# Patient Record
Sex: MALE | Race: WHITE | NOT HISPANIC OR LATINO | Employment: FULL TIME | URBAN - METROPOLITAN AREA
[De-identification: names, ages, dates, MRNs, and addresses within clinical notes are randomized per-mention and may not be internally consistent; named-entity substitution may affect disease eponyms.]

---

## 2017-02-17 ENCOUNTER — HOSPITAL ENCOUNTER (EMERGENCY)
Facility: OTHER | Age: 37
Discharge: HOME | End: 2017-02-17
Attending: EMERGENCY MEDICINE
Payer: MEDICAID

## 2017-02-17 DIAGNOSIS — R52 PAIN: ICD-10-CM

## 2017-02-17 DIAGNOSIS — S82.891A ANKLE FRACTURE, RIGHT, CLOSED, INITIAL ENCOUNTER: Primary | ICD-10-CM

## 2017-02-17 PROCEDURE — 29515 APPLICATION SHORT LEG SPLINT: CPT | Mod: RT

## 2017-02-17 PROCEDURE — 99283 EMERGENCY DEPT VISIT LOW MDM: CPT | Mod: 25

## 2017-02-17 RX ORDER — HYDROCODONE BITARTRATE AND ACETAMINOPHEN 5; 325 MG/1; MG/1
1 TABLET ORAL EVERY 6 HOURS PRN
Qty: 12 TABLET | Refills: 0 | Status: SHIPPED | OUTPATIENT
Start: 2017-02-17 | End: 2017-02-22

## 2017-02-17 RX ORDER — IBUPROFEN 200 MG
400 TABLET ORAL EVERY 6 HOURS PRN
Qty: 30 TABLET | Refills: 0
Start: 2017-02-17 | End: 2017-06-13 | Stop reason: ALTCHOICE

## 2017-02-17 NOTE — ED AVS SNAPSHOT
[facility] EMERGENCY DEPARTMENT  [address] [place] [address]               Tony Gordillo   2017  5:31 PM   ED    Description:  Male : 1980   Department:  [facility] Emergency Department           Your Care was Coordinated By:     Provider Role From To    Miguel Thomason MD Attending Provider [id number] --      Reason for Visit     Ankle Pain           Diagnoses this Visit        Comments    Ankle fracture, right, closed, initial encounter    -  Primary     Pain           ED Disposition     ED Disposition Condition Comment    Discharge             To Do List           Follow-up Information     Follow up with [phone]. Schedule an appointment as soon as possible for a visit in 1 week(s).    Contact information:    Orthopedist       These Medications        Disp Refills Start End    ibuprofen (ADVIL,MOTRIN) 200 MG tablet 30 tablet 0 2017     Take 2 tablets (400 mg total) by mouth every 6 (six) hours as needed for Pain. - Oral    [facility] [address] Ph #: [phone]       hydrocodone-acetaminophen 5-325mg (NORCO) 5-325 mg per tablet 12 tablet 0 2017     Take 1 tablet by mouth every 6 (six) hours as needed for Pain. - Oral    [facility] [address] Ph #: [phone]         [facility] On [facility]     [facility] Care [facility] -  Assistance  Registered nurses in the [facility] provide clinical advisement, health education, appointment booking, and other advisory services.  Call for this free service at [phone].             Medications           Message regarding Medications     Verify the changes and/or additions to your medication regime listed below are the same as discussed with your clinician today.  If any of these changes or additions are incorrect, please notify your healthcare provider.        START  "taking these NEW medications        Refills    ibuprofen (ADVIL,MOTRIN) 200 MG tablet 0    Sig: Take 2 tablets (400 mg total) by mouth every 6 (six) hours as needed for Pain.    Class: No Print    Route: Oral    hydrocodone-acetaminophen 5-325mg (NORCO) 5-325 mg per tablet 0    Sig: Take 1 tablet by mouth every 6 (six) hours as needed for Pain.    Class: Print    Route: Oral           Verify that the below list of medications is an accurate representation of the medications you are currently taking.  If none reported, the list may be blank. If incorrect, please contact your healthcare provider. Carry this list with you in case of emergency.           Current Medications     azelastine (ASTELIN) 137 mcg nasal spray 1 spray by Nasal route 2 (two) times daily.    blood sugar diagnostic (GLUCOSE BLOOD) Strp Test glucose 4 x daily Contour meter    diclofenac (CATAFLAM) 50 MG tablet Take 50 mg by mouth 2 (two) times daily.    fenofibrate 160 MG Tab Take 160 mg by mouth once daily.    fish oil-omega-3 fatty acids 300-1,000 mg capsule Take 2 g by mouth once daily.    hydrocodone-acetaminophen 5-325mg (NORCO) 5-325 mg per tablet Take 1 tablet by mouth every 6 (six) hours as needed for Pain.    ibuprofen (ADVIL,MOTRIN) 200 MG tablet Take 2 tablets (400 mg total) by mouth every 6 (six) hours as needed for Pain.    insulin detemir (LEVEMIR FLEXPEN) 100 unit/mL (3 mL) SubQ InPn pen Inject 40 Units into the skin 2 (two) times daily.    insulin needles, disposable, 31 x 3/16 " Ndle Inject 1 each into the skin 3 (three) times daily before meals.    levothyroxine (SYNTHROID) 50 MCG tablet Take 50 mcg by mouth once daily.    lisinopril (PRINIVIL,ZESTRIL) 40 MG tablet Take 40 mg by mouth once daily.    meloxicam (MOBIC) 7.5 MG tablet Take 7.5 mg by mouth 2 (two) times daily as needed.    metformin (GLUCOPHAGE) 1000 MG tablet Take 1 tablet (1,000 mg total) by mouth 2 (two) times daily with meals.    propranolol (INDERAL) 20 MG tablet " "Take 20 mg by mouth once daily.    SURE COMFORT PEN NEEDLE 31 X 5/16 " Ndle USE WITH LANTUS AND NOVALOG FLEXPENS AS DIRECTED    tramadol (ULTRAM) 50 mg tablet Take 50 mg by mouth every 6 (six) hours as needed.             Clinical Reference Information           Allergies as of 2/17/2017     No Known Allergies      Immunizations Administered on Date of Encounter - 2/17/2017     None      ED Micro, Lab, POCT     None      ED Imaging Orders     Start Ordered       Status Ordering Provider    02/17/17 1802 02/17/17 1801  X-Ray Ankle Complete Right  1 time imaging      Final result         Discharge Instructions           Ankle Fracture    You have an ankle fracture. This means that one or more of the bones that make up the ankle joint are broken. This causes pain, swelling, and sometimes bruising.  A fracture is treated with a splint or cast or special boot. It will take about 4 to 6 weeks for the fracture to heal. Surgery may be needed to fix severe injuries.  Home care  · You will be given a splint, cast or boot to prevent movement at the ankle joint. Unless you were told otherwise, use crutches or a walker. Dont weight on the injured leg until cleared by your healthcare provider to do so. Crutches and walkers can be rented at many pharmacies and surgical or orthopedic supply stores. Dont put weight on a splint. It will break.  · Keep your leg elevated to reduce pain and swelling. When sleeping, place a pillow under the injured leg. When sitting, support the injured leg so it is level with your waist. This is very important during the first 48 hours.  · Apply an ice pack over the injured area for no more than 15 to 20 minutes. Do this every 3 to 6 hours for the first 24 to 48 hours. Continue with ice packs 3 to 4 times a day for the next 2 days, then as needed to ease pain and swelling. To make an ice pack, put ice cubes in a plastic bag that seals at the top. Wrap the bag in a clean, thin towel or cloth. Never put " ice or an ice pack directly on the skin. You can place the ice pack directly over the cast or splint. As the ice melts, be careful that the cast or splint doesnt get wet.  · Keep the cast, splint, or boot completely dry at all times. Bathe with your cast, splint, or boot out of the water, protected with 2 large plastic bags. Place 1 bag outside of the other. Tape each bag with duct tape at the top end. Water can still leak in. So it's best to keep the cast, splint, or boot away from water. If a boot or fiberglass cast or splint gets wet, dry it with a hair dryer on a cool setting.  · You may use over-the-counter pain medicine to control pain, unless another pain medicine was prescribed. Talk with your provider beforeusing these medicines if you have chronic liver or kidney disease or ever had a stomach ulcer or GI bleeding.  Follow-up care  Follow up with your healthcare provider in 1 week, or as advised. This is to be sure the bone is healing properly. If you were given a splint, it may be changed to a cast at your follow-up visit.  If X-rays were taken, you will be told of any new findings that may affect your care.  When to seek medical advice  Call your healthcare provider right away if any of these occur:  · The plaster cast or splint becomes wet or soft  · The fiberglass cast or splint stays wet for more than 24 hours  · There is increased tightness, sore areas, or pain under the cast or splint  · Your toes become swollen, cold, blue, numb, or tingly  · The cast becomes loose  · The cast has a bad smell  · The cast develops cracks or breaks   Date Last Reviewed: 11/23/2015  © 9162-6672 [employer]. [address] [place] [address], [place] [address]. All rights reserved. This information is not intended as a substitute for professional medical care. Always follow your healthcare professional's instructions.          Treating Ankle Fractures  Treatment of an ankle fracture may be surgical or non-surgical,  depending on where and how badly your ankle has been broken.   Some stable ankle fractures may be treated in a walking boot. These fractures are stable and will heal without additional treatment. You may be able to start walking on your ankle as soon as the pain improves.  Some fractures may require cast treatment.  A cast may be used to hold the broken bone in its proper position for healing. Sometimes the sections of broken bone must first be realigned. This is done by a process known as reduction. The type of reduction is based on how far the bone has moved from its normal position.     Sites of common ankle fractures    Closed reduction  If you have a clean break with little soft tissue damage, closed reduction will probably be used. Before the procedure, you may be given a light anesthetic to relax your muscles. Then your doctor manually readjusts the position of the broken bone.  Open reduction  If you have an open fracture (bone sticking out through the skin), badly misaligned sections of bone, or severe tissue injury, open reduction is likely. A general anesthetic may be used during the procedure to let you sleep and relax your muscles. Your doctor then makes one or more incisions to realign the bone and repair soft tissue. Screws or plates may be used to hold the bone in place during healing.    Casting the fracture  To make sure the bone is aligned properly, an X-ray is taken. Then the ankle is put in a cast to hold the bone in place during healing. Youll probably have to wear the cast for several weeks. For less severe fractures, a walking boot, brace, or splint may be all thats needed to hold the bone in place during healing.  The road to healing  Once your fracture has been treated, your doctor will tell you how to help it heal. You may be told to limit ankle use or weight-bearing activities, take medicines, and elevate the foot. If you have a cast, remember to keep it dry.   Date Last Reviewed:  9/9/2015  © 8951-0165 [employer]. [address] [place] [address], [place] [address]. All rights reserved. This information is not intended as a substitute for professional medical care. Always follow your healthcare professional's instructions.           [facility] complies with applicable Federal civil rights laws and does not discriminate on the basis of race, color, national origin, age, disability, or sex.        Language Assistance Services     ATTENTION: Language assistance services are available, free of charge. Please call [phone].      ATENCIÓN: Si habla español, tiene a jones disposición servicios gratuitos de asistencia lingüística. Llame al [phone].     CHÚ Ý: N?u b?n nói Ti?ng Vi?t, có các d?ch v? h? tr? ngôn ng? mi?n phí dành cho b?n. G?i s? [phone].

## 2017-02-17 NOTE — ED TRIAGE NOTES
right ankle pain since Tues secondary to twisting it in a hole on Tues. no deformity, no swelling, no bruising noted. Positive PMS noted.

## 2017-02-18 NOTE — ED PROVIDER NOTES
Encounter Date: 2/17/2017       History     Chief Complaint   Patient presents with    Ankle Pain     right ankle pain since Tues secondary to twisting it in a hole on Tues. no deformity, no swelling, no bruising noted. Positive PMS noted.     Review of patient's allergies indicates:  No Known Allergies  Patient is a 36 y.o. male presenting with the following complaint: foot injury. The history is provided by the patient.   Foot Injury    The incident occurred at work. The injury mechanism was torsion. The incident occurred several days ago (Since is not better my boss wanted me to come get it checked out since it happened at work). The pain is present in the right ankle. The quality of the pain is described as aching (The patient has been ambulating on the foot with an antalgic gait). The pain is at a severity of 3/10. Associated symptoms include loss of motion. Pertinent negatives include no inability to bear weight. The symptoms are aggravated by bearing weight.     Past Medical History   Diagnosis Date    Diabetes mellitus      No past medical history pertinent negatives.  Past Surgical History   Procedure Laterality Date    Left knee x 2      Knee surgery       acl,mcl,pcl     Family History   Problem Relation Age of Onset    Diabetes Mother     Diabetes Father      Social History   Substance Use Topics    Smoking status: Never Smoker    Smokeless tobacco: Never Used    Alcohol use Yes      Comment: 1-2 beers every 2 weeks     Review of Systems   Constitutional: Negative.    HENT: Negative.    Eyes: Negative.    Respiratory: Negative.    Cardiovascular: Negative.    Gastrointestinal: Negative.    Endocrine: Negative.    Genitourinary: Negative.    Musculoskeletal: Negative.    Skin: Negative.    Allergic/Immunologic: Negative.    Neurological: Negative.    Hematological: Negative.    Psychiatric/Behavioral: Negative.    All other systems reviewed and are negative.      Physical Exam   Initial Vitals    BP Pulse Resp Temp SpO2   -- -- -- -- --            Physical Exam    Nursing note and vitals reviewed.  Constitutional: Vital signs are normal. He appears well-developed. He is active and cooperative.   HENT:   Head: Normocephalic and atraumatic.   Eyes: Conjunctivae, EOM and lids are normal. Pupils are equal, round, and reactive to light.   Neck: Trachea normal and full passive range of motion without pain. Neck supple. No thyroid mass present.   Cardiovascular: Normal rate, regular rhythm, S1 normal, S2 normal, normal heart sounds, intact distal pulses and normal pulses.   Abdominal: Soft. Normal appearance, normal aorta and bowel sounds are normal.   Musculoskeletal: Normal range of motion.        Feet:    Lymphadenopathy:     He has no axillary adenopathy.   Neurological: He is alert and oriented to person, place, and time.   Skin: Skin is warm, dry and intact.   Psychiatric: He has a normal mood and affect. His speech is normal and behavior is normal. Judgment and thought content normal. Cognition and memory are normal.         ED Course   Procedures  Labs Reviewed - No data to display                       Imaging Results         X-Ray Ankle Complete Right (Final result) Result time:  02/17/17 18:53:52    Final result by Interface, Rad Results In (02/17/17 18:53:52)    Narrative:    Study Desc:   XR ANKLE COMPLETE 3 VIEW RIGHT  Clinical History: Fall, lateral right ankle pain     Comparison: None     Findings:     3 views right ankle.     Lateral soft tissue swelling.  5 mm linear ossific density adjacent to the posterior   malleolus without a donor site seen in addition to a smaller corticated ossific fragment   more inferiorly on the lateral view.  Findings are likely chronic in nature.  There is a   nondisplaced avulsion fracture at the inferior tip of the lateral malleolus.  Ankle   mortise is symmetric.     Impression:     Nondisplaced avulsion fracture inferior tip of the lateral malleolus seen on the  oblique   view.     SL: 24  Signed by: Levi Hughes MD  2017-02-17 18:53:50                    ED Course     Clinical Impression:   The primary encounter diagnosis was Ankle fracture, right, closed, initial encounter. A diagnosis of Pain was also pertinent to this visit.          Miguel Thomason MD  02/17/17 0160

## 2017-02-22 ENCOUNTER — OFFICE VISIT (OUTPATIENT)
Dept: ORTHOPEDICS | Facility: CLINIC | Age: 37
End: 2017-02-22
Payer: MEDICAID

## 2017-02-22 VITALS — HEIGHT: 73 IN | BODY MASS INDEX: 41.75 KG/M2 | WEIGHT: 315 LBS

## 2017-02-22 DIAGNOSIS — M25.579 ANKLE PAIN, UNSPECIFIED CHRONICITY, UNSPECIFIED LATERALITY: Primary | ICD-10-CM

## 2017-02-22 PROCEDURE — 99999 PR PBB SHADOW E&M-EST. PATIENT-LVL II: CPT | Mod: PBBFAC,,, | Performed by: ORTHOPAEDIC SURGERY

## 2017-02-22 PROCEDURE — 99213 OFFICE O/P EST LOW 20 MIN: CPT | Mod: S$PBB,,, | Performed by: ORTHOPAEDIC SURGERY

## 2017-02-22 PROCEDURE — 99212 OFFICE O/P EST SF 10 MIN: CPT | Mod: PBBFAC,PO | Performed by: ORTHOPAEDIC SURGERY

## 2017-02-22 NOTE — PROGRESS NOTES
Tony Gordillo had an inversion injury to his right ankle about a week ago,   comes in today with a boot, want to be checked.    PHYSICAL EXAMINATION:  Today shows tender at the ATFL.  No instability.  Skin is   intact.  Compartments are soft.    X-rays overall look pretty good.    ASSESSMENT:  Inversion injury to the right ankle.    PLAN:  He has a boot.  He can continue with this with a home exercise program to   work on.  They can start in a couple of weeks.  We can check him back then to   see how things are coming along.      PBB/PN  dd: 02/22/2017 12:14:57 (CST)  td: 02/22/2017 23:36:13 (CST)  Doc ID   #[id number]  Job ID #[id number]    CC:

## 2017-06-13 ENCOUNTER — HOSPITAL ENCOUNTER (EMERGENCY)
Facility: OTHER | Age: 37
Discharge: HOME | End: 2017-06-13
Attending: EMERGENCY MEDICINE
Payer: MEDICAID

## 2017-06-13 VITALS
DIASTOLIC BLOOD PRESSURE: 96 MMHG | HEIGHT: 73 IN | BODY MASS INDEX: 41.75 KG/M2 | RESPIRATION RATE: 20 BRPM | SYSTOLIC BLOOD PRESSURE: 171 MMHG | TEMPERATURE: 98 F | WEIGHT: 315 LBS | OXYGEN SATURATION: 97 % | HEART RATE: 76 BPM

## 2017-06-13 DIAGNOSIS — M77.31 HEEL SPUR, RIGHT: ICD-10-CM

## 2017-06-13 DIAGNOSIS — M79.673 ACUTE FOOT PAIN, UNSPECIFIED LATERALITY: Primary | ICD-10-CM

## 2017-06-13 DIAGNOSIS — T14.90XA TRAUMA: ICD-10-CM

## 2017-06-13 LAB — POCT GLUCOSE: 257 MG/DL (ref 70–110)

## 2017-06-13 PROCEDURE — 63600175 PHARM REV CODE 636 W HCPCS: Performed by: EMERGENCY MEDICINE

## 2017-06-13 PROCEDURE — 99284 EMERGENCY DEPT VISIT MOD MDM: CPT | Mod: 25

## 2017-06-13 PROCEDURE — 96372 THER/PROPH/DIAG INJ SC/IM: CPT

## 2017-06-13 RX ORDER — KETOROLAC TROMETHAMINE 30 MG/ML
30 INJECTION, SOLUTION INTRAMUSCULAR; INTRAVENOUS
Status: COMPLETED | OUTPATIENT
Start: 2017-06-13 | End: 2017-06-13

## 2017-06-13 RX ORDER — HYDROCODONE BITARTRATE AND ACETAMINOPHEN 5; 325 MG/1; MG/1
1 TABLET ORAL EVERY 8 HOURS PRN
Qty: 6 TABLET | Refills: 0 | Status: SHIPPED | OUTPATIENT
Start: 2017-06-13 | End: 2018-09-28

## 2017-06-13 RX ORDER — NAPROXEN 500 MG/1
500 TABLET ORAL 2 TIMES DAILY WITH MEALS
Qty: 20 TABLET | Refills: 0 | Status: SHIPPED | OUTPATIENT
Start: 2017-06-13 | End: 2018-09-28

## 2017-06-13 RX ORDER — CYCLOBENZAPRINE HCL 10 MG
10 TABLET ORAL 3 TIMES DAILY PRN
Qty: 15 TABLET | Refills: 0 | Status: SHIPPED | OUTPATIENT
Start: 2017-06-13 | End: 2017-06-18

## 2017-06-13 RX ORDER — METHYLPREDNISOLONE 4 MG/1
TABLET ORAL
Qty: 1 PACKAGE | Refills: 0 | Status: SHIPPED | OUTPATIENT
Start: 2017-06-13 | End: 2017-07-04

## 2017-06-13 RX ADMIN — KETOROLAC TROMETHAMINE 30 MG: 30 INJECTION, SOLUTION INTRAMUSCULAR at 03:06

## 2017-06-13 NOTE — ED PROVIDER NOTES
Encounter Date: 6/13/2017       History     Chief Complaint   Patient presents with    Foot Pain     + right foot and heel pain x 2 month with it worsening in the last week. patient has had previous surgery on the right ankle but has had no relief with rest and elevation      Review of patient's allergies indicates:  No Known Allergies  Tony Gordillo is a 36 y.o. male who presents to the Emergency Department with  right heel pain.  Patient states he has severe pain to the bottom of his right foot.  Pain is worse when he walks on it.  Patient states at night when he elevates it it'll feel better but the morning when he tries to walk on it the pain gets much worse as the day goes on.  Patient states in February 2017 he broke his ankle has been having right bottom of the foot pain ever since.  She reports a throbbing pain in the vomitus right foot last 3 weeks.  He had been using rest and elevation to treat the pain until last week.  Now pain is becoming unbearable.      The history is provided by the patient.     Past Medical History:   Diagnosis Date    Diabetes mellitus      Past Surgical History:   Procedure Laterality Date    KNEE SURGERY      acl,mcl,pcl    left knee x 2       Family History   Problem Relation Age of Onset    Diabetes Mother     Diabetes Father      Social History   Substance Use Topics    Smoking status: Never Smoker    Smokeless tobacco: Never Used    Alcohol use Yes      Comment: 1-2 beers every 2 weeks     Review of Systems   Constitutional: Negative for fever.   HENT: Negative for sore throat.    Respiratory: Negative for shortness of breath.    Cardiovascular: Negative for chest pain.   Gastrointestinal: Negative for nausea.   Genitourinary: Negative for dysuria.   Musculoskeletal: Positive for arthralgias. Negative for back pain.   Skin: Negative for rash.   Neurological: Negative for numbness.   Hematological: Does not bruise/bleed easily.   All other systems reviewed and are  negative.      Physical Exam     Initial Vitals [06/13/17 1155]   BP Pulse Resp Temp SpO2   (!) 171/96 76 20 97.7 °F (36.5 °C) 97 %     Physical Exam    Nursing note and vitals reviewed.  Constitutional: He appears well-developed and well-nourished. He is not diaphoretic. He is Obese . No distress.   HENT:   Head: Normocephalic and atraumatic.   Right Ear: External ear normal.   Left Ear: External ear normal.   Nose: Nose normal.   Mouth/Throat: Oropharynx is clear and moist.   Eyes: EOM are normal. Pupils are equal, round, and reactive to light.   Neck: Normal range of motion. Neck supple.   Cardiovascular: Normal rate, regular rhythm, normal heart sounds and intact distal pulses. Exam reveals no gallop and no friction rub.    No murmur heard.  Pulmonary/Chest: Breath sounds normal. No stridor. No respiratory distress. He has no wheezes. He has no rhonchi. He has no rales. He exhibits no tenderness.   Abdominal: Soft. Bowel sounds are normal. He exhibits no distension (obese) and no mass. There is no tenderness. There is no rebound and no guarding.   Musculoskeletal: Normal range of motion. He exhibits tenderness. He exhibits no edema.        Right hip: Normal.        Left hip: Normal.        Right knee: Normal.        Left knee: Normal.        Right ankle: Normal.        Left ankle: Normal.        Left foot: Normal.        Feet:    Neurological: He is alert and oriented to person, place, and time. He has normal strength and normal reflexes. He displays normal reflexes. No sensory deficit.   Skin: Skin is warm and dry. Capillary refill takes less than 2 seconds.   Psychiatric: He has a normal mood and affect.         ED Course   Procedures  Labs Reviewed   POCT GLUCOSE - Abnormal; Notable for the following:        Result Value    POCT Glucose 257 (*)     All other components within normal limits        Imaging Results          X-Ray Foot Complete Right (Final result)  Result time 06/13/17 14:43:51    Final result  by Chalo Dunlap MD (06/13/17 14:43:51)                 Narrative:    Study Desc:   XR FOOT COMPLETE 3 VIEW RIGHT  Clinical History: Pain.     3 views right foot.     Findings:  There is no acute fracture or dislocation.  No significant soft tissue swelling.  The   joint spaces appear preserved.  No radiopaque foreign body.  There is under calcaneal   spur.  Curvilinear ossific densities adjacent to the distal tibia seen on lateral   projection presumably chronic etiology.     If there is persistent clinical concern, follow up radiographs or MRI is recommended.     Impression:  No acute fracture.     SL: 24 Signed by: Chalo Dunlap MD  2017-06-13 14:43:49 [CDT]                                                   ED Course       Medical decision making   Chief complaint: right heel pain  Differential diagnosis: Strain, sprain, fracture, contusion, and heel spur  Treatment in the ED Physical Exam, and Toradol for pain  Patient reports decreased pain after medication.    Discussed outpatient treatment plan, and imaging results.    Fill and take prescriptions for naproxen, Flexeril, Medrol Dosepak, and Norco as directed.  Return to the ED if symptoms worsen or do not resolve.   Answered questions and discussed discharge plan.    Patient feels much better and is ready for discharge.  Follow up with PCP in 1 days.    Clinical Impression:   The primary encounter diagnosis was Acute foot pain, unspecified laterality. Diagnoses of Trauma and Heel spur, right were also pertinent to this visit.          Anum Longoria DO  06/17/17 0944

## 2017-06-13 NOTE — ED NOTES
Pt identifiers checked and correct.    LOC: The patient is awake, alert and aware of environment with an appropriate affect, the patient is oriented x 3 and speaking appropriately.   APPEARANCE: Patient resting comfortably and in no acute distress, patient is clean and well groomed, patient's clothing is properly fastened.   SKIN: The skin is warm and dry, color consistent with ethnicity, patient has normal skin turgor and moist mucus membranes, skin intact, no breakdown or bruising noted.   MUSCULOSKELETAL: Patient moving all extremities spontaneously, no obvious swelling or deformities noted / R heel tenderness and pain w/ pressure    RESPIRATORY: Airway is open and patent, respirations are spontaneous, patient has a normal effort and rate, no accessory muscle use noted.   CARDIAC: Patient has a normal rate and regular rhythm, no periphreal edema noted, capillary refill < 3 seconds.   ABDOMEN: Soft and non tender to palpation, no distention noted, active bowel sounds present in all four quadrants.   NEUROLOGIC: PERRL, 3 mm bilaterally, eyes open spontaneously, behavior appropriate to situation, follows commands, facial expression symmetrical, bilateral hand grasp equal and even, purposeful motor response noted, normal sensation in all extremities when touched with a finger.

## 2017-06-27 ENCOUNTER — HOSPITAL ENCOUNTER (EMERGENCY)
Facility: OTHER | Age: 37
Discharge: HOME | End: 2017-06-27
Attending: INTERNAL MEDICINE
Payer: MEDICAID

## 2017-06-27 VITALS
DIASTOLIC BLOOD PRESSURE: 94 MMHG | HEART RATE: 111 BPM | TEMPERATURE: 99 F | OXYGEN SATURATION: 98 % | SYSTOLIC BLOOD PRESSURE: 148 MMHG | RESPIRATION RATE: 18 BRPM

## 2017-06-27 DIAGNOSIS — S63.614A SPRAIN OF RIGHT RING FINGER, UNSPECIFIED SITE OF FINGER, INITIAL ENCOUNTER: Primary | ICD-10-CM

## 2017-06-27 DIAGNOSIS — T14.90XA TRAUMA: ICD-10-CM

## 2017-06-27 PROCEDURE — 25000003 PHARM REV CODE 250: Performed by: INTERNAL MEDICINE

## 2017-06-27 PROCEDURE — 99283 EMERGENCY DEPT VISIT LOW MDM: CPT

## 2017-06-27 RX ORDER — IBUPROFEN 400 MG/1
800 TABLET ORAL
Status: COMPLETED | OUTPATIENT
Start: 2017-06-27 | End: 2017-06-27

## 2017-06-27 RX ORDER — IBUPROFEN 800 MG/1
800 TABLET ORAL EVERY 8 HOURS PRN
Qty: 20 TABLET | Refills: 0 | Status: SHIPPED | OUTPATIENT
Start: 2017-06-27 | End: 2018-09-28

## 2017-06-27 RX ADMIN — IBUPROFEN 800 MG: 400 TABLET ORAL at 10:06

## 2017-06-28 NOTE — ED PROVIDER NOTES
Encounter Date: 6/27/2017       History     Chief Complaint   Patient presents with    Hand Pain     L ring finger caught between strap and tire, +swelling/pain, limited ROM     36-year-old male presents to the emergency department with a right fourth finger pain following an injury at work      The history is provided by the patient. No  was used.   Hand Injury    The incident occurred today. The incident occurred at work. The injury mechanism was torsion. The pain is present in the right fingers. The quality of the pain is described as aching. The pain is at a severity of 4/10. The pain has been constant since the incident. Pertinent negatives include no fever. He reports no foreign bodies present. The symptoms are aggravated by movement, palpation and use. He has tried nothing for the symptoms.     Review of patient's allergies indicates:  No Known Allergies  Past Medical History:   Diagnosis Date    Diabetes mellitus      Past Surgical History:   Procedure Laterality Date    ANKLE SURGERY      KNEE SURGERY      acl,mcl,pcl    left knee x 2       Family History   Problem Relation Age of Onset    Diabetes Mother     Diabetes Father      Social History   Substance Use Topics    Smoking status: Never Smoker    Smokeless tobacco: Never Used    Alcohol use Yes      Comment: 1-2 beers every 2 weeks     Review of Systems   Constitutional: Negative for fever.   Musculoskeletal:        Finger pain   All other systems reviewed and are negative.      Physical Exam     Initial Vitals [06/27/17 2039]   BP Pulse Resp Temp SpO2   (!) 148/94 (!) 111 18 98.8 °F (37.1 °C) 98 %      MAP       112         Physical Exam    Nursing note and vitals reviewed.  Constitutional: He appears well-developed and well-nourished.   HENT:   Head: Normocephalic.   Eyes: EOM are normal.   Neck: Normal range of motion.   Cardiovascular: Normal rate and regular rhythm.   Pulmonary/Chest: Breath sounds normal. No  respiratory distress.   Abdominal: He exhibits no distension.   Musculoskeletal:   Right ring finger pain upon movement; tenderness to palpation; no gross deformity; NV intact   Neurological: He is alert.   Skin: Skin is warm and dry.         ED Course   Procedures  Labs Reviewed - No data to display          Medical Decision Making:   Initial Assessment:   36-year-old male presents to the emergency department with a right fourth finger pain following an injury at work  Differential Diagnosis:   Finger sprain  Finger fracture  ED Management:  Patient was given instructions for finger sprain and a prescription for ibuprofen.  X-ray of the hand showed no acute fracture.  He was advised to follow-up with his primary care physician within the next week for reevaluation.                   ED Course     Clinical Impression:   The primary diagnosis is right fourth finger sprain  Disposition:   Disposition: Discharged  Condition: Stable                        Miguel Andres MD  07/08/17 5724

## 2017-06-29 ENCOUNTER — OFFICE VISIT (OUTPATIENT)
Dept: ORTHOPEDICS | Facility: CLINIC | Age: 37
End: 2017-06-29
Payer: MEDICAID

## 2017-06-29 DIAGNOSIS — M72.2 PLANTAR FASCIITIS: Primary | ICD-10-CM

## 2017-06-29 DIAGNOSIS — M25.571 RIGHT ANKLE PAIN, UNSPECIFIED CHRONICITY: ICD-10-CM

## 2017-06-29 DIAGNOSIS — E66.01 MORBID OBESITY, UNSPECIFIED OBESITY TYPE: ICD-10-CM

## 2017-06-29 DIAGNOSIS — Z79.4 INSULIN LONG-TERM USE: ICD-10-CM

## 2017-06-29 PROCEDURE — 99214 OFFICE O/P EST MOD 30 MIN: CPT | Mod: 25,S$PBB,, | Performed by: ORTHOPAEDIC SURGERY

## 2017-06-29 PROCEDURE — 99999 PR PBB SHADOW E&M-EST. PATIENT-LVL I: CPT | Mod: PBBFAC,,, | Performed by: ORTHOPAEDIC SURGERY

## 2017-06-29 PROCEDURE — 97760 ORTHOTIC MGMT&TRAING 1ST ENC: CPT | Mod: ,,, | Performed by: ORTHOPAEDIC SURGERY

## 2017-06-29 PROCEDURE — 99211 OFF/OP EST MAY X REQ PHY/QHP: CPT | Mod: PBBFAC,PO | Performed by: ORTHOPAEDIC SURGERY

## 2017-06-29 NOTE — PROGRESS NOTES
Dictation #1  MRN:[id number]  CSN:[id number]  36-year-old complaining of pain in his right heel.  He's had this now for about 2 months time.  Pain in the morning when he first gets out of bed.    Exam shows tenderness the plantar aspect of heel.  No signs of infection on stability*    X-rays show calcaneal osteophyte    Plan symptomatic care heelcord stretching will give him Visco heels as well as nights wants to wear as well as literature on plantar fasciitis    Further History  Aching pain  Worse with activity  Relieved with rest  No other associated symptoms  No other radiation    Further Exam  Alert and oriented  Pleasant  Contralateral limb has appropriate range of motion for age and condition  Contralateral limb has appropriate strength for age and condition  Contralateral limb has appropriate stability  for age and condition  No adenopathy  Pulses are appropriate for current condition  Skin is intact        Chief Complaint    Chief Complaint   Patient presents with    Right Ankle - Pain       HPI  Tony Gordillo is a 36 y.o.  male who presents with       Past Medical History  Past Medical History:   Diagnosis Date    Diabetes mellitus        Past Surgical History  Past Surgical History:   Procedure Laterality Date    ANKLE SURGERY      KNEE SURGERY      acl,mcl,pcl    left knee x 2         Medications  Current Outpatient Prescriptions   Medication Sig    azelastine (ASTELIN) 137 mcg nasal spray 1 spray by Nasal route 2 (two) times daily.    blood sugar diagnostic (GLUCOSE BLOOD) Strp Test glucose 4 x daily Contour meter (Patient taking differently: Test glucose 3 x daily Contour meter)    diclofenac (CATAFLAM) 50 MG tablet Take 50 mg by mouth 2 (two) times daily.    fenofibrate 160 MG Tab Take 160 mg by mouth once daily.    fish oil-omega-3 fatty acids 300-1,000 mg capsule Take 2 g by mouth once daily.    hydrocodone-acetaminophen 5-325mg (NORCO) 5-325 mg per tablet Take 1 tablet by mouth every 8  "(eight) hours as needed for Pain (As needed for severe 10 out of 10 pain).    ibuprofen (ADVIL,MOTRIN) 800 MG tablet Take 1 tablet (800 mg total) by mouth every 8 (eight) hours as needed for Pain.    insulin detemir (LEVEMIR FLEXPEN) 100 unit/mL (3 mL) SubQ InPn pen Inject 40 Units into the skin 2 (two) times daily. (Patient taking differently: Inject 80 Units into the skin every evening. )    INSULIN GLARGINE,HUM.REC.ANLOG (BASAGLAR KWIKPEN SUBQ) Inject 50 Units into the skin 2 (two) times daily.    INSULIN GLULISINE (APIDRA SUBQ) Inject 15 Units into the skin 3 (three) times daily.    insulin needles, disposable, 31 x 3/16 " Ndle Inject 1 each into the skin 3 (three) times daily before meals.    levothyroxine (SYNTHROID) 50 MCG tablet Take 50 mcg by mouth once daily.    lisinopril (PRINIVIL,ZESTRIL) 40 MG tablet Take 40 mg by mouth once daily.    meloxicam (MOBIC) 7.5 MG tablet Take 7.5 mg by mouth 2 (two) times daily as needed.    metformin (GLUCOPHAGE) 1000 MG tablet Take 1 tablet (1,000 mg total) by mouth 2 (two) times daily with meals.    methylPREDNISolone (MEDROL DOSEPACK) 4 mg tablet 4 mg six-day dose tapered Dosepak  Take as directed on pack  Dispense one pack    naproxen (NAPROSYN) 500 MG tablet Take 1 tablet (500 mg total) by mouth 2 (two) times daily with meals.    propranolol (INDERAL) 20 MG tablet Take 20 mg by mouth once daily.    SURE COMFORT PEN NEEDLE 31 X 5/16 " Ndle USE WITH LANTUS AND NOVALOG FLEXPENS AS DIRECTED     No current facility-administered medications for this visit.        Allergies  Review of patient's allergies indicates:  No Known Allergies    Family History  Family History   Problem Relation Age of Onset    Diabetes Mother     Diabetes Father        Social History  Social History     Social History    Marital status:      Spouse name: N/A    Number of children: N/A    Years of education: N/A     Occupational History    Not on file.     Social History " Main Topics    Smoking status: Never Smoker    Smokeless tobacco: Never Used    Alcohol use Yes      Comment: 1-2 beers every 2 weeks    Drug use: No    Sexual activity: Not on file     Other Topics Concern    Not on file     Social History Narrative    No narrative on file               Review of Systems     Constitutional: Negative    HENT: Negative  Eyes: Negative  Respiratory: Negative  Cardiovascular: Negative  Musculoskeletal: HPI  Skin: Negative  Neurological: Negative  Hematological: Negative  Endocrine: Negative                 Physical Exam    There were no vitals filed for this visit.  There is no height or weight on file to calculate BMI.  Physical Examination:     General appearance -  well appearing, and in no distress  Mental status - awake  Neck - supple  Chest -  symmetric air entry  Heart - normal rate   Abdomen - soft      Assessment     1. Plantar fasciitis    2. Right ankle pain, unspecified chronicity    3. Morbid obesity, unspecified obesity type    4. Insulin long-term use          PlanWe performed a custom orthotic/brace fitting, adjusting and training with the patient. The patient demonstrated understanding and proper care. This was performed for 15 minutes.

## 2017-11-05 ENCOUNTER — HOSPITAL ENCOUNTER (EMERGENCY)
Facility: OTHER | Age: 37
Discharge: HOME | End: 2017-11-05
Attending: EMERGENCY MEDICINE
Payer: MEDICAID

## 2017-11-05 VITALS
WEIGHT: 300 LBS | HEIGHT: 73 IN | HEART RATE: 89 BPM | DIASTOLIC BLOOD PRESSURE: 99 MMHG | TEMPERATURE: 98 F | OXYGEN SATURATION: 98 % | SYSTOLIC BLOOD PRESSURE: 158 MMHG | BODY MASS INDEX: 39.76 KG/M2 | RESPIRATION RATE: 18 BRPM

## 2017-11-05 DIAGNOSIS — R21 RASH AND NONSPECIFIC SKIN ERUPTION: Primary | ICD-10-CM

## 2017-11-05 LAB — POCT GLUCOSE: 301 MG/DL (ref 70–110)

## 2017-11-05 PROCEDURE — 99283 EMERGENCY DEPT VISIT LOW MDM: CPT

## 2017-11-05 PROCEDURE — 25000003 PHARM REV CODE 250: Performed by: NURSE PRACTITIONER

## 2017-11-05 RX ORDER — MUPIROCIN 20 MG/G
1 OINTMENT TOPICAL
Status: COMPLETED | OUTPATIENT
Start: 2017-11-05 | End: 2017-11-05

## 2017-11-05 RX ADMIN — MUPIROCIN 22 G: 20 OINTMENT TOPICAL at 04:11

## 2017-11-05 NOTE — ED PROVIDER NOTES
"Encounter Date: 11/5/2017       History     Chief Complaint   Patient presents with    Rash     Pt states, "At two today I noticed this thing on my right arm.Now it is itching and burning."     The history is provided by the patient. No  was used.   Rash    This is a new problem. The current episode started today. The problem has been gradually improving. The problem is associated with nothing. Affected Location: left forearm. The pain is at a severity of 0/10. Associated symptoms include blisters and itching. Pertinent negatives include no pain and no weeping. He has tried nothing for the symptoms.     Review of patient's allergies indicates:  No Known Allergies  Past Medical History:   Diagnosis Date    Diabetes mellitus      Past Surgical History:   Procedure Laterality Date    ANKLE SURGERY      KNEE SURGERY      acl,mcl,pcl    left knee x 2       Family History   Problem Relation Age of Onset    Diabetes Mother     Diabetes Father      Social History   Substance Use Topics    Smoking status: Never Smoker    Smokeless tobacco: Never Used    Alcohol use Yes      Comment: 1-2 beers every 2 weeks     Review of Systems   Constitutional: Negative.  Negative for fever.   HENT: Negative.  Negative for sore throat.    Eyes: Negative.    Respiratory: Negative.  Negative for shortness of breath.    Cardiovascular: Negative.  Negative for chest pain.   Gastrointestinal: Negative.  Negative for nausea.   Genitourinary: Negative.  Negative for dysuria.   Musculoskeletal: Negative.  Negative for back pain.   Skin: Positive for itching and rash.   Allergic/Immunologic: Negative.    Neurological: Negative.  Negative for weakness.   Hematological: Does not bruise/bleed easily.   Psychiatric/Behavioral: Negative.        Physical Exam     Initial Vitals [11/05/17 1456]   BP Pulse Resp Temp SpO2   (!) 166/105 95 16 98.2 °F (36.8 °C) 97 %      MAP       125.33         Physical Exam    Nursing note and " vitals reviewed.  Constitutional: He appears well-developed and well-nourished. He is not diaphoretic.  Non-toxic appearance. He does not appear ill. No distress.   HENT:   Head: Normocephalic and atraumatic.   Eyes: Conjunctivae are normal.   Neck: Normal range of motion.   Cardiovascular: Normal rate, regular rhythm, normal heart sounds and intact distal pulses. Exam reveals no gallop and no friction rub.    No murmur heard.  Pulmonary/Chest: Breath sounds normal. No respiratory distress. He has no wheezes. He has no rhonchi. He has no rales. He exhibits no tenderness.   Musculoskeletal: Normal range of motion.   Neurological: He is alert and oriented to person, place, and time.   Skin: Skin is warm and dry. Rash (non-specific skin eruption with three small vesicles noted with scant erythema and no swelling or drainage or tenderness on palpation.) noted.   Psychiatric: He has a normal mood and affect. His behavior is normal. Judgment and thought content normal.         ED Course   Procedures  Labs Reviewed   POCT GLUCOSE - Abnormal; Notable for the following:        Result Value    POCT Glucose 301 (*)     All other components within normal limits   POCT GLUCOSE             Medical Decision Making:   Initial Assessment:   Rash/skin erruption  Differential Diagnosis:   Abscess, cellulitis, folliculitis  ED Management:  Mupirocin ointment applied in the ER and patient discharged home on mupirocin.  Patient instructed to keep affected area clean and dry with mild soap and water.  Patient instructed to follow with his primary care provider in 2 days for wound check and return to the ER as needed if symptoms worsen or fail to improve.  Patient verbalized an understanding of discharge instructions and treatment plan.              Attending Attestation:     Physician Attestation Statement for NP/PA:   I discussed this assessment and plan of this patient with the NP/PA, but I did not personally examine the patient. The  face to face encounter was performed by the NP/PA.                  ED Course      Clinical Impression:   The encounter diagnosis was Rash and nonspecific skin eruption.                           Toussaint Battley III, FNP  11/05/17 2962       Diana Mckeon MD  11/05/17 9992

## 2018-06-19 ENCOUNTER — OFFICE VISIT (OUTPATIENT)
Dept: URGENT CARE | Facility: CLINIC | Age: 38
End: 2018-06-19
Payer: MEDICAID

## 2018-06-19 VITALS
OXYGEN SATURATION: 99 % | BODY MASS INDEX: 39.76 KG/M2 | TEMPERATURE: 99 F | DIASTOLIC BLOOD PRESSURE: 95 MMHG | SYSTOLIC BLOOD PRESSURE: 144 MMHG | HEART RATE: 107 BPM | HEIGHT: 73 IN | WEIGHT: 300 LBS

## 2018-06-19 DIAGNOSIS — K52.9 GASTROENTERITIS: Primary | ICD-10-CM

## 2018-06-19 DIAGNOSIS — R52 BODY ACHES: ICD-10-CM

## 2018-06-19 PROCEDURE — 99214 OFFICE O/P EST MOD 30 MIN: CPT | Mod: S$GLB,,, | Performed by: SURGERY

## 2018-06-19 RX ORDER — DICYCLOMINE HYDROCHLORIDE 20 MG/1
20 TABLET ORAL 3 TIMES DAILY PRN
Qty: 21 TABLET | Refills: 0 | Status: SHIPPED | OUTPATIENT
Start: 2018-06-19 | End: 2018-06-26

## 2018-06-19 RX ORDER — MUPIROCIN 20 MG/G
OINTMENT TOPICAL DAILY
Qty: 1 TUBE | Refills: 0 | Status: SHIPPED | OUTPATIENT
Start: 2018-06-19 | End: 2018-09-28

## 2018-06-19 NOTE — LETTER
June 19, 2018      [facility] [address], [place] [address]  Phone: [phone]  Fax: [phone]       Patient: Tony Gordillo   YOB: 1980  Date of Visit: 06/19/2018    To Whom It May Concern:    Dorothy Gordlilo  was at [facility] on 06/19/2018. He may return to work/school on 6/21/2018 with no restrictions. If you have any questions or concerns, or if I can be of further assistance, please do not hesitate to contact me.    Sincerely,    Kat De La Cruz MD

## 2018-06-19 NOTE — PATIENT INSTRUCTIONS
Noninfectious Gastroenteritis (Ages 6 Years to Adult)    Gastroenteritis can cause nausea, vomiting, diarrhea, and abdominal cramping. This may occur as a result of food sensitivity, inflammation of your gastrointestinal tract, medicines, stress, or other causes not related to infection. Your symptoms will usually last from 1 to 3 days, but can last longer. Antibiotics are not effective, but simple home treatment will be helpful.  Home care  Medicine  · You may use acetaminophen or NSAID medicines like ibuprofen or naproxen to control fever, unless another medicine is prescribed. (Note: If you have chronic liver or kidney disease, or ever had a stomach ulcer or gastrointestinalI bleeding, talk with your healthcare provider before using these medicines.) Aspirin should never be used in anyone under 18 years of age who is ill with a fever. It may cause severe liver damage. Don't increase your NSAID medicines if you are already taking these medicines for another condition (like arthritis). Don't use NSAIDS if you are on aspirin (such as for heart disease, or after a stroke).  · If medicines for diarrhea or vomiting are prescribed, take only as directed.  General care and preventing spread of the illness  · If symptoms are severe, rest at home for the next 24 hours or until you feel better.  · Hand washing with soap and water is the best way to prevent the spread of infection. Wash your hands after touching anyone who is sick.  · Wash your hands after using the toilet and before meals. Clean the toilet after each use.  · Caffeine, tobacco, and alcohol can make your diarrhea, cramping, and pain worse.  Diet  · Water and clear liquids are important so you do not get dehydrated. Drink a small amount at a time.  · Do not force yourself to eat, especially if you have cramps, vomiting, or diarrhea. When you finally decide to start eating, do not eat large amounts at a time, even if you are hungry.  · If you eat, avoid  fatty, greasy, spicy, or fried foods.  · Do not eat dairy products if you have diarrhea; they can make the diarrhea worse.  During the first 24 hours (the first full day), follow the diet below:  · Beverages: Water, clear liquids, soft drinks without caffeine, like ginger ale; mineral water (plain or flavored); decaffeinated tea and coffee.  · Soups: Clear broth, consommé, and bouillon Sports drinks aren't a good choice because they have too much sugar and not enough electrolytes. In this case, commercially available products called oral rehydration solutions are best.  · Desserts: Plain gelatin, popsicles, and fruit juice bars.  During the next 24 hours (the second day), you may add the following to the above if you have improved. If not, continue what you did the first day:  · Hot cereal, plain toast, bread, rolls, crackers  · Plain noodles, rice, mashed potatoes, chicken noodle or rice soup  · Unsweetened canned fruit (avoid pineapple), bananas  · Limit caffeine and chocolate. No spices or seasonings except salt.  During the next 24 hours  · Gradually resume a normal diet, as you feel better and your symptoms improve.  · If at any time your symptoms start getting worse, go back to clear liquids until you feel better.  Food preparation  · If you have diarrhea, you should not prepare food for others. When you  prepare food for yourself, wash your hands before and after.  · Wash your hands after using cutting boards, countertops, and knives that have been in contact with raw food.  · Keep uncooked meats away from cooked and ready-to-eat foods.  Follow-up care  Follow up with your healthcare provider if you are not improving over the next 2 to 3 days, or as advised. If a stool (diarrhea) sample was taken, call for the results as directed.  When to seek medical care  Call your healthcare provider right away if any of these occur:   · Increasing abdominal pain or constant lower right abdominal pain  · Continued  vomiting (unable to keep liquids down)  · Frequent diarrhea (more than 5 times a day)  · Blood in vomit or stool (black or red color)  · Inability to tolerate solid food after a few days.  · Dark urine, reduced urine output  · Weakness, dizziness  · Drowsiness  · Fever of 100.4ºF (38.0ºC) or higher, or as directed by your healthcare provider  · New rash  Call 911  Call 911 if any of these occur:  · Trouble breathing  · Chest pain  · Confusion  · Severe drowsiness or trouble awakening  · Seizure  · Stiff neck  Date Last Reviewed: 11/16/2015  © 1103-1113 [employer]. [address] [place] [address], [place] [address]. All rights reserved. This information is not intended as a substitute for professional medical care. Always follow your healthcare professional's instructions.

## 2018-06-19 NOTE — PROGRESS NOTES
"Subjective:       Patient ID: Tony Gordillo is a 37 y.o. male.    Vitals:  height is 6' 1" (1.854 m) and weight is 136.1 kg (300 lb). His temperature is 99.4 °F (37.4 °C). His blood pressure is 144/95 (abnormal) and his pulse is 107. His oxygen saturation is 99%.     Chief Complaint: Abdominal Pain and Generalized Body Aches    Abdominal Pain   This is a new problem. The current episode started in the past 7 days. The onset quality is sudden. The problem occurs intermittently. The pain is at a severity of 4/10. The pain is mild. The quality of the pain is colicky and a sensation of fullness. The abdominal pain does not radiate. Associated symptoms include diarrhea, a fever and myalgias. Pertinent negatives include no constipation, dysuria, hematochezia, melena, nausea or vomiting. Nothing aggravates the pain. The pain is relieved by bowel movements. He has tried antacids for the symptoms. The treatment provided no relief.     Review of Systems   Constitution: Positive for chills, fever and malaise/fatigue.   Cardiovascular: Negative for chest pain.   Respiratory: Negative for shortness of breath.    Musculoskeletal: Positive for myalgias. Negative for back pain.   Gastrointestinal: Positive for bloating, abdominal pain and diarrhea. Negative for constipation, hematochezia, melena, nausea and vomiting.   Genitourinary: Negative for dysuria.   All other systems reviewed and are negative.      Objective:      Physical Exam   Constitutional: He is oriented to person, place, and time. He appears well-developed and well-nourished.   HENT:   Head: Normocephalic and atraumatic.   Right Ear: External ear normal.   Left Ear: External ear normal.   Nose: Nose normal.   Mouth/Throat: Mucous membranes are normal.   Eyes: Conjunctivae and lids are normal.   Neck: Trachea normal and full passive range of motion without pain. Neck supple.   Cardiovascular: Normal rate, regular rhythm and normal heart sounds.    Pulmonary/Chest: " Effort normal and breath sounds normal. No respiratory distress.   Abdominal: Soft. Normal appearance and bowel sounds are normal. He exhibits no distension, no abdominal bruit, no pulsatile midline mass and no mass. There is no tenderness.   Musculoskeletal: Normal range of motion. He exhibits no edema.   Neurological: He is alert and oriented to person, place, and time. He has normal strength.   Skin: Skin is warm, dry and intact. He is not diaphoretic. No pallor.   Psychiatric: He has a normal mood and affect. His speech is normal and behavior is normal. Judgment and thought content normal. Cognition and memory are normal.   Nursing note and vitals reviewed.      Assessment:       1. Gastroenteritis    2. Body aches        Plan:         Gastroenteritis    Body aches    Other orders  -     mupirocin (BACTROBAN) 2 % ointment; Apply topically once daily.  Dispense: 1 Tube; Refill: 0  -     dicyclomine (BENTYL) 20 mg tablet; Take 1 tablet (20 mg total) by mouth 3 (three) times daily as needed.  Dispense: 21 tablet; Refill: 0          Patient Instructions     Noninfectious Gastroenteritis (Ages 6 Years to Adult)    Gastroenteritis can cause nausea, vomiting, diarrhea, and abdominal cramping. This may occur as a result of food sensitivity, inflammation of your gastrointestinal tract, medicines, stress, or other causes not related to infection. Your symptoms will usually last from 1 to 3 days, but can last longer. Antibiotics are not effective, but simple home treatment will be helpful.  Home care  Medicine  · You may use acetaminophen or NSAID medicines like ibuprofen or naproxen to control fever, unless another medicine is prescribed. (Note: If you have chronic liver or kidney disease, or ever had a stomach ulcer or gastrointestinalI bleeding, talk with your healthcare provider before using these medicines.) Aspirin should never be used in anyone under 18 years of age who is ill with a fever. It may cause severe liver  damage. Don't increase your NSAID medicines if you are already taking these medicines for another condition (like arthritis). Don't use NSAIDS if you are on aspirin (such as for heart disease, or after a stroke).  · If medicines for diarrhea or vomiting are prescribed, take only as directed.  General care and preventing spread of the illness  · If symptoms are severe, rest at home for the next 24 hours or until you feel better.  · Hand washing with soap and water is the best way to prevent the spread of infection. Wash your hands after touching anyone who is sick.  · Wash your hands after using the toilet and before meals. Clean the toilet after each use.  · Caffeine, tobacco, and alcohol can make your diarrhea, cramping, and pain worse.  Diet  · Water and clear liquids are important so you do not get dehydrated. Drink a small amount at a time.  · Do not force yourself to eat, especially if you have cramps, vomiting, or diarrhea. When you finally decide to start eating, do not eat large amounts at a time, even if you are hungry.  · If you eat, avoid fatty, greasy, spicy, or fried foods.  · Do not eat dairy products if you have diarrhea; they can make the diarrhea worse.  During the first 24 hours (the first full day), follow the diet below:  · Beverages: Water, clear liquids, soft drinks without caffeine, like ginger ale; mineral water (plain or flavored); decaffeinated tea and coffee.  · Soups: Clear broth, consommé, and bouillon Sports drinks aren't a good choice because they have too much sugar and not enough electrolytes. In this case, commercially available products called oral rehydration solutions are best.  · Desserts: Plain gelatin, popsicles, and fruit juice bars.  During the next 24 hours (the second day), you may add the following to the above if you have improved. If not, continue what you did the first day:  · Hot cereal, plain toast, bread, rolls, crackers  · Plain noodles, rice, mashed potatoes,  chicken noodle or rice soup  · Unsweetened canned fruit (avoid pineapple), bananas  · Limit caffeine and chocolate. No spices or seasonings except salt.  During the next 24 hours  · Gradually resume a normal diet, as you feel better and your symptoms improve.  · If at any time your symptoms start getting worse, go back to clear liquids until you feel better.  Food preparation  · If you have diarrhea, you should not prepare food for others. When you  prepare food for yourself, wash your hands before and after.  · Wash your hands after using cutting boards, countertops, and knives that have been in contact with raw food.  · Keep uncooked meats away from cooked and ready-to-eat foods.  Follow-up care  Follow up with your healthcare provider if you are not improving over the next 2 to 3 days, or as advised. If a stool (diarrhea) sample was taken, call for the results as directed.  When to seek medical care  Call your healthcare provider right away if any of these occur:   · Increasing abdominal pain or constant lower right abdominal pain  · Continued vomiting (unable to keep liquids down)  · Frequent diarrhea (more than 5 times a day)  · Blood in vomit or stool (black or red color)  · Inability to tolerate solid food after a few days.  · Dark urine, reduced urine output  · Weakness, dizziness  · Drowsiness  · Fever of 100.4ºF (38.0ºC) or higher, or as directed by your healthcare provider  · New rash  Call 911  Call 911 if any of these occur:  · Trouble breathing  · Chest pain  · Confusion  · Severe drowsiness or trouble awakening  · Seizure  · Stiff neck  Date Last Reviewed: 11/16/2015  © 5746-6196 [employer]. [address] [place] [address], [place] [address]. All rights reserved. This information is not intended as a substitute for professional medical care. Always follow your healthcare professional's instructions.

## 2018-07-18 ENCOUNTER — CLINICAL SUPPORT (OUTPATIENT)
Dept: URGENT CARE | Facility: CLINIC | Age: 38
End: 2018-07-18

## 2018-07-18 DIAGNOSIS — Z02.83 ENCOUNTER FOR DRUG SCREENING: Primary | ICD-10-CM

## 2018-07-18 LAB
CTP QC/QA: YES
POC 5 PANEL DRUG SCREEN: NORMAL

## 2018-07-18 PROCEDURE — 80305 DRUG TEST PRSMV DIR OPT OBS: CPT | Mod: S$GLB,,, | Performed by: NURSE PRACTITIONER

## 2018-09-26 ENCOUNTER — PATIENT MESSAGE (OUTPATIENT)
Dept: ENDOCRINOLOGY | Facility: CLINIC | Age: 38
End: 2018-09-26

## 2018-09-27 NOTE — PROGRESS NOTES
"This note was created by combination of typed  and Dragon dictation.  Transcription errors may be present.  If there are any questions, please contact me.    Assessment & Plan:   Type 2 diabetes mellitus without complication, with long-term current use of insulin  -his insurance has been changing.  Reportedly has had high A1cs however in the setting of being out of medications I believe.  Reports that he has not been controlled with insulin glargine 50 units twice daily, and insulin glulisine 15 units with meals.  He will often institute a sliding scale as well.  He had been using Bydureon but insurance formulary issues.  Sounds like they approved Victoza so change to that.  May consider Trulicity in the future if possible for convenience.  Apparently they approved farxiga so start that.  Reports that metformin didn't have any efficacy.  Foot exam normal today.  Eye exam done earlier today Marcelina old records request  On statin on ACEI low dose.  Hold on labs given out of meds x months.  -     VICTOZA 3-AYALA 0.6 mg/0.1 mL (18 mg/3 mL) PnIj; Inject 1.2 mg into the skin once daily.  Dispense: 6 mL; Refill: 5  -     insulin glargine (BASAGLAR KWIKPEN U-100 INSULIN) 100 unit/mL (3 mL) InPn pen; Inject 50 Units into the skin 2 (two) times daily.  Dispense: 3 Box; Refill: 5  -     insulin glulisine U-100 (APIDRA U-100 INSULIN) 100 unit/mL injection; Inject 15 Units into the skin 3 (three) times daily before meals.  Dispense: 20 mL; Refill: 11  -     needle, disp, 31 gauge 31 gauge x 5/16" Ndle; 1 each by Misc.(Non-Drug; Combo Route) route once daily. QID insulin and victoza  Dispense: 500 each; Refill: 5  -     dapagliflozin (FARXIGA) 10 mg Tab; Take 10 mg by mouth once daily.  Dispense: 90 tablet; Refill: 1  -     Comprehensive metabolic panel; Future; Expected date: 12/27/2018  -     Lipid panel; Future; Expected date: 12/27/2018  -     Hemoglobin A1c; Future; Expected date: 12/27/2018  -     CBC auto " "differential; Future; Expected date: 12/27/2018  -     TSH; Future; Expected date: 12/27/2018  -     Microalbumin/creatinine urine ratio; Future; Expected date: 12/27/2018    Pure hypercholesterolemia  -refilled lipitor hold on labs   -     atorvastatin (LIPITOR) 10 MG tablet; Take 1 tablet (10 mg total) by mouth once daily.  Dispense: 90 tablet; Refill: 1    Essential hypertension  -reports the ACEI is more for renoprotection than for HTN.  Refilled low dose lisinopril.  -     lisinopril (PRINIVIL,ZESTRIL) 5 MG tablet; Take 1 tablet (5 mg total) by mouth once daily.  Dispense: 90 tablet; Refill: 1    Migraine with aura and without status migrainosus, not intractable  -hx of topamax and propranolol with SE. Has found the     Need for vaccination for Strep pneumoniae  -     (In Office Administered) Pneumococcal Polysaccharide Vaccine (23 Valent) (SQ/IM)        Medications Discontinued During This Encounter   Medication Reason    mupirocin (BACTROBAN) 2 % ointment     insulin needles, disposable, 31 x 3/16 " Ndle     blood sugar diagnostic (GLUCOSE BLOOD) Strp     levothyroxine (SYNTHROID) 50 MCG tablet     naproxen (NAPROSYN) 500 MG tablet     meloxicam (MOBIC) 7.5 MG tablet     ibuprofen (ADVIL,MOTRIN) 800 MG tablet     diclofenac (CATAFLAM) 50 MG tablet     hydrocodone-acetaminophen 5-325mg (NORCO) 5-325 mg per tablet     fish oil-omega-3 fatty acids 300-1,000 mg capsule     fenofibrate 160 MG Tab     metformin (GLUCOPHAGE) 1000 MG tablet     lisinopril (PRINIVIL,ZESTRIL) 40 MG tablet     insulin detemir (LEVEMIR FLEXPEN) 100 unit/mL (3 mL) SubQ InPn pen     SURE COMFORT PEN NEEDLE 31 X 5/16 " Ndle     propranolol (INDERAL) 20 MG tablet Therapy completed    lisinopril (PRINIVIL,ZESTRIL) 5 MG tablet     insulin glargine (LANTUS SOLOSTAR) 100 unit/mL (3 mL) InPn     atorvastatin (LIPITOR) 10 MG tablet Reorder    lisinopril (PRINIVIL,ZESTRIL) 5 MG tablet Reorder    VICTOZA 3-AYALA 0.6 mg/0.1 mL (18 " "mg/3 mL) PnIj Reorder    INSULIN GLARGINE,HUM.REC.ANLOG (BASAGLAR KWIKPEN SUBQ) Reorder    INSULIN GLULISINE (APIDRA SUBQ) Reorder    exenatide microspheres (BYDUREON BCISE) 2 mg/0.85 mL AtIn Therapy completed       Medications Ordered This Encounter   Medications    atorvastatin (LIPITOR) 10 MG tablet     Sig: Take 1 tablet (10 mg total) by mouth once daily.     Dispense:  90 tablet     Refill:  1    dapagliflozin (FARXIGA) 10 mg Tab     Sig: Take 10 mg by mouth once daily.     Dispense:  90 tablet     Refill:  1    insulin glargine (BASAGLAR KWIKPEN U-100 INSULIN) 100 unit/mL (3 mL) InPn pen     Sig: Inject 50 Units into the skin 2 (two) times daily.     Dispense:  3 Box     Refill:  5     Order Specific Question:   This medication typically requires a prior authorization. For prior auth services, patient financial assistance, patient education and medication management send to an [facility] retail pharmacy.     Answer:   No, I will select a different retail pharmacy    insulin glulisine U-100 (APIDRA U-100 INSULIN) 100 unit/mL injection     Sig: Inject 15 Units into the skin 3 (three) times daily before meals.     Dispense:  20 mL     Refill:  11    lisinopril (PRINIVIL,ZESTRIL) 5 MG tablet     Sig: Take 1 tablet (5 mg total) by mouth once daily.     Dispense:  90 tablet     Refill:  1    needle, disp, 31 gauge 31 gauge x 5/16" Ndle     Si each by Misc.(Non-Drug; Combo Route) route once daily. QID insulin and victoza     Dispense:  500 each     Refill:  5    VICTOZA 3-AYALA 0.6 mg/0.1 mL (18 mg/3 mL) PnIj     Sig: Inject 1.2 mg into the skin once daily.     Dispense:  6 mL     Refill:  5     Order Specific Question:   This medication typically requires a prior authorization. For prior auth services, patient financial assistance, patient education and medication management send to an [facility] retail pharmacy.     Answer:   No, I will select a different retail pharmacy       Current Outpatient Medications: " "    atorvastatin (LIPITOR) 10 MG tablet, Take 1 tablet (10 mg total) by mouth once daily., Disp: 90 tablet, Rfl: 1    azelastine (ASTELIN) 137 mcg nasal spray, 1 spray by Nasal route 2 (two) times daily., Disp: , Rfl:     lisinopril (PRINIVIL,ZESTRIL) 5 MG tablet, Take 1 tablet (5 mg total) by mouth once daily., Disp: 90 tablet, Rfl: 1    loratadine (CLARITIN) 10 mg tablet, Take 10 mg by mouth once daily., Disp: , Rfl:     dapagliflozin (FARXIGA) 10 mg Tab, Take 10 mg by mouth once daily., Disp: 90 tablet, Rfl: 1    insulin glargine (BASAGLAR KWIKPEN U-100 INSULIN) 100 unit/mL (3 mL) InPn pen, Inject 50 Units into the skin 2 (two) times daily., Disp: 3 Box, Rfl: 5    insulin glulisine U-100 (APIDRA U-100 INSULIN) 100 unit/mL injection, Inject 15 Units into the skin 3 (three) times daily before meals., Disp: 20 mL, Rfl: 11    needle, disp, 31 gauge 31 gauge x 5/16" Ndle, 1 each by Misc.(Non-Drug; Combo Route) route once daily. QID insulin and victoza, Disp: 500 each, Rfl: 5    VICTOZA 3-AYALA 0.6 mg/0.1 mL (18 mg/3 mL) PnIj, Inject 1.2 mg into the skin once daily., Disp: 6 mL, Rfl: 5    Follow Up: No Follow-up on file. OV 3 months previsit labs ordered.    Subjective:     Chief Complaint   Patient presents with    Establish Care    Diabetes    Hypertension    Hyperlipidemia       HPI  Tony is a 38 y.o. male, last appointment with this clinic was Visit date not found.    NP   Had been seeing [facility] NP Iza Pierce but his provider is out of the office due to personal issue and not giong to be in for a while and he's out of meds x July.    DM2 on insulin; basaglar, apidra; bydureon; hx of metformin ineffective. Due to insurance formulary the Bydureon was not covered.  His previous NP sent in several medicines to see what might be covered and what would not and apparently he was contacted that Victoza was available and farxiga as well. Insulin with occasional sliding scale.  Variable glucose readings.  Has been " out of meds x July.  No recent labs.   HTN,lisinopril though he notes it's low dose and had previously been higher dose and now more for renal protective effect.  Hyperlipidemia atorvastatin  3/2014 nu med stress test negative.  Hypothyroid in the past with hx of levothyroxine but no longer taking.    Eye exam earlier today with Briana.  Migraines - has tried topamax and propranolol with either SE (propranolol SE of anger) or topamax didn't work.  Did not want to take narcotics.  Actually found a daith piercing (piercing of the ear) helps mitigate the headaches.  Migraine with aura and nausea and photophobia.  imitrex without relief.  The piercing is surgical steel.  Starting a new job and they require skin colored piercing and he tried it but didn't work.  So is requesting note for work to be able to wear surgical steel.  Never seen neurology.    No recent labs.     Getting HBV series with work.     Patient Care Team:  Jovany Ford MD as PCP - General (Internal Medicine)  Janneth Johnson RN (Inactive) as Registered Nurse (Diabetes)    Patient Active Problem List    Diagnosis Date Noted    Migraine with aura and without status migrainosus, not intractable propranolol SE angry; topamax not helpful; imitrex ineffective; daith ear piercing helps 09/28/2018    Type 2 diabetes mellitus without complication, with long-term current use of insulin     Essential hypertension     Hypothyroidism not currently on medication 07/29/2015    Hyperlipidemia LDL goal <70 06/03/2015    Insulin long-term use 06/03/2015    Tinea pedis 06/03/2015    Morbid obesity 06/03/2015       PAST MEDICAL HISTORY:  Past Medical History:   Diagnosis Date    Diabetes mellitus, type 2     Hyperlipidemia     Hypertension        PAST SURGICAL HISTORY:  Past Surgical History:   Procedure Laterality Date    ANKLE SURGERY      KNEE SURGERY      acl,mcl,pcl    left knee x 2       Family History   Problem Relation Age of Onset     Diabetes Mother     Diabetes Father     No Known Problems Brother     Autism Son     No Known Problems Daughter        SOCIAL HISTORY:  Social History     Socioeconomic History    Marital status:      Spouse name: Not on file    Number of children: Not on file    Years of education: Not on file    Highest education level: Not on file   Social Needs    Financial resource strain: Not on file    Food insecurity - worry: Not on file    Food insecurity - inability: Not on file    Transportation needs - medical: Not on file    Transportation needs - non-medical: Not on file   Occupational History    Occupation:  to be EMT basic     Employer: [employer]   Tobacco Use    Smoking status: Never Smoker    Smokeless tobacco: Never Used   Substance and Sexual Activity    Alcohol use: Yes     Comment: 1-2 beers every 2 weeks    Drug use: No    Sexual activity: Not on file   Other Topics Concern    Not on file   Social History Narrative    Not on file       ALLERGIES AND MEDICATIONS: updated and reviewed.  Review of patient's allergies indicates:  No Known Allergies  Current Outpatient Medications   Medication Sig Dispense Refill    azelastine (ASTELIN) 137 mcg nasal spray 1 spray by Nasal route 2 (two) times daily.      blood sugar diagnostic (GLUCOSE BLOOD) Strp Test glucose 4 x daily Contour meter (Patient taking differently: Test glucose 3 x daily Contour meter) 50 strip prn    diclofenac (CATAFLAM) 50 MG tablet Take 50 mg by mouth 2 (two) times daily.      fenofibrate 160 MG Tab Take 160 mg by mouth once daily.      fish oil-omega-3 fatty acids 300-1,000 mg capsule Take 2 g by mouth once daily.      hydrocodone-acetaminophen 5-325mg (NORCO) 5-325 mg per tablet Take 1 tablet by mouth every 8 (eight) hours as needed for Pain (As needed for severe 10 out of 10 pain). 6 tablet 0    ibuprofen (ADVIL,MOTRIN) 800 MG tablet Take 1 tablet (800 mg total) by mouth every 8 (eight)  "hours as needed for Pain. 20 tablet 0    insulin detemir (LEVEMIR FLEXPEN) 100 unit/mL (3 mL) SubQ InPn pen Inject 40 Units into the skin 2 (two) times daily. (Patient taking differently: Inject 80 Units into the skin every evening. ) 2 Box 12    INSULIN GLARGINE,HUM.REC.ANLOG (BASAGLAR KWIKPEN SUBQ) Inject 50 Units into the skin 2 (two) times daily.      INSULIN GLULISINE (APIDRA SUBQ) Inject 15 Units into the skin 3 (three) times daily.      insulin needles, disposable, 31 x 3/16 " Ndle Inject 1 each into the skin 3 (three) times daily before meals. 150 each 12    levothyroxine (SYNTHROID) 50 MCG tablet Take 50 mcg by mouth once daily.      lisinopril (PRINIVIL,ZESTRIL) 40 MG tablet Take 40 mg by mouth once daily.  1    meloxicam (MOBIC) 7.5 MG tablet Take 7.5 mg by mouth 2 (two) times daily as needed.  2    metformin (GLUCOPHAGE) 1000 MG tablet Take 1 tablet (1,000 mg total) by mouth 2 (two) times daily with meals. 60 tablet 11    mupirocin (BACTROBAN) 2 % ointment Apply topically once daily. 1 Tube 0    naproxen (NAPROSYN) 500 MG tablet Take 1 tablet (500 mg total) by mouth 2 (two) times daily with meals. 20 tablet 0    propranolol (INDERAL) 20 MG tablet Take 20 mg by mouth once daily.  1    SURE COMFORT PEN NEEDLE 31 X 5/16 " Ndle USE WITH LANTUS AND NOVALOG FLEXPENS AS DIRECTED 100 each 12     No current facility-administered medications for this visit.        Review of Systems   Constitutional: Negative for fever, malaise/fatigue and weight loss.   HENT: Negative for congestion.    Respiratory: Negative for shortness of breath.    Cardiovascular: Negative for chest pain, palpitations and leg swelling.   Gastrointestinal: Negative for abdominal pain.   Genitourinary: Negative for dysuria and urgency.   Neurological: Positive for headaches. Negative for tingling, focal weakness and weakness.       Objective:   Physical Exam   Vitals:    09/28/18 1412 09/28/18 1448   BP: (!) 142/94 136/64   BP " "Location:  Left arm   Patient Position:  Sitting   BP Method:  Large (Manual)   Pulse: 90    Temp: 98.3 °F (36.8 °C)    SpO2: 97%    Weight: (!) 136.1 kg (300 lb 0.7 oz)    Height: 6' 1" (1.854 m)     Body mass index is 39.59 kg/m².            Physical Exam   Constitutional: He is oriented to person, place, and time. He appears well-developed and well-nourished. No distress.   Eyes: EOM are normal.   Cardiovascular: Normal rate, regular rhythm and normal heart sounds.   No murmur heard.  Pulses:       Dorsalis pedis pulses are 3+ on the right side, and 3+ on the left side.        Posterior tibial pulses are 3+ on the right side, and 3+ on the left side.   Pulmonary/Chest: Effort normal and breath sounds normal.   Musculoskeletal: Normal range of motion.        Right foot: There is no deformity.        Left foot: There is no deformity.   Feet:   Right Foot:   Protective Sensation: 5 sites tested. 5 sites sensed.   Skin Integrity: Negative for ulcer, blister, skin breakdown, erythema or warmth.   Left Foot:   Protective Sensation: 5 sites tested. 5 sites sensed.   Skin Integrity: Negative for ulcer, blister, skin breakdown, erythema or warmth.   Neurological: He is alert and oriented to person, place, and time. Coordination normal.   Skin: Skin is warm and dry.   Psychiatric: He has a normal mood and affect. His behavior is normal. Thought content normal.     "

## 2018-09-28 ENCOUNTER — OFFICE VISIT (OUTPATIENT)
Dept: FAMILY MEDICINE | Facility: CLINIC | Age: 38
End: 2018-09-28
Payer: COMMERCIAL

## 2018-09-28 VITALS
TEMPERATURE: 98 F | BODY MASS INDEX: 39.77 KG/M2 | SYSTOLIC BLOOD PRESSURE: 136 MMHG | OXYGEN SATURATION: 97 % | DIASTOLIC BLOOD PRESSURE: 64 MMHG | HEART RATE: 90 BPM | HEIGHT: 73 IN | WEIGHT: 300.06 LBS

## 2018-09-28 DIAGNOSIS — G43.109 MIGRAINE WITH AURA AND WITHOUT STATUS MIGRAINOSUS, NOT INTRACTABLE: ICD-10-CM

## 2018-09-28 DIAGNOSIS — I10 ESSENTIAL HYPERTENSION: ICD-10-CM

## 2018-09-28 DIAGNOSIS — Z23 NEED FOR VACCINATION FOR STREP PNEUMONIAE: ICD-10-CM

## 2018-09-28 DIAGNOSIS — E11.9 TYPE 2 DIABETES MELLITUS WITHOUT COMPLICATION, WITH LONG-TERM CURRENT USE OF INSULIN: Primary | ICD-10-CM

## 2018-09-28 DIAGNOSIS — Z79.4 TYPE 2 DIABETES MELLITUS WITHOUT COMPLICATION, WITH LONG-TERM CURRENT USE OF INSULIN: Primary | ICD-10-CM

## 2018-09-28 DIAGNOSIS — E78.00 PURE HYPERCHOLESTEROLEMIA: ICD-10-CM

## 2018-09-28 PROBLEM — G43.009 MIGRAINE WITHOUT AURA AND WITHOUT STATUS MIGRAINOSUS, NOT INTRACTABLE: Status: ACTIVE | Noted: 2018-09-28

## 2018-09-28 PROBLEM — S63.614A SPRAIN OF RIGHT RING FINGER: Status: RESOLVED | Noted: 2017-06-27 | Resolved: 2018-09-28

## 2018-09-28 PROCEDURE — 3075F SYST BP GE 130 - 139MM HG: CPT | Mod: CPTII,S$GLB,, | Performed by: INTERNAL MEDICINE

## 2018-09-28 PROCEDURE — 3078F DIAST BP <80 MM HG: CPT | Mod: CPTII,S$GLB,, | Performed by: INTERNAL MEDICINE

## 2018-09-28 PROCEDURE — 90471 IMMUNIZATION ADMIN: CPT | Mod: S$GLB,,, | Performed by: INTERNAL MEDICINE

## 2018-09-28 PROCEDURE — 90732 PPSV23 VACC 2 YRS+ SUBQ/IM: CPT | Mod: S$GLB,,, | Performed by: INTERNAL MEDICINE

## 2018-09-28 PROCEDURE — 99999 PR PBB SHADOW E&M-EST. PATIENT-LVL III: CPT | Mod: PBBFAC,,, | Performed by: INTERNAL MEDICINE

## 2018-09-28 PROCEDURE — 99203 OFFICE O/P NEW LOW 30 MIN: CPT | Mod: 25,S$GLB,, | Performed by: INTERNAL MEDICINE

## 2018-09-28 PROCEDURE — 3008F BODY MASS INDEX DOCD: CPT | Mod: CPTII,S$GLB,, | Performed by: INTERNAL MEDICINE

## 2018-09-28 RX ORDER — LORATADINE 10 MG/1
10 TABLET ORAL DAILY
COMMUNITY
End: 2019-09-30 | Stop reason: SDUPTHER

## 2018-09-28 RX ORDER — LIRAGLUTIDE 6 MG/ML
INJECTION SUBCUTANEOUS
Refills: 3 | COMMUNITY
Start: 2018-08-29 | End: 2018-09-28 | Stop reason: SDUPTHER

## 2018-09-28 RX ORDER — DAPAGLIFLOZIN 10 MG/1
10 TABLET, FILM COATED ORAL DAILY
Qty: 90 TABLET | Refills: 1 | Status: SHIPPED | OUTPATIENT
Start: 2018-09-28 | End: 2019-07-11 | Stop reason: SINTOL

## 2018-09-28 RX ORDER — INSULIN GLARGINE 100 [IU]/ML
50 INJECTION, SOLUTION SUBCUTANEOUS 2 TIMES DAILY
Qty: 3 BOX | Refills: 5 | Status: SHIPPED | OUTPATIENT
Start: 2018-09-28 | End: 2019-07-11 | Stop reason: ALTCHOICE

## 2018-09-28 RX ORDER — LISINOPRIL 5 MG/1
5 TABLET ORAL DAILY
COMMUNITY
End: 2018-09-28 | Stop reason: SDUPTHER

## 2018-09-28 RX ORDER — LISINOPRIL 5 MG/1
5 TABLET ORAL DAILY
Qty: 90 TABLET | Refills: 1 | Status: SHIPPED | OUTPATIENT
Start: 2018-09-28 | End: 2019-05-26 | Stop reason: SDUPTHER

## 2018-09-28 RX ORDER — ATORVASTATIN CALCIUM 10 MG/1
10 TABLET, FILM COATED ORAL DAILY
COMMUNITY
End: 2018-09-28 | Stop reason: SDUPTHER

## 2018-09-28 RX ORDER — LIRAGLUTIDE 6 MG/ML
1.2 INJECTION SUBCUTANEOUS DAILY
Qty: 6 ML | Refills: 5 | Status: SHIPPED | OUTPATIENT
Start: 2018-09-28 | End: 2018-11-09

## 2018-09-28 RX ORDER — ATORVASTATIN CALCIUM 10 MG/1
10 TABLET, FILM COATED ORAL DAILY
Qty: 90 TABLET | Refills: 1 | Status: SHIPPED | OUTPATIENT
Start: 2018-09-28 | End: 2019-05-26 | Stop reason: SDUPTHER

## 2018-09-28 NOTE — LETTER
September 28, 2018      [facility]  [address] [facility]  [place] [address]  Phone: [phone]  Fax: [phone]       Patient: Tony Gordillo  YOB: 1980  Date of Visit: 9/28/18      To Whom It May Concern:    Tony Gordillo  was at [facility] on 9/28/18. He has an ear piercing that helps prevent migraines.  He has tried alternative colored rings but those have not been as effective at preventing migraines.  I recommend that he maintain the current piercing of surgical steel.    If you have any questions or concerns, or if I can be of further assistance, please do not hesitate to contact me.      Sincerely,        Jovany Ford MD

## 2018-11-08 ENCOUNTER — LAB VISIT (OUTPATIENT)
Dept: LAB | Facility: HOSPITAL | Age: 38
End: 2018-11-08
Attending: INTERNAL MEDICINE
Payer: COMMERCIAL

## 2018-11-08 DIAGNOSIS — Z79.4 TYPE 2 DIABETES MELLITUS WITHOUT COMPLICATION, WITH LONG-TERM CURRENT USE OF INSULIN: ICD-10-CM

## 2018-11-08 DIAGNOSIS — E11.9 TYPE 2 DIABETES MELLITUS WITHOUT COMPLICATION, WITH LONG-TERM CURRENT USE OF INSULIN: ICD-10-CM

## 2018-11-08 LAB
ALBUMIN SERPL BCP-MCNC: 3.9 G/DL
ALP SERPL-CCNC: 79 U/L
ALT SERPL W/O P-5'-P-CCNC: 30 U/L
ANION GAP SERPL CALC-SCNC: 11 MMOL/L
AST SERPL-CCNC: 24 U/L
BASOPHILS # BLD AUTO: 0.05 K/UL
BASOPHILS NFR BLD: 0.5 %
BILIRUB SERPL-MCNC: 0.9 MG/DL
BUN SERPL-MCNC: 14 MG/DL
CALCIUM SERPL-MCNC: 10 MG/DL
CHLORIDE SERPL-SCNC: 104 MMOL/L
CHOLEST SERPL-MCNC: 161 MG/DL
CHOLEST/HDLC SERPL: 2.8 {RATIO}
CO2 SERPL-SCNC: 24 MMOL/L
CREAT SERPL-MCNC: 0.9 MG/DL
DIFFERENTIAL METHOD: ABNORMAL
EOSINOPHIL # BLD AUTO: 0.1 K/UL
EOSINOPHIL NFR BLD: 1.3 %
ERYTHROCYTE [DISTWIDTH] IN BLOOD BY AUTOMATED COUNT: 13.1 %
EST. GFR  (AFRICAN AMERICAN): >60 ML/MIN/1.73 M^2
EST. GFR  (NON AFRICAN AMERICAN): >60 ML/MIN/1.73 M^2
ESTIMATED AVG GLUCOSE: 232 MG/DL
GLUCOSE SERPL-MCNC: 186 MG/DL
HBA1C MFR BLD HPLC: 9.7 %
HCT VFR BLD AUTO: 49.9 %
HDLC SERPL-MCNC: 57 MG/DL
HDLC SERPL: 35.4 %
HGB BLD-MCNC: 16 G/DL
IMM GRANULOCYTES # BLD AUTO: 0.05 K/UL
IMM GRANULOCYTES NFR BLD AUTO: 0.5 %
LDLC SERPL CALC-MCNC: 86.2 MG/DL
LYMPHOCYTES # BLD AUTO: 3.4 K/UL
LYMPHOCYTES NFR BLD: 34.7 %
MCH RBC QN AUTO: 29.1 PG
MCHC RBC AUTO-ENTMCNC: 32.1 G/DL
MCV RBC AUTO: 91 FL
MONOCYTES # BLD AUTO: 0.7 K/UL
MONOCYTES NFR BLD: 7.6 %
NEUTROPHILS # BLD AUTO: 5.4 K/UL
NEUTROPHILS NFR BLD: 55.4 %
NONHDLC SERPL-MCNC: 104 MG/DL
NRBC BLD-RTO: 0 /100 WBC
PLATELET # BLD AUTO: 220 K/UL
PMV BLD AUTO: 12.6 FL
POTASSIUM SERPL-SCNC: 4.2 MMOL/L
PROT SERPL-MCNC: 7.9 G/DL
RBC # BLD AUTO: 5.49 M/UL
SODIUM SERPL-SCNC: 139 MMOL/L
TRIGL SERPL-MCNC: 89 MG/DL
TSH SERPL DL<=0.005 MIU/L-ACNC: 2.06 UIU/ML
WBC # BLD AUTO: 9.66 K/UL

## 2018-11-08 PROCEDURE — 83036 HEMOGLOBIN GLYCOSYLATED A1C: CPT

## 2018-11-08 PROCEDURE — 80061 LIPID PANEL: CPT

## 2018-11-08 PROCEDURE — 80053 COMPREHEN METABOLIC PANEL: CPT

## 2018-11-08 PROCEDURE — 85025 COMPLETE CBC W/AUTO DIFF WBC: CPT

## 2018-11-08 PROCEDURE — 36415 COLL VENOUS BLD VENIPUNCTURE: CPT | Mod: PO

## 2018-11-08 PROCEDURE — 84443 ASSAY THYROID STIM HORMONE: CPT

## 2018-11-08 NOTE — PROGRESS NOTES
This note was created by combination of typed  and Dragon dictation.  Transcription errors may be present.  If there are any questions, please contact me.    Assessment & Plan:   Type 2 diabetes mellitus without complication, with long-term current use of insulin  Hypoglycemia   - He did have another time where his morning blood sugar was around the same as his episode reminiscent of hypoglycemia.  He did not have any symptoms at that time.  The rest of his sugars are still higher than ideal.  It could be that he has been acclimated to high glucose and to get down into the low normal range gave him symptoms.  Hopefully he will improve as his blood sugar continues to get controlled.  He is pleased with his current A1c as he notes maybe 5 months ago his A1c was at 13.  He understands that still higher than ideal.  I will increase his Victoza 1.8.  I am going to refer him to the diabetes endocrinologist and defer adjustment of the insulin regimen at this time.  I have sent in a prescription for continuous glucose monitor.  He understands that this may not be covered by insurance.  Stay on Farxiga stay on insulin  -     flash glucose scanning reader (FREESTYLE SAMANTHA 10 DAY READER) Misc; TID accuchecks  Dispense: 1 each; Refill: 0  -     flash glucose sensor (FREESTYLE SAMANTHA 10 DAY SENSOR) Kit; TID glucose checks.  Dispense: 1 kit; Refill: 11  -     Ambulatory referral to Endocrinology  -     VICTOZA 3-AYALA 0.6 mg/0.1 mL (18 mg/3 mL) PnIj; 1.8 mg by abdominal subcutaneous route once daily. Increased dose.  Dispense: 6 mL; Refill: 5  -     EKG 12-lead    Non-seasonal allergic rhinitis due to other allergic trigger  -was instructed to avoid antihistamine with ophtho.  rx for singulair.  -     montelukast (SINGULAIR) 10 mg tablet; Take 1 tablet (10 mg total) by mouth every evening.  Dispense: 30 tablet; Refill: 11    Medications Discontinued During This Encounter   Medication Reason    VICTOZA 3-AYALA 0.6 mg/0.1 mL  (18 mg/3 mL) PnBarosense        meds sent this encounter:  Medications Ordered This Encounter   Medications    flash glucose scanning reader (FREESTYLE SAMANTHA 10 DAY READER) Misc     Sig: TID accuchecks     Dispense:  1 each     Refill:  0    flash glucose sensor (FREESTYLE SAMANTHA 10 DAY SENSOR) Kit     Sig: TID glucose checks.     Dispense:  1 kit     Refill:  11    montelukast (SINGULAIR) 10 mg tablet     Sig: Take 1 tablet (10 mg total) by mouth every evening.     Dispense:  30 tablet     Refill:  11    VICTOZA 3-AYALA 0.6 mg/0.1 mL (18 mg/3 mL) PnIj     Si.8 mg by abdominal subcutaneous route once daily. Increased dose.     Dispense:  6 mL     Refill:  5     Order Specific Question:   This medication typically requires a prior authorization. For prior auth services, patient financial assistance, patient education and medication management send to an [facility] retail pharmacy.     Answer:   No, I will select a different retail pharmacy       Follow Up: No Follow-up on file.    Subjective:     Chief Complaint   Patient presents with    Diabetes       HPI  Tony is a 38 y.o. male, last appointment with this clinic was 2018.    DM2 on insulin; basaglar, apidra; bydureon; hx of metformin ineffective. Due to insurance formulary the Bydureon was not covered. Now on insulin; farxiga; victoza  HTN,lisinopril though he notes it's low dose and had previously been higher dose and now more for renal protective effect.  Hyperlipidemia atorvastatin  3/2014 nu med stress test negative.  Hypothyroid in the past with hx of levothyroxine but no longer taking.    Eye doctor Briana.  Migraines - never seen neuro for this.  has tried topamax and propranolol with either SE (propranolol SE of anger) or topamax didn't work.  Did not want to take narcotics.  Actually found a daith piercing (piercing of the ear) helps mitigate the headaches.  Migraine with aura and nausea and photophobia.  imitrex without relief.    I  previously saw him in late September.  Diabetes on insulin.  Reportedly had not been control with glargine 50 b.i.d. and [id number] with meals.  Changed him to Victoza.  Started on Farxiga.  Metformin ineffective.    So should be on insulin basal and mealtime, along with farxiga and victoza    Had episode of symptoms reminiscent of hypoglycemia 11/7 - the jitteriness, uneasy feeling, communication was off.  But the glc readings - 118 then 116 then 106.  So not hypoglycemic. Later that night 214.  They had to call EMS on him.  12 lead EKG was normal, BP sitting and standing was normal. Felt off the rest of the day with stomach cramping and bloating.  Possible acute gastroenteritis? Felt better by the next day. Did not have diarrhea.  Did not have focal neurologic deficits.  Did not have a headache.  Did not really experience chest pain, no unilateral swelling.      Is taking victoza, farxiga, uses the fast acting as a sliding scale. basaglar 50 BID.  For the fast acting insulin, he takes the base of 15 units for blood sugar less than 200, and increases it by 5 units for every 50 above 200, ie 15 units for 150-200; 20 for 200; 25 for 250, etc.    Is interested in CGM - Freestyle CGM.     Snacking throughout the day. But not ideal b/c of job restrictions.     Saw eye doctor who saw increased pressure in the eyes.  [facility]. Was recommened to stop allergy meds and caffeine.  On follow up decreased pressure after stopping antihistamine and caffeine.  Needs an alternative as he is getting more congested.  History of Singulair and did find it helpful in the past.                Patient Care Team:  Jovany Ford MD as PCP - General (Internal Medicine)  Janneth Johnson RN (Inactive) as Registered Nurse (Diabetes)  Rocky Hewitt MD as Consulting Physician (Ophthalmology)    Patient Active Problem List    Diagnosis Date Noted    Migraine with aura and without status migrainosus,  not intractable propranolol SE angry; topamax not helpful; imitrex ineffective; daith ear piercing helps 09/28/2018    Type 2 diabetes mellitus without complication, with long-term current use of insulin     Essential hypertension     Hypothyroidism not currently on medication 07/29/2015    Hyperlipidemia LDL goal <70 06/03/2015    Insulin long-term use 06/03/2015    Tinea pedis 06/03/2015    Morbid obesity 06/03/2015       PAST MEDICAL HISTORY:  Past Medical History:   Diagnosis Date    Diabetes mellitus, type 2     Hyperlipidemia     Hypertension        PAST SURGICAL HISTORY:  Past Surgical History:   Procedure Laterality Date    ANKLE SURGERY      KNEE SURGERY      acl,mcl,pcl    left knee x 2         SOCIAL HISTORY:  Social History     Socioeconomic History    Marital status:      Spouse name: Not on file    Number of children: Not on file    Years of education: Not on file    Highest education level: Not on file   Social Needs    Financial resource strain: Not on file    Food insecurity - worry: Not on file    Food insecurity - inability: Not on file    Transportation needs - medical: Not on file    Transportation needs - non-medical: Not on file   Occupational History    Occupation:  to be EMT basic     Employer: [employer]   Tobacco Use    Smoking status: Never Smoker    Smokeless tobacco: Never Used   Substance and Sexual Activity    Alcohol use: Yes     Comment: 1-2 beers every 2 weeks    Drug use: No    Sexual activity: Not on file   Other Topics Concern    Not on file   Social History Narrative    Not on file       ALLERGIES AND MEDICATIONS: updated and reviewed.  Review of patient's allergies indicates:   Allergen Reactions    Influenza virus vaccine, specific Hives     Current Outpatient Medications   Medication Sig Dispense Refill    atorvastatin (LIPITOR) 10 MG tablet Take 1 tablet (10 mg total) by mouth once daily. 90 tablet 1    azelastine  "(ASTELIN) 137 mcg nasal spray 1 spray by Nasal route 2 (two) times daily.      dapagliflozin (FARXIGA) 10 mg Tab Take 10 mg by mouth once daily. 90 tablet 1    insulin glargine (BASAGLAR KWIKPEN U-100 INSULIN) 100 unit/mL (3 mL) InPn pen Inject 50 Units into the skin 2 (two) times daily. 3 Box 5    insulin glulisine U-100 (APIDRA U-100 INSULIN) 100 unit/mL injection Inject 15 Units into the skin 3 (three) times daily before meals. 20 mL 11    lisinopril (PRINIVIL,ZESTRIL) 5 MG tablet Take 1 tablet (5 mg total) by mouth once daily. 90 tablet 1    loratadine (CLARITIN) 10 mg tablet Take 10 mg by mouth once daily.      needle, disp, 31 gauge 31 gauge x 5/16" Ndle 1 each by Misc.(Non-Drug; Combo Route) route once daily. QID insulin and victoza 500 each 5    VICTOZA 3-AYALA 0.6 mg/0.1 mL (18 mg/3 mL) PnIj Inject 1.2 mg into the skin once daily. 6 mL 5     No current facility-administered medications for this visit.        Review of Systems   Constitutional: Negative for chills and fever.   Respiratory: Negative for shortness of breath.    Cardiovascular: Negative for chest pain and palpitations.   Gastrointestinal: Negative for abdominal pain.   Genitourinary: Negative for dysuria.       Objective:   Physical Exam   Vitals:    11/09/18 0857   BP: 124/86   Pulse: 102   Temp: 98 °F (36.7 °C)   SpO2: 98%   Weight: (!) 140.6 kg (310 lb 1.2 oz)   Height: 6' 1" (1.854 m)    Body mass index is 40.91 kg/m².            Physical Exam   Constitutional: He is oriented to person, place, and time. He appears well-developed and well-nourished. No distress.   Eyes: EOM are normal.   Cardiovascular: Normal rate, regular rhythm and normal heart sounds.   No murmur heard.  Pulmonary/Chest: Effort normal and breath sounds normal.   Musculoskeletal: Normal range of motion. He exhibits no edema.   Neurological: He is alert and oriented to person, place, and time. Coordination normal.   Skin: Skin is warm and dry.   Psychiatric: He has a " normal mood and affect. His behavior is normal. Thought content normal.     EKG borderline tach no ST/T wave abn    Component      Latest Ref Rng & Units 11/8/2018   WBC      3.90 - 12.70 K/uL 9.66   RBC      4.60 - 6.20 M/uL 5.49   Hemoglobin      14.0 - 18.0 g/dL 16.0   Hematocrit      40.0 - 54.0 % 49.9   MCV      82 - 98 fL 91   MCH      27.0 - 31.0 pg 29.1   MCHC      32.0 - 36.0 g/dL 32.1   RDW      11.5 - 14.5 % 13.1   Platelets      150 - 350 K/uL 220   MPV      9.2 - 12.9 fL 12.6   Immature Granulocytes      0.0 - 0.5 % 0.5   Gran # (ANC)      1.8 - 7.7 K/uL 5.4   Immature Grans (Abs)      0.00 - 0.04 K/uL 0.05 (H)   Lymph #      1.0 - 4.8 K/uL 3.4   Mono #      0.3 - 1.0 K/uL 0.7   Eos #      0.0 - 0.5 K/uL 0.1   Baso #      0.00 - 0.20 K/uL 0.05   nRBC      0 /100 WBC 0   Gran%      38.0 - 73.0 % 55.4   Lymph%      18.0 - 48.0 % 34.7   Mono%      4.0 - 15.0 % 7.6   Eosinophil%      0.0 - 8.0 % 1.3   Basophil%      0.0 - 1.9 % 0.5   Differential Method       Automated   Sodium      136 - 145 mmol/L 139   Potassium      3.5 - 5.1 mmol/L 4.2   Chloride      95 - 110 mmol/L 104   CO2      23 - 29 mmol/L 24   Glucose      70 - 110 mg/dL 186 (H)   BUN, Bld      6 - 20 mg/dL 14   Creatinine      0.5 - 1.4 mg/dL 0.9   Calcium      8.7 - 10.5 mg/dL 10.0   Total Protein      6.0 - 8.4 g/dL 7.9   Albumin      3.5 - 5.2 g/dL 3.9   Total Bilirubin      0.1 - 1.0 mg/dL 0.9   Alkaline Phosphatase      55 - 135 U/L 79   AST      10 - 40 U/L 24   ALT      10 - 44 U/L 30   Anion Gap      8 - 16 mmol/L 11   eGFR if African American      >60 mL/min/1.73 m:2 >60.0   eGFR if non African American      >60 mL/min/1.73 m:2 >60.0   Cholesterol      120 - 199 mg/dL 161   Triglycerides      30 - 150 mg/dL 89   HDL      40 - 75 mg/dL 57   LDL Cholesterol      63.0 - 159.0 mg/dL 86.2   HDL/Chol Ratio      20.0 - 50.0 % 35.4   Total Cholesterol/HDL Ratio      2.0 - 5.0 2.8   Non-HDL Cholesterol      mg/dL 104   Microalbum.,U,Random       ug/mL 10.0   Creatinine, Random Ur      23.0 - 375.0 mg/dL 101.0   Microalb Creat Ratio      0.0 - 30.0 ug/mg 9.9   Hemoglobin A1C      4.0 - 5.6 % 9.7 (H)   Estimated Avg Glucose      68 - 131 mg/dL 232 (H)   TSH      0.400 - 4.000 uIU/mL 2.063

## 2018-11-09 ENCOUNTER — OFFICE VISIT (OUTPATIENT)
Dept: FAMILY MEDICINE | Facility: CLINIC | Age: 38
End: 2018-11-09
Payer: COMMERCIAL

## 2018-11-09 VITALS
OXYGEN SATURATION: 98 % | DIASTOLIC BLOOD PRESSURE: 86 MMHG | HEIGHT: 73 IN | WEIGHT: 310.06 LBS | HEART RATE: 102 BPM | SYSTOLIC BLOOD PRESSURE: 124 MMHG | BODY MASS INDEX: 41.09 KG/M2 | TEMPERATURE: 98 F

## 2018-11-09 DIAGNOSIS — E16.2 HYPOGLYCEMIA: ICD-10-CM

## 2018-11-09 DIAGNOSIS — Z79.4 TYPE 2 DIABETES MELLITUS WITHOUT COMPLICATION, WITH LONG-TERM CURRENT USE OF INSULIN: Primary | ICD-10-CM

## 2018-11-09 DIAGNOSIS — J30.89 NON-SEASONAL ALLERGIC RHINITIS DUE TO OTHER ALLERGIC TRIGGER: ICD-10-CM

## 2018-11-09 DIAGNOSIS — E11.9 TYPE 2 DIABETES MELLITUS WITHOUT COMPLICATION, WITH LONG-TERM CURRENT USE OF INSULIN: Primary | ICD-10-CM

## 2018-11-09 PROCEDURE — 99999 PR PBB SHADOW E&M-EST. PATIENT-LVL V: CPT | Mod: PBBFAC,,, | Performed by: INTERNAL MEDICINE

## 2018-11-09 PROCEDURE — 93010 ELECTROCARDIOGRAM REPORT: CPT | Mod: S$GLB,,, | Performed by: INTERNAL MEDICINE

## 2018-11-09 PROCEDURE — 93005 ELECTROCARDIOGRAM TRACING: CPT | Mod: S$GLB,,, | Performed by: INTERNAL MEDICINE

## 2018-11-09 PROCEDURE — 99214 OFFICE O/P EST MOD 30 MIN: CPT | Mod: S$GLB,,, | Performed by: INTERNAL MEDICINE

## 2018-11-09 PROCEDURE — 3046F HEMOGLOBIN A1C LEVEL >9.0%: CPT | Mod: CPTII,S$GLB,, | Performed by: INTERNAL MEDICINE

## 2018-11-09 PROCEDURE — 3074F SYST BP LT 130 MM HG: CPT | Mod: CPTII,S$GLB,, | Performed by: INTERNAL MEDICINE

## 2018-11-09 PROCEDURE — 3008F BODY MASS INDEX DOCD: CPT | Mod: CPTII,S$GLB,, | Performed by: INTERNAL MEDICINE

## 2018-11-09 PROCEDURE — 3079F DIAST BP 80-89 MM HG: CPT | Mod: CPTII,S$GLB,, | Performed by: INTERNAL MEDICINE

## 2018-11-09 RX ORDER — LIRAGLUTIDE 6 MG/ML
1.8 INJECTION SUBCUTANEOUS DAILY
Qty: 6 ML | Refills: 5 | Status: SHIPPED | OUTPATIENT
Start: 2018-11-09 | End: 2018-11-28 | Stop reason: SINTOL

## 2018-11-09 RX ORDER — MONTELUKAST SODIUM 10 MG/1
10 TABLET ORAL NIGHTLY
Qty: 30 TABLET | Refills: 11 | Status: SHIPPED | OUTPATIENT
Start: 2018-11-09 | End: 2018-12-09

## 2018-11-09 NOTE — LETTER
November 9, 2018      [facility]  [address] [facility]  [place] [address]  Phone: [phone]  Fax: [phone]       Patient: Tony Gordillo  YOB: 1980  Date of Visit: 11/9/18      To Whom It May Concern:    Tony Gordillo  was at [facility] on 11/9/18. He may return to work/school on today November 9 with no restrictions. If you have any questions or concerns, or if I can be of further assistance, please do not hesitate to contact me.      Sincerely,        Jovany Ford MD

## 2018-11-11 ENCOUNTER — HOSPITAL ENCOUNTER (EMERGENCY)
Facility: HOSPITAL | Age: 38
Discharge: HOME | End: 2018-11-11
Attending: EMERGENCY MEDICINE
Payer: COMMERCIAL

## 2018-11-11 VITALS
OXYGEN SATURATION: 95 % | HEIGHT: 73 IN | WEIGHT: 298 LBS | BODY MASS INDEX: 39.49 KG/M2 | TEMPERATURE: 99 F | DIASTOLIC BLOOD PRESSURE: 79 MMHG | SYSTOLIC BLOOD PRESSURE: 117 MMHG | RESPIRATION RATE: 20 BRPM | HEART RATE: 109 BPM

## 2018-11-11 DIAGNOSIS — K52.9 AGE (ACUTE GASTROENTERITIS): Primary | ICD-10-CM

## 2018-11-11 LAB
ALBUMIN SERPL-MCNC: 3.8 G/DL (ref 3.3–5.5)
ALBUMIN SERPL-MCNC: 4.1 G/DL (ref 3.3–5.5)
ALP SERPL-CCNC: 66 U/L (ref 42–141)
ALP SERPL-CCNC: 77 U/L (ref 42–141)
BILIRUB SERPL-MCNC: 1 MG/DL (ref 0.2–1.6)
BILIRUB SERPL-MCNC: 1 MG/DL (ref 0.2–1.6)
BILIRUBIN, POC UA: NEGATIVE
BLOOD, POC UA: NEGATIVE
BUN SERPL-MCNC: 12 MG/DL (ref 7–22)
CALCIUM SERPL-MCNC: 9.6 MG/DL (ref 8–10.3)
CHLORIDE SERPL-SCNC: 101 MMOL/L (ref 98–108)
CLARITY, POC UA: CLEAR
COLOR, POC UA: YELLOW
CREAT SERPL-MCNC: 1 MG/DL (ref 0.6–1.2)
GLUCOSE SERPL-MCNC: 141 MG/DL (ref 73–118)
GLUCOSE, POC UA: ABNORMAL
HCO3 UR-SCNC: 25.4 MMOL/L (ref 24–28)
KETONES, POC UA: ABNORMAL
LDH SERPL L TO P-CCNC: 1.77 MMOL/L (ref 0.5–2.2)
LEUKOCYTE EST, POC UA: NEGATIVE
NITRITE, POC UA: NEGATIVE
PCO2 BLDA: 46.3 MMHG (ref 35–45)
PH SMN: 7.35 [PH] (ref 7.35–7.45)
PH UR STRIP: 5.5 [PH]
PO2 BLDA: 59 MMHG (ref 40–60)
POC ALT (SGPT): 29 U/L (ref 10–47)
POC ALT (SGPT): 37 U/L (ref 10–47)
POC AMYLASE: 41 U/L (ref 14–97)
POC AST (SGOT): 28 U/L (ref 11–38)
POC AST (SGOT): 30 U/L (ref 11–38)
POC BE: -1 MMOL/L
POC GGT: 21 U/L (ref 5–65)
POC SATURATED O2: 89 % (ref 95–100)
POC TCO2: 27 MMOL/L (ref 18–33)
POC TCO2: 27 MMOL/L (ref 24–29)
POTASSIUM BLD-SCNC: 3.9 MMOL/L (ref 3.6–5.1)
PROTEIN, POC UA: NEGATIVE
PROTEIN, POC: 8 G/DL (ref 6.4–8.1)
PROTEIN, POC: 8 G/DL (ref 6.4–8.1)
SAMPLE: ABNORMAL
SODIUM BLD-SCNC: 143 MMOL/L (ref 128–145)
SPECIFIC GRAVITY, POC UA: 1.02
UROBILINOGEN, POC UA: 0.2 E.U./DL

## 2018-11-11 PROCEDURE — 85025 COMPLETE CBC W/AUTO DIFF WBC: CPT

## 2018-11-11 PROCEDURE — 63600175 PHARM REV CODE 636 W HCPCS: Performed by: EMERGENCY MEDICINE

## 2018-11-11 PROCEDURE — 99284 EMERGENCY DEPT VISIT MOD MDM: CPT | Mod: 25

## 2018-11-11 PROCEDURE — 81003 URINALYSIS AUTO W/O SCOPE: CPT

## 2018-11-11 PROCEDURE — 80053 COMPREHEN METABOLIC PANEL: CPT

## 2018-11-11 PROCEDURE — 25000003 PHARM REV CODE 250: Performed by: EMERGENCY MEDICINE

## 2018-11-11 PROCEDURE — 96375 TX/PRO/DX INJ NEW DRUG ADDON: CPT

## 2018-11-11 PROCEDURE — 82150 ASSAY OF AMYLASE: CPT

## 2018-11-11 PROCEDURE — 96365 THER/PROPH/DIAG IV INF INIT: CPT

## 2018-11-11 PROCEDURE — 82803 BLOOD GASES ANY COMBINATION: CPT

## 2018-11-11 RX ORDER — METOCLOPRAMIDE HYDROCHLORIDE 5 MG/ML
10 INJECTION INTRAMUSCULAR; INTRAVENOUS
Status: COMPLETED | OUTPATIENT
Start: 2018-11-11 | End: 2018-11-11

## 2018-11-11 RX ORDER — ONDANSETRON 2 MG/ML
4 INJECTION INTRAMUSCULAR; INTRAVENOUS
Status: COMPLETED | OUTPATIENT
Start: 2018-11-11 | End: 2018-11-11

## 2018-11-11 RX ORDER — PROMETHAZINE HYDROCHLORIDE 25 MG/1
25 SUPPOSITORY RECTAL EVERY 6 HOURS PRN
Qty: 10 SUPPOSITORY | Refills: 0 | OUTPATIENT
Start: 2018-11-11 | End: 2018-11-17

## 2018-11-11 RX ORDER — DICYCLOMINE HYDROCHLORIDE 20 MG/1
20 TABLET ORAL 2 TIMES DAILY PRN
Qty: 20 TABLET | Refills: 0 | Status: SHIPPED | OUTPATIENT
Start: 2018-11-11 | End: 2018-12-11

## 2018-11-11 RX ORDER — KETOROLAC TROMETHAMINE 30 MG/ML
30 INJECTION, SOLUTION INTRAMUSCULAR; INTRAVENOUS
Status: COMPLETED | OUTPATIENT
Start: 2018-11-11 | End: 2018-11-11

## 2018-11-11 RX ORDER — METOCLOPRAMIDE 10 MG/1
10 TABLET ORAL
Qty: 30 TABLET | Refills: 0 | Status: SHIPPED | OUTPATIENT
Start: 2018-11-11 | End: 2019-01-23

## 2018-11-11 RX ADMIN — KETOROLAC TROMETHAMINE 30 MG: 30 INJECTION INTRAMUSCULAR; INTRAVENOUS at 07:11

## 2018-11-11 RX ADMIN — ONDANSETRON 4 MG: 2 INJECTION INTRAMUSCULAR; INTRAVENOUS at 07:11

## 2018-11-11 RX ADMIN — LIDOCAINE HYDROCHLORIDE: 20 SOLUTION ORAL; TOPICAL at 08:11

## 2018-11-11 RX ADMIN — METOCLOPRAMIDE 10 MG: 5 INJECTION, SOLUTION INTRAMUSCULAR; INTRAVENOUS at 08:11

## 2018-11-11 RX ADMIN — SODIUM CHLORIDE 1000 ML: 0.9 INJECTION, SOLUTION INTRAVENOUS at 07:11

## 2018-11-11 RX ADMIN — PROMETHAZINE HYDROCHLORIDE 25 MG: 25 INJECTION INTRAMUSCULAR; INTRAVENOUS at 07:11

## 2018-11-12 ENCOUNTER — OFFICE VISIT (OUTPATIENT)
Dept: FAMILY MEDICINE | Facility: CLINIC | Age: 38
End: 2018-11-12
Payer: COMMERCIAL

## 2018-11-12 VITALS
DIASTOLIC BLOOD PRESSURE: 76 MMHG | HEIGHT: 73 IN | WEIGHT: 311.81 LBS | BODY MASS INDEX: 41.32 KG/M2 | OXYGEN SATURATION: 97 % | HEART RATE: 105 BPM | SYSTOLIC BLOOD PRESSURE: 120 MMHG | TEMPERATURE: 99 F

## 2018-11-12 DIAGNOSIS — Z79.4 TYPE 2 DIABETES MELLITUS WITHOUT COMPLICATION, WITH LONG-TERM CURRENT USE OF INSULIN: ICD-10-CM

## 2018-11-12 DIAGNOSIS — K52.9 GASTROENTERITIS: Primary | ICD-10-CM

## 2018-11-12 DIAGNOSIS — E11.9 TYPE 2 DIABETES MELLITUS WITHOUT COMPLICATION, WITH LONG-TERM CURRENT USE OF INSULIN: ICD-10-CM

## 2018-11-12 PROCEDURE — 99999 PR PBB SHADOW E&M-EST. PATIENT-LVL III: CPT | Mod: PBBFAC,,, | Performed by: INTERNAL MEDICINE

## 2018-11-12 PROCEDURE — 3008F BODY MASS INDEX DOCD: CPT | Mod: CPTII,S$GLB,, | Performed by: INTERNAL MEDICINE

## 2018-11-12 PROCEDURE — 3046F HEMOGLOBIN A1C LEVEL >9.0%: CPT | Mod: CPTII,S$GLB,, | Performed by: INTERNAL MEDICINE

## 2018-11-12 PROCEDURE — 3078F DIAST BP <80 MM HG: CPT | Mod: CPTII,S$GLB,, | Performed by: INTERNAL MEDICINE

## 2018-11-12 PROCEDURE — 3074F SYST BP LT 130 MM HG: CPT | Mod: CPTII,S$GLB,, | Performed by: INTERNAL MEDICINE

## 2018-11-12 PROCEDURE — 99214 OFFICE O/P EST MOD 30 MIN: CPT | Mod: S$GLB,,, | Performed by: INTERNAL MEDICINE

## 2018-11-12 NOTE — ED PROVIDER NOTES
Encounter Date: 11/11/2018    SCRIBE #1 NOTE: I, Alyson Gonzales, am scribing for, and in the presence of,  Dr. Gonsalez. I have scribed the following portions of the note - Other sections scribed: HPI, ROS, PE.       History     Chief Complaint   Patient presents with    Vomiting     Patient stated having nausea and vomiting starting  at 1400. Patient stated unable to tolerate PO intake.    Diarrhea     Patient stated having multiple diarrhea stools starting this am.     Abdominal Pain     Patient stated having right and left upper quadrant pain.      This is a 38 year old diabetic male who presents with nausea and vomiting since 2PM today. He denies symptoms yesterday and was active with his family. He reports his symptoms began with belching a sulfuric egg smell this morning. Around 10AM he began to have diarrhea. He reports more than 10 episodes of diarrhea today, yellow-patricia in color. Denies bloody stool. Around 2PM he began vomiting. At 4PM his wife called the [facility] who told him to try Zofran. The Zofran was left over from a previous prescription. Two hours later he continued to experience nausea, vomiting, and diarrhea so he decided to come to the ED. He reports 10 episodes of vomiting today and denies hematemesis or coffee ground emesis. Denies suspect food or recent antibiotics use. He currently complains of nausea and belching. He also reports right upper quadrant abdominal pressure. He denies history of abdominal surgery. Denies urinary symptoms. He has tried to eat and drink today. 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      Denies any changes in his medications. He saw his doctor three days ago for a recent drop in blood sugar and feeling shaky. His doctor told him he might have a GI bug.      The history is provided by the patient. No  was used.     Review of patient's allergies indicates:   Allergen Reactions     Influenza virus vaccine, specific Hives     Past Medical History:   Diagnosis Date    Diabetes mellitus, type 2     Hyperlipidemia     Hypertension      Past Surgical History:   Procedure Laterality Date    ANKLE SURGERY      KNEE SURGERY      acl,mcl,pcl    left knee x 2       Family History   Problem Relation Age of Onset    Diabetes Mother     Diabetes Father     No Known Problems Brother     Autism Son     No Known Problems Daughter      Social History     Tobacco Use    Smoking status: Never Smoker    Smokeless tobacco: Never Used   Substance Use Topics    Alcohol use: Yes     Comment: 1-2 beers every 2 weeks    Drug use: No     Review of Systems   Constitutional: Negative for chills and fever.   Respiratory: Negative for shortness of breath.    Cardiovascular: Negative for chest pain.   Gastrointestinal: Positive for abdominal pain, diarrhea, nausea and vomiting. Negative for abdominal distention and blood in stool.   Genitourinary: Negative for dysuria, hematuria and urgency.   Neurological: Positive for headaches (mild). Negative for dizziness and light-headedness.   All other systems reviewed and are negative.      Physical Exam     Initial Vitals [11/11/18 1838]   BP Pulse Resp Temp SpO2   (!) 134/99 (!) 117 (!) 22 98.7 °F (37.1 °C) 95 %      MAP       --         Physical Exam    Nursing note and vitals reviewed.  Constitutional: He appears well-developed and well-nourished. He is Obese .   HENT:   Head: Normocephalic and atraumatic.   Eyes: Conjunctivae are normal.   Neck: Normal range of motion and phonation normal. Neck supple. No stridor present.   Cardiovascular: Normal rate, regular rhythm, normal heart sounds and intact distal pulses. Exam reveals no gallop and no friction rub.    No murmur heard.  Pulmonary/Chest: Breath sounds normal. No stridor. No respiratory distress. He has no wheezes. He has no rhonchi. He has no rales.   Abdominal: Soft. Bowel sounds are normal. He exhibits no  distension. There is tenderness in the right upper quadrant and epigastric area. There is guarding. There is no rebound.   Musculoskeletal: Normal range of motion.   Neurological: He is alert and oriented to person, place, and time.   Skin: Skin is warm and dry.   Psychiatric: He has a normal mood and affect. His behavior is normal.         ED Course   Procedures  Labs Reviewed   POCT URINALYSIS W/O SCOPE - Abnormal; Notable for the following components:       Result Value    Glucose, UA 2+ (*)     Bilirubin, UA Negative (*)     Ketones, UA 1+ (*)     Blood, UA Negative (*)     Protein, UA Negative (*)     Nitrite, UA Negative (*)     Leukocytes, UA Negative (*)     All other components within normal limits   POCT CMP - Abnormal; Notable for the following components:    POC Glucose 141 (*)     All other components within normal limits   ISTAT PROCEDURE - Abnormal; Notable for the following components:    POC PH 7.347 (*)     POC PCO2 46.3 (*)     POC SATURATED O2 89 (*)     All other components within normal limits   CULTURE, STOOL   POCT URINALYSIS W/O SCOPE   POCT CBC   POCT CMP   POCT AMYLASE   POCT LIVER PANEL          Imaging Results          CT Renal Stone Study ABD Pelvis WO (Final result)  Result time 11/11/18 19:48:25    Final result by Ari Estrada MD (11/11/18 19:48:25)                 Impression:      Scattered liquid stool throughout the colon, which can be seen with a nonspecific diarrheal illness.  Otherwise, no evidence of bowel obstruction or inflammation.      Electronically signed by: Ari Estrada MD  Date:    11/11/2018  Time:    19:48             Narrative:    EXAMINATION:  CT RENAL STONE STUDY ABD PELVIS WO    CLINICAL HISTORY:  Abdominal pain, unspecified;    TECHNIQUE:  Low dose axial images, sagittal and coronal reformations were obtained from the lung bases to the pubic symphysis.  Contrast was not administered.    COMPARISON:  None    FINDINGS:  Included lung bases are clear.  Base of  the heart is normal in size without significant pericardial fluid.    Noncontrast appearance of the liver, gallbladder, pancreas, spleen, stomach, duodenum and bilateral adrenal glands are within normal limits.  No biliary ductal dilatation.    Bilateral kidneys are normal in size, shape and location.  No hydronephrosis or significant perinephric stranding.  No radiodense calculus seen within the collecting systems on either side or urinary bladder.  Bilateral ureters are within normal limits.  Urinary bladder is suboptimally distended.  Pelvic phleboliths noted.  Prostate and seminal vesicles are within normal limits.    No ascites, free air or lymphadenopathy.  Aorta is nonaneurysmal.  No significant atherosclerosis.    Appendix and terminal ileum are within normal limits.  Scattered liquid stool throughout the colon.  No evidence of bowel obstruction or inflammation.  No pneumatosis or portal venous gas.    Included osseous structures show minimal degenerative change without acute or destructive process seen.                                 Medical Decision Making:   Clinical Tests:   Lab Tests: Ordered and Reviewed       <> Summary of Lab: WBC 12.6   H&H 17.6/54.5  MCV 89.6  RDW% 13.1                  Scribe Attestation:   Scribe #1: I performed the above scribed service and the documentation accurately describes the services I performed. I attest to the accuracy of the note.      Labs Reviewed  Admission on 11/11/2018   Component Date Value Ref Range Status    Albumin, POC 11/11/2018 4.1  3.3 - 5.5 g/dL Final    Alkaline Phosphatase, POC 11/11/2018 66  42 - 141 U/L Final    ALT (SGPT), POC 11/11/2018 29  10 - 47 U/L Final    Amylase, POC 11/11/2018 41  14 - 97 U/L Final    AST (SGOT), POC 11/11/2018 30  11 - 38 U/L Final    POC GGT 11/11/2018 21  5 - 65 U/L Final    Bilirubin 11/11/2018 1.0  0.2 - 1.6 mg/dL Final    Protein 11/11/2018 8.0  6.4 - 8.1 g/dL Final    Albumin, POC 11/11/2018 3.8  3.3  - 5.5 g/dL Final    Alkaline Phosphatase, POC 11/11/2018 77  42 - 141 U/L Final    ALT (SGPT), POC 11/11/2018 37  10 - 47 U/L Final    AST (SGOT), POC 11/11/2018 28  11 - 38 U/L Final    POC BUN 11/11/2018 12  7 - 22 mg/dL Final    Calcium, POC 11/11/2018 9.6  8.0 - 10.3 mg/dL Final    POC Chloride 11/11/2018 101  98 - 108 mmol/L Final    POC Creatinine 11/11/2018 1.0  0.6 - 1.2 mg/dL Final    POC Glucose 11/11/2018 141* 73 - 118 mg/dL Final    POC Potassium 11/11/2018 3.9  3.6 - 5.1 mmol/L Final    POC Sodium 11/11/2018 143  128 - 145 mmol/L Final    Bilirubin 11/11/2018 1.0  0.2 - 1.6 mg/dL Final    POC TCO2 11/11/2018 27  18 - 33 mmol/L Final    Protein 11/11/2018 8.0  6.4 - 8.1 g/dL Final    POC PH 11/11/2018 7.347* 7.35 - 7.45 Final    POC PCO2 11/11/2018 46.3* 35 - 45 mmHg Final    POC PO2 11/11/2018 59  40 - 60 mmHg Final    POC HCO3 11/11/2018 25.4  24 - 28 mmol/L Final    POC BE 11/11/2018 -1  -2 to 2 mmol/L Final    POC SATURATED O2 11/11/2018 89* 95 - 100 % Final    POC Lactate 11/11/2018 1.77  0.5 - 2.2 mmol/L Final    POC TCO2 11/11/2018 27  24 - 29 mmol/L Final    Sample 11/11/2018 VENOUS   Final    Glucose, UA 11/11/2018 2+*  Final    Bilirubin, UA 11/11/2018 Negative*  Final    Ketones, UA 11/11/2018 1+*  Final    Spec Grav UA 11/11/2018 1.020   Final    Blood, UA 11/11/2018 Negative*  Final    PH, UA 11/11/2018 5.5   Final    Protein, UA 11/11/2018 Negative*  Final    Urobilinogen, UA 11/11/2018 0.2  E.U./dL Final    Nitrite, UA 11/11/2018 Negative*  Final    Leukocytes, UA 11/11/2018 Negative*  Final    Color, UA 11/11/2018 Yellow   Final    Clarity, UA 11/11/2018 Clear   Final        Imaging Reviewed    Imaging Results          CT Renal Stone Study ABD Pelvis WO (Final result)  Result time 11/11/18 19:48:25    Final result by Ari Estrada MD (11/11/18 19:48:25)                 Impression:      Scattered liquid stool throughout the colon, which can be seen with  a nonspecific diarrheal illness.  Otherwise, no evidence of bowel obstruction or inflammation.      Electronically signed by: Ari Estrada MD  Date:    11/11/2018  Time:    19:48             Narrative:    EXAMINATION:  CT RENAL STONE STUDY ABD PELVIS WO    CLINICAL HISTORY:  Abdominal pain, unspecified;    TECHNIQUE:  Low dose axial images, sagittal and coronal reformations were obtained from the lung bases to the pubic symphysis.  Contrast was not administered.    COMPARISON:  None    FINDINGS:  Included lung bases are clear.  Base of the heart is normal in size without significant pericardial fluid.    Noncontrast appearance of the liver, gallbladder, pancreas, spleen, stomach, duodenum and bilateral adrenal glands are within normal limits.  No biliary ductal dilatation.    Bilateral kidneys are normal in size, shape and location.  No hydronephrosis or significant perinephric stranding.  No radiodense calculus seen within the collecting systems on either side or urinary bladder.  Bilateral ureters are within normal limits.  Urinary bladder is suboptimally distended.  Pelvic phleboliths noted.  Prostate and seminal vesicles are within normal limits.    No ascites, free air or lymphadenopathy.  Aorta is nonaneurysmal.  No significant atherosclerosis.    Appendix and terminal ileum are within normal limits.  Scattered liquid stool throughout the colon.  No evidence of bowel obstruction or inflammation.  No pneumatosis or portal venous gas.    Included osseous structures show minimal degenerative change without acute or destructive process seen.                                Medications given in ED    Medications   ondansetron injection 4 mg (4 mg Intravenous Given 11/11/18 1920)   sodium chloride 0.9% bolus 1,000 mL (0 mLs Intravenous Stopped 11/11/18 2102)   promethazine (PHENERGAN) 25 mg in dextrose 5 % 50 mL IVPB (0 mg Intravenous Stopped 11/11/18 2034)   ketorolac injection 30 mg (30 mg Intravenous Given  11/11/18 1959)   (pyxis) gi cocktail (mylanta 30 mL, lidocaine 2 % viscous 10 mL, dicyclomine 10 mL) 50 mL ( Oral Given 11/11/18 2005)   metoclopramide HCl injection 10 mg (10 mg Intravenous Given 11/11/18 2056)       This document was produced by a scribe under my direction and in my presence. I agree with the content of the note and have made any necessary edits.     Stanislav Gonsalez MD         Note was created using voice recognition software. Note may have occasional typographical errors that may not have been identified and edited despite good cy initial review prior to signing.          ED Course as of Nov 11 2102   Sun Nov 11, 2018 2042 Mild nausea. No emesis or diarrhea during ED course thus far.   [DL]      ED Course User Index  [DL] Stanislav Gonsalez MD     Discharge Medications        Medication List      START taking these medications    dicyclomine 20 mg tablet  Commonly known as:  BENTYL  Take 1 tablet (20 mg total) by mouth 2 (two) times daily as needed (abdomnal cramps).     metoclopramide HCl 10 MG tablet  Commonly known as:  REGLAN  Take 1 tablet (10 mg total) by mouth before meals and at bedtime as needed (nausea).        ASK your doctor about these medications    atorvastatin 10 MG tablet  Commonly known as:  LIPITOR  Take 1 tablet (10 mg total) by mouth once daily.     azelastine 137 mcg (0.1 %) nasal spray  Commonly known as:  ASTELIN     dapagliflozin 10 mg Tab  Commonly known as:  FARXIGA  Take 10 mg by mouth once daily.     flash glucose scanning reader Misc  Commonly known as:  FREESTYLE SAMANTHA 10 DAY READER  TID accuchecks     flash glucose sensor Kit  Commonly known as:  FREESTYLE SAMANTHA 10 DAY SENSOR  TID glucose checks.     insulin glargine 100 unit/mL (3 mL) Inpn pen  Commonly known as:  BASAGLAR KWIKPEN U-100 INSULIN  Inject 50 Units into the skin 2 (two) times daily.     insulin glulisine U-100 100 unit/mL injection  Commonly known as:  APIDRA U-100 INSULIN  Inject 15 Units into the  "skin 3 (three) times daily before meals.     lisinopril 5 MG tablet  Commonly known as:  PRINIVIL,ZESTRIL  Take 1 tablet (5 mg total) by mouth once daily.     loratadine 10 mg tablet  Commonly known as:  CLARITIN     montelukast 10 mg tablet  Commonly known as:  SINGULAIR  Take 1 tablet (10 mg total) by mouth every evening.     needle (disp) 31 gauge 31 gauge x 5/16" Ndle  1 each by Misc.(Non-Drug; Combo Route) route once daily. QID insulin and victoza     VICTOZA 3-AYALA 0.6 mg/0.1 mL (18 mg/3 mL) Pnij  Generic drug:  liraglutide 0.6 mg/0.1 mL (18 mg/3 mL) subq PNIJ  1.8 mg by abdominal subcutaneous route once daily. Increased dose.           Where to Get Your Medications      You can get these medications from any pharmacy    Bring a paper prescription for each of these medications  · dicyclomine 20 mg tablet  · metoclopramide HCl 10 MG tablet               Patient discharged to home in stable condition with instructions to:   1. Please take all meds as prescribed.  2. Follow-up with your primary care doctor   3. Return precautions discussed and patient and/or family/caretaker understands to return to the emergency room for any concerns including worsening of your current symptoms, fever, chills, night sweats, worsening pain, chest pain, shortness of breath, nausea, vomiting, diarrhea, bleeding, headache, difficulty talking, visual disturbances, weakness, numbness or any other acute concerns    Clinical Impression:     1. AGE (acute gastroenteritis)                                   Stanislav Gonsalez MD  11/26/18 5123    "

## 2018-11-12 NOTE — PATIENT INSTRUCTIONS
SLOW ADVANCE DIET.     IF/WHEN DIARRHEA BETTER AND TOLERATING FOOD - RESTART MEALTIME INSULIN AND FARXIGA.    IF YOU DO OK THEN AFTER 1 WEEK RESTART VICTOZA 1.8.

## 2018-11-12 NOTE — ED NOTES
Pt unable to proceed with triage; pt c/o diarrhea and inability to hold stool; triage assessment to be continued

## 2018-11-12 NOTE — PROGRESS NOTES
This note was created by combination of typed  and Dragon dictation.  Transcription errors may be present.  If there are any questions, please contact me.    Assessment & Plan:   Gastroenteritis  -possible that this is gastroenteritis. 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  He is pretty sure no contact with C diff pt (known) x > 2 weeks.   SE of higher dose victoza? Had gone from 1.2 to 1.8  For now - BRAT diet. Stay on basal insulin, for now SSI for high readings.  immodium OK.  If still no improvement - container given to pt for C diff and stool culture.  If diarrhea continues to improve, and tolerating food better, restart mealtime insulin and farxiga and continue to hold on victoza until after a week.  If diarrhea restarts with resuming high dose victoza then go back down to 1.2.  -     Clostridium difficile EIA; Future; Expected date: 11/12/2018  -     Stool culture; Future; Expected date: 11/12/2018    Type 2 diabetes mellitus without complication, with long-term current use of insulin  -refilled freestyle samantha sensor 14 day.  -     flash glucose sensor (FREESTYLE SAMANTHA 14 DAY SENSOR) Kit; TID glucose checks  Dispense: 2 kit; Refill: 11    There are no discontinued medications.    meds sent this encounter:  Medications Ordered This Encounter   Medications    flash glucose sensor (FREESTYLE SAMANTHA 14 DAY SENSOR) Kit     Sig: TID glucose checks     Dispense:  2 kit     Refill:  11       Follow Up: No Follow-up on file.    Subjective:     Chief Complaint   Patient presents with    Follow-up       HPI  Tony is a 38 y.o. male, last appointment with this clinic was 11/9/2018.    DM2 on insulin; basaglar, apidra; bydureon; hx of metformin ineffective. Due to insurance formulary the Bydureon was not covered. Now on insulin; farxiga; victoza  HTN,lisinopril though he notes it's low dose and had previously been higher dose and now more for renal protective effect.  Hyperlipidemia  atorvastatin  3/2014 [facility] stress test negative.  Hypothyroid in the past with hx of levothyroxine but no longer taking.    Eye doctor Briana.  Migraines - never seen neuro for this.  has tried topamax and propranolol with either SE (propranolol SE of anger) or topamax didn't work.  Did not want to take narcotics.  Actually found a daith piercing (piercing of the ear) helps mitigate the headaches.  Migraine with aura and nausea and photophobia.  imitrex without relief.    Last visit - episode of hypoglycemia sensation though normal/elevated glucose readings. Plan was increase the victoza and follow up with endo.      That was on Friday.  Did okay Friday and did okay Saturday but then on Sunday he woke feeling nauseous and vomited several times.  Had taste of sulfur in his mouth.  Started having liquid stools.  Severe enough to warrant ER visit.  Was given antiemetics, supportive care, CT scan no stone, no mass, liquid stool in the colon.  No obvious inflammation.      He continues to have loose stool.  Clear liquid diet so far.  Blood sugars have been going up.  He was instructed to stop all medicines except for basal insulin because of his poor oral intake.      We discussed possibility that this may be side effect of the higher dose Victoza.      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      No systemic fevers or chills.  Some epigastric discomfort.  But had been vomiting several times.  He has not tried anything to stop the diarrhea such as Imodium.    Patient Care Team:  Jovany Ford MD as PCP - General (Internal Medicine)  Janneth Johnson RN (Inactive) as Registered Nurse (Diabetes)  Rocky Hewitt MD as Consulting Physician (Ophthalmology)    Patient Active Problem List    Diagnosis Date Noted    Non-seasonal allergic rhinitis; avoid antihistamines per opthalmology 11/09/2018    Migraine with aura and without status  migrainosus, not intractable propranolol SE angry; topamax not helpful; imitrex ineffective; daith ear piercing helps 09/28/2018    Type 2 diabetes mellitus without complication, with long-term current use of insulin     Essential hypertension     Hypothyroidism not currently on medication 07/29/2015    Hyperlipidemia LDL goal <70 06/03/2015    Insulin long-term use 06/03/2015    Tinea pedis 06/03/2015    Morbid obesity 06/03/2015       PAST MEDICAL HISTORY:  Past Medical History:   Diagnosis Date    Diabetes mellitus, type 2     Hyperlipidemia     Hypertension        PAST SURGICAL HISTORY:  Past Surgical History:   Procedure Laterality Date    ANKLE SURGERY      KNEE SURGERY      acl,mcl,pcl    left knee x 2         SOCIAL HISTORY:  Social History     Socioeconomic History    Marital status:      Spouse name: Not on file    Number of children: Not on file    Years of education: Not on file    Highest education level: Not on file   Social Needs    Financial resource strain: Not on file    Food insecurity - worry: Not on file    Food insecurity - inability: Not on file    Transportation needs - medical: Not on file    Transportation needs - non-medical: Not on file   Occupational History    Occupation:  to be EMT basic     Employer: [employer]   Tobacco Use    Smoking status: Never Smoker    Smokeless tobacco: Never Used   Substance and Sexual Activity    Alcohol use: Yes     Comment: 1-2 beers every 2 weeks    Drug use: No    Sexual activity: Not on file   Other Topics Concern    Not on file   Social History Narrative    Not on file       ALLERGIES AND MEDICATIONS: updated and reviewed.  Review of patient's allergies indicates:   Allergen Reactions    Influenza virus vaccine, specific Hives     Current Outpatient Medications   Medication Sig Dispense Refill    atorvastatin (LIPITOR) 10 MG tablet Take 1 tablet (10 mg total) by mouth once daily. 90 tablet 1     "azelastine (ASTELIN) 137 mcg nasal spray 1 spray by Nasal route 2 (two) times daily.      dapagliflozin (FARXIGA) 10 mg Tab Take 10 mg by mouth once daily. 90 tablet 1    dicyclomine (BENTYL) 20 mg tablet Take 1 tablet (20 mg total) by mouth 2 (two) times daily as needed (abdomnal cramps). 20 tablet 0    flash glucose scanning reader (FREESTYLE SAMANTHA 10 DAY READER) Misc TID accuchecks 1 each 0    flash glucose sensor (FREESTYLE SAMANTHA 10 DAY SENSOR) Kit TID glucose checks. 1 kit 11    insulin glargine (BASAGLAR KWIKPEN U-100 INSULIN) 100 unit/mL (3 mL) InPn pen Inject 50 Units into the skin 2 (two) times daily. 3 Box 5    insulin glulisine U-100 (APIDRA U-100 INSULIN) 100 unit/mL injection Inject 15 Units into the skin 3 (three) times daily before meals. 20 mL 11    lisinopril (PRINIVIL,ZESTRIL) 5 MG tablet Take 1 tablet (5 mg total) by mouth once daily. 90 tablet 1    loratadine (CLARITIN) 10 mg tablet Take 10 mg by mouth once daily.      metoclopramide HCl (REGLAN) 10 MG tablet Take 1 tablet (10 mg total) by mouth before meals and at bedtime as needed (nausea). 30 tablet 0    montelukast (SINGULAIR) 10 mg tablet Take 1 tablet (10 mg total) by mouth every evening. 30 tablet 11    needle, disp, 31 gauge 31 gauge x 5/16" Ndle 1 each by Misc.(Non-Drug; Combo Route) route once daily. QID insulin and victoza 500 each 5    promethazine (PHENERGAN) 25 MG suppository Place 1 suppository (25 mg total) rectally every 6 (six) hours as needed for Nausea. 10 suppository 0    VICTOZA 3-AYALA 0.6 mg/0.1 mL (18 mg/3 mL) PnIj 1.8 mg by abdominal subcutaneous route once daily. Increased dose. 6 mL 5     No current facility-administered medications for this visit.        Review of Systems   All other systems reviewed and are negative.      Objective:   Physical Exam   Vitals:    11/12/18 1525   BP: 120/76   Pulse: 105   Temp: 98.6 °F (37 °C)   SpO2: 97%   Weight: (!) 141.4 kg (311 lb 13.5 oz)   Height: 6' 1" (1.854 m)    " "Body mass index is 41.14 kg/m².  Weight: (!) 141.4 kg (311 lb 13.5 oz)   Height: 6' 1" (185.4 cm)     Physical Exam   Constitutional: He is oriented to person, place, and time. He appears well-developed and well-nourished. No distress.   Eyes: EOM are normal.   Cardiovascular: Normal rate, regular rhythm and normal heart sounds.   No murmur heard.  Pulmonary/Chest: Effort normal and breath sounds normal.   Abdominal:   Epigastric tenderness without mass   Musculoskeletal: Normal range of motion.   Neurological: He is alert and oriented to person, place, and time. Coordination normal.   Skin: Skin is warm and dry.   Psychiatric: He has a normal mood and affect. His behavior is normal. Thought content normal.     "

## 2018-11-12 NOTE — LETTER
November 12, 2018      [facility]  [address] [facility]  [place] [address]  Phone: [phone]  Fax: [phone]       Patient: Tony Gordillo  YOB: 1980  Date of Visit: 11/12/18      To Whom It May Concern:    Tony Gordillo  was at [facility] on 11/12/18. He may return to work/school on November 17 with no restrictions. If you have any questions or concerns, or if I can be of further assistance, please do not hesitate to contact me.      Sincerely,        Jovany Ford MD

## 2018-11-13 ENCOUNTER — TELEPHONE (OUTPATIENT)
Dept: FAMILY MEDICINE | Facility: CLINIC | Age: 38
End: 2018-11-13

## 2018-11-13 NOTE — TELEPHONE ENCOUNTER
Spoke with patient and would like for glucose kit to be sent to [facility] in [place].    Spoke with Karely at [facility] pharmacy ([phone]) with script order for glucose kit ordered.

## 2018-11-13 NOTE — TELEPHONE ENCOUNTER
----- Message from Brittany Ruth sent at 11/12/2018  4:44 PM CST -----  Contact: [facility]  Cant get flash glucose sensor (FREESTYLE SAMANTHA 14 DAY SENSOR) Kit. [facility].

## 2018-11-16 ENCOUNTER — TELEPHONE (OUTPATIENT)
Dept: FAMILY MEDICINE | Facility: CLINIC | Age: 38
End: 2018-11-16

## 2018-11-16 NOTE — TELEPHONE ENCOUNTER
----- Message from Mary Webb sent at 11/16/2018 10:01 AM CST -----  Contact: Damaso with [employer]   Damaso with [employer] called to inform Marisabel the patient's insurance says they never received the auth for patient's medication.         VICTOZA 3-AYALA 0.6 mg/0.1 mL (18 mg/3 mL) PnIj      [employer] [phone] Ref# [id number]

## 2018-11-16 NOTE — TELEPHONE ENCOUNTER
Spoke with been states a hard copy will be faxed to our office to be forward to pt insurance company.

## 2018-11-17 ENCOUNTER — HOSPITAL ENCOUNTER (EMERGENCY)
Facility: HOSPITAL | Age: 38
Discharge: HOME | End: 2018-11-17
Attending: EMERGENCY MEDICINE
Payer: COMMERCIAL

## 2018-11-17 VITALS
DIASTOLIC BLOOD PRESSURE: 74 MMHG | HEART RATE: 92 BPM | BODY MASS INDEX: 40.77 KG/M2 | WEIGHT: 309 LBS | TEMPERATURE: 98 F | OXYGEN SATURATION: 97 % | RESPIRATION RATE: 16 BRPM | SYSTOLIC BLOOD PRESSURE: 119 MMHG

## 2018-11-17 DIAGNOSIS — K52.9 GASTROENTERITIS: Primary | ICD-10-CM

## 2018-11-17 LAB
ALBUMIN SERPL-MCNC: 3.7 G/DL (ref 3.3–5.5)
ALP SERPL-CCNC: 66 U/L (ref 42–141)
BILIRUB SERPL-MCNC: 1 MG/DL (ref 0.2–1.6)
BILIRUBIN, POC UA: NEGATIVE
BLOOD, POC UA: NEGATIVE
BUN SERPL-MCNC: 16 MG/DL (ref 7–22)
CALCIUM SERPL-MCNC: 9.7 MG/DL (ref 8–10.3)
CHLORIDE SERPL-SCNC: 103 MMOL/L (ref 98–108)
CLARITY, POC UA: CLEAR
COLOR, POC UA: ABNORMAL
CREAT SERPL-MCNC: 0.8 MG/DL (ref 0.6–1.2)
GLUCOSE SERPL-MCNC: 174 MG/DL (ref 73–118)
GLUCOSE, POC UA: ABNORMAL
HCO3 UR-SCNC: 24.3 MMOL/L (ref 24–28)
KETONES, POC UA: ABNORMAL
LDH SERPL L TO P-CCNC: 1.46 MMOL/L (ref 0.5–2.2)
LEUKOCYTE EST, POC UA: NEGATIVE
NITRITE, POC UA: NEGATIVE
PCO2 BLDA: 36.7 MMHG (ref 35–45)
PH SMN: 7.43 [PH] (ref 7.35–7.45)
PH UR STRIP: 5.5 [PH]
PO2 BLDA: 73 MMHG (ref 40–60)
POC ALT (SGPT): 51 U/L (ref 10–47)
POC AST (SGOT): 33 U/L (ref 11–38)
POC BE: 0 MMOL/L
POC SATURATED O2: 95 % (ref 95–100)
POC TCO2: 25 MMOL/L (ref 24–29)
POC TCO2: 27 MMOL/L (ref 18–33)
POCT GLUCOSE: 153 MG/DL (ref 70–110)
POTASSIUM BLD-SCNC: 4.2 MMOL/L (ref 3.6–5.1)
PROTEIN, POC UA: NEGATIVE
PROTEIN, POC: 7.8 G/DL (ref 6.4–8.1)
SAMPLE: ABNORMAL
SODIUM BLD-SCNC: 143 MMOL/L (ref 128–145)
SPECIFIC GRAVITY, POC UA: 1.02
UROBILINOGEN, POC UA: 0.2 E.U./DL

## 2018-11-17 PROCEDURE — 99284 EMERGENCY DEPT VISIT MOD MDM: CPT | Mod: 25

## 2018-11-17 PROCEDURE — 96372 THER/PROPH/DIAG INJ SC/IM: CPT | Mod: 59

## 2018-11-17 PROCEDURE — 25000003 PHARM REV CODE 250: Performed by: NURSE PRACTITIONER

## 2018-11-17 PROCEDURE — 82803 BLOOD GASES ANY COMBINATION: CPT

## 2018-11-17 PROCEDURE — 96361 HYDRATE IV INFUSION ADD-ON: CPT

## 2018-11-17 PROCEDURE — 85025 COMPLETE CBC W/AUTO DIFF WBC: CPT

## 2018-11-17 PROCEDURE — 81003 URINALYSIS AUTO W/O SCOPE: CPT

## 2018-11-17 PROCEDURE — 96375 TX/PRO/DX INJ NEW DRUG ADDON: CPT

## 2018-11-17 PROCEDURE — 80053 COMPREHEN METABOLIC PANEL: CPT

## 2018-11-17 PROCEDURE — S0028 INJECTION, FAMOTIDINE, 20 MG: HCPCS | Performed by: NURSE PRACTITIONER

## 2018-11-17 PROCEDURE — 63600175 PHARM REV CODE 636 W HCPCS: Performed by: NURSE PRACTITIONER

## 2018-11-17 PROCEDURE — 96365 THER/PROPH/DIAG IV INF INIT: CPT

## 2018-11-17 RX ORDER — ONDANSETRON 4 MG/1
4 TABLET, ORALLY DISINTEGRATING ORAL
Status: COMPLETED | OUTPATIENT
Start: 2018-11-17 | End: 2018-11-17

## 2018-11-17 RX ORDER — FAMOTIDINE 10 MG/ML
20 INJECTION INTRAVENOUS
Status: COMPLETED | OUTPATIENT
Start: 2018-11-17 | End: 2018-11-17

## 2018-11-17 RX ORDER — PROMETHAZINE HYDROCHLORIDE 25 MG/1
25 TABLET ORAL EVERY 6 HOURS PRN
Qty: 20 TABLET | Refills: 0 | Status: SHIPPED | OUTPATIENT
Start: 2018-11-17 | End: 2019-01-23

## 2018-11-17 RX ORDER — DICYCLOMINE HYDROCHLORIDE 10 MG/ML
10 INJECTION INTRAMUSCULAR
Status: COMPLETED | OUTPATIENT
Start: 2018-11-17 | End: 2018-11-17

## 2018-11-17 RX ADMIN — SODIUM CHLORIDE 1000 ML: 0.9 INJECTION, SOLUTION INTRAVENOUS at 11:11

## 2018-11-17 RX ADMIN — PROMETHAZINE HYDROCHLORIDE 25 MG: 25 INJECTION INTRAMUSCULAR; INTRAVENOUS at 11:11

## 2018-11-17 RX ADMIN — DICYCLOMINE HYDROCHLORIDE 10 MG: 10 INJECTION INTRAMUSCULAR at 11:11

## 2018-11-17 RX ADMIN — FAMOTIDINE 20 MG: 10 INJECTION, SOLUTION INTRAVENOUS at 11:11

## 2018-11-17 RX ADMIN — ONDANSETRON 4 MG: 4 TABLET, ORALLY DISINTEGRATING ORAL at 02:11

## 2018-11-17 RX ADMIN — SODIUM CHLORIDE 1000 ML: 0.9 INJECTION, SOLUTION INTRAVENOUS at 12:11

## 2018-11-17 NOTE — ED NOTES
Pt reports he is feeling better, nausea greatly decreased.  Pt states he is ready to go home.  Continue with discharge as ordered.

## 2018-11-17 NOTE — ED PROVIDER NOTES
"Encounter Date: 11/17/2018       History     Chief Complaint   Patient presents with    Abdominal Pain     Pt states," Sulfa taste in mouth , upset stomach and stomach cramping. I think it is the Victoza."     The history is provided by the patient. No  was used.   Abdominal Pain   The current episode started yesterday. The onset of the illness was gradual. The problem has been gradually worsening. The abdominal pain is located in the periumbilical region. The abdominal pain does not radiate. The severity of the abdominal pain is 4/10. The abdominal pain is relieved by nothing. The abdominal pain is exacerbated by vomiting. The other symptoms of the illness include nausea, vomiting and diarrhea. The other symptoms of the illness do not include fever or dysuria.   Associated with: The pt was seen in the ER this past Sunday for same complaint. Workup negative. Told to stop Victoza. Started Victoza back up 2 days ago per Endocrinologist. then symptoms returned. Symptoms associated with the illness do not include chills.     Review of patient's allergies indicates:   Allergen Reactions    Influenza virus vaccine, specific Hives    Victoza [liraglutide] Nausea And Vomiting     Past Medical History:   Diagnosis Date    Diabetes mellitus, type 2     Hyperlipidemia     Hypertension      Past Surgical History:   Procedure Laterality Date    ANKLE SURGERY      KNEE SURGERY      acl,mcl,pcl    left knee x 2       Family History   Problem Relation Age of Onset    Diabetes Mother     Diabetes Father     No Known Problems Brother     Autism Son     No Known Problems Daughter      Social History     Tobacco Use    Smoking status: Never Smoker    Smokeless tobacco: Never Used   Substance Use Topics    Alcohol use: Yes     Comment: 1-2 beers every 2 weeks    Drug use: No     Review of Systems   Constitutional: Negative.  Negative for chills and fever.   HENT: Negative.  Negative for congestion, " sinus pressure and sore throat.    Eyes: Negative.  Negative for pain and discharge.   Respiratory: Negative.  Negative for cough.    Cardiovascular: Negative.  Negative for chest pain.   Gastrointestinal: Positive for abdominal pain, diarrhea, nausea and vomiting.   Genitourinary: Negative.  Negative for dysuria, genital sores, penile pain, scrotal swelling and testicular pain.   Musculoskeletal: Negative.  Negative for gait problem, joint swelling and neck pain.   Skin: Negative.  Negative for color change and rash.   Allergic/Immunologic: Negative.    Neurological: Negative.    Psychiatric/Behavioral: Negative.  Negative for behavioral problems, self-injury and suicidal ideas. The patient is not hyperactive.    All other systems reviewed and are negative.      Physical Exam     Initial Vitals [11/17/18 1026]   BP Pulse Resp Temp SpO2   (!) 171/104 (!) 120 16 98.4 °F (36.9 °C) 97 %      MAP       --         Physical Exam    Nursing note and vitals reviewed.  Constitutional: He appears well-developed and well-nourished. He is not diaphoretic.  Non-toxic appearance. He does not appear ill. No distress.   HENT:   Head: Normocephalic and atraumatic.   Eyes: Conjunctivae are normal.   Neck: Normal range of motion.   Cardiovascular: Normal rate, regular rhythm and normal heart sounds. Exam reveals no gallop and no friction rub.    No murmur heard.  Pulmonary/Chest: Breath sounds normal. He has no wheezes. He has no rhonchi. He has no rales. He exhibits no tenderness.   Abdominal: Soft. Normal appearance and bowel sounds are normal. He exhibits no shifting dullness, no distension, no pulsatile liver, no fluid wave, no abdominal bruit, no ascites, no pulsatile midline mass and no mass. There is no hepatosplenomegaly, splenomegaly or hepatomegaly. There is generalized tenderness and tenderness in the periumbilical area. There is no rigidity, no rebound, no guarding, no CVA tenderness, no tenderness at McBurney's point and  negative Herring's sign. No hernia. Hernia confirmed negative in the ventral area, confirmed negative in the right inguinal area and confirmed negative in the left inguinal area.   Musculoskeletal: Normal range of motion.   Neurological: He is alert and oriented to person, place, and time.   Skin: Skin is warm and dry. Capillary refill takes less than 2 seconds. No rash noted.   Psychiatric: He has a normal mood and affect. His behavior is normal. Judgment and thought content normal.         ED Course   Procedures  Labs Reviewed   POCT URINALYSIS W/O SCOPE - Abnormal; Notable for the following components:       Result Value    Glucose, UA 3+ (*)     Bilirubin, UA Negative (*)     Ketones, UA Trace (*)     Blood, UA Negative (*)     Protein, UA Negative (*)     Nitrite, UA Negative (*)     Leukocytes, UA Negative (*)     All other components within normal limits   POCT GLUCOSE - Abnormal; Notable for the following components:    POCT Glucose 153 (*)     All other components within normal limits   ISTAT PROCEDURE - Abnormal; Notable for the following components:    POC PO2 73 (*)     All other components within normal limits   POCT CMP - Abnormal; Notable for the following components:    ALT (SGPT), POC 51 (*)     POC Glucose 174 (*)     All other components within normal limits   POCT CBC   POCT URINALYSIS W/O SCOPE   POCT GLUCOSE MONITORING CONTINUOUS   POCT CMP          Imaging Results    None          Medical Decision Making:   Differential Diagnosis:   Electrolyte abnormality; DKA  Clinical Tests:   Lab Tests: Ordered and Reviewed  The following lab test(s) were unremarkable: CBC, CMP and Lactate       <> Summary of Lab: Venous blood gas  ED Management:  2 L NS bolus and Phenergan IV, Pepcid IV, and Bentyl IM.  Pt reports feeling better.  Tolerated po challenge well.  Pt discharged home on Phenergan and Align probiotic with instructions to eat a BRAT diet for the next 48 hrs then advance to regular as tolerated,  drink plenty of fluid, f/u with PCP and Endocrinologist in 2 days and return to ER as needed if symptoms worsen/fail to improve. Pt verbalized understanding of discharge instructions and treatment plan.                      Clinical Impression:   The encounter diagnosis was Gastroenteritis.                             Toussaint Battley III, [facility]  11/17/18 9019

## 2018-11-17 NOTE — ED NOTES
Pt has not buzzed to use bathroom.  Provide pt with urinal for specimen collection.  Pt reports he is feeling better compared to initial arrival to the ER, nausea is going down. Pt laying in stretcher, spouse at bedside.

## 2018-11-17 NOTE — ED NOTES
While ambulating out of the ER, pt to bathroom vomiting. NP notified.  Order received.  Pt to exam 3.

## 2018-11-27 ENCOUNTER — PATIENT MESSAGE (OUTPATIENT)
Dept: FAMILY MEDICINE | Facility: CLINIC | Age: 38
End: 2018-11-27

## 2018-11-28 ENCOUNTER — PATIENT MESSAGE (OUTPATIENT)
Dept: FAMILY MEDICINE | Facility: CLINIC | Age: 38
End: 2018-11-28

## 2018-11-29 ENCOUNTER — PATIENT MESSAGE (OUTPATIENT)
Dept: FAMILY MEDICINE | Facility: CLINIC | Age: 38
End: 2018-11-29

## 2018-11-29 DIAGNOSIS — E11.9 TYPE 2 DIABETES MELLITUS WITHOUT COMPLICATION, WITH LONG-TERM CURRENT USE OF INSULIN: Primary | ICD-10-CM

## 2018-11-29 DIAGNOSIS — Z79.4 TYPE 2 DIABETES MELLITUS WITHOUT COMPLICATION, WITH LONG-TERM CURRENT USE OF INSULIN: Primary | ICD-10-CM

## 2018-11-29 RX ORDER — INSULIN ASPART 100 [IU]/ML
15 INJECTION, SOLUTION INTRAVENOUS; SUBCUTANEOUS
Qty: 2 BOX | Refills: 11 | Status: SHIPPED | OUTPATIENT
Start: 2018-11-29 | End: 2018-12-07

## 2018-12-03 ENCOUNTER — TELEPHONE (OUTPATIENT)
Dept: ADMINISTRATIVE | Facility: HOSPITAL | Age: 38
End: 2018-12-03

## 2018-12-07 RX ORDER — INSULIN LISPRO 100 [IU]/ML
15 INJECTION, SOLUTION INTRAVENOUS; SUBCUTANEOUS 3 TIMES DAILY
Qty: 1 BOX | Refills: 11 | Status: SHIPPED | OUTPATIENT
Start: 2018-12-07 | End: 2019-01-06 | Stop reason: SDUPTHER

## 2018-12-10 ENCOUNTER — PATIENT MESSAGE (OUTPATIENT)
Dept: FAMILY MEDICINE | Facility: CLINIC | Age: 38
End: 2018-12-10

## 2019-01-06 ENCOUNTER — PATIENT MESSAGE (OUTPATIENT)
Dept: FAMILY MEDICINE | Facility: CLINIC | Age: 39
End: 2019-01-06

## 2019-01-06 DIAGNOSIS — E11.9 TYPE 2 DIABETES MELLITUS WITHOUT COMPLICATION, WITH LONG-TERM CURRENT USE OF INSULIN: Primary | ICD-10-CM

## 2019-01-06 DIAGNOSIS — Z79.4 TYPE 2 DIABETES MELLITUS WITHOUT COMPLICATION, WITH LONG-TERM CURRENT USE OF INSULIN: Primary | ICD-10-CM

## 2019-01-06 RX ORDER — INSULIN LISPRO 100 [IU]/ML
15 INJECTION, SOLUTION INTRAVENOUS; SUBCUTANEOUS 3 TIMES DAILY
Qty: 1 BOX | Refills: 11 | Status: SHIPPED | OUTPATIENT
Start: 2019-01-06 | End: 2019-05-01

## 2019-01-23 ENCOUNTER — OFFICE VISIT (OUTPATIENT)
Dept: ENDOCRINOLOGY | Facility: CLINIC | Age: 39
End: 2019-01-23
Payer: MEDICAID

## 2019-01-23 ENCOUNTER — OFFICE VISIT (OUTPATIENT)
Dept: FAMILY MEDICINE | Facility: CLINIC | Age: 39
End: 2019-01-23
Payer: MEDICAID

## 2019-01-23 VITALS
TEMPERATURE: 98 F | WEIGHT: 315 LBS | HEART RATE: 100 BPM | HEIGHT: 73 IN | BODY MASS INDEX: 41.75 KG/M2 | DIASTOLIC BLOOD PRESSURE: 76 MMHG | OXYGEN SATURATION: 96 % | SYSTOLIC BLOOD PRESSURE: 138 MMHG

## 2019-01-23 VITALS
HEIGHT: 73 IN | HEART RATE: 80 BPM | SYSTOLIC BLOOD PRESSURE: 134 MMHG | WEIGHT: 315 LBS | BODY MASS INDEX: 41.75 KG/M2 | DIASTOLIC BLOOD PRESSURE: 88 MMHG

## 2019-01-23 DIAGNOSIS — B07.0 PLANTAR WART OF LEFT FOOT: ICD-10-CM

## 2019-01-23 DIAGNOSIS — Z79.4 TYPE 2 DIABETES MELLITUS WITHOUT COMPLICATION, WITH LONG-TERM CURRENT USE OF INSULIN: ICD-10-CM

## 2019-01-23 DIAGNOSIS — G43.109 MIGRAINE WITH AURA AND WITHOUT STATUS MIGRAINOSUS, NOT INTRACTABLE: Primary | ICD-10-CM

## 2019-01-23 DIAGNOSIS — E11.9 TYPE 2 DIABETES MELLITUS WITHOUT COMPLICATION, WITH LONG-TERM CURRENT USE OF INSULIN: ICD-10-CM

## 2019-01-23 DIAGNOSIS — I10 ESSENTIAL HYPERTENSION: ICD-10-CM

## 2019-01-23 DIAGNOSIS — E78.5 HYPERLIPIDEMIA LDL GOAL <70: ICD-10-CM

## 2019-01-23 DIAGNOSIS — E66.01 MORBID OBESITY, UNSPECIFIED OBESITY TYPE: ICD-10-CM

## 2019-01-23 DIAGNOSIS — Z79.4 TYPE 2 DIABETES MELLITUS WITHOUT COMPLICATION, WITH LONG-TERM CURRENT USE OF INSULIN: Primary | ICD-10-CM

## 2019-01-23 DIAGNOSIS — E11.9 TYPE 2 DIABETES MELLITUS WITHOUT COMPLICATION, WITH LONG-TERM CURRENT USE OF INSULIN: Primary | ICD-10-CM

## 2019-01-23 DIAGNOSIS — N52.9 ERECTILE DYSFUNCTION, UNSPECIFIED ERECTILE DYSFUNCTION TYPE: ICD-10-CM

## 2019-01-23 DIAGNOSIS — Z79.4 INSULIN LONG-TERM USE: ICD-10-CM

## 2019-01-23 PROCEDURE — 99204 OFFICE O/P NEW MOD 45 MIN: CPT | Mod: S$PBB,,, | Performed by: NURSE PRACTITIONER

## 2019-01-23 PROCEDURE — 99999 PR PBB SHADOW E&M-EST. PATIENT-LVL III: ICD-10-PCS | Mod: PBBFAC,,, | Performed by: INTERNAL MEDICINE

## 2019-01-23 PROCEDURE — 95251 CONT GLUC MNTR ANALYSIS I&R: CPT | Mod: ,,, | Performed by: NURSE PRACTITIONER

## 2019-01-23 PROCEDURE — 99214 PR OFFICE/OUTPT VISIT, EST, LEVL IV, 30-39 MIN: ICD-10-PCS | Mod: S$PBB,,, | Performed by: INTERNAL MEDICINE

## 2019-01-23 PROCEDURE — 99999 PR PBB SHADOW E&M-EST. PATIENT-LVL IV: ICD-10-PCS | Mod: PBBFAC,,, | Performed by: NURSE PRACTITIONER

## 2019-01-23 PROCEDURE — 95251 PR GLUCOSE MONITOR, 72 HOUR, PHYS INTERP: ICD-10-PCS | Mod: ,,, | Performed by: NURSE PRACTITIONER

## 2019-01-23 PROCEDURE — 99999 PR PBB SHADOW E&M-EST. PATIENT-LVL III: CPT | Mod: PBBFAC,,, | Performed by: INTERNAL MEDICINE

## 2019-01-23 PROCEDURE — 99204 PR OFFICE/OUTPT VISIT, NEW, LEVL IV, 45-59 MIN: ICD-10-PCS | Mod: S$PBB,,, | Performed by: NURSE PRACTITIONER

## 2019-01-23 PROCEDURE — 99213 OFFICE O/P EST LOW 20 MIN: CPT | Mod: PBBFAC,27,PO | Performed by: INTERNAL MEDICINE

## 2019-01-23 PROCEDURE — 99214 OFFICE O/P EST MOD 30 MIN: CPT | Mod: PBBFAC,PN | Performed by: NURSE PRACTITIONER

## 2019-01-23 PROCEDURE — 99214 OFFICE O/P EST MOD 30 MIN: CPT | Mod: S$PBB,,, | Performed by: INTERNAL MEDICINE

## 2019-01-23 PROCEDURE — 99999 PR PBB SHADOW E&M-EST. PATIENT-LVL IV: CPT | Mod: PBBFAC,,, | Performed by: NURSE PRACTITIONER

## 2019-01-23 RX ORDER — RIZATRIPTAN BENZOATE 10 MG/1
10 TABLET ORAL
Qty: 6 TABLET | Refills: 11 | Status: SHIPPED | OUTPATIENT
Start: 2019-01-23 | End: 2019-07-18

## 2019-01-23 NOTE — PROGRESS NOTES
"CC: This 38 y.o. White male  is here for evaluation of  T2DM along with comorbidities indicated in the Visit Diagnosis section of this encounter.    HPI: Tony Gordillo was diagnosed with T2DM in 2008. He was without health insurance for 2017 and mid 2018.     Last seen by RICARDO Ngo NP, in 2015. New to me.     States his last A1c was 8.1% from outside lab early January 2019.   Farxiga started in late September per Dr. Ford. Notes that he started having difficulty maintaining an erection as soon as he started it.  However, it has been helpful in lowering his blood sugars.     He will be switching from Apidra to Admelog d/t insurance preference.     See Media for [facility] report. Only 5 days of data available.   Average glucose 152 +/- 43  Glucose > 180 - 21  Glucose  - 75%  Glucose < 70 - 4 %   CGM interpretation: overall glucoses are near or within goal.  Blood sugar dropped overnight after taking apidra about an hour late after dinner bc he fell asleep. Unsure why his BG dropped to 43 after breakfast - ate oatmeal and injected apidra 15 units.     LAST DIABETES EDUCATION: none     HOSPITALIZED FOR DIABETES -  No.    PRESCRIBED DIABETES MEDICATIONS: Farxiga 10 mg once daily, Basaglar 50 units bid, Apidra Solostar 15 units ac with correction scale - 150-200 -+  5 units, 201-250 - + 10 units     Misses medication doses - No  Averages 20-25 units ac of apidra. Generally when he's at work, he injects apidra after meals in case he needs to leave mid-meal d/t a call during work.     DM COMPLICATIONS: none    SIGNIFICANT DIABETES MED HISTORY:   Has previously used Novolin 70/30 morning only   glyburide--hypoglycemia  Victoza - nausea and vomiting at 1.2 mg; felt "run down" at lowest dose of 0.6 mg   Metformin - diarrhea and vomiting (has not tried metformin XR)     SELF MONITORING BLOOD GLUCOSE: Checks blood glucose at home - 4x/day. See media for Freestyle [facility] report.     HYPOGLYCEMIC EPISODES: symptoms start in " "the 70s, corrects with either a meal or a small snack like 1/2 peanut butter sandwich.      CURRENT DIET: drinks water mostly. Eats breakfast (oatmeal or fruit), lunch is at 11 am to 1 pm at work, snacks around 4 pm and then eats dinner at 6-7 pm at home.     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   PSYCH: No depression  OTHER: n/a      PHYSICAL EXAM:  GENERAL: Well developed, well nourished. No acute distress.   PSYCH: AAOx3, appropriate mood and affect, conversant, well-groomed. Judgement and insight good.   NEURO: Cranial nerves grossly intact. Speech clear, no tremor.   NECK: Trachea midline, no thyromegaly or lymphadenopathy.   CHEST: Respirations even and unlabored. CTA bilaterally.  CARDIOVASCULAR: Regular rate and rhythm. No bruits. No murmur. No edema.   ABDOMEN: Soft, non-tender, non-distended. Bowel sounds present.   MS: Gait steady. No clubbing.   SKIN: Normal skin turgor. Skin warm and dry. No areas of breakdown. No acanthosis nigricans.      Hemoglobin A1C   Date Value Ref Range Status   11/08/2018 9.7 (H) 4.0 - 5.6 % Final     Comment:     ADA Screening Guidelines:  5.7-6.4%  Consistent with prediabetes  >or=6.5%  Consistent with diabetes  High levels of fetal hemoglobin interfere with the HbA1C  assay. Heterozygous hemoglobin variants (HbS, HgC, etc)do  not significantly interfere with this assay. "   However, presence of multiple variants may affect accuracy.     06/03/2015 10.7 (H) 4.5 - 6.2 % Final   08/03/2012 10.8 (H) 4.0 - 6.2 % Final           Chemistry        Component Value Date/Time     11/08/2018 1208    K 4.2 11/08/2018 1208     11/08/2018 1208    CO2 24 11/08/2018 1208    BUN 14 11/08/2018 1208    CREATININE 0.9 11/08/2018 1208     (H) 11/08/2018 1208        Component Value Date/Time    CALCIUM 10.0 11/08/2018 1208    ALKPHOS 79 11/08/2018 1208    AST 24 11/08/2018 1208    ALT 30 11/08/2018 1208    BILITOT 0.9 11/08/2018 1208    ESTGFRAFRICA >60.0 11/08/2018 1208    EGFRNONAA >60.0 11/08/2018 1208          Lab Results   Component Value Date    LDLCALC 86.2 11/08/2018          Ref. Range 11/8/2018 12:08   Cholesterol Latest Ref Range: 120 - 199 mg/dL 161   HDL Latest Ref Range: 40 - 75 mg/dL 57   HDL/Chol Ratio Latest Ref Range: 20.0 - 50.0 % 35.4   LDL Cholesterol Latest Ref Range: 63.0 - 159.0 mg/dL 86.2   Non-HDL Cholesterol Latest Units: mg/dL 104   Total Cholesterol/HDL Ratio Latest Ref Range: 2.0 - 5.0  2.8   Triglycerides Latest Ref Range: 30 - 150 mg/dL 89       Lab Results   Component Value Date    MICALBCREAT 9.9 11/08/2018             STANDARDS of CARE:        ASA:               Last eye exam:       ASSESSMENT and PLAN:    A1C GOAL: < 7 %     1. Type 2 diabetes mellitus without complication, with long-term current use of insulin  Reviewed hypoglycemia treatment.     Stop Farxiga to see if it helps ED symptoms.     Increase Apidra (or Admelog) to 18 units before meals with correction scale.     Continue Basaglar 50 units twice daily.     Ideally, inject meal time insulin BEFORE meals. Please do not inject it more than 20 minutes after a meal because of the hypoglycemia risk.     Monitor blood sugars before each meal and bedtime, but at least every 8 hours in order to get a continuous glucose reading.     Can send in glucose logs or [facility] reports via [facility] for further  recommendations.      Return to clinic in 5 months with labs prior.     Hemoglobin A1c   2. Erectile dysfunction, unspecified erectile dysfunction type  As above.    3. Essential hypertension  Check blood pressure about 3x/week. Goal is less than 130/80. Follow up with Dr. Ford if values are greater than this after a month.    4.  Morbid obesity He is exercising a lot more now with his new job. Expect that he will lose some weight and therefore require less insulin. Will need to adjust insulin doses accordingly.        Orders Placed This Encounter   Procedures    Hemoglobin A1c     Standing Status:   Future     Standing Expiration Date:   3/23/2020        Follow-up in about 5 months (around 6/23/2019).     Thank you very much for allowing me to participate in Tony Gordillo's care.

## 2019-01-23 NOTE — PATIENT INSTRUCTIONS
Stop Farxiga.     Increase Apidra (or Admelog) to 18 units before meals with correction scale.     Continue Basaglar 50 units twice daily.     Ideally, inject meal time insulin BEFORE meals. Please do not inject it more than 20 minutes after a meal because of the hypoglycemia risk.     Monitor blood sugars before each meal and bedtime, but at least every 8 hours in order to get a continuous glucose reading.     Can send in glucose logs or [facility] reports via [facility] for further recommendations.      Return to clinic in 5 months with labs prior.     Check blood pressure about 3x/week. Goal is less than 130/80. Follow up with Dr. Ford if values are greater than this after a month.       RULE OF 15 FOR TREATMENT OF LOW BLOOD GLUCOSE:    If your blood glucose is < 80 mg/dL or you are experiencing  symptoms of low blood sugar (shaking, sweating, blurred vision)  treat with 15 grams of glucose, wait 15 minutes, recheck and repeat if needed.      Carry foods with you that you can use for quick treatment.  This could include glucose tablets, peppermints or other hard candies (not sugar free), juice packs.    Should note possible cause of hypoglycemia in blood glucose logs so that you may keep these incidents to a minimum.

## 2019-01-23 NOTE — LETTER
January 23, 2019      Jovany Ford MD  [address]  [place] [address]           [facility]  [address], [address]  [place] [address]  Phone: [phone]  Fax: [phone]          Patient: Tony Gordillo   MR Number: [id number]   YOB: 1980   Date of Visit: 1/23/2019       Dear Dr. Jovany Ford:    Thank you for referring Tony Gordillo to me for evaluation. Attached you will find relevant portions of my assessment and plan of care.    If you have questions, please do not hesitate to call me. I look forward to following Tony Gordillo along with you.    Sincerely,    Kate Mansfield NP    Enclosure  CC:  No Recipients    If you would like to receive this communication electronically, please contact [email or web address] or [phone] to request more information on [facility] access.    For providers and/or their staff who would like to refer a patient to [facility], please contact us through our one-stop-shop provider referral line, [facility], at [phone].    If you feel you have received this communication in error or would no longer like to receive these types of communications, please e-mail [email or web address]

## 2019-01-23 NOTE — PROGRESS NOTES
This note was created by combination of typed  and Dragon dictation.  Transcription errors may be present.  If there are any questions, please contact me.    Assessment & Plan:   Migraine with aura and without status migrainosus, not intractable propranolol SE angry; topamax not helpful; imitrex ineffective; daith ear piercing helps  -history of Imitrex ineffective.  History of Topamax ineffective.  History of propranolol which made him angry in the past.  I will try him with Maxalt and see if it can work for symptomatic control of migraines.  If he starts to get frequent migraines, would rechallenge on metoprolol.  Would like to avoid valproic acid with side effect of weight gain.  Also would avoid amitriptyline as he is going to be working as a  and needs to be easily awakened.  May need to see Neurology.  -     rizatriptan (MAXALT) 10 MG tablet; Take 1 tablet (10 mg total) by mouth as needed for Migraine.  Dispense: 6 tablet; Refill: 11    Plantar wart of left foot  -status post cryotherapy today.  Surrounded by tinea pedis.  Over-the-counter antifungal cream.    Type 2 diabetes mellitus without complication, with long-term current use of insulin  Insulin long-term use  -stopped Farxiga as of today.  Increased his dose of insulin.  Managed by Endocrine NP.    Essential hypertension  -stable on current regimen    Hyperlipidemia LDL goal <70  -on statin    There are no discontinued medications.    meds sent this encounter:  Medications Ordered This Encounter   Medications    rizatriptan (MAXALT) 10 MG tablet     Sig: Take 1 tablet (10 mg total) by mouth as needed for Migraine.     Dispense:  6 tablet     Refill:  11       Follow Up: No Follow-up on file.    Subjective:     Chief Complaint   Patient presents with    Migraine       HPI  Tony is a 38 y.o. male, last appointment with this clinic was 11/12/2018.    DM2 on insulin short and long acting. farxiga SE of ED. hx of metformin  ineffective. hx of victoza  HTN,lisinopril though he notes it's low dose and had previously been higher dose and now more for renal protective effect.  Hyperlipidemia atorvastatin  3/2014 nu med stress test negative.  Hypothyroid in the past with hx of levothyroxine but no longer taking.    Eye doctor Briana.  Migraines - never seen neuro for this.  has tried topamax and propranolol with either SE (propranolol SE of anger) or topamax didn't work.  Did not want to take narcotics.  Actually found a daith piercing (piercing of the ear) helps mitigate the headaches.  Migraine with aura and nausea and photophobia.  imitrex without relief.    Saw endo today.  farxiga with SE of ED; stop the farxiga  Increased apidra/admelog to 18  Stay on basaglar 50    He now has a new job, works as a .  However he had to remove the daith piercing as it was a personal hazard for the job.  Has not had migraines since removing it but is concerned that he might develop such.  Has tried Imitrex for symptomatic relief but never any other Triptan.  The prophylaxis attempts as above with the the side effect or in efficacy.  I will try him with metoprolol if he starts to increase frequency of migraines.    Has a spot on his left plantar foot that started out uncomfortable, started out as a hole, and now it seems to be spreading.  Finds it uncomfortable.    Patient Care Team:  Jovany Ford MD as PCP - General (Internal Medicine)  Janneth Johnson RN (Inactive) as Registered Nurse (Diabetes)  Rocky Hewitt MD as Consulting Physician (Ophthalmology)    Patient Active Problem List    Diagnosis Date Noted    Non-seasonal allergic rhinitis; avoid antihistamines per opthalmology 11/09/2018    Migraine with aura and without status migrainosus, not intractable propranolol SE angry; topamax not helpful; imitrex ineffective; daith ear piercing helps 09/28/2018    Type 2 diabetes mellitus without complication, with  long-term current use of insulin     Essential hypertension     Hypothyroidism not currently on medication 07/29/2015    Hyperlipidemia LDL goal <70 06/03/2015    Insulin long-term use 06/03/2015    Tinea pedis 06/03/2015    Morbid obesity 06/03/2015       PAST MEDICAL HISTORY:  Past Medical History:   Diagnosis Date    Diabetes mellitus, type 2     Hyperlipidemia     Hypertension        PAST SURGICAL HISTORY:  Past Surgical History:   Procedure Laterality Date    ANKLE SURGERY      KNEE SURGERY      acl,mcl,pcl    left knee x 2         SOCIAL HISTORY:  Social History     Socioeconomic History    Marital status:      Spouse name: Not on file    Number of children: Not on file    Years of education: Not on file    Highest education level: Not on file   Social Needs    Financial resource strain: Not on file    Food insecurity - worry: Not on file    Food insecurity - inability: Not on file    Transportation needs - medical: Not on file    Transportation needs - non-medical: Not on file   Occupational History    Occupation:  to be EMT basic     Employer: [employer]   Tobacco Use    Smoking status: Never Smoker    Smokeless tobacco: Never Used   Substance and Sexual Activity    Alcohol use: Yes     Comment: 1-2 beers every 2 weeks    Drug use: No    Sexual activity: Not on file   Other Topics Concern    Not on file   Social History Narrative    Not on file       ALLERGIES AND MEDICATIONS: updated and reviewed.  Review of patient's allergies indicates:   Allergen Reactions    Influenza virus vaccine, specific Hives    Victoza [liraglutide] Nausea And Vomiting     Current Outpatient Medications   Medication Sig Dispense Refill    atorvastatin (LIPITOR) 10 MG tablet Take 1 tablet (10 mg total) by mouth once daily. 90 tablet 1    flash glucose scanning reader ([employer]YLE SAMANTHA 14 DAY READER) [facility] Freestyle Samantha 14-day reader. To be used with Freestyle Samantha 14-day  "sensors 1 each 0    flash glucose sensor (FREESTYLE SAMANTHA 14 DAY SENSOR) Kit Please change sensor every 14 days. Freestyle Samantha Sensor 30-day supply, 2 sensors/month 2 kit 11    insulin glargine (BASAGLAR KWIKPEN U-100 INSULIN) 100 unit/mL (3 mL) InPn pen Inject 50 Units into the skin 2 (two) times daily. 3 Box 5    insulin lispro (ADMELOG SOLOSTAR U-100 INSULIN) 100 unit/mL pen Inject 15 Units into the skin 3 (three) times daily. Instead of apidra 1 Box 11    lisinopril (PRINIVIL,ZESTRIL) 5 MG tablet Take 1 tablet (5 mg total) by mouth once daily. 90 tablet 1    dapagliflozin (FARXIGA) 10 mg Tab Take 10 mg by mouth once daily. 90 tablet 1    loratadine (CLARITIN) 10 mg tablet Take 10 mg by mouth once daily.      needle, disp, 31 gauge 31 gauge x 5/16" Ndle 1 each by Misc.(Non-Drug; Combo Route) route once daily. QID insulin and victoza 500 each 5     No current facility-administered medications for this visit.        Review of Systems   All other systems reviewed and are negative.      Objective:   Physical Exam   Vitals:    01/23/19 1613   BP: 138/76   Pulse: 100   Temp: 98.2 °F (36.8 °C)   TempSrc: Oral   SpO2: 96%   Weight: (!) 144.1 kg (317 lb 10.9 oz)   Height: 6' 1" (1.854 m)    Body mass index is 41.91 kg/m².  Weight: (!) 144.1 kg (317 lb 10.9 oz)   Height: 6' 1" (185.4 cm)     Physical Exam   Constitutional: He is oriented to person, place, and time. He appears well-developed and well-nourished. No distress.   Eyes: EOM are normal.   Cardiovascular: Normal rate, regular rhythm and normal heart sounds.   No murmur heard.  Pulmonary/Chest: Effort normal and breath sounds normal.   Musculoskeletal: Normal range of motion. He exhibits no edema.   Neurological: He is alert and oriented to person, place, and time. Coordination normal.   Skin: Skin is warm and dry.   On the plantar left foot, around the head of metatarsal 3, is an area of hyperkeratosis and induration, no surrounding erythema no " ulceration.  Some surrounding scale of the bony heads of the metatarsals   Psychiatric: He has a normal mood and affect. His behavior is normal. Thought content normal.     The plantar foot, was cleaned rubbing alcohol wipe, and using a #11 Scalpel, sharp debridement was performed over the wart.  Subsequent cryotherapy.  Patient tolerated without complication.

## 2019-02-01 ENCOUNTER — HOSPITAL ENCOUNTER (EMERGENCY)
Facility: HOSPITAL | Age: 39
Discharge: HOME | End: 2019-02-01
Payer: MEDICAID

## 2019-02-01 VITALS
OXYGEN SATURATION: 98 % | HEIGHT: 73 IN | WEIGHT: 315 LBS | SYSTOLIC BLOOD PRESSURE: 142 MMHG | RESPIRATION RATE: 18 BRPM | DIASTOLIC BLOOD PRESSURE: 86 MMHG | HEART RATE: 96 BPM | TEMPERATURE: 98 F | BODY MASS INDEX: 41.75 KG/M2

## 2019-02-01 DIAGNOSIS — J06.9 UPPER RESPIRATORY TRACT INFECTION, UNSPECIFIED TYPE: Primary | ICD-10-CM

## 2019-02-01 LAB
CTP QC/QA: YES
FLUAV AG NPH QL: NEGATIVE
FLUBV AG NPH QL: NEGATIVE

## 2019-02-01 PROCEDURE — 99283 EMERGENCY DEPT VISIT LOW MDM: CPT | Mod: 25,ER

## 2019-02-01 PROCEDURE — 87804 INFLUENZA ASSAY W/OPTIC: CPT | Mod: ER

## 2019-02-01 RX ORDER — PROMETHAZINE HYDROCHLORIDE AND DEXTROMETHORPHAN HYDROBROMIDE 6.25; 15 MG/5ML; MG/5ML
5 SYRUP ORAL EVERY 6 HOURS PRN
Qty: 60 ML | Refills: 0 | Status: SHIPPED | OUTPATIENT
Start: 2019-02-01 | End: 2019-07-18

## 2019-02-01 RX ORDER — LEVOCETIRIZINE DIHYDROCHLORIDE 5 MG/1
5 TABLET, FILM COATED ORAL NIGHTLY
Qty: 30 TABLET | Refills: 0 | Status: SHIPPED | OUTPATIENT
Start: 2019-02-01 | End: 2019-07-11

## 2019-02-02 NOTE — ED PROVIDER NOTES
Encounter Date: 2/1/2019    SCRIBE #1 NOTE: I, Savanna Lewis, am scribing for, and in the presence of,  Toussaint Battley NP. I have scribed the following portions of the note - Other sections scribed: HPI, ROS, PE.       History     Chief Complaint   Patient presents with    Cough     C/O productive cough x2 days, vomiting starting tonight. Decreased appetite.     Generalized Body Aches     C/O generalized body aches x2 days.     38 y.o male with DM presents with a productive cough, congestion, and body aches for 5 days. He had 1 episode of vomiting due to a cough spell. He denies fever, sore throat, or diarrhea.  Positive ill contacts (flu exposure). He has been using a nasal spray every day for 1 week.       The history is provided by the patient. No  was used.     Review of patient's allergies indicates:   Allergen Reactions    Influenza virus vaccine, specific Hives    Victoza [liraglutide] Nausea And Vomiting     Past Medical History:   Diagnosis Date    Diabetes mellitus, type 2     Hyperlipidemia     Hypertension      Past Surgical History:   Procedure Laterality Date    ANKLE SURGERY      KNEE SURGERY      acl,mcl,pcl    left knee x 2       Family History   Problem Relation Age of Onset    Diabetes Mother     Diabetes Father     No Known Problems Brother     Autism Son     No Known Problems Daughter      Social History     Tobacco Use    Smoking status: Never Smoker    Smokeless tobacco: Never Used   Substance Use Topics    Alcohol use: Yes     Comment: 1-2 beers every 2 weeks    Drug use: No     Review of Systems   Constitutional: Negative.  Negative for activity change, chills, diaphoresis and fever.   HENT: Positive for congestion, postnasal drip and sinus pressure. Negative for nosebleeds, rhinorrhea, sinus pain, sore throat and trouble swallowing.    Eyes: Negative.  Negative for pain, discharge, redness and itching.   Respiratory: Positive for cough (productive).  Negative for chest tightness, shortness of breath, wheezing and stridor.    Cardiovascular: Negative.  Negative for chest pain, palpitations and leg swelling.   Gastrointestinal: Positive for vomiting (post tussive). Negative for abdominal pain, constipation, diarrhea and nausea.   Endocrine: Negative.  Negative for cold intolerance, heat intolerance, polydipsia, polyphagia and polyuria.   Genitourinary: Negative.  Negative for difficulty urinating, discharge, dysuria, frequency, genital sores, penile pain, scrotal swelling and testicular pain.   Musculoskeletal: Negative.  Negative for back pain, gait problem, joint swelling and neck pain.   Skin: Negative.  Negative for color change, rash and wound.   Allergic/Immunologic: Negative.    Neurological: Negative.  Negative for dizziness, tremors, seizures, weakness, light-headedness and headaches.   Hematological: Negative.    Psychiatric/Behavioral: Negative.  Negative for agitation, behavioral problems, confusion, hallucinations, self-injury and suicidal ideas. The patient is not nervous/anxious and is not hyperactive.    All other systems reviewed and are negative.      Physical Exam     Initial Vitals [02/01/19 2202]   BP Pulse Resp Temp SpO2   (!) 142/86 96 18 98.2 °F (36.8 °C) 98 %      MAP       --         Physical Exam    Nursing note and vitals reviewed.  Constitutional: He appears well-developed and well-nourished. He is not diaphoretic.  Non-toxic appearance. He does not appear ill. No distress.   HENT:   Head: Normocephalic and atraumatic.   Nose: Mucosal edema present. Right sinus exhibits no maxillary sinus tenderness and no frontal sinus tenderness. Left sinus exhibits no maxillary sinus tenderness and no frontal sinus tenderness.   Mouth/Throat: Uvula is midline, oropharynx is clear and moist and mucous membranes are normal. No oropharyngeal exudate, posterior oropharyngeal edema, posterior oropharyngeal erythema or tonsillar abscesses.   Eyes:  Conjunctivae and EOM are normal.   Neck: Normal range of motion.   Cardiovascular: Normal rate, regular rhythm and normal heart sounds. Exam reveals no gallop and no friction rub.    No murmur heard.  Pulmonary/Chest: Breath sounds normal. No respiratory distress. He has no wheezes. He has no rhonchi. He has no rales. He exhibits no tenderness.   Abdominal: Soft.   Musculoskeletal: Normal range of motion.   Neurological: He is alert and oriented to person, place, and time. No cranial nerve deficit or sensory deficit.   Skin: Skin is warm and dry. Capillary refill takes less than 2 seconds. No rash noted.   Psychiatric: He has a normal mood and affect. His behavior is normal. Judgment and thought content normal.         ED Course   Procedures  Labs Reviewed   POCT INFLUENZA A/B          Imaging Results    None          Medical Decision Making:   Initial Assessment:   URI with cough and congestion  Differential Diagnosis:   Acute sinusitis, bronchitis, influenza  Clinical Tests:   Lab Tests: Ordered and Reviewed  ED Management:  1) Pt discharged home on promethazine DM and Xyzal; influenza test negative  2) Pt instructed to drink plenty of fluids to loosen secretions  3) Pt instructed that he may take over-the-counter Mucinex (NOT DM) as needed  4) Pt instructed to take over-the-counter Tylenol or Motrin for fever/body aches   5) Pt instructed to return to ER as needed if symptoms worsen/fail to improve  6) Pt instructed to follow-up with primary care provider in 3 days  7) Pt verbalized understanding of discharge instructions and treatment plan              Scribe Attestation:   Scribe #1: I performed the above scribed service and the documentation accurately describes the services I performed. I attest to the accuracy of the note.               Clinical Impression:     1. Upper respiratory tract infection, unspecified type                                 Toussaint Battley III, [facility]  02/01/19 0240

## 2019-02-07 ENCOUNTER — PATIENT MESSAGE (OUTPATIENT)
Dept: FAMILY MEDICINE | Facility: CLINIC | Age: 39
End: 2019-02-07

## 2019-02-20 NOTE — TELEPHONE ENCOUNTER
Pt has not read my [facility] message. Insurance wrote stating that they will not cover rizatriptan.    Please call pt - did he ever try Amerge (naratriptan)?

## 2019-05-01 ENCOUNTER — PATIENT MESSAGE (OUTPATIENT)
Dept: FAMILY MEDICINE | Facility: CLINIC | Age: 39
End: 2019-05-01

## 2019-05-01 DIAGNOSIS — E11.9 TYPE 2 DIABETES MELLITUS WITHOUT COMPLICATION, WITH LONG-TERM CURRENT USE OF INSULIN: Primary | ICD-10-CM

## 2019-05-01 DIAGNOSIS — Z79.4 TYPE 2 DIABETES MELLITUS WITHOUT COMPLICATION, WITH LONG-TERM CURRENT USE OF INSULIN: Primary | ICD-10-CM

## 2019-05-01 RX ORDER — INSULIN ASPART 100 [IU]/ML
15 INJECTION, SOLUTION INTRAVENOUS; SUBCUTANEOUS
Qty: 1 BOX | Refills: 11 | Status: SHIPPED | OUTPATIENT
Start: 2019-05-01 | End: 2019-05-02 | Stop reason: SDUPTHER

## 2019-05-02 ENCOUNTER — PATIENT MESSAGE (OUTPATIENT)
Dept: FAMILY MEDICINE | Facility: CLINIC | Age: 39
End: 2019-05-02

## 2019-05-02 DIAGNOSIS — Z79.4 TYPE 2 DIABETES MELLITUS WITHOUT COMPLICATION, WITH LONG-TERM CURRENT USE OF INSULIN: ICD-10-CM

## 2019-05-02 DIAGNOSIS — E11.9 TYPE 2 DIABETES MELLITUS WITHOUT COMPLICATION, WITH LONG-TERM CURRENT USE OF INSULIN: ICD-10-CM

## 2019-05-02 RX ORDER — INSULIN ASPART 100 [IU]/ML
15 INJECTION, SOLUTION INTRAVENOUS; SUBCUTANEOUS
Qty: 1 BOX | Refills: 11 | Status: SHIPPED | OUTPATIENT
Start: 2019-05-02 | End: 2019-07-11

## 2019-05-26 DIAGNOSIS — E78.00 PURE HYPERCHOLESTEROLEMIA: ICD-10-CM

## 2019-05-26 DIAGNOSIS — I10 ESSENTIAL HYPERTENSION: ICD-10-CM

## 2019-05-26 RX ORDER — LISINOPRIL 5 MG/1
TABLET ORAL
Qty: 30 TABLET | Refills: 5 | Status: SHIPPED | OUTPATIENT
Start: 2019-05-26 | End: 2019-07-18 | Stop reason: SDUPTHER

## 2019-05-26 RX ORDER — ATORVASTATIN CALCIUM 10 MG/1
TABLET, FILM COATED ORAL
Qty: 30 TABLET | Refills: 5 | Status: SHIPPED | OUTPATIENT
Start: 2019-05-26 | End: 2019-07-18 | Stop reason: SDUPTHER

## 2019-06-19 ENCOUNTER — PATIENT MESSAGE (OUTPATIENT)
Dept: ENDOCRINOLOGY | Facility: CLINIC | Age: 39
End: 2019-06-19

## 2019-06-26 ENCOUNTER — LAB VISIT (OUTPATIENT)
Dept: LAB | Facility: HOSPITAL | Age: 39
End: 2019-06-26
Attending: NURSE PRACTITIONER
Payer: MEDICAID

## 2019-06-26 DIAGNOSIS — E11.9 TYPE 2 DIABETES MELLITUS WITHOUT COMPLICATION, WITH LONG-TERM CURRENT USE OF INSULIN: ICD-10-CM

## 2019-06-26 DIAGNOSIS — Z79.4 TYPE 2 DIABETES MELLITUS WITHOUT COMPLICATION, WITH LONG-TERM CURRENT USE OF INSULIN: ICD-10-CM

## 2019-06-26 LAB
ESTIMATED AVG GLUCOSE: 318 MG/DL (ref 68–131)
HBA1C MFR BLD HPLC: 12.7 % (ref 4–5.6)

## 2019-06-26 PROCEDURE — 36415 COLL VENOUS BLD VENIPUNCTURE: CPT | Mod: PO

## 2019-06-26 PROCEDURE — 83036 HEMOGLOBIN GLYCOSYLATED A1C: CPT

## 2019-07-08 ENCOUNTER — PATIENT OUTREACH (OUTPATIENT)
Dept: ADMINISTRATIVE | Facility: OTHER | Age: 39
End: 2019-07-08

## 2019-07-11 ENCOUNTER — PATIENT MESSAGE (OUTPATIENT)
Dept: ENDOCRINOLOGY | Facility: CLINIC | Age: 39
End: 2019-07-11

## 2019-07-11 ENCOUNTER — OFFICE VISIT (OUTPATIENT)
Dept: ENDOCRINOLOGY | Facility: CLINIC | Age: 39
End: 2019-07-11
Payer: COMMERCIAL

## 2019-07-11 VITALS
WEIGHT: 308 LBS | HEART RATE: 88 BPM | SYSTOLIC BLOOD PRESSURE: 146 MMHG | DIASTOLIC BLOOD PRESSURE: 90 MMHG | BODY MASS INDEX: 40.64 KG/M2

## 2019-07-11 DIAGNOSIS — E11.9 TYPE 2 DIABETES MELLITUS WITHOUT COMPLICATION, WITH LONG-TERM CURRENT USE OF INSULIN: ICD-10-CM

## 2019-07-11 DIAGNOSIS — Z71.9 VISIT FOR COUNSELING: ICD-10-CM

## 2019-07-11 DIAGNOSIS — Z79.4 TYPE 2 DIABETES MELLITUS WITHOUT COMPLICATION, WITH LONG-TERM CURRENT USE OF INSULIN: Primary | ICD-10-CM

## 2019-07-11 DIAGNOSIS — E66.01 MORBID OBESITY, UNSPECIFIED OBESITY TYPE: ICD-10-CM

## 2019-07-11 DIAGNOSIS — R45.86 MOOD CHANGES: ICD-10-CM

## 2019-07-11 DIAGNOSIS — E11.9 TYPE 2 DIABETES MELLITUS WITHOUT COMPLICATION, WITH LONG-TERM CURRENT USE OF INSULIN: Primary | ICD-10-CM

## 2019-07-11 DIAGNOSIS — Z79.4 TYPE 2 DIABETES MELLITUS WITHOUT COMPLICATION, WITH LONG-TERM CURRENT USE OF INSULIN: ICD-10-CM

## 2019-07-11 DIAGNOSIS — N52.9 ERECTILE DYSFUNCTION, UNSPECIFIED ERECTILE DYSFUNCTION TYPE: ICD-10-CM

## 2019-07-11 PROCEDURE — 99999 PR PBB SHADOW E&M-EST. PATIENT-LVL III: ICD-10-PCS | Mod: PBBFAC,,, | Performed by: NURSE PRACTITIONER

## 2019-07-11 PROCEDURE — 99999 PR PBB SHADOW E&M-EST. PATIENT-LVL III: CPT | Mod: PBBFAC,,, | Performed by: NURSE PRACTITIONER

## 2019-07-11 PROCEDURE — 99215 PR OFFICE/OUTPT VISIT, EST, LEVL V, 40-54 MIN: ICD-10-PCS | Mod: S$GLB,,, | Performed by: NURSE PRACTITIONER

## 2019-07-11 PROCEDURE — 99215 OFFICE O/P EST HI 40 MIN: CPT | Mod: S$GLB,,, | Performed by: NURSE PRACTITIONER

## 2019-07-11 RX ORDER — INSULIN PUMP SYRINGE, 3 ML
EACH MISCELLANEOUS
COMMUNITY
End: 2019-08-09 | Stop reason: ALTCHOICE

## 2019-07-11 RX ORDER — INSULIN GLARGINE 300 [IU]/ML
50 INJECTION, SOLUTION SUBCUTANEOUS 2 TIMES DAILY
Qty: 6 SYRINGE | Refills: 5 | Status: SHIPPED | OUTPATIENT
Start: 2019-07-11 | End: 2019-08-07 | Stop reason: SDUPTHER

## 2019-07-11 RX ORDER — INSULIN ASPART 100 [IU]/ML
25 INJECTION, SOLUTION INTRAVENOUS; SUBCUTANEOUS
Qty: 2 BOX | Refills: 5 | Status: SHIPPED | OUTPATIENT
Start: 2019-07-11 | End: 2019-12-13 | Stop reason: SDUPTHER

## 2019-07-11 NOTE — PROGRESS NOTES
CC: This 38 y.o. White male  is here for evaluation of  T2DM along with comorbidities indicated in the Visit Diagnosis section of this encounter.    HPI: Tony Gordillo was diagnosed with T2DM in 2008. He was without health insurance for 2017 and mid 2018.     Initial visit on 1/23/19  Last seen by RICARDO Ngo NP, in 2015. New to me.   States his last A1c was 8.1% from outside lab early January 2019.   Farxiga started in late September per Dr. Ford. Notes that he started having difficulty maintaining an erection as soon as he started it.  However, it has been helpful in lowering his blood sugars.   He will be switching from Apidra to Admelog d/t insurance preference.   See Media for Arleth report. Only 5 days of data available.   Average glucose 152 +/- 43  Glucose > 180 - 21  Glucose  - 75%  Glucose < 70 - 4 %   CGM interpretation: overall glucoses are near or within goal.  Blood sugar dropped overnight after taking apidra about an hour late after dinner bc he fell asleep. Unsure why his BG dropped to 43 after breakfast - ate oatmeal and injected apidra 15 units.   Plan Reviewed hypoglycemia treatment.   Stop Farxiga to see if it helps ED symptoms.   Increase Apidra (or Admelog) to 18 units before meals with correction scale.   Continue Basaglar 50 units twice daily.   Ideally, inject meal time insulin BEFORE meals. Please do not inject it more than 20 minutes after a meal because of the hypoglycemia risk.   Monitor blood sugars before each meal and bedtime, but at least every 8 hours in order to get a continuous glucose reading.   Can send in glucose logs or Arleth reports via [facility] for further recommendations.    Return to clinic in 5 months with labs prior.     Interval history  a1c is up from 9.7 to now 12.7%.   Due to job change and lack of insurance, he was without medications including insulin for about 3 months and was able to resume it in May. He stopped Farxiga and his rash and ED symptoms have  "resolved.   However, he found that he was getting kim and angry. He stopped all his meds and 3 days later found that his mood has stabilized back to baseline. Currently not on any medications. States his diet is good as well as exercise.     He was previously taking atorvastatin, lisinopril, and loratadine including Basaglar and Admelog.       LAST DIABETES EDUCATION: none     HOSPITALIZED FOR DIABETES -  No.    PRESCRIBED DIABETES MEDICATIONS:  Basaglar 50 units bid, Admelog 18 units ac with correction scale - 150-200 -+  5 units, 201-250 - + 10 units     Averaging 25 units before meals     DM COMPLICATIONS: none    SIGNIFICANT DIABETES MED HISTORY:   Has previously used Novolin 70/30 morning only   glyburide--hypoglycemia  Victoza - nausea and vomiting at 1.2 mg; felt "run down" at lowest dose of 0.6 mg   Metformin - diarrhea and vomiting (has not tried metformin XR)     SELF MONITORING BLOOD GLUCOSE: Checks blood glucose at home - 2x/day. No logs. Not using Freestyle Arleth.     While on Farxiga and insulin - BGs were 110-200, mostly 100s; when he got off Farxiga BGs increased to 200-300s. BGs now off insulin and therefore off all DM meds are a bit higher 240-350s (interestingly, not signifcantly higher).       HYPOGLYCEMIC EPISODES: none     CURRENT DIET: drinks water mostly. Eats breakfast (oatmeal or fruit), lunch is at 11 am to 1 pm at work, snacks around 4 pm and then eats dinner at 6-7 pm at home.     CURRENT EXERCISE: At home, he walks 4-5 miles about 3 times a week. At work 45 minutes on the treadmill at work.     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 No acute distress.   PSYCH: AAOx3, appropriate mood and affect, conversant, well-groomed. Judgement and insight good.   NEURO: Cranial nerves grossly intact. Speech clear, no tremor.   CHEST: Respirations even and unlabored.         Hemoglobin A1C   Date Value Ref Range Status   06/26/2019 12.7 (H) 4.0 - 5.6 % Final     Comment:     ADA Screening Guidelines:  5.7-6.4%  Consistent with prediabetes  >or=6.5%  Consistent with diabetes  High levels of fetal hemoglobin interfere with the HbA1C  assay. Heterozygous hemoglobin variants (HbS, HgC, etc)do  not significantly interfere with this assay.   However, presence of multiple variants may affect accuracy.     11/08/2018 9.7 (H) 4.0 - 5.6 % Final     Comment:     ADA Screening Guidelines:  5.7-6.4%  Consistent with prediabetes  >or=6.5%  Consistent with diabetes  High levels of fetal hemoglobin interfere with the HbA1C  assay. Heterozygous hemoglobin variants (HbS, HgC, etc)do  not significantly interfere with this assay.   However, presence of multiple variants may affect accuracy.     06/03/2015 10.7 (H) 4.5 - 6.2 % Final           Chemistry        Component Value Date/Time     11/08/2018 1208    K 4.2 11/08/2018 1208     11/08/2018 1208    CO2 24 11/08/2018 1208    BUN 14 11/08/2018 1208    CREATININE 0.9 11/08/2018 1208     (H) 11/08/2018 1208        Component Value Date/Time    CALCIUM 10.0 11/08/2018 1208    ALKPHOS 79 11/08/2018 1208    AST 24 11/08/2018 1208    ALT 30 11/08/2018 1208    BILITOT 0.9 11/08/2018 1208    ESTGFRAFRICA >60.0 11/08/2018 1208    EGFRNONAA >60.0 11/08/2018 1208          Lab Results   Component Value Date    LDLCALC 86.2 11/08/2018          Ref. Range 11/8/2018 12:08   Cholesterol Latest Ref Range: 120 - 199 mg/dL 161   HDL Latest Ref Range: 40 - 75 mg/dL 57   HDL/Chol Ratio  Latest Ref Range: 20.0 - 50.0 % 35.4   LDL Cholesterol Latest Ref Range: 63.0 - 159.0 mg/dL 86.2   Non-HDL Cholesterol Latest Units: mg/dL 104   Total Cholesterol/HDL Ratio Latest Ref Range: 2.0 - 5.0  2.8   Triglycerides Latest Ref Range: 30 - 150 mg/dL 89       Lab Results   Component Value Date    MICALBCREAT 9.9 11/08/2018             STANDARDS of CARE:        ASA:               Last eye exam:       ASSESSMENT and PLAN:    A1C GOAL: < 7 %     1. Type 2 diabetes mellitus without complication, with long-term current use of insulin  Needs to get back on insulin asap. Weight loss of 8 lb noted -  Likely related to discontinuation of insulin but may also be related to extreme rise in glucoses.     Change to Toujeo insulin and inject 50 units twice daily. -- long acting insulin.   Find out what rapid acting insulin is covered - Humalog, Humalog U200*, Novolog, Apidra. Not Ademlog since you most recently used it. Message office back with what's covered and the rx will be sent.     Inject 25 units fasting acting insulin before meals with insulin sensitivity factor of 10 and blood glucose goal of 150. (current glucose - 150)/10 = correction insulin dose.     Will try Invokana 100 mg once daily in the morning.     Monitor glucose 4x/day.     Return to clinic in 4-6 weeks.      2. Erectile dysfunction, unspecified erectile dysfunction type  Resolved with discontinuation of farxiga    3. Morbid obesity, unspecified obesity type  Increases insulin resistance.    4. Mood changes  F/u with Dr. Ford re: other medication adjustments.      Spent 40 minutes with patient with >50% time spent in counseling, as noted in # 1-4. Z71.9             No orders of the defined types were placed in this encounter.       Follow up in about 1 month (around 8/8/2019).

## 2019-07-11 NOTE — PATIENT INSTRUCTIONS
Change to Toujeo insulin and inject 50 units twice daily. -- long acting insulin.   Find out what rapid acting insulin is covered - Humalog, Humalog U200*, Novolog, Apidra. Not Ademlog since you most recently used it. Message office back with what's covered and the rx will be sent.     Inject 25 units fasting acting insulin before meals with insulin sensitivity factor of 10 and blood glucose goal of 150. (current glucose - 150)/10 = correction insulin dose.     Will try Invokana 100 mg once daily in the morning.     Monitor glucose 4x/day.     Return to clinic in 4-6 weeks.

## 2019-07-17 NOTE — PROGRESS NOTES
This note was created by combination of typed  and Dragon dictation.  Transcription errors may be present.  If there are any questions, please contact me.    Assessment & Plan:   Type 2 diabetes mellitus without complication, with long-term current use of insulin  -update CBC and CMP  -     CBC auto differential; Future; Expected date: 07/18/2019  -     Comprehensive metabolic panel; Future; Expected date: 07/18/2019    Pure hypercholesterolemia  -last lipid profile in the fall was good.  Nonfasting today.  No change, atorvastatin 10  -     atorvastatin (LIPITOR) 10 MG tablet; Take 1 tablet (10 mg total) by mouth once daily.  Dispense: 90 tablet; Refill: 3    Essential hypertension  -borderline control today.  Goal would be less than 130/80.  For now no change lisinopril 5.  I have asked him to start monitoring more consistently and if consistently above 130, increase it to lisinopril 10.  Asked him to notify me if I need to send in new prescription.  -     lisinopril (PRINIVIL,ZESTRIL) 5 MG tablet; Take 1 tablet (5 mg total) by mouth once daily.  Dispense: 90 tablet; Refill: 3    Male erectile disorder  -he would like to rule out etiologies.  Talked about possible causes, obesity, progressive diabetes.  Occult sleep apnea.  Check total testosterone.  If normal, would pursue sleep study and he would be fine with that.  -     Testosterone; Future; Expected date: 07/18/2019    Need for Tdap vaccination  -     (In Office Administered) Tdap Vaccine        Medications Discontinued During This Encounter   Medication Reason    promethazine-dextromethorphan (PROMETHAZINE-DM) 6.25-15 mg/5 mL Syrp Patient no longer taking    rizatriptan (MAXALT) 10 MG tablet Patient no longer taking    atorvastatin (LIPITOR) 10 MG tablet Reorder    lisinopril (PRINIVIL,ZESTRIL) 5 MG tablet Reorder       meds sent this encounter:  Medications Ordered This Encounter   Medications    atorvastatin (LIPITOR) 10 MG tablet     Sig:  Take 1 tablet (10 mg total) by mouth once daily.     Dispense:  90 tablet     Refill:  3    lisinopril (PRINIVIL,ZESTRIL) 5 MG tablet     Sig: Take 1 tablet (5 mg total) by mouth once daily.     Dispense:  90 tablet     Refill:  3       Follow Up: No follow-ups on file.    Subjective:     Chief Complaint   Patient presents with    Hypertension    Erectile Dysfunction       HPI  Tony is a 38 y.o. male, last appointment with this clinic was 1/23/2019.    Last visit with me - migraines - if persisting consider restarting beta blocker. History of Topamax ineffective.  History of propranolol which made him angry in the past.    Saw DM NP 7/11; stop farxiga SE of ED; increase short acting insulin to 25; change basaglar to toujeo same dose 50 BID; trial invokana  Insurance no longer covering rizatriptan. Changed to naratriptan    11/2018 lipid good; TSH WNL  6/26/19 a1c 12.7 off of insulin    Notes improvement in glucose control on toujeo and novolog. invokana so far OK/no SE. Previous had given him rash.  This so far no rash.    Still with some ED.  With medication, does seem to make it a bit worse.  He is interested in testing testosterone. Libido is good. Issues with maintaining erection.   Prostate on CT 11/2018 was normal  We talked about testosterone testing, and differential on this.  Could be pseudo hypogonadism from obesity, or from occult sleep apnea.  He would be agreeable for sleep apnea testing if testosterone is normal.  Sometimes snoring.   Normal urination normal stream.   He would like to find dx first before considering sildenafil.    Blood pressure today borderline.  Above 130.  Has a home cuff, not checking on a regular basis.  I would like for his goal to be less than 130/80.    His blood sugars have been coming down with the adjustments on his insulin regimen.  Overall feeling better.    New job with the fire department, much less stressed than previous.  As result, significantly decreased  frequency of migraines.    Patient Care Team:  Jovany Ford MD as PCP - General (Internal Medicine)  Janneth Johnson RN (Inactive) as Registered Nurse (Diabetes)  Rocky Hewitt MD as Consulting Physician (Ophthalmology)    Patient Active Problem List    Diagnosis Date Noted    Non-seasonal allergic rhinitis; avoid antihistamines per opthalmology 11/09/2018    Migraine with aura and without status migrainosus, not intractable propranolol SE angry; topamax not helpful; imitrex ineffective; daith ear piercing helps 09/28/2018    Type 2 diabetes mellitus without complication, with long-term current use of insulin     Essential hypertension     Hypothyroidism not currently on medication 07/29/2015    Hyperlipidemia LDL goal <70 06/03/2015    Insulin long-term use 06/03/2015    Tinea pedis 06/03/2015    Morbid obesity 06/03/2015       PAST MEDICAL HISTORY:  Past Medical History:   Diagnosis Date    Diabetes mellitus, type 2     Hyperlipidemia     Hypertension        PAST SURGICAL HISTORY:  Past Surgical History:   Procedure Laterality Date    ANKLE SURGERY      KNEE SURGERY      acl,mcl,pcl    left knee x 2         SOCIAL HISTORY:  Social History     Socioeconomic History    Marital status:      Spouse name: Not on file    Number of children: Not on file    Years of education: Not on file    Highest education level: Not on file   Occupational History    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     Employer: [employer]   Social Needs    Financial resource strain: Not on file    Food insecurity:     Worry: Not on file     Inability: Not on file    Transportation needs:     Medical: Not on file     Non-medical: Not on file   Tobacco Use    Smoking status: Never Smoker    Smokeless tobacco: Never Used   Substance and Sexual Activity    Alcohol use: Yes     Comment: 1-2 beers every 2 weeks    Drug use: No    Sexual activity: Not on file    Lifestyle    Physical activity:     Days per week: Not on file     Minutes per session: Not on file    Stress: Not on file   Relationships    Social connections:     Talks on phone: Not on file     Gets together: Not on file     Attends Sabianist service: Not on file     Active member of club or organization: Not on file     Attends meetings of clubs or organizations: Not on file     Relationship status: Not on file   Other Topics Concern    Not on file   Social History Narrative    Not on file       ALLERGIES AND MEDICATIONS: updated and reviewed.  Review of patient's allergies indicates:   Allergen Reactions    Influenza virus vaccine, specific Hives    Victoza [liraglutide] Nausea And Vomiting    Farxiga [dapagliflozin] Rash     Erectile dysfunction and rash      Current Outpatient Medications   Medication Sig Dispense Refill    atorvastatin (LIPITOR) 10 MG tablet TAKE 1 TABLET BY MOUTH EVERY DAY 30 tablet 5    blood-glucose meter (ONETOUCH ULTRA2 METER) kit OneTouch Ultra2 Meter kit      canagliflozin (INVOKANA) 100 mg Tab Take 1 tablet (100 mg total) by mouth once daily. 30 tablet 5    flash glucose scanning reader (FREESTYLE SAMANTHA 14 DAY READER) [facility] Freestyle Samantha 14-day reader. To be used with Freestyle Samantha 14-day sensors 1 each 0    flash glucose sensor (FREESTYLE SAMANTHA 14 DAY SENSOR) Kit Please change sensor every 14 days. Freestyle Samantha Sensor 30-day supply, 2 sensors/month 2 kit 11    insulin aspart U-100 (NOVOLOG FLEXPEN U-100 INSULIN) 100 unit/mL (3 mL) InPn pen Inject 25 Units into the skin 3 (three) times daily with meals. With correction scale; max dose 100  Units/day 2 Box 5    insulin glargine U-300 conc (TOUJEO MAX U-300 SOLOSTAR) 300 unit/mL (3 mL) InPn Inject 50 Units into the skin 2 (two) times daily. 6 Syringe 5    lisinopril (PRINIVIL,ZESTRIL) 5 MG tablet TAKE 1 TABLET BY MOUTH EVERY DAY 30 tablet 5    loratadine (CLARITIN) 10 mg tablet Take 10 mg by mouth once daily.   "    needle, disp, 31 gauge 31 gauge x 5/16" Ndle 1 each by Misc.(Non-Drug; Combo Route) route once daily. QID insulin and victoza 500 each 5    fluticasone propionate (FLONASE) 50 mcg/actuation nasal spray fluticasone propionate 50 mcg/actuation nasal spray,suspension       No current facility-administered medications for this visit.        Review of Systems   Constitutional: Negative for chills and fever.   Respiratory: Negative for shortness of breath.    Cardiovascular: Negative for chest pain and palpitations.   Genitourinary: Negative for dysuria, frequency and urgency.       Objective:   Physical Exam   Vitals:    07/18/19 1115 07/18/19 1135   BP: (!) 140/72 136/80   BP Location: Left arm Left arm   Patient Position: Sitting Sitting   BP Method: X-Large (Manual) Large (Manual)   Pulse: 90    Temp: 98.2 °F (36.8 °C)    TempSrc: Oral    SpO2: 96%    Weight: (!) 138.8 kg (306 lb)    Height: 6' 1" (1.854 m)     Body mass index is 40.37 kg/m².  Weight: (!) 138.8 kg (306 lb)   Height: 6' 1" (185.4 cm)     Physical Exam   Constitutional: He is oriented to person, place, and time. He appears well-developed and well-nourished. No distress.   Eyes: EOM are normal.   Cardiovascular: Normal rate, regular rhythm and normal heart sounds.   No murmur heard.  Posterior tibial pulses 3+ bilaterally   Pulmonary/Chest: Effort normal and breath sounds normal.   Musculoskeletal: Normal range of motion. He exhibits no edema.   Neurological: He is alert and oriented to person, place, and time. Coordination normal.   Skin: Skin is warm and dry.   Psychiatric: He has a normal mood and affect. His behavior is normal. Thought content normal.     "

## 2019-07-18 ENCOUNTER — LAB VISIT (OUTPATIENT)
Dept: LAB | Facility: HOSPITAL | Age: 39
End: 2019-07-18
Attending: INTERNAL MEDICINE
Payer: COMMERCIAL

## 2019-07-18 ENCOUNTER — OFFICE VISIT (OUTPATIENT)
Dept: FAMILY MEDICINE | Facility: CLINIC | Age: 39
End: 2019-07-18
Payer: COMMERCIAL

## 2019-07-18 VITALS
SYSTOLIC BLOOD PRESSURE: 136 MMHG | DIASTOLIC BLOOD PRESSURE: 80 MMHG | HEIGHT: 73 IN | TEMPERATURE: 98 F | HEART RATE: 90 BPM | BODY MASS INDEX: 40.56 KG/M2 | WEIGHT: 306 LBS | OXYGEN SATURATION: 96 %

## 2019-07-18 DIAGNOSIS — E11.9 TYPE 2 DIABETES MELLITUS WITHOUT COMPLICATION, WITH LONG-TERM CURRENT USE OF INSULIN: ICD-10-CM

## 2019-07-18 DIAGNOSIS — Z79.4 TYPE 2 DIABETES MELLITUS WITHOUT COMPLICATION, WITH LONG-TERM CURRENT USE OF INSULIN: Primary | ICD-10-CM

## 2019-07-18 DIAGNOSIS — N52.9 MALE ERECTILE DISORDER: ICD-10-CM

## 2019-07-18 DIAGNOSIS — E78.00 PURE HYPERCHOLESTEROLEMIA: ICD-10-CM

## 2019-07-18 DIAGNOSIS — Z79.4 TYPE 2 DIABETES MELLITUS WITHOUT COMPLICATION, WITH LONG-TERM CURRENT USE OF INSULIN: ICD-10-CM

## 2019-07-18 DIAGNOSIS — I10 ESSENTIAL HYPERTENSION: ICD-10-CM

## 2019-07-18 DIAGNOSIS — Z23 NEED FOR TDAP VACCINATION: ICD-10-CM

## 2019-07-18 DIAGNOSIS — E11.9 TYPE 2 DIABETES MELLITUS WITHOUT COMPLICATION, WITH LONG-TERM CURRENT USE OF INSULIN: Primary | ICD-10-CM

## 2019-07-18 LAB
ALBUMIN SERPL BCP-MCNC: 3.6 G/DL (ref 3.5–5.2)
ALP SERPL-CCNC: 68 U/L (ref 55–135)
ALT SERPL W/O P-5'-P-CCNC: 54 U/L (ref 10–44)
ANION GAP SERPL CALC-SCNC: 9 MMOL/L (ref 8–16)
AST SERPL-CCNC: 38 U/L (ref 10–40)
BASOPHILS # BLD AUTO: 0.05 K/UL (ref 0–0.2)
BASOPHILS NFR BLD: 0.5 % (ref 0–1.9)
BILIRUB SERPL-MCNC: 0.4 MG/DL (ref 0.1–1)
BUN SERPL-MCNC: 12 MG/DL (ref 6–20)
CALCIUM SERPL-MCNC: 10.2 MG/DL (ref 8.7–10.5)
CHLORIDE SERPL-SCNC: 104 MMOL/L (ref 95–110)
CO2 SERPL-SCNC: 26 MMOL/L (ref 23–29)
CREAT SERPL-MCNC: 0.9 MG/DL (ref 0.5–1.4)
DIFFERENTIAL METHOD: NORMAL
EOSINOPHIL # BLD AUTO: 0.1 K/UL (ref 0–0.5)
EOSINOPHIL NFR BLD: 1.5 % (ref 0–8)
ERYTHROCYTE [DISTWIDTH] IN BLOOD BY AUTOMATED COUNT: 13.1 % (ref 11.5–14.5)
EST. GFR  (AFRICAN AMERICAN): >60 ML/MIN/1.73 M^2
EST. GFR  (NON AFRICAN AMERICAN): >60 ML/MIN/1.73 M^2
GLUCOSE SERPL-MCNC: 141 MG/DL (ref 70–110)
HCT VFR BLD AUTO: 47.4 % (ref 40–54)
HGB BLD-MCNC: 15.3 G/DL (ref 14–18)
IMM GRANULOCYTES # BLD AUTO: 0.04 K/UL (ref 0–0.04)
IMM GRANULOCYTES NFR BLD AUTO: 0.4 % (ref 0–0.5)
LYMPHOCYTES # BLD AUTO: 3.2 K/UL (ref 1–4.8)
LYMPHOCYTES NFR BLD: 33.5 % (ref 18–48)
MCH RBC QN AUTO: 29.9 PG (ref 27–31)
MCHC RBC AUTO-ENTMCNC: 32.3 G/DL (ref 32–36)
MCV RBC AUTO: 93 FL (ref 82–98)
MONOCYTES # BLD AUTO: 0.8 K/UL (ref 0.3–1)
MONOCYTES NFR BLD: 8.4 % (ref 4–15)
NEUTROPHILS # BLD AUTO: 5.3 K/UL (ref 1.8–7.7)
NEUTROPHILS NFR BLD: 55.7 % (ref 38–73)
NRBC BLD-RTO: 0 /100 WBC
PLATELET # BLD AUTO: 233 K/UL (ref 150–350)
PMV BLD AUTO: 12.4 FL (ref 9.2–12.9)
POTASSIUM SERPL-SCNC: 4.8 MMOL/L (ref 3.5–5.1)
PROT SERPL-MCNC: 7.3 G/DL (ref 6–8.4)
RBC # BLD AUTO: 5.11 M/UL (ref 4.6–6.2)
SODIUM SERPL-SCNC: 139 MMOL/L (ref 136–145)
WBC # BLD AUTO: 9.51 K/UL (ref 3.9–12.7)

## 2019-07-18 PROCEDURE — 85025 COMPLETE CBC W/AUTO DIFF WBC: CPT

## 2019-07-18 PROCEDURE — 36415 COLL VENOUS BLD VENIPUNCTURE: CPT | Mod: PO

## 2019-07-18 PROCEDURE — 99214 OFFICE O/P EST MOD 30 MIN: CPT | Mod: 25,S$GLB,, | Performed by: INTERNAL MEDICINE

## 2019-07-18 PROCEDURE — 90715 TDAP VACCINE 7 YRS/> IM: CPT | Mod: S$GLB,,, | Performed by: INTERNAL MEDICINE

## 2019-07-18 PROCEDURE — 99999 PR PBB SHADOW E&M-EST. PATIENT-LVL IV: CPT | Mod: PBBFAC,,, | Performed by: INTERNAL MEDICINE

## 2019-07-18 PROCEDURE — 99214 PR OFFICE/OUTPT VISIT, EST, LEVL IV, 30-39 MIN: ICD-10-PCS | Mod: 25,S$GLB,, | Performed by: INTERNAL MEDICINE

## 2019-07-18 PROCEDURE — 99999 PR PBB SHADOW E&M-EST. PATIENT-LVL IV: ICD-10-PCS | Mod: PBBFAC,,, | Performed by: INTERNAL MEDICINE

## 2019-07-18 PROCEDURE — 90715 TDAP VACCINE GREATER THAN OR EQUAL TO 7YO IM: ICD-10-PCS | Mod: S$GLB,,, | Performed by: INTERNAL MEDICINE

## 2019-07-18 PROCEDURE — 80053 COMPREHEN METABOLIC PANEL: CPT

## 2019-07-18 PROCEDURE — 90471 IMMUNIZATION ADMIN: CPT | Mod: S$GLB,,, | Performed by: INTERNAL MEDICINE

## 2019-07-18 PROCEDURE — 84403 ASSAY OF TOTAL TESTOSTERONE: CPT

## 2019-07-18 PROCEDURE — 90471 TDAP VACCINE GREATER THAN OR EQUAL TO 7YO IM: ICD-10-PCS | Mod: S$GLB,,, | Performed by: INTERNAL MEDICINE

## 2019-07-18 RX ORDER — LISINOPRIL 5 MG/1
5 TABLET ORAL DAILY
Qty: 90 TABLET | Refills: 3 | Status: SHIPPED | OUTPATIENT
Start: 2019-07-18 | End: 2019-09-30

## 2019-07-18 RX ORDER — ATORVASTATIN CALCIUM 10 MG/1
10 TABLET, FILM COATED ORAL DAILY
Qty: 90 TABLET | Refills: 3 | Status: SHIPPED | OUTPATIENT
Start: 2019-07-18 | End: 2020-01-07 | Stop reason: DRUGHIGH

## 2019-07-18 RX ORDER — FLUTICASONE PROPIONATE 50 MCG
SPRAY, SUSPENSION (ML) NASAL
COMMUNITY
End: 2019-09-30 | Stop reason: SDUPTHER

## 2019-07-18 NOTE — PATIENT INSTRUCTIONS
PLEASE MONITOR BP - GOAL IS < 130/85.  IF PERSISTENTLY HIGH - WOULD INCREASE THE LISINOPRIL TO 10 MG.  LET ME KNOW IF I NEED TO SEND IN NEW RX.    If testosterone normal - would check for sleep apnea.

## 2019-07-19 ENCOUNTER — PATIENT MESSAGE (OUTPATIENT)
Dept: FAMILY MEDICINE | Facility: CLINIC | Age: 39
End: 2019-07-19

## 2019-07-19 DIAGNOSIS — R79.89 LOW TESTOSTERONE IN MALE: Primary | ICD-10-CM

## 2019-07-19 LAB — TESTOST SERPL-MCNC: 148 NG/DL (ref 304–1227)

## 2019-07-19 NOTE — PROGRESS NOTES
LFT mildly elevated  CBC normal  Suspect fatty liver  Testosterone low.  Need further evaluation with free and total fractions, FSH, LH, TSH, prolactin.  Consider MRI sella  Results to patient via [facility].  Labs ordered

## 2019-07-23 ENCOUNTER — LAB VISIT (OUTPATIENT)
Dept: LAB | Facility: HOSPITAL | Age: 39
End: 2019-07-23
Attending: INTERNAL MEDICINE
Payer: COMMERCIAL

## 2019-07-23 DIAGNOSIS — R79.89 LOW TESTOSTERONE IN MALE: ICD-10-CM

## 2019-07-23 LAB
FSH SERPL-ACNC: 4.6 MIU/ML (ref 0.95–11.95)
LH SERPL-ACNC: 3.3 MIU/ML (ref 0.6–12.1)
PROLACTIN SERPL IA-MCNC: 12.8 NG/ML (ref 3.5–19.4)
TESTOST SERPL-MCNC: 168 NG/DL (ref 304–1227)
TSH SERPL DL<=0.005 MIU/L-ACNC: 3.48 UIU/ML (ref 0.4–4)

## 2019-07-23 PROCEDURE — 84146 ASSAY OF PROLACTIN: CPT

## 2019-07-23 PROCEDURE — 84443 ASSAY THYROID STIM HORMONE: CPT

## 2019-07-23 PROCEDURE — 84403 ASSAY OF TOTAL TESTOSTERONE: CPT

## 2019-07-23 PROCEDURE — 83002 ASSAY OF GONADOTROPIN (LH): CPT

## 2019-07-23 PROCEDURE — 36415 COLL VENOUS BLD VENIPUNCTURE: CPT | Mod: PO

## 2019-07-23 PROCEDURE — 83001 ASSAY OF GONADOTROPIN (FSH): CPT

## 2019-07-23 PROCEDURE — 84402 ASSAY OF FREE TESTOSTERONE: CPT

## 2019-07-25 LAB — TESTOST FREE SERPL-MCNC: 7.9 PG/ML (ref 5.1–41.5)

## 2019-07-27 ENCOUNTER — PATIENT MESSAGE (OUTPATIENT)
Dept: FAMILY MEDICINE | Facility: CLINIC | Age: 39
End: 2019-07-27

## 2019-07-27 DIAGNOSIS — R06.83 SNORING: Primary | ICD-10-CM

## 2019-07-27 DIAGNOSIS — E66.9 OBESITY, UNSPECIFIED CLASSIFICATION, UNSPECIFIED OBESITY TYPE, UNSPECIFIED WHETHER SERIOUS COMORBIDITY PRESENT: ICD-10-CM

## 2019-07-29 ENCOUNTER — PATIENT MESSAGE (OUTPATIENT)
Dept: ENDOCRINOLOGY | Facility: CLINIC | Age: 39
End: 2019-07-29

## 2019-08-07 ENCOUNTER — PATIENT MESSAGE (OUTPATIENT)
Dept: FAMILY MEDICINE | Facility: CLINIC | Age: 39
End: 2019-08-07

## 2019-08-07 DIAGNOSIS — M79.673 HEEL PAIN, UNSPECIFIED LATERALITY: Primary | ICD-10-CM

## 2019-08-07 RX ORDER — CANAGLIFLOZIN 100 MG/1
TABLET, FILM COATED ORAL
Qty: 30 TABLET | Refills: 2 | Status: SHIPPED | OUTPATIENT
Start: 2019-08-07 | End: 2019-08-07 | Stop reason: SDUPTHER

## 2019-08-07 RX ORDER — INSULIN GLARGINE 300 [IU]/ML
50 INJECTION, SOLUTION SUBCUTANEOUS 2 TIMES DAILY
Qty: 6 SYRINGE | Refills: 5 | Status: SHIPPED | OUTPATIENT
Start: 2019-08-07 | End: 2019-08-08 | Stop reason: SDUPTHER

## 2019-08-07 NOTE — TELEPHONE ENCOUNTER
----- Message from Rox Stanley sent at 8/7/2019  2:54 PM CDT -----  Contact: [facility]  Type: RX Refill Request    Who Called: [facility]    Refill or New Rx: Refill     RX Name and Strength:insulin glargine U-300 conc (TOUJEO MAX U-300 SOLOSTAR) 300 unit/mL (3 mL) InPn, canagliflozin (INVOKANA) 100 mg Tab    How is the patient currently taking it? (ex. 1XDay):    Is this a 30 day or 90 day RX: 30    Preferred Pharmacy with phone number: [facility] [phone] (Phone)  [phone] (Fax)        Local or Mail Order: [facility]    Ordering Provider: Dr. Ford    Would the patient rather a call back or a response via [facility]? Call back     Best Call Back Number:[phone]    Additional Information:

## 2019-08-08 RX ORDER — INSULIN GLARGINE 300 [IU]/ML
50 INJECTION, SOLUTION SUBCUTANEOUS 2 TIMES DAILY
Qty: 4 SYRINGE | Refills: 5 | Status: SHIPPED | OUTPATIENT
Start: 2019-08-08 | End: 2020-10-21 | Stop reason: SDUPTHER

## 2019-08-09 ENCOUNTER — PATIENT MESSAGE (OUTPATIENT)
Dept: ENDOCRINOLOGY | Facility: CLINIC | Age: 39
End: 2019-08-09

## 2019-08-09 ENCOUNTER — TELEPHONE (OUTPATIENT)
Dept: OTOLARYNGOLOGY | Facility: CLINIC | Age: 39
End: 2019-08-09

## 2019-08-09 ENCOUNTER — PATIENT MESSAGE (OUTPATIENT)
Dept: FAMILY MEDICINE | Facility: CLINIC | Age: 39
End: 2019-08-09

## 2019-08-09 DIAGNOSIS — R79.89 LOW TESTOSTERONE IN MALE: Primary | ICD-10-CM

## 2019-08-09 RX ORDER — LANCETS
EACH MISCELLANEOUS
Qty: 400 EACH | Refills: 3 | Status: SHIPPED | OUTPATIENT
Start: 2019-08-09 | End: 2019-08-12

## 2019-08-09 RX ORDER — INSULIN PUMP SYRINGE, 3 ML
EACH MISCELLANEOUS
Qty: 1 EACH | Refills: 0 | Status: SHIPPED | OUTPATIENT
Start: 2019-08-09 | End: 2019-08-12

## 2019-08-09 NOTE — TELEPHONE ENCOUNTER
----- Message from Huber Godinez sent at 8/9/2019  8:55 AM CDT -----  Contact: Basilia Vazquez  [phone]  Ex [phone]    Type: Needs Medical Advice    Who Called:  Basilia Vazquez  [phone]  Ex [phone]      Medication: flash glucose scanning reader (FREESTYLE ARLETH 14 DAY READER) Misc 1 each 0 8/7/2019    Sig: Freestyle Arleth 14-day reader. To be used with Freestyle Arleth 14-day sensors   Sent to pharmacy as: flash glucose scanning reader (FREESTYLE ARLETH 14 DAY READER) Misc   Notes to Pharmacy: Patient will have voucher for free 14-day reader since he already owns the 10-day reader   E-Prescribing Status: Receipt confirmed by pharmacy (8/7/2019  1:41 PM CDT)     Pharmacy name and phone #:      [id number] - [place] - [email or web address]  [email or web address] [place] [address]  Phone: [phone] Fax: [phone]    Best Call Back Number:Ptnt     Additional Information:    Basilia advised this item needs a prior authorization. Ptnt advised that the Dr office was to have sent the PA request on 08-07-19, needs to confirm the fax number that the PA went to. PA has not been received yet at [employer] should be faxed to [phone]. Please call to confirm that PA has been sent.

## 2019-08-10 ENCOUNTER — HOSPITAL ENCOUNTER (EMERGENCY)
Facility: HOSPITAL | Age: 39
Discharge: HOME | End: 2019-08-10
Attending: INTERNAL MEDICINE
Payer: COMMERCIAL

## 2019-08-10 VITALS
RESPIRATION RATE: 18 BRPM | SYSTOLIC BLOOD PRESSURE: 133 MMHG | DIASTOLIC BLOOD PRESSURE: 89 MMHG | HEART RATE: 88 BPM | BODY MASS INDEX: 40.42 KG/M2 | TEMPERATURE: 98 F | HEIGHT: 73 IN | OXYGEN SATURATION: 99 % | WEIGHT: 305 LBS

## 2019-08-10 DIAGNOSIS — M54.50 ACUTE BILATERAL LOW BACK PAIN WITHOUT SCIATICA: Primary | ICD-10-CM

## 2019-08-10 PROCEDURE — 99283 EMERGENCY DEPT VISIT LOW MDM: CPT | Mod: ER

## 2019-08-10 RX ORDER — IBUPROFEN 800 MG/1
800 TABLET ORAL EVERY 8 HOURS PRN
Qty: 30 TABLET | Refills: 0 | Status: SHIPPED | OUTPATIENT
Start: 2019-08-10 | End: 2020-06-18

## 2019-08-11 NOTE — ED PROVIDER NOTES
Encounter Date: 8/10/2019    SCRIBE #1 NOTE: I, Phil Mane, am scribing for, and in the presence of,  Dr. Andres. I have scribed the following portions of the note - Other sections scribed: HPI, ROS, PE.       History     Chief Complaint   Patient presents with    Back Pain     PT REPORTS SNEEZED 3 DAYS AGO AND PULLED A MUSCLE IN HIS BACK, REPORTS WORK WANTED A NOTE TO RETURN TO WORK, PT REPORTS BACK IS STILL HURTING     Tony Gordillo is a 39 y.o. male who presents to the ED complaining of lower back pain from sneezing 3 days ago. The pt took ibuprofen but it did not help. The pain is worse with movement but unchanged with touched. The pt states the sx is getting worse and there is no improvement to his mobility The pt states he can not lift his twenty pound niece.     The history is provided by the patient. No  was used.     Review of patient's allergies indicates:   Allergen Reactions    Influenza virus vaccine, specific Hives    Victoza [liraglutide] Nausea And Vomiting    Farxiga [dapagliflozin] Rash     Erectile dysfunction and rash      Past Medical History:   Diagnosis Date    Diabetes mellitus, type 2     Hyperlipidemia     Hypertension      Past Surgical History:   Procedure Laterality Date    ANKLE SURGERY      KNEE SURGERY      acl,mcl,pcl    left knee x 2       Family History   Problem Relation Age of Onset    Diabetes Mother     Diabetes Father     No Known Problems Brother     Autism Son     No Known Problems Daughter      Social History     Tobacco Use    Smoking status: Never Smoker    Smokeless tobacco: Never Used   Substance Use Topics    Alcohol use: Yes     Comment: 1-2 beers every 2 weeks    Drug use: No     Review of Systems   Constitutional: Negative for fever.   Musculoskeletal: Positive for back pain.   All other systems reviewed and are negative.      Physical Exam     Initial Vitals [08/10/19 2054]   BP Pulse Resp Temp SpO2   132/82 98 20 98.1 °F  (36.7 °C) 98 %      MAP       --         Physical Exam    Nursing note and vitals reviewed.  Constitutional: He appears well-developed and well-nourished.   HENT:   Head: Normocephalic and atraumatic.   Eyes: Conjunctivae are normal.   Neck: Normal range of motion. Neck supple.   Cardiovascular: Normal rate and intact distal pulses.   Pulmonary/Chest: No respiratory distress.   Musculoskeletal: Normal range of motion.        Thoracic back: He exhibits pain (when moving).   Neurological: He is alert and oriented to person, place, and time.   Skin: Skin is warm and dry.   Psychiatric: He has a normal mood and affect. His behavior is normal.         ED Course   Procedures  Labs Reviewed - No data to display       Imaging Results    None          Medical Decision Making:   Initial Assessment:   Tony Gordillo is a 39 y.o. male who presents to the ED complaining of lower back pain from sneezing 3 days ago. The pt took ibuprofen but it did not help. The pain is worse with movement but unchanged with touched. The pt states the sx is getting worse and there is no improvement to his mobility The pt states he can not lift his twenty pound niece.     ED Management:  Patient was given instructions for back pain and a prescription for ibuprofen.  He was advised to follow up with primary care physician within the next 3 days for re-evaluation/return to the emergency department if condition worsens.            Scribe Attestation:   Scribe #1: I performed the above scribed service and the documentation accurately describes the services I performed. I attest to the accuracy of the note.        This document was produced by a scribe under my direction and in my presence. I agree with the content of the note and have made any necessary edits.     Dr. Andres    08/12/2019 5:41 AM          Clinical Impression:     1. Acute bilateral low back pain without sciatica            Disposition:   Disposition: Discharged  Condition:  Stable                        Miguel Andres MD  08/12/19 0575

## 2019-08-11 NOTE — ED PROVIDER NOTES
Encounter Date: 8/10/2019    SCRIBE #1 NOTE: I, Phil Mane, am scribing for, and in the presence of,  Dr. Andres. I have scribed the following portions of the note - Other sections scribed: HPI, ROS, PE.       History     Chief Complaint   Patient presents with    Back Pain     PT REPORTS SNEEZED 3 DAYS AGO AND PULLED A MUSCLE IN HIS BACK, REPORTS WORK WANTED A NOTE TO RETURN TO WORK, PT REPORTS BACK IS STILL HURTING     HPI  Review of patient's allergies indicates:   Allergen Reactions    Influenza virus vaccine, specific Hives    Victoza [liraglutide] Nausea And Vomiting    Farxiga [dapagliflozin] Rash     Erectile dysfunction and rash      Past Medical History:   Diagnosis Date    Diabetes mellitus, type 2     Hyperlipidemia     Hypertension      Past Surgical History:   Procedure Laterality Date    ANKLE SURGERY      KNEE SURGERY      acl,mcl,pcl    left knee x 2       Family History   Problem Relation Age of Onset    Diabetes Mother     Diabetes Father     No Known Problems Brother     Autism Son     No Known Problems Daughter      Social History     Tobacco Use    Smoking status: Never Smoker    Smokeless tobacco: Never Used   Substance Use Topics    Alcohol use: Yes     Comment: 1-2 beers every 2 weeks    Drug use: No     Review of Systems    Physical Exam     Initial Vitals [08/10/19 2054]   BP Pulse Resp Temp SpO2   132/82 98 20 98.1 °F (36.7 °C) 98 %      MAP       --         Physical Exam    ED Course   Procedures  Labs Reviewed - No data to display       Imaging Results    None                               Clinical Impression:   {Add your Clinical Impression here. If you haven't documented one yet, please pend the note, finalize a Clinical Impression, and refresh your note before signing.:[id number]}

## 2019-08-11 NOTE — ED TRIAGE NOTES
Pt presents to ER with c/o straining his back when he sneezed 3 days ago.  He denies any back problems previously and denies any numbness or tingling or weakness to legs.  He has been treating self with ibuprofen and aleve alternating for pain.  He states he can't take muscle relaxants because it makes his knees weak and he has had ACL repair and doesn't want to injure them.

## 2019-08-12 ENCOUNTER — PATIENT MESSAGE (OUTPATIENT)
Dept: ENDOCRINOLOGY | Facility: CLINIC | Age: 39
End: 2019-08-12

## 2019-08-12 RX ORDER — LANCETS
EACH MISCELLANEOUS
Qty: 400 EACH | Refills: 3 | Status: SHIPPED | OUTPATIENT
Start: 2019-08-12 | End: 2019-09-06

## 2019-08-12 RX ORDER — INSULIN PUMP SYRINGE, 3 ML
EACH MISCELLANEOUS
Qty: 1 EACH | Refills: 0 | Status: SHIPPED | OUTPATIENT
Start: 2019-08-12 | End: 2019-09-06

## 2019-08-14 ENCOUNTER — TELEPHONE (OUTPATIENT)
Dept: SLEEP MEDICINE | Facility: HOSPITAL | Age: 39
End: 2019-08-14

## 2019-08-20 ENCOUNTER — LAB VISIT (OUTPATIENT)
Dept: LAB | Facility: HOSPITAL | Age: 39
End: 2019-08-20
Attending: INTERNAL MEDICINE
Payer: COMMERCIAL

## 2019-08-20 DIAGNOSIS — R79.89 LOW TESTOSTERONE IN MALE: ICD-10-CM

## 2019-08-20 PROCEDURE — 36415 COLL VENOUS BLD VENIPUNCTURE: CPT | Mod: PO

## 2019-08-20 PROCEDURE — 82040 ASSAY OF SERUM ALBUMIN: CPT

## 2019-08-21 NOTE — PROGRESS NOTES
CC: This 39 y.o. White male  is here for evaluation of  T2DM along with comorbidities indicated in the Visit Diagnosis section of this encounter.    HPI: Tony Gordillo was diagnosed with T2DM in 2008. He was without health insurance for 2017 and mid 2018.       Prior visit 7/11/19  a1c is up from 9.7 to now 12.7%.   Due to job change and lack of insurance, he was without medications including insulin for about 3 months and was able to resume it in May. He stopped Farxiga and his rash and ED symptoms have resolved.   However, he found that he was getting kim and angry. He stopped all his meds and 3 days later found that his mood has stabilized back to baseline. Currently not on any medications. States his diet is good as well as exercise.   He was previously taking atorvastatin, lisinopril, and loratadine including Basaglar and Admelog.   Plan Needs to get back on insulin asap. Weight loss of 8 lb noted -  Likely related to discontinuation of insulin but may also be related to extreme rise in glucoses.   Change to Toujeo insulin and inject 50 units twice daily. -- long acting insulin.   Find out what rapid acting insulin is covered - Humalog, Humalog U200*, Novolog, Apidra. Not Ademlog since you most recently used it. Message office back with what's covered and the rx will be sent.   Inject 25 units fasting acting insulin before meals with insulin sensitivity factor of 10 and blood glucose goal of 150. (current glucose - 150)/10 = correction insulin dose.   Will try Invokana 100 mg once daily in the morning. Monitor glucose 4x/day. Return to clinic in 4-6 weeks.        Interval history  Patient has started Toujeo, Novolog, and Invokana. No complaints or noted side effects.   BGs have improved.     LAST DIABETES EDUCATION: none     HOSPITALIZED FOR DIABETES -  No.    PRESCRIBED DIABETES MEDICATIONS:  Toujeo 50 units bid, Novolog 25 units ac with correction scale -  ISF 10, goal 150, Invokana 100 mg once daily  "    Averaging 25 units before meals     DM COMPLICATIONS: none    SIGNIFICANT DIABETES MED HISTORY:   Has previously used Novolin 70/30 morning only   glyburide--hypoglycemia  Victoza - nausea and vomiting at 1.2 mg; felt "run down" at lowest dose of 0.6 mg   Metformin - diarrhea and vomiting (has not tried metformin XR)     SELF MONITORING BLOOD GLUCOSE: Checks blood glucose at home -8x/day. uses  Freestyle Arleth CGM. See report in Media.     Freestyle Arleth CGM interpretation: BGs overall stable with highest glucoses postdinner. Infrequent mild hypoglycemia noted.     HYPOGLYCEMIC EPISODES: two episodes noted from CGM report. Pt unsure what triggered those events.      CURRENT DIET: drinks water mostly. Eats breakfast (oatmeal or fruit), lunch is at 11 am to 1 pm at work, snacks around 4 pm and then eats dinner at 6-7 pm at home.     CURRENT EXERCISE: At home, he walks 4-5 miles about 3 times a week. At work 45 minutes on the treadmill at work.     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 No acute distress.   PSYCH: AAOx3, appropriate mood and affect, conversant, well-groomed. Judgement and insight good.   NEURO: Cranial nerves grossly intact. Speech clear, no tremor.   CHEST: Respirations even and unlabored.         Hemoglobin A1C   Date Value Ref Range Status   06/26/2019 12.7 (H) 4.0 - 5.6 % Final     Comment:     ADA Screening Guidelines:  5.7-6.4%  Consistent with prediabetes  >or=6.5%  Consistent with diabetes  High levels of fetal hemoglobin interfere with the HbA1C  assay. Heterozygous hemoglobin variants (HbS, " HgC, etc)do  not significantly interfere with this assay.   However, presence of multiple variants may affect accuracy.     11/08/2018 9.7 (H) 4.0 - 5.6 % Final     Comment:     ADA Screening Guidelines:  5.7-6.4%  Consistent with prediabetes  >or=6.5%  Consistent with diabetes  High levels of fetal hemoglobin interfere with the HbA1C  assay. Heterozygous hemoglobin variants (HbS, HgC, etc)do  not significantly interfere with this assay.   However, presence of multiple variants may affect accuracy.     06/03/2015 10.7 (H) 4.5 - 6.2 % Final           Chemistry        Component Value Date/Time     07/18/2019 1156    K 4.8 07/18/2019 1156     07/18/2019 1156    CO2 26 07/18/2019 1156    BUN 12 07/18/2019 1156    CREATININE 0.9 07/18/2019 1156     (H) 07/18/2019 1156        Component Value Date/Time    CALCIUM 10.2 07/18/2019 1156    ALKPHOS 68 07/18/2019 1156    AST 38 07/18/2019 1156    ALT 54 (H) 07/18/2019 1156    BILITOT 0.4 07/18/2019 1156    ESTGFRAFRICA >60.0 07/18/2019 1156    EGFRNONAA >60.0 07/18/2019 1156          Lab Results   Component Value Date    LDLCALC 86.2 11/08/2018          Ref. Range 11/8/2018 12:08   Cholesterol Latest Ref Range: 120 - 199 mg/dL 161   HDL Latest Ref Range: 40 - 75 mg/dL 57   HDL/Chol Ratio Latest Ref Range: 20.0 - 50.0 % 35.4   LDL Cholesterol Latest Ref Range: 63.0 - 159.0 mg/dL 86.2   Non-HDL Cholesterol Latest Units: mg/dL 104   Total Cholesterol/HDL Ratio Latest Ref Range: 2.0 - 5.0  2.8   Triglycerides Latest Ref Range: 30 - 150 mg/dL 89       Lab Results   Component Value Date    MICALBCREAT 9.9 11/08/2018             STANDARDS of CARE:        ASA:               Last eye exam:       ASSESSMENT and PLAN:    A1C GOAL: < 7 %     1. Type 2 diabetes mellitus without complication, with long-term current use of insulin  Increase Novolog to 30 units before dinner.   Monitor glucose 4x/day.   Return to clinic in 3 months with labs prior.   a1c today.      Hemoglobin A1c    Lipid panel    Microalbumin/creatinine urine ratio   2. Morbid obesity, unspecified obesity type  Avoid high carb/fat meals.    3. Essential hypertension  Per PCP        Orders Placed This Encounter   Procedures    Hemoglobin A1c     Standing Status:   Standing     Number of Occurrences:   2     Standing Expiration Date:   10/20/2020    Lipid panel     Standing Status:   Future     Standing Expiration Date:   8/21/2020    Microalbumin/creatinine urine ratio     Standing Status:   Future     Standing Expiration Date:   10/20/2020     Order Specific Question:   Specimen Source     Answer:   Urine        Follow up in about 3 months (around 11/22/2019).

## 2019-08-22 ENCOUNTER — OFFICE VISIT (OUTPATIENT)
Dept: ENDOCRINOLOGY | Facility: CLINIC | Age: 39
End: 2019-08-22
Payer: COMMERCIAL

## 2019-08-22 ENCOUNTER — HOSPITAL ENCOUNTER (OUTPATIENT)
Dept: SLEEP MEDICINE | Facility: HOSPITAL | Age: 39
Discharge: HOME | End: 2019-08-22
Attending: INTERNAL MEDICINE
Payer: COMMERCIAL

## 2019-08-22 VITALS
BODY MASS INDEX: 41.04 KG/M2 | DIASTOLIC BLOOD PRESSURE: 94 MMHG | WEIGHT: 311.13 LBS | HEART RATE: 98 BPM | SYSTOLIC BLOOD PRESSURE: 150 MMHG

## 2019-08-22 DIAGNOSIS — E11.9 TYPE 2 DIABETES MELLITUS WITHOUT COMPLICATION, WITH LONG-TERM CURRENT USE OF INSULIN: Primary | ICD-10-CM

## 2019-08-22 DIAGNOSIS — E66.9 OBESITY, UNSPECIFIED CLASSIFICATION, UNSPECIFIED OBESITY TYPE, UNSPECIFIED WHETHER SERIOUS COMORBIDITY PRESENT: ICD-10-CM

## 2019-08-22 DIAGNOSIS — R06.83 SNORING: ICD-10-CM

## 2019-08-22 DIAGNOSIS — I10 ESSENTIAL HYPERTENSION: ICD-10-CM

## 2019-08-22 DIAGNOSIS — E66.01 MORBID OBESITY, UNSPECIFIED OBESITY TYPE: ICD-10-CM

## 2019-08-22 DIAGNOSIS — Z79.4 TYPE 2 DIABETES MELLITUS WITHOUT COMPLICATION, WITH LONG-TERM CURRENT USE OF INSULIN: Primary | ICD-10-CM

## 2019-08-22 DIAGNOSIS — G47.33 OSA (OBSTRUCTIVE SLEEP APNEA): ICD-10-CM

## 2019-08-22 PROCEDURE — 99214 PR OFFICE/OUTPT VISIT, EST, LEVL IV, 30-39 MIN: ICD-10-PCS | Mod: S$GLB,,, | Performed by: NURSE PRACTITIONER

## 2019-08-22 PROCEDURE — 95800 SLP STDY UNATTENDED: CPT

## 2019-08-22 PROCEDURE — 99214 OFFICE O/P EST MOD 30 MIN: CPT | Mod: S$GLB,,, | Performed by: NURSE PRACTITIONER

## 2019-08-22 PROCEDURE — 95251 PR GLUCOSE MONITOR, 72 HOUR, PHYS INTERP: ICD-10-PCS | Mod: S$GLB,,, | Performed by: NURSE PRACTITIONER

## 2019-08-22 PROCEDURE — 99999 PR PBB SHADOW E&M-EST. PATIENT-LVL IV: ICD-10-PCS | Mod: PBBFAC,,, | Performed by: NURSE PRACTITIONER

## 2019-08-22 PROCEDURE — 95251 CONT GLUC MNTR ANALYSIS I&R: CPT | Mod: S$GLB,,, | Performed by: NURSE PRACTITIONER

## 2019-08-22 PROCEDURE — 99999 PR PBB SHADOW E&M-EST. PATIENT-LVL IV: CPT | Mod: PBBFAC,,, | Performed by: NURSE PRACTITIONER

## 2019-08-22 NOTE — PROGRESS NOTES
The patient ID was verified. He instructed on how to turn the Home Sleep Testing device on and off, how to apply the sensors. He  was encouraged to sleep on supine position and must have 6 hours of sleep. The patient was instructed not to get the device wet and return it to a  at the hospital. All questions were answered prior to patient leaving. He  was provided the  after visit summary.

## 2019-08-22 NOTE — PATIENT INSTRUCTIONS
Your sleep study will be scored and interpreted by one of our physicians who are board certified in sleep medicine.  Within two weeks the results will be sent to the physician who referred you. Your physician should then contact you to go over the results, along with any recommendations. If you do not hear from your physician within two weeks, please call them.   Please return the device back to us tomorrow before 10:00 AM. Drop it off to our .

## 2019-08-22 NOTE — PATIENT INSTRUCTIONS
Increase Novolog to 30 units before dinner.   Monitor glucose 4x/day.   Return to clinic in 3 months with labs prior.   a1c today.

## 2019-08-25 PROCEDURE — 95800 SLP STDY UNATTENDED: CPT | Mod: 26,,, | Performed by: INTERNAL MEDICINE

## 2019-08-25 PROCEDURE — 95800 PR SLEEP STUDY, UNATTENDED, RECORD HEART RATE/O2 SAT/RESP ANAL/SLEEP TIME: ICD-10-PCS | Mod: 26,,, | Performed by: INTERNAL MEDICINE

## 2019-08-26 DIAGNOSIS — G47.33 OBSTRUCTIVE SLEEP APNEA: Primary | ICD-10-CM

## 2019-08-26 NOTE — PROGRESS NOTES
Home polysomnogram with NAINA mild AHI 11.    Results to pt via my [facility]. Trial autopap. Plan for f/u 2-3 months on machine.

## 2019-08-26 NOTE — PROCEDURES
"Dear Provider,     You have ordered sleep LAB services to perform the sleep study for Tony Gordillo.  The sleep study that you ordered is complete.      Please find Sleep Study result in "Chart Review" under the "Media tab."      As the ordering provider, you are responsible for reviewing the results and implementing a treatment plan with your patient.    If you need a Sleep Medicine provider to explain the sleep study findings and arrange treatment for the patient, please refer patient for consultation to our Sleep Clinic via [facility] with Ambulatory Consult Sleep.    To do that please place an order for an  "Ambulatory Consult Sleep" - it will go to our clinic work queue for our Medical Assistant to contact the patient for an appointment.     For any questions, please contact our clinic staff at [phone] to talk to clinical staff.   "

## 2019-08-29 ENCOUNTER — OFFICE VISIT (OUTPATIENT)
Dept: URGENT CARE | Facility: CLINIC | Age: 39
End: 2019-08-29
Payer: COMMERCIAL

## 2019-08-29 VITALS
WEIGHT: 311 LBS | RESPIRATION RATE: 17 BRPM | BODY MASS INDEX: 41.22 KG/M2 | OXYGEN SATURATION: 97 % | HEIGHT: 73 IN | DIASTOLIC BLOOD PRESSURE: 89 MMHG | HEART RATE: 78 BPM | SYSTOLIC BLOOD PRESSURE: 136 MMHG | TEMPERATURE: 98 F

## 2019-08-29 DIAGNOSIS — M77.30: Primary | ICD-10-CM

## 2019-08-29 PROCEDURE — 99214 PR OFFICE/OUTPT VISIT, EST, LEVL IV, 30-39 MIN: ICD-10-PCS | Mod: 25,S$GLB,, | Performed by: PHYSICIAN ASSISTANT

## 2019-08-29 PROCEDURE — 99214 OFFICE O/P EST MOD 30 MIN: CPT | Mod: 25,S$GLB,, | Performed by: PHYSICIAN ASSISTANT

## 2019-08-29 PROCEDURE — 96372 PR INJECTION,THERAP/PROPH/DIAG2ST, IM OR SUBCUT: ICD-10-PCS | Mod: S$GLB,,, | Performed by: EMERGENCY MEDICINE

## 2019-08-29 PROCEDURE — 73630 X-RAY EXAM OF FOOT: CPT | Mod: RT,S$GLB,, | Performed by: RADIOLOGY

## 2019-08-29 PROCEDURE — 96372 THER/PROPH/DIAG INJ SC/IM: CPT | Mod: S$GLB,,, | Performed by: EMERGENCY MEDICINE

## 2019-08-29 PROCEDURE — 73630 XR FOOT COMPLETE 3 VIEW RIGHT: ICD-10-PCS | Mod: RT,S$GLB,, | Performed by: RADIOLOGY

## 2019-08-29 RX ORDER — KETOROLAC TROMETHAMINE 30 MG/ML
60 INJECTION, SOLUTION INTRAMUSCULAR; INTRAVENOUS
Status: COMPLETED | OUTPATIENT
Start: 2019-08-29 | End: 2019-08-29

## 2019-08-29 RX ORDER — METHYLPREDNISOLONE 4 MG/1
4 TABLET ORAL DAILY
Qty: 1 PACKAGE | Refills: 0 | Status: SHIPPED | OUTPATIENT
Start: 2019-08-29 | End: 2019-09-05

## 2019-08-29 RX ADMIN — KETOROLAC TROMETHAMINE 60 MG: 30 INJECTION, SOLUTION INTRAMUSCULAR; INTRAVENOUS at 10:08

## 2019-08-29 NOTE — PROGRESS NOTES
"Subjective:       Patient ID: Tony Gordillo is a 39 y.o. male.    Vitals:  height is 6' 1" (1.854 m) and weight is 141.1 kg (311 lb) (abnormal). His temperature is 98.1 °F (36.7 °C). His blood pressure is 136/89 and his pulse is 78. His respiration is 17 and oxygen saturation is 97%.     Chief Complaint: Foot Pain    Patient reports history of heel bone spur on the right side since 2017.  He has been seen by podiatrist before given a cortisone shot.  He has also been taking anti-inflammatories and wearing a brace.  However over the past months his symptoms have gotten worse and he could no longer take the pain. He has follow-up appointment with Podiatry but not until 9/28.  Denies numbness/tingling, weakness, recent injury.    Foot Pain   This is a new problem. Episode onset: 5 days  The problem occurs constantly. The problem has been rapidly worsening. Associated symptoms include arthralgias and myalgias. Pertinent negatives include no chest pain, chills, congestion, coughing, fatigue, fever, headaches, joint swelling, nausea, numbness, rash, sore throat, vertigo or vomiting. The symptoms are aggravated by walking. He has tried NSAIDs and acetaminophen for the symptoms. The treatment provided no relief.       Constitution: Negative for chills, fatigue and fever.   HENT: Negative for congestion and sore throat.    Neck: Negative for painful lymph nodes.   Cardiovascular: Negative for chest pain and leg swelling.   Eyes: Negative for double vision and blurred vision.   Respiratory: Negative for cough and shortness of breath.    Gastrointestinal: Negative for nausea, vomiting and diarrhea.   Genitourinary: Negative for dysuria, frequency and urgency.   Musculoskeletal: Positive for joint pain, pain with walking and muscle ache. Negative for joint swelling and muscle cramps.   Skin: Negative for color change, pale, rash and erythema.   Allergic/Immunologic: Negative for seasonal allergies.   Neurological: Negative " for dizziness, history of vertigo, light-headedness, passing out, headaches, numbness and tingling.   Hematologic/Lymphatic: Negative for swollen lymph nodes, easy bruising/bleeding and history of blood clots. Does not bruise/bleed easily.   Psychiatric/Behavioral: Negative for nervous/anxious, sleep disturbance and depression. The patient is not nervous/anxious.        Objective:      Physical Exam   Constitutional: He is oriented to person, place, and time. He appears well-developed and well-nourished.   HENT:   Head: Normocephalic and atraumatic. Head is without abrasion, without contusion and without laceration.   Right Ear: External ear normal.   Left Ear: External ear normal.   Nose: Nose normal.   Mouth/Throat: Oropharynx is clear and moist.   Eyes: Pupils are equal, round, and reactive to light. Conjunctivae, EOM and lids are normal.   Neck: Trachea normal, full passive range of motion without pain and phonation normal. Neck supple.   Cardiovascular: Normal rate, regular rhythm and normal heart sounds.   Pulmonary/Chest: Effort normal and breath sounds normal. No stridor. No respiratory distress.   Musculoskeletal: Normal range of motion.        Right foot: There is bony tenderness (Inferior calcaneus ). There is normal range of motion, no swelling and normal capillary refill.   Neurological: He is alert and oriented to person, place, and time. He has normal strength. No sensory deficit.   Skin: Skin is warm, dry and intact. Capillary refill takes less than 2 seconds. No abrasion, no bruising, no burn, no ecchymosis, no laceration, no lesion and no rash noted. No erythema.   Psychiatric: He has a normal mood and affect. His speech is normal and behavior is normal. Judgment and thought content normal. Cognition and memory are normal.   Nursing note and vitals reviewed.        Xr Foot Complete 3 View Right    Result Date: 8/29/2019  EXAMINATION: XR FOOT COMPLETE 3 VIEW RIGHT CLINICAL HISTORY: . Calcaneal spur,  unspecified foot TECHNIQUE: AP, lateral, and oblique views of the right foot were performed. COMPARISON: 06/13/2017. FINDINGS: Bulky inferior calcaneal spur appear similar to 06/13/2019 in this 39-year-old man. There is mild spurring about the head of the 1st metatarsal.  Otherwise joint spaces appear preserved.  Cortical margins are intact.  Lisfranc articulation is congruent.  Talar dome maintains rounded contour. I detect no fracture, dislocation, radiopaque retained foreign body, lytic or blastic lesion, erosion or chondrocalcinosis.     Please see above. Electronically signed by: Bea Blum MD Date:    08/29/2019 Time:    10:28    Assessment:       1. Bone spur of inferior portion of calcaneus        Plan:         Bone spur of inferior portion of calcaneus  -     XR FOOT COMPLETE 3 VIEW RIGHT; Future; Expected date: 08/29/2019  -     ketorolac injection 60 mg  -     methylPREDNISolone (MEDROL DOSEPACK) 4 mg tablet; Take 1 tablet (4 mg total) by mouth once daily. use as directed for 7 days  Dispense: 1 Package; Refill: 0      Heel Spurs    The plantar fascia is a thick, fibrous layer of tissue that covers the bones on the bottom of your foot. It holds the foot bones in an arched position. Plantar fasciitis is a painful swelling of the plantar fascia.  A heel spur is an overgrowth of bone where the plantar fascia attaches to the heel bone. The heel spur itself usually doesnt cause pain. However, the heel spur might be a sign of plantar fasciitis which may cause your foot pain. There is no specific treatment for heel spurs.   Plantar fasciitis can develop slowly or suddenly. It usually affects one foot at a time. Heel pain can feel sharp, like a knife sticking into the bottom of your foot. You may feel pain after exercising, long-distance jogging, stair climbing, long periods of standing, or after standing up.  Risk factors for plantar fasciitis include: arthritis, diabetes, obesity or recent weight gain,  flat foot, and having high arches. Wearing high heels, loose shoes, or shoes with poor arch support adds to the risk.    Foot pain is usually worse in the morning. But it improves with walking. By the end of the day there may be a dull aching. Treatment includes short-term rest and controlling inflammation. It may take up to 9 months before all symptoms go away. In rare cases, a steroid injection in the foot, or surgery, may be needed.  Home care  · If you are overweight, lose weight to help healing.  · Choose supportive shoes with good arch support and shock absorbency. Replace athletic shoes when they become worn out. Dont walk or run barefoot.  · Premade or custom-fitted shoe inserts may be helpful. Inserts made of silicone seem to be the most effective. Custom-made inserts can be provided by a podiatrist or foot specialist, physical therapist, or orthopedist.  · Premade or custom-made night splints keep the heel stretched out while you sleep. They may prevent morning pain.  · Avoid activities that stress the feet: jogging, prolonged standing or walking, contact sports, etc.  · First thing in the morning and before sports, stretch the bottom of your foot. Gently flex your ankle so the toes move toward your knee.  · Icing may help control heel pain. Apply an ice pack to the heel for 10-20 minutes as a preventive. Or ice your heel after a severe flare-up of symptoms. You may repeat this every 1-2 hours as needed.  · You may use over-the-counter pain medicine to control pain, unless another medicine was prescribed. Anti-inflammatory pain medicines, such as ibuprofen or naproxen, may work better than acetaminophen. If you have chronic liver or kidney disease or ever had a stomach ulcer or GI bleeding, talk with your healthcare provider before using these medicines.  · Shoe inserts, a night splint, or a special boot may be needed. Use these as directed by your healthcare provider.  Follow-up care   Follow up with  your healthcare provider, physical therapist, or podiatrist or foot specialist as advised.  When to seek medical advice  Call your healthcare provider right away if any of these occur:  · The pain worsens.  · There is no relief after a few weeks of home treatment.   Date Last Reviewed: 11/23/2015  © 8007-4594 [employer]. [address] [place] [address], [place] [address]. All rights reserved. This information is not intended as a substitute for professional medical care. Always follow your healthcare professional's instructions.

## 2019-08-29 NOTE — PATIENT INSTRUCTIONS
Heel Spurs    The plantar fascia is a thick, fibrous layer of tissue that covers the bones on the bottom of your foot. It holds the foot bones in an arched position. Plantar fasciitis is a painful swelling of the plantar fascia.  A heel spur is an overgrowth of bone where the plantar fascia attaches to the heel bone. The heel spur itself usually doesnt cause pain. However, the heel spur might be a sign of plantar fasciitis which may cause your foot pain. There is no specific treatment for heel spurs.   Plantar fasciitis can develop slowly or suddenly. It usually affects one foot at a time. Heel pain can feel sharp, like a knife sticking into the bottom of your foot. You may feel pain after exercising, long-distance jogging, stair climbing, long periods of standing, or after standing up.  Risk factors for plantar fasciitis include: arthritis, diabetes, obesity or recent weight gain, flat foot, and having high arches. Wearing high heels, loose shoes, or shoes with poor arch support adds to the risk.    Foot pain is usually worse in the morning. But it improves with walking. By the end of the day there may be a dull aching. Treatment includes short-term rest and controlling inflammation. It may take up to 9 months before all symptoms go away. In rare cases, a steroid injection in the foot, or surgery, may be needed.  Home care  · If you are overweight, lose weight to help healing.  · Choose supportive shoes with good arch support and shock absorbency. Replace athletic shoes when they become worn out. Dont walk or run barefoot.  · Premade or custom-fitted shoe inserts may be helpful. Inserts made of silicone seem to be the most effective. Custom-made inserts can be provided by a podiatrist or foot specialist, physical therapist, or orthopedist.  · Premade or custom-made night splints keep the heel stretched out while you sleep. They may prevent morning pain.  · Avoid activities that stress the feet: jogging,  prolonged standing or walking, contact sports, etc.  · First thing in the morning and before sports, stretch the bottom of your foot. Gently flex your ankle so the toes move toward your knee.  · Icing may help control heel pain. Apply an ice pack to the heel for 10-20 minutes as a preventive. Or ice your heel after a severe flare-up of symptoms. You may repeat this every 1-2 hours as needed.  · You may use over-the-counter pain medicine to control pain, unless another medicine was prescribed. Anti-inflammatory pain medicines, such as ibuprofen or naproxen, may work better than acetaminophen. If you have chronic liver or kidney disease or ever had a stomach ulcer or GI bleeding, talk with your healthcare provider before using these medicines.  · Shoe inserts, a night splint, or a special boot may be needed. Use these as directed by your healthcare provider.  Follow-up care   Follow up with your healthcare provider, physical therapist, or podiatrist or foot specialist as advised.  When to seek medical advice  Call your healthcare provider right away if any of these occur:  · The pain worsens.  · There is no relief after a few weeks of home treatment.   Date Last Reviewed: 11/23/2015  © 5745-0767 [employer]. [address] [place] [address], [place] [address]. All rights reserved. This information is not intended as a substitute for professional medical care. Always follow your healthcare professional's instructions.

## 2019-08-30 LAB
ALBUMIN SERPL-MCNC: 4.3 G/DL (ref 3.6–5.1)
SHBG SERPL-SCNC: 21 NMOL/L (ref 10–50)
TESTOST FREE SERPL-MCNC: 33.7 PG/ML (ref 46–224)
TESTOST SERPL-MCNC: 193 NG/DL (ref 250–1100)
TESTOSTERONE.FREE+WB SERPL-MCNC: 66.4 NG/DL (ref 110–575)

## 2019-09-04 DIAGNOSIS — R79.89 LOW TESTOSTERONE IN MALE: Primary | ICD-10-CM

## 2019-09-04 NOTE — PROGRESS NOTES
Total and free testosterone were low.  Pituitary hormone workup negative  But I will check MRI pituitary r/o mass/impingment. If normal start testosterone.

## 2019-09-06 ENCOUNTER — TELEPHONE (OUTPATIENT)
Dept: FAMILY MEDICINE | Facility: CLINIC | Age: 39
End: 2019-09-06

## 2019-09-06 ENCOUNTER — PATIENT MESSAGE (OUTPATIENT)
Dept: ENDOCRINOLOGY | Facility: CLINIC | Age: 39
End: 2019-09-06

## 2019-09-06 ENCOUNTER — PATIENT MESSAGE (OUTPATIENT)
Dept: FAMILY MEDICINE | Facility: CLINIC | Age: 39
End: 2019-09-06

## 2019-09-06 RX ORDER — PEN NEEDLE, DIABETIC 30 GX3/16"
1 NEEDLE, DISPOSABLE MISCELLANEOUS
Qty: 550 EACH | Refills: 4 | Status: SHIPPED | OUTPATIENT
Start: 2019-09-06 | End: 2023-02-15 | Stop reason: SDUPTHER

## 2019-09-06 RX ORDER — INSULIN PUMP SYRINGE, 3 ML
EACH MISCELLANEOUS
Qty: 1 EACH | Refills: 0 | Status: SHIPPED | OUTPATIENT
Start: 2019-09-06 | End: 2020-09-05

## 2019-09-06 RX ORDER — LANCETS
EACH MISCELLANEOUS
Qty: 400 EACH | Refills: 3 | Status: SHIPPED | OUTPATIENT
Start: 2019-09-06

## 2019-09-10 ENCOUNTER — TELEPHONE (OUTPATIENT)
Dept: ENDOCRINOLOGY | Facility: CLINIC | Age: 39
End: 2019-09-10

## 2019-09-10 DIAGNOSIS — Z79.4 TYPE 2 DIABETES MELLITUS WITHOUT COMPLICATION, WITH LONG-TERM CURRENT USE OF INSULIN: Primary | ICD-10-CM

## 2019-09-10 DIAGNOSIS — E11.9 TYPE 2 DIABETES MELLITUS WITHOUT COMPLICATION, WITH LONG-TERM CURRENT USE OF INSULIN: Primary | ICD-10-CM

## 2019-09-10 NOTE — TELEPHONE ENCOUNTER
Spoke with pt and was informed that he needs to see the DM education dept. Appt was made and pt also informed that his paperwork would be held until this appt is completed. Pt acknowledged he understands. Also a call was placed to [employer]([place]) and was informed that his paperwork would be put on temp. Hold until all documentation has been sent and at this time it  would be reopened and the process would continue.

## 2019-09-13 ENCOUNTER — CLINICAL SUPPORT (OUTPATIENT)
Dept: DIABETES | Facility: CLINIC | Age: 39
End: 2019-09-13
Payer: COMMERCIAL

## 2019-09-13 VITALS — WEIGHT: 308 LBS | BODY MASS INDEX: 40.64 KG/M2

## 2019-09-13 DIAGNOSIS — Z79.4 TYPE 2 DIABETES MELLITUS WITHOUT COMPLICATION, WITH LONG-TERM CURRENT USE OF INSULIN: ICD-10-CM

## 2019-09-13 DIAGNOSIS — E11.9 TYPE 2 DIABETES MELLITUS WITHOUT COMPLICATION, WITH LONG-TERM CURRENT USE OF INSULIN: ICD-10-CM

## 2019-09-13 PROCEDURE — 99999 PR PBB SHADOW E&M-EST. PATIENT-LVL I: CPT | Mod: PBBFAC,,, | Performed by: DIETITIAN, REGISTERED

## 2019-09-13 PROCEDURE — 99999 PR PBB SHADOW E&M-EST. PATIENT-LVL I: ICD-10-PCS | Mod: PBBFAC,,, | Performed by: DIETITIAN, REGISTERED

## 2019-09-13 PROCEDURE — G0108 PR DIAB MANAGE TRN  PER INDIV: ICD-10-PCS | Mod: S$GLB,,, | Performed by: DIETITIAN, REGISTERED

## 2019-09-13 PROCEDURE — G0108 DIAB MANAGE TRN  PER INDIV: HCPCS | Mod: S$GLB,,, | Performed by: DIETITIAN, REGISTERED

## 2019-09-13 NOTE — PROGRESS NOTES
Diabetes Education  Author: Preethi Monaco RD  Date: 9/13/2019    Diabetes Care Management Summary  Diabetes Education Record Assessment/Progress: Initial  Current Diabetes Risk Level: Moderate     Last A1c:   Lab Results   Component Value Date    HGBA1C 9.1 (H) 08/22/2019     Last visit with Diabetes Educator: : 09/13/2019      Diabetes Type  Diabetes Type : Type II  Diabetes History  Diabetes Diagnosis: >10 years  Current Treatment: Oral Medication, Diet, Insulin  Reviewed Problem List with Patient: Yes    Health Maintenance was reviewed today with patient. Discussed with patient importance of routine eye exams, foot exams/foot care, blood work (i.e.: A1c, microalbumin, and lipid), dental visits, yearly flu vaccine, and pneumonia vaccine as indicated by PCP. Patient verbalized understanding.     Health Maintenance Topics with due status: Not Due       Topic Last Completion Date    Eye Exam 10/29/2018    Lipid Panel 11/08/2018    TETANUS VACCINE 07/18/2019    Low Dose Statin 07/18/2019    Hemoglobin A1c 08/22/2019     Health Maintenance Due   Topic Date Due    Foot Exam  09/28/2019       Nutrition  Meal Planning: skipping meals, water, 3 meals per day, eats out often, snacks between meal  What type of beverages do you drink?: water  Meal Plan 24 Hour Recall - Breakfast: 6-8am: 1 c cereal w 2 cups milk, apple, coffee-black or 2-3 rggs, 2 sl hernandez, coffee blacl  Meal Plan 24 Hour Recall - Lunch: 12-1; Green veg salad w Italian dressing or 1-2 ham sandwiches on 2sl honey wheat bread/sandwich, water  Meal Plan 24 Hour Recall - Dinner: 5pm to midnight: 3 sandwiches w italian sausage, saurekraut,  beet salad, water  Meal Plan 24 Hour Recall - Snack: beef jacki, nuts, salad like at lunch, chees or PB crackers, fruit    Monitoring   Monitoring: Other  Self Monitoring : CGM  Blood Glucose Logs: No  Do you use a personal continuous glucose monitor?: Yes  What kind of glucose monitor do you use?: Freestyle Arleth Flash  In the  "last month, how often have you had a low blood sugar reaction?: once  What are your symptoms of low blood sugar?: fel strange  How do you treat low blood sugar?: candy  Can you tell when your blood sugar is too high?: no  Exercise   Exercise Type: walking, use exercise equipment(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)  Intensity: Moderate  Frequency: 3-5 Times per week  Duration: 30 min  Current Diabetes Treatment   Current Treatment: Oral Medication, Diet, Insulin  Social History  Preferred Learning Method: Face to Face, Group Education  Primary Support: Self, Spouse  Educational Level: High School  Occupation:   Smoking Status: Never a Smoker  Alcohol Use: Weekly  Barriers to Change: None  Learning Challenges : None   Readiness to Learn : Acceptance  Cultural Influences: No    Diabetes Education Assessment/Progress  Diabetes Disease Process (diabetes disease process and treatment options): Discussion, Instructed, Written Materials Provided, Individual Session, Comprehends Key Points  Nutrition (Incorporating nutritional management into one's lifestyle): Individual Session, Written Materials Provided, Discussion, Comprehends Key Points, Instructed  -pt is a  and often meals are "disturbed by 's duty". He admits to skipped or delayed meals as well as consuming large portions in anticipation of heavy "work load" in evenings  Diet recall notes very inconsistent carb intake at meals from 3 g to 100 g. Diet limited in whole grains, low fat dairy. Pt states he avoids sweets, potatoes and corn but will consume up to 10 slices of bread in on day (2 or more sandwiches at each meal or snack), pt states she eats several servings of fruit and non starchy veg each day  -Encouraged decreasing portion sizes of CHO to 3-5 servings (45-75g) per meal, 3 evenly-spaced meals daily and limiting snacks to mostly 0-5g CHO. If does have CHO snack occasionally, no more than 15g  -Discussed slow wt " "loss but pt states his wt is steady in 298-315lb range; reminded pt to reduce portion sizes and limit snacks when off duty    Physical Activity (incorporating physical activity into one's lifestyle): Individual Session, Written Materials Provided, Instructed, Comprehends Key Points  Medications (states correct name, dose, onset, peak, duration, side effects & timing of meds): Instructed, Comprehends Key Points, Discussion, Needs Review, Written Materials Provided, Individual Session  -Pt states he takes Novolog before all meals even if they do not contain carbs but has noticed recent low BG in early morning since increasing evening Novolog to 35 units. Advised pt to have a bedtime snack if BG approaches 100 or less or arrow indicates rapid drop in BG    Monitoring (monitoring blood glucose/other parameters & using results): Individual Session, Written Materials Provided, Instructed, Discussion, Demonstrates Understanding/Competency (verbalizes/demonstrates)  -pt has CGM and states -200. Discussed how to use the data from the freestyle to assit w diet and medication decisions; urged pt to adhere to the recommendations suggested with the direction of the arrows.     Acute Complications (preventing, detecting, and treating acute complications): Individual Session, Written Materials Provided, Instructed, Discussion, Comprehends Key Points  -pt noticed recent drop in BG in early morning  -Reviewed s/s, causes, and treatment of hyperglycemia and hypoglycemia and use of  "rule of 15" w/ hypoglycemia.    Chronic Complications (preventing, detecting, and treating chronic complications): Discussion, Individual Session, Written Materials Provided, Instructed, Demonstrates Understanding/Competency (verbalizes/demonstrates)  Clinical (diabetes, other pertinent medical history, and relevant comorbidities reviewed during visit): Discussion, Individual Session, Written Materials Provided, Instructed, Demonstrates " Understanding/Competency (verbalizes/demonstrates)  Cognitive (knowledge of self-management skills, functional health literacy): Discussion  Psychosocial (emotional response to diabetes): Discussion  Diabetes Distress and Support Systems: Discussion  Behavioral (readiness for change, lifestyle practices, self-care behaviors): Discussion    Goals  Patient has selected/evaluated goals during today's session: Yes, selected  Healthy Eating: Set(Adjust meals to more evenly spaced and reduce portion sizes to amts rec'd in DM guide)  Start Date: 09/13/19  Target Date: 10/13/19    Diabetes Care Plan/Intervention  Education Plan/Intervention: Individual Follow-Up DSMT(pt will get wife to come at next visit to discuss diet modifications for entire family)    Diabetes Meal Plan  Restrictions: Restricted Carbohydrate  Carbohydrate Per Meal: 45-60g, 60-75g  Carbohydrate Per Snack : 15-20g    Today's Self-Management Care Plan was developed with the patient's input and is based on barriers identified during today's assessment.    The long and short-term goals in the care plan were written with the patient/caregiver's input. The patient has agreed to work toward these goals to improve his overall diabetes control.      The patient received a copy of today's self-management plan and verbalized understanding of the care plan, goals, and all of today's instructions.      The patient was encouraged to communicate with his physician and care team regarding his condition(s) and treatment.  I provided the patient with my contact information today and encouraged him to contact me via phone or patient portal as needed.     Education Units of Time   Time Spent: 60 min

## 2019-09-25 ENCOUNTER — OFFICE VISIT (OUTPATIENT)
Dept: PODIATRY | Facility: CLINIC | Age: 39
End: 2019-09-25
Payer: COMMERCIAL

## 2019-09-25 VITALS
DIASTOLIC BLOOD PRESSURE: 84 MMHG | HEIGHT: 72 IN | BODY MASS INDEX: 41.72 KG/M2 | WEIGHT: 308 LBS | SYSTOLIC BLOOD PRESSURE: 128 MMHG

## 2019-09-25 DIAGNOSIS — B35.3 TINEA PEDIS, UNSPECIFIED LATERALITY: ICD-10-CM

## 2019-09-25 DIAGNOSIS — M72.2 PLANTAR FASCIITIS: ICD-10-CM

## 2019-09-25 DIAGNOSIS — M79.671 RIGHT FOOT PAIN: Primary | ICD-10-CM

## 2019-09-25 PROCEDURE — 99203 PR OFFICE/OUTPT VISIT, NEW, LEVL III, 30-44 MIN: ICD-10-PCS | Mod: 25,S$GLB,, | Performed by: PODIATRIST

## 2019-09-25 PROCEDURE — 20550 PR INJECT TENDON SHEATH/LIGAMENT: ICD-10-PCS | Mod: RT,S$GLB,, | Performed by: PODIATRIST

## 2019-09-25 PROCEDURE — 99999 PR PBB SHADOW E&M-EST. PATIENT-LVL III: CPT | Mod: PBBFAC,,, | Performed by: PODIATRIST

## 2019-09-25 PROCEDURE — 99999 PR PBB SHADOW E&M-EST. PATIENT-LVL III: ICD-10-PCS | Mod: PBBFAC,,, | Performed by: PODIATRIST

## 2019-09-25 PROCEDURE — 20550 NJX 1 TENDON SHEATH/LIGAMENT: CPT | Mod: RT,S$GLB,, | Performed by: PODIATRIST

## 2019-09-25 PROCEDURE — 99203 OFFICE O/P NEW LOW 30 MIN: CPT | Mod: 25,S$GLB,, | Performed by: PODIATRIST

## 2019-09-25 RX ORDER — KETOCONAZOLE 20 MG/G
CREAM TOPICAL DAILY
Qty: 1 TUBE | Refills: 3 | Status: SHIPPED | OUTPATIENT
Start: 2019-09-25 | End: 2023-10-18 | Stop reason: CLARIF

## 2019-09-25 RX ORDER — DEXAMETHASONE SODIUM PHOSPHATE 4 MG/ML
2 INJECTION, SOLUTION INTRA-ARTICULAR; INTRALESIONAL; INTRAMUSCULAR; INTRAVENOUS; SOFT TISSUE ONCE
Status: COMPLETED | OUTPATIENT
Start: 2019-09-25 | End: 2019-09-25

## 2019-09-25 RX ORDER — TRIAMCINOLONE ACETONIDE 40 MG/ML
20 INJECTION, SUSPENSION INTRA-ARTICULAR; INTRAMUSCULAR ONCE
Status: COMPLETED | OUTPATIENT
Start: 2019-09-25 | End: 2019-09-25

## 2019-09-25 RX ADMIN — DEXAMETHASONE SODIUM PHOSPHATE 2 MG: 4 INJECTION, SOLUTION INTRA-ARTICULAR; INTRALESIONAL; INTRAMUSCULAR; INTRAVENOUS; SOFT TISSUE at 08:09

## 2019-09-25 RX ADMIN — TRIAMCINOLONE ACETONIDE 20 MG: 40 INJECTION, SUSPENSION INTRA-ARTICULAR; INTRAMUSCULAR at 08:09

## 2019-09-25 NOTE — LETTER
September 25, 2019      Yo Mansfield MD  [address]  [place] [address]           [facility]  [address]  [place] [address]  Phone: [phone]          Patient: Tony Gordillo   MR Number: [id number]   YOB: 1980   Date of Visit: 9/25/2019       Dear Dr. Yo Mansfield:    Thank you for referring Tony Gordillo to me for evaluation. Attached you will find relevant portions of my assessment and plan of care.    If you have questions, please do not hesitate to call me. I look forward to following Tony Gordillo along with you.    Sincerely,    Brit Díaz, CHINTAN    Enclosure  CC:  No Recipients    If you would like to receive this communication electronically, please contact [email or web address] or [phone] to request more information on [employer] Link access.    For providers and/or their staff who would like to refer a patient to [facility], please contact us through our one-stop-shop provider referral line, [facility], at [phone].    If you feel you have received this communication in error or would no longer like to receive these types of communications, please e-mail [email or web address]

## 2019-09-25 NOTE — PROGRESS NOTES
Subjective:      Patient ID: Tony Gordillo is a 39 y.o. male.    Chief Complaint: Heel Pain (right)    Tony is a 39 y.o. male who presents to the clinic complaining of heel pain in the right foot, especially with the first step in the morning. The pain is described as Throbbing. The onset of the pain was gradual and has worsened over the past several weeks. Tony rates the pain as 5/10. He denies a history of trauma. Prior treatments include new shoes, massage. Reports heel pain present on and off since 2017. Also reports discoloration to nails and right plantar foot.     Review of Systems   Constitution: Negative for chills, diaphoresis and fever.   Cardiovascular: Negative for claudication, cyanosis, leg swelling and syncope.   Respiratory: Negative for cough and shortness of breath.    Skin: Positive for color change, nail changes and suspicious lesions.   Musculoskeletal: Positive for joint pain and myalgias. Negative for falls, muscle cramps and muscle weakness.   Gastrointestinal: Negative for diarrhea, nausea and vomiting.   Neurological: Negative for disturbances in coordination, numbness, paresthesias, sensory change, tremors and weakness.   Psychiatric/Behavioral: Negative for altered mental status.           Objective:      Physical Exam   Constitutional: He appears well-developed. He is cooperative.   Oriented to time, place, and person.   Cardiovascular:   DP and PT pulses are palpable bilaterally. 3 sec capillary refill time and toes and feet are warm to touch proximally .  There is  hair growth on the feet and toes b/l. There is no edema b/l. No spider veins or varicosities present b/l.      Musculoskeletal:   Equinus noted b/l ankles with < 10 deg DF noted. MMT 5/5 in DF/PF/Inv/Ev resistance with no reproduction of pain in any direction. Passive range of motion of ankle and pedal joints is painless b/l.    Pain on palpation plantar medial right heel, no pain with ROM or MMT or medial and lateral  compression of heel, - tinel's sign.         Feet:   Right Foot:   Skin Integrity: Negative for callus or dry skin.   Left Foot:   Skin Integrity: Negative for callus or dry skin.   Lymphadenopathy:   Negative lymphadenopathy bilateral popliteal fossa and tarsal tunnel.   Neurological: He is alert.   Light touch, proprioception, and sharp/dull sensation are all intact bilaterally. Protective threshold with the [place]-Wienstein monofilament is intact bilaterally.    Skin:   No open lesions, lacerations or wounds noted.Interdigital spaces clean, dry and intact b/l. No erythema noted to b/l foot.    Right hallux discolored/yellowed, dystrophic      Scaling dryness in a moccasin distribution is noted to the right foot.          Psychiatric: He has a normal mood and affect.             Assessment:       Encounter Diagnoses   Name Primary?    Right foot pain Yes    Plantar fasciitis     Tinea pedis, unspecified laterality          Plan:       Tony was seen today for heel pain.    Diagnoses and all orders for this visit:    Right foot pain  -     Ambulatory consult to Physical Therapy    Plantar fasciitis  -     Ambulatory consult to Physical Therapy    Tinea pedis, unspecified laterality    Other orders  -     ketoconazole (NIZORAL) 2 % cream; Apply topically once daily.  -     dexamethasone injection 2 mg  -     triamcinolone acetonide injection 20 mg      I counseled the patient on his conditions, their implications and medical management.      Discussed different treatment options for heel pain. including conservative and interventional.  I gave written and verbal instructions on heel cord stretching and this was demonstrated for the patient. Patient expressed understanding. Discussed wearing appropriate shoe gear and avoiding flats, slippers, sandals, barefoot, and sockfeet. Recommended arch supports. My recommendation for OTC supports is [employer], ASICS tennis shoes.       Patient instructed on adequate  icing techniques. Patient should ice the affected area at least once per day x 10 minutes for 10 days . I advised the  patient that extra icing would also be beneficial to ensure adequate anti inflammatory effect     Stretching handout dispensed to patient. Instructions on adequate stretching reviewed in clinic      Patient would like injection today. Skin was prepped with alcohol and anesthetized with ethyl chloride.  The following mixture was injected into Right medial approach: 3ccs of mixture of (1cc 1% plain Lidocaine : 1cc 0.5% Marcaine plain:  0.5cc kenalog-40 : 0.5cc dexamethasone) directly into problematic areas.  Patient  tolerated the injection well.     Referral for physical therapy placed    CAM boot dispensed and applied to affected foot. Advised to use at all times while weightbearing and ambulating even for few minutes. Advised to use sneaker style shoe in opposite foot to maintain balance. Ok to remove  at night but reapply if patient is to get up in the middle of the night. Verbalized understanding. Advised to use for 3 -4 weeks.     Ketoconazole 2% topical cream prescribed for treatment of aforementioned tinea pedis. Patient will use this medication as directed in addition to thourougly drying between toes daily, and applying powder as needed      F/u prn    Brit Díaz DPM

## 2019-09-27 NOTE — PROGRESS NOTES
"This note was created by combination of typed  and M-Modal dictation.  Transcription errors may be present.  If there are any questions, please contact me.    Assessment & Plan:   Essential hypertension  -notes that outside readings are typically greater than 130/80.  On lisinopril 5 longstanding.  I will increase it to 10 mg.  -     lisinopril 10 MG tablet; Take 1 tablet (10 mg total) by mouth once daily.  Dispense: 90 tablet; Refill: 1    Low testosterone in male  -awaiting MRI to rule out pituitary mass.  If/when negative, Rx sent for testosterone injection.  He would prefer injection over topical.  Less likely to contaminate other people.  Would start with testosterone 200 every 2 weeks, and will need to check a level after 3rd injection.  These are ordered.  -     testosterone cypionate (DEPOTESTOTERONE CYPIONATE) 200 mg/mL injection; Inject 1 mL (200 mg total) into the muscle every 14 (fourteen) days.  Dispense: 10 mL; Refill: 2  -     syringe, disposable, 1 mL Syrg; Testosterone every 2 weeks  Dispense: 25 Syringe; Refill: 1  -     safety needles (BD SAFETYGLIDE NEEDLE) 21 gauge x 1" Ndle; Testosterone injection every 2 weeks  Dispense: 25 each; Refill: 1  -     needle, disp, 25 gauge 25 gauge x 1" Ndle; Testosterone every 2 weeks  Dispense: 25 each; Refill: 11  -     Testosterone; Future; Expected date: 11/14/2019    Allergic rhinitis, unspecified seasonality, unspecified trigger  -refilled Flonase and Claritin  -     fluticasone propionate (FLONASE) 50 mcg/actuation nasal spray; fluticasone propionate 50 mcg/actuation nasal spray,suspension  Dispense: 16 g; Refill: 11  -     loratadine (CLARITIN) 10 mg tablet; Take 1 tablet (10 mg total) by mouth once daily.  Dispense: 30 tablet; Refill: 11        Medications Discontinued During This Encounter   Medication Reason    needle, disp, 31 gauge 31 gauge x 5/16" Ndle     fluticasone propionate (FLONASE) 50 mcg/actuation nasal spray Reorder    " "loratadine (CLARITIN) 10 mg tablet Reorder    lisinopril (PRINIVIL,ZESTRIL) 5 MG tablet        meds sent this encounter:  Medications Ordered This Encounter   Medications    fluticasone propionate (FLONASE) 50 mcg/actuation nasal spray     Sig: fluticasone propionate 50 mcg/actuation nasal spray,suspension     Dispense:  16 g     Refill:  11    lisinopril 10 MG tablet     Sig: Take 1 tablet (10 mg total) by mouth once daily.     Dispense:  90 tablet     Refill:  1    loratadine (CLARITIN) 10 mg tablet     Sig: Take 1 tablet (10 mg total) by mouth once daily.     Dispense:  30 tablet     Refill:  11    needle, disp, 25 gauge 25 gauge x 1" Ndle     Sig: Testosterone every 2 weeks     Dispense:  25 each     Refill:  11    safety needles (BD SAFETYGLIDE NEEDLE) 21 gauge x 1" Ndle     Sig: Testosterone injection every 2 weeks     Dispense:  25 each     Refill:  1    syringe, disposable, 1 mL Syrg     Sig: Testosterone every 2 weeks     Dispense:  25 Syringe     Refill:  1    testosterone cypionate (DEPOTESTOTERONE CYPIONATE) 200 mg/mL injection     Sig: Inject 1 mL (200 mg total) into the muscle every 14 (fourteen) days.     Dispense:  10 mL     Refill:  2       Follow Up: No follow-ups on file.    Subjective:     Chief Complaint   Patient presents with    Hypertension    Medication Refill       GONZALES Jimenez is a 39 y.o. male, last appointment with this clinic was 7/18/2019.    Low total and free testosterone, pituitary hormones normal.  Ordered MRI pituitary.  Finally got that approved through insurance.  Still needs to get that scheduled/done.    folllowed by DM NP    a1c high 8/2019, 9.1  7/2019 tsh wnl not on meds I believe    Unfortunately insurance won't cover freestyle joe unless he is referred to diabetes education.  So he went.  And now apparently is covered.    Had steroid injection 9/25 with podiatry for plantar fasciitis.    Has been monitoring BP - systolic 130s, typically greater than 130. Random " timing of checks.  On review, last couple of readings have been good but over the past 6 months have been higher than ideal.    Outside med list has hx of lisinopril hctz but he has no recollection of this and is pretty sure he's taking lisinopril 5 and has done so longstanding.     Insurance is refusing to pay for the home sleep study and refusing to pay for auto PAP at this time.  Apparently they feel there is insufficient documentation.    Requesting prescriptions for Flonase and Claritin.    Answers for HPI/ROS submitted by the patient on 9/29/2019   activity change: No  unexpected weight change: No  trouble swallowing: No  visual disturbance: No  chest tightness: No  polyuria: No  difficulty urinating: No  joint swelling: No  arthralgias: No  confusion: No  dysphoric mood: No      Diabetes Management Status    Statin: Taking  ACE/ARB: Taking    Screening or Prevention Patient's value Goal Complete/Controlled?   HgA1C Testing and Control   Lab Results   Component Value Date    HGBA1C 9.1 (H) 08/22/2019      Annually/Less than 8% No   Lipid profile : 11/08/2018 Annually Yes   LDL control Lab Results   Component Value Date    LDLCALC 86.2 11/08/2018    Annually/Less than 100 mg/dl  Yes   Nephropathy screening Lab Results   Component Value Date    LABMICR 10.0 11/08/2018     No results found for: PROTEINUA Annually Yes   Blood pressure BP Readings from Last 1 Encounters:   09/25/19 128/84    Less than 140/90 Yes   Dilated retinal exam : 10/29/2018 Annually Yes   Foot exam   : 09/28/2018 Annually Yes         Patient Care Team:  Jovany Ford MD as PCP - General (Internal Medicine)  Janneth Johnson RN (Inactive) as Registered Nurse (Diabetes)  Rocky Hewitt MD as Consulting Physician (Ophthalmology)  Preethi Monaco RD as Dietitian (Nutrition)    Patient Active Problem List    Diagnosis Date Noted    Low testosterone in male 09/04/2019    NAINA (obstructive sleep apnea) 8/2019 sleep study [facility] 11     Acute  bilateral low back pain without sciatica 08/10/2019    Non-seasonal allergic rhinitis; avoid antihistamines per opthalmology 11/09/2018    Migraine with aura and without status migrainosus, not intractable propranolol SE angry; topamax not helpful; imitrex ineffective; daith ear piercing helps 09/28/2018    Type 2 diabetes mellitus without complication, with long-term current use of insulin     Essential hypertension     Hypothyroidism not currently on medication 07/29/2015    Hyperlipidemia LDL goal <70 06/03/2015    Insulin long-term use 06/03/2015    Tinea pedis 06/03/2015    Morbid obesity 06/03/2015       PAST MEDICAL HISTORY:  Past Medical History:   Diagnosis Date    Diabetes mellitus, type 2     Hyperlipidemia     Hypertension        PAST SURGICAL HISTORY:  Past Surgical History:   Procedure Laterality Date    ANKLE SURGERY      KNEE SURGERY      acl,mcl,pcl    left knee x 2         SOCIAL HISTORY:  Social History     Socioeconomic History    Marital status:      Spouse name: Not on file    Number of children: Not on file    Years of education: Not on file    Highest education level: Not on file   Occupational History    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     Employer: [employer]   Social Needs    Financial resource strain: Not very hard    Food insecurity:     Worry: Sometimes true     Inability: Patient refused    Transportation needs:     Medical: No     Non-medical: No   Tobacco Use    Smoking status: Never Smoker    Smokeless tobacco: Never Used   Substance and Sexual Activity    Alcohol use: Yes     Frequency: Monthly or less     Drinks per session: 1 or 2     Binge frequency: Never     Comment: 1-2 beers every 2 weeks    Drug use: No    Sexual activity: Not on file   Lifestyle    Physical activity:     Days per week: 4 days     Minutes per session: 40 min    Stress: Not at all   Relationships    Social connections:      Talks on phone: More than three times a week     Gets together: Twice a week     Attends [employer] service: Not on file     Active member of club or organization: Patient refused     Attends meetings of clubs or organizations: Patient refused     Relationship status:    Other Topics Concern    Not on file   Social History Narrative    Not on file       ALLERGIES AND MEDICATIONS: updated and reviewed.  Review of patient's allergies indicates:   Allergen Reactions    Influenza virus vaccine, specific Hives    Victoza [liraglutide] Nausea And Vomiting    Farxiga [dapagliflozin] Rash     Erectile dysfunction and rash      Current Outpatient Medications   Medication Sig Dispense Refill    atorvastatin (LIPITOR) 10 MG tablet Take 1 tablet (10 mg total) by mouth once daily. 90 tablet 3    blood sugar diagnostic Strp To check BG 4 times daily, to use with insurance preferred meter 400 strip 3    blood-glucose meter kit To check BG 4 times daily, to use with insurance preferred meter 1 each 0    canagliflozin (INVOKANA) 100 mg Tab Take 1 tablet (100 mg total) by mouth once daily. 30 tablet 2    flash glucose scanning reader (FREESTYLE SAMANTHA 14 DAY READER) [facility] Freestyle Samantha 14-day reader. To be used with Freestyle Samantha 14-day sensors 1 each 0    flash glucose sensor (FREESTYLE SAMANTHA 14 DAY SENSOR) Kit Please change sensor every 14 days. Freestyle Samantha Sensor 30-day supply, 2 sensors/month 2 kit 11    fluticasone propionate (FLONASE) 50 mcg/actuation nasal spray fluticasone propionate 50 mcg/actuation nasal spray,suspension      ibuprofen (ADVIL,MOTRIN) 800 MG tablet Take 1 tablet (800 mg total) by mouth every 8 (eight) hours as needed for Pain. 30 tablet 0    insulin aspart U-100 (NOVOLOG FLEXPEN U-100 INSULIN) 100 unit/mL (3 mL) InPn pen Inject 25 Units into the skin 3 (three) times daily with meals. With correction scale; max dose 100  Units/day 2 Box 5    insulin glargine U-300 conc (TOUJEO MAX  "U-300 SOLOSTAR) 300 unit/mL (3 mL) InPn Inject 50 Units into the skin 2 (two) times daily. 4 Syringe 5    ketoconazole (NIZORAL) 2 % cream Apply topically once daily. 1 Tube 3    lancets Misc To check BG 4 times daily, to use with insurance preferred meter 400 each 3    lisinopril (PRINIVIL,ZESTRIL) 5 MG tablet Take 1 tablet (5 mg total) by mouth once daily. 90 tablet 3    loratadine (CLARITIN) 10 mg tablet Take 10 mg by mouth once daily.      needle, disp, 31 gauge 31 gauge x 5/16" Ndle 1 each by Misc.(Non-Drug; Combo Route) route once daily. QID insulin and victoza 500 each 5    pen needle, diabetic (BD ULTRA-FINE KEN PEN NEEDLE) 32 gauge x 5/32" Ndle 1 Device by Misc.(Non-Drug; Combo Route) route 5 (five) times daily. 550 each 4     No current facility-administered medications for this visit.        Review of Systems   HENT: Negative for hearing loss.    Eyes: Negative for discharge.   Respiratory: Negative for wheezing.    Cardiovascular: Negative for chest pain and palpitations.   Gastrointestinal: Negative for blood in stool, constipation, diarrhea and vomiting.   Genitourinary: Negative for hematuria and urgency.   Musculoskeletal: Negative for neck pain.   Neurological: Negative for weakness and headaches.   Endo/Heme/Allergies: Negative for polydipsia.       Objective:   Physical Exam   Vitals:    09/30/19 0807   BP: 128/84   BP Location: Right arm   Patient Position: Sitting   BP Method: Large (Manual)   Pulse: 92   Resp: 20   Temp: 98.5 °F (36.9 °C)   TempSrc: Oral   SpO2: 97%   Weight: (!) 142.7 kg (314 lb 7.8 oz)   Height: 6' 1" (1.854 m)    Body mass index is 41.49 kg/m².            Physical Exam   Constitutional: He is oriented to person, place, and time. He appears well-developed and well-nourished. No distress.   Eyes: EOM are normal.   Cardiovascular: Normal rate, regular rhythm and normal heart sounds.   No murmur heard.  Pulses:       Dorsalis pedis pulses are 3+ on the right side, and 3+ " on the left side.        Posterior tibial pulses are 3+ on the right side, and 3+ on the left side.   Pulmonary/Chest: Effort normal and breath sounds normal.   Musculoskeletal: Normal range of motion.        Right foot: There is no deformity.        Left foot: There is no deformity.   Feet:   Right Foot:   Protective Sensation: 5 sites tested. 5 sites sensed.   Skin Integrity: Negative for ulcer, blister, skin breakdown, erythema or warmth.   Left Foot:   Protective Sensation: 5 sites tested. 5 sites sensed.   Skin Integrity: Negative for ulcer, blister, skin breakdown, erythema or warmth.   Neurological: He is alert and oriented to person, place, and time. Coordination normal.   Skin: Skin is warm and dry.   Psychiatric: He has a normal mood and affect. His behavior is normal. Thought content normal.

## 2019-09-30 ENCOUNTER — OFFICE VISIT (OUTPATIENT)
Dept: FAMILY MEDICINE | Facility: CLINIC | Age: 39
End: 2019-09-30
Payer: COMMERCIAL

## 2019-09-30 VITALS
RESPIRATION RATE: 20 BRPM | HEART RATE: 92 BPM | OXYGEN SATURATION: 97 % | SYSTOLIC BLOOD PRESSURE: 128 MMHG | TEMPERATURE: 99 F | HEIGHT: 73 IN | DIASTOLIC BLOOD PRESSURE: 84 MMHG | WEIGHT: 314.5 LBS | BODY MASS INDEX: 41.68 KG/M2

## 2019-09-30 DIAGNOSIS — J30.9 ALLERGIC RHINITIS, UNSPECIFIED SEASONALITY, UNSPECIFIED TRIGGER: ICD-10-CM

## 2019-09-30 DIAGNOSIS — I10 ESSENTIAL HYPERTENSION: Primary | ICD-10-CM

## 2019-09-30 DIAGNOSIS — R79.89 LOW TESTOSTERONE IN MALE: ICD-10-CM

## 2019-09-30 PROCEDURE — 99999 PR PBB SHADOW E&M-EST. PATIENT-LVL III: CPT | Mod: PBBFAC,,, | Performed by: INTERNAL MEDICINE

## 2019-09-30 PROCEDURE — 99214 OFFICE O/P EST MOD 30 MIN: CPT | Mod: S$GLB,,, | Performed by: INTERNAL MEDICINE

## 2019-09-30 PROCEDURE — 99999 PR PBB SHADOW E&M-EST. PATIENT-LVL III: ICD-10-PCS | Mod: PBBFAC,,, | Performed by: INTERNAL MEDICINE

## 2019-09-30 PROCEDURE — 99214 PR OFFICE/OUTPT VISIT, EST, LEVL IV, 30-39 MIN: ICD-10-PCS | Mod: S$GLB,,, | Performed by: INTERNAL MEDICINE

## 2019-09-30 RX ORDER — TESTOSTERONE CYPIONATE 200 MG/ML
200 INJECTION, SOLUTION INTRAMUSCULAR
Qty: 10 ML | Refills: 2 | Status: SHIPPED | OUTPATIENT
Start: 2019-09-30 | End: 2020-04-22

## 2019-09-30 RX ORDER — LORATADINE 10 MG/1
10 TABLET ORAL DAILY
Qty: 30 TABLET | Refills: 11 | Status: SHIPPED | OUTPATIENT
Start: 2019-09-30 | End: 2020-09-14

## 2019-09-30 RX ORDER — LISINOPRIL 10 MG/1
10 TABLET ORAL DAILY
Qty: 90 TABLET | Refills: 1 | Status: SHIPPED | OUTPATIENT
Start: 2019-09-30 | End: 2020-03-22

## 2019-09-30 RX ORDER — FLUTICASONE PROPIONATE 50 MCG
SPRAY, SUSPENSION (ML) NASAL
Qty: 16 G | Refills: 11 | Status: SHIPPED | OUTPATIENT
Start: 2019-09-30 | End: 2020-12-16

## 2019-10-02 ENCOUNTER — TELEPHONE (OUTPATIENT)
Dept: RADIOLOGY | Facility: HOSPITAL | Age: 39
End: 2019-10-02

## 2019-10-02 ENCOUNTER — PATIENT MESSAGE (OUTPATIENT)
Dept: FAMILY MEDICINE | Facility: CLINIC | Age: 39
End: 2019-10-02

## 2019-10-03 ENCOUNTER — PATIENT MESSAGE (OUTPATIENT)
Dept: FAMILY MEDICINE | Facility: CLINIC | Age: 39
End: 2019-10-03

## 2019-10-04 ENCOUNTER — PATIENT MESSAGE (OUTPATIENT)
Dept: FAMILY MEDICINE | Facility: CLINIC | Age: 39
End: 2019-10-04

## 2019-10-09 ENCOUNTER — PATIENT MESSAGE (OUTPATIENT)
Dept: FAMILY MEDICINE | Facility: CLINIC | Age: 39
End: 2019-10-09

## 2019-10-10 ENCOUNTER — CLINICAL SUPPORT (OUTPATIENT)
Dept: REHABILITATION | Facility: HOSPITAL | Age: 39
End: 2019-10-10
Attending: PODIATRIST
Payer: COMMERCIAL

## 2019-10-10 DIAGNOSIS — M79.671 RIGHT FOOT PAIN: ICD-10-CM

## 2019-10-10 DIAGNOSIS — R26.9 GAIT ABNORMALITY: ICD-10-CM

## 2019-10-10 DIAGNOSIS — R29.898 DECREASED STRENGTH OF LOWER EXTREMITY: ICD-10-CM

## 2019-10-10 DIAGNOSIS — M25.672 DECREASED RANGE OF MOTION OF BOTH ANKLES: ICD-10-CM

## 2019-10-10 DIAGNOSIS — M25.671 DECREASED RANGE OF MOTION OF BOTH ANKLES: ICD-10-CM

## 2019-10-10 PROCEDURE — 97110 THERAPEUTIC EXERCISES: CPT | Mod: PN

## 2019-10-10 PROCEDURE — 97161 PT EVAL LOW COMPLEX 20 MIN: CPT | Mod: PN

## 2019-10-10 NOTE — PLAN OF CARE
[facility]  Physical Therapy Initial Evaluation    Name: Tony Gordillo  Clinic Number: [id number]    Therapy Diagnosis:   Encounter Diagnoses   Name Primary?    Right foot pain     Decreased strength of lower extremity     Decreased range of motion of both ankles     Gait abnormality      Physician: Brit Díaz DPM    Physician Orders: PT Eval and Treat     Medical Diagnosis from Referral:   M79.671 (ICD-10-CM) - Right foot pain   M72.2 (ICD-10-CM) - Plantar fasciitis     Evaluation Date: 10/10/2019  Authorization Period Expiration: 9/24/2020  Plan of Care Expiration: 1/10/2019  Visit # / Visits authorized: 1/ 1    Time In: 805  Time Out: 845  Total Billable Time: 40 minutes    Precautions: Standard and Diabetes    Subjective   Date of onset: 3 years ago, increasing over past couple of months  History of current condition - Tony reports: a history of heel spur on the foot that was found on imaging in 2017. At this time he had heel pain in foot 2/10. States he was given night splint at the time and was able to manage pain. Pt states recently it became unbearably painful. Pt states pain is primarily in heel of foot and goes toward toes. Pt states he saw podiatrist who gave him injection in heel, minimal relief reported. Pt states MD gave him CAM boot for at home. Pt also reports history of 3 reconstructive surgeries on L knee.      Medical History:   Past Medical History:   Diagnosis Date    Diabetes mellitus, type 2     Hyperlipidemia     Hypertension        Surgical History:   Tony Gordillo  has a past surgical history that includes left knee x 2; Knee surgery; and Ankle surgery.    Medications:   Tony has a current medication list which includes the following prescription(s): atorvastatin, blood sugar diagnostic, blood-glucose meter, canagliflozin, flash glucose scanning reader, flash glucose sensor, fluticasone propionate, ibuprofen, insulin aspart u-100, insulin  "glargine u-300 conc, ketoconazole, lancets, lisinopril, loratadine, needle (disp) 25 gauge, pen needle, diabetic, safety needles, syringe (disposable), and testosterone cypionate.    Allergies:   Review of patient's allergies indicates:   Allergen Reactions    Influenza virus vaccine, specific Hives    Victoza [liraglutide] Nausea And Vomiting    Farxiga [dapagliflozin] Rash     Erectile dysfunction and rash         Imaging, X-ray foot: 8/29/2019        Prior Therapy: For the knee and shoulders  Social History: lives with their family  Occupation: [employer]  Prior Level of Function: Some pain in heel, minimal, able to complete most tasks without pain  Current Level of Function: "Pain slows me down in tasks I have to do, can't stop but more painful"    Pain:  Current 3/10, worst 8/10, best 2/10   Location: right heel   Description: Sharp  Aggravating Factors: walking on foot all day, no rest time  Easing Factors: not walking    Pts goals: Be without pain     Objective     Posture Alignment: Posture WFL, decreased medial arches bilaterally R>L    GAIT DEVIATIONS: Tony displays antalgic gait on R LE    R-14  L-21    Ankle Range of Motion:   Ankle Right Left   Dorsiflexion 3/10 5/10      Strength:   Right Ankle   Left Ankle    Dorsiflexion: 5/5 Dorsiflexion: 5/5   Plantarflexion:  4/5 Plantarflexion: 4/5   Inversion: 4+/5 Inversion: 5/5   Eversion: 4/5 Eversion: 5/5     Special Tests:   Right Left   Navicular Drop positive positive   Windlass test negative negative     Palpation: Increased TTP to R calcaneus and proximal plantar fascia, some TTP to distal plantar fascia. No significant increased tone or tenderness noted to posterior tibialis tendon or navicular bone.     Flexibility: Decreased flexibility of B hamstrings and B gastrocs      CMS Impairment/Limitation/Restriction for FOTO Foot Survey    Therapist reviewed FOTO scores for Tony Gordillo on 10/10/2019.   FOTO documents entered into EPIC - see Media " section.    Limitation Score: 51%  Category: Mobility    Current : CK = at least 40% but < 60% impaired, limited or restricted  Goal: CK = at least 40% but < 60% impaired, limited or restricted       TREATMENT   Treatment Time In: 835  Treatment Time Out: 845  Total Treatment time separate from Evaluation: 10 minutes    Tony received therapeutic exercises to develop ROM and flexibility for 10 minutes including:  +Seated Gastroc Stretch 3x30 ea  +Seated Soleus Stretch 3x30 ea  +Standing Soleus Stretch 3x30 ea  +Standing Gastroc Stretch 3x30 ea    Home Exercises and Patient Education Provided    Education provided:   - role of PT, plan of care, goals    Written Home Exercises Provided: yes.  Exercises were reviewed and Tony was able to demonstrate them prior to the end of the session.  Tony demonstrated good  understanding of the education provided.     See EMR under Patient Instructions for exercises provided 10/10/2019.    Assessment   Tony is a 39 y.o. male referred to outpatient Physical Therapy with a medical diagnosis of right foot pain and plantar fascitis. Pt presents with signs and symptoms consistent with medical diagnoses. Pt presents with limitations in R foot ROM, strength, and balance. Pt with negative TTP to posterior tibialis tendon and navicular bone, suggesting no posterior tibialis tendonitis. Pt with significant TTP to calcaneus and proximal plantar fascia. Pt also with significant loss of medial arch on B feet. Pt would benefit from skilled PT services in order to improve R LE strength, flexibility, SL balance, and improve understanding of managing condition. Pt is motivated to participate in PT in order to improve tolerance to work tasks and functional mobility.     Pt prognosis is Good.   Pt will benefit from skilled outpatient Physical Therapy to address the deficits stated above and in the chart below, provide pt/family education, and to maximize pt's level of independence.     Plan of care  discussed with patient: Yes  Pt's spiritual, cultural and educational needs considered and patient is agreeable to the plan of care and goals as stated below:     Anticipated Barriers for therapy: none    Medical Necessity is demonstrated by the following  History  Co-morbidities and personal factors that may impact the plan of care Co-morbidities:   diabetes, high BMI, HTN and HLD, NAINA    Personal Factors:   no deficits     Complexity: low   Examination  Body Structures and Functions, activity limitations and participation restrictions that may impact the plan of care Body Regions:   lower extremities    Body Systems:    ROM  strength  balance  gait  motor control    Participation Restrictions:   Ability to participate in work activities without pain    Activity limitations:   Learning and applying knowledge  no deficits    General Tasks and Commands  no deficits    Communication  no deficits    Mobility  walking    Self care  no deficits    Domestic Life  shopping  cooking  doing house work (cleaning house, washing dishes, laundry)  assisting others    Interactions/Relationships  no deficits    Life Areas  no deficits    Community and Social Life  no deficits         Complexity: low  See FOTO outcome assessment   Clinical Presentation stable and uncomplicated low   Decision Making/ Complexity Score: low     Goals:  GOALS: Short Term Goals:  4 weeks  1.Report decreased  in  pain at worse less than  <   / =  4 /10  to increase tolerance for improved functional actvities  2. Pt to improve range of motion by 25% to allow for improved functional mobility to allow for improvement in IADLs.   3. Increased R LE MMT 1/2 grade  to increase tolerance for ADL and work activities.  4. Pt to be able to SL balance on R LE > / = 20 seconds in order to improve gait.   5. Pt to tolerate HEP to improve ROM and independence with ADL's.  6. Pt will report ability to walk for an entire work day with pain < / = 3/10 in order to improve  tolerance to work activities.     Long Term Goals: 8 weeks  1.Report decreased  in  pain at worse  less than  <   / =  1 /10  to increase tolerance for improved functional actvities  2.Pt to improve range of motion by 75% to allow for improved functional mobility to allow for improvement in IADLs.   3.Increased R LE MMT 1 grade  to increase tolerance for ADL and work activities.  4. Pt will score at least 41% on  FOTO outcome assessment  to increase functional activities and mobility   5. Pt to be Independent with HEP to improve ROM and independence with ADL's  6. Pt will be able to SL balance on R LE > / = 30 seconds in order to improve balance and gait.       Plan   Plan of care Certification: 10/10/2019 to 12/10/2019.    Outpatient Physical Therapy 2 times weekly for 6 weeks to include the following interventions: Gait Training, Manual Therapy, Moist Heat/ Ice, Neuromuscular Re-ed, Patient Education, Self Care, Therapeutic Activites and Therapeutic Exercise.       Maria Del Rosario Cruz, PT   10/10/2019

## 2019-10-28 ENCOUNTER — TELEPHONE (OUTPATIENT)
Dept: FAMILY MEDICINE | Facility: CLINIC | Age: 39
End: 2019-10-28

## 2019-10-28 NOTE — TELEPHONE ENCOUNTER
----- Message from Nicola Webb sent at 10/28/2019 11:53 AM CDT -----  Contact: [facility]  Type: Patient Call Back    Who called:Martine    What is the request in detail: She needs information on patient's insurance    Can the clinic reply by [facility]?No    Would the patient rather a call back or a response via [facility]? Callback    Best call back number:[phone] ext. [phone]    Additional Information:n/a

## 2019-10-29 ENCOUNTER — TELEPHONE (OUTPATIENT)
Dept: FAMILY MEDICINE | Facility: CLINIC | Age: 39
End: 2019-10-29

## 2019-10-29 NOTE — TELEPHONE ENCOUNTER
Talked with patient about coming in to sign a Medical record release forms. So [facility] can get all the information they need leading up to the cause for the MRI. Patient verbalized understanding and stated that he will be in Tomorrow morning to fill out the release forms.

## 2019-10-29 NOTE — TELEPHONE ENCOUNTER
----- Message from Tressa Ronquillo sent at 10/29/2019  1:28 PM CDT -----  Contact: Erica [facility] [phone]  Type: Patient Call Back    Who called:Erica     What is the request in detail: Erica from [facility] called to speak to the staff. They would like all of the patient's office notes, leading up to the MRI, faxed over to them. It can be faxed over: [phone]    Can the clinic reply by [facility]?no    Would the patient rather a call back or a response via [facility]? Call back    Best call back number: [phone]

## 2019-11-07 ENCOUNTER — PATIENT MESSAGE (OUTPATIENT)
Dept: FAMILY MEDICINE | Facility: CLINIC | Age: 39
End: 2019-11-07

## 2019-11-11 ENCOUNTER — PATIENT MESSAGE (OUTPATIENT)
Dept: FAMILY MEDICINE | Facility: CLINIC | Age: 39
End: 2019-11-11

## 2019-11-11 ENCOUNTER — TELEPHONE (OUTPATIENT)
Dept: FAMILY MEDICINE | Facility: CLINIC | Age: 39
End: 2019-11-11

## 2019-11-11 ENCOUNTER — DOCUMENTATION ONLY (OUTPATIENT)
Dept: FAMILY MEDICINE | Facility: CLINIC | Age: 39
End: 2019-11-11

## 2019-11-11 DIAGNOSIS — R79.89 LOW TESTOSTERONE IN MALE: ICD-10-CM

## 2019-11-11 NOTE — TELEPHONE ENCOUNTER
----- Message from Bhaskar Rutledge sent at 11/11/2019 11:27 AM CST -----  Contact: Pt  Pt called and would like to know if your office has received the results of his MRI    Pt can be reached at [phone]

## 2019-11-11 NOTE — PROGRESS NOTES
11/06/2019 received MRI pituitary with and without contrast from [facility]    Impression:  1.  No pituitary mass seen.  2.  Absence of the septum pellucidum.

## 2019-11-11 NOTE — TELEPHONE ENCOUNTER
Spoke with the pt and he is requesting results from his MRI done with [facility], it was done on 11/6/2019.  I told the pt, that I don't see his results and I ask him to call that location and request the results be sent over to the pcp.  Please advise

## 2019-11-11 NOTE — TELEPHONE ENCOUNTER
I did receive MRI results.  No pituitary mass.  Lab tests for his other pituitary hormones were negative.  This complete the workup.  I believe I had sent in a prescription for testosterone injections.  He can go ahead and start.  200 mg, every 2 weeks.   After 4 injections I will need to check testosterone level on him, 1 week after his 4th injection.     Testosterone level was ordered  Spoke with the pt and gave him the recommendations.  Patient verbalized understandings.

## 2019-11-11 NOTE — TELEPHONE ENCOUNTER
I did receive MRI results.  No pituitary mass.  Lab tests for his other pituitary hormones were negative.  This complete the workup.  I believe I had sent in a prescription for testosterone injections.  He can go ahead and start.  200 mg, every 2 weeks.   After 4 injections I will need to check testosterone level on him, 1 week after his 4th injection.    Testosterone level was ordered

## 2019-11-21 ENCOUNTER — LAB VISIT (OUTPATIENT)
Dept: LAB | Facility: HOSPITAL | Age: 39
End: 2019-11-21
Attending: NURSE PRACTITIONER
Payer: COMMERCIAL

## 2019-11-21 DIAGNOSIS — Z79.4 TYPE 2 DIABETES MELLITUS WITHOUT COMPLICATION, WITH LONG-TERM CURRENT USE OF INSULIN: ICD-10-CM

## 2019-11-21 DIAGNOSIS — E11.9 TYPE 2 DIABETES MELLITUS WITHOUT COMPLICATION, WITH LONG-TERM CURRENT USE OF INSULIN: ICD-10-CM

## 2019-11-21 LAB
CHOLEST SERPL-MCNC: 185 MG/DL (ref 120–199)
CHOLEST/HDLC SERPL: 3.4 {RATIO} (ref 2–5)
ESTIMATED AVG GLUCOSE: 189 MG/DL (ref 68–131)
HBA1C MFR BLD HPLC: 8.2 % (ref 4–5.6)
HDLC SERPL-MCNC: 55 MG/DL (ref 40–75)
HDLC SERPL: 29.7 % (ref 20–50)
LDLC SERPL CALC-MCNC: 107.6 MG/DL (ref 63–159)
NONHDLC SERPL-MCNC: 130 MG/DL
TRIGL SERPL-MCNC: 112 MG/DL (ref 30–150)

## 2019-11-21 PROCEDURE — 36415 COLL VENOUS BLD VENIPUNCTURE: CPT | Mod: PO

## 2019-11-21 PROCEDURE — 83036 HEMOGLOBIN GLYCOSYLATED A1C: CPT

## 2019-11-21 PROCEDURE — 80061 LIPID PANEL: CPT

## 2019-12-01 RX ORDER — MONTELUKAST SODIUM 10 MG/1
TABLET ORAL
Qty: 90 TABLET | Refills: 2 | Status: SHIPPED | OUTPATIENT
Start: 2019-12-01 | End: 2020-06-14

## 2019-12-06 DIAGNOSIS — E11.9 TYPE 2 DIABETES MELLITUS WITHOUT COMPLICATION, UNSPECIFIED WHETHER LONG TERM INSULIN USE: ICD-10-CM

## 2019-12-10 NOTE — PROGRESS NOTES
This note was created by combination of typed  and M-Modal dictation.  Transcription errors may be present.  If there are any questions, please contact me.    Assessment & Plan:   Low testosterone in male; pituitary axis normal. MRI negative. 11/2019 started on testosterone  -notes improvement in mood, energy, muscle mass since initiating it.  Injecting every 2 weeks.  I will repeat a testosterone level for him later this month.  Seven days after his last injection.  Notes some improvement in rectal dysfunction but not complete resolution.  Would like to give this a little bit more time.  We can certainly try him with sildenafil or similar if he fails to have significant improvement but it may take some time for this to really help.    Essential hypertension  -stable.  On lisinopril    Hyperlipidemia LDL goal <70  -on low-dose statin    Type 2 diabetes mellitus without complication, with long-term current use of insulin  -followed by diabetes nurse practitioner.  Has continuous glucose monitor.  A1c is improving.  Upcoming eye exam with outside Optometry. Dr. Marcelina CHEW (obstructive sleep apnea) 8/2019 sleep study AHI 11  -looks like he is compliant with use although he does not sleep a full 6 hr with it.  Reduced AHI episodes.  Overall positive contributor to energy, mood, and likely ED.  No changes.    There are no discontinued medications.    meds sent this encounter:       Follow Up: No follow-ups on file.  Plan for follow-up 3 months, with pre visit labs ordered    Subjective:     Chief Complaint   Patient presents with    Sleep Apnea    Hypogonadism       GONZALES Jimenez is a 39 y.o. male, last appointment with this clinic was 9/30/2019.    Hypogonad, negative workup, started on testosterone.  DM followed by NP    previist labs a1c 8.2 from 9.1  Lipid OK  Last TSH 7/2019 WNL on dose.    Testosterone - has had 3 shots.  Sundays. Notes improvement. Better energy. Better attitude - more calm.  Less  high strung.     But sensation of pulling to the testes.  Not painful not really nagging.  But constant. Normal urination.   Erections are better but issues with maintenance of erection. 2-3 minutes.   Notes waist size seems to be improving. Feels like muscle mass modestly improving.    Exercising in the gym. Every few days.     CPAP x 1 month; autopap.  Less nocturnal waking.  Wife notes he decreased snoring.  But stops after 4-5 hours. Intolerable. Sometimes wakes up feeling like he can't breathe - sensation of restriction?   He has a log of his use on his cell phone and he shows me.  Each evening,  Total apneas avg 2, hypopopneas maybe 10; avg 3- 6 hours a night.     Has CGM. a1c improving.  Still issues with insurance filling/covering meds timely manner.   Has upcoming eye exam next week with Marcelina. 12/23.     Knee - seeing ortho - had MRI - meniscal damage? And concerns about the ACL? Has f/u with him.  Hx of surgery with retained hardware.  Has had 3 surgeries in the past already. Dr. Rowley.    Patient Care Team:  Jovany Ford MD as PCP - General (Internal Medicine)  Janneth Johnson RN (Inactive) as Registered Nurse (Diabetes)  Rocky Hewitt MD as Consulting Physician (Ophthalmology)  Preethi Monaco RD as Dietitian (Nutrition)  River Montiel MA as Care Coordinator    Patient Active Problem List    Diagnosis Date Noted    Right foot pain 10/10/2019    Decreased strength of lower extremity 10/10/2019    Decreased range of motion of both ankles 10/10/2019    Gait abnormality 10/10/2019    Low testosterone in male; pituitary axis normal. MRI negative. 11/2019 started on testosterone 09/04/2019 11/06/2019 MRI pituitary with and without contrast from [facility]  Impression:  1.  No pituitary mass seen.  2.  Absence of the septum pellucidum.      NAINA (obstructive sleep apnea) 8/2019 sleep study AHI 11     Acute bilateral low back pain without sciatica 08/10/2019    Non-seasonal  allergic rhinitis; avoid antihistamines per opthalmology 11/09/2018    Migraine with aura and without status migrainosus, not intractable propranolol SE angry; topamax not helpful; imitrex ineffective; daith ear piercing helps 09/28/2018    Type 2 diabetes mellitus without complication, with long-term current use of insulin     Essential hypertension     Hypothyroidism not currently on medication 07/29/2015    Hyperlipidemia LDL goal <70 06/03/2015    Insulin long-term use 06/03/2015    Tinea pedis 06/03/2015    Morbid obesity 06/03/2015       PAST MEDICAL HISTORY:  Past Medical History:   Diagnosis Date    Diabetes mellitus, type 2     Hyperlipidemia     Hypertension        PAST SURGICAL HISTORY:  Past Surgical History:   Procedure Laterality Date    ANKLE SURGERY      KNEE SURGERY      acl,mcl,pcl    left knee x 2         SOCIAL HISTORY:  Social History     Socioeconomic History    Marital status:      Spouse name: Not on file    Number of children: Not on file    Years of education: Not on file    Highest education level: Not on file   Occupational History    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     Employer: [employer]   Social Needs    Financial resource strain: Not very hard    Food insecurity:     Worry: Sometimes true     Inability: Patient refused    Transportation needs:     Medical: No     Non-medical: No   Tobacco Use    Smoking status: Never Smoker    Smokeless tobacco: Never Used   Substance and Sexual Activity    Alcohol use: Yes     Frequency: Monthly or less     Drinks per session: 1 or 2     Binge frequency: Never     Comment: 1-2 beers every 2 weeks    Drug use: No    Sexual activity: Not on file   Lifestyle    Physical activity:     Days per week: 4 days     Minutes per session: 40 min    Stress: Not at all   Relationships    Social connections:     Talks on phone: More than three times a week     Gets together:  Twice a week     Attends Taoism service: Not on file     Active member of club or organization: Patient refused     Attends meetings of clubs or organizations: Patient refused     Relationship status:    Other Topics Concern    Not on file   Social History Narrative    Not on file       ALLERGIES AND MEDICATIONS: updated and reviewed.  Review of patient's allergies indicates:   Allergen Reactions    Influenza virus vaccine, specific Hives    Victoza [liraglutide] Nausea And Vomiting    Farxiga [dapagliflozin] Rash     Erectile dysfunction and rash      Current Outpatient Medications   Medication Sig Dispense Refill    atorvastatin (LIPITOR) 10 MG tablet Take 1 tablet (10 mg total) by mouth once daily. 90 tablet 3    blood sugar diagnostic Strp To check BG 4 times daily, to use with insurance preferred meter 400 strip 3    blood-glucose meter kit To check BG 4 times daily, to use with insurance preferred meter 1 each 0    canagliflozin (INVOKANA) 100 mg Tab Take 1 tablet (100 mg total) by mouth once daily. 30 tablet 2    flash glucose scanning reader (FREESTYLE SAMANTHA 14 DAY READER) [facility] Freestyle Samantha 14-day reader. To be used with Freestyle Samantha 14-day sensors 1 each 0    flash glucose sensor (FREESTYLE SAMANTHA 14 DAY SENSOR) Kit Please change sensor every 14 days. Freestyle Samantha Sensor 30-day supply, 2 sensors/month 2 kit 11    fluticasone propionate (FLONASE) 50 mcg/actuation nasal spray fluticasone propionate 50 mcg/actuation nasal spray,suspension 16 g 11    ibuprofen (ADVIL,MOTRIN) 800 MG tablet Take 1 tablet (800 mg total) by mouth every 8 (eight) hours as needed for Pain. 30 tablet 0    insulin aspart U-100 (NOVOLOG FLEXPEN U-100 INSULIN) 100 unit/mL (3 mL) InPn pen Inject 25 Units into the skin 3 (three) times daily with meals. With correction scale; max dose 100  Units/day 2 Box 5    insulin glargine U-300 conc (TOUJEO MAX U-300 SOLOSTAR) 300 unit/mL (3 mL) InPn Inject 50 Units  "into the skin 2 (two) times daily. 4 Syringe 5    ketoconazole (NIZORAL) 2 % cream Apply topically once daily. 1 Tube 3    lancets Misc To check BG 4 times daily, to use with insurance preferred meter 400 each 3    lisinopril 10 MG tablet Take 1 tablet (10 mg total) by mouth once daily. 90 tablet 1    loratadine (CLARITIN) 10 mg tablet Take 1 tablet (10 mg total) by mouth once daily. 30 tablet 11    montelukast (SINGULAIR) 10 mg tablet TAKE 1 TABLET BY MOUTH EVERY EVENING 90 tablet 2    needle, disp, 25 gauge 25 gauge x 1" Ndle Testosterone every 2 weeks 25 each 11    pen needle, diabetic (BD ULTRA-FINE KEN PEN NEEDLE) 32 gauge x 5/32" Ndle 1 Device by Misc.(Non-Drug; Combo Route) route 5 (five) times daily. 550 each 4    safety needles (BD SAFETYGLIDE NEEDLE) 21 gauge x 1" Ndle Testosterone injection every 2 weeks 25 each 1    syringe, disposable, 1 mL Syrg Testosterone every 2 weeks 25 Syringe 1    testosterone cypionate (DEPOTESTOTERONE CYPIONATE) 200 mg/mL injection Inject 1 mL (200 mg total) into the muscle every 14 (fourteen) days. 10 mL 2     No current facility-administered medications for this visit.        Review of Systems   All other systems reviewed and are negative.      Objective:   Physical Exam   Vitals:    12/11/19 1054   BP: 120/70   BP Location: Left arm   Patient Position: Sitting   BP Method: Large (Manual)   Pulse: 98   Temp: 98 °F (36.7 °C)   TempSrc: Oral   SpO2: 99%   Weight: (!) 145 kg (319 lb 10.7 oz)   Height: 6' 1" (1.854 m)    Body mass index is 42.17 kg/m².  Weight: (!) 145 kg (319 lb 10.7 oz)   Height: 6' 1" (185.4 cm)     Physical Exam   Constitutional: He is oriented to person, place, and time. He appears well-developed and well-nourished. No distress.   Eyes: EOM are normal.   Cardiovascular: Normal rate, regular rhythm and normal heart sounds.   No murmur heard.  Pulmonary/Chest: Effort normal and breath sounds normal.   Musculoskeletal: Normal range of motion. "   Neurological: He is alert and oriented to person, place, and time. Coordination normal.   Skin: Skin is warm and dry.   Psychiatric: He has a normal mood and affect. His behavior is normal. Thought content normal.        Component      Latest Ref Rng & Units 11/21/2019 8/22/2019   Cholesterol      120 - 199 mg/dL 185    Triglycerides      30 - 150 mg/dL 112    HDL      40 - 75 mg/dL 55    LDL Cholesterol External      63.0 - 159.0 mg/dL 107.6    Hdl/Cholesterol Ratio      20.0 - 50.0 % 29.7    Total Cholesterol/HDL Ratio      2.0 - 5.0 3.4    Non-HDL Cholesterol      mg/dL 130    Microalbum.,U,Random      ug/mL <2.5    Creatinine, Random Ur      23.0 - 375.0 mg/dL 64.0    MICROALB/CREAT RATIO      0.0 - 30.0 ug/mg Unable to calculate    Hemoglobin A1C External      4.0 - 5.6 % 8.2 (H) 9.1 (H)   Estimated Avg Glucose      68 - 131 mg/dL 189 (H) 214 (H)

## 2019-12-11 ENCOUNTER — OFFICE VISIT (OUTPATIENT)
Dept: FAMILY MEDICINE | Facility: CLINIC | Age: 39
End: 2019-12-11
Payer: COMMERCIAL

## 2019-12-11 VITALS
HEART RATE: 98 BPM | HEIGHT: 73 IN | BODY MASS INDEX: 41.75 KG/M2 | WEIGHT: 315 LBS | SYSTOLIC BLOOD PRESSURE: 120 MMHG | TEMPERATURE: 98 F | DIASTOLIC BLOOD PRESSURE: 70 MMHG | OXYGEN SATURATION: 99 %

## 2019-12-11 DIAGNOSIS — Z79.4 TYPE 2 DIABETES MELLITUS WITHOUT COMPLICATION, WITH LONG-TERM CURRENT USE OF INSULIN: ICD-10-CM

## 2019-12-11 DIAGNOSIS — E11.9 TYPE 2 DIABETES MELLITUS WITHOUT COMPLICATION, WITH LONG-TERM CURRENT USE OF INSULIN: ICD-10-CM

## 2019-12-11 DIAGNOSIS — G47.33 OSA (OBSTRUCTIVE SLEEP APNEA): ICD-10-CM

## 2019-12-11 DIAGNOSIS — R79.89 LOW TESTOSTERONE IN MALE: Primary | ICD-10-CM

## 2019-12-11 DIAGNOSIS — I10 ESSENTIAL HYPERTENSION: ICD-10-CM

## 2019-12-11 DIAGNOSIS — E78.5 HYPERLIPIDEMIA LDL GOAL <70: ICD-10-CM

## 2019-12-11 PROCEDURE — 99214 PR OFFICE/OUTPT VISIT, EST, LEVL IV, 30-39 MIN: ICD-10-PCS | Mod: S$GLB,,, | Performed by: INTERNAL MEDICINE

## 2019-12-11 PROCEDURE — 99999 PR PBB SHADOW E&M-EST. PATIENT-LVL III: CPT | Mod: PBBFAC,,, | Performed by: INTERNAL MEDICINE

## 2019-12-11 PROCEDURE — 99214 OFFICE O/P EST MOD 30 MIN: CPT | Mod: S$GLB,,, | Performed by: INTERNAL MEDICINE

## 2019-12-11 PROCEDURE — 99999 PR PBB SHADOW E&M-EST. PATIENT-LVL III: ICD-10-PCS | Mod: PBBFAC,,, | Performed by: INTERNAL MEDICINE

## 2019-12-13 ENCOUNTER — PATIENT MESSAGE (OUTPATIENT)
Dept: FAMILY MEDICINE | Facility: CLINIC | Age: 39
End: 2019-12-13

## 2019-12-13 ENCOUNTER — TELEPHONE (OUTPATIENT)
Dept: FAMILY MEDICINE | Facility: CLINIC | Age: 39
End: 2019-12-13

## 2019-12-13 DIAGNOSIS — Z20.828 EXPOSURE TO THE FLU: Primary | ICD-10-CM

## 2019-12-13 DIAGNOSIS — Z79.4 TYPE 2 DIABETES MELLITUS WITHOUT COMPLICATION, WITH LONG-TERM CURRENT USE OF INSULIN: ICD-10-CM

## 2019-12-13 DIAGNOSIS — E11.9 TYPE 2 DIABETES MELLITUS WITHOUT COMPLICATION, WITH LONG-TERM CURRENT USE OF INSULIN: ICD-10-CM

## 2019-12-13 RX ORDER — INSULIN ASPART 100 [IU]/ML
INJECTION, SOLUTION INTRAVENOUS; SUBCUTANEOUS
Qty: 30 ML | Refills: 1 | Status: SHIPPED | OUTPATIENT
Start: 2019-12-13 | End: 2020-01-07 | Stop reason: ALTCHOICE

## 2019-12-13 RX ORDER — OSELTAMIVIR PHOSPHATE 75 MG/1
75 CAPSULE ORAL DAILY
Qty: 10 CAPSULE | Refills: 0 | Status: SHIPPED | OUTPATIENT
Start: 2019-12-13 | End: 2019-12-23

## 2019-12-13 NOTE — TELEPHONE ENCOUNTER
Spoke with pt/F/u appt with Kate schedule for 1/14/2020 @10am. Pt had no further questions or concerns at this time.

## 2019-12-13 NOTE — TELEPHONE ENCOUNTER
LVM to call back to see if patient would like tamiflu sent in to pharmacy being the fact his children are sick with the flu.

## 2019-12-23 LAB
LEFT EYE DM RETINOPATHY: NEGATIVE
RIGHT EYE DM RETINOPATHY: NEGATIVE

## 2019-12-30 ENCOUNTER — LAB VISIT (OUTPATIENT)
Dept: LAB | Facility: HOSPITAL | Age: 39
End: 2019-12-30
Attending: INTERNAL MEDICINE
Payer: COMMERCIAL

## 2019-12-30 DIAGNOSIS — R79.89 LOW TESTOSTERONE IN MALE: ICD-10-CM

## 2019-12-30 LAB — TESTOST SERPL-MCNC: 575 NG/DL (ref 304–1227)

## 2019-12-30 PROCEDURE — 36415 COLL VENOUS BLD VENIPUNCTURE: CPT | Mod: PO

## 2019-12-30 PROCEDURE — 84403 ASSAY OF TOTAL TESTOSTERONE: CPT

## 2020-01-03 ENCOUNTER — PATIENT OUTREACH (OUTPATIENT)
Dept: ADMINISTRATIVE | Facility: OTHER | Age: 40
End: 2020-01-03

## 2020-01-06 NOTE — PROGRESS NOTES
This note was created by combination of typed  and M-Modal dictation.  Transcription errors may be present.  If there are any questions, please contact me.    Assessment & Plan:   Preop examination  Preop cardiovascular exam  Acute medial meniscal tear, unspecified laterality, initial encounter  -EKG essentially unchanged.  No known cardiovascular disease. Normal creatinine.  He has not arranged surgeon or surgery date yet.  Insurance issues  He will contact me with date and time when he is going to proceed with surgery and then we will check CBC and CMP and TSH at that time.  If those are normal, may proceed without further testing.  -     EKG 12-lead  -     CBC auto differential; Future; Expected date: 01/07/2020  -     Comprehensive metabolic panel; Future; Expected date: 01/07/2020    Hyperlipidemia LDL goal <70  -on statin    Essential hypertension  -at goal, on lisinopril not lisinopril HCT    Hypothyroidism due to acquired atrophy of thyroid  -last check in July was normal not on medication.  History of hypothyroid.  Check future TSH  -     TSH; Future; Expected date: 01/07/2020    Low testosterone in male; pituitary axis normal. MRI negative. 11/2019 started on testosterone  -labs 12/2019 was good on regimen repeat in 3 months.    Morbid obesity  Type 2 diabetes mellitus without complication, with long-term current use of insulin  Insulin long-term use  -just came from diabetes nurse practitioner with regimen changes including increase in the canagliflozin, adding on Actos, and increasing insulin.  Alyson to Dr. Hewitt for eye exam done week of [place]    Medications Discontinued During This Encounter   Medication Reason    lisinopril-hydrochlorothiazide (PRINZIDE,ZESTORETIC) 20-12.5 mg per tablet Therapy completed       meds sent this encounter:       Follow Up: No follow-ups on file.  He will arrange lab visit for CBC CMP TSH.  In 3 months, A1c, testosterone    Subjective:     Chief Complaint    Patient presents with    Pre-op Exam     for knee surgery       HPI  Tony is a 39 y.o. male, last appointment with this clinic was 12/11/2019.    12/30 testsoterone level normal on replacement  DM followed by NP. Outside ophtho Dr. Hewitt  NAINA on CPAP    DM NP - increased the canagliflozin. Added on actos. Increased the insulin mealtime.    Followed by [facility] for knee issues.  Meniscus? ACL?     ACL was confirmed to be torn but the plan is not to repair that.  MCL, LCL were ok.  So the plan was meniscectomy and hardware removal (screw).     He just found out that insurance is not covering the surgery - out of network. So he is trying to get in with his other orthopedist Dr. Rizo in [place].  See if he is in network.     On an average day lifts up to 100 pounds. No chest pain. 100 pound sacks. No palpitations, no pedal edema, no dyspnea.  No presyncope.     He had 1 episode where his blood sugars shot up to the 300 range for unclear reasons.  And then back down to normal.  Does not have any respiratory infection type symptoms.  No abdominal pain or diarrhea.  No dysuria.  No rash.  No headaches.    DASI score > 4.5 METS    Got eye exam last week with Marcelina    Patient Care Team:  Jovany Ford MD as PCP - General (Internal Medicine)  Janneth Johnson RN (Inactive) as Registered Nurse (Diabetes)  Rocky Hewitt MD as Consulting Physician (Ophthalmology)  Preethi Monaco RD as Dietitian (Nutrition)  River Montiel MA as Care Coordinator  Christofer Rowley MD as Consulting Physician (Orthopedic Surgery)    Patient Active Problem List    Diagnosis Date Noted    Right foot pain 10/10/2019    Decreased strength of lower extremity 10/10/2019    Decreased range of motion of both ankles 10/10/2019    Gait abnormality 10/10/2019    Low testosterone in male; pituitary axis normal. MRI negative. 11/2019 started on testosterone 09/04/2019 11/06/2019 MRI pituitary with and without contrast [facility]  Impression:  1.  No pituitary mass seen.  2.  Absence of the septum pellucidum.      NAINA (obstructive sleep apnea) 8/2019 sleep study AHI 11     Acute bilateral low back pain without sciatica 08/10/2019    Non-seasonal allergic rhinitis; avoid antihistamines per opthalmology 11/09/2018    Migraine with aura and without status migrainosus, not intractable propranolol SE angry; topamax not helpful; imitrex ineffective; daith ear piercing helps 09/28/2018    Type 2 diabetes mellitus without complication, with long-term current use of insulin     Essential hypertension     Hypothyroidism not currently on medication 07/29/2015    Hyperlipidemia LDL goal <70 06/03/2015    Insulin long-term use 06/03/2015    Tinea pedis 06/03/2015    Morbid obesity 06/03/2015       PAST MEDICAL HISTORY:  Past Medical History:   Diagnosis Date    Diabetes mellitus, type 2     Hyperlipidemia     Hypertension        PAST SURGICAL HISTORY:  Past Surgical History:   Procedure Laterality Date    ANKLE SURGERY      KNEE SURGERY      acl,mcl,pcl    left knee x 2         SOCIAL HISTORY:  Social History     Socioeconomic History    Marital status:      Spouse name: Not on file    Number of children: Not on file    Years of education: Not on file    Highest education level: Not on file   Occupational History    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     Employer: [employer]   Social Needs    Financial resource strain: Not very hard    Food insecurity:     Worry: Sometimes true     Inability: Patient refused    Transportation needs:     Medical: No     Non-medical: No   Tobacco Use    Smoking status: Never Smoker    Smokeless tobacco: Never Used   Substance and Sexual Activity    Alcohol use: Yes     Frequency: Monthly or less     Drinks per session: 1 or 2     Binge frequency: Never     Comment: 1-2 beers every 2 weeks    Drug use: No    Sexual activity: Not  on file   Lifestyle    Physical activity:     Days per week: 4 days     Minutes per session: 40 min    Stress: Not at all   Relationships    Social connections:     Talks on phone: More than three times a week     Gets together: Twice a week     Attends Holiness service: Not on file     Active member of club or organization: Patient refused     Attends meetings of clubs or organizations: Patient refused     Relationship status:    Other Topics Concern    Not on file   Social History Narrative    Not on file       ALLERGIES AND MEDICATIONS: updated and reviewed.  Review of patient's allergies indicates:   Allergen Reactions    Influenza virus vaccine, specific Hives    Victoza [liraglutide] Nausea And Vomiting    Farxiga [dapagliflozin] Rash     Erectile dysfunction and rash      Current Outpatient Medications   Medication Sig Dispense Refill    acyclovir (ZOVIRAX) 400 MG tablet acyclovir 400 mg tablet      atorvastatin (LIPITOR) 20 MG tablet Take 1 tablet (20 mg total) by mouth once daily. 30 tablet 3    azelastine 0.15 % (205.5 mcg) Spry azelastine 0.15 % (205.5 mcg) nasal spray   INHALE 2 SPRAYS TO EACH NOSTRIL TWICE A DAY      baclofen (LIORESAL) 10 MG tablet baclofen 10 mg tablet      blood sugar diagnostic (BLOOD GLUCOSE TEST) Strp OneTouch Ultra Test strips      blood sugar diagnostic Strp To check BG 4 times daily, to use with insurance preferred meter (Patient not taking: Reported on 1/7/2020) 400 strip 3    blood-glucose meter (ONETOUCH ULTRA2 METER) kit OneTouch Ultra2 Meter kit      blood-glucose meter kit To check BG 4 times daily, to use with insurance preferred meter 1 each 0    canagliflozin (INVOKANA) 300 mg Tab tablet Take 1 tablet (300 mg total) by mouth once daily. 30 tablet 5    fenofibrate 160 MG Tab fenofibrate 160 mg tablet      flash glucose scanning reader (FREESTYLE SAMANTHA 14 DAY READER) [facility] Freestyle Samantha 14-day reader. To be used with Freestyle Samantha 14-day  "sensors 1 each 0    flash glucose sensor (FREESTYLE SAMANTHA 14 DAY SENSOR) Kit Please change sensor every 14 days. Freestyle Samantha Sensor 30-day supply, 2 sensors/month 2 kit 11    fluticasone propionate (FLONASE) 50 mcg/actuation nasal spray fluticasone propionate 50 mcg/actuation nasal spray,suspension 16 g 11    fluticasone propionate (FLONASE) 50 mcg/actuation nasal spray fluticasone propionate 50 mcg/actuation nasal spray,suspension      ibuprofen (ADVIL,MOTRIN) 800 MG tablet Take 1 tablet (800 mg total) by mouth every 8 (eight) hours as needed for Pain. (Patient not taking: Reported on 1/7/2020) 30 tablet 0    insulin aspart U-100 (NOVOLOG U-100 INSULIN ASPART) 100 unit/mL injection Novolog U-100 Insulin aspart   SSI      insulin aspart, niacinamide, (FIASP FLEXTOUCH U-100 INSULIN) 100 unit/mL (3 mL) InPn Inject 30-30-36 units before meals with ss; max dose 120 units/day 15 Syringe 5    insulin glargine (LANTUS U-100 INSULIN) 100 unit/mL injection Lantus U-100 Insulin   40units at night before bed      insulin glargine U-300 conc (TOUJEO MAX U-300 SOLOSTAR) 300 unit/mL (3 mL) InPn Inject 50 Units into the skin 2 (two) times daily. 4 Syringe 5    insulin syringe-needle U-100 (SURE COMFORT INSULIN SYRINGE) 1 mL 31 gauge x 5/16 Syrg Sure Comfort Insulin Syringe 1 mL 31 gauge x 5/16"   USE FIVE TIMES DAILY WITH INSULINS      insulin syringe-needle U-100 1 mL 31 gauge x 15/64" Syrg insulin syrg mis 1ml/31gsure comfort insulin syringe/u-100/1ml/31g x 5/16"      ketoconazole (NIZORAL) 2 % cream Apply topically once daily. 1 Tube 3    lancets Misc To check BG 4 times daily, to use with insurance preferred meter (Patient not taking: Reported on 1/7/2020) 400 each 3    levothyroxine (SYNTHROID) 50 MCG tablet levothyroxine 50 mcg tablet   TAKE ONE TABLET BY MOUTH EVERY DAY      lisinopril 10 MG tablet Take 1 tablet (10 mg total) by mouth once daily. 90 tablet 1    lisinopril-hydrochlorothiazide " "(PRINZIDE,ZESTORETIC) 20-12.5 mg per tablet lisinopril 20 mg-hydrochlorothiazide 12.5 mg tablet      loratadine (CLARITIN) 10 mg tablet Take 1 tablet (10 mg total) by mouth once daily. 30 tablet 11    meloxicam (MOBIC) 7.5 MG tablet meloxicam 7.5 mg tablet   TAKE 1 TABLET TWICE DAILY AS NEEDED      montelukast (SINGULAIR) 10 mg tablet TAKE 1 TABLET BY MOUTH EVERY EVENING 90 tablet 2    needle, disp, 25 gauge 25 gauge x 1" Ndle Testosterone every 2 weeks 25 each 11    pen needle, diabetic (BD ULTRA-FINE MINI PEN NEEDLE) 31 gauge x 3/16" Ndle BD Ultra-Fine Mini Pen Needle 31 gauge x 3/16"   INJECT 1 EACH INTO THE SKIN 3 (THREE) TIMES DAILY BEFORE MEALS AND AT BEDTIME      pen needle, diabetic (BD ULTRA-FINE KEN PEN NEEDLE) 32 gauge x 5/32" Ndle 1 Device by Misc.(Non-Drug; Combo Route) route 5 (five) times daily. 550 each 4    pioglitazone (ACTOS) 15 MG tablet Take 1 tablet (15 mg total) by mouth once daily. 30 tablet 3    safety needles (BD SAFETYGLIDE NEEDLE) 21 gauge x 1" Ndle Testosterone injection every 2 weeks 25 each 1    syringe, disposable, 1 mL Syrg Testosterone every 2 weeks 25 Syringe 1    testosterone cypionate (DEPOTESTOTERONE CYPIONATE) 200 mg/mL injection Inject 1 mL (200 mg total) into the muscle every 14 (fourteen) days. 10 mL 2     No current facility-administered medications for this visit.        Review of Systems   Constitutional: Negative for chills and fever.   HENT: Negative for sinus pain.    Respiratory: Negative for cough and shortness of breath.    Cardiovascular: Negative for chest pain.   Gastrointestinal: Negative for abdominal pain.   Genitourinary: Negative for dysuria.   Musculoskeletal: Positive for joint pain.       Objective:   Physical Exam   Vitals:    01/07/20 1008   Pulse: 93   Temp: 98.8 °F (37.1 °C)   TempSrc: Oral   SpO2: 97%   Weight: (!) 147.8 kg (325 lb 13.4 oz)   Height: 6' 1" (1.854 m)    Body mass index is 42.99 kg/m².  Weight: (!) 147.8 kg (325 lb 13.4 oz) " "  Height: 6' 1" (185.4 cm)     Physical Exam   Constitutional: He is oriented to person, place, and time. He appears well-developed and well-nourished. No distress.   HENT:   TMs grey/clear bilaterally.  OP no erythema no exudates   Eyes: EOM are normal.   Neck: Neck supple.   Cardiovascular: Normal rate, regular rhythm and normal heart sounds.   No murmur heard.  Pulmonary/Chest: Effort normal and breath sounds normal. He has no wheezes.   Abdominal: Soft. He exhibits no distension and no mass. There is no tenderness. There is no guarding.   Musculoskeletal: He exhibits no edema.   Lymphadenopathy:     He has no cervical adenopathy.   Neurological: He is alert and oriented to person, place, and time. Coordination normal.   Skin: Skin is warm and dry.   Psychiatric: He has a normal mood and affect. His behavior is normal. Thought content normal.        EKG computer interpretation is septal infarct, with Q in V1 and V2; previous 11/2018 with Q in V1, insignificant Q in V2    I interpret as no change.  "

## 2020-01-07 ENCOUNTER — OFFICE VISIT (OUTPATIENT)
Dept: ENDOCRINOLOGY | Facility: CLINIC | Age: 40
End: 2020-01-07
Payer: COMMERCIAL

## 2020-01-07 ENCOUNTER — OFFICE VISIT (OUTPATIENT)
Dept: FAMILY MEDICINE | Facility: CLINIC | Age: 40
End: 2020-01-07
Payer: COMMERCIAL

## 2020-01-07 VITALS
OXYGEN SATURATION: 97 % | DIASTOLIC BLOOD PRESSURE: 78 MMHG | TEMPERATURE: 99 F | SYSTOLIC BLOOD PRESSURE: 122 MMHG | BODY MASS INDEX: 41.75 KG/M2 | HEIGHT: 73 IN | HEART RATE: 93 BPM | WEIGHT: 315 LBS

## 2020-01-07 VITALS
DIASTOLIC BLOOD PRESSURE: 88 MMHG | SYSTOLIC BLOOD PRESSURE: 141 MMHG | BODY MASS INDEX: 42.98 KG/M2 | HEART RATE: 93 BPM | WEIGHT: 315 LBS

## 2020-01-07 DIAGNOSIS — S83.249A ACUTE MEDIAL MENISCAL TEAR, UNSPECIFIED LATERALITY, INITIAL ENCOUNTER: ICD-10-CM

## 2020-01-07 DIAGNOSIS — Z79.4 INSULIN LONG-TERM USE: ICD-10-CM

## 2020-01-07 DIAGNOSIS — E03.4 HYPOTHYROIDISM DUE TO ACQUIRED ATROPHY OF THYROID: ICD-10-CM

## 2020-01-07 DIAGNOSIS — I10 ESSENTIAL HYPERTENSION: ICD-10-CM

## 2020-01-07 DIAGNOSIS — Z01.810 PREOP CARDIOVASCULAR EXAM: ICD-10-CM

## 2020-01-07 DIAGNOSIS — Z79.4 TYPE 2 DIABETES MELLITUS WITHOUT COMPLICATION, WITH LONG-TERM CURRENT USE OF INSULIN: ICD-10-CM

## 2020-01-07 DIAGNOSIS — E66.01 MORBID OBESITY, UNSPECIFIED OBESITY TYPE: ICD-10-CM

## 2020-01-07 DIAGNOSIS — E78.5 HYPERLIPIDEMIA, UNSPECIFIED HYPERLIPIDEMIA TYPE: ICD-10-CM

## 2020-01-07 DIAGNOSIS — E78.5 HYPERLIPIDEMIA LDL GOAL <70: ICD-10-CM

## 2020-01-07 DIAGNOSIS — Z01.818 PREOP EXAMINATION: Primary | ICD-10-CM

## 2020-01-07 DIAGNOSIS — E11.9 TYPE 2 DIABETES MELLITUS WITHOUT COMPLICATION, WITH LONG-TERM CURRENT USE OF INSULIN: ICD-10-CM

## 2020-01-07 DIAGNOSIS — R79.89 LOW TESTOSTERONE IN MALE: ICD-10-CM

## 2020-01-07 DIAGNOSIS — Z79.4 TYPE 2 DIABETES MELLITUS WITHOUT COMPLICATION, WITH LONG-TERM CURRENT USE OF INSULIN: Primary | ICD-10-CM

## 2020-01-07 DIAGNOSIS — E11.9 TYPE 2 DIABETES MELLITUS WITHOUT COMPLICATION, WITH LONG-TERM CURRENT USE OF INSULIN: Primary | ICD-10-CM

## 2020-01-07 DIAGNOSIS — E66.01 MORBID OBESITY: ICD-10-CM

## 2020-01-07 PROCEDURE — 99214 PR OFFICE/OUTPT VISIT, EST, LEVL IV, 30-39 MIN: ICD-10-PCS | Mod: S$GLB,,, | Performed by: NURSE PRACTITIONER

## 2020-01-07 PROCEDURE — 99999 PR PBB SHADOW E&M-EST. PATIENT-LVL IV: CPT | Mod: PBBFAC,,, | Performed by: NURSE PRACTITIONER

## 2020-01-07 PROCEDURE — 99214 OFFICE O/P EST MOD 30 MIN: CPT | Mod: S$GLB,,, | Performed by: INTERNAL MEDICINE

## 2020-01-07 PROCEDURE — 99214 OFFICE O/P EST MOD 30 MIN: CPT | Mod: S$GLB,,, | Performed by: NURSE PRACTITIONER

## 2020-01-07 PROCEDURE — 95251 PR GLUCOSE MONITOR, 72 HOUR, PHYS INTERP: ICD-10-PCS | Mod: S$GLB,,, | Performed by: NURSE PRACTITIONER

## 2020-01-07 PROCEDURE — 93010 EKG 12-LEAD: ICD-10-PCS | Mod: S$GLB,,, | Performed by: INTERNAL MEDICINE

## 2020-01-07 PROCEDURE — 99999 PR PBB SHADOW E&M-EST. PATIENT-LVL III: CPT | Mod: PBBFAC,,, | Performed by: INTERNAL MEDICINE

## 2020-01-07 PROCEDURE — 93005 ELECTROCARDIOGRAM TRACING: CPT | Mod: S$GLB,,, | Performed by: INTERNAL MEDICINE

## 2020-01-07 PROCEDURE — 95251 CONT GLUC MNTR ANALYSIS I&R: CPT | Mod: S$GLB,,, | Performed by: NURSE PRACTITIONER

## 2020-01-07 PROCEDURE — 99999 PR PBB SHADOW E&M-EST. PATIENT-LVL III: ICD-10-PCS | Mod: PBBFAC,,, | Performed by: INTERNAL MEDICINE

## 2020-01-07 PROCEDURE — 99214 PR OFFICE/OUTPT VISIT, EST, LEVL IV, 30-39 MIN: ICD-10-PCS | Mod: S$GLB,,, | Performed by: INTERNAL MEDICINE

## 2020-01-07 PROCEDURE — 93010 ELECTROCARDIOGRAM REPORT: CPT | Mod: S$GLB,,, | Performed by: INTERNAL MEDICINE

## 2020-01-07 PROCEDURE — 99999 PR PBB SHADOW E&M-EST. PATIENT-LVL IV: ICD-10-PCS | Mod: PBBFAC,,, | Performed by: NURSE PRACTITIONER

## 2020-01-07 PROCEDURE — 93005 EKG 12-LEAD: ICD-10-PCS | Mod: S$GLB,,, | Performed by: INTERNAL MEDICINE

## 2020-01-07 RX ORDER — MELOXICAM 7.5 MG/1
TABLET ORAL
COMMUNITY
End: 2020-12-17 | Stop reason: ALTCHOICE

## 2020-01-07 RX ORDER — FENOFIBRATE 160 MG/1
TABLET ORAL
COMMUNITY
End: 2020-10-21

## 2020-01-07 RX ORDER — INSULIN PUMP SYRINGE, 3 ML
EACH MISCELLANEOUS
COMMUNITY

## 2020-01-07 RX ORDER — PEN NEEDLE, DIABETIC 30 GX3/16"
NEEDLE, DISPOSABLE MISCELLANEOUS
COMMUNITY
End: 2020-07-21

## 2020-01-07 RX ORDER — SYRINGE,SAFETY WITH NEEDLE,1ML 25GX1"
SYRINGE (EA) MISCELLANEOUS
COMMUNITY
End: 2020-07-21

## 2020-01-07 RX ORDER — BACLOFEN 10 MG/1
TABLET ORAL
COMMUNITY
End: 2020-04-06 | Stop reason: ALTCHOICE

## 2020-01-07 RX ORDER — LISINOPRIL AND HYDROCHLOROTHIAZIDE 12.5; 2 MG/1; MG/1
TABLET ORAL
COMMUNITY
End: 2020-01-07 | Stop reason: ALTCHOICE

## 2020-01-07 RX ORDER — AZELASTINE HCL 205.5 UG/1
SPRAY NASAL
COMMUNITY
End: 2021-01-22

## 2020-01-07 RX ORDER — ACYCLOVIR 400 MG/1
TABLET ORAL
COMMUNITY
End: 2020-04-06 | Stop reason: ALTCHOICE

## 2020-01-07 RX ORDER — SYRINGE AND NEEDLE,INSULIN,1ML 31GX15/64"
SYRINGE, EMPTY DISPOSABLE MISCELLANEOUS
COMMUNITY
End: 2020-07-21 | Stop reason: ALTCHOICE

## 2020-01-07 RX ORDER — ATORVASTATIN CALCIUM 20 MG/1
20 TABLET, FILM COATED ORAL DAILY
Qty: 30 TABLET | Refills: 3 | Status: SHIPPED | OUTPATIENT
Start: 2020-01-07 | End: 2020-10-21 | Stop reason: SDUPTHER

## 2020-01-07 RX ORDER — PIOGLITAZONEHYDROCHLORIDE 15 MG/1
15 TABLET ORAL DAILY
Qty: 30 TABLET | Refills: 3 | Status: SHIPPED | OUTPATIENT
Start: 2020-01-07 | End: 2020-04-06 | Stop reason: SINTOL

## 2020-01-07 RX ORDER — INSULIN ASPART 100 [IU]/ML
INJECTION, SOLUTION INTRAVENOUS; SUBCUTANEOUS
COMMUNITY
End: 2020-04-06 | Stop reason: ALTCHOICE

## 2020-01-07 RX ORDER — LEVOTHYROXINE SODIUM 50 UG/1
TABLET ORAL
COMMUNITY
End: 2020-10-21

## 2020-01-07 RX ORDER — INSULIN GLARGINE 100 [IU]/ML
INJECTION, SOLUTION SUBCUTANEOUS
COMMUNITY
End: 2020-04-06 | Stop reason: ALTCHOICE

## 2020-01-07 RX ORDER — FLUTICASONE PROPIONATE 50 MCG
SPRAY, SUSPENSION (ML) NASAL
COMMUNITY
End: 2022-01-25 | Stop reason: SDUPTHER

## 2020-01-07 NOTE — PROGRESS NOTES
"CC: This 39 y.o. White male  is here for evaluation of  T2DM along with comorbidities indicated in the Visit Diagnosis section of this encounter.    HPI: Tony Gordillo was diagnosed with T2DM in 2008. He was without health insurance for 2017 and mid 2018.         Prior visit 8/22/19  Patient has started Toujeo, Novolog, and Invokana. No complaints or noted side effects.   BGs have improved.   Plan Increase Novolog to 30 units before dinner.   Monitor glucose 4x/day.   Return to clinic in 3 months with labs prior. a1c today.         Interval history  His A1c dropped from 9.1 in August to 8.2% in November.   He was going to have left knee surgery later this month but his insurance does not cover the place where he had it scheduled. Will need to find another surgeon.   Couldn't keep his BGs down despite low carb intake a couple of days ago.         LAST DIABETES EDUCATION: none     HOSPITALIZED FOR DIABETES -  No.    PRESCRIBED DIABETES MEDICATIONS:  Toujeo Max 50 units bid, Novolog 25-25-30 units ac with correction scale -  Takes + 5 units for every 50 mg/dL at BG > 150, Invokana 100 mg once daily     Hasn't been using the correction scale lately.     DM COMPLICATIONS: none    SIGNIFICANT DIABETES MED HISTORY:   Has previously used Novolin 70/30 morning only   glyburide--hypoglycemia  Victoza - nausea and vomiting at 1.2 mg; felt "run down" at lowest dose of 0.6 mg   Metformin - diarrhea and vomiting (has not tried metformin XR)     SELF MONITORING BLOOD GLUCOSE: Checks blood glucose at home -8x/day. uses  Freestyle Arleth CGM. See report in Media.     Freestyle Arleth CGM interpretation: a lot of postprandial elevations especially after dinner. No hypoglycemia. Lowest BGs overnight.     HYPOGLYCEMIC EPISODES: two episodes noted from CGM report. Pt unsure what triggered those events.      CURRENT DIET: drinks water mostly. Sometimes coffee at work.  Eats breakfast (hernandez/eggs, sandwich, occasionally cereal and " milk), lunch is at 11 am to 1 pm at work, snacks around 4 pm and then eats dinner at 6-7 pm at home.     CURRENT EXERCISE: At home, he walks 4-5 miles about 3 times a week. At work 45 minutes on the treadmill at work.     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   MS: + left knee pain r/t torn meniscus        PHYSICAL EXAM:  GENERAL: Well developed, well nourished. No acute distress.   PSYCH: AAOx3, appropriate mood and affect, conversant, well-groomed. Judgement and insight good.   NEURO: Cranial nerves grossly intact. Speech clear, no tremor.   CHEST: Respirations even and unlabored.         Hemoglobin A1C   Date Value Ref Range Status   11/21/2019 8.2 (H) 4.0 - 5.6 % Final     Comment:     ADA Screening Guidelines:  5.7-6.4%  Consistent with prediabetes  >or=6.5%  Consistent with diabetes  High levels of fetal hemoglobin interfere with the HbA1C  assay. Heterozygous hemoglobin variants (HbS, HgC, etc)do  not significantly interfere with this assay.   However, presence of multiple variants may affect accuracy.     08/22/2019 9.1 (H) 4.0 - 5.6 % Final     Comment:     ADA Screening Guidelines:  5.7-6.4%  Consistent with prediabetes  >or=6.5%  Consistent with diabetes  High levels of fetal hemoglobin interfere with the HbA1C  assay. Heterozygous hemoglobin variants (HbS, HgC, etc)do  not significantly interfere with this assay.   However, presence of multiple variants may affect accuracy.     06/26/2019 12.7 (H) 4.0 - 5.6 % Final     Comment:     ADA Screening Guidelines:  5.7-6.4%  Consistent with prediabetes  >or=6.5%  Consistent with diabetes  High levels of fetal hemoglobin interfere with the HbA1C  assay. Heterozygous hemoglobin variants (HbS,  HgC, etc)do  not significantly interfere with this assay.   However, presence of multiple variants may affect accuracy.             Chemistry        Component Value Date/Time     07/18/2019 1156    K 4.8 07/18/2019 1156     07/18/2019 1156    CO2 26 07/18/2019 1156    BUN 12 07/18/2019 1156    CREATININE 0.9 07/18/2019 1156     (H) 07/18/2019 1156        Component Value Date/Time    CALCIUM 10.2 07/18/2019 1156    ALKPHOS 68 07/18/2019 1156    AST 38 07/18/2019 1156    ALT 54 (H) 07/18/2019 1156    BILITOT 0.4 07/18/2019 1156    ESTGFRAFRICA >60.0 07/18/2019 1156    EGFRNONAA >60.0 07/18/2019 1156          Lab Results   Component Value Date    LDLCALC 107.6 11/21/2019           Lab Results   Component Value Date    MICALBCREAT Unable to calculate 11/21/2019             ASSESSMENT and PLAN:    A1C GOAL: < 7 %     1. Type 2 diabetes mellitus without complication, with long-term current use of insulin  Increase Novolog to 30-30-36 units before each meal.   Increase Invokana to 300 mg once daily.   Patient declines using metformin ER due to severe GI s/e in the past.   Will try Actos at low dose 15 mg once daily. Discussed s/e and risks.   Test glucose 4x/day.   Return to clinic in 3 months with labs prior. Call or message with any issues.     canagliflozin (INVOKANA) 300 mg Tab tablet    insulin aspart, niacinamide, (FIASP FLEXTOUCH U-100 INSULIN) 100 unit/mL (3 mL) InPn    pioglitazone (ACTOS) 15 MG tablet    Hemoglobin A1c   2. Morbid obesity, unspecified obesity type  Increases insulin resistance.      3. Essential hypertension  F/u with PCP    4. Hyperlipidemia, unspecified hyperlipidemia type  atorvastatin (LIPITOR) 20 MG tablet    Lipid panel    Hepatic function panel       Orders Placed This Encounter   Procedures    Hemoglobin A1c     Standing Status:   Future     Standing Expiration Date:   3/7/2021    Lipid panel     Standing Status:   Future     Standing Expiration Date:   1/6/2021     Hepatic function panel     Standing Status:   Future     Standing Expiration Date:   3/7/2021        Follow up in about 3 months (around 4/7/2020).

## 2020-01-07 NOTE — LETTER
January 7, 2020    Dr. Hewitt               [facility]  [address] [place] [address]  Phone: [phone]  Fax: [phone] January 7, 2020     Patient: Tony Gordillo    YOB: 1980   Date of Visit: 1/7/2020     AUTHORIZATION FOR RELEASE OF   CONFIDENTIAL INFORMATION     Dear Dr. Hewitt/staff,     We are seeing Tony Gordillo, date of birth 1980, in the clinic at [facility]/ INTERNAL MED/ PEDS. Jovany Ford MD is the patient's PCP. Tony Gordillo  has an outstanding lab/procedure at the time we reviewed him chart. In order to help keep his health information updated, he has authorized us to request the following medical record(s):          (  )  MAMMOGRAM                                      (  )  COLONOSCOPY      (  )  PAP SMEAR                                          (  )  OUTSIDE LAB RESULTS      (  )  DEXA SCAN                                          ( x )  EYE EXAM             (  )  FOOT EXAM                                          (  )  ENTIRE RECORD      (  )  OUTSIDE IMMUNIZATIONS                 (  )  _______________            Please fax records to [facility], Marvin P Dair, MD,  [phone]   [address] [place] [address]         If you have any questions, please contact River Montiel MA at [phone].

## 2020-01-07 NOTE — PATIENT INSTRUCTIONS
Increase Novolog to 30-30-36 units before each meal.   Increase Invokana to 300 mg once daily.   Patient declines using metformin ER due to severe GI s/e in the past.   Will try Actos at low dose 15 mg once daily. Discussed s/e and risks.   Test glucose 4x/day.   Return to clinic in 3 months with labs prior. Call or message with any issues.             Actos - makes insulin work better.   Side effects: weight gain, swelling. Risks: congestive heart failure and bladder cancer risk (conflicting reports);  fractures in women     Can help SHORT

## 2020-01-08 ENCOUNTER — TELEPHONE (OUTPATIENT)
Dept: ORTHOPEDICS | Facility: CLINIC | Age: 40
End: 2020-01-08

## 2020-01-08 ENCOUNTER — TELEPHONE (OUTPATIENT)
Dept: ENDOCRINOLOGY | Facility: CLINIC | Age: 40
End: 2020-01-08

## 2020-01-08 DIAGNOSIS — E11.9 TYPE 2 DIABETES MELLITUS WITHOUT COMPLICATION, WITH LONG-TERM CURRENT USE OF INSULIN: ICD-10-CM

## 2020-01-08 DIAGNOSIS — Z79.4 TYPE 2 DIABETES MELLITUS WITHOUT COMPLICATION, WITH LONG-TERM CURRENT USE OF INSULIN: ICD-10-CM

## 2020-01-08 NOTE — TELEPHONE ENCOUNTER
Called patient back patient states that he had medical release signed and wanted to know if the office received the medical records, I let the patient know that we have not received anything and we would continue to look for them and call him back if we do or do not receive anything before his appointment I also asked the patient to call us if he hasn't heard anything from us and he can also get his most recent mri on a disc with the report just incase the records are not available to be on the safe side patient states understand and also states he will call us a few days before the appointment to see if the medical records were sent

## 2020-01-08 NOTE — TELEPHONE ENCOUNTER
Returned call for x pharmacy and spoke with Rajendra. She stated that due to Mr. Gordillo's insurance he has to use this specific pharmacy. She stated that the pt informed her that the Invokana was sent to [facility] she has been trying to get [facility] on the phone to transfer the Rx over, but was unsuccessful with getting someone on the phone. The pharmacy would like to know can the Rx be resent to them instead.

## 2020-01-08 NOTE — TELEPHONE ENCOUNTER
----- Message from Tiffanie  sent at 1/8/2020  9:03 AM CST -----  Contact: Rajendra [facility]  Type: Needs Medical Advice    Who Called:      Best Call Back Number:     Additional Information: Requesting a call back from Nurse, regarding a refill for RX canagliflozin (INVOKANA) 300 mg Tab tablet , pharmacy needs Nurse to call back for RX to be filled ,please call ASAP

## 2020-01-08 NOTE — TELEPHONE ENCOUNTER
----- Message from Catracho Mckeon sent at 1/8/2020  1:10 PM CST -----  Contact: pt  Type: Needs Medical Advice    Who Called:  pt    Best Call Back Number: [phone]  Additional Information: pt is requseting a call when his medical release has been received. Pt is wanting to discuss.

## 2020-01-09 ENCOUNTER — PATIENT MESSAGE (OUTPATIENT)
Dept: FAMILY MEDICINE | Facility: CLINIC | Age: 40
End: 2020-01-09

## 2020-01-09 DIAGNOSIS — M25.562 CHRONIC PAIN OF BOTH KNEES: Primary | ICD-10-CM

## 2020-01-09 DIAGNOSIS — M25.561 CHRONIC PAIN OF BOTH KNEES: Primary | ICD-10-CM

## 2020-01-09 DIAGNOSIS — G89.29 CHRONIC PAIN OF BOTH KNEES: Primary | ICD-10-CM

## 2020-01-10 ENCOUNTER — TELEPHONE (OUTPATIENT)
Dept: ORTHOPEDICS | Facility: CLINIC | Age: 40
End: 2020-01-10

## 2020-01-10 NOTE — TELEPHONE ENCOUNTER
"----- Message from Tal Webb sent at 1/10/2020  4:44 PM CST -----  Contact: pt   Type: Needs Medical Advice    Who Called:  Pt   Symptoms (please be specific):    How long has patient had these symptoms:    Pharmacy name and phone #:    Best Call Back Number: [phone]   Additional Information: pt called wanted to know if you guys received records from [facility] in [place]. From "". Pt says he thinks they sent them 01/09/2020     "

## 2020-01-10 NOTE — TELEPHONE ENCOUNTER
Notified patient that Dr. Rizo's staff is gone for the day and someone will return his call on Monday. Patient stated understanding.

## 2020-01-13 ENCOUNTER — OFFICE VISIT (OUTPATIENT)
Dept: ORTHOPEDICS | Facility: CLINIC | Age: 40
End: 2020-01-13
Payer: COMMERCIAL

## 2020-01-13 ENCOUNTER — HOSPITAL ENCOUNTER (OUTPATIENT)
Dept: RADIOLOGY | Facility: HOSPITAL | Age: 40
Discharge: HOME | End: 2020-01-13
Attending: ORTHOPAEDIC SURGERY
Payer: COMMERCIAL

## 2020-01-13 VITALS — BODY MASS INDEX: 41.75 KG/M2 | HEIGHT: 73 IN | WEIGHT: 315 LBS

## 2020-01-13 DIAGNOSIS — M25.562 CHRONIC PAIN OF LEFT KNEE: Primary | ICD-10-CM

## 2020-01-13 DIAGNOSIS — Z71.2 ENCOUNTER TO DISCUSS TEST RESULTS: ICD-10-CM

## 2020-01-13 DIAGNOSIS — G89.29 CHRONIC PAIN OF BOTH KNEES: ICD-10-CM

## 2020-01-13 DIAGNOSIS — M25.562 CHRONIC PAIN OF BOTH KNEES: ICD-10-CM

## 2020-01-13 DIAGNOSIS — Z98.890 HISTORY OF REPAIR OF ACL: ICD-10-CM

## 2020-01-13 DIAGNOSIS — M17.12 PRIMARY OSTEOARTHRITIS OF LEFT KNEE: ICD-10-CM

## 2020-01-13 DIAGNOSIS — M25.561 CHRONIC PAIN OF BOTH KNEES: ICD-10-CM

## 2020-01-13 DIAGNOSIS — G89.29 CHRONIC PAIN OF LEFT KNEE: Primary | ICD-10-CM

## 2020-01-13 DIAGNOSIS — S83.242A ACUTE MEDIAL MENISCUS TEAR OF LEFT KNEE, INITIAL ENCOUNTER: ICD-10-CM

## 2020-01-13 PROCEDURE — 99214 PR OFFICE/OUTPT VISIT, EST, LEVL IV, 30-39 MIN: ICD-10-PCS | Mod: S$GLB,,, | Performed by: ORTHOPAEDIC SURGERY

## 2020-01-13 PROCEDURE — 99999 PR PBB SHADOW E&M-EST. PATIENT-LVL III: ICD-10-PCS | Mod: PBBFAC,,, | Performed by: ORTHOPAEDIC SURGERY

## 2020-01-13 PROCEDURE — 73562 XR KNEE ORTHO BILAT: ICD-10-PCS | Mod: 26,50,, | Performed by: RADIOLOGY

## 2020-01-13 PROCEDURE — 99214 OFFICE O/P EST MOD 30 MIN: CPT | Mod: S$GLB,,, | Performed by: ORTHOPAEDIC SURGERY

## 2020-01-13 PROCEDURE — 99999 PR PBB SHADOW E&M-EST. PATIENT-LVL III: CPT | Mod: PBBFAC,,, | Performed by: ORTHOPAEDIC SURGERY

## 2020-01-13 PROCEDURE — 73562 X-RAY EXAM OF KNEE 3: CPT | Mod: TC,50,PO

## 2020-01-13 PROCEDURE — 73562 X-RAY EXAM OF KNEE 3: CPT | Mod: 26,50,, | Performed by: RADIOLOGY

## 2020-01-13 NOTE — PROGRESS NOTES
39 years old complaining of pain in his left knee. He has had this now for a few months time. He has a history of an ACL reconstruction with an allograft revision done by myself in 2013.  States that his biggest problem is his knee giving way and knee pain.  Pain is 2/10 on good days 8/10 on bad days.    Exam shows well-healed scar, Lachman test and a soft endpoint but only about 4 mm translation    X-rays show well placed tunnels with degenerative changes noted in the knee.  Has an MRI report that reads rerupture of ACL graft with diffuse tear of the body posterior horn medial meniscus    Assessment degenerative disease the knee after ACL reconstruction    Plan:  We had a lengthy discussion about different treatment options and his symptoms.  At this point I feel that continued non operative measures is a good option.  We will see if we can get him set up for viscosupplementation into that left knee in the form of Euflexxa    Further History  Aching pain  Worse with activity  Relieved with rest  No other associated symptoms  No other radiation    Further Exam  Alert and oriented  Pleasant  Contralateral limb has appropriate range of motion for age and condition  Contralateral limb has appropriate strength for age and condition  Contralateral limb has appropriate stability  for age and condition  No adenopathy  Pulses are appropriate for current condition  Skin is intact        Chief Complaint    Chief Complaint   Patient presents with    Left Knee - Pain       HPI  Tony Gordillo is a 39 y.o.  male who presents with       Past Medical History  Past Medical History:   Diagnosis Date    Diabetes mellitus, type 2     Hyperlipidemia     Hypertension        Past Surgical History  Past Surgical History:   Procedure Laterality Date    ANKLE SURGERY      KNEE SURGERY      acl,mcl,pcl    left knee x 2         Medications  Current Outpatient Medications   Medication Sig    acyclovir (ZOVIRAX) 400 MG tablet  acyclovir 400 mg tablet    atorvastatin (LIPITOR) 20 MG tablet Take 1 tablet (20 mg total) by mouth once daily.    azelastine 0.15 % (205.5 mcg) Spry azelastine 0.15 % (205.5 mcg) nasal spray   INHALE 2 SPRAYS TO EACH NOSTRIL TWICE A DAY    baclofen (LIORESAL) 10 MG tablet baclofen 10 mg tablet    blood sugar diagnostic (BLOOD GLUCOSE TEST) Strp OneTouch Ultra Test strips    blood sugar diagnostic Strp To check BG 4 times daily, to use with insurance preferred meter    blood-glucose meter (ONETOUCH ULTRA2 METER) kit OneTouch Ultra2 Meter kit    blood-glucose meter kit To check BG 4 times daily, to use with insurance preferred meter    canagliflozin (INVOKANA) 300 mg Tab tablet Take 1 tablet (300 mg total) by mouth once daily.    fenofibrate 160 MG Tab fenofibrate 160 mg tablet    flash glucose scanning reader (FREESTYLE SAMANTHA 14 DAY READER) [facility] Freestyle Samantha 14-day reader. To be used with Freestyle Samantha 14-day sensors    flash glucose sensor (FREESTYLE SAMANHTA 14 DAY SENSOR) Kit Please change sensor every 14 days. Freestyle Samatnha Sensor 30-day supply, 2 sensors/month    fluticasone propionate (FLONASE) 50 mcg/actuation nasal spray fluticasone propionate 50 mcg/actuation nasal spray,suspension    fluticasone propionate (FLONASE) 50 mcg/actuation nasal spray fluticasone propionate 50 mcg/actuation nasal spray,suspension    ibuprofen (ADVIL,MOTRIN) 800 MG tablet Take 1 tablet (800 mg total) by mouth every 8 (eight) hours as needed for Pain. (Patient not taking: Reported on 1/7/2020)    insulin aspart U-100 (NOVOLOG U-100 INSULIN ASPART) 100 unit/mL injection Novolog U-100 Insulin aspart   SSI    insulin aspart, niacinamide, (FIASP FLEXTOUCH U-100 INSULIN) 100 unit/mL (3 mL) InPn Inject 30-30-36 units before meals with ss; max dose 120 units/day    insulin glargine (LANTUS U-100 INSULIN) 100 unit/mL injection Lantus U-100 Insulin   40units at night before bed    insulin glargine U-300 conc (TOUJEO  "MAX U-300 SOLOSTAR) 300 unit/mL (3 mL) InPn Inject 50 Units into the skin 2 (two) times daily.    insulin syringe-needle U-100 (SURE COMFORT INSULIN SYRINGE) 1 mL 31 gauge x 5/16 Syrg Sure Comfort Insulin Syringe 1 mL 31 gauge x 5/16"   USE FIVE TIMES DAILY WITH INSULINS    insulin syringe-needle U-100 1 mL 31 gauge x 15/64" Syrg insulin syrg mis 1ml/31gsure comfort insulin syringe/u-100/1ml/31g x 5/16"    ketoconazole (NIZORAL) 2 % cream Apply topically once daily.    lancets [facility] To check BG 4 times daily, to use with insurance preferred meter    levothyroxine (SYNTHROID) 50 MCG tablet levothyroxine 50 mcg tablet   TAKE ONE TABLET BY MOUTH EVERY DAY    lisinopril 10 MG tablet Take 1 tablet (10 mg total) by mouth once daily.    loratadine (CLARITIN) 10 mg tablet Take 1 tablet (10 mg total) by mouth once daily.    meloxicam (MOBIC) 7.5 MG tablet meloxicam 7.5 mg tablet   TAKE 1 TABLET TWICE DAILY AS NEEDED    montelukast (SINGULAIR) 10 mg tablet TAKE 1 TABLET BY MOUTH EVERY EVENING    needle, disp, 25 gauge 25 gauge x 1" Ndle Testosterone every 2 weeks    pen needle, diabetic (BD ULTRA-FINE MINI PEN NEEDLE) 31 gauge x 3/16" Ndle BD Ultra-Fine Mini Pen Needle 31 gauge x 3/16"   INJECT 1 EACH INTO THE SKIN 3 (THREE) TIMES DAILY BEFORE MEALS AND AT BEDTIME    pen needle, diabetic (BD ULTRA-FINE KEN PEN NEEDLE) 32 gauge x 5/32" Ndle 1 Device by Misc.(Non-Drug; Combo Route) route 5 (five) times daily.    pioglitazone (ACTOS) 15 MG tablet Take 1 tablet (15 mg total) by mouth once daily.    safety needles (BD SAFETYGLIDE NEEDLE) 21 gauge x 1" Ndle Testosterone injection every 2 weeks    syringe, disposable, 1 mL Syrg Testosterone every 2 weeks    testosterone cypionate (DEPOTESTOTERONE CYPIONATE) 200 mg/mL injection Inject 1 mL (200 mg total) into the muscle every 14 (fourteen) days.     No current facility-administered medications for this visit.        Allergies  Review of patient's allergies indicates: "   Allergen Reactions    Influenza virus vaccine, specific Hives    Victoza [liraglutide] Nausea And Vomiting    Farxiga [dapagliflozin] Rash     Erectile dysfunction and rash        Family History  Family History   Problem Relation Age of Onset    Diabetes Mother     Diabetes Father     No Known Problems Brother     Autism Son     No Known Problems Daughter        Social History  Social History     Socioeconomic History    Marital status:      Spouse name: Not on file    Number of children: Not on file    Years of education: Not on file    Highest education level: Not on file   Occupational History    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     Employer: [employer]   Social Needs    Financial resource strain: Not very hard    Food insecurity:     Worry: Sometimes true     Inability: Patient refused    Transportation needs:     Medical: No     Non-medical: No   Tobacco Use    Smoking status: Never Smoker    Smokeless tobacco: Never Used   Substance and Sexual Activity    Alcohol use: Yes     Frequency: Monthly or less     Drinks per session: 1 or 2     Binge frequency: Never     Comment: 1-2 beers every 2 weeks    Drug use: No    Sexual activity: Not on file   Lifestyle    Physical activity:     Days per week: 4 days     Minutes per session: 40 min    Stress: Not at all   Relationships    Social connections:     Talks on phone: More than three times a week     Gets together: Twice a week     Attends Je[employer] witness service: Not on file     Active member of club or organization: Patient refused     Attends meetings of clubs or organizations: Patient refused     Relationship status:    Other Topics Concern    Not on file   Social History Narrative    Not on file               Review of Systems     Constitutional: Negative    HENT: Negative  Eyes: Negative  Respiratory: Negative  Cardiovascular: Negative  Musculoskeletal: HPI  Skin:  Negative  Neurological: Negative  Hematological: Negative  Endocrine: Negative                 Physical Exam    There were no vitals filed for this visit.  Body mass index is 42.88 kg/m².  Physical Examination:     General appearance -  well appearing, and in no distress  Mental status - awake  Neck - supple  Chest -  symmetric air entry  Heart - normal rate   Abdomen - soft      Assessment     1. Chronic pain of left knee    2. Encounter to discuss test results    3. History of repair of ACL    4. Acute medial meniscus tear of left knee, initial encounter    5. Primary osteoarthritis of left knee          Plan

## 2020-01-17 ENCOUNTER — PATIENT OUTREACH (OUTPATIENT)
Dept: ADMINISTRATIVE | Facility: HOSPITAL | Age: 40
End: 2020-01-17

## 2020-01-21 ENCOUNTER — TELEPHONE (OUTPATIENT)
Dept: ORTHOPEDICS | Facility: CLINIC | Age: 40
End: 2020-01-21

## 2020-01-21 NOTE — TELEPHONE ENCOUNTER
Spoke to patient states his company is requesting billing codes to try and get authorization. Codes given as requested by patient.  ----- Message from Wallace Castellano MA sent at 1/21/2020  2:24 PM CST -----  Contact: Patient  Patient needs to know the code used for the injection that was denied by his ins company, his employer's ins will cover it  Call back

## 2020-01-31 ENCOUNTER — OFFICE VISIT (OUTPATIENT)
Dept: ORTHOPEDICS | Facility: CLINIC | Age: 40
End: 2020-01-31
Payer: COMMERCIAL

## 2020-01-31 VITALS — BODY MASS INDEX: 41.75 KG/M2 | WEIGHT: 315 LBS | HEIGHT: 73 IN

## 2020-01-31 DIAGNOSIS — M17.12 PRIMARY OSTEOARTHRITIS OF LEFT KNEE: ICD-10-CM

## 2020-01-31 DIAGNOSIS — M25.562 CHRONIC PAIN OF LEFT KNEE: Primary | ICD-10-CM

## 2020-01-31 DIAGNOSIS — G89.29 CHRONIC PAIN OF LEFT KNEE: Primary | ICD-10-CM

## 2020-01-31 PROCEDURE — 99499 NO LOS: ICD-10-PCS | Mod: S$GLB,,, | Performed by: ORTHOPAEDIC SURGERY

## 2020-01-31 PROCEDURE — 20610 DRAIN/INJ JOINT/BURSA W/O US: CPT | Mod: LT,S$GLB,, | Performed by: ORTHOPAEDIC SURGERY

## 2020-01-31 PROCEDURE — 99499 UNLISTED E&M SERVICE: CPT | Mod: S$GLB,,, | Performed by: ORTHOPAEDIC SURGERY

## 2020-01-31 PROCEDURE — 99999 PR PBB SHADOW E&M-EST. PATIENT-LVL IV: ICD-10-PCS | Mod: PBBFAC,,, | Performed by: ORTHOPAEDIC SURGERY

## 2020-01-31 PROCEDURE — 20610 LARGE JOINT ASPIRATION/INJECTION: L KNEE: ICD-10-PCS | Mod: LT,S$GLB,, | Performed by: ORTHOPAEDIC SURGERY

## 2020-01-31 PROCEDURE — 99999 PR PBB SHADOW E&M-EST. PATIENT-LVL IV: CPT | Mod: PBBFAC,,, | Performed by: ORTHOPAEDIC SURGERY

## 2020-01-31 NOTE — LETTER
January 31, 2020      Kate Mansfield, MARION  [address] [facility]  [place] [address]           [facility]  [address] [place] [address]  Phone: [phone]          Patient: Tony Gordillo   MR Number: [id number]   YOB: 1980   Date of Visit: 1/31/2020       Dear Kate Mansfield:    Thank you for referring Tony Gordillo to me for evaluation. Attached you will find relevant portions of my assessment and plan of care.    If you have questions, please do not hesitate to call me. I look forward to following Tony Gordillo along with you.    Sincerely,    Singh Rizo MD    Enclosure  CC:  No Recipients    If you would like to receive this communication electronically, please contact [email or web address] or [phone] to request more information on Sincerely Link access.    For providers and/or their staff who would like to refer a patient to [facility], please contact us through our one-stop-shop provider referral line, Kavitha Breaux, at [phone].    If you feel you have received this communication in error or would no longer like to receive these types of communications, please e-mail [email or web address]

## 2020-01-31 NOTE — PROCEDURES
Large Joint Aspiration/Injection: L knee  Date/Time: 1/31/2020 1:30 PM  Performed by: Singh Rizo MD  Authorized by: Singh Rizo MD     Consent Done?:  Yes (Verbal)  Indications:  Pain  Procedure site marked: Yes    Timeout: Prior to procedure the correct patient, procedure, and site was verified      Location:  Knee  Site:  L knee  Prep: Patient was prepped and draped in usual sterile fashion    Needle size:  21 G  Ultrasonic Guidance for needle placement: No  Approach:  Anterolateral  Medications:  20 mg sodium hyaluronate (EUFLEXXA) 10 mg/mL(mw 2.4 -3.6 million)  Patient tolerance:  Patient tolerated the procedure well with no immediate complications

## 2020-02-06 ENCOUNTER — OFFICE VISIT (OUTPATIENT)
Dept: ORTHOPEDICS | Facility: CLINIC | Age: 40
End: 2020-02-06
Payer: COMMERCIAL

## 2020-02-06 VITALS — WEIGHT: 315 LBS | BODY MASS INDEX: 41.75 KG/M2 | HEIGHT: 73 IN

## 2020-02-06 DIAGNOSIS — G89.29 CHRONIC PAIN OF LEFT KNEE: Primary | ICD-10-CM

## 2020-02-06 DIAGNOSIS — M17.12 PRIMARY OSTEOARTHRITIS OF LEFT KNEE: ICD-10-CM

## 2020-02-06 DIAGNOSIS — M25.562 CHRONIC PAIN OF LEFT KNEE: Primary | ICD-10-CM

## 2020-02-06 PROCEDURE — 99499 NO LOS: ICD-10-PCS | Mod: S$GLB,,, | Performed by: ORTHOPAEDIC SURGERY

## 2020-02-06 PROCEDURE — 99499 UNLISTED E&M SERVICE: CPT | Mod: S$GLB,,, | Performed by: ORTHOPAEDIC SURGERY

## 2020-02-06 PROCEDURE — 99999 PR PBB SHADOW E&M-EST. PATIENT-LVL IV: ICD-10-PCS | Mod: PBBFAC,,, | Performed by: ORTHOPAEDIC SURGERY

## 2020-02-06 PROCEDURE — 99999 PR PBB SHADOW E&M-EST. PATIENT-LVL IV: CPT | Mod: PBBFAC,,, | Performed by: ORTHOPAEDIC SURGERY

## 2020-02-06 PROCEDURE — 20610 DRAIN/INJ JOINT/BURSA W/O US: CPT | Mod: LT,S$GLB,, | Performed by: ORTHOPAEDIC SURGERY

## 2020-02-06 PROCEDURE — 20610 LARGE JOINT ASPIRATION/INJECTION: L KNEE: ICD-10-PCS | Mod: LT,S$GLB,, | Performed by: ORTHOPAEDIC SURGERY

## 2020-02-06 NOTE — PROCEDURES
Large Joint Aspiration/Injection: L knee  Performed by: Singh Rizo MD  Authorized by: Singh Rizo MD  Date/Time: 2/6/2020 3:45 PM    Consent Done?:  Yes (Verbal)  Indications:   Timeout: Immediately prior to procedure a time out was called to verify the correct patient, procedure, equipment, support staff and site/side marked as required.  Prep:Patient was prepped and draped in the usual sterile fashion.        Details:   Needle size: 21 G   Approach: anterolateral  Location:  Knee  Site:  L knee    Medications: 20 mg sodium hyaluronate (EUFLEXXA) 10 mg/mL(mw 2.4 -3.6 million)  Patient tolerance:  patient tolerated the procedure well with no immediate complications

## 2020-02-12 ENCOUNTER — OFFICE VISIT (OUTPATIENT)
Dept: ORTHOPEDICS | Facility: CLINIC | Age: 40
End: 2020-02-12
Payer: COMMERCIAL

## 2020-02-12 VITALS — WEIGHT: 315 LBS | BODY MASS INDEX: 41.75 KG/M2 | HEIGHT: 73 IN

## 2020-02-12 DIAGNOSIS — M17.12 PRIMARY OSTEOARTHRITIS OF LEFT KNEE: ICD-10-CM

## 2020-02-12 DIAGNOSIS — G89.29 CHRONIC PAIN OF LEFT KNEE: Primary | ICD-10-CM

## 2020-02-12 DIAGNOSIS — M25.562 CHRONIC PAIN OF LEFT KNEE: Primary | ICD-10-CM

## 2020-02-12 PROCEDURE — 20610 DRAIN/INJ JOINT/BURSA W/O US: CPT | Mod: LT,S$GLB,, | Performed by: ORTHOPAEDIC SURGERY

## 2020-02-12 PROCEDURE — 99999 PR PBB SHADOW E&M-EST. PATIENT-LVL IV: ICD-10-PCS | Mod: PBBFAC,,, | Performed by: ORTHOPAEDIC SURGERY

## 2020-02-12 PROCEDURE — 20610 LARGE JOINT ASPIRATION/INJECTION: L KNEE: ICD-10-PCS | Mod: LT,S$GLB,, | Performed by: ORTHOPAEDIC SURGERY

## 2020-02-12 PROCEDURE — 99499 NO LOS: ICD-10-PCS | Mod: S$GLB,,, | Performed by: ORTHOPAEDIC SURGERY

## 2020-02-12 PROCEDURE — 99499 UNLISTED E&M SERVICE: CPT | Mod: S$GLB,,, | Performed by: ORTHOPAEDIC SURGERY

## 2020-02-12 PROCEDURE — 99999 PR PBB SHADOW E&M-EST. PATIENT-LVL IV: CPT | Mod: PBBFAC,,, | Performed by: ORTHOPAEDIC SURGERY

## 2020-03-06 ENCOUNTER — PATIENT MESSAGE (OUTPATIENT)
Dept: FAMILY MEDICINE | Facility: CLINIC | Age: 40
End: 2020-03-06

## 2020-03-08 ENCOUNTER — PATIENT MESSAGE (OUTPATIENT)
Dept: FAMILY MEDICINE | Facility: CLINIC | Age: 40
End: 2020-03-08

## 2020-03-09 NOTE — TELEPHONE ENCOUNTER
Lab appt 3/31.  Will be injection 3/29  If low - will need to increase  If high - would not change but repeat in the future

## 2020-03-21 DIAGNOSIS — I10 ESSENTIAL HYPERTENSION: ICD-10-CM

## 2020-03-22 RX ORDER — LISINOPRIL 10 MG/1
TABLET ORAL
Qty: 90 TABLET | Refills: 1 | Status: SHIPPED | OUTPATIENT
Start: 2020-03-22 | End: 2020-09-09

## 2020-03-23 ENCOUNTER — LAB VISIT (OUTPATIENT)
Dept: LAB | Facility: HOSPITAL | Age: 40
End: 2020-03-23
Attending: INTERNAL MEDICINE
Payer: COMMERCIAL

## 2020-03-23 DIAGNOSIS — E78.5 HYPERLIPIDEMIA, UNSPECIFIED HYPERLIPIDEMIA TYPE: ICD-10-CM

## 2020-03-23 DIAGNOSIS — E11.9 TYPE 2 DIABETES MELLITUS WITHOUT COMPLICATION, WITH LONG-TERM CURRENT USE OF INSULIN: ICD-10-CM

## 2020-03-23 DIAGNOSIS — Z79.4 TYPE 2 DIABETES MELLITUS WITHOUT COMPLICATION, WITH LONG-TERM CURRENT USE OF INSULIN: ICD-10-CM

## 2020-03-23 LAB
ALBUMIN SERPL BCP-MCNC: 4 G/DL (ref 3.5–5.2)
ALP SERPL-CCNC: 67 U/L (ref 55–135)
ALT SERPL W/O P-5'-P-CCNC: 25 U/L (ref 10–44)
AST SERPL-CCNC: 19 U/L (ref 10–40)
BILIRUB DIRECT SERPL-MCNC: 0.3 MG/DL (ref 0.1–0.3)
BILIRUB SERPL-MCNC: 0.8 MG/DL (ref 0.1–1)
CHOLEST SERPL-MCNC: 183 MG/DL (ref 120–199)
CHOLEST/HDLC SERPL: 5.5 {RATIO} (ref 2–5)
ESTIMATED AVG GLUCOSE: 209 MG/DL (ref 68–131)
HBA1C MFR BLD HPLC: 8.9 % (ref 4–5.6)
HDLC SERPL-MCNC: 33 MG/DL (ref 40–75)
HDLC SERPL: 18 % (ref 20–50)
LDLC SERPL CALC-MCNC: 90.6 MG/DL (ref 63–159)
NONHDLC SERPL-MCNC: 150 MG/DL
PROT SERPL-MCNC: 7.9 G/DL (ref 6–8.4)
TRIGL SERPL-MCNC: 297 MG/DL (ref 30–150)

## 2020-03-23 PROCEDURE — 80061 LIPID PANEL: CPT

## 2020-03-23 PROCEDURE — 80076 HEPATIC FUNCTION PANEL: CPT

## 2020-03-23 PROCEDURE — 36415 COLL VENOUS BLD VENIPUNCTURE: CPT | Mod: PO

## 2020-03-23 PROCEDURE — 83036 HEMOGLOBIN GLYCOSYLATED A1C: CPT

## 2020-03-25 ENCOUNTER — PATIENT MESSAGE (OUTPATIENT)
Dept: FAMILY MEDICINE | Facility: CLINIC | Age: 40
End: 2020-03-25

## 2020-03-26 ENCOUNTER — PATIENT MESSAGE (OUTPATIENT)
Dept: FAMILY MEDICINE | Facility: CLINIC | Age: 40
End: 2020-03-26

## 2020-03-26 DIAGNOSIS — J01.90 ACUTE NON-RECURRENT SINUSITIS, UNSPECIFIED LOCATION: Primary | ICD-10-CM

## 2020-03-26 RX ORDER — AMOXICILLIN AND CLAVULANATE POTASSIUM 875; 125 MG/1; MG/1
1 TABLET, FILM COATED ORAL 2 TIMES DAILY
Qty: 20 TABLET | Refills: 0 | Status: SHIPPED | OUTPATIENT
Start: 2020-03-26 | End: 2020-04-06 | Stop reason: ALTCHOICE

## 2020-03-26 NOTE — TELEPHONE ENCOUNTER
Spoke with the patient, has been taking Tylenol, temperature 99°.  Sinus congestion, cough, 4 days.  No chest congestion or dyspnea.  He does work in emergency services.  I will send in Augmentin.  If he develops fever.  Chest congestion.  He needs to contact me-I would set up Covid19 testing.

## 2020-03-29 ENCOUNTER — PATIENT MESSAGE (OUTPATIENT)
Dept: FAMILY MEDICINE | Facility: CLINIC | Age: 40
End: 2020-03-29

## 2020-03-30 NOTE — TELEPHONE ENCOUNTER
Respiratory infection symptoms improving.  Unfortunately testosterone labs were not drawn with recent diabetes labs.  There are pending and and future labs I have asked him to send me a message so we can link up my labs to any future labs that diabetes nurse practitioner maybe ordering in the future.

## 2020-04-06 ENCOUNTER — OFFICE VISIT (OUTPATIENT)
Dept: ENDOCRINOLOGY | Facility: CLINIC | Age: 40
End: 2020-04-06
Payer: COMMERCIAL

## 2020-04-06 ENCOUNTER — PATIENT MESSAGE (OUTPATIENT)
Dept: ENDOCRINOLOGY | Facility: CLINIC | Age: 40
End: 2020-04-06

## 2020-04-06 DIAGNOSIS — E11.9 TYPE 2 DIABETES MELLITUS WITHOUT COMPLICATION, WITH LONG-TERM CURRENT USE OF INSULIN: Primary | ICD-10-CM

## 2020-04-06 DIAGNOSIS — Z79.4 TYPE 2 DIABETES MELLITUS WITHOUT COMPLICATION, WITH LONG-TERM CURRENT USE OF INSULIN: Primary | ICD-10-CM

## 2020-04-06 DIAGNOSIS — E66.01 MORBID OBESITY, UNSPECIFIED OBESITY TYPE: ICD-10-CM

## 2020-04-06 DIAGNOSIS — E11.649 HYPOGLYCEMIA ASSOCIATED WITH DIABETES: ICD-10-CM

## 2020-04-06 DIAGNOSIS — E78.5 HYPERLIPIDEMIA, UNSPECIFIED HYPERLIPIDEMIA TYPE: ICD-10-CM

## 2020-04-06 PROCEDURE — 99214 PR OFFICE/OUTPT VISIT, EST, LEVL IV, 30-39 MIN: ICD-10-PCS | Mod: 95,,, | Performed by: NURSE PRACTITIONER

## 2020-04-06 PROCEDURE — 99214 OFFICE O/P EST MOD 30 MIN: CPT | Mod: 95,,, | Performed by: NURSE PRACTITIONER

## 2020-04-06 NOTE — PATIENT INSTRUCTIONS
Continue Fiasp doses. If BG continues to drop consistently after testosterone injections, then we will need to adjust Fiasp dose the nights before testosterone doses.   Continue Invokana 300 mg once daily and Toujeo 50 units twice daily.   Messaged patient with link to Immediate Savings card to be used with Fiasp.   Ideally test glucose 4x/day. Can use Relion testing supplies from [employer] over the counter.   Follow up with PCP regarding levothyroxine. Patient unsure if he should be on it or not.   Improve diet as much as you can.   Reviewed importance of DM control to avoid COVID19 severe illness. Reviewed glycemic goals.   Return to clinic in 3 months with labs prior. Call if blood sugars if are still high.

## 2020-04-06 NOTE — PROGRESS NOTES
The patient location is: home  The chief complaint leading to consultation is: type 2 diabetes   Visit type: Virtual visit with synchronous audio and video  Total time spent with patient: 34 minutes  Each patient to whom he or she provides medical services by telemedicine is:  (1) informed of the relationship between the physician and patient and the respective role of any other health care provider with respect to management of the patient; and (2) notified that he or she may decline to receive medical services by telemedicine and may withdraw from such care at any time.      CC: This 39 y.o. White male  is here for evaluation of  T2DM along with comorbidities indicated in the Visit Diagnosis section of this encounter.    HPI: Tony Gordillo was diagnosed with T2DM in 2008. He was without health insurance for 2017 and mid 2018.         Prior visit 1/7/20  His A1c dropped from 9.1 in August to 8.2% in November.   He was going to have left knee surgery later this month but his insurance does not cover the place where he had it scheduled. Will need to find another surgeon.   Couldn't keep his BGs down despite low carb intake a couple of days ago.   Plan Increase Novolog to 30-30-36 units before each meal.   Increase Invokana to 300 mg once daily.   Patient declines using metformin ER due to severe GI s/e in the past.   Will try Actos at low dose 15 mg once daily. Discussed s/e and risks.   Test glucose 4x/day.   Return to clinic in 3 months with labs prior. Call or message with any issues.         Interval history  a1c up from 8.2 to 8.9%.   He is not taking pioglitazone - it made him kim.   His Toujeo pens were broken. He didn't have any Toujeo for a month. He just resumed it 2 days ago.   Hasn't been taking levothyroxine. Doesn't know how long he's been off it.     LAST DIABETES EDUCATION: none     HOSPITALIZED FOR DIABETES -  No.    PRESCRIBED DIABETES MEDICATIONS:  Toujeo Max 50 units bid, Fiasp 30-30-36   "units ac with correction scale -  Takes + 5 units for every 50 mg/dL at BG > 150, Invokana 300 mg once daily     Hasn't been using the correction scale lately.     DM COMPLICATIONS: none    SIGNIFICANT DIABETES MED HISTORY:   Has previously used Novolin 70/30 morning only   glyburide--hypoglycemia  Victoza - nausea and vomiting at 1.2 mg; felt "run down" at lowest dose of 0.6 mg   Metformin - diarrhea and vomiting (has not tried metformin XR)   Pioglitazone - altered mood, became angry     SELF MONITORING BLOOD GLUCOSE: Checks blood glucose at home several times a day with [employer]le[employer]. Hasn't used it however in a week d/t finances. He and his wife's work hours have been cut.   Rationing his strips, testing BG 2x/day. BGs were 200s in the mornings without Toujeo. FBG today was 130.     HYPOGLYCEMIC EPISODES: BG has dropped overnight as low as 40s, even without Toujeo. He did have a beer one night preceding one of these episodes. The last 2 times it occurred, it was the day after he injected the testosterone, which he takes every 14 days on Sundays. Has also happened when carb intake for dinner was low.      CURRENT DIET: drinks water mostly. Sometimes coffee at work.  Eats breakfast (hernandez/eggs, sandwich, occasionally cereal and milk), lunch is at 11 am to 1 pm at work, snacks around 4 pm and then eats dinner at 6-7 pm at home. -- could be better     CURRENT EXERCISE: cannot work out right now because gym is closed.   Averages 10 k steps/day.     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      ROS:   CONSTITUTIONAL: Appetite good, denies fatigue  : No dysuria.   MS: + left knee pain r/t torn meniscus      PHYSICAL EXAM:  GENERAL: Well developed, well nourished. No acute distress.   PSYCH: AAOx3, appropriate mood and affect, conversant, well-groomed. Judgement and insight good.   NEURO: Cranial nerves grossly intact. Speech clear, no tremor.   CHEST: " Respirations even and unlabored.         Hemoglobin A1C   Date Value Ref Range Status   03/23/2020 8.9 (H) 4.0 - 5.6 % Final     Comment:     ADA Screening Guidelines:  5.7-6.4%  Consistent with prediabetes  >or=6.5%  Consistent with diabetes  High levels of fetal hemoglobin interfere with the HbA1C  assay. Heterozygous hemoglobin variants (HbS, HgC, etc)do  not significantly interfere with this assay.   However, presence of multiple variants may affect accuracy.     11/21/2019 8.2 (H) 4.0 - 5.6 % Final     Comment:     ADA Screening Guidelines:  5.7-6.4%  Consistent with prediabetes  >or=6.5%  Consistent with diabetes  High levels of fetal hemoglobin interfere with the HbA1C  assay. Heterozygous hemoglobin variants (HbS, HgC, etc)do  not significantly interfere with this assay.   However, presence of multiple variants may affect accuracy.     08/22/2019 9.1 (H) 4.0 - 5.6 % Final     Comment:     ADA Screening Guidelines:  5.7-6.4%  Consistent with prediabetes  >or=6.5%  Consistent with diabetes  High levels of fetal hemoglobin interfere with the HbA1C  assay. Heterozygous hemoglobin variants (HbS, HgC, etc)do  not significantly interfere with this assay.   However, presence of multiple variants may affect accuracy.             Chemistry        Component Value Date/Time     07/18/2019 1156    K 4.8 07/18/2019 1156     07/18/2019 1156    CO2 26 07/18/2019 1156    BUN 12 07/18/2019 1156    CREATININE 0.9 07/18/2019 1156     (H) 07/18/2019 1156        Component Value Date/Time    CALCIUM 10.2 07/18/2019 1156    ALKPHOS 67 03/23/2020 0844    AST 19 03/23/2020 0844    ALT 25 03/23/2020 0844    BILITOT 0.8 03/23/2020 0844    ESTGFRAFRICA >60.0 07/18/2019 1156    EGFRNONAA >60.0 07/18/2019 1156          Lab Results   Component Value Date    LDLCALC 90.6 03/23/2020          Ref. Range 11/21/2019 10:35 3/23/2020 08:44   Cholesterol Latest Ref Range: 120 - 199 mg/dL 185 183   HDL Latest Ref Range: 40 - 75  mg/dL 55 33 (L)   Hdl/Cholesterol Ratio Latest Ref Range: 20.0 - 50.0 % 29.7 18.0 (L)   LDL Cholesterol External Latest Ref Range: 63.0 - 159.0 mg/dL 107.6 90.6   Non-HDL Cholesterol Latest Units: mg/dL 130 150   Total Cholesterol/HDL Ratio Latest Ref Range: 2.0 - 5.0  3.4 5.5 (H)   Triglycerides Latest Ref Range: 30 - 150 mg/dL 112 297 (H)     Lab Results   Component Value Date    MICALBCREAT Unable to calculate 11/21/2019             ASSESSMENT and PLAN:    A1C GOAL: < 7 %         1. Type 2 diabetes mellitus without complication, with long-term current use of insulin  Continue Fiasp doses. If BG continues to drop consistently after testosterone injections, then we will need to adjust Fiasp dose the nights before testosterone doses.   Continue Invokana 300 mg once daily and Toujeo 50 units twice daily.   Messaged patient with link to Immediate Savings card to be used with Fiasp.   Ideally test glucose 4x/day. Can use Relion testing supplies from [employer] over the counter.   Follow up with PCP regarding levothyroxine. Patient unsure if he should be on it or not.   Improve diet as much as you can.   Reviewed importance of DM control to avoid COVID19 severe illness. Reviewed glycemic goals.   Return to clinic in 3 months with labs prior. Call if blood sugars if are still high.       insulin aspart, niacinamide, (FIASP FLEXTOUCH U-100 INSULIN) 100 unit/mL (3 mL) InPn   2. Morbid obesity, unspecified obesity type  Increases insulin resistance.      3. Hyperlipidemia, unspecified hyperlipidemia type  Continue atorvastatin 20 mg    4. Hypoglycemia associated with diabetes  As above.          Orders Placed This Encounter   Procedures    Hemoglobin A1c     Standing Status:   Future     Standing Expiration Date:   6/5/2021        Follow up in about 3 months (around 7/6/2020).

## 2020-04-09 DIAGNOSIS — E11.9 TYPE 2 DIABETES MELLITUS WITHOUT COMPLICATION, WITH LONG-TERM CURRENT USE OF INSULIN: ICD-10-CM

## 2020-04-09 DIAGNOSIS — Z79.4 TYPE 2 DIABETES MELLITUS WITHOUT COMPLICATION, WITH LONG-TERM CURRENT USE OF INSULIN: ICD-10-CM

## 2020-04-09 RX ORDER — PIOGLITAZONEHYDROCHLORIDE 15 MG/1
TABLET ORAL
Qty: 90 TABLET | Refills: 1 | OUTPATIENT
Start: 2020-04-09

## 2020-04-14 ENCOUNTER — TELEPHONE (OUTPATIENT)
Dept: ENDOCRINOLOGY | Facility: CLINIC | Age: 40
End: 2020-04-14

## 2020-04-14 NOTE — TELEPHONE ENCOUNTER
----- Message from Kate Mansfield NP sent at 4/6/2020 11:19 AM CDT -----  Regarding: schedule visit in 3 mo   Please schedule f/u visit in 3 mo with labs prior.

## 2020-04-21 ENCOUNTER — TELEPHONE (OUTPATIENT)
Dept: ENDOCRINOLOGY | Facility: CLINIC | Age: 40
End: 2020-04-21

## 2020-04-21 NOTE — TELEPHONE ENCOUNTER
Called pt and informed him MARION Mansfield would like a 3 month f/u with him . Offered first opening 7/21/2020 preferred 11:30 am and 7/15/2020 labs at 8:45 am . Mailed reminder

## 2020-04-22 DIAGNOSIS — R79.89 LOW TESTOSTERONE IN MALE: ICD-10-CM

## 2020-04-22 RX ORDER — TESTOSTERONE CYPIONATE 200 MG/ML
200 INJECTION, SOLUTION INTRAMUSCULAR
Qty: 2 ML | Refills: 0 | Status: SHIPPED | OUTPATIENT
Start: 2020-04-22 | End: 2020-05-21

## 2020-05-14 ENCOUNTER — PATIENT MESSAGE (OUTPATIENT)
Dept: FAMILY MEDICINE | Facility: CLINIC | Age: 40
End: 2020-05-14

## 2020-05-14 DIAGNOSIS — R79.89 LOW TESTOSTERONE IN MALE: ICD-10-CM

## 2020-05-14 DIAGNOSIS — Z12.5 SCREENING FOR PROSTATE CANCER: Primary | ICD-10-CM

## 2020-05-15 NOTE — TELEPHONE ENCOUNTER
Please assist pt with lab scheduling - all the labs that are ordered, except the A1c which is scheduled for July.

## 2020-05-18 ENCOUNTER — LAB VISIT (OUTPATIENT)
Dept: LAB | Facility: HOSPITAL | Age: 40
End: 2020-05-18
Attending: INTERNAL MEDICINE
Payer: COMMERCIAL

## 2020-05-18 ENCOUNTER — PATIENT MESSAGE (OUTPATIENT)
Dept: FAMILY MEDICINE | Facility: CLINIC | Age: 40
End: 2020-05-18

## 2020-05-18 DIAGNOSIS — Z01.818 PREOP EXAMINATION: ICD-10-CM

## 2020-05-18 DIAGNOSIS — Z12.5 SCREENING FOR PROSTATE CANCER: ICD-10-CM

## 2020-05-18 DIAGNOSIS — R79.89 ABNORMAL TSH: Primary | ICD-10-CM

## 2020-05-18 DIAGNOSIS — R79.89 LOW TESTOSTERONE IN MALE: ICD-10-CM

## 2020-05-18 DIAGNOSIS — E03.4 HYPOTHYROIDISM DUE TO ACQUIRED ATROPHY OF THYROID: ICD-10-CM

## 2020-05-18 LAB
ALBUMIN SERPL BCP-MCNC: 3.7 G/DL (ref 3.5–5.2)
ALP SERPL-CCNC: 58 U/L (ref 55–135)
ALT SERPL W/O P-5'-P-CCNC: 21 U/L (ref 10–44)
ANION GAP SERPL CALC-SCNC: 9 MMOL/L (ref 8–16)
AST SERPL-CCNC: 18 U/L (ref 10–40)
BASOPHILS # BLD AUTO: 0.05 K/UL (ref 0–0.2)
BASOPHILS NFR BLD: 0.5 % (ref 0–1.9)
BILIRUB SERPL-MCNC: 0.8 MG/DL (ref 0.1–1)
BUN SERPL-MCNC: 14 MG/DL (ref 6–20)
CALCIUM SERPL-MCNC: 9.4 MG/DL (ref 8.7–10.5)
CHLORIDE SERPL-SCNC: 103 MMOL/L (ref 95–110)
CO2 SERPL-SCNC: 26 MMOL/L (ref 23–29)
COMPLEXED PSA SERPL-MCNC: 0.64 NG/ML (ref 0–4)
CREAT SERPL-MCNC: 0.8 MG/DL (ref 0.5–1.4)
DIFFERENTIAL METHOD: ABNORMAL
EOSINOPHIL # BLD AUTO: 0.1 K/UL (ref 0–0.5)
EOSINOPHIL NFR BLD: 1.1 % (ref 0–8)
ERYTHROCYTE [DISTWIDTH] IN BLOOD BY AUTOMATED COUNT: 13.7 % (ref 11.5–14.5)
EST. GFR  (AFRICAN AMERICAN): >60 ML/MIN/1.73 M^2
EST. GFR  (NON AFRICAN AMERICAN): >60 ML/MIN/1.73 M^2
GLUCOSE SERPL-MCNC: 113 MG/DL (ref 70–110)
HCT VFR BLD AUTO: 54.8 % (ref 40–54)
HGB BLD-MCNC: 17 G/DL (ref 14–18)
IMM GRANULOCYTES # BLD AUTO: 0.02 K/UL (ref 0–0.04)
IMM GRANULOCYTES NFR BLD AUTO: 0.2 % (ref 0–0.5)
LYMPHOCYTES # BLD AUTO: 3.1 K/UL (ref 1–4.8)
LYMPHOCYTES NFR BLD: 31.7 % (ref 18–48)
MCH RBC QN AUTO: 29.1 PG (ref 27–31)
MCHC RBC AUTO-ENTMCNC: 31 G/DL (ref 32–36)
MCV RBC AUTO: 94 FL (ref 82–98)
MONOCYTES # BLD AUTO: 1 K/UL (ref 0.3–1)
MONOCYTES NFR BLD: 10.6 % (ref 4–15)
NEUTROPHILS # BLD AUTO: 5.5 K/UL (ref 1.8–7.7)
NEUTROPHILS NFR BLD: 55.9 % (ref 38–73)
NRBC BLD-RTO: 0 /100 WBC
PLATELET # BLD AUTO: 214 K/UL (ref 150–350)
PMV BLD AUTO: 12.3 FL (ref 9.2–12.9)
POTASSIUM SERPL-SCNC: 4.1 MMOL/L (ref 3.5–5.1)
PROT SERPL-MCNC: 7.4 G/DL (ref 6–8.4)
RBC # BLD AUTO: 5.85 M/UL (ref 4.6–6.2)
SODIUM SERPL-SCNC: 138 MMOL/L (ref 136–145)
T4 FREE SERPL-MCNC: 0.81 NG/DL (ref 0.71–1.51)
TESTOST SERPL-MCNC: 546 NG/DL (ref 304–1227)
TSH SERPL DL<=0.005 MIU/L-ACNC: 4.44 UIU/ML (ref 0.4–4)
WBC # BLD AUTO: 9.83 K/UL (ref 3.9–12.7)

## 2020-05-18 PROCEDURE — 84439 ASSAY OF FREE THYROXINE: CPT

## 2020-05-18 PROCEDURE — 84443 ASSAY THYROID STIM HORMONE: CPT

## 2020-05-18 PROCEDURE — 85025 COMPLETE CBC W/AUTO DIFF WBC: CPT

## 2020-05-18 PROCEDURE — 84403 ASSAY OF TOTAL TESTOSTERONE: CPT

## 2020-05-18 PROCEDURE — 84153 ASSAY OF PSA TOTAL: CPT

## 2020-05-18 PROCEDURE — 80053 COMPREHEN METABOLIC PANEL: CPT

## 2020-05-18 PROCEDURE — 36415 COLL VENOUS BLD VENIPUNCTURE: CPT | Mod: PO

## 2020-05-19 NOTE — PROGRESS NOTES
CBC Hb elevated high normal  TSH high FT4 WNL  Not on thyroid medicine I believe  PSA WNL  CMP WNL  Testosterone ok on 200 every 2 weeks    [facility]  Repeat labs 3 months  If cbc still high may decrease to 150 every 2 weeks.

## 2020-05-21 DIAGNOSIS — R79.89 LOW TESTOSTERONE IN MALE: ICD-10-CM

## 2020-05-21 RX ORDER — TESTOSTERONE CYPIONATE 200 MG/ML
200 INJECTION, SOLUTION INTRAMUSCULAR
Qty: 2 ML | Refills: 0 | Status: SHIPPED | OUTPATIENT
Start: 2020-05-21 | End: 2020-06-09

## 2020-05-29 DIAGNOSIS — E11.9 TYPE 2 DIABETES MELLITUS WITHOUT COMPLICATION, WITH LONG-TERM CURRENT USE OF INSULIN: ICD-10-CM

## 2020-05-29 DIAGNOSIS — Z79.4 TYPE 2 DIABETES MELLITUS WITHOUT COMPLICATION, WITH LONG-TERM CURRENT USE OF INSULIN: ICD-10-CM

## 2020-05-29 RX ORDER — CANAGLIFLOZIN 300 MG/1
TABLET, FILM COATED ORAL
Qty: 30 TABLET | Refills: 11 | Status: SHIPPED | OUTPATIENT
Start: 2020-05-29 | End: 2020-10-23

## 2020-06-18 ENCOUNTER — OFFICE VISIT (OUTPATIENT)
Dept: UROLOGY | Facility: CLINIC | Age: 40
End: 2020-06-18
Payer: COMMERCIAL

## 2020-06-18 VITALS
HEART RATE: 98 BPM | WEIGHT: 315 LBS | HEIGHT: 73 IN | DIASTOLIC BLOOD PRESSURE: 98 MMHG | BODY MASS INDEX: 41.75 KG/M2 | SYSTOLIC BLOOD PRESSURE: 163 MMHG

## 2020-06-18 DIAGNOSIS — N52.01 ERECTILE DYSFUNCTION DUE TO ARTERIAL INSUFFICIENCY: ICD-10-CM

## 2020-06-18 DIAGNOSIS — E29.1 HYPOGONADISM IN MALE: Primary | ICD-10-CM

## 2020-06-18 PROCEDURE — 99203 PR OFFICE/OUTPT VISIT, NEW, LEVL III, 30-44 MIN: ICD-10-PCS | Mod: S$GLB,,, | Performed by: NURSE PRACTITIONER

## 2020-06-18 PROCEDURE — 99203 OFFICE O/P NEW LOW 30 MIN: CPT | Mod: S$GLB,,, | Performed by: NURSE PRACTITIONER

## 2020-06-18 RX ORDER — TADALAFIL 20 MG/1
20 TABLET ORAL
Qty: 30 TABLET | Refills: 11 | Status: SHIPPED | OUTPATIENT
Start: 2020-06-18 | End: 2022-02-18

## 2020-06-26 ENCOUNTER — LAB VISIT (OUTPATIENT)
Dept: LAB | Facility: HOSPITAL | Age: 40
End: 2020-06-26
Attending: NURSE PRACTITIONER
Payer: COMMERCIAL

## 2020-06-26 DIAGNOSIS — E29.1 HYPOGONADISM IN MALE: ICD-10-CM

## 2020-06-26 LAB
BASOPHILS # BLD AUTO: 0.05 K/UL (ref 0–0.2)
BASOPHILS NFR BLD: 0.6 % (ref 0–1.9)
DIFFERENTIAL METHOD: ABNORMAL
EOSINOPHIL # BLD AUTO: 0.1 K/UL (ref 0–0.5)
EOSINOPHIL NFR BLD: 0.9 % (ref 0–8)
ERYTHROCYTE [DISTWIDTH] IN BLOOD BY AUTOMATED COUNT: 13.5 % (ref 11.5–14.5)
HCT VFR BLD AUTO: 50.3 % (ref 40–54)
HGB BLD-MCNC: 16 G/DL (ref 14–18)
IMM GRANULOCYTES # BLD AUTO: 0.03 K/UL (ref 0–0.04)
IMM GRANULOCYTES NFR BLD AUTO: 0.3 % (ref 0–0.5)
LYMPHOCYTES # BLD AUTO: 2.4 K/UL (ref 1–4.8)
LYMPHOCYTES NFR BLD: 27.8 % (ref 18–48)
MCH RBC QN AUTO: 29.3 PG (ref 27–31)
MCHC RBC AUTO-ENTMCNC: 31.8 G/DL (ref 32–36)
MCV RBC AUTO: 92 FL (ref 82–98)
MONOCYTES # BLD AUTO: 1 K/UL (ref 0.3–1)
MONOCYTES NFR BLD: 11.9 % (ref 4–15)
NEUTROPHILS # BLD AUTO: 5.1 K/UL (ref 1.8–7.7)
NEUTROPHILS NFR BLD: 58.5 % (ref 38–73)
NRBC BLD-RTO: 0 /100 WBC
PLATELET # BLD AUTO: 197 K/UL (ref 150–350)
PMV BLD AUTO: 12.4 FL (ref 9.2–12.9)
RBC # BLD AUTO: 5.47 M/UL (ref 4.6–6.2)
TESTOST SERPL-MCNC: 332 NG/DL (ref 304–1227)
WBC # BLD AUTO: 8.69 K/UL (ref 3.9–12.7)

## 2020-06-26 PROCEDURE — 85025 COMPLETE CBC W/AUTO DIFF WBC: CPT

## 2020-06-26 PROCEDURE — 82672 ASSAY OF ESTROGEN: CPT

## 2020-06-26 PROCEDURE — 36415 COLL VENOUS BLD VENIPUNCTURE: CPT | Mod: PO

## 2020-06-26 PROCEDURE — 84403 ASSAY OF TOTAL TESTOSTERONE: CPT

## 2020-06-29 LAB — ESTROGEN SERPL-MCNC: 351 PG/ML

## 2020-07-01 ENCOUNTER — LAB VISIT (OUTPATIENT)
Dept: PRIMARY CARE CLINIC | Facility: OTHER | Age: 40
End: 2020-07-01
Attending: INTERNAL MEDICINE
Payer: COMMERCIAL

## 2020-07-01 DIAGNOSIS — Z03.818 ENCOUNTER FOR OBSERVATION FOR SUSPECTED EXPOSURE TO OTHER BIOLOGICAL AGENTS RULED OUT: ICD-10-CM

## 2020-07-01 PROCEDURE — U0003 INFECTIOUS AGENT DETECTION BY NUCLEIC ACID (DNA OR RNA); SEVERE ACUTE RESPIRATORY SYNDROME CORONAVIRUS 2 (SARS-COV-2) (CORONAVIRUS DISEASE [COVID-19]), AMPLIFIED PROBE TECHNIQUE, MAKING USE OF HIGH THROUGHPUT TECHNOLOGIES AS DESCRIBED BY CMS-2020-01-R: HCPCS

## 2020-07-02 LAB — SARS-COV-2 RNA RESP QL NAA+PROBE: NOT DETECTED

## 2020-07-08 ENCOUNTER — TELEPHONE (OUTPATIENT)
Dept: UROLOGY | Facility: CLINIC | Age: 40
End: 2020-07-08

## 2020-07-15 ENCOUNTER — LAB VISIT (OUTPATIENT)
Dept: LAB | Facility: HOSPITAL | Age: 40
End: 2020-07-15
Attending: NURSE PRACTITIONER
Payer: COMMERCIAL

## 2020-07-15 DIAGNOSIS — Z79.4 TYPE 2 DIABETES MELLITUS WITHOUT COMPLICATION, WITH LONG-TERM CURRENT USE OF INSULIN: ICD-10-CM

## 2020-07-15 DIAGNOSIS — E11.9 TYPE 2 DIABETES MELLITUS WITHOUT COMPLICATION, WITH LONG-TERM CURRENT USE OF INSULIN: ICD-10-CM

## 2020-07-15 PROCEDURE — 83036 HEMOGLOBIN GLYCOSYLATED A1C: CPT

## 2020-07-15 PROCEDURE — 36415 COLL VENOUS BLD VENIPUNCTURE: CPT | Mod: PO

## 2020-07-16 LAB
ESTIMATED AVG GLUCOSE: 180 MG/DL (ref 68–131)
HBA1C MFR BLD HPLC: 7.9 % (ref 4–5.6)

## 2020-07-21 ENCOUNTER — OFFICE VISIT (OUTPATIENT)
Dept: ENDOCRINOLOGY | Facility: CLINIC | Age: 40
End: 2020-07-21
Payer: COMMERCIAL

## 2020-07-21 VITALS
BODY MASS INDEX: 42.3 KG/M2 | HEART RATE: 95 BPM | SYSTOLIC BLOOD PRESSURE: 142 MMHG | WEIGHT: 315 LBS | TEMPERATURE: 100 F | DIASTOLIC BLOOD PRESSURE: 86 MMHG

## 2020-07-21 DIAGNOSIS — E66.01 MORBID OBESITY, UNSPECIFIED OBESITY TYPE: ICD-10-CM

## 2020-07-21 DIAGNOSIS — Z79.4 TYPE 2 DIABETES MELLITUS WITHOUT COMPLICATION, WITH LONG-TERM CURRENT USE OF INSULIN: Primary | ICD-10-CM

## 2020-07-21 DIAGNOSIS — E11.9 TYPE 2 DIABETES MELLITUS WITHOUT COMPLICATION, WITH LONG-TERM CURRENT USE OF INSULIN: Primary | ICD-10-CM

## 2020-07-21 DIAGNOSIS — I10 ESSENTIAL HYPERTENSION: ICD-10-CM

## 2020-07-21 DIAGNOSIS — E11.649 HYPOGLYCEMIA ASSOCIATED WITH DIABETES: ICD-10-CM

## 2020-07-21 PROCEDURE — 99999 PR PBB SHADOW E&M-EST. PATIENT-LVL V: ICD-10-PCS | Mod: PBBFAC,,, | Performed by: NURSE PRACTITIONER

## 2020-07-21 PROCEDURE — 99214 PR OFFICE/OUTPT VISIT, EST, LEVL IV, 30-39 MIN: ICD-10-PCS | Mod: S$GLB,,, | Performed by: NURSE PRACTITIONER

## 2020-07-21 PROCEDURE — 99999 PR PBB SHADOW E&M-EST. PATIENT-LVL V: CPT | Mod: PBBFAC,,, | Performed by: NURSE PRACTITIONER

## 2020-07-21 PROCEDURE — 99214 OFFICE O/P EST MOD 30 MIN: CPT | Mod: S$GLB,,, | Performed by: NURSE PRACTITIONER

## 2020-07-21 RX ORDER — METFORMIN HYDROCHLORIDE 1000 MG/1
1000 TABLET ORAL 2 TIMES DAILY WITH MEALS
Qty: 60 TABLET | Refills: 3 | Status: SHIPPED | OUTPATIENT
Start: 2020-07-21 | End: 2020-08-17

## 2020-07-21 NOTE — PATIENT INSTRUCTIONS
Look into Omnipod insulin pump. Unsure if it's conducive to his work as a .   Reviewed correct hypoglycemia treatment.   Make sure to take each Toujeo dose about every 24 hours. Discussed avoiding insulin stacking.

## 2020-07-21 NOTE — PROGRESS NOTES
CC: This 39 y.o. White male  is here for evaluation of  T2DM along with comorbidities indicated in the Visit Diagnosis section of this encounter.    HPI: Tony Gordillo was diagnosed with T2DM in 2008. He was without health insurance for 2017 and mid 2018.         Prior visit 4/6/20 virtual visit   a1c up from 8.2 to 8.9%.   He is not taking pioglitazone - it made him kim.   His Toujeo pens were broken. He didn't have any Toujeo for a month. He just resumed it 2 days ago.   Hasn't been taking levothyroxine. Doesn't know how long he's been off it.   Plan Continue Fiasp doses. If BG continues to drop consistently after testosterone injections, then we will need to adjust Fiasp dose the nights before testosterone doses.   Continue Invokana 300 mg once daily and Toujeo 50 units twice daily.   Messaged patient with link to [employer] card to be used with Fiasp.   Ideally test glucose 4x/day. Can use [employer] testing supplies from [employer] over the counter.   Follow up with PCP regarding levothyroxine. Patient unsure if he should be on it or not.   Improve diet as much as you can.   Reviewed importance of DM control to avoid COVID19 severe illness. Reviewed glycemic goals.   Return to clinic in 3 months with labs prior. Call if blood sugars if are still high.     Interval history  a1c is down from 8.9 to 7.9%.   He c/o feeling overworked at his job as a . He's lost 5 lb since January. He's been able to afford both insulins.   He takes Toujeo late 1-2x/week. Tries to inject Fiasp after a meal if he's at work because he is oftentimes interrupted mid meal to take a call. At home, he injects ac.     LAST DIABETES EDUCATION: none     HOSPITALIZED FOR DIABETES -  No.    PRESCRIBED DIABETES MEDICATIONS:  Toujeo Max 50 units bid, Fiasp 30-30-36  units ac with correction scale -  Takes + 5 units for every 50 mg/dL at BG > 150, Invokana 300 mg once daily     Hasn't been using the correction scale lately.     DM  "COMPLICATIONS: none    SIGNIFICANT DIABETES MED HISTORY:   Has previously used Novolin 70/30 morning only   glyburide--hypoglycemia  Victoza - nausea and vomiting at 1.2 mg; felt "run down" at lowest dose of 0.6 mg   Metformin - diarrhea and vomiting (has not tried metformin XR)   Pioglitazone - altered mood, reportedly became angry       SELF MONITORING BLOOD GLUCOSE: Checks blood glucose at home several times a day with Arleth. Hasn't used it however in a week d/t finances. He and his wife's work hours have been cut.   Rationing his strips, testing BG 2x/day. BGs were 200s in the mornings without Toujeo. FBG today was 130.     HYPOGLYCEMIC EPISODES: BG has dropped overnight twice over the last 2 weeks, with the last one at 44. Denies alcohol use prior to these nights. Does not believe it was related to his testosterone shots as he previously suspected. BG rebounded quite high after BG of 44 - he corrected with peanut butter and jelly with a soda.      CURRENT DIET: drinks water mostly. Sometimes coffee at work.  Eats breakfast (hernandez/eggs, sandwich, occasionally cereal and milk), lunch is at 11 am to 1 pm at work, snacks around 4 pm and then eats dinner at 6-7 pm at home. -- could be better     CURRENT EXERCISE: works out every 3 days - weights and cardio, aims for 1-1.5 hour workouts.      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 No acute distress.   PSYCH: AAOx3, appropriate mood and affect, conversant, well-groomed. Judgement and insight good.   NEURO: Cranial nerves grossly intact. Speech clear, no tremor.   CHEST: Respirations even and " unlabored.         Hemoglobin A1C   Date Value Ref Range Status   07/15/2020 7.9 (H) 4.0 - 5.6 % Final     Comment:     ADA Screening Guidelines:  5.7-6.4%  Consistent with prediabetes  >or=6.5%  Consistent with diabetes  High levels of fetal hemoglobin interfere with the HbA1C  assay. Heterozygous hemoglobin variants (HbS, HgC, etc)do  not significantly interfere with this assay.   However, presence of multiple variants may affect accuracy.     03/23/2020 8.9 (H) 4.0 - 5.6 % Final     Comment:     ADA Screening Guidelines:  5.7-6.4%  Consistent with prediabetes  >or=6.5%  Consistent with diabetes  High levels of fetal hemoglobin interfere with the HbA1C  assay. Heterozygous hemoglobin variants (HbS, HgC, etc)do  not significantly interfere with this assay.   However, presence of multiple variants may affect accuracy.     11/21/2019 8.2 (H) 4.0 - 5.6 % Final     Comment:     ADA Screening Guidelines:  5.7-6.4%  Consistent with prediabetes  >or=6.5%  Consistent with diabetes  High levels of fetal hemoglobin interfere with the HbA1C  assay. Heterozygous hemoglobin variants (HbS, HgC, etc)do  not significantly interfere with this assay.   However, presence of multiple variants may affect accuracy.             Chemistry        Component Value Date/Time     05/18/2020 0830    K 4.1 05/18/2020 0830     05/18/2020 0830    CO2 26 05/18/2020 0830    BUN 14 05/18/2020 0830    CREATININE 0.8 05/18/2020 0830     (H) 05/18/2020 0830        Component Value Date/Time    CALCIUM 9.4 05/18/2020 0830    ALKPHOS 58 05/18/2020 0830    AST 18 05/18/2020 0830    ALT 21 05/18/2020 0830    BILITOT 0.8 05/18/2020 0830    ESTGFRAFRICA >60.0 05/18/2020 0830    EGFRNONAA >60.0 05/18/2020 0830          Lab Results   Component Value Date    LDLCALC 90.6 03/23/2020          Ref. Range 11/21/2019 10:35 3/23/2020 08:44   Cholesterol Latest Ref Range: 120 - 199 mg/dL 185 183   HDL Latest Ref Range: 40 - 75 mg/dL 55 33 (L)    Hdl/Cholesterol Ratio Latest Ref Range: 20.0 - 50.0 % 29.7 18.0 (L)   LDL Cholesterol External Latest Ref Range: 63.0 - 159.0 mg/dL 107.6 90.6   Non-HDL Cholesterol Latest Units: mg/dL 130 150   Total Cholesterol/HDL Ratio Latest Ref Range: 2.0 - 5.0  3.4 5.5 (H)   Triglycerides Latest Ref Range: 30 - 150 mg/dL 112 297 (H)     Lab Results   Component Value Date    MICALBCREAT Unable to calculate 11/21/2019             ASSESSMENT and PLAN:    A1C GOAL: < 7 %       1. Type 2 diabetes mellitus without complication, with long-term current use of insulin  Look into Omnipod insulin pump. Unsure if it's conducive to his work as a .   Reviewed correct hypoglycemia treatment.   Make sure to take each Toujeo dose about every 24 hours. Discussed avoiding insulin stacking.     Will start TOPICAL metformin 1000 mg bid. Pt has had GI s/e in the past with oral metformin.     Decrease Toujeo Max to 64 units ONCE daily in the evening.     F/u with virtual visit in 4-6 weeks. Pt will make his own appt.    2. Morbid obesity, unspecified obesity type  Increases insulin resistance.      3. Hypoglycemia associated with diabetes  Reduce insulin as above.    4. Essential hypertension  High today. Advised pt to check bp regularly at home          No orders of the defined types were placed in this encounter.       Follow up in about 4 weeks (around 8/18/2020).

## 2020-07-22 ENCOUNTER — TELEPHONE (OUTPATIENT)
Dept: UROLOGY | Facility: CLINIC | Age: 40
End: 2020-07-22

## 2020-07-22 NOTE — TELEPHONE ENCOUNTER
Called patient.  Informed him that there is a national shortage.  Advised him to call back in 2 weeks to check.

## 2020-07-22 NOTE — TELEPHONE ENCOUNTER
----- Message from Nicola Webb sent at 7/22/2020  2:54 PM CDT -----  Regarding: Call  Contact: Patient  Type: Patient Call Back    Who called: Patient    What is the request in detail: Patient is requesting a call back; he is requesting an update on procedure status.    Can the clinic reply by [facility]? No    Would the patient rather a call back or a response via [facility]? Call    Best call back number: [phone]    Additional Information:n/a

## 2020-07-31 ENCOUNTER — PATIENT MESSAGE (OUTPATIENT)
Dept: FAMILY MEDICINE | Facility: CLINIC | Age: 40
End: 2020-07-31

## 2020-08-14 DIAGNOSIS — Z11.59 NEED FOR HEPATITIS C SCREENING TEST: ICD-10-CM

## 2020-08-17 ENCOUNTER — OFFICE VISIT (OUTPATIENT)
Dept: ENDOCRINOLOGY | Facility: CLINIC | Age: 40
End: 2020-08-17
Payer: COMMERCIAL

## 2020-08-17 DIAGNOSIS — Z79.4 TYPE 2 DIABETES MELLITUS WITHOUT COMPLICATION, WITH LONG-TERM CURRENT USE OF INSULIN: Primary | ICD-10-CM

## 2020-08-17 DIAGNOSIS — Z71.9 VISIT FOR COUNSELING: ICD-10-CM

## 2020-08-17 DIAGNOSIS — E11.9 TYPE 2 DIABETES MELLITUS WITHOUT COMPLICATION, WITH LONG-TERM CURRENT USE OF INSULIN: Primary | ICD-10-CM

## 2020-08-17 PROCEDURE — 95251 CONT GLUC MNTR ANALYSIS I&R: CPT | Mod: 95,,, | Performed by: NURSE PRACTITIONER

## 2020-08-17 PROCEDURE — 99213 OFFICE O/P EST LOW 20 MIN: CPT | Mod: 25,95,, | Performed by: NURSE PRACTITIONER

## 2020-08-17 PROCEDURE — 95251 PR GLUCOSE MONITOR, 72 HOUR, PHYS INTERP: ICD-10-PCS | Mod: 95,,, | Performed by: NURSE PRACTITIONER

## 2020-08-17 PROCEDURE — 99213 PR OFFICE/OUTPT VISIT, EST, LEVL III, 20-29 MIN: ICD-10-PCS | Mod: 25,95,, | Performed by: NURSE PRACTITIONER

## 2020-08-17 RX ORDER — METFORMIN HYDROCHLORIDE 1000 MG/1
1000 TABLET ORAL 2 TIMES DAILY WITH MEALS
Qty: 180 TABLET | Refills: 2 | Status: SHIPPED | OUTPATIENT
Start: 2020-08-17 | End: 2022-03-10

## 2020-08-17 NOTE — PROGRESS NOTES
The patient location is: home  The chief complaint leading to consultation is: type 2 diabetes    Visit type: audiovisual    Face to Face time with patient: 15 minutes of total time spent on the encounter, which includes face to face time and non-face to face time preparing to see the patient (eg, review of tests), Obtaining and/or reviewing separately obtained history, Documenting clinical information in the electronic or other health record, Independently interpreting results (not separately reported) and communicating results to the patient/family/caregiver, or Care coordination (not separately reported).         Each patient to whom he or she provides medical services by telemedicine is:  (1) informed of the relationship between the physician and patient and the respective role of any other health care provider with respect to management of the patient; and (2) notified that he or she may decline to receive medical services by telemedicine and may withdraw from such care at any time.    Notes:       CC: This 40 y.o. White male  is here for evaluation of  T2DM along with comorbidities indicated in the Visit Diagnosis section of this encounter.    HPI: Tony Gordillo was diagnosed with T2DM in 2008. He was without health insurance for 2017 and mid 2018.       Prior visit 7/21/20  a1c is down from 8.9 to 7.9%.   He c/o feeling overworked at his job as a . He's lost 5 lb since January. He's been able to afford both insulins.   He takes Toujeo late 1-2x/week. Tries to inject Fiasp after a meal if he's at work because he is oftentimes interrupted mid meal to take a call. At home, he injects ac.   Plan Look into Omnipod insulin pump. Unsure if it's conducive to his work as a .   Reviewed correct hypoglycemia treatment.   Make sure to take each Toujeo dose about every 24 hours. Discussed avoiding insulin stacking.   Will start TOPICAL metformin 1000 mg bid. Pt has had GI s/e in the past with  "oral metformin.   Decrease Toujeo Max to 64 units ONCE daily in the evening.   F/u with virtual visit in 4-6 weeks. Pt will make his own appt.         Interval history  He has started topical metformin. No GI s/e. Helping lower spikes in BGs.     He is interested in starting Omnipod.     LAST DIABETES EDUCATION: 2019    HOSPITALIZED FOR DIABETES -  No.    PRESCRIBED DIABETES MEDICATIONS:  Metformin TOPICAL 1000 mg bid, Toujeo Max 64 units nightly, Fiasp 30-30-36  units ac with correction scale -  Takes + 5 units for every 50 mg/dL at BG > 150, Invokana 300 mg once daily     Hasn't been using the correction scale lately.     DM COMPLICATIONS: none    SIGNIFICANT DIABETES MED HISTORY:   Has previously used Novolin 70/30 morning only   glyburide--hypoglycemia  Victoza - nausea and vomiting at 1.2 mg; felt "run down" at lowest dose of 0.6 mg   Metformin - diarrhea and vomiting (has not tried metformin XR)   Pioglitazone - altered mood, reportedly became angry       SELF MONITORING BLOOD GLUCOSE: Checks blood glucose at home several times a day with Arleth.   CGM interpretation: BGs overall stable. Tends to get higher BGs after dinner, extending overnight. Lowest BGs were 60s after breakfast, both times r/t not finishing meal when he had to take a call for work.     HYPOGLYCEMIC EPISODES: as above    CURRENT DIET: drinks water mostly. Sometimes coffee at work.  Eats breakfast (hernandez/eggs, sandwich, occasionally cereal and milk), lunch is at 11 am to 1 pm at work, snacks around 4 pm and then eats dinner at 6-7 pm at home.    CURRENT EXERCISE: some walking.     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            Hemoglobin A1C   Date Value Ref Range Status   07/15/2020 7.9 (H) 4.0 - 5.6 % Final     Comment:     ADA Screening Guidelines:  5.7-6.4%  Consistent with prediabetes  >or=6.5%  Consistent with diabetes  High levels of fetal hemoglobin interfere with the HbA1C  assay. " Heterozygous hemoglobin variants (HbS, HgC, etc)do  not significantly interfere with this assay.   However, presence of multiple variants may affect accuracy.     03/23/2020 8.9 (H) 4.0 - 5.6 % Final     Comment:     ADA Screening Guidelines:  5.7-6.4%  Consistent with prediabetes  >or=6.5%  Consistent with diabetes  High levels of fetal hemoglobin interfere with the HbA1C  assay. Heterozygous hemoglobin variants (HbS, HgC, etc)do  not significantly interfere with this assay.   However, presence of multiple variants may affect accuracy.     11/21/2019 8.2 (H) 4.0 - 5.6 % Final     Comment:     ADA Screening Guidelines:  5.7-6.4%  Consistent with prediabetes  >or=6.5%  Consistent with diabetes  High levels of fetal hemoglobin interfere with the HbA1C  assay. Heterozygous hemoglobin variants (HbS, HgC, etc)do  not significantly interfere with this assay.   However, presence of multiple variants may affect accuracy.             Chemistry        Component Value Date/Time     05/18/2020 0830    K 4.1 05/18/2020 0830     05/18/2020 0830    CO2 26 05/18/2020 0830    BUN 14 05/18/2020 0830    CREATININE 0.8 05/18/2020 0830     (H) 05/18/2020 0830        Component Value Date/Time    CALCIUM 9.4 05/18/2020 0830    ALKPHOS 58 05/18/2020 0830    AST 18 05/18/2020 0830    ALT 21 05/18/2020 0830    BILITOT 0.8 05/18/2020 0830    ESTGFRAFRICA >60.0 05/18/2020 0830    EGFRNONAA >60.0 05/18/2020 0830          Lab Results   Component Value Date    LDLCALC 90.6 03/23/2020          Ref. Range 11/21/2019 10:35 3/23/2020 08:44   Cholesterol Latest Ref Range: 120 - 199 mg/dL 185 183   HDL Latest Ref Range: 40 - 75 mg/dL 55 33 (L)   Hdl/Cholesterol Ratio Latest Ref Range: 20.0 - 50.0 % 29.7 18.0 (L)   LDL Cholesterol External Latest Ref Range: 63.0 - 159.0 mg/dL 107.6 90.6   Non-HDL Cholesterol Latest Units: mg/dL 130 150   Total Cholesterol/HDL Ratio Latest Ref Range: 2.0 - 5.0  3.4 5.5 (H)   Triglycerides Latest Ref  Range: 30 - 150 mg/dL 112 297 (H)     Lab Results   Component Value Date    MICALBCREAT Unable to calculate 11/21/2019             ASSESSMENT and PLAN:    A1C GOAL: < 7 %       1. Type 2 diabetes mellitus without complication, with long-term current use of insulin  Increase Fiasp to 42 units if eating a heavy dinner.   Will send pt's demographic info to [employer] so start orders.   rtc in 2 mo with labs prior.          Spent 15 minutes with patient with >50% time spent in counseling, as noted in # 1.     Orders Placed This Encounter   Procedures    Hemoglobin A1C     Standing Status:   Future     Standing Expiration Date:   10/16/2021    Microalbumin/creatinine urine ratio     Standing Status:   Future     Standing Expiration Date:   10/16/2021     Order Specific Question:   Specimen Source     Answer:   Urine        Follow up in about 2 months (around 10/17/2020).

## 2020-10-06 ENCOUNTER — PATIENT OUTREACH (OUTPATIENT)
Dept: ADMINISTRATIVE | Facility: OTHER | Age: 40
End: 2020-10-06

## 2020-10-06 DIAGNOSIS — M17.12 PRIMARY OSTEOARTHRITIS OF LEFT KNEE: Primary | ICD-10-CM

## 2020-10-06 NOTE — PROGRESS NOTES
LINKS immunization registry not responding  Care Everywhere updated  Health Maintenance updated  Chart reviewed for overdue [facility] (SUZAN) health maintenance testing (CRS, Breast Ca, Diabetic Eye Exam)   Orders entered:N/A

## 2020-10-07 ENCOUNTER — OFFICE VISIT (OUTPATIENT)
Dept: ORTHOPEDICS | Facility: CLINIC | Age: 40
End: 2020-10-07
Payer: COMMERCIAL

## 2020-10-07 VITALS — WEIGHT: 315 LBS | BODY MASS INDEX: 41.75 KG/M2 | HEIGHT: 73 IN

## 2020-10-07 DIAGNOSIS — M25.561 RIGHT KNEE PAIN, UNSPECIFIED CHRONICITY: ICD-10-CM

## 2020-10-07 DIAGNOSIS — E66.01 MORBID OBESITY: ICD-10-CM

## 2020-10-07 DIAGNOSIS — Z79.4 TYPE 2 DIABETES MELLITUS WITHOUT COMPLICATION, WITH LONG-TERM CURRENT USE OF INSULIN: ICD-10-CM

## 2020-10-07 DIAGNOSIS — M17.12 PRIMARY OSTEOARTHRITIS OF LEFT KNEE: Primary | ICD-10-CM

## 2020-10-07 DIAGNOSIS — E11.9 TYPE 2 DIABETES MELLITUS WITHOUT COMPLICATION, WITH LONG-TERM CURRENT USE OF INSULIN: ICD-10-CM

## 2020-10-07 DIAGNOSIS — M17.0 PRIMARY OSTEOARTHRITIS OF BOTH KNEES: Primary | ICD-10-CM

## 2020-10-07 PROCEDURE — 99999 PR PBB SHADOW E&M-EST. PATIENT-LVL V: CPT | Mod: PBBFAC,,, | Performed by: ORTHOPAEDIC SURGERY

## 2020-10-07 PROCEDURE — 99214 PR OFFICE/OUTPT VISIT, EST, LEVL IV, 30-39 MIN: ICD-10-PCS | Mod: 25,S$GLB,, | Performed by: ORTHOPAEDIC SURGERY

## 2020-10-07 PROCEDURE — 99214 OFFICE O/P EST MOD 30 MIN: CPT | Mod: 25,S$GLB,, | Performed by: ORTHOPAEDIC SURGERY

## 2020-10-07 PROCEDURE — 99999 PR PBB SHADOW E&M-EST. PATIENT-LVL V: ICD-10-PCS | Mod: PBBFAC,,, | Performed by: ORTHOPAEDIC SURGERY

## 2020-10-07 NOTE — PROGRESS NOTES
40 years old complaining of bilateral knee pain.  Received Euflexxa injections left knee in the past responded well.  Also having pain in the right knee which is particularly bothersome when goes to get up from being seated for long period of time.  Points to the lateral side knee as location his pain.  Pain is 3 on good days, 8 on bad days    Exam shows tenderness the joint line, Naida testing weakly positive, no signs infection or instability    X-rays showed arthritic changes    Assessment:  Bilateral knee arthrosis    Plan:  We will schedule him for bilateral Euflexxa injections get this done once it is approved by insurance    Further History  Aching pain  Worse with activity  Relieved with rest  No other associated symptoms  No other radiation    Further Exam  Alert and oriented  Pleasant  Contralateral limb has appropriate range of motion for age and condition  Contralateral limb has appropriate strength for age and condition  Contralateral limb has appropriate stability  for age and condition  No adenopathy  Pulses are appropriate for current condition  Skin is intact        Chief Complaint    Chief Complaint   Patient presents with    Left Knee - Pain    Right Knee - Pain       HPI  Tony Gordillo is a 40 y.o.  male who presents with       Past Medical History  Past Medical History:   Diagnosis Date    Diabetes mellitus, type 2     Hyperlipidemia     Hypertension        Past Surgical History  Past Surgical History:   Procedure Laterality Date    ANKLE SURGERY      KNEE SURGERY      acl,mcl,pcl    left knee x 2         Medications  Current Outpatient Medications   Medication Sig    blood sugar diagnostic (BLOOD GLUCOSE TEST) Strp OneTouch Ultra Test strips    blood sugar diagnostic Strp To check BG 4 times daily, to use with insurance preferred meter    blood-glucose meter (ONETOUCH ULTRA2 METER) kit OneTouch Ultra2 Meter kit    flash glucose scanning reader ([employer] SAMANTHA 14 DAY READER)  "Misc Freestyle Arleth 14-day reader. To be used with Freestyle Arleth 14-day sensors    fluticasone propionate (FLONASE) 50 mcg/actuation nasal spray fluticasone propionate 50 mcg/actuation nasal spray,suspension    fluticasone propionate (FLONASE) 50 mcg/actuation nasal spray fluticasone propionate 50 mcg/actuation nasal spray,suspension    FREESTYLE ARLETH 14 DAY SENSOR Kit PLEASE CHANGE SENSOR EVERY 14 DAYS. FREESTYLE ARLETH SENSOR 30-DAY SUPPLY, 2 SENSORS/MONTH    insulin aspart, niacinamide, (FIASP FLEXTOUCH U-100 INSULIN) 100 unit/mL (3 mL) InPn Inject 30-30-36 units before meals with ss; max dose 120 units/day    insulin glargine U-300 conc (TOUJEO MAX U-300 SOLOSTAR) 300 unit/mL (3 mL) InPn Inject 50 Units into the skin 2 (two) times daily.    INVOKANA 300 mg Tab tablet TAKE 1 TABLET BY MOUTH ONCE DAILY    ketoconazole (NIZORAL) 2 % cream Apply topically once daily.    lancets [facility] [place] BG 4 times daily, to use with insurance preferred meter    lisinopriL 10 MG tablet TAKE 1 TABLET BY MOUTH EVERY DAY    loratadine (CLARITIN) 10 mg tablet TAKE 1 TABLET BY MOUTH EVERY DAY    metFORMIN (GLUCOPHAGE) 1000 MG tablet Take 1 tablet (1,000 mg total) by mouth 2 (two) times daily with meals.    montelukast (SINGULAIR) 10 mg tablet TAKE 1 TABLET BY MOUTH EVERY EVENING    pen needle, diabetic (BD ULTRA-FINE KEN PEN NEEDLE) 32 gauge x 5/32" Ndle 1 Device by Misc.(Non-Drug; Combo Route) route 5 (five) times daily.    syringe, disposable, 1 mL Syrg Testosterone every 2 weeks    tadalafiL (CIALIS) 20 MG Tab Take 1 tablet (20 mg total) by mouth every 72 hours as needed.    testosterone cypionate (DEPOTESTOTERONE CYPIONATE) 200 mg/mL injection INJECT 1 ML (200 MG TOTAL) INTO THE MUSCLE EVERY 14 (FOURTEEN) DAYS.    atorvastatin (LIPITOR) 20 MG tablet Take 1 tablet (20 mg total) by mouth once daily.    azelastine 0.15 % (205.5 mcg) Spry azelastine 0.15 % (205.5 mcg) nasal spray   INHALE 2 SPRAYS TO EACH " NOSTRIL TWICE A DAY    fenofibrate 160 MG Tab fenofibrate 160 mg tablet    levothyroxine (SYNTHROID) 50 MCG tablet levothyroxine 50 mcg tablet   TAKE ONE TABLET BY MOUTH EVERY DAY    meloxicam (MOBIC) 7.5 MG tablet meloxicam 7.5 mg tablet   TAKE 1 TABLET TWICE DAILY AS NEEDED     Current Facility-Administered Medications   Medication    testosterone Pllt 10 Pellet       Allergies  Review of patient's allergies indicates:   Allergen Reactions    Influenza virus vaccine, specific Hives    Pioglitazone      Altered mood     Victoza [liraglutide] Nausea And Vomiting    Farxiga [dapagliflozin] Rash     Erectile dysfunction and rash        Family History  Family History   Problem Relation Age of Onset    Diabetes Mother     Diabetes Father     No Known Problems Brother     Autism Son     No Known Problems Daughter        Social History  Social History     Socioeconomic History    Marital status:      Spouse name: Not on file    Number of children: Not on file    Years of education: Not on file    Highest education level: Not on file   Occupational History    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     Employer: [employer]   Social Needs    Financial resource strain: Not very hard    Food insecurity     Worry: Sometimes true     Inability: Patient refused    Transportation needs     Medical: No     Non-medical: No   Tobacco Use    Smoking status: Never Smoker    Smokeless tobacco: Never Used   Substance and Sexual Activity    Alcohol use: Yes     Frequency: Monthly or less     Drinks per session: 1 or 2     Binge frequency: Never     Comment: 1-2 beers every 2 weeks    Drug use: No    Sexual activity: Not on file   Lifestyle    Physical activity     Days per week: 4 days     Minutes per session: 40 min    Stress: Not at all   Relationships    Social connections     Talks on phone: More than three times a week     Gets together: Twice a week      Attends Catholic service: Not on file     Active member of club or organization: Patient refused     Attends meetings of clubs or organizations: Patient refused     Relationship status:    Other Topics Concern    Not on file   Social History Narrative    Not on file               Review of Systems     Constitutional: Negative    HENT: Negative  Eyes: Negative  Respiratory: Negative  Cardiovascular: Negative  Musculoskeletal: HPI  Skin: Negative  Neurological: Negative  Hematological: Negative  Endocrine: Negative                 Physical Exam    There were no vitals filed for this visit.  Body mass index is 42.22 kg/m².  Physical Examination:     General appearance -  well appearing, and in no distress  Mental status - awake  Neck - supple  Chest -  symmetric air entry  Heart - normal rate   Abdomen - soft      Assessment     1. Primary osteoarthritis of left knee    2. Right knee pain, unspecified chronicity    3. Type 2 diabetes mellitus without complication, with long-term current use of insulin    4. Morbid obesity          Plan

## 2020-10-12 ENCOUNTER — PATIENT MESSAGE (OUTPATIENT)
Dept: ENDOCRINOLOGY | Facility: CLINIC | Age: 40
End: 2020-10-12

## 2020-10-19 ENCOUNTER — LAB VISIT (OUTPATIENT)
Dept: LAB | Facility: HOSPITAL | Age: 40
End: 2020-10-19
Attending: NURSE PRACTITIONER
Payer: COMMERCIAL

## 2020-10-19 ENCOUNTER — PATIENT MESSAGE (OUTPATIENT)
Dept: FAMILY MEDICINE | Facility: CLINIC | Age: 40
End: 2020-10-19

## 2020-10-19 ENCOUNTER — LAB VISIT (OUTPATIENT)
Dept: LAB | Facility: HOSPITAL | Age: 40
End: 2020-10-19
Attending: INTERNAL MEDICINE
Payer: COMMERCIAL

## 2020-10-19 DIAGNOSIS — E11.9 TYPE 2 DIABETES MELLITUS WITHOUT COMPLICATION, WITH LONG-TERM CURRENT USE OF INSULIN: ICD-10-CM

## 2020-10-19 DIAGNOSIS — R79.89 LOW TESTOSTERONE IN MALE: ICD-10-CM

## 2020-10-19 DIAGNOSIS — Z79.4 TYPE 2 DIABETES MELLITUS WITHOUT COMPLICATION, WITH LONG-TERM CURRENT USE OF INSULIN: ICD-10-CM

## 2020-10-19 DIAGNOSIS — R79.89 ABNORMAL TSH: ICD-10-CM

## 2020-10-19 DIAGNOSIS — Z11.59 NEED FOR HEPATITIS C SCREENING TEST: ICD-10-CM

## 2020-10-19 LAB
ALBUMIN SERPL BCP-MCNC: 4 G/DL (ref 3.5–5.2)
ALBUMIN/CREAT UR: 4 UG/MG (ref 0–30)
ALP SERPL-CCNC: 45 U/L (ref 55–135)
ALT SERPL W/O P-5'-P-CCNC: 32 U/L (ref 10–44)
ANION GAP SERPL CALC-SCNC: 11 MMOL/L (ref 8–16)
AST SERPL-CCNC: 22 U/L (ref 10–40)
BASOPHILS # BLD AUTO: 0.05 K/UL (ref 0–0.2)
BASOPHILS NFR BLD: 0.7 % (ref 0–1.9)
BILIRUB SERPL-MCNC: 0.6 MG/DL (ref 0.1–1)
BUN SERPL-MCNC: 15 MG/DL (ref 6–20)
CALCIUM SERPL-MCNC: 9.7 MG/DL (ref 8.7–10.5)
CHLORIDE SERPL-SCNC: 103 MMOL/L (ref 95–110)
CO2 SERPL-SCNC: 26 MMOL/L (ref 23–29)
CREAT SERPL-MCNC: 0.8 MG/DL (ref 0.5–1.4)
CREAT UR-MCNC: 75 MG/DL (ref 23–375)
DIFFERENTIAL METHOD: ABNORMAL
EOSINOPHIL # BLD AUTO: 0.1 K/UL (ref 0–0.5)
EOSINOPHIL NFR BLD: 1.1 % (ref 0–8)
ERYTHROCYTE [DISTWIDTH] IN BLOOD BY AUTOMATED COUNT: 13.7 % (ref 11.5–14.5)
EST. GFR  (AFRICAN AMERICAN): >60 ML/MIN/1.73 M^2
EST. GFR  (NON AFRICAN AMERICAN): >60 ML/MIN/1.73 M^2
ESTIMATED AVG GLUCOSE: 180 MG/DL (ref 68–131)
GLUCOSE SERPL-MCNC: 117 MG/DL (ref 70–110)
HBA1C MFR BLD HPLC: 7.9 % (ref 4–5.6)
HCT VFR BLD AUTO: 56.5 % (ref 40–54)
HGB BLD-MCNC: 17.7 G/DL (ref 14–18)
IMM GRANULOCYTES # BLD AUTO: 0.03 K/UL (ref 0–0.04)
IMM GRANULOCYTES NFR BLD AUTO: 0.4 % (ref 0–0.5)
LYMPHOCYTES # BLD AUTO: 2.6 K/UL (ref 1–4.8)
LYMPHOCYTES NFR BLD: 34.7 % (ref 18–48)
MCH RBC QN AUTO: 29.2 PG (ref 27–31)
MCHC RBC AUTO-ENTMCNC: 31.3 G/DL (ref 32–36)
MCV RBC AUTO: 93 FL (ref 82–98)
MICROALBUMIN UR DL<=1MG/L-MCNC: 3 UG/ML
MONOCYTES # BLD AUTO: 0.6 K/UL (ref 0.3–1)
MONOCYTES NFR BLD: 7.8 % (ref 4–15)
NEUTROPHILS # BLD AUTO: 4.1 K/UL (ref 1.8–7.7)
NEUTROPHILS NFR BLD: 55.3 % (ref 38–73)
NRBC BLD-RTO: 0 /100 WBC
PLATELET # BLD AUTO: 199 K/UL (ref 150–350)
PMV BLD AUTO: 12 FL (ref 9.2–12.9)
POTASSIUM SERPL-SCNC: 4 MMOL/L (ref 3.5–5.1)
PROT SERPL-MCNC: 7.6 G/DL (ref 6–8.4)
RBC # BLD AUTO: 6.07 M/UL (ref 4.6–6.2)
SODIUM SERPL-SCNC: 140 MMOL/L (ref 136–145)
TESTOST SERPL-MCNC: 636 NG/DL (ref 304–1227)
TSH SERPL DL<=0.005 MIU/L-ACNC: 2.75 UIU/ML (ref 0.4–4)
WBC # BLD AUTO: 7.4 K/UL (ref 3.9–12.7)

## 2020-10-19 PROCEDURE — 80053 COMPREHEN METABOLIC PANEL: CPT

## 2020-10-19 PROCEDURE — 84443 ASSAY THYROID STIM HORMONE: CPT

## 2020-10-19 PROCEDURE — 84403 ASSAY OF TOTAL TESTOSTERONE: CPT

## 2020-10-19 PROCEDURE — 85025 COMPLETE CBC W/AUTO DIFF WBC: CPT

## 2020-10-19 PROCEDURE — 86803 HEPATITIS C AB TEST: CPT

## 2020-10-19 PROCEDURE — 83036 HEMOGLOBIN GLYCOSYLATED A1C: CPT

## 2020-10-19 PROCEDURE — 36415 COLL VENOUS BLD VENIPUNCTURE: CPT | Mod: PO

## 2020-10-19 PROCEDURE — 82043 UR ALBUMIN QUANTITATIVE: CPT

## 2020-10-19 RX ORDER — TESTOSTERONE CYPIONATE 200 MG/ML
100 INJECTION, SOLUTION INTRAMUSCULAR
Qty: 2 ML | Refills: 4 | Status: SHIPPED | OUTPATIENT
Start: 2020-10-19 | End: 2021-02-12

## 2020-10-20 LAB — HCV AB SERPL QL IA: NEGATIVE

## 2020-10-20 NOTE — PROGRESS NOTES
Testosterone normal on 200 q2 weeks  But CBC elevated Hb  CMP Ok  TSH WNL on 50  a1c high, followed by DM NP    [facility] Reduce testosterone to 100 mg q2 weeks  Repeat labs 3 months.

## 2020-10-21 ENCOUNTER — OFFICE VISIT (OUTPATIENT)
Dept: ORTHOPEDICS | Facility: CLINIC | Age: 40
End: 2020-10-21
Payer: COMMERCIAL

## 2020-10-21 ENCOUNTER — OFFICE VISIT (OUTPATIENT)
Dept: ENDOCRINOLOGY | Facility: CLINIC | Age: 40
End: 2020-10-21
Payer: COMMERCIAL

## 2020-10-21 VITALS
HEART RATE: 101 BPM | TEMPERATURE: 98 F | SYSTOLIC BLOOD PRESSURE: 141 MMHG | DIASTOLIC BLOOD PRESSURE: 87 MMHG | WEIGHT: 315 LBS | BODY MASS INDEX: 42.73 KG/M2

## 2020-10-21 VITALS — HEIGHT: 73 IN | BODY MASS INDEX: 41.75 KG/M2 | WEIGHT: 315 LBS

## 2020-10-21 DIAGNOSIS — E66.01 MORBID OBESITY, UNSPECIFIED OBESITY TYPE: ICD-10-CM

## 2020-10-21 DIAGNOSIS — E78.5 HYPERLIPIDEMIA, UNSPECIFIED HYPERLIPIDEMIA TYPE: ICD-10-CM

## 2020-10-21 DIAGNOSIS — E11.9 TYPE 2 DIABETES MELLITUS WITHOUT COMPLICATION, WITH LONG-TERM CURRENT USE OF INSULIN: Primary | ICD-10-CM

## 2020-10-21 DIAGNOSIS — I10 ESSENTIAL HYPERTENSION: ICD-10-CM

## 2020-10-21 DIAGNOSIS — Z79.4 TYPE 2 DIABETES MELLITUS WITHOUT COMPLICATION, WITH LONG-TERM CURRENT USE OF INSULIN: Primary | ICD-10-CM

## 2020-10-21 DIAGNOSIS — E11.649 HYPOGLYCEMIA ASSOCIATED WITH DIABETES: ICD-10-CM

## 2020-10-21 DIAGNOSIS — M17.0 PRIMARY OSTEOARTHRITIS OF BOTH KNEES: Primary | ICD-10-CM

## 2020-10-21 DIAGNOSIS — Z71.9 VISIT FOR COUNSELING: ICD-10-CM

## 2020-10-21 DIAGNOSIS — E78.1 HYPERTRIGLYCERIDEMIA: ICD-10-CM

## 2020-10-21 PROCEDURE — 99499 NO LOS: ICD-10-PCS | Mod: S$GLB,,, | Performed by: ORTHOPAEDIC SURGERY

## 2020-10-21 PROCEDURE — 95251 CONT GLUC MNTR ANALYSIS I&R: CPT | Mod: S$GLB,,, | Performed by: NURSE PRACTITIONER

## 2020-10-21 PROCEDURE — 99999 PR PBB SHADOW E&M-EST. PATIENT-LVL II: CPT | Mod: PBBFAC,,, | Performed by: ORTHOPAEDIC SURGERY

## 2020-10-21 PROCEDURE — 99215 OFFICE O/P EST HI 40 MIN: CPT | Mod: S$GLB,,, | Performed by: NURSE PRACTITIONER

## 2020-10-21 PROCEDURE — 95251 PR GLUCOSE MONITOR, 72 HOUR, PHYS INTERP: ICD-10-PCS | Mod: S$GLB,,, | Performed by: NURSE PRACTITIONER

## 2020-10-21 PROCEDURE — 99499 UNLISTED E&M SERVICE: CPT | Mod: S$GLB,,, | Performed by: ORTHOPAEDIC SURGERY

## 2020-10-21 PROCEDURE — 20610 LARGE JOINT ASPIRATION/INJECTION: BILATERAL KNEE: ICD-10-PCS | Mod: 50,S$GLB,, | Performed by: ORTHOPAEDIC SURGERY

## 2020-10-21 PROCEDURE — 99999 PR PBB SHADOW E&M-EST. PATIENT-LVL II: ICD-10-PCS | Mod: PBBFAC,,, | Performed by: ORTHOPAEDIC SURGERY

## 2020-10-21 PROCEDURE — 99999 PR PBB SHADOW E&M-EST. PATIENT-LVL V: CPT | Mod: PBBFAC,,, | Performed by: NURSE PRACTITIONER

## 2020-10-21 PROCEDURE — 99215 PR OFFICE/OUTPT VISIT, EST, LEVL V, 40-54 MIN: ICD-10-PCS | Mod: S$GLB,,, | Performed by: NURSE PRACTITIONER

## 2020-10-21 PROCEDURE — 99999 PR PBB SHADOW E&M-EST. PATIENT-LVL V: ICD-10-PCS | Mod: PBBFAC,,, | Performed by: NURSE PRACTITIONER

## 2020-10-21 PROCEDURE — 20610 DRAIN/INJ JOINT/BURSA W/O US: CPT | Mod: 50,S$GLB,, | Performed by: ORTHOPAEDIC SURGERY

## 2020-10-21 RX ORDER — ATORVASTATIN CALCIUM 20 MG/1
20 TABLET, FILM COATED ORAL DAILY
Qty: 90 TABLET | Refills: 1 | Status: SHIPPED | OUTPATIENT
Start: 2020-10-21 | End: 2021-03-15

## 2020-10-21 RX ORDER — INSULIN ASPART INJECTION 100 [IU]/ML
INJECTION, SOLUTION SUBCUTANEOUS
Qty: 15 SYRINGE | Refills: 5 | Status: SHIPPED | OUTPATIENT
Start: 2020-10-21 | End: 2021-01-22

## 2020-10-21 RX ORDER — INSULIN GLARGINE 300 U/ML
56 INJECTION, SOLUTION SUBCUTANEOUS DAILY
Qty: 4 SYRINGE | Refills: 5 | Status: SHIPPED | OUTPATIENT
Start: 2020-10-21 | End: 2020-10-23

## 2020-10-21 NOTE — PROCEDURES
Large Joint Aspiration/Injection: bilateral knee    Date/Time: 10/21/2020 10:30 AM  Performed by: Singh Rizo MD  Authorized by: Singh Rizo MD     Consent Done?:  Yes (Verbal)  Timeout: prior to procedure the correct patient, procedure, and site was verified    Prep: patient was prepped and draped in usual sterile fashion      Details:  Needle Size:  21 G  Approach:  Anterolateral  Location:  Knee  Laterality:  Bilateral  Site:  Bilateral knee  Medications (Right):  20 mg sodium hyaluronate (EUFLEXXA) 10 mg/mL(mw 2.4 -3.6 million)  Medications (Left):  20 mg sodium hyaluronate (EUFLEXXA) 10 mg/mL(mw 2.4 -3.6 million)  Patient tolerance:  Patient tolerated the procedure well with no immediate complications

## 2020-10-21 NOTE — PROGRESS NOTES
Testosterone normal on 200 q2 weeks  But CBC elevated Hb  CMP Ok  TSH WNL on 50  a1c high, followed by DM NP  HCV negative.     Results to pt via [facility]. Reduce testosterone to 100 mg q2 weeks  He is in discussion with urology about testosterone seed implants.  Repeat labs 3 months.

## 2020-10-21 NOTE — PATIENT INSTRUCTIONS
Reduce Toujeo to 56 units once daily.   Continue Fiasp at 30-34 units before each meal but take larger amount of 40 units for larger meals.   At least 1 week prior to office visits, try to log in insulin doses and ideally a food log.    Change correction scale - Use on premeal readings: 150-200=+2, 201-250=+4; 251-300=+6; 301-350=+8, over 350=+10 units    Follow up in 3 months with labs prior.       Monitor BPs at home and send log in a week or two.

## 2020-10-21 NOTE — PROGRESS NOTES
CC: This 40 y.o. White male  is here for evaluation of  T2DM along with comorbidities indicated in the Visit Diagnosis section of this encounter.    HPI: Tony Gordillo was diagnosed with T2DM in 2008. He was without health insurance for 2017 and mid 2018.       Prior visit 7/21/20  a1c is down from 8.9 to 7.9%.   He c/o feeling overworked at his job as a . He's lost 5 lb since January. He's been able to afford both insulins.   He takes Toujeo late 1-2x/week. Tries to inject Fiasp after a meal if he's at work because he is oftentimes interrupted mid meal to take a call. At home, he injects ac.   Plan Look into Omnipod insulin pump. Unsure if it's conducive to his work as a .   Reviewed correct hypoglycemia treatment.   Make sure to take each Toujeo dose about every 24 hours. Discussed avoiding insulin stacking.   Will start TOPICAL metformin 1000 mg bid. Pt has had GI s/e in the past with oral metformin.   Decrease Toujeo Max to 64 units ONCE daily in the evening.   F/u with virtual visit in 4-6 weeks. Pt will make his own appt.         Prior visit 8/17/20 virtual visit   He has started topical metformin. No GI s/e. Helping lower spikes in BGs.   He is interested in starting Omnipod.   Plan Increase Fiasp to 42 units if eating a heavy dinner.   Will send pt's demographic info to Omn[employer] so start orders.   rtc in 2 mo with labs prior.       Interval history  a1c is the same at 7.9%. He was advised at lov to increase Fiasp to 42 units before supper. He has however continued with 30-34 units ac. BG dropped too low after supper at 42 units.     Cannot afford the Omnipod for $500/mo.         LAST DIABETES EDUCATION: 2019    HOSPITALIZED FOR DIABETES -  No.    PRESCRIBED DIABETES MEDICATIONS:    Metformin TOPICAL 1000 mg bid  Invokana 300 mg once daily   Toujeo Max 64 units nightly  Fiasp 30-30-36  units ac with correction scale -  Takes + 5 units for every 50 mg/dL at BG > 150  Hasn't been using  "the correction scale lately.     Takes Fiasp immediately after meals instead of before in case meal gets interrupted. He does inject ac when at home.       DM COMPLICATIONS: none    SIGNIFICANT DIABETES MED HISTORY:   Has previously used Novolin 70/30 morning only   glyburide--hypoglycemia  Victoza - nausea and vomiting at 1.2 mg; felt "run down" at lowest dose of 0.6 mg   Metformin - diarrhea and vomiting (has not tried metformin XR)   Pioglitazone - altered mood, reportedly became angry       SELF MONITORING BLOOD GLUCOSE: Checks blood glucose at home several times a day with [facility]. Uses phone as the reader   CGM interpretation:  Tends to get higher BGs after dinner. Having lows overnight about half the time.     HYPOGLYCEMIC EPISODES: as above    CURRENT DIET: drinks water mostly. Sometimes coffee at work.  Eats breakfast (hernandez/eggs, sandwich, occasionally cereal and milk), lunch is at 11 am to 1 pm at work, snacks around 4 pm and then eats dinner at 6-7 pm at home.    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   RESPIRATORY: No shortness of breath      PHYSICAL EXAM:  GENERAL: Well developed, well nourished. No acute distress.   PSYCH: AAOx3, appropriate mood and affect, conversant, well-groomed. Judgement and insight good.   NEURO: Cranial nerves grossly intact. Speech clear, no tremor.   CHEST: Respirations even and unlabored.    Foot exam:     Protective Sensation (w/ 10 gram monofilament):  Right: Intact  Left: Intact    Visual Inspection:  Skin Breakdown -  Neither    Pedal Pulses:   Right: Present  Left: Present    Posterior tibialis:   Right:Present  Left: Present          Hemoglobin A1C "   Date Value Ref Range Status   10/19/2020 7.9 (H) 4.0 - 5.6 % Final     Comment:     ADA Screening Guidelines:  5.7-6.4%  Consistent with prediabetes  >or=6.5%  Consistent with diabetes  High levels of fetal hemoglobin interfere with the HbA1C  assay. Heterozygous hemoglobin variants (HbS, HgC, etc)do  not significantly interfere with this assay.   However, presence of multiple variants may affect accuracy.     07/15/2020 7.9 (H) 4.0 - 5.6 % Final     Comment:     ADA Screening Guidelines:  5.7-6.4%  Consistent with prediabetes  >or=6.5%  Consistent with diabetes  High levels of fetal hemoglobin interfere with the HbA1C  assay. Heterozygous hemoglobin variants (HbS, HgC, etc)do  not significantly interfere with this assay.   However, presence of multiple variants may affect accuracy.     03/23/2020 8.9 (H) 4.0 - 5.6 % Final     Comment:     ADA Screening Guidelines:  5.7-6.4%  Consistent with prediabetes  >or=6.5%  Consistent with diabetes  High levels of fetal hemoglobin interfere with the HbA1C  assay. Heterozygous hemoglobin variants (HbS, HgC, etc)do  not significantly interfere with this assay.   However, presence of multiple variants may affect accuracy.       Lab Results   Component Value Date    TSH 2.752 10/19/2020           Chemistry        Component Value Date/Time     10/19/2020 1100    K 4.0 10/19/2020 1100     10/19/2020 1100    CO2 26 10/19/2020 1100    BUN 15 10/19/2020 1100    CREATININE 0.8 10/19/2020 1100     (H) 10/19/2020 1100        Component Value Date/Time    CALCIUM 9.7 10/19/2020 1100    ALKPHOS 45 (L) 10/19/2020 1100    AST 22 10/19/2020 1100    ALT 32 10/19/2020 1100    BILITOT 0.6 10/19/2020 1100    ESTGFRAFRICA >60.0 10/19/2020 1100    EGFRNONAA >60.0 10/19/2020 1100          Lab Results   Component Value Date    LDLCALC 90.6 03/23/2020          Ref. Range 11/21/2019 10:35 3/23/2020 08:44   Cholesterol Latest Ref Range: 120 - 199 mg/dL 185 183   HDL Latest Ref Range:  40 - 75 mg/dL 55 33 (L)   Hdl/Cholesterol Ratio Latest Ref Range: 20.0 - 50.0 % 29.7 18.0 (L)   LDL Cholesterol External Latest Ref Range: 63.0 - 159.0 mg/dL 107.6 90.6   Non-HDL Cholesterol Latest Units: mg/dL 130 150   Total Cholesterol/HDL Ratio Latest Ref Range: 2.0 - 5.0  3.4 5.5 (H)   Triglycerides Latest Ref Range: 30 - 150 mg/dL 112 297 (H)     Lab Results   Component Value Date    MICALBCREAT 4.0 10/19/2020             ASSESSMENT and PLAN:    A1C GOAL: < 7 %     1. Type 2 diabetes mellitus without complication, with long-term current use of insulin  Reduce Toujeo to 56 units once daily.   Continue Fiasp at 30-34 units before each meal but take larger amount of 40 units for larger meals.   At least 1 week prior to office visits, try to log in insulin doses and ideally a food log.    Change correction scale - Use on premeal readings: 150-200=+2, 201-250=+4; 251-300=+6; 301-350=+8, over 350=+10 units    Follow up in 3 months with labs prior.       insulin aspart, niacinamide, (FIASP FLEXTOUCH U-100 INSULIN) 100 unit/mL (3 mL) InPn    Hemoglobin A1C   2. Morbid obesity, unspecified obesity type  Increases insulin resistance.      3. Hypoglycemia associated with diabetes  Reduce insulin as above.    4. Essential hypertension  Monitor BPs at home and send log in a week or two.    5. Hyperlipidemia, unspecified hyperlipidemia type  atorvastatin (LIPITOR) 20 MG tablet   6. Hypertriglyceridemia  Lipid Panel       Spent 40 minutes with patient with >50% time spent in counseling, as noted in # 1-6.        Patient is testing blood sugars 4x/day and taking 4 injections of insulin a day. The patient's insulin treatment regimen requires frequent adjustments by the patient on the basis of therapeutic CGM testing results.   Addendum: He is having low glucoses less than 50 at least twice weekly.     Orders Placed This Encounter   Procedures    Hemoglobin A1C     Standing Status:   Future     Standing Expiration Date:    12/20/2021    Lipid Panel     Standing Status:   Future     Standing Expiration Date:   10/21/2021        Follow up in about 3 months (around 1/21/2021).

## 2020-10-23 ENCOUNTER — PATIENT MESSAGE (OUTPATIENT)
Dept: ENDOCRINOLOGY | Facility: CLINIC | Age: 40
End: 2020-10-23

## 2020-10-23 DIAGNOSIS — E11.9 TYPE 2 DIABETES MELLITUS WITHOUT COMPLICATION, WITH LONG-TERM CURRENT USE OF INSULIN: ICD-10-CM

## 2020-10-23 DIAGNOSIS — Z79.4 TYPE 2 DIABETES MELLITUS WITHOUT COMPLICATION, WITH LONG-TERM CURRENT USE OF INSULIN: ICD-10-CM

## 2020-10-23 RX ORDER — INSULIN GLARGINE 300 U/ML
56 INJECTION, SOLUTION SUBCUTANEOUS DAILY
Qty: 4 SYRINGE | Refills: 5 | Status: SHIPPED | OUTPATIENT
Start: 2020-10-23 | End: 2020-12-03 | Stop reason: SDUPTHER

## 2020-10-30 ENCOUNTER — OFFICE VISIT (OUTPATIENT)
Dept: ORTHOPEDICS | Facility: CLINIC | Age: 40
End: 2020-10-30
Payer: COMMERCIAL

## 2020-10-30 VITALS — WEIGHT: 315 LBS | HEIGHT: 73 IN | BODY MASS INDEX: 41.75 KG/M2

## 2020-10-30 DIAGNOSIS — M17.0 PRIMARY OSTEOARTHRITIS OF BOTH KNEES: Primary | ICD-10-CM

## 2020-10-30 PROCEDURE — 20610 LARGE JOINT ASPIRATION/INJECTION: BILATERAL KNEE: ICD-10-PCS | Mod: 50,S$GLB,, | Performed by: ORTHOPAEDIC SURGERY

## 2020-10-30 PROCEDURE — 20610 DRAIN/INJ JOINT/BURSA W/O US: CPT | Mod: 50,S$GLB,, | Performed by: ORTHOPAEDIC SURGERY

## 2020-10-30 PROCEDURE — 99999 PR PBB SHADOW E&M-EST. PATIENT-LVL IV: CPT | Mod: PBBFAC,,, | Performed by: ORTHOPAEDIC SURGERY

## 2020-10-30 PROCEDURE — 99999 PR PBB SHADOW E&M-EST. PATIENT-LVL IV: ICD-10-PCS | Mod: PBBFAC,,, | Performed by: ORTHOPAEDIC SURGERY

## 2020-10-30 PROCEDURE — 99499 NO LOS: ICD-10-PCS | Mod: S$GLB,,, | Performed by: ORTHOPAEDIC SURGERY

## 2020-10-30 PROCEDURE — 99499 UNLISTED E&M SERVICE: CPT | Mod: S$GLB,,, | Performed by: ORTHOPAEDIC SURGERY

## 2020-10-30 NOTE — PROCEDURES
Large Joint Aspiration/Injection: bilateral knee    Date/Time: 10/30/2020 11:00 AM  Performed by: Singh Rizo MD  Authorized by: Singh Rizo MD     Consent Done?:  Yes (Verbal)  Timeout: prior to procedure the correct patient, procedure, and site was verified    Prep: patient was prepped and draped in usual sterile fashion      Details:  Needle Size:  21 G  Approach:  Anterolateral  Location:  Knee  Laterality:  Bilateral  Site:  Bilateral knee  Medications (Right):  20 mg sodium hyaluronate (EUFLEXXA) 10 mg/mL(mw 2.4 -3.6 million)  Medications (Left):  20 mg sodium hyaluronate (EUFLEXXA) 10 mg/mL(mw 2.4 -3.6 million)  Patient tolerance:  Patient tolerated the procedure well with no immediate complications

## 2020-11-03 ENCOUNTER — PATIENT MESSAGE (OUTPATIENT)
Dept: ENDOCRINOLOGY | Facility: CLINIC | Age: 40
End: 2020-11-03

## 2020-11-04 ENCOUNTER — PATIENT MESSAGE (OUTPATIENT)
Dept: ENDOCRINOLOGY | Facility: CLINIC | Age: 40
End: 2020-11-04

## 2020-11-04 RX ORDER — BLOOD-GLUCOSE SENSOR
EACH MISCELLANEOUS
Qty: 3 DEVICE | Refills: 12 | Status: SHIPPED | OUTPATIENT
Start: 2020-11-04 | End: 2021-07-21 | Stop reason: SDUPTHER

## 2020-11-04 RX ORDER — BLOOD-GLUCOSE TRANSMITTER
EACH MISCELLANEOUS
Qty: 1 DEVICE | Refills: 3 | Status: SHIPPED | OUTPATIENT
Start: 2020-11-04 | End: 2020-11-04

## 2020-11-04 RX ORDER — BLOOD-GLUCOSE SENSOR
EACH MISCELLANEOUS
Qty: 3 DEVICE | Refills: 12 | Status: SHIPPED | OUTPATIENT
Start: 2020-11-04 | End: 2020-11-04

## 2020-11-04 RX ORDER — BLOOD-GLUCOSE TRANSMITTER
EACH MISCELLANEOUS
Qty: 1 DEVICE | Refills: 3 | Status: SHIPPED | OUTPATIENT
Start: 2020-11-04 | End: 2021-07-21 | Stop reason: SDUPTHER

## 2020-11-05 ENCOUNTER — OFFICE VISIT (OUTPATIENT)
Dept: ORTHOPEDICS | Facility: CLINIC | Age: 40
End: 2020-11-05
Payer: COMMERCIAL

## 2020-11-05 VITALS — WEIGHT: 315 LBS | HEIGHT: 73 IN | BODY MASS INDEX: 41.75 KG/M2

## 2020-11-05 DIAGNOSIS — M17.0 PRIMARY OSTEOARTHRITIS OF BOTH KNEES: Primary | ICD-10-CM

## 2020-11-05 PROCEDURE — 20610 LARGE JOINT ASPIRATION/INJECTION: BILATERAL KNEE: ICD-10-PCS | Mod: 50,S$GLB,, | Performed by: ORTHOPAEDIC SURGERY

## 2020-11-05 PROCEDURE — 99499 UNLISTED E&M SERVICE: CPT | Mod: S$GLB,,, | Performed by: ORTHOPAEDIC SURGERY

## 2020-11-05 PROCEDURE — 99999 PR PBB SHADOW E&M-EST. PATIENT-LVL II: CPT | Mod: PBBFAC,,, | Performed by: ORTHOPAEDIC SURGERY

## 2020-11-05 PROCEDURE — 20610 DRAIN/INJ JOINT/BURSA W/O US: CPT | Mod: 50,S$GLB,, | Performed by: ORTHOPAEDIC SURGERY

## 2020-11-05 PROCEDURE — 99499 NO LOS: ICD-10-PCS | Mod: S$GLB,,, | Performed by: ORTHOPAEDIC SURGERY

## 2020-11-05 PROCEDURE — 99999 PR PBB SHADOW E&M-EST. PATIENT-LVL II: ICD-10-PCS | Mod: PBBFAC,,, | Performed by: ORTHOPAEDIC SURGERY

## 2020-11-05 NOTE — PROCEDURES
Large Joint Aspiration/Injection: bilateral knee    Date/Time: 11/5/2020 1:00 PM  Performed by: Singh Rizo MD  Authorized by: Singh Rizo MD     Consent Done?:  Yes (Verbal)  Timeout: prior to procedure the correct patient, procedure, and site was verified    Prep: patient was prepped and draped in usual sterile fashion      Details:  Needle Size:  21 G  Approach:  Anterolateral  Location:  Knee  Laterality:  Bilateral  Site:  Bilateral knee  Medications (Right):  20 mg sodium hyaluronate (EUFLEXXA) 10 mg/mL(mw 2.4 -3.6 million)  Medications (Left):  20 mg sodium hyaluronate (EUFLEXXA) 10 mg/mL(mw 2.4 -3.6 million)  Patient tolerance:  Patient tolerated the procedure well with no immediate complications

## 2020-11-12 ENCOUNTER — PATIENT MESSAGE (OUTPATIENT)
Dept: ENDOCRINOLOGY | Facility: CLINIC | Age: 40
End: 2020-11-12

## 2020-11-12 DIAGNOSIS — E08.01 DIABETES MELLITUS DUE TO UNDERLYING CONDITION WITH HYPEROSMOLARITY AND COMA, WITH LONG-TERM CURRENT USE OF INSULIN: Primary | ICD-10-CM

## 2020-11-12 DIAGNOSIS — Z79.4 DIABETES MELLITUS DUE TO UNDERLYING CONDITION WITH HYPEROSMOLARITY AND COMA, WITH LONG-TERM CURRENT USE OF INSULIN: Primary | ICD-10-CM

## 2020-11-12 RX ORDER — BLOOD-GLUCOSE TRANSMITTER
EACH MISCELLANEOUS
Qty: 1 DEVICE | Refills: 3 | Status: CANCELLED | OUTPATIENT
Start: 2020-11-12

## 2020-11-12 RX ORDER — FLASH GLUCOSE SCANNING READER
EACH MISCELLANEOUS
Qty: 1 EACH | Refills: 0 | Status: CANCELLED | OUTPATIENT
Start: 2020-11-12

## 2020-11-12 RX ORDER — BLOOD-GLUCOSE,RECEIVER,CONT
EACH MISCELLANEOUS
Qty: 1 EACH | Refills: 0 | Status: SHIPPED | OUTPATIENT
Start: 2020-11-12 | End: 2023-02-15

## 2020-11-12 NOTE — TELEPHONE ENCOUNTER
Pt requesting order for Dexcom G6 transmitter (reader). States that order for the Dexcom went through [facility], he filled order, however he did not fill the prescription for the reciever, only the sensors.  He has been using the smartphone raul for a few days, likes using device, but dislikes that the raul drains the phone's battery, and would like to use the reciever instead

## 2020-11-15 ENCOUNTER — PATIENT MESSAGE (OUTPATIENT)
Dept: UROLOGY | Facility: CLINIC | Age: 40
End: 2020-11-15

## 2020-11-17 DIAGNOSIS — E29.1 HYPOGONADISM MALE: Primary | ICD-10-CM

## 2020-11-18 ENCOUNTER — TELEPHONE (OUTPATIENT)
Dept: UROLOGY | Facility: CLINIC | Age: 40
End: 2020-11-18

## 2020-11-18 NOTE — TELEPHONE ENCOUNTER
----- Message from Shilpa Gordon NP sent at 11/17/2020  7:02 AM CST -----  Regarding: RE: Testopel  Signed!  ----- Message -----  From: Blayne Leo  Sent: 11/16/2020   3:55 PM CST  To: Shilpa Gordon NP  Subject: Testopel                                         Please place order for testopel. Thanks!

## 2020-12-03 DIAGNOSIS — E11.9 TYPE 2 DIABETES MELLITUS WITHOUT COMPLICATION, WITH LONG-TERM CURRENT USE OF INSULIN: ICD-10-CM

## 2020-12-03 DIAGNOSIS — Z79.4 TYPE 2 DIABETES MELLITUS WITHOUT COMPLICATION, WITH LONG-TERM CURRENT USE OF INSULIN: ICD-10-CM

## 2020-12-03 RX ORDER — INSULIN GLARGINE 300 U/ML
56 INJECTION, SOLUTION SUBCUTANEOUS DAILY
Qty: 4 SYRINGE | Refills: 5 | Status: SHIPPED | OUTPATIENT
Start: 2020-12-03 | End: 2021-04-08 | Stop reason: SDUPTHER

## 2020-12-14 ENCOUNTER — PATIENT MESSAGE (OUTPATIENT)
Dept: ENDOCRINOLOGY | Facility: CLINIC | Age: 40
End: 2020-12-14

## 2020-12-17 ENCOUNTER — PATIENT MESSAGE (OUTPATIENT)
Dept: FAMILY MEDICINE | Facility: CLINIC | Age: 40
End: 2020-12-17

## 2020-12-17 ENCOUNTER — HOSPITAL ENCOUNTER (OUTPATIENT)
Dept: RADIOLOGY | Facility: HOSPITAL | Age: 40
Discharge: HOME | End: 2020-12-17
Attending: INTERNAL MEDICINE
Payer: COMMERCIAL

## 2020-12-17 ENCOUNTER — OFFICE VISIT (OUTPATIENT)
Dept: FAMILY MEDICINE | Facility: CLINIC | Age: 40
End: 2020-12-17
Payer: COMMERCIAL

## 2020-12-17 ENCOUNTER — TELEPHONE (OUTPATIENT)
Dept: UROLOGY | Facility: CLINIC | Age: 40
End: 2020-12-17

## 2020-12-17 VITALS
SYSTOLIC BLOOD PRESSURE: 120 MMHG | OXYGEN SATURATION: 98 % | DIASTOLIC BLOOD PRESSURE: 80 MMHG | BODY MASS INDEX: 41.75 KG/M2 | WEIGHT: 315 LBS | HEIGHT: 73 IN | TEMPERATURE: 99 F | HEART RATE: 74 BPM

## 2020-12-17 DIAGNOSIS — R68.89 FLU-LIKE SYMPTOMS: ICD-10-CM

## 2020-12-17 DIAGNOSIS — Z20.822 SUSPECTED COVID-19 VIRUS INFECTION: Primary | ICD-10-CM

## 2020-12-17 LAB
CTP QC/QA: YES
POC MOLECULAR INFLUENZA A AGN: NEGATIVE
POC MOLECULAR INFLUENZA B AGN: NEGATIVE

## 2020-12-17 PROCEDURE — 71046 XR CHEST PA AND LATERAL: ICD-10-PCS | Mod: 26,,, | Performed by: RADIOLOGY

## 2020-12-17 PROCEDURE — 87502 POCT INFLUENZA A/B MOLECULAR: ICD-10-PCS | Mod: QW,S$GLB,, | Performed by: INTERNAL MEDICINE

## 2020-12-17 PROCEDURE — 71046 X-RAY EXAM CHEST 2 VIEWS: CPT | Mod: 26,,, | Performed by: RADIOLOGY

## 2020-12-17 PROCEDURE — 99999 PR PBB SHADOW E&M-EST. PATIENT-LVL V: ICD-10-PCS | Mod: PBBFAC,,, | Performed by: INTERNAL MEDICINE

## 2020-12-17 PROCEDURE — 99999 PR PBB SHADOW E&M-EST. PATIENT-LVL V: CPT | Mod: PBBFAC,,, | Performed by: INTERNAL MEDICINE

## 2020-12-17 PROCEDURE — 99214 OFFICE O/P EST MOD 30 MIN: CPT | Mod: S$GLB,,, | Performed by: INTERNAL MEDICINE

## 2020-12-17 PROCEDURE — 87502 INFLUENZA DNA AMP PROBE: CPT | Mod: QW,S$GLB,, | Performed by: INTERNAL MEDICINE

## 2020-12-17 PROCEDURE — 71046 X-RAY EXAM CHEST 2 VIEWS: CPT | Mod: TC,FY,PO

## 2020-12-17 PROCEDURE — 99214 PR OFFICE/OUTPT VISIT, EST, LEVL IV, 30-39 MIN: ICD-10-PCS | Mod: S$GLB,,, | Performed by: INTERNAL MEDICINE

## 2020-12-17 NOTE — PROGRESS NOTES
This note was created by combination of typed  and M-Modal dictation.  Transcription errors may be present.  If there are any questions, please contact me.    Assessment and Plan:   Suspected COVID-19 virus infection  Flu-like symptoms  -already swabbed at [facility], results pending  Checking flu and CXR  Out of work until results  If negative out of work until afebrile x 24 hours and improvement in URI sx.  Will need note eventually regarding work disposition  If positive apparently his work requires 2 negative swabs to clear him, despite CDC guidance recommending against this.  Declines rx for cough  -     POCT Influenza A/B Molecular  -     X-Ray Chest PA And Lateral; Future; Expected date: 12/17/2020    Medications Discontinued During This Encounter   Medication Reason    flash glucose scanning reader (Carena SAMANTHA 14 DAY READER) Misc Therapy completed    J.G. inkSTYLE SAMANTHA 14 DAY SENSOR Kit     meloxicam (MOBIC) 7.5 MG tablet Therapy completed       meds sent this encounter:       Follow Up: No follow-ups on file.    Subjective:     Chief Complaint   Patient presents with    Cough     symptoms started about 3 days ago. taking ibuprofen and say selter plus    Fever    Generalized Body Aches    Chest Congestion    Nasal Congestion    Headache       HPI  Tony is a 40 y.o. male, last appointment with this clinic was Visit date not found.    DM followed by endo. Got the dexcom  OA knees, ortho, s/p joint injection  Hypogonad, urology was discussing with him about changing testosterone injection to testosterone pellets.  Hypothyroid  hyperlipid    10/2020 a1c 7.9  tsh wnl  Testosterone 636  CBC high hct    Is a [employer]  now.  Formerly with [employer].     URI sx. Went to [facility] for covid testing yesterday. Results pending.   Fever, congestion, aches and pains, joint pains.   Fever this AM 6 AM. Tylenol and say seltzer cold and ibuprofen. Afebrile on intake  Cough, nonproductive.  Feels like chest congestion. Sometimes some dyspnea. With exertion.   Bowels are OK, alternate between soft and formed but not watery diarrhea.   Sick contacts - a number of coworkers with covid.  He has not been around those individuals though.  Household members are asymptomatic. One child in school one child home school.     Is taking metformin topical compounded. Equivalent of 1000 bid. No GI issues with this.  toujeo and fiasp.  Had been out of toujeo for about 10 days for coverage issues.     Testosterone I had lowered him to 100 mg every 14 days IM  He is trying to get coverage for the testosterone pellets. Has not had those implanted yet.  He is in constant discussion with his insurance.    Patient Care Team:  Jovany Ford MD as PCP - General (Internal Medicine)  Janneth Johnson RN (Inactive) as Registered Nurse (Diabetes)  Rocky Hewitt MD as Consulting Physician (Ophthalmology)  Preethi Monaco RD as Dietitian (Nutrition)  River Montiel MA as Care Coordinator  Christofer Rowley MD as Consulting Physician (Orthopedic Surgery)  Singh Rizo MD as Consulting Physician (Sports Medicine)  Shilpa Gordon NP as Nurse Practitioner (Urology)    Patient Active Problem List    Diagnosis Date Noted    Right foot pain 10/10/2019    Decreased strength of lower extremity 10/10/2019    Decreased range of motion of both ankles 10/10/2019    Gait abnormality 10/10/2019    Low testosterone in male; pituitary axis normal. MRI negative. 11/2019 started on testosterone 09/04/2019 11/06/2019 MRI pituitary with and without contrast from [facility]  Impression:  1.  No pituitary mass seen.  2.  Absence of the septum pellucidum.      NAINA (obstructive sleep apnea) 8/2019 sleep study [facility] 11     Acute bilateral low back pain without sciatica 08/10/2019    Non-seasonal allergic rhinitis; avoid antihistamines per opthalmology 11/09/2018    Migraine with aura and without status migrainosus, not  intractable propranolol SE angry; topamax not helpful; imitrex ineffective; daith ear piercing helps 09/28/2018    Type 2 diabetes mellitus without complication, with long-term current use of insulin     Essential hypertension     Hypothyroidism 07/29/2015    Hyperlipidemia LDL goal <70 06/03/2015    Insulin long-term use 06/03/2015    Tinea pedis 06/03/2015    Morbid obesity 06/03/2015       PAST MEDICAL HISTORY:  Past Medical History:   Diagnosis Date    Diabetes mellitus, type 2     Hyperlipidemia     Hypertension        PAST SURGICAL HISTORY:  Past Surgical History:   Procedure Laterality Date    ANKLE SURGERY      KNEE SURGERY      acl,mcl,pcl    left knee x 2         SOCIAL HISTORY:  Social History     Socioeconomic History    Marital status:      Spouse name: Not on file    Number of children: Not on file    Years of education: Not on file    Highest education level: Not on file   Occupational History    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     Employer: [employer]   Social Needs    Financial resource strain: Not very hard    Food insecurity     Worry: Never true     Inability: Never true    Transportation needs     Medical: No     Non-medical: No   Tobacco Use    Smoking status: Never Smoker    Smokeless tobacco: Never Used   Substance and Sexual Activity    Alcohol use: Yes     Frequency: 2-4 times a month     Drinks per session: 1 or 2     Binge frequency: Never     Comment: 1-2 beers every 2 weeks    Drug use: No    Sexual activity: Not on file   Lifestyle    Physical activity     Days per week: 4 days     Minutes per session: 30 min    Stress: Not at all   Relationships    Social connections     Talks on phone: Three times a week     Gets together: Never     Attends Druze service: Not on file     Active member of club or organization: No     Attends meetings of clubs or organizations: Never     Relationship status:     Other Topics Concern    Not on file   Social History Narrative    Not on file       ALLERGIES AND MEDICATIONS: updated and reviewed.  Review of patient's allergies indicates:   Allergen Reactions    Influenza virus vaccine, specific Hives    Pioglitazone      Altered mood     Victoza [liraglutide] Nausea And Vomiting    Farxiga [dapagliflozin] Rash     Erectile dysfunction and rash        Medication List with Changes/Refills   Current Medications    ATORVASTATIN (LIPITOR) 20 MG TABLET    Take 1 tablet (20 mg total) by mouth once daily.    AZELASTINE 0.15 % (205.5 MCG) SPRY    azelastine 0.15 % (205.5 mcg) nasal spray   INHALE 2 SPRAYS TO EACH NOSTRIL TWICE A DAY    BLOOD SUGAR DIAGNOSTIC (BLOOD GLUCOSE TEST) STRP    OneTouch Ultra Test strips    BLOOD SUGAR DIAGNOSTIC STRP    To check BG 4 times daily, to use with insurance preferred meter    BLOOD-GLUCOSE METER (ONETOUCH ULTRA2 METER) KIT    OneTouch Ultra2 Meter kit    BLOOD-GLUCOSE METER,CONTINUOUS (DEXCOM G6 ) MISC    Use with dexcom G6 system    BLOOD-GLUCOSE SENSOR (DEXCOM G6 SENSOR) CAT    Change sensor every 10 days    BLOOD-GLUCOSE TRANSMITTER (DEXCOM G6 TRANSMITTER) CAT    Change every 3 months    CANAGLIFLOZIN (INVOKANA) 300 MG TAB TABLET    Take 1 tablet (300 mg total) by mouth once daily.    FLUTICASONE PROPIONATE (FLONASE) 50 MCG/ACTUATION NASAL SPRAY    fluticasone propionate 50 mcg/actuation nasal spray,suspension    FLUTICASONE PROPIONATE (FLONASE) 50 MCG/ACTUATION NASAL SPRAY    FLUTICASONE PROPIONATE 50 MCG/ACTUATION NASAL SPRAY,SUSPENSION    INSULIN ASPART, NIACINAMIDE, (FIASP FLEXTOUCH U-100 INSULIN) 100 UNIT/ML (3 ML) INPN    Inject 30-40 units before meals with ss; max dose 120 units/day    INSULIN GLARGINE U-300 CONC (TOUJEO MAX U-300 SOLOSTAR) 300 UNIT/ML (3 ML) INPN    Inject 56 Units into the skin once daily.    KETOCONAZOLE (NIZORAL) 2 % CREAM    Apply topically once daily.    LANCETS MISC    To check BG 4 times  "daily, to use with insurance preferred meter    LISINOPRIL 10 MG TABLET    TAKE 1 TABLET BY MOUTH EVERY DAY    LORATADINE (CLARITIN) 10 MG TABLET    TAKE 1 TABLET BY MOUTH EVERY DAY    METFORMIN (GLUCOPHAGE) 1000 MG TABLET    Take 1 tablet (1,000 mg total) by mouth 2 (two) times daily with meals.    MONTELUKAST (SINGULAIR) 10 MG TABLET    TAKE 1 TABLET BY MOUTH EVERY EVENING    PEN NEEDLE, DIABETIC (BD ULTRA-FINE KEN PEN NEEDLE) 32 GAUGE X 5/32" NDLE    1 Device by Misc.(Non-Drug; Combo Route) route 5 (five) times daily.    SYRINGE, DISPOSABLE, 1 ML SYRG    Testosterone every 2 weeks    TADALAFIL (CIALIS) 20 MG TAB    Take 1 tablet (20 mg total) by mouth every 72 hours as needed.    TESTOSTERONE CYPIONATE (DEPOTESTOTERONE CYPIONATE) 200 MG/ML INJECTION    Inject 0.5 mLs (100 mg total) into the muscle every 14 (fourteen) days. REDUCED DOSE   Discontinued Medications    FLASH GLUCOSE SCANNING READER (FREESTYLE SAMANTHA 14 DAY READER) [facility]    Freestyle Samantha 14-day reader. To be used with Freestyle Samantha 14-day sensors    FREESTYLE SAMANTHA 14 DAY SENSOR KIT    PLEASE CHANGE SENSOR EVERY 14 DAYS. FREESTYLE SAMANTHA SENSOR 30-DAY SUPPLY, 2 SENSORS/MONTH    MELOXICAM (MOBIC) 7.5 MG TABLET    meloxicam 7.5 mg tablet   TAKE 1 TABLET TWICE DAILY AS NEEDED        Review of Systems   Constitutional: Positive for chills and fever. Negative for malaise/fatigue.   HENT: Positive for congestion and sore throat. Negative for ear pain and hearing loss.    Respiratory: Positive for cough. Negative for sputum production and shortness of breath.    Cardiovascular: Negative for chest pain.   Musculoskeletal: Negative for myalgias and neck pain.   Skin: Negative for rash.   Neurological: Negative for headaches.       Objective:   Physical Exam   Vitals:    12/17/20 1305   BP: 120/80   BP Location: Right arm   Patient Position: Sitting   BP Method: Large (Automatic)   Pulse: 74   Temp: 98.6 °F (37 °C)   TempSrc: Oral   SpO2: 98%   Weight: (!) " "147.9 kg (326 lb)   Height: 6' 1" (1.854 m)    Body mass index is 43.01 kg/m².            Physical Exam  Constitutional:       Appearance: He is well-developed.   HENT:      Head: Normocephalic.      Right Ear: Tympanic membrane and ear canal normal.      Left Ear: Tympanic membrane and ear canal normal.      Mouth/Throat:      Pharynx: No oropharyngeal exudate or posterior oropharyngeal erythema.   Eyes:      General: No scleral icterus.        Right eye: No discharge.         Left eye: No discharge.   Neck:      Musculoskeletal: Neck supple.   Cardiovascular:      Rate and Rhythm: Normal rate and regular rhythm.      Heart sounds: Normal heart sounds.   Pulmonary:      Effort: Pulmonary effort is normal.      Breath sounds: Normal breath sounds. No wheezing.   Lymphadenopathy:      Cervical: No cervical adenopathy.   Skin:     General: Skin is warm and dry.   Neurological:      Mental Status: He is alert and oriented to person, place, and time.   Psychiatric:         Behavior: Behavior normal.       POCT flu negative  CXR negative  "

## 2020-12-18 ENCOUNTER — PATIENT MESSAGE (OUTPATIENT)
Dept: FAMILY MEDICINE | Facility: CLINIC | Age: 40
End: 2020-12-18

## 2020-12-18 ENCOUNTER — LAB VISIT (OUTPATIENT)
Dept: LAB | Facility: HOSPITAL | Age: 40
End: 2020-12-18
Attending: NURSE PRACTITIONER
Payer: COMMERCIAL

## 2020-12-18 DIAGNOSIS — E11.9 TYPE 2 DIABETES MELLITUS WITHOUT COMPLICATION, WITH LONG-TERM CURRENT USE OF INSULIN: ICD-10-CM

## 2020-12-18 DIAGNOSIS — E78.1 HYPERTRIGLYCERIDEMIA: ICD-10-CM

## 2020-12-18 DIAGNOSIS — Z79.4 TYPE 2 DIABETES MELLITUS WITHOUT COMPLICATION, WITH LONG-TERM CURRENT USE OF INSULIN: ICD-10-CM

## 2020-12-18 LAB
CHOLEST SERPL-MCNC: 136 MG/DL (ref 120–199)
CHOLEST/HDLC SERPL: 2.8 {RATIO} (ref 2–5)
ESTIMATED AVG GLUCOSE: 163 MG/DL (ref 68–131)
HBA1C MFR BLD HPLC: 7.3 % (ref 4–5.6)
HDLC SERPL-MCNC: 48 MG/DL (ref 40–75)
HDLC SERPL: 35.3 % (ref 20–50)
LDLC SERPL CALC-MCNC: 62.4 MG/DL (ref 63–159)
NONHDLC SERPL-MCNC: 88 MG/DL
TRIGL SERPL-MCNC: 128 MG/DL (ref 30–150)

## 2020-12-18 PROCEDURE — 80061 LIPID PANEL: CPT

## 2020-12-18 PROCEDURE — 83036 HEMOGLOBIN GLYCOSYLATED A1C: CPT

## 2020-12-18 PROCEDURE — 36415 COLL VENOUS BLD VENIPUNCTURE: CPT | Mod: PO

## 2021-01-04 ENCOUNTER — PATIENT MESSAGE (OUTPATIENT)
Dept: ADMINISTRATIVE | Facility: HOSPITAL | Age: 41
End: 2021-01-04

## 2021-01-11 ENCOUNTER — OFFICE VISIT (OUTPATIENT)
Dept: FAMILY MEDICINE | Facility: CLINIC | Age: 41
End: 2021-01-11
Payer: COMMERCIAL

## 2021-01-11 VITALS
DIASTOLIC BLOOD PRESSURE: 74 MMHG | WEIGHT: 315 LBS | SYSTOLIC BLOOD PRESSURE: 132 MMHG | HEIGHT: 73 IN | OXYGEN SATURATION: 96 % | BODY MASS INDEX: 41.75 KG/M2 | TEMPERATURE: 98 F | HEART RATE: 75 BPM

## 2021-01-11 DIAGNOSIS — M65.9 FLEXOR TENOSYNOVITIS OF THUMB: Primary | ICD-10-CM

## 2021-01-11 DIAGNOSIS — G56.01 CARPAL TUNNEL SYNDROME, RIGHT: ICD-10-CM

## 2021-01-11 PROCEDURE — 99999 PR PBB SHADOW E&M-EST. PATIENT-LVL V: ICD-10-PCS | Mod: PBBFAC,,, | Performed by: INTERNAL MEDICINE

## 2021-01-11 PROCEDURE — 99213 PR OFFICE/OUTPT VISIT, EST, LEVL III, 20-29 MIN: ICD-10-PCS | Mod: S$GLB,,, | Performed by: INTERNAL MEDICINE

## 2021-01-11 PROCEDURE — 99213 OFFICE O/P EST LOW 20 MIN: CPT | Mod: S$GLB,,, | Performed by: INTERNAL MEDICINE

## 2021-01-11 PROCEDURE — 99999 PR PBB SHADOW E&M-EST. PATIENT-LVL V: CPT | Mod: PBBFAC,,, | Performed by: INTERNAL MEDICINE

## 2021-01-18 ENCOUNTER — PATIENT MESSAGE (OUTPATIENT)
Dept: UROLOGY | Facility: CLINIC | Age: 41
End: 2021-01-18

## 2021-01-18 RX ORDER — MONTELUKAST SODIUM 10 MG/1
TABLET ORAL
Qty: 90 TABLET | Refills: 3 | Status: SHIPPED | OUTPATIENT
Start: 2021-01-18 | End: 2023-07-18

## 2021-01-21 ENCOUNTER — TELEPHONE (OUTPATIENT)
Dept: ENDOCRINOLOGY | Facility: CLINIC | Age: 41
End: 2021-01-21

## 2021-01-22 ENCOUNTER — OFFICE VISIT (OUTPATIENT)
Dept: ENDOCRINOLOGY | Facility: CLINIC | Age: 41
End: 2021-01-22
Payer: COMMERCIAL

## 2021-01-22 VITALS
TEMPERATURE: 98 F | HEART RATE: 83 BPM | SYSTOLIC BLOOD PRESSURE: 158 MMHG | WEIGHT: 315 LBS | BODY MASS INDEX: 42.54 KG/M2 | DIASTOLIC BLOOD PRESSURE: 96 MMHG

## 2021-01-22 DIAGNOSIS — E66.01 MORBID OBESITY, UNSPECIFIED OBESITY TYPE: ICD-10-CM

## 2021-01-22 DIAGNOSIS — Z79.4 TYPE 2 DIABETES MELLITUS WITHOUT COMPLICATION, WITH LONG-TERM CURRENT USE OF INSULIN: Primary | ICD-10-CM

## 2021-01-22 DIAGNOSIS — E11.9 TYPE 2 DIABETES MELLITUS WITHOUT COMPLICATION, WITH LONG-TERM CURRENT USE OF INSULIN: Primary | ICD-10-CM

## 2021-01-22 DIAGNOSIS — I10 ESSENTIAL HYPERTENSION: ICD-10-CM

## 2021-01-22 DIAGNOSIS — E11.649 HYPOGLYCEMIA ASSOCIATED WITH DIABETES: ICD-10-CM

## 2021-01-22 PROCEDURE — 99214 PR OFFICE/OUTPT VISIT, EST, LEVL IV, 30-39 MIN: ICD-10-PCS | Mod: S$GLB,,, | Performed by: NURSE PRACTITIONER

## 2021-01-22 PROCEDURE — 99999 PR PBB SHADOW E&M-EST. PATIENT-LVL V: CPT | Mod: PBBFAC,,, | Performed by: NURSE PRACTITIONER

## 2021-01-22 PROCEDURE — 99999 PR PBB SHADOW E&M-EST. PATIENT-LVL V: ICD-10-PCS | Mod: PBBFAC,,, | Performed by: NURSE PRACTITIONER

## 2021-01-22 PROCEDURE — 99214 OFFICE O/P EST MOD 30 MIN: CPT | Mod: S$GLB,,, | Performed by: NURSE PRACTITIONER

## 2021-01-22 RX ORDER — INSULIN ASPART INJECTION 100 [IU]/ML
INJECTION, SOLUTION SUBCUTANEOUS
Qty: 15 SYRINGE | Refills: 5
Start: 2021-01-22 | End: 2021-07-21 | Stop reason: SDUPTHER

## 2021-01-25 ENCOUNTER — PATIENT OUTREACH (OUTPATIENT)
Dept: ADMINISTRATIVE | Facility: OTHER | Age: 41
End: 2021-01-25

## 2021-01-25 ENCOUNTER — OFFICE VISIT (OUTPATIENT)
Dept: ENDOCRINOLOGY | Facility: CLINIC | Age: 41
End: 2021-01-25
Payer: COMMERCIAL

## 2021-01-25 VITALS
BODY MASS INDEX: 42.22 KG/M2 | SYSTOLIC BLOOD PRESSURE: 136 MMHG | WEIGHT: 315 LBS | DIASTOLIC BLOOD PRESSURE: 76 MMHG | HEART RATE: 99 BPM | TEMPERATURE: 98 F

## 2021-01-25 DIAGNOSIS — Z79.4 TYPE 2 DIABETES MELLITUS WITHOUT COMPLICATION, WITH LONG-TERM CURRENT USE OF INSULIN: Primary | ICD-10-CM

## 2021-01-25 DIAGNOSIS — E66.01 MORBID OBESITY: ICD-10-CM

## 2021-01-25 DIAGNOSIS — G47.33 OSA (OBSTRUCTIVE SLEEP APNEA): ICD-10-CM

## 2021-01-25 DIAGNOSIS — E11.9 TYPE 2 DIABETES MELLITUS WITHOUT COMPLICATION, WITH LONG-TERM CURRENT USE OF INSULIN: Primary | ICD-10-CM

## 2021-01-25 DIAGNOSIS — E03.4 HYPOTHYROIDISM DUE TO ACQUIRED ATROPHY OF THYROID: ICD-10-CM

## 2021-01-25 DIAGNOSIS — R79.89 LOW TESTOSTERONE IN MALE: Primary | ICD-10-CM

## 2021-01-25 PROCEDURE — 99214 PR OFFICE/OUTPT VISIT, EST, LEVL IV, 30-39 MIN: ICD-10-PCS | Mod: S$GLB,,, | Performed by: HOSPITALIST

## 2021-01-25 PROCEDURE — 99999 PR PBB SHADOW E&M-EST. PATIENT-LVL V: CPT | Mod: PBBFAC,,, | Performed by: HOSPITALIST

## 2021-01-25 PROCEDURE — 99214 OFFICE O/P EST MOD 30 MIN: CPT | Mod: S$GLB,,, | Performed by: HOSPITALIST

## 2021-01-25 PROCEDURE — 99999 PR PBB SHADOW E&M-EST. PATIENT-LVL V: ICD-10-PCS | Mod: PBBFAC,,, | Performed by: HOSPITALIST

## 2021-02-02 ENCOUNTER — PATIENT MESSAGE (OUTPATIENT)
Dept: ENDOCRINOLOGY | Facility: CLINIC | Age: 41
End: 2021-02-02

## 2021-02-03 ENCOUNTER — LAB VISIT (OUTPATIENT)
Dept: LAB | Facility: HOSPITAL | Age: 41
End: 2021-02-03
Attending: HOSPITALIST
Payer: COMMERCIAL

## 2021-02-03 DIAGNOSIS — R79.89 LOW TESTOSTERONE IN MALE: ICD-10-CM

## 2021-02-03 DIAGNOSIS — E03.4 HYPOTHYROIDISM DUE TO ACQUIRED ATROPHY OF THYROID: ICD-10-CM

## 2021-02-03 LAB
ALBUMIN SERPL BCP-MCNC: 3.9 G/DL (ref 3.5–5.2)
ALP SERPL-CCNC: 52 U/L (ref 55–135)
ALT SERPL W/O P-5'-P-CCNC: 27 U/L (ref 10–44)
ANION GAP SERPL CALC-SCNC: 10 MMOL/L (ref 8–16)
AST SERPL-CCNC: 19 U/L (ref 10–40)
BASOPHILS # BLD AUTO: 0.05 K/UL (ref 0–0.2)
BASOPHILS NFR BLD: 0.7 % (ref 0–1.9)
BILIRUB SERPL-MCNC: 0.7 MG/DL (ref 0.1–1)
BUN SERPL-MCNC: 16 MG/DL (ref 6–20)
CALCIUM SERPL-MCNC: 8.9 MG/DL (ref 8.7–10.5)
CHLORIDE SERPL-SCNC: 105 MMOL/L (ref 95–110)
CO2 SERPL-SCNC: 23 MMOL/L (ref 23–29)
CREAT SERPL-MCNC: 0.7 MG/DL (ref 0.5–1.4)
DIFFERENTIAL METHOD: NORMAL
EOSINOPHIL # BLD AUTO: 0.1 K/UL (ref 0–0.5)
EOSINOPHIL NFR BLD: 1.8 % (ref 0–8)
ERYTHROCYTE [DISTWIDTH] IN BLOOD BY AUTOMATED COUNT: 13.6 % (ref 11.5–14.5)
EST. GFR  (AFRICAN AMERICAN): >60 ML/MIN/1.73 M^2
EST. GFR  (NON AFRICAN AMERICAN): >60 ML/MIN/1.73 M^2
FERRITIN SERPL-MCNC: 82 NG/ML (ref 20–300)
FSH SERPL-ACNC: 0.4 MIU/ML (ref 0.95–11.95)
GLUCOSE SERPL-MCNC: 157 MG/DL (ref 70–110)
HCT VFR BLD AUTO: 47.6 % (ref 40–54)
HGB BLD-MCNC: 15.5 G/DL (ref 14–18)
IMM GRANULOCYTES # BLD AUTO: 0.02 K/UL (ref 0–0.04)
IMM GRANULOCYTES NFR BLD AUTO: 0.3 % (ref 0–0.5)
LH SERPL-ACNC: <0.2 MIU/ML (ref 0.6–12.1)
LYMPHOCYTES # BLD AUTO: 2.6 K/UL (ref 1–4.8)
LYMPHOCYTES NFR BLD: 35.9 % (ref 18–48)
MCH RBC QN AUTO: 30.2 PG (ref 27–31)
MCHC RBC AUTO-ENTMCNC: 32.6 G/DL (ref 32–36)
MCV RBC AUTO: 93 FL (ref 82–98)
MONOCYTES # BLD AUTO: 0.7 K/UL (ref 0.3–1)
MONOCYTES NFR BLD: 9.6 % (ref 4–15)
NEUTROPHILS # BLD AUTO: 3.7 K/UL (ref 1.8–7.7)
NEUTROPHILS NFR BLD: 51.7 % (ref 38–73)
NRBC BLD-RTO: 0 /100 WBC
PLATELET # BLD AUTO: 199 K/UL (ref 150–350)
PMV BLD AUTO: 11.9 FL (ref 9.2–12.9)
POTASSIUM SERPL-SCNC: 3.9 MMOL/L (ref 3.5–5.1)
PROLACTIN SERPL IA-MCNC: 11.1 NG/ML (ref 3.5–19.4)
PROT SERPL-MCNC: 7.1 G/DL (ref 6–8.4)
RBC # BLD AUTO: 5.13 M/UL (ref 4.6–6.2)
SODIUM SERPL-SCNC: 138 MMOL/L (ref 136–145)
T4 FREE SERPL-MCNC: 0.88 NG/DL (ref 0.71–1.51)
TSH SERPL DL<=0.005 MIU/L-ACNC: 4.87 UIU/ML (ref 0.4–4)
WBC # BLD AUTO: 7.21 K/UL (ref 3.9–12.7)

## 2021-02-03 PROCEDURE — 84443 ASSAY THYROID STIM HORMONE: CPT

## 2021-02-03 PROCEDURE — 85025 COMPLETE CBC W/AUTO DIFF WBC: CPT

## 2021-02-03 PROCEDURE — 83002 ASSAY OF GONADOTROPIN (LH): CPT

## 2021-02-03 PROCEDURE — 84146 ASSAY OF PROLACTIN: CPT

## 2021-02-03 PROCEDURE — 83001 ASSAY OF GONADOTROPIN (FSH): CPT

## 2021-02-03 PROCEDURE — 84403 ASSAY OF TOTAL TESTOSTERONE: CPT

## 2021-02-03 PROCEDURE — 36415 COLL VENOUS BLD VENIPUNCTURE: CPT | Mod: PO

## 2021-02-03 PROCEDURE — 82728 ASSAY OF FERRITIN: CPT

## 2021-02-03 PROCEDURE — 80053 COMPREHEN METABOLIC PANEL: CPT

## 2021-02-03 PROCEDURE — 84439 ASSAY OF FREE THYROXINE: CPT

## 2021-02-04 ENCOUNTER — TELEPHONE (OUTPATIENT)
Dept: UROLOGY | Facility: CLINIC | Age: 41
End: 2021-02-04

## 2021-02-11 LAB
ALBUMIN SERPL-MCNC: 4.1 G/DL (ref 3.6–5.1)
SHBG SERPL-SCNC: 21 NMOL/L (ref 10–50)
TESTOST FREE SERPL-MCNC: 25.2 PG/ML (ref 46–224)
TESTOST SERPL-MCNC: 145 NG/DL (ref 250–1100)
TESTOSTERONE.FREE+WB SERPL-MCNC: 47.5 NG/DL (ref 110–575)

## 2021-02-12 ENCOUNTER — TELEPHONE (OUTPATIENT)
Dept: ENDOCRINOLOGY | Facility: CLINIC | Age: 41
End: 2021-02-12

## 2021-02-12 DIAGNOSIS — E29.1 HYPOGONADISM IN MALE: Primary | ICD-10-CM

## 2021-02-12 DIAGNOSIS — R79.89 LOW TESTOSTERONE IN MALE: ICD-10-CM

## 2021-02-12 RX ORDER — TESTOSTERONE CYPIONATE 200 MG/ML
100 INJECTION, SOLUTION INTRAMUSCULAR
Qty: 2 ML | Refills: 5 | Status: SHIPPED | OUTPATIENT
Start: 2021-02-12 | End: 2022-02-18

## 2021-03-04 ENCOUNTER — TELEPHONE (OUTPATIENT)
Dept: UROLOGY | Facility: CLINIC | Age: 41
End: 2021-03-04

## 2021-03-05 ENCOUNTER — PATIENT MESSAGE (OUTPATIENT)
Dept: FAMILY MEDICINE | Facility: CLINIC | Age: 41
End: 2021-03-05

## 2021-03-05 DIAGNOSIS — E06.3 HYPOTHYROIDISM DUE TO HASHIMOTO'S THYROIDITIS: Primary | ICD-10-CM

## 2021-03-05 DIAGNOSIS — E03.8 HYPOTHYROIDISM DUE TO HASHIMOTO'S THYROIDITIS: Primary | ICD-10-CM

## 2021-03-07 ENCOUNTER — PATIENT MESSAGE (OUTPATIENT)
Dept: FAMILY MEDICINE | Facility: CLINIC | Age: 41
End: 2021-03-07

## 2021-03-07 RX ORDER — LEVOTHYROXINE SODIUM 50 UG/1
50 TABLET ORAL
Qty: 60 TABLET | Refills: 0 | Status: SHIPPED | OUTPATIENT
Start: 2021-03-07 | End: 2021-03-14

## 2021-04-06 ENCOUNTER — PATIENT MESSAGE (OUTPATIENT)
Dept: ADMINISTRATIVE | Facility: HOSPITAL | Age: 41
End: 2021-04-06

## 2021-04-08 RX ORDER — INSULIN GLARGINE 300 U/ML
54 INJECTION, SOLUTION SUBCUTANEOUS DAILY
Qty: 6 SYRINGE | Refills: 5 | Status: SHIPPED | OUTPATIENT
Start: 2021-04-08 | End: 2021-04-22 | Stop reason: SDUPTHER

## 2021-04-19 ENCOUNTER — LAB VISIT (OUTPATIENT)
Dept: LAB | Facility: HOSPITAL | Age: 41
End: 2021-04-19
Attending: NURSE PRACTITIONER
Payer: COMMERCIAL

## 2021-04-19 DIAGNOSIS — R79.89 LOW TESTOSTERONE IN MALE: ICD-10-CM

## 2021-04-19 DIAGNOSIS — E11.9 TYPE 2 DIABETES MELLITUS WITHOUT COMPLICATION, WITH LONG-TERM CURRENT USE OF INSULIN: ICD-10-CM

## 2021-04-19 DIAGNOSIS — Z79.4 TYPE 2 DIABETES MELLITUS WITHOUT COMPLICATION, WITH LONG-TERM CURRENT USE OF INSULIN: ICD-10-CM

## 2021-04-19 DIAGNOSIS — E29.1 HYPOGONADISM IN MALE: ICD-10-CM

## 2021-04-19 LAB
BASOPHILS # BLD AUTO: 0.04 K/UL (ref 0–0.2)
BASOPHILS NFR BLD: 0.5 % (ref 0–1.9)
DIFFERENTIAL METHOD: ABNORMAL
EOSINOPHIL # BLD AUTO: 0.1 K/UL (ref 0–0.5)
EOSINOPHIL NFR BLD: 1.5 % (ref 0–8)
ERYTHROCYTE [DISTWIDTH] IN BLOOD BY AUTOMATED COUNT: 13.3 % (ref 11.5–14.5)
ESTIMATED AVG GLUCOSE: 143 MG/DL (ref 68–131)
HBA1C MFR BLD: 6.6 % (ref 4–5.6)
HCT VFR BLD AUTO: 52 % (ref 40–54)
HGB BLD-MCNC: 16.4 G/DL (ref 14–18)
IMM GRANULOCYTES # BLD AUTO: 0.02 K/UL (ref 0–0.04)
IMM GRANULOCYTES NFR BLD AUTO: 0.2 % (ref 0–0.5)
LYMPHOCYTES # BLD AUTO: 2.5 K/UL (ref 1–4.8)
LYMPHOCYTES NFR BLD: 29.4 % (ref 18–48)
MCH RBC QN AUTO: 29.9 PG (ref 27–31)
MCHC RBC AUTO-ENTMCNC: 31.5 G/DL (ref 32–36)
MCV RBC AUTO: 95 FL (ref 82–98)
MONOCYTES # BLD AUTO: 0.9 K/UL (ref 0.3–1)
MONOCYTES NFR BLD: 10.5 % (ref 4–15)
NEUTROPHILS # BLD AUTO: 4.9 K/UL (ref 1.8–7.7)
NEUTROPHILS NFR BLD: 57.9 % (ref 38–73)
NRBC BLD-RTO: 0 /100 WBC
PLATELET # BLD AUTO: 204 K/UL (ref 150–450)
PMV BLD AUTO: 12.2 FL (ref 9.2–12.9)
RBC # BLD AUTO: 5.48 M/UL (ref 4.6–6.2)
TESTOST SERPL-MCNC: 894 NG/DL (ref 304–1227)
WBC # BLD AUTO: 8.41 K/UL (ref 3.9–12.7)

## 2021-04-19 PROCEDURE — 36415 COLL VENOUS BLD VENIPUNCTURE: CPT | Mod: PO | Performed by: HOSPITALIST

## 2021-04-19 PROCEDURE — 83036 HEMOGLOBIN GLYCOSYLATED A1C: CPT | Performed by: NURSE PRACTITIONER

## 2021-04-19 PROCEDURE — 85025 COMPLETE CBC W/AUTO DIFF WBC: CPT | Performed by: HOSPITALIST

## 2021-04-19 PROCEDURE — 84403 ASSAY OF TOTAL TESTOSTERONE: CPT | Performed by: HOSPITALIST

## 2021-04-22 ENCOUNTER — OFFICE VISIT (OUTPATIENT)
Dept: ENDOCRINOLOGY | Facility: CLINIC | Age: 41
End: 2021-04-22
Payer: COMMERCIAL

## 2021-04-22 VITALS
TEMPERATURE: 98 F | SYSTOLIC BLOOD PRESSURE: 133 MMHG | DIASTOLIC BLOOD PRESSURE: 94 MMHG | HEART RATE: 83 BPM | BODY MASS INDEX: 40.64 KG/M2 | WEIGHT: 308 LBS

## 2021-04-22 DIAGNOSIS — Z79.4 TYPE 2 DIABETES MELLITUS WITHOUT COMPLICATION, WITH LONG-TERM CURRENT USE OF INSULIN: Primary | ICD-10-CM

## 2021-04-22 DIAGNOSIS — E66.01 MORBID OBESITY, UNSPECIFIED OBESITY TYPE: ICD-10-CM

## 2021-04-22 DIAGNOSIS — E11.9 TYPE 2 DIABETES MELLITUS WITHOUT COMPLICATION, WITH LONG-TERM CURRENT USE OF INSULIN: Primary | ICD-10-CM

## 2021-04-22 DIAGNOSIS — I10 ESSENTIAL HYPERTENSION: ICD-10-CM

## 2021-04-22 DIAGNOSIS — E78.5 HYPERLIPIDEMIA, UNSPECIFIED HYPERLIPIDEMIA TYPE: ICD-10-CM

## 2021-04-22 PROCEDURE — 99999 PR PBB SHADOW E&M-EST. PATIENT-LVL V: CPT | Mod: PBBFAC,,, | Performed by: NURSE PRACTITIONER

## 2021-04-22 PROCEDURE — 95251 CONT GLUC MNTR ANALYSIS I&R: CPT | Mod: S$GLB,,, | Performed by: NURSE PRACTITIONER

## 2021-04-22 PROCEDURE — 95251 PR GLUCOSE MONITOR, 72 HOUR, PHYS INTERP: ICD-10-PCS | Mod: S$GLB,,, | Performed by: NURSE PRACTITIONER

## 2021-04-22 PROCEDURE — 99999 PR PBB SHADOW E&M-EST. PATIENT-LVL V: ICD-10-PCS | Mod: PBBFAC,,, | Performed by: NURSE PRACTITIONER

## 2021-04-22 PROCEDURE — 99214 OFFICE O/P EST MOD 30 MIN: CPT | Mod: S$GLB,,, | Performed by: NURSE PRACTITIONER

## 2021-04-22 PROCEDURE — 99214 PR OFFICE/OUTPT VISIT, EST, LEVL IV, 30-39 MIN: ICD-10-PCS | Mod: S$GLB,,, | Performed by: NURSE PRACTITIONER

## 2021-04-22 RX ORDER — INSULIN GLARGINE 300 U/ML
34 INJECTION, SOLUTION SUBCUTANEOUS DAILY
Qty: 3 SYRINGE | Refills: 5 | Status: SHIPPED | OUTPATIENT
Start: 2021-04-22 | End: 2021-07-21 | Stop reason: SDUPTHER

## 2021-04-22 RX ORDER — DULAGLUTIDE 0.75 MG/.5ML
0.75 INJECTION, SOLUTION SUBCUTANEOUS WEEKLY
Qty: 4 PEN | Refills: 3 | Status: SHIPPED | OUTPATIENT
Start: 2021-04-22 | End: 2021-07-21 | Stop reason: ALTCHOICE

## 2021-05-05 ENCOUNTER — PATIENT OUTREACH (OUTPATIENT)
Dept: ADMINISTRATIVE | Facility: OTHER | Age: 41
End: 2021-05-05

## 2021-05-18 ENCOUNTER — TELEPHONE (OUTPATIENT)
Dept: ENDOCRINOLOGY | Facility: CLINIC | Age: 41
End: 2021-05-18

## 2021-05-29 DIAGNOSIS — E03.8 HYPOTHYROIDISM DUE TO HASHIMOTO'S THYROIDITIS: ICD-10-CM

## 2021-05-29 DIAGNOSIS — E06.3 HYPOTHYROIDISM DUE TO HASHIMOTO'S THYROIDITIS: ICD-10-CM

## 2021-05-30 RX ORDER — LEVOTHYROXINE SODIUM 50 UG/1
50 TABLET ORAL
Qty: 90 TABLET | Refills: 0 | Status: SHIPPED | OUTPATIENT
Start: 2021-05-30 | End: 2022-08-12

## 2021-07-07 ENCOUNTER — PATIENT MESSAGE (OUTPATIENT)
Dept: ADMINISTRATIVE | Facility: HOSPITAL | Age: 41
End: 2021-07-07

## 2021-07-15 ENCOUNTER — OFFICE VISIT (OUTPATIENT)
Dept: FAMILY MEDICINE | Facility: CLINIC | Age: 41
End: 2021-07-15
Payer: COMMERCIAL

## 2021-07-15 ENCOUNTER — LAB VISIT (OUTPATIENT)
Dept: LAB | Facility: HOSPITAL | Age: 41
End: 2021-07-15
Attending: INTERNAL MEDICINE
Payer: COMMERCIAL

## 2021-07-15 VITALS
TEMPERATURE: 98 F | WEIGHT: 300.63 LBS | SYSTOLIC BLOOD PRESSURE: 130 MMHG | OXYGEN SATURATION: 96 % | HEART RATE: 87 BPM | BODY MASS INDEX: 39.84 KG/M2 | HEIGHT: 73 IN | DIASTOLIC BLOOD PRESSURE: 80 MMHG

## 2021-07-15 DIAGNOSIS — E78.5 HYPERLIPIDEMIA LDL GOAL <70: ICD-10-CM

## 2021-07-15 DIAGNOSIS — Z11.4 ENCOUNTER FOR SCREENING FOR HIV: ICD-10-CM

## 2021-07-15 DIAGNOSIS — E03.4 HYPOTHYROIDISM DUE TO ACQUIRED ATROPHY OF THYROID: ICD-10-CM

## 2021-07-15 DIAGNOSIS — E78.5 HYPERLIPIDEMIA, UNSPECIFIED HYPERLIPIDEMIA TYPE: ICD-10-CM

## 2021-07-15 DIAGNOSIS — N63.0 BREAST LUMP: ICD-10-CM

## 2021-07-15 DIAGNOSIS — M65.4 DE QUERVAIN'S TENOSYNOVITIS, RIGHT: Primary | ICD-10-CM

## 2021-07-15 DIAGNOSIS — E03.8 HYPOTHYROIDISM DUE TO HASHIMOTO'S THYROIDITIS: ICD-10-CM

## 2021-07-15 DIAGNOSIS — I10 ESSENTIAL HYPERTENSION: ICD-10-CM

## 2021-07-15 DIAGNOSIS — E11.9 TYPE 2 DIABETES MELLITUS WITHOUT COMPLICATION, WITH LONG-TERM CURRENT USE OF INSULIN: ICD-10-CM

## 2021-07-15 DIAGNOSIS — Z79.4 TYPE 2 DIABETES MELLITUS WITHOUT COMPLICATION, WITH LONG-TERM CURRENT USE OF INSULIN: ICD-10-CM

## 2021-07-15 DIAGNOSIS — R79.89 LOW TESTOSTERONE IN MALE: ICD-10-CM

## 2021-07-15 DIAGNOSIS — E06.3 HYPOTHYROIDISM DUE TO HASHIMOTO'S THYROIDITIS: ICD-10-CM

## 2021-07-15 LAB
ALBUMIN SERPL BCP-MCNC: 4 G/DL (ref 3.5–5.2)
ALP SERPL-CCNC: 56 U/L (ref 55–135)
ALT SERPL W/O P-5'-P-CCNC: 23 U/L (ref 10–44)
ANION GAP SERPL CALC-SCNC: 10 MMOL/L (ref 8–16)
AST SERPL-CCNC: 19 U/L (ref 10–40)
BASOPHILS # BLD AUTO: 0.03 K/UL (ref 0–0.2)
BASOPHILS NFR BLD: 0.4 % (ref 0–1.9)
BILIRUB SERPL-MCNC: 0.9 MG/DL (ref 0.1–1)
BUN SERPL-MCNC: 14 MG/DL (ref 6–20)
CALCIUM SERPL-MCNC: 10.1 MG/DL (ref 8.7–10.5)
CHLORIDE SERPL-SCNC: 104 MMOL/L (ref 95–110)
CHOLEST SERPL-MCNC: 160 MG/DL (ref 120–199)
CHOLEST/HDLC SERPL: 3.3 {RATIO} (ref 2–5)
CO2 SERPL-SCNC: 25 MMOL/L (ref 23–29)
CREAT SERPL-MCNC: 0.9 MG/DL (ref 0.5–1.4)
DIFFERENTIAL METHOD: NORMAL
EOSINOPHIL # BLD AUTO: 0.1 K/UL (ref 0–0.5)
EOSINOPHIL NFR BLD: 1.2 % (ref 0–8)
ERYTHROCYTE [DISTWIDTH] IN BLOOD BY AUTOMATED COUNT: 14 % (ref 11.5–14.5)
EST. GFR  (AFRICAN AMERICAN): >60 ML/MIN/1.73 M^2
EST. GFR  (NON AFRICAN AMERICAN): >60 ML/MIN/1.73 M^2
ESTIMATED AVG GLUCOSE: 174 MG/DL (ref 68–131)
GLUCOSE SERPL-MCNC: 155 MG/DL (ref 70–110)
HBA1C MFR BLD: 7.7 % (ref 4–5.6)
HCT VFR BLD AUTO: 51.7 % (ref 40–54)
HDLC SERPL-MCNC: 48 MG/DL (ref 40–75)
HDLC SERPL: 30 % (ref 20–50)
HGB BLD-MCNC: 16.7 G/DL (ref 14–18)
IMM GRANULOCYTES # BLD AUTO: 0.03 K/UL (ref 0–0.04)
IMM GRANULOCYTES NFR BLD AUTO: 0.4 % (ref 0–0.5)
LDLC SERPL CALC-MCNC: 84.4 MG/DL (ref 63–159)
LYMPHOCYTES # BLD AUTO: 2.5 K/UL (ref 1–4.8)
LYMPHOCYTES NFR BLD: 28.9 % (ref 18–48)
MCH RBC QN AUTO: 29.4 PG (ref 27–31)
MCHC RBC AUTO-ENTMCNC: 32.3 G/DL (ref 32–36)
MCV RBC AUTO: 91 FL (ref 82–98)
MONOCYTES # BLD AUTO: 0.7 K/UL (ref 0.3–1)
MONOCYTES NFR BLD: 8 % (ref 4–15)
NEUTROPHILS # BLD AUTO: 5.2 K/UL (ref 1.8–7.7)
NEUTROPHILS NFR BLD: 61.1 % (ref 38–73)
NONHDLC SERPL-MCNC: 112 MG/DL
NRBC BLD-RTO: 0 /100 WBC
PLATELET # BLD AUTO: 200 K/UL (ref 150–450)
PMV BLD AUTO: 12.2 FL (ref 9.2–12.9)
POTASSIUM SERPL-SCNC: 4 MMOL/L (ref 3.5–5.1)
PROT SERPL-MCNC: 7.7 G/DL (ref 6–8.4)
RBC # BLD AUTO: 5.68 M/UL (ref 4.6–6.2)
SODIUM SERPL-SCNC: 139 MMOL/L (ref 136–145)
TRIGL SERPL-MCNC: 138 MG/DL (ref 30–150)
TSH SERPL DL<=0.005 MIU/L-ACNC: 3.46 UIU/ML (ref 0.4–4)
WBC # BLD AUTO: 8.48 K/UL (ref 3.9–12.7)

## 2021-07-15 PROCEDURE — 99214 PR OFFICE/OUTPT VISIT, EST, LEVL IV, 30-39 MIN: ICD-10-PCS | Mod: S$GLB,,, | Performed by: INTERNAL MEDICINE

## 2021-07-15 PROCEDURE — 85025 COMPLETE CBC W/AUTO DIFF WBC: CPT | Performed by: INTERNAL MEDICINE

## 2021-07-15 PROCEDURE — 99999 PR PBB SHADOW E&M-EST. PATIENT-LVL V: ICD-10-PCS | Mod: PBBFAC,,, | Performed by: INTERNAL MEDICINE

## 2021-07-15 PROCEDURE — 80053 COMPREHEN METABOLIC PANEL: CPT | Performed by: INTERNAL MEDICINE

## 2021-07-15 PROCEDURE — 99999 PR PBB SHADOW E&M-EST. PATIENT-LVL V: CPT | Mod: PBBFAC,,, | Performed by: INTERNAL MEDICINE

## 2021-07-15 PROCEDURE — 83036 HEMOGLOBIN GLYCOSYLATED A1C: CPT | Performed by: NURSE PRACTITIONER

## 2021-07-15 PROCEDURE — 87389 HIV-1 AG W/HIV-1&-2 AB AG IA: CPT | Performed by: INTERNAL MEDICINE

## 2021-07-15 PROCEDURE — 36415 COLL VENOUS BLD VENIPUNCTURE: CPT | Mod: PO | Performed by: INTERNAL MEDICINE

## 2021-07-15 PROCEDURE — 80061 LIPID PANEL: CPT | Performed by: INTERNAL MEDICINE

## 2021-07-15 PROCEDURE — 99214 OFFICE O/P EST MOD 30 MIN: CPT | Mod: S$GLB,,, | Performed by: INTERNAL MEDICINE

## 2021-07-15 PROCEDURE — 84443 ASSAY THYROID STIM HORMONE: CPT | Performed by: INTERNAL MEDICINE

## 2021-07-15 RX ORDER — AZELASTINE HCL 205.5 UG/1
SPRAY NASAL
COMMUNITY
End: 2022-01-25 | Stop reason: SDUPTHER

## 2021-07-15 RX ORDER — ATORVASTATIN CALCIUM 20 MG/1
20 TABLET, FILM COATED ORAL DAILY
Qty: 90 TABLET | Refills: 3 | Status: SHIPPED | OUTPATIENT
Start: 2021-07-15 | End: 2022-04-18

## 2021-07-15 RX ORDER — LISINOPRIL 10 MG/1
10 TABLET ORAL DAILY
Qty: 90 TABLET | Refills: 3 | Status: SHIPPED | OUTPATIENT
Start: 2021-07-15 | End: 2022-03-15

## 2021-07-15 RX ORDER — DICLOFENAC SODIUM 10 MG/G
GEL TOPICAL
Qty: 100 G | Refills: 1 | Status: SHIPPED | OUTPATIENT
Start: 2021-07-15 | End: 2023-10-18 | Stop reason: CLARIF

## 2021-07-15 RX ORDER — FENOFIBRATE 160 MG/1
TABLET ORAL
COMMUNITY
End: 2023-10-05

## 2021-07-15 RX ORDER — DULAGLUTIDE 1.5 MG/.5ML
INJECTION, SOLUTION SUBCUTANEOUS
COMMUNITY
Start: 2021-07-07 | End: 2021-07-21 | Stop reason: ALTCHOICE

## 2021-07-16 DIAGNOSIS — I10 ESSENTIAL HYPERTENSION: ICD-10-CM

## 2021-07-16 DIAGNOSIS — E03.4 HYPOTHYROIDISM DUE TO ACQUIRED ATROPHY OF THYROID: Primary | ICD-10-CM

## 2021-07-16 DIAGNOSIS — E78.5 HYPERLIPIDEMIA LDL GOAL <70: ICD-10-CM

## 2021-07-16 LAB — HIV 1+2 AB+HIV1 P24 AG SERPL QL IA: NEGATIVE

## 2021-07-21 ENCOUNTER — OFFICE VISIT (OUTPATIENT)
Dept: ENDOCRINOLOGY | Facility: CLINIC | Age: 41
End: 2021-07-21
Payer: COMMERCIAL

## 2021-07-21 VITALS
HEART RATE: 96 BPM | BODY MASS INDEX: 39.63 KG/M2 | SYSTOLIC BLOOD PRESSURE: 138 MMHG | WEIGHT: 300.38 LBS | DIASTOLIC BLOOD PRESSURE: 90 MMHG | TEMPERATURE: 98 F

## 2021-07-21 DIAGNOSIS — R79.89 LOW TESTOSTERONE IN MALE: ICD-10-CM

## 2021-07-21 DIAGNOSIS — E66.01 SEVERE OBESITY (BMI 35.0-39.9) WITH COMORBIDITY: ICD-10-CM

## 2021-07-21 DIAGNOSIS — Z79.4 TYPE 2 DIABETES MELLITUS WITHOUT COMPLICATION, WITH LONG-TERM CURRENT USE OF INSULIN: Primary | ICD-10-CM

## 2021-07-21 DIAGNOSIS — M79.641 PAIN OF RIGHT HAND: Primary | ICD-10-CM

## 2021-07-21 DIAGNOSIS — E11.9 TYPE 2 DIABETES MELLITUS WITHOUT COMPLICATION, WITH LONG-TERM CURRENT USE OF INSULIN: Primary | ICD-10-CM

## 2021-07-21 PROCEDURE — 99214 OFFICE O/P EST MOD 30 MIN: CPT | Mod: S$GLB,,, | Performed by: NURSE PRACTITIONER

## 2021-07-21 PROCEDURE — 99999 PR PBB SHADOW E&M-EST. PATIENT-LVL IV: ICD-10-PCS | Mod: PBBFAC,,, | Performed by: NURSE PRACTITIONER

## 2021-07-21 PROCEDURE — 95251 PR GLUCOSE MONITOR, 72 HOUR, PHYS INTERP: ICD-10-PCS | Mod: S$GLB,,, | Performed by: NURSE PRACTITIONER

## 2021-07-21 PROCEDURE — 99214 PR OFFICE/OUTPT VISIT, EST, LEVL IV, 30-39 MIN: ICD-10-PCS | Mod: S$GLB,,, | Performed by: NURSE PRACTITIONER

## 2021-07-21 PROCEDURE — 95251 CONT GLUC MNTR ANALYSIS I&R: CPT | Mod: S$GLB,,, | Performed by: NURSE PRACTITIONER

## 2021-07-21 PROCEDURE — 99999 PR PBB SHADOW E&M-EST. PATIENT-LVL IV: CPT | Mod: PBBFAC,,, | Performed by: NURSE PRACTITIONER

## 2021-07-21 RX ORDER — BLOOD-GLUCOSE TRANSMITTER
EACH MISCELLANEOUS
Qty: 1 DEVICE | Refills: 3 | Status: SHIPPED | OUTPATIENT
Start: 2021-07-21 | End: 2022-08-15

## 2021-07-21 RX ORDER — INSULIN ASPART INJECTION 100 [IU]/ML
INJECTION, SOLUTION SUBCUTANEOUS
Qty: 5 PEN | Refills: 2 | Status: SHIPPED | OUTPATIENT
Start: 2021-07-21 | End: 2022-04-28

## 2021-07-21 RX ORDER — BLOOD-GLUCOSE SENSOR
EACH MISCELLANEOUS
Qty: 3 DEVICE | Refills: 12 | Status: SHIPPED | OUTPATIENT
Start: 2021-07-21 | End: 2022-08-15

## 2021-07-21 RX ORDER — INSULIN GLARGINE 300 U/ML
40 INJECTION, SOLUTION SUBCUTANEOUS DAILY
Qty: 2 PEN | Refills: 5 | Status: SHIPPED | OUTPATIENT
Start: 2021-07-21 | End: 2022-03-10

## 2021-07-21 RX ORDER — DULAGLUTIDE 1.5 MG/.5ML
INJECTION, SOLUTION SUBCUTANEOUS
Qty: 4 PEN | Refills: 5 | Status: SHIPPED | OUTPATIENT
Start: 2021-07-21 | End: 2021-09-21

## 2021-07-22 ENCOUNTER — PATIENT OUTREACH (OUTPATIENT)
Dept: ADMINISTRATIVE | Facility: OTHER | Age: 41
End: 2021-07-22

## 2021-07-22 ENCOUNTER — OFFICE VISIT (OUTPATIENT)
Dept: ORTHOPEDICS | Facility: CLINIC | Age: 41
End: 2021-07-22
Payer: COMMERCIAL

## 2021-07-22 VITALS
TEMPERATURE: 98 F | BODY MASS INDEX: 39.3 KG/M2 | WEIGHT: 296.5 LBS | RESPIRATION RATE: 18 BRPM | OXYGEN SATURATION: 96 % | DIASTOLIC BLOOD PRESSURE: 84 MMHG | HEART RATE: 91 BPM | HEIGHT: 73 IN | SYSTOLIC BLOOD PRESSURE: 134 MMHG

## 2021-07-22 DIAGNOSIS — M65.4 DE QUERVAIN'S TENOSYNOVITIS, RIGHT: ICD-10-CM

## 2021-07-22 DIAGNOSIS — M17.12 PRIMARY OSTEOARTHRITIS OF LEFT KNEE: Primary | ICD-10-CM

## 2021-07-22 PROCEDURE — 99999 PR PBB SHADOW E&M-EST. PATIENT-LVL V: CPT | Mod: PBBFAC,,, | Performed by: ORTHOPAEDIC SURGERY

## 2021-07-22 PROCEDURE — 99204 OFFICE O/P NEW MOD 45 MIN: CPT | Mod: S$GLB,,, | Performed by: ORTHOPAEDIC SURGERY

## 2021-07-22 PROCEDURE — 99999 PR PBB SHADOW E&M-EST. PATIENT-LVL V: ICD-10-PCS | Mod: PBBFAC,,, | Performed by: ORTHOPAEDIC SURGERY

## 2021-07-22 PROCEDURE — 99204 PR OFFICE/OUTPT VISIT, NEW, LEVL IV, 45-59 MIN: ICD-10-PCS | Mod: S$GLB,,, | Performed by: ORTHOPAEDIC SURGERY

## 2021-07-22 RX ORDER — MELOXICAM 7.5 MG/1
7.5 TABLET ORAL DAILY
Qty: 30 TABLET | Refills: 0 | Status: SHIPPED | OUTPATIENT
Start: 2021-07-22 | End: 2022-02-18

## 2021-07-30 ENCOUNTER — HOSPITAL ENCOUNTER (OUTPATIENT)
Dept: RADIOLOGY | Facility: HOSPITAL | Age: 41
Discharge: HOME | End: 2021-07-30
Attending: INTERNAL MEDICINE
Payer: COMMERCIAL

## 2021-07-30 DIAGNOSIS — N63.0 BREAST LUMP: ICD-10-CM

## 2021-07-30 PROCEDURE — 77066 DX MAMMO INCL CAD BI: CPT | Mod: 26,,, | Performed by: RADIOLOGY

## 2021-07-30 PROCEDURE — 77062 MAMMO DIGITAL DIAGNOSTIC BILAT WITH TOMO: ICD-10-PCS | Mod: 26,,, | Performed by: RADIOLOGY

## 2021-07-30 PROCEDURE — 77062 BREAST TOMOSYNTHESIS BI: CPT | Mod: 26,,, | Performed by: RADIOLOGY

## 2021-07-30 PROCEDURE — 77066 DX MAMMO INCL CAD BI: CPT | Mod: TC

## 2021-07-30 PROCEDURE — 76642 ULTRASOUND BREAST LIMITED: CPT | Mod: TC,LT

## 2021-07-30 PROCEDURE — 76642 US BREAST LEFT LIMITED: ICD-10-PCS | Mod: 26,LT,, | Performed by: RADIOLOGY

## 2021-07-30 PROCEDURE — 76642 ULTRASOUND BREAST LIMITED: CPT | Mod: 26,LT,, | Performed by: RADIOLOGY

## 2021-07-30 PROCEDURE — 77066 MAMMO DIGITAL DIAGNOSTIC BILAT WITH TOMO: ICD-10-PCS | Mod: 26,,, | Performed by: RADIOLOGY

## 2021-08-08 ENCOUNTER — HOSPITAL ENCOUNTER (EMERGENCY)
Facility: HOSPITAL | Age: 41
Discharge: HOME | End: 2021-08-08
Attending: INTERNAL MEDICINE
Payer: COMMERCIAL

## 2021-08-08 VITALS
TEMPERATURE: 98 F | HEART RATE: 84 BPM | RESPIRATION RATE: 18 BRPM | OXYGEN SATURATION: 98 % | BODY MASS INDEX: 39.76 KG/M2 | WEIGHT: 300 LBS | HEIGHT: 73 IN | DIASTOLIC BLOOD PRESSURE: 76 MMHG | SYSTOLIC BLOOD PRESSURE: 141 MMHG

## 2021-08-08 DIAGNOSIS — R52 PAIN: ICD-10-CM

## 2021-08-08 DIAGNOSIS — R07.81 RIB PAIN ON LEFT SIDE: Primary | ICD-10-CM

## 2021-08-08 PROCEDURE — 25000003 PHARM REV CODE 250: Mod: ER | Performed by: INTERNAL MEDICINE

## 2021-08-08 PROCEDURE — 94799 UNLISTED PULMONARY SVC/PX: CPT | Mod: ER

## 2021-08-08 PROCEDURE — 99283 EMERGENCY DEPT VISIT LOW MDM: CPT | Mod: ER

## 2021-08-08 RX ORDER — IBUPROFEN 400 MG/1
800 TABLET ORAL
Status: COMPLETED | OUTPATIENT
Start: 2021-08-08 | End: 2021-08-08

## 2021-08-08 RX ORDER — IBUPROFEN 800 MG/1
800 TABLET ORAL EVERY 8 HOURS PRN
Qty: 30 TABLET | Refills: 0 | OUTPATIENT
Start: 2021-08-08 | End: 2022-09-24

## 2021-08-08 RX ADMIN — IBUPROFEN 800 MG: 400 TABLET ORAL at 10:08

## 2021-08-09 ENCOUNTER — OFFICE VISIT (OUTPATIENT)
Dept: ORTHOPEDICS | Facility: CLINIC | Age: 41
End: 2021-08-09
Payer: COMMERCIAL

## 2021-08-09 VITALS
BODY MASS INDEX: 39.24 KG/M2 | WEIGHT: 296.06 LBS | RESPIRATION RATE: 18 BRPM | HEIGHT: 73 IN | SYSTOLIC BLOOD PRESSURE: 130 MMHG | DIASTOLIC BLOOD PRESSURE: 90 MMHG | TEMPERATURE: 98 F | HEART RATE: 85 BPM | OXYGEN SATURATION: 97 %

## 2021-08-09 DIAGNOSIS — M17.0 PRIMARY OSTEOARTHRITIS OF BOTH KNEES: Primary | ICD-10-CM

## 2021-08-09 PROCEDURE — 99499 NO LOS: ICD-10-PCS | Mod: S$GLB,,, | Performed by: ORTHOPAEDIC SURGERY

## 2021-08-09 PROCEDURE — 20610 LARGE JOINT ASPIRATION/INJECTION: L KNEE: ICD-10-PCS | Mod: 50,S$GLB,, | Performed by: ORTHOPAEDIC SURGERY

## 2021-08-09 PROCEDURE — 99499 UNLISTED E&M SERVICE: CPT | Mod: S$GLB,,, | Performed by: ORTHOPAEDIC SURGERY

## 2021-08-09 PROCEDURE — 99999 PR PBB SHADOW E&M-EST. PATIENT-LVL III: CPT | Mod: PBBFAC,,, | Performed by: ORTHOPAEDIC SURGERY

## 2021-08-09 PROCEDURE — 20610 DRAIN/INJ JOINT/BURSA W/O US: CPT | Mod: 50,S$GLB,, | Performed by: ORTHOPAEDIC SURGERY

## 2021-08-09 PROCEDURE — 99999 PR PBB SHADOW E&M-EST. PATIENT-LVL III: ICD-10-PCS | Mod: PBBFAC,,, | Performed by: ORTHOPAEDIC SURGERY

## 2021-08-09 RX ADMIN — TRIAMCINOLONE ACETONIDE 80 MG: 40 INJECTION, SUSPENSION INTRA-ARTICULAR; INTRAMUSCULAR at 01:08

## 2021-08-11 RX ORDER — TRIAMCINOLONE ACETONIDE 40 MG/ML
80 INJECTION, SUSPENSION INTRA-ARTICULAR; INTRAMUSCULAR
Status: DISCONTINUED | OUTPATIENT
Start: 2021-08-09 | End: 2021-08-11 | Stop reason: HOSPADM

## 2021-09-21 RX ORDER — DULAGLUTIDE 1.5 MG/.5ML
INJECTION, SOLUTION SUBCUTANEOUS
Qty: 4 PEN | Refills: 5 | Status: SHIPPED | OUTPATIENT
Start: 2021-09-21 | End: 2022-03-10

## 2021-10-04 ENCOUNTER — PATIENT MESSAGE (OUTPATIENT)
Dept: ADMINISTRATIVE | Facility: HOSPITAL | Age: 41
End: 2021-10-04

## 2021-12-15 ENCOUNTER — PATIENT MESSAGE (OUTPATIENT)
Dept: ADMINISTRATIVE | Facility: HOSPITAL | Age: 41
End: 2021-12-15
Payer: COMMERCIAL

## 2022-01-07 ENCOUNTER — LAB VISIT (OUTPATIENT)
Dept: PRIMARY CARE CLINIC | Facility: CLINIC | Age: 42
End: 2022-01-07
Payer: COMMERCIAL

## 2022-01-07 DIAGNOSIS — Z20.822 CONTACT WITH AND (SUSPECTED) EXPOSURE TO COVID-19: ICD-10-CM

## 2022-01-07 LAB
CTP QC/QA: YES
SARS-COV-2 AG RESP QL IA.RAPID: NEGATIVE

## 2022-01-07 PROCEDURE — 87811 SARS-COV-2 COVID19 W/OPTIC: CPT

## 2022-01-13 ENCOUNTER — PATIENT MESSAGE (OUTPATIENT)
Dept: ADMINISTRATIVE | Facility: HOSPITAL | Age: 42
End: 2022-01-13
Payer: COMMERCIAL

## 2022-01-25 ENCOUNTER — PATIENT MESSAGE (OUTPATIENT)
Dept: FAMILY MEDICINE | Facility: CLINIC | Age: 42
End: 2022-01-25

## 2022-01-25 ENCOUNTER — OFFICE VISIT (OUTPATIENT)
Dept: FAMILY MEDICINE | Facility: CLINIC | Age: 42
End: 2022-01-25
Payer: COMMERCIAL

## 2022-01-25 ENCOUNTER — HOSPITAL ENCOUNTER (OUTPATIENT)
Dept: RADIOLOGY | Facility: HOSPITAL | Age: 42
Discharge: HOME | End: 2022-01-25
Attending: INTERNAL MEDICINE
Payer: COMMERCIAL

## 2022-01-25 VITALS
DIASTOLIC BLOOD PRESSURE: 86 MMHG | HEART RATE: 105 BPM | HEIGHT: 73 IN | BODY MASS INDEX: 38.05 KG/M2 | SYSTOLIC BLOOD PRESSURE: 140 MMHG | OXYGEN SATURATION: 96 % | WEIGHT: 287.13 LBS | TEMPERATURE: 98 F

## 2022-01-25 DIAGNOSIS — R06.00 DYSPNEA, UNSPECIFIED TYPE: ICD-10-CM

## 2022-01-25 DIAGNOSIS — I10 ESSENTIAL HYPERTENSION: ICD-10-CM

## 2022-01-25 DIAGNOSIS — G43.109 MIGRAINE WITH AURA AND WITHOUT STATUS MIGRAINOSUS, NOT INTRACTABLE: ICD-10-CM

## 2022-01-25 DIAGNOSIS — J31.0 NONALLERGIC RHINITIS: ICD-10-CM

## 2022-01-25 DIAGNOSIS — E78.5 HYPERLIPIDEMIA LDL GOAL <70: ICD-10-CM

## 2022-01-25 DIAGNOSIS — U07.1 COVID-19 VIRUS INFECTION: Primary | ICD-10-CM

## 2022-01-25 DIAGNOSIS — U07.1 COVID-19 VIRUS INFECTION: ICD-10-CM

## 2022-01-25 DIAGNOSIS — Z79.4 TYPE 2 DIABETES MELLITUS WITHOUT COMPLICATION, WITH LONG-TERM CURRENT USE OF INSULIN: ICD-10-CM

## 2022-01-25 DIAGNOSIS — E03.4 HYPOTHYROIDISM DUE TO ACQUIRED ATROPHY OF THYROID: ICD-10-CM

## 2022-01-25 DIAGNOSIS — E11.9 TYPE 2 DIABETES MELLITUS WITHOUT COMPLICATION, WITH LONG-TERM CURRENT USE OF INSULIN: ICD-10-CM

## 2022-01-25 DIAGNOSIS — E66.01 MORBID OBESITY: ICD-10-CM

## 2022-01-25 PROCEDURE — 93010 EKG 12-LEAD: ICD-10-PCS | Mod: S$GLB,,, | Performed by: INTERNAL MEDICINE

## 2022-01-25 PROCEDURE — 71046 XR CHEST PA AND LATERAL: ICD-10-PCS | Mod: 26,,, | Performed by: RADIOLOGY

## 2022-01-25 PROCEDURE — 71046 X-RAY EXAM CHEST 2 VIEWS: CPT | Mod: 26,,, | Performed by: RADIOLOGY

## 2022-01-25 PROCEDURE — 99999 PR PBB SHADOW E&M-EST. PATIENT-LVL V: CPT | Mod: PBBFAC,,, | Performed by: INTERNAL MEDICINE

## 2022-01-25 PROCEDURE — 93005 EKG 12-LEAD: ICD-10-PCS | Mod: S$GLB,,, | Performed by: INTERNAL MEDICINE

## 2022-01-25 PROCEDURE — 99999 PR PBB SHADOW E&M-EST. PATIENT-LVL V: ICD-10-PCS | Mod: PBBFAC,,, | Performed by: INTERNAL MEDICINE

## 2022-01-25 PROCEDURE — 71046 X-RAY EXAM CHEST 2 VIEWS: CPT | Mod: TC,FY,PO

## 2022-01-25 PROCEDURE — 93005 ELECTROCARDIOGRAM TRACING: CPT | Mod: S$GLB,,, | Performed by: INTERNAL MEDICINE

## 2022-01-25 PROCEDURE — 99214 OFFICE O/P EST MOD 30 MIN: CPT | Mod: S$GLB,,, | Performed by: INTERNAL MEDICINE

## 2022-01-25 PROCEDURE — 99214 PR OFFICE/OUTPT VISIT, EST, LEVL IV, 30-39 MIN: ICD-10-PCS | Mod: S$GLB,,, | Performed by: INTERNAL MEDICINE

## 2022-01-25 PROCEDURE — 93010 ELECTROCARDIOGRAM REPORT: CPT | Mod: S$GLB,,, | Performed by: INTERNAL MEDICINE

## 2022-01-25 RX ORDER — BUDESONIDE AND FORMOTEROL FUMARATE DIHYDRATE 160; 4.5 UG/1; UG/1
2 AEROSOL RESPIRATORY (INHALATION) EVERY 12 HOURS
Qty: 10.2 G | Refills: 0 | Status: SHIPPED | OUTPATIENT
Start: 2022-01-25 | End: 2022-03-16 | Stop reason: ALTCHOICE

## 2022-01-25 RX ORDER — FLUTICASONE PROPIONATE 50 MCG
SPRAY, SUSPENSION (ML) NASAL
Qty: 16 G | Refills: 11 | Status: SHIPPED | OUTPATIENT
Start: 2022-01-25 | End: 2022-11-02 | Stop reason: SDUPTHER

## 2022-01-25 RX ORDER — BUTALBITAL, ACETAMINOPHEN AND CAFFEINE 50; 325; 40 MG/1; MG/1; MG/1
1 TABLET ORAL EVERY 4 HOURS PRN
Qty: 12 TABLET | Refills: 0 | OUTPATIENT
Start: 2022-01-25 | End: 2023-01-05

## 2022-01-25 RX ORDER — AZELASTINE HCL 205.5 UG/1
SPRAY NASAL
Qty: 30 ML | Refills: 11 | Status: SHIPPED | OUTPATIENT
Start: 2022-01-25 | End: 2023-10-05 | Stop reason: SDUPTHER

## 2022-01-25 NOTE — PROGRESS NOTES
This note was created by combination of typed  and M-Modal dictation.  Transcription errors may be present.  If there are any questions, please contact me.    Assessment and Plan:   COVID-19 virus infection  Dyspnea, unspecified type  -ever since COVID positive test.  Initially no symptoms and now with cough and dyspnea with exertion.  EKG no acute findings  Check chest x-ray  Has a home pulse ox, advised to monitor O2 sats.  No known history of asthma.  Trial of Symbicort.  -     EKG 12-lead  -     X-Ray Chest PA And Lateral; Future; Expected date: 01/25/2022  -     budesonide-formoterol 160-4.5 mcg (SYMBICORT) 160-4.5 mcg/actuation HFAA; Inhale 2 puffs into the lungs every 12 (twelve) hours. Controller  Dispense: 10.2 g; Refill: 0    Migraine with aura and without status migrainosus, not intractable propranolol SE angry; topamax not helpful; imitrex ineffective; daith ear piercing helps  -has been getting some headaches with recent illness.  Trial of Fioricet.  Does note that it makes him over-sedated so sparing use.  -     butalbital-acetaminophen-caffeine -40 mg (FIORICET, ESGIC) -40 mg per tablet; Take 1 tablet by mouth every 4 (four) hours as needed for Headaches.  Dispense: 12 tablet; Refill: 0    Essential hypertension  -not to goal, feeling ill today, address at follow-up    Morbid obesity  Type 2 diabetes mellitus without complication, with long-term current use of insulin  -overdue for labs, does not want to get labs today and he will schedule follow-up    Hyperlipidemia LDL goal <70  -last lipid profile was okay on statin    Hypothyroidism due to acquired atrophy of thyroid  -last TSH was good, on levothyroxine, needs to update.  He defers labs at this time    Nonallergic rhinitis  -requesting refills on Flonase and Astelin nasal spray.  Refill to pharmacy  -     azelastine 205.5 mcg (0.15 %) Spry; azelastine 205.5 mcg (0.15 %) nasal spray  INHALE 2 SPRAYS TO EACH NOSTRIL TWICE A  DAY  Dispense: 30 mL; Refill: 11  -     fluticasone propionate (FLONASE) 50 mcg/actuation nasal spray; fluticasone propionate 50 mcg/actuation nasal spray,suspension  Dispense: 16 g; Refill: 11          Medications Discontinued During This Encounter   Medication Reason    fluticasone propionate (FLONASE) 50 mcg/actuation nasal spray Reorder    azelastine 205.5 mcg (0.15 %) Spry Reorder       meds sent this encounter:  Medications Ordered This Encounter   Medications    azelastine 205.5 mcg (0.15 %) Spry     Sig: azelastine 205.5 mcg (0.15 %) nasal spray  INHALE 2 SPRAYS TO EACH NOSTRIL TWICE A DAY     Dispense:  30 mL     Refill:  11    budesonide-formoterol 160-4.5 mcg (SYMBICORT) 160-4.5 mcg/actuation HFAA     Sig: Inhale 2 puffs into the lungs every 12 (twelve) hours. Controller     Dispense:  10.2 g     Refill:  0    butalbital-acetaminophen-caffeine -40 mg (FIORICET, ESGIC) -40 mg per tablet     Sig: Take 1 tablet by mouth every 4 (four) hours as needed for Headaches.     Dispense:  12 tablet     Refill:  0    fluticasone propionate (FLONASE) 50 mcg/actuation nasal spray     Sig: fluticasone propionate 50 mcg/actuation nasal spray,suspension     Dispense:  16 g     Refill:  11       Follow Up: No follow-ups on file.  Will need to schedule follow-up when he is feeling better    Subjective:     Chief Complaint   Patient presents with    Cough     Symptoms started 2nd of January . Took say goodwin.     Nausea    Diarrhea    Chest Congestion    Nasal Congestion       HPI  Tony is a 41 y.o. male, last appointment with this clinic was Visit date not found.  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 Increased trulicity    mammo and US shows lipoma    Saw ortho for the knees and for the de quervains  injections in the knees    12/28 he and wife exposed to covid - his MIL went to  ED and subsequently admitted, wife was going to  every day for several days.  Went for testing 12/31 after several days found out it was positive both of them  Work required negative test; So [facility], negative. 1/7  And work needed another negative test from their doctor; he went 1/7, also negative per pt.    With initial positive test, really had no symptoms.  But since then he developed dyspnea.  With even mild exertion.  He works as a  so that of course is hazardous  And cough.  With rib pain.  The cough is minimally productive.  Talking too much, coughing in fits with rib pain  Last night new symptoms diarrhea, gassiness, burping, with acid brash.  No blood in stool  Possibly fever last night but did not check.  Throughout otherwise, no fever.   He took a home Rapid test today was negative.     And sensation of hard heartbeat.  Borderline tachycardic on intake today    Having migraines with this.  Tried multiple prophy without relief.  Had been doing better with reduced work stress but having headaches of late.    Patient Care Team:  Jovany Ford MD as PCP - General (Internal Medicine)  Janneth Johnson RN (Inactive) as Registered Nurse (Diabetes)  Rocky Hewitt MD as Consulting Physician (Ophthalmology)  Preethi Monaco RD as Dietitian (Nutrition)  River Montiel MA as Care Coordinator  Christofer Rowley MD as Consulting Physician  (Orthopedic Surgery)  Singh Rizo MD as Consulting Physician (Sports Medicine)  Shilpa Gordon NP as Nurse Practitioner (Urology)  Nicole Wynn MD (Family Medicine)    Patient Active Problem List    Diagnosis Date Noted    Rib pain on left side 08/08/2021    Right foot pain 10/10/2019    Decreased strength of lower extremity 10/10/2019    Decreased range of motion of both ankles 10/10/2019    Gait abnormality 10/10/2019    Low testosterone in male; pituitary axis normal. MRI negative. 11/2019 started on testosterone 09/04/2019 11/06/2019 MRI pituitary with and without contrast from [facility]  Impression:  1.  No pituitary mass seen.  2.  Absence of the septum pellucidum.      NAINA (obstructive sleep apnea) 8/2019 sleep study [facility] 11     Acute bilateral low back pain without sciatica 08/10/2019    Non-seasonal allergic rhinitis; avoid antihistamines per opthalmology 11/09/2018    Migraine with aura and without status migrainosus, not intractable propranolol SE angry; topamax not helpful; imitrex ineffective; daith ear piercing helps 09/28/2018    Type 2 diabetes mellitus without complication, with long-term current use of insulin     Essential hypertension     Hypothyroidism 07/29/2015    Hyperlipidemia LDL goal <70 06/03/2015    Insulin long-term use 06/03/2015    Tinea pedis 06/03/2015    Morbid obesity 06/03/2015       PAST MEDICAL PROBLEMS, PAST SURGICAL HISTORY: please see relevant portions of the electronic medical record    ALLERGIES AND MEDICATIONS: updated and reviewed.  Review of patient's allergies indicates:   Allergen Reactions    Influenza virus vaccine, specific Hives    Pioglitazone      Altered mood     Victoza [liraglutide] Nausea And Vomiting    Farxiga [dapagliflozin] Rash     Erectile dysfunction and rash        Medication List with Changes/Refills   Current Medications    ATORVASTATIN (LIPITOR) 20 MG TABLET    Take 1 tablet (20 mg total) by  mouth once daily.    AZELASTINE 205.5 MCG (0.15 %) SPRY    azelastine 205.5 mcg (0.15 %) nasal spray   INHALE 2 SPRAYS TO EACH NOSTRIL TWICE A DAY    BLOOD SUGAR DIAGNOSTIC (BLOOD GLUCOSE TEST) STRP    OneTouch Ultra Test strips    BLOOD SUGAR DIAGNOSTIC STRP    To check BG 4 times daily, to use with insurance preferred meter    BLOOD-GLUCOSE METER (ONETOUCH ULTRA2 METER) KIT    OneTouch Ultra2 Meter kit    BLOOD-GLUCOSE METER,CONTINUOUS (DEXCOM G6 ) MISC    Use with dexcom G6 system    BLOOD-GLUCOSE SENSOR (DEXCOM G6 SENSOR) CAT    Change sensor every 10 days    BLOOD-GLUCOSE TRANSMITTER (DEXCOM G6 TRANSMITTER) CAT    Change every 3 months    DICLOFENAC SODIUM (VOLTAREN) 1 % GEL    Apply the gel (2 g) to the affected area 4 times daily. Do not apply more than 8 g daily to any one affected joint    FENOFIBRATE 160 MG TAB    fenofibrate 160 mg tablet    FLUTICASONE PROPIONATE (FLONASE) 50 MCG/ACTUATION NASAL SPRAY    fluticasone propionate 50 mcg/actuation nasal spray,suspension    IBUPROFEN (ADVIL,MOTRIN) 800 MG TABLET    Take 1 tablet (800 mg total) by mouth every 8 (eight) hours as needed for Pain.    INSULIN ASPART, NIACINAMIDE, (FIASP FLEXTOUCH U-100 INSULIN) 100 UNIT/ML (3 ML) INPN    Use as needed with sliding scale. Max daily dose 30 units/day    INSULIN GLARGINE U-300 CONC (TOUJEO MAX U-300 SOLOSTAR) 300 UNIT/ML (3 ML) INSULIN PEN    Inject 40 Units into the skin once daily.    INVOKANA 300 MG TAB TABLET    TAKE 1 TABLET BY MOUTH ONCE DAILY    KETOCONAZOLE (NIZORAL) 2 % CREAM    Apply topically once daily.    LANCETS MIS    To check BG 4 times daily, to use with insurance preferred meter    LEVOTHYROXINE (SYNTHROID) 50 MCG TABLET    TAKE 1 TABLET (50 MCG TOTAL) BY MOUTH BEFORE BREAKFAST.    LISINOPRIL 10 MG TABLET    Take 1 tablet (10 mg total) by mouth once daily.    LORATADINE (CLARITIN) 10 MG TABLET    TAKE 1 TABLET BY MOUTH EVERY DAY    MELOXICAM (MOBIC) 7.5 MG TABLET    Take 1 tablet (7.5 mg  "total) by mouth once daily. Take once a day for two weeks then as needed. Take with food    METFORMIN (GLUCOPHAGE) 1000 MG TABLET    Take 1 tablet (1,000 mg total) by mouth 2 (two) times daily with meals.    MONTELUKAST (SINGULAIR) 10 MG TABLET    TAKE 1 TABLET BY MOUTH EVERY EVENING    PEN NEEDLE, DIABETIC (BD ULTRA-FINE KEN PEN NEEDLE) 32 GAUGE X 5/32" NDLE    1 Device by Misc.(Non-Drug; Combo Route) route 5 (five) times daily.    SYRINGE, DISPOSABLE, 1 ML SYRG    Testosterone every 2 weeks    TADALAFIL (CIALIS) 20 MG TAB    Take 1 tablet (20 mg total) by mouth every 72 hours as needed.    TESTOSTERONE CYPIONATE (DEPOTESTOTERONE CYPIONATE) 200 MG/ML INJECTION    Inject 0.5 mLs (100 mg total) into the muscle every 7 days. REDUCED DOSE    TRULICITY 1.5 MG/0.5 ML PEN INJECTOR    INJECT 1.5MG SUB CUTANEOUSLY EVERY WEEK    TRULICITY 1.5 MG/0.5 ML PEN INJECTOR    INJECT 1.5MG SUB CUTANEOUSLY EVERY WEEK        Objective:   Physical Exam   Vitals:    01/25/22 1527   BP: (!) 140/86   BP Location: Right arm   Patient Position: Sitting   BP Method: Large (Manual)   Pulse: 105   Temp: 98.1 °F (36.7 °C)   TempSrc: Oral   SpO2: 96%   Weight: 130.3 kg (287 lb 2.4 oz)   Height: 6' 1" (1.854 m)    Body mass index is 37.88 kg/m².            Physical Exam  Constitutional:       General: He is not in acute distress.     Appearance: He is well-developed and well-nourished.   HENT:      Right Ear: Tympanic membrane, ear canal and external ear normal. There is no impacted cerumen.      Left Ear: Tympanic membrane, ear canal and external ear normal. There is no impacted cerumen.   Eyes:      Extraocular Movements: EOM normal.   Cardiovascular:      Rate and Rhythm: Regular rhythm. Tachycardia present.      Heart sounds: Normal heart sounds. No murmur heard.      Pulmonary:      Effort: Pulmonary effort is normal.      Breath sounds: Normal breath sounds.   Musculoskeletal:         General: Normal range of motion.      Right lower leg: " No edema.      Left lower leg: No edema.   Skin:     General: Skin is warm and dry.   Neurological:      Mental Status: He is alert and oriented to person, place, and time.      Coordination: Coordination normal.   Psychiatric:         Mood and Affect: Mood and affect normal.         Behavior: Behavior normal.         Thought Content: Thought content normal.       EKG sinus, possible LAE, cannot r/o septal infarct; but no change compared to 1/7/2020

## 2022-01-25 NOTE — LETTER
[address] [facility] ? [place] [address] ? Phone [phone] ? Fax [phone]           Return to Work/School    Patient: Tony Gordillo  YOB: 1980   Date: 01/25/2022      To Whom It May Concern:     Tony Gordillo was in contact with/seen in my office on 01/25/2022. COVID-19 is present in our communities across the [facility]. Not all patients are eligible or appropriate to be tested. In this situation, your employee meets the following criteria:     Tony Gordillo has met the criteria for COVID-19 testing and has a POSITIVE result. He can return to work once they are asymptomatic for 24 hours without the use of fever reducing medications AND at least five days from the start of symptoms (or from the first positive result if they have no symptoms).      Patient remains significantly symptomatic and is not cleared to return to work at this time. Return to work date TBD, depending on symptom improvement.    If you have any questions or concerns, or if I can be of further assistance, please do not hesitate to contact me.     Sincerely,        Jovany Ford MD

## 2022-01-26 NOTE — PROGRESS NOTES
CXR normal.  Done for complaints of dyspnea.  With recent COVID infection.  Given inhalers at office visit  Results to patient via mychart.  If persisting, consider CT scan

## 2022-02-11 DIAGNOSIS — E11.9 TYPE 2 DIABETES MELLITUS WITHOUT COMPLICATION, UNSPECIFIED WHETHER LONG TERM INSULIN USE: ICD-10-CM

## 2022-02-14 ENCOUNTER — PATIENT MESSAGE (OUTPATIENT)
Dept: FAMILY MEDICINE | Facility: CLINIC | Age: 42
End: 2022-02-14
Payer: COMMERCIAL

## 2022-02-16 ENCOUNTER — HOSPITAL ENCOUNTER (OUTPATIENT)
Dept: RADIOLOGY | Facility: HOSPITAL | Age: 42
Discharge: HOME | End: 2022-02-16
Attending: INTERNAL MEDICINE
Payer: COMMERCIAL

## 2022-02-16 ENCOUNTER — OFFICE VISIT (OUTPATIENT)
Dept: FAMILY MEDICINE | Facility: CLINIC | Age: 42
End: 2022-02-16
Payer: COMMERCIAL

## 2022-02-16 VITALS
SYSTOLIC BLOOD PRESSURE: 132 MMHG | TEMPERATURE: 99 F | OXYGEN SATURATION: 99 % | WEIGHT: 291.56 LBS | HEART RATE: 111 BPM | DIASTOLIC BLOOD PRESSURE: 84 MMHG | HEIGHT: 73 IN | BODY MASS INDEX: 38.64 KG/M2

## 2022-02-16 DIAGNOSIS — Z79.4 TYPE 2 DIABETES MELLITUS WITHOUT COMPLICATION, WITH LONG-TERM CURRENT USE OF INSULIN: ICD-10-CM

## 2022-02-16 DIAGNOSIS — U07.1 COVID-19 VIRUS INFECTION: ICD-10-CM

## 2022-02-16 DIAGNOSIS — E66.01 MORBID OBESITY: ICD-10-CM

## 2022-02-16 DIAGNOSIS — I10 ESSENTIAL HYPERTENSION: ICD-10-CM

## 2022-02-16 DIAGNOSIS — E11.9 TYPE 2 DIABETES MELLITUS WITHOUT COMPLICATION, WITH LONG-TERM CURRENT USE OF INSULIN: ICD-10-CM

## 2022-02-16 DIAGNOSIS — R06.00 DYSPNEA, UNSPECIFIED TYPE: ICD-10-CM

## 2022-02-16 DIAGNOSIS — U07.1 COVID-19 VIRUS INFECTION: Primary | ICD-10-CM

## 2022-02-16 DIAGNOSIS — E03.4 HYPOTHYROIDISM DUE TO ACQUIRED ATROPHY OF THYROID: ICD-10-CM

## 2022-02-16 PROCEDURE — 71046 XR CHEST PA AND LATERAL: ICD-10-PCS | Mod: 26,,, | Performed by: RADIOLOGY

## 2022-02-16 PROCEDURE — 99999 PR PBB SHADOW E&M-EST. PATIENT-LVL IV: CPT | Mod: PBBFAC,,, | Performed by: INTERNAL MEDICINE

## 2022-02-16 PROCEDURE — 71046 X-RAY EXAM CHEST 2 VIEWS: CPT | Mod: TC,FY,PO

## 2022-02-16 PROCEDURE — 71046 X-RAY EXAM CHEST 2 VIEWS: CPT | Mod: 26,,, | Performed by: RADIOLOGY

## 2022-02-16 PROCEDURE — 99214 PR OFFICE/OUTPT VISIT, EST, LEVL IV, 30-39 MIN: ICD-10-PCS | Mod: S$GLB,,, | Performed by: INTERNAL MEDICINE

## 2022-02-16 PROCEDURE — 99999 PR PBB SHADOW E&M-EST. PATIENT-LVL IV: ICD-10-PCS | Mod: PBBFAC,,, | Performed by: INTERNAL MEDICINE

## 2022-02-16 PROCEDURE — 99214 OFFICE O/P EST MOD 30 MIN: CPT | Mod: S$GLB,,, | Performed by: INTERNAL MEDICINE

## 2022-02-16 NOTE — PROGRESS NOTES
How Severe Is Your Skin Lesion?: mild This note was created by combination of typed  and M-Modal dictation.  Transcription errors may be present.  If there are any questions, please contact me.    Assessment and Plan:   COVID-19 virus infection  Dyspnea, unspecified type  -continue debility after his COVID infection in late December/early January.  Last visit chest x-ray was normal.  Last visit O2 sats were lower than they are now.  At home his O2 sats got down to 88/89% but did not go to emergency room.  Today on intake, O2 sats 96/97% on room air at rest.  Saturations go down to 95% with ambulation.  Very briefly went down to 93%.  Does not meet threshold for supplemental O2.  And it sounds like overall this has improved compared to a month ago.  Still remains disabled from his current line of work.  Referral to Covid19 PTT  Referral to pulmonology  Stay on Symbicort  Paperwork for work given-out of work no return to work date set.  Plan to re-evaluate in 1 month.  Will see what prognosis physical therapy may be able to provide regards to time table  -     Ambulatory referral/consult to Physical/Occupational Therapy; Future; Expected date: 02/23/2022  -     Ambulatory referral/consult to Pulmonology; Future; Expected date: 02/23/2022  -     X-Ray Chest PA And Lateral; Future; Expected date: 02/16/2022    Type 2 diabetes mellitus without complication, with long-term current use of insulin  Morbid obesity  -will return for fasting labs.  Followed by diabetes nurse practitioner    Essential hypertension  -BP stable today.    Hypothyroidism due to acquired atrophy of thyroid  -check TSH on levothyroxine          There are no discontinued medications.    meds sent this encounter:       Follow Up: No follow-ups on file.  Follow-up 1 month    Subjective:     Chief Complaint   Patient presents with    Breathing Problem    Paperwork       HPI  Tony is a 41 y.o. male, last appointment with this clinic was 1/25/2022.  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 Trial symbicort  Migraines triggered by recent illness; fioricet  HTN not to goal, acute illness  Obesity, DM2, acute illness, deferred labs.  Allergic rhinitis, flonase and astelin  Hypothyroid/LT    7/15 labs  TSH good on dose  Lipid good on statin  CBC, CMP normal  A1c went up 7.7 from previous 6.6  HIV screening negative     Saw DM NP in July. Increased trulicity     mammo and US shows lipoma    Recovering from coronavirus infection.  Still feeling fatigued and dyspneic.  Still is unlikely to be able to perform a central job duties  Work needed some sort of return to work estimate.  Tachycardic on intake.  Remains tachycardic throughout exam.  Mowed his lawn this morning.  Normally he can mow his front and back yd in about 15 or 20 minutes.  Today he only most the front yd and it took about 30 minutes and was very fatigued and tired.  Earlier on he had message me about O2 sats dropping to 88% with ambulation.  He thought about going to the emergency room but all of his friends who worked in the ER discouraged him from going.  His O2 sats of late at home have been better.  Today on intake 96/97% on room air at rest, with ambulation, desaturation down to 95%.  Briefly down was down to 93% but that was fleeting.   Cough is better    Patient Care Team:  Jovany Ford MD as PCP - General (Internal Medicine)  Janneth Johnson RN (Inactive) as Registered Nurse (Diabetes)  Rocky Hewitt MD as Consulting Physician (Ophthalmology)  Preethi Monaco RD as Dietitian (Nutrition)  River Montiel,  Have Your Skin Lesions Been Treated?: not been treated Is This A New Presentation, Or A Follow-Up?: Skin Lesions MA as Care Coordinator  Christofer Rowley MD as Consulting Physician (Orthopedic Surgery)  Singh Rizo MD as Consulting Physician (Sports Medicine)  Shilpa Gordon NP as Nurse Practitioner (Urology)  Nicole Wynn MD (Family Medicine)    Patient Active Problem List    Diagnosis Date Noted    Rib pain on left side 08/08/2021    Right foot pain 10/10/2019    Decreased strength of lower extremity 10/10/2019    Decreased range of motion of both ankles 10/10/2019    Gait abnormality 10/10/2019    Low testosterone in male; pituitary axis normal. MRI negative. 11/2019 started on testosterone 09/04/2019 11/06/2019 MRI pituitary with and without contrast from [facility]  Impression:  1.  No pituitary mass seen.  2.  Absence of the septum pellucidum.      NAINA (obstructive sleep apnea) 8/2019 sleep study AHI 11     Acute bilateral low back pain without sciatica 08/10/2019    Non-seasonal allergic rhinitis; avoid antihistamines per opthalmology 11/09/2018    Migraine with aura and without status migrainosus, not intractable propranolol SE angry; topamax not helpful; imitrex ineffective; daith ear piercing helps 09/28/2018    Type 2 diabetes mellitus without complication, with long-term current use of insulin     Essential hypertension     Hypothyroidism 07/29/2015    Hyperlipidemia LDL goal <70 06/03/2015    Insulin long-term use 06/03/2015    Tinea pedis 06/03/2015    Morbid obesity 06/03/2015       PAST MEDICAL PROBLEMS, PAST SURGICAL HISTORY: please see relevant portions of the electronic medical record    ALLERGIES AND MEDICATIONS: updated and reviewed.  Review of patient's allergies indicates:   Allergen Reactions    Influenza virus vaccine, specific Hives    Pioglitazone      Altered mood     Victoza [liraglutide] Nausea And Vomiting    Farxiga [dapagliflozin] Rash     Erectile dysfunction and rash        Medication List with Changes/Refills   Current Medications     ATORVASTATIN (LIPITOR) 20 MG TABLET    Take 1 tablet (20 mg total) by mouth once daily.    AZELASTINE 205.5 MCG (0.15 %) SPRY    azelastine 205.5 mcg (0.15 %) nasal spray  INHALE 2 SPRAYS TO EACH NOSTRIL TWICE A DAY    BLOOD SUGAR DIAGNOSTIC STRP    To check BG 4 times daily, to use with insurance preferred meter    BLOOD SUGAR DIAGNOSTIC STRP    OneTouch Ultra Test strips    BLOOD-GLUCOSE METER KIT    OneTouch Ultra2 Meter kit    BLOOD-GLUCOSE METER,CONTINUOUS (DEXCOM G6 ) MISC    Use with dexcom G6 system    BLOOD-GLUCOSE SENSOR (DEXCOM G6 SENSOR) CAT    Change sensor every 10 days    BLOOD-GLUCOSE TRANSMITTER (DEXCOM G6 TRANSMITTER) CAT    Change every 3 months    BUDESONIDE-FORMOTEROL 160-4.5 MCG (SYMBICORT) 160-4.5 MCG/ACTUATION HFAA    Inhale 2 puffs into the lungs every 12 (twelve) hours. Controller    BUTALBITAL-ACETAMINOPHEN-CAFFEINE -40 MG (FIORICET, ESGIC) -40 MG PER TABLET    Take 1 tablet by mouth every 4 (four) hours as needed for Headaches.    DICLOFENAC SODIUM (VOLTAREN) 1 % GEL    Apply the gel (2 g) to the affected area 4 times daily. Do not apply more than 8 g daily to any one affected joint    FENOFIBRATE 160 MG TAB    fenofibrate 160 mg tablet    FLUTICASONE PROPIONATE (FLONASE) 50 MCG/ACTUATION NASAL SPRAY    fluticasone propionate 50 mcg/actuation nasal spray,suspension    IBUPROFEN (ADVIL,MOTRIN) 800 MG TABLET    Take 1 tablet (800 mg total) by mouth every 8 (eight) hours as needed for Pain.    INSULIN ASPART, NIACINAMIDE, (FIASP FLEXTOUCH U-100 INSULIN) 100 UNIT/ML (3 ML) INPN    Use as needed with sliding scale. Max daily dose 30 units/day    INSULIN GLARGINE U-300 CONC (TOUJEO MAX U-300 SOLOSTAR) 300 UNIT/ML (3 ML) INSULIN PEN    Inject 40 Units into the skin once daily.    INVOKANA 300 MG TAB TABLET    TAKE 1 TABLET BY MOUTH ONCE DAILY    KETOCONAZOLE (NIZORAL) 2 % CREAM    Apply topically once daily.    LANCETS MISC    To check BG 4 times daily, to use with  "insurance preferred meter    LEVOTHYROXINE (SYNTHROID) 50 MCG TABLET    TAKE 1 TABLET (50 MCG TOTAL) BY MOUTH BEFORE BREAKFAST.    LISINOPRIL 10 MG TABLET    Take 1 tablet (10 mg total) by mouth once daily.    LORATADINE (CLARITIN) 10 MG TABLET    TAKE 1 TABLET BY MOUTH EVERY DAY    MELOXICAM (MOBIC) 7.5 MG TABLET    Take 1 tablet (7.5 mg total) by mouth once daily. Take once a day for two weeks then as needed. Take with food    METFORMIN (GLUCOPHAGE) 1000 MG TABLET    Take 1 tablet (1,000 mg total) by mouth 2 (two) times daily with meals.    MONTELUKAST (SINGULAIR) 10 MG TABLET    TAKE 1 TABLET BY MOUTH EVERY EVENING    PEN NEEDLE, DIABETIC (BD ULTRA-FINE KEN PEN NEEDLE) 32 GAUGE X 5/32" NDLE    1 Device by Misc.(Non-Drug; Combo Route) route 5 (five) times daily.    SYRINGE, DISPOSABLE, 1 ML SYRG    Testosterone every 2 weeks    TADALAFIL (CIALIS) 20 MG TAB    Take 1 tablet (20 mg total) by mouth every 72 hours as needed.    TESTOSTERONE CYPIONATE (DEPOTESTOTERONE CYPIONATE) 200 MG/ML INJECTION    Inject 0.5 mLs (100 mg total) into the muscle every 7 days. REDUCED DOSE    TRULICITY 1.5 MG/0.5 ML PEN INJECTOR    INJECT 1.5MG SUB CUTANEOUSLY EVERY WEEK    TRULICITY 1.5 MG/0.5 ML PEN INJECTOR    INJECT 1.5MG SUB CUTANEOUSLY EVERY WEEK        Objective:   Physical Exam   Vitals:    02/16/22 1534   BP: 132/84   Pulse: (!) 111   Temp: 98.7 °F (37.1 °C)   TempSrc: Oral   SpO2: 99%   Weight: 132.2 kg (291 lb 8.9 oz)   Height: 6' 1" (1.854 m)    Body mass index is 38.47 kg/m².            Physical Exam  Constitutional:       General: He is not in acute distress.     Appearance: He is well-developed and well-nourished.   Eyes:      Extraocular Movements: EOM normal.   Cardiovascular:      Rate and Rhythm: Normal rate and regular rhythm.      Pulses:           Dorsalis pedis pulses are 3+ on the right side and 3+ on the left side.        Posterior tibial pulses are 1+ on the right side and 1+ on the left side.      Heart " sounds: Normal heart sounds. No murmur heard.      Pulmonary:      Effort: Pulmonary effort is normal.      Breath sounds: Normal breath sounds.   Musculoskeletal:         General: Normal range of motion.      Right foot: No deformity.      Left foot: No deformity.   Feet:      Right foot:      Protective Sensation: 5 sites tested. 5 sites sensed.      Skin integrity: No ulcer, blister, skin breakdown, erythema or warmth.      Left foot:      Protective Sensation: 5 sites tested. 5 sites sensed.      Skin integrity: No ulcer, blister, skin breakdown, erythema or warmth.   Skin:     General: Skin is warm and dry.   Neurological:      Mental Status: He is alert and oriented to person, place, and time.      Coordination: Coordination normal.   Psychiatric:         Mood and Affect: Mood and affect normal.         Behavior: Behavior normal.         Thought Content: Thought content normal.

## 2022-02-17 ENCOUNTER — PATIENT OUTREACH (OUTPATIENT)
Dept: ADMINISTRATIVE | Facility: OTHER | Age: 42
End: 2022-02-17
Payer: COMMERCIAL

## 2022-02-17 ENCOUNTER — LAB VISIT (OUTPATIENT)
Dept: LAB | Facility: HOSPITAL | Age: 42
End: 2022-02-17
Attending: INTERNAL MEDICINE
Payer: COMMERCIAL

## 2022-02-17 DIAGNOSIS — Z79.4 TYPE 2 DIABETES MELLITUS WITHOUT COMPLICATION, WITH LONG-TERM CURRENT USE OF INSULIN: ICD-10-CM

## 2022-02-17 DIAGNOSIS — E03.4 HYPOTHYROIDISM DUE TO ACQUIRED ATROPHY OF THYROID: ICD-10-CM

## 2022-02-17 DIAGNOSIS — E78.5 HYPERLIPIDEMIA LDL GOAL <70: ICD-10-CM

## 2022-02-17 DIAGNOSIS — E11.9 TYPE 2 DIABETES MELLITUS WITHOUT COMPLICATION, WITH LONG-TERM CURRENT USE OF INSULIN: ICD-10-CM

## 2022-02-17 LAB
ALBUMIN SERPL BCP-MCNC: 4.1 G/DL (ref 3.5–5.2)
ALP SERPL-CCNC: 64 U/L (ref 55–135)
ALT SERPL W/O P-5'-P-CCNC: 33 U/L (ref 10–44)
ANION GAP SERPL CALC-SCNC: 14 MMOL/L (ref 8–16)
AST SERPL-CCNC: 18 U/L (ref 10–40)
BASOPHILS # BLD AUTO: 0.05 K/UL (ref 0–0.2)
BASOPHILS NFR BLD: 0.5 % (ref 0–1.9)
BILIRUB SERPL-MCNC: 0.6 MG/DL (ref 0.1–1)
BUN SERPL-MCNC: 14 MG/DL (ref 6–20)
CALCIUM SERPL-MCNC: 10.3 MG/DL (ref 8.7–10.5)
CHLORIDE SERPL-SCNC: 105 MMOL/L (ref 95–110)
CHOLEST SERPL-MCNC: 162 MG/DL (ref 120–199)
CHOLEST/HDLC SERPL: 3.1 {RATIO} (ref 2–5)
CO2 SERPL-SCNC: 21 MMOL/L (ref 23–29)
CREAT SERPL-MCNC: 0.9 MG/DL (ref 0.5–1.4)
DIFFERENTIAL METHOD: ABNORMAL
EOSINOPHIL # BLD AUTO: 0.2 K/UL (ref 0–0.5)
EOSINOPHIL NFR BLD: 1.6 % (ref 0–8)
ERYTHROCYTE [DISTWIDTH] IN BLOOD BY AUTOMATED COUNT: 18 % (ref 11.5–14.5)
EST. GFR  (AFRICAN AMERICAN): >60 ML/MIN/1.73 M^2
EST. GFR  (NON AFRICAN AMERICAN): >60 ML/MIN/1.73 M^2
ESTIMATED AVG GLUCOSE: 237 MG/DL (ref 68–131)
GLUCOSE SERPL-MCNC: 159 MG/DL (ref 70–110)
HBA1C MFR BLD: 9.9 % (ref 4–5.6)
HCT VFR BLD AUTO: 53.9 % (ref 40–54)
HDLC SERPL-MCNC: 52 MG/DL (ref 40–75)
HDLC SERPL: 32.1 % (ref 20–50)
HGB BLD-MCNC: 16.8 G/DL (ref 14–18)
IMM GRANULOCYTES # BLD AUTO: 0.03 K/UL (ref 0–0.04)
IMM GRANULOCYTES NFR BLD AUTO: 0.3 % (ref 0–0.5)
LDLC SERPL CALC-MCNC: 81.8 MG/DL (ref 63–159)
LYMPHOCYTES # BLD AUTO: 2.9 K/UL (ref 1–4.8)
LYMPHOCYTES NFR BLD: 29.4 % (ref 18–48)
MCH RBC QN AUTO: 27.9 PG (ref 27–31)
MCHC RBC AUTO-ENTMCNC: 31.2 G/DL (ref 32–36)
MCV RBC AUTO: 90 FL (ref 82–98)
MONOCYTES # BLD AUTO: 0.9 K/UL (ref 0.3–1)
MONOCYTES NFR BLD: 8.9 % (ref 4–15)
NEUTROPHILS # BLD AUTO: 5.9 K/UL (ref 1.8–7.7)
NEUTROPHILS NFR BLD: 59.3 % (ref 38–73)
NONHDLC SERPL-MCNC: 110 MG/DL
NRBC BLD-RTO: 0 /100 WBC
PLATELET # BLD AUTO: 223 K/UL (ref 150–450)
PMV BLD AUTO: 11.9 FL (ref 9.2–12.9)
POTASSIUM SERPL-SCNC: 4.3 MMOL/L (ref 3.5–5.1)
PROT SERPL-MCNC: 8.1 G/DL (ref 6–8.4)
RBC # BLD AUTO: 6.02 M/UL (ref 4.6–6.2)
SODIUM SERPL-SCNC: 140 MMOL/L (ref 136–145)
TRIGL SERPL-MCNC: 141 MG/DL (ref 30–150)
TSH SERPL DL<=0.005 MIU/L-ACNC: 2.65 UIU/ML (ref 0.4–4)
WBC # BLD AUTO: 9.92 K/UL (ref 3.9–12.7)

## 2022-02-17 PROCEDURE — 83036 HEMOGLOBIN GLYCOSYLATED A1C: CPT | Performed by: NURSE PRACTITIONER

## 2022-02-17 PROCEDURE — 36415 COLL VENOUS BLD VENIPUNCTURE: CPT | Mod: PO | Performed by: INTERNAL MEDICINE

## 2022-02-17 PROCEDURE — 80053 COMPREHEN METABOLIC PANEL: CPT | Performed by: INTERNAL MEDICINE

## 2022-02-17 PROCEDURE — 85025 COMPLETE CBC W/AUTO DIFF WBC: CPT | Performed by: INTERNAL MEDICINE

## 2022-02-17 PROCEDURE — 80061 LIPID PANEL: CPT | Performed by: INTERNAL MEDICINE

## 2022-02-17 PROCEDURE — 84443 ASSAY THYROID STIM HORMONE: CPT | Performed by: INTERNAL MEDICINE

## 2022-02-18 ENCOUNTER — OFFICE VISIT (OUTPATIENT)
Dept: PULMONOLOGY | Facility: CLINIC | Age: 42
End: 2022-02-18
Payer: COMMERCIAL

## 2022-02-18 ENCOUNTER — LAB VISIT (OUTPATIENT)
Dept: LAB | Facility: HOSPITAL | Age: 42
End: 2022-02-18
Attending: INTERNAL MEDICINE
Payer: COMMERCIAL

## 2022-02-18 VITALS
BODY MASS INDEX: 38.29 KG/M2 | WEIGHT: 288.94 LBS | OXYGEN SATURATION: 97 % | HEART RATE: 86 BPM | DIASTOLIC BLOOD PRESSURE: 88 MMHG | HEIGHT: 73 IN | SYSTOLIC BLOOD PRESSURE: 136 MMHG

## 2022-02-18 DIAGNOSIS — U07.1 COVID-19 VIRUS INFECTION: ICD-10-CM

## 2022-02-18 DIAGNOSIS — G47.33 OSA (OBSTRUCTIVE SLEEP APNEA): ICD-10-CM

## 2022-02-18 DIAGNOSIS — R06.00 DYSPNEA, UNSPECIFIED TYPE: ICD-10-CM

## 2022-02-18 LAB — D DIMER PPP IA.FEU-MCNC: 0.36 MG/L FEU

## 2022-02-18 PROCEDURE — 99244 OFF/OP CNSLTJ NEW/EST MOD 40: CPT | Mod: S$GLB,,, | Performed by: INTERNAL MEDICINE

## 2022-02-18 PROCEDURE — 85379 FIBRIN DEGRADATION QUANT: CPT | Performed by: INTERNAL MEDICINE

## 2022-02-18 PROCEDURE — 99999 PR PBB SHADOW E&M-EST. PATIENT-LVL III: CPT | Mod: PBBFAC,,, | Performed by: INTERNAL MEDICINE

## 2022-02-18 PROCEDURE — 36415 COLL VENOUS BLD VENIPUNCTURE: CPT | Performed by: INTERNAL MEDICINE

## 2022-02-18 PROCEDURE — 99244 PR OFFICE CONSULTATION,LEVEL IV: ICD-10-PCS | Mod: S$GLB,,, | Performed by: INTERNAL MEDICINE

## 2022-02-18 PROCEDURE — 99999 PR PBB SHADOW E&M-EST. PATIENT-LVL III: ICD-10-PCS | Mod: PBBFAC,,, | Performed by: INTERNAL MEDICINE

## 2022-02-18 NOTE — PROGRESS NOTES
Tony Gordillo  was seen as a new patient at the request  Jovany Ford MD for the evaluation of  sob.    CHIEF COMPLAINT:  Shortness of Breath (Hx of covid)      HISTORY OF PRESENT ILLNESS: Tony Gordillo is a 41 y.o. male  has a past medical history of Diabetes mellitus, type 2, Hyperlipidemia, Hypertension, Obesity, and NAINA (obstructive sleep apnea).  Patient contracted covid 19 12/31/2021 at [facility].  At that time, patient experienced nasal congestion.  No fever/chill.  Patient was treated with self quarantine.  About 2 weeks after covid infection, patient noticed worsening dyspnea with adl.  +cough and wheezing.  Most noticeable when doing yard work.  Elevated pulse rate.  No lower extremities edema.  No orthopnea.  Patient stopped using cpap due to gasping sensation.  Patient was started on symbicort with improvement in dyspnea.      Today, patient denied coughing or wheezing.  No chest pain.  No orthopnea.  No pnd. Lost 20 lbs over past 6 months.  Recent blood work showed HbA1C 9.9.  Difficulty affording meds for DM2.      PAST MEDICAL HISTORY:    Active Ambulatory Problems     Diagnosis Date Noted    Hyperlipidemia LDL goal <70 06/03/2015    Insulin long-term use 06/03/2015    Tinea pedis 06/03/2015    Morbid obesity 06/03/2015    Hypothyroidism 07/29/2015    Type 2 diabetes mellitus without complication, with long-term current use of insulin     Essential hypertension     Migraine with aura and without status migrainosus, not intractable propranolol SE angry; topamax not helpful; imitrex ineffective; daith ear piercing helps 09/28/2018    Non-seasonal allergic rhinitis; avoid antihistamines per opthalmology 11/09/2018    Acute bilateral low back pain without sciatica 08/10/2019    NAINA (obstructive sleep apnea) 8/2019 sleep study AHI 11     Low testosterone in male; pituitary axis normal. MRI negative. 11/2019 started on testosterone 09/04/2019    Right foot pain 10/10/2019     Decreased strength of lower extremity 10/10/2019    Decreased range of motion of both ankles 10/10/2019    Gait abnormality 10/10/2019    Rib pain on left side 08/08/2021    Dyspnea 02/18/2022     Resolved Ambulatory Problems     Diagnosis Date Noted    Diabetes mellitus, type II 08/03/2012    HTN (hypertension) 08/03/2012    Type II or unspecified type diabetes mellitus without mention of complication, uncontrolled 06/03/2015    Sprain of right ring finger 06/27/2017    Hyperlipidemia     Snoring      Past Medical History:   Diagnosis Date    Diabetes mellitus, type 2     Hypertension     Obesity                 PAST SURGICAL HISTORY:    Past Surgical History:   Procedure Laterality Date    ANKLE SURGERY      KNEE SURGERY      acl,mcl,pcl    left knee x 2           FAMILY HISTORY:                Family History   Problem Relation Age of Onset    Diabetes Mother     Diabetes Father     No Known Problems Brother     Autism Son     No Known Problems Daughter        SOCIAL HISTORY:          Tobacco:   Social History     Tobacco Use   Smoking Status Never Smoker   Smokeless Tobacco Never Used     alcohol use:    Social History     Substance and Sexual Activity   Alcohol Use Yes    Comment: 1-2 beers every 2 weeks               Occupation:     ALLERGIES:    Review of patient's allergies indicates:   Allergen Reactions    Influenza virus vaccine, specific Hives    Pioglitazone      Altered mood     Victoza [liraglutide] Nausea And Vomiting    Farxiga [dapagliflozin] Rash     Erectile dysfunction and rash        CURRENT MEDICATIONS:    Current Outpatient Medications   Medication Sig Dispense Refill    atorvastatin (LIPITOR) 20 MG tablet Take 1 tablet (20 mg total) by mouth once daily. 90 tablet 3    azelastine 205.5 mcg (0.15 %) Spry azelastine 205.5 mcg (0.15 %) nasal spray  INHALE 2 SPRAYS TO EACH NOSTRIL TWICE A DAY 30 mL 11    blood sugar diagnostic Strp To check BG 4 times daily,  to use with insurance preferred meter 400 strip 3    blood sugar diagnostic Strp OneTouch Ultra Test strips      blood-glucose meter kit OneTouch Ultra2 Meter kit      blood-glucose meter,continuous (DEXCOM G6 ) Misc Use with dexcom G6 system 1 each 0    blood-glucose sensor (DEXCOM G6 SENSOR) Filomena Change sensor every 10 days 3 Device 12    blood-glucose transmitter (DEXCOM G6 TRANSMITTER) Filomena Change every 3 months 1 Device 3    budesonide-formoterol 160-4.5 mcg (SYMBICORT) 160-4.5 mcg/actuation HFAA Inhale 2 puffs into the lungs every 12 (twelve) hours. Controller 10.2 g 0    butalbital-acetaminophen-caffeine -40 mg (FIORICET, ESGIC) -40 mg per tablet Take 1 tablet by mouth every 4 (four) hours as needed for Headaches. 12 tablet 0    diclofenac sodium (VOLTAREN) 1 % Gel Apply the gel (2 g) to the affected area 4 times daily. Do not apply more than 8 g daily to any one affected joint 100 g 1    fenofibrate 160 MG Tab fenofibrate 160 mg tablet      fluticasone propionate (FLONASE) 50 mcg/actuation nasal spray fluticasone propionate 50 mcg/actuation nasal spray,suspension 16 g 11    ibuprofen (ADVIL,MOTRIN) 800 MG tablet Take 1 tablet (800 mg total) by mouth every 8 (eight) hours as needed for Pain. 30 tablet 0    insulin aspart, niacinamide, (FIASP FLEXTOUCH U-100 INSULIN) 100 unit/mL (3 mL) InPn Use as needed with sliding scale. Max daily dose 30 units/day 5 pen 2    insulin glargine U-300 conc (TOUJEO MAX U-300 SOLOSTAR) 300 unit/mL (3 mL) insulin pen Inject 40 Units into the skin once daily. 2 pen 5    INVOKANA 300 mg Tab tablet TAKE 1 TABLET BY MOUTH ONCE DAILY 30 tablet 11    ketoconazole (NIZORAL) 2 % cream Apply topically once daily. 1 Tube 3    lancets Misc To check BG 4 times daily, to use with insurance preferred meter 400 each 3    levothyroxine (SYNTHROID) 50 MCG tablet TAKE 1 TABLET (50 MCG TOTAL) BY MOUTH BEFORE BREAKFAST. 90 tablet 0    lisinopriL 10 MG tablet  "Take 1 tablet (10 mg total) by mouth once daily. 90 tablet 3    loratadine (CLARITIN) 10 mg tablet TAKE 1 TABLET BY MOUTH EVERY DAY 30 tablet 11    montelukast (SINGULAIR) 10 mg tablet TAKE 1 TABLET BY MOUTH EVERY EVENING 90 tablet 3    pen needle, diabetic (BD ULTRA-FINE KEN PEN NEEDLE) 32 gauge x 5/32" Ndle 1 Device by Misc.(Non-Drug; Combo Route) route 5 (five) times daily. 550 each 4    syringe, disposable, 1 mL Syrg Testosterone every 2 weeks 25 Syringe 1    TRULICITY 1.5 mg/0.5 mL pen injector INJECT 1.5MG SUB CUTANEOUSLY EVERY WEEK 4 pen 11    TRULICITY 1.5 mg/0.5 mL pen injector INJECT 1.5MG SUB CUTANEOUSLY EVERY WEEK 4 pen 5    meloxicam (MOBIC) 7.5 MG tablet Take 1 tablet (7.5 mg total) by mouth once daily. Take once a day for two weeks then as needed. Take with food (Patient not taking: No sig reported) 30 tablet 0    metFORMIN (GLUCOPHAGE) 1000 MG tablet Take 1 tablet (1,000 mg total) by mouth 2 (two) times daily with meals. (Patient not taking: No sig reported) 180 tablet 2    tadalafiL (CIALIS) 20 MG Tab Take 1 tablet (20 mg total) by mouth every 72 hours as needed. (Patient not taking: No sig reported) 30 tablet 11    testosterone cypionate (DEPOTESTOTERONE CYPIONATE) 200 mg/mL injection Inject 0.5 mLs (100 mg total) into the muscle every 7 days. REDUCED DOSE (Patient not taking: Reported on 2/16/2022) 2 mL 5     No current facility-administered medications for this visit.                  REVIEW OF SYSTEMS:     Pulmonary related symptoms as per HPI.  Gen:  no weight loss, no fever, no night sweat  HEENT:  no visual changes, no sore throat, no hearing loss  CV:  Per hpi  GI:  no melena, no hematochezia, no diarhea, no constipation.  :  no dysuria, no hematuria, no hesistancy, no dribbling  Neuro:  no syncope, no vertigo, no tinitus  Psych:  No homocide or suicide ideation; no depression.  Endocrine:  No heat or cold intolerance.  Sleep:  Without cpap, patient with loud snoring.  Sleep is " "about the same.    Otherwise, a balance of systems reviewed is negative.          PHYSICAL EXAM:  Vitals:    02/18/22 0839   BP: 136/88   Pulse: 86   SpO2: 97%   Weight: 131.1 kg (288 lb 14.6 oz)   Height: 6' 1" (1.854 m)   PainSc: 0-No pain     Body mass index is 38.12 kg/m².     GENERAL:  well develop; no apparent distress  HEENT:  no nasal congestion; no discharge noted; class 4 modified mallampatti.   NECK:  supple; no palpable masses.  CARDIO: regular rate and rhythm  PULM:  clear to auscultation bilaterally; no intercostals retractions; no accessory muscle usage   ABDOMEN:  soft nontender/nondistended.  +bowel sound  EXTREMITIES no cce  NEURO:  CN II-XII intact.  5/5 motor in all extremities.  sensation grossly intact   to light touch.  PSYCH:  normal affect.  Alert and oriented x 4    LABS  Pulmonary Functions Testing Results(personally reviewed):  none  ABG (personally reviewed):  none  CXR (personally reviewed):  2/16/22 no consolidation or effusion; +nipple piercing  CT CHEST(personally reviewed):  none    hsat 8/22/19 ahi of 11; rdi of 29    ASSESSMENT/PLAN  Problem List Items Addressed This Visit     Dyspnea    Overview      started after covid 19. cxr unremarkable.  Clinically improve with symbicort.  Baseline pft.  Will send for ETT to rule out cardiac etiology.  D-dimer is normal arguing against PE/DVT.             Relevant Orders    D-Dimer, Quantitative (Completed)    Stress Echo Which stress agent will be used? Treadmill Exercise; Color Flow Doppler? No    Complete PFT with bronchodilator    COVID-19 Routine Screening    CPAP/BIPAP SUPPLIES    NAINA (obstructive sleep apnea) 8/2019 sleep study AHI 11    Overview     -ahi of 11.  Stopped apap for over month due to gasping sensation.  ?excessive leakage a/w nasal congestion while having covid 19.  Nasal pantency is adequate.  Recommend resumption of apap.  However, APAP is being recall by [employer]  -we discussed at length about [employer] recall.  " Patient will register his apap.    Information for recall given to patient.             Other Visit Diagnoses     COVID-19 virus infection        Relevant Orders    CPAP/BIPAP SUPPLIES          Fatigue - may related to untreated patricia and dm2.  Patient had not fill his dm2 meds due to high copay.  Just met his deductible and meds are filled.        Patient will No follow-ups on file. with md/np.    CC: Send copy of this note to Jovany Ford MD

## 2022-02-18 NOTE — PROGRESS NOTES
Health Maintenance Due   Topic Date Due    Eye Exam  12/23/2020    Influenza Vaccine (1) 09/01/2021     Updates were requested from care everywhere.  Chart was reviewed for overdue Proactive [facility] Encounters (SUZAN) topics (CRS, Breast Cancer Screening, Eye exam)  Health Maintenance has been updated.  LINKS immunization registry triggered.  Immunizations were reconciled.

## 2022-02-21 ENCOUNTER — LAB VISIT (OUTPATIENT)
Dept: FAMILY MEDICINE | Facility: CLINIC | Age: 42
End: 2022-02-21
Payer: COMMERCIAL

## 2022-02-21 DIAGNOSIS — R06.02 SHORTNESS OF BREATH: ICD-10-CM

## 2022-02-21 DIAGNOSIS — R06.00 DYSPNEA, UNSPECIFIED TYPE: ICD-10-CM

## 2022-02-21 PROCEDURE — U0003 INFECTIOUS AGENT DETECTION BY NUCLEIC ACID (DNA OR RNA); SEVERE ACUTE RESPIRATORY SYNDROME CORONAVIRUS 2 (SARS-COV-2) (CORONAVIRUS DISEASE [COVID-19]), AMPLIFIED PROBE TECHNIQUE, MAKING USE OF HIGH THROUGHPUT TECHNOLOGIES AS DESCRIBED BY CMS-2020-01-R: HCPCS | Performed by: INTERNAL MEDICINE

## 2022-02-21 PROCEDURE — U0005 INFEC AGEN DETEC AMPLI PROBE: HCPCS | Performed by: INTERNAL MEDICINE

## 2022-02-21 NOTE — PROGRESS NOTES
CMP normal  CBC normal  Lipid profile good on statin  TSH good on current dose levothyroxine  A1c though high at 9.9  Urine microalbumin normal  Results to patient via my chart.  He is followed by diabetes nurse practitioner.  Encouraged to follow-up with her

## 2022-02-22 LAB
SARS-COV-2 RNA RESP QL NAA+PROBE: NOT DETECTED
SARS-COV-2- CYCLE NUMBER: NORMAL

## 2022-02-24 ENCOUNTER — HOSPITAL ENCOUNTER (OUTPATIENT)
Dept: RESPIRATORY THERAPY | Facility: HOSPITAL | Age: 42
Discharge: HOME | End: 2022-02-24
Attending: INTERNAL MEDICINE
Payer: COMMERCIAL

## 2022-02-24 VITALS — OXYGEN SATURATION: 97 % | RESPIRATION RATE: 20 BRPM | HEART RATE: 103 BPM

## 2022-02-24 DIAGNOSIS — R06.00 DYSPNEA, UNSPECIFIED TYPE: ICD-10-CM

## 2022-02-24 PROCEDURE — 25000242 PHARM REV CODE 250 ALT 637 W/ HCPCS: Performed by: INTERNAL MEDICINE

## 2022-02-24 PROCEDURE — 94060 PR EVAL OF BRONCHOSPASM: ICD-10-PCS | Mod: 26,,, | Performed by: INTERNAL MEDICINE

## 2022-02-24 PROCEDURE — 94727 PR PULM FUNCTION TEST BY GAS: ICD-10-PCS | Mod: 26,,, | Performed by: INTERNAL MEDICINE

## 2022-02-24 PROCEDURE — 94010 BREATHING CAPACITY TEST: CPT

## 2022-02-24 PROCEDURE — 94060 EVALUATION OF WHEEZING: CPT | Mod: 26,,, | Performed by: INTERNAL MEDICINE

## 2022-02-24 PROCEDURE — 94727 GAS DIL/WSHOT DETER LNG VOL: CPT | Mod: 26,,, | Performed by: INTERNAL MEDICINE

## 2022-02-24 PROCEDURE — 94727 GAS DIL/WSHOT DETER LNG VOL: CPT

## 2022-02-24 PROCEDURE — 94729 DIFFUSING CAPACITY: CPT

## 2022-02-24 RX ORDER — ALBUTEROL SULFATE 2.5 MG/.5ML
2.5 SOLUTION RESPIRATORY (INHALATION) ONCE
Status: COMPLETED | OUTPATIENT
Start: 2022-02-24 | End: 2022-02-24

## 2022-02-24 RX ADMIN — ALBUTEROL SULFATE 2.5 MG: 2.5 SOLUTION RESPIRATORY (INHALATION) at 11:02

## 2022-02-25 LAB
BRPFT: NORMAL
DLCO ADJ PRE: 31.45 ML/(MIN*MMHG)
DLCO SINGLE BREATH LLN: 28.16
DLCO SINGLE BREATH PRE REF: 94.7 %
DLCO SINGLE BREATH REF: 35.09
DLCOC SBVA LLN: 3.43
DLCOC SBVA PRE REF: 121.7 %
DLCOC SBVA REF: 4.56
DLCOC SINGLE BREATH LLN: 28.16
DLCOC SINGLE BREATH PRE REF: 89.6 %
DLCOC SINGLE BREATH REF: 35.09
DLCOVA LLN: 3.43
DLCOVA PRE REF: 128.6 %
DLCOVA PRE: 5.86 ML/(MIN*MMHG*L)
DLCOVA REF: 4.56
DLVAADJ PRE: 5.54 ML/(MIN*MMHG*L)
ERVN2 LLN: -16448.5
ERVN2 PRE REF: 56.7 %
ERVN2 PRE: 0.85 L
ERVN2 REF: 1.5
FEF 25 75 CHG: -25.2 %
FEF 25 75 LLN: 2.44
FEF 25 75 POST REF: 77.6 %
FEF 25 75 PRE REF: 103.7 %
FEF 25 75 REF: 4.25
FET100 CHG: -5.2 %
FEV1 CHG: -5.2 %
FEV1 FVC CHG: -8.6 %
FEV1 FVC LLN: 69
FEV1 FVC POST REF: 94.5 %
FEV1 FVC PRE REF: 103.5 %
FEV1 FVC REF: 80
FEV1 LLN: 3.58
FEV1 POST REF: 75.8 %
FEV1 PRE REF: 79.9 %
FEV1 REF: 4.53
FRCN2 LLN: 2.63
FRCN2 PRE REF: 59.2 %
FRCN2 REF: 3.62
FVC CHG: 3.8 %
FVC LLN: 4.53
FVC POST REF: 79.7 %
FVC PRE REF: 76.8 %
FVC REF: 5.71
IVC PRE: 4.31 L
IVC SINGLE BREATH LLN: 4.53
IVC SINGLE BREATH PRE REF: 75.5 %
IVC SINGLE BREATH REF: 5.71
PEF CHG: -37.9 %
PEF LLN: 8.27
PEF POST REF: 38.3 %
PEF PRE REF: 61.7 %
PEF REF: 10.77
POST FEF 25 75: 3.3 L/S
POST FET 100: 8.23 SEC
POST FEV1 FVC: 75.41 %
POST FEV1: 3.43 L
POST FVC: 4.55 L
POST PEF: 4.13 L/S
PRE DLCO: 33.25 ML/(MIN*MMHG)
PRE FEF 25 75: 4.41 L/S
PRE FET 100: 8.68 SEC
PRE FEV1 FVC: 82.54 %
PRE FEV1: 3.62 L
PRE FRC N2: 2.14 L
PRE FVC: 4.39 L
PRE PEF: 6.65 L/S
RVN2 LLN: 1.44
RVN2 PRE REF: 60.9 %
RVN2 PRE: 1.29 L
RVN2 REF: 2.12
RVN2TLCN2 LLN: 21.36
RVN2TLCN2 PRE REF: 75.9 %
RVN2TLCN2 PRE: 23.04 %
RVN2TLCN2 REF: 30.34
TLCN2 LLN: 6.55
TLCN2 PRE REF: 72.7 %
TLCN2 PRE: 5.6 L
TLCN2 REF: 7.7
VA PRE: 5.68 L
VA SINGLE BREATH LLN: 7.55
VA SINGLE BREATH PRE REF: 75.2 %
VA SINGLE BREATH REF: 7.55
VCMAXN2 LLN: 4.53
VCMAXN2 PRE REF: 75.4 %
VCMAXN2 PRE: 4.31 L
VCMAXN2 REF: 5.71

## 2022-03-03 ENCOUNTER — HOSPITAL ENCOUNTER (OUTPATIENT)
Dept: CARDIOLOGY | Facility: HOSPITAL | Age: 42
Discharge: HOME | End: 2022-03-03
Attending: INTERNAL MEDICINE
Payer: COMMERCIAL

## 2022-03-03 VITALS — BODY MASS INDEX: 37.81 KG/M2 | WEIGHT: 286.63 LBS

## 2022-03-03 DIAGNOSIS — R06.00 DYSPNEA, UNSPECIFIED TYPE: ICD-10-CM

## 2022-03-03 LAB
AORTIC ROOT ANNULUS: 2.9 CM
AORTIC VALVE CUSP SEPERATION: 2.38 CM
ASCENDING AORTA: 3.02 CM
AV INDEX (PROSTH): 0.79
AV MEAN GRADIENT: 4 MMHG
AV PEAK GRADIENT: 6 MMHG
AV VALVE AREA: 2.74 CM2
AV VELOCITY RATIO: 0.81
CV ECHO LV RWT: 0.59 CM
CV STRESS BASE HR: 88 BPM
DIASTOLIC BLOOD PRESSURE: 77 MMHG
DOP CALC AO PEAK VEL: 1.27 M/S
DOP CALC AO VTI: 24.51 CM
DOP CALC LVOT AREA: 3.5 CM2
DOP CALC LVOT DIAMETER: 2.1 CM
DOP CALC LVOT PEAK VEL: 1.03 M/S
DOP CALC LVOT STROKE VOLUME: 67.26 CM3
DOP CALCLVOT PEAK VEL VTI: 19.43 CM
E WAVE DECELERATION TIME: 116.91 MSEC
E/A RATIO: 0.78
E/E' RATIO: 9.87 M/S
ECHO LV POSTERIOR WALL: 1.35 CM (ref 0.6–1.1)
EJECTION FRACTION: 60 %
FRACTIONAL SHORTENING: 36 % (ref 28–44)
INTERVENTRICULAR SEPTUM: 1.42 CM (ref 0.6–1.1)
LA MAJOR: 5.53 CM
LA MINOR: 5.49 CM
LA WIDTH: 4.15 CM
LEFT ATRIUM SIZE: 4.41 CM
LEFT ATRIUM VOLUME: 85.71 CM3
LEFT INTERNAL DIMENSION IN SYSTOLE: 2.9 CM (ref 2.1–4)
LEFT VENTRICLE DIASTOLIC VOLUME: 94.46 ML
LEFT VENTRICLE SYSTOLIC VOLUME: 32.18 ML
LEFT VENTRICULAR INTERNAL DIMENSION IN DIASTOLE: 4.54 CM (ref 3.5–6)
LEFT VENTRICULAR MASS: 247.74 G
LV LATERAL E/E' RATIO: 7.4 M/S
LV SEPTAL E/E' RATIO: 14.8 M/S
MV PEAK A VEL: 0.95 M/S
MV PEAK E VEL: 0.74 M/S
MV STENOSIS PRESSURE HALF TIME: 33.9 MS
MV VALVE AREA P 1/2 METHOD: 6.49 CM2
OHS CV CPX 1 MINUTE RECOVERY HEART RATE: 157 BPM
OHS CV CPX 85 PERCENT MAX PREDICTED HEART RATE MALE: 151
OHS CV CPX MAX PREDICTED HEART RATE: 178
OHS CV CPX PATIENT IS FEMALE: 0
OHS CV CPX PATIENT IS MALE: 1
OHS CV CPX PEAK DIASTOLIC BLOOD PRESSURE: 30 MMHG
OHS CV CPX PEAK HEAR RATE: 169 BPM
OHS CV CPX PEAK RATE PRESSURE PRODUCT: ABNORMAL
OHS CV CPX PEAK SYSTOLIC BLOOD PRESSURE: 263 MMHG
OHS CV CPX PERCENT MAX PREDICTED HEART RATE ACHIEVED: 95
OHS CV CPX RATE PRESSURE PRODUCT PRESENTING: ABNORMAL
PISA TR MAX VEL: 1.6 M/S
PV PEAK VELOCITY: 1.16 CM/S
RA MAJOR: 4.42 CM
RA WIDTH: 3.86 CM
RIGHT VENTRICULAR END-DIASTOLIC DIMENSION: 3.59 CM
RV TISSUE DOPPLER FREE WALL SYSTOLIC VELOCITY 1 (APICAL 4 CHAMBER VIEW): 12.43 CM/S
SINUS: 3.04 CM
STJ: 2.4 CM
STRESS ECHO POST EXERCISE DUR MIN: 9 MINUTES
STRESS ECHO POST EXERCISE DUR SEC: 0 SECONDS
SYSTOLIC BLOOD PRESSURE: 118 MMHG
TDI LATERAL: 0.1 M/S
TDI SEPTAL: 0.05 M/S
TDI: 0.08 M/S
TR MAX PG: 10 MMHG
TRICUSPID ANNULAR PLANE SYSTOLIC EXCURSION: 2.3 CM

## 2022-03-03 PROCEDURE — 93325 STRESS ECHO (CUPID ONLY): ICD-10-PCS | Mod: 26,,, | Performed by: INTERNAL MEDICINE

## 2022-03-03 PROCEDURE — 63600175 PHARM REV CODE 636 W HCPCS: Performed by: INTERNAL MEDICINE

## 2022-03-03 PROCEDURE — 93320 DOPPLER ECHO COMPLETE: CPT | Mod: 26,,, | Performed by: INTERNAL MEDICINE

## 2022-03-03 PROCEDURE — 93351 STRESS ECHO (CUPID ONLY): ICD-10-PCS | Mod: 26,,, | Performed by: INTERNAL MEDICINE

## 2022-03-03 PROCEDURE — 93320 STRESS ECHO (CUPID ONLY): ICD-10-PCS | Mod: 26,,, | Performed by: INTERNAL MEDICINE

## 2022-03-03 PROCEDURE — 93351 STRESS TTE COMPLETE: CPT | Mod: 26,,, | Performed by: INTERNAL MEDICINE

## 2022-03-03 PROCEDURE — 93325 DOPPLER ECHO COLOR FLOW MAPG: CPT

## 2022-03-03 PROCEDURE — 93325 DOPPLER ECHO COLOR FLOW MAPG: CPT | Mod: 26,,, | Performed by: INTERNAL MEDICINE

## 2022-03-03 RX ORDER — DOBUTAMINE HYDROCHLORIDE 400 MG/100ML
0-20 INJECTION INTRAVENOUS CONTINUOUS
Status: DISCONTINUED | OUTPATIENT
Start: 2022-03-03 | End: 2022-03-04 | Stop reason: HOSPADM

## 2022-03-03 RX ORDER — ONDANSETRON 2 MG/ML
8 INJECTION INTRAMUSCULAR; INTRAVENOUS ONCE
Status: COMPLETED | OUTPATIENT
Start: 2022-03-03 | End: 2022-03-03

## 2022-03-03 RX ADMIN — ONDANSETRON 8 MG: 2 INJECTION, SOLUTION INTRAMUSCULAR; INTRAVENOUS at 10:03

## 2022-03-03 RX ADMIN — DOBUTAMINE HYDROCHLORIDE 20 MCG/KG/MIN: 400 INJECTION INTRAVENOUS at 10:03

## 2022-03-08 ENCOUNTER — CLINICAL SUPPORT (OUTPATIENT)
Dept: REHABILITATION | Facility: HOSPITAL | Age: 42
End: 2022-03-08
Attending: INTERNAL MEDICINE
Payer: COMMERCIAL

## 2022-03-08 ENCOUNTER — TELEPHONE (OUTPATIENT)
Dept: FAMILY MEDICINE | Facility: CLINIC | Age: 42
End: 2022-03-08
Payer: COMMERCIAL

## 2022-03-08 DIAGNOSIS — R06.00 DYSPNEA, UNSPECIFIED TYPE: ICD-10-CM

## 2022-03-08 DIAGNOSIS — Z74.09 DECREASED STRENGTH, ENDURANCE, AND MOBILITY: ICD-10-CM

## 2022-03-08 DIAGNOSIS — R68.89 DECREASED STRENGTH, ENDURANCE, AND MOBILITY: ICD-10-CM

## 2022-03-08 DIAGNOSIS — U07.1 COVID-19 VIRUS INFECTION: ICD-10-CM

## 2022-03-08 DIAGNOSIS — R53.1 DECREASED STRENGTH, ENDURANCE, AND MOBILITY: ICD-10-CM

## 2022-03-08 PROCEDURE — 97161 PT EVAL LOW COMPLEX 20 MIN: CPT | Mod: PN

## 2022-03-08 NOTE — TELEPHONE ENCOUNTER
Rt Natalia's call with [facility] , invokana no longer covered by insurance . They cover farxiga, vickduo xr , jardance and sinjardi , glyxambi . Please advise

## 2022-03-08 NOTE — TELEPHONE ENCOUNTER
----- Message from Shira Miller sent at 3/8/2022  4:29 PM CST -----  Type: Patient Call Back    Who called: Natalia from [facility].     What is the request in detail: Would not go into detail.    [facility] - [address] [address]  Phone: [phone] Fax: [phone]    Can the clinic reply by [facility]? No     Would the patient rather a call back or a response via [facility]? Call back     Best call back number: [phone]

## 2022-03-09 RX ORDER — EMPAGLIFLOZIN 25 MG/1
25 TABLET, FILM COATED ORAL DAILY
Qty: 30 TABLET | Refills: 0 | Status: SHIPPED | OUTPATIENT
Start: 2022-03-09 | End: 2022-03-09

## 2022-03-09 RX ORDER — EMPAGLIFLOZIN 25 MG/1
25 TABLET, FILM COATED ORAL DAILY
Qty: 30 TABLET | Refills: 0 | Status: SHIPPED | OUTPATIENT
Start: 2022-03-09 | End: 2022-03-10 | Stop reason: SDUPTHER

## 2022-03-09 NOTE — TELEPHONE ENCOUNTER
Spoke to patient, informed  medication sent to [facility], scheduled appointment for 3/10/22

## 2022-03-10 ENCOUNTER — OFFICE VISIT (OUTPATIENT)
Dept: ENDOCRINOLOGY | Facility: CLINIC | Age: 42
End: 2022-03-10
Payer: COMMERCIAL

## 2022-03-10 VITALS
TEMPERATURE: 98 F | BODY MASS INDEX: 39.37 KG/M2 | WEIGHT: 298.38 LBS | DIASTOLIC BLOOD PRESSURE: 100 MMHG | SYSTOLIC BLOOD PRESSURE: 160 MMHG | HEART RATE: 86 BPM

## 2022-03-10 DIAGNOSIS — E78.5 HYPERLIPIDEMIA, UNSPECIFIED HYPERLIPIDEMIA TYPE: ICD-10-CM

## 2022-03-10 DIAGNOSIS — Z79.4 TYPE 2 DIABETES MELLITUS WITHOUT COMPLICATION, WITH LONG-TERM CURRENT USE OF INSULIN: Primary | ICD-10-CM

## 2022-03-10 DIAGNOSIS — E66.01 SEVERE OBESITY (BMI 35.0-39.9) WITH COMORBIDITY: ICD-10-CM

## 2022-03-10 DIAGNOSIS — E11.9 TYPE 2 DIABETES MELLITUS WITHOUT COMPLICATION, WITH LONG-TERM CURRENT USE OF INSULIN: Primary | ICD-10-CM

## 2022-03-10 PROCEDURE — 99999 PR PBB SHADOW E&M-EST. PATIENT-LVL V: ICD-10-PCS | Mod: PBBFAC,,, | Performed by: NURSE PRACTITIONER

## 2022-03-10 PROCEDURE — 99999 PR PBB SHADOW E&M-EST. PATIENT-LVL V: CPT | Mod: PBBFAC,,, | Performed by: NURSE PRACTITIONER

## 2022-03-10 PROCEDURE — 99215 OFFICE O/P EST HI 40 MIN: CPT | Mod: S$GLB,,, | Performed by: NURSE PRACTITIONER

## 2022-03-10 PROCEDURE — 99215 PR OFFICE/OUTPT VISIT, EST, LEVL V, 40-54 MIN: ICD-10-PCS | Mod: S$GLB,,, | Performed by: NURSE PRACTITIONER

## 2022-03-10 RX ORDER — INSULIN GLARGINE 300 U/ML
30 INJECTION, SOLUTION SUBCUTANEOUS DAILY
Qty: 2 PEN | Refills: 5 | Status: SHIPPED | OUTPATIENT
Start: 2022-03-10 | End: 2023-02-15 | Stop reason: SDUPTHER

## 2022-03-10 RX ORDER — DULAGLUTIDE 3 MG/.5ML
3 INJECTION, SOLUTION SUBCUTANEOUS
Qty: 4 PEN | Refills: 3 | Status: SHIPPED | OUTPATIENT
Start: 2022-03-10 | End: 2022-04-28

## 2022-03-10 RX ORDER — HUMAN INSULIN 100 [IU]/ML
INJECTION, SUSPENSION SUBCUTANEOUS
Qty: 15 ML | Refills: 2 | Status: SHIPPED | OUTPATIENT
Start: 2022-03-10 | End: 2022-04-28 | Stop reason: SDUPTHER

## 2022-03-10 RX ORDER — EMPAGLIFLOZIN 25 MG/1
25 TABLET, FILM COATED ORAL DAILY
Qty: 30 TABLET | Refills: 0 | Status: SHIPPED | OUTPATIENT
Start: 2022-03-10 | End: 2022-04-28 | Stop reason: SDUPTHER

## 2022-03-10 NOTE — PATIENT INSTRUCTIONS
Increase Fiasp to 35 units before meals.   Decrease Toujeo from 40 to 30 units.   Start Jardiance as planned to replace Invokana.   Start Novolin N 14 units every night at 8 pm.   Increase Trulicity 3 mg weekly.   Return to clinic in 2 weeks - virtual visit.

## 2022-03-10 NOTE — PROGRESS NOTES
CC: This 41 y.o. White male  is here for evaluation of  T2DM along with comorbidities indicated in the Visit Diagnosis section of this encounter.    HPI: Tony Gordillo was diagnosed with T2DM in 2008. He was without health insurance for 2017 and mid 2018.       Prior visit 7/2021  a1c is up from 6.6 to 7.7%. He has noticed that his BGs have been higher.   BGs claudette after a stomach virus 3 weeks ago and he is needing Fiasp more often. BG rises 30-40 points after eating 2 boiled eggs. He was also without his CGM for a few weeks because of insurance issues.   He wonders if topical metformin has been working or not.   He has been taking Toujeo 40 units.  He did start Trulicity - took 0.75 mg x 1 month then couldn't get the next dose until a couple of weeks ago. Now taking Trulicity 1.5 mg x 2 weeks and denies GI s/e or other issues.   He is taking Fiasp about 20-30 units if BG > 250 and denies hypoglycemia following such high doses.   Plan Blood glucoses should improve over the next several weeks as Trulicity kicks in. After that, try holding metformin for a couple of days to see if BGs indeed remain the same or if they rise.   No other changes in DM meds.   Return to clinic in 3 months with labs prior.       Interval history  Pt has not been seen for several months. A1c is up from 7.7% in July to 9.9% in Feb.     Pt could not afford his meds for a few weeks in Jan d/t high deductible. He has been out of work since Jan d/t COVID and still continues to have SOB.   However, c/o high BGs despite eating low carb, being back on his meds, and also resuming Fiasp 30 units ac.     Pt stopped topical metformin in October d/t ineffectiveness.   Needs to change from Invokana to Jardiance d/t formulary change.           LAST DIABETES EDUCATION: 2019    HOSPITALIZED FOR DIABETES -  No.    PRESCRIBED DIABETES MEDICATIONS:    Metformin TOPICAL 1000 mg bid  Jardiance 25 mg once daily   Toujeo Max 40 units nightly  Trulicity 1.5 mg  "weekly   Fiasp - as above     Missed doses - no metformin as above.       DM COMPLICATIONS: none    SIGNIFICANT DIABETES MED HISTORY:   Has previously used Novolin 70/30 morning only   glyburide--hypoglycemia  Victoza - nausea and vomiting at 1.2 mg; felt "run down" at lowest dose of 0.6 mg   Metformin - diarrhea and vomiting (has not tried metformin XR)   Pioglitazone - altered mood, reportedly became angry       SELF MONITORING BLOOD GLUCOSE: Checks blood glucose at home several times a day with Dexcom. Uses phone as the reader   CGM interpretation:  BGs overall high, even after large doses of Fiasp. No hypoglycemia. BG consistently rises starting around 9 pm at night despite reportedly no bedtime snacking and stays high until about 3 am.       HYPOGLYCEMIC EPISODES:  None     CURRENT DIET: drinks water mostly. Sometimes coffee at work.  Eats 3-4 meals/day.     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 No acute distress.   PSYCH: AAOx3, appropriate mood and affect, conversant, well-groomed. Judgement and insight good.   NEURO: Cranial nerves grossly intact. Speech clear, no tremor.   CHEST: Respirations even and unlabored.          Hemoglobin A1C   Date Value Ref Range Status   02/17/2022 9.9 (H) 4.0 - 5.6 % Final     Comment:     ADA Screening Guidelines:  5.7-6.4%  Consistent with prediabetes  >or=6.5%  Consistent with diabetes    High levels of fetal hemoglobin interfere with the HbA1C  assay. Heterozygous hemoglobin variants (HbS, HgC, etc)do  not significantly interfere with this assay.   However, presence of multiple variants may affect accuracy.     07/15/2021 7.7 (H) " 4.0 - 5.6 % Final     Comment:     ADA Screening Guidelines:  5.7-6.4%  Consistent with prediabetes  >or=6.5%  Consistent with diabetes    High levels of fetal hemoglobin interfere with the HbA1C  assay. Heterozygous hemoglobin variants (HbS, HgC, etc)do  not significantly interfere with this assay.   However, presence of multiple variants may affect accuracy.     04/19/2021 6.6 (H) 4.0 - 5.6 % Final     Comment:     ADA Screening Guidelines:  5.7-6.4%  Consistent with prediabetes  >or=6.5%  Consistent with diabetes    High levels of fetal hemoglobin interfere with the HbA1C  assay. Heterozygous hemoglobin variants (HbS, HgC, etc)do  not significantly interfere with this assay.   However, presence of multiple variants may affect accuracy.       Lab Results   Component Value Date    TSH 2.655 02/17/2022           Chemistry        Component Value Date/Time     02/17/2022 1115    K 4.3 02/17/2022 1115     02/17/2022 1115    CO2 21 (L) 02/17/2022 1115    BUN 14 02/17/2022 1115    CREATININE 0.9 02/17/2022 1115     (H) 02/17/2022 1115        Component Value Date/Time    CALCIUM 10.3 02/17/2022 1115    ALKPHOS 64 02/17/2022 1115    AST 18 02/17/2022 1115    ALT 33 02/17/2022 1115    BILITOT 0.6 02/17/2022 1115    ESTGFRAFRICA >60.0 02/17/2022 1115    EGFRNONAA >60.0 02/17/2022 1115          Lab Results   Component Value Date    LDLCALC 81.8 02/17/2022      Latest Reference Range & Units 02/17/22 11:15   Cholesterol 120 - 199 mg/dL 162 [1]   HDL 40 - 75 mg/dL 52 [2]   HDL/Cholesterol Ratio 20.0 - 50.0 % 32.1   LDL Cholesterol External 63.0 - 159.0 mg/dL 81.8 [3]   Non-HDL Cholesterol mg/dL 110 [4]   Total Cholesterol/HDL Ratio 2.0 - 5.0  3.1   Triglycerides 30 - 150 mg/dL 141 [5]     Lab Results   Component Value Date    MICALBCREAT 8.8 02/17/2022             ASSESSMENT and PLAN:    A1C GOAL: < 7 %     1. Type 2 diabetes mellitus without complication, with long-term current use of insulin  Reviewed CGM  data in detail. Pt requires more aggressive prandial treatment as well as focus on high bedtime glucoses.     Increase Fiasp to 35 units before meals.   Increase Trulicity 3 mg weekly.   Decrease Toujeo from 40 to 30 units.   Start Jardiance as planned to replace Invokana.   Start Novolin N 14 units every night at 8 pm.     Return to clinic in 2 weeks - virtual visit.      2. Severe obesity (BMI 35.0-39.9) with comorbidity  Increases insulin resistance.      3. Hyperlipidemia, unspecified hyperlipidemia type  Continue statin           Patient is testing blood sugars 4x/day and taking 3 injections of insulin a day. The patient's insulin treatment regimen requires frequent adjustments by the patient on the basis of therapeutic CGM testing results.       No orders of the defined types were placed in this encounter.       No follow-ups on file.

## 2022-03-14 ENCOUNTER — OFFICE VISIT (OUTPATIENT)
Dept: PULMONOLOGY | Facility: CLINIC | Age: 42
End: 2022-03-14
Payer: COMMERCIAL

## 2022-03-14 VITALS
HEIGHT: 73 IN | DIASTOLIC BLOOD PRESSURE: 98 MMHG | WEIGHT: 301.38 LBS | OXYGEN SATURATION: 97 % | SYSTOLIC BLOOD PRESSURE: 152 MMHG | HEART RATE: 80 BPM | BODY MASS INDEX: 39.94 KG/M2

## 2022-03-14 DIAGNOSIS — R06.00 DYSPNEA, UNSPECIFIED TYPE: ICD-10-CM

## 2022-03-14 DIAGNOSIS — G47.33 OSA (OBSTRUCTIVE SLEEP APNEA): ICD-10-CM

## 2022-03-14 PROCEDURE — 99999 PR PBB SHADOW E&M-EST. PATIENT-LVL V: ICD-10-PCS | Mod: PBBFAC,,, | Performed by: INTERNAL MEDICINE

## 2022-03-14 PROCEDURE — 99214 OFFICE O/P EST MOD 30 MIN: CPT | Mod: S$GLB,,, | Performed by: INTERNAL MEDICINE

## 2022-03-14 PROCEDURE — 99999 PR PBB SHADOW E&M-EST. PATIENT-LVL V: CPT | Mod: PBBFAC,,, | Performed by: INTERNAL MEDICINE

## 2022-03-14 PROCEDURE — 99214 PR OFFICE/OUTPT VISIT, EST, LEVL IV, 30-39 MIN: ICD-10-PCS | Mod: S$GLB,,, | Performed by: INTERNAL MEDICINE

## 2022-03-14 NOTE — ASSESSMENT & PLAN NOTE
-will refer to card for st changes on dse  -patient endorse desat with ambulation.  dlco wnl.  Will send for 6 min walk.  May consider ct of chest pending result of 6 min walk.

## 2022-03-14 NOTE — PLAN OF CARE
[facility]  Physical Therapy Initial Evaluation  Post-COVID Recovery    Name: Tony Gordillo  Clinic Number: [id number]    Therapy Diagnosis:   Encounter Diagnoses   Name Primary?    COVID-19 virus infection     Dyspnea, unspecified type     Decreased strength, endurance, and mobility      Physician: Jovany Ford MD    Physician Orders: PT Eval and Treat     Medical Diagnosis from Referral:   U07.1 (ICD-10-CM) - COVID-19 virus infection   R06.00 (ICD-10-CM) - Dyspnea, unspecified type     Evaluation Date: 3/8/2022  Authorization Period Expiration: 2/16/23  Plan of Care Expiration: 6/8/2022  Visit # / Visits authorized: 1/ 1    Time In: 9:00 AM  Time Out: 9:30 AM  Total Billable Time: 30 minutes    Precautions: Standard and Diabetes    Subjective   Date of onset: December 2021  History of current condition - Tony reports: States he caught COVID on 12/28/21. States he was asymptomatic throughout initial infection. States about a week after initial infection, become harder to catch breath. States simple tasks were making him SOB. States he has been out of work since mid January. States he saw Dr. Lopez with pulmonology and did a PFT and an echo stress test. States at rest, his oxygen is normal now but was dropping into 80s. States now with more activity he gets into lower 90s. States his most limiting symptoms right now are fatigue, SOB with activity. States tasks that he would normally complete take a lot longer. States he has not attempted stairs at this time.      Medical History:   Past Medical History:   Diagnosis Date    Diabetes mellitus, type 2     Hyperlipidemia     Hypertension     Obesity     NAINA (obstructive sleep apnea)        Surgical History:   Tony Gordillo  has a past surgical history that includes left knee x 2; Knee surgery; and Ankle surgery.    Medications:   Tony has a current medication list which includes the following prescription(s): atorvastatin,  "azelastine, blood sugar diagnostic, blood sugar diagnostic, blood-glucose meter, dexcom g6 , dexcom g6 sensor, dexcom g6 transmitter, budesonide-formoterol 160-4.5 mcg, butalbital-acetaminophen-caffeine -40 mg, diclofenac sodium, fenofibrate, fluticasone propionate, ibuprofen, fiasp flextouch u-100 insulin, toujeo max u-300 solostar, invokana, ketoconazole, lancets, levothyroxine, lisinopril, loratadine, metformin, montelukast, pen needle, diabetic, syringe (disposable), trulicity, and trulicity.    Allergies:   Review of patient's allergies indicates:   Allergen Reactions    Influenza virus vaccine, specific Hives    Pioglitazone      Altered mood     Victoza [liraglutide] Nausea And Vomiting    Farxiga [dapagliflozin] Rash     Erectile dysfunction and rash         Imaging, Chest XRay: 2/16/2022  Impression: Stable, normal chest     Prior Therapy: none for COVID  Social History: lives with family,   Occupation:   Prior Level of Function: independent in all tasks, household activities   Current Level of Function: limited in exertion, takes longer to complete typical tasks    Pain:  Current 2/10, worst 10/10, best 2/10   Location: B knees  Description: aching, sharp  Aggravating Factors: weather, stairs, high impact   Easing Factors: ice and lying down      Pts goals: "I would like to breath and go back to work"     Objective   Fatigue Symptom Inventory (FSI)  Global Score: 82/127 (64%)  Disruption Index (questions 5-11): 41/70 (58%)  (Paper copies need to be scanned into patient media)    Resting vitals:   BP: 161/107 (initial)    -   148/102 (second measurement)   HR: 85   O2 sats: 97%    Postural examination/scapula alignment: Rounded shoulder    Strength: manual muscle test grades below     Lower Extremity Strength - all taken in seated  Right LE  Left LE    Hip Flexion: 5/5 Hip Flexion: 5/5   Hip Abduction: 5/5 Hip Abduction 5/5   Knee Extension: 5/5 Knee Extension: 5/5   Knee Flexion: " 5/5 Knee Flexion: 5/5   Ankle Dorsiflexion: 5/5 Ankle Dorsiflexion: 5/5       Gait Assessment:  - AD used: none  - 2 Minute Walk Test Distance: 411 ft  - O2 sats after: 93 % - claudette to 96% in 15 seconds  - HR after: 100-103  - BP: 161/103 bpm     GAIT DEVIATIONS:  Tony displays the following deviations with ambulation: decreased step length B, decreased pelvic rotation B, decreased reciprocal arm swing    Endurance Assessment:     Evaluation Reassessment Reassessment   5 time sit to stand 15 seconds  99 HR, 97%         TREATMENT   Treatment Time In: 00  Treatment Time Out:00  Total Treatment time separate from Evaluation: 00 minutes    Home Exercises and Patient Education Provided    Education provided:   - role of PT, plan of care, goals, scheduling limitations, cancellation policies  -breathing techniques (diaphragamtic, pursed lip breathing)   -walking program (starting at 5 minutes and building up as tolerated)    Written Home Exercises Provided: yes.  Exercises were reviewed and Tony was able to demonstrate them prior to the end of the session.  Tony demonstrated good  understanding of the education provided.     See EMR under Patient Instructions for exercises provided 3/8/2022.    Assessment   Tony is a 41 y.o. male referred to outpatient Physical Therapy with a medical diagnosis of COVID 19 infection and dyspnea. Pt presents with signs and symptoms consistent with medical diagnoses. Pt with significant fatigue as noted by global FSI scoring of 64%. Pt also with impaired functional strength and endurance as noted by 5 time sit to stand below age related normative data. Limited CV endurance indicated by distance ambulated in 2 minute walk test below age related normative data. Pt also with slight drop in SpO2 noted following 2 minute walk test indicating limited oxygenation during activities. Pt with elevated BP noted throughout session today, held higher level CV endurance activities at this time. Pt would  benefit from follow up with primary care MD to address BP. Pt would benefit from skilled PT services in order to address listed deficits, maximize return to functional activities, and improve quality of life.     Pt prognosis is Good.   Pt will benefit from skilled outpatient Physical Therapy to address the deficits stated above and in the chart below, provide pt/family education, and to maximize pt's level of independence.     Plan of care discussed with patient: Yes  Pt's spiritual, cultural and educational needs considered and patient is agreeable to the plan of care and goals as stated below:     Anticipated Barriers for therapy: none    Medical Necessity is demonstrated by the following  History  Co-morbidities and personal factors that may impact the plan of care Co-morbidities:   Type 2 Diabetes, HLD, HTN, NAINA    Personal Factors:   age  no deficits     low   Examination  Body Structures and Functions, activity limitations and participation restrictions that may impact the plan of care Body Regions:   lower extremities  upper extremities  trunk    Body Systems:    strength  balance  gait  transfers  transitions    Participation Restrictions:       Activity limitations:   Learning and applying knowledge  no deficits    General Tasks and Commands  no deficits    Communication  no deficits    Mobility  lifting and carrying objects  walking    Self care  no deficits    Domestic Life  shopping  cooking  doing house work (cleaning house, washing dishes, laundry)  assisting others    Interactions/Relationships  no deficits    Life Areas  no deficits    Community and Social Life  no deficits         low   Clinical Presentation stable and uncomplicated low   Decision Making/ Complexity Score: low     Goals:  Short Term Goals:  4 weeks  1. Pt will initiate home exercise program to improve strength, flexibility, endurance, mobility & balance to return pt to PLOF (progressing, not met)  2. Pt will tolerate 10 min on  stationary bike (I.e. Bike, NuStep) to improve endurance to assist in return to leaisure activities. (progressing, not met)  3. Pt will tolerate 2 min or greater on treadmill at 2.0 mph to improve gait speed (progressing, not met)  4. Pt will report ability to ride bike at home for > / = 5 minutes in order to improve quality of life. (progressing, not met)   5.  Pt will verbalize good recall & understanding of universal precautions to improve containment of infection and future illness (progressing, not met)  6. Pt will complete 6 minute walk test in order to improve CV endurance and tolerance to activities. (progressing, not met)    Long Term Goals: 12 weeks  1. Pt to report compliance with walking program/HEP > / = 3x per week in order to maximize gains and return to PLOF (progressing, not met)  2. Pt to perform 2 minute walk test for 450 feet or greater to improve gait speed & endurance  (progressing, not met)  3. Pt will improve 5 time sit to stand score for < / = 10 seconds in order to maximize functional strength and return to PLOF. (progressing, not met)  4. Pt will tolerate ambulating on treadmill at varying inclines for > / = 10 minutes in order to promote return to work. (progressing, not met)  5. Pt to report biking at home for 30 mins without significant fatigue in order to improve quality of life. (progressing, not met)     Plan   Plan of care Certification: 3/8/2022 to 6/8/2022.    Outpatient Physical Therapy 2 times weekly for 12 weeks to include the following interventions: Gait Training, Manual Therapy, Moist Heat/ Ice, Neuromuscular Re-ed, Patient Education, Self Care, Therapeutic Activities and Therapeutic Exercise.     Maria Del Rosario Cruz, PT, DPT  3/8/2022

## 2022-03-14 NOTE — PROGRESS NOTES
Tony Gordillo  was seen as a follow up.  CHIEF COMPLAINT:  Results      HISTORY OF PRESENT ILLNESS: Tony Gordillo is a 41 y.o. male  has a past medical history of Diabetes mellitus, type 2, Hyperlipidemia, Hypertension, Obesity, and NAINA (obstructive sleep apnea).  Patient contracted covid 19 12/31/2021 at [facility].  At that time, patient experienced nasal congestion.  No fever/chill.  Patient was treated with self quarantine.  About 2 weeks after covid infection, patient noticed worsening dyspnea with adl.  +cough and wheezing.  Most noticeable when doing yard work.  Elevated pulse rate.  No lower extremities edema.  No orthopnea.  Patient stopped using cpap due to gasping sensation.  Patient was started on symbicort with improvement in dyspnea.      Today, patient denied coughing or wheezing.  No chest pain.  No orthopnea.  No pnd. Lost 20 lbs over past 6 months.  Recent blood work showed HbA1C 9.9.  Difficulty affording meds for DM2.  Patient restarted cpap.  Main issue is pressure markings on ridge of nose with nasal mask.      PAST MEDICAL HISTORY:    Active Ambulatory Problems     Diagnosis Date Noted    Hyperlipidemia LDL goal <70 06/03/2015    Insulin long-term use 06/03/2015    Tinea pedis 06/03/2015    Morbid obesity 06/03/2015    Hypothyroidism 07/29/2015    Type 2 diabetes mellitus without complication, with long-term current use of insulin     Essential hypertension     Migraine with aura and without status migrainosus, not intractable propranolol SE angry; topamax not helpful; imitrex ineffective; daith ear piercing helps 09/28/2018    Non-seasonal allergic rhinitis; avoid antihistamines per opthalmology 11/09/2018    Acute bilateral low back pain without sciatica 08/10/2019    NAINA (obstructive sleep apnea) 8/2019 sleep study AHI 11     Low testosterone in male; pituitary axis normal. MRI negative. 11/2019 started on testosterone 09/04/2019    Right foot pain 10/10/2019     Decreased strength of lower extremity 10/10/2019    Decreased range of motion of both ankles 10/10/2019    Gait abnormality 10/10/2019    Rib pain on left side 08/08/2021    Dyspnea 02/18/2022    Decreased strength, endurance, and mobility 03/08/2022     Resolved Ambulatory Problems     Diagnosis Date Noted    Diabetes mellitus, type II 08/03/2012    HTN (hypertension) 08/03/2012    Type II or unspecified type diabetes mellitus without mention of complication, uncontrolled 06/03/2015    Sprain of right ring finger 06/27/2017    Hyperlipidemia     Snoring      Past Medical History:   Diagnosis Date    Diabetes mellitus, type 2     Hypertension     Obesity                 PAST SURGICAL HISTORY:    Past Surgical History:   Procedure Laterality Date    ANKLE SURGERY      KNEE SURGERY      acl,mcl,pcl    left knee x 2           FAMILY HISTORY:                Family History   Problem Relation Age of Onset    Diabetes Mother     Diabetes Father     No Known Problems Brother     Autism Son     No Known Problems Daughter        SOCIAL HISTORY:          Tobacco:   Social History     Tobacco Use   Smoking Status Never Smoker   Smokeless Tobacco Never Used     alcohol use:    Social History     Substance and Sexual Activity   Alcohol Use Yes    Comment: 1-2 beers every 2 weeks               Occupation:     ALLERGIES:    Review of patient's allergies indicates:   Allergen Reactions    Influenza virus vaccine, specific Hives    Pioglitazone      Altered mood     Victoza [liraglutide] Nausea And Vomiting    Farxiga [dapagliflozin] Rash     Erectile dysfunction and rash        CURRENT MEDICATIONS:    Current Outpatient Medications   Medication Sig Dispense Refill    atorvastatin (LIPITOR) 20 MG tablet Take 1 tablet (20 mg total) by mouth once daily. 90 tablet 3    azelastine 205.5 mcg (0.15 %) Spry azelastine 205.5 mcg (0.15 %) nasal spray  INHALE 2 SPRAYS TO EACH NOSTRIL TWICE A DAY 30 mL 11     blood sugar diagnostic Strp To check BG 4 times daily, to use with insurance preferred meter 400 strip 3    blood sugar diagnostic Strp OneTouch Ultra Test strips      blood-glucose meter kit OneTouch Ultra2 Meter kit      blood-glucose meter,continuous (DEXCOM G6 ) Misc Use with dexcom G6 system 1 each 0    blood-glucose sensor (DEXCOM G6 SENSOR) Filomena Change sensor every 10 days 3 Device 12    blood-glucose transmitter (DEXCOM G6 TRANSMITTER) Filomena Change every 3 months 1 Device 3    budesonide-formoterol 160-4.5 mcg (SYMBICORT) 160-4.5 mcg/actuation HFAA Inhale 2 puffs into the lungs every 12 (twelve) hours. Controller 10.2 g 0    butalbital-acetaminophen-caffeine -40 mg (FIORICET, ESGIC) -40 mg per tablet Take 1 tablet by mouth every 4 (four) hours as needed for Headaches. 12 tablet 0    diclofenac sodium (VOLTAREN) 1 % Gel Apply the gel (2 g) to the affected area 4 times daily. Do not apply more than 8 g daily to any one affected joint 100 g 1    dulaglutide (TRULICITY) 3 mg/0.5 mL pen injector Inject 3 mg into the skin every 7 days. 4 pen 3    empagliflozin (JARDIANCE) 25 mg tablet Take 1 tablet (25 mg total) by mouth once daily. 30 tablet 0    fenofibrate 160 MG Tab fenofibrate 160 mg tablet      fluticasone propionate (FLONASE) 50 mcg/actuation nasal spray fluticasone propionate 50 mcg/actuation nasal spray,suspension 16 g 11    ibuprofen (ADVIL,MOTRIN) 800 MG tablet Take 1 tablet (800 mg total) by mouth every 8 (eight) hours as needed for Pain. 30 tablet 0    insulin aspart, niacinamide, (FIASP FLEXTOUCH U-100 INSULIN) 100 unit/mL (3 mL) InPn Use as needed with sliding scale. Max daily dose 30 units/day 5 pen 2    insulin glargine U-300 conc (TOUJEO MAX U-300 SOLOSTAR) 300 unit/mL (3 mL) insulin pen Inject 30 Units into the skin once daily. 2 pen 5    insulin NPH isoph U-100 human (NOVOLIN N FLEXPEN) 100 unit/mL (3 mL) InPn Inject 14 units once daily at night 8 pm. 15  "mL 2    ketoconazole (NIZORAL) 2 % cream Apply topically once daily. 1 Tube 3    lancets Misc To check BG 4 times daily, to use with insurance preferred meter 400 each 3    levothyroxine (SYNTHROID) 50 MCG tablet TAKE 1 TABLET (50 MCG TOTAL) BY MOUTH BEFORE BREAKFAST. 90 tablet 0    lisinopriL 10 MG tablet Take 1 tablet (10 mg total) by mouth once daily. 90 tablet 3    loratadine (CLARITIN) 10 mg tablet TAKE 1 TABLET BY MOUTH EVERY DAY 30 tablet 11    montelukast (SINGULAIR) 10 mg tablet TAKE 1 TABLET BY MOUTH EVERY EVENING 90 tablet 3    pen needle, diabetic (BD ULTRA-FINE KEN PEN NEEDLE) 32 gauge x 5/32" Ndle 1 Device by Misc.(Non-Drug; Combo Route) route 5 (five) times daily. 550 each 4    syringe, disposable, 1 mL Syrg Testosterone every 2 weeks 25 Syringe 1     No current facility-administered medications for this visit.                  REVIEW OF SYSTEMS:     Pulmonary related symptoms as per HPI.  Gen:  no weight loss, no fever, no night sweat  HEENT:  no visual changes, no sore throat, no hearing loss  CV:  Per hpi  GI:  no melena, no hematochezia, no diarhea, no constipation.  :  no dysuria, no hematuria, no hesistancy, no dribbling  Neuro:  no syncope, no vertigo, no tinitus  Psych:  No homocide or suicide ideation; no depression.  Endocrine:  No heat or cold intolerance.  Sleep:  Without cpap, patient with loud snoring.  Sleep is about the same.    Otherwise, a balance of systems reviewed is negative.          PHYSICAL EXAM:  Vitals:    03/14/22 0926   BP: (!) 152/98   Pulse: 80   SpO2: 97%   Weight: (!) 136.7 kg (301 lb 5.9 oz)   Height: 6' 1" (1.854 m)   PainSc:   4     Body mass index is 39.76 kg/m².     GENERAL:  well develop; no apparent distress  HEENT:  no nasal congestion; no discharge noted; class 4 modified mallampatti.   NECK:  supple; no palpable masses.  CARDIO: regular rate and rhythm  PULM:  clear to auscultation bilaterally; no intercostals retractions; no accessory muscle usage "   ABDOMEN:  soft nontender/nondistended.  +bowel sound  EXTREMITIES no cce  NEURO:  CN II-XII intact.  5/5 motor in all extremities.  sensation grossly intact   to light touch.  PSYCH:  normal affect.  Alert and oriented x 4    LABS  Pulmonary Functions Testing Results(personally reviewed):    PFT 2/24/22 Ratio of 83%; FVC 4.39 L (77%); FEV1 3.62 L (80%); TLC 5.6 L (73%); dlco 33 (94%)   ABG (personally reviewed):  none  CXR (personally reviewed):  2/16/22 no consolidation or effusion; +nipple piercing  CT CHEST(personally reviewed):  none    hsat 8/22/19 ahi of 11; rdi of 29    Echo 3/3/22  · There were no arrhythmias during stress.  · Concentric hypertrophy and normal systolic function.  · The ECG portion of this study is positive for myocardial ischemia.  · The estimated ejection fraction is 60%.  · Normal left ventricular diastolic function.  · Normal right ventricular size with normal right ventricular systolic function.  · Mild left atrial enlargement.  · The stress echo portion of this study is negative for myocardial ischemia.        ASSESSMENT/PLAN  Problem List Items Addressed This Visit     Dyspnea    Overview      started after covid 19. cxr unremarkable.  Clinically improve with symbicort.  PFT with restrictive physiology with preserved dlco c/w habitus.  Stress echo with ?ischemic changes on echo but ST changes on ekg.             Current Assessment & Plan     -will refer to card for st changes on dse  -patient endorse desat with ambulation.  dlco wnl.  Will send for 6 min walk.  May consider ct of chest pending result of 6 min walk.               Relevant Orders    Ambulatory referral/consult to Cardiology    Stress test, pulmonary    NAINA (obstructive sleep apnea) 8/2019 sleep study AHI 11    Overview     -ahi of 11.  Stopped apap for over month due to gasping sensation.  ?excessive leakage a/w nasal congestion while having covid 19.  Nasal pantency is adequate.  Recommend resumption of apap.   However, APAP is being recall by [employer]  -we discussed at length about [employer] recall.  Patient already register his apap  -will switch to nasal pillow to alleviate pressure on ridge of nose.             Relevant Orders    CPAP/BIPAP SUPPLIES          Fatigue - may related to untreated patricia and dm2.  Patient had not fill his dm2 meds due to high copay.  Followed by endocrine.        Patient will No follow-ups on file. with md/np.

## 2022-03-15 ENCOUNTER — OFFICE VISIT (OUTPATIENT)
Dept: CARDIOLOGY | Facility: CLINIC | Age: 42
End: 2022-03-15
Payer: COMMERCIAL

## 2022-03-15 VITALS
WEIGHT: 297.63 LBS | OXYGEN SATURATION: 98 % | BODY MASS INDEX: 39.44 KG/M2 | HEART RATE: 85 BPM | SYSTOLIC BLOOD PRESSURE: 143 MMHG | DIASTOLIC BLOOD PRESSURE: 95 MMHG | RESPIRATION RATE: 15 BRPM | HEIGHT: 73 IN

## 2022-03-15 DIAGNOSIS — G47.33 OSA (OBSTRUCTIVE SLEEP APNEA): ICD-10-CM

## 2022-03-15 DIAGNOSIS — Z79.4 TYPE 2 DIABETES MELLITUS WITHOUT COMPLICATION, WITH LONG-TERM CURRENT USE OF INSULIN: ICD-10-CM

## 2022-03-15 DIAGNOSIS — R06.00 DYSPNEA, UNSPECIFIED TYPE: ICD-10-CM

## 2022-03-15 DIAGNOSIS — R79.89 LOW TESTOSTERONE IN MALE: ICD-10-CM

## 2022-03-15 DIAGNOSIS — I10 ESSENTIAL HYPERTENSION: ICD-10-CM

## 2022-03-15 DIAGNOSIS — M54.50 ACUTE BILATERAL LOW BACK PAIN WITHOUT SCIATICA: ICD-10-CM

## 2022-03-15 DIAGNOSIS — E11.9 TYPE 2 DIABETES MELLITUS WITHOUT COMPLICATION, WITH LONG-TERM CURRENT USE OF INSULIN: ICD-10-CM

## 2022-03-15 DIAGNOSIS — E66.01 MORBID OBESITY: ICD-10-CM

## 2022-03-15 DIAGNOSIS — E78.5 HYPERLIPIDEMIA LDL GOAL <70: Primary | ICD-10-CM

## 2022-03-15 PROCEDURE — 99204 OFFICE O/P NEW MOD 45 MIN: CPT | Mod: S$GLB,,, | Performed by: INTERNAL MEDICINE

## 2022-03-15 PROCEDURE — 99999 PR PBB SHADOW E&M-EST. PATIENT-LVL V: CPT | Mod: PBBFAC,,, | Performed by: INTERNAL MEDICINE

## 2022-03-15 PROCEDURE — 99204 PR OFFICE/OUTPT VISIT, NEW, LEVL IV, 45-59 MIN: ICD-10-PCS | Mod: S$GLB,,, | Performed by: INTERNAL MEDICINE

## 2022-03-15 PROCEDURE — 99999 PR PBB SHADOW E&M-EST. PATIENT-LVL V: ICD-10-PCS | Mod: PBBFAC,,, | Performed by: INTERNAL MEDICINE

## 2022-03-15 RX ORDER — LISINOPRIL 20 MG/1
20 TABLET ORAL DAILY
Qty: 90 TABLET | Refills: 3 | Status: SHIPPED | OUTPATIENT
Start: 2022-03-15 | End: 2023-04-17

## 2022-03-15 NOTE — PROGRESS NOTES
CARDIOVASCULAR CONSULTATION    REASON FOR CONSULT:   Tony Gordillo is a 41 y.o. male who presents for chest pain.      HISTORY OF PRESENT ILLNESS:     Patient is a pleasant 41-year-old man.  Main complaint is dyspnea on exertion and atypical chest pain.  Had a stress echo done.  Echo portion did not show any significant ischemia, EKG post abnormal.  It was a pharmacological stress echo because patient cannot run because of knee on knee.  States that he had COVID infection in December and since then his exercise tolerance has decreased significantly.  He can hardly walk and then has to stop because of shortness of breath.  Occasional gets chest heaviness when he gets very short of breath on exertion.      · There were no arrhythmias during stress.  · Concentric hypertrophy and normal systolic function.  · The ECG portion of this study is positive for myocardial ischemia.  · The estimated ejection fraction is 60%.  · Normal left ventricular diastolic function.  · Normal right ventricular size with normal right ventricular systolic function.  · Mild left atrial enlargement.  · The stress echo portion of this study is negative for myocardial ischemia.        PAST MEDICAL HISTORY:     Past Medical History:   Diagnosis Date    Diabetes mellitus, type 2     Hyperlipidemia     Hypertension     Obesity     NAINA (obstructive sleep apnea)        PAST SURGICAL HISTORY:     Past Surgical History:   Procedure Laterality Date    ANKLE SURGERY      KNEE SURGERY      acl,mcl,pcl    left knee x 2         ALLERGIES AND MEDICATION:     Review of patient's allergies indicates:   Allergen Reactions    Influenza virus vaccine, specific Hives    Pioglitazone      Altered mood     Victoza [liraglutide] Nausea And Vomiting    Farxiga [dapagliflozin] Rash     Erectile dysfunction and rash         Medication List          Accurate as of March 15, 2022  9:59 AM. If you have any questions, ask your nurse or doctor.             CONTINUE taking these medications    atorvastatin 20 MG tablet  Commonly known as: LIPITOR  Take 1 tablet (20 mg total) by mouth once daily.     azelastine 205.5 mcg (0.15 %) Spry  azelastine 205.5 mcg (0.15 %) nasal spray  INHALE 2 SPRAYS TO EACH NOSTRIL TWICE A DAY     * blood sugar diagnostic Strp     * blood sugar diagnostic Strp  To check BG 4 times daily, to use with insurance preferred meter     blood-glucose meter kit     budesonide-formoterol 160-4.5 mcg 160-4.5 mcg/actuation Hfaa  Commonly known as: SYMBICORT  Inhale 2 puffs into the lungs every 12 (twelve) hours. Controller     butalbital-acetaminophen-caffeine -40 mg -40 mg per tablet  Commonly known as: FIORICET, ESGIC  Take 1 tablet by mouth every 4 (four) hours as needed for Headaches.     DEXCOM G6  Misc  Generic drug: blood-glucose meter,continuous  Use with dexcom G6 system     DEXCOM G6 SENSOR Filomena  Generic drug: blood-glucose sensor  Change sensor every 10 days     DEXCOM G6 TRANSMITTER Filomena  Generic drug: blood-glucose transmitter  Change every 3 months     diclofenac sodium 1 % Gel  Commonly known as: VOLTAREN  Apply the gel (2 g) to the affected area 4 times daily. Do not apply more than 8 g daily to any one affected joint     fenofibrate 160 MG Tab     FIASP FLEXTOUCH U-100 INSULIN 100 unit/mL (3 mL) Inpn  Generic drug: insulin aspart (niacinamide)  Use as needed with sliding scale. Max daily dose 30 units/day     fluticasone propionate 50 mcg/actuation nasal spray  Commonly known as: FLONASE  fluticasone propionate 50 mcg/actuation nasal spray,suspension     ibuprofen 800 MG tablet  Commonly known as: ADVIL,MOTRIN  Take 1 tablet (800 mg total) by mouth every 8 (eight) hours as needed for Pain.     JARDIANCE 25 mg tablet  Generic drug: empagliflozin  Take 1 tablet (25 mg total) by mouth once daily.     ketoconazole 2 % cream  Commonly known as: NIZORAL  Apply topically once daily.     lancets Misc  To check BG 4 times  "daily, to use with insurance preferred meter     levothyroxine 50 MCG tablet  Commonly known as: SYNTHROID  TAKE 1 TABLET (50 MCG TOTAL) BY MOUTH BEFORE BREAKFAST.     lisinopriL 10 MG tablet  Take 1 tablet (10 mg total) by mouth once daily.     loratadine 10 mg tablet  Commonly known as: CLARITIN  TAKE 1 TABLET BY MOUTH EVERY DAY     montelukast 10 mg tablet  Commonly known as: SINGULAIR  TAKE 1 TABLET BY MOUTH EVERY EVENING     NovoLIN N Flexpen 100 unit/mL (3 mL) Inpn  Generic drug: insulin NPH isoph U-100 human  Inject 14 units once daily at night 8 pm.     pen needle, diabetic 32 gauge x 5/32" Ndle  Commonly known as: BD ULTRA-FINE KEN PEN NEEDLE  1 Device by Misc.(Non-Drug; Combo Route) route 5 (five) times daily.     syringe (disposable) 1 mL Syrg  Testosterone every 2 weeks     TOUJEO MAX U-300 SOLOSTAR 300 unit/mL (3 mL) insulin pen  Generic drug: insulin glargine U-300 conc  Inject 30 Units into the skin once daily.     TRULICITY 3 mg/0.5 mL pen injector  Generic drug: dulaglutide  Inject 3 mg into the skin every 7 days.         * This list has 2 medication(s) that are the same as other medications prescribed for you. Read the directions carefully, and ask your doctor or other care provider to review them with you.                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   HENT: " "Negative.    Eyes: Negative.    Cardiovascular: Positive for dyspnea on exertion.   Respiratory: Positive for shortness of breath.    Endocrine: Negative.    Hematologic/Lymphatic: Negative.    Skin: Negative.    Musculoskeletal: Negative.    Gastrointestinal: Negative.    Genitourinary: Negative.    Neurological: Negative.    Psychiatric/Behavioral: Negative.    Allergic/Immunologic: Negative.        A 10 point review of systems was performed and all the pertinent positives have been mentioned. Rest of review of systems was negative.        PHYSICAL EXAM:     Vitals:    03/15/22 0945   BP: (!) 143/95   Pulse: 85   Resp: 15    Body mass index is 39.27 kg/m².  Weight: 135 kg (297 lb 9.9 oz)   Height: 6' 1" (185.4 cm)     Physical Exam  Vitals reviewed.   Constitutional:       Appearance: He is well-developed.   HENT:      Head: Normocephalic.   Eyes:      Conjunctiva/sclera: Conjunctivae normal.      Pupils: Pupils are equal, round, and reactive to light.   Cardiovascular:      Rate and Rhythm: Normal rate and regular rhythm.      Heart sounds: Normal heart sounds.   Pulmonary:      Effort: Pulmonary effort is normal.      Breath sounds: Normal breath sounds.   Abdominal:      General: Bowel sounds are normal.      Palpations: Abdomen is soft.   Musculoskeletal:      Cervical back: Normal range of motion and neck supple.   Skin:     General: Skin is warm.   Neurological:      Mental Status: He is alert and oriented to person, place, and time.           DATA:     Laboratory:  CBC:  Recent Labs   Lab 04/19/21  0845 07/15/21  0840 02/17/22  1115   WBC 8.41 8.48 9.92   Hemoglobin 16.4 16.7 16.8   Hematocrit 52.0 51.7 53.9   Platelets 204 200 223       CHEMISTRIES:  Recent Labs   Lab 02/03/21  0817 07/15/21  0840 02/17/22  1115   Glucose 157 H 155 H 159 H   Sodium 138 139 140   Potassium 3.9 4.0 4.3   BUN 16 14 14   Creatinine 0.7 0.9 0.9   eGFR if African American >60.0 >60.0 >60.0   eGFR if non African American >60.0 " >60.0 >60.0   Calcium 8.9 10.1 10.3       CARDIAC BIOMARKERS:        COAGS:        LIPIDS/LFTS:  Recent Labs   Lab 12/18/20  0820 02/03/21  0817 07/15/21  0840 02/17/22  1115   Cholesterol 136  --  160 162   Triglycerides 128  --  138 141   HDL 48  --  48 52   LDL Cholesterol 62.4 L  --  84.4 81.8   Non-HDL Cholesterol 88  --  112 110   AST  --  19 19 18   ALT  --  27 23 33       Hemoglobin A1C   Date Value Ref Range Status   02/17/2022 9.9 (H) 4.0 - 5.6 % Final     Comment:     ADA Screening Guidelines:  5.7-6.4%  Consistent with prediabetes  >or=6.5%  Consistent with diabetes    High levels of fetal hemoglobin interfere with the HbA1C  assay. Heterozygous hemoglobin variants (HbS, HgC, etc)do  not significantly interfere with this assay.   However, presence of multiple variants may affect accuracy.     07/15/2021 7.7 (H) 4.0 - 5.6 % Final     Comment:     ADA Screening Guidelines:  5.7-6.4%  Consistent with prediabetes  >or=6.5%  Consistent with diabetes    High levels of fetal hemoglobin interfere with the HbA1C  assay. Heterozygous hemoglobin variants (HbS, HgC, etc)do  not significantly interfere with this assay.   However, presence of multiple variants may affect accuracy.     04/19/2021 6.6 (H) 4.0 - 5.6 % Final     Comment:     ADA Screening Guidelines:  5.7-6.4%  Consistent with prediabetes  >or=6.5%  Consistent with diabetes    High levels of fetal hemoglobin interfere with the HbA1C  assay. Heterozygous hemoglobin variants (HbS, HgC, etc)do  not significantly interfere with this assay.   However, presence of multiple variants may affect accuracy.         TSH  Recent Labs   Lab 02/03/21  0817 07/15/21  0840 02/17/22  1115   TSH 4.872 H 3.459 2.655       The 10-year ASCVD risk score (Neftali SHAH Jr., et al., 2013) is: 2.5%    Values used to calculate the score:      Age: 41 years      Sex: Male      Is Non- : No      Diabetic: Yes      Tobacco smoker: No      Systolic Blood Pressure: 143  mmHg      Is BP treated: Yes      HDL Cholesterol: 52 mg/dL      Total Cholesterol: 162 mg/dL             ASSESSMENT AND PLAN     Patient Active Problem List   Diagnosis    Hyperlipidemia LDL goal <70    Insulin long-term use    Tinea pedis    Morbid obesity    Hypothyroidism    Type 2 diabetes mellitus without complication, with long-term current use of insulin    Essential hypertension    Migraine with aura and without status migrainosus, not intractable propranolol SE angry; topamax not helpful; imitrex ineffective; daith ear piercing helps    Non-seasonal allergic rhinitis; avoid antihistamines per opthalmology    Acute bilateral low back pain without sciatica    NAINA (obstructive sleep apnea) 8/2019 sleep study AHI 11    Low testosterone in male; pituitary axis normal. MRI negative. 11/2019 started on testosterone    Right foot pain    Decreased strength of lower extremity    Decreased range of motion of both ankles    Gait abnormality    Rib pain on left side    Dyspnea    Decreased strength, endurance, and mobility         Dyspnea on exertion in patient with multiple risk factors for coronary artery disease.  EKG portion of stress test was abnormal, imaging portion was normal.  Discussed further evaluation.  Not ideal candidate for coronary CTA because of high BMI.  We will do further evaluation with the help of PET scan      Palpitations:  Check Holter      Hypertension:  Increase lisinopril to 20 mg daily.  Titrate as needed    Weight loss has been recommended    Follow-up after testing    Thank you very much for involving me in the care of your patient.  Please do not hesitate to contact me if there are any questions.      Hussain Winston MD, FACC, [facility]  Interventional Cardiologist, [facility].           This note was dictated with the help of speech recognition software.  There might be un-intended errors and/or substitutions.

## 2022-03-16 ENCOUNTER — OFFICE VISIT (OUTPATIENT)
Dept: FAMILY MEDICINE | Facility: CLINIC | Age: 42
End: 2022-03-16
Payer: COMMERCIAL

## 2022-03-16 VITALS
SYSTOLIC BLOOD PRESSURE: 130 MMHG | HEIGHT: 73 IN | TEMPERATURE: 98 F | OXYGEN SATURATION: 97 % | BODY MASS INDEX: 39.27 KG/M2 | HEART RATE: 85 BPM | DIASTOLIC BLOOD PRESSURE: 86 MMHG

## 2022-03-16 DIAGNOSIS — I10 ESSENTIAL HYPERTENSION: ICD-10-CM

## 2022-03-16 DIAGNOSIS — U09.9 COVID-19 LONG HAULER: ICD-10-CM

## 2022-03-16 DIAGNOSIS — Z86.16 HISTORY OF COVID-19: Primary | ICD-10-CM

## 2022-03-16 DIAGNOSIS — E66.01 SEVERE OBESITY: ICD-10-CM

## 2022-03-16 DIAGNOSIS — R06.00 DYSPNEA, UNSPECIFIED TYPE: ICD-10-CM

## 2022-03-16 DIAGNOSIS — R94.39 ABNORMAL STRESS TEST: ICD-10-CM

## 2022-03-16 PROCEDURE — 99999 PR PBB SHADOW E&M-EST. PATIENT-LVL V: ICD-10-PCS | Mod: PBBFAC,,, | Performed by: INTERNAL MEDICINE

## 2022-03-16 PROCEDURE — 99999 PR PBB SHADOW E&M-EST. PATIENT-LVL V: CPT | Mod: PBBFAC,,, | Performed by: INTERNAL MEDICINE

## 2022-03-16 PROCEDURE — 99214 OFFICE O/P EST MOD 30 MIN: CPT | Mod: S$GLB,,, | Performed by: INTERNAL MEDICINE

## 2022-03-16 PROCEDURE — 99214 PR OFFICE/OUTPT VISIT, EST, LEVL IV, 30-39 MIN: ICD-10-PCS | Mod: S$GLB,,, | Performed by: INTERNAL MEDICINE

## 2022-03-16 RX ORDER — FLUTICASONE FUROATE, UMECLIDINIUM BROMIDE AND VILANTEROL TRIFENATATE 200; 62.5; 25 UG/1; UG/1; UG/1
1 POWDER RESPIRATORY (INHALATION) DAILY
Qty: 60 EACH | Refills: 5 | Status: SHIPPED | OUTPATIENT
Start: 2022-03-16 | End: 2023-10-05

## 2022-03-16 NOTE — PROGRESS NOTES
This note was created by combination of typed  and M-Modal dictation.  Transcription errors may be present.  If there are any questions, please contact me.    Assessment and Plan:   History of COVID-19  COVID-19 long hauler  Dyspnea, unspecified type  Abnormal stress test  -forms filled out for work.  Out of work until June 16, 2022. Working with physical therapy.  He has 4 week and 12 week goals.  Also working with pulmonology and Cardiology.  Cardiology in response to abnormal stress test has ordered PET-CT as well as Holter monitor.  Symbicort with minimal improvement.  Will try him with Trelegy.  If no symptomatic improvement on Trelegy would not pursue further.  PFTs with some restriction, bronchodilator non responsive.  Work note, see scanned media  -     fluticasone-umeclidin-vilanter (TRELEGY ELLIPTA) 200-62.5-25 mcg inhaler; Inhale 1 puff into the lungs once daily. Stop symbicort  Dispense: 60 each; Refill: 5    Essential hypertension  -just started higher dose lisinopril 20. BP is good today.  Monitor.  Having headaches, let us see if this improvement in BP will help out with his headaches.  Fioricet over sedating.    Severe obesity  -work apparently will cover bariatric surgery.  He is interested in exploring.  Referral submitted  -     Ambulatory referral/consult to Bariatric Surgery; Future; Expected date: 03/23/2022          Medications Discontinued During This Encounter   Medication Reason    budesonide-formoterol 160-4.5 mcg (SYMBICORT) 160-4.5 mcg/actuation HFAA Alternate therapy       meds sent this encounter:  Medications Ordered This Encounter   Medications    fluticasone-umeclidin-vilanter (TRELEGY ELLIPTA) 200-62.5-25 mcg inhaler     Sig: Inhale 1 puff into the lungs once daily. Stop symbicort     Dispense:  60 each     Refill:  5       Follow Up: No follow-ups on file.  Follow-up 1 month.  His work needs him to update his out of work status every month.    Subjective:     Chief  Complaint   Patient presents with    Diabetes       HPI  Tony is a 41 y.o. male, last appointment with this clinic was 2/16/2022.  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 Referred to pulm and PT    CMP normal  CBC normal  Lipid profile good on statin  TSH good on current dose levothyroxine  A1c though high at 9.9  Urine microalbumin normal  Results to patient via my chart.  He is followed by diabetes nurse practitioner.  Encouraged to follow-up with her    Saw pulm  Ordered stress test and PFTs    PFTs with restriction, not BD responsive    Stress echo with abn EKG portion, normal echo portion    Saw DM NP; increased fiasp, increased trulicity; decreased toujeo; novolin evenings    Saw pulm in f/u  Referral to cards  Ordered 6 MWT  Change to nasal pillow    Saw cards.  Recommended PET stress  And holter  Increased lisinopril to 20    Improved baseline oxygenation he notes.  But he notes that whenever his heart rate goes up his oxygenation goes down.  Using the Symbicort does not really find that it does much.  I will try him on Trelegy.  He went to physical therapy once.  Because of access issues there were not able to get him in sooner.  But he is going to start up next week I believe.  Twice a week.  I reviewed physical therapy report and goals.  He has short-term, 4 week goals, and 12 lead goals.  Obviously right now he has not met any of them.  He may also start exercising at the facilities with work.  They have access to oxygen tanks if necessary and of course his coworkers are 1st  responders.    Continues to get headaches.  Fioricet did help but it made him groggy that day and into the next day.  Work has restrictions about piercings and so he is not able to wear his daith piercing which he previously found helpful.     Interested in bariatric surgery.  Found out that insurance will cover it.  Had made progress with weight loss but in the past month or 2 has not been able to sustain.     Needs forms filled out for work, updating return to work date.  Given that he has not met any of his physical therapy goals, and that these are 12 week goals with physical therapy, and given the strenuous rigorous physical nature of his work, I have put him out of work for 3 months.    Patient Care Team:  Jovany Ford MD as PCP - General (Internal Medicine)  Janneth Johnson RN (Inactive) as Registered Nurse (Diabetes)  Rocky Hewitt MD as Consulting Physician (Ophthalmology)  Preethi Monaco RD as Dietitian (Nutrition)  River Montiel MA as Care Coordinator  Christofer Rowley MD as Consulting Physician (Orthopedic Surgery)  Singh Rizo MD as Consulting Physician (Sports Medicine)  Shilpa Gordon NP as Nurse Practitioner (Urology)  Nicole Wynn MD (Family Medicine)    Patient Active Problem List    Diagnosis Date Noted    Decreased strength, endurance, and mobility 03/08/2022    Dyspnea 02/18/2022      started after covid 19. cxr unremarkable.  Clinically improve with symbicort.  PFT with restrictive physiology with preserved dlco c/w habitus.  Stress echo with ?ischemic changes on echo but ST changes on ekg.        Rib pain on left side 08/08/2021    Right foot pain 10/10/2019    Decreased strength of lower extremity 10/10/2019    Decreased range of motion of both ankles 10/10/2019    Gait abnormality 10/10/2019    Low testosterone in male; pituitary axis normal. MRI negative. 11/2019 started on testosterone 09/04/2019 11/06/2019 MRI pituitary with and without  contrast from [facility]  Impression:  1.  No pituitary mass seen.  2.  Absence of the septum pellucidum.      NAINA (obstructive sleep apnea) 8/2019 sleep study AHI 11      -ahi of 11.  Stopped apap for over month due to gasping sensation.  ?excessive leakage a/w nasal congestion while having covid 19.  Nasal pantency is adequate.  Recommend resumption of apap.  However, APAP is being recall by [employer]  -we discussed at length about [employer] recall.  Patient already register his apap  -will switch to nasal pillow to alleviate pressure on ridge of nose.        Acute bilateral low back pain without sciatica 08/10/2019    Non-seasonal allergic rhinitis; avoid antihistamines per opthalmology 11/09/2018    Migraine with aura and without status migrainosus, not intractable propranolol SE angry; topamax not helpful; imitrex ineffective; daith ear piercing helps 09/28/2018    Type 2 diabetes mellitus without complication, with long-term current use of insulin     Essential hypertension     Hypothyroidism 07/29/2015    Hyperlipidemia LDL goal <70 06/03/2015    Insulin long-term use 06/03/2015    Tinea pedis 06/03/2015    Morbid obesity 06/03/2015       PAST MEDICAL PROBLEMS, PAST SURGICAL HISTORY: please see relevant portions of the electronic medical record    ALLERGIES AND MEDICATIONS: updated and reviewed.  Review of patient's allergies indicates:   Allergen Reactions    Influenza virus vaccine, specific Hives    Pioglitazone      Altered mood     Victoza [liraglutide] Nausea And Vomiting    Farxiga [dapagliflozin] Rash     Erectile dysfunction and rash        Medication List with Changes/Refills   Current Medications    ATORVASTATIN (LIPITOR) 20 MG TABLET    Take 1 tablet (20 mg total) by mouth once daily.    AZELASTINE 205.5 MCG (0.15 %) SPRY    azelastine 205.5 mcg (0.15 %) nasal spray  INHALE 2 SPRAYS TO EACH NOSTRIL TWICE A DAY    BLOOD SUGAR DIAGNOSTIC STRP    To check BG 4 times daily, to  use with insurance preferred meter    BLOOD SUGAR DIAGNOSTIC STRP    OneTouch Ultra Test strips    BLOOD-GLUCOSE METER KIT    OneTouch Ultra2 Meter kit    BLOOD-GLUCOSE METER,CONTINUOUS (DEXCOM G6 ) MISC    Use with dexcom G6 system    BLOOD-GLUCOSE SENSOR (DEXCOM G6 SENSOR) CAT    Change sensor every 10 days    BLOOD-GLUCOSE TRANSMITTER (DEXCOM G6 TRANSMITTER) CAT    Change every 3 months    BUDESONIDE-FORMOTEROL 160-4.5 MCG (SYMBICORT) 160-4.5 MCG/ACTUATION HFAA    Inhale 2 puffs into the lungs every 12 (twelve) hours. Controller    BUTALBITAL-ACETAMINOPHEN-CAFFEINE -40 MG (FIORICET, ESGIC) -40 MG PER TABLET    Take 1 tablet by mouth every 4 (four) hours as needed for Headaches.    DICLOFENAC SODIUM (VOLTAREN) 1 % GEL    Apply the gel (2 g) to the affected area 4 times daily. Do not apply more than 8 g daily to any one affected joint    DULAGLUTIDE (TRULICITY) 3 MG/0.5 ML PEN INJECTOR    Inject 3 mg into the skin every 7 days.    EMPAGLIFLOZIN (JARDIANCE) 25 MG TABLET    Take 1 tablet (25 mg total) by mouth once daily.    FENOFIBRATE 160 MG TAB    fenofibrate 160 mg tablet    FLUTICASONE PROPIONATE (FLONASE) 50 MCG/ACTUATION NASAL SPRAY    fluticasone propionate 50 mcg/actuation nasal spray,suspension    IBUPROFEN (ADVIL,MOTRIN) 800 MG TABLET    Take 1 tablet (800 mg total) by mouth every 8 (eight) hours as needed for Pain.    INSULIN ASPART, NIACINAMIDE, (FIASP FLEXTOUCH U-100 INSULIN) 100 UNIT/ML (3 ML) INPN    Use as needed with sliding scale. Max daily dose 30 units/day    INSULIN GLARGINE U-300 CONC (TOUJEO MAX U-300 SOLOSTAR) 300 UNIT/ML (3 ML) INSULIN PEN    Inject 30 Units into the skin once daily.    INSULIN NPH ISOPH U-100 HUMAN (NOVOLIN N FLEXPEN) 100 UNIT/ML (3 ML) INPN    Inject 14 units once daily at night 8 pm.    KETOCONAZOLE (NIZORAL) 2 % CREAM    Apply topically once daily.    LANCETS MISC    To check BG 4 times daily, to use with insurance preferred meter    LEVOTHYROXINE  "(SYNTHROID) 50 MCG TABLET    TAKE 1 TABLET (50 MCG TOTAL) BY MOUTH BEFORE BREAKFAST.    LISINOPRIL (PRINIVIL,ZESTRIL) 20 MG TABLET    Take 1 tablet (20 mg total) by mouth once daily.    LORATADINE (CLARITIN) 10 MG TABLET    TAKE 1 TABLET BY MOUTH EVERY DAY    MONTELUKAST (SINGULAIR) 10 MG TABLET    TAKE 1 TABLET BY MOUTH EVERY EVENING    PEN NEEDLE, DIABETIC (BD ULTRA-FINE KEN PEN NEEDLE) 32 GAUGE X 5/32" NDLE    1 Device by Misc.(Non-Drug; Combo Route) route 5 (five) times daily.    SYRINGE, DISPOSABLE, 1 ML SYRG    Testosterone every 2 weeks        Objective:   Physical Exam   Vitals:    03/16/22 1027   BP: 130/86   BP Location: Right arm   Patient Position: Sitting   BP Method: Large (Manual)   Pulse: 85   Temp: 98.3 °F (36.8 °C)   TempSrc: Oral   SpO2: 97%   Height: 6' 1" (1.854 m)    Body mass index is 39.27 kg/m².            Physical Exam  Constitutional:       General: He is not in acute distress.     Appearance: He is well-developed.   Cardiovascular:      Rate and Rhythm: Normal rate and regular rhythm.      Heart sounds: Normal heart sounds. No murmur heard.  Pulmonary:      Effort: Pulmonary effort is normal.      Breath sounds: Normal breath sounds.   Musculoskeletal:         General: Normal range of motion.      Right lower leg: No edema.      Left lower leg: No edema.   Skin:     General: Skin is warm and dry.   Neurological:      Mental Status: He is alert and oriented to person, place, and time.      Coordination: Coordination normal.   Psychiatric:         Behavior: Behavior normal.         Thought Content: Thought content normal.         "

## 2022-03-21 ENCOUNTER — TELEPHONE (OUTPATIENT)
Dept: FAMILY MEDICINE | Facility: CLINIC | Age: 42
End: 2022-03-21
Payer: COMMERCIAL

## 2022-03-21 NOTE — TELEPHONE ENCOUNTER
----- Message from Jovany Ford MD sent at 3/18/2022 12:03 PM CDT -----  Regarding: aleyda richardson PA, needs form from insurer...  please contact [facility] at [phone].  Can't do this through cover my meds

## 2022-03-24 ENCOUNTER — CLINICAL SUPPORT (OUTPATIENT)
Dept: REHABILITATION | Facility: HOSPITAL | Age: 42
End: 2022-03-24
Payer: COMMERCIAL

## 2022-03-24 DIAGNOSIS — R68.89 DECREASED STRENGTH, ENDURANCE, AND MOBILITY: Primary | ICD-10-CM

## 2022-03-24 DIAGNOSIS — R53.1 DECREASED STRENGTH, ENDURANCE, AND MOBILITY: Primary | ICD-10-CM

## 2022-03-24 DIAGNOSIS — Z74.09 DECREASED STRENGTH, ENDURANCE, AND MOBILITY: Primary | ICD-10-CM

## 2022-03-24 PROCEDURE — 97110 THERAPEUTIC EXERCISES: CPT | Mod: PN

## 2022-03-24 NOTE — PROGRESS NOTES
[facility]   Physical Therapy Treatment Note     Name: Tony Gordillo  Clinic Number: [id number]    Therapy Diagnosis:   Encounter Diagnosis   Name Primary?    Decreased strength, endurance, and mobility Yes     Physician: Jovany Ford MD    Visit Date: 3/24/2022     Physician Orders: PT Eval and Treat      Medical Diagnosis from Referral:   U07.1 (ICD-10-CM) - COVID-19 virus infection   R06.00 (ICD-10-CM) - Dyspnea, unspecified type      Evaluation Date: 3/8/2022  Authorization Period Expiration: 12/31/2022  Plan of Care Expiration: 6/8/2022  Visit # / Visits authorized: 1/25 (+eval)  Progress Note Due: 4/8/2022    Precautions: Standard and monitor BP    Time In: 8:50 AM  Time Out: 9:30 AM  Total Billable Time: 40 minutes      SUBJECTIVE     Pt reports: States he has tried to start a walking program. Walked about 20 minutes at the longest last week. Oxygen has been running a little bit lower over the past couple days (~94%). States he saw Cardiology and is getting a heart monitor tomorrow.     He was compliant with home exercise program.  Response to previous treatment: none  Functional change: ongoing    Pain: 4/10  Location: headache/migraine    OBJECTIVE     Objective Measures updated at progress report unless specified.       TREATMENT     Tony received the treatments listed below:      received therapeutic exercises to develop strength and ROM for 40  minutes including:    Initial BP - 165/118  After 5 minutes seated  (cuff change to bariatric cuff) - 139/356XyH3: 96%  HR: 86    Nu-Step  L1 - 5 minutes    -98% SpO2   -93 HR    -144/100    -RPE 2/10    L3 - 5 minutes    -96% SpO2    -98 HR    -149/99   -RPE 2/10    L3 - 5 minutes   -98% SpO2   - 100 HR    -139/98    -RPE 2/10    Mat Exercises:    Bridges 3x10   -98%, 97 bpm, 2/10 RPE  Clamshells 3x10 B   -96%, 91, 2/10 RPE    BP following mat exercises: 144/101    Sit to stands from mat with 5# overhead raise  2x10   -98%, 107  bpm, 3/10 RPE    BP at end of session: 144/112    PATIENT EDUCATION AND HOME EXERCISES     Home Exercises Provided and Patient Education Provided     Education provided:   - continuation of HEP/walking program  -continuous monitoring of BP at home    Written Home Exercises Provided: yes. Exercises were reviewed and Tony was able to demonstrate them prior to the end of the session.  Tony demonstrated good  understanding of the education provided. See EMR under Patient Instructions for exercises provided during therapy sessions    ASSESSMENT     Pt tolerated initial treatment session well with no adverse response noted. Pt with elevated BP this session, particularly to diastolic pressure. Secondary to elevated BP, very low intensity exercise performed today. Pt reporting no symptoms of elevated BP at this time. BP monitored throughout low intensity Nu-Step and supine mat exercises with slight decline in pressure noted. Pt with appropriate SpO2 response throughout exercise, remaining above 95%. Pt with low level of intensity/exertion reported throughout all activities today. Pt educated to follow up with MD regarding elevated BP and present to ER if any acute symptoms of elevated BP emerge. Plan to continue progression of CV endurance challenges when BP returns to more therapeutic range. Pt remains appropriate for therapy at this time.     Tony Is progressing well towards his goals.   Pt prognosis is Good.     Pt will continue to benefit from skilled outpatient physical therapy to address the deficits listed in the problem list box on initial evaluation, provide pt/family education and to maximize pt's level of independence in the home and community environment.     Pt's spiritual, cultural and educational needs considered and pt agreeable to plan of care and goals.     Anticipated barriers to physical therapy: none    Goals:  Short Term Goals:  4 weeks  1. Pt will initiate home exercise program to improve strength,  flexibility, endurance, mobility & balance to return pt to PLOF (progressing, not met)  2. Pt will tolerate 10 min on stationary bike (I.e. Bike, NuStep) to improve endurance to assist in return to leaisure activities. (progressing, not met)  3. Pt will tolerate 2 min or greater on treadmill at 2.0 mph to improve gait speed (progressing, not met)  4. Pt will report ability to ride bike at home for > / = 5 minutes in order to improve quality of life. (progressing, not met)   5.  Pt will verbalize good recall & understanding of universal precautions to improve containment of infection and future illness (progressing, not met)  6. Pt will complete 6 minute walk test in order to improve CV endurance and tolerance to activities. (progressing, not met)     Long Term Goals: 12 weeks  1. Pt to report compliance with walking program/HEP > / = 3x per week in order to maximize gains and return to PLOF (progressing, not met)  2. Pt to perform 2 minute walk test for 450 feet or greater to improve gait speed & endurance  (progressing, not met)  3. Pt will improve 5 time sit to stand score for < / = 10 seconds in order to maximize functional strength and return to PLOF. (progressing, not met)  4. Pt will tolerate ambulating on treadmill at varying inclines for > / = 10 minutes in order to promote return to work. (progressing, not met)  5. Pt to report biking at home for 30 mins without significant fatigue in order to improve quality of life. (progressing, not met)     PLAN     Plan of care Certification: 3/8/2022 to 6/8/2022.     Outpatient Physical Therapy 2 times weekly for 12 weeks to include the following interventions: Gait Training, Manual Therapy, Moist Heat/ Ice, Neuromuscular Re-ed, Patient Education, Self Care, Therapeutic Activities and Therapeutic Exercise.     Maria Del Rosario Cruz, PT, DPT  3/24/2022

## 2022-03-25 ENCOUNTER — HOSPITAL ENCOUNTER (EMERGENCY)
Facility: HOSPITAL | Age: 42
Discharge: HOME | End: 2022-03-25
Attending: EMERGENCY MEDICINE
Payer: COMMERCIAL

## 2022-03-25 VITALS
OXYGEN SATURATION: 96 % | TEMPERATURE: 99 F | SYSTOLIC BLOOD PRESSURE: 136 MMHG | HEIGHT: 73 IN | HEART RATE: 95 BPM | WEIGHT: 297 LBS | DIASTOLIC BLOOD PRESSURE: 76 MMHG | RESPIRATION RATE: 27 BRPM | BODY MASS INDEX: 39.36 KG/M2

## 2022-03-25 DIAGNOSIS — R00.0 TACHYCARDIA: ICD-10-CM

## 2022-03-25 DIAGNOSIS — R19.7 VOMITING AND DIARRHEA: ICD-10-CM

## 2022-03-25 DIAGNOSIS — R11.10 VOMITING AND DIARRHEA: ICD-10-CM

## 2022-03-25 DIAGNOSIS — K52.9 COLITIS: Primary | ICD-10-CM

## 2022-03-25 LAB
ALBUMIN SERPL BCP-MCNC: 4 G/DL (ref 3.5–5.2)
ALP SERPL-CCNC: 60 U/L (ref 55–135)
ALT SERPL W/O P-5'-P-CCNC: 31 U/L (ref 10–44)
ANION GAP SERPL CALC-SCNC: 10 MMOL/L (ref 8–16)
AST SERPL-CCNC: 16 U/L (ref 10–40)
BACTERIA #/AREA URNS HPF: NORMAL /HPF
BASOPHILS # BLD AUTO: 0.04 K/UL (ref 0–0.2)
BASOPHILS NFR BLD: 0.2 % (ref 0–1.9)
BILIRUB SERPL-MCNC: 0.8 MG/DL (ref 0.1–1)
BILIRUB UR QL STRIP: NEGATIVE
BUN SERPL-MCNC: 22 MG/DL (ref 6–20)
C DIFF GDH STL QL: NEGATIVE
C DIFF TOX A+B STL QL IA: NEGATIVE
CALCIUM SERPL-MCNC: 9.8 MG/DL (ref 8.7–10.5)
CHLORIDE SERPL-SCNC: 107 MMOL/L (ref 95–110)
CLARITY UR: CLEAR
CO2 SERPL-SCNC: 22 MMOL/L (ref 23–29)
COLOR UR: YELLOW
CREAT SERPL-MCNC: 1 MG/DL (ref 0.5–1.4)
CTP QC/QA: YES
DIFFERENTIAL METHOD: ABNORMAL
EOSINOPHIL # BLD AUTO: 0.1 K/UL (ref 0–0.5)
EOSINOPHIL NFR BLD: 0.3 % (ref 0–8)
ERYTHROCYTE [DISTWIDTH] IN BLOOD BY AUTOMATED COUNT: 15.2 % (ref 11.5–14.5)
EST. GFR  (AFRICAN AMERICAN): >60 ML/MIN/1.73 M^2
EST. GFR  (NON AFRICAN AMERICAN): >60 ML/MIN/1.73 M^2
GLUCOSE SERPL-MCNC: 260 MG/DL (ref 70–110)
GLUCOSE UR QL STRIP: ABNORMAL
HCT VFR BLD AUTO: 57.5 % (ref 40–54)
HGB BLD-MCNC: 18.5 G/DL (ref 14–18)
HGB UR QL STRIP: NEGATIVE
IMM GRANULOCYTES # BLD AUTO: 0.08 K/UL (ref 0–0.04)
IMM GRANULOCYTES NFR BLD AUTO: 0.4 % (ref 0–0.5)
KETONES UR QL STRIP: ABNORMAL
LACTATE SERPL-SCNC: 1.9 MMOL/L (ref 0.5–2.2)
LEUKOCYTE ESTERASE UR QL STRIP: NEGATIVE
LIPASE SERPL-CCNC: 45 U/L (ref 4–60)
LYMPHOCYTES # BLD AUTO: 1.7 K/UL (ref 1–4.8)
LYMPHOCYTES NFR BLD: 9.4 % (ref 18–48)
MAGNESIUM SERPL-MCNC: 1.8 MG/DL (ref 1.6–2.6)
MCH RBC QN AUTO: 28.2 PG (ref 27–31)
MCHC RBC AUTO-ENTMCNC: 32.2 G/DL (ref 32–36)
MCV RBC AUTO: 88 FL (ref 82–98)
MICROSCOPIC COMMENT: NORMAL
MONOCYTES # BLD AUTO: 1.1 K/UL (ref 0.3–1)
MONOCYTES NFR BLD: 6.2 % (ref 4–15)
NEUTROPHILS # BLD AUTO: 15 K/UL (ref 1.8–7.7)
NEUTROPHILS NFR BLD: 83.5 % (ref 38–73)
NITRITE UR QL STRIP: NEGATIVE
NRBC BLD-RTO: 0 /100 WBC
OB PNL STL: POSITIVE
PH UR STRIP: 6 [PH] (ref 5–8)
PHOSPHATE SERPL-MCNC: 3.5 MG/DL (ref 2.7–4.5)
PLATELET # BLD AUTO: 218 K/UL (ref 150–450)
PMV BLD AUTO: 11.2 FL (ref 9.2–12.9)
POC MOLECULAR INFLUENZA A AGN: NEGATIVE
POC MOLECULAR INFLUENZA B AGN: NEGATIVE
POTASSIUM SERPL-SCNC: 4.2 MMOL/L (ref 3.5–5.1)
PROCALCITONIN SERPL IA-MCNC: 0.02 NG/ML
PROT SERPL-MCNC: 8 G/DL (ref 6–8.4)
PROT UR QL STRIP: ABNORMAL
RBC # BLD AUTO: 6.57 M/UL (ref 4.6–6.2)
SODIUM SERPL-SCNC: 139 MMOL/L (ref 136–145)
SP GR UR STRIP: >1.03 (ref 1–1.03)
T4 FREE SERPL-MCNC: 0.92 NG/DL (ref 0.71–1.51)
TROPONIN I SERPL DL<=0.01 NG/ML-MCNC: <0.006 NG/ML (ref 0–0.03)
TSH SERPL DL<=0.005 MIU/L-ACNC: 8.1 UIU/ML (ref 0.4–4)
URN SPEC COLLECT METH UR: ABNORMAL
UROBILINOGEN UR STRIP-ACNC: NEGATIVE EU/DL
WBC # BLD AUTO: 17.96 K/UL (ref 3.9–12.7)
WBC #/AREA STL HPF: NORMAL /[HPF]
YEAST URNS QL MICRO: NORMAL

## 2022-03-25 PROCEDURE — 96361 HYDRATE IV INFUSION ADD-ON: CPT

## 2022-03-25 PROCEDURE — 87427 SHIGA-LIKE TOXIN AG IA: CPT | Mod: 59 | Performed by: EMERGENCY MEDICINE

## 2022-03-25 PROCEDURE — 96376 TX/PRO/DX INJ SAME DRUG ADON: CPT

## 2022-03-25 PROCEDURE — 93005 ELECTROCARDIOGRAM TRACING: CPT

## 2022-03-25 PROCEDURE — 84484 ASSAY OF TROPONIN QUANT: CPT | Performed by: EMERGENCY MEDICINE

## 2022-03-25 PROCEDURE — 25000003 PHARM REV CODE 250: Performed by: EMERGENCY MEDICINE

## 2022-03-25 PROCEDURE — 84100 ASSAY OF PHOSPHORUS: CPT | Performed by: EMERGENCY MEDICINE

## 2022-03-25 PROCEDURE — 84443 ASSAY THYROID STIM HORMONE: CPT | Performed by: EMERGENCY MEDICINE

## 2022-03-25 PROCEDURE — 87040 BLOOD CULTURE FOR BACTERIA: CPT | Mod: 59 | Performed by: EMERGENCY MEDICINE

## 2022-03-25 PROCEDURE — 84439 ASSAY OF FREE THYROXINE: CPT | Performed by: EMERGENCY MEDICINE

## 2022-03-25 PROCEDURE — 87177 OVA AND PARASITES SMEARS: CPT | Performed by: EMERGENCY MEDICINE

## 2022-03-25 PROCEDURE — 87046 STOOL CULTR AEROBIC BACT EA: CPT | Mod: 59 | Performed by: EMERGENCY MEDICINE

## 2022-03-25 PROCEDURE — 96367 TX/PROPH/DG ADDL SEQ IV INF: CPT

## 2022-03-25 PROCEDURE — 82272 OCCULT BLD FECES 1-3 TESTS: CPT

## 2022-03-25 PROCEDURE — 83690 ASSAY OF LIPASE: CPT | Performed by: EMERGENCY MEDICINE

## 2022-03-25 PROCEDURE — 87045 FECES CULTURE AEROBIC BACT: CPT | Performed by: EMERGENCY MEDICINE

## 2022-03-25 PROCEDURE — 83605 ASSAY OF LACTIC ACID: CPT | Performed by: EMERGENCY MEDICINE

## 2022-03-25 PROCEDURE — 83735 ASSAY OF MAGNESIUM: CPT | Performed by: EMERGENCY MEDICINE

## 2022-03-25 PROCEDURE — 87449 NOS EACH ORGANISM AG IA: CPT | Performed by: EMERGENCY MEDICINE

## 2022-03-25 PROCEDURE — 87186 SC STD MICRODIL/AGAR DIL: CPT | Performed by: EMERGENCY MEDICINE

## 2022-03-25 PROCEDURE — 87329 GIARDIA AG IA: CPT | Performed by: EMERGENCY MEDICINE

## 2022-03-25 PROCEDURE — 89055 LEUKOCYTE ASSESSMENT FECAL: CPT | Performed by: EMERGENCY MEDICINE

## 2022-03-25 PROCEDURE — 96372 THER/PROPH/DIAG INJ SC/IM: CPT | Mod: 59 | Performed by: EMERGENCY MEDICINE

## 2022-03-25 PROCEDURE — 84145 PROCALCITONIN (PCT): CPT | Performed by: EMERGENCY MEDICINE

## 2022-03-25 PROCEDURE — 82272 OCCULT BLD FECES 1-3 TESTS: CPT | Performed by: EMERGENCY MEDICINE

## 2022-03-25 PROCEDURE — 81000 URINALYSIS NONAUTO W/SCOPE: CPT | Performed by: EMERGENCY MEDICINE

## 2022-03-25 PROCEDURE — 25500020 PHARM REV CODE 255: Performed by: EMERGENCY MEDICINE

## 2022-03-25 PROCEDURE — 87502 INFLUENZA DNA AMP PROBE: CPT

## 2022-03-25 PROCEDURE — 80053 COMPREHEN METABOLIC PANEL: CPT | Performed by: EMERGENCY MEDICINE

## 2022-03-25 PROCEDURE — 87209 SMEAR COMPLEX STAIN: CPT | Performed by: EMERGENCY MEDICINE

## 2022-03-25 PROCEDURE — 99285 EMERGENCY DEPT VISIT HI MDM: CPT | Mod: 25

## 2022-03-25 PROCEDURE — 96375 TX/PRO/DX INJ NEW DRUG ADDON: CPT

## 2022-03-25 PROCEDURE — 63600175 PHARM REV CODE 636 W HCPCS: Performed by: EMERGENCY MEDICINE

## 2022-03-25 PROCEDURE — 93010 EKG 12-LEAD: ICD-10-PCS | Mod: ,,, | Performed by: INTERNAL MEDICINE

## 2022-03-25 PROCEDURE — 85025 COMPLETE CBC W/AUTO DIFF WBC: CPT | Performed by: EMERGENCY MEDICINE

## 2022-03-25 PROCEDURE — 96365 THER/PROPH/DIAG IV INF INIT: CPT

## 2022-03-25 PROCEDURE — 87077 CULTURE AEROBIC IDENTIFY: CPT | Performed by: EMERGENCY MEDICINE

## 2022-03-25 PROCEDURE — 93010 ELECTROCARDIOGRAM REPORT: CPT | Mod: ,,, | Performed by: INTERNAL MEDICINE

## 2022-03-25 PROCEDURE — 82705 FATS/LIPIDS FECES QUAL: CPT | Performed by: EMERGENCY MEDICINE

## 2022-03-25 RX ORDER — PROMETHAZINE HYDROCHLORIDE 25 MG/1
25 SUPPOSITORY RECTAL EVERY 6 HOURS PRN
Qty: 10 SUPPOSITORY | Refills: 0 | Status: SHIPPED | OUTPATIENT
Start: 2022-03-25 | End: 2023-03-10 | Stop reason: CLARIF

## 2022-03-25 RX ORDER — DIPHENHYDRAMINE HCL 25 MG
50 CAPSULE ORAL
Status: COMPLETED | OUTPATIENT
Start: 2022-03-25 | End: 2022-03-25

## 2022-03-25 RX ORDER — ONDANSETRON 8 MG/1
8 TABLET, ORALLY DISINTEGRATING ORAL
Status: COMPLETED | OUTPATIENT
Start: 2022-03-25 | End: 2022-03-25

## 2022-03-25 RX ORDER — CIPROFLOXACIN 500 MG/1
500 TABLET ORAL 2 TIMES DAILY
Qty: 20 TABLET | Refills: 0 | Status: SHIPPED | OUTPATIENT
Start: 2022-03-25 | End: 2022-04-04

## 2022-03-25 RX ORDER — DICYCLOMINE HYDROCHLORIDE 10 MG/ML
20 INJECTION INTRAMUSCULAR
Status: COMPLETED | OUTPATIENT
Start: 2022-03-25 | End: 2022-03-25

## 2022-03-25 RX ORDER — DIPHENHYDRAMINE HYDROCHLORIDE 50 MG/ML
50 INJECTION INTRAMUSCULAR; INTRAVENOUS
Status: COMPLETED | OUTPATIENT
Start: 2022-03-25 | End: 2022-03-25

## 2022-03-25 RX ORDER — ONDANSETRON 8 MG/1
8 TABLET, ORALLY DISINTEGRATING ORAL EVERY 6 HOURS PRN
Qty: 30 TABLET | Refills: 0 | Status: SHIPPED | OUTPATIENT
Start: 2022-03-25 | End: 2023-03-10 | Stop reason: CLARIF

## 2022-03-25 RX ORDER — ONDANSETRON 2 MG/ML
8 INJECTION INTRAMUSCULAR; INTRAVENOUS
Status: COMPLETED | OUTPATIENT
Start: 2022-03-25 | End: 2022-03-25

## 2022-03-25 RX ORDER — METRONIDAZOLE 500 MG/1
500 TABLET ORAL 3 TIMES DAILY
Qty: 30 TABLET | Refills: 0 | Status: SHIPPED | OUTPATIENT
Start: 2022-03-25 | End: 2022-04-04

## 2022-03-25 RX ADMIN — DIPHENHYDRAMINE HYDROCHLORIDE 50 MG: 25 CAPSULE ORAL at 10:03

## 2022-03-25 RX ADMIN — SODIUM CHLORIDE 2397 ML: 0.9 INJECTION, SOLUTION INTRAVENOUS at 06:03

## 2022-03-25 RX ADMIN — PIPERACILLIN AND TAZOBACTAM 4.5 G: 4; .5 INJECTION, POWDER, LYOPHILIZED, FOR SOLUTION INTRAVENOUS; PARENTERAL at 08:03

## 2022-03-25 RX ADMIN — ONDANSETRON 8 MG: 2 INJECTION INTRAMUSCULAR; INTRAVENOUS at 09:03

## 2022-03-25 RX ADMIN — PROMETHAZINE HYDROCHLORIDE 25 MG: 25 INJECTION INTRAMUSCULAR; INTRAVENOUS at 11:03

## 2022-03-25 RX ADMIN — DICYCLOMINE HYDROCHLORIDE 20 MG: 20 INJECTION, SOLUTION INTRAMUSCULAR at 06:03

## 2022-03-25 RX ADMIN — IOHEXOL 100 ML: 350 INJECTION, SOLUTION INTRAVENOUS at 07:03

## 2022-03-25 RX ADMIN — DIPHENHYDRAMINE HYDROCHLORIDE 50 MG: 50 INJECTION INTRAMUSCULAR; INTRAVENOUS at 11:03

## 2022-03-25 RX ADMIN — ONDANSETRON 8 MG: 8 TABLET, ORALLY DISINTEGRATING ORAL at 10:03

## 2022-03-25 RX ADMIN — SODIUM CHLORIDE 1000 ML: 0.9 INJECTION, SOLUTION INTRAVENOUS at 11:03

## 2022-03-25 RX ADMIN — ONDANSETRON 8 MG: 2 INJECTION INTRAMUSCULAR; INTRAVENOUS at 06:03

## 2022-03-25 NOTE — ED NOTES
Adult Physical Assessment  LOC: Tony Gordillo, 41 y.o. male verified via two identifiers.  The patient is awake, alert, oriented and speaking appropriately at this time.  APPEARANCE: Appears UNWELL, burping frequently, guarding his abd area. Patient appears to be in no acute distress at this time. Patient is clean and well groomed, patient's clothing is properly fastened. Changed into gown and EKG performed, [facility] at [facility]  SKIN:The skin is warm and dry, color consistent with ethnicity, patient has normal skin turgor and moist mucus membranes, skin intact, no breakdown or brusing noted.  MUSCULOSKELETAL: Patient moving all extremities well, no obvious swelling or deformities noted.  RESPIRATORY: Airway is open and patent, respirations are spontaneous, patient has a increased effort and rate, no accessory muscle use noted.  CARDIAC: Patient has a normal rate and rhythm, occasionally slightly tachycardic, no periphreal edema noted in any extremity, capillary refill < 3 seconds in all extremities  ABDOMEN: Soft and  tender to palpation, no abdominal distention noted. Bowel sounds present in all four quadrants.  NEUROLOGIC: Eyes open spontaneously, behavior appropriate to situation, follows commands, facial expression symmetrical, bilateral hand grasp equal and even, purposeful motor response noted, normal sensation in all extremities when touched with a finger.    Wife remains at bedside.

## 2022-03-25 NOTE — DISCHARGE INSTRUCTIONS
Thank you for coming to our Emergency Department today. It is important to remember that some problems are difficult to diagnose and may not be found during your Emergency Department visit. Be sure to follow up with your primary care doctor and review all labs/imaging/tests that were performed during this visit with them. Some labs/tests may be outside of the normal range and require non-emergent follow-up and further investigation to help diagnose/exclude/prevent complications or other medical conditions.    If you do not have a primary care doctor, you may contact the one listed on your discharge paperwork or you may also call the [facility] Appointment Desk at [phone] to schedule an appointment and establish care with one. It is important to your health that you have a primary care doctor.    Please take all medications as directed. All medications may potentially have side-effects and it is impossible to predict which medications may give you side-effects or what side-effects (if any) they will give you.. If you feel that you are having a negative effect or side-effect of any medication you should immediately stop taking them and seek medical attention. If you feel that you are having a life-threatening reaction call 911.    Return to the ER with any questions/concerns, new/concerning symptoms, worsening or failure to improve.     Do not drive, swim, climb to height, take a bath or make any important decisions for 24 hours if you have received any pain medications, sedatives or mood altering drugs during your ER visit.        BELOW THIS LINE ONLY APPLIES IF YOU HAVE A COVID TEST PENDING OR IF YOU HAVE BEEN DIAGNOSED WITH COVID:  Please access [facility] to review the results of your test. Until the results of your COVID test return, you should isolate yourself so as not to potentially spread illness to others.   If your COVID test returns positive, you should isolate yourself so as not to spread  illness to others. After five full days, if you are feeling better and you have not had fever for 24 hours, you can return to your typical daily activities, but you must wear a mask around others for an additional 5 days.   If your COVID test returns negative and you are either unvaccinated or more than six months out from your two-dose vaccine and are not yet boosted, you should still quarantine for 5 full days followed by strict mask use for an additional 5 full days.   If your COVID test returns negative and you have received your 2-dose initial vaccine as well as a booster, you should continue strict mask use for 10 full days after the exposure.  For all those exposed, best practice includes a test at day 5 after the exposure. This can be a home test or a test through one of the many testing centers throughout our community.   Masking is always advised to limit the spread of COVID. Cdc.gov is an excellent site to obtain the latest up to date recommendations regarding COVID and COVID testing.     CDC Testing and Quarantine Guidelines for patients with exposure to a known-positive COVID-19 person:  A close exposure is defined as anyone who has had an exposure (masked or unmasked) to a known COVID -19 positive person within 6 feet of someone for a cumulative total of 15 minutes or more over a 24-hour period.   Vaccinated and/or if you recently had a positive covid test within 90 days do NOT need to quarantine after contact with someone who had COVID-19 unless you develop symptoms.   Fully vaccinated people who have not had a positive test within 90 days, should get tested 3-5 days after their exposure, even if they don't have symptoms and wear a mask indoors in public for 14 days following exposure or until their test result is negative.      Unvaccinated and/or NOT had a positive test within 90 days and meet close exposure  You are required by CDC guidelines to quarantine for at least 5 days from time of  exposure followed by 5 days of strict masking. It is recommended, but not required to test after 5 days, unless you develop symptoms, in which case you should test at that time.  If you get tested after 5 days and your test is positive, your 5 day period of isolation starts the day of the positive test.    If your exposure does not meet the above definition, you can return to your normal daily activities to include social distancing, wearing a mask and frequent handwashing.      Here is a link to guidance from the CDC:  https://www.cdc.gov/media/releases/2021/s1227-isolation-quarantine-guidance.html      [facility] Testing Sites:  https://ldh.la.gov/page/3934      [facility] website with testing locations and guidance:  [email or web address]

## 2022-03-25 NOTE — ED PROVIDER NOTES
Encounter Date: 3/25/2022       History     Chief Complaint   Patient presents with    Emesis     NVD for the past several hours this morning. Pt states that last time this happened it was a reaction to his insulin a few years ago, however he has not had any recent medication changes.     Diarrhea     41 y.o. male Past Medical History:  No date: Diabetes mellitus, type 2  No date: Hyperlipidemia  No date: Hypertension  No date: Obesity  No date: NAINA (obstructive sleep apnea)     Since for evaluation of intractable nausea vomiting diarrhea starting at 2:30 a.m. last night patient notes that he has had greater than 10 nonbloody diarrheal bowel movements and vomiting greater than 10 times since 2:30 a.m..  No new food exposures notes that family member ate the same meals that he did and they are not sick.  Notes mild abdominal cramping but no real pain.  Does note increase in flatulence        Review of patient's allergies indicates:   Allergen Reactions    Influenza virus vaccine, specific Hives    Pioglitazone      Altered mood     Victoza [liraglutide] Nausea And Vomiting    Farxiga [dapagliflozin] Rash     Erectile dysfunction and rash      Past Medical History:   Diagnosis Date    Diabetes mellitus, type 2     Hyperlipidemia     Hypertension     Obesity     NAINA (obstructive sleep apnea)      Past Surgical History:   Procedure Laterality Date    ANKLE SURGERY      KNEE SURGERY      acl,mcl,pcl    left knee x 2       Family History   Problem Relation Age of Onset    Diabetes Mother     Diabetes Father     No Known Problems Brother     Autism Son     No Known Problems Daughter      Social History     Tobacco Use    Smoking status: Never Smoker    Smokeless tobacco: Never Used   Substance Use Topics    Alcohol use: Yes     Comment: 1-2 beers every 2 weeks    Drug use: No     Review of Systems   Constitutional: Negative for fever.   HENT: Negative for sore throat.    Respiratory: Negative for  shortness of breath.    Cardiovascular: Negative for chest pain.   Gastrointestinal: Positive for abdominal pain. Negative for nausea.   Genitourinary: Negative for dysuria.   Musculoskeletal: Negative for back pain.   Skin: Negative for rash.   Neurological: Negative for weakness.   Hematological: Does not bruise/bleed easily.   All other systems reviewed and are negative.      Physical Exam     Initial Vitals   BP Pulse Resp Temp SpO2   03/25/22 0550 03/25/22 0550 03/25/22 0550 03/25/22 0551 03/25/22 0550   (!) 170/106 (!) 123 20 98.9 °F (37.2 °C) 99 %      MAP       --                Physical Exam    Nursing note and vitals reviewed.  Constitutional: He appears well-developed and well-nourished.   HENT:   Head: Normocephalic and atraumatic.   Eyes: EOM are normal. Pupils are equal, round, and reactive to light.   Cardiovascular: Normal rate, regular rhythm and normal heart sounds.   Pulmonary/Chest: Effort normal. No respiratory distress. He has no wheezes. He has no rales.   Abdominal: He exhibits no distension. There is no abdominal tenderness. There is no rebound and no guarding.   Musculoskeletal:         General: No tenderness or edema.     Neurological: He is alert and oriented to person, place, and time. No cranial nerve deficit.   Skin: Skin is warm and dry.   Psychiatric: He has a normal mood and affect.     soft ntnd no g/r  Well appearing in no distress  ED Course   Procedures  Labs Reviewed   CBC W/ AUTO DIFFERENTIAL - Abnormal; Notable for the following components:       Result Value    WBC 17.96 (*)     RBC 6.57 (*)     Hemoglobin 18.5 (*)     Hematocrit 57.5 (*)     RDW 15.2 (*)     Gran # (ANC) 15.0 (*)     Immature Grans (Abs) 0.08 (*)     Mono # 1.1 (*)     Gran % 83.5 (*)     Lymph % 9.4 (*)     All other components within normal limits   COMPREHENSIVE METABOLIC PANEL - Abnormal; Notable for the following components:    CO2 22 (*)     Glucose 260 (*)     BUN 22 (*)     All other components  within normal limits   OCCULT BLOOD X 1, STOOL - Abnormal; Notable for the following components:    Occult Blood Positive (*)     All other components within normal limits   TSH - Abnormal; Notable for the following components:    TSH 8.099 (*)     All other components within normal limits   CULTURE, BLOOD   CULTURE, BLOOD   CULTURE, STOOL   ENTEROHEMORRHAGIC E.COLI   CLOSTRIDIUM DIFFICILE   CULTURE, STOOL   ENTEROHEMORRHAGIC E.COLI   LACTIC ACID, PLASMA   MAGNESIUM   PHOSPHORUS   TROPONIN I   PROCALCITONIN   LIPASE   WBC, STOOL   TSH   T4, FREE   URINALYSIS, REFLEX TO URINE CULTURE   FECAL FAT, QUALITATIVE   GIARDIA/CRYPTOSPORIDIUM (EIA)   STOOL EXAM-OVA,CYSTS,PARASITES   POCT INFLUENZA A/B MOLECULAR     EKG Readings: (Independently Interpreted)   Hr 107, sinus tach, no jonna/twi, non acute, no stemi.        Imaging Results          CT Abdomen Pelvis With Contrast (Final result)  Result time 03/25/22 07:59:36    Final result by Nazario Chakraborty MD (03/25/22 07:59:36)                 Impression:      1. Dilated small bowel loops with wall thickening and likely reactive small volume free fluid in the right lower quadrant.  No discrete transition point is identified, however there is a gradual transition to more decompressed distal small bowel in the right lower quadrant.  Findings may relate to small bowel obstruction versus infectious inflammatory enteritis.  Clinical correlation recommended.  2. Liquid stool noted throughout the colon, nonspecific finding which may be seen in setting of diarrheal illness.  3. Additional details, as provided in the body of report.      Electronically signed by: Nazario Chakraborty  Date:    03/25/2022  Time:    07:59             Narrative:    EXAMINATION:  CT ABDOMEN PELVIS WITH CONTRAST    CLINICAL HISTORY:  Abdominal abscess/infection suspected;    TECHNIQUE:  Low dose axial images, sagittal and coronal reformations were obtained from the lung bases to the pubic symphysis following the  IV administration of 100 mL of Omnipaque 350    COMPARISON:  CT abdomen and pelvis performed 11/11/2018    FINDINGS:  Lower chest: Unremarkable.    Liver: Normal contour.  Focal fat deposition in the region the falciform ligament.    Gallbladder and bile ducts: Unremarkable. No biliary ductal dilatation.    Pancreas: Unremarkable.    Spleen: Unremarkable.    Adrenals: Unremarkable.    Kidneys: Unremarkable.    Lymph nodes: Mildly prominent in number upper mesenteric lymph nodes are noted.  For reference, a portacaval node measures up to 8 mm short axis dimension (series 2, image 51).  Scattered small but prominent number retroperitoneal lymph nodes are additionally noted.    Bowel and mesentery: Hiatal hernia.  Liquid stool is noted throughout the colon, a nonspecific finding which may be seen in the setting of diarrheal illness.  There is normal caliber the appendix.There is relatively diffuse dilation of small bowel loops, measuring up to approximately 3.1 cm as measured on the in the left upper quadrant (series 601, image 67).  Associated bowel wall thickening.  No discrete transition point is identified, however there appears to be a gradual transition to decompressed distal small bowel loops in the right lower quadrant.  Small volume potentially reactive to fluid is noted about small bowel loops in the right lower quadrant.    Abdominal aorta: Unremarkable.    Inferior vena cava: Unremarkable.    Free fluid or free air: As above.  No definite free air.    Pelvis: Unremarkable.    Body wall: Fat containing left inguinal hernia.    Bones: No definite acute findings.  Relatively minor degenerative findings in the spine.                               X-Ray Chest AP Portable (Final result)  Result time 03/25/22 06:51:47    Final result by Nazario Chakraborty MD (03/25/22 06:51:47)                 Impression:      Mildly prominent interstitial markings may relate to AP portable technique, noting that a mild degree of  pulmonary vascular congestion/edema versus an atypical or viral infectious etiology could appear similar.      Electronically signed by: Nazario Chakraborty  Date:    03/25/2022  Time:    06:51             Narrative:    EXAMINATION:  XR CHEST AP PORTABLE    CLINICAL HISTORY:  Sepsis;    TECHNIQUE:  Single frontal view of the chest was performed.    COMPARISON:  Chest radiograph performed 02/16/2022    FINDINGS:  Cardiomediastinal contour appears grossly unchanged.  Nonspecific interstitial increased attenuation.  No definite pneumothorax or pleural effusion.  No acute findings identified in the visualized abdomen.  Osseous and soft tissue structures appear without definite acute abnormality.                               X-Ray Abdomen Flat And Erect (Final result)  Result time 03/25/22 06:53:07    Final result by Nazario Chakraborty MD (03/25/22 06:53:07)                 Impression:      No definite acute findings identified.      Electronically signed by: Nazario Chakraborty  Date:    03/25/2022  Time:    06:53             Narrative:    EXAMINATION:  XR ABDOMEN FLAT AND ERECT    CLINICAL HISTORY:  Vomiting, unspecified    TECHNIQUE:  Flat and erect AP views of the abdomen were performed.    COMPARISON:  None    FINDINGS:  No definite dilated loops of small or large bowel are appreciated.  No fluid levels are seen on the upright view.  No definite free air.  Phleboliths appear to project within the pelvis.  No acute osseous or soft tissue abnormalities appreciated                                 Medications   sodium chloride 0.9% bolus 2,397 mL (0 mLs Intravenous Stopped 3/25/22 0734)   ondansetron injection 8 mg (8 mg Intravenous Given 3/25/22 0629)   dicyclomine injection 20 mg (20 mg Intramuscular Given 3/25/22 0632)   piperacillin-tazobactam 4.5 g in dextrose 5 % 100 mL IVPB (ready to mix system) (0 g Intravenous Stopped 3/25/22 0929)   iohexoL (OMNIPAQUE 350) injection 100 mL (100 mLs Intravenous Given 3/25/22 0735)    ondansetron injection 8 mg (8 mg Intravenous Given 3/25/22 7181)                     CT Abdomen Pelvis With Contrast   Final Result      1. Dilated small bowel loops with wall thickening and likely reactive small volume free fluid in the right lower quadrant.  No discrete transition point is identified, however there is a gradual transition to more decompressed distal small bowel in the right lower quadrant.  Findings may relate to small bowel obstruction versus infectious inflammatory enteritis.  Clinical correlation recommended.   2. Liquid stool noted throughout the colon, nonspecific finding which may be seen in setting of diarrheal illness.   3. Additional details, as provided in the body of report.         Electronically signed by: Nazario Chakraborty   Date:    03/25/2022   Time:    07:59      X-Ray Chest AP Portable   Final Result      Mildly prominent interstitial markings may relate to AP portable technique, noting that a mild degree of pulmonary vascular congestion/edema versus an atypical or viral infectious etiology could appear similar.         Electronically signed by: Nazario Chakraborty   Date:    03/25/2022   Time:    06:51      X-Ray Abdomen Flat And Erect   Final Result      No definite acute findings identified.         Electronically signed by: Nazario Chakraborty   Date:    03/25/2022   Time:    06:53          Clinically more c/w colitis than SBO. Pt is not distended.     Will discharge pt on cipro/flagyl/nausea meds/bentyl, have cautioned him to not use imodium  Clinical Impression:   Final diagnoses:  [R00.0] Tachycardia  [R11.10, R19.7] Vomiting and diarrhea  [K52.9] Colitis (Primary)          ED Disposition Condition    Discharge Stable        ED Prescriptions     None        Follow-up Information     Follow up With Specialties Details Why Contact Info    Jovany Ford MD Internal Medicine   [address] [place] [address]  [place] [id number]  [phone]             Jose Watlers MD  03/25/22 2223

## 2022-03-25 NOTE — ED NOTES
Pt presented to the ED with C/O N/V/D/ since 0300. Pt reports generalized abd cramps and discomfort. RR even and unlabored, Pt vomited 300ml of emesis once in the ED room. Pt also had an episode of diarrhea.

## 2022-03-26 LAB
CRYPTOSP AG STL QL IA: NEGATIVE
G LAMBLIA AG STL QL IA: NEGATIVE

## 2022-03-27 LAB
E COLI SXT1 STL QL IA: NEGATIVE
E COLI SXT2 STL QL IA: NEGATIVE

## 2022-03-28 LAB
BACTERIA STL CULT: NORMAL
O+P STL MICRO: NORMAL

## 2022-03-29 LAB
BACTERIA BLD CULT: NORMAL
BACTERIA BLD CULT: NORMAL

## 2022-03-30 LAB
FAT STL QL: NORMAL
NEUTRAL FAT STL QL: NORMAL

## 2022-03-31 ENCOUNTER — CLINICAL SUPPORT (OUTPATIENT)
Dept: REHABILITATION | Facility: HOSPITAL | Age: 42
End: 2022-03-31
Payer: COMMERCIAL

## 2022-03-31 DIAGNOSIS — R68.89 DECREASED STRENGTH, ENDURANCE, AND MOBILITY: Primary | ICD-10-CM

## 2022-03-31 DIAGNOSIS — Z74.09 DECREASED STRENGTH, ENDURANCE, AND MOBILITY: Primary | ICD-10-CM

## 2022-03-31 DIAGNOSIS — R53.1 DECREASED STRENGTH, ENDURANCE, AND MOBILITY: Primary | ICD-10-CM

## 2022-03-31 PROCEDURE — 97110 THERAPEUTIC EXERCISES: CPT | Mod: PN

## 2022-03-31 NOTE — PROGRESS NOTES
[facility]   Physical Therapy Treatment Note     Name: Tony Gordillo  Clinic Number: [id number]    Therapy Diagnosis:   Encounter Diagnosis   Name Primary?    Decreased strength, endurance, and mobility Yes     Physician: Jovany Ford MD    Visit Date: 3/31/2022     Physician Orders: PT Eval and Treat      Medical Diagnosis from Referral:   U07.1 (ICD-10-CM) - COVID-19 virus infection   R06.00 (ICD-10-CM) - Dyspnea, unspecified type      Evaluation Date: 3/8/2022  Authorization Period Expiration: 12/31/2022  Plan of Care Expiration: 6/8/2022  Visit # / Visits authorized: 2/25 (+eval)  Progress Note Due: 4/8/2022    Precautions: Standard and monitor BP    Time In: 8:50 AM  Time Out: 9:30 AM   Total Billable Time: 40 minutes       SUBJECTIVE     Pt reports: Was sick last week, feeling better now. Heart monitor was not performed secondary to being in ER, sick.     He was compliant with home exercise program.  Response to previous treatment: none  Functional change: ongoing    Pain: 4/10  Location: headache/migraine    OBJECTIVE     Objective Measures updated at progress report unless specified.       TREATMENT     Tony received the treatments listed below:      received therapeutic exercises to develop strength and ROM for 40 minutes including:    Initial BP - 121/98   SpO2: 98%  HR: 86    Recumbent Bike x 15 minutes  L2 - 3 minutes    -98% SpO2   -100 HR    -RPE 1/10    L5 - 3 minutes    -96% SpO2    -112 HR    -149/99   -RPE 3/10     *1 minute rest, adjusted seat farther from peddles*    L7 - 3 minutes   -98% SpO2   - 125 HR    -RPE 7/10    *1 minute rest break*    L5- 3 minutes    -98% SpO2   -119 HR    -RPE 5/10    L2 - 3 minutes    -98% SpO2   -110 HR    -RPE 6/10    BP following recumbent bike: 147/97    Mat Exercises:  Sit to stands from mat with 5# overhead raise  3x10   -98%, 118-121 bpm, 5/10 RPE    Walking with overhead carry #15 lbs x 6 laps 25 ft, 2 trials   -98%, 118-  126 bpm, 2/10 RPE    Sled Push    -97%, 146 bpm, 6/10 RPE    BP at end of session: 156/77    Not performed:  Bridges 3x10   -98%, 97 bpm, 2/10 RPE  Clamshells 3x10 B   -96%, 91, 2/10 RPE      PATIENT EDUCATION AND HOME EXERCISES     Home Exercises Provided and Patient Education Provided     Education provided:   - continuation of HEP/walking program  -continuous monitoring of BP at home    Written Home Exercises Provided: yes. Exercises were reviewed and Tony was able to demonstrate them prior to the end of the session.  Tony demonstrated good  understanding of the education provided. See EMR under Patient Instructions for exercises provided during therapy sessions    ASSESSMENT     Pt tolerated initial treatment session well with no adverse response noted. Pt with BP noted within therapeutic range this session, challenges increased as appropriate. Pt with greatest reported challenge with increased bike resistance and sled pushes this session. Pt with drop in diastolic pressure at end of session, however, pt reporting no symptoms at this time. Pt reports this is common for him with increased HR. Pt currently scheduled for heart monitor to be placed within upcoming month. Pt remains appropriate for therapy at this time.      Tony Is progressing well towards his goals.   Pt prognosis is Good.     Pt will continue to benefit from skilled outpatient physical therapy to address the deficits listed in the problem list box on initial evaluation, provide pt/family education and to maximize pt's level of independence in the home and community environment.     Pt's spiritual, cultural and educational needs considered and pt agreeable to plan of care and goals.     Anticipated barriers to physical therapy: none    Goals:  Short Term Goals:  4 weeks  1. Pt will initiate home exercise program to improve strength, flexibility, endurance, mobility & balance to return pt to PLOF (progressing, not met)  2. Pt will tolerate 10 min on  stationary bike (I.e. Bike, NuStep) to improve endurance to assist in return to leaisure activities. (progressing, not met)  3. Pt will tolerate 2 min or greater on treadmill at 2.0 mph to improve gait speed (progressing, not met)  4. Pt will report ability to ride bike at home for > / = 5 minutes in order to improve quality of life. (progressing, not met)   5.  Pt will verbalize good recall & understanding of universal precautions to improve containment of infection and future illness (progressing, not met)  6. Pt will complete 6 minute walk test in order to improve CV endurance and tolerance to activities. (progressing, not met)     Long Term Goals: 12 weeks  1. Pt to report compliance with walking program/HEP > / = 3x per week in order to maximize gains and return to PLOF (progressing, not met)  2. Pt to perform 2 minute walk test for 450 feet or greater to improve gait speed & endurance  (progressing, not met)  3. Pt will improve 5 time sit to stand score for < / = 10 seconds in order to maximize functional strength and return to PLOF. (progressing, not met)  4. Pt will tolerate ambulating on treadmill at varying inclines for > / = 10 minutes in order to promote return to work. (progressing, not met)  5. Pt to report biking at home for 30 mins without significant fatigue in order to improve quality of life. (progressing, not met)     PLAN     Plan of care Certification: 3/8/2022 to 6/8/2022.     Outpatient Physical Therapy 2 times weekly for 12 weeks to include the following interventions: Gait Training, Manual Therapy, Moist Heat/ Ice, Neuromuscular Re-ed, Patient Education, Self Care, Therapeutic Activities and Therapeutic Exercise.     Maria Del Rosario Cruz, PT, DPT  3/31/2022

## 2022-04-04 ENCOUNTER — CLINICAL SUPPORT (OUTPATIENT)
Dept: REHABILITATION | Facility: HOSPITAL | Age: 42
End: 2022-04-04
Payer: COMMERCIAL

## 2022-04-04 DIAGNOSIS — R53.1 DECREASED STRENGTH, ENDURANCE, AND MOBILITY: Primary | ICD-10-CM

## 2022-04-04 DIAGNOSIS — Z74.09 DECREASED STRENGTH, ENDURANCE, AND MOBILITY: Primary | ICD-10-CM

## 2022-04-04 DIAGNOSIS — R68.89 DECREASED STRENGTH, ENDURANCE, AND MOBILITY: Primary | ICD-10-CM

## 2022-04-04 PROCEDURE — 97110 THERAPEUTIC EXERCISES: CPT | Mod: PN

## 2022-04-04 NOTE — PROGRESS NOTES
[facility]   Physical Therapy Treatment Note     Name: Tony Gordillo  Clinic Number: [id number]    Therapy Diagnosis:   Encounter Diagnosis   Name Primary?    Decreased strength, endurance, and mobility Yes     Physician: Jovany Ford MD    Visit Date: 4/4/2022     Physician Orders: PT Eval and Treat      Medical Diagnosis from Referral:   U07.1 (ICD-10-CM) - COVID-19 virus infection   R06.00 (ICD-10-CM) - Dyspnea, unspecified type      Evaluation Date: 3/8/2022  Authorization Period Expiration: 12/31/2022  Plan of Care Expiration: 6/8/2022  Visit # / Visits authorized: 3/25 (+eval)  Progress Note Due: 4/8/2022    Precautions: Standard and monitor BP    Time In: 11:00 AM  Time Out: 11:45 AM   Total Billable Time: 45 minutes       SUBJECTIVE     Pt reports: Yesterday was feeling more fatigued, SOB. Doing better today.    He was compliant with home exercise program.  Response to previous treatment: none  Functional change: ongoing    Pain: 4/10  Location: headache/migraine    OBJECTIVE     Objective Measures updated at progress report unless specified.       TREATMENT     Tony received the treatments listed below:      received therapeutic exercises to develop strength and ROM for 45 minutes including:    Initial BP - 157/98  SpO2: 95%  HR: 115    Recumbent Bike x 13.5 minutes  L2 - 3 minutes    -98% SpO2   -100 HR    -RPE 1/10    L5 - 3 minutes    -98% SpO2    -135 HR    -RPE 4/10     L7 - 1.5 minutes   -97% SpO2   -143 HR    -RPE 7/10     *2 minute rest break* (96%, 122 bpm)    L5- 3 minutes    -95% SpO2   -131 HR    -RPE /410    L2 - 3 minutes    -95% SpO2   -130 HR    -RPE 2/10    BP following recumbent bike: 149/97    Mat Exercises:  Sit to stands from mat with 5# overhead raise  2x10   -97%, 134 bpm, 3/10 RPE    Walking around gym with equipment on: 5 minutes total   After 2 minutes - 96%, 140 bpm  After 5 minutes - 93%, 145 bpm  After 30 second seated break - 99%, 135  bpm    Sled Push 25#, with equipment donned (jacket and pants/boots)   -97%, 145 bpm, 6/10 RPE    BP at end of session: 158/96    Not performed:  Bridges 3x10   -98%, 97 bpm, 2/10 RPE  Clamshells 3x10 B   -96%, 91, 2/10 RPE    Walking with overhead carry #15 lbs x 6 laps 25 ft, 2 trials   -98%, 118- 126 bpm, 2/10 RPE    PATIENT EDUCATION AND HOME EXERCISES     Home Exercises Provided and Patient Education Provided     Education provided:   - continuation of HEP/walking program  -continuous monitoring of BP at home    Written Home Exercises Provided: yes. Exercises were reviewed and Tony was able to demonstrate them prior to the end of the session.  Tony demonstrated good  understanding of the education provided. See EMR under Patient Instructions for exercises provided during therapy sessions    ASSESSMENT     Pt tolerated initial treatment session well with no adverse response noted. Pt with BP noted within therapeutic range this session, however, higher resting HR noted today throughout. Pt requesting break after about 1.5 minutes on bike at L7 today. Pt with HR at 80% of MR max at this time.  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 Pt HR approaching 80% of HR max but within tolerable range during sled pushes.  Pt currently scheduled for heart monitor to be placed within upcoming days. Pt remains appropriate for therapy at this time.      Tony Is progressing well towards his goals.   Pt prognosis is Good.     Pt will continue to benefit from skilled outpatient physical therapy to address the deficits listed in the problem list box on initial evaluation, provide pt/family education and to maximize pt's level of independence in the home and community environment.     Pt's spiritual, cultural and educational needs considered and pt agreeable to plan of care and goals.     Anticipated barriers to physical therapy: none    Goals:  Short Term  Goals:  4 weeks  1. Pt will initiate home exercise program to improve strength, flexibility, endurance, mobility & balance to return pt to PLOF (progressing, not met)  2. Pt will tolerate 10 min on stationary bike (I.e. Bike, NuStep) to improve endurance to assist in return to leaisure activities. (progressing, not met)  3. Pt will tolerate 2 min or greater on treadmill at 2.0 mph to improve gait speed (progressing, not met)  4. Pt will report ability to ride bike at home for > / = 5 minutes in order to improve quality of life. (progressing, not met)   5.  Pt will verbalize good recall & understanding of universal precautions to improve containment of infection and future illness (progressing, not met)  6. Pt will complete 6 minute walk test in order to improve CV endurance and tolerance to activities. (progressing, not met)     Long Term Goals: 12 weeks  1. Pt to report compliance with walking program/HEP > / = 3x per week in order to maximize gains and return to PLOF (progressing, not met)  2. Pt to perform 2 minute walk test for 450 feet or greater to improve gait speed & endurance  (progressing, not met)  3. Pt will improve 5 time sit to stand score for < / = 10 seconds in order to maximize functional strength and return to PLOF. (progressing, not met)  4. Pt will tolerate ambulating on treadmill at varying inclines for > / = 10 minutes in order to promote return to work. (progressing, not met)  5. Pt to report biking at home for 30 mins without significant fatigue in order to improve quality of life. (progressing, not met)     PLAN     Plan of care Certification: 3/8/2022 to 6/8/2022.     Outpatient Physical Therapy 2 times weekly for 12 weeks to include the following interventions: Gait Training, Manual Therapy, Moist Heat/ Ice, Neuromuscular Re-ed, Patient Education, Self Care, Therapeutic Activities and Therapeutic Exercise.     Maria Del Rosario Cruz, PT, DPT  4/4/2022

## 2022-04-10 ENCOUNTER — PATIENT MESSAGE (OUTPATIENT)
Dept: FAMILY MEDICINE | Facility: CLINIC | Age: 42
End: 2022-04-10
Payer: COMMERCIAL

## 2022-04-11 ENCOUNTER — TELEPHONE (OUTPATIENT)
Dept: CARDIOLOGY | Facility: HOSPITAL | Age: 42
End: 2022-04-11
Payer: COMMERCIAL

## 2022-04-13 ENCOUNTER — HOSPITAL ENCOUNTER (OUTPATIENT)
Dept: CARDIOLOGY | Facility: HOSPITAL | Age: 42
Discharge: HOME | End: 2022-04-13
Attending: INTERNAL MEDICINE
Payer: COMMERCIAL

## 2022-04-13 VITALS
BODY MASS INDEX: 39.36 KG/M2 | DIASTOLIC BLOOD PRESSURE: 84 MMHG | SYSTOLIC BLOOD PRESSURE: 126 MMHG | WEIGHT: 297 LBS | HEART RATE: 100 BPM | HEIGHT: 73 IN

## 2022-04-13 DIAGNOSIS — R06.00 DYSPNEA, UNSPECIFIED TYPE: ICD-10-CM

## 2022-04-13 LAB
CFR FLOW - ANTERIOR: 2.57
CFR FLOW - INFERIOR: 3.1
CFR FLOW - LATERAL: 2.63
CFR FLOW - MAX: 3.9
CFR FLOW - MIN: 1.85
CFR FLOW - SEPTAL: 3.06
CFR FLOW - WHOLE HEART: 2.84
CV PHARM DOSE: 0.4 MG
CV STRESS BASE HR: 100 BPM
DIASTOLIC BLOOD PRESSURE: 84 MMHG
EJECTION FRACTION- HIGH: 65 %
END DIASTOLIC INDEX-HIGH: 153 ML/M2
END DIASTOLIC INDEX-LOW: 93 ML/M2
END SYSTOLIC INDEX-HIGH: 71 ML/M2
END SYSTOLIC INDEX-LOW: 31 ML/M2
NUC REST DIASTOLIC VOLUME INDEX: 125
NUC REST EJECTION FRACTION: 60
NUC REST SYSTOLIC VOLUME INDEX: 50
NUC STRESS DIASTOLIC VOLUME INDEX: 133
NUC STRESS EJECTION FRACTION: 68 %
NUC STRESS SYSTOLIC VOLUME INDEX: 43
OHS CV CPX 85 PERCENT MAX PREDICTED HEART RATE MALE: 151
OHS CV CPX MAX PREDICTED HEART RATE: 178
OHS CV CPX PATIENT IS FEMALE: 0
OHS CV CPX PATIENT IS MALE: 1
OHS CV CPX PEAK DIASTOLIC BLOOD PRESSURE: 52 MMHG
OHS CV CPX PEAK HEAR RATE: 107 BPM
OHS CV CPX PEAK RATE PRESSURE PRODUCT: NORMAL
OHS CV CPX PEAK SYSTOLIC BLOOD PRESSURE: 113 MMHG
OHS CV CPX PERCENT MAX PREDICTED HEART RATE ACHIEVED: 60
OHS CV CPX RATE PRESSURE PRODUCT PRESENTING: NORMAL
REST FLOW - ANTERIOR: 0.67 CC/MIN/G
REST FLOW - INFERIOR: 0.61 CC/MIN/G
REST FLOW - LATERAL: 0.74 CC/MIN/G
REST FLOW - MAX: 0.93 CC/MIN/G
REST FLOW - MIN: 0.29 CC/MIN/G
REST FLOW - SEPTAL: 0.44 CC/MIN/G
REST FLOW - WHOLE HEART: 0.62 CC/MIN/G
RETIRED EF AND QEF - SEE NOTES: 53 %
STRESS FLOW - ANTERIOR: 1.67 CC/MIN/G
STRESS FLOW - INFERIOR: 1.89 CC/MIN/G
STRESS FLOW - LATERAL: 1.94 CC/MIN/G
STRESS FLOW - MAX: 2.41 CC/MIN/G
STRESS FLOW - MIN: 0.57 CC/MIN/G
STRESS FLOW - SEPTAL: 1.34 CC/MIN/G
STRESS FLOW - WHOLE HEART: 1.71 CC/MIN/G
SYSTOLIC BLOOD PRESSURE: 126 MMHG

## 2022-04-13 PROCEDURE — 93018 CV STRESS TEST I&R ONLY: CPT | Mod: ,,, | Performed by: INTERNAL MEDICINE

## 2022-04-13 PROCEDURE — 93018 CARDIAC PET SCAN STRESS (CUPID ONLY): ICD-10-PCS | Mod: ,,, | Performed by: INTERNAL MEDICINE

## 2022-04-13 PROCEDURE — 78434 CARDIAC PET SCAN STRESS (CUPID ONLY): ICD-10-PCS | Mod: 26,,, | Performed by: INTERNAL MEDICINE

## 2022-04-13 PROCEDURE — 78434 AQMBF PET REST & RX STRESS: CPT | Mod: 26,,, | Performed by: INTERNAL MEDICINE

## 2022-04-13 PROCEDURE — 93016 CARDIAC PET SCAN STRESS (CUPID ONLY): ICD-10-PCS | Mod: ,,, | Performed by: INTERNAL MEDICINE

## 2022-04-13 PROCEDURE — 78431 MYOCRD IMG PET RST&STRS CT: CPT | Mod: 26,,, | Performed by: INTERNAL MEDICINE

## 2022-04-13 PROCEDURE — 63600175 PHARM REV CODE 636 W HCPCS: Performed by: INTERNAL MEDICINE

## 2022-04-13 PROCEDURE — 78431 CARDIAC PET SCAN STRESS (CUPID ONLY): ICD-10-PCS | Mod: 26,,, | Performed by: INTERNAL MEDICINE

## 2022-04-13 PROCEDURE — 78434 AQMBF PET REST & RX STRESS: CPT

## 2022-04-13 PROCEDURE — 93016 CV STRESS TEST SUPVJ ONLY: CPT | Mod: ,,, | Performed by: INTERNAL MEDICINE

## 2022-04-13 RX ORDER — REGADENOSON 0.08 MG/ML
0.4 INJECTION, SOLUTION INTRAVENOUS ONCE
Status: COMPLETED | OUTPATIENT
Start: 2022-04-13 | End: 2022-04-13

## 2022-04-13 RX ORDER — AMINOPHYLLINE 25 MG/ML
75 INJECTION, SOLUTION INTRAVENOUS ONCE
Status: COMPLETED | OUTPATIENT
Start: 2022-04-13 | End: 2022-04-13

## 2022-04-13 RX ADMIN — AMINOPHYLLINE 75 MG: 25 INJECTION, SOLUTION INTRAVENOUS at 12:04

## 2022-04-13 RX ADMIN — REGADENOSON 0.4 MG: 0.08 INJECTION, SOLUTION INTRAVENOUS at 12:04

## 2022-04-14 NOTE — PROGRESS NOTES
This note was created by combination of typed  and M-Modal dictation.  Transcription errors may be present.  If there are any questions, please contact me.    Assessment and Plan:   COVID-19 long hauler  Dyspnea, unspecified type  -still working with physical therapy.  At last visit with them has not met short-term or long-term goals.  Still not cleared to work.  Tentative return to work date around June.  Based on 4 week as well as 12 week goals from physical therapy.  Because of possible delay in getting back into physical therapy may need to extend out the leave  trelegy not covered by insurance.    Colitis  -symptoms have improved but CT scan with colitis, occult blood positive.  Referral to GI for evaluation and likely needs colonoscopy.  -     Ambulatory referral/consult to Gastroenterology; Future; Expected date: 04/25/2022    Type 2 diabetes mellitus without complication, with long-term current use of insulin  Morbid obesity  -followed by diabetes nurse practitioner.  Making adjustments in insulin regimen.    Hyperlipidemia LDL goal <70  Hyperlipidemia, unspecified hyperlipidemia type  -coronary artery calcifications seen on PET scan.  Increase atorvastatin from 20 mg to 40 mg  -     atorvastatin (LIPITOR) 40 MG tablet; Take 1 tablet (40 mg total) by mouth once daily.  Dispense: 90 tablet; Refill: 3    Hypothyroidism due to acquired atrophy of thyroid  -TSH elevated at ED visit.  Check future labs.  Previous to that had been good.    Essential hypertension  -stable    Has not heard back from bariatrics yet.  Asked him to stop off at referrals desk to inquire.    Medications Discontinued During This Encounter   Medication Reason    atorvastatin (LIPITOR) 20 MG tablet        meds sent this encounter:  Medications Ordered This Encounter   Medications    atorvastatin (LIPITOR) 40 MG tablet     Sig: Take 1 tablet (40 mg total) by mouth once daily.     Dispense:  90 tablet     Refill:  3     Increased  dose       Follow Up: No follow-ups on file.  Follow-up 1 month.  In the interim to follow-up with Cardiology.  And to reestablish physical therapy.    Subjective:     Chief Complaint   Patient presents with    Diabetes       HPI  Tony is a 41 y.o. male, last appointment with this clinic was 3/16/2022.  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  Abnormal stress test.  Plan for out of work until June 16. Working with physical therapy.  Also working with pulmonology and Cardiology.  Symbicort with minimal improvement so trial of Trelegy.  Hypertension just started higher dose of lisinopril.  Obesity, apparently qualifies/is covered for bariatric surgery.  Referral submitted.  Abnormal stress test cardiology ordered PET-CT and Holter monitor.    Trelegy not covered by insurance    ED 3/25.  CT suspicious for colitis.  Stool studies positive for occult blood otherwise negative for infection.  Discharged on Cipro and Flagyl    04/13/2022 cardiac PET stress negative for any significant perfusion abnormalities.  CT attenuation images show mild diffuse coronary calcifications in the LAD    Had gastroenteritis  And then end of last month, felt like he got the flu. Did not get dx'd but fever, joint and muscle pain.  Symptoms duration about 5 days.   He started getting burping, taste of sulfur.  Then changed to diarrhea.  And then 3-4 days later got the flu symptoms.    Has not seen GI before. Would be agreeable for referral.     On statin would increase dose - discussed findings on PET scan.  He would be agreeable.  Has  f/u with cardiology.     Last PT 4/4. Had to miss due to acute illness - the colitis and the flu like symptoms.   Because he missed appointments it's going to take some time to get back in to PT.  About 6 weeks he estimates.   Has been trying to push himself at home.  Notes HR tends to be high, in the high 90s.      Adjusting insulin.      Patient Care Team:  Jovany Ford MD as PCP - General (Internal Medicine)  Janneth Johnson RN (Inactive) as Registered Nurse (Diabetes)  Rocky Hewitt MD as Consulting Physician (Ophthalmology)  Preethi Monaco RD as Dietitian (Nutrition)  River Montiel MA as Care Coordinator  Christofer Rowley MD as Consulting Physician (Orthopedic Surgery)  Singh Rizo MD as Consulting Physician (Sports Medicine)  Shilpa Gordon NP as Nurse Practitioner (Urology)  Nicole Wynn MD (Family Medicine)    Patient Active Problem List    Diagnosis Date Noted    Decreased strength, endurance, and mobility 03/08/2022    Dyspnea 02/18/2022      started after covid 19. cxr unremarkable.  Clinically improve with symbicort.  PFT with restrictive physiology with preserved dlco c/w habitus.  Stress echo with ?ischemic changes on echo but ST changes on ekg.        Rib pain on left side 08/08/2021    Right foot pain 10/10/2019    Decreased strength of lower extremity 10/10/2019    Decreased range of motion of both ankles 10/10/2019    Gait abnormality 10/10/2019    Low testosterone in male; pituitary axis normal. MRI negative. 11/2019 started on testosterone 09/04/2019 11/06/2019 MRI pituitary with and without contrast from [facility]  Impression:  1.  No pituitary mass seen.  2.  Absence of the septum pellucidum.      NAINA (obstructive sleep apnea) 8/2019 sleep study A[facility] 11      -ahi of 11.  Stopped apap for over month due to gasping sensation.  ?excessive leakage a/w nasal congestion while having covid 19.  Nasal pantency is adequate.  Recommend resumption  of apap.  However, APAP is being recall by [employer]  -we discussed at length about [employer] recall.  Patient already register his apap  -will switch to nasal pillow to alleviate pressure on ridge of nose.        Acute bilateral low back pain without sciatica 08/10/2019    Non-seasonal allergic rhinitis; avoid antihistamines per opthalmology 11/09/2018    Migraine with aura and without status migrainosus, not intractable propranolol SE angry; topamax not helpful; imitrex ineffective; daith ear piercing helps 09/28/2018    Type 2 diabetes mellitus without complication, with long-term current use of insulin     Essential hypertension     Hypothyroidism 07/29/2015    Hyperlipidemia LDL goal <70 06/03/2015    Insulin long-term use 06/03/2015    Tinea pedis 06/03/2015    Morbid obesity 06/03/2015       PAST MEDICAL PROBLEMS, PAST SURGICAL HISTORY: please see relevant portions of the electronic medical record    ALLERGIES AND MEDICATIONS: updated and reviewed.  Review of patient's allergies indicates:   Allergen Reactions    Influenza virus vaccine, specific Hives    Pioglitazone      Altered mood     Victoza [liraglutide] Nausea And Vomiting    Farxiga [dapagliflozin] Rash     Erectile dysfunction and rash        Medication List with Changes/Refills   Current Medications    ATORVASTATIN (LIPITOR) 20 MG TABLET    Take 1 tablet (20 mg total) by mouth once daily.    AZELASTINE 205.5 MCG (0.15 %) SPRY    azelastine 205.5 mcg (0.15 %) nasal spray  INHALE 2 SPRAYS TO EACH NOSTRIL TWICE A DAY    BLOOD SUGAR DIAGNOSTIC STRP    To check BG 4 times daily, to use with insurance preferred meter    BLOOD SUGAR DIAGNOSTIC STRP    OneTouch Ultra Test strips    BLOOD-GLUCOSE METER KIT    OneTouch Ultra2 Meter kit    BLOOD-GLUCOSE METER,CONTINUOUS (DEXCOM G6 ) MISC    Use with dexcom G6 system    BLOOD-GLUCOSE SENSOR (DEXCOM G6 SENSOR) CAT    Change sensor every 10 days    BLOOD-GLUCOSE TRANSMITTER (DEXCOM G6  TRANSMITTER) CAT    Change every 3 months    BUTALBITAL-ACETAMINOPHEN-CAFFEINE -40 MG (FIORICET, ESGIC) -40 MG PER TABLET    Take 1 tablet by mouth every 4 (four) hours as needed for Headaches.    DICLOFENAC SODIUM (VOLTAREN) 1 % GEL    Apply the gel (2 g) to the affected area 4 times daily. Do not apply more than 8 g daily to any one affected joint    DULAGLUTIDE (TRULICITY) 3 MG/0.5 ML PEN INJECTOR    Inject 3 mg into the skin every 7 days.    EMPAGLIFLOZIN (JARDIANCE) 25 MG TABLET    Take 1 tablet (25 mg total) by mouth once daily.    FENOFIBRATE 160 MG TAB    fenofibrate 160 mg tablet    FLUTICASONE PROPIONATE (FLONASE) 50 MCG/ACTUATION NASAL SPRAY    fluticasone propionate 50 mcg/actuation nasal spray,suspension    FLUTICASONE-UMECLIDIN-VILANTER (TRELEGY ELLIPTA) 200-62.5-25 MCG INHALER    Inhale 1 puff into the lungs once daily. Stop symbicort    IBUPROFEN (ADVIL,MOTRIN) 800 MG TABLET    Take 1 tablet (800 mg total) by mouth every 8 (eight) hours as needed for Pain.    INSULIN ASPART, NIACINAMIDE, (FIASP FLEXTOUCH U-100 INSULIN) 100 UNIT/ML (3 ML) INPN    Use as needed with sliding scale. Max daily dose 30 units/day    INSULIN GLARGINE U-300 CONC (TOUJEO MAX U-300 SOLOSTAR) 300 UNIT/ML (3 ML) INSULIN PEN    Inject 30 Units into the skin once daily.    INSULIN NPH ISOPH U-100 HUMAN (NOVOLIN N FLEXPEN) 100 UNIT/ML (3 ML) INPN    Inject 14 units once daily at night 8 pm.    KETOCONAZOLE (NIZORAL) 2 % CREAM    Apply topically once daily.    LANCETS MISC    To check BG 4 times daily, to use with insurance preferred meter    LEVOTHYROXINE (SYNTHROID) 50 MCG TABLET    TAKE 1 TABLET (50 MCG TOTAL) BY MOUTH BEFORE BREAKFAST.    LISINOPRIL (PRINIVIL,ZESTRIL) 20 MG TABLET    Take 1 tablet (20 mg total) by mouth once daily.    LORATADINE (CLARITIN) 10 MG TABLET    TAKE 1 TABLET BY MOUTH EVERY DAY    MONTELUKAST (SINGULAIR) 10 MG TABLET    TAKE 1 TABLET BY MOUTH EVERY EVENING    ONDANSETRON (ZOFRAN-ODT) 8 MG  "TBDL    Take 1 tablet (8 mg total) by mouth every 6 (six) hours as needed (n/v).    PEN NEEDLE, DIABETIC (BD ULTRA-FINE KEN PEN NEEDLE) 32 GAUGE X 5/32" NDLE    1 Device by Misc.(Non-Drug; Combo Route) route 5 (five) times daily.    PROMETHAZINE (PHENERGAN) 25 MG SUPPOSITORY    Place 1 suppository (25 mg total) rectally every 6 (six) hours as needed for Nausea.    SYRINGE, DISPOSABLE, 1 ML SYRG    Testosterone every 2 weeks        Objective:   Physical Exam   Vitals:    04/18/22 0852   BP: 126/76   BP Location: Right arm   Patient Position: Sitting   BP Method: Large (Manual)   Pulse: 97   Temp: 98.1 °F (36.7 °C)   TempSrc: Oral   SpO2: 96%   Weight: 133.3 kg (293 lb 15.7 oz)   Height: 6' 1" (1.854 m)    Body mass index is 38.79 kg/m².  Weight: 133.3 kg (293 lb 15.7 oz)   Height: 6' 1" (185.4 cm)     Physical Exam  Constitutional:       General: He is not in acute distress.     Appearance: He is well-developed.   Cardiovascular:      Rate and Rhythm: Normal rate and regular rhythm.      Heart sounds: Normal heart sounds. No murmur heard.  Pulmonary:      Effort: Pulmonary effort is normal.      Breath sounds: Normal breath sounds.   Musculoskeletal:         General: Normal range of motion.   Skin:     General: Skin is warm and dry.   Neurological:      Mental Status: He is alert and oriented to person, place, and time.      Coordination: Coordination normal.   Psychiatric:         Behavior: Behavior normal.         Thought Content: Thought content normal.         "

## 2022-04-18 ENCOUNTER — OFFICE VISIT (OUTPATIENT)
Dept: FAMILY MEDICINE | Facility: CLINIC | Age: 42
End: 2022-04-18
Payer: COMMERCIAL

## 2022-04-18 VITALS
BODY MASS INDEX: 38.97 KG/M2 | OXYGEN SATURATION: 96 % | SYSTOLIC BLOOD PRESSURE: 126 MMHG | WEIGHT: 294 LBS | TEMPERATURE: 98 F | HEIGHT: 73 IN | DIASTOLIC BLOOD PRESSURE: 76 MMHG | HEART RATE: 97 BPM

## 2022-04-18 DIAGNOSIS — E11.9 TYPE 2 DIABETES MELLITUS WITHOUT COMPLICATION, WITH LONG-TERM CURRENT USE OF INSULIN: ICD-10-CM

## 2022-04-18 DIAGNOSIS — I10 ESSENTIAL HYPERTENSION: ICD-10-CM

## 2022-04-18 DIAGNOSIS — K52.9 COLITIS: ICD-10-CM

## 2022-04-18 DIAGNOSIS — E78.5 HYPERLIPIDEMIA LDL GOAL <70: ICD-10-CM

## 2022-04-18 DIAGNOSIS — E03.4 HYPOTHYROIDISM DUE TO ACQUIRED ATROPHY OF THYROID: ICD-10-CM

## 2022-04-18 DIAGNOSIS — U09.9 COVID-19 LONG HAULER: Primary | ICD-10-CM

## 2022-04-18 DIAGNOSIS — E78.5 HYPERLIPIDEMIA, UNSPECIFIED HYPERLIPIDEMIA TYPE: ICD-10-CM

## 2022-04-18 DIAGNOSIS — R06.00 DYSPNEA, UNSPECIFIED TYPE: ICD-10-CM

## 2022-04-18 DIAGNOSIS — E66.01 MORBID OBESITY: ICD-10-CM

## 2022-04-18 DIAGNOSIS — Z79.4 TYPE 2 DIABETES MELLITUS WITHOUT COMPLICATION, WITH LONG-TERM CURRENT USE OF INSULIN: ICD-10-CM

## 2022-04-18 PROCEDURE — 99214 PR OFFICE/OUTPT VISIT, EST, LEVL IV, 30-39 MIN: ICD-10-PCS | Mod: S$GLB,,, | Performed by: INTERNAL MEDICINE

## 2022-04-18 PROCEDURE — 99999 PR PBB SHADOW E&M-EST. PATIENT-LVL IV: CPT | Mod: PBBFAC,,, | Performed by: INTERNAL MEDICINE

## 2022-04-18 PROCEDURE — 99214 OFFICE O/P EST MOD 30 MIN: CPT | Mod: S$GLB,,, | Performed by: INTERNAL MEDICINE

## 2022-04-18 PROCEDURE — 99999 PR PBB SHADOW E&M-EST. PATIENT-LVL IV: ICD-10-PCS | Mod: PBBFAC,,, | Performed by: INTERNAL MEDICINE

## 2022-04-18 RX ORDER — ATORVASTATIN CALCIUM 40 MG/1
40 TABLET, FILM COATED ORAL DAILY
Qty: 90 TABLET | Refills: 3 | Status: SHIPPED | OUTPATIENT
Start: 2022-04-18 | End: 2023-10-16

## 2022-04-19 ENCOUNTER — TELEPHONE (OUTPATIENT)
Dept: FAMILY MEDICINE | Facility: CLINIC | Age: 42
End: 2022-04-19
Payer: COMMERCIAL

## 2022-04-21 ENCOUNTER — PATIENT OUTREACH (OUTPATIENT)
Dept: ADMINISTRATIVE | Facility: OTHER | Age: 42
End: 2022-04-21
Payer: COMMERCIAL

## 2022-04-21 NOTE — PROGRESS NOTES
Health Maintenance Due   Topic Date Due    Eye Exam  12/23/2020     Updates were requested from care everywhere.  Chart was reviewed for overdue Proactive [facility] Encounters (SUZAN) topics (CRS, Breast Cancer Screening, Eye exam)  Health Maintenance has been updated.  LINKS immunization registry triggered.  Immunizations were reconciled.

## 2022-04-22 ENCOUNTER — OFFICE VISIT (OUTPATIENT)
Dept: CARDIOLOGY | Facility: CLINIC | Age: 42
End: 2022-04-22
Payer: COMMERCIAL

## 2022-04-22 VITALS
DIASTOLIC BLOOD PRESSURE: 82 MMHG | WEIGHT: 291 LBS | SYSTOLIC BLOOD PRESSURE: 132 MMHG | OXYGEN SATURATION: 97 % | HEART RATE: 91 BPM | RESPIRATION RATE: 15 BRPM | BODY MASS INDEX: 38.57 KG/M2 | HEIGHT: 73 IN

## 2022-04-22 DIAGNOSIS — G43.109 MIGRAINE WITH AURA AND WITHOUT STATUS MIGRAINOSUS, NOT INTRACTABLE: ICD-10-CM

## 2022-04-22 DIAGNOSIS — Z79.4 TYPE 2 DIABETES MELLITUS WITHOUT COMPLICATION, WITH LONG-TERM CURRENT USE OF INSULIN: ICD-10-CM

## 2022-04-22 DIAGNOSIS — I10 ESSENTIAL HYPERTENSION: ICD-10-CM

## 2022-04-22 DIAGNOSIS — R29.898 DECREASED STRENGTH OF LOWER EXTREMITY: ICD-10-CM

## 2022-04-22 DIAGNOSIS — G47.33 OSA (OBSTRUCTIVE SLEEP APNEA): ICD-10-CM

## 2022-04-22 DIAGNOSIS — E78.5 HYPERLIPIDEMIA LDL GOAL <70: Primary | ICD-10-CM

## 2022-04-22 DIAGNOSIS — E11.9 TYPE 2 DIABETES MELLITUS WITHOUT COMPLICATION, WITH LONG-TERM CURRENT USE OF INSULIN: ICD-10-CM

## 2022-04-22 DIAGNOSIS — R06.00 DYSPNEA, UNSPECIFIED TYPE: ICD-10-CM

## 2022-04-22 DIAGNOSIS — Z79.4 INSULIN LONG-TERM USE: ICD-10-CM

## 2022-04-22 PROCEDURE — 99999 PR PBB SHADOW E&M-EST. PATIENT-LVL III: ICD-10-PCS | Mod: PBBFAC,,, | Performed by: INTERNAL MEDICINE

## 2022-04-22 PROCEDURE — 99214 PR OFFICE/OUTPT VISIT, EST, LEVL IV, 30-39 MIN: ICD-10-PCS | Mod: S$GLB,,, | Performed by: INTERNAL MEDICINE

## 2022-04-22 PROCEDURE — 99999 PR PBB SHADOW E&M-EST. PATIENT-LVL III: CPT | Mod: PBBFAC,,, | Performed by: INTERNAL MEDICINE

## 2022-04-22 PROCEDURE — 99214 OFFICE O/P EST MOD 30 MIN: CPT | Mod: S$GLB,,, | Performed by: INTERNAL MEDICINE

## 2022-04-22 NOTE — PROGRESS NOTES
CARDIOVASCULAR CONSULTATION    REASON FOR CONSULT:   Tony Gordillo is a 41 y.o. male who presents for chest pain.      HISTORY OF PRESENT ILLNESS:     Patient is a pleasant 41-year-old man.  Main complaint is dyspnea on exertion and atypical chest pain.  Had a stress echo done.  Echo portion did not show any significant ischemia, EKG post abnormal.  It was a pharmacological stress echo because patient cannot run because of knee on knee.  States that he had COVID infection in December and since then his exercise tolerance has decreased significantly.  He can hardly walk and then has to stop because of shortness of breath.  Occasional gets chest heaviness when he gets very short of breath on exertion.      · There were no arrhythmias during stress.  · Concentric hypertrophy and normal systolic function.  · The ECG portion of this study is positive for myocardial ischemia.  · The estimated ejection fraction is 60%.  · Normal left ventricular diastolic function.  · Normal right ventricular size with normal right ventricular systolic function.  · Mild left atrial enlargement.  · The stress echo portion of this study is negative for myocardial ischemia.    Notes from April 2022    Patient here for follow-up.  PET scan did not show any significant coronary artery stenosis.  It was suggestive of mild endothelial dysfunction.  Patient states that his dyspnea on exertion predominantly got worse after he got the COVID.  He is in physical therapy for that.        There are no clinically significant perfusion abnormalities. There is a small (<10%) amount of mild resting heterogeneity in the basal to apical septal wall(s) that improves with stress consistent with endothelial dysfunction secondary to HTN, HLD, and DM.    The whole heart absolute myocardial perfusion values averaged 0.62 cc/min/g at rest, which is normal; 1.71 cc/min/g at stress, which is mildly reduced; and CFR is 2.84 , which is low normal.    CT  attenuation images demonstrate mild diffuse coronary calcifications in the LAD territory and no aortic calcifications.    The gated perfusion images showed an ejection fraction of 60% at rest and 68% during stress. A normal ejection fraction is greater than 53%.    The wall motion is normal at rest and during stress.    The LV cavity size is normal at rest and stress.    The EKG portion of this study is negative for ischemia.    There were no arrhythmias during stress.    The patient reported no chest pain during the stress test.    There are no prior studies for comparison.      PAST MEDICAL HISTORY:     Past Medical History:   Diagnosis Date    Diabetes mellitus, type 2     Hyperlipidemia     Hypertension     Obesity     NAINA (obstructive sleep apnea)        PAST SURGICAL HISTORY:     Past Surgical History:   Procedure Laterality Date    ANKLE SURGERY      KNEE SURGERY      acl,mcl,pcl    left knee x 2         ALLERGIES AND MEDICATION:     Review of patient's allergies indicates:   Allergen Reactions    Influenza virus vaccine, specific Hives    Pioglitazone      Altered mood     Victoza [liraglutide] Nausea And Vomiting    Farxiga [dapagliflozin] Rash     Erectile dysfunction and rash         Medication List          Accurate as of April 22, 2022  9:39 AM. If you have any questions, ask your nurse or doctor.            CONTINUE taking these medications    atorvastatin 40 MG tablet  Commonly known as: LIPITOR  Take 1 tablet (40 mg total) by mouth once daily.     azelastine 205.5 mcg (0.15 %) Spry  azelastine 205.5 mcg (0.15 %) nasal spray  INHALE 2 SPRAYS TO EACH NOSTRIL TWICE A DAY     * blood sugar diagnostic Strp     * blood sugar diagnostic Strp  To check BG 4 times daily, to use with insurance preferred meter     blood-glucose meter kit     butalbital-acetaminophen-caffeine -40 mg -40 mg per tablet  Commonly known as: FIORICET, ESGIC  Take 1 tablet by mouth every 4 (four) hours  as needed for Headaches.     DEXCOM G6  Misc  Generic drug: blood-glucose meter,continuous  Use with dexcom G6 system     DEXCOM G6 SENSOR Filomena  Generic drug: blood-glucose sensor  Change sensor every 10 days     DEXCOM G6 TRANSMITTER Filomena  Generic drug: blood-glucose transmitter  Change every 3 months     diclofenac sodium 1 % Gel  Commonly known as: VOLTAREN  Apply the gel (2 g) to the affected area 4 times daily. Do not apply more than 8 g daily to any one affected joint     fenofibrate 160 MG Tab     FIASP FLEXTOUCH U-100 INSULIN 100 unit/mL (3 mL) Inpn  Generic drug: insulin aspart (niacinamide)  Use as needed with sliding scale. Max daily dose 30 units/day     fluticasone propionate 50 mcg/actuation nasal spray  Commonly known as: FLONASE  fluticasone propionate 50 mcg/actuation nasal spray,suspension     ibuprofen 800 MG tablet  Commonly known as: ADVIL,MOTRIN  Take 1 tablet (800 mg total) by mouth every 8 (eight) hours as needed for Pain.     JARDIANCE 25 mg tablet  Generic drug: empagliflozin  Take 1 tablet (25 mg total) by mouth once daily.     ketoconazole 2 % cream  Commonly known as: NIZORAL  Apply topically once daily.     lancets [facility]  To check BG 4 times daily, to use with insurance preferred meter     levothyroxine 50 MCG tablet  Commonly known as: SYNTHROID  TAKE 1 TABLET (50 MCG TOTAL) BY MOUTH BEFORE BREAKFAST.     lisinopriL 20 MG tablet  Commonly known as: PRINIVIL,ZESTRIL  Take 1 tablet (20 mg total) by mouth once daily.     loratadine 10 mg tablet  Commonly known as: CLARITIN  TAKE 1 TABLET BY MOUTH EVERY DAY     montelukast 10 mg tablet  Commonly known as: SINGULAIR  TAKE 1 TABLET BY MOUTH EVERY EVENING     NovoLIN N Flexpen 100 unit/mL (3 mL) Inpn  Generic drug: insulin NPH isoph U-100 human  Inject 14 units once daily at night 8 pm.     ondansetron 8 MG Tbdl  Commonly known as: ZOFRAN-ODT  Take 1 tablet (8 mg total) by mouth every 6 (six) hours as needed (n/v).     pen needle,  "diabetic 32 gauge x 5/32" Ndle  Commonly known as: BD ULTRA-FINE KEN PEN NEEDLE  1 Device by Misc.(Non-Drug; Combo Route) route 5 (five) times daily.     promethazine 25 MG suppository  Commonly known as: PHENERGAN  Place 1 suppository (25 mg total) rectally every 6 (six) hours as needed for Nausea.     syringe (disposable) 1 mL Syrg  Testosterone every 2 weeks     TOUJEO MAX U-300 SOLOSTAR 300 unit/mL (3 mL) insulin pen  Generic drug: insulin glargine U-300 conc  Inject 30 Units into the skin once daily.     TRELEGY ELLIPTA 200-62.5-25 mcg inhaler  Generic drug: fluticasone-umeclidin-vilanter  Inhale 1 puff into the lungs once daily. Stop symbicort     TRULICITY 3 mg/0.5 mL pen injector  Generic drug: dulaglutide  Inject 3 mg into the skin every 7 days.         * This list has 2 medication(s) that are the same as other medications prescribed for you. Read the directions carefully, and ask your doctor or other care provider to review them with you.                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   HENT: Negative.    Eyes: Negative.    Cardiovascular: Positive for dyspnea on exertion.   Respiratory: Positive for shortness of breath.    Endocrine: Negative.    Hematologic/Lymphatic: Negative.    Skin: Negative.    Musculoskeletal: Negative.    Gastrointestinal: Negative.  " "  Genitourinary: Negative.    Neurological: Negative.    Psychiatric/Behavioral: Negative.    Allergic/Immunologic: Negative.        A 10 point review of systems was performed and all the pertinent positives have been mentioned. Rest of review of systems was negative.        PHYSICAL EXAM:     Vitals:    04/22/22 0933   BP: 132/82   Pulse: 91   Resp: 15    Body mass index is 38.39 kg/m².  Weight: 132 kg (291 lb 0.1 oz)   Height: 6' 1" (185.4 cm)     Physical Exam  Vitals reviewed.   Constitutional:       Appearance: He is well-developed.   HENT:      Head: Normocephalic.   Eyes:      Conjunctiva/sclera: Conjunctivae normal.      Pupils: Pupils are equal, round, and reactive to light.   Cardiovascular:      Rate and Rhythm: Normal rate and regular rhythm.      Heart sounds: Normal heart sounds.   Pulmonary:      Effort: Pulmonary effort is normal.      Breath sounds: Normal breath sounds.   Abdominal:      General: Bowel sounds are normal.      Palpations: Abdomen is soft.   Musculoskeletal:      Cervical back: Normal range of motion and neck supple.   Skin:     General: Skin is warm.   Neurological:      Mental Status: He is alert and oriented to person, place, and time.           DATA:     Laboratory:  CBC:  Recent Labs   Lab 07/15/21  0840 02/17/22  1115 03/25/22  0617   WBC 8.48 9.92 17.96 H   Hemoglobin 16.7 16.8 18.5 H   Hematocrit 51.7 53.9 57.5 H   Platelets 200 223 218       CHEMISTRIES:  Recent Labs   Lab 07/15/21  0840 02/17/22  1115 03/25/22  0617   Glucose 155 H 159 H 260 H   Sodium 139 140 139   Potassium 4.0 4.3 4.2   BUN 14 14 22 H   Creatinine 0.9 0.9 1.0   eGFR if African American >60.0 >60.0 >60   eGFR if non African American >60.0 >60.0 >60   Calcium 10.1 10.3 9.8   Magnesium  --   --  1.8       CARDIAC BIOMARKERS:  Recent Labs   Lab 03/25/22  0617   Troponin I <0.006       COAGS:        LIPIDS/LFTS:  Recent Labs   Lab 12/18/20  0820 02/03/21  0817 07/15/21  0840 02/17/22  1115 03/25/22  0617 "   Cholesterol 136  --  160 162  --    Triglycerides 128  --  138 141  --    HDL 48  --  48 52  --    LDL Cholesterol 62.4 L  --  84.4 81.8  --    Non-HDL Cholesterol 88  --  112 110  --    AST  --    < > 19 18 16   ALT  --    < > 23 33 31    < > = values in this interval not displayed.       Hemoglobin A1C   Date Value Ref Range Status   02/17/2022 9.9 (H) 4.0 - 5.6 % Final     Comment:     ADA Screening Guidelines:  5.7-6.4%  Consistent with prediabetes  >or=6.5%  Consistent with diabetes    High levels of fetal hemoglobin interfere with the HbA1C  assay. Heterozygous hemoglobin variants (HbS, HgC, etc)do  not significantly interfere with this assay.   However, presence of multiple variants may affect accuracy.     07/15/2021 7.7 (H) 4.0 - 5.6 % Final     Comment:     ADA Screening Guidelines:  5.7-6.4%  Consistent with prediabetes  >or=6.5%  Consistent with diabetes    High levels of fetal hemoglobin interfere with the HbA1C  assay. Heterozygous hemoglobin variants (HbS, HgC, etc)do  not significantly interfere with this assay.   However, presence of multiple variants may affect accuracy.     04/19/2021 6.6 (H) 4.0 - 5.6 % Final     Comment:     ADA Screening Guidelines:  5.7-6.4%  Consistent with prediabetes  >or=6.5%  Consistent with diabetes    High levels of fetal hemoglobin interfere with the HbA1C  assay. Heterozygous hemoglobin variants (HbS, HgC, etc)do  not significantly interfere with this assay.   However, presence of multiple variants may affect accuracy.         TSH  Recent Labs   Lab 07/15/21  0840 02/17/22  1115 03/25/22  0617   TSH 3.459 2.655 8.099 H       The 10-year ASCVD risk score (Neftalimagnus SHAH Jr., et al., 2013) is: 2.2%    Values used to calculate the score:      Age: 41 years      Sex: Male      Is Non- : No      Diabetic: Yes      Tobacco smoker: No      Systolic Blood Pressure: 132 mmHg      Is BP treated: Yes      HDL Cholesterol: 52 mg/dL      Total Cholesterol: 162  mg/dL             ASSESSMENT AND PLAN     Patient Active Problem List   Diagnosis    Hyperlipidemia LDL goal <70    Insulin long-term use    Tinea pedis    Morbid obesity    Hypothyroidism    Type 2 diabetes mellitus without complication, with long-term current use of insulin    Essential hypertension    Migraine with aura and without status migrainosus, not intractable propranolol SE angry; topamax not helpful; imitrex ineffective; daith ear piercing helps    Non-seasonal allergic rhinitis; avoid antihistamines per opthalmology    Acute bilateral low back pain without sciatica    NAINA (obstructive sleep apnea) 8/2019 sleep study AHI 11    Low testosterone in male; pituitary axis normal. MRI negative. 11/2019 started on testosterone    Right foot pain    Decreased strength of lower extremity    Decreased range of motion of both ankles    Gait abnormality    Rib pain on left side    Dyspnea    Decreased strength, endurance, and mobility         Dyspnea on exertion in patient with multiple risk factors for coronary artery disease.  PET scan did not show any significant ischemia.  Endothelial dysfunction.  Continue aggressive risk factor modification, weight loss control risk factors.    Hypertension:  Well controlled at current therapy    Weight loss has been recommended    Follow-up 1 year or as needed  Thank you very much for involving me in the care of your patient.  Please do not hesitate to contact me if there are any questions.      Hussain Winston MD, FACC, [facility]  Interventional Cardiologist, [facility].           This note was dictated with the help of speech recognition software.  There might be un-intended errors and/or substitutions.

## 2022-04-28 ENCOUNTER — OFFICE VISIT (OUTPATIENT)
Dept: ENDOCRINOLOGY | Facility: CLINIC | Age: 42
End: 2022-04-28
Payer: COMMERCIAL

## 2022-04-28 DIAGNOSIS — Z79.4 TYPE 2 DIABETES MELLITUS WITHOUT COMPLICATION, WITH LONG-TERM CURRENT USE OF INSULIN: Primary | ICD-10-CM

## 2022-04-28 DIAGNOSIS — E11.9 TYPE 2 DIABETES MELLITUS WITHOUT COMPLICATION, WITH LONG-TERM CURRENT USE OF INSULIN: Primary | ICD-10-CM

## 2022-04-28 DIAGNOSIS — E78.5 HYPERLIPIDEMIA, UNSPECIFIED HYPERLIPIDEMIA TYPE: ICD-10-CM

## 2022-04-28 DIAGNOSIS — E66.01 SEVERE OBESITY (BMI 35.0-39.9) WITH COMORBIDITY: ICD-10-CM

## 2022-04-28 PROCEDURE — 99214 PR OFFICE/OUTPT VISIT, EST, LEVL IV, 30-39 MIN: ICD-10-PCS | Mod: 95,,, | Performed by: NURSE PRACTITIONER

## 2022-04-28 PROCEDURE — 99214 OFFICE O/P EST MOD 30 MIN: CPT | Mod: 95,,, | Performed by: NURSE PRACTITIONER

## 2022-04-28 RX ORDER — INSULIN ASPART 100 [IU]/ML
INJECTION, SOLUTION INTRAVENOUS; SUBCUTANEOUS
Qty: 45 ML | Refills: 5 | Status: SHIPPED | OUTPATIENT
Start: 2022-04-28 | End: 2022-12-21

## 2022-04-28 RX ORDER — HUMAN INSULIN 100 [IU]/ML
INJECTION, SUSPENSION SUBCUTANEOUS
Qty: 15 ML | Refills: 2 | Status: SHIPPED | OUTPATIENT
Start: 2022-04-28 | End: 2022-12-21

## 2022-04-28 RX ORDER — EMPAGLIFLOZIN 25 MG/1
25 TABLET, FILM COATED ORAL DAILY
Qty: 30 TABLET | Refills: 5 | Status: SHIPPED | OUTPATIENT
Start: 2022-04-28 | End: 2023-02-15 | Stop reason: SDUPTHER

## 2022-04-28 NOTE — PROGRESS NOTES
The patient location is: [place]  The chief complaint leading to consultation is: type 2 diabetes    Visit type: audiovisual    Face to Face time with patient: 30 minutes of total time spent on the encounter, which includes face to face time and non-face to face time preparing to see the patient (eg, review of tests), Obtaining and/or reviewing separately obtained history, Documenting clinical information in the electronic or other health record, Independently interpreting results (not separately reported) and communicating results to the patient/family/caregiver, or Care coordination (not separately reported).         Each patient to whom he or she provides medical services by telemedicine is:  (1) informed of the relationship between the physician and patient and the respective role of any other health care provider with respect to management of the patient; and (2) notified that he or she may decline to receive medical services by telemedicine and may withdraw from such care at any time.    Notes:     CC: This 41 y.o. White male  is here for evaluation of  T2DM along with comorbidities indicated in the Visit Diagnosis section of this encounter.    HPI: Tony Gordillo was diagnosed with T2DM in 2008. He was without health insurance for 2017 and mid 2018.     Prior visit 7/2021  a1c is up from 6.6 to 7.7%. He has noticed that his BGs have been higher.   BGs claudette after a stomach virus 3 weeks ago and he is needing Fiasp more often. BG rises 30-40 points after eating 2 boiled eggs. He was also without his CGM for a few weeks because of insurance issues.   He wonders if topical metformin has been working or not.   He has been taking Toujeo 40 units.  He did start Trulicity - took 0.75 mg x 1 month then couldn't get the next dose until a couple of weeks ago. Now taking Trulicity 1.5 mg x 2 weeks and denies GI s/e or other issues.   He is taking Fiasp about 20-30 units if BG > 250 and denies hypoglycemia following  such high doses.   Plan Blood glucoses should improve over the next several weeks as Trulicity kicks in. After that, try holding metformin for a couple of days to see if BGs indeed remain the same or if they rise.   No other changes in DM meds.   Return to clinic in 3 months with labs prior.       Prior visit 3/10/22  Pt has not been seen for several months. A1c is up from 7.7% in July to 9.9% in Feb.   Pt could not afford his meds for a few weeks in Jan d/t high deductible. He has been out of work since Jan d/t COVID and still continues to have SOB.   However, c/o high BGs despite eating low carb, being back on his meds, and also resuming Fiasp 30 units ac.   Pt stopped topical metformin in October d/t ineffectiveness.   Needs to change from Invokana to Jardiance d/t formulary change.   Plan Reviewed CGM data in detail. Pt requires more aggressive prandial treatment as well as focus on high bedtime glucoses.   Increase Fiasp to 35 units before meals.   Increase Trulicity 3 mg weekly.   Decrease Toujeo from 40 to 30 units.   Start Jardiance as planned to replace Invokana.   Start Novolin N 14 units every night at 8 pm.   Return to clinic in 2 weeks - virtual visit.     Interval history  States that his BGs are still high. Believes that Fiasp is not working.   He has increased Trulicity to 3 mg weekly and started  Novolin 14 units hs at 8-10 pm. He's resumed Jardiance. Denies nausea, diarrhea, or constipation.   He has used Fiasp and Novolin N at the same time during the day when BG spikes very high (~ Novolin 14 units with Fiasp 20 units).     LAST DIABETES EDUCATION: 2019    HOSPITALIZED FOR DIABETES -  No.    PRESCRIBED DIABETES MEDICATIONS:    Jardiance 25 mg once daily   Toujeo Max 42 units nightly  Trulicity 3 mg weekly   Fiasp 30-35 units ac     Missed doses - denies   Rotates injections: yes   Changes pen needles - yes     DM COMPLICATIONS: none    SIGNIFICANT DIABETES MED HISTORY:   Has previously used  "Novolin 70/30 morning only   glyburide--hypoglycemia  Victoza - nausea and vomiting at 1.2 mg; felt "run down" at lowest dose of 0.6 mg   Metformin - diarrhea and vomiting (has not tried metformin XR)   Metformin topical - ineffective, stopped on his own 10/2021  Pioglitazone - altered mood, reportedly became angry       SELF MONITORING BLOOD GLUCOSE: Checks blood glucose at home several times a day with Dexcom. Uses phone as the reader   CGM interpretation:  BGs overall elevated, highly fluctuating. Markedly high postprandial glucoses despite large dose of Fiasp. No hypoglycemia.         HYPOGLYCEMIC EPISODES:  None     CURRENT DIET: drinks water mostly. Sometimes coffee at work.  Eats 2-3 meals/day.   Dinner was fried chicken, salad, water. Lunch - 1 slice of keto toast, 1 egg. Apple at  ~ 4 pm.     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         ROS:   CONSTITUTIONAL: Appetite good, denies fatigue  RESPIRATORY: + pena s/p COVID infection, much improved     PHYSICAL EXAM:  GENERAL: Well developed, well nourished. No acute distress.   PSYCH: AAOx3, appropriate mood and affect, conversant, well-groomed. Judgement and insight good.   NEURO: Cranial nerves grossly intact. Speech clear, no tremor.   CHEST: Respirations even and unlabored.          Hemoglobin A1C   Date Value Ref Range Status   02/17/2022 9.9 (H) 4.0 - 5.6 % Final     Comment:     ADA Screening Guidelines:  5.7-6.4%  Consistent with prediabetes  >or=6.5%  Consistent with diabetes    High levels of fetal hemoglobin interfere with the HbA1C  assay. Heterozygous hemoglobin variants (HbS, HgC, etc)do  not significantly interfere with this assay.   However, presence of multiple variants may affect accuracy.     07/15/2021 7.7 (H) 4.0 - 5.6 % Final     Comment:     ADA Screening Guidelines:  5.7-6.4%  Consistent with prediabetes  >or=6.5%  Consistent with diabetes    High levels of fetal " hemoglobin interfere with the HbA1C  assay. Heterozygous hemoglobin variants (HbS, HgC, etc)do  not significantly interfere with this assay.   However, presence of multiple variants may affect accuracy.     04/19/2021 6.6 (H) 4.0 - 5.6 % Final     Comment:     ADA Screening Guidelines:  5.7-6.4%  Consistent with prediabetes  >or=6.5%  Consistent with diabetes    High levels of fetal hemoglobin interfere with the HbA1C  assay. Heterozygous hemoglobin variants (HbS, HgC, etc)do  not significantly interfere with this assay.   However, presence of multiple variants may affect accuracy.       Lab Results   Component Value Date    TSH 8.099 (H) 03/25/2022           Chemistry        Component Value Date/Time     03/25/2022 0617    K 4.2 03/25/2022 0617     03/25/2022 0617    CO2 22 (L) 03/25/2022 0617    BUN 22 (H) 03/25/2022 0617    CREATININE 1.0 03/25/2022 0617     (H) 03/25/2022 0617        Component Value Date/Time    CALCIUM 9.8 03/25/2022 0617    ALKPHOS 60 03/25/2022 0617    AST 16 03/25/2022 0617    ALT 31 03/25/2022 0617    BILITOT 0.8 03/25/2022 0617    ESTGFRAFRICA >60 03/25/2022 0617    EGFRNONAA >60 03/25/2022 0617          Lab Results   Component Value Date    LDLCALC 81.8 02/17/2022      Latest Reference Range & Units 02/17/22 11:15   Cholesterol 120 - 199 mg/dL 162 [1]   HDL 40 - 75 mg/dL 52 [2]   HDL/Cholesterol Ratio 20.0 - 50.0 % 32.1   LDL Cholesterol External 63.0 - 159.0 mg/dL 81.8 [3]   Non-HDL Cholesterol mg/dL 110 [4]   Total Cholesterol/HDL Ratio 2.0 - 5.0  3.1   Triglycerides 30 - 150 mg/dL 141 [5]     Lab Results   Component Value Date    MICALBCREAT 8.8 02/17/2022             ASSESSMENT and PLAN:    A1C GOAL: < 7 %     1. Type 2 diabetes mellitus without complication, with long-term current use of insulin  Unclear why post-meal BGs are so high despite reportedly good dietary and medication adherence. Will try different GLP1RA (Ozempic) and prandial insulin on higher dose.  Advised:     Switch from Fiasp to Novolog and increase to 40 units before each meal.   Switch from Truliclity 3 mg weekly to Ozempic 1 mg. - rx sent to [facility].   Continue Jardiance, Toujeo 42 units and Novolin N 14 units at bedtime.   Return to clinic in 1 month - in person visit.        2. Severe obesity (BMI 35.0-39.9) with comorbidity  Increases insulin resistance.      3. Hyperlipidemia, unspecified hyperlipidemia type  Continue statin           Patient is testing blood sugars 4x/day and taking 3 injections of insulin a day. The patient's insulin treatment regimen requires frequent adjustments by the patient on the basis of therapeutic CGM testing results.       No orders of the defined types were placed in this encounter.       Follow up in about 4 weeks (around 5/26/2022).

## 2022-04-28 NOTE — PATIENT INSTRUCTIONS
Switch from Fiasp to Novolog and increase to 40 units before each meal.   Switch from Truliclity 3 mg weekly to Ozempic 1 mg. - rx sent to [facility].   Continue Jardiance, Toujeo 42 units and Novolin N 14 units at bedtime.   Return to clinic in 1 month - in person visit.

## 2022-04-29 ENCOUNTER — DOCUMENTATION ONLY (OUTPATIENT)
Dept: REHABILITATION | Facility: HOSPITAL | Age: 42
End: 2022-04-29
Payer: COMMERCIAL

## 2022-04-29 NOTE — PROGRESS NOTES
Missed Visit/Cancellation      Date: 4/29/2022         Canceled Number: -  No Show Number: 1                                                                                                                Pt initially had visit scheduled for today for 0715.   Reason for cancellation: no show.    Pt's next scheduled physical therapy visit is 5/6/22.    Selina Tovar, PT, DPT  4/29/2022

## 2022-05-09 ENCOUNTER — CLINICAL SUPPORT (OUTPATIENT)
Dept: REHABILITATION | Facility: HOSPITAL | Age: 42
End: 2022-05-09
Payer: COMMERCIAL

## 2022-05-09 DIAGNOSIS — R68.89 DECREASED STRENGTH, ENDURANCE, AND MOBILITY: Primary | ICD-10-CM

## 2022-05-09 DIAGNOSIS — R53.1 DECREASED STRENGTH, ENDURANCE, AND MOBILITY: Primary | ICD-10-CM

## 2022-05-09 DIAGNOSIS — Z74.09 DECREASED STRENGTH, ENDURANCE, AND MOBILITY: Primary | ICD-10-CM

## 2022-05-09 PROCEDURE — 97110 THERAPEUTIC EXERCISES: CPT | Mod: PN

## 2022-05-09 NOTE — PROGRESS NOTES
"  [facility]   Physical Therapy Treatment Note     Name: Tony Gordillo  Clinic Number: [id number]    Therapy Diagnosis:   Encounter Diagnosis   Name Primary?    Decreased strength, endurance, and mobility Yes     Physician: Jovany Ford MD    Visit Date: 5/9/2022     Physician Orders: PT Eval and Treat      Medical Diagnosis from Referral:   U07.1 (ICD-10-CM) - COVID-19 virus infection   R06.00 (ICD-10-CM) - Dyspnea, unspecified type      Evaluation Date: 3/8/2022  Authorization Period Expiration: 12/31/2022  Plan of Care Expiration: 6/8/2022  Visit # / Visits authorized: 4/25 (+eval)  Progress Note Due: 4/8/2022    Precautions: Standard and monitor BP    Time In: 8:45  Time Out: 9:45  Total Billable Time: 60 minutes     SUBJECTIVE     Pt reports: missed appts - lower intestinal infections; missed appt - flu; missed appt - sons school   - back to riding bike and walking   - 1 bad SOB - "felt like someone was doing jumping jacks on my chest" "physically hurt to breathe"   - pt states doctor "cleared heart" and has appt in May, hoping to get released in June for work     He was compliant with home exercise program.  Response to previous treatment: worn out for 20 minutes   Functional change: ongoing    Pain: 4/10  Location: B knee pain     OBJECTIVE     Objective Measures updated at progress report unless specified.     TREATMENT     Tony received the treatments listed below:      received therapeutic exercises to develop strength and ROM for 60 minutes including:    Initial BP - 151/100 (automatic) 140/85 (manual)     Recumbent Bike  L2 - 4 minutes    -97% SpO2   -108 HR    -RPE 0/10    L5 - 4 minutes    -97% SpO2    -117 HR    -RPE 1/10     L7 - 4 minutes   -97% SpO2   -135 HR    -RPE 3-4/10     1 minute restbreak for B knee pain     L5 - 4 minutes    -92% SpO2   -135 HR    -RPE 3/10    L2 - 4 minutes    -98% SpO2   -125 HR    -RPE 1/10    BP following recumbent bike: 140/87    Mat " Exercises:  Sit to stands from mat with 15# overhead raise  2x10   -97%, 147 bpm, 4/10 RPE    Walking around gym with equipment (jacket, pants, boots, helmet) on: 5 minutes total   After 5 minutes - 96%, 158 bpm  After 30 second seated break - 98%, 135 bpm    Sled Push 25# x4 lengths    -97%, 145 bpm, 3-4/10 RPE - jacket pants boots helmet    -96%, 166 bpm, 3-4/10 RPE - jacket pants boots helmet tank     BP at end of session: 140/80    Not performed:  Bridges 3x10   -98%, 97 bpm, 2/10 RPE  Clamshells 3x10 B   -96%, 91, 2/10 RPE    Walking with overhead carry #15 lbs x 6 laps 25 ft, 2 trials   -98%, 118- 126 bpm, 2/10 RPE    PATIENT EDUCATION AND HOME EXERCISES     Home Exercises Provided and Patient Education Provided     Education provided:   - continuation of HEP/walking program  -continuous monitoring of BP at home    Written Home Exercises Provided: yes. Exercises were reviewed and Tony was able to demonstrate them prior to the end of the session.  Tony demonstrated good  understanding of the education provided. See EMR under Patient Instructions for exercises provided during therapy sessions    ASSESSMENT   Tony tolerated session well with improved vital signs and increased speed of recovery in regard to SpO2. Pt is feeling better following x2 illnesses which may be related. Limitations this date are bilateral knee pain. Increased bout time on nustep, increased overhead weight for sit to stand, increased gear utilized during walk and sled push with appropriate response. Pt remains appropriate for therapy at this time.      Tony Is progressing well towards his goals.   Pt prognosis is Good.     Pt will continue to benefit from skilled outpatient physical therapy to address the deficits listed in the problem list box on initial evaluation, provide pt/family education and to maximize pt's level of independence in the home and community environment.     Pt's spiritual, cultural and educational needs considered and  pt agreeable to plan of care and goals.     Anticipated barriers to physical therapy: SCHEDULING     Goals:  Short Term Goals:  4 weeks  1. Pt will initiate home exercise program to improve strength, flexibility, endurance, mobility & balance to return pt to PLOF (progressing, not met)  2. Pt will tolerate 10 min on stationary bike (I.e. Bike, NuStep) to improve endurance to assist in return to leaisure activities. (progressing, not met)  3. Pt will tolerate 2 min or greater on treadmill at 2.0 mph to improve gait speed (progressing, not met)  4. Pt will report ability to ride bike at home for > / = 5 minutes in order to improve quality of life. (progressing, not met)   5.  Pt will verbalize good recall & understanding of universal precautions to improve containment of infection and future illness (progressing, not met)  6. Pt will complete 6 minute walk test in order to improve CV endurance and tolerance to activities. (progressing, not met)     Long Term Goals: 12 weeks  1. Pt to report compliance with walking program/HEP > / = 3x per week in order to maximize gains and return to PLOF (progressing, not met)  2. Pt to perform 2 minute walk test for 450 feet or greater to improve gait speed & endurance  (progressing, not met)  3. Pt will improve 5 time sit to stand score for < / = 10 seconds in order to maximize functional strength and return to PLOF. (progressing, not met)  4. Pt will tolerate ambulating on treadmill at varying inclines for > / = 10 minutes in order to promote return to work. (progressing, not met)  5. Pt to report biking at home for 30 mins without significant fatigue in order to improve quality of life. (progressing, not met)     PLAN     Plan of care Certification: 3/8/2022 to 6/8/2022.     Outpatient Physical Therapy 2 times weekly for 12 weeks to include the following interventions: Gait Training, Manual Therapy, Moist Heat/ Ice, Neuromuscular Re-ed, Patient Education, Self Care, Therapeutic  Activities and Therapeutic Exercise.     Increase challenge, increase weight, increase amount of gear.     Tita Aquino, PT, DPT  5/9/2022

## 2022-05-11 ENCOUNTER — PATIENT OUTREACH (OUTPATIENT)
Dept: ADMINISTRATIVE | Facility: HOSPITAL | Age: 42
End: 2022-05-11
Payer: COMMERCIAL

## 2022-05-11 DIAGNOSIS — E11.9 TYPE 2 DIABETES MELLITUS WITHOUT COMPLICATION, UNSPECIFIED WHETHER LONG TERM INSULIN USE: Primary | ICD-10-CM

## 2022-05-12 ENCOUNTER — LAB VISIT (OUTPATIENT)
Dept: LAB | Facility: HOSPITAL | Age: 42
End: 2022-05-12
Attending: INTERNAL MEDICINE
Payer: COMMERCIAL

## 2022-05-12 ENCOUNTER — CLINICAL SUPPORT (OUTPATIENT)
Dept: REHABILITATION | Facility: HOSPITAL | Age: 42
End: 2022-05-12
Payer: COMMERCIAL

## 2022-05-12 DIAGNOSIS — R68.89 DECREASED STRENGTH, ENDURANCE, AND MOBILITY: Primary | ICD-10-CM

## 2022-05-12 DIAGNOSIS — Z74.09 DECREASED STRENGTH, ENDURANCE, AND MOBILITY: Primary | ICD-10-CM

## 2022-05-12 DIAGNOSIS — E11.9 TYPE 2 DIABETES MELLITUS WITHOUT COMPLICATION, UNSPECIFIED WHETHER LONG TERM INSULIN USE: ICD-10-CM

## 2022-05-12 DIAGNOSIS — R53.1 DECREASED STRENGTH, ENDURANCE, AND MOBILITY: Primary | ICD-10-CM

## 2022-05-12 LAB
ESTIMATED AVG GLUCOSE: 206 MG/DL (ref 68–131)
HBA1C MFR BLD: 8.8 % (ref 4–5.6)

## 2022-05-12 PROCEDURE — 83036 HEMOGLOBIN GLYCOSYLATED A1C: CPT | Performed by: INTERNAL MEDICINE

## 2022-05-12 PROCEDURE — 36415 COLL VENOUS BLD VENIPUNCTURE: CPT | Mod: PO | Performed by: INTERNAL MEDICINE

## 2022-05-12 PROCEDURE — 97110 THERAPEUTIC EXERCISES: CPT | Mod: PN

## 2022-05-12 NOTE — PROGRESS NOTES
[facility]   Physical Therapy Treatment Note     Name: Tony Gordillo  Clinic Number: [id number]    Therapy Diagnosis:   Encounter Diagnosis   Name Primary?    Decreased strength, endurance, and mobility Yes     Physician: Jovany Ford MD    Visit Date: 5/12/2022     Physician Orders: PT Eval and Treat      Medical Diagnosis from Referral:   U07.1 (ICD-10-CM) - COVID-19 virus infection   R06.00 (ICD-10-CM) - Dyspnea, unspecified type      Evaluation Date: 3/8/2022  Authorization Period Expiration: 12/31/2022  Plan of Care Expiration: 6/8/2022  Visit # / Visits authorized: 4/25 (+eval)  Progress Note Due: 4/8/2022    Precautions: Standard and monitor BP    Time In: 8:50 AM  Time Out: 9:30 AM  Total Billable Time: 40 minutes     SUBJECTIVE     Pt reports: his knees are bothering him today, otherwise doing well    He was compliant with home exercise program.  Response to previous treatment: worn out for 20 minutes   Functional change: ongoing    Pain: 6/10  Location: B knee pain     OBJECTIVE     Objective Measures updated at progress report unless specified.     TREATMENT     Tony received the treatments listed below:      received therapeutic exercises to develop strength and ROM for 40 minutes including:    Initial BP - 158/83    Recumbent Bike  L2 - 4 minutes    -97% SpO2   -111 HR    -RPE 0/10    L5 - 4 minutes    -97% SpO2    -107 HR    -RPE 3/10     L7 - 4 minutes   -97% SpO2   -114 HR    -RPE 4/10     1 minute restbreak for B knee pain     L5 - 4 minutes    -97% SpO2   -116 HR    -RPE 2/10    L2 - 4 minutes    -98% SpO2   -125 HR    -RPE 2/10    BP following recumbent bike: 140/87    Walking around gym with equipment (jacket, pants, boots, helmet) on: 5 minutes total   After 5 minutes - 97%, 133 bpm  After 30 second seated break - 98%, 135 bpm    Sled Push 25# x4 lengths    -96%, 158 bpm, 4/10 RPE - jacket pants boots helmet tank     BP at end of session: 169/108    Not  performed:  Sit to stands from mat with 15# overhead raise  2x10   -97%, 147 bpm, 4/10 RPE  Walking with overhead carry #15 lbs x 6 laps 25 ft, 2 trials   -98%, 118- 126 bpm, 2/10 RPE    PATIENT EDUCATION AND HOME EXERCISES     Home Exercises Provided and Patient Education Provided     Education provided:   - continuation of HEP/walking program  -continuous monitoring of BP at home    Written Home Exercises Provided: yes. Exercises were reviewed and Tony was able to demonstrate them prior to the end of the session.  Tony demonstrated good  understanding of the education provided. See EMR under Patient Instructions for exercises provided during therapy sessions    ASSESSMENT   Tony tolerated session well with improved vital signs and appropriate SpO2 response. Pt with some elevation in BP noted at end of session. Pt able to tolerate gait and sled activities with full uniform this session. RPE about 4/10 at end of walking/sled. Limited today secondary to B knee pain, however, able to tolerate all activities. Pt remains appropriate for therapy at this time.      Tony Is progressing well towards his goals.   Pt prognosis is Good.     Pt will continue to benefit from skilled outpatient physical therapy to address the deficits listed in the problem list box on initial evaluation, provide pt/family education and to maximize pt's level of independence in the home and community environment.     Pt's spiritual, cultural and educational needs considered and pt agreeable to plan of care and goals.     Anticipated barriers to physical therapy: SCHEDULING     Goals:  Short Term Goals:  4 weeks  1. Pt will initiate home exercise program to improve strength, flexibility, endurance, mobility & balance to return pt to PLOF (progressing, not met)  2. Pt will tolerate 10 min on stationary bike (I.e. Bike, NuStep) to improve endurance to assist in return to leaisure activities. (progressing, not met)  3. Pt will tolerate 2 min or  greater on treadmill at 2.0 mph to improve gait speed (progressing, not met)  4. Pt will report ability to ride bike at home for > / = 5 minutes in order to improve quality of life. (progressing, not met)   5.  Pt will verbalize good recall & understanding of universal precautions to improve containment of infection and future illness (progressing, not met)  6. Pt will complete 6 minute walk test in order to improve CV endurance and tolerance to activities. (progressing, not met)     Long Term Goals: 12 weeks  1. Pt to report compliance with walking program/HEP > / = 3x per week in order to maximize gains and return to PLOF (progressing, not met)  2. Pt to perform 2 minute walk test for 450 feet or greater to improve gait speed & endurance  (progressing, not met)  3. Pt will improve 5 time sit to stand score for < / = 10 seconds in order to maximize functional strength and return to PLOF. (progressing, not met)  4. Pt will tolerate ambulating on treadmill at varying inclines for > / = 10 minutes in order to promote return to work. (progressing, not met)  5. Pt to report biking at home for 30 mins without significant fatigue in order to improve quality of life. (progressing, not met)     PLAN     Plan of care Certification: 3/8/2022 to 6/8/2022.     Outpatient Physical Therapy 2 times weekly for 12 weeks to include the following interventions: Gait Training, Manual Therapy, Moist Heat/ Ice, Neuromuscular Re-ed, Patient Education, Self Care, Therapeutic Activities and Therapeutic Exercise.     Increase challenge, increase weight, increase amount of gear.     Maria Del Rosario Cruz, PT, DPT  5/12/2022

## 2022-05-16 ENCOUNTER — CLINICAL SUPPORT (OUTPATIENT)
Dept: REHABILITATION | Facility: HOSPITAL | Age: 42
End: 2022-05-16
Payer: COMMERCIAL

## 2022-05-16 DIAGNOSIS — Z74.09 DECREASED STRENGTH, ENDURANCE, AND MOBILITY: Primary | ICD-10-CM

## 2022-05-16 DIAGNOSIS — R68.89 DECREASED STRENGTH, ENDURANCE, AND MOBILITY: Primary | ICD-10-CM

## 2022-05-16 DIAGNOSIS — R53.1 DECREASED STRENGTH, ENDURANCE, AND MOBILITY: Primary | ICD-10-CM

## 2022-05-16 PROCEDURE — 97110 THERAPEUTIC EXERCISES: CPT | Mod: PN

## 2022-05-16 NOTE — PROGRESS NOTES
[facility]   Physical Therapy Treatment Note     Name: Tony Gordillo  Clinic Number: [id number]    Therapy Diagnosis:   No diagnosis found.  Physician: Jovany Ford MD    Visit Date: 5/16/2022     Physician Orders: PT Eval and Treat      Medical Diagnosis from Referral:   U07.1 (ICD-10-CM) - COVID-19 virus infection   R06.00 (ICD-10-CM) - Dyspnea, unspecified type      Evaluation Date: 3/8/2022  Authorization Period Expiration: 12/31/2022  Plan of Care Expiration: 6/8/2022  Visit # / Visits authorized: 4/25 (+eval)  Progress Note Due: 4/8/2022    Precautions: Standard and monitor BP    Time In: ***  Time Out: ***  Total Billable Time: *** minutes     SUBJECTIVE     Pt reports: *** his knees are bothering him today, otherwise doing well    He was compliant with home exercise program.  Response to previous treatment: worn out for 20 minutes   Functional change: ongoing    Pain: 6/10  Location: B knee pain     OBJECTIVE     Objective Measures updated at progress report unless specified.     TREATMENT     Tony received the treatments listed below:      received therapeutic exercises to develop strength and ROM for 40 minutes including:    Initial BP - 158/83    Recumbent Bike  L2 - 4 minutes    -97% SpO2   -111 HR    -RPE 0/10    L5 - 4 minutes    -97% SpO2    -107 HR    -RPE 3/10     L7 - 4 minutes   -97% SpO2   -114 HR    -RPE 4/10     1 minute restbreak for B knee pain     L5 - 4 minutes    -97% SpO2   -116 HR    -RPE 2/10    L2 - 4 minutes    -98% SpO2   -125 HR    -RPE 2/10    BP following recumbent bike: 140/87    Walking around gym with equipment (jacket, pants, boots, helmet) on: 5 minutes total   After 5 minutes - 97%, 133 bpm  After 30 second seated break - 98%, 135 bpm    Sled Push 25# x4 lengths    -96%, 158 bpm, 4/10 RPE - jacket pants boots helmet tank     BP at end of session: 169/108    Not performed:  Sit to stands from mat with 15# overhead raise  2x10   -97%, 147  bpm, 4/10 RPE  Walking with overhead carry #15 lbs x 6 laps 25 ft, 2 trials   -98%, 118- 126 bpm, 2/10 RPE    PATIENT EDUCATION AND HOME EXERCISES     Home Exercises Provided and Patient Education Provided     Education provided:   - continuation of HEP/walking program  -continuous monitoring of BP at home    Written Home Exercises Provided: yes. Exercises were reviewed and Tony was able to demonstrate them prior to the end of the session.  Tony demonstrated good  understanding of the education provided. See EMR under Patient Instructions for exercises provided during therapy sessions    ASSESSMENT   Tony tolerated session well with improved vital signs and appropriate SpO2 response. Pt with some elevation in BP noted at end of session. Pt able to tolerate gait and sled activities with full uniform this session. RPE about 4/10 at end of walking/sled. Limited today secondary to B knee pain, however, able to tolerate all activities. Pt remains appropriate for therapy at this time.      Tony Is progressing well towards his goals.   Pt prognosis is Good.     Pt will continue to benefit from skilled outpatient physical therapy to address the deficits listed in the problem list box on initial evaluation, provide pt/family education and to maximize pt's level of independence in the home and community environment.     Pt's spiritual, cultural and educational needs considered and pt agreeable to plan of care and goals.     Anticipated barriers to physical therapy: SCHEDULING     Goals:  Short Term Goals:  4 weeks  1. Pt will initiate home exercise program to improve strength, flexibility, endurance, mobility & balance to return pt to PLOF (progressing, not met)  2. Pt will tolerate 10 min on stationary bike (I.e. Bike, NuStep) to improve endurance to assist in return to leaisure activities. (progressing, not met)  3. Pt will tolerate 2 min or greater on treadmill at 2.0 mph to improve gait speed (progressing, not met)  4. Pt  will report ability to ride bike at home for > / = 5 minutes in order to improve quality of life. (progressing, not met)   5.  Pt will verbalize good recall & understanding of universal precautions to improve containment of infection and future illness (progressing, not met)  6. Pt will complete 6 minute walk test in order to improve CV endurance and tolerance to activities. (progressing, not met)     Long Term Goals: 12 weeks  1. Pt to report compliance with walking program/HEP > / = 3x per week in order to maximize gains and return to PLOF (progressing, not met)  2. Pt to perform 2 minute walk test for 450 feet or greater to improve gait speed & endurance  (progressing, not met)  3. Pt will improve 5 time sit to stand score for < / = 10 seconds in order to maximize functional strength and return to PLOF. (progressing, not met)  4. Pt will tolerate ambulating on treadmill at varying inclines for > / = 10 minutes in order to promote return to work. (progressing, not met)  5. Pt to report biking at home for 30 mins without significant fatigue in order to improve quality of life. (progressing, not met)     PLAN     Plan of care Certification: 3/8/2022 to 6/8/2022.     Outpatient Physical Therapy 2 times weekly for 12 weeks to include the following interventions: Gait Training, Manual Therapy, Moist Heat/ Ice, Neuromuscular Re-ed, Patient Education, Self Care, Therapeutic Activities and Therapeutic Exercise.     Increase challenge, increase weight, increase amount of gear.     Tita Aquino, PT, DPT  5/16/2022

## 2022-05-16 NOTE — PROGRESS NOTES
[facility]   Physical Therapy Treatment Note     Name: Tony Gordillo  Clinic Number: [id number]    Therapy Diagnosis:   Encounter Diagnosis   Name Primary?    Decreased strength, endurance, and mobility Yes     Physician: Jovany Ford MD    Visit Date: 5/16/2022     Physician Orders: PT Eval and Treat      Medical Diagnosis from Referral:   U07.1 (ICD-10-CM) - COVID-19 virus infection   R06.00 (ICD-10-CM) - Dyspnea, unspecified type      Evaluation Date: 3/8/2022  Authorization Period Expiration: 12/31/2022  Plan of Care Expiration: 6/8/2022  Visit # / Visits authorized: 6/25 (+eval)  Progress Note Due: 5/19/2022    Precautions: Standard and monitor BP    Time In:0850  Time Out: 0930  Total Billable Time: 40 minutes (TE-3)    SUBJECTIVE     Pt reports: Bilateral knee soreness this am. Soreness always present. Did not bring gear in today due to not wanting to get a diesel smell on it as he was working on his vehicle. Is trialing knee sleeves today to see if compression helps. Says so far they seem to be helping    He was compliant with home exercise program.  Response to previous treatment: worn out for 20 minutes   Functional change: ongoing    Pain: 2/10  Location: B knee soreness    OBJECTIVE     Objective Measures updated at progress report unless specified.     TREATMENT     Tony received the treatments listed below:      received therapeutic exercises to develop strength and ROM for 40 minutes including:    Initial BP - 131/85    Recumbent Bike  L2 - 4 minutes    -91% SpO2   -107 HR    -RPE 1/10    L5 - 4 minutes    -97% SpO2    -113 HR    -RPE 3/10     L7 - 4 minutes   -97% SpO2   -120 HR    -RPE 4/10     L5 - 4 minutes    -97% SpO2   -122 HR    -RPE 2/10    L2 - 4 minutes    -96% SpO2   -110 HR    -RPE 2/10    BP following recumbent bike: 131/72    Treadmill  2' at 2.0 mph   -96% SpO2   -100 HR    -RPE 2/10    2' at 2.0 mph   -94% SpO2   -113 HR    -RPE 2/10    5' at 2.0  mph   -95% SpO2   -115 HR    -RPE 1/10        PATIENT EDUCATION AND HOME EXERCISES     Home Exercises Provided and Patient Education Provided     Education provided:   - continuation of HEP/walking program  -continuous monitoring of BP at home    Written Home Exercises Provided: yes. Exercises were reviewed and Tony was able to demonstrate them prior to the end of the session.  Tony demonstrated good  understanding of the education provided. See EMR under Patient Instructions for exercises provided during therapy sessions    ASSESSMENT   Patient with appropriate vital signs and SpO2 response this visit. Has met 2/6 short-term goals as he has been riding his bike at home for 20-30 minutes 1-2 times a week and was able to tolerate greater than 2 minutes on the treadmill at 2.0 mph with appropriate vital signs response.      Tony Is progressing well towards his goals.   Pt prognosis is Good.     Pt will continue to benefit from skilled outpatient physical therapy to address the deficits listed in the problem list box on initial evaluation, provide pt/family education and to maximize pt's level of independence in the home and community environment.     Pt's spiritual, cultural and educational needs considered and pt agreeable to plan of care and goals.     Anticipated barriers to physical therapy: SCHEDULING     Goals:  Short Term Goals:  4 weeks  1. Pt will initiate home exercise program to improve strength, flexibility, endurance, mobility & balance to return pt to PLOF (progressing, not met)  2. Pt will tolerate 10 min on stationary bike (I.e. Bike, NuStep) to improve endurance to assist in return to leaisure activities. (progressing, not met)  3. Pt will tolerate 2 min or greater on treadmill at 2.0 mph to improve gait speed MET 5.16.22  4. Pt will report ability to ride bike at home for > / = 5 minutes in order to improve quality of life. MET 5.16.22  5.  Pt will verbalize good recall & understanding of universal  precautions to improve containment of infection and future illness (progressing, not met)  6. Pt will complete 6 minute walk test in order to improve CV endurance and tolerance to activities. (progressing, not met)     Long Term Goals: 12 weeks  1. Pt to report compliance with walking program/HEP > / = 3x per week in order to maximize gains and return to PLOF (progressing, not met)  2. Pt to perform 2 minute walk test for 450 feet or greater to improve gait speed & endurance  (progressing, not met)  3. Pt will improve 5 time sit to stand score for < / = 10 seconds in order to maximize functional strength and return to PLOF. (progressing, not met)  4. Pt will tolerate ambulating on treadmill at varying inclines for > / = 10 minutes in order to promote return to work. (progressing, not met)  5. Pt to report biking at home for 30 mins without significant fatigue in order to improve quality of life. (progressing, not met)     PLAN     Take objective measures next visit.    Lennie Thakur, PT, DPT  5/16/2022

## 2022-05-18 ENCOUNTER — CLINICAL SUPPORT (OUTPATIENT)
Dept: OPHTHALMOLOGY | Facility: CLINIC | Age: 42
End: 2022-05-18
Attending: INTERNAL MEDICINE
Payer: COMMERCIAL

## 2022-05-18 ENCOUNTER — OFFICE VISIT (OUTPATIENT)
Dept: FAMILY MEDICINE | Facility: CLINIC | Age: 42
End: 2022-05-18
Payer: COMMERCIAL

## 2022-05-18 VITALS
HEIGHT: 73 IN | SYSTOLIC BLOOD PRESSURE: 130 MMHG | OXYGEN SATURATION: 98 % | BODY MASS INDEX: 38.39 KG/M2 | TEMPERATURE: 98 F | HEART RATE: 100 BPM | DIASTOLIC BLOOD PRESSURE: 84 MMHG

## 2022-05-18 DIAGNOSIS — E03.4 HYPOTHYROIDISM DUE TO ACQUIRED ATROPHY OF THYROID: ICD-10-CM

## 2022-05-18 DIAGNOSIS — I10 ESSENTIAL HYPERTENSION: ICD-10-CM

## 2022-05-18 DIAGNOSIS — E66.01 MORBID OBESITY: ICD-10-CM

## 2022-05-18 DIAGNOSIS — U09.9 COVID-19 LONG HAULER: Primary | ICD-10-CM

## 2022-05-18 DIAGNOSIS — G43.109 MIGRAINE WITH AURA AND WITHOUT STATUS MIGRAINOSUS, NOT INTRACTABLE: ICD-10-CM

## 2022-05-18 DIAGNOSIS — Z87.820 HISTORY OF CONCUSSION: ICD-10-CM

## 2022-05-18 DIAGNOSIS — E11.3292 MILD NONPROLIFERATIVE DIABETIC RETINOPATHY OF LEFT EYE WITHOUT MACULAR EDEMA ASSOCIATED WITH TYPE 2 DIABETES MELLITUS: Primary | ICD-10-CM

## 2022-05-18 DIAGNOSIS — E78.5 HYPERLIPIDEMIA LDL GOAL <70: ICD-10-CM

## 2022-05-18 DIAGNOSIS — E11.9 TYPE 2 DIABETES MELLITUS WITHOUT COMPLICATION, UNSPECIFIED WHETHER LONG TERM INSULIN USE: ICD-10-CM

## 2022-05-18 PROCEDURE — 92228 IMG RTA DETC/MNTR DS PHY/QHP: CPT | Mod: TC,S$GLB,, | Performed by: INTERNAL MEDICINE

## 2022-05-18 PROCEDURE — 92228 DIABETIC EYE SCREENING PHOTO: ICD-10-PCS | Mod: TC,S$GLB,, | Performed by: INTERNAL MEDICINE

## 2022-05-18 PROCEDURE — 92228 IMG RTA DETC/MNTR DS PHY/QHP: CPT | Mod: 26,S$GLB,, | Performed by: OPTOMETRIST

## 2022-05-18 PROCEDURE — 99214 PR OFFICE/OUTPT VISIT, EST, LEVL IV, 30-39 MIN: ICD-10-PCS | Mod: S$GLB,,, | Performed by: INTERNAL MEDICINE

## 2022-05-18 PROCEDURE — 99999 PR PBB SHADOW E&M-EST. PATIENT-LVL III: CPT | Mod: PBBFAC,,, | Performed by: INTERNAL MEDICINE

## 2022-05-18 PROCEDURE — 99999 PR PBB SHADOW E&M-EST. PATIENT-LVL III: ICD-10-PCS | Mod: PBBFAC,,, | Performed by: INTERNAL MEDICINE

## 2022-05-18 PROCEDURE — 92228 DIABETIC EYE SCREENING PHOTO: ICD-10-PCS | Mod: 26,S$GLB,, | Performed by: OPTOMETRIST

## 2022-05-18 PROCEDURE — 99214 OFFICE O/P EST MOD 30 MIN: CPT | Mod: S$GLB,,, | Performed by: INTERNAL MEDICINE
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
## 2022-05-18 NOTE — PROGRESS NOTES
HPI     40 y/o male here for diabetic retinopathy screening with non-dilated   fundus photos per Dr. Ford.    Last edited by Sierra Jennings MA on 5/18/2022 10:23 AM. (History)            Assessment /Plan     For exam results, see Encounter Report.    Type 2 diabetes mellitus without complication, unspecified whether long term insulin use  -     Diabetic Eye Screening Photo      Please see  progress notes for interpretation.

## 2022-05-19 ENCOUNTER — PATIENT MESSAGE (OUTPATIENT)
Dept: FAMILY MEDICINE | Facility: CLINIC | Age: 42
End: 2022-05-19
Payer: COMMERCIAL

## 2022-05-19 ENCOUNTER — CLINICAL SUPPORT (OUTPATIENT)
Dept: REHABILITATION | Facility: HOSPITAL | Age: 42
End: 2022-05-19
Payer: COMMERCIAL

## 2022-05-19 DIAGNOSIS — R53.1 DECREASED STRENGTH, ENDURANCE, AND MOBILITY: Primary | ICD-10-CM

## 2022-05-19 DIAGNOSIS — Z74.09 DECREASED STRENGTH, ENDURANCE, AND MOBILITY: Primary | ICD-10-CM

## 2022-05-19 DIAGNOSIS — R68.89 DECREASED STRENGTH, ENDURANCE, AND MOBILITY: Primary | ICD-10-CM

## 2022-05-19 PROCEDURE — 97110 THERAPEUTIC EXERCISES: CPT | Mod: PN

## 2022-05-19 NOTE — PROGRESS NOTES
[facility]   Physical Therapy Treatment Note     Name: Tony Gordillo  Clinic Number: [id number]    Therapy Diagnosis:   Encounter Diagnosis   Name Primary?    Decreased strength, endurance, and mobility Yes     Physician: Jovany Ford MD    Visit Date: 5/19/2022     Physician Orders: PT Eval and Treat      Medical Diagnosis from Referral:   U07.1 (ICD-10-CM) - COVID-19 virus infection   R06.00 (ICD-10-CM) - Dyspnea, unspecified type      Evaluation Date: 3/8/2022  Authorization Period Expiration: 12/31/2022  Plan of Care Expiration: 6/8/2022  Visit # / Visits authorized: 7/25 (+eval)  Progress Note Due: 5/19/2022    Precautions: Standard and monitor BP    Time In: 845 AM  Time Out: 0915 AM  Total Billable Time: 30 minutes     SUBJECTIVE     Pt reports: Overall feels like his breathing and endurance has been much better. Feels confident about returning to work.     He was compliant with home exercise program.  Response to previous treatment: worn out for 20 minutes   Functional change: ongoing    Pain: 2/10  Location: B knee soreness    OBJECTIVE     Resting vitals:   BP: 155/100   HR: 102  O2 sats: 97%     Postural examination/scapula alignment: Rounded shoulder    Gait Assessment:  - AD used: none   - 6 Minute Walk Test Distance:  1555 ft (previous: 2 minute walk test for 411 ft)  - O2 sats after: 96 %  - HR after: 100  - BP: 146/90 with manual cuff     GAIT DEVIATIONS:  Tony displays the following deviations with ambulation: decreased step length B, decreased pelvic rotation B, decreased reciprocal arm swing     Endurance Assessment:       Evaluation Reassessment Reassessment   5 time sit to stand 15 seconds  99 HR, 97% 12 seconds   1/10 RPE   96%, 107 bpm            TREATMENT     Tony received the treatments listed below:      received therapeutic exercises to develop strength and ROM for 30 minutes including:    Testing listed above.    Recumbent Bike  L2 - 4 minutes     -91% SpO2   -107 HR    -RPE 1/10    L5 - 4 minutes    -97% SpO2    -113 HR    -RPE 3/10     L7 - 4 minutes   -97% SpO2   -120 HR    -RPE 4/10     L5 - 4 minutes    -97% SpO2   -122 HR    -RPE 2/10    L2 - 4 minutes    -96% SpO2   -110 HR    -RPE 2/10    BP following recumbent bike: 131/72    Treadmill  2' at 2.0 mph   -96% SpO2   -100 HR    -RPE 2/10    2' at 2.0 mph   -94% SpO2   -113 HR    -RPE 2/10    5' at 2.0 mph   -95% SpO2   -115 HR    -RPE 1/10        PATIENT EDUCATION AND HOME EXERCISES     Home Exercises Provided and Patient Education Provided     Education provided:   - continuation of HEP/walking program  -continuous monitoring of BP at home    Written Home Exercises Provided: yes. Exercises were reviewed and Tony was able to demonstrate them prior to the end of the session.  Tony demonstrated good  understanding of the education provided. See EMR under Patient Instructions for exercises provided during therapy sessions    ASSESSMENT   Patient presented to session today reporting improved confidence in return to work abilities and improved SOB. Pt re-assessed today with improvement noted in 5 time sit to stand and 6 minute walk test. Pt able to complete 6 minute walk test today with vitals remaining in therapeutic range. No significant SOB noted with all objective testing. Pt able to complete 10+ minutes on Nu-Step and Treadmill over past few sessions indicating improved endurance. Pt also able to demo ambulation around clinic in full gear without SOB or fatigue. Overall, pt with good gains noted in therapy. Pt demo ability for return to work at this time and reporting confidence to return to work. Pt to see primary care physician next week. Pt is appropriate for d/c from therapy at this time.     Tony Is progressing well towards his goals.   Pt prognosis is Good.     Pt will continue to benefit from skilled outpatient physical therapy to address the deficits listed in the problem list box on initial  evaluation, provide pt/family education and to maximize pt's level of independence in the home and community environment.     Pt's spiritual, cultural and educational needs considered and pt agreeable to plan of care and goals.     Anticipated barriers to physical therapy: SCHEDULING     Goals:  Short Term Goals:  4 weeks  1. Pt will initiate home exercise program to improve strength, flexibility, endurance, mobility & balance to return pt to PLOF MET  2. Pt will tolerate 10 min on stationary bike (I.e. Bike, NuStep) to improve endurance to assist in return to leaisure activities. MET  3. Pt will tolerate 2 min or greater on treadmill at 2.0 mph to improve gait speed MET 5.16.22  4. Pt will report ability to ride bike at home for > / = 5 minutes in order to improve quality of life. MET 5.16.22  5.  Pt will verbalize good recall & understanding of universal precautions to improve containment of infection and future illness MET  6. Pt will complete 6 minute walk test in order to improve CV endurance and tolerance to activities. MET     Long Term Goals: 12 weeks  1. Pt to report compliance with walking program/HEP > / = 3x per week in order to maximize gains and return to PLOF MET  2. Pt to perform 2 minute walk test for 450 feet or greater to improve gait speed & endurance MET  3. Pt will improve 5 time sit to stand score for < / = 10 seconds in order to maximize functional strength and return to PLOF. MET  4. Pt will tolerate ambulating on treadmill at varying inclines for > / = 10 minutes in order to promote return to work. MET  5. Pt to report biking at home for 30 mins without significant fatigue in order to improve quality of life. MET    PLAN     Discharge from skilled PT services.     Maria Del Rosario Cruz, PT, DPT  5/19/2022

## 2022-05-20 ENCOUNTER — TELEPHONE (OUTPATIENT)
Dept: FAMILY MEDICINE | Facility: CLINIC | Age: 42
End: 2022-05-20
Payer: COMMERCIAL

## 2022-05-20 ENCOUNTER — PATIENT MESSAGE (OUTPATIENT)
Dept: REHABILITATION | Facility: HOSPITAL | Age: 42
End: 2022-05-20
Payer: COMMERCIAL

## 2022-05-20 NOTE — TELEPHONE ENCOUNTER
----- Message from Kasia Carr sent at 5/20/2022 11:35 AM CDT -----  Regarding: self  .Type: Patient Call Back    Who called: self     What is the request in detail: submitted work release paper through portal yesterday and is checking on status of the form. Please call     Can the clinic reply by [facility]?    Would the patient rather a call back or a response via [facility]? Call     Best call back number: .[phone]

## 2022-05-20 NOTE — TELEPHONE ENCOUNTER
----- Message from Kasia Carr sent at 5/20/2022 11:35 AM CDT -----  Regarding: self  .Type: Patient Call Back    Who called: self     What is the request in detail: submitted work release paper through portal yesterday and is checking on status of the form. Please call     Can the clinic reply by [facility]?    Would the patient rather a call back or a response via [facility]? Call     Best call back number: .[phone]

## 2022-05-23 ENCOUNTER — PATIENT MESSAGE (OUTPATIENT)
Dept: CARDIOLOGY | Facility: CLINIC | Age: 42
End: 2022-05-23
Payer: COMMERCIAL

## 2022-05-26 ENCOUNTER — TELEPHONE (OUTPATIENT)
Dept: CARDIOLOGY | Facility: CLINIC | Age: 42
End: 2022-05-26
Payer: COMMERCIAL

## 2022-05-26 NOTE — TELEPHONE ENCOUNTER
----- Message from Dania Kirk sent at 5/23/2022 12:21 PM CDT -----  Pt would need a return to work note for his company

## 2022-08-08 ENCOUNTER — PATIENT MESSAGE (OUTPATIENT)
Dept: ENDOCRINOLOGY | Facility: CLINIC | Age: 42
End: 2022-08-08
Payer: COMMERCIAL

## 2022-08-09 ENCOUNTER — TELEPHONE (OUTPATIENT)
Dept: ENDOCRINOLOGY | Facility: CLINIC | Age: 42
End: 2022-08-09
Payer: COMMERCIAL

## 2022-08-09 NOTE — TELEPHONE ENCOUNTER
----- Message from Sierra Hernández sent at 8/9/2022 11:21 AM CDT -----  Type: Patient Call Back    Who called:pt    What is the request in detail:pt requesting paperwork for eligibility for work. Needs to be able to authorize to work. He has diabetes. Call pt     Can the clinic reply by [facility]?    Would the patient rather a call back or a response via [facility]? call    Best call back number:[phone] (home)       Additional Information:

## 2022-08-11 ENCOUNTER — PATIENT OUTREACH (OUTPATIENT)
Dept: ADMINISTRATIVE | Facility: HOSPITAL | Age: 42
End: 2022-08-11
Payer: COMMERCIAL

## 2022-08-11 NOTE — TELEPHONE ENCOUNTER
Contrast Dose Calculator:   Patient's age: 61.   Patient's sex: Male. Patient weight (kg) = 76.6. Creatinine level (mg/dL) = 5.21. Creatinine clearance (mL/min): 16.74. Contrast concentration (mg/mL) = 370. MACD = 60 mL. Max Contrast dose per Creatinine Cl calculator = 37.68 mL. Patient was scheduled.

## 2022-08-24 DIAGNOSIS — E11.9 TYPE 2 DIABETES MELLITUS WITHOUT COMPLICATION: ICD-10-CM

## 2022-08-31 ENCOUNTER — OFFICE VISIT (OUTPATIENT)
Dept: FAMILY MEDICINE | Facility: CLINIC | Age: 42
End: 2022-08-31
Payer: COMMERCIAL

## 2022-08-31 VITALS
BODY MASS INDEX: 38.12 KG/M2 | WEIGHT: 288.94 LBS | TEMPERATURE: 99 F | DIASTOLIC BLOOD PRESSURE: 80 MMHG | SYSTOLIC BLOOD PRESSURE: 120 MMHG | HEART RATE: 88 BPM | OXYGEN SATURATION: 96 %

## 2022-08-31 DIAGNOSIS — E66.01 MORBID OBESITY: ICD-10-CM

## 2022-08-31 DIAGNOSIS — J06.9 UPPER RESPIRATORY TRACT INFECTION, UNSPECIFIED TYPE: Primary | ICD-10-CM

## 2022-08-31 DIAGNOSIS — G43.109 MIGRAINE WITH AURA AND WITHOUT STATUS MIGRAINOSUS, NOT INTRACTABLE: ICD-10-CM

## 2022-08-31 DIAGNOSIS — H92.01 OTALGIA OF RIGHT EAR: ICD-10-CM

## 2022-08-31 PROCEDURE — 99214 OFFICE O/P EST MOD 30 MIN: CPT | Mod: S$GLB,,, | Performed by: NURSE PRACTITIONER

## 2022-08-31 PROCEDURE — 99999 PR PBB SHADOW E&M-EST. PATIENT-LVL III: CPT | Mod: PBBFAC,,, | Performed by: NURSE PRACTITIONER

## 2022-08-31 PROCEDURE — 99999 PR PBB SHADOW E&M-EST. PATIENT-LVL III: ICD-10-PCS | Mod: PBBFAC,,, | Performed by: NURSE PRACTITIONER

## 2022-08-31 PROCEDURE — 99214 PR OFFICE/OUTPT VISIT, EST, LEVL IV, 30-39 MIN: ICD-10-PCS | Mod: S$GLB,,, | Performed by: NURSE PRACTITIONER

## 2022-08-31 RX ORDER — ACETAMINOPHEN AND CHLORPHENIRAMINE MALEATE 325; 2 MG/1; MG/1
1 TABLET, FILM COATED ORAL 4 TIMES DAILY PRN
Qty: 30 TABLET | Refills: 1 | Status: SHIPPED | OUTPATIENT
Start: 2022-08-31 | End: 2023-03-09

## 2022-08-31 NOTE — PROGRESS NOTES
HPI     Chief Complaint:  Chief Complaint   Patient presents with    Otalgia     Right ear    Ear Drainage       Tony Gordillo is a 42 y.o. male with multiple medical diagnoses as listed in the medical history and problem list that presents for right ear pain.  Pt is new to me but is known to this clinic with his last appointment being 2022.      Otalgia   There is pain in the right ear. This is a new problem. The current episode started in the past 7 days. The problem has been gradually improving. There has been no fever. The pain is at a severity of 3/10. Associated symptoms include ear discharge (once after putting drops in ear). Pertinent negatives include no abdominal pain, coughing, diarrhea, headaches, hearing loss, neck pain, rash, rhinorrhea, sore throat or vomiting. He has tried ear drops for the symptoms. The treatment provided mild relief. There is no history of a chronic ear infection, hearing loss or a tympanostomy tube.       he is compliant with medications daily without any adverse side effects.    Assessment & Plan     Problem List Items Addressed This Visit          Neuro    Migraine with aura and without status migrainosus, not intractable propranolol SE angry; topamax not helpful; imitrex ineffective; daith ear piercing helps    The current medical regimen is effective;  continue present plan and medications.         Endocrine    Morbid obesity    We discussed weight issues and safe, effective ways of losing pounds, includin) diet:  low carbohydrate, low fat diet, stay away from fast food, fried and processed food, use whole grain, lot of fruits and vegetables, use healthy fat such as avocado, nuts and olive oil in reasonable quantity, stay away from sodas. Regular meals with lean proteins.  2) physical activity: ideally 150 min a week, with cardiovascular and resistance activity.  Patient was encouraged to set realistic attainable goals for weight loss, and we will follow up  periodically.    Provided pt with phone number to bariatric medicine.      Other Visit Diagnoses       Upper respiratory tract infection, unspecified type    -  Primary    -AFVSS in clinic today.  -exam of right ear unremarkable. No erythema, bulging TM, or pain with manipulation of tragus. Pt did report one episode of drainage, however this was after using ear drops which were the likely source of fluid. Pt's TM is intact.   -Mild symptoms, most likely viral etiology.  -based on clinical findings today, does not warrant Abx treatment at this time. D/w pt about the potential risks of inappropriate Abx use and that if patient's Sx worsens, we will re-evaluate to assess the need to change the treatment plan.    -Warm tea with honey and lemon, and/or warm salt water gargles every 4 hours PRN for sore throat. May try OTC Cepacol if pt desires.  -advised frequent hand washing, rest, and plenty of fluids. Increase water intake to 64-80 oz daily to help thin mucus.  -Tylenol tablets as needed for fever, headaches, sore throat, ear pain, bodyaches, and/or nasal/sinus inflammation.   -Nasal Saline spray (Over the counter [place] spray or [place])  2 sprays each nostril 2-3 times a day for nasal congestion.     Coricidin prn.     I counseled the patient on fluids, rest, OTC medications that can safely be used, hand/cough hygiene, expected course of illness, and when further medical attention would be warranted.      Discussed DDx, condition, and treatment.   Education sent to patient portal/included in after visit summary.  ED precautions given.   Notify provider if symptoms do not resolve or increase in severity.   Patient verbalizes understanding and agrees with plan of care.        Relevant Medications    chlorpheniramine-acetaminophen (CORICIDIN HBP COLD AND FLU) 2-325 mg Tab    Otalgia of right ear        As above.     Relevant Medications    chlorpheniramine-acetaminophen (CORICIDIN HBP COLD AND FLU) 2-325 mg Tab             --------------------------------------------      Health Maintenance:  Health Maintenance         Date Due Completion Date    Pneumococcal Vaccines (Age 0-64) (2 - PCV) 09/28/2019 9/28/2018    Hemoglobin A1c 08/12/2022 5/12/2022    Override on 7/11/2015: Done (HGB A1C 9.9H - [facility] - DR. MANN)    Influenza Vaccine (1) 09/01/2022 9/6/2019    Foot Exam 02/16/2023 2/16/2022    Override on 12/11/2012: Done    Diabetes Urine Screening 02/17/2023 2/17/2022    Lipid Panel 02/17/2023 2/17/2022    Override on 7/11/2015: Done ([facility] RESULTS - DR. MANN)    Low Dose Statin 04/18/2023 4/18/2022    Eye Exam 05/18/2023 5/18/2022    TETANUS VACCINE 07/18/2029 7/18/2019            Health maintenance reviewed.  Vaccines offered patient declined at this time     Follow Up:  Follow up in about 2 weeks (around 9/14/2022), or if symptoms worsen or fail to improve.    Exam     Review of Systems:  (as noted above)  Review of Systems   Constitutional:  Negative for chills, fever and unexpected weight change.   HENT:  Positive for ear discharge (once after putting drops in ear) and ear pain. Negative for hearing loss, rhinorrhea, sore throat and trouble swallowing.    Eyes:  Negative for visual disturbance.   Respiratory:  Negative for cough, chest tightness, shortness of breath and wheezing.    Cardiovascular:  Negative for chest pain and palpitations.   Gastrointestinal:  Negative for abdominal pain, anal bleeding, blood in stool, diarrhea and vomiting.   Endocrine: Negative for polydipsia and polyphagia.   Genitourinary:  Negative for decreased urine volume and hematuria.   Musculoskeletal:  Negative for neck pain.   Skin:  Negative for rash and wound.   Neurological:  Negative for seizures, speech difficulty and headaches.   Psychiatric/Behavioral:  Negative for confusion, decreased concentration and suicidal ideas.      Physical Exam:   Physical Exam  Constitutional:       General:  He is not in acute distress.     Appearance: He is obese. He is not ill-appearing or diaphoretic.   HENT:      Head: Normocephalic and atraumatic.      Right Ear: Tympanic membrane, ear canal and external ear normal. There is no impacted cerumen.      Left Ear: Tympanic membrane, ear canal and external ear normal. There is no impacted cerumen.      Nose: Congestion present.      Mouth/Throat:      Pharynx: No oropharyngeal exudate or posterior oropharyngeal erythema.   Eyes:      General: No scleral icterus.     Pupils: Pupils are equal, round, and reactive to light.   Neck:      Vascular: No carotid bruit.   Cardiovascular:      Rate and Rhythm: Normal rate and regular rhythm.      Pulses: Normal pulses.      Heart sounds: No murmur heard.    No friction rub. No gallop.   Pulmonary:      Effort: No respiratory distress.      Breath sounds: No wheezing.   Chest:      Chest wall: No tenderness.   Musculoskeletal:         General: No signs of injury.      Cervical back: No rigidity or tenderness.   Lymphadenopathy:      Cervical: No cervical adenopathy.   Skin:     Capillary Refill: Capillary refill takes 2 to 3 seconds.      Findings: No rash.   Neurological:      Mental Status: He is alert.      Cranial Nerves: No cranial nerve deficit.      Sensory: No sensory deficit.      Motor: No weakness.   Psychiatric:         Mood and Affect: Mood normal.     Vitals:    08/31/22 0942   BP: 120/80   BP Location: Right arm   Patient Position: Sitting   BP Method: Large (Manual)   Pulse: 88   Temp: 98.9 °F (37.2 °C)   TempSrc: Oral   SpO2: 96%   Weight: 131.1 kg (288 lb 14.6 oz)      Body mass index is 38.12 kg/m².        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     Employer: [employer]   Tobacco Use    Smoking status: Never    Smokeless tobacco: Never   Substance and Sexual Activity    Alcohol use: Yes     Comment: 1-2 beers every 2 weeks    Drug use: No     Social Determinants of Health     Financial Resource Strain: Unknown    Difficulty of Paying Living Expenses: Patient refused   Food Insecurity: Unknown    Worried About Running Out of Food in the Last Year: Patient refused    Ran Out of Food in the Last Year: Patient refused   Transportation Needs: Unknown    Lack of Transportation (Medical): Patient refused    Lack of Transportation (Non-Medical): Patient refused   Physical Activity: Unknown    Days of Exercise per Week: 2 days   Stress: Unknown    Feeling of Stress : Patient refused   Social Connections: Unknown    Frequency of Communication with Friends and Family: Patient refused    Frequency of Social Gatherings with Friends and Family: Patient refused    Active Member of Clubs or Organizations: Patient refused    Attends Club or Organization Meetings: Patient refused    Marital Status:    Housing Stability: Unknown    Unable to Pay for Housing in the Last Year: Patient refused    Unstable Housing in the Last Year: Patient refused       Family History:  Family History   Problem Relation Age of Onset    Diabetes Mother     Diabetes Father     No Known Problems Brother     Autism Son     No Known Problems Daughter        Allergies and Medications: (updated and reviewed)  Review of patient's allergies indicates:   Allergen Reactions    Influenza virus vaccine, specific Hives    Pioglitazone      Altered mood     Victoza [liraglutide] Nausea And Vomiting    Farxiga [dapagliflozin] Rash     Erectile dysfunction and rash      Current  Outpatient Medications   Medication Sig Dispense Refill    atorvastatin (LIPITOR) 40 MG tablet Take 1 tablet (40 mg total) by mouth once daily. 90 tablet 3    azelastine 205.5 mcg (0.15 %) Spry azelastine 205.5 mcg (0.15 %) nasal spray  INHALE 2 SPRAYS TO EACH NOSTRIL TWICE A DAY 30 mL 11    blood sugar diagnostic Strp To check BG 4 times daily, to use with insurance preferred meter 400 strip 3    blood sugar diagnostic Strp OneTouch Ultra Test strips      blood-glucose meter kit OneTouch Ultra2 Meter kit      blood-glucose meter,continuous (DEXCOM G6 ) Misc Use with dexcom G6 system 1 each 0    butalbital-acetaminophen-caffeine -40 mg (FIORICET, ESGIC) -40 mg per tablet Take 1 tablet by mouth every 4 (four) hours as needed for Headaches. 12 tablet 0    DEXCOM G6 SENSOR Filomena CHANGE SENSOR EVERY 10 DAYS 9 each 4    DEXCOM G6 TRANSMITTER Filomena CHANGE EVERY 3 MONTHS 1 each 3    diclofenac sodium (VOLTAREN) 1 % Gel Apply the gel (2 g) to the affected area 4 times daily. Do not apply more than 8 g daily to any one affected joint 100 g 1    empagliflozin (JARDIANCE) 25 mg tablet Take 1 tablet (25 mg total) by mouth once daily. 30 tablet 5    fenofibrate 160 MG Tab fenofibrate 160 mg tablet      fluticasone propionate (FLONASE) 50 mcg/actuation nasal spray fluticasone propionate 50 mcg/actuation nasal spray,suspension 16 g 11    fluticasone-umeclidin-vilanter (TRELEGY ELLIPTA) 200-62.5-25 mcg inhaler Inhale 1 puff into the lungs once daily. Stop symbicort 60 each 5    ibuprofen (ADVIL,MOTRIN) 800 MG tablet Take 1 tablet (800 mg total) by mouth every 8 (eight) hours as needed for Pain. 30 tablet 0    insulin aspart U-100 (NOVOLOG FLEXPEN U-100 INSULIN) 100 unit/mL (3 mL) InPn pen Inject 40 units before each meal with ss, max daily dose 150 units 45 mL 5    insulin glargine U-300 conc (TOUJEO MAX U-300 SOLOSTAR) 300 unit/mL (3 mL) insulin pen Inject 30 Units into the skin once daily. 2 pen 5    insulin NPH  "isoph U-100 human (NOVOLIN N FLEXPEN) 100 unit/mL (3 mL) InPn Inject 14 units once daily at night 8 pm. 15 mL 2    ketoconazole (NIZORAL) 2 % cream Apply topically once daily. 1 Tube 3    lancets Misc To check BG 4 times daily, to use with insurance preferred meter 400 each 3    levothyroxine (SYNTHROID) 50 MCG tablet TAKE 1 TABLET (50 MCG TOTAL) BY MOUTH BEFORE BREAKFAST. 90 tablet 2    lisinopriL (PRINIVIL,ZESTRIL) 20 MG tablet Take 1 tablet (20 mg total) by mouth once daily. 90 tablet 3    montelukast (SINGULAIR) 10 mg tablet TAKE 1 TABLET BY MOUTH EVERY EVENING 90 tablet 3    ondansetron (ZOFRAN-ODT) 8 MG TbDL Take 1 tablet (8 mg total) by mouth every 6 (six) hours as needed (n/v). 30 tablet 0    pen needle, diabetic (BD ULTRA-FINE KEN PEN NEEDLE) 32 gauge x 5/32" Ndle 1 Device by Misc.(Non-Drug; Combo Route) route 5 (five) times daily. 550 each 4    promethazine (PHENERGAN) 25 MG suppository Place 1 suppository (25 mg total) rectally every 6 (six) hours as needed for Nausea. 10 suppository 0    semaglutide (OZEMPIC) 1 mg/dose (4 mg/3 mL) Inject 1 mg into the skin every 7 days. 1 pen 5    syringe, disposable, 1 mL Syrg Testosterone every 2 weeks 25 Syringe 1    chlorpheniramine-acetaminophen (CORICIDIN HBP COLD AND FLU) 2-325 mg Tab Take 1 tablet by mouth 4 (four) times daily as needed (ear ache, runny nose, and congestion.). 30 tablet 1     No current facility-administered medications for this visit.       Patient Care Team:  Jovany Ford MD as PCP - General (Internal Medicine)  Janneth Johnson RN (Inactive) as Registered Nurse (Diabetes)  Rocky Hewitt MD as Consulting Physician (Ophthalmology)  Preethi Monaco RD as Dietitian (Nutrition)  Christofer Rowley MD as Consulting Physician (Orthopedic Surgery)  Singh Rizo MD as Consulting Physician (Sports Medicine)  Shilpa Gordon NP as Nurse Practitioner (Urology)  Nicole Wnyn MD (Family Medicine)  Rafael Meraz MA as Care " Coordinator      The patient expressed understanding and no barriers to adherence were identified.      - The patient indicates understanding of these issues and agrees with the plan. Brief care plan is updated and reviewed with the patient as applicable.      - The patient is given an After Visit Summary that lists all medications with directions, allergies, education, orders placed during this encounter and follow-up instructions.      - I have reviewed the patient's medical information including past medical, family, and social history sections including the medications and allergies.      - We discussed the patient's current medications.     This note was created by combination of typed  and MModal dictation.  Transcription errors may be present.  If there are any questions, please contact me.       Ridge Webb NP

## 2022-08-31 NOTE — PATIENT INSTRUCTIONS
//////////PHONE NUMBERS//////////    Bariatrics---[phone]  Breast Surgery---[phone]  Case Management---[phone]  Colonoscopy---[phone]  Imaging, Xray, CT, MRI, Ultrasound---[phone]  Infectious Disease---[phone]  Interventional Radiology---[phone]  Medical Records---[phone]  [facility]---[phone]  DME---[phone]  Optometry/Ophthalmology---[phone]  [facility]---[phone]  Physical Therapy---[phone]  Psychiatry---[phone]  Plastic Surgery---[phone]  Sleep Study---[phone]  Smoking Cessation---[phone]  Vaccine Hotline---[phone]  Wound Care---[phone]  COVID Vaccine center---[phone]  Referral Desk---[phone]    /////////////////////////////////////////////////    Patient Education       Obesity Discharge Instructions, Adult   About this topic   Obesity is a health problem where your weight is higher than it should be. Doctors use a method called body mass index or BMI to measure whether you are at a healthy weight for your height. The doctor uses your weight and your height to determine your BMI. A high BMI can be an indicator of high body fat. Obesity is different from being overweight. Being overweight means your BMI is 25 or higher. Being obese means your BMI is 30 or higher. If your BMI is 40 or higher, you are considered severely or morbidly obese. Being obese may lead to many health problems. It may make it hard for you to breathe and move easily. It may raise your risk for illnesses like high blood sugar and heart disease.  What care is needed at home?   Ask your doctor what you need to do when you go home. Make sure you understand everything the doctor says. This way you will know what you need to do.  Change your diet. Lower the calories in your diet. Ask your dietitian to help you set an eating plan that is right for you.  Get enough exercise. Talk to your doctor about the right amount of exercise for you.  Do not  smoke.  Limit the amount of beer, wine, and mixed drinks (alcohol) you drink.  Keep a record of your weight. Write down what you eat and drink each day. This may help you be more aware of the choices you are making.  What drugs may be needed?   The doctor may order drugs to:  Treat health problems that may cause weight gain  Lower your appetite  Help you lose weight  Will physical activity be limited?   Talk to your doctor about the right amount of exercise for you. This is especially important if you have heart problems, other illnesses, or if you recently had surgery.  Start slowly by doing more everyday activities like yard work or household chores.  Choose activities that you like to do.  What changes to diet are needed?   Whole grains are a good source of carbohydrates and fiber. Try to eat 3 to 5 servings of whole grain, high fiber foods each day. These are things like whole grain bread, bran cereals, brown rice, and whole wheat pasta.  Fruits and vegetables are good sources of fiber, vitamins, and minerals. Try to pick many kinds and colors. This will help you get different nutrients in your diet. Choose fresh, frozen, or canned fruits and vegetables. Buy plain, frozen fruits without added sugar. Buy plain, frozen vegetables without added salt and fat. Buy canned fruit in 100% juice or water. Avoid canned fruit in syrups. Buy canned vegetables with no salt added.  Milk is a good source of protein and some vitamins and minerals. Choose low-fat (1%) or fat-free milk. Eat nonfat or low-fat cheeses, ice creams, yogurt, and other dairy products.  Meats and beans are good sources of protein and iron. Eat more low-fat or lean meats like chicken without the skin, turkey without the skin, and fish. Eggs and peanut butter are good sources of protein as well. Dried peas, beans, and lentils are also good and contain fiber. Fatty fish, like salmon, tuna, mackerel, herring, and trout, are good to eat and have healthy  omega-3 fats.  Good fats can give you long-term energy. These are found in fish, nuts, seeds, and avocados. Try using olive oil, safflower oil, and low-sodium, low-fat salad dressing as a topping on foods. Use canola, olive, or peanut oil for cooking. Other healthy oils include corn, sunflower, and soybean oils.  Limit sweets such as candy and sugary drinks. Try to drink water instead. Drink diet or no calorie beverages when you want something other than water.  Cut back on solid fats, like butter, lard, and coconut oil.  Limit fatty foods such as desserts, fried foods, and chips.  Trans fats should be avoided. Most trans fats are found in processed foods, commercially baked goods, and fried foods and are very unhealthy. Saturated fat, which is different from trans fat, should be watched and limited if portions are too large. Saturated fat is found mainly in animal sources, such as meat and dairy products. Saturated fat is also found in coconut and palm oils.  Limit processed meats and most processed foods.  Limit eating out. If you choose to visit a restaurant, ask for the nutritional facts. You may also be able to find nutritional facts online. Then, you can make a plan and choose healthy items. Watch the portion size. Have large portions split and take part home for some other meal.  What problems could happen?   Low mood or self-esteem  Anxiety  Muscle pain, joint pain, or arthritis  Sleep apnea  Diabetes  High blood pressure  High cholesterol  Heart, lung, or breathing problems  Kidney or liver problems  Cancer  Gallstones  Increased sweating  Reduced fertility  Pregnancy complications  Gastroesophageal reflux disease  When do I need to call the doctor?   Signs of high or low blood sugar  Thoughts of hurting yourself  Teach Back: Helping You Understand   The Teach Back Method helps you understand the information we are giving you. After you talk with the staff, tell them in your own words what you learned. This  "helps to make sure the staff has described each thing clearly. It also helps to explain things that may have been confusing. Before going home, make sure you can do these:  I can tell you about my condition.  I can tell you what changes I need to make with my diet or drugs.  I can tell you what I will do if I have signs of a heart attack or stroke.  Where can I learn more?   Centers for Disease Control and Prevention  http://www.cdc.gov/obesity/adult/defining.html   [facility]  http://www.nhs.uk/Conditions/Obesity/Pages/obesityprevention.aspx   Last Reviewed Date   2021-06-23  Consumer Information Use and Disclaimer   This information is not specific medical advice and does not replace information you receive from your health care provider. This is only a brief summary of general information. It does NOT include all information about conditions, illnesses, injuries, tests, procedures, treatments, therapies, discharge instructions or life-style choices that may apply to you. You must talk with your health care provider for complete information about your health and treatment options. This information should not be used to decide whether or not to accept your health care providers advice, instructions or recommendations. Only your health care provider has the knowledge and training to provide advice that is right for you.  Copyright   Copyright © 2021 [employer] and its affiliates and/or licensors. All rights reserved.  Patient Education       Weight Loss Diet   About this topic   There are many "trendy" weight loss diets that are popular today. Many of these diets can end up being more harmful than helpful. The healthiest way to lose weight is to burn more calories than you eat.  A weight loss diet should help you have a healthy view of eating. It is NOT healthy to stop eating to try and lose weight. A good diet plan will help you cut down your food intake and make healthy choices.  A healthy weight loss goal is 1 to 2 " pounds (0.5 to 1 kg) per week. Reducing calories in your diet, burning calories through exercise, or both can help you lose weight. Combining a healthy diet with regular physical activity can help you get the best results.  To cut calories in your diet you can:  Switch from whole milk to 1% or skim milk.  Switch from regular cheese to low-fat or fat-free cheese.  Use healthier condiment choices:  Fat-free or low-fat sour cream or salad dressings  Spray butter  Diet syrups or jellies over regular  Try frozen yogurt as a dessert rather than eating ice cream.  Skip the chips. Snack on carrots, vegetables, or fruit. If chips are a favorite of yours, try the baked style and watch portion size.  Eat grilled, roasted, boiled, broiled, or baked meats. Avoid deep-frying. Choose skinless poultry, lean red meat, lean cuts of pork, and fish for good protein sources.  Try flavored no-calorie paul. Do not drink soda and juices that have many calories.  Choose fruit instead of sweets.  General   Eating smaller meals more often may be helpful. This will keep you from overeating at your next meal. Also, eating meals slowly helps you feel full faster.  If eating 3 meals is a part of your lifestyle, choose more lean proteins and higher fiber foods to fill you up at each meal.  Do not skip meals. Most often if you skip a meal, you eat too much at the next meal.  Eat smaller portions. Use a smaller plate or bowl for meals, and when you are eating out, eat half and take the rest home.  Plan ahead. Plan your meals and grocery list before going to the store. Planning will keep you from getting meals from restaurants.  Do not go to the grocery store hungry. You are more likely to buy snacks that are not good for you.  Portion out snacks. When you are having a snack, instead of grabbing the whole bag, portion a small amount out to give yourself a stopping point.  Drink water before and after your meals to help fill you up without the  calories.  When eating starchy foods, choose whole-grain products. These have a lot of fiber which will make you feel full. Fiber also helps lower cholesterol and helps with bowel function.  If you need a helpful start, ask your doctor to send you to a dietitian for weight loss help.         What will the results be?   Losing excess weight will make your whole body healthier. You will have more energy for your daily activities and lower your risk for health problems.  What lifestyle changes are needed?   Stay active. Eating healthy is not always enough to lose weight. Burning calories by exercising is a big part of weight loss.  What foods are good to eat?   The key is to watch your portion sizes. It is best to choose foods that are lower in fat and calories.  Choose lean meats:  Boneless, skinless chicken breast  Pork loin  90% lean beef  Lean turkey meat  Fresh fish (not fried)  Choose low-fat dairy products:  1% or skim milk  Spray butter or margarine  Low-fat or fat-free cheese  Frozen yogurt or low-calorie ice cream  Choose fresh fruits, vegetables, beans and lentils, and whole wheat products more often.  Choose water to drink more often. Drink diet or no-calorie beverages when you want something other than water. Aim to get your calories from the foods you eat.  Choose smart snacks:  Fruits  Vegetables  Low-fat or nonfat yogurt  Low-fat or no-fat cheese, such as cottage cheese  Unsalted nuts  Hard-boiled egg  Hummus  Guacamole  Natural peanut butter  Popcorn with no butter ? use pepper, garlic, or another spice to taste  Whole grain crackers  What foods should be limited or avoided?   Limit high-fat, high-sodium, and high-calorie foods like:  Fried foods  Processed meats  Whole-fat dairy products  Candy, cookies, chips, pastries  Sausage, hernandez, any full-fat meats  Soda, juice  Beer, wine, and mixed drinks (alcohol)  Will there be any other care needed?   What do I do first before trying to lose weight?  Talk  to your doctor and dietitian to see if you need to lose weight. Work with them to set your weight loss goals.  If you have a chronic illness, such as high blood sugar or high blood pressure, ask a doctor or dietitian what diet and exercise is right for you.  Ask your doctor about how much you are able to exercise and what type of exercise is good for you.  Helpful tips   Keep a food journal to help keep you on track.  Join a support group.  Tips for burning calories:  If your workplace is near your house, choose to walk or bike to work instead of driving.  Take 20-minute walks each day. Walk around during your lunch break. You will not only burn calories, but will raise your energy for the rest of the day.  Take the stairs over the elevator.  Join a gym or exercise class with a friend.  Try to exercise 30 minutes a day for overall health. Three 10-minute sessions work too. Aim for 60 to 90 minutes a day to lose weight.  Drink lots of water before, during, and after exercise.  Where can I learn more?   Academy of Nutrition and Dietetics  https://www.eatright.org/health/weight-loss/your-health-and-your-weight/back-to-basics-for-healthy-weight-loss   Centers for Disease Control and Prevention  https://www.cdc.gov/healthyweight/healthy_eating/index.html   Familydoctor.org  https://familydoctor.org/nutrition-weight-loss-need-know-fad-diets/   [facility]  https://www.nhs.uk/live-well/healthy-weight/12-tips-to-help-you-lose-weight/?tabname=you-and-your-weight   Last Reviewed Date   2021-06-24  Consumer Information Use and Disclaimer   This information is not specific medical advice and does not replace information you receive from your health care provider. This is only a brief summary of general information. It does NOT include all information about conditions, illnesses, injuries, tests, procedures, treatments, therapies, discharge instructions or life-style choices that may apply to you. You must talk with your health care  provider for complete information about your health and treatment options. This information should not be used to decide whether or not to accept your health care providers advice, instructions or recommendations. Only your health care provider has the knowledge and training to provide advice that is right for you.  Copyright   Copyright © 2021 [employer], [employer]. and its affiliates and/or licensors. All rights reserved.    Patient Education       Viral Upper Respiratory Infection Discharge Instructions, Adult   About this topic   You have an upper respiratory infection or URI. A URI can affect your nose, throat, ears, and sinuses. A virus is the cause of almost all URIs and antibiotics will not help you feel better more quickly. The common cold is an example of a viral URI.  URIs are easy to spread from person to person, most often through coughing or sneezing. A URI will almost always get better in a week or two without any treatment.         What care is needed at home?   Ask your doctor what you need to do when you go home. Make sure you ask questions if you do not understand what the doctor says.  If you smoke, try to quit. Your doctor or nurse can help.  Drink lots of fluids like water, juice, or broth. This will help replace any fluids lost if you have a runny nose or fever. Warm tea or soup can help soothe a sore throat.  If the air in your home feels dry, use a cool mist humidifier. This can help a stuffy nose and make it easier to breathe.  You can also use saline nose drops to relieve stuffiness.  If you decide to take over-the-counter cough or cold medicines, follow the directions on the label carefully. Be sure you do not take more than 1 medicine that contains acetaminophen. Also, if you have a heart problem or high blood pressure, check with your doctor before you take any of these medicines.  Wash your hands often. Cough or sneeze into a tissue or your elbow instead of your hands. This will help keep  others healthy.  What follow-up care is needed?   Your doctor may ask you to make visits to the office to check on your progress. Be sure to keep these visits.  What drugs may be needed?   The doctor may order drugs to:  Open up the tubes of your lungs  Treat viral infection  Relieve or stop coughing  Help with pain from a sore throat  Relieve runny and stuffy nose  Provide oxygen  Will physical activity be limited?   You need to rest for a few days to let your body recover from the infection.  What changes to diet are needed?   Eat soft foods like soup if swallowing is too painful.  What problems could happen?   Asthma attack  Sinus infections  Lung problems like pneumonia and bronchitis  Severe fluid loss. This is dehydration.  What can be done to prevent this health problem?   Wash your hands often with soap and water for at least 20 seconds, especially after coughing or sneezing. Alcohol-based hand sanitizers also work to kill the virus.  If you are sick, cover your mouth and nose with tissue when you cough or sneeze. You can also cough into your elbow. Throw away tissues in the trash and wash your hands after touching used tissues.  Do not get too close (kissing, hugging) to people who are sick.  Do not share towels or hankies with anyone who is sick. Clean commonly handled things like door handles, remotes, toys, and phones. Wipe them with a disinfectant.  Stay away from crowded places.  Cover your nose and mouth when you sneeze or cough.  Take vitamin C to help build up your body's ability to fight disease.  Get a flu shot each year.  When do I need to call the doctor?   You have trouble breathing when talking or sitting still.  You have a fever of 100.4°F (38°C) or higher for several days, chills, a very bad sore throat, or ear or sinus pain.  You develop a new fever after several days of feeling the same or improving.  You develop chest pain when you cough.  You have a cough that lasts more than 10  days.  You cough up blood, or the color of the mucus you cough up changes.  Teach Back: Helping You Understand   The Teach Back Method helps you understand the information we are giving you. After you talk with the staff, tell them in your own words what you learned. This helps to make sure the staff has described each thing clearly. It also helps to explain things that may have been confusing. Before going home, make sure you can do these:  I can tell you about my condition.  I can tell you what may help ease my signs.  I can tell you what I will do if I have a fever, chills, breathing very fast, or trouble breathing.  Where can I learn more?   American Lung Association  https://www.lung.org/blog/can-you-exercise-with-a-cold   American Lung Association  https://www.lung.org/lung-health-diseases/lung-disease-lookup/influenza/facts-about-the-common-cold   [facility] Choices  https://www.nhs.uk/conditions/respiratory-tract-infection/   [employer]  https://www.[employer].PageUp People/contents/the-common-cold-in-adults-beyond-the-basics   Last Reviewed Date   2021-06-08  Consumer Information Use and Disclaimer   This information is not specific medical advice and does not replace information you receive from your health care provider. This is only a brief summary of general information. It does NOT include all information about conditions, illnesses, injuries, tests, procedures, treatments, therapies, discharge instructions or life-style choices that may apply to you. You must talk with your health care provider for complete information about your health and treatment options. This information should not be used to decide whether or not to accept your health care providers advice, instructions or recommendations. Only your health care provider has the knowledge and training to provide advice that is right for you.  Copyright   Copyright © 2021 [employer], [employer]. and its affiliates and/or licensors. All rights reserved.

## 2022-09-24 ENCOUNTER — HOSPITAL ENCOUNTER (EMERGENCY)
Facility: HOSPITAL | Age: 42
Discharge: HOME | End: 2022-09-24
Attending: INTERNAL MEDICINE
Payer: COMMERCIAL

## 2022-09-24 VITALS
HEIGHT: 73 IN | RESPIRATION RATE: 16 BRPM | BODY MASS INDEX: 38.17 KG/M2 | TEMPERATURE: 98 F | WEIGHT: 288 LBS | HEART RATE: 100 BPM | SYSTOLIC BLOOD PRESSURE: 139 MMHG | DIASTOLIC BLOOD PRESSURE: 86 MMHG | OXYGEN SATURATION: 97 %

## 2022-09-24 DIAGNOSIS — M79.642 LEFT HAND PAIN: Primary | ICD-10-CM

## 2022-09-24 LAB — POCT GLUCOSE: 144 MG/DL (ref 70–110)

## 2022-09-24 PROCEDURE — 25000003 PHARM REV CODE 250: Mod: ER | Performed by: INTERNAL MEDICINE

## 2022-09-24 PROCEDURE — 82962 GLUCOSE BLOOD TEST: CPT | Mod: ER

## 2022-09-24 PROCEDURE — 99283 EMERGENCY DEPT VISIT LOW MDM: CPT | Mod: 25,ER

## 2022-09-24 RX ORDER — IBUPROFEN 800 MG/1
800 TABLET ORAL EVERY 8 HOURS PRN
Qty: 30 TABLET | Refills: 0 | Status: SHIPPED | OUTPATIENT
Start: 2022-09-24 | End: 2023-01-05 | Stop reason: ALTCHOICE

## 2022-09-24 RX ORDER — IBUPROFEN 400 MG/1
800 TABLET ORAL
Status: COMPLETED | OUTPATIENT
Start: 2022-09-24 | End: 2022-09-24

## 2022-09-24 RX ADMIN — IBUPROFEN 800 MG: 400 TABLET ORAL at 08:09

## 2022-09-25 NOTE — ED PROVIDER NOTES
Encounter Date: 9/24/2022    SCRIBE #1 NOTE: I, Kathleen Ronquillo, am scribing for, and in the presence of,  Miguel Andres MD. I have scribed the following portions of the note - Other sections scribed: HPI, ROS, PE.     History     Chief Complaint   Patient presents with    Hand Pain     Pt injured her second digit on his left hand today pt has pain and swelling to the area     Tony Gordillo is a 42 y.o. male, with HLD, HTN, and DM, presents to the ED for evaluation of second digit of left hand pain and swelling onset today PTA. Pt reports that he was playing with his nephew when he injured his finger.     The history is provided by the patient. No  was used.   Review of patient's allergies indicates:   Allergen Reactions    Influenza virus vaccine, specific Hives    Pioglitazone      Altered mood     Victoza [liraglutide] Nausea And Vomiting    Farxiga [dapagliflozin] Rash     Erectile dysfunction and rash      Past Medical History:   Diagnosis Date    Diabetes mellitus, type 2     Hyperlipidemia     Hypertension     Obesity     NAINA (obstructive sleep apnea)      Past Surgical History:   Procedure Laterality Date    ANKLE SURGERY      KNEE SURGERY      acl,mcl,pcl    left knee x 2       Family History   Problem Relation Age of Onset    Diabetes Mother     Diabetes Father     No Known Problems Brother     Autism Son     No Known Problems Daughter      Social History     Tobacco Use    Smoking status: Never    Smokeless tobacco: Never   Substance Use Topics    Alcohol use: Yes     Comment: 1-2 beers every 2 weeks    Drug use: No     Review of Systems   Constitutional:  Negative for fever.   HENT:  Negative for congestion, sore throat and trouble swallowing.    Respiratory:  Negative for cough and shortness of breath.    Cardiovascular:  Negative for chest pain.   Gastrointestinal:  Negative for abdominal pain, constipation, diarrhea, nausea and vomiting.   Genitourinary:  Negative for dysuria,  flank pain, frequency and urgency.   Musculoskeletal:  Positive for arthralgias and joint swelling. Negative for back pain.   Skin:  Negative for rash.   Neurological:  Negative for headaches.   All other systems reviewed and are negative.    Physical Exam     Initial Vitals [09/24/22 1940]   BP Pulse Resp Temp SpO2   129/81 100 16 98.2 °F (36.8 °C) 97 %      MAP       --         Physical Exam    Nursing note and vitals reviewed.  Constitutional: He appears well-developed and well-nourished.   HENT:   Head: Normocephalic and atraumatic.   Eyes: Conjunctivae are normal.   Neck: Neck supple.   Normal range of motion.  Cardiovascular:  Normal rate, regular rhythm and normal heart sounds.     Exam reveals no gallop and no friction rub.       No murmur heard.  Pulmonary/Chest: Breath sounds normal. No respiratory distress. He has no wheezes. He has no rhonchi. He has no rales.   Abdominal: Abdomen is soft. There is no abdominal tenderness.   Musculoskeletal:         General: Normal range of motion.      Left hand: No deformity.      Cervical back: Normal range of motion and neck supple.      Comments: Left index pain upon movement at MP joint proximal phalanx. Bilateral upper extremities are neurovascularly intact.       Neurological: He is alert and oriented to person, place, and time. GCS score is 15. GCS eye subscore is 4. GCS verbal subscore is 5. GCS motor subscore is 6.   Skin: Skin is warm and dry.   Psychiatric: He has a normal mood and affect.       ED Course   Procedures  Labs Reviewed   POCT GLUCOSE - Abnormal; Notable for the following components:       Result Value    POCT Glucose 144 (*)     All other components within normal limits   POCT GLUCOSE MONITORING CONTINUOUS          Imaging Results              X-Ray Finger 2 or More Views Left (Final result)  Result time 09/24/22 20:05:11      Final result by Amy Alvarez MD (09/24/22 20:05:11)                   Impression:      No acute osseous  abnormality identified.      Electronically signed by: Amy Alvarez MD  Date:    09/24/2022  Time:    20:05               Narrative:    EXAMINATION:  XR FINGER 2 OR MORE VIEWS LEFT    CLINICAL HISTORY:  injury;    TECHNIQUE:  Left 2nd finger three views.    COMPARISON:  None    FINDINGS:  No evidence of acute displaced fracture, dislocation, or osseous destructive process.  No radiopaque retained foreign body seen.                                       Medications   ibuprofen tablet 800 mg (800 mg Oral Given 9/24/22 2057)     Medical Decision Making:   History:   Old Medical Records: I decided to obtain old medical records.  Initial Assessment:   Tony Gordillo is a 42 y.o. male, with HLD, HTN, and DM, presents to the ED for evaluation of second digit of left hand pain and swelling onset today PTA. Pt reports that he was playing with his nephew when he injured his finger.   Clinical Tests:   Lab Tests: Ordered and Reviewed  Radiological Study: Ordered and Reviewed  ED Management:  X-ray of left hand reveals no acute disease.  Patient was given instructions for finger sprain and received ibuprofen in the emergency department as well as a prescription for ibuprofen.  He was advised to follow-up with his primary care physician within the next week for re-evaluation/return to the emergency department if condition worsens.        Scribe Attestation:   Scribe #1: I performed the above scribed service and the documentation accurately describes the services I performed. I attest to the accuracy of the note.               This document was produced by a scribe under my direction and in my presence. I agree with the content of the note and have made any necessary edits.     Dr. Andres    09/25/2022 4:00 AM    Clinical Impression:   Final diagnoses:  [M79.642] Left hand pain (Primary)        ED Disposition Condition    Discharge Stable          ED Prescriptions       Medication Sig Dispense Start Date End Date Auth.  Provider    ibuprofen (ADVIL,MOTRIN) 800 MG tablet Take 1 tablet (800 mg total) by mouth every 8 (eight) hours as needed for Pain. 30 tablet 9/24/2022 -- Miguel Andres MD          Follow-up Information       Follow up With Specialties Details Why Contact Info    Jovany Ford MD Internal Medicine Schedule an appointment as soon as possible for a visit in 1 week For reevaluation [address] [facility]  [place] [address]  [phone]               Miguel Andres MD  09/25/22 0409

## 2022-09-29 ENCOUNTER — OFFICE VISIT (OUTPATIENT)
Dept: NEUROLOGY | Facility: CLINIC | Age: 42
End: 2022-09-29
Payer: COMMERCIAL

## 2022-09-29 VITALS
HEART RATE: 84 BPM | BODY MASS INDEX: 40.5 KG/M2 | DIASTOLIC BLOOD PRESSURE: 86 MMHG | WEIGHT: 305.56 LBS | HEIGHT: 73 IN | SYSTOLIC BLOOD PRESSURE: 156 MMHG

## 2022-09-29 DIAGNOSIS — G43.109 MIGRAINE WITH AURA AND WITHOUT STATUS MIGRAINOSUS, NOT INTRACTABLE: ICD-10-CM

## 2022-09-29 DIAGNOSIS — Z87.820 HISTORY OF CONCUSSION: ICD-10-CM

## 2022-09-29 DIAGNOSIS — R41.3 OTHER AMNESIA: Primary | ICD-10-CM

## 2022-09-29 PROCEDURE — 99205 OFFICE O/P NEW HI 60 MIN: CPT | Mod: S$GLB,,, | Performed by: NEUROLOGICAL SURGERY

## 2022-09-29 PROCEDURE — 99999 PR PBB SHADOW E&M-EST. PATIENT-LVL IV: CPT | Mod: PBBFAC,,, | Performed by: NEUROLOGICAL SURGERY

## 2022-09-29 PROCEDURE — 99205 PR OFFICE/OUTPT VISIT, NEW, LEVL V, 60-74 MIN: ICD-10-PCS | Mod: S$GLB,,, | Performed by: NEUROLOGICAL SURGERY

## 2022-09-29 PROCEDURE — 99999 PR PBB SHADOW E&M-EST. PATIENT-LVL IV: ICD-10-PCS | Mod: PBBFAC,,, | Performed by: NEUROLOGICAL SURGERY
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
## 2022-10-05 ENCOUNTER — PATIENT OUTREACH (OUTPATIENT)
Dept: ADMINISTRATIVE | Facility: HOSPITAL | Age: 42
End: 2022-10-05
Payer: COMMERCIAL

## 2022-10-05 ENCOUNTER — PATIENT MESSAGE (OUTPATIENT)
Dept: ADMINISTRATIVE | Facility: HOSPITAL | Age: 42
End: 2022-10-05
Payer: COMMERCIAL

## 2022-10-11 ENCOUNTER — PATIENT MESSAGE (OUTPATIENT)
Dept: ADMINISTRATIVE | Facility: HOSPITAL | Age: 42
End: 2022-10-11
Payer: COMMERCIAL

## 2022-10-18 ENCOUNTER — OFFICE VISIT (OUTPATIENT)
Dept: NEUROLOGY | Facility: CLINIC | Age: 42
End: 2022-10-18
Payer: COMMERCIAL

## 2022-10-18 ENCOUNTER — TELEPHONE (OUTPATIENT)
Dept: FAMILY MEDICINE | Facility: CLINIC | Age: 42
End: 2022-10-18
Payer: COMMERCIAL

## 2022-10-18 DIAGNOSIS — F06.70 MILD NEUROCOGNITIVE DISORDER DUE TO TRAUMATIC BRAIN INJURY: Primary | ICD-10-CM

## 2022-10-18 DIAGNOSIS — S06.9XAS MILD NEUROCOGNITIVE DISORDER DUE TO TRAUMATIC BRAIN INJURY: Primary | ICD-10-CM

## 2022-10-18 DIAGNOSIS — R45.4 OUTBURSTS OF ANGER: ICD-10-CM

## 2022-10-18 PROCEDURE — 3066F PR DOCUMENTATION OF TREATMENT FOR NEPHROPATHY: ICD-10-PCS | Mod: CPTII,95,, | Performed by: PSYCHIATRY & NEUROLOGY

## 2022-10-18 PROCEDURE — 3066F NEPHROPATHY DOC TX: CPT | Mod: CPTII,95,, | Performed by: PSYCHIATRY & NEUROLOGY

## 2022-10-18 PROCEDURE — 4010F ACE/ARB THERAPY RXD/TAKEN: CPT | Mod: CPTII,95,, | Performed by: PSYCHIATRY & NEUROLOGY

## 2022-10-18 PROCEDURE — 3061F NEG MICROALBUMINURIA REV: CPT | Mod: CPTII,95,, | Performed by: PSYCHIATRY & NEUROLOGY

## 2022-10-18 PROCEDURE — 99499 NO LOS: ICD-10-PCS | Mod: 95,,, | Performed by: PSYCHIATRY & NEUROLOGY

## 2022-10-18 PROCEDURE — 4010F PR ACE/ARB THEARPY RXD/TAKEN: ICD-10-PCS | Mod: CPTII,95,, | Performed by: PSYCHIATRY & NEUROLOGY

## 2022-10-18 PROCEDURE — 96116 NUBHVL XM PHYS/QHP 1ST HR: CPT | Mod: 95,,, | Performed by: PSYCHIATRY & NEUROLOGY

## 2022-10-18 PROCEDURE — 96116 PR NEUROBEHAVIORAL STATUS EXAM BY PSYCH/PHYS: ICD-10-PCS | Mod: 95,,, | Performed by: PSYCHIATRY & NEUROLOGY

## 2022-10-18 PROCEDURE — 99499 UNLISTED E&M SERVICE: CPT | Mod: 95,,, | Performed by: PSYCHIATRY & NEUROLOGY

## 2022-10-18 PROCEDURE — 3052F PR MOST RECENT HEMOGLOBIN A1C LEVEL 8.0 - < 9.0%: ICD-10-PCS | Mod: CPTII,95,, | Performed by: PSYCHIATRY & NEUROLOGY

## 2022-10-18 PROCEDURE — 3061F PR NEG MICROALBUMINURIA RESULT DOCUMENTED/REVIEW: ICD-10-PCS | Mod: CPTII,95,, | Performed by: PSYCHIATRY & NEUROLOGY

## 2022-10-18 PROCEDURE — 3052F HG A1C>EQUAL 8.0%<EQUAL 9.0%: CPT | Mod: CPTII,95,, | Performed by: PSYCHIATRY & NEUROLOGY
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
## 2022-10-19 PROBLEM — R45.4 OUTBURSTS OF ANGER: Status: ACTIVE | Noted: 2022-10-19

## 2022-10-19 PROBLEM — F06.70 MILD NEUROCOGNITIVE DISORDER DUE TO TRAUMATIC BRAIN INJURY: Status: ACTIVE | Noted: 2022-10-19

## 2022-10-19 PROBLEM — S06.9XAS MILD NEUROCOGNITIVE DISORDER DUE TO TRAUMATIC BRAIN INJURY: Status: ACTIVE | Noted: 2022-10-19

## 2022-10-19 NOTE — ASSESSMENT & PLAN NOTE
Behavioral Neurology: He may benefit from discussing biomarkers and/or genetic testing for future planning.     Neuroimaging: Scheduled for 11/2.     Optimize Brain Health: Prioritize diabetes management. Focus on physical/social/cognitive activity/stimulation and maintain a heart healthy diet.     Follow-up: Testing on 11/11.

## 2022-10-19 NOTE — TELEPHONE ENCOUNTER
----- Message from Tiffany Bull sent at 12/17/2020  1:05 PM CST -----  Type: Patient Call Back    Who called: pt    What is the request in detail: pt calling regarding procedure today at 2:30. Please contact pt for f/u.    Can the clinic reply by [facility]? No     Would the patient rather a call back or a response via [facility]? Call back     Best call back number: [phone] (mobile)    Additional Information:       Thank You      
Spoke with the pt. The nurse informed the pt testopel procedure is pending. Appointment reschdulded. Pt stated understanding.            Thanks Faina  
Yes

## 2022-10-26 ENCOUNTER — APPOINTMENT (OUTPATIENT)
Dept: RADIOLOGY | Facility: HOSPITAL | Age: 42
End: 2022-10-26
Attending: ORTHOPAEDIC SURGERY
Payer: COMMERCIAL

## 2022-10-26 ENCOUNTER — OFFICE VISIT (OUTPATIENT)
Dept: ORTHOPEDICS | Facility: CLINIC | Age: 42
End: 2022-10-26
Attending: ORTHOPAEDIC SURGERY
Payer: COMMERCIAL

## 2022-10-26 DIAGNOSIS — M25.562 LEFT KNEE PAIN, UNSPECIFIED CHRONICITY: ICD-10-CM

## 2022-10-26 DIAGNOSIS — M70.52 PES ANSERINUS BURSITIS OF LEFT KNEE: ICD-10-CM

## 2022-10-26 DIAGNOSIS — M25.562 LEFT KNEE PAIN, UNSPECIFIED CHRONICITY: Primary | ICD-10-CM

## 2022-10-26 DIAGNOSIS — M17.0 PRIMARY OSTEOARTHRITIS OF BOTH KNEES: ICD-10-CM

## 2022-10-26 DIAGNOSIS — M79.642 LEFT HAND PAIN: Primary | ICD-10-CM

## 2022-10-26 PROCEDURE — 20610 LARGE JOINT ASPIRATION/INJECTION: L ANSERINE BURSA: ICD-10-PCS | Mod: 59,LT,S$GLB, | Performed by: ORTHOPAEDIC SURGERY

## 2022-10-26 PROCEDURE — 99213 OFFICE O/P EST LOW 20 MIN: CPT | Mod: 25,S$GLB,, | Performed by: ORTHOPAEDIC SURGERY

## 2022-10-26 PROCEDURE — 99213 PR OFFICE/OUTPT VISIT, EST, LEVL III, 20-29 MIN: ICD-10-PCS | Mod: 25,S$GLB,, | Performed by: ORTHOPAEDIC SURGERY

## 2022-10-26 PROCEDURE — 73562 X-RAY EXAM OF KNEE 3: CPT | Mod: TC,FY,PN,LT

## 2022-10-26 PROCEDURE — 73562 X-RAY EXAM OF KNEE 3: CPT | Mod: 26,LT,, | Performed by: RADIOLOGY

## 2022-10-26 PROCEDURE — 99999 PR PBB SHADOW E&M-EST. PATIENT-LVL III: CPT | Mod: PBBFAC,,, | Performed by: ORTHOPAEDIC SURGERY

## 2022-10-26 PROCEDURE — 20610 DRAIN/INJ JOINT/BURSA W/O US: CPT | Mod: LT,S$GLB,, | Performed by: ORTHOPAEDIC SURGERY

## 2022-10-26 PROCEDURE — 99999 PR PBB SHADOW E&M-EST. PATIENT-LVL III: ICD-10-PCS | Mod: PBBFAC,,, | Performed by: ORTHOPAEDIC SURGERY

## 2022-10-26 PROCEDURE — 73562 XR KNEE 3 VIEW LEFT: ICD-10-PCS | Mod: 26,LT,, | Performed by: RADIOLOGY

## 2022-10-26 PROCEDURE — 20610 DRAIN/INJ JOINT/BURSA W/O US: CPT | Mod: 59,LT,S$GLB, | Performed by: ORTHOPAEDIC SURGERY

## 2022-10-26 RX ORDER — TRIAMCINOLONE ACETONIDE 40 MG/ML
40 INJECTION, SUSPENSION INTRA-ARTICULAR; INTRAMUSCULAR
Status: DISCONTINUED | OUTPATIENT
Start: 2022-10-26 | End: 2022-10-26 | Stop reason: HOSPADM

## 2022-10-26 RX ADMIN — TRIAMCINOLONE ACETONIDE 40 MG: 40 INJECTION, SUSPENSION INTRA-ARTICULAR; INTRAMUSCULAR at 11:10

## 2022-10-26 NOTE — PROCEDURES
Large Joint Aspiration/Injection: L anserine bursa    Date/Time: 10/26/2022 11:15 AM  Performed by: Crissy Bradford MD  Authorized by: Crissy Bradford MD     Consent Done?:  Yes (Verbal)  Indications:  Arthritis  Timeout: prior to procedure the correct patient, procedure, and site was verified    Prep: patient was prepped and draped in usual sterile fashion      Local anesthesia used?: Yes    Local anesthetic:  Topical anesthetic    Details:  Needle Size:  22 G  Approach:  Anteromedial  Location:  Knee  Site:  L anserine bursa  Medications:  40 mg triamcinolone acetonide 40 mg/mL  Patient tolerance:  Patient tolerated the procedure well with no immediate complications
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
## 2022-10-26 NOTE — LETTER
October 26, 2022    Tony Gordillo  [address] [place] [address]         [facility]  [address] [facility]  [place] [address]  Phone: [phone] October 26, 2022     Patient: Tony Gordillo   YOB: 1980   Date of Visit: 10/26/2022       To Whom It May Concern:    It is my medical opinion that Tony Gordillo should remain out of work until re-evaulated in 1 week .    If you have any questions or concerns, please don't hesitate to call.    Sincerely,        Crissy Bradford MD

## 2022-10-26 NOTE — PROCEDURES
Large Joint Aspiration/Injection: L knee    Date/Time: 10/26/2022 11:15 AM  Performed by: Crissy Bradford MD  Authorized by: Crissy Bradford MD     Consent Done?:  Yes (Verbal)  Indications:  Arthritis  Timeout: prior to procedure the correct patient, procedure, and site was verified    Prep: patient was prepped and draped in usual sterile fashion      Local anesthesia used?: Yes    Local anesthetic:  Topical anesthetic    Details:  Needle Size:  22 G  Approach:  Anteromedial  Location:  Knee  Site:  L knee  Medications:  40 mg triamcinolone acetonide 40 mg/mL  Patient tolerance:  Patient tolerated the procedure well with no immediate complications   No

## 2022-10-28 DIAGNOSIS — Z79.4 TYPE 2 DIABETES MELLITUS WITH LEFT EYE AFFECTED BY MILD NONPROLIFERATIVE RETINOPATHY WITHOUT MACULAR EDEMA, WITH LONG-TERM CURRENT USE OF INSULIN: Primary | ICD-10-CM

## 2022-10-28 DIAGNOSIS — I10 ESSENTIAL HYPERTENSION: ICD-10-CM

## 2022-10-28 DIAGNOSIS — E11.3292 TYPE 2 DIABETES MELLITUS WITH LEFT EYE AFFECTED BY MILD NONPROLIFERATIVE RETINOPATHY WITHOUT MACULAR EDEMA, WITH LONG-TERM CURRENT USE OF INSULIN: Primary | ICD-10-CM

## 2022-11-02 ENCOUNTER — HOSPITAL ENCOUNTER (OUTPATIENT)
Dept: NEUROLOGY | Facility: HOSPITAL | Age: 42
Discharge: HOME | End: 2022-11-02
Attending: NEUROLOGICAL SURGERY
Payer: COMMERCIAL

## 2022-11-02 ENCOUNTER — HOSPITAL ENCOUNTER (OUTPATIENT)
Dept: RADIOLOGY | Facility: HOSPITAL | Age: 42
Discharge: HOME | End: 2022-11-02
Attending: NEUROLOGICAL SURGERY
Payer: COMMERCIAL

## 2022-11-02 ENCOUNTER — OFFICE VISIT (OUTPATIENT)
Dept: FAMILY MEDICINE | Facility: CLINIC | Age: 42
End: 2022-11-02
Payer: COMMERCIAL

## 2022-11-02 VITALS
HEART RATE: 95 BPM | WEIGHT: 284.94 LBS | TEMPERATURE: 99 F | OXYGEN SATURATION: 96 % | DIASTOLIC BLOOD PRESSURE: 70 MMHG | SYSTOLIC BLOOD PRESSURE: 130 MMHG | BODY MASS INDEX: 37.59 KG/M2

## 2022-11-02 DIAGNOSIS — E66.01 MORBID OBESITY: ICD-10-CM

## 2022-11-02 DIAGNOSIS — R41.3 OTHER AMNESIA: ICD-10-CM

## 2022-11-02 DIAGNOSIS — Z79.4 TYPE 2 DIABETES MELLITUS WITH LEFT EYE AFFECTED BY MILD NONPROLIFERATIVE RETINOPATHY WITHOUT MACULAR EDEMA, WITH LONG-TERM CURRENT USE OF INSULIN: ICD-10-CM

## 2022-11-02 DIAGNOSIS — E78.5 HYPERLIPIDEMIA LDL GOAL <70: ICD-10-CM

## 2022-11-02 DIAGNOSIS — J32.1 CHRONIC FRONTAL SINUSITIS: Primary | ICD-10-CM

## 2022-11-02 DIAGNOSIS — E11.3292 TYPE 2 DIABETES MELLITUS WITH LEFT EYE AFFECTED BY MILD NONPROLIFERATIVE RETINOPATHY WITHOUT MACULAR EDEMA, WITH LONG-TERM CURRENT USE OF INSULIN: ICD-10-CM

## 2022-11-02 DIAGNOSIS — E11.649 HYPOGLYCEMIA ASSOCIATED WITH DIABETES: ICD-10-CM

## 2022-11-02 DIAGNOSIS — J31.0 NONALLERGIC RHINITIS: ICD-10-CM

## 2022-11-02 DIAGNOSIS — I10 ESSENTIAL HYPERTENSION: ICD-10-CM

## 2022-11-02 PROCEDURE — 70553 MRI BRAIN STEM W/O & W/DYE: CPT | Mod: 26,,, | Performed by: RADIOLOGY

## 2022-11-02 PROCEDURE — 99214 PR OFFICE/OUTPT VISIT, EST, LEVL IV, 30-39 MIN: ICD-10-PCS | Mod: S$GLB,,, | Performed by: NURSE PRACTITIONER

## 2022-11-02 PROCEDURE — 70553 MRI BRAIN W WO CONTRAST: ICD-10-PCS | Mod: 26,,, | Performed by: RADIOLOGY

## 2022-11-02 PROCEDURE — 99999 PR PBB SHADOW E&M-EST. PATIENT-LVL IV: CPT | Mod: PBBFAC,,, | Performed by: NURSE PRACTITIONER

## 2022-11-02 PROCEDURE — 95819 EEG AWAKE AND ASLEEP: CPT | Mod: 26,,, | Performed by: PSYCHIATRY & NEUROLOGY

## 2022-11-02 PROCEDURE — A9585 GADOBUTROL INJECTION: HCPCS | Performed by: NEUROLOGICAL SURGERY

## 2022-11-02 PROCEDURE — 99999 PR PBB SHADOW E&M-EST. PATIENT-LVL IV: ICD-10-PCS | Mod: PBBFAC,,, | Performed by: NURSE PRACTITIONER

## 2022-11-02 PROCEDURE — 70553 MRI BRAIN STEM W/O & W/DYE: CPT | Mod: TC

## 2022-11-02 PROCEDURE — 25500020 PHARM REV CODE 255: Performed by: NEUROLOGICAL SURGERY

## 2022-11-02 PROCEDURE — 95819 PR EEG,W/AWAKE & ASLEEP RECORD: ICD-10-PCS | Mod: 26,,, | Performed by: PSYCHIATRY & NEUROLOGY

## 2022-11-02 PROCEDURE — 99214 OFFICE O/P EST MOD 30 MIN: CPT | Mod: S$GLB,,, | Performed by: NURSE PRACTITIONER

## 2022-11-02 RX ORDER — GADOBUTROL 604.72 MG/ML
10 INJECTION INTRAVENOUS
Status: COMPLETED | OUTPATIENT
Start: 2022-11-02 | End: 2022-11-02

## 2022-11-02 RX ORDER — FLUTICASONE PROPIONATE 50 MCG
SPRAY, SUSPENSION (ML) NASAL
Qty: 16 G | Refills: 3 | Status: SHIPPED | OUTPATIENT
Start: 2022-11-02 | End: 2022-11-03 | Stop reason: SDUPTHER

## 2022-11-02 RX ADMIN — GADOBUTROL 10 ML: 604.72 INJECTION INTRAVENOUS at 09:11

## 2022-11-02 NOTE — PROGRESS NOTES
HPI     Chief Complaint:  Chief Complaint   Patient presents with    Allergies    discuss meds         Tony DALLIN Gordillo is a 42 y.o. male with multiple medical diagnoses as listed in the medical history and problem list that presents for chronic sinusitis.  Pt is known to me with his his last appointment 8/31/2022.      Sinusitis  This is a chronic problem. The current episode started more than 1 year ago. The problem has been waxing and waning since onset. There has been no fever. Associated symptoms include congestion, headaches and sinus pressure. Pertinent negatives include no chills, coughing, shortness of breath or swollen glands. (Post nasal drip  ) Past treatments include oral decongestants, saline sprays, spray decongestants, sitting up and lying down. The treatment provided mild relief.     He has taking several medications in the past for this condition including but not limited to antihistamines, flonase, astelin. He denies previous evaluation by an ENT. States his nose has been broken on several occasions.    Pt is currently taking loratadine and singulair qd.       he is compliant with medications daily without any adverse side effects.    Assessment & Plan     Problem List Items Addressed This Visit          Cardiac/Vascular    Essential hypertension    /70 (BP Location: Right arm, Patient Position: Sitting, BP Method: Large (Manual))   Pulse 95   Temp 98.7 °F (37.1 °C) (Oral)   Wt 129.2 kg (284 lb 15.1 oz)   SpO2 96%   BMI 37.59 kg/m²     -continue current medication regimen  -DASH diet, regular cardiovascular exercises, portion control  - ?weight loss  -f/u with BP logs in 2 weeks if BP is not consistently <140/90      Hyperlipidemia LDL goal <70    discussed ways to lower triglycerides such as cutting simple sugars out of diet (white breads, candies, cookies, cakes, etc.) and reducing/eliminating intake of highly processed trans fatty acids.   Exercise 30 minutes a day for 4-5 days a  week.   Eat more fiber.         Endocrine    Morbid obesity    Body mass index is 37.59 kg/m².    We discussed weight issues and safe, effective ways of losing pounds, includin) diet:  low carbohydrate, low fat diet, stay away from fast food, fried and processed food, use whole grain, lot of fruits and vegetables, use healthy fat such as avocado, nuts and olive oil in reasonable quantity, stay away from sodas. Regular meals with lean proteins.  2) physical activity: ideally 150 min a week, with cardiovascular and resistance activity.  Patient was encouraged to set realistic attainable goals for weight loss, and we will follow up periodically.    Follow up with bariatric medicine.     Type 2 diabetes mellitus with left eye affected by mild nonproliferative retinopathy without macular edema, with long-term current use of insulin    -discussed with patient about routine diabetic care that includes but are not limited to regular eye exams, skin care, daily foot exam, proper nutrition, regular BG monitoring at home (fasting and mealtime) and medication compliance in a diabetic.  Target morning BS  and meal-time BS <180       Other Visit Diagnoses       Chronic frontal sinusitis    -  Primary    Hx of recurrent sinusitis with associated sinus infections. Specifically frontal sinusitis.     Continue antihistamine and singulair. Restart flonase.     Avoid triggers.     He is a . Advised to wear mask when possible when working in environment with fumes.     Hx of broken nose on several occasions.     Will refer to ENT due to chronic nature and possible deviation of septum.     Discussed DDx, condition, and treatment.   Education sent to patient portal/included in after visit summary.  ED precautions given.   Notify provider if symptoms do not resolve or increase in severity.   Patient verbalizes understanding and agrees with plan of care.      Relevant Medications    fluticasone propionate (FLONASE) 50  mcg/actuation nasal spray    Other Relevant Orders    Ambulatory referral/consult to ENT    Hypoglycemia associated with diabetes        The current medical regimen is effective;  continue present plan      Nonallergic rhinitis        Refilled flonase.    Relevant Medications    fluticasone propionate (FLONASE) 50 mcg/actuation nasal spray            --------------------------------------------      Health Maintenance:  Health Maintenance         Date Due Completion Date    Pneumococcal Vaccines (Age 0-64) (2 - PCV) 09/28/2019 9/28/2018    COVID-19 Vaccine (4 - Booster for Moderna series) 01/03/2022 11/8/2021    Hemoglobin A1c 08/12/2022 5/12/2022    Override on 7/11/2015: Done (HGB A1C 9.9H - [facility] - DR. MANN)    Influenza Vaccine (1) 09/01/2022 9/6/2019    Foot Exam 02/16/2023 2/16/2022    Override on 12/11/2012: Done    Diabetes Urine Screening 02/17/2023 2/17/2022    Lipid Panel 02/17/2023 2/17/2022    Override on 7/11/2015: Done ([facility] RESULTS - DR. MANN)    Eye Exam 05/18/2023 5/18/2022    Low Dose Statin 10/26/2023 10/26/2022    TETANUS VACCINE 07/18/2029 7/18/2019            Health maintenance reviewed.  Vaccines offered patient declined at this time     Follow Up:  Follow up in about 2 weeks (around 11/16/2022), or if symptoms worsen or fail to improve.    Exam     Review of Systems:  (as noted above)  Review of Systems   Constitutional:  Negative for chills and fever.   HENT:  Positive for congestion and sinus pressure. Negative for trouble swallowing.    Eyes:  Negative for visual disturbance.   Respiratory:  Negative for cough, chest tightness and shortness of breath.    Cardiovascular:  Negative for chest pain.   Gastrointestinal:  Negative for blood in stool.   Neurological:  Positive for headaches.     Physical Exam:   Physical Exam  Constitutional:       General: He is not in acute distress.     Appearance: He is not ill-appearing or diaphoretic.    HENT:      Head: Normocephalic and atraumatic.   Eyes:      General: No scleral icterus.  Cardiovascular:      Rate and Rhythm: Normal rate and regular rhythm.      Pulses: Normal pulses.      Heart sounds: No murmur heard.    No friction rub. No gallop.   Pulmonary:      Effort: No respiratory distress.   Chest:      Chest wall: No tenderness.   Musculoskeletal:      Cervical back: No rigidity.   Skin:     Capillary Refill: Capillary refill takes 2 to 3 seconds.   Neurological:      General: No focal deficit present.      Mental Status: He is alert and oriented to person, place, and time.     Vitals:    11/02/22 1627   BP: 130/70   BP Location: Right arm   Patient Position: Sitting   BP Method: Large (Manual)   Pulse: 95   Temp: 98.7 °F (37.1 °C)   TempSrc: Oral   SpO2: 96%   Weight: 129.2 kg (284 lb 15.1 oz)      Body mass index is 37.59 kg/m².        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     Employer: [employer]   Tobacco Use    Smoking status: Never    Smokeless tobacco: Never   Substance and Sexual Activity    Alcohol use: Yes     Comment: 1-2 beers every 2 weeks    Drug use: No     Social Determinants of Health     Financial Resource Strain: Low Risk     Difficulty of Paying Living Expenses: Not very hard   Food Insecurity: No Food Insecurity    Worried About Running Out of Food in the Last Year: Never true    Ran Out of Food in the Last Year: Never true   Transportation Needs: No Transportation Needs    Lack of  Transportation (Medical): No    Lack of Transportation (Non-Medical): No   Physical Activity: Unknown    Days of Exercise per Week: 5 days    Minutes of Exercise per Session: Patient refused   Stress: No Stress Concern Present    Feeling of Stress : Not at all   Social Connections: Unknown    Frequency of Communication with Friends and Family: More than three times a week    Frequency of Social Gatherings with Friends and Family: Patient refused    Active Member of Clubs or Organizations: No    Attends Club or Organization Meetings: Patient refused    Marital Status:    Housing Stability: Low Risk     Unable to Pay for Housing in the Last Year: No    Number of Places Lived in the Last Year: 1    Unstable Housing in the Last Year: No       Family History:  Family History   Problem Relation Age of Onset    Diabetes Mother     Diabetes Father     No Known Problems Brother     Autism Son     No Known Problems Daughter        Allergies and Medications: (updated and reviewed)  Review of patient's allergies indicates:   Allergen Reactions    Influenza virus vaccine, specific Hives    Pioglitazone      Altered mood     Victoza [liraglutide] Nausea And Vomiting    Farxiga [dapagliflozin] Rash     Erectile dysfunction and rash      Current Outpatient Medications   Medication Sig Dispense Refill    atorvastatin (LIPITOR) 40 MG tablet Take 1 tablet (40 mg total) by mouth once daily. 90 tablet 3    azelastine 205.5 mcg (0.15 %) Spry azelastine 205.5 mcg (0.15 %) nasal spray  INHALE 2 SPRAYS TO EACH NOSTRIL TWICE A DAY 30 mL 11    blood sugar diagnostic Strp To check BG 4 times daily, to use with insurance preferred meter 400 strip 3    blood sugar diagnostic Strp OneTouch Ultra Test strips      blood-glucose meter kit OneTouch Ultra2 Meter kit      blood-glucose meter,continuous (DEXCOM G6 ) Misc Use with dexcom G6 system 1 each 0    butalbital-acetaminophen-caffeine -40 mg (FIORICET, ESGIC) -40 mg per  tablet Take 1 tablet by mouth every 4 (four) hours as needed for Headaches. 12 tablet 0    chlorpheniramine-acetaminophen (CORICIDIN HBP COLD AND FLU) 2-325 mg Tab Take 1 tablet by mouth 4 (four) times daily as needed (ear ache, runny nose, and congestion.). 30 tablet 1    DEXCOM G6 SENSOR Filomena CHANGE SENSOR EVERY 10 DAYS 9 each 4    DEXCOM G6 TRANSMITTER Filomena CHANGE EVERY 3 MONTHS 1 each 3    diclofenac sodium (VOLTAREN) 1 % Gel Apply the gel (2 g) to the affected area 4 times daily. Do not apply more than 8 g daily to any one affected joint 100 g 1    empagliflozin (JARDIANCE) 25 mg tablet Take 1 tablet (25 mg total) by mouth once daily. 30 tablet 5    fenofibrate 160 MG Tab fenofibrate 160 mg tablet      fluticasone-umeclidin-vilanter (TRELEGY ELLIPTA) 200-62.5-25 mcg inhaler Inhale 1 puff into the lungs once daily. Stop symbicort 60 each 5    ibuprofen (ADVIL,MOTRIN) 800 MG tablet Take 1 tablet (800 mg total) by mouth every 8 (eight) hours as needed for Pain. 30 tablet 0    insulin aspart U-100 (NOVOLOG FLEXPEN U-100 INSULIN) 100 unit/mL (3 mL) InPn pen Inject 40 units before each meal with ss, max daily dose 150 units 45 mL 5    insulin glargine U-300 conc (TOUJEO MAX U-300 SOLOSTAR) 300 unit/mL (3 mL) insulin pen Inject 30 Units into the skin once daily. 2 pen 5    insulin NPH isoph U-100 human (NOVOLIN N FLEXPEN) 100 unit/mL (3 mL) InPn Inject 14 units once daily at night 8 pm. 15 mL 2    ketoconazole (NIZORAL) 2 % cream Apply topically once daily. 1 Tube 3    lancets Misc To check BG 4 times daily, to use with insurance preferred meter 400 each 3    levothyroxine (SYNTHROID) 50 MCG tablet TAKE 1 TABLET (50 MCG TOTAL) BY MOUTH BEFORE BREAKFAST. 90 tablet 2    lisinopriL (PRINIVIL,ZESTRIL) 20 MG tablet Take 1 tablet (20 mg total) by mouth once daily. 90 tablet 3    montelukast (SINGULAIR) 10 mg tablet TAKE 1 TABLET BY MOUTH EVERY EVENING 90 tablet 3    ondansetron (ZOFRAN-ODT) 8 MG TbDL Take 1 tablet (8 mg  "total) by mouth every 6 (six) hours as needed (n/v). 30 tablet 0    pen needle, diabetic (BD ULTRA-FINE KEN PEN NEEDLE) 32 gauge x 5/32" Ndle 1 Device by Misc.(Non-Drug; Combo Route) route 5 (five) times daily. 550 each 4    promethazine (PHENERGAN) 25 MG suppository Place 1 suppository (25 mg total) rectally every 6 (six) hours as needed for Nausea. 10 suppository 0    semaglutide (OZEMPIC) 1 mg/dose (4 mg/3 mL) Inject 1 mg into the skin every 7 days. 1 pen 5    syringe, disposable, 1 mL Syrg Testosterone every 2 weeks 25 Syringe 1    fluticasone propionate (FLONASE) 50 mcg/actuation nasal spray fluticasone propionate 50 mcg/actuation nasal spray,suspension 16 g 3     No current facility-administered medications for this visit.       Patient Care Team:  Jovany Ford MD as PCP - General (Internal Medicine)  Janneth Johnson RN (Inactive) as Registered Nurse (Diabetes)  Rocky Hewitt MD as Consulting Physician (Ophthalmology)  Preethi Monaco RD as Dietitian (Nutrition)  Christofer Rowley MD as Consulting Physician (Orthopedic Surgery)  Singh Rizo MD as Consulting Physician (Sports Medicine)  Shilpa Gordon NP as Nurse Practitioner (Urology)  Nicole Wynn MD (Family Medicine)  Rafael Meraz MA as Care Coordinator      The patient expressed understanding and no barriers to adherence were identified.      - The patient indicates understanding of these issues and agrees with the plan. Brief care plan is updated and reviewed with the patient as applicable.      - The patient is given an After Visit Summary that lists all medications with directions, allergies, education, orders placed during this encounter and follow-up instructions.      - I have reviewed the patient's medical information including past medical, family, and social history sections including the medications and allergies.      - We discussed the patient's current medications.     This note was created by combination " of typed  and MModal dictation.  Transcription errors may be present.  If there are any questions, please contact me.       Ridge Webb NP

## 2022-11-02 NOTE — PROCEDURES
EEG,w/awake & drowsy record    Date/Time: 11/2/2022 9:30 AM  Performed by: Eris Gonsales MD  Authorized by: Gildardo Rodarte MD     ELECTROENCEPHALOGRAM REPORT    DATE OF SERVICE: 11/2/22  EEG NUMBER: OW   REQUESTED BY: Cayden  LOCATION OF SERVICE:  [facility] [place]    METHODOLOGY   Electroencephalographic (EEG) recording is with electrodes placed according to the International 10-20 placement system.  Thirty two (32) channels of digital signal (sampling rate of 512/sec) including T1 and T2 was simultaneously recorded from the scalp and may include  EKG, EMG, and/or eye monitors.  Recording band pass was 0.1 to 512 hz.  Digital video recording of the patient is simultaneously recorded with the EEG.  The patient is instructed report clinical symptoms which may occur during the recording session.  EEG and video recording is stored and archived in digital format. Activation procedures which include photic stimulation, hyperventilation and instructing patients to perform simple task are done in selected patients.    The EEG is displayed on a monitor screen and can be reviewed using different montages.  Computer assisted analysis is employed to detect spike and electrographic seizure activity.   The entire record is submitted for computer analysis.  The entire recording is visually reviewed and the times identified by computer analysis as being spikes or seizures are reviewed again.  Compresses spectral analysis (CSA) is also performed on the activity recorded from each individual channel.  This is displayed as a power display of frequencies from 0 to 30 Hz over time.   The CSA is reviewed looking for asymmetries in power between homologous areas of the scalp and then compared with the original EEG recording.     Aligo software was also utilized in the review of this study.  This software suite analyzes the EEG recording in multiple domains.  Coherence and rhythmicity is computed to identify EEG sections which may contain  organized seizures.  Each channel undergoes analysis to detect presence of spike and sharp waves which have special and morphological characteristic of epileptic activity.  The routine EEG recording is converted from spacial into frequency domain.  This is then displayed comparing homologous areas to identify areas of significant asymmetry.  Algorithm to identify non-cortically generated artifact is used to separate eye movement, EMG and other artifact from the EEG    EEG FINDINGS  The record shows a good  organization at rest, consisting of a 10 Hz posterior dominant rhythm with good  reactivity. There is mild bilateral beta activity.    Drowsiness is characterized by attenuation of the background, vertex waves, and bilateral theta slowing. Stage II sleep is characterized by slowing, vertex waves, and symmetric sleep spindles.     Provocative maneuvers including hyperventilation and photic stimulation were not performed.     EKG recording shows a regular rhythm.    There is no push button or clinical event.    IMPRESSION:  Normal study.     Eris Gonsales MD

## 2022-11-03 ENCOUNTER — PATIENT MESSAGE (OUTPATIENT)
Dept: ENDOCRINOLOGY | Facility: CLINIC | Age: 42
End: 2022-11-03
Payer: COMMERCIAL

## 2022-11-03 ENCOUNTER — OFFICE VISIT (OUTPATIENT)
Dept: ORTHOPEDICS | Facility: CLINIC | Age: 42
End: 2022-11-03
Payer: COMMERCIAL

## 2022-11-03 DIAGNOSIS — E11.9 TYPE 2 DIABETES MELLITUS WITHOUT COMPLICATION, WITH LONG-TERM CURRENT USE OF INSULIN: Primary | ICD-10-CM

## 2022-11-03 DIAGNOSIS — J31.0 NONALLERGIC RHINITIS: ICD-10-CM

## 2022-11-03 DIAGNOSIS — J32.1 CHRONIC FRONTAL SINUSITIS: ICD-10-CM

## 2022-11-03 DIAGNOSIS — M17.0 PRIMARY OSTEOARTHRITIS OF BOTH KNEES: Primary | ICD-10-CM

## 2022-11-03 DIAGNOSIS — M79.645 PAIN OF FINGER OF LEFT HAND: Primary | ICD-10-CM

## 2022-11-03 DIAGNOSIS — Z79.4 TYPE 2 DIABETES MELLITUS WITHOUT COMPLICATION, WITH LONG-TERM CURRENT USE OF INSULIN: Primary | ICD-10-CM

## 2022-11-03 PROCEDURE — 99999 PR PBB SHADOW E&M-EST. PATIENT-LVL III: ICD-10-PCS | Mod: PBBFAC,,, | Performed by: ORTHOPAEDIC SURGERY

## 2022-11-03 PROCEDURE — 20610 LARGE JOINT ASPIRATION/INJECTION: R KNEE: ICD-10-PCS | Mod: RT,S$GLB,, | Performed by: ORTHOPAEDIC SURGERY

## 2022-11-03 PROCEDURE — 99999 PR PBB SHADOW E&M-EST. PATIENT-LVL III: CPT | Mod: PBBFAC,,, | Performed by: ORTHOPAEDIC SURGERY

## 2022-11-03 PROCEDURE — 99213 PR OFFICE/OUTPT VISIT, EST, LEVL III, 20-29 MIN: ICD-10-PCS | Mod: 25,S$GLB,, | Performed by: ORTHOPAEDIC SURGERY

## 2022-11-03 PROCEDURE — 99213 OFFICE O/P EST LOW 20 MIN: CPT | Mod: 25,S$GLB,, | Performed by: ORTHOPAEDIC SURGERY

## 2022-11-03 PROCEDURE — 20610 DRAIN/INJ JOINT/BURSA W/O US: CPT | Mod: RT,S$GLB,, | Performed by: ORTHOPAEDIC SURGERY

## 2022-11-03 RX ORDER — TRIAMCINOLONE ACETONIDE 40 MG/ML
80 INJECTION, SUSPENSION INTRA-ARTICULAR; INTRAMUSCULAR
Status: DISCONTINUED | OUTPATIENT
Start: 2022-11-03 | End: 2022-11-03 | Stop reason: HOSPADM

## 2022-11-03 RX ORDER — FLUTICASONE PROPIONATE 50 MCG
SPRAY, SUSPENSION (ML) NASAL
Qty: 16 G | Refills: 3 | Status: SHIPPED | OUTPATIENT
Start: 2022-11-03 | End: 2023-10-04 | Stop reason: SDUPTHER

## 2022-11-03 RX ORDER — BLOOD-GLUCOSE SENSOR
EACH MISCELLANEOUS
Qty: 2 KIT | Refills: 11 | Status: SHIPPED | OUTPATIENT
Start: 2022-11-03 | End: 2022-12-05

## 2022-11-03 RX ADMIN — TRIAMCINOLONE ACETONIDE 80 MG: 40 INJECTION, SUSPENSION INTRA-ARTICULAR; INTRAMUSCULAR at 12:11

## 2022-11-03 NOTE — PROGRESS NOTES
Follow up visit    History of Present Illness:   Tony comes to the office for follow up evaluation of bilateral knee pain.      Injected L knee at last visit   Feeling better but feels a lot of pressure still  R knee is painful     Finger pain improving     ROS: unremarkable and no change since last visit    Physical Examination:    NAD  Right knee  Minimal effusion   TTP MJL      L rindex finger - non tender to palpation over middle phalanx  Can make a full fist     Radiographic imaging: 3 views of the left knee shows arthritis     Assessment/Plan:  42 y.o. male  with R knee OA, healing fracture of L index middle phalanx    We discussed the etiology of persistent pain and further treatment options.  Injection of the right knee  performed, please see procedure note for more details.  Prior to the injection risks and benefits of corticosteroid injection were discussed with the patient including pain, infection, bleeding, skin color changes, swelling, steroid flare. We discussed that over time injections can result in chondral damage, acceleration of arthritis formation, damage to tendons and damage to joints.  The patient consented for the procedure.  Post-injection instructions were given to the patient in writing.  Ok to return to work  PT referral placed   RTC PRN      All questions were answered in detail. The patient  verbalized the understanding of the treatment plan and is in full agreement with the treatment plan.

## 2022-11-03 NOTE — PROCEDURES
Large Joint Aspiration/Injection: R knee    Date/Time: 11/3/2022 12:45 PM  Performed by: Crissy Bradford MD  Authorized by: Crissy Bradford MD     Consent Done?:  Yes (Verbal)  Indications:  Arthritis  Timeout: prior to procedure the correct patient, procedure, and site was verified    Prep: patient was prepped and draped in usual sterile fashion      Local anesthesia used?: Yes    Local anesthetic:  Topical anesthetic    Details:  Needle Size:  22 G  Approach:  Anteromedial  Location:  Knee  Site:  R knee  Medications:  80 mg triamcinolone acetonide 40 mg/mL  Patient tolerance:  Patient tolerated the procedure well with no immediate complications

## 2022-11-03 NOTE — TELEPHONE ENCOUNTER
No new care gaps identified.  [facility] Embedded Care Gaps. Reference number: [id number]. 11/03/2022   2:09:10 PM CDT

## 2022-11-03 NOTE — LETTER
November 3, 2022    Tony Gordillo  [address] [place] [address]         [facility]  [address] [facility]  [place] [address]  Phone: [phone] November 3, 2022     Patient: Tony Gordillo   YOB: 1980   Date of Visit: 11/3/2022       To Whom It May Concern:    It is my medical opinion that Tony Gordillo may return to full duty immediately with no restrictions.    If you have any questions or concerns, please don't hesitate to call.    Sincerely,        Crissy Bradford MD

## 2022-11-10 ENCOUNTER — PATIENT MESSAGE (OUTPATIENT)
Dept: ADMINISTRATIVE | Facility: HOSPITAL | Age: 42
End: 2022-11-10
Payer: COMMERCIAL

## 2022-11-11 ENCOUNTER — OFFICE VISIT (OUTPATIENT)
Dept: NEUROLOGY | Facility: CLINIC | Age: 42
End: 2022-11-11
Payer: COMMERCIAL

## 2022-11-11 DIAGNOSIS — S06.9XAS MILD NEUROCOGNITIVE DISORDER DUE TO TRAUMATIC BRAIN INJURY: Primary | ICD-10-CM

## 2022-11-11 DIAGNOSIS — R45.4 OUTBURSTS OF ANGER: ICD-10-CM

## 2022-11-11 DIAGNOSIS — F06.70 MILD NEUROCOGNITIVE DISORDER DUE TO TRAUMATIC BRAIN INJURY: Primary | ICD-10-CM

## 2022-11-11 DIAGNOSIS — R41.3 OTHER AMNESIA: ICD-10-CM

## 2022-11-11 PROCEDURE — 96132 PR NEUROPSYCHOLOGIC TEST EVAL SVCS, 1ST HR: ICD-10-PCS | Mod: S$GLB,,, | Performed by: PSYCHIATRY & NEUROLOGY

## 2022-11-11 PROCEDURE — 96138 PR PSYCH/NEUROPSYCH TEST ADMIN/SCORING, BY TECH, 2+ TESTS, 1ST 30 MIN: ICD-10-PCS | Mod: S$GLB,,, | Performed by: PSYCHIATRY & NEUROLOGY

## 2022-11-11 PROCEDURE — 96133 NRPSYC TST EVAL PHYS/QHP EA: CPT | Mod: S$GLB,,, | Performed by: PSYCHIATRY & NEUROLOGY

## 2022-11-11 PROCEDURE — 96138 PSYCL/NRPSYC TECH 1ST: CPT | Mod: S$GLB,,, | Performed by: PSYCHIATRY & NEUROLOGY

## 2022-11-11 PROCEDURE — 99999 PR PBB SHADOW E&M-EST. PATIENT-LVL I: CPT | Mod: PBBFAC,,,

## 2022-11-11 PROCEDURE — 99499 UNLISTED E&M SERVICE: CPT | Mod: S$GLB,,, | Performed by: PSYCHIATRY & NEUROLOGY

## 2022-11-11 PROCEDURE — 96139 PSYCL/NRPSYC TST TECH EA: CPT | Mod: S$GLB,,, | Performed by: PSYCHIATRY & NEUROLOGY

## 2022-11-11 PROCEDURE — 96139 PR PSYCH/NEUROPSYCH TEST ADMIN/SCORING, BY TECH, 2+ TESTS, EA ADDTL 30 MIN: ICD-10-PCS | Mod: S$GLB,,, | Performed by: PSYCHIATRY & NEUROLOGY

## 2022-11-11 PROCEDURE — 96132 NRPSYC TST EVAL PHYS/QHP 1ST: CPT | Mod: S$GLB,,, | Performed by: PSYCHIATRY & NEUROLOGY

## 2022-11-11 PROCEDURE — 99999 PR PBB SHADOW E&M-EST. PATIENT-LVL I: ICD-10-PCS | Mod: PBBFAC,,,

## 2022-11-11 PROCEDURE — 96133 PR NEUROPSYCHOLOGIC TEST EVAL SVCS, EA ADDTL HR: ICD-10-PCS | Mod: S$GLB,,, | Performed by: PSYCHIATRY & NEUROLOGY

## 2022-11-11 PROCEDURE — 99499 NO LOS: ICD-10-PCS | Mod: S$GLB,,, | Performed by: PSYCHIATRY & NEUROLOGY

## 2022-11-14 NOTE — PROGRESS NOTES
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   (CVLT-II Long Delay) 0  10 50   ATTENTION/WORKING MEMORY Raw Score Type of Standardized Score Standardized Score Percentile/CP   WAIS-IV WMI - SS 77 6   WAIS-IV Digit Span 21 ss 7 16         DS Forward 7 ss 6 9         DS Backward 7 ss 8 25         DS Sequence 7 ss 8 25         Longest Digit Forward 5 - - -         Longest Digit Backward 4 - - -         Longest Digit Sequence 5 - - -   WAIS-IV Arithmetic 8 ss 5 5   MENTAL PROCESSING SPEED Raw Score Type of Standardized Score Standardized Score Percentile/CP   WAIS-IV PSI - SS 84 14   WAIS-IV Symbol Search 28 ss 8 25   WAIS-IV Coding 45 ss 6 9   TMT A  26 Tscore 47 38   TMT A errors 0 - - -   EXECUTIVE FUNCTIONING Raw Score Type of Standardized Score Standardized Score Percentile/CP   TMT B 78 Tscore 40 16   TMT B errors 0 - - -   WAIS-IV Similarities 25 ss 10 50   FRONTOMOTOR  Raw Score Type of Standardized Score Standardized Score Percentile/CP   GPT DH 98 Tscore 28 1   GPD  Tscore 29 2   MOOD & PERSONALITY Raw Score Type of Standardized Score Standardized Score Percentile/CP   BDI-2 17 - - -   KYLE-7 5 - - -

## 2022-11-21 NOTE — ASSESSMENT & PLAN NOTE
Behavioral Neurology: He may benefit from discussing biomarkers and/or genetic testing for future planning. 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      Optimize Brain Health: Prioritize diabetes management. Focus on physical/social/cognitive activity/stimulation and maintain a heart healthy diet.    Compensatory Mechanisms: Working memory seems to be a significant weakness. As a result, he won't want to take for granted that he can quickly encode even a small piece of information. Recommended that he keep a small notebook on his person at all times and write down all important information.       Follow-up: Interval testing in 1-2 years.

## 2022-11-23 ENCOUNTER — TELEPHONE (OUTPATIENT)
Dept: NEUROLOGY | Facility: CLINIC | Age: 42
End: 2022-11-23
Payer: COMMERCIAL

## 2022-11-23 ENCOUNTER — PATIENT MESSAGE (OUTPATIENT)
Dept: NEUROLOGY | Facility: CLINIC | Age: 42
End: 2022-11-23
Payer: COMMERCIAL

## 2022-11-23 ENCOUNTER — OFFICE VISIT (OUTPATIENT)
Dept: NEUROLOGY | Facility: CLINIC | Age: 42
End: 2022-11-23
Payer: COMMERCIAL

## 2022-11-23 DIAGNOSIS — F06.70 MILD NEUROCOGNITIVE DISORDER DUE TO TRAUMATIC BRAIN INJURY: Primary | ICD-10-CM

## 2022-11-23 DIAGNOSIS — S06.9XAS MILD NEUROCOGNITIVE DISORDER DUE TO TRAUMATIC BRAIN INJURY: Primary | ICD-10-CM

## 2022-11-23 DIAGNOSIS — R45.4 OUTBURSTS OF ANGER: ICD-10-CM

## 2022-11-23 PROCEDURE — 99499 NO LOS: ICD-10-PCS | Mod: 95,,, | Performed by: PSYCHIATRY & NEUROLOGY

## 2022-11-23 PROCEDURE — 99499 UNLISTED E&M SERVICE: CPT | Mod: 95,,, | Performed by: PSYCHIATRY & NEUROLOGY

## 2022-11-23 NOTE — TELEPHONE ENCOUNTER
----- Message from Ari Frazier PsyD sent at 11/23/2022  2:36 PM CST -----  Here is another referral for Dr. South. Nothing urgent. Thanks,    Ari

## 2022-11-23 NOTE — PROGRESS NOTES
NEUROPSYCHOLOGICAL EVALUATION - CONFIDENTIAL  FEEDBACK NOTE    On 11/23/2022, I provided Mr. Tony Gordillo and his wife the neuropsychological evaluation results. Please see the full report for a comprehensive overview of the findings. Mr. Gordillo was provided a copy of the report and invited to call with additional questions.      Ari Frazier Psy.D., CAREYP  Board Certified in Clinical Neuropsychology  [facility] - Department of Neurology

## 2022-12-01 ENCOUNTER — TELEPHONE (OUTPATIENT)
Dept: NEUROLOGY | Facility: CLINIC | Age: 42
End: 2022-12-01
Payer: COMMERCIAL

## 2022-12-02 ENCOUNTER — OFFICE VISIT (OUTPATIENT)
Dept: OTOLARYNGOLOGY | Facility: CLINIC | Age: 42
End: 2022-12-02
Payer: COMMERCIAL

## 2022-12-02 VITALS — BODY MASS INDEX: 37.33 KG/M2 | HEIGHT: 73 IN | WEIGHT: 281.63 LBS

## 2022-12-02 DIAGNOSIS — R51.9 NONINTRACTABLE EPISODIC HEADACHE, UNSPECIFIED HEADACHE TYPE: ICD-10-CM

## 2022-12-02 DIAGNOSIS — J34.3 HYPERTROPHY OF INFERIOR NASAL TURBINATE: ICD-10-CM

## 2022-12-02 DIAGNOSIS — G47.33 OSA (OBSTRUCTIVE SLEEP APNEA): Primary | ICD-10-CM

## 2022-12-02 DIAGNOSIS — J30.2 SEASONAL ALLERGIC RHINITIS, UNSPECIFIED TRIGGER: ICD-10-CM

## 2022-12-02 DIAGNOSIS — Z78.9 DIFFICULTY USING CONTINUOUS POSITIVE AIRWAY PRESSURE (CPAP) DEVICE: ICD-10-CM

## 2022-12-02 DIAGNOSIS — J34.89 NASAL CRUSTING: ICD-10-CM

## 2022-12-02 PROCEDURE — 31231 NASAL ENDOSCOPY DX: CPT | Mod: S$GLB,,, | Performed by: OTOLARYNGOLOGY

## 2022-12-02 PROCEDURE — 31231 PR NASAL ENDOSCOPY, DX: ICD-10-PCS | Mod: S$GLB,,, | Performed by: OTOLARYNGOLOGY

## 2022-12-02 PROCEDURE — 99204 OFFICE O/P NEW MOD 45 MIN: CPT | Mod: 25,S$GLB,, | Performed by: OTOLARYNGOLOGY

## 2022-12-02 PROCEDURE — 99204 PR OFFICE/OUTPT VISIT, NEW, LEVL IV, 45-59 MIN: ICD-10-PCS | Mod: 25,S$GLB,, | Performed by: OTOLARYNGOLOGY

## 2022-12-02 RX ORDER — AZELASTINE 1 MG/ML
1 SPRAY, METERED NASAL 2 TIMES DAILY
Qty: 30 ML | Refills: 3 | Status: SHIPPED | OUTPATIENT
Start: 2022-12-02 | End: 2024-01-16

## 2022-12-02 RX ORDER — MUPIROCIN 20 MG/G
OINTMENT TOPICAL 2 TIMES DAILY
Qty: 1 EACH | Refills: 0 | Status: SHIPPED | OUTPATIENT
Start: 2022-12-02 | End: 2022-12-16

## 2022-12-02 NOTE — PROGRESS NOTES
OTOLARYNGOLOGY CLINIC NOTE  Date:  12/02/2022     Chief complaint:  Chief Complaint   Patient presents with    Sinus Problem     Sinus headaches, a lot of facial pressure.       History of Present Illness  Tony Gordillo is a 42 y.o. male  presenting today for a new evaluation and treatment of  headaches which he describes as a sinus headache. He has also been seen by neurologist dr neff and had an mri brain 11-2-22.   Congestion bilaterally when things blooming especially but does have some congestion all the time  Has broken nose a couple of times but No history of sinonasal surgery. 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 Tried astelin in the past without saline but notes that the Astelin dried out nose. Currently he is Doing nasal spray once a day - nasonex 2 sprays per nose once daily. Has not used slaine prior to medication nasal sprays in the past   Has to get abx 3 times per year when sinus issues get really bad    Certain smells make him nausea sometimes get congested too but not often ( perfumes)    He also has a history of patricia (AHI of 11 on HST 8-) and has a cpap mask. Interested if any other masks could work better but ideally would like to get rid of mask . Wakes up to pee on occasion still  [place] sleepy scale of 12 on pre-visit questionairre    Past Medical History  Past Medical History:   Diagnosis Date    Diabetes mellitus, type 2     Hyperlipidemia     Hypertension     Obesity     PATRICIA (obstructive sleep apnea)         Past Surgical History  Past Surgical History:   Procedure Laterality Date    ANKLE SURGERY      KNEE SURGERY      acl,mcl,pcl    left knee x 2          Medications  Current Outpatient Medications on File Prior to Visit   Medication Sig Dispense Refill    atorvastatin (LIPITOR) 40 MG tablet Take 1 tablet (40 mg total) by mouth once daily. 90 tablet 3    blood sugar  diagnostic Strp To check BG 4 times daily, to use with insurance preferred meter 400 strip 3    blood sugar diagnostic Strp OneTouch Ultra Test strips      blood-glucose meter kit OneTouch Ultra2 Meter kit      blood-glucose meter,continuous (DEXCOM G6 ) Misc Use with dexcom G6 system 1 each 0    butalbital-acetaminophen-caffeine -40 mg (FIORICET, ESGIC) -40 mg per tablet Take 1 tablet by mouth every 4 (four) hours as needed for Headaches. 12 tablet 0    chlorpheniramine-acetaminophen (CORICIDIN HBP COLD AND FLU) 2-325 mg Tab Take 1 tablet by mouth 4 (four) times daily as needed (ear ache, runny nose, and congestion.). 30 tablet 1    DEXCOM G6 SENSOR Filomena CHANGE SENSOR EVERY 10 DAYS 9 each 4    DEXCOM G6 TRANSMITTER Filomena CHANGE EVERY 3 MONTHS 1 each 3    diclofenac sodium (VOLTAREN) 1 % Gel Apply the gel (2 g) to the affected area 4 times daily. Do not apply more than 8 g daily to any one affected joint 100 g 1    empagliflozin (JARDIANCE) 25 mg tablet Take 1 tablet (25 mg total) by mouth once daily. 30 tablet 5    fenofibrate 160 MG Tab fenofibrate 160 mg tablet      flash glucose sensor (FREESTYLE SAMANTHA 3 SENSOR) Kit Change every 14 days 2 kit 11    fluticasone propionate (FLONASE) 50 mcg/actuation nasal spray fluticasone propionate 50 mcg/actuation nasal spray,suspension 16 g 3    fluticasone-umeclidin-vilanter (TRELEGY ELLIPTA) 200-62.5-25 mcg inhaler Inhale 1 puff into the lungs once daily. Stop symbicort 60 each 5    ibuprofen (ADVIL,MOTRIN) 800 MG tablet Take 1 tablet (800 mg total) by mouth every 8 (eight) hours as needed for Pain. 30 tablet 0    insulin aspart U-100 (NOVOLOG FLEXPEN U-100 INSULIN) 100 unit/mL (3 mL) InPn pen Inject 40 units before each meal with ss, max daily dose 150 units 45 mL 5    insulin glargine U-300 conc (TOUJEO MAX U-300 SOLOSTAR) 300 unit/mL (3 mL) insulin pen Inject 30 Units into the skin once daily. 2 pen 5    insulin NPH isoph U-100 human (NOVOLIN N FLEXPEN)  "100 unit/mL (3 mL) InPn Inject 14 units once daily at night 8 pm. 15 mL 2    ketoconazole (NIZORAL) 2 % cream Apply topically once daily. 1 Tube 3    lancets Misc To check BG 4 times daily, to use with insurance preferred meter 400 each 3    levothyroxine (SYNTHROID) 50 MCG tablet TAKE 1 TABLET (50 MCG TOTAL) BY MOUTH BEFORE BREAKFAST. 90 tablet 2    lisinopriL (PRINIVIL,ZESTRIL) 20 MG tablet Take 1 tablet (20 mg total) by mouth once daily. 90 tablet 3    montelukast (SINGULAIR) 10 mg tablet TAKE 1 TABLET BY MOUTH EVERY EVENING 90 tablet 3    ondansetron (ZOFRAN-ODT) 8 MG TbDL Take 1 tablet (8 mg total) by mouth every 6 (six) hours as needed (n/v). 30 tablet 0    pen needle, diabetic (BD ULTRA-FINE KEN PEN NEEDLE) 32 gauge x 5/32" Ndle 1 Device by Misc.(Non-Drug; Combo Route) route 5 (five) times daily. 550 each 4    promethazine (PHENERGAN) 25 MG suppository Place 1 suppository (25 mg total) rectally every 6 (six) hours as needed for Nausea. 10 suppository 0    semaglutide (OZEMPIC) 1 mg/dose (4 mg/3 mL) Inject 1 mg into the skin every 7 days. 1 pen 5    syringe, disposable, 1 mL Syrg Testosterone every 2 weeks 25 Syringe 1    azelastine 205.5 mcg (0.15 %) Spry azelastine 205.5 mcg (0.15 %) nasal spray  INHALE 2 SPRAYS TO EACH NOSTRIL TWICE A DAY (Patient not taking: Reported on 12/2/2022) 30 mL 11     No current facility-administered medications on file prior to visit.       Review of Systems  Review of Systems   Constitutional:  Negative for fever.   HENT:  Positive for congestion and sinus pain.    Eyes: Negative.    Respiratory:  Negative for hemoptysis.    Cardiovascular: Negative.    Gastrointestinal:  Positive for heartburn.   Musculoskeletal:  Positive for joint pain.   Neurological:  Positive for headaches.   Endo/Heme/Allergies:  Positive for environmental allergies.   Psychiatric/Behavioral: Negative.      Answers submitted by the patient for this visit:  Review of Symptoms Questionnaire  (Submitted on " "12/2/2022)  sinus pressure : Yes  postnasal drip: Yes  Snoring?: Yes  Sleep Apnea?: Yes  Acid Reflux?: Yes  Seasonal Allergies?: Yes  Social History   reports that he has never smoked. He has never used smokeless tobacco. He reports current alcohol use. He reports that he does not use drugs.     Family History  Family History   Problem Relation Age of Onset    Diabetes Mother     Diabetes Father     No Known Problems Brother     Autism Son     No Known Problems Daughter         Physical Exam   There were no vitals filed for this visit. Body mass index is 37.16 kg/m².  Weight: 127.7 kg (281 lb 10.2 oz)   Height: 6' 1" (185.4 cm)     GENERAL: no acute distress.  HEAD: normocephalic.   EYES: lids and lashes normal. No scleral icterus  EARS: external ear without lesion, normal pinna shape and position.  External auditory canal with normal cerumen, tympanic membrane fully visible, no perforation , no retraction. No middle ear effusion. Ossicles intact.   NOSE: external nose without significant bony abnormality; unable to visualize sinonasal tract on anterior rhinoscopy due to turbinate hypertrophy  ORAL CAVITY/OROPHARYNX: tongue midline and mobile.lateral scalloping of tongue Symmetric palate rise. Uvula midline.   NECK: trachea midline.   LYMPH NODES:No cervical lymphadenopathy.  RESPIRATORY: no stridor, no stertor. Voice normal. Respirations nonlabored.  NEURO: alert, responds to questions appropriately.   PSYCH:mood appropriate    PROCEDURE NOTE  Procedure: diagnostic rigid nasal endoscopy  Indications for procedure: recurrent sinus disease, evaluate for polyps and other sinonasal pathology; unable to visualize sinonasal tract on anterior rhinoscopy    Consent: procedure was explained in detail and verbal consent was obtained.   Anesthesia:4% lidocaine with neosynephrine  Procedure in detail: With the patient in the seated position, the zero degree endoscope was inserted atraumatically into the bilateral nasal " cavities and advance to the nasopharynx with the following areas examined with findings as described below.     Nasal cavity:no polyps or mass, no purulent drainage, no bleeding; +Crusting   Septum: no perforation   Turbinates:  inferior turbinates hypertrophied; middle turbinates not enlarged  Middle Meati: no significant edema, no polyps  Nasopharynx: no mass or lesion in the nasopharynx.     The scope was removed atraumatically without complication. The patient tolerated the procedure well. Photodocumentation obtained , all images and/or videos uploaded in media section of epic.    Imaging:  The patient does have any pertinent and/or recent imaging of the head and neck.mri brain 11-2-22    Images reviewed and no overt inflammatory changes/fluid build up   Labs:  CBC  Recent Labs   Lab 07/15/21  0840 02/17/22  1115 03/25/22  0617   WBC 8.48 9.92 17.96 H   Hemoglobin 16.7 16.8 18.5 H   Hematocrit 51.7 53.9 57.5 H   MCV 91 90 88   Platelets 200 223 218     BMP  Recent Labs   Lab 07/15/21  0840 02/17/22  1115 03/25/22  0617 11/02/22  0857   Glucose 155 H 159 H 260 H  --    Sodium 139 140 139  --    Potassium 4.0 4.3 4.2  --    Chloride 104 105 107  --    CO2 25 21 L 22 L  --    BUN 14 14 22 H  --    Creatinine 0.9 0.9 1.0 1.0   Calcium 10.1 10.3 9.8  --    Phosphorus  --   --  3.5  --    Magnesium  --   --  1.8  --      COAGS        Assessment  1. Nasal crusting  - Ambulatory referral/consult to ENT    2. NAINA (obstructive sleep apnea)  - Polysomnogram (CPAP will be added if patient meets diagnostic criteria.); Future    3. Hypertrophy of inferior nasal turbinate    4. Nonintractable episodic headache, unspecified headache type    5. Difficulty using continuous positive airway pressure (CPAP) device    6. Seasonal allergic rhinitis, unspecified trigger       Plan:  Discussed plan of care with patient in detail and all questions answered. Patient reported understanding of plan of care.   Allergic rhinitis(AR): We  discussed about treatment options for this. I recommend dual nasal spray therapy as combo of steroid and antihistamine nasal spray has been shown in evidence based studies to be better than either alone. Discussed medication administration technique ( point toward outer corner of eye and not towards nasal septum) and use nasal saline prior to medication sprays. Counseled on risk of bleeding, risk of increased intraocular pressure/cataract with long term use. We discussed importance of saline use prior to medication sprays to help sprays work better and to clean the nose. We discussed and physical documentation with photos were given to the patient about the saline and other medication sprays ( paperwork summarized discussion topics)    Nasal crusting: likely due to staph colonization. saline rinse prior to bactroban. bactroban BID for 14 days. If persists will need to treat members of household as they can be carriers that cause patient to get reinfected. Differential would include vasculitis or granulomatous processes but this is extremely rare . Workup indicated if crusting persists despite appropriate mgmt.      Obstructive sleep apnea: counseled would need to lose weight to be considered for inspire and recommend getting PSG after has had weight loss, counseled him on amount of weight loss needed to meet bmi criteria to move forward with next step of evaluation for inspire and also discussed with him that procedure is indicated for patient with moderate to severe sleep apnea. Would like to get PSG for accuracy . We discussed other potential options for treatment of sleep apnea including oral appliance    F/u 2-3 months for repeat scope    Please be aware that this note has been generated with the assistance of MModal voice-to-text.  Please excuse any spelling or grammatical errors.

## 2022-12-02 NOTE — PATIENT INSTRUCTIONS
"Information and instructions from your visit with me today:  Use ointment after sprays  Wait 20 minutes after nettipot for medication sprays  Start using the following medication nasal sprays:   Fluticasone spray:    This medication is a steroid spray. It stays within the nose and does not have absorption into the body that leads to side effects that one has with oral steroid medication. Fluticasone nasal spray is the same as the Flonase brand nasal spray. Discuss with your pharmacist if the price is lower over the counter or with a prescription ( this varies depending on insurance). The medication that is over the counter is the same as the prescription medication. Use this medication as instructed on the prescription, 1-2 sprays on each side of your nose twice daily.     Azelastine  spray:  This medication is an antihistamine used to treat nasal symptoms of allergy, which works specifically in the nose unlike antihistamine pills which have more of an effect on the whole body. Use this medication as instructed on the prescription, 1 spray on each side of your nose twice daily.     Additional instructions for medication sprays  Place the tip of the medication bottle in your nose and aim slightly up and out on each side to get medication high and deep into your nose and sinuses, and not have it all deposit in the very front of your nose. Aim the tip of the nozzle towards the outer corner of your eye . You can imagine aiming towards the back of your eyeball on each side for this, as opposed to straight back to the center of your nose and head.     You need to use this medication every day regardless of symptoms, as it takes time ( a few weeks) to work and get the benefits. It does not work on an "as needed" basis like taking a decongestant. If your symptoms only occur in a particular season, then the medication can be used seasonally instead of year long. For seasonal symptoms, you should start using the spray twice " daily a month before when you normally have symptoms ( for example, if symptoms start in August, should start at the end of June).     Start nasal irrigations with saline solution- you can either use a rinse or a mist spray:    SALINE SINUS RINSE (Melquiades Med brand): You should do a full bottle, half on one side of your nose and half on the other, 1-2 times per day (or more if able to, you cannot do this too much). Follow the instructions on the box: mix the salt packet with clean water (bottle, previously boiled, distilled, etc -- not tap water) to the line on the bottle to make the irrigation.         NASAL SALINE SPRAY ( simply saline and arm and hammer are examples) There are several different brands found in the cold and flu aisle of the pharmacy. You can use any brand of saline spray - this will deliver the saline by a gentle mist ( if you have difficulty or discomfort with nasal rinse/ a lot of fluid in the nose, this will be more comfortable).       Always rinse your nose with saline prior to using medication sprays and wait a couple of hours before using again. You can use the saline throughout the day to help with stuffy nose or dry nose.    Do not use nasal decongestant sprays such as Afrin or similar products long term ( over 3 days) .  This can cause long term physical nasal addiction. Afrin should only be used if having nose bleeds, severe nasal congestion , or severe ear pain/fullness and should not be used for more than 2-3 days in a row . It is a not a medication that should be used for a long period of time.     It was nice meeting you today, and I look forward to helping you feel better soon. Please don't hesitate to call if you have any other questions or concerns, or if I can be of any assistance in the meantime.      Tiffanie Delgado MD    [facility]     Phone  [phone]    Fax      [phone]        Tiffanie Delgado MD  Otorhinolaryngology

## 2022-12-03 ENCOUNTER — PATIENT MESSAGE (OUTPATIENT)
Dept: ENDOCRINOLOGY | Facility: CLINIC | Age: 42
End: 2022-12-03
Payer: COMMERCIAL

## 2022-12-03 DIAGNOSIS — Z79.4 TYPE 2 DIABETES MELLITUS WITHOUT COMPLICATION, WITH LONG-TERM CURRENT USE OF INSULIN: Primary | ICD-10-CM

## 2022-12-03 DIAGNOSIS — E11.9 TYPE 2 DIABETES MELLITUS WITHOUT COMPLICATION, WITH LONG-TERM CURRENT USE OF INSULIN: Primary | ICD-10-CM

## 2022-12-05 ENCOUNTER — PATIENT MESSAGE (OUTPATIENT)
Dept: ENDOCRINOLOGY | Facility: CLINIC | Age: 42
End: 2022-12-05
Payer: COMMERCIAL

## 2022-12-05 RX ORDER — FLASH GLUCOSE SENSOR
KIT MISCELLANEOUS
Qty: 2 KIT | Refills: 11 | Status: SHIPPED | OUTPATIENT
Start: 2022-12-05 | End: 2023-02-15

## 2022-12-06 ENCOUNTER — TELEPHONE (OUTPATIENT)
Dept: PULMONOLOGY | Facility: CLINIC | Age: 42
End: 2022-12-06
Payer: COMMERCIAL

## 2022-12-12 ENCOUNTER — TELEPHONE (OUTPATIENT)
Dept: SLEEP MEDICINE | Facility: HOSPITAL | Age: 42
End: 2022-12-12
Payer: COMMERCIAL

## 2022-12-21 RX ORDER — INSULIN ASPART 100 [IU]/ML
INJECTION, SOLUTION INTRAVENOUS; SUBCUTANEOUS
Qty: 45 EACH | Refills: 1 | Status: SHIPPED | OUTPATIENT
Start: 2022-12-21 | End: 2023-04-05 | Stop reason: SDUPTHER

## 2022-12-21 RX ORDER — HUMAN INSULIN 100 [IU]/ML
INJECTION, SUSPENSION SUBCUTANEOUS
Qty: 15 EACH | Refills: 1 | Status: SHIPPED | OUTPATIENT
Start: 2022-12-21 | End: 2023-02-15 | Stop reason: SDUPTHER

## 2022-12-29 ENCOUNTER — CLINICAL SUPPORT (OUTPATIENT)
Dept: REHABILITATION | Facility: HOSPITAL | Age: 42
End: 2022-12-29
Attending: ORTHOPAEDIC SURGERY
Payer: COMMERCIAL

## 2022-12-29 DIAGNOSIS — M17.0 PRIMARY OSTEOARTHRITIS OF BOTH KNEES: ICD-10-CM

## 2022-12-29 DIAGNOSIS — M25.662 DECREASED RANGE OF MOTION (ROM) OF LEFT KNEE: ICD-10-CM

## 2022-12-29 DIAGNOSIS — M62.81 QUADRICEPS WEAKNESS: ICD-10-CM

## 2022-12-29 PROCEDURE — 97161 PT EVAL LOW COMPLEX 20 MIN: CPT | Mod: PN

## 2022-12-29 PROCEDURE — 97110 THERAPEUTIC EXERCISES: CPT | Mod: PN

## 2022-12-29 NOTE — PLAN OF CARE
[facility]  Physical Therapy Initial Evaluation    Name: Tony Gordillo  Clinic Number: [id number]    Therapy Diagnosis:   Encounter Diagnoses   Name Primary?    Primary osteoarthritis of both knees     Decreased range of motion (ROM) of left knee     Quadriceps weakness      Physician: Crissy Bradford MD    Physician Orders: PT Eval and Treat   Medical Diagnosis from Referral: M17.0 (ICD-10-CM) - Primary osteoarthritis of both knees  Evaluation Date: 12/29/2022  Plan of Care Expiration: 3/29/23    Authorization Period Expiration: 11/3/23  Visit # / Visits authorized: 1/ 1      Time In: 0945  Time Out: 1040    Total Billable Time: 50 minutes    Precautions: Standard    Subjective   Date of onset: acute on chronic    History of current condition:   Tony  is a 42 year old male presenting with c/o bilateral knee pain left>right.  He reports an original onset of knee pain in 1999 that was aggravated with fall in shower 3 months ago.  He reports recent injections with minimal relief in the left.  He states he works as a  for 15 years. He states his most recent knee ACL surgery 2015. His goal is to help strengthen his knee and reduce pain.      Medical History:   Past Medical History:   Diagnosis Date    Diabetes mellitus, type 2     Hyperlipidemia     Hypertension     Obesity     NAINA (obstructive sleep apnea)        Surgical History:   Tony Gordillo  has a past surgical history that includes left knee x 2; Knee surgery; and Ankle surgery.    Medications:   Tony has a current medication list which includes the following prescription(s): atorvastatin, azelastine, azelastine, blood sugar diagnostic, blood sugar diagnostic, blood-glucose meter, dexcom g6 , butalbital-acetaminophen-caffeine -40 mg, coricidin hbp cold and flu, dexcom g6 sensor, dexcom g6 transmitter, diclofenac sodium, jardiance, fenofibrate, freestyle joe 2 sensor, fluticasone propionate, trelegy  ellipta, ibuprofen, insulin aspart u-100, toujeo max u-300 solostar, novolin n flexpen, ketoconazole, lancets, levothyroxine, lisinopril, montelukast, ondansetron, pen needle, diabetic, promethazine, semaglutide, and syringe (disposable).    Allergies:   Review of patient's allergies indicates:   Allergen Reactions    Influenza virus vaccine, specific Hives    Pioglitazone      Altered mood     Victoza [liraglutide] Nausea And Vomiting    Farxiga [dapagliflozin] Rash     Erectile dysfunction and rash         Imaging:   FINDINGS:  Patient has had previous cruciate ligament repair.  Joint space is narrowed with some degenerative change.  No joint effusion is seen.     Electronically signed by: Miguel Medina,     Prior Therapy: ACL reconstruction (2000, 2004, 2015)  Social History:  lives with their spouse, [facility]  Occupation:   Prior Level of Function: independent  Current Level of Function: independent    Pain:  Current 3/10, worst 9/10, best 3/10   Location: left knee > right     Aggravating Factors: sitting, standing, walking, squatting, kneeling, work duties  Easing Factors: lying down     Pts goals: His goal is to help strengthen his knee and reduce pain.      Objective     Gait: pt presents ambulating with a slight antalgic gait, decreased stance on left LE.   Observation: pt stands with diverging patellas, moderate quad atrophy bilaterally  Palpation: mild to moderate peripatellar, and medial and lateral joint line  Sensation: Intact    Range of Motion/Strength:   .  Knee  Right   Left      AROM  MMT  AROM  MMT    Flexion  140 4 132 4   Extension  0 4 -4 4   Hip ER/abd 4/5    Bed Mobility:Independent   Transfers: Independent     FOTO:  in media.     TREATMENT     Treatment Time In: 1015  Treatment Time Out: 1040    Total Treatment time separate from Evaluation: 20 minutes    Tony received therapeutic exercises to develop strength, endurance, ROM, flexibility, posture and core stabilization for 10  minutes including:   -quad set 2 x 10  -SLR 2 x 10  -hamstring stretch 20 se x 4  -sidelying clam 3 x 10 GTB    Tony received the following manual therapy techniques:  were applied to the: bilateral knees for 5 minutes, including:  Patellar mobilization    Education provided:   - HEP compliance    Written Home Exercises Provided: yes.    Exercises were reviewed and Tony was able to demonstrate them prior to the end of the session.  Tony demonstrated good  understanding of the education provided.     See EMR under Patient Instructions for exercises provided 12/29/2022.    Assessment     Pt presents with signs and symptoms consistent with referring diagnosis. Evaluation has determined a decrease in functional status and subjective and objective deficits that can be addressed by physical therapy intervention. Pt demonstrates pain limiting functional activities. Decreased flexibility and strength limiting normal movement patterns. Decreased segmental motion. Decreased postural strength and awareness. Positive special testing. Decreased participation in functional and recreational activities. Subjective and objective measures are addressed by goals in the plan of care.  Patient/family are involved in the development of these goals. Patient/family are educated about current injury and treatment.       Plan of care was dicussed with patient. Pt will benefit from skilled outpatient Physical Therapy to address the deficits stated above and in the chart below, provide pt/family education, and to maximize pt's level of independence. Pt's spiritual, cultural and educational needs considered and patient is agreeable to the plan of care and goals as stated below:     Pt prognosis is Good.  Anticipated Barriers for therapy: none     Medical Necessity is demonstrated by the following  History  Co-morbidities and personal factors that may impact the plan of care Co-morbidities:   Type 2 Diabetes, HLD, HTN, NAINA     Personal Factors:    age  no deficits       low   Examination  Body Structures and Functions, activity limitations and participation restrictions that may impact the plan of care Body Regions:   lower extremities  upper extremities  trunk     Body Systems:    strength  balance  gait  transfers  transitions     Participation Restrictions:    work duties     Activity limitations:   Learning and applying knowledge  no deficits     General Tasks and Commands  no deficits     Communication  no deficits     Mobility  lifting and carrying objects  walking     Self care  no deficits     Domestic Life  shopping  cooking  doing house work (cleaning house, washing dishes, laundry)  assisting others     Interactions/Relationships  no deficits     Life Areas  no deficits     Community and Social Life  no deficits             low   Clinical Presentation stable and uncomplicated low   Decision Making/ Complexity Score: low        Goals:    Short Term Goals (4 Weeks):     1.Pt to increase strength by a 1/2 grade of muscles test to allow for improvement in functional activities such as performing chores.  2.Pt to improve range of motion by 25% to allow for improved functional mobility to allow for improvement in IADLs.   3.Pt to report compliance with HEP and demonstrate proper exercise technique to PT to show competence with self management of condition.  4.Decrease pain by 25% during functional activities.    Long Term Goals (12 Weeks):     1. Increase ROM to allow improved joint biomechanics during functional activities.   2.Increase trunk and lower extremity strength to within normal limits during functional activities.   3. Independent with home exercise program.   4. Full return to functional activities with manageable complaints.  5. Patient to demonstrate improved posture and body mechanics.  6. Decrease pain by 75% during functional activities.    Plan     Plan of care Certification: 12/29/2022 to 3/25/23.    Recommended Treatment Plan: 2  times per week for 12 weeks with treatments to consist of:  Neuromuscular and postural re-education,  training, therapeutic exercise, therapeutic activities,balance training, gait training, manual therapy, soft tissue mobilization, ROM exercises, Cardiovascular,  Postural stabilization, manual traction, spinal mobilization, moist heat, cryotherapy, electrical stimulation, ultrasound, home exercise education and planning.    Kyler Miner, PT

## 2023-01-03 ENCOUNTER — OFFICE VISIT (OUTPATIENT)
Dept: URGENT CARE | Facility: CLINIC | Age: 43
End: 2023-01-03
Payer: COMMERCIAL

## 2023-01-03 VITALS
HEIGHT: 73 IN | RESPIRATION RATE: 18 BRPM | OXYGEN SATURATION: 96 % | WEIGHT: 281 LBS | SYSTOLIC BLOOD PRESSURE: 153 MMHG | BODY MASS INDEX: 37.24 KG/M2 | TEMPERATURE: 98 F | HEART RATE: 99 BPM | DIASTOLIC BLOOD PRESSURE: 99 MMHG

## 2023-01-03 DIAGNOSIS — R03.0 ELEVATED BLOOD PRESSURE READING: ICD-10-CM

## 2023-01-03 DIAGNOSIS — G44.021 INTRACTABLE CHRONIC CLUSTER HEADACHE: Primary | ICD-10-CM

## 2023-01-03 PROCEDURE — 99204 OFFICE O/P NEW MOD 45 MIN: CPT | Mod: 25,S$GLB,, | Performed by: FAMILY MEDICINE

## 2023-01-03 PROCEDURE — 96372 PR INJECTION,THERAP/PROPH/DIAG2ST, IM OR SUBCUT: ICD-10-PCS | Mod: S$GLB,,, | Performed by: FAMILY MEDICINE

## 2023-01-03 PROCEDURE — 96372 THER/PROPH/DIAG INJ SC/IM: CPT | Mod: S$GLB,,, | Performed by: FAMILY MEDICINE

## 2023-01-03 PROCEDURE — 99204 PR OFFICE/OUTPT VISIT, NEW, LEVL IV, 45-59 MIN: ICD-10-PCS | Mod: 25,S$GLB,, | Performed by: FAMILY MEDICINE

## 2023-01-03 RX ORDER — DIPHENHYDRAMINE HYDROCHLORIDE 50 MG/ML
25 INJECTION INTRAMUSCULAR; INTRAVENOUS
Status: COMPLETED | OUTPATIENT
Start: 2023-01-03 | End: 2023-01-03

## 2023-01-03 RX ORDER — KETOROLAC TROMETHAMINE 30 MG/ML
60 INJECTION, SOLUTION INTRAMUSCULAR; INTRAVENOUS
Status: COMPLETED | OUTPATIENT
Start: 2023-01-03 | End: 2023-01-03

## 2023-01-03 RX ORDER — KETOROLAC TROMETHAMINE 30 MG/ML
60 INJECTION, SOLUTION INTRAMUSCULAR; INTRAVENOUS
Status: DISCONTINUED | OUTPATIENT
Start: 2023-01-03 | End: 2023-01-03

## 2023-01-03 RX ADMIN — DIPHENHYDRAMINE HYDROCHLORIDE 25 MG: 50 INJECTION INTRAMUSCULAR; INTRAVENOUS at 03:01

## 2023-01-03 RX ADMIN — KETOROLAC TROMETHAMINE 60 MG: 30 INJECTION, SOLUTION INTRAMUSCULAR; INTRAVENOUS at 03:01

## 2023-01-03 NOTE — PATIENT INSTRUCTIONS
General Discharge Instructions   PLEASE READ YOUR DISCHARGE INSTRUCTIONS ENTIRELY AS IT CONTAINS IMPORTANT INFORMATION.  If you were prescribed a narcotic or controlled medication, do not drive or operate heavy equipment or machinery while taking these medications.  If you were prescribed antibiotics, please take them to completion.  You must understand that you've received an Urgent Care treatment only and that you may be released before all your medical problems are known or treated. You, the patient, will arrange for follow up care as instructed.    OVER THE COUNTER RECOMMENDATIONS/SUGGESTIONS.    Make sure to stay well hydrated.    Use Nasal Saline to mechanically move any post nasal drip from your eustachian tube or from the back of your throat.    Use warm salt water gargles to ease your throat pain. Warm salt water gargles as needed for sore throat- 1/2 tsp salt to 1 cup warm water, gargle as desired.    Use an antihistamine such as Claritin, Zyrtec or Allegra to dry you out.    Use pseudoephedrine (behind the counter) to decongest. Pseudoephedrine 30 mg up to 240 mg /day. It can raise your blood pressure and give you palpitations.    Use mucinex (guaifenesin) to break up mucous up to 2400mg/day to loosen any mucous.    The mucinex DM pill has a cough suppressant that can be sedating. It can be used at night to stop the tickle at the back of your throat.    You can use Mucinex D (it has guaifenesin and a high dose of pseudoephedrine) in the mornings to help decongest.    Use Afrin in each nare for no longer than 3 days, as it is addictive. It can also dry out your mucous membranes and cause elevated blood pressure. This is especially useful if you are flying.    Use Flonase 1-2 sprays/nostril per day. It is a local acting steroid nasal spray, if you develop a bloody nose, stop using the medication immediately.    Sometimes Nyquil at night is beneficial to help you get some rest, however it is sedating and it  does have an antihistamine, and tylenol.    Honey is a natural cough suppressant that can be used.    Tylenol up to 4,000 mg a day is safe for short periods and can be used for body aches, pain, and fever. However in high doses and prolonged use it can cause liver irritation.    Ibuprofen is a non-steroidal anti-inflammatory that can be used for body aches, pain, and fever.However it can also cause stomach irritation if over used.     Follow up with your PCP or specialty clinic as instructed in the next 2-3 days if not improved or as needed. You can call [phone] to schedule an appointment with appropriate provider.      If you condition worsens, we recommend that you receive another evaluation at the emergency room immediately or contact your primary medical clinic's after hours call service to discuss your concerns.      Please return here or go to the Emergency Department for any concerns or worsening condition.   You can also call [phone] to schedule an appointment with the appropriate provider.    Please return here or go to the Emergency Department for any concerns or worsening of condition.    Thank you for choosing [facility]!    Our goal in the Urgent Care is to always provide outstanding medical care. You may receive a survey by mail or e-mail in the next week regarding your experience today. We would greatly appreciate you completing and returning the survey. Your feedback provides us with a way to recognize our staff who provide very good care, and it helps us learn how to improve when your experience was below our aspiration of excellence.      We appreciate you trusting us with your medical care. We hope you feel better soon. We will be happy to take care of you for all of your future medical needs.    Sincerely,    SHARI Phan                                                      Headache   If your condition worsens or fails to improve we recommend that you receive  another evaluation at the ER immediately or contact your PCP to discuss your concerns or return here. You must understand that you've received an urgent care treatment only and that you may be released before all your medical problems are known or treated. You the patient will arrange for follouwp care as instructed.   If you were given narcotic prescriptions or muscle relaxants do not operate heavy equipment or machinery while taking these medications   Get rest and drink fluids. Get plenty of rest.  Watch for increase pain, fever, vomiting, neck pain or mental confusion.   You can also use tylenol or ibuprofen as directed as long as you don't have any allergies to these medications or medical conditions such as ulcers, liver or kidney disease or blood thinners etc that would prevent you from taking these meds.     Supplements for Migraine:  1. Magnesium Oxide - 400mg by mouth daily  2. Riboflavin (Vitamin B2) - 400mg by mouth daily  3. Coenzyme Q10 - 200mg tablet by mouth daily     You received an injection of a powerful NSAID today (Toradol).  It's effects will last up to 24 hours. Please do not take another NSAID (ie aspirin, ibuprofen, Aleve, Advil or Motrin) until this time tomorrow.  If you continue to have pain, you may take Tylenol (acetaminophen) if you are not allergic to this medication.

## 2023-01-03 NOTE — LETTER
January 3, 2023      [facility] - [facility]  [address] [facility], [address]  [place] [address]  Phone: [phone]  Fax: [phone]       Patient: Tony Gordillo   YOB: 1980  Date of Visit: 01/03/2023    To Whom It May Concern:    Dorothy Gordillo  was at [facility] on 01/03/2023. The patient may return to work/school on 1/6/2022 with no restrictions. If you have any questions or concerns, or if I can be of further assistance, please do not hesitate to contact me.    Sincerely,    Divya Huitron NP

## 2023-01-03 NOTE — PROGRESS NOTES
"Subjective:       Patient ID: Tony Gordillo is a 42 y.o. male.    Vitals:  height is 6' 1" (1.854 m) and weight is 127.5 kg (281 lb). His tympanic temperature is 97.6 °F (36.4 °C). His blood pressure is 153/99 (abnormal) and his pulse is 99. His respiration is 18 and oxygen saturation is 96%.     Chief Complaint: Headache    Pt is coming in with headaches and high blood pressure that started 5:30 this morning. Pt says he suffers with migraine headaches. Pt says the works at the [employer] and in the middle of a call the headaches hit him and when he got back to the station he started to feel worse. Pt took ibuprofen to help with little to no relief.    Provider note begins below:  Past Medical History:  No date: Diabetes mellitus, type 2  No date: Hyperlipidemia  No date: Hypertension  No date: Obesity  No date: NAINA (obstructive sleep apnea)   Pt with above pmh started with a headache this morning at 0530, denies this being the worst ha of his life. He has a hx of migraines and this seems similar. He says the lights worsen the pain. He says he feels hypersensitive to touch, and says he has pins and needles all over his body. He takes 20 mg of lisinopril, baseline bp <140/90. He was evaluated by EMS this morning, and advised by EMS to go to ED for further eval, but did not go. He says he has had cluster v migraines in the past and believes this feels more like cluster headaches. Most migraines are triggered by smells, so he has not had migraines lately bc they removed the triggers, wife is present today. He has a neuro appt on 1/19, hx of multiple head injuries and TBIs. Currently Cbg 118, joe 3. He does see ortho for knee pain, chronic knee issues. He works as a , has been working many shifts 24h, but relates he is not stressed at his job.     Headache   This is a new problem. The current episode started today. The problem occurs constantly. The problem has been gradually worsening. The pain does " not radiate. The pain quality is similar to prior headaches. The pain is at a severity of 8/10. The pain is severe. Associated symptoms include muscle aches, phonophobia, photophobia and tingling (to all extremties, trunk and back.). Pertinent negatives include no abdominal pain, abnormal behavior, anorexia, back pain, blurred vision, coughing, dizziness, drainage, ear pain, eye pain, eye redness, eye watering, facial sweating, fever, hearing loss, insomnia, loss of balance, nausea, neck pain, numbness, rhinorrhea, scalp tenderness, seizures, sinus pressure, sore throat, swollen glands, tinnitus, visual change, vomiting, weakness or weight loss. The symptoms are aggravated by bright light. He has tried NSAIDs for the symptoms. The treatment provided mild relief. His past medical history is significant for cluster headaches, hypertension, migraine headaches and recent head traumas (not recent, but hx of TBIs.). There is no history of cancer, immunosuppression, migraines in the family, obesity, pseudotumor cerebri, sinus disease or TMJ.     Constitution: Negative for activity change, appetite change, chills, sweating, fatigue, fever, unexpected weight change and generalized weakness.   HENT:  Negative for ear pain, tinnitus, hearing loss, sinus pressure and sore throat.    Neck: Negative for neck pain.   Eyes:  Positive for photophobia. Negative for eye trauma, foreign body in eye, eye discharge, eye itching, eye pain, eye redness, vision loss, double vision, blurred vision and eyelid swelling.   Respiratory:  Negative for cough.    Gastrointestinal:  Negative for abdominal pain, nausea and vomiting.   Musculoskeletal:  Negative for back pain.   Neurological:  Positive for headaches and history of migraines. Negative for dizziness, history of vertigo, light-headedness, passing out, facial drooping, speech difficulty, coordination disturbances, loss of balance, disorientation, altered mental status, loss of  consciousness, numbness, tingling, seizures and tremors.   Psychiatric/Behavioral:  Negative for altered mental status and disorientation. The patient does not have insomnia.      Objective:      Physical Exam   Constitutional: He is oriented to person, place, and time. He appears well-developed. He is cooperative.  Non-toxic appearance. He does not appear ill. No distress.      Comments:Wife present.   He is wearing sunglasses, he is speaking in complete sentences, follows all commands, [place].    overweight  HENT:   Head: Normocephalic and atraumatic.   Ears:   Right Ear: External ear normal.   Left Ear: External ear normal.   Nose: Nose normal.   Mouth/Throat: Oropharynx is clear and moist.   Eyes: Conjunctivae, EOM and lids are normal. Pupils are equal, round, and reactive to light. No visual field deficit is present. Pupils are equal.      Comments: EOMI without pain.    Neck: Trachea normal and phonation normal. Neck supple.   Musculoskeletal: Normal range of motion.         General: Normal range of motion.   Neurological: no focal deficit. He is alert and oriented to person, place, and time. He has normal motor skills and normal sensation. He displays no weakness, no atrophy, no tremor, facial symmetry and no dysarthria. No cranial nerve deficit. He exhibits normal muscle tone. He has a normal Finger-Nose-Finger Test. Coordination: Romberg sign negative, Heel to shin test normal and Rapid alternating movements normal. He shows no pronator drift. He displays no seizure activity. Gait and coordination normal. Coordination normal.      Comments: Alert, oriented x 3. EOMI, PERRLA. Cranial nerves intact: facial expressions (smile, raising eyebrows, shutting eyes, pursed lips) symmetric. Shoulder shrug strength 5/5; sternocleidomastoid muscle strength 5/5 bilaterally. Jaw is midline without deviation. Tongue protrudes at midline without fasciculations. Sensation to face in distribution of CN V1, V2, and V3 intact.  Sensation to upper and lower extremities intact. Finger to nose, rapid rhythmic alternating movements, and heel to shin test are intact and smooth bilaterally. Patient ambulates unassisted without rigidity or ataxia forward, backward, on heels and toes, and in tandem gait. Romberg negative. Voice quality, comprehension, articulation, coherence assessed as appropriate.       Skin: Skin is warm, dry, intact and not diaphoretic.   Psychiatric: His speech is normal and behavior is normal. Judgment and thought content normal.   Nursing note and vitals reviewed.      Assessment:       1. Intractable chronic cluster headache    2. Elevated blood pressure reading          Plan:       appt with neuro 1/19  Wife present who tells me he has had similar symptoms with previous headaches in the past.   Reviewed these supplements to try..  Supplements for Migraine:  1. Magnesium Oxide - 400mg by mouth daily  2. Riboflavin (Vitamin B2) - 400mg by mouth daily  3. Coenzyme Q10 - 200mg tablet by mouth daily      there is no:  Systemic symptoms including fever    ?Neoplasm history    ?Neurologic deficit (including decreased consciousness)    ?Onset is sudden or abrupt    ?Older age (onset after age 50 years)    ?Pattern change or recent onset of new headache    ?Precipitated by sneezing, coughing, or exercise    ?Progressive headache and atypical presentations    ?Painful eye with autonomic features    ?Post-traumatic onset of headache    ?Pathology of the immune system such as HIV    We discussed emergency department evaluation, he is not interested in emergency department evaluation.  He would like to try measures here in the urgent care.  He has no unilateral weakness or signs and symptoms of a stroke.  His headache was not thunderclap and feels similar to previous headaches.   Reviewed any worsening he must proceed to the emergency department for further eval and higher level of care.    Discussed results/diagnosis/plan with  patient in clinic. Strict precautions given to patient to monitor for worsening signs and symptoms. Advised to follow up with PCP or specialist.    Explained side effects of medications prescribed with patient and informed him/her to discontinue use if he/she has any side effects and to inform UC or PCP if this occurs. All questions answered. Strict ED verses clinic return precautions stressed and given in depth. Advised if symptoms worsens of fail to improve he/she should go to the Emergency Room. Discharge and follow-up instructions given verbally/printed with the patient who expressed understanding and willingness to comply with my recommendations. Patient voiced understanding and in agreement with current treatment plan. Patient exits the exam room in no acute distress. Conversant and engaged during discharge discussion, verbalized understanding.      30 minutes spent on patient's encounter. This includes face to face time and non-face to face time preparing to see the patient (eg, review of tests), obtaining and/or reviewing separately obtained history, documenting clinical information in the electronic or other health record, independently interpreting results and communicating results to the patient/family/caregiver, or care coordinator.    Intractable chronic cluster headache  -     Discontinue: ketorolac injection 60 mg  -     diphenhydrAMINE injection 25 mg  -     ketorolac injection 60 mg    Elevated blood pressure reading  Comments:  ctm, wife present and very helpful, log and fu with pcp.                  Patient Instructions   General Discharge Instructions   PLEASE READ YOUR DISCHARGE INSTRUCTIONS ENTIRELY AS IT CONTAINS IMPORTANT INFORMATION.  If you were prescribed a narcotic or controlled medication, do not drive or operate heavy equipment or machinery while taking these medications.  If you were prescribed antibiotics, please take them to completion.  You must understand that you've received an  Urgent Care treatment only and that you may be released before all your medical problems are known or treated. You, the patient, will arrange for follow up care as instructed.    OVER THE COUNTER RECOMMENDATIONS/SUGGESTIONS.    Make sure to stay well hydrated.    Use Nasal Saline to mechanically move any post nasal drip from your eustachian tube or from the back of your throat.    Use warm salt water gargles to ease your throat pain. Warm salt water gargles as needed for sore throat- 1/2 tsp salt to 1 cup warm water, gargle as desired.    Use an antihistamine such as Claritin, Zyrtec or Allegra to dry you out.    Use pseudoephedrine (behind the counter) to decongest. Pseudoephedrine 30 mg up to 240 mg /day. It can raise your blood pressure and give you palpitations.    Use mucinex (guaifenesin) to break up mucous up to 2400mg/day to loosen any mucous.    The mucinex DM pill has a cough suppressant that can be sedating. It can be used at night to stop the tickle at the back of your throat.    You can use Mucinex D (it has guaifenesin and a high dose of pseudoephedrine) in the mornings to help decongest.    Use Afrin in each nare for no longer than 3 days, as it is addictive. It can also dry out your mucous membranes and cause elevated blood pressure. This is especially useful if you are flying.    Use Flonase 1-2 sprays/nostril per day. It is a local acting steroid nasal spray, if you develop a bloody nose, stop using the medication immediately.    Sometimes Nyquil at night is beneficial to help you get some rest, however it is sedating and it does have an antihistamine, and tylenol.    Honey is a natural cough suppressant that can be used.    Tylenol up to 4,000 mg a day is safe for short periods and can be used for body aches, pain, and fever. However in high doses and prolonged use it can cause liver irritation.    Ibuprofen is a non-steroidal anti-inflammatory that can be used for body aches, pain, and  fever.However it can also cause stomach irritation if over used.     Follow up with your PCP or specialty clinic as instructed in the next 2-3 days if not improved or as needed. You can call [phone] to schedule an appointment with appropriate provider.      If you condition worsens, we recommend that you receive another evaluation at the emergency room immediately or contact your primary medical clinic's after hours call service to discuss your concerns.      Please return here or go to the Emergency Department for any concerns or worsening condition.   You can also call [phone] to schedule an appointment with the appropriate provider.    Please return here or go to the Emergency Department for any concerns or worsening of condition.    Thank you for choosing [facility]!    Our goal in the Urgent Care is to always provide outstanding medical care. You may receive a survey by mail or e-mail in the next week regarding your experience today. We would greatly appreciate you completing and returning the survey. Your feedback provides us with a way to recognize our staff who provide very good care, and it helps us learn how to improve when your experience was below our aspiration of excellence.      We appreciate you trusting us with your medical care. We hope you feel better soon. We will be happy to take care of you for all of your future medical needs.    Sincerely,    SHARI Phan                                                      Headache   If your condition worsens or fails to improve we recommend that you receive another evaluation at the ER immediately or contact your PCP to discuss your concerns or return here. You must understand that you've received an urgent care treatment only and that you may be released before all your medical problems are known or treated. You the patient will arrange for follouwp care as instructed.   If you were given narcotic prescriptions or muscle  relaxants do not operate heavy equipment or machinery while taking these medications   Get rest and drink fluids. Get plenty of rest.  Watch for increase pain, fever, vomiting, neck pain or mental confusion.   You can also use tylenol or ibuprofen as directed as long as you don't have any allergies to these medications or medical conditions such as ulcers, liver or kidney disease or blood thinners etc that would prevent you from taking these meds.     Supplements for Migraine:  1. Magnesium Oxide - 400mg by mouth daily  2. Riboflavin (Vitamin B2) - 400mg by mouth daily  3. Coenzyme Q10 - 200mg tablet by mouth daily     You received an injection of a powerful NSAID today (Toradol).  It's effects will last up to 24 hours. Please do not take another NSAID (ie aspirin, ibuprofen, Aleve, Advil or Motrin) until this time tomorrow.  If you continue to have pain, you may take Tylenol (acetaminophen) if you are not allergic to this medication.

## 2023-01-04 ENCOUNTER — TELEPHONE (OUTPATIENT)
Dept: URGENT CARE | Facility: CLINIC | Age: 43
End: 2023-01-04
Payer: COMMERCIAL

## 2023-01-04 NOTE — TELEPHONE ENCOUNTER
Courtesy call to check on patient, verified full name and date of birth.  He reports that he is feeling better, rates headache 1 or 2/10 on pain scale.  He reports he has been sleeping.  He does report he feels much better than yesterday, I advised her to follow-up with any future questions or concerns he agreed with plan.

## 2023-01-05 ENCOUNTER — HOSPITAL ENCOUNTER (EMERGENCY)
Facility: HOSPITAL | Age: 43
Discharge: HOME | End: 2023-01-05
Attending: EMERGENCY MEDICINE
Payer: COMMERCIAL

## 2023-01-05 VITALS
HEIGHT: 73 IN | HEART RATE: 77 BPM | RESPIRATION RATE: 17 BRPM | TEMPERATURE: 99 F | SYSTOLIC BLOOD PRESSURE: 135 MMHG | OXYGEN SATURATION: 98 % | BODY MASS INDEX: 37.24 KG/M2 | WEIGHT: 281 LBS | DIASTOLIC BLOOD PRESSURE: 80 MMHG

## 2023-01-05 DIAGNOSIS — G89.29 CHRONIC NONINTRACTABLE HEADACHE, UNSPECIFIED HEADACHE TYPE: Primary | ICD-10-CM

## 2023-01-05 DIAGNOSIS — R51.9 CHRONIC NONINTRACTABLE HEADACHE, UNSPECIFIED HEADACHE TYPE: Primary | ICD-10-CM

## 2023-01-05 LAB
CTP QC/QA: YES
INFLUENZA A ANTIGEN, POC: NEGATIVE
INFLUENZA B ANTIGEN, POC: NEGATIVE
POCT GLUCOSE: 111 MG/DL (ref 70–110)
SARS-COV-2 RDRP RESP QL NAA+PROBE: NEGATIVE

## 2023-01-05 PROCEDURE — 82962 GLUCOSE BLOOD TEST: CPT | Mod: ER

## 2023-01-05 PROCEDURE — 99284 EMERGENCY DEPT VISIT MOD MDM: CPT | Mod: 25,ER

## 2023-01-05 PROCEDURE — 63600175 PHARM REV CODE 636 W HCPCS: Mod: ER | Performed by: EMERGENCY MEDICINE

## 2023-01-05 PROCEDURE — 96372 THER/PROPH/DIAG INJ SC/IM: CPT | Performed by: EMERGENCY MEDICINE

## 2023-01-05 PROCEDURE — 87804 INFLUENZA ASSAY W/OPTIC: CPT | Mod: 59,ER

## 2023-01-05 PROCEDURE — 87635 SARS-COV-2 COVID-19 AMP PRB: CPT | Mod: ER | Performed by: PHYSICIAN ASSISTANT

## 2023-01-05 PROCEDURE — 25000003 PHARM REV CODE 250: Mod: ER | Performed by: EMERGENCY MEDICINE

## 2023-01-05 RX ORDER — DEXAMETHASONE SODIUM PHOSPHATE 4 MG/ML
12 INJECTION, SOLUTION INTRA-ARTICULAR; INTRALESIONAL; INTRAMUSCULAR; INTRAVENOUS; SOFT TISSUE
Status: COMPLETED | OUTPATIENT
Start: 2023-01-05 | End: 2023-01-05

## 2023-01-05 RX ORDER — PREDNISONE 20 MG/1
40 TABLET ORAL DAILY
Qty: 10 TABLET | Refills: 0 | Status: SHIPPED | OUTPATIENT
Start: 2023-01-05 | End: 2023-01-10

## 2023-01-05 RX ORDER — KETOROLAC TROMETHAMINE 10 MG/1
10 TABLET, FILM COATED ORAL EVERY 6 HOURS PRN
Qty: 12 TABLET | Refills: 0 | Status: SHIPPED | OUTPATIENT
Start: 2023-01-05 | End: 2023-01-08

## 2023-01-05 RX ORDER — BUTALBITAL, ACETAMINOPHEN AND CAFFEINE 50; 325; 40 MG/1; MG/1; MG/1
1 TABLET ORAL EVERY 4 HOURS PRN
Qty: 12 TABLET | Refills: 0 | Status: SHIPPED | OUTPATIENT
Start: 2023-01-05 | End: 2023-01-30 | Stop reason: ALTCHOICE

## 2023-01-05 RX ORDER — BUTALBITAL, ACETAMINOPHEN AND CAFFEINE 50; 325; 40 MG/1; MG/1; MG/1
1 TABLET ORAL
Status: COMPLETED | OUTPATIENT
Start: 2023-01-05 | End: 2023-01-05

## 2023-01-05 RX ORDER — KETOROLAC TROMETHAMINE 30 MG/ML
30 INJECTION, SOLUTION INTRAMUSCULAR; INTRAVENOUS
Status: COMPLETED | OUTPATIENT
Start: 2023-01-05 | End: 2023-01-05

## 2023-01-05 RX ADMIN — DEXAMETHASONE SODIUM PHOSPHATE 12 MG: 4 INJECTION, SOLUTION INTRA-ARTICULAR; INTRALESIONAL; INTRAMUSCULAR; INTRAVENOUS; SOFT TISSUE at 08:01

## 2023-01-05 RX ADMIN — KETOROLAC TROMETHAMINE 30 MG: 30 INJECTION, SOLUTION INTRAMUSCULAR; INTRAVENOUS at 07:01

## 2023-01-05 RX ADMIN — BUTALBITAL, ACETAMINOPHEN AND CAFFEINE 1 TABLET: 50; 325; 40 TABLET ORAL at 07:01

## 2023-01-05 NOTE — Clinical Note
"Tony"Annabella Gordillo was seen and treated in our emergency department on 1/5/2023.  He may return to work on 01/08/2023.       If you have any questions or concerns, please don't hesitate to call.      Dhara Villarreal RN    "

## 2023-01-06 NOTE — ED NOTES
Patient presents to Ed for headache x 3 days- was sent home from work and went to a walk in clinic and cleared for stroke- they gave him medications that did help but the headache cam back- initially with headache patient had elevated BP- Patient takes Lisinopril. Last took medication around 3pm and rates pain 5 out of 10 currently

## 2023-01-06 NOTE — ED PROVIDER NOTES
Encounter Date: 1/5/2023    SCRIBE #1 NOTE: I, Fany Reddy, am scribing for, and in the presence of,  Stanislav Gonsalez MD. I have scribed the following portions of the note - Other sections scribed: HPI, ROS, PE.     History     Chief Complaint   Patient presents with    Headache     Pt states migraine x3 days has Hx of this issue pt states tramadol help      42 year old male with multiple known medical problems including type 2 diabetes mellitus, hypertension, and hyperlipidemia, presents to the ED with chief compliant of a migraine that began 3 days ago. He reports he get migraines once a month. He states he went to urgent care 2 days ago where he received a benadryl and Toradol injection. He states this did not affect his migraine. His pain rating currently is 6/10. He says pain is in the front and middle of his head as well as behind the eyes. Patient has associated symptoms of dizziness and light-headedness. Patient denies any fever, nausea, or vomiting. Patient endorses occasional EtOH usage, but denies smoking. Patient endorses drinking 2-3 cups of coffee per day. Patient has allergy to influenza vaccine, Pioglitazone, and Victoza.     The history is provided by the patient. No  was used.   Review of patient's allergies indicates:   Allergen Reactions    Influenza virus vaccine, specific Hives    Pioglitazone      Altered mood     Victoza [liraglutide] Nausea And Vomiting    Farxiga [dapagliflozin] Rash     Erectile dysfunction and rash      Past Medical History:   Diagnosis Date    Diabetes mellitus, type 2     Hyperlipidemia     Hypertension     Obesity     NAINA (obstructive sleep apnea)      Past Surgical History:   Procedure Laterality Date    ANKLE SURGERY      KNEE SURGERY      acl,mcl,pcl    left knee x 2       Family History   Problem Relation Age of Onset    Diabetes Mother     Diabetes Father     No Known Problems Brother     Autism Son     No Known Problems Daughter       Social History     Tobacco Use    Smoking status: Never    Smokeless tobacco: Never   Substance Use Topics    Alcohol use: Yes     Comment: 1-2 beers every 2 weeks    Drug use: No     Review of Systems   Constitutional:  Negative for fever.   Eyes:  Negative for photophobia and visual disturbance.   Gastrointestinal:  Negative for nausea and vomiting.   Musculoskeletal:  Negative for gait problem and neck stiffness.   Skin:  Negative for rash.   Neurological:  Positive for dizziness, light-headedness and headaches. Negative for syncope and weakness.   All other systems reviewed and are negative.    Physical Exam     Initial Vitals [01/05/23 1641]   BP Pulse Resp Temp SpO2   136/88 97 17 98.8 °F (37.1 °C) 96 %      MAP       --         Physical Exam    Nursing note and vitals reviewed.  Constitutional: He appears well-developed and well-nourished.  Non-toxic appearance. He does not have a sickly appearance. No distress.   HENT:   Head: Normocephalic.   Right Ear: External ear normal. Tympanic membrane is bulging. Tympanic membrane is not injected and not erythematous.   Left Ear: External ear normal. Tympanic membrane is bulging. Tympanic membrane is not injected and not erythematous.   Mouth/Throat: Mucous membranes are dry. No trismus in the jaw.   Eyes: Conjunctivae are normal.   Neck: Neck supple.   Normal range of motion.  Cardiovascular:  Normal rate.           Pulmonary/Chest: Breath sounds normal. No stridor. No tachypnea and no bradypnea. No respiratory distress.   Abdominal: He exhibits no distension. There is no abdominal tenderness.   Musculoskeletal:         General: No tenderness or edema.      Cervical back: Normal range of motion and neck supple.     Neurological: He is alert and oriented to person, place, and time. GCS eye subscore is 4. GCS verbal subscore is 5. GCS motor subscore is 6.   Skin: Skin is warm and dry.   Psychiatric: He has a normal mood and affect. His behavior is normal.       ED  Course   Procedures  Labs Reviewed   POCT GLUCOSE - Abnormal; Notable for the following components:       Result Value    POCT Glucose 111 (*)     All other components within normal limits   SARS-COV-2 RDRP GENE    Narrative:     This test utilizes isothermal nucleic acid amplification technology to detect the SARS-CoV-2 RdRp nucleic acid segment. The analytical sensitivity (limit of detection) is 500 copies/swab.     A POSITIVE result is indicative of the presence of SARS-CoV-2 RNA; clinical correlation with patient history and other diagnostic information is necessary to determine patient infection status.    A NEGATIVE result means that SARS-CoV-2 nucleic acids are not present above the limit of detection. A NEGATIVE result should be treated as presumptive. It does not rule out the possibility of COVID-19 and should not be the sole basis for treatment decisions. If COVID-19 is strongly suspected based on clinical and exposure history, re-testing using an alternate molecular assay should be considered.     This test is only for use under the Food and Drug Administration s Emergency Use Authorization (EUA).     Commercial kits are provided by [employer]. Performance characteristics of the EUA have been independently verified by [facility] Department of Pathology and Laboratory Medicine.   _________________________________________________________________   The authorized Fact Sheet for Healthcare Providers and the authorized Fact Sheet for Patients of the ID NOW COVID-19 are available on the FDA website:    https://www.fda.gov/media/807911/download      https://www.fda.gov/media/434124/download      POCT RAPID INFLUENZA A/B          Imaging Results    None          Medications   butalbital-acetaminophen-caffeine -40 mg per tablet 1 tablet (1 tablet Oral Given 1/5/23 1932)   ketorolac injection 30 mg (30 mg Intramuscular Given 1/5/23 1933)   dexAMETHasone injection 12 mg (12 mg Intramuscular  Given 1/5/23 2001)     Medical Decision Making:   History:   Old Medical Records: I decided to obtain old medical records.  Clinical Tests:   Lab Tests: Reviewed and Ordered        Scribe Attestation:   Scribe #1: I performed the above scribed service and the documentation accurately describes the services I performed. I attest to the accuracy of the note.                 Labs Reviewed    Admission on 01/05/2023, Discharged on 01/05/2023   Component Date Value Ref Range Status    POCT Glucose 01/05/2023 111 (H)  70 - 110 mg/dL Final    POC Rapid COVID 01/05/2023 Negative  Negative Final     Acceptable 01/05/2023 Yes   Final    Influenza B Ag 01/05/2023 negative  Positive/Negative Final    Inflenza A Ag 01/05/2023 negative  Positive/Negative Final        Imaging Reviewed    Imaging Results    None         Medications given in ED    Medications   butalbital-acetaminophen-caffeine -40 mg per tablet 1 tablet (1 tablet Oral Given 1/5/23 1932)   ketorolac injection 30 mg (30 mg Intramuscular Given 1/5/23 1933)   dexAMETHasone injection 12 mg (12 mg Intramuscular Given 1/5/23 2001)         Note was created using voice recognition software. Note may have occasional typographical errors that may not have been identified and edited despite good cy initial review prior to signing.    I, Stanislav Gonsalez MD, personally performed the services described in this documentation. All medical record entries made by the scribe were at my direction and in my presence.  I have reviewed the chart and agree that the record reflects my personal performance and is accurate and complete.     Clinical Impression:   Final diagnoses:  [R51.9, G89.29] Chronic nonintractable headache, unspecified headache type (Primary)        ED Disposition Condition    Discharge Stable          ED Prescriptions       Medication Sig Dispense Start Date End Date Auth. Provider    butalbital-acetaminophen-caffeine -40 mg (FIORICET,  ESGIC) -40 mg per tablet Take 1 tablet by mouth every 4 (four) hours as needed for Headaches. 12 tablet 2023 Stanislav Gonsalez MD    ketorolac (TORADOL) 10 mg tablet () Take 1 tablet (10 mg total) by mouth every 6 (six) hours as needed for Pain (take with food). 12 tablet 2023 Stanislav Gonsalez MD    predniSONE (DELTASONE) 20 MG tablet () Take 2 tablets (40 mg total) by mouth once daily. for 5 days 10 tablet 2023 1/10/2023 Stanislav Gonsalez MD          Follow-up Information       Follow up With Specialties Details Why Contact Info    Jovany Ford MD Internal Medicine Call  As needed, for ongoing care [address] [place] [address] [phone]      The nearest emergency department.  Go to  As needed, If symptoms worsen     Srini South MD Neurology, Behavioral Health Go to  as previously scheduled [id number] [facility] [id number] [phone]               Stanislav Gonsalez MD  [phone]

## 2023-01-06 NOTE — DISCHARGE INSTRUCTIONS
Drink plenty of electrolyte-rich fluids (at least one gallon or more daily).  Limit/avoid caffeine (coffee, tea, coke, Dr AmaliaPepper, Mountain Dew, energy drinks, pre-workout supplements, etc.) and alcohol intake while symptoms persist.    Continue Flonase, Astelin, Singulair and Zyrtec(or Claritin or Allegra) daily as previously prescribed.

## 2023-01-09 ENCOUNTER — PATIENT MESSAGE (OUTPATIENT)
Dept: ADMINISTRATIVE | Facility: HOSPITAL | Age: 43
End: 2023-01-09
Payer: COMMERCIAL

## 2023-01-10 ENCOUNTER — CLINICAL SUPPORT (OUTPATIENT)
Dept: REHABILITATION | Facility: HOSPITAL | Age: 43
End: 2023-01-10
Payer: COMMERCIAL

## 2023-01-10 DIAGNOSIS — M62.81 QUADRICEPS WEAKNESS: ICD-10-CM

## 2023-01-10 DIAGNOSIS — M25.662 DECREASED RANGE OF MOTION (ROM) OF LEFT KNEE: Primary | ICD-10-CM

## 2023-01-10 PROCEDURE — 97110 THERAPEUTIC EXERCISES: CPT | Mod: PN

## 2023-01-10 NOTE — PROGRESS NOTES
Physical Therapy Daily Treatment Note     Name: Tony Gordillo  Clinic Number: [id number]    Therapy Diagnosis:   Encounter Diagnoses   Name Primary?    Decreased range of motion (ROM) of left knee Yes    Quadriceps weakness      Physician: Crissy Bradford MD    Visit Date: 1/10/2023  Physician Orders: PT Eval and Treat   Medical Diagnosis from Referral: M17.0 (ICD-10-CM) - Primary osteoarthritis of both knees  Evaluation Date: 12/29/2022  Plan of Care Expiration: 3/29/23     Authorization Period Expiration: 11/3/23  Visit # / Visits authorized: 1/ 1       Time In: 0830  Time Out: 0932    Total Billable Time: 57 minutes    Precautions: Standard    Subjective     Pt reports: the knees continue to moderately painful left> right. .  He was compliant with home exercise program.  Response to previous treatment: good  Functional change: none    Pain: 6/10  Location: bilateral knees     Objective     Tony received therapeutic exercises to develop strength, endurance, ROM, flexibility, posture and core stabilization for 45 minutes including:     -Upright bike x 8min  -gastroc stretch on wedge 30 sec x 4  -quad set 2 x 10  -SLR 2 x 10  -hamstring stretch 20 se x 4  -sidelying clam 3 x 10 GTB  -bridge with GTb 3 x 10  -SL shuttle 1.5/2  straps 3 x 10  -matrix hip abd 40 lbs 3 x 10  -matrix knee ext 10lbs 3 x 10 bilateral  -matrix ham curl 30 lbs 3 x 10 bilateral  -resisted sidestepping: hold     Tony received the following manual therapy techniques:  were applied to the: bilateral knees for 10 minutes, including:  Patellar mobilization, tibiofemoral distraction    CP x 10 min left knee post treatment.      Education provided:   - HEP compliance     Written Home Exercises Provided: Patient instructed to cont prior HEP.  Exercises were reviewed and Tony was able to demonstrate them prior to the end of the session.  Tony demonstrated good  understanding of the education provided.     See EMR under Patient Instructions for  exercises provided 1/10/2023.    Assessment     Pt continues to ambulate with a slight antalgic gait, decreased stance on left LE.  Progressed therex consisting of knee stabilization emphasis with attention to challenges with CKC activities. Fair response to exercise progression. Tony is progressing fair towards his goals.     Pt prognosis is Good.     Pt will continue to benefit from skilled outpatient physical therapy to address the deficits listed in the problem list box on initial evaluation, provide pt/family education and to maximize pt's level of independence in the home and community environment.     Pt's spiritual, cultural and educational needs considered and pt agreeable to plan of care and goals.     Anticipated barriers to physical therapy: none    Short Term Goals (4 Weeks):     1.Pt to increase strength by a 1/2 grade of muscles test to allow for improvement in functional activities such as performing chores.  2.Pt to improve range of motion by 25% to allow for improved functional mobility to allow for improvement in IADLs.   3.Pt to report compliance with HEP and demonstrate proper exercise technique to PT to show competence with self management of condition.  4.Decrease pain by 25% during functional activities.    Long Term Goals (12 Weeks):     1. Increase ROM to allow improved joint biomechanics during functional activities.   2.Increase trunk and lower extremity strength to within normal limits during functional activities.   3. Independent with home exercise program.   4. Full return to functional activities with manageable complaints.  5. Patient to demonstrate improved posture and body mechanics.  6. Decrease pain by 75% during functional activities.     Plan     Progress stabilization emphasis next visit.     Kyler Miner, PT

## 2023-01-13 ENCOUNTER — HOSPITAL ENCOUNTER (OUTPATIENT)
Dept: SLEEP MEDICINE | Facility: HOSPITAL | Age: 43
Discharge: HOME | End: 2023-01-13
Attending: OTOLARYNGOLOGY
Payer: COMMERCIAL

## 2023-01-13 ENCOUNTER — TELEPHONE (OUTPATIENT)
Dept: SLEEP MEDICINE | Facility: HOSPITAL | Age: 43
End: 2023-01-13
Payer: COMMERCIAL

## 2023-01-13 DIAGNOSIS — G47.33 OSA (OBSTRUCTIVE SLEEP APNEA): ICD-10-CM

## 2023-01-13 PROCEDURE — 95810 POLYSOM 6/> YRS 4/> PARAM: CPT

## 2023-01-14 NOTE — PATIENT INSTRUCTIONS
Your sleep study will be scored and interpreted by one of our physicians who are board certified in sleep medicine.  Within two weeks the results will be sent to the physician who referred you. Your physician should then contact you to go over the results, along with any recommendations. If you do not hear from your physician within two weeks, please call them.

## 2023-01-14 NOTE — PROGRESS NOTES
[facility] The entire procedure was explained, including Bio calibration procedure, patient was informed that there may be a need to enter the room during the night to fix lead or make adjustments to the equipment. Questions were answered prior to start of study. He was given instructions on how to call out for help including how to use the nurse call unit in the bathroom. Patient was given [employer] phone number to call if patient needed tech for anything, in case tech was out of tech room.  Patient education was performed. This includes the possible use of CPAP machine and different CPAP masks. He was given the after visit summary.   The lowest oxygen saturation observed during sleep was 87%   He did not meet criteria for a split night study due to insufficient amount of events during the 1st part of he study  The EKG appeared to be a sinus rhythm with periods of tachycardia  During the study the pt needed to sit up and eat chips due to his blood sugar dropping. The patient said his hands were shaking and the pt was sweating.Once the patients blood sugar went up the patient went back to sleep.

## 2023-01-16 ENCOUNTER — OUTPATIENT CASE MANAGEMENT (OUTPATIENT)
Dept: NEUROLOGY | Facility: CLINIC | Age: 43
End: 2023-01-16
Payer: COMMERCIAL

## 2023-01-16 DIAGNOSIS — R46.89 COGNITIVE AND BEHAVIORAL CHANGES: Primary | ICD-10-CM

## 2023-01-16 DIAGNOSIS — R41.89 COGNITIVE AND BEHAVIORAL CHANGES: Primary | ICD-10-CM

## 2023-01-16 PROCEDURE — 99358 PROLONG SERVICE W/O CONTACT: CPT | Mod: S$GLB,,, | Performed by: PSYCHIATRY & NEUROLOGY

## 2023-01-16 PROCEDURE — 99358 PR PROLONGED SERV,NO CONTACT,1ST HR: ICD-10-PCS | Mod: S$GLB,,, | Performed by: PSYCHIATRY & NEUROLOGY
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
## 2023-01-19 ENCOUNTER — LAB VISIT (OUTPATIENT)
Dept: LAB | Facility: HOSPITAL | Age: 43
End: 2023-01-19
Attending: PSYCHIATRY & NEUROLOGY
Payer: COMMERCIAL

## 2023-01-19 ENCOUNTER — OFFICE VISIT (OUTPATIENT)
Dept: NEUROLOGY | Facility: CLINIC | Age: 43
End: 2023-01-19
Payer: COMMERCIAL

## 2023-01-19 VITALS
WEIGHT: 295.31 LBS | BODY MASS INDEX: 39.14 KG/M2 | HEART RATE: 81 BPM | SYSTOLIC BLOOD PRESSURE: 135 MMHG | HEIGHT: 73 IN | DIASTOLIC BLOOD PRESSURE: 83 MMHG

## 2023-01-19 DIAGNOSIS — R45.4 OUTBURSTS OF ANGER: ICD-10-CM

## 2023-01-19 DIAGNOSIS — E03.4 HYPOTHYROIDISM DUE TO ACQUIRED ATROPHY OF THYROID: ICD-10-CM

## 2023-01-19 DIAGNOSIS — E11.49 TYPE 2 DIABETES MELLITUS WITH OTHER NEUROLOGIC COMPLICATION, WITH LONG-TERM CURRENT USE OF INSULIN: ICD-10-CM

## 2023-01-19 DIAGNOSIS — E11.649 HYPOGLYCEMIA ASSOCIATED WITH DIABETES: ICD-10-CM

## 2023-01-19 DIAGNOSIS — Z79.4 TYPE 2 DIABETES MELLITUS WITH OTHER NEUROLOGIC COMPLICATION, WITH LONG-TERM CURRENT USE OF INSULIN: ICD-10-CM

## 2023-01-19 DIAGNOSIS — G47.00 INSOMNIA, UNSPECIFIED TYPE: ICD-10-CM

## 2023-01-19 DIAGNOSIS — F90.1 ATTENTION DEFICIT HYPERACTIVITY DISORDER (ADHD), PREDOMINANTLY HYPERACTIVE TYPE: ICD-10-CM

## 2023-01-19 DIAGNOSIS — E11.9 TYPE 2 DIABETES MELLITUS WITHOUT COMPLICATION: ICD-10-CM

## 2023-01-19 DIAGNOSIS — G31.84 MCI (MILD COGNITIVE IMPAIRMENT): ICD-10-CM

## 2023-01-19 DIAGNOSIS — G47.33 OSA (OBSTRUCTIVE SLEEP APNEA): ICD-10-CM

## 2023-01-19 DIAGNOSIS — F07.81 TRAUMATIC ENCEPHALOPATHY: ICD-10-CM

## 2023-01-19 DIAGNOSIS — F07.81 TRAUMATIC ENCEPHALOPATHY: Primary | ICD-10-CM

## 2023-01-19 LAB
ALBUMIN SERPL BCP-MCNC: 3.9 G/DL (ref 3.5–5.2)
ALP SERPL-CCNC: 58 U/L (ref 55–135)
ALT SERPL W/O P-5'-P-CCNC: 30 U/L (ref 10–44)
ANION GAP SERPL CALC-SCNC: 6 MMOL/L (ref 8–16)
AST SERPL-CCNC: 25 U/L (ref 10–40)
BASOPHILS # BLD AUTO: 0.04 K/UL (ref 0–0.2)
BASOPHILS NFR BLD: 0.5 % (ref 0–1.9)
BILIRUB SERPL-MCNC: 0.6 MG/DL (ref 0.1–1)
BUN SERPL-MCNC: 17 MG/DL (ref 6–20)
CALCIUM SERPL-MCNC: 9.7 MG/DL (ref 8.7–10.5)
CHLORIDE SERPL-SCNC: 106 MMOL/L (ref 95–110)
CO2 SERPL-SCNC: 25 MMOL/L (ref 23–29)
CREAT SERPL-MCNC: 0.8 MG/DL (ref 0.5–1.4)
DIFFERENTIAL METHOD: ABNORMAL
EOSINOPHIL # BLD AUTO: 0.1 K/UL (ref 0–0.5)
EOSINOPHIL NFR BLD: 0.9 % (ref 0–8)
ERYTHROCYTE [DISTWIDTH] IN BLOOD BY AUTOMATED COUNT: 19.2 % (ref 11.5–14.5)
EST. GFR  (NO RACE VARIABLE): >60 ML/MIN/1.73 M^2
ESTIMATED AVG GLUCOSE: 183 MG/DL (ref 68–131)
FOLATE SERPL-MCNC: 10.7 NG/ML (ref 4–24)
GLUCOSE SERPL-MCNC: 182 MG/DL (ref 70–110)
HBA1C MFR BLD: 8 % (ref 4–5.6)
HCT VFR BLD AUTO: 46.9 % (ref 40–54)
HGB BLD-MCNC: 13.9 G/DL (ref 14–18)
IGG SERPL-MCNC: 813 MG/DL (ref 650–1600)
IMM GRANULOCYTES # BLD AUTO: 0.03 K/UL (ref 0–0.04)
IMM GRANULOCYTES NFR BLD AUTO: 0.4 % (ref 0–0.5)
LYMPHOCYTES # BLD AUTO: 2 K/UL (ref 1–4.8)
LYMPHOCYTES NFR BLD: 27 % (ref 18–48)
MAGNESIUM SERPL-MCNC: 1.9 MG/DL (ref 1.6–2.6)
MCH RBC QN AUTO: 24.5 PG (ref 27–31)
MCHC RBC AUTO-ENTMCNC: 29.6 G/DL (ref 32–36)
MCV RBC AUTO: 83 FL (ref 82–98)
MONOCYTES # BLD AUTO: 0.6 K/UL (ref 0.3–1)
MONOCYTES NFR BLD: 8.5 % (ref 4–15)
NEUTROPHILS # BLD AUTO: 4.6 K/UL (ref 1.8–7.7)
NEUTROPHILS NFR BLD: 62.7 % (ref 38–73)
NRBC BLD-RTO: 0 /100 WBC
PLATELET # BLD AUTO: 209 K/UL (ref 150–450)
PMV BLD AUTO: 11.5 FL (ref 9.2–12.9)
POTASSIUM SERPL-SCNC: 4 MMOL/L (ref 3.5–5.1)
PROT SERPL-MCNC: 7.5 G/DL (ref 6–8.4)
RBC # BLD AUTO: 5.67 M/UL (ref 4.6–6.2)
SODIUM SERPL-SCNC: 137 MMOL/L (ref 136–145)
T4 SERPL-MCNC: 6.5 UG/DL (ref 4.5–11.5)
TSH SERPL DL<=0.005 MIU/L-ACNC: 2.56 UIU/ML (ref 0.4–4)
TSH SERPL DL<=0.005 MIU/L-ACNC: 2.56 UIU/ML (ref 0.4–4)
WBC # BLD AUTO: 7.41 K/UL (ref 3.9–12.7)

## 2023-01-19 PROCEDURE — 83735 ASSAY OF MAGNESIUM: CPT | Performed by: PSYCHIATRY & NEUROLOGY

## 2023-01-19 PROCEDURE — 82746 ASSAY OF FOLIC ACID SERUM: CPT | Performed by: PSYCHIATRY & NEUROLOGY

## 2023-01-19 PROCEDURE — 85025 COMPLETE CBC W/AUTO DIFF WBC: CPT | Performed by: PSYCHIATRY & NEUROLOGY

## 2023-01-19 PROCEDURE — 96116 PR NEUROBEHAVIORAL STATUS EXAM BY PSYCH/PHYS: ICD-10-PCS | Mod: 59,S$GLB,, | Performed by: PSYCHIATRY & NEUROLOGY

## 2023-01-19 PROCEDURE — 84425 ASSAY OF VITAMIN B-1: CPT | Performed by: PSYCHIATRY & NEUROLOGY

## 2023-01-19 PROCEDURE — 96116 NUBHVL XM PHYS/QHP 1ST HR: CPT | Mod: 59,S$GLB,, | Performed by: PSYCHIATRY & NEUROLOGY

## 2023-01-19 PROCEDURE — 99215 PR OFFICE/OUTPT VISIT, EST, LEVL V, 40-54 MIN: ICD-10-PCS | Mod: S$GLB,,, | Performed by: PSYCHIATRY & NEUROLOGY

## 2023-01-19 PROCEDURE — 99999 PR PBB SHADOW E&M-EST. PATIENT-LVL V: ICD-10-PCS | Mod: PBBFAC,,, | Performed by: PSYCHIATRY & NEUROLOGY

## 2023-01-19 PROCEDURE — 0361U NEURFLMNT LT CHN DIG IA QUAN: CPT | Performed by: PSYCHIATRY & NEUROLOGY

## 2023-01-19 PROCEDURE — 84443 ASSAY THYROID STIM HORMONE: CPT | Performed by: INTERNAL MEDICINE

## 2023-01-19 PROCEDURE — 99999 PR PBB SHADOW E&M-EST. PATIENT-LVL V: CPT | Mod: PBBFAC,,, | Performed by: PSYCHIATRY & NEUROLOGY

## 2023-01-19 PROCEDURE — 36415 COLL VENOUS BLD VENIPUNCTURE: CPT | Performed by: PSYCHIATRY & NEUROLOGY

## 2023-01-19 PROCEDURE — 99417 PR PROLONGED SVC, OUTPT, W/WO DIRECT PT CONTACT,  EA ADDTL 15 MIN: ICD-10-PCS | Mod: S$GLB,,, | Performed by: PSYCHIATRY & NEUROLOGY

## 2023-01-19 PROCEDURE — 83921 ORGANIC ACID SINGLE QUANT: CPT | Mod: 91 | Performed by: PSYCHIATRY & NEUROLOGY

## 2023-01-19 PROCEDURE — 80053 COMPREHEN METABOLIC PANEL: CPT | Performed by: PSYCHIATRY & NEUROLOGY

## 2023-01-19 PROCEDURE — 86780 TREPONEMA PALLIDUM: CPT | Performed by: PSYCHIATRY & NEUROLOGY

## 2023-01-19 PROCEDURE — 82784 ASSAY IGA/IGD/IGG/IGM EACH: CPT | Performed by: PSYCHIATRY & NEUROLOGY

## 2023-01-19 PROCEDURE — 83036 HEMOGLOBIN GLYCOSYLATED A1C: CPT | Performed by: INTERNAL MEDICINE

## 2023-01-19 PROCEDURE — 84436 ASSAY OF TOTAL THYROXINE: CPT | Performed by: PSYCHIATRY & NEUROLOGY

## 2023-01-19 PROCEDURE — 99215 OFFICE O/P EST HI 40 MIN: CPT | Mod: S$GLB,,, | Performed by: PSYCHIATRY & NEUROLOGY

## 2023-01-19 PROCEDURE — 82607 VITAMIN B-12: CPT | Performed by: PSYCHIATRY & NEUROLOGY

## 2023-01-19 PROCEDURE — 99417 PROLNG OP E/M EACH 15 MIN: CPT | Mod: S$GLB,,, | Performed by: PSYCHIATRY & NEUROLOGY

## 2023-01-19 RX ORDER — TESTOSTERONE ENANTHATE 200 MG/ML
VIAL (ML) INTRAMUSCULAR
COMMUNITY
End: 2023-03-10 | Stop reason: CLARIF

## 2023-01-19 NOTE — PROGRESS NOTES
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 Communication with Friends and Family: More than three times a week    Frequency of Social Gatherings with Friends and Family: Patient refused    Active Member of Clubs or Organizations: No    Attends Club or Organization Meetings: Patient refused    Marital Status:    Housing Stability: Low Risk     Unable to Pay for Housing in the Last Year: No    Number of Places Lived in the Last Year: 1    Unstable Housing in the Last Year: No       Medication:     Current Outpatient Medications on File Prior to Visit   Medication Sig Dispense Refill    atorvastatin (LIPITOR) 40 MG tablet Take 1 tablet (40 mg total) by mouth once daily. 90 tablet 3    azelastine (ASTELIN) 137 mcg (0.1 %) nasal spray 1 spray (137 mcg total) by Nasal route 2 (two) times daily. 30 mL 3    azelastine 205.5 mcg (0.15 %) Spry azelastine 205.5 mcg (0.15 %) nasal spray  INHALE 2 SPRAYS TO EACH NOSTRIL TWICE A DAY 30 mL 11    blood sugar diagnostic Strp To check BG 4 times daily, to use with insurance preferred meter 400 strip 3    blood sugar diagnostic Strp OneTouch Ultra Test strips      blood-glucose meter kit  OneTouch Ultra2 Meter kit      blood-glucose meter,continuous (DEXCOM G6 ) Misc Use with dexcom G6 system 1 each 0    butalbital-acetaminophen-caffeine -40 mg (FIORICET, ESGIC) -40 mg per tablet Take 1 tablet by mouth every 4 (four) hours as needed for Headaches. 12 tablet 0    chlorpheniramine-acetaminophen (CORICIDIN HBP COLD AND FLU) 2-325 mg Tab Take 1 tablet by mouth 4 (four) times daily as needed (ear ache, runny nose, and congestion.). 30 tablet 1    DEXCOM G6 SENSOR Filomena CHANGE SENSOR EVERY 10 DAYS 9 each 4    DEXCOM G6 TRANSMITTER Filomena CHANGE EVERY 3 MONTHS 1 each 3    diclofenac sodium (VOLTAREN) 1 % Gel Apply the gel (2 g) to the affected area 4 times daily. Do not apply more than 8 g daily to any one affected joint 100 g 1    empagliflozin (JARDIANCE) 25 mg tablet Take 1 tablet (25 mg total) by mouth once daily. 30 tablet 5    fenofibrate 160 MG Tab fenofibrate 160 mg tablet      flash glucose sensor (FREESTYLE SAMANTHA 2 SENSOR) Kit Change sensor every 14 days. 2 kit 11    fluticasone propionate (FLONASE) 50 mcg/actuation nasal spray fluticasone propionate 50 mcg/actuation nasal spray,suspension 16 g 3    fluticasone-umeclidin-vilanter (TRELEGY ELLIPTA) 200-62.5-25 mcg inhaler Inhale 1 puff into the lungs once daily. Stop symbicort 60 each 5    insulin aspart U-100 (NOVOLOG) 100 unit/mL (3 mL) InPn pen INJECT 40 UNITS BEFORE EACH MEAL WITH SLIDNING SCALE, MAX DAILY DOSE 150 UNITS 45 each 1    insulin glargine U-300 conc (TOUJEO MAX U-300 SOLOSTAR) 300 unit/mL (3 mL) insulin pen Inject 30 Units into the skin once daily. 2 pen 5    insulin NPH isoph U-100 human (NOVOLIN N FLEXPEN) 100 unit/mL (3 mL) InPn INJECT 14 UNITS INTO THE SKIN ONCE DAILY AT NIGHT 8 PM. 15 each 1    ketoconazole (NIZORAL) 2 % cream Apply topically once daily. 1 Tube 3    lancets Misc To check BG 4 times daily, to use with insurance preferred meter 400 each 3    levothyroxine (SYNTHROID) 50 MCG tablet TAKE 1 TABLET  "(50 MCG TOTAL) BY MOUTH BEFORE BREAKFAST. 90 tablet 2    lisinopriL (PRINIVIL,ZESTRIL) 20 MG tablet Take 1 tablet (20 mg total) by mouth once daily. 90 tablet 3    montelukast (SINGULAIR) 10 mg tablet TAKE 1 TABLET BY MOUTH EVERY EVENING 90 tablet 3    ondansetron (ZOFRAN-ODT) 8 MG TbDL Take 1 tablet (8 mg total) by mouth every 6 (six) hours as needed (n/v). 30 tablet 0    pen needle, diabetic (BD ULTRA-FINE KEN PEN NEEDLE) 32 gauge x 5/32" Ndle 1 Device by Misc.(Non-Drug; Combo Route) route 5 (five) times daily. 550 each 4    promethazine (PHENERGAN) 25 MG suppository Place 1 suppository (25 mg total) rectally every 6 (six) hours as needed for Nausea. 10 suppository 0    semaglutide (OZEMPIC) 1 mg/dose (4 mg/3 mL) Inject 1 mg into the skin every 7 days. 1 pen 5    syringe, disposable, 1 mL Syrg Testosterone every 2 weeks 25 Syringe 1    testosterone enanthate (DELATESTRYL) 200 mg/mL injection Inject into the muscle every 14 (fourteen) days.       No current facility-administered medications on file prior to visit.        Review of patient's allergies indicates:   Allergen Reactions    Influenza virus vaccine, specific Hives    Pioglitazone      Altered mood     Victoza [liraglutide] Nausea And Vomiting    Farxiga [dapagliflozin] Rash     Erectile dysfunction and rash        Medications Reconciliation:   I have reconciled the patient's home medications and discharge medications with the patient/family. I have updated all changes.  Refer to After-Visit Medication List.    Objective:  Vital Signs:  Vitals:    01/19/23 0819   BP: 135/83   Pulse: 81     Wt Readings from Last 3 Encounters:   01/19/23 0819 134 kg (295 lb 4.9 oz)   01/05/23 1641 127.5 kg (281 lb)   01/03/23 1411 127.5 kg (281 lb)     Body mass index is 38.96 kg/m².           Neurological examination:  Mental Status:   His appearance was abnormal.  Throughout the interview, he is cooperative, his eye contact is appropriate.  His behavior is appropriate to " the clinical context without impropriety or improper language/conduct.  His behavior was not characterized by episodes of sudden uncontrollable and inappropriate laughing or crying.  The patient is awake; His energy level appeared normal.  His orientation is normal; Spatial 5/5 (location, the floor of building, city, county, state) and temporal 5/5 (month, day, year, MANDA) dimensions are accurate.  His attention/concentration is impaired.  He can complete three-step commands.  His fund of knowledge was appropriate for age, culture, and level of education.  His thought process is logical and goal-oriented.  He demonstrated appropriate insight based on actions, awareness of his illness, plans for the future.  He demonstrated good judgment based on actions and plans for the future.  He has no evidence of hallucinations (auditory, visual, olfactory).  He has no evidence of delusions (paranoid, grandiose, bizarre).  Cranial Nerves:   His pupils were normal.  His visual fields were full to confrontation in all quadrants.  His ocular pursuit in the horizontal and vertical plane was complete.  His saccadic initiation, velocity, and amplitude are normal.  His facial strength was normal.  His facial expression was symmetric and appropriate to the context.  His facial expression was normal without evidence of hypomimia.  His oropharynx and soft palate appeared abnormal.   Comment: mallampati 4;  His uvula is mid-line.  His tongue showed no evidence of scalloping.  He can protrude their tongue beyond His lips for >10 sec.  Speech/Language:   The patient's speech was fluent, non-effortful, and his rate was appropriate to the context.  He has no articulation (segmental features) errors.  The patient's speech is not dysarthric.  The patient's speech was without evidence of anomia.  He makes no phonological loop errors.  Motor:   The patient's bilateral upper extremity muscle bulk is appropriate.  The patient's upper extremity  muscle tone is increased.   Comment: R, R>L, Mild;  The patient's bilateral upper extremity muscle tone does not suggest spasticity.  The patient's bilateral upper extremity muscle tone does not suggest rigidity/cogwheeling.  There is evidence of paratonia.   Comment: Muscle tone is increased and there is evidence of paratonia.; Muscle tone is increased and there is evidence of paratonia.  Assessment of motor strength was symmetric and at minimal anti-gravity.  There is no pronator or downward drift.  There is no myoclonus observed in The patient's bilateral upper and lower extremities.  There are no fasciculations observed in The patient's bilateral upper and lower extremities.  Coordination:   He has no bilateral upper extremity limb dysmetria or past pointing on finger-nose-finger bilaterally.  He has no limb dysdiadochokinesia of the upper extremity on the pronation/supination test and screwing in a light bulb or lower extremity during tapping ball of each foot bilaterally.  He has no visible tremor.  He has evidence of interhemispheric motor control deficits.  He demonstrates evidence of motor overflow.  The patient's upper extremity fine motor coordination was abnormal.   Comment: R, R>L, Mild;  The patient's upper extremity fine motor coordination was not slow.   Comment: finger tapping, pronation/supination, and the open-close fist was slow.; finger tapping, pronation/supination, and the open-close fist was slow.  The patient's upper extremity fine motor coordination was not hypometric.   Comment: finger tapping, pronation/supination, and the open-close fist showed hypometria.; finger tapping, pronation/supination, and the open-close fist showed hypometria.  The patient's upper extremity fine motor coordination was not dysrhythmic.   Comment: finger tapping, pronation/supination, and the open-close fist showed dysrhythmia.; finger tapping, pronation/supination, and the open-close fist showed  dysrhythmia.  Higher Cortical Function:   The patient showed no evidence of simultanagnosia (Navon hierarchical letters).  He demonstrates no evidence of dorsal simultanagnosia (overlapping objects).  He demonstrates no evidence of ventral simultanagnosia (complex picture synthesis).  The patient showed no evidence of visuospatial constructional dysfunction.  The patient showed no evidence of apraxia.  He showed no dysexecutive behavior.  Sensory:   His cortical sensory assessment demonstrated no neglect bilaterally.  He he had no astereognosis (paper clip, ring, dime) bilaterally in the palms.  He he had no agraphesthesia (drawing numbers) in the palms.  His sensation was diminished to light touch, and vibratory sense.   Comment: in the bilateral upper and lower extremities in a length-dependant pattern.; in the bilateral upper and lower extremities in a length-dependant pattern.  Reflexes:   Reflexes were symmetric and 2+ at biceps, 2+ triceps, and 2+ brachioradialis, 2+ at the knees bilaterally, there was no cross-abductor sign, 2+ in the bilateral ankles.  Gait:   He can arise from a chair and sit back down without using their arms.  His gait was normal.  When attempting to walk abnormally (heels, tiptoes, tandem), he makes no errors.  Neuropsychological Evaluation Summary:  Prior Neurocognitive/Neurobehavioral Evaluation(s)  No Prior Testing Available  Neurocognitive/Neurobehavioral Evaluation completed on 2023-01-19    Neuropsychiatric/Behavioral Focused Evaluation Assessment    BEHAV5+ 2/6 See ROS section for a full description   Laboratories:     Lab Date Value [Reference]   Infectious Disease/Immunocompromised Screening           Cryptosporidium Antigen 2022, Mar-25    Negative      Giardia Antigen - EIA 2022, Mar-25    Negative      SARS Coronavirus 2 Antigen 2022, Jan-07    Negative      SARS-CoV-2 RNA, Amplification, Qual 2023, Jan-05    Negative      SARS-CoV2 (COVID-19) Qualitative PCR 2022, Feb-21     Not Detected [Not Detected]      Stool WBC 2022, Mar-25    No neutrophils seen [No neutrophils seen]      Hepatitis C Ab 07/15/2021  Negative      HIV 1/2 Ag/Ab 2021, Jul-15    Negative      Coagulopathy Screening   D-Dimer 2022, Feb-18    0.36      Metabolic Screening   Fat Qual Neutral, Stl 2022, Mar-25    Normal [Normal]      FECAL SPLIT FAT 2022, Mar-25    Normal [Normal]      Ferritin 03/25/2022  82 [20.0 - 300.0 ng/mL]      Free T4 02/03/20212021, Feb-03    0.92 [0.71 - 1.51 ng/dL]  0.88 [0.71 - 1.51 ng/dL]      Hemoglobin A1C External 2022, May-12  2022, Feb-17 2021, Jul-15    8.8 (H) [4.0 - 5.6 %]  9.9 (H) [4.0 - 5.6 %]  7.7 (H) [4.0 - 5.6 %]      Lactate, Fernie 2022, Mar-25    1.9 [0.5 - 2.2 mmol/L]      Lipase 2022, Mar-25    45 [4 - 60 U/L]      Procalcitonin 03/25/2022  0.02      TSH 02/03/2021  8.099 (H) [0.400 - 4.000 uIU/mL]  2.655 [0.400 - 4.000 uIU/mL]  3.459 [0.400 - 4.000 uIU/mL]      Albumin 2021, Feb-03    4.1 [3.6 - 5.1 g/dL]      Cholesterol 2022, Feb-17    162 [120 - 199 mg/dL]      HDL 2022, Feb-17    52 [40 - 75 mg/dL]      Non-HDL Cholesterol 2022, Feb-17    110 [mg/dL]      Triglycerides 2022, Feb-17    141 [30 - 150 mg/dL]      Neuroendocrine/Electrolyte Screening   FSH 03/25/2022  0.40 (L) [0.95 - 11.95 mIU/mL]      Magnesium 2022, Mar-25    1.8 [1.6 - 2.6 mg/dL]      Phosphorus 2022, Mar-25    3.5 [2.7 - 4.5 mg/dL]      Sex Hormone Binding Globulin 2021, Feb-03    21 [10 - 50 nmol/L]      Testosterone, Total 2021, Apr-19    894 [304 - 1227 ng/dL]      Creatinine 2022, Nov-02    1.0 [0.5 - 1.4 mg/dL]      Testosterone, Bioavailable 2021, Feb-03    47.5 (L) [110.0 - 575.0 ng/dL]      Testosterone, Free 2021, Feb-03    25.2 (L) [46.0 - 224.0 pg/mL]           Neuroimaging:    MRI brain/head without contrast on 11/2/2022  Formal interpretation by Radiology:  No acute intracranial abnormality  Independently reviewed radiological imaging by Srini Lima MD. MPH. Behavioral  Neurologist  T1: absence of a septum pellucidum probably suggestive of posttraumatic cavum septum vergae with degeneration. No significant cortical atrophy; tight sulci not suggestive of any focal atrophy or degenerative pathology. Subcortical structures of normal volume and ratio. Corpus callosum is of normal volume. Midbrain and kevin has normal volume ratio. Hippocampi shows no evidence of atrophy.  T2/FLAIR: no significant subcortical white matter changes.  DWI/ADC: No Significant DWI hyperintensities/hypointensities. No ADC correlation.  SWI/GRE: No Significant hypointensities to suggest cortical/subcortical hemosiderin deposition.  Impression: : No significant white matter changes regional atrophy however there is clear evidence of a lack of septum pellucidum likely traumatic in nature.     Procedures:    Electrocardiogram on 3/25/2022  Formal interpretation:  Vent. Rate : 107 BPM     Atrial Rate : 107 BPM    P-R Int : 166 ms          QRS Dur : 094 ms     QT Int : 334 ms       P-R-T Axes : 041 012 024 degrees    QTc Int : 445 ms Sinus tachycardia Nonspecific T wave abnormality Abnormal ECG  Independently reviewed Electrocardiogram by Srini Lima MD. MPH. Behavioral Neurologist  Impression: : Received ECG has no evidence of sinus node disease. HR (>=50-60). Prolonged OR interval (>0.22 s). Broad QRS complex (> 0.12 s).     Clinical Summary:     Mr. Crane is a 42-year-old right-handed male with a relevant past medical history of DM with complications, HTN, HLD. NAINA, migraine headaches, who presents reporting a 24-year history of gradually progressive neurocognitive impairment.       The clinical history is suggestive of:  Memory Impairment: STM encoding impairment, LTM encoding-retrieval impairment  Attention Impairment: Attention, Selective attention, Sustained attention, Shifting attention  Executive Impairment: Energization, Working Memory, Response Inhibition  Behavior Impairment: Emotional  Regulation  Psychiatric Impairment: Neurovegetative, Social Coherence, Mood Regulation  The neurological examination is significant for:  Cortical Transcallosal Disconnection: interhemispheric motor control (interhemispheric motor control ), motor efference (motor overflow)  Movement Disorder (Hypokinetic): paratonia (tone), parkinsonism (bradykinesia), dyskinesia (slowing, hypometria, dysrhythmia)  Sensory Dysfunction: peripheral (A? 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        Assessment:        Mr. Rothmans clinical presentation is behavior/psychiatric predominant mild cognitive impairment insufficient to interfere with activities of daily living (CDR-SOB: 1 - Questionable cognitive impairment).     Mr. Rothmans clinical presentation meets the criteria for Mild Cognitive Impairment (MCI-ADRC) (Miquel MS, et al. 2011 Alzheimer's & Dementia).     Concern regarding an intraindividual change in cognition  Impairment in one or more cognitive domains  Preservation of independence in functional abilities.  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     Substantial exposure to repetitive head impacts (RHIs) from contact sports,  service, or other causes i.e. history of concussion, although may be limited to subconcussive  core clinical features of cognitive impairment (in episodic memory and/or executive functioning) and/or neurobehavioral dysregulation.  a progressive course (typically  at least 2 years)  typically delayed clinical onset  that the clinical features are not fully accounted for by any other neurologic, psychiatric, or medical conditions.  Subtype Behavioral change: including violence, poor impulse control, socially inappropriate behavior, avolition, apathy, change in personality, and comorbid substance abuse  Subtype Motor disturbance: including bradykinesia, tremor, rigidity, gait instability, dysarthria, dysphagia, ataxia, and gaze disturbance     The observations made above, were discussed with the patient and his family. We recommend screening for reversible causes of cognitive impairment with serum laboratories. The patient has requested additional information regarding prognostication. No definitive biomarkers for CTE currently exist however significantly higher levels of [id number], lower levels of A?1-42, and variable t-tau tend to correlated with CTE versus normal controls (Valdivia et al. 2015; Silvana et al. 2021; Toby et al. 2022). We have discussed opportunities for biomarker testing (CSF Osorio biomarkers, IDEAs Amyloid-PET, Syn-One skin biopsy). We have provided reading material. We have   Recommended follow-up with sleep medicine  due to poorly controlled obstructive sleep apnea. Discussed environmental /lifestyle changes. We have discussed the MIND Diet and other lifestyle behavior that may help maintain brain health.        Care Management Plan:     #Diagnostic Workup:   Laboratories: Hcy, Free T4, TSH, B1, B9, B12, MMA, HIV Ab/Ag, RPR, D  #Neurocognitive Disorder Treatment:  We have discussed opportunities for biomarker testing (CSF Osorio biomarkers, IDEAs Amyloid-PET, Syn-One skin biopsy)  No indication for donepezil at this time  #Behavioral Disorder Treatment:  No indication for memantine at this time  Do not full have a full list accounting of patient's medication doses.  Request patient to retrieve these before recommending any new medication  May consider the  "addition of Depakote future date for management mood swings  May consider the addition of modafinil for daytime fatigue following decision to pursue biomarker and control of mood swing  #Insomnia Treatment:  We have made referrals to Sleep medicine for SXS suggest of NAINA  #Behavioral/Environmental Treatment  We recommend engaging in activities that stimulate cognitively and socially while avoiding excessive stimulation and fatigue in overwhelmingly complex situations.  We recommend integrating routine and schedule into your daily life. https://www.alzheimersproject.org/news/the-importance-of-routine-and-familiarity-to-persons-with-dementia/  #Health Maintenance/Lifestyle Advice  We have discussed the value in aggressively controlling vascular risk factors like hypertension, hyperlipidemia, and Diabetes SBP<130, LDL<100, A1C<7.0.  We discussed the need to optimize lifestyle choices including a heart-healthy diet (e.g., Mediterranean or DASH), increased cardiovascular exercise (goal 150 minutes of moderate-intensity per week), and stay cognitively and socially active.  #Support  We all need support sometimes. Get easy access to local resources, community programs, and services. https://www.communityresourcefinder.org/  Learn more about Cognitive Impairment in [place]: https://www.alz.org/professionals/public-health/state-overview/[place]  #Safety  The Alzheimer's Association administers the nationwide "Safe Return" program with identification bracelets, necklaces, or clothing tags and 24-hour assistance. More information is available online at https://www.alz.org/help-support/caregiving/safety/medicalert-with-24-7-wandering-support  #Follow up:  Follow-up in 4 weeks (Feb 2023).    Thank you for allowing us to participate in the care of your patient. Please do not hesitate to contact us with any questions or concerns.     It was a pleasure seeing Mr. Crane and we look forward to seeing them at their follow-up " visit.     This note is dictated on M*Modal Fluency Direct word recognition program. There are word recognition mistakes that are occasionally missed on review.         Scheduled Follow-up :  Future Appointments   Date Time Provider Department Center   1/25/2023  8:15 AM Kyler Miner, PT [facility]   1/31/2023  9:45 AM Kyler Miner, PT [facility]   3/3/2023  8:45 AM Tiffanie Delgado MD [facility] ENT [facility]       After Visit Medication List :     Medication List            Accurate as of January 19, 2023  5:02 PM. If you have any questions, ask your nurse or doctor.                CONTINUE taking these medications      atorvastatin 40 MG tablet  Commonly known as: LIPITOR  Take 1 tablet (40 mg total) by mouth once daily.     * azelastine 205.5 mcg (0.15 %) Spry  azelastine 205.5 mcg (0.15 %) nasal spray  INHALE 2 SPRAYS TO EACH NOSTRIL TWICE A DAY     * azelastine 137 mcg (0.1 %) nasal spray  Commonly known as: ASTELIN  1 spray (137 mcg total) by Nasal route 2 (two) times daily.     * blood sugar diagnostic Strp     * blood sugar diagnostic Strp  To check BG 4 times daily, to use with insurance preferred meter     blood-glucose meter kit     butalbital-acetaminophen-caffeine -40 mg -40 mg per tablet  Commonly known as: FIORICET, ESGIC  Take 1 tablet by mouth every 4 (four) hours as needed for Headaches.     CORICIDIN HBP COLD AND FLU 2-325 mg Tab  Generic drug: chlorpheniramine-acetaminophen  Take 1 tablet by mouth 4 (four) times daily as needed (ear ache, runny nose, and congestion.).     DEXCOM G6  Misc  Generic drug: blood-glucose meter,continuous  Use with dexcom G6 system     DEXCOM G6 SENSOR Filomena  Generic drug: blood-glucose sensor  CHANGE SENSOR EVERY 10 DAYS     DEXCOM G6 TRANSMITTER Filomena  Generic drug: blood-glucose transmitter  CHANGE EVERY 3 MONTHS     diclofenac sodium 1 % Gel  Commonly known as: VOLTAREN  Apply the gel (2 g) to the affected  "area 4 times daily. Do not apply more than 8 g daily to any one affected joint     fenofibrate 160 MG Tab     fluticasone propionate 50 mcg/actuation nasal spray  Commonly known as: FLONASE  fluticasone propionate 50 mcg/actuation nasal spray,suspension     FREESTYLE SAMANTHA 2 SENSOR Kit  Generic drug: flash glucose sensor  Change sensor every 14 days.     insulin aspart U-100 100 unit/mL (3 mL) Inpn pen  Commonly known as: NovoLOG  INJECT 40 UNITS BEFORE EACH MEAL WITH SLIDNING SCALE, MAX DAILY DOSE 150 UNITS     JARDIANCE 25 mg tablet  Generic drug: empagliflozin  Take 1 tablet (25 mg total) by mouth once daily.     ketoconazole 2 % cream  Commonly known as: NIZORAL  Apply topically once daily.     lancets [facility]  To check BG 4 times daily, to use with insurance preferred meter     levothyroxine 50 MCG tablet  Commonly known as: SYNTHROID  TAKE 1 TABLET (50 MCG TOTAL) BY MOUTH BEFORE BREAKFAST.     lisinopriL 20 MG tablet  Commonly known as: PRINIVIL,ZESTRIL  Take 1 tablet (20 mg total) by mouth once daily.     montelukast 10 mg tablet  Commonly known as: SINGULAIR  TAKE 1 TABLET BY MOUTH EVERY EVENING     NovoLIN N FlexPen 100 unit/mL (3 mL) Inpn  Generic drug: insulin NPH isoph U-100 human  INJECT 14 UNITS INTO THE SKIN ONCE DAILY AT NIGHT 8 PM.     ondansetron 8 MG Tbdl  Commonly known as: ZOFRAN-ODT  Take 1 tablet (8 mg total) by mouth every 6 (six) hours as needed (n/v).     pen needle, diabetic 32 gauge x 5/32" Ndle  Commonly known as: BD ULTRA-FINE KEN PEN NEEDLE  1 Device by Misc.(Non-Drug; Combo Route) route 5 (five) times daily.     promethazine 25 MG suppository  Commonly known as: PHENERGAN  Place 1 suppository (25 mg total) rectally every 6 (six) hours as needed for Nausea.     semaglutide 1 mg/dose (4 mg/3 mL)  Commonly known as: OZEMPIC  Inject 1 mg into the skin every 7 days.     syringe (disposable) 1 mL Syrg  Testosterone every 2 weeks     testosterone enanthate 200 mg/mL injection  Commonly known " as: MELVIN BREWER U-300 SOLOSTAR 300 unit/mL (3 mL) insulin pen  Generic drug: insulin glargine U-300 conc  Inject 30 Units into the skin once daily.     TRELEGY ELLIPTA 200-62.5-25 mcg inhaler  Generic drug: fluticasone-umeclidin-vilanter  Inhale 1 puff into the lungs once daily. Stop symbicort           * This list has 4 medication(s) that are the same as other medications prescribed for you. Read the directions carefully, and ask your doctor or other care provider to review them with you.                  Signing Physician:  Srini South MD    Billing:          -----------------------------------------------------------------------------    I spent a total of 105 minutes (time-in: 08:00 AM; time-out: 09:45 AM) on 2023-01-19, in person face-to-face with the patient and caregiver(s), >50% of that time was spent counseling regarding the symptoms, treatment plan, risks, therapeutic options, lifestyle modifications, and/or safety issues for the diagnoses above.    10/14 Review of Systems completed and is negative except as stated above in HPI (Systems reviewed: Const, Eyes, ENT, Resp, CV, GI, , MSK, Skin, Neuro)    I reviewed previous labs for a total of 5 minutes on 2023-01-19. This is directly related to the face-to-face encounter. Review of previous labs was performed all negative except as stated above in HPI    I reviewed previous diagnostic testing for a total of 5 minutes on 2023-01-19. This is directly related to the face-to-face encounter. A review of previous diagnostic testing was performed was noted to be within normal limits except as is stated above in HPI    I independently reviewed radiological imaging, specimen, and tracing for a total of 10 minutes on 2023-01-19. This is directly related to the face-to-face encounter. Independent review of radiological imaging, specimen, and tracing was noted to be within normal limits except as stated above in HPI    I reviewed the summation of  records from outside physicians for a total of 10 minutes on 2023-01-19. Reviewed and summation of records from an outside physician was performed as summarized above in HPI    I performed a neurobehavioral status examination that included a clinical assessment of thinking, reasoning, and judgment. Please see above HPI and ROS for full details. This exam was performed on 2023-01-19 and included 14 minutes spent on direct face-to-face clinical observation and interview with the patient and 17 minutes spent interpreting test results and preparing the report. The total time of 31 minutes spent on the neurobehavioral status examination is not included in the time spent on evaluation and management coding.    Total Billing time spent on encounter/documentation for this patient's evaluation and management, not including the neurobehavioral status examination: 121 minutes.

## 2023-01-20 LAB
TREPONEMA PALLIDUM IGG+IGM AB [PRESENCE] IN SERUM OR PLASMA BY IMMUNOASSAY: NONREACTIVE
VIT B12 SERPL-MCNC: 364 NG/L (ref 180–914)

## 2023-01-21 ENCOUNTER — PATIENT MESSAGE (OUTPATIENT)
Dept: NEUROLOGY | Facility: CLINIC | Age: 43
End: 2023-01-21
Payer: COMMERCIAL

## 2023-01-24 LAB
METHYLMALONATE SERPL-SCNC: 0.1 NMOL/ML
METHYLMALONATE SERPL-SCNC: 0.12 UMOL/L

## 2023-01-24 PROCEDURE — 95810 PR POLYSOMNOGRAPHY, 4 OR MORE: ICD-10-PCS | Mod: 26,,, | Performed by: INTERNAL MEDICINE

## 2023-01-24 PROCEDURE — 95810 POLYSOM 6/> YRS 4/> PARAM: CPT | Mod: 26,,, | Performed by: INTERNAL MEDICINE

## 2023-01-25 LAB — VIT B1 BLD-MCNC: 38 UG/L (ref 38–122)

## 2023-01-25 NOTE — PROCEDURES
"Dear Provider,     You have ordered sleep LAB services to perform the sleep study for Tony Gordillo.  The sleep study that you ordered is complete.      Please find Sleep Study result in "Chart Review" under the "Media tab."      As the ordering provider, you are responsible for reviewing the results and implementing a treatment plan with your patient.    If you need a Sleep Medicine provider to explain the sleep study findings and arrange treatment for the patient, please refer patient for consultation to our Sleep Clinic via [facility] with Ambulatory Consult Sleep.    To do that please place an order for an  "Ambulatory Consult Sleep" - it will go to our clinic work queue for our Medical Assistant to contact the patient for an appointment.     For any questions, please contact our clinic staff at [phone] to talk to clinical staff.   "

## 2023-01-26 LAB — NEUROFILAMENT LIGHT CHAIN, PLASMA: 8 PG/ML

## 2023-01-26 NOTE — PROGRESS NOTES
A1c 8.0 from 8.8  Overdue for f/u with NP Mansfield  TSH WNL on dose 50 no changes  Results to pt via MyChart. No changes.  Seeing neuro about TBI with memory loss

## 2023-01-30 DIAGNOSIS — E53.8 B12 DEFICIENCY: Primary | ICD-10-CM

## 2023-01-30 LAB
ONEOME COMMENT: NORMAL
ONEOME METHOD: NORMAL

## 2023-01-30 RX ORDER — LAMOTRIGINE 25 MG/1
TABLET ORAL
Qty: 90 TABLET | Refills: 1 | Status: SHIPPED | OUTPATIENT
Start: 2023-01-30 | End: 2023-02-21

## 2023-01-30 RX ORDER — ACETAMINOPHEN, DIPHENHYDRAMINE HCL, PHENYLEPHRINE HCL 325; 25; 5 MG/1; MG/1; MG/1
TABLET ORAL
Qty: 30 TABLET | Refills: 3 | Status: SHIPPED | OUTPATIENT
Start: 2023-01-30

## 2023-02-06 ENCOUNTER — TELEPHONE (OUTPATIENT)
Dept: ENDOCRINOLOGY | Facility: CLINIC | Age: 43
End: 2023-02-06
Payer: COMMERCIAL

## 2023-02-06 NOTE — TELEPHONE ENCOUNTER
Spoke with pharmacist, one refill of Ozempic 1mg authorized by VANIA Mansfield. Pharmacy aware that pt needs an apt for additional refills.

## 2023-02-15 ENCOUNTER — OFFICE VISIT (OUTPATIENT)
Dept: ENDOCRINOLOGY | Facility: CLINIC | Age: 43
End: 2023-02-15
Payer: COMMERCIAL

## 2023-02-15 ENCOUNTER — OFFICE VISIT (OUTPATIENT)
Dept: OTOLARYNGOLOGY | Facility: CLINIC | Age: 43
End: 2023-02-15
Payer: COMMERCIAL

## 2023-02-15 ENCOUNTER — TELEPHONE (OUTPATIENT)
Dept: PHARMACY | Facility: CLINIC | Age: 43
End: 2023-02-15
Payer: COMMERCIAL

## 2023-02-15 VITALS
DIASTOLIC BLOOD PRESSURE: 84 MMHG | SYSTOLIC BLOOD PRESSURE: 156 MMHG | WEIGHT: 308 LBS | TEMPERATURE: 99 F | BODY MASS INDEX: 40.64 KG/M2 | HEART RATE: 100 BPM

## 2023-02-15 VITALS — WEIGHT: 295 LBS | BODY MASS INDEX: 39.1 KG/M2 | HEIGHT: 73 IN

## 2023-02-15 DIAGNOSIS — J30.2 SEASONAL ALLERGIC RHINITIS, UNSPECIFIED TRIGGER: ICD-10-CM

## 2023-02-15 DIAGNOSIS — G47.33 OSA (OBSTRUCTIVE SLEEP APNEA): Primary | ICD-10-CM

## 2023-02-15 DIAGNOSIS — Z78.9 DIFFICULTY USING CONTINUOUS POSITIVE AIRWAY PRESSURE (CPAP) DEVICE: ICD-10-CM

## 2023-02-15 DIAGNOSIS — Z79.4 TYPE 2 DIABETES MELLITUS WITHOUT COMPLICATION, WITH LONG-TERM CURRENT USE OF INSULIN: Primary | ICD-10-CM

## 2023-02-15 DIAGNOSIS — E66.01 MORBID OBESITY: ICD-10-CM

## 2023-02-15 DIAGNOSIS — E11.9 TYPE 2 DIABETES MELLITUS WITHOUT COMPLICATION, WITH LONG-TERM CURRENT USE OF INSULIN: Primary | ICD-10-CM

## 2023-02-15 DIAGNOSIS — E78.5 HYPERLIPIDEMIA, UNSPECIFIED HYPERLIPIDEMIA TYPE: ICD-10-CM

## 2023-02-15 DIAGNOSIS — J34.3 HYPERTROPHY OF INFERIOR NASAL TURBINATE: ICD-10-CM

## 2023-02-15 PROCEDURE — 99999 PR PBB SHADOW E&M-EST. PATIENT-LVL V: ICD-10-PCS | Mod: PBBFAC,,, | Performed by: NURSE PRACTITIONER

## 2023-02-15 PROCEDURE — 95251 PR GLUCOSE MONITOR, 72 HOUR, PHYS INTERP: ICD-10-PCS | Mod: S$GLB,,, | Performed by: NURSE PRACTITIONER

## 2023-02-15 PROCEDURE — 99214 OFFICE O/P EST MOD 30 MIN: CPT | Mod: 25,S$GLB,, | Performed by: OTOLARYNGOLOGY

## 2023-02-15 PROCEDURE — 99215 OFFICE O/P EST HI 40 MIN: CPT | Mod: S$GLB,,, | Performed by: NURSE PRACTITIONER

## 2023-02-15 PROCEDURE — 99215 PR OFFICE/OUTPT VISIT, EST, LEVL V, 40-54 MIN: ICD-10-PCS | Mod: S$GLB,,, | Performed by: NURSE PRACTITIONER

## 2023-02-15 PROCEDURE — 99999 PR PBB SHADOW E&M-EST. PATIENT-LVL V: CPT | Mod: PBBFAC,,, | Performed by: NURSE PRACTITIONER

## 2023-02-15 PROCEDURE — 31231 PR NASAL ENDOSCOPY, DX: ICD-10-PCS | Mod: S$GLB,,, | Performed by: OTOLARYNGOLOGY

## 2023-02-15 PROCEDURE — 31231 NASAL ENDOSCOPY DX: CPT | Mod: S$GLB,,, | Performed by: OTOLARYNGOLOGY

## 2023-02-15 PROCEDURE — 99214 PR OFFICE/OUTPT VISIT, EST, LEVL IV, 30-39 MIN: ICD-10-PCS | Mod: 25,S$GLB,, | Performed by: OTOLARYNGOLOGY

## 2023-02-15 PROCEDURE — 95251 CONT GLUC MNTR ANALYSIS I&R: CPT | Mod: S$GLB,,, | Performed by: NURSE PRACTITIONER

## 2023-02-15 RX ORDER — INSULIN GLARGINE 300 U/ML
38 INJECTION, SOLUTION SUBCUTANEOUS DAILY
Qty: 4 PEN | Refills: 1 | Status: SHIPPED | OUTPATIENT
Start: 2023-02-15 | End: 2023-04-05 | Stop reason: SDUPTHER

## 2023-02-15 RX ORDER — HUMAN INSULIN 100 [IU]/ML
INJECTION, SUSPENSION SUBCUTANEOUS
Qty: 15 ML | Refills: 1 | Status: SHIPPED | OUTPATIENT
Start: 2023-02-15 | End: 2023-04-05 | Stop reason: SDUPTHER

## 2023-02-15 RX ORDER — PEN NEEDLE, DIABETIC 30 GX3/16"
1 NEEDLE, DISPOSABLE MISCELLANEOUS
Qty: 550 EACH | Refills: 4 | Status: SHIPPED | OUTPATIENT
Start: 2023-02-15

## 2023-02-15 RX ORDER — BLOOD-GLUCOSE SENSOR
EACH MISCELLANEOUS
Qty: 2 EACH | Refills: 11 | Status: SHIPPED | OUTPATIENT
Start: 2023-02-15

## 2023-02-15 RX ORDER — TIRZEPATIDE 10 MG/.5ML
10 INJECTION, SOLUTION SUBCUTANEOUS
Qty: 4 PEN | Refills: 1 | Status: SHIPPED | OUTPATIENT
Start: 2023-02-15 | End: 2023-04-05

## 2023-02-15 RX ORDER — TIRZEPATIDE 5 MG/.5ML
5 INJECTION, SOLUTION SUBCUTANEOUS
Qty: 4 PEN | Refills: 0 | Status: SHIPPED | OUTPATIENT
Start: 2023-02-15 | End: 2023-04-05

## 2023-02-15 NOTE — PATIENT INSTRUCTIONS
Start Mounjaro 5 mg once weekly for 4 weeks. Then increase to Mounjaro 10 mg once weekly.   Reduce Toujeo back down to 38 units once daily.     Continue Fiasp 40 units before meals, add correction scale:   Blood sugar 150 to 200, + 2 units  Blood sugar 201 to 250, + 4 units  Blood sugar 251 to 300, + 6 units  Blood sugar 301 to 350, + 8 units   Blood sugar greater than 350, + 10 units    Continue Jardiance and Novolin N 14 units at night.     Return to clinic in 2 months with labs prior with a1c.

## 2023-02-15 NOTE — PROGRESS NOTES
OTOLARYNGOLOGY CLINIC NOTE  Date:  02/15/2023     Chief complaint:  Chief Complaint   Patient presents with    Follow-up     3 month follow up for sinus, had a bad sinus infection about 11/2 months ago. Caused him to have a bad migraine       History of Present Illness  Tony Gordillo is a 42 y.o. male  presenting today for a followup. Doing saline, flonase, astelin once a day  right side feels slightly clogged today but has not really has issue with congestion.     Had been doing well and then had sinus infection   PSG AHI 1-24-23 19.5; uses cpap mask and sometimes does good with cpap and sometimes not. If allergy issue has to use face mask and gets chapped lips etc     Had been losing weight on ozempic  but has not been able to get it   Had discussed with him in the past about inspire , needs to lose weight if wants to try for dise  About a month ago had headache that was bad and was told it was a sinus infection, no drainage, no nasal congestion at that time. Started feeling better the next am after er but then got worse again later that day so he went back to the er and was told it was because of sinus and too much caffeine per patient. Was given a steroid injection ; took him about 5 days before got back to work     Woke up at 5 am and sudden facial pain after waking up and BP was elevated ; took some ibuprofen, went to er   No tearing from eye just severe pain   I originally saw the patient on 12-2 - 22. Below text is copied from initial note on that date describing history of present illness at time of presentation.    headaches which he describes as a sinus headache. He has also been seen by neurologist dr neff and had an mri brain 11-2-22.   Congestion bilaterally when things blooming especially but does have some congestion all the time  Has broken nose a couple of times but No history of sinonasal surgery. 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 Tried astelin in the past without saline but notes that the Astelin dried out nose. Currently he is Doing nasal spray once a day - nasonex 2 sprays per nose once daily. Has not used slaine prior to medication nasal sprays in the past   Has to get abx 3 times per year when sinus issues get really bad     Certain smells make him nausea sometimes get congested too but not often ( perfumes)     He also has a history of patricia (AHI of 11 on HST 8-) and has a cpap mask. Interested if any other masks could work better but ideally would like to get rid of mask . Wakes up to pee on occasion still  [place] sleepy scale of 12 on pre-visit questionairre    Past Medical History  Past Medical History:   Diagnosis Date    Diabetes mellitus, type 2     Hyperlipidemia     Hypertension     Obesity     PATRICIA (obstructive sleep apnea)         Past Surgical History  Past Surgical History:   Procedure Laterality Date    ANKLE SURGERY      KNEE SURGERY      acl,mcl,pcl    left knee x 2          Medications  Current Outpatient Medications on File Prior to Visit   Medication Sig Dispense Refill    atorvastatin (LIPITOR) 40 MG tablet Take 1 tablet (40 mg total) by mouth once daily. 90 tablet 3    azelastine (ASTELIN) 137 mcg (0.1 %) nasal spray 1 spray (137 mcg total) by Nasal route 2 (two) times daily. 30 mL 3    azelastine 205.5 mcg (0.15 %) Spry azelastine 205.5 mcg (0.15 %) nasal spray  INHALE 2 SPRAYS TO EACH NOSTRIL TWICE A DAY 30 mL 11    blood sugar diagnostic Strp To check BG 4 times daily, to use with insurance preferred meter 400 strip 3    blood sugar diagnostic Strp OneTouch Ultra Test strips      blood-glucose meter kit OneTouch Ultra2 Meter kit      blood-glucose meter,continuous (DEXCOM G6 ) Misc Use with dexcom G6 system 1 each 0    chlorpheniramine-acetaminophen (CORICIDIN HBP COLD AND FLU) 2-325 mg Tab Take 1 tablet by mouth 4 (four) times daily as needed (ear  ache, runny nose, and congestion.). 30 tablet 1    cyanocobalamin, vitamin B-12, 5,000 mcg Subl Place one under tongue once a day for 1 month, then continue to take under tongue per week 30 tablet 3    DEXCOM G6 SENSOR Filomena CHANGE SENSOR EVERY 10 DAYS 9 each 4    DEXCOM G6 TRANSMITTER Filomena CHANGE EVERY 3 MONTHS 1 each 3    diclofenac sodium (VOLTAREN) 1 % Gel Apply the gel (2 g) to the affected area 4 times daily. Do not apply more than 8 g daily to any one affected joint 100 g 1    empagliflozin (JARDIANCE) 25 mg tablet Take 1 tablet (25 mg total) by mouth once daily. 30 tablet 5    fenofibrate 160 MG Tab fenofibrate 160 mg tablet      flash glucose sensor (FREESTYLE SAMANTHA 2 SENSOR) Kit Change sensor every 14 days. 2 kit 11    fluticasone propionate (FLONASE) 50 mcg/actuation nasal spray fluticasone propionate 50 mcg/actuation nasal spray,suspension 16 g 3    fluticasone-umeclidin-vilanter (TRELEGY ELLIPTA) 200-62.5-25 mcg inhaler Inhale 1 puff into the lungs once daily. Stop symbicort 60 each 5    insulin aspart U-100 (NOVOLOG) 100 unit/mL (3 mL) InPn pen INJECT 40 UNITS BEFORE EACH MEAL WITH SLIDNING SCALE, MAX DAILY DOSE 150 UNITS 45 each 1    insulin glargine U-300 conc (TOUJEO MAX U-300 SOLOSTAR) 300 unit/mL (3 mL) insulin pen Inject 30 Units into the skin once daily. 2 pen 5    insulin NPH isoph U-100 human (NOVOLIN N FLEXPEN) 100 unit/mL (3 mL) InPn INJECT 14 UNITS INTO THE SKIN ONCE DAILY AT NIGHT 8 PM. 15 each 1    ketoconazole (NIZORAL) 2 % cream Apply topically once daily. 1 Tube 3    lamoTRIgine (LAMICTAL) 25 MG tablet Weeks 1-2: take 1 tablet (25 mg) daily; Weeks 3-4: take 2 tablets (50 mg) daily; Week 5: Continue taking 3 tablets (75mg) daily 90 tablet 1    lancets Misc To check BG 4 times daily, to use with insurance preferred meter 400 each 3    levothyroxine (SYNTHROID) 50 MCG tablet TAKE 1 TABLET (50 MCG TOTAL) BY MOUTH BEFORE BREAKFAST. 90 tablet 2    lisinopriL (PRINIVIL,ZESTRIL) 20 MG tablet  "Take 1 tablet (20 mg total) by mouth once daily. 90 tablet 3    montelukast (SINGULAIR) 10 mg tablet TAKE 1 TABLET BY MOUTH EVERY EVENING 90 tablet 3    ondansetron (ZOFRAN-ODT) 8 MG TbDL Take 1 tablet (8 mg total) by mouth every 6 (six) hours as needed (n/v). 30 tablet 0    pen needle, diabetic (BD ULTRA-FINE KEN PEN NEEDLE) 32 gauge x 5/32" Ndle 1 Device by Misc.(Non-Drug; Combo Route) route 5 (five) times daily. 550 each 4    promethazine (PHENERGAN) 25 MG suppository Place 1 suppository (25 mg total) rectally every 6 (six) hours as needed for Nausea. 10 suppository 0    semaglutide (OZEMPIC) 1 mg/dose (4 mg/3 mL) Inject 1 mg into the skin every 7 days. 1 pen 5    syringe, disposable, 1 mL Syrg Testosterone every 2 weeks 25 Syringe 1    testosterone enanthate (DELATESTRYL) 200 mg/mL injection Inject into the muscle every 14 (fourteen) days.       No current facility-administered medications on file prior to visit.       Review of Systems  Review of Systems   Constitutional:  Positive for malaise/fatigue.   Eyes: Negative.    Cardiovascular: Negative.    Gastrointestinal: Negative.    Genitourinary: Negative.    Skin: Negative.    Neurological:  Positive for headaches.   Psychiatric/Behavioral:  The patient is nervous/anxious.     Answers submitted by the patient for this visit:  Review of Symptoms Questionnaire  (Submitted on 2/15/2023)  sinus pressure : Yes  Sinus infection(s)?: Yes  Snoring?: Yes  Sleep Apnea?: Yes  Muscle aches / pain?: Yes  Seasonal Allergies?: Yes    Social History   reports that he has never smoked. He has never used smokeless tobacco. He reports current alcohol use. He reports that he does not use drugs.     Family History  Family History   Problem Relation Age of Onset    Diabetes Mother     Diabetes Father     No Known Problems Brother     Autism Son     No Known Problems Daughter         Physical Exam   There were no vitals filed for this visit. Body mass index is 38.92 kg/m².  Weight: " "133.8 kg (295 lb)   Height: 6' 1" (185.4 cm)     GENERAL: no acute distress.  HEAD: normocephalic.   EYES: No scleral icterus  EARS: external ear without lesion, normal pinna shape and position.    NOSE: external nose without significant bony abnormality  ORAL CAVITY/OROPHARYNX: tongue mobile.   NECK: trachea midline.   RESPIRATORY: no stridor, no stertor. Voice normal. Respirations nonlabored.  NEURO: alert, responds to questions appropriately.    PSYCH:mood appropriate    PROCEDURE NOTE  Procedure: diagnostic rigid nasal endoscopy  Indications for procedure:  evaluate response to nasal spray regimen, concern for possible recent sinus infection, eval if signs of chronic issue    Consent: procedure was explained in detail and verbal consent was obtained.   Anesthesia:4% lidocaine with neosynephrine  Procedure in detail: With the patient in the seated position, the zero degree endoscope was inserted atraumatically into the bilateral nasal cavities and advance to the nasopharynx with the following areas examined with findings as described below.     Nasal cavity:no polyps or mass, no purulent drainage, no bleeding  Septum: no perforation mild deviation to left ; spur  Turbinates:  inferior turbinates with mild hypertrophy; middle turbinates not enlarged  Middle Meati: mild edema  No edema of base of sphenoid  Nasopharynx: no mass or lesion in the nasopharynx.     The scope was removed atraumatically without complication. The patient tolerated the procedure well. Photodocumentation obtained with representative images below, all images and/or videos uploaded in media section of epic.                                                Imaging:  The patient does not have any new imaging of the head and neck since last visit.     Labs:  CBC  Recent Labs   Lab 02/17/22  1115 03/25/22  0617 01/19/23  1002   WBC 9.92 17.96 H 7.41   Hemoglobin 16.8 18.5 H 13.9 L   Hematocrit 53.9 57.5 H 46.9   MCV 90 88 83   Platelets 223 218 209 "     BMP  Recent Labs   Lab 02/17/22  1115 03/25/22  0617 11/02/22  0857 01/19/23  1002   Glucose 159 H 260 H  --  182 H   Sodium 140 139  --  137   Potassium 4.3 4.2  --  4.0   Chloride 105 107  --  106   CO2 21 L 22 L  --  25   BUN 14 22 H  --  17   Creatinine 0.9 1.0 1.0 0.8   Calcium 10.3 9.8  --  9.7   Phosphorus  --  3.5  --   --    Magnesium  --  1.8  --  1.9     COAGS        Assessment  1. NAINA (obstructive sleep apnea)    2. Hypertrophy of inferior nasal turbinate    3. Difficulty using continuous positive airway pressure (CPAP) device    4. Seasonal allergic rhinitis, unspecified trigger       Plan:  Discussed plan of care with patient in detail and all questions answered. Patient reported understanding of plan of care.   Reviewed psg results  Continue nasal spray regimen , can increase to bid with flare ups, discussed about regimen and how to tailor etc  Symptom description from recent episode does not sound like sinus infection suspect neurogenic headache pattern, has neurologist. Discussed that if recurs could get ct sinus , has mild edema of middle meatus on scope but mri without over fluid/chronic sinus issue. Ct could potentially give more information but recent episode I do not think was sinus related. He agrees to hold off on imaging for now  He will let me know if weight getting closer to what would be needed for dise and will also let me know if sinus issues recur/become problematic . Offered scheduled vs prn , he prefers prn which I am ok with     I spent a total of 33 minutes on the day of the visit.  This includes face to face time and non-face to face time preparing to see the patient (eg, review of tests), obtaining and/or reviewing separately obtained history, documenting clinical information in the electronic or other health record, independently interpreting results and communicating results to the patient/family/caregiver, or care coordinator.   Please be aware that this note has been  generated with the assistance of [employer] voice-to-text.  Please excuse any spelling or grammatical errors.

## 2023-02-15 NOTE — PROGRESS NOTES
CC: This 42 y.o. White male  is here for evaluation of  T2DM along with comorbidities indicated in the Visit Diagnosis section of this encounter.    HPI: Tony Gordillo was diagnosed with T2DM in 2008. He was without health insurance for 2017 and mid 2018.       Prior visit 4/2022 virtual visit   States that his BGs are still high. Believes that Fiasp is not working.   He has increased Trulicity to 3 mg weekly and started  Novolin 14 units hs at 8-10 pm. He's resumed Jardiance. Denies nausea, diarrhea, or constipation.   He has used Fiasp and Novolin N at the same time during the day when BG spikes very high (~ Novolin 14 units with Fiasp 20 units).   Plan Unclear why post-meal BGs are so high despite reportedly good dietary and medication adherence. Will try different GLP1RA (Ozempic) and prandial insulin on higher dose. Advised:   Switch from Fiasp to Novolog and increase to 40 units before each meal.   Switch from Truliclity 3 mg weekly to Ozempic 1 mg. - rx sent to [facility].   Continue Jardiance, Toujeo 42 units and Novolin N 14 units at bedtime.   Return to clinic in 1 month - in person visit.     Interval history  Pt has not been seen in close to a year. He missed his last appt and returns today for refills. He has been busy working 2 full time jobs.   Last a1c was 8% in Jan.     He found that Ozempic 1 mg was a bit more effective than Trulicity 3 mg. Pt has been out of Ozempic x 4 months. It was on backorder and then later needed a PA.   14 day CGM average has increased from ~ 170  in Dec to now 226. Denies poor diet. Tries to limit carbs to 60-80 grams CHO per meal.     When he ran out of Ozempic, he increased Toujeo from 35 to 45-50 units hs. Injects Fiasp 40 units ac, plus 10-20 units about an hour after meals as well.          LAST DIABETES EDUCATION: 2019    HOSPITALIZED FOR DIABETES -  No.    PRESCRIBED DIABETES MEDICATIONS:    Jardiance 25 mg once daily   Toujeo Max  hs - as above   Ozempic 1 mg weekly  "  Fiasp 30-35 units ac   Novolin N 14 units between 8-10 pm     Missed doses - denies   Rotates injections: yes   Changes pen needles - yes     DM COMPLICATIONS: none    SIGNIFICANT DIABETES MED HISTORY:   Has previously used Novolin 70/30 morning only   glyburide--hypoglycemia  Victoza - nausea and vomiting at 1.2 mg; felt "run down" at 0.6 mg   Metformin - diarrhea and vomiting (has not tried metformin XR)   Metformin topical - ineffective, stopped on his own 10/2021  Pioglitazone - altered mood, reportedly became angry   Novolin N at bedtime started 3/2022    SELF MONITORING BLOOD GLUCOSE: Checks blood glucose at home several times a day with [employer]  CGM raul.     CGM interpretation:  BGs overall elevated, particularly post-meal.  Markedly high postprandial glucoses despite large doses of Fiasp. No hypoglycemia.         HYPOGLYCEMIC EPISODES:  None     CURRENT DIET: drinks water mostly. Sometimes coffee at work.  Eats 2-3 meals/day.   Today lunch was pizza and salad but he states he typically eats lower carb.     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 No acute distress.   PSYCH: AAOx3, appropriate mood and affect, conversant, well-groomed. Judgement and insight good.   NEURO: Cranial nerves grossly intact. Speech clear, no tremor.   CHEST: Respirations even and unlabored.          Hemoglobin A1C   Date Value Ref Range Status   01/19/2023 8.0 (H) 4.0 - 5.6 % Final     Comment:     ADA Screening Guidelines:  5.7-6.4%  Consistent with prediabetes  >or=6.5%  Consistent with diabetes    High levels of fetal hemoglobin interfere with the HbA1C  assay. Heterozygous hemoglobin variants (HbS, HgC, etc)do  not significantly interfere with this assay. "   However, presence of multiple variants may affect accuracy.     05/12/2022 8.8 (H) 4.0 - 5.6 % Final     Comment:     ADA Screening Guidelines:  5.7-6.4%  Consistent with prediabetes  >or=6.5%  Consistent with diabetes    High levels of fetal hemoglobin interfere with the HbA1C  assay. Heterozygous hemoglobin variants (HbS, HgC, etc)do  not significantly interfere with this assay.   However, presence of multiple variants may affect accuracy.     02/17/2022 9.9 (H) 4.0 - 5.6 % Final     Comment:     ADA Screening Guidelines:  5.7-6.4%  Consistent with prediabetes  >or=6.5%  Consistent with diabetes    High levels of fetal hemoglobin interfere with the HbA1C  assay. Heterozygous hemoglobin variants (HbS, HgC, etc)do  not significantly interfere with this assay.   However, presence of multiple variants may affect accuracy.       Lab Results   Component Value Date    TSH 2.562 01/19/2023    TSH 2.562 01/19/2023           Chemistry        Component Value Date/Time     01/19/2023 1002    K 4.0 01/19/2023 1002     01/19/2023 1002    CO2 25 01/19/2023 1002    BUN 17 01/19/2023 1002    CREATININE 0.8 01/19/2023 1002     (H) 01/19/2023 1002        Component Value Date/Time    CALCIUM 9.7 01/19/2023 1002    ALKPHOS 58 01/19/2023 1002    AST 25 01/19/2023 1002    ALT 30 01/19/2023 1002    BILITOT 0.6 01/19/2023 1002    ESTGFRAFRICA >60 03/25/2022 0617    EGFRNONAA >60 03/25/2022 0617          Lab Results   Component Value Date    LDLCALC 81.8 02/17/2022      Latest Reference Range & Units 02/17/22 11:15   Cholesterol 120 - 199 mg/dL 162 [1]   HDL 40 - 75 mg/dL 52 [2]   HDL/Cholesterol Ratio 20.0 - 50.0 % 32.1   LDL Cholesterol External 63.0 - 159.0 mg/dL 81.8 [3]   Non-HDL Cholesterol mg/dL 110 [4]   Total Cholesterol/HDL Ratio 2.0 - 5.0  3.1   Triglycerides 30 - 150 mg/dL 141 [5]     Lab Results   Component Value Date    MICALBCREAT 8.8 02/17/2022             ASSESSMENT and PLAN:    A1C GOAL: < 7 %          1. Type 2 diabetes mellitus without complication, with long-term current use of insulin  Start Mounjaro 5 mg once weekly for 4 weeks. Then increase to Mounjaro 10 mg once weekly.   Reduce Toujeo back down to 38 units once daily.     Continue Fiasp 40 units before meals, add correction scale:   Blood sugar 150 to 200, + 2 units  Blood sugar 201 to 250, + 4 units  Blood sugar 251 to 300, + 6 units  Blood sugar 301 to 350, + 8 units   Blood sugar greater than 350, + 10 units    Continue Jardiance and Novolin N 14 units at night.     Return to clinic in 2 months with labs prior with a1c.     tirzepatide (MOUNJARO) 5 mg/0.5 mL PnIj    tirzepatide (MOUNJARO) 10 mg/0.5 mL PnIj    Hemoglobin A1C    Microalbumin/Creatinine Ratio, Urine    Lipid Panel      2. Morbid obesity  tirzepatide (MOUNJARO) 5 mg/0.5 mL PnIj    tirzepatide (MOUNJARO) 10 mg/0.5 mL PnIj      3. Hyperlipidemia, unspecified hyperlipidemia type  Lipid Panel           Patient is testing blood sugars 4x/day and taking 3 injections of insulin a day. The patient's insulin treatment regimen requires frequent adjustments by the patient on the basis of therapeutic CGM testing results.       Orders Placed This Encounter   Procedures    Hemoglobin A1C     Standing Status:   Future     Standing Expiration Date:   4/15/2024    Microalbumin/Creatinine Ratio, Urine     Standing Status:   Future     Standing Expiration Date:   4/15/2024     Order Specific Question:   Specimen Source     Answer:   Urine    Lipid Panel     Standing Status:   Future     Standing Expiration Date:   2/15/2024        Follow up in about 2 months (around 4/15/2023).

## 2023-02-24 ENCOUNTER — PATIENT MESSAGE (OUTPATIENT)
Dept: RESEARCH | Facility: HOSPITAL | Age: 43
End: 2023-02-24
Payer: COMMERCIAL

## 2023-02-24 NOTE — TELEPHONE ENCOUNTER
PA request received for Jardiance but pt states it needs to go to [facility]. Rx sent as pt requested.

## 2023-03-02 ENCOUNTER — PATIENT MESSAGE (OUTPATIENT)
Dept: ORTHOPEDICS | Facility: CLINIC | Age: 43
End: 2023-03-02
Payer: COMMERCIAL

## 2023-03-07 DIAGNOSIS — S46.211A BICEPS MUSCLE STRAIN, RIGHT, INITIAL ENCOUNTER: Primary | ICD-10-CM

## 2023-03-08 ENCOUNTER — PATIENT OUTREACH (OUTPATIENT)
Dept: ADMINISTRATIVE | Facility: HOSPITAL | Age: 43
End: 2023-03-08
Payer: COMMERCIAL

## 2023-03-08 ENCOUNTER — OFFICE VISIT (OUTPATIENT)
Dept: ORTHOPEDICS | Facility: CLINIC | Age: 43
End: 2023-03-08
Attending: ORTHOPAEDIC SURGERY
Payer: COMMERCIAL

## 2023-03-08 DIAGNOSIS — S46.011A TRAUMATIC ROTATOR CUFF TEAR, RIGHT, INITIAL ENCOUNTER: Primary | ICD-10-CM

## 2023-03-08 PROCEDURE — 99213 OFFICE O/P EST LOW 20 MIN: CPT | Mod: S$GLB,,, | Performed by: ORTHOPAEDIC SURGERY

## 2023-03-08 PROCEDURE — 99999 PR PBB SHADOW E&M-EST. PATIENT-LVL V: CPT | Mod: PBBFAC,,, | Performed by: ORTHOPAEDIC SURGERY

## 2023-03-08 PROCEDURE — 1159F PR MEDICATION LIST DOCUMENTED IN MEDICAL RECORD: ICD-10-PCS | Mod: CPTII,S$GLB,, | Performed by: ORTHOPAEDIC SURGERY

## 2023-03-08 PROCEDURE — 3052F PR MOST RECENT HEMOGLOBIN A1C LEVEL 8.0 - < 9.0%: ICD-10-PCS | Mod: CPTII,S$GLB,, | Performed by: ORTHOPAEDIC SURGERY

## 2023-03-08 PROCEDURE — 99213 PR OFFICE/OUTPT VISIT, EST, LEVL III, 20-29 MIN: ICD-10-PCS | Mod: S$GLB,,, | Performed by: ORTHOPAEDIC SURGERY

## 2023-03-08 PROCEDURE — 1160F PR REVIEW ALL MEDS BY PRESCRIBER/CLIN PHARMACIST DOCUMENTED: ICD-10-PCS | Mod: CPTII,S$GLB,, | Performed by: ORTHOPAEDIC SURGERY

## 2023-03-08 PROCEDURE — 4010F PR ACE/ARB THEARPY RXD/TAKEN: ICD-10-PCS | Mod: CPTII,S$GLB,, | Performed by: ORTHOPAEDIC SURGERY

## 2023-03-08 PROCEDURE — 1159F MED LIST DOCD IN RCRD: CPT | Mod: CPTII,S$GLB,, | Performed by: ORTHOPAEDIC SURGERY

## 2023-03-08 PROCEDURE — 4010F ACE/ARB THERAPY RXD/TAKEN: CPT | Mod: CPTII,S$GLB,, | Performed by: ORTHOPAEDIC SURGERY

## 2023-03-08 PROCEDURE — 99999 PR PBB SHADOW E&M-EST. PATIENT-LVL V: ICD-10-PCS | Mod: PBBFAC,,, | Performed by: ORTHOPAEDIC SURGERY

## 2023-03-08 PROCEDURE — 1160F RVW MEDS BY RX/DR IN RCRD: CPT | Mod: CPTII,S$GLB,, | Performed by: ORTHOPAEDIC SURGERY

## 2023-03-08 PROCEDURE — 3052F HG A1C>EQUAL 8.0%<EQUAL 9.0%: CPT | Mod: CPTII,S$GLB,, | Performed by: ORTHOPAEDIC SURGERY

## 2023-03-08 RX ORDER — MUPIROCIN 20 MG/G
OINTMENT TOPICAL
Status: CANCELLED | OUTPATIENT
Start: 2023-03-08

## 2023-03-08 NOTE — LETTER
March 8, 2023    Tony Gordillo  [address] [place] [address]         [facility]  [address]  [place] [address]  Phone: [phone] March 8, 2023     Patient: Tony Gordillo   YOB: 1980   Date of Visit: 3/8/2023       To Whom It May Concern:    Tony Gordillo  is currently under my care for an orthopedic injury/condition requiring activity restriction.     Start date of restrictions/Date of injury:  2/28/23    Patient has been under my care for this injury since: 03/08/2023     He is scheduled for surgery on 3/14/23. I anticipate that he will be out of work for about 6 months following the date of surgery.  Return to work will depend on progress with healing.     Date of next evaluation: 3/29/23      If you have any questions or concerns, please don't hesitate to call.    Sincerely,        Crissy Bradford MD

## 2023-03-08 NOTE — PROGRESS NOTES
Assessment: 42 y.o. male with right rotator cuff tear    I explained my diagnostic impression and the reasoning behind it in detail, using layman's terms.      Plan:   - he will drop off the MRI disc to me sometime this week     We discussed nonoperative and operative treatment options.  Given his age and activity level, I do recommend operative treatment in the form of right shoulder arthroscopy with rotator cuff repair, possible open biceps tenodesis.    We have discussed the surgery and anticipated recovery.  The patient understands that there may be limited mobility up to several weeks after surgery depending on procedures that are performed at the time of surgery.    The details of the surgical procedure were explained, including the location of probable incisions and a description of likely hardware and/or grafts to be used.  The patient understands the likely convalescence after surgery, in particular the expected postop rehab and recovery course. Alternatives both operative and non-operative with associated risks and benefits discussed. The outlined risks and potential complications of the proposed procedure include but are not limited to: bleeding, infection, vessel and/or nerve damage, pain, numbness, tingling, compartment syndrome, need for additional surgery, failure to return to pre-injury and/or preoperative functional status, inability to return to work, scar sensitivity, delayed healing, complex regional pain syndrome, weakness, partial and/or incomplete relief of symptoms, persistence of and/or worsening of symptoms, hardware and/or surgical failure, prominent and/or symptomatic hardware possibly necessitating future removal, failure of repair to heal, retear, loss of function, stiffness, functional debility, dysfunction, need for prolonged postoperative rehabilitation, fracture, deep venous thrombosis, pulmonary embolism, arthritis and death.  The patient states an understanding and wishes to  proceed with surgery.   All questions were answered.  No guarantees were implied or stated.  Written informed consent was obtained.   - we discussed his increased risk given his history of diabetes.  His last A1c was 8.0.  It is higher than usual because he was not able to find Ozempic for quite some time to control his diabetes.  He restarted on mounjaro 2 weeks ago and has had better blood sugar control.  We went over his log in his raul today and his average over the last week was 137.  With an A1c of 8 his risk of failure of repair is about 40%.  I believe his risk is in reality much lower given his more recent blood sugar control.  Is difficult to assign a 2.  This risk given the discrepancy between his current blood sugar average and A1c while he was out of his medication.  Discussed the importance of maintaining control of his blood sugar during the healing process to reduce the risk of retear or delayed healing.  - work note given    All questions were answered in detail. The patient is in full agreement with the treatment plan and will proceed accordingly.    Chief Complaint   Patient presents with    Right Shoulder - Injury, Pain, Swelling       Initial visit (3/8/23): Tony Gordillo is a 42 y.o. male who presents today complaining of right shoulder pain  DOI: 2/28/23  Trauma or new activity: yes - was starting a leaf blower and felt a pop with immediate pain in the right shoulder. He was at work when this happened   Pain is constant  Not getting better   Can move the arm, painful  Is able to fully FE when he is in supine position   Was seen at [facility] and an MRI was done at [facility] [place]  Aggravating factors: forward elevation   Relieving factors: rest   Night pain is present and is disruptive to sleep  Radicular symptoms: no numbness, paresthesias   Associated symptoms:  limited range of motion.    Prior treatment:  Taking meloxicam 7.5 and ibuprofen without relief   Pain does interfere with duties at  work and activities of daily living .  Localizes pain to lateral shoulder, subdeltoid fossa    This patient was seen in consultation at the request of Dr. Crissy Bradford    This is the extent of the patient's complaints at this time.     Hand dominance: Right     Occupation:      Review of patient's allergies indicates:   Allergen Reactions    Influenza virus vaccine, specific Hives    Pioglitazone      Altered mood     Victoza [liraglutide] Nausea And Vomiting    Farxiga [dapagliflozin] Rash     Erectile dysfunction and rash      Physical Exam:   Vitals:    03/08/23 1520   PainSc:   4   PainLoc: Shoulder       General:  Patient is alert, awake and oriented to time, place and person. Mood and affect are appropriate.  Patient does not appear to be in any distress, denies any constitutional symptoms and appears stated age.   HEENT: Pupils are equal and round, sclera are not injected. External examination of ears and nose reveals no abnormalities. Cranial nerves II-X are grossly intact  Neck: examination demonstrates painless  active range of motion. Spurling's sign is negative  Skin: no rashes, abrasions or open wounds on the affected extremity   Resp: No respiratory distress or audible wheezing   CV: 2+  pulses, all extremities warm and well perfused   Right Shoulder    Shoulder Range of Motion    Right     Left   (Active/Passive)       Forward Elevation     90/165             165/165  External rotation (arm at side)  45/45             45/45      Range of motion is painful     Scapular winging no  Scapular dyskinesia no    Acromioclavicular joint is not tender  Crossbody test: negative    Neer's positive  Hawkin's positive    Lisa's positive  Drop arm negative  Belly press positive      Cuff Strength     Right     Left   Supraspinatus        4/5    5/5  Infraspinatus     5/5    5/5  Subscapularis     3/5    5/5    Deltoid testing            5/5    5/5    Speeds positive  Yergasons positive    Elbow  examination demonstrates no tenderness to palpation and has normal range of motion.     ltsi C5-T1  + epl, io, fds, fdp   2+ RP      Imaging: 3 views of the right shoulder:  positive for degenerative changes of the AC joint. The humeral head is well centered on the AP and axillary views.  No significant degenerative changes.  No acute changes or fracture.      I personally reviewed and interpreted the patient's imaging obtained today in clinic     A note notifying Dr. Crissy Bradford of my findings was sent via the electronic medical record     This note was created by combination of typed  and M-Modal dictation. Transcription and phonetic errors may be present.  If there are any questions, please contact me.      Current Outpatient Medications:     atorvastatin (LIPITOR) 40 MG tablet, Take 1 tablet (40 mg total) by mouth once daily., Disp: 90 tablet, Rfl: 3    azelastine (ASTELIN) 137 mcg (0.1 %) nasal spray, 1 spray (137 mcg total) by Nasal route 2 (two) times daily., Disp: 30 mL, Rfl: 3    azelastine 205.5 mcg (0.15 %) Spry, azelastine 205.5 mcg (0.15 %) nasal spray  INHALE 2 SPRAYS TO EACH NOSTRIL TWICE A DAY, Disp: 30 mL, Rfl: 11    blood sugar diagnostic Strp, To check BG 4 times daily, to use with insurance preferred meter, Disp: 400 strip, Rfl: 3    blood sugar diagnostic Strp, OneTouch Ultra Test strips, Disp: , Rfl:     blood-glucose meter kit, OneTouch Ultra2 Meter kit, Disp: , Rfl:     blood-glucose sensor (FREESTYLE SAMANTHA 3 SENSOR) Filomena, Change every 14 days, Disp: 2 each, Rfl: 11    chlorpheniramine-acetaminophen (CORICIDIN HBP COLD AND FLU) 2-325 mg Tab, Take 1 tablet by mouth 4 (four) times daily as needed (ear ache, runny nose, and congestion.)., Disp: 30 tablet, Rfl: 1    cyanocobalamin, vitamin B-12, 5,000 mcg Subl, Place one under tongue once a day for 1 month, then continue to take under tongue per week, Disp: 30 tablet, Rfl: 3    diclofenac sodium (VOLTAREN) 1 % Gel, Apply the gel  (2 g) to the affected area 4 times daily. Do not apply more than 8 g daily to any one affected joint, Disp: 100 g, Rfl: 1    empagliflozin (JARDIANCE) 25 mg tablet, Take 1 tablet (25 mg total) by mouth once daily., Disp: 90 tablet, Rfl: 2    fenofibrate 160 MG Tab, fenofibrate 160 mg tablet, Disp: , Rfl:     fluticasone propionate (FLONASE) 50 mcg/actuation nasal spray, fluticasone propionate 50 mcg/actuation nasal spray,suspension, Disp: 16 g, Rfl: 3    fluticasone-umeclidin-vilanter (TRELEGY ELLIPTA) 200-62.5-25 mcg inhaler, Inhale 1 puff into the lungs once daily. Stop symbicort, Disp: 60 each, Rfl: 5    insulin aspart U-100 (NOVOLOG) 100 unit/mL (3 mL) InPn pen, INJECT 40 UNITS BEFORE EACH MEAL WITH SLIDNING SCALE, MAX DAILY DOSE 150 UNITS, Disp: 45 each, Rfl: 1    insulin glargine U-300 conc (TOUJEO MAX U-300 SOLOSTAR) 300 unit/mL (3 mL) insulin pen, Inject 38 Units into the skin once daily., Disp: 4 pen, Rfl: 1    insulin NPH isoph U-100 human (NOVOLIN N FLEXPEN) 100 unit/mL (3 mL) InPn, INJECT 14 UNITS INTO THE SKIN ONCE DAILY AT NIGHT 8 PM., Disp: 15 mL, Rfl: 1    ketoconazole (NIZORAL) 2 % cream, Apply topically once daily., Disp: 1 Tube, Rfl: 3    lamoTRIgine (LAMICTAL) 25 MG tablet, Take 3 tablets (75 mg total) by mouth once daily., Disp: 90 tablet, Rfl: 1    lancets Misc, To check BG 4 times daily, to use with insurance preferred meter, Disp: 400 each, Rfl: 3    levothyroxine (SYNTHROID) 50 MCG tablet, TAKE 1 TABLET (50 MCG TOTAL) BY MOUTH BEFORE BREAKFAST., Disp: 90 tablet, Rfl: 2    lisinopriL (PRINIVIL,ZESTRIL) 20 MG tablet, Take 1 tablet (20 mg total) by mouth once daily., Disp: 90 tablet, Rfl: 3    montelukast (SINGULAIR) 10 mg tablet, TAKE 1 TABLET BY MOUTH EVERY EVENING, Disp: 90 tablet, Rfl: 3    ondansetron (ZOFRAN-ODT) 8 MG TbDL, Take 1 tablet (8 mg total) by mouth every 6 (six) hours as needed (n/v)., Disp: 30 tablet, Rfl: 0    pen needle, diabetic (BD ULTRA-FINE KEN PEN NEEDLE) 32 gauge x  "5/32" Ndle, 1 Device by Misc.(Non-Drug; Combo Route) route 5 (five) times daily., Disp: 550 each, Rfl: 4    promethazine (PHENERGAN) 25 MG suppository, Place 1 suppository (25 mg total) rectally every 6 (six) hours as needed for Nausea., Disp: 10 suppository, Rfl: 0    syringe, disposable, 1 mL Syrg, Testosterone every 2 weeks, Disp: 25 Syringe, Rfl: 1    testosterone enanthate (DELATESTRYL) 200 mg/mL injection, Inject into the muscle every 14 (fourteen) days., Disp: , Rfl:     tirzepatide (MOUNJARO) 10 mg/0.5 mL PnIj, Inject 10 mg into the skin every 7 days. Start Mounjaro 5 mg once weekly for 4 weeks. Then increase to Mounjaro 10 mg once weekly., Disp: 4 pen, Rfl: 1    tirzepatide (MOUNJARO) 5 mg/0.5 mL PnIj, Inject 5 mg into the skin every 7 days. Start Mounjaro 5 mg once weekly for 4 weeks. Then increase to Mounjaro 10 mg once weekly., Disp: 4 pen, Rfl: 0    Past Medical History:   Diagnosis Date    Diabetes mellitus, type 2     Hyperlipidemia     Hypertension     Obesity     NAINA (obstructive sleep apnea)        Active Problem List with Overview Notes    Diagnosis Date Noted    Decreased range of motion (ROM) of left knee 12/29/2022    Quadriceps weakness 12/29/2022    Other amnesia 11/02/2022    Mild neurocognitive disorder due to traumatic brain injury 10/19/2022    Outbursts of anger 10/19/2022    Left hand pain 09/24/2022    Decreased strength, endurance, and mobility 03/08/2022    Dyspnea 02/18/2022      started after covid 19. cxr unremarkable.  Clinically improve with symbicort.  PFT with restrictive physiology with preserved dlco c/w habitus.  Stress echo with ?ischemic changes on echo but ST changes on ekg.        Rib pain on left side 08/08/2021    Right foot pain 10/10/2019    Decreased strength of lower extremity 10/10/2019    Decreased range of motion of both ankles 10/10/2019    Gait abnormality 10/10/2019    Low testosterone in male; pituitary axis normal. MRI negative. 11/2019 started on " testosterone 09/04/2019 11/06/2019 MRI pituitary with and without contrast from [facility]  Impression:  1.  No pituitary mass seen.  2.  Absence of the septum pellucidum.      NAINA (obstructive sleep apnea) 8/2019 sleep study [facility] 11      -ahi of 11.  Stopped apap for over month due to gasping sensation.  ?excessive leakage a/w nasal congestion while having covid 19.  Nasal pantency is adequate.  Recommend resumption of apap.  However, APAP is being recall by [employer]  -we discussed at length about [employer] recall.  Patient already register his apap  -will switch to nasal pillow to alleviate pressure on ridge of nose.        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, with long-term current use of insulin     Essential hypertension     Hypothyroidism 07/29/2015    Hyperlipidemia LDL goal <70 06/03/2015    Insulin long-term use 06/03/2015    Tinea pedis 06/03/2015    Morbid obesity 06/03/2015       Past Surgical History:   Procedure Laterality Date    ANKLE SURGERY      KNEE SURGERY      acl,mcl,pcl    left knee x 2         Social History     Socioeconomic History    Marital status:    Occupational History    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     Employer: [employer]   Tobacco Use    Smoking status: Never    Smokeless tobacco: Never   Substance and Sexual Activity    Alcohol use: Yes     Comment: 1-2 beers every 2 weeks    Drug use: No     Social Determinants of Health     Financial Resource Strain: Low Risk     Difficulty of Paying Living Expenses: Not very hard   Food  Insecurity: No Food Insecurity    Worried About Running Out of Food in the Last Year: Never true    Ran Out of Food in the Last Year: Never true   Transportation Needs: No Transportation Needs    Lack of Transportation (Medical): No    Lack of Transportation (Non-Medical): No   Physical Activity: Unknown    Days of Exercise per Week: 5 days    Minutes of Exercise per Session: Patient refused   Stress: No Stress Concern Present    Feeling of Stress : Not at all   Social Connections: Unknown    Frequency of Communication with Friends and Family: More than three times a week    Frequency of Social Gatherings with Friends and Family: Patient refused    Active Member of Clubs or Organizations: No    Attends Club or Organization Meetings: Patient refused    Marital Status:    Housing Stability: Low Risk     Unable to Pay for Housing in the Last Year: No    Number of Places Lived in the Last Year: 1    Unstable Housing in the Last Year: No

## 2023-03-08 NOTE — H&P (VIEW-ONLY)
Assessment: 42 y.o. male with right rotator cuff tear    I explained my diagnostic impression and the reasoning behind it in detail, using layman's terms.      Plan:   - he will drop off the MRI disc to me sometime this week     We discussed nonoperative and operative treatment options.  Given his age and activity level, I do recommend operative treatment in the form of right shoulder arthroscopy with rotator cuff repair, possible open biceps tenodesis.    We have discussed the surgery and anticipated recovery.  The patient understands that there may be limited mobility up to several weeks after surgery depending on procedures that are performed at the time of surgery.    The details of the surgical procedure were explained, including the location of probable incisions and a description of likely hardware and/or grafts to be used.  The patient understands the likely convalescence after surgery, in particular the expected postop rehab and recovery course. Alternatives both operative and non-operative with associated risks and benefits discussed. The outlined risks and potential complications of the proposed procedure include but are not limited to: bleeding, infection, vessel and/or nerve damage, pain, numbness, tingling, compartment syndrome, need for additional surgery, failure to return to pre-injury and/or preoperative functional status, inability to return to work, scar sensitivity, delayed healing, complex regional pain syndrome, weakness, partial and/or incomplete relief of symptoms, persistence of and/or worsening of symptoms, hardware and/or surgical failure, prominent and/or symptomatic hardware possibly necessitating future removal, failure of repair to heal, retear, loss of function, stiffness, functional debility, dysfunction, need for prolonged postoperative rehabilitation, fracture, deep venous thrombosis, pulmonary embolism, arthritis and death.  The patient states an understanding and wishes to  proceed with surgery.   All questions were answered.  No guarantees were implied or stated.  Written informed consent was obtained.   - we discussed his increased risk given his history of diabetes.  His last A1c was 8.0.  It is higher than usual because he was not able to find Ozempic for quite some time to control his diabetes.  He restarted on mounjaro 2 weeks ago and has had better blood sugar control.  We went over his log in his raul today and his average over the last week was 137.  With an A1c of 8 his risk of failure of repair is about 40%.  I believe his risk is in reality much lower given his more recent blood sugar control.  Is difficult to assign a 2.  This risk given the discrepancy between his current blood sugar average and A1c while he was out of his medication.  Discussed the importance of maintaining control of his blood sugar during the healing process to reduce the risk of retear or delayed healing.  - work note given    All questions were answered in detail. The patient is in full agreement with the treatment plan and will proceed accordingly.    Chief Complaint   Patient presents with    Right Shoulder - Injury, Pain, Swelling       Initial visit (3/8/23): Tony Gordillo is a 42 y.o. male who presents today complaining of right shoulder pain  DOI: 2/28/23  Trauma or new activity: yes - was starting a leaf blower and felt a pop with immediate pain in the right shoulder. He was at work when this happened   Pain is constant  Not getting better   Can move the arm, painful  Is able to fully FE when he is in supine position   Was seen at [facility] and an MRI was done at [facility] [place]  Aggravating factors: forward elevation   Relieving factors: rest   Night pain is present and is disruptive to sleep  Radicular symptoms: no numbness, paresthesias   Associated symptoms:  limited range of motion.    Prior treatment:  Taking meloxicam 7.5 and ibuprofen without relief   Pain does interfere with duties at  work and activities of daily living .  Localizes pain to lateral shoulder, subdeltoid fossa    This patient was seen in consultation at the request of Dr. Crissy Bradford    This is the extent of the patient's complaints at this time.     Hand dominance: Right     Occupation:      Review of patient's allergies indicates:   Allergen Reactions    Influenza virus vaccine, specific Hives    Pioglitazone      Altered mood     Victoza [liraglutide] Nausea And Vomiting    Farxiga [dapagliflozin] Rash     Erectile dysfunction and rash      Physical Exam:   Vitals:    03/08/23 1520   PainSc:   4   PainLoc: Shoulder       General:  Patient is alert, awake and oriented to time, place and person. Mood and affect are appropriate.  Patient does not appear to be in any distress, denies any constitutional symptoms and appears stated age.   HEENT: Pupils are equal and round, sclera are not injected. External examination of ears and nose reveals no abnormalities. Cranial nerves II-X are grossly intact  Neck: examination demonstrates painless  active range of motion. Spurling's sign is negative  Skin: no rashes, abrasions or open wounds on the affected extremity   Resp: No respiratory distress or audible wheezing   CV: 2+  pulses, all extremities warm and well perfused   Right Shoulder    Shoulder Range of Motion    Right     Left   (Active/Passive)       Forward Elevation     90/165             165/165  External rotation (arm at side)  45/45             45/45      Range of motion is painful     Scapular winging no  Scapular dyskinesia no    Acromioclavicular joint is not tender  Crossbody test: negative    Neer's positive  Hawkin's positive    Lisa's positive  Drop arm negative  Belly press positive      Cuff Strength     Right     Left   Supraspinatus        4/5    5/5  Infraspinatus     5/5    5/5  Subscapularis     3/5    5/5    Deltoid testing            5/5    5/5    Speeds positive  Yergasons positive    Elbow  examination demonstrates no tenderness to palpation and has normal range of motion.     ltsi C5-T1  + epl, io, fds, fdp   2+ RP      Imaging: 3 views of the right shoulder:  positive for degenerative changes of the AC joint. The humeral head is well centered on the AP and axillary views.  No significant degenerative changes.  No acute changes or fracture.      I personally reviewed and interpreted the patient's imaging obtained today in clinic     A note notifying Dr. Crissy Bradford of my findings was sent via the electronic medical record     This note was created by combination of typed  and M-Modal dictation. Transcription and phonetic errors may be present.  If there are any questions, please contact me.      Current Outpatient Medications:     atorvastatin (LIPITOR) 40 MG tablet, Take 1 tablet (40 mg total) by mouth once daily., Disp: 90 tablet, Rfl: 3    azelastine (ASTELIN) 137 mcg (0.1 %) nasal spray, 1 spray (137 mcg total) by Nasal route 2 (two) times daily., Disp: 30 mL, Rfl: 3    azelastine 205.5 mcg (0.15 %) Spry, azelastine 205.5 mcg (0.15 %) nasal spray  INHALE 2 SPRAYS TO EACH NOSTRIL TWICE A DAY, Disp: 30 mL, Rfl: 11    blood sugar diagnostic Strp, To check BG 4 times daily, to use with insurance preferred meter, Disp: 400 strip, Rfl: 3    blood sugar diagnostic Strp, OneTouch Ultra Test strips, Disp: , Rfl:     blood-glucose meter kit, OneTouch Ultra2 Meter kit, Disp: , Rfl:     blood-glucose sensor (FREESTYLE SAMANTHA 3 SENSOR) Filomena, Change every 14 days, Disp: 2 each, Rfl: 11    chlorpheniramine-acetaminophen (CORICIDIN HBP COLD AND FLU) 2-325 mg Tab, Take 1 tablet by mouth 4 (four) times daily as needed (ear ache, runny nose, and congestion.)., Disp: 30 tablet, Rfl: 1    cyanocobalamin, vitamin B-12, 5,000 mcg Subl, Place one under tongue once a day for 1 month, then continue to take under tongue per week, Disp: 30 tablet, Rfl: 3    diclofenac sodium (VOLTAREN) 1 % Gel, Apply the gel  (2 g) to the affected area 4 times daily. Do not apply more than 8 g daily to any one affected joint, Disp: 100 g, Rfl: 1    empagliflozin (JARDIANCE) 25 mg tablet, Take 1 tablet (25 mg total) by mouth once daily., Disp: 90 tablet, Rfl: 2    fenofibrate 160 MG Tab, fenofibrate 160 mg tablet, Disp: , Rfl:     fluticasone propionate (FLONASE) 50 mcg/actuation nasal spray, fluticasone propionate 50 mcg/actuation nasal spray,suspension, Disp: 16 g, Rfl: 3    fluticasone-umeclidin-vilanter (TRELEGY ELLIPTA) 200-62.5-25 mcg inhaler, Inhale 1 puff into the lungs once daily. Stop symbicort, Disp: 60 each, Rfl: 5    insulin aspart U-100 (NOVOLOG) 100 unit/mL (3 mL) InPn pen, INJECT 40 UNITS BEFORE EACH MEAL WITH SLIDNING SCALE, MAX DAILY DOSE 150 UNITS, Disp: 45 each, Rfl: 1    insulin glargine U-300 conc (TOUJEO MAX U-300 SOLOSTAR) 300 unit/mL (3 mL) insulin pen, Inject 38 Units into the skin once daily., Disp: 4 pen, Rfl: 1    insulin NPH isoph U-100 human (NOVOLIN N FLEXPEN) 100 unit/mL (3 mL) InPn, INJECT 14 UNITS INTO THE SKIN ONCE DAILY AT NIGHT 8 PM., Disp: 15 mL, Rfl: 1    ketoconazole (NIZORAL) 2 % cream, Apply topically once daily., Disp: 1 Tube, Rfl: 3    lamoTRIgine (LAMICTAL) 25 MG tablet, Take 3 tablets (75 mg total) by mouth once daily., Disp: 90 tablet, Rfl: 1    lancets Misc, To check BG 4 times daily, to use with insurance preferred meter, Disp: 400 each, Rfl: 3    levothyroxine (SYNTHROID) 50 MCG tablet, TAKE 1 TABLET (50 MCG TOTAL) BY MOUTH BEFORE BREAKFAST., Disp: 90 tablet, Rfl: 2    lisinopriL (PRINIVIL,ZESTRIL) 20 MG tablet, Take 1 tablet (20 mg total) by mouth once daily., Disp: 90 tablet, Rfl: 3    montelukast (SINGULAIR) 10 mg tablet, TAKE 1 TABLET BY MOUTH EVERY EVENING, Disp: 90 tablet, Rfl: 3    ondansetron (ZOFRAN-ODT) 8 MG TbDL, Take 1 tablet (8 mg total) by mouth every 6 (six) hours as needed (n/v)., Disp: 30 tablet, Rfl: 0    pen needle, diabetic (BD ULTRA-FINE KEN PEN NEEDLE) 32 gauge x  "5/32" Ndle, 1 Device by Misc.(Non-Drug; Combo Route) route 5 (five) times daily., Disp: 550 each, Rfl: 4    promethazine (PHENERGAN) 25 MG suppository, Place 1 suppository (25 mg total) rectally every 6 (six) hours as needed for Nausea., Disp: 10 suppository, Rfl: 0    syringe, disposable, 1 mL Syrg, Testosterone every 2 weeks, Disp: 25 Syringe, Rfl: 1    testosterone enanthate (DELATESTRYL) 200 mg/mL injection, Inject into the muscle every 14 (fourteen) days., Disp: , Rfl:     tirzepatide (MOUNJARO) 10 mg/0.5 mL PnIj, Inject 10 mg into the skin every 7 days. Start Mounjaro 5 mg once weekly for 4 weeks. Then increase to Mounjaro 10 mg once weekly., Disp: 4 pen, Rfl: 1    tirzepatide (MOUNJARO) 5 mg/0.5 mL PnIj, Inject 5 mg into the skin every 7 days. Start Mounjaro 5 mg once weekly for 4 weeks. Then increase to Mounjaro 10 mg once weekly., Disp: 4 pen, Rfl: 0    Past Medical History:   Diagnosis Date    Diabetes mellitus, type 2     Hyperlipidemia     Hypertension     Obesity     NAINA (obstructive sleep apnea)        Active Problem List with Overview Notes    Diagnosis Date Noted    Decreased range of motion (ROM) of left knee 12/29/2022    Quadriceps weakness 12/29/2022    Other amnesia 11/02/2022    Mild neurocognitive disorder due to traumatic brain injury 10/19/2022    Outbursts of anger 10/19/2022    Left hand pain 09/24/2022    Decreased strength, endurance, and mobility 03/08/2022    Dyspnea 02/18/2022      started after covid 19. cxr unremarkable.  Clinically improve with symbicort.  PFT with restrictive physiology with preserved dlco c/w habitus.  Stress echo with ?ischemic changes on echo but ST changes on ekg.        Rib pain on left side 08/08/2021    Right foot pain 10/10/2019    Decreased strength of lower extremity 10/10/2019    Decreased range of motion of both ankles 10/10/2019    Gait abnormality 10/10/2019    Low testosterone in male; pituitary axis normal. MRI negative. 11/2019 started on " testosterone 09/04/2019 11/06/2019 MRI pituitary with and without contrast from [facility]  Impression:  1.  No pituitary mass seen.  2.  Absence of the septum pellucidum.      NAINA (obstructive sleep apnea) 8/2019 sleep study [facility] 11      -[facility] of 11.  Stopped apap for over month due to gasping sensation.  ?excessive leakage a/w nasal congestion while having covid 19.  Nasal pantency is adequate.  Recommend resumption of apap.  However, APAP is being recall by [employer]  -we discussed at length about [employer] recall.  Patient already register his apap  -will switch to nasal pillow to alleviate pressure on ridge of nose.        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, with long-term current use of insulin     Essential hypertension     Hypothyroidism 07/29/2015    Hyperlipidemia LDL goal <70 06/03/2015    Insulin long-term use 06/03/2015    Tinea pedis 06/03/2015    Morbid obesity 06/03/2015       Past Surgical History:   Procedure Laterality Date    ANKLE SURGERY      KNEE SURGERY      acl,mcl,pcl    left knee x 2         Social History     Socioeconomic History    Marital status:    Occupational History    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     Employer: [employer]   Tobacco Use    Smoking status: Never    Smokeless tobacco: Never   Substance and Sexual Activity    Alcohol use: Yes     Comment: 1-2 beers every 2 weeks    Drug use: No     Social Determinants of Health     Financial Resource Strain: Low Risk     Difficulty of Paying Living Expenses: Not very hard   Food  Insecurity: No Food Insecurity    Worried About Running Out of Food in the Last Year: Never true    Ran Out of Food in the Last Year: Never true   Transportation Needs: No Transportation Needs    Lack of Transportation (Medical): No    Lack of Transportation (Non-Medical): No   Physical Activity: Unknown    Days of Exercise per Week: 5 days    Minutes of Exercise per Session: Patient refused   Stress: No Stress Concern Present    Feeling of Stress : Not at all   Social Connections: Unknown    Frequency of Communication with Friends and Family: More than three times a week    Frequency of Social Gatherings with Friends and Family: Patient refused    Active Member of Clubs or Organizations: No    Attends Club or Organization Meetings: Patient refused    Marital Status:    Housing Stability: Low Risk     Unable to Pay for Housing in the Last Year: No    Number of Places Lived in the Last Year: 1    Unstable Housing in the Last Year: No

## 2023-03-09 ENCOUNTER — HOSPITAL ENCOUNTER (OUTPATIENT)
Dept: RADIOLOGY | Facility: HOSPITAL | Age: 43
Discharge: HOME | End: 2023-03-09
Attending: NURSE PRACTITIONER
Payer: COMMERCIAL

## 2023-03-09 ENCOUNTER — PATIENT MESSAGE (OUTPATIENT)
Dept: SURGERY | Facility: HOSPITAL | Age: 43
End: 2023-03-09
Payer: COMMERCIAL

## 2023-03-09 ENCOUNTER — ANESTHESIA EVENT (OUTPATIENT)
Dept: SURGERY | Facility: HOSPITAL | Age: 43
End: 2023-03-09
Payer: OTHER MISCELLANEOUS

## 2023-03-09 ENCOUNTER — OFFICE VISIT (OUTPATIENT)
Dept: FAMILY MEDICINE | Facility: CLINIC | Age: 43
End: 2023-03-09
Payer: COMMERCIAL

## 2023-03-09 VITALS
BODY MASS INDEX: 39.77 KG/M2 | HEART RATE: 99 BPM | DIASTOLIC BLOOD PRESSURE: 86 MMHG | OXYGEN SATURATION: 96 % | SYSTOLIC BLOOD PRESSURE: 138 MMHG | WEIGHT: 300.06 LBS | HEIGHT: 73 IN | TEMPERATURE: 99 F

## 2023-03-09 DIAGNOSIS — Z01.818 PRE-OP EVALUATION: Primary | ICD-10-CM

## 2023-03-09 DIAGNOSIS — E11.3292 TYPE 2 DIABETES MELLITUS WITH LEFT EYE AFFECTED BY MILD NONPROLIFERATIVE RETINOPATHY WITHOUT MACULAR EDEMA, WITH LONG-TERM CURRENT USE OF INSULIN: ICD-10-CM

## 2023-03-09 DIAGNOSIS — Z79.4 TYPE 2 DIABETES MELLITUS WITH LEFT EYE AFFECTED BY MILD NONPROLIFERATIVE RETINOPATHY WITHOUT MACULAR EDEMA, WITH LONG-TERM CURRENT USE OF INSULIN: ICD-10-CM

## 2023-03-09 DIAGNOSIS — Z79.4 INSULIN LONG-TERM USE: ICD-10-CM

## 2023-03-09 DIAGNOSIS — G47.33 OSA (OBSTRUCTIVE SLEEP APNEA): ICD-10-CM

## 2023-03-09 DIAGNOSIS — E78.5 HYPERLIPIDEMIA LDL GOAL <70: ICD-10-CM

## 2023-03-09 DIAGNOSIS — E11.9 TYPE 2 DIABETES MELLITUS WITHOUT COMPLICATION, WITH LONG-TERM CURRENT USE OF INSULIN: ICD-10-CM

## 2023-03-09 DIAGNOSIS — I10 ESSENTIAL HYPERTENSION: ICD-10-CM

## 2023-03-09 DIAGNOSIS — E66.01 MORBID OBESITY: ICD-10-CM

## 2023-03-09 DIAGNOSIS — E03.4 HYPOTHYROIDISM DUE TO ACQUIRED ATROPHY OF THYROID: ICD-10-CM

## 2023-03-09 DIAGNOSIS — Z01.818 PRE-OP EVALUATION: ICD-10-CM

## 2023-03-09 DIAGNOSIS — Z79.4 TYPE 2 DIABETES MELLITUS WITHOUT COMPLICATION, WITH LONG-TERM CURRENT USE OF INSULIN: ICD-10-CM

## 2023-03-09 PROCEDURE — 99214 PR OFFICE/OUTPT VISIT, EST, LEVL IV, 30-39 MIN: ICD-10-PCS | Mod: S$GLB,,, | Performed by: NURSE PRACTITIONER

## 2023-03-09 PROCEDURE — 71046 X-RAY EXAM CHEST 2 VIEWS: CPT | Mod: 26,,, | Performed by: RADIOLOGY

## 2023-03-09 PROCEDURE — 3075F SYST BP GE 130 - 139MM HG: CPT | Mod: CPTII,S$GLB,, | Performed by: NURSE PRACTITIONER

## 2023-03-09 PROCEDURE — 3079F DIAST BP 80-89 MM HG: CPT | Mod: CPTII,S$GLB,, | Performed by: NURSE PRACTITIONER

## 2023-03-09 PROCEDURE — 3008F PR BODY MASS INDEX (BMI) DOCUMENTED: ICD-10-PCS | Mod: CPTII,S$GLB,, | Performed by: NURSE PRACTITIONER

## 2023-03-09 PROCEDURE — 1160F PR REVIEW ALL MEDS BY PRESCRIBER/CLIN PHARMACIST DOCUMENTED: ICD-10-PCS | Mod: CPTII,S$GLB,, | Performed by: NURSE PRACTITIONER

## 2023-03-09 PROCEDURE — 99214 OFFICE O/P EST MOD 30 MIN: CPT | Mod: S$GLB,,, | Performed by: NURSE PRACTITIONER

## 2023-03-09 PROCEDURE — 4010F ACE/ARB THERAPY RXD/TAKEN: CPT | Mod: CPTII,S$GLB,, | Performed by: NURSE PRACTITIONER

## 2023-03-09 PROCEDURE — 1159F PR MEDICATION LIST DOCUMENTED IN MEDICAL RECORD: ICD-10-PCS | Mod: CPTII,S$GLB,, | Performed by: NURSE PRACTITIONER

## 2023-03-09 PROCEDURE — 71046 XR CHEST PA AND LATERAL: ICD-10-PCS | Mod: 26,,, | Performed by: RADIOLOGY

## 2023-03-09 PROCEDURE — 99999 PR PBB SHADOW E&M-EST. PATIENT-LVL IV: ICD-10-PCS | Mod: PBBFAC,,, | Performed by: NURSE PRACTITIONER

## 2023-03-09 PROCEDURE — 3052F HG A1C>EQUAL 8.0%<EQUAL 9.0%: CPT | Mod: CPTII,S$GLB,, | Performed by: NURSE PRACTITIONER

## 2023-03-09 PROCEDURE — 1159F MED LIST DOCD IN RCRD: CPT | Mod: CPTII,S$GLB,, | Performed by: NURSE PRACTITIONER

## 2023-03-09 PROCEDURE — 93010 ELECTROCARDIOGRAM REPORT: CPT | Mod: S$GLB,,, | Performed by: INTERNAL MEDICINE

## 2023-03-09 PROCEDURE — 3075F PR MOST RECENT SYSTOLIC BLOOD PRESS GE 130-139MM HG: ICD-10-PCS | Mod: CPTII,S$GLB,, | Performed by: NURSE PRACTITIONER

## 2023-03-09 PROCEDURE — 93010 EKG 12-LEAD: ICD-10-PCS | Mod: S$GLB,,, | Performed by: INTERNAL MEDICINE

## 2023-03-09 PROCEDURE — 1160F RVW MEDS BY RX/DR IN RCRD: CPT | Mod: CPTII,S$GLB,, | Performed by: NURSE PRACTITIONER

## 2023-03-09 PROCEDURE — 71046 X-RAY EXAM CHEST 2 VIEWS: CPT | Mod: TC,FY

## 2023-03-09 PROCEDURE — 4010F PR ACE/ARB THEARPY RXD/TAKEN: ICD-10-PCS | Mod: CPTII,S$GLB,, | Performed by: NURSE PRACTITIONER

## 2023-03-09 PROCEDURE — 3052F PR MOST RECENT HEMOGLOBIN A1C LEVEL 8.0 - < 9.0%: ICD-10-PCS | Mod: CPTII,S$GLB,, | Performed by: NURSE PRACTITIONER

## 2023-03-09 PROCEDURE — 99999 PR PBB SHADOW E&M-EST. PATIENT-LVL IV: CPT | Mod: PBBFAC,,, | Performed by: NURSE PRACTITIONER

## 2023-03-09 PROCEDURE — 93005 ELECTROCARDIOGRAM TRACING: CPT | Mod: S$GLB,,, | Performed by: NURSE PRACTITIONER

## 2023-03-09 PROCEDURE — 3008F BODY MASS INDEX DOCD: CPT | Mod: CPTII,S$GLB,, | Performed by: NURSE PRACTITIONER

## 2023-03-09 PROCEDURE — 93005 EKG 12-LEAD: ICD-10-PCS | Mod: S$GLB,,, | Performed by: NURSE PRACTITIONER

## 2023-03-09 PROCEDURE — 3079F PR MOST RECENT DIASTOLIC BLOOD PRESSURE 80-89 MM HG: ICD-10-PCS | Mod: CPTII,S$GLB,, | Performed by: NURSE PRACTITIONER

## 2023-03-09 NOTE — PROGRESS NOTES
Subjective:      Tony Gordillo is a 42 y.o. male who presents to the office today for a preoperative consultation at the request of surgeon Dr. Bradford who plans on performing rotator cuff repair on March 14. This consultation is requested for the specific conditions prompting preoperative evaluation (i.e. because of potential affect on operative risk): DM, obestiy, NAINA. Planned anesthesia: general. The patient has the following known anesthesia issues:  none . Patients bleeding risk: no recent abnormal bleeding. Patient does not have objections to receiving blood products if needed.    The following portions of the patient's history were reviewed and updated as appropriate: allergies, current medications, past family history, past medical history, past social history, past surgical history, and problem list.    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     Employer: [employer]   Tobacco Use    Smoking status: Never    Smokeless tobacco: Never   Substance and Sexual Activity    Alcohol use: Yes     Comment: 1-2 beers every 2 weeks    Drug use: No     Social Determinants of Health     Financial Resource Strain: Low Risk     Difficulty of Paying Living Expenses: Not hard at all   Food Insecurity: No Food Insecurity    Worried About Running Out of Food in the Last Year: Never true    Ran Out of Food in the Last Year: Never true    Transportation Needs: No Transportation Needs    Lack of Transportation (Medical): No    Lack of Transportation (Non-Medical): No   Physical Activity: Insufficiently Active    Days of Exercise per Week: 3 days    Minutes of Exercise per Session: 40 min   Stress: No Stress Concern Present    Feeling of Stress : Only a little   Social Connections: Unknown    Frequency of Communication with Friends and Family: More than three times a week    Frequency of Social Gatherings with Friends and Family: Once a week    Active Member of Clubs or Organizations: Patient refused    Attends Club or Organization Meetings: Patient refused    Marital Status: Living with partner   Housing Stability: Low Risk     Unable to Pay for Housing in the Last Year: No    Number of Places Lived in the Last Year: 1    Unstable Housing in the Last Year: No       Current Outpatient Medications   Medication Sig Dispense Refill    atorvastatin (LIPITOR) 40 MG tablet Take 1 tablet (40 mg total) by mouth once daily. 90 tablet 3    azelastine (ASTELIN) 137 mcg (0.1 %) nasal spray 1 spray (137 mcg total) by Nasal route 2 (two) times daily. 30 mL 3    azelastine 205.5 mcg (0.15 %) Spry azelastine 205.5 mcg (0.15 %) nasal spray  INHALE 2 SPRAYS TO EACH NOSTRIL TWICE A DAY 30 mL 11    blood sugar diagnostic Strp To check BG 4 times daily, to use with insurance preferred meter 400 strip 3    blood sugar diagnostic Strp OneTouch Ultra Test strips      blood-glucose meter kit OneTouch Ultra2 Meter kit      blood-glucose sensor (FREESTYLE SAMANTHA 3 SENSOR) Filomena Change every 14 days 2 each 11    chlorpheniramine-acetaminophen (CORICIDIN HBP COLD AND FLU) 2-325 mg Tab Take 1 tablet by mouth 4 (four) times daily as needed (ear ache, runny nose, and congestion.). 30 tablet 1    cyanocobalamin, vitamin B-12, 5,000 mcg Subl Place one under tongue once a day for 1 month, then continue to take under tongue per week 30 tablet 3    diclofenac sodium (VOLTAREN) 1 % Gel  "Apply the gel (2 g) to the affected area 4 times daily. Do not apply more than 8 g daily to any one affected joint 100 g 1    empagliflozin (JARDIANCE) 25 mg tablet Take 1 tablet (25 mg total) by mouth once daily. 90 tablet 2    fenofibrate 160 MG Tab fenofibrate 160 mg tablet      fluticasone propionate (FLONASE) 50 mcg/actuation nasal spray fluticasone propionate 50 mcg/actuation nasal spray,suspension 16 g 3    fluticasone-umeclidin-vilanter (TRELEGY ELLIPTA) 200-62.5-25 mcg inhaler Inhale 1 puff into the lungs once daily. Stop symbicort 60 each 5    insulin aspart U-100 (NOVOLOG) 100 unit/mL (3 mL) InPn pen INJECT 40 UNITS BEFORE EACH MEAL WITH SLIDNING SCALE, MAX DAILY DOSE 150 UNITS 45 each 1    insulin glargine U-300 conc (TOUJEO MAX U-300 SOLOSTAR) 300 unit/mL (3 mL) insulin pen Inject 38 Units into the skin once daily. 4 pen 1    insulin NPH isoph U-100 human (NOVOLIN N FLEXPEN) 100 unit/mL (3 mL) InPn INJECT 14 UNITS INTO THE SKIN ONCE DAILY AT NIGHT 8 PM. 15 mL 1    ketoconazole (NIZORAL) 2 % cream Apply topically once daily. 1 Tube 3    lamoTRIgine (LAMICTAL) 25 MG tablet Take 3 tablets (75 mg total) by mouth once daily. 90 tablet 1    lancets Misc To check BG 4 times daily, to use with insurance preferred meter 400 each 3    levothyroxine (SYNTHROID) 50 MCG tablet TAKE 1 TABLET (50 MCG TOTAL) BY MOUTH BEFORE BREAKFAST. 90 tablet 2    lisinopriL (PRINIVIL,ZESTRIL) 20 MG tablet Take 1 tablet (20 mg total) by mouth once daily. 90 tablet 3    montelukast (SINGULAIR) 10 mg tablet TAKE 1 TABLET BY MOUTH EVERY EVENING 90 tablet 3    ondansetron (ZOFRAN-ODT) 8 MG TbDL Take 1 tablet (8 mg total) by mouth every 6 (six) hours as needed (n/v). 30 tablet 0    pen needle, diabetic (BD ULTRA-FINE KEN PEN NEEDLE) 32 gauge x 5/32" Ndle 1 Device by Misc.(Non-Drug; Combo Route) route 5 (five) times daily. 550 each 4    promethazine (PHENERGAN) 25 MG suppository Place 1 suppository (25 mg total) rectally every 6 (six) " "hours as needed for Nausea. 10 suppository 0    syringe, disposable, 1 mL Syrg Testosterone every 2 weeks 25 Syringe 1    testosterone enanthate (DELATESTRYL) 200 mg/mL injection Inject into the muscle every 14 (fourteen) days.      tirzepatide (MOUNJARO) 10 mg/0.5 mL PnIj Inject 10 mg into the skin every 7 days. Start Mounjaro 5 mg once weekly for 4 weeks. Then increase to Mounjaro 10 mg once weekly. 4 pen 1    tirzepatide (MOUNJARO) 5 mg/0.5 mL PnIj Inject 5 mg into the skin every 7 days. Start Mounjaro 5 mg once weekly for 4 weeks. Then increase to Mounjaro 10 mg once weekly. 4 pen 0     No current facility-administered medications for this visit.       Review of patient's allergies indicates:   Allergen Reactions    Influenza virus vaccine, specific Hives    Pioglitazone      Altered mood     Victoza [liraglutide] Nausea And Vomiting    Farxiga [dapagliflozin] Rash     Erectile dysfunction and rash       Review of Systems  A comprehensive review of systems was negative.      Objective:      /86 (BP Location: Right arm, Patient Position: Sitting, BP Method: Large (Manual))   Pulse 99   Temp 98.8 °F (37.1 °C) (Oral)   Ht 6' 1" (1.854 m)   Wt (!) 136.1 kg (300 lb 0.7 oz)   SpO2 96%   BMI 39.59 kg/m²     General Appearance:    Alert, cooperative, no distress, appears stated age   Head:    Normocephalic, without obvious abnormality, atraumatic   Eyes:    PERRL, conjunctiva/corneas clear, EOM's intact, fundi     benign, both eyes        Ears:    Normal TM's and external ear canals, both ears   Nose:   Nares normal, septum midline, mucosa normal, no drainage    or sinus tenderness   Throat:   Lips, mucosa, and tongue normal; teeth and gums normal   Neck:   Supple, symmetrical, trachea midline, no adenopathy;        thyroid:  No enlargement/tenderness/nodules; no carotid    bruit or JVD   Back:     Symmetric, no curvature, ROM normal, no CVA tenderness   Lungs:     Clear to auscultation bilaterally, " respirations unlabored   Chest wall:    No tenderness or deformity   Heart:    Regular rate and rhythm, S1 and S2 normal, no murmur, rub   or gallop   Abdomen:     Soft, non-tender, bowel sounds active all four quadrants,     no masses, no organomegaly   Genitalia:    Normal male without lesion, discharge or tenderness   Rectal:    Normal tone, normal prostate, no masses or tenderness;    guaiac negative stool   Extremities:   Extremities normal, atraumatic, no cyanosis or edema   Pulses:   2+ and symmetric all extremities   Skin:   Skin color, texture, turgor normal, no rashes or lesions   Lymph nodes:   Cervical, supraclavicular, and axillary nodes normal   Neurologic:   CNII-XII intact. Normal strength, sensation and reflexes       throughout       Predictors of intubation difficulty:   Morbid obesity? yes - 39.59   Anatomically abnormal facies? no   Prominent incisors? no   Receding mandible? no   Neck range of motion: normal   Mallampati score: I (soft palate, uvula, fauces, and tonsillar pillars visible)      Cardiographics  ECG:  NSR; possible left atrial enlargement; septal infarct undetermined age.    Had cardiac PET scan stress with CT: results below:  Conclusion         There are no clinically significant perfusion abnormalities. There is a small (<10%) amount of mild resting heterogeneity in the basal to apical septal wall(s) that improves with stress consistent with endothelial dysfunction secondary to HTN, HLD, and DM.    The whole heart absolute myocardial perfusion values averaged 0.62 cc/min/g at rest, which is normal; 1.71 cc/min/g at stress, which is mildly reduced; and CFR is 2.84 , which is low normal.    CT attenuation images demonstrate mild diffuse coronary calcifications in the LAD territory and no aortic calcifications.    The gated perfusion images showed an ejection fraction of 60% at rest and 68% during stress. A normal ejection fraction is greater than 53%.    The wall motion is normal  at rest and during stress.    The LV cavity size is normal at rest and stress.    The EKG portion of this study is negative for ischemia.    There were no arrhythmias during stress.    The patient reported no chest pain during the stress test.    There are no prior studies for comparison.      Imaging  Chest x-ray:  will obtain after visit      Lab Review   not applicable  Will obtain after visit.      Assessment:        42 y.o. male with planned surgery as above.    Known risk factors for perioperative complications: Diabetes mellitus    Difficulty with intubation is not anticipated.    Cardiac Risk Estimation: RCRI score :1; class II risk        Plan:   Pre-op evaluation  -     CBC Auto Differential; Future; Expected date: 03/09/2023  -     Comprehensive Metabolic Panel; Future; Expected date: 03/09/2023  -     IN OFFICE EKG 12-LEAD (to Muse)  -     Protime-INR; Future; Expected date: 03/09/2023  -     APTT; Future; Expected date: 03/09/2023  -     X-Ray Chest PA And Lateral; Future; Expected date: 03/09/2023    Essential hypertension  -     CBC Auto Differential; Future; Expected date: 03/09/2023  -     Comprehensive Metabolic Panel; Future; Expected date: 03/09/2023  -     X-Ray Chest PA And Lateral; Future; Expected date: 03/09/2023  The current medical regimen is effective;  continue present plan and medications.    Hyperlipidemia LDL goal <70  The current medical regimen is effective;  continue present plan and medications.  The patient is asked to make an attempt to improve diet and exercise patterns to aid in medical management of this problem.    Type 2 diabetes mellitus with left eye affected by mild nonproliferative retinopathy without macular edema, with long-term current use of insulin/Type 2 diabetes mellitus without complication, with long-term current use of insulin/Insulin long-term use   Last hemoglobin A1C was 8. Followed by endo. Down from 8.8.   The current medical regimen is effective;   continue present plan and medications.  The patient is asked to make an attempt to improve diet and exercise patterns to aid in medical management of this problem.    Morbid obesity  The patient is asked to make an attempt to improve diet and exercise patterns to aid in medical management of this problem.    NAINA (obstructive sleep apnea) 8/2019 sleep study [facility] 11  Stable. No acute concerns.    Hypothyroidism due to acquired atrophy of thyroid  The current medical regimen is effective;  continue present plan and medications.      RTC PRN    After reviewing all labs and test and evaluation by cardiology the patient is medically optimized for surgery.

## 2023-03-09 NOTE — PROGRESS NOTES
Health Maintenance Due   Topic Date Due    Pneumococcal Vaccines (Age 0-64) (2 - PCV) 09/28/2019    Influenza Vaccine (1) 09/01/2022    Foot Exam  02/16/2023    Lipid Panel  02/17/2023    Diabetes Urine Screening  02/17/2023

## 2023-03-10 ENCOUNTER — TELEPHONE (OUTPATIENT)
Dept: NEUROLOGY | Facility: CLINIC | Age: 43
End: 2023-03-10
Payer: COMMERCIAL

## 2023-03-10 ENCOUNTER — HOSPITAL ENCOUNTER (OUTPATIENT)
Dept: PREADMISSION TESTING | Facility: HOSPITAL | Age: 43
Discharge: HOME | End: 2023-03-10
Attending: ORTHOPAEDIC SURGERY
Payer: COMMERCIAL

## 2023-03-10 ENCOUNTER — OFFICE VISIT (OUTPATIENT)
Dept: CARDIOLOGY | Facility: CLINIC | Age: 43
End: 2023-03-10
Payer: COMMERCIAL

## 2023-03-10 ENCOUNTER — HOSPITAL ENCOUNTER (OUTPATIENT)
Dept: PREADMISSION TESTING | Facility: HOSPITAL | Age: 43
Discharge: HOME | End: 2023-03-10
Attending: INTERNAL MEDICINE
Payer: COMMERCIAL

## 2023-03-10 ENCOUNTER — TELEPHONE (OUTPATIENT)
Dept: FAMILY MEDICINE | Facility: CLINIC | Age: 43
End: 2023-03-10
Payer: COMMERCIAL

## 2023-03-10 VITALS
WEIGHT: 296.44 LBS | DIASTOLIC BLOOD PRESSURE: 72 MMHG | OXYGEN SATURATION: 98 % | HEART RATE: 97 BPM | SYSTOLIC BLOOD PRESSURE: 128 MMHG | RESPIRATION RATE: 18 BRPM | BODY MASS INDEX: 39.29 KG/M2 | HEIGHT: 73 IN

## 2023-03-10 VITALS
TEMPERATURE: 98 F | HEART RATE: 94 BPM | HEIGHT: 73 IN | DIASTOLIC BLOOD PRESSURE: 87 MMHG | RESPIRATION RATE: 18 BRPM | WEIGHT: 298.75 LBS | SYSTOLIC BLOOD PRESSURE: 128 MMHG | OXYGEN SATURATION: 97 % | BODY MASS INDEX: 39.59 KG/M2

## 2023-03-10 DIAGNOSIS — Z79.4 TYPE 2 DIABETES MELLITUS WITH LEFT EYE AFFECTED BY MILD NONPROLIFERATIVE RETINOPATHY WITHOUT MACULAR EDEMA, WITH LONG-TERM CURRENT USE OF INSULIN: ICD-10-CM

## 2023-03-10 DIAGNOSIS — R06.00 DYSPNEA, UNSPECIFIED TYPE: ICD-10-CM

## 2023-03-10 DIAGNOSIS — I10 ESSENTIAL HYPERTENSION: Primary | ICD-10-CM

## 2023-03-10 DIAGNOSIS — Z79.4 TYPE 2 DIABETES MELLITUS WITHOUT COMPLICATION, WITH LONG-TERM CURRENT USE OF INSULIN: ICD-10-CM

## 2023-03-10 DIAGNOSIS — Z74.09 DECREASED STRENGTH, ENDURANCE, AND MOBILITY: ICD-10-CM

## 2023-03-10 DIAGNOSIS — Z01.818 PRE-OP EXAM: ICD-10-CM

## 2023-03-10 DIAGNOSIS — E11.9 TYPE 2 DIABETES MELLITUS WITHOUT COMPLICATION, WITH LONG-TERM CURRENT USE OF INSULIN: ICD-10-CM

## 2023-03-10 DIAGNOSIS — E66.01 MORBID OBESITY: ICD-10-CM

## 2023-03-10 DIAGNOSIS — E11.3292 TYPE 2 DIABETES MELLITUS WITH LEFT EYE AFFECTED BY MILD NONPROLIFERATIVE RETINOPATHY WITHOUT MACULAR EDEMA, WITH LONG-TERM CURRENT USE OF INSULIN: ICD-10-CM

## 2023-03-10 DIAGNOSIS — E78.5 HYPERLIPIDEMIA LDL GOAL <70: ICD-10-CM

## 2023-03-10 DIAGNOSIS — R94.31 ABNORMAL EKG: Primary | ICD-10-CM

## 2023-03-10 DIAGNOSIS — G47.33 OSA (OBSTRUCTIVE SLEEP APNEA): ICD-10-CM

## 2023-03-10 DIAGNOSIS — R68.89 DECREASED STRENGTH, ENDURANCE, AND MOBILITY: ICD-10-CM

## 2023-03-10 DIAGNOSIS — E03.4 HYPOTHYROIDISM DUE TO ACQUIRED ATROPHY OF THYROID: ICD-10-CM

## 2023-03-10 DIAGNOSIS — G43.109 MIGRAINE WITH AURA AND WITHOUT STATUS MIGRAINOSUS, NOT INTRACTABLE: ICD-10-CM

## 2023-03-10 DIAGNOSIS — R53.1 DECREASED STRENGTH, ENDURANCE, AND MOBILITY: ICD-10-CM

## 2023-03-10 DIAGNOSIS — Z01.810 PREOP CARDIOVASCULAR EXAM: ICD-10-CM

## 2023-03-10 PROCEDURE — 3074F SYST BP LT 130 MM HG: CPT | Mod: CPTII,S$GLB,, | Performed by: INTERNAL MEDICINE

## 2023-03-10 PROCEDURE — 1160F PR REVIEW ALL MEDS BY PRESCRIBER/CLIN PHARMACIST DOCUMENTED: ICD-10-PCS | Mod: CPTII,S$GLB,, | Performed by: INTERNAL MEDICINE

## 2023-03-10 PROCEDURE — 99999 PR PBB SHADOW E&M-EST. PATIENT-LVL III: CPT | Mod: PBBFAC,,, | Performed by: INTERNAL MEDICINE

## 2023-03-10 PROCEDURE — 99214 OFFICE O/P EST MOD 30 MIN: CPT | Mod: S$GLB,,, | Performed by: INTERNAL MEDICINE

## 2023-03-10 PROCEDURE — 1159F MED LIST DOCD IN RCRD: CPT | Mod: CPTII,S$GLB,, | Performed by: INTERNAL MEDICINE

## 2023-03-10 PROCEDURE — 3078F PR MOST RECENT DIASTOLIC BLOOD PRESSURE < 80 MM HG: ICD-10-PCS | Mod: CPTII,S$GLB,, | Performed by: INTERNAL MEDICINE

## 2023-03-10 PROCEDURE — 3078F DIAST BP <80 MM HG: CPT | Mod: CPTII,S$GLB,, | Performed by: INTERNAL MEDICINE

## 2023-03-10 PROCEDURE — 4010F ACE/ARB THERAPY RXD/TAKEN: CPT | Mod: CPTII,S$GLB,, | Performed by: INTERNAL MEDICINE

## 2023-03-10 PROCEDURE — 3074F PR MOST RECENT SYSTOLIC BLOOD PRESSURE < 130 MM HG: ICD-10-PCS | Mod: CPTII,S$GLB,, | Performed by: INTERNAL MEDICINE

## 2023-03-10 PROCEDURE — 1160F RVW MEDS BY RX/DR IN RCRD: CPT | Mod: CPTII,S$GLB,, | Performed by: INTERNAL MEDICINE

## 2023-03-10 PROCEDURE — 3052F PR MOST RECENT HEMOGLOBIN A1C LEVEL 8.0 - < 9.0%: ICD-10-PCS | Mod: CPTII,S$GLB,, | Performed by: INTERNAL MEDICINE

## 2023-03-10 PROCEDURE — 3052F HG A1C>EQUAL 8.0%<EQUAL 9.0%: CPT | Mod: CPTII,S$GLB,, | Performed by: INTERNAL MEDICINE

## 2023-03-10 PROCEDURE — 99214 PR OFFICE/OUTPT VISIT, EST, LEVL IV, 30-39 MIN: ICD-10-PCS | Mod: S$GLB,,, | Performed by: INTERNAL MEDICINE

## 2023-03-10 PROCEDURE — 4010F PR ACE/ARB THEARPY RXD/TAKEN: ICD-10-PCS | Mod: CPTII,S$GLB,, | Performed by: INTERNAL MEDICINE

## 2023-03-10 PROCEDURE — 3008F PR BODY MASS INDEX (BMI) DOCUMENTED: ICD-10-PCS | Mod: CPTII,S$GLB,, | Performed by: INTERNAL MEDICINE

## 2023-03-10 PROCEDURE — 3008F BODY MASS INDEX DOCD: CPT | Mod: CPTII,S$GLB,, | Performed by: INTERNAL MEDICINE

## 2023-03-10 PROCEDURE — 99999 PR PBB SHADOW E&M-EST. PATIENT-LVL III: ICD-10-PCS | Mod: PBBFAC,,, | Performed by: INTERNAL MEDICINE

## 2023-03-10 PROCEDURE — 1159F PR MEDICATION LIST DOCUMENTED IN MEDICAL RECORD: ICD-10-PCS | Mod: CPTII,S$GLB,, | Performed by: INTERNAL MEDICINE

## 2023-03-10 NOTE — PROGRESS NOTES
CARDIOVASCULAR CONSULTATION    REASON FOR CONSULT:   Tony Gordillo is a 42 y.o. male who presents for chest pain.      HISTORY OF PRESENT ILLNESS:     Patient is a pleasant 41-year-old man.  Main complaint is dyspnea on exertion and atypical chest pain.  Had a stress echo done.  Echo portion did not show any significant ischemia, EKG post abnormal.  It was a pharmacological stress echo because patient cannot run because of knee on knee.  States that he had COVID infection in December and since then his exercise tolerance has decreased significantly.  He can hardly walk and then has to stop because of shortness of breath.  Occasional gets chest heaviness when he gets very short of breath on exertion.      There were no arrhythmias during stress.  Concentric hypertrophy and normal systolic function.  The ECG portion of this study is positive for myocardial ischemia.  The estimated ejection fraction is 60%.  Normal left ventricular diastolic function.  Normal right ventricular size with normal right ventricular systolic function.  Mild left atrial enlargement.  The stress echo portion of this study is negative for myocardial ischemia.    Notes from April 2022    Patient here for follow-up.  PET scan did not show any significant coronary artery stenosis.  It was suggestive of mild endothelial dysfunction.  Patient states that his dyspnea on exertion predominantly got worse after he got the COVID.  He is in physical therapy for that.        There are no clinically significant perfusion abnormalities. There is a small (<10%) amount of mild resting heterogeneity in the basal to apical septal wall(s) that improves with stress consistent with endothelial dysfunction secondary to HTN, HLD, and DM.    The whole heart absolute myocardial perfusion values averaged 0.62 cc/min/g at rest, which is normal; 1.71 cc/min/g at stress, which is mildly reduced; and CFR is 2.84 , which is low normal.    CT attenuation images  demonstrate mild diffuse coronary calcifications in the LAD territory and no aortic calcifications.    The gated perfusion images showed an ejection fraction of 60% at rest and 68% during stress. A normal ejection fraction is greater than 53%.    The wall motion is normal at rest and during stress.    The LV cavity size is normal at rest and stress.    The EKG portion of this study is negative for ischemia.    There were no arrhythmias during stress.    The patient reported no chest pain during the stress test.    There are no prior studies for comparison.      Mar 23:  Issues.  Needs to go for rotator cuff surgery.  Patient here for follow-up. can go up 4 flights of stairs and can walk 5 miles without any issues.  No chest pains at rest on exertion, orthopnea, PND        PAST MEDICAL HISTORY:     Past Medical History:   Diagnosis Date    Diabetes mellitus, type 2     Hyperlipidemia     Hypertension     Obesity     NAINA (obstructive sleep apnea)        PAST SURGICAL HISTORY:     Past Surgical History:   Procedure Laterality Date    KNEE SURGERY      acl,mcl,pcl    left knee x 2         ALLERGIES AND MEDICATION:     Review of patient's allergies indicates:   Allergen Reactions    Influenza virus vaccine, specific Hives    Pioglitazone      Altered mood     Victoza [liraglutide] Nausea And Vomiting    Farxiga [dapagliflozin] Rash     Erectile dysfunction and rash         Medication List            Accurate as of March 10, 2023  9:39 AM. If you have any questions, ask your nurse or doctor.                CONTINUE taking these medications      atorvastatin 40 MG tablet  Commonly known as: LIPITOR  Take 1 tablet (40 mg total) by mouth once daily.     * azelastine 205.5 mcg (0.15 %) Spry  azelastine 205.5 mcg (0.15 %) nasal spray  INHALE 2 SPRAYS TO EACH NOSTRIL TWICE A DAY     * azelastine 137 mcg (0.1 %) nasal spray  Commonly known as: ASTELIN  1 spray (137 mcg total) by Nasal route 2 (two) times daily.     * blood sugar  diagnostic Strp     * blood sugar diagnostic Strp  To check BG 4 times daily, to use with insurance preferred meter     blood-glucose meter kit     cyanocobalamin (vitamin B-12) 5,000 mcg Subl  Place one under tongue once a day for 1 month, then continue to take under tongue per week     diclofenac sodium 1 % Gel  Commonly known as: VOLTAREN  Apply the gel (2 g) to the affected area 4 times daily. Do not apply more than 8 g daily to any one affected joint     empagliflozin 25 mg tablet  Commonly known as: JARDIANCE  Take 1 tablet (25 mg total) by mouth once daily.     fenofibrate 160 MG Tab     fluticasone propionate 50 mcg/actuation nasal spray  Commonly known as: FLONASE  fluticasone propionate 50 mcg/actuation nasal spray,suspension     FREESTYLE SAMANTHA 3 SENSOR Filomena  Generic drug: blood-glucose sensor  Change every 14 days     insulin aspart U-100 100 unit/mL (3 mL) Inpn pen  Commonly known as: NovoLOG  INJECT 40 UNITS BEFORE EACH MEAL WITH SLIDNING SCALE, MAX DAILY DOSE 150 UNITS     ketoconazole 2 % cream  Commonly known as: NIZORAL  Apply topically once daily.     lamoTRIgine 25 MG tablet  Commonly known as: LAMICTAL  Take 3 tablets (75 mg total) by mouth once daily.     lancets Misc  To check BG 4 times daily, to use with insurance preferred meter     levothyroxine 50 MCG tablet  Commonly known as: SYNTHROID  TAKE 1 TABLET (50 MCG TOTAL) BY MOUTH BEFORE BREAKFAST.     lisinopriL 20 MG tablet  Commonly known as: PRINIVIL,ZESTRIL  Take 1 tablet (20 mg total) by mouth once daily.     montelukast 10 mg tablet  Commonly known as: SINGULAIR  TAKE 1 TABLET BY MOUTH EVERY EVENING     * MOUNJARO 5 mg/0.5 mL Pnij  Generic drug: tirzepatide  Inject 5 mg into the skin every 7 days. Start Mounjaro 5 mg once weekly for 4 weeks. Then increase to Mounjaro 10 mg once weekly.     * MOUNJARO 10 mg/0.5 mL Pnij  Generic drug: tirzepatide  Inject 10 mg into the skin every 7 days. Start Mounjaro 5 mg once weekly for 4 weeks. Then  "increase to Mounjaro 10 mg once weekly.     NovoLIN N FlexPen 100 unit/mL (3 mL) Inpn  Generic drug: insulin NPH isoph U-100 human  INJECT 14 UNITS INTO THE SKIN ONCE DAILY AT NIGHT 8 PM.     pen needle, diabetic 32 gauge x 5/32" Ndle  Commonly known as: BD ULTRA-FINE KEN PEN NEEDLE  1 Device by Misc.(Non-Drug; Combo Route) route 5 (five) times daily.     syringe (disposable) 1 mL Syrg  Testosterone every 2 weeks     TOUJEO MAX U-300 SOLOSTAR 300 unit/mL (3 mL) insulin pen  Generic drug: insulin glargine U-300 conc  Inject 38 Units into the skin once daily.     TRELEGY ELLIPTA 200-62.5-25 mcg inhaler  Generic drug: fluticasone-umeclidin-vilanter  Inhale 1 puff into the lungs once daily. Stop symbicort           * This list has 6 medication(s) that are the same as other medications prescribed for you. Read the directions carefully, and ask your doctor or other care provider to review them with you.                  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     Employer: [email or web address]   Tobacco Use    Smoking status: Never    Smokeless tobacco: Never   Substance and Sexual Activity    Alcohol use: Yes     Comment: 1-2 beers every 2 weeks    Drug use: No     Social Determinants of Health     Financial Resource Strain: Low Risk     Difficulty of Paying Living Expenses: Not hard at all   Food Insecurity: No Food Insecurity    Worried About Running Out of Food in the Last Year: Never true    Ran Out of Food in the Last Year: Never true   Transportation Needs: No Transportation Needs    Lack of Transportation (Medical): No    Lack of Transportation (Non-Medical): No   Physical Activity: Insufficiently Active    Days of Exercise per Week: 3 days    Minutes of Exercise per Session: 40 min   Stress: No Stress Concern Present    Feeling of Stress : Only a little   Social " "Connections: Unknown    Frequency of Communication with Friends and Family: More than three times a week    Frequency of Social Gatherings with Friends and Family: Once a week    Active Member of Clubs or Organizations: Patient refused    Attends Club or Organization Meetings: Patient refused    Marital Status: Living with partner   Housing Stability: Low Risk     Unable to Pay for Housing in the Last Year: No    Number of Places Lived in the Last Year: 1    Unstable Housing in the Last Year: No       FAMILY HISTORY:     Family History   Problem Relation Age of Onset    Diabetes Mother     Diabetes Father     No Known Problems Brother     Autism Son     No Known Problems Daughter        REVIEW OF SYSTEMS:   Review of Systems   Constitutional: Negative.   HENT: Negative.     Eyes: Negative.    Cardiovascular:  Negative for chest pain.   Endocrine: Negative.    Hematologic/Lymphatic: Negative.    Skin: Negative.    Musculoskeletal: Negative.    Gastrointestinal: Negative.    Genitourinary: Negative.    Neurological: Negative.    Psychiatric/Behavioral: Negative.     Allergic/Immunologic: Negative.      A 10 point review of systems was performed and all the pertinent positives have been mentioned. Rest of review of systems was negative.        PHYSICAL EXAM:     Vitals:    03/10/23 0927   BP: 128/72   Pulse: 97   Resp: 18    Body mass index is 39.11 kg/m².  Weight: 134.5 kg (296 lb 6.5 oz)   Height: 6' 1" (185.4 cm)     Physical Exam  Vitals reviewed.   Constitutional:       Appearance: He is well-developed.   HENT:      Head: Normocephalic.   Eyes:      Conjunctiva/sclera: Conjunctivae normal.      Pupils: Pupils are equal, round, and reactive to light.   Cardiovascular:      Rate and Rhythm: Normal rate and regular rhythm.      Heart sounds: Normal heart sounds.   Pulmonary:      Effort: Pulmonary effort is normal.      Breath sounds: Normal breath sounds.   Abdominal:      General: Bowel sounds are normal.      " Palpations: Abdomen is soft.   Musculoskeletal:      Cervical back: Normal range of motion and neck supple.   Skin:     General: Skin is warm.   Neurological:      Mental Status: He is alert and oriented to person, place, and time.         DATA:     Laboratory:  CBC:  Recent Labs   Lab 03/25/22  0617 01/19/23  1002 03/09/23  1604   WBC 17.96 H 7.41 8.21   Hemoglobin 18.5 H 13.9 L 14.4   Hematocrit 57.5 H 46.9 46.4   Platelets 218 209 240         CHEMISTRIES:  Recent Labs   Lab 07/15/21  0840 02/17/22  1115 03/25/22  0617 11/02/22  0857 01/19/23  1002 03/09/23  1604   Glucose 155 H 159 H 260 H  --  182 H 111 H   Sodium 139 140 139  --  137 141   Potassium 4.0 4.3 4.2  --  4.0 4.6   BUN 14 14 22 H  --  17 16   Creatinine 0.9 0.9 1.0 1.0 0.8 1.0   eGFR if African American >60.0 >60.0 >60  --   --   --    eGFR if non African American >60.0 >60.0 >60  --   --   --    Calcium 10.1 10.3 9.8  --  9.7 9.6   Magnesium  --   --  1.8  --  1.9  --          CARDIAC BIOMARKERS:  Recent Labs   Lab 03/25/22  0617   Troponin I <0.006         COAGS:  Recent Labs   Lab 03/09/23  1604   INR 1.0         LIPIDS/LFTS:  Recent Labs   Lab 12/18/20  0820 02/03/21  0817 07/15/21  0840 02/17/22  1115 03/25/22  0617 01/19/23  1002 03/09/23  1604   Cholesterol 136  --  160 162  --   --   --    Triglycerides 128  --  138 141  --   --   --    HDL 48  --  48 52  --   --   --    LDL Cholesterol 62.4 L  --  84.4 81.8  --   --   --    Non-HDL Cholesterol 88  --  112 110  --   --   --    AST  --    < > 19 18 16 25 16   ALT  --    < > 23 33 31 30 23    < > = values in this interval not displayed.         Hemoglobin A1C   Date Value Ref Range Status   01/19/2023 8.0 (H) 4.0 - 5.6 % Final     Comment:     ADA Screening Guidelines:  5.7-6.4%  Consistent with prediabetes  >or=6.5%  Consistent with diabetes    High levels of fetal hemoglobin interfere with the HbA1C  assay. Heterozygous hemoglobin variants (HbS, HgC, etc)do  not significantly interfere with  this assay.   However, presence of multiple variants may affect accuracy.     05/12/2022 8.8 (H) 4.0 - 5.6 % Final     Comment:     ADA Screening Guidelines:  5.7-6.4%  Consistent with prediabetes  >or=6.5%  Consistent with diabetes    High levels of fetal hemoglobin interfere with the HbA1C  assay. Heterozygous hemoglobin variants (HbS, HgC, etc)do  not significantly interfere with this assay.   However, presence of multiple variants may affect accuracy.     02/17/2022 9.9 (H) 4.0 - 5.6 % Final     Comment:     ADA Screening Guidelines:  5.7-6.4%  Consistent with prediabetes  >or=6.5%  Consistent with diabetes    High levels of fetal hemoglobin interfere with the HbA1C  assay. Heterozygous hemoglobin variants (HbS, HgC, etc)do  not significantly interfere with this assay.   However, presence of multiple variants may affect accuracy.         TSH  Recent Labs   Lab 02/17/22  1115 03/25/22  0617 01/19/23  1002   TSH 2.655 8.099 H 2.562  2.562         The 10-year ASCVD risk score (Shahzad TREVIÑO, et al., 2019) is: 2.3%    Values used to calculate the score:      Age: 42 years      Sex: Male      Is Non- : No      Diabetic: Yes      Tobacco smoker: No      Systolic Blood Pressure: 128 mmHg      Is BP treated: Yes      HDL Cholesterol: 52 mg/dL      Total Cholesterol: 162 mg/dL             ASSESSMENT AND PLAN     Patient Active Problem List   Diagnosis    Hyperlipidemia LDL goal <70    Insulin long-term use    Tinea pedis    Morbid obesity    Hypothyroidism    Type 2 diabetes mellitus with left eye affected by mild nonproliferative retinopathy without macular edema, with long-term current use of insulin    Essential hypertension    Migraine with aura and without status migrainosus, not intractable propranolol SE angry; topamax not helpful; imitrex ineffective; daith ear piercing helps    Non-seasonal allergic rhinitis; avoid antihistamines per opthalmology    Acute bilateral low back pain without  sciatica    NAINA (obstructive sleep apnea) 8/2019 sleep study AHI 11    Low testosterone in male; pituitary axis normal. MRI negative. 11/2019 started on testosterone    Right foot pain    Decreased strength of lower extremity    Decreased range of motion of both ankles    Gait abnormality    Rib pain on left side    Dyspnea    Decreased strength, endurance, and mobility    Left hand pain    Mild neurocognitive disorder due to traumatic brain injury    Outbursts of anger    Other amnesia    Decreased range of motion (ROM) of left knee    Quadriceps weakness         Preoperative risk assessment prior to rotator cuff surgery.   PET scan did not show any significant ischemia.  Endothelial dysfunction.  Continue aggressive risk factor modification, weight loss control risk factors.  Exercise tolerance greater than 4 Mets.  No further ischemic workup indicated prior to surgery.  May proceed for surgery at low-to-moderate risk    Hypertension:  Well controlled at current therapy    Weight loss has been recommended    Follow-up 1 year or as needed  Thank you very much for involving me in the care of your patient.  Please do not hesitate to contact me if there are any questions.      Hussain Winston MD, FACC, [facility]  Interventional Cardiologist, [facility].           This note was dictated with the help of speech recognition software.  There might be un-intended errors and/or substitutions.

## 2023-03-10 NOTE — ANESTHESIA PREPROCEDURE EVALUATION
03/10/2023  Tony Gordillo is a 42 y.o., male scheduled for REPAIR, ROTATOR CUFF, ARTHROSCOPIC (Right: Shoulder) on 3/14/2023.        Past Medical History:   Diagnosis Date    Diabetes mellitus, type 2     Hyperlipidemia     Hypertension     Obesity     NAINA (obstructive sleep apnea)        Past Surgical History:   Procedure Laterality Date    ANKLE SURGERY      KNEE SURGERY      acl,mcl,pcl    left knee x 2       LABS  CBC  Lab Results   Component Value Date    WBC 8.21 03/09/2023    HGB 14.4 03/09/2023    HCT 46.4 03/09/2023    MCV 83 03/09/2023     03/09/2023     BMP  Lab Results   Component Value Date     03/09/2023    K 4.6 03/09/2023     03/09/2023    CO2 26 03/09/2023    BUN 16 03/09/2023    CREATININE 1.0 03/09/2023    CALCIUM 9.6 03/09/2023    ANIONGAP 9 03/09/2023    EGFRNORACEVR >60 03/09/2023           Pre-op Assessment    I have reviewed the Patient Summary Reports.     I have reviewed the Nursing Notes. I have reviewed the NPO Status.   I have reviewed the Medications.     Review of Systems  Anesthesia Hx:  No problems with previous Anesthesia  Denies Family Hx of Anesthesia complications.   Denies Personal Hx of Anesthesia complications.   Social:  Non-Smoker, No Alcohol Use    Hematology/Oncology:  Hematology Normal   Oncology Normal     EENT/Dental:EENT/Dental Normal   Cardiovascular:   Exercise tolerance: good Hypertension  Functional Capacity good / => 4 METS    Pulmonary:   Denies Shortness of breath. Sleep Apnea, CPAP    Renal/:  Renal/ Normal     Hepatic/GI:  Hepatic/GI Normal    Musculoskeletal:   Rotator cuff tear   Neurological:   Headaches    Endocrine:   Diabetes, type 2 Hypothyroidism    Dermatological:  Skin Normal    Psych:  Psychiatric Normal           Physical Exam  General: Well nourished, Cooperative, Alert and Oriented    Airway:  Mallampati:  II   Mouth Opening: Normal  TM Distance: Normal  Tongue: Normal  Neck ROM: Normal ROM    Dental:  Intact    Chest/Lungs:  Normal Respiratory Rate    Heart:  Rate: Normal        Anesthesia Plan  Type of Anesthesia, risks & benefits discussed:    Anesthesia Type: Gen ETT  Intra-op Monitoring Plan: Standard ASA Monitors  Induction:  IV  Airway Plan: Direct and Video  Informed Consent: Informed consent signed with the Patient and all parties understand the risks and agree with anesthesia plan.  All questions answered.   ASA Score: 2    Ready For Surgery From Anesthesia Perspective.     .

## 2023-03-10 NOTE — TELEPHONE ENCOUNTER
----- Message from Divya Parker sent at 3/10/2023  9:50 AM CST -----  Who called: Self    What is the request in detail: Inform provider that he was seen at Cardiology for paperwork to be completed    Can the clinic reply by [facility]? No     Would the patient rather a call back or a response via [facility]? Call Back    Best call back number: [phone] (home)      Additional Information:

## 2023-03-10 NOTE — DISCHARGE INSTRUCTIONS
Before 7 AM, enter through the Emergency Entrance..   After 7 AM enter through the Main Entrance.      Your procedure  is scheduled for __3/14/2023________.    Call [phone] between 2pm and 5pm on __3/13/2023_____to find out your arrival time for the day of surgery.    You may have one visitor.  No children allowed.     You will be going to the Same Day Surgery Unit on the 2nd floor of the hospital.    Important instructions:  Do not eat anything after midnight.  You may have plain water, non carbonated.  You may also have Gatorade or Powerade after midnight.    Stop all fluids 2 hours before your surgery.    It is okay to brush your teeth.  Do not have gum, candy or mints.    SEE MEDICATION SHEET.   TAKE MEDICATIONS AS DIRECTED WITH SIPS OF WATER.      Do not take any diabetic medication on the morning of surgery unless instructed to do so by your doctor or pre op nurse.    STOP taking Aspirin, Ibuprofen,  Advil, Motrin, Mobic(meloxicam), Aleve (naproxen), Fish oil, and Vitamin E for at least 7 days before your surgery.     You may take Tylenol if needed which is not a blood thinner.    Please shower the night before and the morning of your surgery.      Use Chlorhexidine soap as instructed by your pre op nurse.   Please place clean linens on your bed the night before surgery. Please wear fresh clean clothing after each shower.    No shaving of procedural area at least 4-5 days before surgery due to increased risk of skin irritation and/or possible infection.    Contact lenses and removable denture work may not be worn during your procedure.    You may wear deodorant only. If you are having breast surgery, do not wear deodorant on the operative side.    Do not wear powder, body lotion, perfume/cologne or make-up.    Do not wear any jewelry or have any metal on your body.    You will be asked to remove any dentures or partials for the procedure.    If you are going home on the same day of surgery, you  must arrange for a family member or a friend to drive you home.  Public transportation is prohibited.  You will not be able to drive home if you were given anesthesia or sedation.    Patients who want to have their Post-op prescriptions filled from our in-house [facility], bring a Credit/Debit Card or cash with you. A co-pay may be required.  The pharmacy closes at 5:30 pm.    Wear loose fitting clothes allowing for bandages.    Please leave money and valuables home.      You may bring your cell phone.    Call the doctor if fever or illness should occur before your surgery.    Call [phone] to contact us here if needed.                            CLOTHES ON DAY OF SURGERY    SHOULDER surgery:  you must have a very oversized shirt.  Very, Very large.  You will probably have a large sling on with your arm strapped to your chest.  You will not be able to put the arm of the operated shoulder into a sleeve.  You can put the arm of the un-operated shoulder into the sleeve, but the shirt will need to be draped over the operated shoulder.       ARM or HAND surgery:  make sure that your sleeves are large and loose enough to pass over large dressings or cast.      BREAST or UNDERARM surgery:  wear a loose, button down shirt so that you can dress without raising your arms over your head.    ABDOMINAL surgery:  wear loose, comfortable clothing.  Nothing tight around the abdomen.  NO JEANS    PENIS or SCROTAL surgery:  loose comfortable clothing.  Large sweat pants, pajama pants or a robe.  ABSOLUTELY NO JEANS      LEG or FOOT surgery:  wear large loose pants that are able to pass over any large dressings or casts.  You could also wear loose shorts or a skirt.

## 2023-03-13 ENCOUNTER — TELEPHONE (OUTPATIENT)
Dept: ORTHOPEDICS | Facility: CLINIC | Age: 43
End: 2023-03-13
Payer: COMMERCIAL

## 2023-03-13 ENCOUNTER — PATIENT MESSAGE (OUTPATIENT)
Dept: SURGERY | Facility: HOSPITAL | Age: 43
End: 2023-03-13
Payer: COMMERCIAL

## 2023-03-13 DIAGNOSIS — S46.011A TRAUMATIC ROTATOR CUFF TEAR, RIGHT, INITIAL ENCOUNTER: Primary | ICD-10-CM

## 2023-03-13 NOTE — TELEPHONE ENCOUNTER
Spoken with patient about his physical therapy raul that is coming up after his surgery Dr. Bradford only want for him to learn about dressing and undressing with the sling on and do not raised his post op arm .

## 2023-03-14 ENCOUNTER — ANESTHESIA (OUTPATIENT)
Dept: SURGERY | Facility: HOSPITAL | Age: 43
End: 2023-03-14
Payer: OTHER MISCELLANEOUS

## 2023-03-14 ENCOUNTER — HOSPITAL ENCOUNTER (OUTPATIENT)
Facility: HOSPITAL | Age: 43
Discharge: HOME | End: 2023-03-14
Attending: ORTHOPAEDIC SURGERY | Admitting: ORTHOPAEDIC SURGERY
Payer: OTHER MISCELLANEOUS

## 2023-03-14 VITALS
OXYGEN SATURATION: 98 % | HEART RATE: 86 BPM | WEIGHT: 296.38 LBS | RESPIRATION RATE: 17 BRPM | DIASTOLIC BLOOD PRESSURE: 73 MMHG | TEMPERATURE: 98 F | BODY MASS INDEX: 39.1 KG/M2 | SYSTOLIC BLOOD PRESSURE: 140 MMHG

## 2023-03-14 DIAGNOSIS — S46.011A TRAUMATIC ROTATOR CUFF TEAR, RIGHT, INITIAL ENCOUNTER: Primary | ICD-10-CM

## 2023-03-14 DIAGNOSIS — Z01.818 PREOPERATIVE TESTING: ICD-10-CM

## 2023-03-14 LAB
POCT GLUCOSE: 109 MG/DL (ref 70–110)
POCT GLUCOSE: 88 MG/DL (ref 70–110)

## 2023-03-14 PROCEDURE — 25000003 PHARM REV CODE 250: Performed by: ANESTHESIOLOGY

## 2023-03-14 PROCEDURE — 25000003 PHARM REV CODE 250: Performed by: NURSE ANESTHETIST, CERTIFIED REGISTERED

## 2023-03-14 PROCEDURE — 64415 PERIPHERAL BLOCK: ICD-10-PCS | Mod: 59,RT,, | Performed by: ANESTHESIOLOGY

## 2023-03-14 PROCEDURE — C1889 IMPLANT/INSERT DEVICE, NOC: HCPCS | Performed by: ORTHOPAEDIC SURGERY

## 2023-03-14 PROCEDURE — 63600175 PHARM REV CODE 636 W HCPCS: Performed by: NURSE ANESTHETIST, CERTIFIED REGISTERED

## 2023-03-14 PROCEDURE — 25000003 PHARM REV CODE 250: Performed by: ORTHOPAEDIC SURGERY

## 2023-03-14 PROCEDURE — D9220A PRA ANESTHESIA: ICD-10-PCS | Mod: CRNA,,, | Performed by: NURSE ANESTHETIST, CERTIFIED REGISTERED

## 2023-03-14 PROCEDURE — 27201423 OPTIME MED/SURG SUP & DEVICES STERILE SUPPLY: Performed by: ORTHOPAEDIC SURGERY

## 2023-03-14 PROCEDURE — D9220A PRA ANESTHESIA: Mod: ANES,,, | Performed by: ANESTHESIOLOGY

## 2023-03-14 PROCEDURE — 29828 SHO ARTHRS SRG BICP TENODSIS: CPT | Mod: 51,RT,, | Performed by: ORTHOPAEDIC SURGERY

## 2023-03-14 PROCEDURE — 82962 GLUCOSE BLOOD TEST: CPT | Performed by: ORTHOPAEDIC SURGERY

## 2023-03-14 PROCEDURE — 63600175 PHARM REV CODE 636 W HCPCS: Performed by: ANESTHESIOLOGY

## 2023-03-14 PROCEDURE — 71000016 HC POSTOP RECOV ADDL HR: Performed by: ORTHOPAEDIC SURGERY

## 2023-03-14 PROCEDURE — D9220A PRA ANESTHESIA: ICD-10-PCS | Mod: ANES,,, | Performed by: ANESTHESIOLOGY

## 2023-03-14 PROCEDURE — D9220A PRA ANESTHESIA: Mod: CRNA,,, | Performed by: NURSE ANESTHETIST, CERTIFIED REGISTERED

## 2023-03-14 PROCEDURE — C1713 ANCHOR/SCREW BN/BN,TIS/BN: HCPCS | Performed by: ORTHOPAEDIC SURGERY

## 2023-03-14 PROCEDURE — 29827 PR SHLDR ARTHROSCOP,SURG,W/ROTAT CUFF REPR: ICD-10-PCS | Mod: RT,,, | Performed by: ORTHOPAEDIC SURGERY

## 2023-03-14 PROCEDURE — 36000710: Performed by: ORTHOPAEDIC SURGERY

## 2023-03-14 PROCEDURE — 37000009 HC ANESTHESIA EA ADD 15 MINS: Performed by: ORTHOPAEDIC SURGERY

## 2023-03-14 PROCEDURE — 29828 PR ARTHROSCOPY SHOULDER SURGICAL BICEPS TENODESIS: ICD-10-PCS | Mod: 51,RT,, | Performed by: ORTHOPAEDIC SURGERY

## 2023-03-14 PROCEDURE — 29823 PR SHLDR ARTHROSCOP,EXTEN DEBRIDE: ICD-10-PCS | Mod: 51,RT,, | Performed by: ORTHOPAEDIC SURGERY

## 2023-03-14 PROCEDURE — 64415 NJX AA&/STRD BRCH PLXS IMG: CPT | Performed by: ANESTHESIOLOGY

## 2023-03-14 PROCEDURE — 71000033 HC RECOVERY, INTIAL HOUR: Performed by: ORTHOPAEDIC SURGERY

## 2023-03-14 PROCEDURE — 71000015 HC POSTOP RECOV 1ST HR: Performed by: ORTHOPAEDIC SURGERY

## 2023-03-14 PROCEDURE — 29827 SHO ARTHRS SRG RT8TR CUF RPR: CPT | Mod: RT,,, | Performed by: ORTHOPAEDIC SURGERY

## 2023-03-14 PROCEDURE — 29823 SHO ARTHRS SRG XTNSV DBRDMT: CPT | Mod: 51,RT,, | Performed by: ORTHOPAEDIC SURGERY

## 2023-03-14 PROCEDURE — 36000711: Performed by: ORTHOPAEDIC SURGERY

## 2023-03-14 PROCEDURE — 37000008 HC ANESTHESIA 1ST 15 MINUTES: Performed by: ORTHOPAEDIC SURGERY

## 2023-03-14 RX ORDER — MUPIROCIN 20 MG/G
OINTMENT TOPICAL
Status: DISCONTINUED | OUTPATIENT
Start: 2023-03-14 | End: 2023-03-14 | Stop reason: HOSPADM

## 2023-03-14 RX ORDER — PREGABALIN 75 MG/1
75 CAPSULE ORAL 2 TIMES DAILY
Qty: 14 CAPSULE | Refills: 0 | Status: SHIPPED | OUTPATIENT
Start: 2023-03-14 | End: 2023-07-18

## 2023-03-14 RX ORDER — ROCURONIUM BROMIDE 10 MG/ML
INJECTION, SOLUTION INTRAVENOUS
Status: DISCONTINUED | OUTPATIENT
Start: 2023-03-14 | End: 2023-03-14

## 2023-03-14 RX ORDER — MIDAZOLAM HYDROCHLORIDE 1 MG/ML
INJECTION, SOLUTION INTRAMUSCULAR; INTRAVENOUS
Status: DISCONTINUED | OUTPATIENT
Start: 2023-03-14 | End: 2023-03-14

## 2023-03-14 RX ORDER — OXYCODONE HYDROCHLORIDE 5 MG/1
5 TABLET ORAL EVERY 6 HOURS PRN
Qty: 20 TABLET | Refills: 0 | Status: SHIPPED | OUTPATIENT
Start: 2023-03-14 | End: 2023-03-20 | Stop reason: SDUPTHER

## 2023-03-14 RX ORDER — SODIUM CHLORIDE 0.9 % (FLUSH) 0.9 %
10 SYRINGE (ML) INJECTION
Status: DISCONTINUED | OUTPATIENT
Start: 2023-03-14 | End: 2023-03-14 | Stop reason: HOSPADM

## 2023-03-14 RX ORDER — ONDANSETRON 4 MG/1
4 TABLET, ORALLY DISINTEGRATING ORAL ONCE
Status: COMPLETED | OUTPATIENT
Start: 2023-03-14 | End: 2023-03-14

## 2023-03-14 RX ORDER — SUCCINYLCHOLINE CHLORIDE 20 MG/ML
INJECTION INTRAMUSCULAR; INTRAVENOUS
Status: DISCONTINUED | OUTPATIENT
Start: 2023-03-14 | End: 2023-03-14

## 2023-03-14 RX ORDER — HYDROMORPHONE HYDROCHLORIDE 2 MG/ML
0.2 INJECTION, SOLUTION INTRAMUSCULAR; INTRAVENOUS; SUBCUTANEOUS EVERY 5 MIN PRN
Status: DISCONTINUED | OUTPATIENT
Start: 2023-03-14 | End: 2023-03-14 | Stop reason: HOSPADM

## 2023-03-14 RX ORDER — LIDOCAINE HYDROCHLORIDE 20 MG/ML
INJECTION INTRAVENOUS
Status: DISCONTINUED | OUTPATIENT
Start: 2023-03-14 | End: 2023-03-14

## 2023-03-14 RX ORDER — DEXAMETHASONE SODIUM PHOSPHATE 4 MG/ML
INJECTION, SOLUTION INTRA-ARTICULAR; INTRALESIONAL; INTRAMUSCULAR; INTRAVENOUS; SOFT TISSUE
Status: DISCONTINUED | OUTPATIENT
Start: 2023-03-14 | End: 2023-03-14

## 2023-03-14 RX ORDER — ACETAMINOPHEN 500 MG
500 TABLET ORAL EVERY 6 HOURS
Qty: 28 TABLET | Refills: 0 | Status: SHIPPED | OUTPATIENT
Start: 2023-03-14 | End: 2023-03-21

## 2023-03-14 RX ORDER — PHENYLEPHRINE HYDROCHLORIDE 10 MG/ML
INJECTION INTRAVENOUS
Status: DISCONTINUED | OUTPATIENT
Start: 2023-03-14 | End: 2023-03-14

## 2023-03-14 RX ORDER — SODIUM CHLORIDE, SODIUM LACTATE, POTASSIUM CHLORIDE, CALCIUM CHLORIDE 600; 310; 30; 20 MG/100ML; MG/100ML; MG/100ML; MG/100ML
INJECTION, SOLUTION INTRAVENOUS CONTINUOUS
Status: DISCONTINUED | OUTPATIENT
Start: 2023-03-14 | End: 2023-03-14 | Stop reason: HOSPADM

## 2023-03-14 RX ORDER — ROPIVACAINE HYDROCHLORIDE 5 MG/ML
INJECTION, SOLUTION EPIDURAL; INFILTRATION; PERINEURAL
Status: COMPLETED | OUTPATIENT
Start: 2023-03-14 | End: 2023-03-14

## 2023-03-14 RX ORDER — ONDANSETRON 2 MG/ML
INJECTION INTRAMUSCULAR; INTRAVENOUS
Status: DISCONTINUED | OUTPATIENT
Start: 2023-03-14 | End: 2023-03-14

## 2023-03-14 RX ORDER — FENTANYL CITRATE 50 UG/ML
INJECTION, SOLUTION INTRAMUSCULAR; INTRAVENOUS
Status: DISCONTINUED | OUTPATIENT
Start: 2023-03-14 | End: 2023-03-14

## 2023-03-14 RX ORDER — ONDANSETRON 4 MG/1
4 TABLET, FILM COATED ORAL EVERY 8 HOURS PRN
Qty: 10 TABLET | Refills: 0 | OUTPATIENT
Start: 2023-03-14 | End: 2023-04-03

## 2023-03-14 RX ORDER — CEFAZOLIN SODIUM 2 G/50ML
2 SOLUTION INTRAVENOUS
Status: DISCONTINUED | OUTPATIENT
Start: 2023-03-14 | End: 2023-03-14 | Stop reason: HOSPADM

## 2023-03-14 RX ORDER — PROPOFOL 10 MG/ML
VIAL (ML) INTRAVENOUS
Status: DISCONTINUED | OUTPATIENT
Start: 2023-03-14 | End: 2023-03-14

## 2023-03-14 RX ORDER — IBUPROFEN 800 MG/1
800 TABLET ORAL 3 TIMES DAILY
Qty: 30 TABLET | Refills: 0 | Status: SHIPPED | OUTPATIENT
Start: 2023-03-14 | End: 2023-03-29 | Stop reason: SDUPTHER

## 2023-03-14 RX ORDER — LIDOCAINE HYDROCHLORIDE 10 MG/ML
1 INJECTION, SOLUTION EPIDURAL; INFILTRATION; INTRACAUDAL; PERINEURAL ONCE
Status: DISCONTINUED | OUTPATIENT
Start: 2023-03-14 | End: 2023-03-14 | Stop reason: HOSPADM

## 2023-03-14 RX ORDER — ACETAMINOPHEN 500 MG
1000 TABLET ORAL
Status: COMPLETED | OUTPATIENT
Start: 2023-03-14 | End: 2023-03-14

## 2023-03-14 RX ADMIN — SODIUM CHLORIDE, SODIUM LACTATE, POTASSIUM CHLORIDE, AND CALCIUM CHLORIDE: .6; .31; .03; .02 INJECTION, SOLUTION INTRAVENOUS at 03:03

## 2023-03-14 RX ADMIN — PHENYLEPHRINE HYDROCHLORIDE 100 MCG: 10 INJECTION INTRAVENOUS at 02:03

## 2023-03-14 RX ADMIN — SUGAMMADEX 400 MG: 100 INJECTION, SOLUTION INTRAVENOUS at 04:03

## 2023-03-14 RX ADMIN — CEFAZOLIN SODIUM 3 G: 1 POWDER, FOR SOLUTION INTRAMUSCULAR; INTRAVENOUS at 01:03

## 2023-03-14 RX ADMIN — ROCURONIUM BROMIDE 50 MG: 10 INJECTION, SOLUTION INTRAVENOUS at 02:03

## 2023-03-14 RX ADMIN — ONDANSETRON 4 MG: 4 TABLET, ORALLY DISINTEGRATING ORAL at 06:03

## 2023-03-14 RX ADMIN — MUPIROCIN: 20 OINTMENT TOPICAL at 10:03

## 2023-03-14 RX ADMIN — LIDOCAINE HYDROCHLORIDE 100 MG: 20 INJECTION, SOLUTION INTRAVENOUS at 02:03

## 2023-03-14 RX ADMIN — SODIUM CHLORIDE, SODIUM LACTATE, POTASSIUM CHLORIDE, AND CALCIUM CHLORIDE: .6; .31; .03; .02 INJECTION, SOLUTION INTRAVENOUS at 01:03

## 2023-03-14 RX ADMIN — ROCURONIUM BROMIDE 20 MG: 10 INJECTION, SOLUTION INTRAVENOUS at 03:03

## 2023-03-14 RX ADMIN — FENTANYL CITRATE 100 MCG: 0.05 INJECTION, SOLUTION INTRAMUSCULAR; INTRAVENOUS at 02:03

## 2023-03-14 RX ADMIN — PROPOFOL 250 MG: 10 INJECTION, EMULSION INTRAVENOUS at 02:03

## 2023-03-14 RX ADMIN — SODIUM CHLORIDE, POTASSIUM CHLORIDE, SODIUM LACTATE AND CALCIUM CHLORIDE: 600; 310; 30; 20 INJECTION, SOLUTION INTRAVENOUS at 10:03

## 2023-03-14 RX ADMIN — SUCCINYLCHOLINE CHLORIDE 140 MG: 20 INJECTION, SOLUTION INTRAMUSCULAR; INTRAVENOUS at 02:03

## 2023-03-14 RX ADMIN — DEXAMETHASONE SODIUM PHOSPHATE 4 MG: 4 INJECTION, SOLUTION INTRAMUSCULAR; INTRAVENOUS at 02:03

## 2023-03-14 RX ADMIN — ONDANSETRON 4 MG: 2 INJECTION, SOLUTION INTRAMUSCULAR; INTRAVENOUS at 04:03

## 2023-03-14 RX ADMIN — ROPIVACAINE HYDROCHLORIDE 20 ML: 5 INJECTION, SOLUTION EPIDURAL; INFILTRATION; PERINEURAL at 01:03

## 2023-03-14 RX ADMIN — PHENYLEPHRINE HYDROCHLORIDE 50 MCG/MIN: 10 INJECTION INTRAVENOUS at 02:03

## 2023-03-14 RX ADMIN — ROCURONIUM BROMIDE 20 MG: 10 INJECTION, SOLUTION INTRAVENOUS at 04:03

## 2023-03-14 RX ADMIN — ACETAMINOPHEN 1000 MG: 500 TABLET, FILM COATED ORAL at 10:03

## 2023-03-14 RX ADMIN — MIDAZOLAM HYDROCHLORIDE 2 MG: 1 INJECTION, SOLUTION INTRAMUSCULAR; INTRAVENOUS at 01:03

## 2023-03-14 NOTE — TRANSFER OF CARE
Anesthesia Transfer of Care Note    Patient: Tony Gordillo    Procedure(s) Performed: Procedure(s) (LRB):  REPAIR, ROTATOR CUFF, ARTHROSCOPIC (Right)    Patient location: PACU    Anesthesia Type: general    Transport from OR: Transported from OR on 100% O2 by closed face mask with adequate spontaneous ventilation    Post pain: adequate analgesia    Post assessment: no apparent anesthetic complications and tolerated procedure well    Post vital signs: stable    Level of consciousness: sedated and responds to stimulation    Nausea/Vomiting: no nausea/vomiting    Complications: none    Transfer of care protocol was followed      Last vitals:   Visit Vitals  /77 (BP Location: Left arm)   Pulse 72   Temp 36.4 °C (97.5 °F) (Temporal)   Resp 17   Wt 134.4 kg (296 lb 6 oz)   SpO2 100%   BMI 39.10 kg/m²

## 2023-03-14 NOTE — OP NOTE
Surgeon: Crissy Bradford MD     PATIENT INFORMATION   Tony Gordillo 42 y.o. male 1980  MRN: [id number]  LOCATION: [facility]     DATE OF PROCEDURE: 3/14/2023     PREOPERATIVE DIAGNOSES:   Right    1. Rotator cuff tear   2. Biceps tendinopathy    POSTOPERATIVE DIAGNOSES:   Right   1. Rotator cuff tear, full thickness involving the subscapularis and supraspinatus,   2. Biceps tendinopathy     OPERATION:   Right Shoulder  1. Arthroscopic  subscapularis and supraspinatus rotator cuff repair   2. Arthroscopic biceps tenodesis  3. Extensive debridement       Surgeon(s) and Role:     * Crissy Bradford MD - Primary     ANESTHESIA: Regional with General Anesthesia     ESTIMATED BLOOD LOSS: less than 50 mL     IMPLANTS:   Implant Name Type Inv. Item Serial No.  Lot No. LRB No. Used Action   KNOTLESS REGNESORB SUTURE ANCHOR     [id number] Right 1 Implanted   MINITAPE LOOP    [employer] [id number] Right 2 Implanted   5.5mm SUTURE ANCHOR WITH THREE ULTRABRAID    [employer] [id number] Right 1 Implanted   KNOTLESS REGENESORB SUTURE ANCHOR 5.5mm    [employer] [id number] Right 1 Implanted           SPECIMENS:       Specimen (12h ago, onward)     None          COMPLICATIONS: None.      INTRAOPERATIVE COUNTS: Correct.      PROPHYLACTIC IV ANTIBIOTICS: Given per [facility] Protocol.     INDICATIONS FOR OPERATION:  Tony Gordillo 42 y.o. male has been seen and evaluated in the office for continued shoulder pain associated with a rotator cuff tear despite extensive non operative treatment. After a lengthy discussion with the patient, they wished to proceed with surgical intervention. The patient was fully informed of risks and benefits.     DESCRIPTION OF PROCEDURE:  The patient was identified and marked in the preoperative holding area.  All questions and concerns were addressed.  Regional anesthesia was performed by anesthesia personnel. The patient was then brought to the operating room and underwent general  anesthesia and was positioned in the beach chair position. All bony prominences were padded. The right upper extremity was prepped and draped in the normal sterile fashion. A time-out was called confirming the correct patient, site, side, procedure, and that appropriate antibiotics had been administered within 30 min of incision. Diagnostic arthroscopy was performed through the standard posterior portal.  Systematic review of the joint demonstrated a full thickness tear of the upper border of the subscapularis, full thickness midsubstance tear of the supraspinatus, tear of the biceps tendon with subluxation, type 1 SLAP tear, intact cartilage of the glenoid and humeral head.   The biceps was released with arthroscopic scissors and tagged with a  loop suture. The superior labrum and biceps stump were debrided.  The anterior capsule and superior aspect of the subscapularis  were debrided.    The lesser tuberosity was prepared using the shaver.  A looped was passed through the superior rolled edge of the tendon with a birdbeak and luggage tagged.  The subscapularis and biceps sutures were then loaded into a knotless suturetack. The tuberosity was prepared for anchor insertion using a punch and tap and the anchor was inserted.  The sutures were tensioned prior to tendon insertion and the tendon was reduced to the tuberosity with good compression.      The scope was then introduced the subacromial space. A minimal bursectomy was performed using a combination of the radiofrequency device and shaver.  The tear was noted to be mid substance in the supraspinatus tendon.    The tendon was noted to have adequate mobility to the footprint without excessive tension.  The footprint  was prepared with a shaver and keeley. Remaining tendon was left intact where possible, healthy tendon was only removed as needed for exposure  The tear was repaired using  a triple loaded 5.5 medial row anchor. The sutures were passed in a horizontal  mattress fashion and tied.  These sutures were then dunked into a knotless lateral row anchor.  Following repair, the tendon was probed and found to be well compressed to the repair site.  The head was noted to be well covered with the repair.     There was not a significant acromial osteophyte so no acromioplasty was performed.      The instruments were removed from the shoulder.  Portal sites were closed.        Sterile dressings were applied followed a [facility] Care wrap and Super Sling the patient was awakened from anesthesia and brought to recovery room without complication     POSTOPERATIVE PLAN: Plan to follow subscapularis-supraspinatus repair protocol (<3 cm in total size). Passive motion may begin at 4 weeks. Active motion at 6 weeks. No biceps resistive activity x 8 weeks. No cuff resistive activity x 12 weeks.

## 2023-03-14 NOTE — OR NURSING
1308 - time out done for block per Dr Borrero   Pt on monitors  1324 - block complete - pt tolerated well

## 2023-03-14 NOTE — PATIENT INSTRUCTIONS
Post operative pain control     You have been prescribed multiple medications to help with pain control and minimize use of narcotic pain medications. This is called multimodal pain control.   Take these medications scheduled for the first week (take on the below schedule whether or not you are having pain)  Ibuprofen 800 mg every 8 hours  Tylenol 500 mg every 6 hours   Lyrica 75 mg twice a day     Take oxycodone 5 mg every 6 hours as needed for breakthrough pain (only if pain is not well controlled with the above medications)    If you use a CPAP for sleep apnea it is extremely important to use this as instructed while taking the pain medication. Untreated sleep apnea and narcotic use can cause respiratory arrest and death      Rotator Cuff Tear Post-Operative Instructions     Crissy Bradford MD  Clinic phone number: [phone]    Call your surgeon for:   Uncontrolled nausea or vomiting  Persistent numbness or tingling in the arm after the block has worn off   Fevers greater than 101.4  Redness surrounding the incision (swelling is normal)  Shortness of breath/difficulty breathing   Chest pain  Any other concerns you may have   Pain:  You will be sent home from the hospital with a pain medication e-prescribed to your pharmacy   The anesthesiologist will perform a block which will numb your arm for several hours after the surgery. When you start to feel sensation return (in the form of pain or tingling) start taking your pain medication. The medicine takes about an hour to work do you do not want to wait until the block has fully worn off.   The first two days will be the most painful. After the pain starts to lessen you should start weaning the narcotic medication  An anti-nausea medicine will be sent with the pain medication as many people do experience nausea as a side effect  An over the counter stool softener such as Miralax can be taken to avoid constipation as a side effect of the pain medication   The pain  medication is intended to make your pain more tolerable. It is not intended to relieve 100% of pain.  Do not drive while taking pain medications   Avoid taking other sedative medications such as sleeping pills while taking narcotics.   Ice  You will be sent home with a discoapi care wrap as part of your dressings. It will be hooked up to a polar care ice pump.   For the first 4 days, use the machine for 30 minutes on and then 30 minutes off. Turning the unit off is especially important when your arm is numb from the block to avoid thermal injury to the skin.  After day 5 use as needed for pain control.   To fill the unit:   Unlock handles and remove lid   Fill with cold water to the indicated line and then with ice  Replace lid and lock   Keep unit upright   To use the polar care:  Before turning on make sure the pad is connected  Plug in  -- this will turn the unit on  Refilling:  Unplug unit to turn off  Disconnect the wrap by pushing down on the metal tab and gently pulling the connectors apart  Drain unit and refill  Reattach connector by pushing down on metal piece and pushing the connectors together   After the dressing is removed, keep the blue towel between the skin and the cooling pad so the pad does not directly contact your skin.    You will be sent home with instructions of how the pad should be re-applied after your dressing change.  Dressing care:    Leave your dressing on for three days. You may remove it and then recover each incision with a large band-aid   It is normal for the dressing to have a large amount of fluid on it.  This will be mixed with some blood.  Fluid is used during the surgery and will slowly leak out of the wounds over the first few days.  Do not get the dressing or incisions wet.  You will need to carefully shower or take a sponge bath to avoid wetting the dressing.  To wash under your arm safely, bend forward slightly to allow the arm to gently move forward so you can clean under  your arm without raising it to the side   Sling Instructions:  Wear sling at all times until instructed otherwise. It will be worn a total of 4-6 weeks depending on the size of your rotator cuff tear  Keep the pillow between the sling and your body in place as well  The sling may be removed for hygiene and dressing.    You may take the sling off to perform elbow range of motion exercises.  If a biceps tenodesis has been performed this should be avoided the first two weeks.   Post op activity and precautions:  Keep sling on as described above   Do not actively move the arm at the shoulder until instructed to do so by your surgeon.   No lifting   When lying on your back, put a pillow or towel behind the elbow to support the arm   No pushing yourself up out of a chair or off of your bed with the operative arm  You may find it more comfortable to sleep in about 30-40 degrees of inclination - in a recliner or on multiple pillows  Do not lay on the same side of the operative arm   To wash under your arm safely, bend forward slightly to allow the arm to gently move forward so you can clean under your arm without raising it to the side  Avoid any activities or places that may result in a fall (walking on uneven ground, crowded places, etc)  Physical Therapy  Physical therapy will start at 2-6 weeks post op depending on the size of your tear   In general, strengthening is not started until 12 weeks and after motion has been restored   Follow up appointment  You will be seen in clinic in 2 weeks following your procedure.  This will be made before you leave the hospital   Sutures will be removed  More detailed activity instructions will be given at that time

## 2023-03-14 NOTE — BRIEF OP NOTE
[facility] - Surgery  Brief Operative Note    Surgery Date: 3/14/2023     Surgeon(s) and Role:     * Crissy Bradford MD - Primary    Assisting Surgeon: None    Pre-op Diagnosis:  Traumatic rotator cuff tear, right, initial encounter [S46.011A]    Post-op Diagnosis:  Post-Op Diagnosis Codes:     * Traumatic rotator cuff tear, right, initial encounter [S46.011A]    Procedure(s) (LRB):  REPAIR, ROTATOR CUFF, ARTHROSCOPIC (Right)    Anesthesia: General/Regional    Operative Findings: rotator cuff tear, biceps tendon tear, subacromial bursitis    Estimated Blood Loss: 50 cc         Specimens:   none        Discharge Note    OUTCOME: Patient tolerated treatment/procedure well without complication and is now ready for discharge.    DISPOSITION: Home or Self Care    FINAL DIAGNOSIS:  Traumatic rotator cuff tear, right, initial encounter    FOLLOWUP: In clinic    DISCHARGE INSTRUCTIONS:    Discharge Procedure Orders   Ice to affected area   Order Comments: Polar care 30 min on 30 min off     Lifting restrictions   Order Comments: No lifting RUE     Other restrictions (specify):   Order Comments: Remain in sling     Notify your health care provider if you experience any of the following:  temperature >100.4     Notify your health care provider if you experience any of the following:  persistent nausea and vomiting or diarrhea     Notify your health care provider if you experience any of the following:  severe uncontrolled pain     Notify your health care provider if you experience any of the following:  redness, tenderness, or signs of infection (pain, swelling, redness, odor or green/yellow discharge around incision site)     Leave dressing on - Keep it clean, dry, and intact until clinic visit

## 2023-03-14 NOTE — DISCHARGE INSTRUCTIONS
ACTIVITY LEVEL: If you have received sedation or an anesthetic, you may feel sleepy for several hours. Rest  until you are more awake. Gradually resume your normal activities.    DIET: You may resume your home diet. If nausea is present, increase your diet gradually with fluids and bland  foods.    Medications: Pain medication should be taken only if needed and as directed. If antibiotics are prescribed, the  medication should be taken until completed. You will be given an updated list of you medications.    No driving, alcoholic beverages or signing legal documents for next 24 hours or while  taking pain medication.    Use polar ice as directed.     CALL THE DOCTOR:  For any obvious bleeding (some dried blood over the incision is normal).  Redness, swelling, foul smell around incision or fever over 101.  Shortness of breath, Coughing up Bloody Sputum or Pains or Swelling in your Calves.  Persistent pain or nausea not relieved by medication.  Impaired circulation such as tingling, change in color, numbness or tingling, coldness.    If any unusual problems or difficulties occur contact your doctor. If you cannot contact your doctor but  feel your signs and symptoms warrant a physicians attention return to the emergency room.       POLAR ICE-You will be sent home with a polar care wrap as part of your care. It will be hooked up to an ice pump.  For the first 4 days , use the machine for 30 minutes on and then 30 minutes off. ( or  time and or number of days specified by your doctor)   . You may use it after day 5 to use as needed for pain control. Be very cautious when anesthesia has given you a block to numb your extremity to monitor the ice therapy to avoid thermal injury to your skin. ( Do not leave the machine on , turn it on and off  at the 30 minute intervals and use a towel or cloth under the wrap to protect the skin )      To fill the unit:         Unlock the handles and remove lid        Fill with cold water  to the indicated line and then with ice to the indicated line        Replace the lid and lock         Keep unit upright   To use the polar care:         Before turning on make sure the pad is connected         Plug in -- this will turn the unit on   Refilling:         UNPLUG unit to turn OFF         Disconnect the wrap by pushing down on the metal tab and gently pulling the connectors apart         Drain unit and refill          Reattach connector by pushing down on metal piece and pushing the connectors together   To reapply the wrap  follow directions given to you or follow the numbers on the wrap 1,2,3        Fall Prevention  Millions of people fall every year and injure themselves. You may have had anesthesia or sedation which may increase your risk of falling. You may have health issues that put you at an increased risk of falling.     Here are ways to reduce your risk of falling.    Make your home safe by keeping walkways clear of objects you may trip over.  Use non-slip pads under rugs. Do not use area rugs or small throw rugs.  Use non-slip mats in bathtubs and showers.  Install handrails and lights on staircases.  Do not walk in poorly lit areas.  Do not stand on chairs or wobbly ladders.  Use caution when reaching overhead or looking upward. This position can cause a loss of balance.  Be sure your shoes fit properly, have non-slip bottoms and are in good condition.   Wear shoes both inside and out. Avoid going barefoot or wearing slippers.  Be cautious when going up and down stairs, curbs, and when walking on uneven sidewalks.  If your balance is poor, consider using a cane or walker.  If your fall was related to alcohol use, stop or limit alcohol intake.   If your fall was related to use of sleeping medicines, talk to your doctor about this. You may need to reduce your dosage at bedtime if you awaken during the night to go to the bathroom.    To reduce the need for nighttime bathroom trips:  Avoid  drinking fluids for several hours before going to bed  Empty your bladder before going to bed  Men can keep a urinal at the bedside  Stay as active as you can. Balance, flexibility, strength, and endurance all come from exercise. They all play a role in preventing falls. Ask your healthcare provider which types of activity are right for you.  Get your vision checked on a regular basis.  If you have pets, know where they are before you stand up or walk so you don't trip over them.  Use night lights.

## 2023-03-15 ENCOUNTER — CLINICAL SUPPORT (OUTPATIENT)
Dept: REHABILITATION | Facility: HOSPITAL | Age: 43
End: 2023-03-15
Attending: ORTHOPAEDIC SURGERY
Payer: OTHER MISCELLANEOUS

## 2023-03-15 DIAGNOSIS — R29.898 IMPAIRED STRENGTH OF SHOULDER MUSCLES: ICD-10-CM

## 2023-03-15 DIAGNOSIS — M25.611 DECREASED RANGE OF MOTION OF RIGHT SHOULDER: ICD-10-CM

## 2023-03-15 DIAGNOSIS — S46.011A TRAUMATIC ROTATOR CUFF TEAR, RIGHT, INITIAL ENCOUNTER: ICD-10-CM

## 2023-03-15 DIAGNOSIS — Z98.890 S/P RIGHT ROTATOR CUFF REPAIR: ICD-10-CM

## 2023-03-15 DIAGNOSIS — M67.813 BICEPS TENDONOSIS OF RIGHT SHOULDER: ICD-10-CM

## 2023-03-15 PROCEDURE — 97110 THERAPEUTIC EXERCISES: CPT | Mod: PN

## 2023-03-15 PROCEDURE — 97161 PT EVAL LOW COMPLEX 20 MIN: CPT | Mod: PN

## 2023-03-15 NOTE — PROGRESS NOTES
[facility]  Physical Therapy Initial Evaluation    Date: 3/15/2023   Name: Tony Gordillo  [employer] Number: [id number]    Therapy Diagnosis:   Encounter Diagnoses   Name Primary?    Traumatic rotator cuff tear, right, initial encounter     S/P right rotator cuff repair     Biceps tendonosis of right shoulder     Decreased range of motion of right shoulder     Impaired strength of shoulder muscles      Physician: Crissy Bradford MD    Physician Orders: PT Eval and Treat   Medical Diagnosis from Referral: S46.011A (ICD-10-CM) - Traumatic rotator cuff tear, right, initial encounter   Evaluation Date: 3/15/2023  Authorization Period Expiration: 3/12/2024  Plan of Care Expiration: 7/7/2024 - most likely will have to extend due to nature of surgery and pt's job requirements   Visit # / Visits authorized: 1/ 1    Progress Note Due: 4/15/2023  FOTO: 1/1  HEP: Access Code: [email or web address]    Precautions: Diabetes;     POSTOPERATIVE PLAN: Plan to follow subscapularis-supraspinatus repair protocol (<3 cm in total size). Passive motion may begin at 4 weeks. Active motion at 6 weeks. No biceps resistive activity x 8 weeks. No cuff resistive activity x 12 weeks.     DATE OF PROCEDURE: 3/14/2023     PREOPERATIVE DIAGNOSES:   Right     1. Rotator cuff tear   2. Biceps tendinopathy    POSTOPERATIVE DIAGNOSES:   Right   1. Rotator cuff tear, full thickness involving the subscapularis and supraspinatus,   2. Biceps tendinopathy     OPERATION:   Right Shoulder  1. Arthroscopic  subscapularis and supraspinatus rotator cuff repair   2. Arthroscopic biceps tenodesis  3. Extensive debridement  Surgeon(s) and Role:     * Crissy Bradford MD - Primary    Time In: 1210  Time Out: 1300  Total Appointment Time (timed & untimed codes): 50 minutes    Subjective   Date of onset: Tore RTC (supraspinatus and subscap and bicep tendon) on 2/28 while trying to pull back to start a leaf blower ---- DOS: 3/14/2023   History of  current condition - Tony reports: using tylenol and ibuprofen for pain management instead of pain medication. Pt presents to PT today with sling donned. He is sleeping in bed and got approx 3hrs last night however he did not get home from surgery until 8pm. Nerve block wore off around 6:30 this morning.   Pt is R arm dominant.   Prior to surgery; both arms would go numb and pt would decrease symptoms with elbow and wrist popping along with median and ulnar nerve glides - which he knows he will not be able to perform on R.    Pt is using ice machine provided to him and is using it for 30min on, 30min off t/o the day.     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);       ELIJAH: work related injury   Any fall: no  Any popping or clicking or locking: no  Any difficult to elevate shoulder flexion, abd, ER, or IR: yes  Does pain radiates: not currently   Pain constant or intermittent: constant       Pain:  Current 5/10, worst 10/10, best 3/10   Location: right shoulder   Description: Aching, Throbbing, Tight, and Deep  Aggravating Factors: everything   Easing Factors: ice    Prior Therapy: no  Social History:  lives with their family  Occupation:    Job description: ; 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), 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), loaded carries for hose approx 45lb; 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    Medications:   Tony has a current medication list which includes the following prescription(s): acetaminophen, atorvastatin, azelastine, azelastine, blood sugar diagnostic, blood sugar diagnostic, blood-glucose meter, freestyle joe 3 sensor, cyanocobalamin (vitamin b-12), diclofenac sodium, empagliflozin, fenofibrate, fluticasone propionate, trelegy ellipta, ibuprofen, insulin aspart u-100, toujeo max u-300 solostar, novolin n flexpen, ketoconazole, lamotrigine, lancets, levothyroxine, lisinopril, montelukast, ondansetron, oxycodone, pen needle, diabetic, pregabalin, syringe (disposable), mounjaro, and mounjaro.    Allergies:   Review of patient's allergies indicates:   Allergen Reactions    Influenza virus vaccine, specific Hives    Pioglitazone      Altered mood     Victoza [liraglutide] Nausea And Vomiting    Farxiga [dapagliflozin] Rash     Erectile dysfunction and rash           Objective           Posture Alignment: slouched posture;forward head;increased kyphosis   Shoulder rotation at rest: in post-op sling     Cervical Range of Motion: WNL t/o all planes     No ROM assessed 2/2 surgeon's instructions for no PROM until 4wks post-op   Passive Range of Motion (degrees):   Shoulder Right   Flexion NT   Abduction NT   ER  NT   IR  NT      Active Range of Motion in  (degrees):   Shoulder Right Left   Flexion    Abduction    ER NT 65   IR  NT 60     Upper Extremity Strength  (R) UE  (L) UE    Elbow flexion: NT Elbow flexion: 5/5   Elbow extension: NT Elbow extension: 5/5   Shoulder elevation: NT  "Shoulder elevation: 5/5   Shoulder flexion: NT Shoulder flexion: 5/5   Shoulder Abduction: NT Shoulder abduction: 5/5   Shoulder ER NT Shoulder ER 4/5   Shoulder IR NT Shoulder IR 4/5   Lower Trap NT Lower Trap 4-/5   Middle Trap NT Middle Trap 4/5   Rhomboids NT Rhomboids 4-/5         CMS Impairment/Limitation/Restriction for FOTO - Survey    Therapist reviewed FOTO scores for Tony Gordillo on 3/15/2023.   FOTO documents entered into [employer] - see Media section.    Limitation Score: -%  Perform when pt returns in 4wks          TREATMENT     Total Treatment time separate from Evaluation: 20 minutes    Tony received therapeutic exercises to develop strength, endurance, ROM, flexibility, posture, and core stabilization for 20 minutes including:  All performed in sling: x10 reps   Wrist flexion in neutral  Wrist extension in neutral  Wrist radial and ulnar deviation  Finger opposition  Ball squeezes   UT (S) 2x30" B  LS (S) B 2x30"  Scalene stretch B 2x30"      Home Exercises and Patient Education Provided    Education provided:   - HEP  - post-op precautions    Written Home Exercises Provided: yes.  Exercises were reviewed and Tony was able to demonstrate them prior to the end of the session.  Tony demonstrated fair  understanding of the education provided.     See EMR under Patient Instructions for exercises provided 3/15/2023.    Assessment   Tony is a 42 y.o. male referred to outpatient Physical Therapy with a medical diagnosis of S46.011A (ICD-10-CM) - Traumatic rotator cuff tear, right, initial encounter . Pt presents with s/p R RTCr of supraspinatus and subscapularis along with bicep tenodesis. No objective measures were assessed today 2/2 post-op precautions from MD stating no PROM until 4wks post-op. DPT discussed post-op precautions with pt and the importance of maintaining compliance with these recommendations set by surgeon. Pt admits he is not patient and usually pushes himself. DPT countered this " statement with additional physiological education regarding tissue healing time, scar tissue development and long lasting deficits in range of motion and strength. Pt will require frequent and follow-up education to ensure compliance is maintained. Pt was scheduled to return to therapy in 4wks per MD/surgeon's recommendations.     Pt prognosis is Guarded.   Pt will benefit from skilled outpatient Physical Therapy to address the deficits stated above and in the chart below, provide pt/family education, and to maximize pt's level of independence.     Plan of care discussed with patient: Yes  Pt's spiritual, cultural and educational needs considered and patient is agreeable to the plan of care and goals as stated below:     Anticipated Barriers for therapy: compliance with post-op precautions     Medical Necessity is demonstrated by the following  History  Co-morbidities and personal factors that may impact the plan of care Co-morbidities:   Past Medical History:   Diagnosis Date    Diabetes mellitus, type 2     Hyperlipidemia     Hypertension     Obesity     NAINA (obstructive sleep apnea)        Personal Factors:   attitudes     low   Examination  Body Structures and Functions, activity limitations and participation restrictions that may impact the plan of care Body Regions:   upper extremities  trunk    Body Systems:    gross symmetry  ROM  strength  gross coordinated movement  transfers  transitions  motor control  motor learning  respiratory rate  edema  scar formation  skin integrity  skin color    Participation Restrictions:   Post-op precautions for RTCr     Activity limitations:   Learning and applying knowledge  no deficits    General Tasks and Commands  no deficits    Communication  no deficits    Mobility  lifting and carrying objects  fine hand use (grasping/picking up)  using transportation (bus, train, plane, car)  driving (bike, car, motorcycle)    Self care  washing oneself (bathing, drying, washing  hands)  caring for body parts (brushing teeth, shaving, grooming)  toileting  dressing  eating  drinking  looking after one's health    Domestic Life  shopping  cooking  doing house work (cleaning house, washing dishes, laundry)  assisting others    Interactions/Relationships  no deficits    Life Areas  no deficits    Community and Social Life  community life  recreation and leisure         Complexity: low  See FOTO outcome assessment    Clinical Presentation evolving clinical presentation with changing clinical characteristics low   Decision Making/ Complexity Score: low     GOALS: Short Term Goals: 8 weeks  1.Report decreased in pain at worse less than  <   / =  6  /10  to increase tolerance for functional mobility. On going  2. Pt to improve PROM of flexion, scaption and abduction to 90deg and IR/ER to 30deg and R elbow to 0-130deg to allow for improved functional mobility to allow for improvement in IADLs. On going  3. Increased BUE MMT 1/2 grade to increase tolerance for ADL and work activities. On going  4. Pt to report be conscious of impaired sitting and standing posture daily to decrease pain. On going  5. Pt to tolerate HEP to improve ROM and independence with ADL's. On going    Long Term Goals: 16 weeks  1.Report decreased in pain at worse less than  <   / =  4  /10  to increase tolerance for functional mobility. On going  2.  Patient will report clinically significant improvements on FOTO score.On going  3.Increased BUE MMT 1 grade to increase tolerance for ADL and work activities.On going  4. Pt to report and demonstrate proper posture in standing and sitting to decrease pain. On going  5. Pt to be Independent with HEP to improve ROM and independence with ADL's.On going  6. Pt to improve AROM of flexion, scaption and abduction to 150deg and IR/ER to 60deg and R elbow to 0-150deg to allow for improved functional mobility to allow for improvement in IADLs. On going    Plan   Plan of care Certification:  3/15/2023 to 7/7/2023.    Outpatient Physical Therapy 2-3 times weekly for 16 weeks to include the following interventions: Electrical Stimulation  , Manual Therapy, Moist Heat/ Ice, Neuromuscular Re-ed, Patient Education, Self Care, Therapeutic Activities, and Therapeutic Exercise. Landon Lewis, PT, DPT, Cert DN      I CERTIFY THE NEED FOR THESE SERVICES FURNISHED UNDER THIS PLAN OF TREATMENT AND WHILE UNDER MY CARE   Physician's comments:     Physician's Signature: ___________________________________________________

## 2023-03-16 NOTE — ANESTHESIA POSTPROCEDURE EVALUATION
Anesthesia Post Evaluation    Patient: Tony Gordillo    Procedure(s) Performed: Procedure(s) (LRB):  REPAIR, ROTATOR CUFF, ARTHROSCOPIC (Right)    Final Anesthesia Type: general      Patient location during evaluation: PACU  Patient participation: Yes- Able to Participate  Level of consciousness: awake and alert and oriented  Post-procedure vital signs: reviewed and stable  Pain management: adequate  Airway patency: patent    PONV status at discharge: No PONV  Anesthetic complications: no      Cardiovascular status: blood pressure returned to baseline, hemodynamically stable and stable  Respiratory status: unassisted, spontaneous ventilation and room air  Hydration status: euvolemic  Follow-up not needed.          Vitals Value Taken Time   /73 03/14/23 1830   Temp 36.6 °C (97.9 °F) 03/14/23 1830   Pulse 86 03/14/23 1830   Resp 17 03/14/23 1830   SpO2 98 % 03/14/23 1830         Event Time   Out of Recovery 17:23:00         Pain/Bernabe Score: No data recorded

## 2023-03-17 PROBLEM — M25.611 DECREASED RANGE OF MOTION OF RIGHT SHOULDER: Status: ACTIVE | Noted: 2023-03-17

## 2023-03-17 PROBLEM — M67.813 BICEPS TENDONOSIS OF RIGHT SHOULDER: Status: ACTIVE | Noted: 2023-03-17

## 2023-03-17 PROBLEM — M62.81 QUADRICEPS WEAKNESS: Status: RESOLVED | Noted: 2022-12-29 | Resolved: 2023-03-17

## 2023-03-17 PROBLEM — M25.662 DECREASED RANGE OF MOTION (ROM) OF LEFT KNEE: Status: RESOLVED | Noted: 2022-12-29 | Resolved: 2023-03-17

## 2023-03-17 PROBLEM — R29.898 IMPAIRED STRENGTH OF SHOULDER MUSCLES: Status: ACTIVE | Noted: 2023-03-17

## 2023-03-17 PROBLEM — Z98.890 S/P RIGHT ROTATOR CUFF REPAIR: Status: ACTIVE | Noted: 2023-03-17

## 2023-03-17 NOTE — PLAN OF CARE
[facility]  Physical Therapy Initial Evaluation    Date: 3/15/2023   Name: Tony Gordillo  [facility] Number: [id number]    Therapy Diagnosis:   Encounter Diagnoses   Name Primary?    Traumatic rotator cuff tear, right, initial encounter     S/P right rotator cuff repair     Biceps tendonosis of right shoulder     Decreased range of motion of right shoulder     Impaired strength of shoulder muscles      Physician: Crissy Bradford MD    Physician Orders: PT Eval and Treat   Medical Diagnosis from Referral: S46.011A (ICD-10-CM) - Traumatic rotator cuff tear, right, initial encounter   Evaluation Date: 3/15/2023  Authorization Period Expiration: 3/12/2024  Plan of Care Expiration: 7/7/2024 - most likely will have to extend due to nature of surgery and pt's job requirements   Visit # / Visits authorized: 1/ 1    Progress Note Due: 4/15/2023  FOTO: 1/1  HEP: Access Code: [email or web address]    Precautions: Diabetes;     POSTOPERATIVE PLAN: Plan to follow subscapularis-supraspinatus repair protocol (<3 cm in total size). Passive motion may begin at 4 weeks. Active motion at 6 weeks. No biceps resistive activity x 8 weeks. No cuff resistive activity x 12 weeks.     DATE OF PROCEDURE: 3/14/2023     PREOPERATIVE DIAGNOSES:   Right     1. Rotator cuff tear   2. Biceps tendinopathy    POSTOPERATIVE DIAGNOSES:   Right   1. Rotator cuff tear, full thickness involving the subscapularis and supraspinatus,   2. Biceps tendinopathy     OPERATION:   Right Shoulder  1. Arthroscopic  subscapularis and supraspinatus rotator cuff repair   2. Arthroscopic biceps tenodesis  3. Extensive debridement  Surgeon(s) and Role:     * Crissy Bradford MD - Primary    Time In: 1210  Time Out: 1300  Total Appointment Time (timed & untimed codes): 50 minutes    Subjective   Date of onset: Tore RTC (supraspinatus and subscap and bicep tendon) on 2/28 while trying to pull back to start a leaf blower ---- DOS: 3/14/2023   History of  current condition - Tony reports: using tylenol and ibuprofen for pain management instead of pain medication. Pt presents to PT today with sling donned. He is sleeping in bed and got approx 3hrs last night however he did not get home from surgery until 8pm. Nerve block wore off around 6:30 this morning.   Pt is R arm dominant.   Prior to surgery; both arms would go numb and pt would decrease symptoms with elbow and wrist popping along with median and ulnar nerve glides - which he knows he will not be able to perform on R.    Pt is using ice machine provided to him and is using it for 30min on, 30min off t/o the day.     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);       ELIJAH: work related injury   Any fall: no  Any popping or clicking or locking: no  Any difficult to elevate shoulder flexion, abd, ER, or IR: yes  Does pain radiates: not currently   Pain constant or intermittent: constant       Pain:  Current 5/10, worst 10/10, best 3/10   Location: right shoulder   Description: Aching, Throbbing, Tight, and Deep  Aggravating Factors: everything   Easing Factors: ice    Prior Therapy: no  Social History:  lives with their family  Occupation:    Job description: ; 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), 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), loaded carries for hose approx 45lb; 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    Medications:   Tony has a current medication list which includes the following prescription(s): acetaminophen, atorvastatin, azelastine, azelastine, blood sugar diagnostic, blood sugar diagnostic, blood-glucose meter, freestyle joe 3 sensor, cyanocobalamin (vitamin b-12), diclofenac sodium, empagliflozin, fenofibrate, fluticasone propionate, trelegy ellipta, ibuprofen, insulin aspart u-100, toujeo max u-300 solostar, novolin n flexpen, ketoconazole, lamotrigine, lancets, levothyroxine, lisinopril, montelukast, ondansetron, oxycodone, pen needle, diabetic, pregabalin, syringe (disposable), mounjaro, and mounjaro.    Allergies:   Review of patient's allergies indicates:   Allergen Reactions    Influenza virus vaccine, specific Hives    Pioglitazone      Altered mood     Victoza [liraglutide] Nausea And Vomiting    Farxiga [dapagliflozin] Rash     Erectile dysfunction and rash           Objective           Posture Alignment: slouched posture;forward head;increased kyphosis   Shoulder rotation at rest: in post-op sling     Cervical Range of Motion: WNL t/o all planes     No ROM assessed 2/2 surgeon's instructions for no PROM until 4wks post-op   Passive Range of Motion (degrees):   Shoulder Right   Flexion NT   Abduction NT   ER  NT   IR  NT      Active Range of Motion in  (degrees):   Shoulder Right Left   Flexion    Abduction    ER NT 65   IR  NT 60     Upper Extremity Strength  (R) UE  (L) UE    Elbow flexion: NT Elbow flexion: 5/5   Elbow extension: NT Elbow extension: 5/5   Shoulder elevation: NT  "Shoulder elevation: 5/5   Shoulder flexion: NT Shoulder flexion: 5/5   Shoulder Abduction: NT Shoulder abduction: 5/5   Shoulder ER NT Shoulder ER 4/5   Shoulder IR NT Shoulder IR 4/5   Lower Trap NT Lower Trap 4-/5   Middle Trap NT Middle Trap 4/5   Rhomboids NT Rhomboids 4-/5         CMS Impairment/Limitation/Restriction for FOTO - Survey    Therapist reviewed FOTO scores for Tony Gordillo on 3/15/2023.   FOTO documents entered into [employer] - see Media section.    Limitation Score: -%  Perform when pt returns in 4wks          TREATMENT     Total Treatment time separate from Evaluation: 20 minutes    Tony received therapeutic exercises to develop strength, endurance, ROM, flexibility, posture, and core stabilization for 20 minutes including:  All performed in sling: x10 reps   Wrist flexion in neutral  Wrist extension in neutral  Wrist radial and ulnar deviation  Finger opposition  Ball squeezes   UT (S) 2x30" B  LS (S) B 2x30"  Scalene stretch B 2x30"      Home Exercises and Patient Education Provided    Education provided:   - HEP  - post-op precautions    Written Home Exercises Provided: yes.  Exercises were reviewed and Tony was able to demonstrate them prior to the end of the session.  Tony demonstrated fair  understanding of the education provided.     See EMR under Patient Instructions for exercises provided 3/15/2023.    Assessment   Tony is a 42 y.o. male referred to outpatient Physical Therapy with a medical diagnosis of S46.011A (ICD-10-CM) - Traumatic rotator cuff tear, right, initial encounter . Pt presents with s/p R RTCr of supraspinatus and subscapularis along with bicep tenodesis. No objective measures were assessed today 2/2 post-op precautions from MD stating no PROM until 4wks post-op. DPT discussed post-op precautions with pt and the importance of maintaining compliance with these recommendations set by surgeon. Pt admits he is not patient and usually pushes himself. DPT countered this " statement with additional physiological education regarding tissue healing time, scar tissue development and long lasting deficits in range of motion and strength. Pt will require frequent and follow-up education to ensure compliance is maintained. Pt was scheduled to return to therapy in 4wks per MD/surgeon's recommendations.     Pt prognosis is Guarded.   Pt will benefit from skilled outpatient Physical Therapy to address the deficits stated above and in the chart below, provide pt/family education, and to maximize pt's level of independence.     Plan of care discussed with patient: Yes  Pt's spiritual, cultural and educational needs considered and patient is agreeable to the plan of care and goals as stated below:     Anticipated Barriers for therapy: compliance with post-op precautions     Medical Necessity is demonstrated by the following  History  Co-morbidities and personal factors that may impact the plan of care Co-morbidities:   Past Medical History:   Diagnosis Date    Diabetes mellitus, type 2     Hyperlipidemia     Hypertension     Obesity     NAINA (obstructive sleep apnea)        Personal Factors:   attitudes     low   Examination  Body Structures and Functions, activity limitations and participation restrictions that may impact the plan of care Body Regions:   upper extremities  trunk    Body Systems:    gross symmetry  ROM  strength  gross coordinated movement  transfers  transitions  motor control  motor learning  respiratory rate  edema  scar formation  skin integrity  skin color    Participation Restrictions:   Post-op precautions for RTCr     Activity limitations:   Learning and applying knowledge  no deficits    General Tasks and Commands  no deficits    Communication  no deficits    Mobility  lifting and carrying objects  fine hand use (grasping/picking up)  using transportation (bus, train, plane, car)  driving (bike, car, motorcycle)    Self care  washing oneself (bathing, drying, washing  hands)  caring for body parts (brushing teeth, shaving, grooming)  toileting  dressing  eating  drinking  looking after one's health    Domestic Life  shopping  cooking  doing house work (cleaning house, washing dishes, laundry)  assisting others    Interactions/Relationships  no deficits    Life Areas  no deficits    Community and Social Life  community life  recreation and leisure         Complexity: low  See FOTO outcome assessment    Clinical Presentation evolving clinical presentation with changing clinical characteristics low   Decision Making/ Complexity Score: low     GOALS: Short Term Goals: 8 weeks  1.Report decreased in pain at worse less than  <   / =  6  /10  to increase tolerance for functional mobility. On going  2. Pt to improve PROM of flexion, scaption and abduction to 90deg and IR/ER to 30deg and R elbow to 0-130deg to allow for improved functional mobility to allow for improvement in IADLs. On going  3. Increased BUE MMT 1/2 grade to increase tolerance for ADL and work activities. On going  4. Pt to report be conscious of impaired sitting and standing posture daily to decrease pain. On going  5. Pt to tolerate HEP to improve ROM and independence with ADL's. On going    Long Term Goals: 16 weeks  1.Report decreased in pain at worse less than  <   / =  4  /10  to increase tolerance for functional mobility. On going  2.  Patient will report clinically significant improvements on FOTO score.On going  3.Increased BUE MMT 1 grade to increase tolerance for ADL and work activities.On going  4. Pt to report and demonstrate proper posture in standing and sitting to decrease pain. On going  5. Pt to be Independent with HEP to improve ROM and independence with ADL's.On going  6. Pt to improve AROM of flexion, scaption and abduction to 150deg and IR/ER to 60deg and R elbow to 0-150deg to allow for improved functional mobility to allow for improvement in IADLs. On going    Plan   Plan of care Certification:  3/15/2023 to 7/7/2023.    Outpatient Physical Therapy 2-3 times weekly for 16 weeks to include the following interventions: Electrical Stimulation , Manual Therapy, Moist Heat/ Ice, Neuromuscular Re-ed, Patient Education, Self Care, Therapeutic Activities, and Therapeutic Exercise. Landon Lewis, PT, DPT, Cert DN      I CERTIFY THE NEED FOR THESE SERVICES FURNISHED UNDER THIS PLAN OF TREATMENT AND WHILE UNDER MY CARE   Physician's comments:     Physician's Signature: ___________________________________________________

## 2023-03-20 DIAGNOSIS — S46.011A TRAUMATIC ROTATOR CUFF TEAR, RIGHT, INITIAL ENCOUNTER: Primary | ICD-10-CM

## 2023-03-20 RX ORDER — OXYCODONE HYDROCHLORIDE 5 MG/1
5 TABLET ORAL EVERY 6 HOURS PRN
Qty: 20 TABLET | Refills: 0 | Status: SHIPPED | OUTPATIENT
Start: 2023-03-20 | End: 2023-07-18

## 2023-03-29 ENCOUNTER — OFFICE VISIT (OUTPATIENT)
Dept: ORTHOPEDICS | Facility: CLINIC | Age: 43
End: 2023-03-29
Payer: COMMERCIAL

## 2023-03-29 DIAGNOSIS — S46.011A TRAUMATIC ROTATOR CUFF TEAR, RIGHT, INITIAL ENCOUNTER: Primary | ICD-10-CM

## 2023-03-29 PROCEDURE — 1159F MED LIST DOCD IN RCRD: CPT | Mod: CPTII,S$GLB,, | Performed by: ORTHOPAEDIC SURGERY

## 2023-03-29 PROCEDURE — 99999 PR PBB SHADOW E&M-EST. PATIENT-LVL II: CPT | Mod: PBBFAC,,, | Performed by: ORTHOPAEDIC SURGERY

## 2023-03-29 PROCEDURE — 1159F PR MEDICATION LIST DOCUMENTED IN MEDICAL RECORD: ICD-10-PCS | Mod: CPTII,S$GLB,, | Performed by: ORTHOPAEDIC SURGERY

## 2023-03-29 PROCEDURE — 99999 PR PBB SHADOW E&M-EST. PATIENT-LVL II: ICD-10-PCS | Mod: PBBFAC,,, | Performed by: ORTHOPAEDIC SURGERY

## 2023-03-29 PROCEDURE — 99024 PR POST-OP FOLLOW-UP VISIT: ICD-10-PCS | Mod: S$GLB,,, | Performed by: ORTHOPAEDIC SURGERY

## 2023-03-29 PROCEDURE — 4010F ACE/ARB THERAPY RXD/TAKEN: CPT | Mod: CPTII,S$GLB,, | Performed by: ORTHOPAEDIC SURGERY

## 2023-03-29 PROCEDURE — 3052F PR MOST RECENT HEMOGLOBIN A1C LEVEL 8.0 - < 9.0%: ICD-10-PCS | Mod: CPTII,S$GLB,, | Performed by: ORTHOPAEDIC SURGERY

## 2023-03-29 PROCEDURE — 99024 POSTOP FOLLOW-UP VISIT: CPT | Mod: S$GLB,,, | Performed by: ORTHOPAEDIC SURGERY

## 2023-03-29 PROCEDURE — 3052F HG A1C>EQUAL 8.0%<EQUAL 9.0%: CPT | Mod: CPTII,S$GLB,, | Performed by: ORTHOPAEDIC SURGERY

## 2023-03-29 PROCEDURE — 4010F PR ACE/ARB THEARPY RXD/TAKEN: ICD-10-PCS | Mod: CPTII,S$GLB,, | Performed by: ORTHOPAEDIC SURGERY

## 2023-03-29 RX ORDER — IBUPROFEN 800 MG/1
800 TABLET ORAL 3 TIMES DAILY
Qty: 90 TABLET | Refills: 0 | Status: SHIPPED | OUTPATIENT
Start: 2023-03-29 | End: 2023-04-28

## 2023-03-29 NOTE — PROGRESS NOTES
Postoperative Visit    History of Present Illness:   Tony is 2 weeks s/p right shoulder arthroscopy , rotator cuff repair, and arthroscopic biceps tenodesis  (DOS-3/14/23)  Full thickness SS and SSC, ATS biceps tenodesis  He is doing well -- pain is well controlled and He is not continuing to take narcotic pain medications   He is compliant with activity restrictions and is wearing the sling as instructed   Denies fevers, chills, constitutional symptoms   Has noted slower cap refill in R hand, not as pink. Is a little dry. No hand pain, swelling ,temp changes etc    Physical Examination:    NAD  right upper extremity:  Incision over the shoulder healing well with suture in place   No erythema, drainage or other signs of infection.   Swelling resolved   Sling in place   2+ RP, BCR in all digits. Hand is slightly less pink than CL side and CR is a little slower but still appropriate      Assessment/Plan:  42 y.o. male  2 weeks s/p right shoulder arthroscopy , rotator cuff repair, and arthroscopic biceps tenodesis  (DOS-3/14/23)    Plan  Sutures were removed today and steri strips were placed   The patient will continue with the sling and precautions as outlined in the postoperative instructions given to the patient.  The patient was given refill for non narcotic pain medications  elbow, wrist, and hand exercises as instructed   Physical therapy prescription placed at time of surgery - has appt scheduled  Observe changes in hand - let me know if this gets any worse.   Follow up in 4 weeks      All questions were answered in detail. The patient is in full agreement with the treatment plan and will proceed accordingly.

## 2023-03-29 NOTE — LETTER
March 29, 2023    Tony Gordillo  [address] [place] [address]             [facility]  [address]  [place] [address]  Phone: [phone] March 29, 2023     Patient: Tony Gordillo   YOB: 1980   Date of Visit: 3/29/2023       To Whom It May Concern:    Tony Gordillo  is currently under my care for an orthopedic injury/condition requiring activity restriction.     Start date of restrictions/Date of injury:  2/28/23    Patient has been under my care for this injury since: 03/08/2023     Last seen on 3/29/23    Continue Current restrictions - sling, no motion R shoulder     Will re-evaluate in 4 weeks.      If you have any questions or concerns, please don't hesitate to call.    Sincerely,        Crissy Bradford MD

## 2023-03-30 ENCOUNTER — PATIENT MESSAGE (OUTPATIENT)
Dept: ORTHOPEDICS | Facility: CLINIC | Age: 43
End: 2023-03-30
Payer: COMMERCIAL

## 2023-03-30 ENCOUNTER — PATIENT MESSAGE (OUTPATIENT)
Dept: ENDOCRINOLOGY | Facility: CLINIC | Age: 43
End: 2023-03-30
Payer: COMMERCIAL

## 2023-03-31 DIAGNOSIS — S46.011A TRAUMATIC ROTATOR CUFF TEAR, RIGHT, INITIAL ENCOUNTER: Primary | ICD-10-CM

## 2023-04-03 ENCOUNTER — HOSPITAL ENCOUNTER (EMERGENCY)
Facility: HOSPITAL | Age: 43
Discharge: HOME | End: 2023-04-03
Attending: EMERGENCY MEDICINE
Payer: COMMERCIAL

## 2023-04-03 VITALS
RESPIRATION RATE: 18 BRPM | OXYGEN SATURATION: 100 % | BODY MASS INDEX: 39.49 KG/M2 | SYSTOLIC BLOOD PRESSURE: 136 MMHG | TEMPERATURE: 99 F | WEIGHT: 298 LBS | HEIGHT: 73 IN | DIASTOLIC BLOOD PRESSURE: 72 MMHG | HEART RATE: 82 BPM

## 2023-04-03 DIAGNOSIS — R10.84 GENERALIZED ABDOMINAL CRAMPING: ICD-10-CM

## 2023-04-03 DIAGNOSIS — R19.7 VOMITING AND DIARRHEA: Primary | ICD-10-CM

## 2023-04-03 DIAGNOSIS — R11.10 VOMITING AND DIARRHEA: Primary | ICD-10-CM

## 2023-04-03 LAB
ALBUMIN SERPL BCP-MCNC: 4.1 G/DL (ref 3.5–5.2)
ALLENS TEST: ABNORMAL
ALP SERPL-CCNC: 76 U/L (ref 55–135)
ALT SERPL W/O P-5'-P-CCNC: 29 U/L (ref 10–44)
ANION GAP SERPL CALC-SCNC: 11 MMOL/L (ref 8–16)
AST SERPL-CCNC: 17 U/L (ref 10–40)
B-OH-BUTYR BLD STRIP-SCNC: 0.1 MMOL/L (ref 0–0.5)
BACTERIA #/AREA URNS HPF: NORMAL /HPF
BASOPHILS # BLD AUTO: 0.02 K/UL (ref 0–0.2)
BASOPHILS NFR BLD: 0.1 % (ref 0–1.9)
BILIRUB SERPL-MCNC: 0.7 MG/DL (ref 0.1–1)
BILIRUB UR QL STRIP: NEGATIVE
BUN SERPL-MCNC: 17 MG/DL (ref 6–20)
CALCIUM SERPL-MCNC: 9.4 MG/DL (ref 8.7–10.5)
CHLORIDE SERPL-SCNC: 110 MMOL/L (ref 95–110)
CLARITY UR: CLEAR
CO2 SERPL-SCNC: 17 MMOL/L (ref 23–29)
COLOR UR: YELLOW
CREAT SERPL-MCNC: 1.1 MG/DL (ref 0.5–1.4)
DELSYS: ABNORMAL
DIFFERENTIAL METHOD: ABNORMAL
EOSINOPHIL # BLD AUTO: 0.1 K/UL (ref 0–0.5)
EOSINOPHIL NFR BLD: 0.9 % (ref 0–8)
ERYTHROCYTE [DISTWIDTH] IN BLOOD BY AUTOMATED COUNT: 18 % (ref 11.5–14.5)
EST. GFR  (NO RACE VARIABLE): >60 ML/MIN/1.73 M^2
GLUCOSE SERPL-MCNC: 229 MG/DL (ref 70–110)
GLUCOSE UR QL STRIP: ABNORMAL
HCO3 UR-SCNC: 18.3 MMOL/L (ref 24–28)
HCT VFR BLD AUTO: 55.2 % (ref 40–54)
HGB BLD-MCNC: 17 G/DL (ref 14–18)
HGB UR QL STRIP: NEGATIVE
IMM GRANULOCYTES # BLD AUTO: 0.05 K/UL (ref 0–0.04)
IMM GRANULOCYTES NFR BLD AUTO: 0.3 % (ref 0–0.5)
KETONES UR QL STRIP: NEGATIVE
LEUKOCYTE ESTERASE UR QL STRIP: NEGATIVE
LYMPHOCYTES # BLD AUTO: 1.2 K/UL (ref 1–4.8)
LYMPHOCYTES NFR BLD: 7.5 % (ref 18–48)
MCH RBC QN AUTO: 25.2 PG (ref 27–31)
MCHC RBC AUTO-ENTMCNC: 30.8 G/DL (ref 32–36)
MCV RBC AUTO: 82 FL (ref 82–98)
MICROSCOPIC COMMENT: NORMAL
MODE: ABNORMAL
MONOCYTES # BLD AUTO: 1.3 K/UL (ref 0.3–1)
MONOCYTES NFR BLD: 8.3 % (ref 4–15)
NEUTROPHILS # BLD AUTO: 13 K/UL (ref 1.8–7.7)
NEUTROPHILS NFR BLD: 82.9 % (ref 38–73)
NITRITE UR QL STRIP: NEGATIVE
NRBC BLD-RTO: 0 /100 WBC
PCO2 BLDA: 35.7 MMHG (ref 35–45)
PH SMN: 7.32 [PH] (ref 7.35–7.45)
PH UR STRIP: 5 [PH] (ref 5–8)
PLATELET # BLD AUTO: 292 K/UL (ref 150–450)
PMV BLD AUTO: 10.6 FL (ref 9.2–12.9)
PO2 BLDA: 55 MMHG (ref 40–60)
POC BE: -7 MMOL/L
POC SATURATED O2: 86 % (ref 95–100)
POC TCO2: 19 MMOL/L (ref 24–29)
POCT GLUCOSE: 191 MG/DL (ref 70–110)
POTASSIUM SERPL-SCNC: 4.8 MMOL/L (ref 3.5–5.1)
PROT SERPL-MCNC: 7.9 G/DL (ref 6–8.4)
PROT UR QL STRIP: NEGATIVE
RBC # BLD AUTO: 6.74 M/UL (ref 4.6–6.2)
RBC #/AREA URNS HPF: 3 /HPF (ref 0–4)
SAMPLE: ABNORMAL
SITE: ABNORMAL
SODIUM SERPL-SCNC: 138 MMOL/L (ref 136–145)
SP GR UR STRIP: >1.03 (ref 1–1.03)
URN SPEC COLLECT METH UR: ABNORMAL
UROBILINOGEN UR STRIP-ACNC: NEGATIVE EU/DL
WBC # BLD AUTO: 15.69 K/UL (ref 3.9–12.7)
YEAST URNS QL MICRO: NORMAL

## 2023-04-03 PROCEDURE — 25000003 PHARM REV CODE 250: Performed by: EMERGENCY MEDICINE

## 2023-04-03 PROCEDURE — 81000 URINALYSIS NONAUTO W/SCOPE: CPT | Performed by: EMERGENCY MEDICINE

## 2023-04-03 PROCEDURE — 96374 THER/PROPH/DIAG INJ IV PUSH: CPT

## 2023-04-03 PROCEDURE — 82803 BLOOD GASES ANY COMBINATION: CPT

## 2023-04-03 PROCEDURE — 85025 COMPLETE CBC W/AUTO DIFF WBC: CPT | Performed by: EMERGENCY MEDICINE

## 2023-04-03 PROCEDURE — 80053 COMPREHEN METABOLIC PANEL: CPT | Performed by: EMERGENCY MEDICINE

## 2023-04-03 PROCEDURE — 99900035 HC TECH TIME PER 15 MIN (STAT)

## 2023-04-03 PROCEDURE — 82010 KETONE BODYS QUAN: CPT | Performed by: EMERGENCY MEDICINE

## 2023-04-03 PROCEDURE — 96372 THER/PROPH/DIAG INJ SC/IM: CPT | Mod: 59 | Performed by: EMERGENCY MEDICINE

## 2023-04-03 PROCEDURE — 99284 EMERGENCY DEPT VISIT MOD MDM: CPT | Mod: 25

## 2023-04-03 PROCEDURE — 96361 HYDRATE IV INFUSION ADD-ON: CPT

## 2023-04-03 PROCEDURE — 63600175 PHARM REV CODE 636 W HCPCS: Performed by: EMERGENCY MEDICINE

## 2023-04-03 PROCEDURE — 82962 GLUCOSE BLOOD TEST: CPT

## 2023-04-03 RX ORDER — DIPHENHYDRAMINE HCL 25 MG
50 CAPSULE ORAL
Status: COMPLETED | OUTPATIENT
Start: 2023-04-03 | End: 2023-04-03

## 2023-04-03 RX ORDER — DICYCLOMINE HYDROCHLORIDE 10 MG/ML
20 INJECTION INTRAMUSCULAR
Status: COMPLETED | OUTPATIENT
Start: 2023-04-03 | End: 2023-04-03

## 2023-04-03 RX ORDER — PROCHLORPERAZINE EDISYLATE 5 MG/ML
10 INJECTION INTRAMUSCULAR; INTRAVENOUS
Status: COMPLETED | OUTPATIENT
Start: 2023-04-03 | End: 2023-04-03

## 2023-04-03 RX ORDER — ONDANSETRON 4 MG/1
4 TABLET, FILM COATED ORAL EVERY 8 HOURS PRN
Qty: 12 TABLET | Refills: 0 | Status: SHIPPED | OUTPATIENT
Start: 2023-04-03 | End: 2023-07-18

## 2023-04-03 RX ORDER — DICYCLOMINE HYDROCHLORIDE 20 MG/1
20 TABLET ORAL 2 TIMES DAILY
Qty: 20 TABLET | Refills: 0 | Status: SHIPPED | OUTPATIENT
Start: 2023-04-03 | End: 2023-05-03

## 2023-04-03 RX ADMIN — DIPHENHYDRAMINE HYDROCHLORIDE 50 MG: 25 CAPSULE ORAL at 04:04

## 2023-04-03 RX ADMIN — DICYCLOMINE HYDROCHLORIDE 20 MG: 20 INJECTION INTRAMUSCULAR at 04:04

## 2023-04-03 RX ADMIN — SODIUM CHLORIDE 2000 ML: 9 INJECTION, SOLUTION INTRAVENOUS at 04:04

## 2023-04-03 RX ADMIN — PROCHLORPERAZINE EDISYLATE 10 MG: 5 INJECTION INTRAMUSCULAR; INTRAVENOUS at 04:04

## 2023-04-03 NOTE — FIRST PROVIDER EVALUATION
Medical screening examination initiated.  I have conducted a focused provider triage encounter, findings are as follows:    Brief history of present illness:  42-year-old gentleman who presents the emergency department with a complaint of vomiting, recent changes to diabetes medicines    There were no vitals filed for this visit.    Pertinent physical exam:  Alert and oriented.  Tachycardic.  Unlabored respirations.    Brief workup plan:  Labs order    Preliminary workup initiated; this workup will be continued and followed by the physician or advanced practice provider that is assigned to the patient when roomed.

## 2023-04-03 NOTE — ED PROVIDER NOTES
"Encounter Date: 4/3/2023    SCRIBE #1 NOTE: I, Antonio Crawley, am scribing for, and in the presence of,  Rudolph Esteves MD.     History     Chief Complaint   Patient presents with    Abdominal Pain     Pt reports to the ED with C/O weakness, near syncope, nausea, ABD cramps, and fatigue that started today. Pt reports "I have been feeling bad for the last week but today it has gotten a lot worse." Pt just started on a new DM med. Pt also is recovering from right rotator cough surgery and right arm is in a sling/ Splint. Pt is AAOx4, RESP easy and unlabored. Pt placed in [facility] bed for eval.      41 y/o male with DM, HTN, HLD presents to the ED with diarrhea for 2-3 days and vomiting today. His wife also notes loss of appetite, diffuse abdominal pain, chills, and sweats. Last meal this morning. Patient notes recently increasing dose of tirzepatide from 5 mg to 10 mg; first dose of 10 mg 1 week ago. He notes that he has had similar reactions to previous DM medications. He notes that his sugar was running low this morning, <70. Patient attempted treatment with fenergan at 1200 today with no relief. Patient denies cough, shortness of breath, chest pain, fever, dysuria, headaches, congestion, sore throat, arm or leg trouble, eye pain, ear pain, rash, or other associated symptoms. No medications taken today. He is allergic to pioglitazone, liraglutide, dapagliflozin.     The history is provided by the patient. No  was used.   Review of patient's allergies indicates:   Allergen Reactions    Influenza virus vaccine, specific Hives    Pioglitazone      Altered mood     Victoza [liraglutide] Nausea And Vomiting    Farxiga [dapagliflozin] Rash     Erectile dysfunction and rash      Past Medical History:   Diagnosis Date    Diabetes mellitus, type 2     Hyperlipidemia     Hypertension     Obesity     NAINA (obstructive sleep apnea)      Past Surgical History:   Procedure Laterality Date    ARTHROSCOPIC DEBRIDEMENT OF " SHOULDER  3/14/2023    Procedure: DEBRIDEMENT, SHOULDER, ARTHROSCOPIC;  Surgeon: Crissy Bradford MD;  Location: [facility] OR;  Service: Orthopedics;;    ARTHROSCOPIC REPAIR OF ROTATOR CUFF OF SHOULDER Right 3/14/2023    Procedure: REPAIR, ROTATOR CUFF, ARTHROSCOPIC;  Surgeon: Crissy Bradford MD;  Location: [facility] OR;  Service: Orthopedics;  Laterality: Right;  KARY PAYNE [phone]. TEXTED ELMER ON 3/9/2023 @ 12:10PM. ELMER RESPONDED ON 3/9/23 @ 12:23PM-SAG  RN PREOP 3/10/2023---CLEARED BY PCP AND CARDS    ARTHROSCOPY,SHOULDER,WITH BICEPS TENODESIS  3/14/2023    Procedure: ARTHROSCOPY,SHOULDER,WITH BICEPS TENODESIS;  Surgeon: Crissy Bradford MD;  Location: [facility] OR;  Service: Orthopedics;;    KNEE SURGERY      acl,mcl,pcl    left knee x 2       Family History   Problem Relation Age of Onset    Diabetes Mother     Diabetes Father     No Known Problems Brother     Autism Son     No Known Problems Daughter      Social History     Tobacco Use    Smoking status: Never    Smokeless tobacco: Never   Substance Use Topics    Alcohol use: Yes     Comment: 1-2 beers every 2 weeks    Drug use: No     Review of Systems   Constitutional:  Positive for appetite change (loss), chills and diaphoresis. Negative for fever.   HENT:  Negative for ear pain and sore throat.    Eyes:  Negative for pain.   Respiratory:  Negative for cough and shortness of breath.    Cardiovascular:  Negative for chest pain.   Gastrointestinal:  Positive for abdominal pain, diarrhea, nausea and vomiting.   Genitourinary:  Negative for dysuria.   Musculoskeletal:  Negative for back pain.        (-) Arm or leg trouble.    Skin:  Negative for rash.   Neurological:  Negative for headaches.   Psychiatric/Behavioral:  Negative for confusion.      Physical Exam     Initial Vitals [04/03/23 1518]   BP Pulse Resp Temp SpO2   134/78 106 18 98.8 °F (37.1 °C) 96 %      MAP       --         Physical Exam  The patient was examined specifically for the  following:   General:No significant distress, Good color, Warm and dry. Head and neck:Scalp atraumatic, Neck supple. Neurological:Appropriate conversation, Gross motor deficits. Eyes:Conjugate gaze, Clear corneas. ENT: No epistaxis. Cardiac: Regular rate and rhythm, Grossly normal heart tones. Pulmonary: Wheezing, Rales. Gastrointestinal: Abdominal tenderness, Abdominal distention. Musculoskeletal: Extremity deformity, Apparent pain with range of motion of the joints. Skin: Rash.   The findings on examination were normal except for the following:  The patient has an elevated BMI.  Lungs are clear and free of wheezing rales rubs or rhonchi.  There is no significant abdominal tenderness.  There is no guarding rebound mass or distention.  The abdomen is nontender.  The patient is in no apparent distress.  ED Course   Procedures  Labs Reviewed   COMPREHENSIVE METABOLIC PANEL - Abnormal; Notable for the following components:       Result Value    CO2 17 (*)     Glucose 229 (*)     All other components within normal limits   CBC W/ AUTO DIFFERENTIAL - Abnormal; Notable for the following components:    WBC 15.69 (*)     RBC 6.74 (*)     Hematocrit 55.2 (*)     MCH 25.2 (*)     MCHC 30.8 (*)     RDW 18.0 (*)     Gran # (ANC) 13.0 (*)     Immature Grans (Abs) 0.05 (*)     Mono # 1.3 (*)     Gran % 82.9 (*)     Lymph % 7.5 (*)     All other components within normal limits   ISTAT PROCEDURE - Abnormal; Notable for the following components:    POC PH 7.318 (*)     POC HCO3 18.3 (*)     POC SATURATED O2 86 (*)     POC TCO2 19 (*)     All other components within normal limits   POCT GLUCOSE - Abnormal; Notable for the following components:    POCT Glucose 191 (*)     All other components within normal limits   BETA - HYDROXYBUTYRATE, SERUM   URINALYSIS          Imaging Results    None       Medical decision making: Given the above this patient presents emergency room with diffuse abdominal cramping vomiting and diarrhea.   Gastroenteritis is considered.  The patient's presentation is complicated by diabetes on multiple medicines.  The patient has an elevated white blood count at 16,000.  I believe this is demargination for nausea and vomiting.  The patient has a slightly high hematocrit but a normal hemoglobin.  There are preponderance of granulocytes, likely demargination.  Chemistries are interesting for glucose of 229 after the history suggest that the patient has had low glucose is today.  The serum CO2 is slightly low raising concerns for diabetic ketoacidosis but the beta hydroxybutyrate is 0.1.  The patient has pH is slightly low at 7.31.  Perhaps his acidosis is secondary to his diarrhea or starvation.  I offered the patient observation in the hospital.  They declined wishing instead to go home on symptomatic therapy.  They understand the importance of returning if he gets worse or if new problems develop.  The patient and his wife were present for the conversations.  They seem to be very intelligent and capable of making good decisions in his own best interest.  I will release them as they request on clear liquids Zofran and dicyclomine.  The patient feels much improved that discharge.  The patient's abdomen is completely nontender at discharge.  I doubt ischemic colitis peritonitis and bowel obstruction.       Medications   sodium chloride 0.9% bolus 2,000 mL 2,000 mL (2,000 mLs Intravenous New Bag 4/3/23 1639)   dicyclomine injection 20 mg (20 mg Intramuscular Given 4/3/23 1640)   prochlorperazine injection Soln 10 mg (10 mg Intravenous Given 4/3/23 1640)   diphenhydrAMINE capsule 50 mg (50 mg Oral Given 4/3/23 1647)              Scribe Attestation:   Scribe #1: I performed the above scribed service and the documentation accurately describes the services I performed. I attest to the accuracy of the note.            I personally performed the services described in this documentation.  All medical record  entries made by the  randi are at my direction and in my presence.  Signed, Dr. Esteves       Clinical Impression:   Final diagnoses:  [R11.10, R19.7] Vomiting and diarrhea (Primary)  [R10.84] Generalized abdominal cramping        ED Disposition Condition    Discharge Stable          ED Prescriptions       Medication Sig Dispense Start Date End Date Auth. Provider    dicyclomine (BENTYL) 20 mg tablet Take 1 tablet (20 mg total) by mouth 2 (two) times daily. 20 tablet 4/3/2023 5/3/2023 Rudolph Esteves MD    ondansetron (ZOFRAN) 4 MG tablet Take 1 tablet (4 mg total) by mouth every 8 (eight) hours as needed (Nausea and vomiting). 12 tablet 4/3/2023 -- Rudolph Esteves MD          Follow-up Information       Follow up With Specialties Details Why Contact Info    Jovany Ford MD Internal Medicine In 2 days  [address] [facility]  [place] [address]  [phone]               Rudolph Esteves MD  04/03/23 1828       Rudolph Esteves MD  04/03/23 1820

## 2023-04-03 NOTE — DISCHARGE INSTRUCTIONS
Please follow up with the primary care doctor in 2 days.  Lots of clear liquids only while you have nausea vomiting diarrhea and abdominal pain.  Please hold your diabetes medicines while you are not eating.  Medicines as directed.

## 2023-04-04 ENCOUNTER — OFFICE VISIT (OUTPATIENT)
Dept: NEUROLOGY | Facility: CLINIC | Age: 43
End: 2023-04-04
Payer: COMMERCIAL

## 2023-04-04 DIAGNOSIS — F07.81 TRAUMATIC ENCEPHALOPATHY: Primary | ICD-10-CM

## 2023-04-04 DIAGNOSIS — Z15.89 MTHFR MUTATION: ICD-10-CM

## 2023-04-04 DIAGNOSIS — G89.29 OTHER CHRONIC PAIN: ICD-10-CM

## 2023-04-04 DIAGNOSIS — G31.84 MCI (MILD COGNITIVE IMPAIRMENT): ICD-10-CM

## 2023-04-04 DIAGNOSIS — G47.33 OSA (OBSTRUCTIVE SLEEP APNEA): ICD-10-CM

## 2023-04-04 DIAGNOSIS — Z79.899 POLYPHARMACY: ICD-10-CM

## 2023-04-04 DIAGNOSIS — F34.9 PERSISTENT MOOD DISORDER: ICD-10-CM

## 2023-04-04 DIAGNOSIS — R45.1 AGITATION: ICD-10-CM

## 2023-04-04 PROCEDURE — 99215 PR OFFICE/OUTPT VISIT, EST, LEVL V, 40-54 MIN: ICD-10-PCS | Mod: 95,,, | Performed by: PSYCHIATRY & NEUROLOGY

## 2023-04-04 PROCEDURE — 96116 NUBHVL XM PHYS/QHP 1ST HR: CPT | Mod: 95,59,, | Performed by: PSYCHIATRY & NEUROLOGY

## 2023-04-04 PROCEDURE — 96116 PR NEUROBEHAVIORAL STATUS EXAM BY PSYCH/PHYS: ICD-10-PCS | Mod: 95,59,, | Performed by: PSYCHIATRY & NEUROLOGY

## 2023-04-04 PROCEDURE — 99215 OFFICE O/P EST HI 40 MIN: CPT | Mod: 95,,, | Performed by: PSYCHIATRY & NEUROLOGY

## 2023-04-04 RX ORDER — LAMOTRIGINE 100 MG/1
TABLET ORAL
Qty: 60 TABLET | Refills: 1 | Status: SHIPPED | OUTPATIENT
Start: 2023-04-04 | End: 2023-05-11

## 2023-04-04 RX ORDER — LEVOMEFOLATE CALCIUM 15 MG
1 TABLET ORAL DAILY
Qty: 30 TABLET | Refills: 11 | Status: SHIPPED | OUTPATIENT
Start: 2023-04-04 | End: 2023-04-06

## 2023-04-04 NOTE — PROGRESS NOTES
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 objects.  Psychiatric:   He does feel depressed.  He is exhibiting symptoms of social withdrawal/indifference.  He denies anxiety.  He does exhibit cycling behavior.  He does not exhibit hyperactive behavior.  He is not exhibited symptoms of paranoia.  He does not have delusions.  He does not have hallucinations.  He does not have a history of sensitivity to neuroleptic/psychotropic medications.  Medical Review of Systems:   The patient does not have constipation.  The patient does not have urinary incontinence.  The patient denies orthostatic lightheadedness.  The patient's weight is stable.  Functional status:  Difficulty performing the following Instrumental ADLs:  Housekeeping: No  Food Preparation: No  Shopping: No  Ability to Handle Finances: No  Transportation/Driving: No  Household Appliances/Stove: No  Laundry: No  Difficulty performing the following Basic ADLs:  Dressing: No  Bathing: No  Toileting: No  Personal hygiene and grooming: No  Feeding: No  Care Management:  Patient/Family Safety Concerns:  Medication  Adherence: No  Home Safety: No  Wandered: No  Firearms: No  Fall Risk: No  Home Alone: No          Past Medical History:   Diagnosis Date    Diabetes mellitus, type 2     Hyperlipidemia     Hypertension     Obesity     NAINA (obstructive sleep apnea)        Past Surgical History:   Procedure Laterality Date    ARTHROSCOPIC DEBRIDEMENT OF SHOULDER  3/14/2023    Procedure: DEBRIDEMENT, SHOULDER, ARTHROSCOPIC;  Surgeon: Crissy Bradford MD;  Location: [facility] OR;  Service: Orthopedics;;    ARTHROSCOPIC REPAIR OF ROTATOR CUFF OF SHOULDER Right 3/14/2023    Procedure: REPAIR, ROTATOR CUFF, ARTHROSCOPIC;  Surgeon: Crissy Bradford MD;  Location: [facility] OR;  Service: Orthopedics;  Laterality: Right;  HARDY AND NEPHFLY PAYNE [phone]. TEXTED ELMER ON 3/9/2023 @ 12:10PM. ELMER RESPONDED ON 3/9/23 @ 12:23PM-SAG  RN PREOP 3/10/2023---CLEARED BY PCP AND CARDS    ARTHROSCOPY,SHOULDER,WITH BICEPS TENODESIS  3/14/2023    Procedure: ARTHROSCOPY,SHOULDER,WITH BICEPS TENODESIS;  Surgeon: Crissy Bradford MD;  Location: [facility] OR;  Service: Orthopedics;;    KNEE SURGERY      acl,mcl,pcl    left knee x 2         Family History   Problem Relation Age of Onset    Diabetes Mother     Diabetes Father     No Known Problems Brother     Autism Son     No Known Problems Daughter        Social History     Socioeconomic History    Marital status:    Occupational History    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     Employer: [employer]   Tobacco Use    Smoking status: Never    Smokeless tobacco: Never   Substance and Sexual Activity    Alcohol use: Yes     Comment: 1-2 beers every 2 weeks    Drug use: No     Social Determinants of Health     Financial Resource Strain: Low Risk     Difficulty of Paying Living Expenses: Not hard at all   Food Insecurity: No Food Insecurity    Worried About Running Out of Food in the Last Year: Never true    Ran Out of Food in the Last Year: Never true    Transportation Needs: No Transportation Needs    Lack of Transportation (Medical): No    Lack of Transportation (Non-Medical): No   Physical Activity: Insufficiently Active    Days of Exercise per Week: 3 days    Minutes of Exercise per Session: 40 min   Stress: No Stress Concern Present    Feeling of Stress : Only a little   Social Connections: Unknown    Frequency of Communication with Friends and Family: More than three times a week    Frequency of Social Gatherings with Friends and Family: Once a week    Active Member of Clubs or Organizations: Patient refused    Attends Club or Organization Meetings: Patient refused    Marital Status: Living with partner   Housing Stability: Low Risk     Unable to Pay for Housing in the Last Year: No    Number of Places Lived in the Last Year: 1    Unstable Housing in the Last Year: No       Medication:     Current Outpatient Medications on File Prior to Visit   Medication Sig Dispense Refill    atorvastatin (LIPITOR) 40 MG tablet Take 1 tablet (40 mg total) by mouth once daily. 90 tablet 3    azelastine (ASTELIN) 137 mcg (0.1 %) nasal spray 1 spray (137 mcg total) by Nasal route 2 (two) times daily. 30 mL 3    azelastine 205.5 mcg (0.15 %) Spry azelastine 205.5 mcg (0.15 %) nasal spray  INHALE 2 SPRAYS TO EACH NOSTRIL TWICE A DAY 30 mL 11    blood sugar diagnostic Strp To check BG 4 times daily, to use with insurance preferred meter 400 strip 3    blood sugar diagnostic Strp OneTouch Ultra Test strips      blood-glucose meter kit OneTouch Ultra2 Meter kit      blood-glucose sensor (FREESTYLE SAMANTHA 3 SENSOR) Filomena Change every 14 days 2 each 11    cyanocobalamin, vitamin B-12, 5,000 mcg Subl Place one under tongue once a day for 1 month, then continue to take under tongue per week 30 tablet 3    diclofenac sodium (VOLTAREN) 1 % Gel Apply the gel (2 g) to the affected area 4 times daily. Do not apply more than 8 g daily to any one affected joint 100 g 1    dicyclomine (BENTYL)  20 mg tablet Take 1 tablet (20 mg total) by mouth 2 (two) times daily. 20 tablet 0    empagliflozin (JARDIANCE) 25 mg tablet Take 1 tablet (25 mg total) by mouth once daily. 90 tablet 2    fenofibrate 160 MG Tab fenofibrate 160 mg tablet      fluticasone propionate (FLONASE) 50 mcg/actuation nasal spray fluticasone propionate 50 mcg/actuation nasal spray,suspension 16 g 3    fluticasone-umeclidin-vilanter (TRELEGY ELLIPTA) 200-62.5-25 mcg inhaler Inhale 1 puff into the lungs once daily. Stop symbicort 60 each 5    ibuprofen (ADVIL,MOTRIN) 800 MG tablet Take 1 tablet (800 mg total) by mouth 3 (three) times daily. 90 tablet 0    insulin aspart U-100 (NOVOLOG) 100 unit/mL (3 mL) InPn pen INJECT 40 UNITS BEFORE EACH MEAL WITH SLIDNING SCALE, MAX DAILY DOSE 150 UNITS 45 each 1    insulin glargine U-300 conc (TOUJEO MAX U-300 SOLOSTAR) 300 unit/mL (3 mL) insulin pen Inject 38 Units into the skin once daily. 4 pen 1    insulin NPH isoph U-100 human (NOVOLIN N FLEXPEN) 100 unit/mL (3 mL) InPn INJECT 14 UNITS INTO THE SKIN ONCE DAILY AT NIGHT 8 PM. 15 mL 1    ketoconazole (NIZORAL) 2 % cream Apply topically once daily. 1 Tube 3    lamoTRIgine (LAMICTAL) 25 MG tablet Take 3 tablets (75 mg total) by mouth once daily. 90 tablet 1    lancets Misc To check BG 4 times daily, to use with insurance preferred meter 400 each 3    levothyroxine (SYNTHROID) 50 MCG tablet TAKE 1 TABLET (50 MCG TOTAL) BY MOUTH BEFORE BREAKFAST. 90 tablet 2    lisinopriL (PRINIVIL,ZESTRIL) 20 MG tablet Take 1 tablet (20 mg total) by mouth once daily. 90 tablet 3    montelukast (SINGULAIR) 10 mg tablet TAKE 1 TABLET BY MOUTH EVERY EVENING 90 tablet 3    ondansetron (ZOFRAN) 4 MG tablet Take 1 tablet (4 mg total) by mouth every 8 (eight) hours as needed (Nausea and vomiting). 12 tablet 0    oxyCODONE (ROXICODONE) 5 MG immediate release tablet Take 1 tablet (5 mg total) by mouth every 6 (six) hours as needed for Pain. 20 tablet 0    pen needle, diabetic (BD  "ULTRA-FINE KEN PEN NEEDLE) 32 gauge x 5/32" Ndle 1 Device by Misc.(Non-Drug; Combo Route) route 5 (five) times daily. 550 each 4    pregabalin (LYRICA) 75 MG capsule Take 1 capsule (75 mg total) by mouth 2 (two) times daily. for 7 days 14 capsule 0    syringe, disposable, 1 mL Syrg Testosterone every 2 weeks 25 Syringe 1    tirzepatide (MOUNJARO) 10 mg/0.5 mL PnIj Inject 10 mg (one pen) into the skin every 7 days. 4 pen 1    tirzepatide (MOUNJARO) 5 mg/0.5 mL PnIj Inject 5 mg into the skin every 7 days. Start Mounjaro 5 mg once weekly for 4 weeks. Then increase to Mounjaro 10 mg once weekly. 4 pen 0     Current Facility-Administered Medications on File Prior to Visit   Medication Dose Route Frequency Provider Last Rate Last Admin    [COMPLETED] dicyclomine injection 20 mg  20 mg Intramuscular ED 1 Time Rudolph Esteves MD   20 mg at 04/03/23 1640    [COMPLETED] diphenhydrAMINE capsule 50 mg  50 mg Oral ED 1 Time Rudolph Esteves MD   50 mg at 04/03/23 1647    [COMPLETED] prochlorperazine injection Soln 10 mg  10 mg Intravenous ED 1 Time Rudolph Esteves MD   10 mg at 04/03/23 1640    [COMPLETED] sodium chloride 0.9% bolus 2,000 mL 2,000 mL  2,000 mL Intravenous ED 1 Time Rudolph Esteves MD   Stopped at 04/03/23 1739        Review of patient's allergies indicates:   Allergen Reactions    Influenza virus vaccine, specific Hives    Pioglitazone      Altered mood     Victoza [liraglutide] Nausea And Vomiting    Farxiga [dapagliflozin] Rash     Erectile dysfunction and rash        Medications Reconciliation:   I have reconciled the patient's home medications and discharge medications with the patient/family. I have updated all changes.  Refer to After-Visit Medication List.    Objective:  Vital Signs:  There were no vitals filed for this visit.  Wt Readings from Last 3 Encounters:   04/03/23 1518 135.2 kg (298 lb)   03/10/23 1209 134.4 kg (296 lb 6 oz)   03/10/23 0927 134.5 kg (296 lb 6.5 oz)     There is no height or " weight on file to calculate BMI.      Neurological examination:    Mental Status:   His appearance was abnormal.  Throughout the interview, he is cooperative, his eye contact is appropriate.  His behavior is appropriate to the clinical context without impropriety or improper language/conduct.  His behavior was not characterized by episodes of sudden uncontrollable and inappropriate laughing or crying.  The patient is awake; His energy level appeared normal.  His orientation is normal; Spatial 5/5 (location, the floor of building, city, county, state) and temporal 5/5 (month, day, year, MANDA) dimensions are accurate.  His attention/concentration is impaired.  He can complete three-step commands.  His fund of knowledge was appropriate for age, culture, and level of education.  His thought process is logical and goal-oriented.  He demonstrated appropriate insight based on actions, awareness of his illness, plans for the future.  He demonstrated good judgment based on actions and plans for the future.  He has no evidence of hallucinations (auditory, visual, olfactory).  He has no evidence of delusions (paranoid, grandiose, bizarre).  Cranial Nerves:   His facial strength was normal.  His facial expression was symmetric and appropriate to the context.  His oropharynx and soft palate appeared abnormal.   Comment: mallampati 4;  His uvula is mid-line.  His tongue showed no evidence of scalloping.  He tongue movement with normal.  Speech/Language:   The patient's speech was fluent, non-effortful, and his rate was appropriate to the context.  He has no articulation (segmental features) errors.  The patient's speech is not dysarthric.  The patient's speech was without evidence of anomia.  He makes no phonological loop errors.  Motor:   Assessment of motor strength was symmetric and at minimal anti-gravity.  There is no pronator or downward drift.  There is no myoclonus observed in The patient's bilateral upper and lower  extremities.  There are no fasciculations observed in The patient's bilateral upper and lower extremities.    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  Neurocognitive/Neurobehavioral Evaluation completed on 2023-04-04    Neuropsychiatric/Behavioral Focused Evaluation Assessment    BEHAV5+ 2/6 See ROS section for a full description   Laboratories:     Lab Date Value [Reference]   Coagulopathy Screening           aPTT 2023, Mar-09    24.5 [21.0 - 32.0 sec]      D-Dimer 2022, Feb-18    0.36      INR 2023, Mar-09    1.0 [0.8 - 1.2]      Protime 2023, Mar-09    10.2 [9.0 - 12.5 sec]      Metabolic Screening   Beta-Hydroxybutyrate 2023, Apr-03    0.1 [0.0 - 0.5 mmol/L]      Fat Qual Neutral, Stl 2022, Mar-25    Normal [Normal]      FECAL SPLIT FAT 2022, Mar-25    Normal [Normal]      Ferritin 03/25/2022  82 [20.0 - 300.0 ng/mL]      Free T4 02/03/20212021, Feb-03    0.92 [0.71 - 1.51 ng/dL]  0.88 [0.71 - 1.51 ng/dL]      Hemoglobin A1C External 2023, Jan-19 2023, Jan-19 2022, May-12    8.0 (H) [4.0 - 5.6 %]  8.0 (H) [4.0 - 5.6 %]  8.8 (H) [4.0 - 5.6 %]      Lactate, Fernie 2022, Mar-25    1.9 [0.5 - 2.2 mmol/L]      Lipase 01/19/2023  45 [4 - 60 U/L]      Methlymalonic Acid 01/19/20232023, Jan-19    0.12  0.12      Procalcitonin 01/19/2023  0.02      T4 Total 01/19/2023  6.5 [4.5 - 11.5 ug/dL]  6.5 [4.5 - 11.5 ug/dL]      TSH 02/03/2021  2.562 [0.400 - 4.000 uIU/mL]  2.562 [0.400 - 4.000 uIU/mL]  8.099 (H) [0.400 - 4.000 uIU/mL]      Glucose 2023, Apr-03    229 (H) [70 - 110 mg/dL]      Albumin 2023, Apr-03 2023, Mar-09  2023, Jan-19    4.1 [3.5 - 5.2 g/dL]  3.9 [3.5 - 5.2 g/dL]  3.9 [3.5 - 5.2 g/dL]      Alkaline Phosphatase 2023, Apr-03 2023, Mar-09  2023, Jan-19    76 [55 - 135 U/L]  60 [55 - 135 U/L]  58 [55 - 135 U/L]      ALT 2023, Apr-03 2023, Mar-09  2023, Jan-19     29 [10 - 44 U/L]  23 [10 - 44 U/L]  30 [10 - 44 U/L]      AST 2023, Apr-03 2023, Mar-09  2023, Jan-19    17 [10 - 40 U/L]  16 [10 - 40 U/L]  25 [10 - 40 U/L]      BILIRUBIN TOTAL 2023, Apr-03 2023, Mar-09  2023, Jan-19    0.7 [0.1 - 1.0 mg/dL]  0.4 [0.1 - 1.0 mg/dL]  0.6 [0.1 - 1.0 mg/dL]      PROTEIN TOTAL 2023, Apr-03 2023, Mar-09  2023, Jan-19    7.9 [6.0 - 8.4 g/dL]  7.5 [6.0 - 8.4 g/dL]  7.5 [6.0 - 8.4 g/dL]      Cholesterol 2022, Feb-17    162 [120 - 199 mg/dL]      HDL 2022, Feb-17    52 [40 - 75 mg/dL]      Non-HDL Cholesterol 2022, Feb-17    110 [mg/dL]      Triglycerides 01/19/2023  141 [30 - 150 mg/dL]      B12 Def. Methylmalonic Acid 01/19/2023  0.10  0.10      Folate 01/19/2023  10.7 [4.0 - 24.0 ng/mL]  10.7 [4.0 - 24.0 ng/mL]      Thiamine 01/19/2023  38 [38 - 122 ug/L]  38 [38 - 122 ug/L]      Vitamin B-12 01/19/20232023, Jan-19    364 [180 - 914 ng/L]  364 [180 - 914 ng/L]      Infectious Disease/Immunocompromised Screening   Cryptosporidium Antigen 2022, Mar-25    Negative      Giardia Antigen - EIA 2022, Mar-25    Negative      SARS Coronavirus 2 Antigen 2022, Jan-07    Negative      SARS-CoV-2 RNA, Amplification, Qual 2023, Jan-05 2023, Jan-05 2023, Jan-05    Negative  Negative  Negative      SARS-CoV2 (COVID-19) Qualitative PCR 2022, Feb-21    Not Detected [Not Detected]      Stool WBC 2022, Mar-25    No neutrophils seen [No neutrophils seen]      Hepatitis C Ab 07/15/2021  Negative      HIV 1/2 Ag/Ab 01/19/2023  Negative      Syphilis Treponemal Ab 01/19/20232023, Jan-19    Nonreactive [Nonreactive]  Nonreactive [Nonreactive]      Neuroendocrine/Electrolyte Screening   FSH 01/19/2023  0.40 (L) [0.95 - 11.95 mIU/mL]      Magnesium 03/25/20222022, Mar-25    1.9 [1.6 - 2.6 mg/dL]  1.9 [1.6 - 2.6 mg/dL]  1.8 [1.6 - 2.6 mg/dL]      Phosphorus 2022, Mar-25    3.5 [2.7 - 4.5 mg/dL]      Sex Hormone Binding Globulin 2021, Feb-03    21 [10 - 50 nmol/L]      Testosterone, Total 2021, Apr-19    894  [304 - 1227 ng/dL]      BUN 2023, Apr-03    17 [6 - 20 mg/dL]      Chloride 2023, Apr-03    110 [95 - 110 mmol/L]      Creatinine 2023, Apr-03    1.1 [0.5 - 1.4 mg/dL]      Potassium 2023, Apr-03    4.8 [3.5 - 5.1 mmol/L]      Sodium 2023, Apr-03    138 [136 - 145 mmol/L]      Testosterone, Bioavailable 2021, Feb-03    47.5 (L) [110.0 - 575.0 ng/dL]      Testosterone, Free 2021, Feb-03    25.2 (L) [46.0 - 224.0 pg/mL]      Cerebrospinal Fluid Assessment   IgG 2023, Jan-19 2023, Jan-19    813 [650 - 1600 mg/dL]  813 [650 - 1600 mg/dL]      Neurodegenerative Serum Fluid Assessment   NEUROFILAMENT LIGHT CHAIN, PLASMA 2023, Jan-19 2023, Jan-19    8.0  8.0      Standard Hematology Screen   Hematocrit 2023, Apr-03    55.2 (H) [40.0 - 54.0 %]      Hemoglobin 2023, Apr-03    17.0 [14.0 - 18.0 g/dL]      MCV 2023, Apr-03    82 [82 - 98 fL]      Platelets 2023, Apr-03    292 [150 - 450 K/uL]      Delirium Screening   Bacteria, UA 2023, Apr-03    Rare [None-Occ /hpf]      Glucose, UA 2023, Apr-03    4+ (A)      Ketones, UA 2023, Apr-03    Negative      Leukocytes, UA 2023, Apr-03    Negative      NITRITE UA 2023, Apr-03    Negative      Protein, UA 2023, Apr-03    Negative      RBC, UA 2023, Apr-03    3 [0 - 4 /hpf]           Neuroimaging:    MRI brain/head without contrast on 11/2/2022  Formal interpretation by Radiology:  No acute intracranial abnormality  Independently reviewed radiological imaging by Srini Lima MD. MPH. 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 No significant cortical atrophy; tight sulci not suggestive of any focal atrophy or degenerative pathology. Subcortical structures of normal volume and ratio. Corpus callosum is of normal volume. Midbrain and kevin has normal volume ratio. Hippocampi shows no evidence of atrophy.  T2/FLAIR: no significant subcortical white matter changes.  DWI/ADC: No Significant  DWI hyperintensities/hypointensities. No ADC correlation.  SWI/GRE: No Significant hypointensities to suggest cortical/subcortical hemosiderin deposition.  Impression: : No significant white matter changes regional atrophy however there is clear evidence of a lack of septum pellucidum likely traumatic in nature.     Procedures:    Electrocardiogram on 3/9/2023  Formal interpretation:  BPM    P-R Int : 174 ms          QRS Dur : 104 ms     QT Int : 338 ms       P-R-T Axes : 052 023 -01 degrees    QTc Int : 415 ms Normal sinus rhythm Possible Left atrial enlargement Septal infarct (cited on or before 09-MAR-2023) Abnormal ECG  Independently reviewed Electrocardiogram by Srini Lima MD. MPH. Behavioral Neurologist  Impression: : Received ECG has no evidence of sinus node disease. HR (>=50-60). Prolonged MD interval (>0.22 s). Broad QRS complex (> 0.12 s).    Electrocardiogram on 3/25/2022  Formal interpretation:  Vent. Rate : 107 BPM     Atrial Rate : 107 BPM    P-R Int : 166 ms          QRS Dur : 094 ms     QT Int : 334 ms       P-R-T Axes : 041 012 024 degrees    QTc Int : 445 ms Sinus tachycardia Nonspecific T wave abnormality Abnormal ECG  Independently reviewed Electrocardiogram by Srini Lima MD. MPH. Behavioral Neurologist  Impression: : Received ECG has no evidence of sinus node disease. HR (>=50-60). Prolonged MD interval (>0.22 s). Broad QRS complex (> 0.12 s).     Clinical Summary:     The patient is a 42-year-old right-handed male with a relevant past medical history of DM with complications, HTN, HLD. NAINA, migraine headaches, who presents reporting a 24-year history of gradually progressive neurocognitive impairment.       The clinical history is suggestive of:  Memory Impairment: STM encoding impairment, LTM encoding-retrieval impairment  Attention Impairment: Attention, Selective attention, Sustained attention, Shifting attention  Executive Impairment: Energization, Working Memory, Response  Inhibition  Behavior Impairment: Emotional Regulation  Psychiatric Impairment: Neurovegetative, Social Coherence, Mood Regulation  The neurological examination is significant for:  Cortical Transcallosal Disconnection: interhemispheric motor control (interhemispheric motor control ), motor efference (motor overflow)  Movement Disorder (Hypokinetic): paratonia (tone), parkinsonism (bradykinesia), dyskinesia (slowing, hypometria, dysrhythmia)  Sensory Dysfunction: peripheral (A? 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        Assessment:        The patient's clinical presentation is behavior/psychiatric predominant mild cognitive impairment insufficient to interfere with activities of daily living (CDR-SOB: 1 - Questionable cognitive impairment).     The patient's clinical presentation meets the criteria for Mild Cognitive Impairment (MCI-ADRC) (Miquel MS, et al. 2011 Alzheimer's & Dementia).     Concern regarding an intraindividual change in cognition  Impairment in one or more cognitive domains  Preservation of independence in functional abilities.  Not demented    The patient's clinical syndrome is best described as Traumatic Encephalopathy (SHAYLA) (Howard et al., 2015; Leela et al., 2017; Adelina et al., 2021).     At present, all neurodegenerative diseases can only be diagnosed with 100% certainty through a brain autopsy. 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     The observations made above, were discussed with the patient  and their supporting historian(s) (wife). We have discussed the additional diagnostic(s) and/or managenent below.     Care Management Plan:    #Diagnostic Screening for measurable forms of neurodegenerative pathology.  We have discussed opportunities for biomarker testing (CSF Osorio biomarkers, IDEAs Amyloid-PET, Syn-One skin biopsy).  #Optimize Neurocognitive Impairment and Quality  We have discussed the MIND Diet and other lifestyle behavior that may help maintain brain health.  We have provided written/digital reading material  Recommend continue CPAP compliance  Next appointment will try low-dose modafinil combination of Lamictal for inattention cognitive impairment  Continue B12 5000 mcg Q weekly  Start levo methyl folate 15 mg q.day for MTHFR mutation  #Optimize Behavioral Management and Quality.  No indication for memantine at this time  Increase Lamictal from  mg q.day - continue for 2 weeks. If no significant improvement increase to 150 mg q.day.  #Optimize Cerebrovascular Health.  The patient has a documented history of hyperlipidemia and/or hypercholesteremia with long-term complications such as cerebrovascular disease, peripheral vascular disease, and/or aortic atherosclerosis. Collectively these risk factors may contribute to cerebral atherosclerosis, and cerebral hypoperfusion compounded neurocognitive disorder. We discussed maximizing cerebrovascular-related medical therapy, including but not limited to cholesterol medications and antiplatelet agents. We have discussed the value of aggressively controlling vascular risk factors like hypertension, hyperlipidemia, and Diabetes SBP<130, LDL<100, and A1C<7.0. We discussed the need to optimize lifestyle choices, including a heart-healthy diet (e.g., Mediterranean or DASH), increased cardiovascular exercise (goal 150 minutes of moderate-intensity per week), and staying cognitively and socially active.  #Coordination of Care Management Planning.  We  "have recommended additional care management through social work support.  #Behavioral/Environmental Strategies  We recommend engaging in activities that stimulate cognitively and socially while avoiding excessive stimulation and fatigue in overwhelmingly complex situations.  We recommend integrating routine and schedule into your daily life. https://www.alzheimersproject.org/news/the-importance-of-routine-and-familiarity-to-persons-with-dementia/  #Health Maintenance/Lifestyle Advice  We have discussed the value in aggressively controlling vascular risk factors like hypertension, hyperlipidemia, and Diabetes SBP<130, LDL<100, A1C<7.0.  We discussed the need to optimize lifestyle choices including a heart-healthy diet (e.g., Mediterranean or DASH), increased cardiovascular exercise (goal 150 minutes of moderate-intensity per week), and stay cognitively and socially active.  #Support  We all need support sometimes. Get easy access to local resources, community programs, and services. https://www.communityresourcefinder.org/  Learn more about Cognitive Impairment in [place]: https://www.alz.org/professionals/public-health/state-overview/[place]  #Safety  The Alzheimer's Association administers the nationwide "Safe Return" program with identification bracelets, necklaces, or clothing tags and 24-hour assistance. More information is available online at [email or web address]  #Follow up:  Follow-up as needed.    Thank you for allowing us to participate in the care of your patient. Please do not hesitate to contact us with any questions or concerns.     It was a pleasure seeing The patient and we look forward to seeing them at their follow-up visit.     This note is dictated on M*Modal Fluency Direct word recognition program. There are word recognition mistakes that are occasionally missed on review.         Scheduled Follow-up :  Future Appointments "   Date Time Provider Department Center   4/5/2023 11:30 AM Kate Mansfield, NP [facility]CR[facility]   4/12/2023 10:00 AM Yvonne Lewis, PT [facility] [place] [facility]   4/14/2023  9:30 AM Rebeca Richardson, PTA [facility] [place] [facility]   4/17/2023 11:00 AM Rebeca Richardson, PTA [facility] [place] [facility]   4/19/2023  8:30 AM [facility], [facility] [place]   4/19/2023 10:45 AM Rebeca Richardson, PTA [facility] [place] [facility]   4/21/2023  9:30 AM Rebeca Richardson, PTA [facility] [place] [facility]   4/24/2023 11:00 AM Kyler Miner, PT [facility] [place] [facility]   4/25/2023  9:00 AM Kate Mansfield, NP [facility] ENDOCRN [facility]   4/26/2023 10:45 AM Rebeca Richardson, PTA [facility] [place] [facility]   4/27/2023 11:30 AM Crissy Bradford MD [facility] ORTHO [place] [facility]   4/28/2023  9:30 AM Rebeca Richardson, PTA [facility] [place] [facility]   5/1/2023 11:00 AM Kyler Miner, PT [facility] [place] [facility]   5/3/2023 10:45 AM Rebeca Richardson, PTA [facility] [place] [facility]   5/5/2023  9:30 AM Kyler Miner, PT [facility] [place] [facility]   5/8/2023 11:00 AM Kyler Miner, PT [facility] [place] [facility]   5/10/2023 10:45 AM Rebeca Richardson, PTA [facility] [place] [facility]   5/12/2023  9:30 AM Kyler iMner, PT [facility] [place] [facility]   5/15/2023 11:00 AM Rebeca Richardson, PTA [facility] [place] [facility]   5/17/2023 10:45 AM Rebeca Richardson, PTA [facility] [place] [facility]   5/19/2023  9:30 AM Kyler Miner, PT [facility]   5/22/2023 11:00 AM Kyler Miner, PT [facility]   5/24/2023 10:45 AM Rebeca Richardson PTA [facility]       After Visit Medication List :     Medication List            Accurate as of April 4, 2023  1:06 PM. If you have any questions, ask your nurse or doctor.                CONTINUE taking these medications      atorvastatin 40 MG tablet  Commonly known as: LIPITOR  Take 1 tablet (40 mg total) by mouth once daily.     * azelastine 205.5 mcg (0.15 %) Spry  azelastine 205.5 mcg (0.15 %) nasal spray  INHALE 2 SPRAYS TO EACH NOSTRIL  TWICE A DAY     * azelastine 137 mcg (0.1 %) nasal spray  Commonly known as: ASTELIN  1 spray (137 mcg total) by Nasal route 2 (two) times daily.     * blood sugar diagnostic Strp     * blood sugar diagnostic Strp  To check BG 4 times daily, to use with insurance preferred meter     blood-glucose meter kit     cyanocobalamin (vitamin B-12) 5,000 mcg Subl  Place one under tongue once a day for 1 month, then continue to take under tongue per week     diclofenac sodium 1 % Gel  Commonly known as: VOLTAREN  Apply the gel (2 g) to the affected area 4 times daily. Do not apply more than 8 g daily to any one affected joint     dicyclomine 20 mg tablet  Commonly known as: BENTYL  Take 1 tablet (20 mg total) by mouth 2 (two) times daily.     empagliflozin 25 mg tablet  Commonly known as: JARDIANCE  Take 1 tablet (25 mg total) by mouth once daily.     fenofibrate 160 MG Tab     fluticasone propionate 50 mcg/actuation nasal spray  Commonly known as: FLONASE  fluticasone propionate 50 mcg/actuation nasal spray,suspension     FREESTYLE SAMANTHA 3 SENSOR Filomena  Generic drug: blood-glucose sensor  Change every 14 days     ibuprofen 800 MG tablet  Commonly known as: ADVIL,MOTRIN  Take 1 tablet (800 mg total) by mouth 3 (three) times daily.     insulin aspart U-100 100 unit/mL (3 mL) Inpn pen  Commonly known as: NovoLOG  INJECT 40 UNITS BEFORE EACH MEAL WITH SLIDNING SCALE, MAX DAILY DOSE 150 UNITS     ketoconazole 2 % cream  Commonly known as: NIZORAL  Apply topically once daily.     lamoTRIgine 25 MG tablet  Commonly known as: LAMICTAL  Take 3 tablets (75 mg total) by mouth once daily.     lancets Misc  To check BG 4 times daily, to use with insurance preferred meter     levothyroxine 50 MCG tablet  Commonly known as: SYNTHROID  TAKE 1 TABLET (50 MCG TOTAL) BY MOUTH BEFORE BREAKFAST.     lisinopriL 20 MG tablet  Commonly known as: PRINIVIL,ZESTRIL  Take 1 tablet (20 mg total) by mouth once daily.     montelukast 10 mg tablet  Commonly  "known as: SINGULAIR  TAKE 1 TABLET BY MOUTH EVERY EVENING     * MOUNJARO 5 mg/0.5 mL Pnij  Generic drug: tirzepatide  Inject 5 mg into the skin every 7 days. Start Mounjaro 5 mg once weekly for 4 weeks. Then increase to Mounjaro 10 mg once weekly.     * MOUNJARO 10 mg/0.5 mL Pnij  Generic drug: tirzepatide  Inject 10 mg (one pen) into the skin every 7 days.     NovoLIN N FlexPen 100 unit/mL (3 mL) Inpn  Generic drug: insulin NPH isoph U-100 human  INJECT 14 UNITS INTO THE SKIN ONCE DAILY AT NIGHT 8 PM.     ondansetron 4 MG tablet  Commonly known as: ZOFRAN  Take 1 tablet (4 mg total) by mouth every 8 (eight) hours as needed (Nausea and vomiting).     oxyCODONE 5 MG immediate release tablet  Commonly known as: ROXICODONE  Take 1 tablet (5 mg total) by mouth every 6 (six) hours as needed for Pain.     pen needle, diabetic 32 gauge x 5/32" Ndle  Commonly known as: BD ULTRA-FINE KEN PEN NEEDLE  1 Device by Misc.(Non-Drug; Combo Route) route 5 (five) times daily.     pregabalin 75 MG capsule  Commonly known as: LYRICA  Take 1 capsule (75 mg total) by mouth 2 (two) times daily. for 7 days     syringe (disposable) 1 mL Syrg  Testosterone every 2 weeks     TOUJEO MAX U-300 SOLOSTAR 300 unit/mL (3 mL) insulin pen  Generic drug: insulin glargine U-300 conc  Inject 38 Units into the skin once daily.     TRELEGY ELLIPTA 200-62.5-25 mcg inhaler  Generic drug: fluticasone-umeclidin-vilanter  Inhale 1 puff into the lungs once daily. Stop symbicort           * This list has 6 medication(s) that are the same as other medications prescribed for you. Read the directions carefully, and ask your doctor or other care provider to review them with you.                  Signing Physician:  Srini South MD    Billing:        -----------------------------------------------------------------------------    I performed this consultation using real-time Telehealth tools, including a live video connection between my location and the patient's " location (their home within the [facility]). Prior to initiating the consultation, I obtained informed verbal consent to perform this consultation using Telehealth tools and answered all the questions about the Telehealth interaction. The participants understand that only a limited neurological exam and limited neuropsychological testing can be performed using Telehealth tools.    I spent a total of 45 minutes (time-in: 13:00 PM; time-out: 13:45 PM) on 2023-04-04, virtually face-to-face with the patient and caregiver(s), >50% of that time was spent counseling regarding the symptoms, treatment plan, risks, therapeutic options, lifestyle modifications, and/or safety issues for the diagnoses above.    10/14 Review of Systems completed and is negative except as stated above in HPI (Systems reviewed: Const, Eyes, ENT, Resp, CV, GI, , MSK, Skin, Neuro)    I reviewed previous labs for a total of 5 minutes on 2023-04-04. This is directly related to the face-to-face encounter. Review of previous labs was performed all negative except as stated above in HPI    I reviewed previous diagnostic testing for a total of 5 minutes on 2023-04-04. This is directly related to the face-to-face encounter. A review of previous diagnostic testing was performed was noted to be within normal limits except as is stated above in HPI    I performed a neurobehavioral status examination that included a clinical assessment of thinking, reasoning, and judgment. Please see above HPI and ROS for full details. This exam was performed on 2023-04-04 and included 15 minutes spent on direct face-to-face clinical observation and interview with the patient and 19 minutes spent interpreting test results and preparing the report. The total time of 34 minutes spent on the neurobehavioral status examination is not included in the time spent on evaluation and management coding.    Total Billing time spent on encounter/documentation for this patient's  evaluation and management, not including the neurobehavioral status examination: 40 minutes.

## 2023-04-05 ENCOUNTER — OFFICE VISIT (OUTPATIENT)
Dept: ENDOCRINOLOGY | Facility: CLINIC | Age: 43
End: 2023-04-05
Payer: COMMERCIAL

## 2023-04-05 VITALS
BODY MASS INDEX: 39.58 KG/M2 | DIASTOLIC BLOOD PRESSURE: 88 MMHG | WEIGHT: 300 LBS | TEMPERATURE: 98 F | HEART RATE: 88 BPM | SYSTOLIC BLOOD PRESSURE: 150 MMHG

## 2023-04-05 DIAGNOSIS — E66.01 SEVERE OBESITY: ICD-10-CM

## 2023-04-05 DIAGNOSIS — T50.905A DRUG-INDUCED NAUSEA AND VOMITING: ICD-10-CM

## 2023-04-05 DIAGNOSIS — R11.2 DRUG-INDUCED NAUSEA AND VOMITING: ICD-10-CM

## 2023-04-05 DIAGNOSIS — E11.9 TYPE 2 DIABETES MELLITUS WITHOUT COMPLICATION, WITH LONG-TERM CURRENT USE OF INSULIN: Primary | ICD-10-CM

## 2023-04-05 DIAGNOSIS — Z79.4 TYPE 2 DIABETES MELLITUS WITHOUT COMPLICATION, WITH LONG-TERM CURRENT USE OF INSULIN: Primary | ICD-10-CM

## 2023-04-05 PROCEDURE — 99999 PR PBB SHADOW E&M-EST. PATIENT-LVL V: CPT | Mod: PBBFAC,,, | Performed by: NURSE PRACTITIONER

## 2023-04-05 PROCEDURE — 95251 PR GLUCOSE MONITOR, 72 HOUR, PHYS INTERP: ICD-10-PCS | Mod: S$GLB,,, | Performed by: NURSE PRACTITIONER

## 2023-04-05 PROCEDURE — 99215 OFFICE O/P EST HI 40 MIN: CPT | Mod: S$GLB,,, | Performed by: NURSE PRACTITIONER

## 2023-04-05 PROCEDURE — 95251 CONT GLUC MNTR ANALYSIS I&R: CPT | Mod: S$GLB,,, | Performed by: NURSE PRACTITIONER

## 2023-04-05 PROCEDURE — 99215 PR OFFICE/OUTPT VISIT, EST, LEVL V, 40-54 MIN: ICD-10-PCS | Mod: S$GLB,,, | Performed by: NURSE PRACTITIONER

## 2023-04-05 PROCEDURE — 99999 PR PBB SHADOW E&M-EST. PATIENT-LVL V: ICD-10-PCS | Mod: PBBFAC,,, | Performed by: NURSE PRACTITIONER

## 2023-04-05 RX ORDER — SEMAGLUTIDE 1.34 MG/ML
INJECTION, SOLUTION SUBCUTANEOUS
Qty: 1 EACH | Refills: 0 | Status: SHIPPED | OUTPATIENT
Start: 2023-04-05 | End: 2023-07-10

## 2023-04-05 RX ORDER — INSULIN ASPART 100 [IU]/ML
INJECTION, SOLUTION INTRAVENOUS; SUBCUTANEOUS
Qty: 45 ML | Refills: 1 | Status: SHIPPED | OUTPATIENT
Start: 2023-04-05 | End: 2023-07-10 | Stop reason: SDUPTHER

## 2023-04-05 RX ORDER — SEMAGLUTIDE 2.68 MG/ML
2 INJECTION, SOLUTION SUBCUTANEOUS
Qty: 1 EACH | Refills: 2 | Status: SHIPPED | OUTPATIENT
Start: 2023-04-05 | End: 2023-07-10

## 2023-04-05 RX ORDER — HUMAN INSULIN 100 [IU]/ML
INJECTION, SUSPENSION SUBCUTANEOUS
Qty: 15 ML | Refills: 2 | Status: SHIPPED | OUTPATIENT
Start: 2023-04-05 | End: 2024-01-11

## 2023-04-05 RX ORDER — INSULIN GLARGINE 300 U/ML
38 INJECTION, SOLUTION SUBCUTANEOUS DAILY
Qty: 12 ML | Refills: 5 | Status: SHIPPED | OUTPATIENT
Start: 2023-04-05 | End: 2023-07-10 | Stop reason: SDUPTHER

## 2023-04-05 NOTE — PATIENT INSTRUCTIONS
Resume Toujeo at 38 units once daily at night and Jardiance now.   Resume Novolin N 14 units starting next week or when fasting glucoses start rising about 140.   Inject Fiasp 20-30 units before meals for now. Icrease back to 40 unitsl if blood sugars 2 hours after meals stay at 200s.   Resume Ozempic at 0.25 mg once weekly for 1 week.   Then increase to 0.5 mg once weekly for 3 weeks.   Then increase to 1 mg once weekly x 1 month.   Then increase to 2 mg once weekly. Ok to dial down to 36 clicks (= 1 mg) if 2 mg results in side effects.   Return to clinic in 3 months with labs prior.

## 2023-04-05 NOTE — PROGRESS NOTES
CC: This 42 y.o. White male  is here for evaluation of  T2DM along with comorbidities indicated in the Visit Diagnosis section of this encounter.    HPI: Tony Gordillo was diagnosed with T2DM in 2008. He was without health insurance for 2017 and mid 2018.       Prior visit 2/15/23  Pt has not been seen in close to a year. He missed his last appt and returns today for refills. He has been busy working 2 full time jobs.   Last a1c was 8% in Jan.   He found that Ozempic 1 mg was a bit more effective than Trulicity 3 mg. Pt has been out of Ozempic x 4 months. It was on backorder and then later needed a PA.   14 day CGM average has increased from ~ 170  in Dec to now 226. Denies poor diet. Tries to limit carbs to 60-80 grams CHO per meal.   When he ran out of Ozempic, he increased Toujeo from 35 to 45-50 units hs. Injects Fiasp 40 units ac, plus 10-20 units about an hour after meals as well.   Plan Start Mounjaro 5 mg once weekly for 4 weeks. Then increase to Mounjaro 10 mg once weekly.   Reduce Toujeo back down to 38 units once daily.   Continue Fiasp 40 units before meals, add correction scale: 150-200=+1, 201-250=+2, etc.   Continue Jardiance and Novolin N 14 units at night.   Return to clinic in 2 months with labs prior with a1c.       Interval hx  Pt presented to ED earlier this week with severe n/v and diarrhea. He had just increased Mounjaro from 5 to 10 mg a few days prior.   He reports great glucose control on Mounjaro 5 mg and was able to reduce Fiasp from 40 to 20-30 units ac. There were nights he did not take Novolin N. Appetite was quite lower on Mounjaro as well.        LAST DIABETES EDUCATION: 2019    HOSPITALIZED FOR DIABETES -  No.    PRESCRIBED DIABETES MEDICATIONS:    Jardiance 25 mg once daily   Toujeo Max 38 units hs   Fiasp - as above   Novolin N 14 units between 8-10 pm   Mounjaro - as above      Missed doses - denies   Rotates injections: yes   Changes pen needles - yes     DM COMPLICATIONS:  "none    SIGNIFICANT DIABETES MED HISTORY:   Has previously used Novolin 70/30 morning only   glyburide--hypoglycemia  Victoza - nausea and vomiting at 1.2 mg; felt "run down" at 0.6 mg.   Trulicity less effective than Ozempic, switched to Ozempic 4/2022  Metformin - diarrhea and vomiting (has not tried metformin XR)   Metformin topical - ineffective, stopped on his own 10/2021  Pioglitazone - altered mood, reportedly became angry   Novolin N at bedtime started 3/2022      SELF MONITORING BLOOD GLUCOSE: Checks blood glucose at home several times a day with I-Stand  CGM raul.     CGM interpretation:  2 week report includes the weeks before and after he increased Mounjaro from 5 to 10 mg. BGs overall controlled but with postprandial highs into the mid 200s. Mild hypoglycemia noted when he increased to 10 mg despite reportedly consuming large amounts of glucose.             HYPOGLYCEMIC EPISODES:  as above      CURRENT DIET: drinks water mostly. Sometimes coffee at work.  Eats 2-3 meals/day.     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     PHYSICAL EXAM:  GENERAL: Well developed, well nourished. No acute distress.   PSYCH: AAOx3, appropriate mood and affect, conversant, well-groomed. Judgement and insight good.   NEURO: Cranial nerves grossly intact. Speech clear, no tremor.   CHEST: Respirations even and unlabored.          Hemoglobin A1C   Date Value Ref Range Status   01/19/2023 8.0 (H) 4.0 - 5.6 % Final     Comment:     ADA Screening Guidelines:  5.7-6.4%  Consistent with prediabetes  >or=6.5%  Consistent with diabetes    High levels of fetal hemoglobin interfere with the HbA1C  assay. Heterozygous hemoglobin variants (HbS, HgC, etc)do  not significantly interfere with this assay.   However, " presence of multiple variants may affect accuracy.     05/12/2022 8.8 (H) 4.0 - 5.6 % Final     Comment:     ADA Screening Guidelines:  5.7-6.4%  Consistent with prediabetes  >or=6.5%  Consistent with diabetes    High levels of fetal hemoglobin interfere with the HbA1C  assay. Heterozygous hemoglobin variants (HbS, HgC, etc)do  not significantly interfere with this assay.   However, presence of multiple variants may affect accuracy.     02/17/2022 9.9 (H) 4.0 - 5.6 % Final     Comment:     ADA Screening Guidelines:  5.7-6.4%  Consistent with prediabetes  >or=6.5%  Consistent with diabetes    High levels of fetal hemoglobin interfere with the HbA1C  assay. Heterozygous hemoglobin variants (HbS, HgC, etc)do  not significantly interfere with this assay.   However, presence of multiple variants may affect accuracy.       Lab Results   Component Value Date    TSH 2.562 01/19/2023    TSH 2.562 01/19/2023           Chemistry        Component Value Date/Time     04/03/2023 1548    K 4.8 04/03/2023 1548     04/03/2023 1548    CO2 17 (L) 04/03/2023 1548    BUN 17 04/03/2023 1548    CREATININE 1.1 04/03/2023 1548     (H) 04/03/2023 1548        Component Value Date/Time    CALCIUM 9.4 04/03/2023 1548    ALKPHOS 76 04/03/2023 1548    AST 17 04/03/2023 1548    ALT 29 04/03/2023 1548    BILITOT 0.7 04/03/2023 1548    ESTGFRAFRICA >60 03/25/2022 0617    EGFRNONAA >60 03/25/2022 0617          Lab Results   Component Value Date    LDLCALC 81.8 02/17/2022      Latest Reference Range & Units 02/17/22 11:15   Cholesterol 120 - 199 mg/dL 162 [1]   HDL 40 - 75 mg/dL 52 [2]   HDL/Cholesterol Ratio 20.0 - 50.0 % 32.1   LDL Cholesterol External 63.0 - 159.0 mg/dL 81.8 [3]   Non-HDL Cholesterol mg/dL 110 [4]   Total Cholesterol/HDL Ratio 2.0 - 5.0  3.1   Triglycerides 30 - 150 mg/dL 141 [5]     Lab Results   Component Value Date    MICALBCREAT 8.8 02/17/2022             ASSESSMENT and PLAN:    A1C GOAL: < 7 %     1. Type 2  diabetes mellitus without complication, with long-term current use of insulin  Discussed resuming Mounjaro at lower dose versus Ozempic and titrating to 2 mg. He preferred the latter for now. Advised:     Resume Toujeo at 38 units once daily at night and Jardiance now.   Resume Novolin N 14 units starting next week or when fasting glucoses start rising about 140.   Inject Fiasp 20-30 units before meals for now. Increase back to 40 units if blood sugars 2 hours after meals stay at 200s.   Resume Ozempic at 0.25 mg once weekly for 1 week.   Then increase to 0.5 mg once weekly for 3 weeks.   Then increase to 1 mg once weekly x 1 month.   Then increase to 2 mg once weekly. Ok to dial down to 36 clicks (= 1 mg) if 2 mg results in side effects.   Return to clinic in 3 months with labs prior.       2. Severe obesity  Increases insulin resistance.         3. Drug-induced nausea and vomiting  Unable to tolerate Mounjaro 10 mg.            Patient is testing blood sugars 4x/day and taking 3 injections of insulin a day. The patient's insulin treatment regimen requires frequent adjustments by the patient on the basis of therapeutic CGM testing results.       No orders of the defined types were placed in this encounter.       Follow up in about 3 months (around 7/5/2023).

## 2023-04-12 ENCOUNTER — CLINICAL SUPPORT (OUTPATIENT)
Dept: REHABILITATION | Facility: HOSPITAL | Age: 43
End: 2023-04-12
Attending: ORTHOPAEDIC SURGERY
Payer: OTHER MISCELLANEOUS

## 2023-04-12 DIAGNOSIS — M25.611 DECREASED RANGE OF MOTION OF RIGHT SHOULDER: ICD-10-CM

## 2023-04-12 DIAGNOSIS — R29.898 IMPAIRED STRENGTH OF SHOULDER MUSCLES: ICD-10-CM

## 2023-04-12 DIAGNOSIS — M67.813 BICEPS TENDONOSIS OF RIGHT SHOULDER: ICD-10-CM

## 2023-04-12 DIAGNOSIS — Z98.890 S/P RIGHT ROTATOR CUFF REPAIR: Primary | ICD-10-CM

## 2023-04-12 PROCEDURE — 97112 NEUROMUSCULAR REEDUCATION: CPT | Mod: PN

## 2023-04-12 PROCEDURE — 97140 MANUAL THERAPY 1/> REGIONS: CPT | Mod: PN

## 2023-04-12 NOTE — PLAN OF CARE
[facility]   Physical Therapy Treatment Note     Name: Tony Gordillo  [facility] Number: [id number]    Therapy Diagnosis:   Encounter Diagnoses   Name Primary?    S/P right rotator cuff repair Yes    Biceps tendonosis of right shoulder     Decreased range of motion of right shoulder     Impaired strength of shoulder muscles      Physician: Crissy Bradford MD    Visit Date: 4/12/2023    Physician Orders: PT Eval and Treat   Medical Diagnosis from Referral: S46.011A (ICD-10-CM) - Traumatic rotator cuff tear, right, initial encounter   Evaluation Date: 3/15/2023  Authorization Period Expiration: 3/12/2024  Plan of Care Expiration: 7/7/2024 - most likely will have to extend due to nature of surgery and pt's job requirements   Visit # / Visits authorized: 1/ 1    Progress Note Due: 5/12/2023  FOTO: 1/1  HEP: Access Code: [email or web address]     Precautions: Diabetes;      POSTOPERATIVE PLAN: Plan to follow subscapularis-supraspinatus repair protocol (<3 cm in total size). Passive motion may begin at 4 weeks. Active motion at 6 weeks. No biceps resistive activity x 8 weeks. No cuff resistive activity x 12 weeks.      DATE OF PROCEDURE: 3/14/2023     PREOPERATIVE DIAGNOSES:   Right     1. Rotator cuff tear   2. Biceps tendinopathy    POSTOPERATIVE DIAGNOSES:   Right   1. Rotator cuff tear, full thickness involving the subscapularis and supraspinatus,   2. Biceps tendinopathy     OPERATION:   Right Shoulder  1. Arthroscopic  subscapularis and supraspinatus rotator cuff repair   2. Arthroscopic biceps tenodesis  3. Extensive debridement  Surgeon(s) and Role:     * Crissy Bradford MD - Primary     Per 2wk f/u with surgeon on 3/29/23:   Assessment/Plan:  42 y.o. male  2 weeks s/p right shoulder arthroscopy , rotator cuff repair, and arthroscopic biceps tenodesis  (DOS-3/14/23)     Plan  Sutures were removed today and steri strips were placed   The patient will continue with the sling and precautions as  "outlined in the postoperative instructions given to the patient.  The patient was given refill for non narcotic pain medications  elbow, wrist, and hand exercises as instructed   Physical therapy prescription placed at time of surgery - has appt scheduled  Observe changes in hand - let me know if this gets any worse.   Follow up in 4 weeks      Time In: 1000  Time Out: 1100  Total Appointment Time (timed & untimed codes): 50 minutes      SUBJECTIVE     Pt reports: "I've been feeling ok. I still have soreness but that is pretty normal at this point. I am still sleeping in my sling and I wear it the majority of the day."  He was compliant with home exercise program.  Response to previous treatment: no change - initial eval was last visit  Functional change: weaning out of sling     Pain: 5/10  Location: right shoulder      OBJECTIVE     Passive Range of Motion (degrees): measured on 4/12/23  Shoulder Right   Flexion 140deg   Abduction 100deg   Scaption 155deg   ER 30deg scap plane 40deg   IR 30deg scap plane 50deg   Elbow ROM 0-145deg      Active Range of Motion in  (degrees): will assess in upcoming weeks  Shoulder Right Left   Flexion    Abduction    ER NT 65   IR  NT 60      Upper Extremity Strength  (R) UE   (L) UE     Elbow flexion: 3/5 Elbow flexion: 5/5   Elbow extension: 3/5 Elbow extension: 5/5   Shoulder elevation: 2-/5 Shoulder elevation: 5/5   Shoulder flexion: 2-/5 Shoulder flexion: 5/5   Shoulder Abduction: 2-/5 Shoulder abduction: 5/5   Shoulder ER 2-/5 Shoulder ER 4/5   Shoulder IR 2-/5 Shoulder IR 4/5   Lower Trap NT Lower Trap 4-/5   Middle Trap NT Middle Trap 4/5   Rhomboids NT Rhomboids 4-/5          TREATMENT     Tony received the treatments listed below:      Tony received therapeutic exercises to develop strength, endurance, ROM, flexibility, posture, and core stabilization for 5 minutes including:  Supine bicep curls x15 (first 5 with AAROM and last 10reps performed AROM)  Pendulums " 3x 2-3min      Tony received the following manual therapy techniques: Soft tissue Mobilization were applied to the: R shoulder complex for 30 minutes, including:  Manual mobilizations to posterior and inferior capsule gr II-III  PROM all planes with protocol precautions       neuromuscular re-education activities to improve: Balance, Coordination, Kinesthetic, Sense, Proprioception, and Posture for 25 minutes. The following activities were included:  Ambulation t/o clinic to promote normalized arm swing pain free x5min  Seated scap retractions 2x12  Standing scap depressions 2x12  AA supine serratus punches 2x8 with DPT hand placed on scapula to guide movement pattern    PATIENT EDUCATION AND HOME EXERCISES     Home Exercises Provided and Patient Education Provided     Education provided:   - HEP   - post op precautions     Written Home Exercises Provided: Patient instructed to cont prior HEP. Exercises were reviewed and Tony was able to demonstrate them prior to the end of the session.  Tony demonstrated fair  understanding of the education provided. See EMR under Patient Instructions for exercises provided during therapy sessions    ASSESSMENT     Tony presents to PT for first follow up session since initial evaluation. He is 4wks post-op today and tolerated all PROM, joint mobilizations and initiation of AROM of R elbow flexion/extension in GE positions. Tony has pain at above listed end ranges however joint integrity is good/normal and DPT anticipates no limitations in regaining full AROM in R GHJ/scapulothoracic joints. Tony has remained compliant with all protocol restrictions and recommendations from DPT as well as surgeon. He has been provided with additions to HEP to initiate scapular movement patterns without involvement from RUE. DPT recommends initiating daily walks to re-establish reciprocal arm swing during gait, promotion of perfusion and for general mobility in tolerable/pain free ROM. Tony will  continue to benefit from skilled therapy in order to return to job without limitation. Tony's job as a  does not offer light or modified duty thus Tony will most likely require 9-12mo of formal rehab for guided and progressive strength training that will be specific to his job requirements.     Tony Is progressing well towards his goals.   Pt prognosis is Guarded.     Pt will continue to benefit from skilled outpatient physical therapy to address the deficits listed in the problem list box on initial evaluation, provide pt/family education and to maximize pt's level of independence in the home and community environment.     Pt's spiritual, cultural and educational needs considered and pt agreeable to plan of care and goals.     Anticipated Barriers for therapy: compliance with post-op precautions     GOALS: Short Term Goals: 8 weeks  1.Report decreased in pain at worse less than  <   / =  6  /10  to increase tolerance for functional mobility. On going  2. Pt to improve PROM of flexion, scaption and abduction to 90deg and IR/ER to 30deg and R elbow to 0-130deg to allow for improved functional mobility to allow for improvement in IADLs. Met 4/12/23  3. Increased BUE MMT 1/2 grade to increase tolerance for ADL and work activities. On going  4. Pt to report be conscious of impaired sitting and standing posture daily to decrease pain. On going  5. Pt to tolerate HEP to improve ROM and independence with ADL's. On going     Long Term Goals: 16 weeks  1.Report decreased in pain at worse less than  <   / =  4  /10  to increase tolerance for functional mobility. On going  2.  Patient will report clinically significant improvements on FOTO score.On going  3.Increased BUE MMT 1 grade to increase tolerance for ADL and work activities.On going  4. Pt to report and demonstrate proper posture in standing and sitting to decrease pain. On going  5. Pt to be Independent with HEP to improve ROM and independence with  ADL's.On going  6. Pt to improve AROM of flexion, scaption and abduction to 150deg and IR/ER to 60deg and R elbow to 0-150deg to allow for improved functional mobility to allow for improvement in IADLs. On going    PLAN     Plan of Care: 2x/wk for 12wks (4/12/23 - 7/7/23)      4/12/23 - 4wks post-op - (started PT)  4/26/23 - 6wks post-op  5/10/23 - 8wks post-op  5/24/23 - 10wks post-op   6/7/23 - 12 wks post-op - (only 8wks in PT)    DPT advised pt to not return to driving for at least another 2wks at which time DPT will reassess and make recommendations accordingly    Yvonne Lewis, PT, DPT, Cert DN  04/12/2023

## 2023-04-12 NOTE — PROGRESS NOTES
[facility]   Physical Therapy Treatment Note     Name: Tony Gordillo  [facility] Number: [id number]    Therapy Diagnosis:   Encounter Diagnoses   Name Primary?    S/P right rotator cuff repair Yes    Biceps tendonosis of right shoulder     Decreased range of motion of right shoulder     Impaired strength of shoulder muscles      Physician: Crissy Bradford MD    Visit Date: 4/12/2023    Physician Orders: PT Eval and Treat   Medical Diagnosis from Referral: S46.011A (ICD-10-CM) - Traumatic rotator cuff tear, right, initial encounter   Evaluation Date: 3/15/2023  Authorization Period Expiration: 3/12/2024  Plan of Care Expiration: 7/7/2024 - most likely will have to extend due to nature of surgery and pt's job requirements   Visit # / Visits authorized: 1/ 1    Progress Note Due: 5/12/2023  FOTO: 1/1  HEP: Access Code: [email or web address]     Precautions: Diabetes;      POSTOPERATIVE PLAN: Plan to follow subscapularis-supraspinatus repair protocol (<3 cm in total size). Passive motion may begin at 4 weeks. Active motion at 6 weeks. No biceps resistive activity x 8 weeks. No cuff resistive activity x 12 weeks.      DATE OF PROCEDURE: 3/14/2023     PREOPERATIVE DIAGNOSES:   Right     1. Rotator cuff tear   2. Biceps tendinopathy    POSTOPERATIVE DIAGNOSES:   Right   1. Rotator cuff tear, full thickness involving the subscapularis and supraspinatus,   2. Biceps tendinopathy     OPERATION:   Right Shoulder  1. Arthroscopic  subscapularis and supraspinatus rotator cuff repair   2. Arthroscopic biceps tenodesis  3. Extensive debridement  Surgeon(s) and Role:     * Crissy Bradford MD - Primary     Per 2wk f/u with surgeon on 3/29/23:   Assessment/Plan:  42 y.o. male  2 weeks s/p right shoulder arthroscopy , rotator cuff repair, and arthroscopic biceps tenodesis  (DOS-3/14/23)     Plan  Sutures were removed today and steri strips were placed   The patient will continue with the sling and precautions as  "outlined in the postoperative instructions given to the patient.  The patient was given refill for non narcotic pain medications  elbow, wrist, and hand exercises as instructed   Physical therapy prescription placed at time of surgery - has appt scheduled  Observe changes in hand - let me know if this gets any worse.   Follow up in 4 weeks      Time In: 1000  Time Out: 1100  Total Appointment Time (timed & untimed codes): 50 minutes      SUBJECTIVE     Pt reports: "I've been feeling ok. I still have soreness but that is pretty normal at this point. I am still sleeping in my sling and I wear it the majority of the day."  He was compliant with home exercise program.  Response to previous treatment: no change - initial eval was last visit  Functional change: weaning out of sling     Pain: 5/10  Location: right shoulder      OBJECTIVE     Passive Range of Motion (degrees): measured on 4/12/23  Shoulder Right   Flexion 140deg   Abduction 100deg   Scaption 155deg   ER 30deg scap plane 40deg   IR 30deg scap plane 50deg   Elbow ROM 0-145deg      Active Range of Motion in  (degrees): will assess in upcoming weeks  Shoulder Right Left   Flexion    Abduction    ER NT 65   IR  NT 60      Upper Extremity Strength  (R) UE   (L) UE     Elbow flexion: 3/5 Elbow flexion: 5/5   Elbow extension: 3/5 Elbow extension: 5/5   Shoulder elevation: 2-/5 Shoulder elevation: 5/5   Shoulder flexion: 2-/5 Shoulder flexion: 5/5   Shoulder Abduction: 2-/5 Shoulder abduction: 5/5   Shoulder ER 2-/5 Shoulder ER 4/5   Shoulder IR 2-/5 Shoulder IR 4/5   Lower Trap NT Lower Trap 4-/5   Middle Trap NT Middle Trap 4/5   Rhomboids NT Rhomboids 4-/5          TREATMENT     Tony received the treatments listed below:      Tony received therapeutic exercises to develop strength, endurance, ROM, flexibility, posture, and core stabilization for 5 minutes including:  Supine bicep curls x15 (first 5 with AAROM and last 10reps performed AROM)  Pendulums " 3x 2-3min      Tony received the following manual therapy techniques: Soft tissue Mobilization were applied to the: R shoulder complex for 30 minutes, including:  Manual mobilizations to posterior and inferior capsule gr II-III  PROM all planes with protocol precautions       neuromuscular re-education activities to improve: Balance, Coordination, Kinesthetic, Sense, Proprioception, and Posture for 25 minutes. The following activities were included:  Ambulation t/o clinic to promote normalized arm swing pain free x5min  Seated scap retractions 2x12  Standing scap depressions 2x12  AA supine serratus punches 2x8 with DPT hand placed on scapula to guide movement pattern    PATIENT EDUCATION AND HOME EXERCISES     Home Exercises Provided and Patient Education Provided     Education provided:   - HEP   - post op precautions     Written Home Exercises Provided: Patient instructed to cont prior HEP. Exercises were reviewed and Tony was able to demonstrate them prior to the end of the session.  Tony demonstrated fair  understanding of the education provided. See EMR under Patient Instructions for exercises provided during therapy sessions    ASSESSMENT     Tony presents to PT for first follow up session since initial evaluation. He is 4wks post-op today and tolerated all PROM, joint mobilizations and initiation of AROM of R elbow flexion/extension in GE positions. Tony has pain at above listed end ranges however joint integrity is good/normal and DPT anticipates no limitations in regaining full AROM in R GHJ/scapulothoracic joints. Tony has remained compliant with all protocol restrictions and recommendations from DPT as well as surgeon. He has been provided with additions to HEP to initiate scapular movement patterns without involvement from RUE. DPT recommends initiating daily walks to re-establish reciprocal arm swing during gait, promotion of perfusion and for general mobility in tolerable/pain free ROM. Tony will  continue to benefit from skilled therapy in order to return to job without limitation. Tony's job as a  does not offer light or modified duty thus Tony will most likely require 9-12mo of formal rehab for guided and progressive strength training that will be specific to his job requirements.     Tony Is progressing well towards his goals.   Pt prognosis is Guarded.     Pt will continue to benefit from skilled outpatient physical therapy to address the deficits listed in the problem list box on initial evaluation, provide pt/family education and to maximize pt's level of independence in the home and community environment.     Pt's spiritual, cultural and educational needs considered and pt agreeable to plan of care and goals.     Anticipated Barriers for therapy: compliance with post-op precautions     GOALS: Short Term Goals: 8 weeks  1.Report decreased in pain at worse less than  <   / =  6  /10  to increase tolerance for functional mobility. On going  2. Pt to improve PROM of flexion, scaption and abduction to 90deg and IR/ER to 30deg and R elbow to 0-130deg to allow for improved functional mobility to allow for improvement in IADLs. Met 4/12/23  3. Increased BUE MMT 1/2 grade to increase tolerance for ADL and work activities. On going  4. Pt to report be conscious of impaired sitting and standing posture daily to decrease pain. On going  5. Pt to tolerate HEP to improve ROM and independence with ADL's. On going     Long Term Goals: 16 weeks  1.Report decreased in pain at worse less than  <   / =  4  /10  to increase tolerance for functional mobility. On going  2.  Patient will report clinically significant improvements on FOTO score.On going  3.Increased BUE MMT 1 grade to increase tolerance for ADL and work activities.On going  4. Pt to report and demonstrate proper posture in standing and sitting to decrease pain. On going  5. Pt to be Independent with HEP to improve ROM and independence with  ADL's.On going  6. Pt to improve AROM of flexion, scaption and abduction to 150deg and IR/ER to 60deg and R elbow to 0-150deg to allow for improved functional mobility to allow for improvement in IADLs. On going    PLAN     Plan of Care: 2x/wk for 12wks (4/12/23 - 7/7/23)      4/12/23 - 4wks post-op - (started PT)  4/26/23 - 6wks post-op  5/10/23 - 8wks post-op  5/24/23 - 10wks post-op   6/7/23 - 12 wks post-op - (only 8wks in PT)    DPT advised pt to not return to driving for at least another 2wks at which time DPT will reassess and make recommendations accordingly    Yvonne Lewis, PT, DPT, Cert DN  04/12/2023

## 2023-04-14 ENCOUNTER — CLINICAL SUPPORT (OUTPATIENT)
Dept: REHABILITATION | Facility: HOSPITAL | Age: 43
End: 2023-04-14
Attending: ORTHOPAEDIC SURGERY
Payer: OTHER MISCELLANEOUS

## 2023-04-14 DIAGNOSIS — M67.813 BICEPS TENDONOSIS OF RIGHT SHOULDER: ICD-10-CM

## 2023-04-14 DIAGNOSIS — R29.898 IMPAIRED STRENGTH OF SHOULDER MUSCLES: ICD-10-CM

## 2023-04-14 DIAGNOSIS — Z98.890 S/P RIGHT ROTATOR CUFF REPAIR: Primary | ICD-10-CM

## 2023-04-14 DIAGNOSIS — M25.611 DECREASED RANGE OF MOTION OF RIGHT SHOULDER: ICD-10-CM

## 2023-04-14 PROCEDURE — 97140 MANUAL THERAPY 1/> REGIONS: CPT | Mod: PN,CQ

## 2023-04-14 PROCEDURE — 97110 THERAPEUTIC EXERCISES: CPT | Mod: PN,CQ

## 2023-04-14 PROCEDURE — 97112 NEUROMUSCULAR REEDUCATION: CPT | Mod: PN,CQ

## 2023-04-17 ENCOUNTER — CLINICAL SUPPORT (OUTPATIENT)
Dept: REHABILITATION | Facility: HOSPITAL | Age: 43
End: 2023-04-17
Attending: ORTHOPAEDIC SURGERY
Payer: OTHER MISCELLANEOUS

## 2023-04-17 DIAGNOSIS — R29.898 IMPAIRED STRENGTH OF SHOULDER MUSCLES: ICD-10-CM

## 2023-04-17 DIAGNOSIS — M25.611 DECREASED RANGE OF MOTION OF RIGHT SHOULDER: ICD-10-CM

## 2023-04-17 DIAGNOSIS — Z98.890 S/P RIGHT ROTATOR CUFF REPAIR: Primary | ICD-10-CM

## 2023-04-17 DIAGNOSIS — M67.813 BICEPS TENDONOSIS OF RIGHT SHOULDER: ICD-10-CM

## 2023-04-17 PROCEDURE — 97140 MANUAL THERAPY 1/> REGIONS: CPT | Mod: PN,CQ

## 2023-04-17 PROCEDURE — 97112 NEUROMUSCULAR REEDUCATION: CPT | Mod: PN,CQ

## 2023-04-17 PROCEDURE — 97110 THERAPEUTIC EXERCISES: CPT | Mod: PN,CQ

## 2023-04-17 NOTE — PROGRESS NOTES
[facility]   Physical Therapy Treatment Note     Name: Tony Gordillo  [facility] Number: [id number]    Therapy Diagnosis:   Encounter Diagnoses   Name Primary?    S/P right rotator cuff repair Yes    Biceps tendonosis of right shoulder     Decreased range of motion of right shoulder     Impaired strength of shoulder muscles      Physician: Crissy Bradford MD    Visit Date: 4/17/2023    Physician Orders: PT Eval and Treat   Medical Diagnosis from Referral: S46.011A (ICD-10-CM) - Traumatic rotator cuff tear, right, initial encounter   Evaluation Date: 3/15/2023  Authorization Period Expiration: 3/12/2024  Plan of Care Expiration: 7/7/2024 - most likely will have to extend due to nature of surgery and pt's job requirements   Visit # / Visits authorized: 1/ 1    Progress Note Due: 5/12/2023  FOTO: 1/1  HEP: Access Code: [email or web address]     Precautions: Diabetes;      POSTOPERATIVE PLAN: Plan to follow subscapularis-supraspinatus repair protocol (<3 cm in total size). Passive motion may begin at 4 weeks. Active motion at 6 weeks. No biceps resistive activity x 8 weeks. No cuff resistive activity x 12 weeks.      DATE OF PROCEDURE: 3/14/2023, 4 weeks & 3 days as of 04/17/2023       PREOPERATIVE DIAGNOSES:   Right     1. Rotator cuff tear   2. Biceps tendinopathy    POSTOPERATIVE DIAGNOSES:   Right   1. Rotator cuff tear, full thickness involving the subscapularis and supraspinatus,   2. Biceps tendinopathy     OPERATION:   Right Shoulder  1. Arthroscopic  subscapularis and supraspinatus rotator cuff repair   2. Arthroscopic biceps tenodesis  3. Extensive debridement  Surgeon(s) and Role:     * Crissy Bradford MD - Primary     Per 2wk f/u with surgeon on 3/29/23:   Assessment/Plan:  42 y.o. male  2 weeks s/p right shoulder arthroscopy , rotator cuff repair, and arthroscopic biceps tenodesis  (DOS-3/14/23)     Plan  Sutures were removed today and steri strips were placed   The patient will continue  "with the sling and precautions as outlined in the postoperative instructions given to the patient.  The patient was given refill for non narcotic pain medications  elbow, wrist, and hand exercises as instructed   Physical therapy prescription placed at time of surgery - has appt scheduled  Observe changes in hand - let me know if this gets any worse.   Follow up in 4 weeks      Time In: 1107AM  Time Out: 1200PM  Total Appointment Time (timed & untimed codes): 58 minutes      SUBJECTIVE     Pt reports:no pain in shoulder, but tenderness in bicep.    He was compliant with home exercise program.  Response to previous treatment: good  Functional change: weaning out of sling     Pain: 5/10  Location: right shoulder      OBJECTIVE       TREATMENT     Tony received the treatments listed below:      Tony received therapeutic exercises to develop strength, endurance, ROM, flexibility, posture, and core stabilization for 8 minutes including:    Pendulums (fwd/bwd;L/R; CW, CCW) 1 min/ea  UT Stretches 2x30"/ea  Shoulder shrugs fwd/bwd 20x/ea    Tony received the following manual therapy techniques: Soft tissue Mobilization were applied to the: R shoulder complex for 30 minutes, including:  Manual mobilizations to posterior and inferior capsule gr II-III  PROM all planes with protocol precautions     neuromuscular re-education activities to improve: Balance, Coordination, Kinesthetic, Sense, Proprioception, and Posture for 10 minutes. The following activities were included:    Seated scap retractions 3x10,3" (w/ sling donned)  Standing scap depressions 3x10, 3" (w/ sling donned)  Sidelying Scap Retractions 2x10    PATIENT EDUCATION AND HOME EXERCISES     Home Exercises Provided and Patient Education Provided     Education provided:   - HEP   - post op precautions     Written Home Exercises Provided: Patient instructed to cont prior HEP. Exercises were reviewed and Tony was able to demonstrate them prior to the end of the " session.  Tony demonstrated fair  understanding of the education provided. See EMR under Patient Instructions for exercises provided during therapy sessions    ASSESSMENT     Tony tolerated the above tx session well without increased pain. Pt is approx. 4wks & 4 days post-op today. Added scapular ROM with protocol and healing process in mind.  Performed scapular mobs this date, and there were no adverse effects. Tony will continue to benefit from skilled therapy in order to return to job without limitation.    Tony Is progressing well towards his goals.   Pt prognosis is Guarded.     Pt will continue to benefit from skilled outpatient physical therapy to address the deficits listed in the problem list box on initial evaluation, provide pt/family education and to maximize pt's level of independence in the home and community environment.     Pt's spiritual, cultural and educational needs considered and pt agreeable to plan of care and goals.     Anticipated Barriers for therapy: compliance with post-op precautions     GOALS: Short Term Goals: 8 weeks  1.Report decreased in pain at worse less than  <   / =  6  /10  to increase tolerance for functional mobility. On going  2. Pt to improve PROM of flexion, scaption and abduction to 90deg and IR/ER to 30deg and R elbow to 0-130deg to allow for improved functional mobility to allow for improvement in IADLs. Met 4/12/23  3. Increased BUE MMT 1/2 grade to increase tolerance for ADL and work activities. On going  4. Pt to report be conscious of impaired sitting and standing posture daily to decrease pain. On going  5. Pt to tolerate HEP to improve ROM and independence with ADL's. On going     Long Term Goals: 16 weeks  1.Report decreased in pain at worse less than  <   / =  4  /10  to increase tolerance for functional mobility. On going  2.  Patient will report clinically significant improvements on FOTO score.On going  3.Increased BUE MMT 1 grade to increase tolerance for  ADL and work activities.On going  4. Pt to report and demonstrate proper posture in standing and sitting to decrease pain. On going  5. Pt to be Independent with HEP to improve ROM and independence with ADL's.On going  6. Pt to improve AROM of flexion, scaption and abduction to 150deg and IR/ER to 60deg and R elbow to 0-150deg to allow for improved functional mobility to allow for improvement in IADLs. On going    PLAN   Continue progressing per pt protocol in future sessions  Plan of Care: 2x/wk for 12wks (4/12/23 - 7/7/23)      4/12/23 - 4wks post-op - (started PT)  4/26/23 - 6wks post-op  5/10/23 - 8wks post-op  5/24/23 - 10wks post-op   6/7/23 - 12 wks post-op - (only 8wks in PT)      Rebeca Richardson, PTA  04/17/2023

## 2023-04-18 NOTE — DISCHARGE INSTRUCTIONS
Ankle Fracture    You have an ankle fracture. This means that one or more of the bones that make up the ankle joint are broken. This causes pain, swelling, and sometimes bruising.  A fracture is treated with a splint or cast or special boot. It will take about 4 to 6 weeks for the fracture to heal. Surgery may be needed to fix severe injuries.  Home care  · You will be given a splint, cast or boot to prevent movement at the ankle joint. Unless you were told otherwise, use crutches or a walker. Dont weight on the injured leg until cleared by your healthcare provider to do so. Crutches and walkers can be rented at many pharmacies and surgical or orthopedic supply stores. Dont put weight on a splint. It will break.  · Keep your leg elevated to reduce pain and swelling. When sleeping, place a pillow under the injured leg. When sitting, support the injured leg so it is level with your waist. This is very important during the first 48 hours.  · Apply an ice pack over the injured area for no more than 15 to 20 minutes. Do this every 3 to 6 hours for the first 24 to 48 hours. Continue with ice packs 3 to 4 times a day for the next 2 days, then as needed to ease pain and swelling. To make an ice pack, put ice cubes in a plastic bag that seals at the top. Wrap the bag in a clean, thin towel or cloth. Never put ice or an ice pack directly on the skin. You can place the ice pack directly over the cast or splint. As the ice melts, be careful that the cast or splint doesnt get wet.  · Keep the cast, splint, or boot completely dry at all times. Bathe with your cast, splint, or boot out of the water, protected with 2 large plastic bags. Place 1 bag outside of the other. Tape each bag with duct tape at the top end. Water can still leak in. So it's best to keep the cast, splint, or boot away from water. If a boot or fiberglass cast or splint gets wet, dry it with a hair dryer on a cool setting.  · You may use over-the-counter  Follow-up visit with DALLAS Beckett on 4/25/23 for spinal cord stimulator lead pull and to determine next steps in plan of care.       DISCHARGE INSTRUCTIONS    During office hours (8:00 a.m.- 4:00 p.m.) questions or concerns may be answered  by calling [facility] Nurses at  [phone].  Messages received after hours will be returned the following business day.      In the case of an emergency, please dial 911 or seek assistance at the nearest Emergency Room/Urgent Care facility.     All Patients:    You may use ice on the injection site, as frequently as 20 minutes each hour if needed.    You may take your pain medicine.    You may continue taking your regular medication after your injection.     THE DRESSING ON YOUR BACK CANNOT GET WET, SO NO BATHING OR SHOWERING UNTIL THE LEADS ARE REMOVED NEXT WEEK.    You may resume light activities, as tolerated.    Resume your usual diet as tolerated.    It is strongly advised that you do not drive for 1-3 hours post injection.    If you have had oral sedation:  Do not drive for 8 hours post injection.        POSSIBLE PROCEDURE SIDE EFFECTS  -Call the [facility] Center if you are concerned  Increased Pain           Increased numbness/tingling      Nausea/Vomiting          Bruising/bleeding at site      Redness or swelling                                              Difficulty walking      Weakness           Fever greater than 100.5    *In the event of a severe headache after an epidural steroid injection that is relieved by lying down, please call the [facility] to speak with a clinical staff member*     pain medicine to control pain, unless another pain medicine was prescribed. Talk with your provider beforeusing these medicines if you have chronic liver or kidney disease or ever had a stomach ulcer or GI bleeding.  Follow-up care  Follow up with your healthcare provider in 1 week, or as advised. This is to be sure the bone is healing properly. If you were given a splint, it may be changed to a cast at your follow-up visit.  If X-rays were taken, you will be told of any new findings that may affect your care.  When to seek medical advice  Call your healthcare provider right away if any of these occur:  · The plaster cast or splint becomes wet or soft  · The fiberglass cast or splint stays wet for more than 24 hours  · There is increased tightness, sore areas, or pain under the cast or splint  · Your toes become swollen, cold, blue, numb, or tingly  · The cast becomes loose  · The cast has a bad smell  · The cast develops cracks or breaks   Date Last Reviewed: 11/23/2015 © 2000-2016 [employer]. [address] [place] [address]. All rights reserved. This information is not intended as a substitute for professional medical care. Always follow your healthcare professional's instructions.          Treating Ankle Fractures  Treatment of an ankle fracture may be surgical or non-surgical, depending on where and how badly your ankle has been broken.   Some stable ankle fractures may be treated in a walking boot. These fractures are stable and will heal without additional treatment. You may be able to start walking on your ankle as soon as the pain improves.  Some fractures may require cast treatment.  A cast may be used to hold the broken bone in its proper position for healing. Sometimes the sections of broken bone must first be realigned. This is done by a process known as reduction. The type of reduction is based on how far the bone has moved from its normal position.     Sites of common ankle  fractures    Closed reduction  If you have a clean break with little soft tissue damage, closed reduction will probably be used. Before the procedure, you may be given a light anesthetic to relax your muscles. Then your doctor manually readjusts the position of the broken bone.  Open reduction  If you have an open fracture (bone sticking out through the skin), badly misaligned sections of bone, or severe tissue injury, open reduction is likely. A general anesthetic may be used during the procedure to let you sleep and relax your muscles. Your doctor then makes one or more incisions to realign the bone and repair soft tissue. Screws or plates may be used to hold the bone in place during healing.    Casting the fracture  To make sure the bone is aligned properly, an X-ray is taken. Then the ankle is put in a cast to hold the bone in place during healing. Youll probably have to wear the cast for several weeks. For less severe fractures, a walking boot, brace, or splint may be all thats needed to hold the bone in place during healing.  The road to healing  Once your fracture has been treated, your doctor will tell you how to help it heal. You may be told to limit ankle use or weight-bearing activities, take medicines, and elevate the foot. If you have a cast, remember to keep it dry.   Date Last Reviewed: 9/9/2015  © 6453-0013 [employer]. [address] [place] [address], [place] [address]. All rights reserved. This information is not intended as a substitute for professional medical care. Always follow your healthcare professional's instructions.

## 2023-04-19 ENCOUNTER — CLINICAL SUPPORT (OUTPATIENT)
Dept: REHABILITATION | Facility: HOSPITAL | Age: 43
End: 2023-04-19
Attending: ORTHOPAEDIC SURGERY
Payer: OTHER MISCELLANEOUS

## 2023-04-19 DIAGNOSIS — Z98.890 S/P RIGHT ROTATOR CUFF REPAIR: Primary | ICD-10-CM

## 2023-04-19 DIAGNOSIS — R29.898 IMPAIRED STRENGTH OF SHOULDER MUSCLES: ICD-10-CM

## 2023-04-19 DIAGNOSIS — M25.611 DECREASED RANGE OF MOTION OF RIGHT SHOULDER: ICD-10-CM

## 2023-04-19 DIAGNOSIS — M67.813 BICEPS TENDONOSIS OF RIGHT SHOULDER: ICD-10-CM

## 2023-04-19 PROCEDURE — 97110 THERAPEUTIC EXERCISES: CPT | Mod: PN

## 2023-04-19 PROCEDURE — 97140 MANUAL THERAPY 1/> REGIONS: CPT | Mod: PN

## 2023-04-19 PROCEDURE — 97112 NEUROMUSCULAR REEDUCATION: CPT | Mod: PN

## 2023-04-19 NOTE — PROGRESS NOTES
[facility]   Physical Therapy Treatment Note     Name: Tony Gordillo  [facility] Number: [id number]    Therapy Diagnosis:   Encounter Diagnoses   Name Primary?    S/P right rotator cuff repair Yes    Biceps tendonosis of right shoulder     Decreased range of motion of right shoulder     Impaired strength of shoulder muscles      Physician: Crissy Bradford MD    Visit Date: 4/19/2023    Physician Orders: PT Eval and Treat   Medical Diagnosis from Referral: S46.011A (ICD-10-CM) - Traumatic rotator cuff tear, right, initial encounter   Evaluation Date: 3/15/2023  Authorization Period Expiration: 3/12/2024  Plan of Care Expiration: 7/7/2024 - most likely will have to extend due to nature of surgery and pt's job requirements   Visit # / Visits authorized: 1/ 1    Progress Note Due: 5/12/2023  FOTO: 1/1  HEP: Access Code: [email or web address]     Precautions: Diabetes;      POSTOPERATIVE PLAN: Plan to follow subscapularis-supraspinatus repair protocol (<3 cm in total size). Passive motion may begin at 4 weeks. Active motion at 6 weeks. No biceps resistive activity x 8 weeks. No cuff resistive activity x 12 weeks.      DATE OF PROCEDURE: 3/14/2023, 4 weeks & 3 days as of 04/19/2023       PREOPERATIVE DIAGNOSES:   Right     1. Rotator cuff tear   2. Biceps tendinopathy    POSTOPERATIVE DIAGNOSES:   Right   1. Rotator cuff tear, full thickness involving the subscapularis and supraspinatus,   2. Biceps tendinopathy     OPERATION:   Right Shoulder  1. Arthroscopic  subscapularis and supraspinatus rotator cuff repair   2. Arthroscopic biceps tenodesis  3. Extensive debridement  Surgeon(s) and Role:     * Crissy Bradford MD - Primary     Per 2wk f/u with surgeon on 3/29/23:   Assessment/Plan:  42 y.o. male  2 weeks s/p right shoulder arthroscopy , rotator cuff repair, and arthroscopic biceps tenodesis  (DOS-3/14/23)     Plan  Sutures were removed today and steri strips were placed   The patient will continue  "with the sling and precautions as outlined in the postoperative instructions given to the patient.  The patient was given refill for non narcotic pain medications  elbow, wrist, and hand exercises as instructed   Physical therapy prescription placed at time of surgery - has appt scheduled  Observe changes in hand - let me know if this gets any worse.   Follow up in 4 weeks      Time In: 1107AM  Time Out: 1200PM  Total Appointment Time (timed & untimed codes): 58 minutes      SUBJECTIVE     Pt reports:no pain in shoulder, but tenderness in bicep.    He was compliant with home exercise program.  Response to previous treatment: good  Functional change: weaning out of sling     Pain: 5/10  Location: right shoulder      OBJECTIVE       TREATMENT     Tony received the treatments listed below:      Tony received therapeutic exercises to develop strength, endurance, ROM, flexibility, posture, and core stabilization for 8 minutes including:    Pendulums (fwd/bwd;L/R; CW, CCW) 1 min/ea  UT Stretches 2x30"/ea  Shoulder shrugs fwd/bwd 20x/ea    Tony received the following manual therapy techniques: Soft tissue Mobilization were applied to the: R shoulder complex for 30 minutes, including:  Manual mobilizations to posterior and inferior capsule gr II-III  PROM all planes with protocol precautions     neuromuscular re-education activities to improve: Balance, Coordination, Kinesthetic, Sense, Proprioception, and Posture for 10 minutes. The following activities were included:    Seated scap retractions 3x10,3" (w/ sling donned)  Standing scap depressions 3x10, 3" (w/ sling donned)  Sidelying Scap Retractions 2x10    PATIENT EDUCATION AND HOME EXERCISES     Home Exercises Provided and Patient Education Provided     Education provided:   - HEP   - post op precautions     Written Home Exercises Provided: Patient instructed to cont prior HEP. Exercises were reviewed and Tony was able to demonstrate them prior to the end of the " session.  Tony demonstrated fair  understanding of the education provided. See EMR under Patient Instructions for exercises provided during therapy sessions    ASSESSMENT     Tony tolerated the above tx session well without increased pain. Pt is approx. 4wks & 4 days post-op today. Added scapular ROM with protocol and healing process in mind.  Performed scapular mobs this date, and there were no adverse effects. Tony will continue to benefit from skilled therapy in order to return to job without limitation.    Tony Is progressing well towards his goals.   Pt prognosis is Guarded.     Pt will continue to benefit from skilled outpatient physical therapy to address the deficits listed in the problem list box on initial evaluation, provide pt/family education and to maximize pt's level of independence in the home and community environment.     Pt's spiritual, cultural and educational needs considered and pt agreeable to plan of care and goals.     Anticipated Barriers for therapy: compliance with post-op precautions     GOALS: Short Term Goals: 8 weeks  1.Report decreased in pain at worse less than  <   / =  6  /10  to increase tolerance for functional mobility. On going  2. Pt to improve PROM of flexion, scaption and abduction to 90deg and IR/ER to 30deg and R elbow to 0-130deg to allow for improved functional mobility to allow for improvement in IADLs. Met 4/12/23  3. Increased BUE MMT 1/2 grade to increase tolerance for ADL and work activities. On going  4. Pt to report be conscious of impaired sitting and standing posture daily to decrease pain. On going  5. Pt to tolerate HEP to improve ROM and independence with ADL's. On going     Long Term Goals: 16 weeks  1.Report decreased in pain at worse less than  <   / =  4  /10  to increase tolerance for functional mobility. On going  2.  Patient will report clinically significant improvements on FOTO score.On going  3.Increased BUE MMT 1 grade to increase tolerance for  ADL and work activities.On going  4. Pt to report and demonstrate proper posture in standing and sitting to decrease pain. On going  5. Pt to be Independent with HEP to improve ROM and independence with ADL's.On going  6. Pt to improve AROM of flexion, scaption and abduction to 150deg and IR/ER to 60deg and R elbow to 0-150deg to allow for improved functional mobility to allow for improvement in IADLs. On going    PLAN   Continue progressing per pt protocol in future sessions  Plan of Care: 2x/wk for 12wks (4/12/23 - 7/7/23)      4/12/23 - 4wks post-op - (started PT)  4/26/23 - 6wks post-op  5/10/23 - 8wks post-op  5/24/23 - 10wks post-op   6/7/23 - 12 wks post-op - (only 8wks in PT)      Rebeca Richardson, PTA  04/19/2023

## 2023-04-19 NOTE — PROGRESS NOTES
[facility]   Physical Therapy Treatment Note     Name: Tony Gordillo  [facility] Number: [id number]    Therapy Diagnosis:   Encounter Diagnoses   Name Primary?    S/P right rotator cuff repair Yes    Biceps tendonosis of right shoulder     Decreased range of motion of right shoulder     Impaired strength of shoulder muscles      Physician: Crissy Bradford MD    Visit Date: 4/19/2023    Physician Orders: PT Eval and Treat   Medical Diagnosis from Referral: S46.011A (ICD-10-CM) - Traumatic rotator cuff tear, right, initial encounter   Evaluation Date: 3/15/2023  Authorization Period Expiration: 3/12/2024  Plan of Care Expiration: 7/7/2024 - most likely will have to extend due to nature of surgery and pt's job requirements   Visit # / Visits authorized: 1/ 1    Progress Note Due: 5/12/2023  FOTO: 1/1  HEP: Access Code: [email or web address]     Precautions: Diabetes;      POSTOPERATIVE PLAN: Plan to follow subscapularis-supraspinatus repair protocol (<3 cm in total size). Passive motion may begin at 4 weeks. Active motion at 6 weeks. No biceps resistive activity x 8 weeks. No cuff resistive activity x 12 weeks.      DATE OF PROCEDURE: 3/14/2023, 4 weeks & 3 days as of 04/19/2023       PREOPERATIVE DIAGNOSES:   Right     1. Rotator cuff tear   2. Biceps tendinopathy    POSTOPERATIVE DIAGNOSES:   Right   1. Rotator cuff tear, full thickness involving the subscapularis and supraspinatus,   2. Biceps tendinopathy     OPERATION:   Right Shoulder  1. Arthroscopic  subscapularis and supraspinatus rotator cuff repair   2. Arthroscopic biceps tenodesis  3. Extensive debridement  Surgeon(s) and Role:     * Crissy Bradford MD - Primary     Per 2wk f/u with surgeon on 3/29/23:   Assessment/Plan:  42 y.o. male  2 weeks s/p right shoulder arthroscopy , rotator cuff repair, and arthroscopic biceps tenodesis  (DOS-3/14/23)     Plan  Sutures were removed today and steri strips were placed   The patient will continue  "with the sling and precautions as outlined in the postoperative instructions given to the patient.  The patient was given refill for non narcotic pain medications  elbow, wrist, and hand exercises as instructed   Physical therapy prescription placed at time of surgery - has appt scheduled  Observe changes in hand - let me know if this gets any worse.   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 He is sleeping better and is nearing his baseline for sleeping prior to surgery.     He was compliant with home exercise program.  Response to previous treatment: good  Functional change: weaning out of sling     Pain: 5/10  Location: right shoulder      OBJECTIVE       TREATMENT     Tony received the treatments listed below:      Tony received therapeutic exercises to develop strength, endurance, ROM, flexibility, posture, and core stabilization for 10 minutes including:  Pendulums (fwd/bwd;L/R; CW, CCW) 1 min/ea  UT Stretches 2x30"/ea  Shoulder shrugs fwd/bwd 20x/ea      Tony received the following manual therapy techniques: Soft tissue Mobilization were applied to the: R shoulder complex for 25 minutes, including:  Manual mobilizations to posterior and inferior capsule gr II-III  PROM all planes with protocol precautions   L SL scapular mobilizations into upward scapular rotation, depression, protraction and retraction    neuromuscular re-education activities to improve: Balance, Coordination, Kinesthetic, Sense, Proprioception, and Posture for 25 minutes. The following activities were included:  RTC isometrics: IR/ER/flexion/extension x15 ea x2-3sec hold - submax   Table slides " "with PB on elevated head of table  - scaption AAROM x20 with heavy VC and TC to decrease shoulder elevation (compensation from UT)  Seated scap retractions 3x10,3" (w/ sling donned)  Standing scap depressions 3x10, 3" (w/ sling donned)  Sidelying Scap Retractions 2x10    PATIENT EDUCATION AND HOME EXERCISES     Home Exercises Provided and Patient Education Provided     Education provided:   - HEP   - post op precautions     Written Home Exercises Provided: Patient instructed to cont prior HEP. Exercises were reviewed and Tony was able to demonstrate them prior to the end of the session.  Tony demonstrated fair  understanding of the education provided. See EMR under Patient Instructions for exercises provided during therapy sessions    ASSESSMENT     Tony is making good progress in therapy. He is nearing full PROM of R GHJ with improvements noted in scapular mobility. He responds quickly and appropriately to cues and has remained compliant with all recommendations thus far. DPT and Tony discussed attempting to drive his wife's car which has bluetooth and hand controls on steering wheel to manage controls without having to reach with RUE. DPT advised pt to practice driving in empty parking lot with only LUE with wife present in car to assess tolerance for driving and pt agreed. DPT cleared pt from wearing sling at this time with suggestions for community based events and/or sleeping if so desired. PTA to follow up with pt regarding driving and how he is feeling outside of sling on a more regular basis. DPT initiated RTC isometrics aside from abduction 2/2 increasing discomfort along supraspinatus attachment point into muscle belly. Tony experienced slight increase in bicep tension during isometric shoulder flexion and IR however with rest all symptoms were resolved after approx 5-10sec. Tony was provided with progressions to HEP to initiate supine serratus punch, RTC isometrics (excluding abduction) and increase reps " of supine bicep curl. Tony is a great participant and is making good progress.       Tony Is progressing well towards his goals.   Pt prognosis is Guarded.     Pt will continue to benefit from skilled outpatient physical therapy to address the deficits listed in the problem list box on initial evaluation, provide pt/family education and to maximize pt's level of independence in the home and community environment.     Pt's spiritual, cultural and educational needs considered and pt agreeable to plan of care and goals.     Anticipated Barriers for therapy: compliance with post-op precautions     GOALS: Short Term Goals: 8 weeks  1.Report decreased in pain at worse less than  <   / =  6  /10  to increase tolerance for functional mobility. On going  2. Pt to improve PROM of flexion, scaption and abduction to 90deg and IR/ER to 30deg and R elbow to 0-130deg to allow for improved functional mobility to allow for improvement in IADLs. Met 4/12/23  3. Increased BUE MMT 1/2 grade to increase tolerance for ADL and work activities. On going  4. Pt to report be conscious of impaired sitting and standing posture daily to decrease pain. On going  5. Pt to tolerate HEP to improve ROM and independence with ADL's. On going     Long Term Goals: 16 weeks  1.Report decreased in pain at worse less than  <   / =  4  /10  to increase tolerance for functional mobility. On going  2.  Patient will report clinically significant improvements on FOTO score.On going  3.Increased BUE MMT 1 grade to increase tolerance for ADL and work activities.On going  4. Pt to report and demonstrate proper posture in standing and sitting to decrease pain. On going  5. Pt to be Independent with HEP to improve ROM and independence with ADL's.On going  6. Pt to improve AROM of flexion, scaption and abduction to 150deg and IR/ER to 60deg and R elbow to 0-150deg to allow for improved functional mobility to allow for improvement in IADLs. On going    PLAN    Continue progressing per pt protocol in future sessions  Plan of Care: 2x/wk for 12wks (4/12/23 - 7/7/23)      4/12/23 - 4wks post-op - (started PT)  4/26/23 - 6wks post-op  5/10/23 - 8wks post-op  5/24/23 - 10wks post-op   6/7/23 - 12 wks post-op - (only 8wks in PT)      Yvonne Lewis, PT  04/19/2023

## 2023-04-21 ENCOUNTER — CLINICAL SUPPORT (OUTPATIENT)
Dept: REHABILITATION | Facility: HOSPITAL | Age: 43
End: 2023-04-21
Attending: ORTHOPAEDIC SURGERY
Payer: OTHER MISCELLANEOUS

## 2023-04-21 DIAGNOSIS — R29.898 IMPAIRED STRENGTH OF SHOULDER MUSCLES: ICD-10-CM

## 2023-04-21 DIAGNOSIS — M67.813 BICEPS TENDONOSIS OF RIGHT SHOULDER: ICD-10-CM

## 2023-04-21 DIAGNOSIS — M25.611 DECREASED RANGE OF MOTION OF RIGHT SHOULDER: ICD-10-CM

## 2023-04-21 DIAGNOSIS — Z98.890 S/P RIGHT ROTATOR CUFF REPAIR: Primary | ICD-10-CM

## 2023-04-21 PROCEDURE — 97110 THERAPEUTIC EXERCISES: CPT | Mod: PN,CQ

## 2023-04-21 PROCEDURE — 97140 MANUAL THERAPY 1/> REGIONS: CPT | Mod: PN,CQ

## 2023-04-21 PROCEDURE — 97112 NEUROMUSCULAR REEDUCATION: CPT | Mod: PN,CQ

## 2023-04-21 NOTE — PROGRESS NOTES
[facility]   Physical Therapy Treatment Note     Name: Tony Gordillo  [facility] Number: [id number]    Therapy Diagnosis:   Encounter Diagnoses   Name Primary?    S/P right rotator cuff repair Yes    Biceps tendonosis of right shoulder     Decreased range of motion of right shoulder     Impaired strength of shoulder muscles      Physician: Crissy Bradford MD    Visit Date: 4/21/2023    Physician Orders: PT Eval and Treat   Medical Diagnosis from Referral: S46.011A (ICD-10-CM) - Traumatic rotator cuff tear, right, initial encounter   Evaluation Date: 3/15/2023  Authorization Period Expiration: 3/12/2024  Plan of Care Expiration: 7/7/2024 - most likely will have to extend due to nature of surgery and pt's job requirements   Visit # / Visits authorized: 1/ 1    Progress Note Due: 5/12/2023  FOTO: 1/1  HEP: Access Code: [email or web address]     Precautions: Diabetes;      POSTOPERATIVE PLAN: Plan to follow subscapularis-supraspinatus repair protocol (<3 cm in total size). Passive motion may begin at 4 weeks. Active motion at 6 weeks. No biceps resistive activity x 8 weeks. No cuff resistive activity x 12 weeks.      DATE OF PROCEDURE: 3/14/2023, 4 weeks & 3 days as of 04/21/2023       PREOPERATIVE DIAGNOSES:   Right     1. Rotator cuff tear   2. Biceps tendinopathy    POSTOPERATIVE DIAGNOSES:   Right   1. Rotator cuff tear, full thickness involving the subscapularis and supraspinatus,   2. Biceps tendinopathy     OPERATION:   Right Shoulder  1. Arthroscopic  subscapularis and supraspinatus rotator cuff repair   2. Arthroscopic biceps tenodesis  3. Extensive debridement  Surgeon(s) and Role:     * Crissy Bradford MD - Primary     Per 2wk f/u with surgeon on 3/29/23:   Assessment/Plan:  42 y.o. male  2 weeks s/p right shoulder arthroscopy , rotator cuff repair, and arthroscopic biceps tenodesis  (DOS-3/14/23)     Plan  Sutures were removed today and steri strips were placed   The patient will continue  "with the sling and precautions as outlined in the postoperative instructions given to the patient.  The patient was given refill for non narcotic pain medications  elbow, wrist, and hand exercises as instructed   Physical therapy prescription placed at time of surgery - has appt scheduled  Observe changes in hand - let me know if this gets any worse.   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 Yesterday pain was about a 7-8 but decreased to a 5. 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 When asked about sling he said, "She told me I didn't have to wear it anymore."    He was compliant with home exercise program.  Response to previous treatment: good  Functional change: weaning out of sling     Pain: 4/10  Location: right shoulder      OBJECTIVE       TREATMENT     Tony received the treatments listed below:      Tony received therapeutic exercises to develop strength, endurance, ROM, flexibility, posture, and core stabilization for 10 minutes including:  Pendulums (fwd/bwd;L/R; CW, CCW) 1 min/ea  UT Stretches 2x30"/ea  Shoulder shrugs fwd/bwd 20x/ea    Tony received the following manual therapy techniques: Soft tissue Mobilization were applied to the: R shoulder complex for 25 minutes, including:  Manual mobilizations to posterior and inferior capsule gr II-III  PROM all planes with protocol precautions   L SL scapular mobilizations into upward scapular rotation, depression, protraction and retraction    neuromuscular re-education activities to improve: Balance, Coordination, Kinesthetic, Sense, Proprioception, and Posture for 25 minutes. The following " "activities were included:  Seated scap retractions 3x10,3" (w/ arm supported)  Standing scap depressions 3x10, 3" (w/ arm supported  Sidelying Scap Retractions 2x10    PATIENT EDUCATION AND HOME EXERCISES     Home Exercises Provided and Patient Education Provided     Education provided:   - HEP   - post op precautions     Written Home Exercises Provided: Patient instructed to cont prior HEP. Exercises were reviewed and Tony was able to demonstrate them prior to the end of the session.  Tony demonstrated fair  understanding of the education provided. See EMR under Patient Instructions for exercises provided during therapy sessions    ASSESSMENT     Tony is making good progress in therapy in spite of higher pain levels this date. Pt arrives without sling donned and with reports of pain up to 7/10. Regressed program this date, and pt had a good response. Pt instructed to reincorporate sling until his surgeon advises otherwise for safety and protection of healing. Pt gave verbal understanding. Will continue progressing per post op protocol in future sessions.      Tony Is progressing well towards his goals.   Pt prognosis is Guarded.     Pt will continue to benefit from skilled outpatient physical therapy to address the deficits listed in the problem list box on initial evaluation, provide pt/family education and to maximize pt's level of independence in the home and community environment.     Pt's spiritual, cultural and educational needs considered and pt agreeable to plan of care and goals.     Anticipated Barriers for therapy: compliance with post-op precautions     GOALS: Short Term Goals: 8 weeks  1.Report decreased in pain at worse less than  <   / =  6  /10  to increase tolerance for functional mobility. On going  2. Pt to improve PROM of flexion, scaption and abduction to 90deg and IR/ER to 30deg and R elbow to 0-130deg to allow for improved functional mobility to allow for improvement in IADLs. Met " 4/12/23  3. Increased BUE MMT 1/2 grade to increase tolerance for ADL and work activities. On going  4. Pt to report be conscious of impaired sitting and standing posture daily to decrease pain. On going  5. Pt to tolerate HEP to improve ROM and independence with ADL's. On going     Long Term Goals: 16 weeks  1.Report decreased in pain at worse less than  <   / =  4  /10  to increase tolerance for functional mobility. On going  2.  Patient will report clinically significant improvements on FOTO score.On going  3.Increased BUE MMT 1 grade to increase tolerance for ADL and work activities.On going  4. Pt to report and demonstrate proper posture in standing and sitting to decrease pain. On going  5. Pt to be Independent with HEP to improve ROM and independence with ADL's.On going  6. Pt to improve AROM of flexion, scaption and abduction to 150deg and IR/ER to 60deg and R elbow to 0-150deg to allow for improved functional mobility to allow for improvement in IADLs. On going    PLAN   Continue progressing per pt protocol in future sessions  Plan of Care: 2x/wk for 12wks (4/12/23 - 7/7/23)      4/12/23 - 4wks post-op - (started PT)  4/26/23 - 6wks post-op  5/10/23 - 8wks post-op  5/24/23 - 10wks post-op   6/7/23 - 12 wks post-op - (only 8wks in PT)      Rebeca Richardson, PTA  04/21/2023

## 2023-04-22 ENCOUNTER — PATIENT MESSAGE (OUTPATIENT)
Dept: INTERNAL MEDICINE | Facility: CLINIC | Age: 43
End: 2023-04-22
Payer: COMMERCIAL

## 2023-04-24 ENCOUNTER — CLINICAL SUPPORT (OUTPATIENT)
Dept: REHABILITATION | Facility: HOSPITAL | Age: 43
End: 2023-04-24
Attending: ORTHOPAEDIC SURGERY
Payer: OTHER MISCELLANEOUS

## 2023-04-24 DIAGNOSIS — Z98.890 S/P RIGHT ROTATOR CUFF REPAIR: Primary | ICD-10-CM

## 2023-04-24 DIAGNOSIS — M25.611 DECREASED RANGE OF MOTION OF RIGHT SHOULDER: ICD-10-CM

## 2023-04-24 DIAGNOSIS — M67.813 BICEPS TENDONOSIS OF RIGHT SHOULDER: ICD-10-CM

## 2023-04-24 DIAGNOSIS — R29.898 IMPAIRED STRENGTH OF SHOULDER MUSCLES: ICD-10-CM

## 2023-04-24 PROCEDURE — 97112 NEUROMUSCULAR REEDUCATION: CPT | Mod: PN

## 2023-04-24 PROCEDURE — 97140 MANUAL THERAPY 1/> REGIONS: CPT | Mod: PN

## 2023-04-24 PROCEDURE — 97110 THERAPEUTIC EXERCISES: CPT | Mod: PN

## 2023-04-24 NOTE — PROGRESS NOTES
[facility]   Physical Therapy Treatment Note     Name: Tony Gordillo  [facility] Number: [id number]    Therapy Diagnosis:   Encounter Diagnoses   Name Primary?    S/P right rotator cuff repair Yes    Biceps tendonosis of right shoulder     Decreased range of motion of right shoulder     Impaired strength of shoulder muscles      Physician: Crissy Bradford MD    Visit Date: 4/24/2023    Physician Orders: PT Eval and Treat   Medical Diagnosis from Referral: S46.011A (ICD-10-CM) - Traumatic rotator cuff tear, right, initial encounter   Evaluation Date: 3/15/2023  Authorization Period Expiration: 3/12/2024  Plan of Care Expiration: 7/7/2024 - most likely will have to extend due to nature of surgery and pt's job requirements   Visit # / Visits authorized: 1/ 1    Progress Note Due: 5/12/2023  FOTO: 1/1  HEP: Access Code: [email or web address]     Precautions: Diabetes;      POSTOPERATIVE PLAN: Plan to follow subscapularis-supraspinatus repair protocol (<3 cm in total size). Passive motion may begin at 4 weeks. Active motion at 6 weeks. No biceps resistive activity x 8 weeks. No cuff resistive activity x 12 weeks.      DATE OF PROCEDURE: 3/14/2023, 4 weeks & 3 days as of 04/24/2023       PREOPERATIVE DIAGNOSES:   Right     1. Rotator cuff tear   2. Biceps tendinopathy    POSTOPERATIVE DIAGNOSES:   Right   1. Rotator cuff tear, full thickness involving the subscapularis and supraspinatus,   2. Biceps tendinopathy     OPERATION:   Right Shoulder  1. Arthroscopic  subscapularis and supraspinatus rotator cuff repair   2. Arthroscopic biceps tenodesis  3. Extensive debridement  Surgeon(s) and Role:     * Crissy Bradford MD - Primary     Per 2wk f/u with surgeon on 3/29/23:   Assessment/Plan:  42 y.o. male  2 weeks s/p right shoulder arthroscopy , rotator cuff repair, and arthroscopic biceps tenodesis  (DOS-3/14/23)     Plan  Sutures were removed today and steri strips were placed   The patient will continue  "with the sling and precautions as outlined in the postoperative instructions given to the patient.  The patient was given refill for non narcotic pain medications  elbow, wrist, and hand exercises as instructed   Physical therapy prescription placed at time of surgery - has appt scheduled  Observe changes in hand - let me know if this gets any worse.   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 Reports following up with MD this week.   He was compliant with home exercise program.  Response to previous treatment: good  Functional change: weaning out of sling     Pain: 4/10  Location: right shoulder      OBJECTIVE       TREATMENT     Tony received the treatments listed below:      Tony received therapeutic exercises to develop strength, endurance, ROM, flexibility, posture, and core stabilization for 25 minutes including:    Pulley x 5 min scaption  Pendulums (fwd/bwd;L/R; CW, CCW) 1 min/ea  t-ball   UT Stretches 2x30"/ea  Shoulder shrugs fwd/bwd 20x/ea  Seated thoracic ext with towel roll x 20  Seated table slides AA x 2 x 10    Tony received the following manual therapy techniques: Soft tissue Mobilization were applied to the: R shoulder complex for 10 minutes, including:  Manual mobilizations to posterior and inferior capsule gr II-III: not performed  PROM all planes with protocol precautions   L SL scapular mobilizations into upward scapular rotation, depression, protraction and retraction    neuromuscular re-education activities to improve: Balance, Coordination, Kinesthetic, Sense, Proprioception, and Posture for 8 minutes. The following activities were " "included:  Seated scap retractions/depressions 3x10,3"   Sidelying Scap Retractions 2x10    PATIENT EDUCATION AND HOME EXERCISES     Home Exercises Provided and Patient Education Provided     Education provided:   - HEP   - post op precautions     Written Home Exercises Provided: Patient instructed to cont prior HEP. Exercises were reviewed and Tony was able to demonstrate them prior to the end of the session.  Tony demonstrated fair  understanding of the education provided. See EMR under Patient Instructions for exercises provided during therapy sessions    ASSESSMENT     Pt presents today out of immobilizer. Requires min cueing with scapular repositioning with prescribed activities. Improved PROM in all planes.  Good response to exercise progression including initiation of sub-maximal AAROM. Tony Is progressing well towards his goals.     Pt prognosis is Guarded.     Pt will continue to benefit from skilled outpatient physical therapy to address the deficits listed in the problem list box on initial evaluation, provide pt/family education and to maximize pt's level of independence in the home and community environment.     Pt's spiritual, cultural and educational needs considered and pt agreeable to plan of care and goals.     Anticipated Barriers for therapy: compliance with post-op precautions     GOALS: Short Term Goals: 8 weeks  1.Report decreased in pain at worse less than  <   / =  6  /10  to increase tolerance for functional mobility. On going  2. Pt to improve PROM of flexion, scaption and abduction to 90deg and IR/ER to 30deg and R elbow to 0-130deg to allow for improved functional mobility to allow for improvement in IADLs. Met 4/12/23  3. Increased BUE MMT 1/2 grade to increase tolerance for ADL and work activities. On going  4. Pt to report be conscious of impaired sitting and standing posture daily to decrease pain. On going  5. Pt to tolerate HEP to improve ROM and independence with ADL's. On " going     Long Term Goals: 16 weeks  1.Report decreased in pain at worse less than  <   / =  4  /10  to increase tolerance for functional mobility. On going  2.  Patient will report clinically significant improvements on FOTO score.On going  3.Increased BUE MMT 1 grade to increase tolerance for ADL and work activities.On going  4. Pt to report and demonstrate proper posture in standing and sitting to decrease pain. On going  5. Pt to be Independent with HEP to improve ROM and independence with ADL's.On going  6. Pt to improve AROM of flexion, scaption and abduction to 150deg and IR/ER to 60deg and R elbow to 0-150deg to allow for improved functional mobility to allow for improvement in IADLs. On going    PLAN     Continue progressing per pt protocol in future sessions  Plan of Care: 2x/wk for 12wks (4/12/23 - 7/7/23)      4/12/23 - 4wks post-op - (started PT)  4/26/23 - 6wks post-op  5/10/23 - 8wks post-op  5/24/23 - 10wks post-op   6/7/23 - 12 wks post-op - (only 8wks in PT)      Kyler Miner, PT  04/24/2023

## 2023-04-26 ENCOUNTER — CLINICAL SUPPORT (OUTPATIENT)
Dept: REHABILITATION | Facility: HOSPITAL | Age: 43
End: 2023-04-26
Attending: ORTHOPAEDIC SURGERY
Payer: OTHER MISCELLANEOUS

## 2023-04-26 DIAGNOSIS — Z98.890 S/P RIGHT ROTATOR CUFF REPAIR: Primary | ICD-10-CM

## 2023-04-26 DIAGNOSIS — M25.611 DECREASED RANGE OF MOTION OF RIGHT SHOULDER: ICD-10-CM

## 2023-04-26 DIAGNOSIS — M67.813 BICEPS TENDONOSIS OF RIGHT SHOULDER: ICD-10-CM

## 2023-04-26 DIAGNOSIS — R29.898 IMPAIRED STRENGTH OF SHOULDER MUSCLES: ICD-10-CM

## 2023-04-26 PROCEDURE — 97140 MANUAL THERAPY 1/> REGIONS: CPT | Mod: PN,CQ

## 2023-04-26 PROCEDURE — 97110 THERAPEUTIC EXERCISES: CPT | Mod: PN,CQ

## 2023-04-26 PROCEDURE — 97112 NEUROMUSCULAR REEDUCATION: CPT | Mod: PN,CQ

## 2023-04-26 NOTE — PROGRESS NOTES
[facility]   Physical Therapy Treatment Note     Name: Tony Gordillo  [facility] Number: [id number]    Therapy Diagnosis:   Encounter Diagnoses   Name Primary?    S/P right rotator cuff repair Yes    Biceps tendonosis of right shoulder     Decreased range of motion of right shoulder     Impaired strength of shoulder muscles      Physician: Crissy Bradford MD    Visit Date: 4/26/2023    Physician Orders: PT Eval and Treat   Medical Diagnosis from Referral: S46.011A (ICD-10-CM) - Traumatic rotator cuff tear, right, initial encounter   Evaluation Date: 3/15/2023  Authorization Period Expiration: 3/12/2024  Plan of Care Expiration: 7/7/2024 - most likely will have to extend due to nature of surgery and pt's job requirements   Visit # / Visits authorized: 8/12   Progress Note Due: 5/12/2023  FOTO: 1/1  HEP: Access Code: [email or web address]     Precautions: Diabetes;      POSTOPERATIVE PLAN: Plan to follow subscapularis-supraspinatus repair protocol (<3 cm in total size). Passive motion may begin at 4 weeks. Active motion at 6 weeks. No biceps resistive activity x 8 weeks. No cuff resistive activity x 12 weeks.      DATE OF PROCEDURE: 3/14/2023, 4 weeks & 3 days as of 04/26/2023       PREOPERATIVE DIAGNOSES:   Right     1. Rotator cuff tear   2. Biceps tendinopathy    POSTOPERATIVE DIAGNOSES:   Right   1. Rotator cuff tear, full thickness involving the subscapularis and supraspinatus,   2. Biceps tendinopathy     OPERATION:   Right Shoulder  1. Arthroscopic  subscapularis and supraspinatus rotator cuff repair   2. Arthroscopic biceps tenodesis  3. Extensive debridement  Surgeon(s) and Role:     * Crissy Bradford MD - Primary     Per 2wk f/u with surgeon on 3/29/23:   Assessment/Plan:  42 y.o. male  2 weeks s/p right shoulder arthroscopy , rotator cuff repair, and arthroscopic biceps tenodesis  (DOS-3/14/23)     Plan  Sutures were removed today and steri strips were placed   The patient will continue  "with the sling and precautions as outlined in the postoperative instructions given to the patient.  The patient was given refill for non narcotic pain medications  elbow, wrist, and hand exercises as instructed   Physical therapy prescription placed at time of surgery - has appt scheduled  Observe changes in hand - let me know if this gets any worse.   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 Reports following up with MD this week.     He was compliant with home exercise program.  Response to previous treatment: good  Functional change: weaning out of sling     Pain: 4/10  Location: right shoulder      OBJECTIVE       TREATMENT     Tony received the treatments listed below:      Tony received therapeutic exercises to develop strength, endurance, ROM, flexibility, posture, and core stabilization for 25 minutes including:    Pulley x 5 min scaption    Seated thoracic ext with towel roll 20x3"  Seated table slides AA Flexion x 2 x 10  +Seated table Slides AA Scaption w/ elevated HOB 2x10  +Sidelying AA Scaption w/ UE [place] 2x10  +Supine AA Chest Press to Flexion w/ wand 2x10  +Supine AA Protractions w/ wand 2x10  +Supine AA ER @ 0 deg w/ wand 2x10, 5"    Tony received the following manual therapy techniques: Soft tissue Mobilization were applied to the: R shoulder complex for 10 minutes, including:  Manual mobilizations to posterior and inferior capsule gr II-III: not performed  PROM all planes with protocol precautions   L SL scapular mobilizations into upward scapular rotation, depression, protraction and retraction    neuromuscular re-education activities to " "improve: Balance, Coordination, Kinesthetic, Sense, Proprioception, and Posture for 8 minutes. The following activities were included:  Seated scap retractions/depressions 3x10,3"   Sidelying Scap Retractions 2x10, 3"    PATIENT EDUCATION AND HOME EXERCISES     Home Exercises Provided and Patient Education Provided     Education provided:   - HEP   - post op precautions     Written Home Exercises Provided: Patient instructed to cont prior HEP. Exercises were reviewed and Tony was able to demonstrate them prior to the end of the session.  Tony demonstrated fair  understanding of the education provided. See EMR under Patient Instructions for exercises provided during therapy sessions    ASSESSMENT     Tony tolerated the above tx session well with minimal c/o biceps pain. Continued with ROM emphasis this date with no adverse effects reported. Will continue working to improve ROM to normal limits. Tony Is progressing well towards his goals.     Pt prognosis is Guarded.     Pt will continue to benefit from skilled outpatient physical therapy to address the deficits listed in the problem list box on initial evaluation, provide pt/family education and to maximize pt's level of independence in the home and community environment.     Pt's spiritual, cultural and educational needs considered and pt agreeable to plan of care and goals.     Anticipated Barriers for therapy: compliance with post-op precautions     GOALS: Short Term Goals: 8 weeks  1.Report decreased in pain at worse less than  <   / =  6  /10  to increase tolerance for functional mobility. On going  2. Pt to improve PROM of flexion, scaption and abduction to 90deg and IR/ER to 30deg and R elbow to 0-130deg to allow for improved functional mobility to allow for improvement in IADLs. Met 4/12/23  3. Increased BUE MMT 1/2 grade to increase tolerance for ADL and work activities. On going  4. Pt to report be conscious of impaired sitting and standing posture daily " to decrease pain. On going  5. Pt to tolerate HEP to improve ROM and independence with ADL's. On going     Long Term Goals: 16 weeks  1.Report decreased in pain at worse less than  <   / =  4  /10  to increase tolerance for functional mobility. On going  2.  Patient will report clinically significant improvements on FOTO score.On going  3.Increased BUE MMT 1 grade to increase tolerance for ADL and work activities.On going  4. Pt to report and demonstrate proper posture in standing and sitting to decrease pain. On going  5. Pt to be Independent with HEP to improve ROM and independence with ADL's.On going  6. Pt to improve AROM of flexion, scaption and abduction to 150deg and IR/ER to 60deg and R elbow to 0-150deg to allow for improved functional mobility to allow for improvement in IADLs. On going    PLAN     Continue progressing per pt protocol in future sessions  Plan of Care: 2x/wk for 12wks (4/12/23 - 7/7/23)      4/12/23 - 4wks post-op - (started PT)  4/26/23 - 6wks post-op  5/10/23 - 8wks post-op  5/24/23 - 10wks post-op   6/7/23 - 12 wks post-op - (only 8wks in PT)      Rebeca Richardson, MAIDA  04/26/2023

## 2023-04-27 ENCOUNTER — OFFICE VISIT (OUTPATIENT)
Dept: ORTHOPEDICS | Facility: CLINIC | Age: 43
End: 2023-04-27
Payer: COMMERCIAL

## 2023-04-27 DIAGNOSIS — S46.011A TRAUMATIC ROTATOR CUFF TEAR, RIGHT, INITIAL ENCOUNTER: Primary | ICD-10-CM

## 2023-04-27 PROCEDURE — 1159F MED LIST DOCD IN RCRD: CPT | Mod: CPTII,S$GLB,, | Performed by: ORTHOPAEDIC SURGERY

## 2023-04-27 PROCEDURE — 1160F PR REVIEW ALL MEDS BY PRESCRIBER/CLIN PHARMACIST DOCUMENTED: ICD-10-PCS | Mod: CPTII,S$GLB,, | Performed by: ORTHOPAEDIC SURGERY

## 2023-04-27 PROCEDURE — 99999 PR PBB SHADOW E&M-EST. PATIENT-LVL IV: ICD-10-PCS | Mod: PBBFAC,,, | Performed by: ORTHOPAEDIC SURGERY

## 2023-04-27 PROCEDURE — 99999 PR PBB SHADOW E&M-EST. PATIENT-LVL IV: CPT | Mod: PBBFAC,,, | Performed by: ORTHOPAEDIC SURGERY

## 2023-04-27 PROCEDURE — 4010F ACE/ARB THERAPY RXD/TAKEN: CPT | Mod: CPTII,S$GLB,, | Performed by: ORTHOPAEDIC SURGERY

## 2023-04-27 PROCEDURE — 1159F PR MEDICATION LIST DOCUMENTED IN MEDICAL RECORD: ICD-10-PCS | Mod: CPTII,S$GLB,, | Performed by: ORTHOPAEDIC SURGERY

## 2023-04-27 PROCEDURE — 4010F PR ACE/ARB THEARPY RXD/TAKEN: ICD-10-PCS | Mod: CPTII,S$GLB,, | Performed by: ORTHOPAEDIC SURGERY

## 2023-04-27 PROCEDURE — 99024 PR POST-OP FOLLOW-UP VISIT: ICD-10-PCS | Mod: S$GLB,,, | Performed by: ORTHOPAEDIC SURGERY

## 2023-04-27 PROCEDURE — 99024 POSTOP FOLLOW-UP VISIT: CPT | Mod: S$GLB,,, | Performed by: ORTHOPAEDIC SURGERY

## 2023-04-27 PROCEDURE — 1160F RVW MEDS BY RX/DR IN RCRD: CPT | Mod: CPTII,S$GLB,, | Performed by: ORTHOPAEDIC SURGERY

## 2023-04-27 PROCEDURE — 3052F PR MOST RECENT HEMOGLOBIN A1C LEVEL 8.0 - < 9.0%: ICD-10-PCS | Mod: CPTII,S$GLB,, | Performed by: ORTHOPAEDIC SURGERY

## 2023-04-27 PROCEDURE — 3052F HG A1C>EQUAL 8.0%<EQUAL 9.0%: CPT | Mod: CPTII,S$GLB,, | Performed by: ORTHOPAEDIC SURGERY

## 2023-04-27 NOTE — PROGRESS NOTES
Postoperative Visit    History of Present Illness:   Tony is 6 weeks s/p right shoulder arthroscopy , rotator cuff repair, and arthroscopic biceps tenodesis  (DOS-3/14/23)  Full thickness SS and SSC, ATS biceps tenodesis  He is doing well -- pain is well controlled and He is not continuing to take narcotic pain medications   He is compliant with activity restrictions and is wearing the sling as instructed   Denies fevers, chills, constitutional symptoms     Physical Examination:    NAD  right upper extremity: AROM: , ER 40  Weakness with SS, SSC with good strength  2+ RP, BCR in all digits.     Assessment/Plan:  42 y.o. male  6 weeks s/p right shoulder arthroscopy , rotator cuff repair, and arthroscopic biceps tenodesis  (DOS-3/14/23)    Plan  Sling stopped last week- ok   Continue PT  Ok to continue AROM, no strengthening until 12 weeks for shoulder, 8 weeks for elbow   Follow up in 6 weeks      All questions were answered in detail. The patient is in full agreement with the treatment plan and will proceed accordingly.

## 2023-04-28 ENCOUNTER — CLINICAL SUPPORT (OUTPATIENT)
Dept: REHABILITATION | Facility: HOSPITAL | Age: 43
End: 2023-04-28
Attending: ORTHOPAEDIC SURGERY
Payer: OTHER MISCELLANEOUS

## 2023-04-28 DIAGNOSIS — R29.898 IMPAIRED STRENGTH OF SHOULDER MUSCLES: ICD-10-CM

## 2023-04-28 DIAGNOSIS — Z98.890 S/P RIGHT ROTATOR CUFF REPAIR: Primary | ICD-10-CM

## 2023-04-28 DIAGNOSIS — M67.813 BICEPS TENDONOSIS OF RIGHT SHOULDER: ICD-10-CM

## 2023-04-28 DIAGNOSIS — M25.611 DECREASED RANGE OF MOTION OF RIGHT SHOULDER: ICD-10-CM

## 2023-04-28 PROCEDURE — 97140 MANUAL THERAPY 1/> REGIONS: CPT | Mod: PN,CQ

## 2023-04-28 PROCEDURE — 97110 THERAPEUTIC EXERCISES: CPT | Mod: PN,CQ

## 2023-04-28 PROCEDURE — 97112 NEUROMUSCULAR REEDUCATION: CPT | Mod: PN,CQ

## 2023-04-28 NOTE — PROGRESS NOTES
[facility]   Physical Therapy Treatment Note     Name: Tony Gordillo  [facility] Number: [id number]    Therapy Diagnosis:   Encounter Diagnoses   Name Primary?    S/P right rotator cuff repair Yes    Biceps tendonosis of right shoulder     Decreased range of motion of right shoulder     Impaired strength of shoulder muscles      Physician: Crissy Bradford MD    Visit Date: 4/28/2023    Physician Orders: PT Eval and Treat   Medical Diagnosis from Referral: S46.011A (ICD-10-CM) - Traumatic rotator cuff tear, right, initial encounter   Evaluation Date: 3/15/2023  Authorization Period Expiration: 3/12/2024  Plan of Care Expiration: 7/7/2024 - most likely will have to extend due to nature of surgery and pt's job requirements   Visit # / Visits authorized: 8/12   Progress Note Due: 5/12/2023  FOTO: 1/1  HEP: Access Code: [email or web address]     Precautions: Diabetes;      POSTOPERATIVE PLAN: Plan to follow subscapularis-supraspinatus repair protocol (<3 cm in total size). Passive motion may begin at 4 weeks. Active motion at 6 weeks. No biceps resistive activity x 8 weeks. No cuff resistive activity x 12 weeks.      DATE OF PROCEDURE: 3/14/2023, 4 weeks & 3 days as of 04/28/2023       PREOPERATIVE DIAGNOSES:   Right     1. Rotator cuff tear   2. Biceps tendinopathy    POSTOPERATIVE DIAGNOSES:   Right   1. Rotator cuff tear, full thickness involving the subscapularis and supraspinatus,   2. Biceps tendinopathy     OPERATION:   Right Shoulder  1. Arthroscopic  subscapularis and supraspinatus rotator cuff repair   2. Arthroscopic biceps tenodesis  3. Extensive debridement  Surgeon(s) and Role:     * Crissy Bradford MD - Primary     Per 2wk f/u with surgeon on 3/29/23:   Assessment/Plan:  42 y.o. male  2 weeks s/p right shoulder arthroscopy , rotator cuff repair, and arthroscopic biceps tenodesis  (DOS-3/14/23)     Plan  Sutures were removed today and steri strips were placed   The patient will continue  "with the sling and precautions as outlined in the postoperative instructions given to the patient.  The patient was given refill for non narcotic pain medications  elbow, wrist, and hand exercises as instructed   Physical therapy prescription placed at time of surgery - has appt scheduled  Observe changes in hand - let me know if this gets any worse.   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 Reports following up with MD this week.     He was compliant with home exercise program.  Response to previous treatment: good  Functional change: weaning out of sling     Pain: 4/10  Location: right shoulder      OBJECTIVE       TREATMENT     Tony received the treatments listed below:      Tony received therapeutic exercises to develop strength, endurance, ROM, flexibility, posture, and core stabilization for 25 minutes including:    Pulley x 5 min scaption    Seated table slides AA Flexion x 2 x 10  Seated table Slides AA Scaption w/ elevated HOB 2x10  Sidelying AA Scaption w/ UE [place] 2x10  +Supine AA Chest Press to Flexion w/ wand 2x10  Supine AA Protractions w/ wand 2x10  Supine AA ER @ 0 deg w/ wand 2x10, 5"    Tony received the following manual therapy techniques: Soft tissue Mobilization were applied to the: R shoulder complex for 15 minutes, including:  Manual mobilizations to posterior and inferior capsule gr II-III: not performed  PROM all planes with protocol precautions   L SL scapular mobilizations into upward scapular rotation, depression, protraction and retraction    neuromuscular re-education activities to improve: Balance, Coordination, Kinesthetic, " "Sense, Proprioception, and Posture for 10 minutes. The following activities were included:  Seated scap retractions/depressions 3x10,3"   Sidelying Scap Retractions 2x10, 3"  Seated thoracic ext with towel roll 20x3"  PATIENT EDUCATION AND HOME EXERCISES     Home Exercises Provided and Patient Education Provided     Education provided:   - HEP   - post op precautions     Written Home Exercises Provided: Patient instructed to cont prior HEP. Exercises were reviewed and Tony was able to demonstrate them prior to the end of the session.  Tony demonstrated fair  understanding of the education provided. See EMR under Patient Instructions for exercises provided during therapy sessions    ASSESSMENT     Tony tolerated the above tx session well with minimal c/o pain. Continued with ROM emphasis this date with no adverse effects reported. Will continue working to improve ROM to normal limits prior to adding strengthening therEx. Tony Is progressing well towards his goals.     Pt prognosis is Guarded.     Pt will continue to benefit from skilled outpatient physical therapy to address the deficits listed in the problem list box on initial evaluation, provide pt/family education and to maximize pt's level of independence in the home and community environment.     Pt's spiritual, cultural and educational needs considered and pt agreeable to plan of care and goals.     Anticipated Barriers for therapy: compliance with post-op precautions     GOALS: Short Term Goals: 8 weeks  1.Report decreased in pain at worse less than  <   / =  6  /10  to increase tolerance for functional mobility. On going  2. Pt to improve PROM of flexion, scaption and abduction to 90deg and IR/ER to 30deg and R elbow to 0-130deg to allow for improved functional mobility to allow for improvement in IADLs. Met 4/12/23  3. Increased BUE MMT 1/2 grade to increase tolerance for ADL and work activities. On going  4. Pt to report be conscious of impaired sitting " and standing posture daily to decrease pain. On going  5. Pt to tolerate HEP to improve ROM and independence with ADL's. On going     Long Term Goals: 16 weeks  1.Report decreased in pain at worse less than  <   / =  4  /10  to increase tolerance for functional mobility. On going  2.  Patient will report clinically significant improvements on FOTO score.On going  3.Increased BUE MMT 1 grade to increase tolerance for ADL and work activities.On going  4. Pt to report and demonstrate proper posture in standing and sitting to decrease pain. On going  5. Pt to be Independent with HEP to improve ROM and independence with ADL's.On going  6. Pt to improve AROM of flexion, scaption and abduction to 150deg and IR/ER to 60deg and R elbow to 0-150deg to allow for improved functional mobility to allow for improvement in IADLs. On going    PLAN     Continue progressing per pt protocol in future sessions  Plan of Care: 2x/wk for 12wks (4/12/23 - 7/7/23)      4/12/23 - 4wks post-op - (started PT)  4/26/23 - 6wks post-op  5/10/23 - 8wks post-op  5/24/23 - 10wks post-op   6/7/23 - 12 wks post-op - (only 8wks in PT)      Rebeca Richardson, MAIDA  04/28/2023

## 2023-05-01 ENCOUNTER — CLINICAL SUPPORT (OUTPATIENT)
Dept: REHABILITATION | Facility: HOSPITAL | Age: 43
End: 2023-05-01
Attending: ORTHOPAEDIC SURGERY
Payer: OTHER MISCELLANEOUS

## 2023-05-01 DIAGNOSIS — M25.611 DECREASED RANGE OF MOTION OF RIGHT SHOULDER: ICD-10-CM

## 2023-05-01 DIAGNOSIS — M67.813 BICEPS TENDONOSIS OF RIGHT SHOULDER: ICD-10-CM

## 2023-05-01 DIAGNOSIS — S46.011A TRAUMATIC ROTATOR CUFF TEAR, RIGHT, INITIAL ENCOUNTER: Primary | ICD-10-CM

## 2023-05-01 DIAGNOSIS — R29.898 IMPAIRED STRENGTH OF SHOULDER MUSCLES: ICD-10-CM

## 2023-05-01 DIAGNOSIS — Z98.890 S/P RIGHT ROTATOR CUFF REPAIR: Primary | ICD-10-CM

## 2023-05-01 PROCEDURE — 97140 MANUAL THERAPY 1/> REGIONS: CPT | Mod: PN

## 2023-05-01 PROCEDURE — 97110 THERAPEUTIC EXERCISES: CPT | Mod: PN

## 2023-05-01 NOTE — PLAN OF CARE
[facility]   Physical Therapy progress Note     Name: Tony Gordillo  Clinic Number: [id number]    Therapy Diagnosis:   Encounter Diagnoses   Name Primary?    S/P right rotator cuff repair Yes    Biceps tendonosis of right shoulder     Decreased range of motion of right shoulder     Impaired strength of shoulder muscles      Physician: Crissy Bradford MD    Visit Date: 5/1/2023    Physician Orders: PT Eval and Treat   Medical Diagnosis from Referral: S46.011A (ICD-10-CM) - Traumatic rotator cuff tear, right, initial encounter   Evaluation Date: 3/15/2023  Authorization Period Expiration: 3/12/2024  Plan of Care Expiration: 8/1/23  Visit # / Visits authorized: 8/12   Progress Note Due: 5/12/2023  FOTO: 1/1  HEP: Access Code: [email or web address]     Precautions: Diabetes;      POSTOPERATIVE PLAN: Plan to follow subscapularis-supraspinatus repair protocol (<3 cm in total size). Passive motion may begin at 4 weeks. Active motion at 6 weeks. No biceps resistive activity x 8 weeks. No cuff resistive activity x 12 weeks.      DATE OF PROCEDURE: 3/14/2023, 4 weeks & 3 days as of 05/01/2023       PREOPERATIVE DIAGNOSES:   Right     1. Rotator cuff tear   2. Biceps tendinopathy    POSTOPERATIVE DIAGNOSES:   Right   1. Rotator cuff tear, full thickness involving the subscapularis and supraspinatus,   2. Biceps tendinopathy     OPERATION:   Right Shoulder  1. Arthroscopic  subscapularis and supraspinatus rotator cuff repair   2. Arthroscopic biceps tenodesis  3. 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  States the shoulder is much improved.     He was compliant with home exercise program.  Response to previous treatment: good  Functional change: improved ADL's    Pain: 4/10  Location: right shoulder      OBJECTIVE     Passive Range of Motion (degrees): measured on 5/1/23  Shoulder Right   Flexion 140deg   Abduction 100deg   Scaption 155deg      AAROM  (degrees): will assess in upcoming weeks  Shoulder Right Left   Flexion 140 150   Abduction 100 145   ER at 60 deg 80 65   IR  25 60      AROM: not tested  TREATMENT     Tony received the treatments listed below:      Tony received therapeutic exercises to develop strength, endurance, ROM, flexibility, posture, and core stabilization for 35 minutes including:    Pulley x 5 min scaption  [place] with t-ball 30/30  Seated table slides AA Flexion x 2 x 10  Seated table Slides AA Scaption w/ elevated HOB 2x10  Sidelying AA Scaption w/ UE [place] 2x10  Supine AA  press up with wand  Supine AA Protractions w/ wand 2x10  Supine AA ER @ 0 deg w/ wand 2x10, 5"  4 way GH isometrics 2 x 10    Tony received the following manual therapy techniques: Soft tissue Mobilization were applied to the: R shoulder complex for 10  minutes, including:  Manual mobilizations to posterior and inferior capsule gr II-III: not performed  PROM all planes with protocol precautions   L SL scapular mobilizations into upward scapular rotation, depression, protraction and retraction    neuromuscular re-education activities to improve: Balance, Coordination, Kinesthetic, Sense, Proprioception, and Posture for 10 minutes. The following activities were included:  Seated scap retractions/depressions 3x10,3"   Sidelying Scap Retractions 2x10, 3"  Seated thoracic ext with towel roll 20x3"  PATIENT EDUCATION AND HOME EXERCISES     Home Exercises Provided and Patient Education Provided     Education provided:   - HEP   - post op precautions     Written Home Exercises Provided: Patient " instructed to cont prior HEP. Exercises were reviewed and Tony was able to demonstrate them prior to the end of the session.  Tony demonstrated fair  understanding of the education provided. See EMR under Patient Instructions for exercises provided during therapy sessions    ASSESSMENT     Pt presents today d/c'd from immobilizer. Requires decreased cueing with scapular repositioning with prescribed activities. Improved GH PROM in all planes.  Good response to exercise progression per protocol. Tony Is progressing well towards his goals.     Pt prognosis is Guarded.     Pt will continue to benefit from skilled outpatient physical therapy to address the deficits listed in the problem list box on initial evaluation, provide pt/family education and to maximize pt's level of independence in the home and community environment.     Pt's spiritual, cultural and educational needs considered and pt agreeable to plan of care and goals.     Anticipated Barriers for therapy: compliance with post-op precautions     GOALS: Short Term Goals: 8 weeks  updated 5/1/23  MET  1.Report decreased in pain at worse less than  <   / =  6  /10  to increase tolerance for functional mobility. MET  2. Pt to improve PROM of flexion, scaption and abduction to 90deg and IR/ER to 30deg and R elbow to 0-130deg to allow for improved functional mobility to allow for improvement in IADLs. Met 4/12/23  3. Increased BUE MMT 1/2 grade to increase tolerance for ADL and work activities.    4. Pt to report be conscious of impaired sitting and standing posture daily to decrease pain. MET  5. Pt to tolerate HEP to improve ROM and independence with ADL's.  MET     Long Term Goals: 16 weeks   in progress  1.Report decreased in pain at worse less than  <   / =  4  /10  to increase tolerance for functional mobility. On going  2.  Patient will report clinically significant improvements on FOTO score.On going  3.Increased BUE MMT 1 grade to increase tolerance for  ADL and work activities.On going  4. Pt to report and demonstrate proper posture in standing and sitting to decrease pain. On going  5. Pt to be Independent with HEP to improve ROM and independence with ADL's.On going  6. Pt to improve AROM of flexion, scaption and abduction to 150deg and IR/ER to 60deg and R elbow to 0-150deg to allow for improved functional mobility to allow for improvement in IADLs. On going    PLAN     Recommended Treatment Plan: 2-3 times per week for 12 weeks with treatments to consist of:  Neuromuscular and postural re-education,  training, therapeutic exercise, therapeutic activities,balance training, gait training, manual therapy, soft tissue mobilization, ROM exercises, Cardiovascular,  Postural stabilization, manual traction, spinal mobilization, moist heat, cryotherapy, electrical stimulation,dry needling,  home exercise education and planning.     Kyler Miner, PT  05/01/2023

## 2023-05-01 NOTE — PROGRESS NOTES
[facility]   Physical Therapy progress Note     Name: Tony Gordillo  Clinic Number: [id number]    Therapy Diagnosis:   Encounter Diagnoses   Name Primary?    S/P right rotator cuff repair Yes    Biceps tendonosis of right shoulder     Decreased range of motion of right shoulder     Impaired strength of shoulder muscles      Physician: Crissy Bradford MD    Visit Date: 5/1/2023    Physician Orders: PT Eval and Treat   Medical Diagnosis from Referral: S46.011A (ICD-10-CM) - Traumatic rotator cuff tear, right, initial encounter   Evaluation Date: 3/15/2023  Authorization Period Expiration: 3/12/2024  Plan of Care Expiration: 8/1/23  Visit # / Visits authorized: 8/12   Progress Note Due: 5/12/2023  FOTO: 1/1  HEP: Access Code: [email or web address]     Precautions: Diabetes;      POSTOPERATIVE PLAN: Plan to follow subscapularis-supraspinatus repair protocol (<3 cm in total size). Passive motion may begin at 4 weeks. Active motion at 6 weeks. No biceps resistive activity x 8 weeks. No cuff resistive activity x 12 weeks.      DATE OF PROCEDURE: 3/14/2023, 4 weeks & 3 days as of 05/01/2023       PREOPERATIVE DIAGNOSES:   Right     1. Rotator cuff tear   2. Biceps tendinopathy    POSTOPERATIVE DIAGNOSES:   Right   1. Rotator cuff tear, full thickness involving the subscapularis and supraspinatus,   2. Biceps tendinopathy     OPERATION:   Right Shoulder  1. Arthroscopic  subscapularis and supraspinatus rotator cuff repair   2. Arthroscopic biceps tenodesis  3. 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  States the shoulder is much improved.     He was compliant with home exercise program.  Response to previous treatment: good  Functional change: improved ADL's    Pain: 4/10  Location: right shoulder      OBJECTIVE     Passive Range of Motion (degrees): measured on 5/1/23  Shoulder Right   Flexion 140deg   Abduction 100deg   Scaption 155deg      AAROM  (degrees): will assess in upcoming weeks  Shoulder Right Left   Flexion 140 150   Abduction 100 145   ER at 60 deg 80 65   IR  25 60      AROM: not tested  TREATMENT     Tony received the treatments listed below:      Tony received therapeutic exercises to develop strength, endurance, ROM, flexibility, posture, and core stabilization for 35 minutes including:    Pulley x 5 min scaption  [place] with t-ball 30/30  Seated table slides AA Flexion x 2 x 10  Seated table Slides AA Scaption w/ elevated HOB 2x10  Sidelying AA Scaption w/ UE [place] 2x10  Supine AA  press up with wand  Supine AA Protractions w/ wand 2x10  Supine AA ER @ 0 deg w/ wand 2x10, 5"  4 way GH isometrics 2 x 10    Tony received the following manual therapy techniques: Soft tissue Mobilization were applied to the: R shoulder complex for 10  minutes, including:  Manual mobilizations to posterior and inferior capsule gr II-III: not performed  PROM all planes with protocol precautions   L SL scapular mobilizations into upward scapular rotation, depression, protraction and retraction    neuromuscular re-education activities to improve: Balance, Coordination, Kinesthetic, Sense, Proprioception, and Posture for 10 minutes. The following activities were included:  Seated scap retractions/depressions 3x10,3"   Sidelying Scap Retractions 2x10, 3"  Seated thoracic ext with towel roll 20x3"  PATIENT EDUCATION AND HOME EXERCISES     Home Exercises Provided and Patient Education Provided     Education provided:   - HEP   - post op precautions     Written Home Exercises Provided: Patient " instructed to cont prior HEP. Exercises were reviewed and Tony was able to demonstrate them prior to the end of the session.  Tony demonstrated fair  understanding of the education provided. See EMR under Patient Instructions for exercises provided during therapy sessions    ASSESSMENT     Pt presents today d/c'd from immobilizer. Requires decreased cueing with scapular repositioning with prescribed activities. Improved GH PROM in all planes.  Good response to exercise progression per protocol. Tony Is progressing well towards his goals.     Pt prognosis is Guarded.     Pt will continue to benefit from skilled outpatient physical therapy to address the deficits listed in the problem list box on initial evaluation, provide pt/family education and to maximize pt's level of independence in the home and community environment.     Pt's spiritual, cultural and educational needs considered and pt agreeable to plan of care and goals.     Anticipated Barriers for therapy: compliance with post-op precautions     GOALS: Short Term Goals: 8 weeks  updated 5/1/23  MET  1.Report decreased in pain at worse less than  <   / =  6  /10  to increase tolerance for functional mobility. MET  2. Pt to improve PROM of flexion, scaption and abduction to 90deg and IR/ER to 30deg and R elbow to 0-130deg to allow for improved functional mobility to allow for improvement in IADLs. Met 4/12/23  3. Increased BUE MMT 1/2 grade to increase tolerance for ADL and work activities.    4. Pt to report be conscious of impaired sitting and standing posture daily to decrease pain. MET  5. Pt to tolerate HEP to improve ROM and independence with ADL's.  MET     Long Term Goals: 16 weeks   in progress  1.Report decreased in pain at worse less than  <   / =  4  /10  to increase tolerance for functional mobility. On going  2.  Patient will report clinically significant improvements on FOTO score.On going  3.Increased BUE MMT 1 grade to increase tolerance for  ADL and work activities.On going  4. Pt to report and demonstrate proper posture in standing and sitting to decrease pain. On going  5. Pt to be Independent with HEP to improve ROM and independence with ADL's.On going  6. Pt to improve AROM of flexion, scaption and abduction to 150deg and IR/ER to 60deg and R elbow to 0-150deg to allow for improved functional mobility to allow for improvement in IADLs. On going    PLAN     Recommended Treatment Plan: 2-3 times per week for 12 weeks with treatments to consist of:  Neuromuscular and postural re-education,  training, therapeutic exercise, therapeutic activities,balance training, gait training, manual therapy, soft tissue mobilization, ROM exercises, Cardiovascular,  Postural stabilization, manual traction, spinal mobilization, moist heat, cryotherapy, electrical stimulation,dry needling,  home exercise education and planning.     Kyler Miner, PT  05/01/2023

## 2023-05-02 ENCOUNTER — PATIENT MESSAGE (OUTPATIENT)
Dept: ADMINISTRATIVE | Facility: HOSPITAL | Age: 43
End: 2023-05-02
Payer: COMMERCIAL

## 2023-05-02 ENCOUNTER — PATIENT OUTREACH (OUTPATIENT)
Dept: ADMINISTRATIVE | Facility: HOSPITAL | Age: 43
End: 2023-05-02
Payer: COMMERCIAL

## 2023-05-03 ENCOUNTER — CLINICAL SUPPORT (OUTPATIENT)
Dept: REHABILITATION | Facility: HOSPITAL | Age: 43
End: 2023-05-03
Attending: ORTHOPAEDIC SURGERY
Payer: OTHER MISCELLANEOUS

## 2023-05-03 DIAGNOSIS — R29.898 IMPAIRED STRENGTH OF SHOULDER MUSCLES: ICD-10-CM

## 2023-05-03 DIAGNOSIS — Z98.890 S/P RIGHT ROTATOR CUFF REPAIR: Primary | ICD-10-CM

## 2023-05-03 DIAGNOSIS — M25.611 DECREASED RANGE OF MOTION OF RIGHT SHOULDER: ICD-10-CM

## 2023-05-03 DIAGNOSIS — M67.813 BICEPS TENDONOSIS OF RIGHT SHOULDER: ICD-10-CM

## 2023-05-03 PROCEDURE — 97140 MANUAL THERAPY 1/> REGIONS: CPT | Mod: PN,CQ

## 2023-05-03 PROCEDURE — 97112 NEUROMUSCULAR REEDUCATION: CPT | Mod: PN,CQ

## 2023-05-03 PROCEDURE — 97110 THERAPEUTIC EXERCISES: CPT | Mod: PN,CQ

## 2023-05-03 NOTE — PROGRESS NOTES
[facility]   Physical Therapy progress Note     Name: Tony Gordillo  Clinic Number: [id number]    Therapy Diagnosis:   Encounter Diagnoses   Name Primary?    S/P right rotator cuff repair Yes    Biceps tendonosis of right shoulder     Decreased range of motion of right shoulder     Impaired strength of shoulder muscles      Physician: Crissy Bradford MD    Visit Date: 5/3/2023    Physician Orders: PT Eval and Treat   Medical Diagnosis from Referral: S46.011A (ICD-10-CM) - Traumatic rotator cuff tear, right, initial encounter   Evaluation Date: 3/15/2023  Authorization Period Expiration: 3/12/2024  Plan of Care Expiration: 8/1/23  Visit # / Visits authorized: 8/12   Progress Note Due: 5/12/2023  FOTO: 1/1  HEP: Access Code: [email or web address]     Precautions: Diabetes;      POSTOPERATIVE PLAN: Plan to follow subscapularis-supraspinatus repair protocol (<3 cm in total size). Passive motion may begin at 4 weeks. Active motion at 6 weeks. No biceps resistive activity x 8 weeks. No cuff resistive activity x 12 weeks.      DATE OF PROCEDURE: 3/14/2023, 4 weeks & 3 days as of 05/03/2023       PREOPERATIVE DIAGNOSES:   Right     1. Rotator cuff tear   2. Biceps tendinopathy    POSTOPERATIVE DIAGNOSES:   Right   1. Rotator cuff tear, full thickness involving the subscapularis and supraspinatus,   2. Biceps tendinopathy     OPERATION:   Right Shoulder  1. Arthroscopic  subscapularis and supraspinatus rotator cuff repair   2. Arthroscopic biceps tenodesis  3. 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     He was compliant with home exercise program.  Response to previous treatment: good  Functional change: improved ADL's    Pain: 2/10  Location: right shoulder      OBJECTIVE     AROM: not tested  TREATMENT     Tony received the treatments listed below:      Tony received therapeutic exercises to develop strength, endurance, ROM, flexibility, posture, and core stabilization for 35 minutes including:    Pulley x 5 min scaption  Codmans with t-ball 30/30  Seated table slides AA Flexion x 2 x 10  Seated table Slides AA Scaption w/ elevated HOB 2x10  Sidelying AA Scaption w/ UE [place] 2x10  Supine AA  press up with wand  Supine AA Protractions w/ wand 2x10  Supine AA ER @ 0 deg w/ wand 2x10, 5"  4 way GH isometrics 2 x 10    Tony received the following manual therapy techniques: Soft tissue Mobilization were applied to the: R shoulder complex for 10  minutes, including:  Manual mobilizations to posterior and inferior capsule gr II-III: not performed  PROM all planes with protocol precautions   L SL scapular mobilizations into upward scapular rotation, depression, protraction and retraction    neuromuscular re-education activities to improve: Balance, Coordination, Kinesthetic, Sense, Proprioception, and Posture for 10 minutes. The following activities were included:  Seated scap retractions/depressions 3x10,3"   Sidelying Scap Retractions 2x10, 3"  Seated thoracic ext with towel roll 20x3"  PATIENT EDUCATION AND HOME EXERCISES     Home Exercises Provided and Patient Education Provided     Education provided:   - HEP   - post op precautions     Written Home Exercises Provided: Patient instructed to cont prior HEP. Exercises were reviewed and Tony was able to demonstrate them prior to the end of the session.  Tony demonstrated fair  understanding of the education provided. See EMR under Patient Instructions for exercises provided during therapy " sessions    ASSESSMENT     Tony tolerated the above tx session well without exacerbation of symptoms. Continued conservative progressions this date, and pt performed well with minimal cueing required. Improved GHJ PROM in all planes, however pt continues to experience pain across proximal biceps. Continues to have good response to exercise progression per protocol. Tony Is progressing well towards his goals. Will continue incorporating active movements conservatively at this time.     Pt prognosis is Guarded.     Pt will continue to benefit from skilled outpatient physical therapy to address the deficits listed in the problem list box on initial evaluation, provide pt/family education and to maximize pt's level of independence in the home and community environment.     Pt's spiritual, cultural and educational needs considered and pt agreeable to plan of care and goals.     Anticipated Barriers for therapy: compliance with post-op precautions     GOALS: Short Term Goals: 8 weeks  updated 5/1/23  MET  1.Report decreased in pain at worse less than  <   / =  6  /10  to increase tolerance for functional mobility. MET  2. Pt to improve PROM of flexion, scaption and abduction to 90deg and IR/ER to 30deg and R elbow to 0-130deg to allow for improved functional mobility to allow for improvement in IADLs. Met 4/12/23  3. Increased BUE MMT 1/2 grade to increase tolerance for ADL and work activities.    4. Pt to report be conscious of impaired sitting and standing posture daily to decrease pain. MET  5. Pt to tolerate HEP to improve ROM and independence with ADL's.  MET     Long Term Goals: 16 weeks   in progress  1.Report decreased in pain at worse less than  <   / =  4  /10  to increase tolerance for functional mobility. On going  2.  Patient will report clinically significant improvements on FOTO score.On going  3.Increased BUE MMT 1 grade to increase tolerance for ADL and work activities.On going  4. Pt to report and  demonstrate proper posture in standing and sitting to decrease pain. On going  5. Pt to be Independent with HEP to improve ROM and independence with ADL's.On going  6. Pt to improve AROM of flexion, scaption and abduction to 150deg and IR/ER to 60deg and R elbow to 0-150deg to allow for improved functional mobility to allow for improvement in IADLs. On going    PLAN     Recommended Treatment Plan: 2-3 times per week for 12 weeks with treatments to consist of:  Neuromuscular and postural re-education,  training, therapeutic exercise, therapeutic activities,balance training, gait training, manual therapy, soft tissue mobilization, ROM exercises, Cardiovascular,  Postural stabilization, manual traction, spinal mobilization, moist heat, cryotherapy, electrical stimulation,dry needling,  home exercise education and planning.     Rebeca Richardson, PTA  05/03/2023

## 2023-05-05 ENCOUNTER — CLINICAL SUPPORT (OUTPATIENT)
Dept: REHABILITATION | Facility: HOSPITAL | Age: 43
End: 2023-05-05
Attending: ORTHOPAEDIC SURGERY
Payer: OTHER MISCELLANEOUS

## 2023-05-05 DIAGNOSIS — M67.813 BICEPS TENDONOSIS OF RIGHT SHOULDER: ICD-10-CM

## 2023-05-05 DIAGNOSIS — R29.898 IMPAIRED STRENGTH OF SHOULDER MUSCLES: ICD-10-CM

## 2023-05-05 DIAGNOSIS — M25.611 DECREASED RANGE OF MOTION OF RIGHT SHOULDER: ICD-10-CM

## 2023-05-05 DIAGNOSIS — Z98.890 S/P RIGHT ROTATOR CUFF REPAIR: Primary | ICD-10-CM

## 2023-05-05 PROCEDURE — 97110 THERAPEUTIC EXERCISES: CPT | Mod: PN,CQ

## 2023-05-05 PROCEDURE — 97112 NEUROMUSCULAR REEDUCATION: CPT | Mod: PN,CQ

## 2023-05-05 PROCEDURE — 97140 MANUAL THERAPY 1/> REGIONS: CPT | Mod: PN,CQ

## 2023-05-05 NOTE — PROGRESS NOTES
[facility]   Physical Therapy progress Note     Name: Tony Gordillo  Clinic Number: [id number]    Therapy Diagnosis:   Encounter Diagnoses   Name Primary?    S/P right rotator cuff repair Yes    Biceps tendonosis of right shoulder     Decreased range of motion of right shoulder     Impaired strength of shoulder muscles      Physician: Crissy Bradford MD    Visit Date: 5/5/2023    Physician Orders: PT Eval and Treat   Medical Diagnosis from Referral: S46.011A (ICD-10-CM) - Traumatic rotator cuff tear, right, initial encounter   Evaluation Date: 3/15/2023  Authorization Period Expiration: 3/12/2024  Plan of Care Expiration: 8/1/23  Visit # / Visits authorized: 8/12   Progress Note Due: 5/12/2023  FOTO: 1/1  HEP: Access Code: [email or web address]     Precautions: Diabetes;      POSTOPERATIVE PLAN: Plan to follow subscapularis-supraspinatus repair protocol (<3 cm in total size). Passive motion may begin at 4 weeks. Active motion at 6 weeks. No biceps resistive activity x 8 weeks. No cuff resistive activity x 12 weeks.      DATE OF PROCEDURE: 3/14/2023, 4 weeks & 3 days as of 05/05/2023       PREOPERATIVE DIAGNOSES:   Right     1. Rotator cuff tear   2. Biceps tendinopathy    POSTOPERATIVE DIAGNOSES:   Right   1. Rotator cuff tear, full thickness involving the subscapularis and supraspinatus,   2. Biceps tendinopathy     OPERATION:   Right Shoulder  1. Arthroscopic  subscapularis and supraspinatus rotator cuff repair   2. Arthroscopic biceps tenodesis  3. 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     He was  "compliant with home exercise program.  Response to previous treatment: good  Functional change: improved ADL's    Pain: 2/10  Location: right shoulder      OBJECTIVE     TREATMENT     Tony received the treatments listed below:      Tony received therapeutic exercises to develop strength, endurance, ROM, flexibility, posture, and core stabilization for 35 minutes including:    Pulley x 5 min scaption  UBE 5 min  [place] with t-ball 30/30-all directions  Wall Slides in Scaption 2x10  Supine AA  press up with wand overhead flexion 3x10  Supine AA Protractions w/ wand 3x10  Supine AA ER @ 45 deg w/ wand 3x10, 5"  Sidelying Shoulder Flexion 2x10  Sidelying Shoulder Abduction 2x10    Tony received the following manual therapy techniques: Soft tissue Mobilization were applied to the: R shoulder complex for 10 minutes, including:  Manual mobilizations to posterior and inferior capsule gr II-III  PROM all planes with protocol precautions   L SL scapular mobilizations into upward scapular rotation, depression, protraction and retraction    neuromuscular re-education activities to improve: Balance, Coordination, Kinesthetic, Sense, Proprioception, and Posture for 15 minutes. The following activities were included:  +IR/ER Isometric Walkouts w/ YTB 2x10/ea  Seated scap retractions/depressions 3x10,3"   Sidelying Scap Retractions 2x10, 3"  Seated thoracic ext with towel roll 20x3"    PATIENT EDUCATION AND HOME EXERCISES     Home Exercises Provided and Patient Education Provided     Education provided:   - HEP   - post op precautions     Written Home Exercises Provided: Patient instructed to cont prior HEP. Exercises were reviewed and Tony was able to demonstrate them prior to the end of the session.  Tony demonstrated fair  understanding of the education provided. See EMR under Patient Instructions for exercises provided during therapy sessions    ASSESSMENT     Tony tolerated the above tx session well with minimal pain with " progressions this date, and pt performed well with minimal cueing required. Continues to improve GHJ PROM in all planes, however pt continues to experience pain across proximal biceps. Continues to have good response to exercise progression per protocol. Tony Is progressing well towards his goals. Will continue incorporating active movements conservatively at this time.     Pt prognosis is Guarded.     Pt will continue to benefit from skilled outpatient physical therapy to address the deficits listed in the problem list box on initial evaluation, provide pt/family education and to maximize pt's level of independence in the home and community environment.     Pt's spiritual, cultural and educational needs considered and pt agreeable to plan of care and goals.     Anticipated Barriers for therapy: compliance with post-op precautions     GOALS: Short Term Goals: 8 weeks  updated 5/1/23  MET  1.Report decreased in pain at worse less than  <   / =  6  /10  to increase tolerance for functional mobility. MET  2. Pt to improve PROM of flexion, scaption and abduction to 90deg and IR/ER to 30deg and R elbow to 0-130deg to allow for improved functional mobility to allow for improvement in IADLs. Met 4/12/23  3. Increased BUE MMT 1/2 grade to increase tolerance for ADL and work activities.    4. Pt to report be conscious of impaired sitting and standing posture daily to decrease pain. MET  5. Pt to tolerate HEP to improve ROM and independence with ADL's.  MET     Long Term Goals: 16 weeks   in progress  1.Report decreased in pain at worse less than  <   / =  4  /10  to increase tolerance for functional mobility. On going  2.  Patient will report clinically significant improvements on FOTO score.On going  3.Increased BUE MMT 1 grade to increase tolerance for ADL and work activities.On going  4. Pt to report and demonstrate proper posture in standing and sitting to decrease pain. On going  5. Pt to be Independent with HEP to  improve ROM and independence with ADL's.On going  6. Pt to improve AROM of flexion, scaption and abduction to 150deg and IR/ER to 60deg and R elbow to 0-150deg to allow for improved functional mobility to allow for improvement in IADLs. On going    PLAN     Recommended Treatment Plan: 2-3 times per week for 12 weeks with treatments to consist of:  Neuromuscular and postural re-education,  training, therapeutic exercise, therapeutic activities,balance training, gait training, manual therapy, soft tissue mobilization, ROM exercises, Cardiovascular,  Postural stabilization, manual traction, spinal mobilization, moist heat, cryotherapy, electrical stimulation,dry needling,  home exercise education and planning.     Rebeca Richardson, PTA  05/05/2023

## 2023-05-08 ENCOUNTER — CLINICAL SUPPORT (OUTPATIENT)
Dept: REHABILITATION | Facility: HOSPITAL | Age: 43
End: 2023-05-08
Attending: ORTHOPAEDIC SURGERY
Payer: OTHER MISCELLANEOUS

## 2023-05-08 DIAGNOSIS — S46.011A TRAUMATIC ROTATOR CUFF TEAR, RIGHT, INITIAL ENCOUNTER: ICD-10-CM

## 2023-05-08 DIAGNOSIS — R29.898 IMPAIRED STRENGTH OF SHOULDER MUSCLES: ICD-10-CM

## 2023-05-08 DIAGNOSIS — M25.611 DECREASED RANGE OF MOTION OF RIGHT SHOULDER: ICD-10-CM

## 2023-05-08 DIAGNOSIS — Z98.890 S/P RIGHT ROTATOR CUFF REPAIR: Primary | ICD-10-CM

## 2023-05-08 DIAGNOSIS — M67.813 BICEPS TENDONOSIS OF RIGHT SHOULDER: ICD-10-CM

## 2023-05-08 PROCEDURE — 97140 MANUAL THERAPY 1/> REGIONS: CPT | Mod: PN

## 2023-05-08 PROCEDURE — 97112 NEUROMUSCULAR REEDUCATION: CPT | Mod: PN

## 2023-05-08 PROCEDURE — 97110 THERAPEUTIC EXERCISES: CPT | Mod: PN

## 2023-05-08 NOTE — PROGRESS NOTES
[facility]   Physical Therapy progress Note     Name: Tony Gordillo  [facility] Number: [id number]    Therapy Diagnosis:   Encounter Diagnoses   Name Primary?    Traumatic rotator cuff tear, right, initial encounter     S/P right rotator cuff repair Yes    Biceps tendonosis of right shoulder     Decreased range of motion of right shoulder     Impaired strength of shoulder muscles      Physician: Crissy Bradford MD    Visit Date: 5/8/2023    Physician Orders: PT Eval and Treat   Medical Diagnosis from Referral: S46.011A (ICD-10-CM) - Traumatic rotator cuff tear, right, initial encounter   Evaluation Date: 3/15/2023  Authorization Period Expiration: 3/12/2024  Plan of Care Expiration: 8/1/23  Visit # / Visits authorized: 8/12   Progress Note Due: 5/12/2023  FOTO: 1/1  HEP: Access Code: [email or web address]     Precautions: Diabetes;      POSTOPERATIVE PLAN: Plan to follow subscapularis-supraspinatus repair protocol (<3 cm in total size). Passive motion may begin at 4 weeks. Active motion at 6 weeks. No biceps resistive activity x 8 weeks. No cuff resistive activity x 12 weeks.      DATE OF PROCEDURE: 3/14/2023, 4 weeks & 3 days as of 05/08/2023       PREOPERATIVE DIAGNOSES:   Right     1. Rotator cuff tear   2. Biceps tendinopathy    POSTOPERATIVE DIAGNOSES:   Right   1. Rotator cuff tear, full thickness involving the subscapularis and supraspinatus,   2. Biceps tendinopathy     OPERATION:   Right Shoulder  1. Arthroscopic  subscapularis and supraspinatus rotator cuff repair   2. Arthroscopic biceps tenodesis  3. 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     He was compliant with home exercise program.  Response to previous treatment: good  Functional change: improved ADL's    Pain: 2/10  Location: right shoulder      OBJECTIVE     TREATMENT     Tony received the treatments listed below:      Tony received therapeutic exercises to develop strength, endurance, ROM, flexibility, posture, and core stabilization for 35 minutes including:    Pulley x 5 min scaption  UBE 5 min  [place] with t-ball 30/30-all directions  Wall Slides in Scaption 2x10  Supine wand press up 3lbs 3 x 10  Supine AA Protractions w/ wand 3x10  Supine AA ER @ 45 deg w/ wand 3x10, 5"  Sidelying Shoulder Flexion 2x10  Sidelying Shoulder Abduction 2x10    Tony received the following manual therapy techniques: Soft tissue Mobilization were applied to the: R shoulder complex for 10 minutes, including:  Manual mobilizations to posterior and inferior capsule gr II-III  PROM all planes with protocol precautions   L SL scapular mobilizations into upward scapular rotation, depression, protraction and retraction    neuromuscular re-education activities to improve: Balance, Coordination, Kinesthetic, Sense, Proprioception, and Posture for 15 minutes. The following activities were included:  +IR/ER Isometric Walkouts w/ YTB 2x10/ea  Seated thoracic ext with towel roll 20x3"  Scapular wall slides with pillow case 2 x 10    PATIENT EDUCATION AND HOME EXERCISES     Home Exercises Provided and Patient Education Provided     Education provided:   - HEP   - post op precautions     Written Home Exercises Provided: Patient instructed to cont prior HEP. Exercises were reviewed and Tony was able to demonstrate them prior to the end of the session.  Tony demonstrated fair  understanding of the education provided. See EMR under Patient Instructions for exercises provided during therapy sessions    ASSESSMENT     Pt presents ambulating with an improved gait, decreased " guarding. Pt requires decreased cueing with scapular repositioning with prescribed activities. Mild c/o increased discomfort with prescribed activities.  Improved GH PROM in all planes.  Good response to exercise progression per protocol.    Pt prognosis is Guarded.     Pt will continue to benefit from skilled outpatient physical therapy to address the deficits listed in the problem list box on initial evaluation, provide pt/family education and to maximize pt's level of independence in the home and community environment.     Pt's spiritual, cultural and educational needs considered and pt agreeable to plan of care and goals.     Anticipated Barriers for therapy: compliance with post-op precautions     GOALS: Short Term Goals: 8 weeks  updated 5/1/23  MET  1.Report decreased in pain at worse less than  <   / =  6  /10  to increase tolerance for functional mobility. MET  2. Pt to improve PROM of flexion, scaption and abduction to 90deg and IR/ER to 30deg and R elbow to 0-130deg to allow for improved functional mobility to allow for improvement in IADLs. Met 4/12/23  3. Increased BUE MMT 1/2 grade to increase tolerance for ADL and work activities.    4. Pt to report be conscious of impaired sitting and standing posture daily to decrease pain. MET  5. Pt to tolerate HEP to improve ROM and independence with ADL's.  MET     Long Term Goals: 16 weeks   in progress  1.Report decreased in pain at worse less than  <   / =  4  /10  to increase tolerance for functional mobility. On going  2.  Patient will report clinically significant improvements on FOTO score.On going  3.Increased BUE MMT 1 grade to increase tolerance for ADL and work activities.On going  4. Pt to report and demonstrate proper posture in standing and sitting to decrease pain. On going  5. Pt to be Independent with HEP to improve ROM and independence with ADL's.On going  6. Pt to improve AROM of flexion, scaption and abduction to 150deg and IR/ER to 60deg  and R elbow to 0-150deg to allow for improved functional mobility to allow for improvement in IADLs. On going    PLAN     Progress sub-maximal shoulder stabilization activities next visit per RTC repair protocol.     Kyler Miner, PT  05/08/2023

## 2023-05-10 ENCOUNTER — CLINICAL SUPPORT (OUTPATIENT)
Dept: REHABILITATION | Facility: HOSPITAL | Age: 43
End: 2023-05-10
Attending: ORTHOPAEDIC SURGERY
Payer: OTHER MISCELLANEOUS

## 2023-05-10 DIAGNOSIS — R29.898 IMPAIRED STRENGTH OF SHOULDER MUSCLES: ICD-10-CM

## 2023-05-10 DIAGNOSIS — M25.611 DECREASED RANGE OF MOTION OF RIGHT SHOULDER: ICD-10-CM

## 2023-05-10 DIAGNOSIS — M67.813 BICEPS TENDONOSIS OF RIGHT SHOULDER: ICD-10-CM

## 2023-05-10 DIAGNOSIS — Z98.890 S/P RIGHT ROTATOR CUFF REPAIR: Primary | ICD-10-CM

## 2023-05-10 PROCEDURE — 97140 MANUAL THERAPY 1/> REGIONS: CPT | Mod: PN

## 2023-05-10 PROCEDURE — 97112 NEUROMUSCULAR REEDUCATION: CPT | Mod: PN

## 2023-05-10 PROCEDURE — 97110 THERAPEUTIC EXERCISES: CPT | Mod: PN

## 2023-05-10 NOTE — PROGRESS NOTES
[facility]   Physical Therapy progress Note     Name: Tony Gordillo  Clinic Number: [id number]    Therapy Diagnosis:   Encounter Diagnoses   Name Primary?    S/P right rotator cuff repair Yes    Biceps tendonosis of right shoulder     Decreased range of motion of right shoulder     Impaired strength of shoulder muscles      Physician: Crissy Bradford MD    Visit Date: 5/10/2023    Physician Orders: PT Eval and Treat   Medical Diagnosis from Referral: S46.011A (ICD-10-CM) - Traumatic rotator cuff tear, right, initial encounter   Evaluation Date: 3/15/2023  Authorization Period Expiration: 3/12/2024  Plan of Care Expiration: 8/1/23  Visit # / Visits authorized: 9/12   Progress Note Due: 5/12/2023  FOTO: 1/1  HEP: Access Code: [email or web address]     Precautions: Diabetes;      POSTOPERATIVE PLAN: Plan to follow subscapularis-supraspinatus repair protocol (<3 cm in total size). Passive motion may begin at 4 weeks. Active motion at 6 weeks. No biceps resistive activity x 8 weeks. No cuff resistive activity x 12 weeks.      DATE OF PROCEDURE: 3/14/2023, 4 weeks & 3 days as of 05/10/2023       PREOPERATIVE DIAGNOSES:   Right     1. Rotator cuff tear   2. Biceps tendinopathy    POSTOPERATIVE DIAGNOSES:   Right   1. Rotator cuff tear, full thickness involving the subscapularis and supraspinatus,   2. Biceps tendinopathy     OPERATION:   Right Shoulder  1. Arthroscopic  subscapularis and supraspinatus rotator cuff repair   2. Arthroscopic biceps tenodesis  3. 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  Still  "observing the lifting restrictions, will be able to return to work in July.  Wants to make sure he has good strength when he returns    He was compliant with home exercise program.  Response to previous treatment: good  Functional change: improved ADL's    Pain: 2/10  Location: right shoulder      OBJECTIVE   PROM Scaption: 170 deg Pain endfeel  PROM ER: 88 deg tight endfeel, pain to 5/10  PROM IR: 61 deg, tight endfeel      TREATMENT     Tony received the treatments listed below:      Tony received therapeutic exercises to develop strength, endurance, ROM, flexibility, posture, and core stabilization for 35 minutes including:    Pulley x 5 min scaption  UBE 6 min/ 3 fwd 3 rev  Subjective taken  Codmans with t-ball 30/30-all directions- NP  Wall Slides in Scaption 2x10  Supine wand press up 3lbs 3 x 10- NP  Supine AA Protractions w/ wand 3x10- NP  Supine AA ER @ 45 deg w/ wand 3x10, 5"- NP  Sidelying Shoulder Flexion 2x10  Sidelying Shoulder Abduction 2x10  Prone ext 3x10  Prone ABD emphasis on scap retract prior to initiation of motion 3x10  Prone rows #6, TC for scap retract 3x12  SA punch with MR 2x12 (fatigue and activation of UT noted)  AAROM seated PT assist with motion and manually track scapula 3x 15    Tony received the following manual therapy techniques: Soft tissue Mobilization were applied to the: R shoulder complex for 13 minutes, including:  Manual mobilizations to posterior and inferior capsule gr II-III  PROM all planes with protocol precautions   L SL scapular mobilizations into upward scapular rotation, depression, protraction and retraction  AAROM scaption with PT protract and rotate scapula  Scapular lift  Subscap release    neuromuscular re-education activities to improve: Balance, Coordination, Kinesthetic, Sense, Proprioception, and Posture for 15 minutes. The following activities were included:  +IR/ER Isometric Walkouts w/ YTB 2x10/ea  Seated thoracic ext with towel roll 20x3"  Scapular " wall slides with pillow case 2 x 10  Wall push ups emphasis on scapular retraction and controlled movment 2x10      PATIENT EDUCATION AND HOME EXERCISES     Home Exercises Provided and Patient Education Provided     Education provided:   - HEP   - post op precautions     Written Home Exercises Provided: Patient instructed to cont prior HEP. Exercises were reviewed and Tony was able to demonstrate them prior to the end of the session.  Tony demonstrated fair  understanding of the education provided. See EMR under Patient Instructions for exercises provided during therapy sessions    ASSESSMENT     Pt presents ambulating with an improved gait, decreased guarding. Pt requires decreased cueing with scapular repositioning with prescribed activities, resonded well to scapulo-humeral rhythm reset with exercise.  Excellent ROM, mild tightness in post capsule. Mild c/o increased discomfort with prescribed activities.   Good response to exercise progression per protocol.    Pt prognosis is Guarded.     Pt will continue to benefit from skilled outpatient physical therapy to address the deficits listed in the problem list box on initial evaluation, provide pt/family education and to maximize pt's level of independence in the home and community environment.     Pt's spiritual, cultural and educational needs considered and pt agreeable to plan of care and goals.     Anticipated Barriers for therapy: compliance with post-op precautions     GOALS: Short Term Goals: 8 weeks  updated 5/1/23  MET  1.Report decreased in pain at worse less than  <   / =  6  /10  to increase tolerance for functional mobility. MET  2. Pt to improve PROM of flexion, scaption and abduction to 90deg and IR/ER to 30deg and R elbow to 0-130deg to allow for improved functional mobility to allow for improvement in IADLs. Met 4/12/23  3. Increased BUE MMT 1/2 grade to increase tolerance for ADL and work activities.    4. Pt to report be conscious of impaired  sitting and standing posture daily to decrease pain. MET  5. Pt to tolerate HEP to improve ROM and independence with ADL's.  MET     Long Term Goals: 16 weeks   in progress  1.Report decreased in pain at worse less than  <   / =  4  /10  to increase tolerance for functional mobility. On going  2.  Patient will report clinically significant improvements on FOTO score.On going  3.Increased BUE MMT 1 grade to increase tolerance for ADL and work activities.On going  4. Pt to report and demonstrate proper posture in standing and sitting to decrease pain. On going  5. Pt to be Independent with HEP to improve ROM and independence with ADL's.On going  6. Pt to improve AROM of flexion, scaption and abduction to 150deg and IR/ER to 60deg and R elbow to 0-150deg to allow for improved functional mobility to allow for improvement in IADLs. On going    PLAN     Progress sub-maximal shoulder stabilization activities next visit per RTC repair protocol.     Reynaldo Mendoza, PT  05/10/2023

## 2023-05-11 ENCOUNTER — PATIENT MESSAGE (OUTPATIENT)
Dept: INTERNAL MEDICINE | Facility: CLINIC | Age: 43
End: 2023-05-11
Payer: COMMERCIAL

## 2023-05-11 RX ORDER — LAMOTRIGINE 100 MG/1
100 TABLET ORAL DAILY
Qty: 45 TABLET | Refills: 1 | Status: SHIPPED | OUTPATIENT
Start: 2023-05-11 | End: 2023-06-12

## 2023-05-12 ENCOUNTER — PATIENT MESSAGE (OUTPATIENT)
Dept: ADMINISTRATIVE | Facility: HOSPITAL | Age: 43
End: 2023-05-12
Payer: COMMERCIAL

## 2023-05-12 ENCOUNTER — CLINICAL SUPPORT (OUTPATIENT)
Dept: REHABILITATION | Facility: HOSPITAL | Age: 43
End: 2023-05-12
Attending: ORTHOPAEDIC SURGERY
Payer: OTHER MISCELLANEOUS

## 2023-05-12 DIAGNOSIS — M67.813 BICEPS TENDONOSIS OF RIGHT SHOULDER: ICD-10-CM

## 2023-05-12 DIAGNOSIS — R29.898 IMPAIRED STRENGTH OF SHOULDER MUSCLES: ICD-10-CM

## 2023-05-12 DIAGNOSIS — Z98.890 S/P RIGHT ROTATOR CUFF REPAIR: Primary | ICD-10-CM

## 2023-05-12 DIAGNOSIS — M25.611 DECREASED RANGE OF MOTION OF RIGHT SHOULDER: ICD-10-CM

## 2023-05-12 PROCEDURE — 97112 NEUROMUSCULAR REEDUCATION: CPT | Mod: PN,CQ

## 2023-05-12 PROCEDURE — 97140 MANUAL THERAPY 1/> REGIONS: CPT | Mod: PN

## 2023-05-12 PROCEDURE — 97110 THERAPEUTIC EXERCISES: CPT | Mod: PN,CQ

## 2023-05-12 NOTE — PROGRESS NOTES
[facility]   Physical Therapy progress Note     Name: Tony Gordillo  Clinic Number: [id number]    Therapy Diagnosis:   Encounter Diagnoses   Name Primary?    S/P right rotator cuff repair Yes    Biceps tendonosis of right shoulder     Decreased range of motion of right shoulder     Impaired strength of shoulder muscles      Physician: Crissy Bradford MD    Visit Date: 5/12/2023    Physician Orders: PT Eval and Treat   Medical Diagnosis from Referral: S46.011A (ICD-10-CM) - Traumatic rotator cuff tear, right, initial encounter   Evaluation Date: 3/15/2023  Authorization Period Expiration: 3/12/2024  Plan of Care Expiration: 8/1/23  Visit # / Visits authorized: 10/12   Progress Note Due: 5/12/2023  FOTO: 1/1  HEP: Access Code: [email or web address]     Precautions: Diabetes;      POSTOPERATIVE PLAN: Plan to follow subscapularis-supraspinatus repair protocol (<3 cm in total size). Passive motion may begin at 4 weeks. Active motion at 6 weeks. No biceps resistive activity x 8 weeks. No cuff resistive activity x 12 weeks.      DATE OF PROCEDURE: 3/14/2023, 4 weeks & 3 days as of 05/12/2023       PREOPERATIVE DIAGNOSES:   Right     1. Rotator cuff tear   2. Biceps tendinopathy    POSTOPERATIVE DIAGNOSES:   Right   1. Rotator cuff tear, full thickness involving the subscapularis and supraspinatus,   2. Biceps tendinopathy     OPERATION:   Right Shoulder  1. Arthroscopic  subscapularis and supraspinatus rotator cuff repair   2. Arthroscopic biceps tenodesis  3. 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 Was at 8/10 last night, is about 6/10 this morning.     He was compliant with home exercise program.  Response to previous treatment: good  Functional change: improved ADL's    Pain: 6/10  Location: right shoulder      OBJECTIVE   PROM Scaption: 170 deg Pain endfeel  PROM ER: 88 deg tight endfeel, pain to 5/10  PROM IR: 61 deg, tight endfeel      TREATMENT     Tony received the treatments listed below:      Bold = performed today     Tony received therapeutic exercises to develop strength, endurance, ROM, flexibility, posture, and core stabilization for 40 minutes including:    Pulley x 5 min scaption  Codmans with t-ball 30/30-all directions  Wall Slides in Scaption 2x10  Supine wand press up 3lbs 3 x 10  Supine AA Protractions w/ wand 3x10  Supine AA ER @ 45 deg w/ wand 3x10, 5"  Sidelying Shoulder Flexion 2x10      Tony received the following manual therapy techniques: Soft tissue Mobilization were applied to the: R shoulder complex for 10 minutes, including:  Manual PROM , GH oscillation, STM biceps    neuromuscular re-education activities to improve: Balance, Coordination, Kinesthetic, Sense, Proprioception, and Posture for 10 minutes. The following activities were included:  IR/ER Isometric Walkouts w/ YTB 2x10/ea  Seated thoracic ext with towel roll 20x3"          PATIENT EDUCATION AND HOME EXERCISES     Home Exercises Provided and Patient Education Provided     Education provided:   - HEP   - post op precautions     Written Home Exercises Provided: Patient instructed to cont prior HEP. Exercises were reviewed and oTny was able to demonstrate them prior to the end of the session.  Tony demonstrated fair  understanding of the education provided. See EMR under Patient Instructions for exercises provided during therapy sessions    ASSESSMENT     Pt reports increased pain along bicep. Completed range of motion activities with increased discomfort " throughout. Patient was unable to tolerate mobility exercises on wall today and has increased pain with IR/ER walkouts. Good response to exercise progression per RTC protocol.     Pt prognosis is Guarded.     Pt will continue to benefit from skilled outpatient physical therapy to address the deficits listed in the problem list box on initial evaluation, provide pt/family education and to maximize pt's level of independence in the home and community environment.     Pt's spiritual, cultural and educational needs considered and pt agreeable to plan of care and goals.     Anticipated Barriers for therapy: compliance with post-op precautions     GOALS: Short Term Goals: 8 weeks  updated 5/1/23  MET  1.Report decreased in pain at worse less than  <   / =  6  /10  to increase tolerance for functional mobility. MET  2. Pt to improve PROM of flexion, scaption and abduction to 90deg and IR/ER to 30deg and R elbow to 0-130deg to allow for improved functional mobility to allow for improvement in IADLs. Met 4/12/23  3. Increased BUE MMT 1/2 grade to increase tolerance for ADL and work activities.    4. Pt to report be conscious of impaired sitting and standing posture daily to decrease pain. MET  5. Pt to tolerate HEP to improve ROM and independence with ADL's.  MET     Long Term Goals: 16 weeks   in progress  1.Report decreased in pain at worse less than  <   / =  4  /10  to increase tolerance for functional mobility. On going  2.  Patient will report clinically significant improvements on FOTO score.On going  3.Increased BUE MMT 1 grade to increase tolerance for ADL and work activities.On going  4. Pt to report and demonstrate proper posture in standing and sitting to decrease pain. On going  5. Pt to be Independent with HEP to improve ROM and independence with ADL's.On going  6. Pt to improve AROM of flexion, scaption and abduction to 150deg and IR/ER to 60deg and R elbow to 0-150deg to allow for improved functional  mobility to allow for improvement in IADLs. On going    PLAN     Progress sub-maximal shoulder stabilization activities next visit per RTC repair protocol.     Bela Carpenter, PTA  05/12/2023

## 2023-05-15 ENCOUNTER — CLINICAL SUPPORT (OUTPATIENT)
Dept: REHABILITATION | Facility: HOSPITAL | Age: 43
End: 2023-05-15
Attending: ORTHOPAEDIC SURGERY
Payer: OTHER MISCELLANEOUS

## 2023-05-15 DIAGNOSIS — Z98.890 S/P RIGHT ROTATOR CUFF REPAIR: Primary | ICD-10-CM

## 2023-05-15 DIAGNOSIS — M67.813 BICEPS TENDONOSIS OF RIGHT SHOULDER: ICD-10-CM

## 2023-05-15 DIAGNOSIS — R29.898 IMPAIRED STRENGTH OF SHOULDER MUSCLES: ICD-10-CM

## 2023-05-15 DIAGNOSIS — M25.611 DECREASED RANGE OF MOTION OF RIGHT SHOULDER: ICD-10-CM

## 2023-05-15 PROCEDURE — 97110 THERAPEUTIC EXERCISES: CPT | Mod: PN,CQ

## 2023-05-15 PROCEDURE — 97140 MANUAL THERAPY 1/> REGIONS: CPT | Mod: PN,CQ

## 2023-05-15 NOTE — PROGRESS NOTES
[facility]   Physical Therapy progress Note     Name: Tony Gordillo  Clinic Number: [id number]    Therapy Diagnosis:   Encounter Diagnoses   Name Primary?    S/P right rotator cuff repair Yes    Biceps tendonosis of right shoulder     Decreased range of motion of right shoulder     Impaired strength of shoulder muscles      Physician: Crissy Bradford MD    Visit Date: 5/15/2023    Physician Orders: PT Eval and Treat   Medical Diagnosis from Referral: S46.011A (ICD-10-CM) - Traumatic rotator cuff tear, right, initial encounter   Evaluation Date: 3/15/2023  Authorization Period Expiration: 3/12/2024  Plan of Care Expiration: 8/1/23  Visit # / Visits authorized: 10/12   Progress Note Due: 5/12/2023  FOTO: 1/1  HEP: Access Code: [email or web address]     Precautions: Diabetes;      POSTOPERATIVE PLAN: Plan to follow subscapularis-supraspinatus repair protocol (<3 cm in total size). Passive motion may begin at 4 weeks. Active motion at 6 weeks. No biceps resistive activity x 8 weeks. No cuff resistive activity x 12 weeks.      DATE OF PROCEDURE: 3/14/2023, 4 weeks & 3 days as of 05/14/2023       PREOPERATIVE DIAGNOSES:   Right     1. Rotator cuff tear   2. Biceps tendinopathy    POSTOPERATIVE DIAGNOSES:   Right   1. Rotator cuff tear, full thickness involving the subscapularis and supraspinatus,   2. Biceps tendinopathy     OPERATION:   Right Shoulder  1. Arthroscopic  subscapularis and supraspinatus rotator cuff repair   2. Arthroscopic biceps tenodesis  3. 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 Was at 8/10 last night, is about 6/10 this morning.     He was compliant with home exercise program.  Response to previous treatment: good  Functional change: improved ADL's    Pain: 6/10  Location: right shoulder      OBJECTIVE   PROM Scaption: 170 deg Pain endfeel  PROM ER: 88 deg tight endfeel, pain to 5/10  PROM IR: 61 deg, tight endfeel      TREATMENT     Tony received the treatments listed below:      Bold = performed today     Tony received therapeutic exercises to develop strength, endurance, ROM, flexibility, posture, and core stabilization for 35 minutes including:    Pulley x 5 min scaption (Facing Door)  Wall Slides in Scaption 2x10  Supine wand press up 3lbs 3 x 10  Supine AA Protractions w/ wand 3x10  Supine AA ER @ 45 deg w/ wand 3x10, 5"  Sidelying Shoulder Flexion w/ [place] 2x10      Tony received the following manual therapy techniques: Soft tissue Mobilization were applied to the: R shoulder complex for 15 minutes, including:  Manual PROM , GH oscillation, STM biceps    neuromuscular re-education activities to improve: Balance, Coordination, Kinesthetic, Sense, Proprioception, and Posture for 10 minutes. The following activities were included:    Ball Circles on Wall 3x30"    PATIENT EDUCATION AND HOME EXERCISES     Home Exercises Provided and Patient Education Provided     Education provided:   - HEP   - post op precautions     Written Home Exercises Provided: Patient instructed to cont prior HEP. Exercises were reviewed and Tony was able to demonstrate them prior to the end of the session.  Tony demonstrated fair  understanding of the education provided. See EMR under Patient Instructions for exercises provided during therapy sessions    ASSESSMENT     Pt continues to report  increased pain along bicep, so cupping and STM was administered. Completed range of motion and stability activities. Tolerated mobility exercises on wall " today. Good response to exercise progression per RTC protocol.   Will add strengthening NV.  Pt prognosis is Guarded.     Pt will continue to benefit from skilled outpatient physical therapy to address the deficits listed in the problem list box on initial evaluation, provide pt/family education and to maximize pt's level of independence in the home and community environment.     Pt's spiritual, cultural and educational needs considered and pt agreeable to plan of care and goals.     Anticipated Barriers for therapy: compliance with post-op precautions     GOALS: Short Term Goals: 8 weeks  updated 5/1/23  MET  1.Report decreased in pain at worse less than  <   / =  6  /10  to increase tolerance for functional mobility. MET  2. Pt to improve PROM of flexion, scaption and abduction to 90deg and IR/ER to 30deg and R elbow to 0-130deg to allow for improved functional mobility to allow for improvement in IADLs. Met 4/12/23  3. Increased BUE MMT 1/2 grade to increase tolerance for ADL and work activities.    4. Pt to report be conscious of impaired sitting and standing posture daily to decrease pain. MET  5. Pt to tolerate HEP to improve ROM and independence with ADL's.  MET     Long Term Goals: 16 weeks   in progress  1.Report decreased in pain at worse less than  <   / =  4  /10  to increase tolerance for functional mobility. On going  2.  Patient will report clinically significant improvements on FOTO score.On going  3.Increased BUE MMT 1 grade to increase tolerance for ADL and work activities.On going  4. Pt to report and demonstrate proper posture in standing and sitting to decrease pain. On going  5. Pt to be Independent with HEP to improve ROM and independence with ADL's.On going  6. Pt to improve AROM of flexion, scaption and abduction to 150deg and IR/ER to 60deg and R elbow to 0-150deg to allow for improved functional mobility to allow for improvement in IADLs. On going    PLAN     Progress sub-maximal  shoulder stabilization activities next visit per RTC repair protocol.     Rebeca Richardson, PTA  05/14/2023

## 2023-05-16 ENCOUNTER — PATIENT MESSAGE (OUTPATIENT)
Dept: ADMINISTRATIVE | Facility: HOSPITAL | Age: 43
End: 2023-05-16
Payer: COMMERCIAL

## 2023-05-16 ENCOUNTER — PATIENT OUTREACH (OUTPATIENT)
Dept: ADMINISTRATIVE | Facility: HOSPITAL | Age: 43
End: 2023-05-16
Payer: COMMERCIAL

## 2023-05-17 ENCOUNTER — CLINICAL SUPPORT (OUTPATIENT)
Dept: REHABILITATION | Facility: HOSPITAL | Age: 43
End: 2023-05-17
Attending: ORTHOPAEDIC SURGERY
Payer: OTHER MISCELLANEOUS

## 2023-05-17 DIAGNOSIS — M25.611 DECREASED RANGE OF MOTION OF RIGHT SHOULDER: ICD-10-CM

## 2023-05-17 DIAGNOSIS — M67.813 BICEPS TENDONOSIS OF RIGHT SHOULDER: ICD-10-CM

## 2023-05-17 DIAGNOSIS — R29.898 IMPAIRED STRENGTH OF SHOULDER MUSCLES: ICD-10-CM

## 2023-05-17 DIAGNOSIS — Z98.890 S/P RIGHT ROTATOR CUFF REPAIR: Primary | ICD-10-CM

## 2023-05-17 PROCEDURE — 97140 MANUAL THERAPY 1/> REGIONS: CPT | Mod: PN,CQ

## 2023-05-17 PROCEDURE — 97110 THERAPEUTIC EXERCISES: CPT | Mod: PN,CQ

## 2023-05-17 PROCEDURE — 97112 NEUROMUSCULAR REEDUCATION: CPT | Mod: PN,CQ

## 2023-05-17 NOTE — PROGRESS NOTES
[facility]   Physical Therapy progress Note     Name: Tony Gordillo  [facility] Number: [id number]    Therapy Diagnosis:   Encounter Diagnoses   Name Primary?    S/P right rotator cuff repair Yes    Biceps tendonosis of right shoulder     Decreased range of motion of right shoulder     Impaired strength of shoulder muscles      Physician: Crissy Bradford MD    Visit Date: 5/17/2023    Physician Orders: PT Eval and Treat   Medical Diagnosis from Referral: S46.011A (ICD-10-CM) - Traumatic rotator cuff tear, right, initial encounter   Evaluation Date: 3/15/2023  Authorization Period Expiration: 3/12/2024  Plan of Care Expiration: 8/1/23  Visit # / Visits authorized: 10/12   Progress Note Due: 5/12/2023  FOTO: 1/1  HEP: Access Code: [email or web address]     Precautions: Diabetes;      POSTOPERATIVE PLAN: Plan to follow subscapularis-supraspinatus repair protocol (<3 cm in total size). Passive motion may begin at 4 weeks. Active motion at 6 weeks. No biceps resistive activity x 8 weeks. No cuff resistive activity x 12 weeks.      DATE OF PROCEDURE: 3/14/2023, 4 weeks & 3 days as of 05/17/2023       PREOPERATIVE DIAGNOSES:   Right     1. Rotator cuff tear   2. Biceps tendinopathy    POSTOPERATIVE DIAGNOSES:   Right   1. Rotator cuff tear, full thickness involving the subscapularis and supraspinatus,   2. Biceps tendinopathy     OPERATION:   Right Shoulder  1. Arthroscopic  subscapularis and supraspinatus rotator cuff repair   2. Arthroscopic biceps tenodesis  3. 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    He was compliant with home exercise program.  Response to previous treatment: good  Functional change: improved ADL's    Pain: 6/10  Location: right shoulder      OBJECTIVE     TREATMENT     Tony received the treatments listed below:      Bold = performed today     Tony received therapeutic exercises to develop strength, endurance, ROM, flexibility, posture, and core stabilization for 35 minutes including:    Pulley x 3 min scaption  +Abduction on Pulley 3 min  Sidelying Shoulder Flexion 3x10  Sidelying Shoulder Abduction 3x10  Pendulums 2x30 (F/B, R/L, Circles)  Supine wand press up 3lbs 3 x 10    Resume NV:  Wall Slides in Scaption 2x10  Supine AA Protractions w/ wand 3x10  Supine AA ER @ 45 deg w/ wand 3x10, 5"    Tony received the following manual therapy techniques: Soft tissue Mobilization were applied to the: R shoulder complex for 8 minutes, including:  Manual PROM , GH oscillation, STM biceps    neuromuscular re-education activities to improve: Balance, Coordination, Kinesthetic, Sense, Proprioception, and Posture for 15 minutes. The following activities were included:  IR/ER walkouts 3xFailure  Rhythmic Stabilization Flexion 3x30"  Rhythmic stabilization ER/IR @ 45 deg 3x30"    PATIENT EDUCATION AND HOME EXERCISES     Home Exercises Provided and Patient Education Provided     Education provided:   - HEP   - post op precautions     Written Home Exercises Provided: Patient instructed to cont prior HEP. Exercises were reviewed and Tony was able to demonstrate them prior to the end of the session.  Tony demonstrated fair  understanding of the education provided. See EMR under Patient Instructions for exercises provided during therapy sessions    ASSESSMENT     Tony tolerated the above tx session fair to well. Pt continues to report increased pain along bicep. Completed range of motion and stability activities this date with pain in bicep being limiting factor. Recommended pt to contact MD as soon as " possible if pain doesn't lessen to under 5/10. Fair response to exercise progression per RTC protocol.   Will add strengthening NV if possible.    Pt prognosis is Guarded.     Pt will continue to benefit from skilled outpatient physical therapy to address the deficits listed in the problem list box on initial evaluation, provide pt/family education and to maximize pt's level of independence in the home and community environment.     Pt's spiritual, cultural and educational needs considered and pt agreeable to plan of care and goals.     Anticipated Barriers for therapy: compliance with post-op precautions     GOALS: Short Term Goals: 8 weeks  updated 5/1/23  MET  1.Report decreased in pain at worse less than  <   / =  6  /10  to increase tolerance for functional mobility. MET  2. Pt to improve PROM of flexion, scaption and abduction to 90deg and IR/ER to 30deg and R elbow to 0-130deg to allow for improved functional mobility to allow for improvement in IADLs. Met 4/12/23  3. Increased BUE MMT 1/2 grade to increase tolerance for ADL and work activities.    4. Pt to report be conscious of impaired sitting and standing posture daily to decrease pain. MET  5. Pt to tolerate HEP to improve ROM and independence with ADL's.  MET     Long Term Goals: 16 weeks   in progress  1.Report decreased in pain at worse less than  <   / =  4  /10  to increase tolerance for functional mobility. On going  2.  Patient will report clinically significant improvements on FOTO score.On going  3.Increased BUE MMT 1 grade to increase tolerance for ADL and work activities.On going  4. Pt to report and demonstrate proper posture in standing and sitting to decrease pain. On going  5. Pt to be Independent with HEP to improve ROM and independence with ADL's.On going  6. Pt to improve AROM of flexion, scaption and abduction to 150deg and IR/ER to 60deg and R elbow to 0-150deg to allow for improved functional mobility to allow for improvement in  IADLs. On going    PLAN     Progress sub-maximal shoulder stabilization activities next visit per RTC repair protocol.     Rebeca Richardson, PTA  05/17/2023

## 2023-05-18 ENCOUNTER — PATIENT MESSAGE (OUTPATIENT)
Dept: ADMINISTRATIVE | Facility: HOSPITAL | Age: 43
End: 2023-05-18
Payer: COMMERCIAL

## 2023-05-18 RX ORDER — SEMAGLUTIDE 1.34 MG/ML
INJECTION, SOLUTION SUBCUTANEOUS
Qty: 1 EACH | Refills: 0 | OUTPATIENT
Start: 2023-05-18

## 2023-05-19 ENCOUNTER — CLINICAL SUPPORT (OUTPATIENT)
Dept: REHABILITATION | Facility: HOSPITAL | Age: 43
End: 2023-05-19
Attending: ORTHOPAEDIC SURGERY
Payer: OTHER MISCELLANEOUS

## 2023-05-19 DIAGNOSIS — M67.813 BICEPS TENDONOSIS OF RIGHT SHOULDER: ICD-10-CM

## 2023-05-19 DIAGNOSIS — Z98.890 S/P RIGHT ROTATOR CUFF REPAIR: Primary | ICD-10-CM

## 2023-05-19 DIAGNOSIS — M25.611 DECREASED RANGE OF MOTION OF RIGHT SHOULDER: ICD-10-CM

## 2023-05-19 DIAGNOSIS — R29.898 IMPAIRED STRENGTH OF SHOULDER MUSCLES: ICD-10-CM

## 2023-05-19 PROCEDURE — 97112 NEUROMUSCULAR REEDUCATION: CPT | Mod: PN,CQ

## 2023-05-19 PROCEDURE — 97110 THERAPEUTIC EXERCISES: CPT | Mod: PN,CQ

## 2023-05-19 PROCEDURE — 97140 MANUAL THERAPY 1/> REGIONS: CPT | Mod: PN,CQ

## 2023-05-19 NOTE — PROGRESS NOTES
[facility]   Physical Therapy progress Note     Name: Tony Gordillo  Clinic Number: [id number]    Therapy Diagnosis:   Encounter Diagnoses   Name Primary?    S/P right rotator cuff repair Yes    Biceps tendonosis of right shoulder     Decreased range of motion of right shoulder     Impaired strength of shoulder muscles      Physician: Crissy Bradford MD    Visit Date: 5/19/2023    Physician Orders: PT Eval and Treat   Medical Diagnosis from Referral: S46.011A (ICD-10-CM) - Traumatic rotator cuff tear, right, initial encounter   Evaluation Date: 3/15/2023  Authorization Period Expiration: 3/12/2024  Plan of Care Expiration: 8/1/23  Visit # / Visits authorized: 10/12   Progress Note Due: 5/12/2023  FOTO: 1/1  HEP: Access Code: [email or web address]     Precautions: Diabetes;      POSTOPERATIVE PLAN: Plan to follow subscapularis-supraspinatus repair protocol (<3 cm in total size). Passive motion may begin at 4 weeks. Active motion at 6 weeks. No biceps resistive activity x 8 weeks. No cuff resistive activity x 12 weeks.      DATE OF PROCEDURE: 3/14/2023, 4 weeks & 3 days as of 05/19/2023       PREOPERATIVE DIAGNOSES:   Right     1. Rotator cuff tear   2. Biceps tendinopathy    POSTOPERATIVE DIAGNOSES:   Right   1. Rotator cuff tear, full thickness involving the subscapularis and supraspinatus,   2. Biceps tendinopathy     OPERATION:   Right Shoulder  1. Arthroscopic  subscapularis and supraspinatus rotator cuff repair   2. Arthroscopic biceps tenodesis  3. 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    He was compliant with home exercise program.  Response to previous treatment: good  Functional change: improved ADL's    Pain: 6/10  Location: right shoulder      OBJECTIVE     TREATMENT     Tony received the treatments listed below:      Bold = performed today     Tony received therapeutic exercises to develop strength, endurance, ROM, flexibility, posture, and core stabilization for 22 minutes including:    UBE 3 fwd, 3 bwd  Sidelying Shoulder Flexion x10 (Biceps pain)  +Supine Shld Abd stretch w/ cane 2x10,3"  Thoracic Extension over half foam 30x    Resume NV:  Wall Slides in Scaption 2x10  Supine AA Protractions w/ wand 3x10  Supine AA ER @ 45 deg w/ wand 3x10, 5"    Tony received the following manual therapy techniques: Soft tissue Mobilization were applied to the: R shoulder complex for 8 minutes, including:  Manual PROM   GH oscillation  Posterior & Inferior Glides Grade II-IV    neuromuscular re-education activities to improve: Balance, Coordination, Kinesthetic, Sense, Proprioception, and Posture for 30 minutes. The following activities were included:  IR/ER walkouts 3xFailure  Rhythmic Stabilization Flexion 3x30"  Rhythmic stabilization ER/IR @ 45 deg 3x30"  Shld Taps on Wall 10x P!  Ball on Wall up/down, side to side, circles each way 10x (P!)    PATIENT EDUCATION AND HOME EXERCISES     Home Exercises Provided and Patient Education Provided     Education provided:   - HEP   - post op precautions     Written Home Exercises Provided: Patient instructed to cont prior HEP. Exercises were reviewed and Tony was able to demonstrate them prior to the end of the session.  Tony demonstrated fair  understanding of the education provided. See EMR under Patient Instructions for exercises provided during therapy sessions    ASSESSMENT     Tony tolerated the above tx session fair to well. Pt continues to report increased pain along bicep that is moderate to severe in nature. Continued with range of motion, " GHJ stability, and postural activities this date with pain in bicep being a limiting factor. Fair response to exercise progression per RTC protocol. Will gradually add strengthening components per pt tolerance in future sessions. .    Pt prognosis is Guarded.     Pt will continue to benefit from skilled outpatient physical therapy to address the deficits listed in the problem list box on initial evaluation, provide pt/family education and to maximize pt's level of independence in the home and community environment.     Pt's spiritual, cultural and educational needs considered and pt agreeable to plan of care and goals.     Anticipated Barriers for therapy: compliance with post-op precautions     GOALS: Short Term Goals: 8 weeks  updated 5/1/23  MET  1.Report decreased in pain at worse less than  <   / =  6  /10  to increase tolerance for functional mobility. MET  2. Pt to improve PROM of flexion, scaption and abduction to 90deg and IR/ER to 30deg and R elbow to 0-130deg to allow for improved functional mobility to allow for improvement in IADLs. Met 4/12/23  3. Increased BUE MMT 1/2 grade to increase tolerance for ADL and work activities.    4. Pt to report be conscious of impaired sitting and standing posture daily to decrease pain. MET  5. Pt to tolerate HEP to improve ROM and independence with ADL's.  MET     Long Term Goals: 16 weeks   in progress  1.Report decreased in pain at worse less than  <   / =  4  /10  to increase tolerance for functional mobility. On going  2.  Patient will report clinically significant improvements on FOTO score.On going  3.Increased BUE MMT 1 grade to increase tolerance for ADL and work activities.On going  4. Pt to report and demonstrate proper posture in standing and sitting to decrease pain. On going  5. Pt to be Independent with HEP to improve ROM and independence with ADL's.On going  6. Pt to improve AROM of flexion, scaption and abduction to 150deg and IR/ER to 60deg and R  elbow to 0-150deg to allow for improved functional mobility to allow for improvement in IADLs. On going    PLAN     Progress shoulder stabilization activities next visit per RTC repair protocol.     Rebeca Richardson, PTA  05/19/2023

## 2023-05-22 ENCOUNTER — CLINICAL SUPPORT (OUTPATIENT)
Dept: REHABILITATION | Facility: HOSPITAL | Age: 43
End: 2023-05-22
Attending: ORTHOPAEDIC SURGERY
Payer: OTHER MISCELLANEOUS

## 2023-05-22 DIAGNOSIS — M67.813 BICEPS TENDONOSIS OF RIGHT SHOULDER: ICD-10-CM

## 2023-05-22 DIAGNOSIS — R29.898 IMPAIRED STRENGTH OF SHOULDER MUSCLES: ICD-10-CM

## 2023-05-22 DIAGNOSIS — M25.611 DECREASED RANGE OF MOTION OF RIGHT SHOULDER: ICD-10-CM

## 2023-05-22 DIAGNOSIS — Z98.890 S/P RIGHT ROTATOR CUFF REPAIR: Primary | ICD-10-CM

## 2023-05-22 PROCEDURE — 97110 THERAPEUTIC EXERCISES: CPT | Mod: PN

## 2023-05-22 PROCEDURE — 97112 NEUROMUSCULAR REEDUCATION: CPT | Mod: PN

## 2023-05-22 PROCEDURE — 97140 MANUAL THERAPY 1/> REGIONS: CPT | Mod: PN

## 2023-05-22 NOTE — PROGRESS NOTES
[facility]   Physical Therapy progress Note     Name: Tony Gordillo  [facility] Number: [id number]    Therapy Diagnosis:   Encounter Diagnoses   Name Primary?    S/P right rotator cuff repair Yes    Biceps tendonosis of right shoulder     Decreased range of motion of right shoulder     Impaired strength of shoulder muscles      Physician: Crissy Bradford MD    Visit Date: 5/22/2023    Physician Orders: PT Eval and Treat   Medical Diagnosis from Referral: S46.011A (ICD-10-CM) - Traumatic rotator cuff tear, right, initial encounter   Evaluation Date: 3/15/2023  Authorization Period Expiration: 3/12/2024  Plan of Care Expiration: 8/1/23  Visit # / Visits authorized: 10/12   Progress Note Due: 6/22/23  FOTO: 1/1  HEP: Access Code: [email or web address]     Precautions: Diabetes;      POSTOPERATIVE PLAN: Plan to follow subscapularis-supraspinatus repair protocol (<3 cm in total size). Passive motion may begin at 4 weeks. Active motion at 6 weeks. No biceps resistive activity x 8 weeks. No cuff resistive activity x 12 weeks.      DATE OF PROCEDURE: 3/14/2023       PREOPERATIVE DIAGNOSES:   Right     1. Rotator cuff tear   2. Biceps tendinopathy    POSTOPERATIVE DIAGNOSES:   Right   1. Rotator cuff tear, full thickness involving the subscapularis and supraspinatus,   2. Biceps tendinopathy     OPERATION:   Right Shoulder  1. Arthroscopic  subscapularis and supraspinatus rotator cuff repair   2. Arthroscopic biceps tenodesis  3. 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 C/o decreased bicep pain of late     He was compliant with home  "exercise program.  Response to previous treatment: good  Functional change: improved ADL's    Pain: 6/10  Location: right shoulder      OBJECTIVE     Passive Range of Motion (degrees): measured on 5/1/23  Shoulder Right   Flexion 155   Abduction 100deg      AROM  (degrees): will assess in upcoming weeks  Shoulder Right Left   Flexion 150 160   Abduction 100 160   ER at 90 deg 88 90   IR  30 40        TREATMENT     Tony received the treatments listed below:      Bold = performed today     Tony received therapeutic exercises to develop strength, endurance, ROM, flexibility, posture, and core stabilization for 40 minutes including:    UBE 3 fwd, 3 bwd  Codmans on t-ball 30/30  Pulley x 6 min scaption/abduction  Standing full can 2 x 10  Supined wand flex/abd 4lbs  Serratus press 3lbs 3 x 10  Scapular wall slides with pillow case 2 x 10    Tony received the following manual therapy techniques: Soft tissue Mobilization were applied to the: R shoulder complex for 8 minutes, including:    Manual GH PROM   R/S at 90 deg    neuromuscular re-education activities to improve: Balance, Coordination, Kinesthetic, Sense, Proprioception, and Posture for 8 minutes. The following activities were included:    IR/ER walkouts GTB 2 x 15  Rhythmic stabilization supine at 90 deg deg 3x30"    PATIENT EDUCATION AND HOME EXERCISES     Home Exercises Provided and Patient Education Provided     Education provided:   - HEP   - post op precautions     Written Home Exercises Provided: Patient instructed to cont prior HEP. Exercises were reviewed and Tony was able to demonstrate them prior to the end of the session.  Tony demonstrated fair  understanding of the education provided. See EMR under Patient Instructions for exercises provided during therapy sessions    ASSESSMENT     Tony tolerated the above tx session fair to well. Pt continues to report increased pain along bicep that is moderate to severe in nature. Continued with range of motion, " GHJ stability, and postural activities this date with pain in bicep being a limiting factor. Fair response to exercise progression per RTC protocol. Will gradually add strengthening components per pt tolerance in future sessions. .    Pt prognosis is Guarded.     Pt will continue to benefit from skilled outpatient physical therapy to address the deficits listed in the problem list box on initial evaluation, provide pt/family education and to maximize pt's level of independence in the home and community environment.     Pt's spiritual, cultural and educational needs considered and pt agreeable to plan of care and goals.     Anticipated Barriers for therapy: compliance with post-op precautions     GOALS: Short Term Goals: 8 weeks  updated 5/22/23  MET  1.Report decreased in pain at worse less than  <   / =  6  /10  to increase tolerance for functional mobility. MET  2. Pt to improve PROM of flexion, scaption and abduction to 90deg and IR/ER to 30deg and R elbow to 0-130deg to allow for improved functional mobility to allow for improvement in IADLs. Met 4/12/23  3. Increased BUE MMT 1/2 grade to increase tolerance for ADL and work activities.  MET  4. Pt to report be conscious of impaired sitting and standing posture daily to decrease pain. MET  5. Pt to tolerate HEP to improve ROM and independence with ADL's.  MET     Long Term Goals: 16 weeks   in progress  1.Report decreased in pain at worse less than  <   / =  4  /10  to increase tolerance for functional mobility. On going  2.  Patient will report clinically significant improvements on FOTO score.On going  3.Increased BUE MMT 1 grade to increase tolerance for ADL and work activities.On going  4. Pt to report and demonstrate proper posture in standing and sitting to decrease pain. On going  5. Pt to be Independent with HEP to improve ROM and independence with ADL's.On going  6. Pt to improve AROM of flexion, scaption and abduction to 150deg and IR/ER to 60deg and R  elbow to 0-150deg to allow for improved functional mobility to allow for improvement in IADLs. On going    PLAN     Progress shoulder stabilization activities next visit per RTC repair protocol.     Kyler Miner, PT  05/22/2023

## 2023-05-24 ENCOUNTER — CLINICAL SUPPORT (OUTPATIENT)
Dept: REHABILITATION | Facility: HOSPITAL | Age: 43
End: 2023-05-24
Attending: ORTHOPAEDIC SURGERY
Payer: OTHER MISCELLANEOUS

## 2023-05-24 DIAGNOSIS — M67.813 BICEPS TENDONOSIS OF RIGHT SHOULDER: ICD-10-CM

## 2023-05-24 DIAGNOSIS — R29.898 IMPAIRED STRENGTH OF SHOULDER MUSCLES: ICD-10-CM

## 2023-05-24 DIAGNOSIS — M25.611 DECREASED RANGE OF MOTION OF RIGHT SHOULDER: ICD-10-CM

## 2023-05-24 DIAGNOSIS — Z98.890 S/P RIGHT ROTATOR CUFF REPAIR: Primary | ICD-10-CM

## 2023-05-24 PROCEDURE — 97140 MANUAL THERAPY 1/> REGIONS: CPT | Mod: PN,CQ

## 2023-05-24 PROCEDURE — 97112 NEUROMUSCULAR REEDUCATION: CPT | Mod: PN,CQ

## 2023-05-24 PROCEDURE — 97110 THERAPEUTIC EXERCISES: CPT | Mod: PN,CQ

## 2023-05-24 NOTE — PROGRESS NOTES
[facility]   Physical Therapy progress Note     Name: Tony Gordillo  Clinic Number: [id number]    Therapy Diagnosis:   Encounter Diagnoses   Name Primary?    S/P right rotator cuff repair Yes    Biceps tendonosis of right shoulder     Decreased range of motion of right shoulder     Impaired strength of shoulder muscles      Physician: Crissy Bradford MD    Visit Date: 5/24/2023    Physician Orders: PT Eval and Treat   Medical Diagnosis from Referral: S46.011A (ICD-10-CM) - Traumatic rotator cuff tear, right, initial encounter   Evaluation Date: 3/15/2023  Authorization Period Expiration: 3/12/2024  Plan of Care Expiration: 8/1/23  Visit # / Visits authorized: 10/12   Progress Note Due: 6/22/23  FOTO: 1/1  HEP: Access Code: [email or web address]     Precautions: Diabetes;      POSTOPERATIVE PLAN: Plan to follow subscapularis-supraspinatus repair protocol (<3 cm in total size). Passive motion may begin at 4 weeks. Active motion at 6 weeks. No biceps resistive activity x 8 weeks. No cuff resistive activity x 12 weeks.      DATE OF PROCEDURE: 3/14/2023       PREOPERATIVE DIAGNOSES:   Right     1. Rotator cuff tear   2. Biceps tendinopathy    POSTOPERATIVE DIAGNOSES:   Right   1. Rotator cuff tear, full thickness involving the subscapularis and supraspinatus,   2. Biceps tendinopathy     OPERATION:   Right Shoulder  1. Arthroscopic  subscapularis and supraspinatus rotator cuff repair   2. Arthroscopic biceps tenodesis  3. 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     He was compliant with home exercise program.  Response to previous treatment: good  Functional change: improved ADL's    Pain: 3-4/10  Location: right shoulder      OBJECTIVE        TREATMENT     Tony received the treatments listed below:      Bold = performed today     Tony received therapeutic exercises to develop strength, endurance, ROM, flexibility, posture, and core stabilization for 25 minutes including:    +Seated AA shoulder flexion 7# 3 min  +Seated AA shoulder Abduction 7# 3 min  Supine wand flex/abd 4lbs 3x15, 3"  Serratus press 3lbs 3 x 15  Scapular wall slides with pillow case 2 x 10      Tony received the following manual therapy techniques: Soft tissue Mobilization were applied to the: R shoulder complex for 8 minutes, including:    Manual GH PROM   R/S at 90 deg    neuromuscular re-education activities to improve: Balance, Coordination, Kinesthetic, Sense, Proprioception, and Posture for 20 minutes. The following activities were included:    Ball Circles on Wall 3x10"/ea  IR/ER walkouts GTB 2 x 15  Rhythmic stabilization supine at 90 deg flexion 5x30"  Rhythmic stabilization supine at 45 deg Abd (ER/IR) 5x30"     PATIENT EDUCATION AND HOME EXERCISES     Home Exercises Provided and Patient Education Provided     Education provided:   - HEP   - post op precautions     Written Home Exercises Provided: Patient instructed to cont prior HEP. Exercises were reviewed and Tony was able to demonstrate them prior to the end of the session.  Tony demonstrated fair  understanding of the education provided. See EMR under Patient Instructions for exercises provided during therapy sessions    ASSESSMENT     Tony tolerated the above tx session fair to well. Pt continues to report increased pain along bicep, so therEx modified slightly to decrease biceps irritability. Continued with range of motion, GHJ stability, and postural activities this date with pain in bicep being a limiting factor. Fair response to " exercise progression per RTC protocol. Will gradually add strengthening components per pt tolerance in future sessions. .    Pt prognosis is Guarded.     Pt will continue to benefit from skilled outpatient physical therapy to address the deficits listed in the problem list box on initial evaluation, provide pt/family education and to maximize pt's level of independence in the home and community environment.     Pt's spiritual, cultural and educational needs considered and pt agreeable to plan of care and goals.     Anticipated Barriers for therapy: compliance with post-op precautions     GOALS: Short Term Goals: 8 weeks  updated 5/22/23  MET  1.Report decreased in pain at worse less than  <   / =  6  /10  to increase tolerance for functional mobility. MET  2. Pt to improve PROM of flexion, scaption and abduction to 90deg and IR/ER to 30deg and R elbow to 0-130deg to allow for improved functional mobility to allow for improvement in IADLs. Met 4/12/23  3. Increased BUE MMT 1/2 grade to increase tolerance for ADL and work activities.  MET  4. Pt to report be conscious of impaired sitting and standing posture daily to decrease pain. MET  5. Pt to tolerate HEP to improve ROM and independence with ADL's.  MET     Long Term Goals: 16 weeks   in progress  1.Report decreased in pain at worse less than  <   / =  4  /10  to increase tolerance for functional mobility. On going  2.  Patient will report clinically significant improvements on FOTO score.On going  3.Increased BUE MMT 1 grade to increase tolerance for ADL and work activities.On going  4. Pt to report and demonstrate proper posture in standing and sitting to decrease pain. On going  5. Pt to be Independent with HEP to improve ROM and independence with ADL's.On going  6. Pt to improve AROM of flexion, scaption and abduction to 150deg and IR/ER to 60deg and R elbow to 0-150deg to allow for improved functional mobility to allow for improvement in IADLs. On  going    PLAN     Progress shoulder stabilization activities next visit per RTC repair protocol.     Rebeca Richardson, PTA  05/24/2023

## 2023-05-25 NOTE — PROGRESS NOTES
[facility]   Physical Therapy progress Note     Name: Tony Gordillo  Clinic Number: [id number]    Therapy Diagnosis:   Encounter Diagnoses   Name Primary?    S/P right rotator cuff repair Yes    Biceps tendonosis of right shoulder     Decreased range of motion of right shoulder     Impaired strength of shoulder muscles        Physician: Crissy Bradford MD    Visit Date: 5/26/2023    Physician Orders: PT Eval and Treat   Medical Diagnosis from Referral: S46.011A (ICD-10-CM) - Traumatic rotator cuff tear, right, initial encounter   Evaluation Date: 3/15/2023  Authorization Period Expiration: 3/12/2024  Plan of Care Expiration: 8/1/23  Visit # / Visits authorized: 11/12   Progress Note Due: 6/22/23  FOTO: 1/1  HEP: Access Code: [email or web address]     Precautions: Diabetes;      POSTOPERATIVE PLAN: Plan to follow subscapularis-supraspinatus repair protocol (<3 cm in total size). Passive motion may begin at 4 weeks. Active motion at 6 weeks. No biceps resistive activity x 8 weeks. No cuff resistive activity x 12 weeks.      DATE OF PROCEDURE: 3/14/2023       PREOPERATIVE DIAGNOSES:   Right     1. Rotator cuff tear   2. Biceps tendinopathy    POSTOPERATIVE DIAGNOSES:   Right   1. Rotator cuff tear, full thickness involving the subscapularis and supraspinatus,   2. Biceps tendinopathy     OPERATION:   Right Shoulder  1. Arthroscopic  subscapularis and supraspinatus rotator cuff repair   2. Arthroscopic biceps tenodesis  3. 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     He was compliant with  "home exercise program.  Response to previous treatment: good  Functional change: improved ADL's    Pain: 6/10  Location: right shoulder      OBJECTIVE     Passive Range of Motion (degrees): measured on 5/1/23  Shoulder Right   Flexion 155   Abduction 100deg      AROM  (degrees): will assess in upcoming weeks  Shoulder Right Left   Flexion 150 160   Abduction 100 160   ER at 90 deg 88 90   IR  30 40        TREATMENT     Tony received the treatments listed below:      Bold = performed today     Tony received therapeutic exercises to develop strength, endurance, ROM, flexibility, posture, and core stabilization for 40 minutes including:    UBE 3 fwd, 3 bwd  Codmans on t-ball 30/30  Pulley x 6 min scaption/abduction  Standing full can 2 x 10  Supine wand flex/abd 4lbs 3x15, 3"  Serratus press 3lbs 3 x 15  Scapular wall slides with pillow case 2 x 10  Sidelying Shoulder Flexion 3x10  Sidelying Shoulder Abduction 3x10  Star taps on wall yellow band at wrists 2x5       Tony received the following manual therapy techniques: Soft tissue Mobilization were applied to the: R shoulder complex for 10 minutes, including:    Manual GH PROM   STM bicep   R/S at 90 deg    neuromuscular re-education activities to improve: Balance, Coordination, Kinesthetic, Sense, Proprioception, and Posture for 10 minutes. The following activities were included:    IR YTB/ER walkouts GTB 2 x 15  Rhythmic stabilization supine at 90 deg deg 3x30"    Cold pack 8'    PATIENT EDUCATION AND HOME EXERCISES     Home Exercises Provided and Patient Education Provided     Education provided:   - HEP   - post op precautions     Written Home Exercises Provided: Patient instructed to cont prior HEP. Exercises were reviewed and Tony was able to demonstrate them prior to the end of the session.  Tony demonstrated fair  understanding of the education provided. See EMR under Patient Instructions for exercises provided during therapy sessions    ASSESSMENT     Tony " presents with continued discomfort within bicep region. Continued working to increase scapular stabilization with progression today. Patient demonstrates good range of motion WNL. Cues given to reduce UT compensation with activities on the wall. Plan to continue progressing strength for enhance shoulder stability as tolerated.    Pt prognosis is Guarded.     Pt will continue to benefit from skilled outpatient physical therapy to address the deficits listed in the problem list box on initial evaluation, provide pt/family education and to maximize pt's level of independence in the home and community environment.     Pt's spiritual, cultural and educational needs considered and pt agreeable to plan of care and goals.     Anticipated Barriers for therapy: compliance with post-op precautions     GOALS: Short Term Goals: 8 weeks  updated 5/22/23  MET  1.Report decreased in pain at worse less than  <   / =  6  /10  to increase tolerance for functional mobility. MET  2. Pt to improve PROM of flexion, scaption and abduction to 90deg and IR/ER to 30deg and R elbow to 0-130deg to allow for improved functional mobility to allow for improvement in IADLs. Met 4/12/23  3. Increased BUE MMT 1/2 grade to increase tolerance for ADL and work activities.  MET  4. Pt to report be conscious of impaired sitting and standing posture daily to decrease pain. MET  5. Pt to tolerate HEP to improve ROM and independence with ADL's.  MET     Long Term Goals: 16 weeks   in progress  1.Report decreased in pain at worse less than  <   / =  4  /10  to increase tolerance for functional mobility. On going  2.  Patient will report clinically significant improvements on FOTO score.On going  3.Increased BUE MMT 1 grade to increase tolerance for ADL and work activities.On going  4. Pt to report and demonstrate proper posture in standing and sitting to decrease pain. On going  5. Pt to be Independent with HEP to improve ROM and independence with ADL's.On  going  6. Pt to improve AROM of flexion, scaption and abduction to 150deg and IR/ER to 60deg and R elbow to 0-150deg to allow for improved functional mobility to allow for improvement in IADLs. On going    PLAN     Progress shoulder stabilization activities next visit per RTC repair protocol.     Bela Carpenter, PTA  05/26/2023

## 2023-05-26 ENCOUNTER — CLINICAL SUPPORT (OUTPATIENT)
Dept: REHABILITATION | Facility: HOSPITAL | Age: 43
End: 2023-05-26
Attending: ORTHOPAEDIC SURGERY
Payer: OTHER MISCELLANEOUS

## 2023-05-26 DIAGNOSIS — R29.898 IMPAIRED STRENGTH OF SHOULDER MUSCLES: ICD-10-CM

## 2023-05-26 DIAGNOSIS — M67.813 BICEPS TENDONOSIS OF RIGHT SHOULDER: ICD-10-CM

## 2023-05-26 DIAGNOSIS — M25.611 DECREASED RANGE OF MOTION OF RIGHT SHOULDER: ICD-10-CM

## 2023-05-26 DIAGNOSIS — Z98.890 S/P RIGHT ROTATOR CUFF REPAIR: Primary | ICD-10-CM

## 2023-05-26 PROCEDURE — 97140 MANUAL THERAPY 1/> REGIONS: CPT | Mod: PN,CQ

## 2023-05-26 PROCEDURE — 97110 THERAPEUTIC EXERCISES: CPT | Mod: PN,CQ

## 2023-05-26 PROCEDURE — 97112 NEUROMUSCULAR REEDUCATION: CPT | Mod: PN,CQ

## 2023-05-29 ENCOUNTER — PATIENT MESSAGE (OUTPATIENT)
Dept: ADMINISTRATIVE | Facility: HOSPITAL | Age: 43
End: 2023-05-29
Payer: COMMERCIAL

## 2023-05-29 ENCOUNTER — CLINICAL SUPPORT (OUTPATIENT)
Dept: REHABILITATION | Facility: HOSPITAL | Age: 43
End: 2023-05-29
Payer: OTHER MISCELLANEOUS

## 2023-05-29 DIAGNOSIS — M25.611 DECREASED RANGE OF MOTION OF RIGHT SHOULDER: ICD-10-CM

## 2023-05-29 DIAGNOSIS — M67.813 BICEPS TENDONOSIS OF RIGHT SHOULDER: ICD-10-CM

## 2023-05-29 DIAGNOSIS — Z98.890 S/P RIGHT ROTATOR CUFF REPAIR: Primary | ICD-10-CM

## 2023-05-29 DIAGNOSIS — R29.898 IMPAIRED STRENGTH OF SHOULDER MUSCLES: ICD-10-CM

## 2023-05-29 PROCEDURE — 97110 THERAPEUTIC EXERCISES: CPT | Mod: PN,CQ

## 2023-05-29 PROCEDURE — 97140 MANUAL THERAPY 1/> REGIONS: CPT | Mod: PN,CQ

## 2023-05-29 NOTE — PROGRESS NOTES
[facility]   Physical Therapy progress Note     Name: Tony Gordillo  Clinic Number: [id number]    Therapy Diagnosis:   No diagnosis found.      Physician: Crissy Bradford MD    Visit Date: 5/29/2023    Physician Orders: PT Eval and Treat   Medical Diagnosis from Referral: S46.011A (ICD-10-CM) - Traumatic rotator cuff tear, right, initial encounter   Evaluation Date: 3/15/2023  Authorization Period Expiration: 3/12/2024  Plan of Care Expiration: 8/1/23  Visit # / Visits authorized: 11/12   Progress Note Due: 6/22/23  FOTO: 1/1  HEP: Access Code: [email or web address]     Precautions: Diabetes;      POSTOPERATIVE PLAN: Plan to follow subscapularis-supraspinatus repair protocol (<3 cm in total size). Passive motion may begin at 4 weeks. Active motion at 6 weeks. No biceps resistive activity x 8 weeks. No cuff resistive activity x 12 weeks.      DATE OF PROCEDURE: 3/14/2023       PREOPERATIVE DIAGNOSES:   Right     1. Rotator cuff tear   2. Biceps tendinopathy    POSTOPERATIVE DIAGNOSES:   Right   1. Rotator cuff tear, full thickness involving the subscapularis and supraspinatus,   2. Biceps tendinopathy     OPERATION:   Right Shoulder  1. Arthroscopic  subscapularis and supraspinatus rotator cuff repair   2. Arthroscopic biceps tenodesis  3. 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     He was compliant with home exercise program.  Response to previous treatment: good  Functional change: improved ADL's    Pain: 6/10  Location: right shoulder      OBJECTIVE     Passive Range of Motion  "(degrees): measured on 5/1/23  Shoulder Right   Flexion 155   Abduction 100deg      AROM  (degrees): will assess in upcoming weeks  Shoulder Right Left   Flexion 150 160   Abduction 100 160   ER at 90 deg 88 90   IR  30 40        TREATMENT     Tony received the treatments listed below:      Bold = performed today     Tony received therapeutic exercises to develop strength, endurance, ROM, flexibility, posture, and core stabilization for 40 minutes including:    Codmans on t-ball 30/30  Pulley x 6 min scaption/abduction  Standing full can 2 x 10  Supine wand flex/abd 4lbs 3x15, 3"  Serratus press 3lbs 3 x 15  Scapular wall slides with pillow case 2 x 10  Sidelying Shoulder Flexion 3x10  Sidelying Shoulder Abduction 3x10  Star taps on wall yellow band at wrists 2x5   +Bicep curl 1# 2 x 10 in supine     Tony received the following manual therapy techniques: Soft tissue Mobilization were applied to the: R shoulder complex for 10 minutes, including:    Manual GH PROM   STM bicep   R/S at 90 deg    neuromuscular re-education activities to improve: Balance, Coordination, Kinesthetic, Sense, Proprioception, and Posture for 10 minutes. The following activities were included:    IR YTB/ER walkouts GTB 2 x 15  Rhythmic stabilization supine at 90 deg deg 3x30"    Cold pack 8'    PATIENT EDUCATION AND HOME EXERCISES     Home Exercises Provided and Patient Education Provided     Education provided:   - HEP   - post op precautions     Written Home Exercises Provided: Patient instructed to cont prior HEP. Exercises were reviewed and Tony was able to demonstrate them prior to the end of the session.  Tony demonstrated fair  understanding of the education provided. See EMR under Patient Instructions for exercises provided during therapy sessions    ASSESSMENT     Tony presents with continued discomfort within bicep region. Continued working to increase scapular stabilization with progression today. Patient demonstrates good range of " motion WNL. Cues given to reduce UT compensation with activities on the wall. Plan to continue progressing strength for enhance shoulder stability as tolerated.    Pt prognosis is Guarded.     Pt will continue to benefit from skilled outpatient physical therapy to address the deficits listed in the problem list box on initial evaluation, provide pt/family education and to maximize pt's level of independence in the home and community environment.     Pt's spiritual, cultural and educational needs considered and pt agreeable to plan of care and goals.     Anticipated Barriers for therapy: compliance with post-op precautions     GOALS: Short Term Goals: 8 weeks  updated 5/22/23  MET  1.Report decreased in pain at worse less than  <   / =  6  /10  to increase tolerance for functional mobility. MET  2. Pt to improve PROM of flexion, scaption and abduction to 90deg and IR/ER to 30deg and R elbow to 0-130deg to allow for improved functional mobility to allow for improvement in IADLs. Met 4/12/23  3. Increased BUE MMT 1/2 grade to increase tolerance for ADL and work activities.  MET  4. Pt to report be conscious of impaired sitting and standing posture daily to decrease pain. MET  5. Pt to tolerate HEP to improve ROM and independence with ADL's.  MET     Long Term Goals: 16 weeks   in progress  1.Report decreased in pain at worse less than  <   / =  4  /10  to increase tolerance for functional mobility. On going  2.  Patient will report clinically significant improvements on FOTO score.On going  3.Increased BUE MMT 1 grade to increase tolerance for ADL and work activities.On going  4. Pt to report and demonstrate proper posture in standing and sitting to decrease pain. On going  5. Pt to be Independent with HEP to improve ROM and independence with ADL's.On going  6. Pt to improve AROM of flexion, scaption and abduction to 150deg and IR/ER to 60deg and R elbow to 0-150deg to allow for improved functional mobility to allow  for improvement in IADLs. On going    PLAN     Progress shoulder stabilization activities next visit per RTC repair protocol.     Ronak Koenig, PTA  05/29/2023

## 2023-05-29 NOTE — PROGRESS NOTES
[facility]   Physical Therapy progress Note     Name: Tony Gordillo  Clinic Number: [id number]    Therapy Diagnosis:   Encounter Diagnoses   Name Primary?    S/P right rotator cuff repair Yes    Biceps tendonosis of right shoulder     Decreased range of motion of right shoulder     Impaired strength of shoulder muscles        Physician: Crissy Bradford MD    Visit Date: 5/29/2023    Physician Orders: PT Eval and Treat   Medical Diagnosis from Referral: S46.011A (ICD-10-CM) - Traumatic rotator cuff tear, right, initial encounter   Evaluation Date: 3/15/2023  Authorization Period Expiration: 3/12/2024  Plan of Care Expiration: 8/1/23  Visit # / Visits authorized: 11/12   Progress Note Due: 6/22/23  FOTO: 1/1  HEP: Access Code: [email or web address]     Precautions: Diabetes;      POSTOPERATIVE PLAN: Plan to follow subscapularis-supraspinatus repair protocol (<3 cm in total size). Passive motion may begin at 4 weeks. Active motion at 6 weeks. No biceps resistive activity x 8 weeks. No cuff resistive activity x 12 weeks.      DATE OF PROCEDURE: 3/14/2023       PREOPERATIVE DIAGNOSES:   Right     1. Rotator cuff tear   2. Biceps tendinopathy    POSTOPERATIVE DIAGNOSES:   Right   1. Rotator cuff tear, full thickness involving the subscapularis and supraspinatus,   2. Biceps tendinopathy     OPERATION:   Right Shoulder  1. Arthroscopic  subscapularis and supraspinatus rotator cuff repair   2. Arthroscopic biceps tenodesis  3. 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     He was compliant with home exercise program.  Response to previous treatment: good  Functional change: improved ADL's    Pain: 6/10  Location: right shoulder      OBJECTIVE     Passive Range of Motion (degrees): measured on 5/1/23  Shoulder Right   Flexion 155   Abduction 100deg      AROM  (degrees): will assess in upcoming weeks  Shoulder Right Left   Flexion 150 160   Abduction 100 160   ER at 90 deg 88 90   IR  30 40        TREATMENT     Tony received the treatments listed below:      Bold = performed today     Tony received therapeutic exercises to develop strength, endurance, ROM, flexibility, posture, and core stabilization for 45 minutes including:    UBE 3 fwd, 3 bwd  Codmans on t-ball 30/30  Pulley x 6 min scaption/abduction  Standing full can 2 x 10  Supine wand flex/abd 4lbs 3x15, 3"  Serratus press 3lbs 3 x 15  Supine bicep curl 1# 2x10  Scapular wall slides with pillow case 2 x 10  Sidelying Shoulder Flexion 3x10  Sidelying Shoulder Abduction 3x10  Star taps on wall yellow band at wrists 2x5   +Rows Blue band 2x10  +shoulder extension blue band 2x10  +Standing scaption w/ isometric hold x10 ea       Tony received the following manual therapy techniques: Soft tissue Mobilization were applied to the: R shoulder complex for 15 minutes, including:    Manual GH PROM   IASTM bicep   R/S at 90 deg    neuromuscular re-education activities to improve: Balance, Coordination, Kinesthetic, Sense, Proprioception, and Posture for 00 minutes. The following activities were included:    IR YTB/ER walkouts GTB 2 x 15  Rhythmic stabilization supine at 90 deg deg 3x30"    Cold pack 8'    PATIENT EDUCATION AND HOME EXERCISES     Home Exercises Provided and Patient Education Provided     Education provided:   - HEP   - post op precautions     Written Home Exercises Provided: Patient instructed to cont prior HEP. Exercises were reviewed and Tony was able to demonstrate them prior to the end of the " session.  Tony demonstrated fair  understanding of the education provided. See EMR under Patient Instructions for exercises provided during therapy sessions    ASSESSMENT     Tony presents with increased soreness today in bicep region. Completed increased manual techniques including IASTM to reduce muscle tension and enhance mobility. Patient continued to be limited with progressions secondary to increased pain. Progressed patient scapular strengthening in order to enhance stability of shoulder with OH movements. Plan to continue with manual techniques as patient reports some relief. Progress strengthening per protocol.     Pt prognosis is Guarded.     Pt will continue to benefit from skilled outpatient physical therapy to address the deficits listed in the problem list box on initial evaluation, provide pt/family education and to maximize pt's level of independence in the home and community environment.     Pt's spiritual, cultural and educational needs considered and pt agreeable to plan of care and goals.     Anticipated Barriers for therapy: compliance with post-op precautions     GOALS: Short Term Goals: 8 weeks  updated 5/22/23  MET  1.Report decreased in pain at worse less than  <   / =  6  /10  to increase tolerance for functional mobility. MET  2. Pt to improve PROM of flexion, scaption and abduction to 90deg and IR/ER to 30deg and R elbow to 0-130deg to allow for improved functional mobility to allow for improvement in IADLs. Met 4/12/23  3. Increased BUE MMT 1/2 grade to increase tolerance for ADL and work activities.  MET  4. Pt to report be conscious of impaired sitting and standing posture daily to decrease pain. MET  5. Pt to tolerate HEP to improve ROM and independence with ADL's.  MET     Long Term Goals: 16 weeks   in progress  1.Report decreased in pain at worse less than  <   / =  4  /10  to increase tolerance for functional mobility. On going  2.  Patient will report clinically significant  improvements on FOTO score.On going  3.Increased BUE MMT 1 grade to increase tolerance for ADL and work activities.On going  4. Pt to report and demonstrate proper posture in standing and sitting to decrease pain. On going  5. Pt to be Independent with HEP to improve ROM and independence with ADL's.On going  6. Pt to improve AROM of flexion, scaption and abduction to 150deg and IR/ER to 60deg and R elbow to 0-150deg to allow for improved functional mobility to allow for improvement in IADLs. On going    PLAN     Progress shoulder stabilization activities next visit per RTC repair protocol.     Bela Carpenter, PTA  05/29/2023

## 2023-05-31 ENCOUNTER — CLINICAL SUPPORT (OUTPATIENT)
Dept: REHABILITATION | Facility: HOSPITAL | Age: 43
End: 2023-05-31
Attending: ORTHOPAEDIC SURGERY
Payer: OTHER MISCELLANEOUS

## 2023-05-31 DIAGNOSIS — M67.813 BICEPS TENDONOSIS OF RIGHT SHOULDER: ICD-10-CM

## 2023-05-31 DIAGNOSIS — Z98.890 S/P RIGHT ROTATOR CUFF REPAIR: Primary | ICD-10-CM

## 2023-05-31 DIAGNOSIS — M25.611 DECREASED RANGE OF MOTION OF RIGHT SHOULDER: ICD-10-CM

## 2023-05-31 DIAGNOSIS — R29.898 IMPAIRED STRENGTH OF SHOULDER MUSCLES: ICD-10-CM

## 2023-05-31 PROCEDURE — 97140 MANUAL THERAPY 1/> REGIONS: CPT | Mod: PN,CQ

## 2023-05-31 PROCEDURE — 97112 NEUROMUSCULAR REEDUCATION: CPT | Mod: PN,CQ

## 2023-05-31 PROCEDURE — 97110 THERAPEUTIC EXERCISES: CPT | Mod: PN,CQ

## 2023-05-31 NOTE — PROGRESS NOTES
[facility]   Physical Therapy progress Note     Name: Tony Gordillo  Clinic Number: [id number]    Therapy Diagnosis:   Encounter Diagnoses   Name Primary?    S/P right rotator cuff repair Yes    Biceps tendonosis of right shoulder     Decreased range of motion of right shoulder     Impaired strength of shoulder muscles        Physician: Crissy Bradford MD    Visit Date: 5/31/2023    Physician Orders: PT Eval and Treat   Medical Diagnosis from Referral: S46.011A (ICD-10-CM) - Traumatic rotator cuff tear, right, initial encounter   Evaluation Date: 3/15/2023  Authorization Period Expiration: 3/12/2024  Plan of Care Expiration: 8/1/23  Visit # / Visits authorized: 11/12   Progress Note Due: 6/22/23  FOTO: 1/1  HEP: Access Code: [email or web address]     Precautions: Diabetes;      POSTOPERATIVE PLAN: Plan to follow subscapularis-supraspinatus repair protocol (<3 cm in total size). Passive motion may begin at 4 weeks. Active motion at 6 weeks. No biceps resistive activity x 8 weeks. No cuff resistive activity x 12 weeks.      DATE OF PROCEDURE: 3/14/2023       PREOPERATIVE DIAGNOSES:   Right     1. Rotator cuff tear   2. Biceps tendinopathy    POSTOPERATIVE DIAGNOSES:   Right   1. Rotator cuff tear, full thickness involving the subscapularis and supraspinatus,   2. Biceps tendinopathy     OPERATION:   Right Shoulder  1. Arthroscopic  subscapularis and supraspinatus rotator cuff repair   2. Arthroscopic biceps tenodesis  3. 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 Ever since we scraped on it the bicep has been feeling better.  "    He was compliant with home exercise program.  Response to previous treatment: good  Functional change: improved ADL's    Pain: 0/10  Location: right shoulder      OBJECTIVE         TREATMENT     Tony received the treatments listed below:      Bold = performed today     Tony received therapeutic exercises to develop strength, endurance, ROM, flexibility, posture, and core stabilization for 30 minutes including:    UBE 3 fwd, 3 bwd    Serratus press 3lbs 3 x 15  Supine bicep curl 3# 3x10  Scapular wall slides with YTB pillow case 3 x 10  Sidelying Shoulder Flexion 3x10  Sidelying Shoulder Abduction 3x10  Wall clocks yellow band at wrists 5x/ea hand   +Rows Blue band 3x10  +Shoulder extension blue band 3x10  +Standing scaption 1# 2x10    Resume NV:  Codmans on t-ball 30/30  Pulley x 6 min scaption/abduction  Standing full can 2 x 10  Supine wand flex/abd 4lbs 3x15, 3"    Tony received the following manual therapy techniques: Soft tissue Mobilization were applied to the: R shoulder complex for 15 minutes, including:    Manual GH PROM   IASTM bicep   R/S at 90 deg    neuromuscular re-education activities to improve: Balance, Coordination, Kinesthetic, Sense, Proprioception, and Posture for 15 minutes. The following activities were included:    IR walkouts GTB 2 x 15  ER walkouts YTB 2 x 15  Rhythmic stabilization supine at 90 deg 3x30"    Cold pack 10'    PATIENT EDUCATION AND HOME EXERCISES     Home Exercises Provided and Patient Education Provided     Education provided:   - HEP   - post op precautions     Written Home Exercises Provided: Patient instructed to cont prior HEP. Exercises were reviewed and Tony was able to demonstrate them prior to the end of the session.  Tony demonstrated fair  understanding of the education provided. See EMR under Patient Instructions for exercises provided during therapy sessions    ASSESSMENT     Tony is 9 weeks and 1 day post op. Pt presents with decreased soreness in bicep " region for the first time in a few weeks. Included additional stability therEx this date 2/2 presentation, and he performed well with minimal pain exacerbation. Plan to continue improving ROM, strength, and stability in future sessions per PT protocol.    Pt prognosis is Guarded.     Pt will continue to benefit from skilled outpatient physical therapy to address the deficits listed in the problem list box on initial evaluation, provide pt/family education and to maximize pt's level of independence in the home and community environment.     Pt's spiritual, cultural and educational needs considered and pt agreeable to plan of care and goals.     Anticipated Barriers for therapy: compliance with post-op precautions     GOALS: Short Term Goals: 8 weeks  updated 5/22/23  MET  1.Report decreased in pain at worse less than  <   / =  6  /10  to increase tolerance for functional mobility. MET  2. Pt to improve PROM of flexion, scaption and abduction to 90deg and IR/ER to 30deg and R elbow to 0-130deg to allow for improved functional mobility to allow for improvement in IADLs. Met 4/12/23  3. Increased BUE MMT 1/2 grade to increase tolerance for ADL and work activities.  MET  4. Pt to report be conscious of impaired sitting and standing posture daily to decrease pain. MET  5. Pt to tolerate HEP to improve ROM and independence with ADL's.  MET     Long Term Goals: 16 weeks   in progress  1.Report decreased in pain at worse less than  <   / =  4  /10  to increase tolerance for functional mobility. On going  2.  Patient will report clinically significant improvements on FOTO score.On going  3.Increased BUE MMT 1 grade to increase tolerance for ADL and work activities.On going  4. Pt to report and demonstrate proper posture in standing and sitting to decrease pain. On going  5. Pt to be Independent with HEP to improve ROM and independence with ADL's.On going  6. Pt to improve AROM of flexion, scaption and abduction to 150deg and  IR/ER to 60deg and R elbow to 0-150deg to allow for improved functional mobility to allow for improvement in IADLs. On going    PLAN     Progress shoulder stabilization activities next visit per RTC repair protocol.     Rebeca Richardson, MAIDA  05/31/2023

## 2023-06-02 ENCOUNTER — CLINICAL SUPPORT (OUTPATIENT)
Dept: REHABILITATION | Facility: HOSPITAL | Age: 43
End: 2023-06-02
Attending: ORTHOPAEDIC SURGERY
Payer: OTHER MISCELLANEOUS

## 2023-06-02 DIAGNOSIS — R29.898 IMPAIRED STRENGTH OF SHOULDER MUSCLES: ICD-10-CM

## 2023-06-02 DIAGNOSIS — M25.611 DECREASED RANGE OF MOTION OF RIGHT SHOULDER: ICD-10-CM

## 2023-06-02 DIAGNOSIS — M67.813 BICEPS TENDONOSIS OF RIGHT SHOULDER: ICD-10-CM

## 2023-06-02 DIAGNOSIS — S46.011A TRAUMATIC ROTATOR CUFF TEAR, RIGHT, INITIAL ENCOUNTER: Primary | ICD-10-CM

## 2023-06-02 DIAGNOSIS — Z98.890 S/P RIGHT ROTATOR CUFF REPAIR: Primary | ICD-10-CM

## 2023-06-02 PROCEDURE — 97110 THERAPEUTIC EXERCISES: CPT | Mod: PN

## 2023-06-02 PROCEDURE — 97112 NEUROMUSCULAR REEDUCATION: CPT | Mod: PN

## 2023-06-02 NOTE — PROGRESS NOTES
[facility]   Physical Therapy progress Note     Name: Tony Gordillo  Clinic Number: [id number]    Therapy Diagnosis:   Encounter Diagnoses   Name Primary?    S/P right rotator cuff repair Yes    Biceps tendonosis of right shoulder     Decreased range of motion of right shoulder     Impaired strength of shoulder muscles        Physician: Crissy Bradford MD    Visit Date: 6/2/2023    Physician Orders: PT Eval and Treat   Medical Diagnosis from Referral: S46.011A (ICD-10-CM) - Traumatic rotator cuff tear, right, initial encounter   Evaluation Date: 3/15/2023  Authorization Period Expiration: 3/12/2024  Plan of Care Expiration: 8/1/23  Visit # / Visits authorized: 11/12   Progress Note Due: 6/22/23  FOTO: 1/1  HEP: Access Code: [email or web address]     Precautions: Diabetes;      POSTOPERATIVE PLAN: Plan to follow subscapularis-supraspinatus repair protocol (<3 cm in total size). Passive motion may begin at 4 weeks. Active motion at 6 weeks. No biceps resistive activity x 8 weeks. No cuff resistive activity x 12 weeks.      DATE OF PROCEDURE: 3/14/2023       PREOPERATIVE DIAGNOSES:   Right     1. Rotator cuff tear   2. Biceps tendinopathy    POSTOPERATIVE DIAGNOSES:   Right   1. Rotator cuff tear, full thickness involving the subscapularis and supraspinatus,   2. Biceps tendinopathy     OPERATION:   Right Shoulder  1. Arthroscopic  subscapularis and supraspinatus rotator cuff repair   2. Arthroscopic biceps tenodesis  3. 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     He was compliant with home exercise  "program.  Response to previous treatment: good  Functional change: improved ADL's    Pain: 3/10  Location: right shoulder      OBJECTIVE       TREATMENT     Tony received the treatments listed below:      Bold = performed today     Tony received therapeutic exercises to develop strength, endurance, ROM, flexibility, posture, and core stabilization for 45 minutes including:    UBE 3 fwd, 3 bwd  Serratus press 3lbs 3 x 15  Bicep curl 3 x 10 RTB  Scapular wall slides with pillow case 2 x 10  Sidelying ER 1lb  Prone shoulder ext 3 x 10  Rows BTB 3x10  Bilateral Shoulder extension RTB 3x10  Standing full can scaption 0# 2x10  Wax on/off red weighted ball on wall in scaption CKC x 30  Chest pass vs rebounder yellow weighted ball 2 x 10 seated on blue t-ball      Tony received the following manual therapy techniques: Soft tissue Mobilization were applied to the: R shoulder complex for 5 minutes, including:  Manual GH PROM       neuromuscular re-education activities to improve: Balance, Coordination, Kinesthetic, Sense, Proprioception, and Posture for 10 minutes. The following activities were included:    IR walkouts GTB 2 x 15  ER walkouts YTB 2 x 15  Rhythmic stabilization supine at 90 deg 3x30"    Cold pack 10'    PATIENT EDUCATION AND HOME EXERCISES     Home Exercises Provided and Patient Education Provided     Education provided:   - HEP   - post op precautions     Written Home Exercises Provided: Patient instructed to cont prior HEP. Exercises were reviewed and Tony was able to demonstrate them prior to the end of the session.  Tony demonstrated fair  understanding of the education provided. See EMR under Patient Instructions for exercises provided during therapy sessions    ASSESSMENT      Pt requires continued min cueing with scapular repositioning with prescribed activities. Mild c/o increased discomfort with prescribed activities.  Good GH PROM in all planes.  Good response to exercise progression per protocol " including initiation of RTC strengthening.     Pt prognosis is Guarded.     Pt will continue to benefit from skilled outpatient physical therapy to address the deficits listed in the problem list box on initial evaluation, provide pt/family education and to maximize pt's level of independence in the home and community environment.     Pt's spiritual, cultural and educational needs considered and pt agreeable to plan of care and goals.     Anticipated Barriers for therapy: compliance with post-op precautions     GOALS: Short Term Goals: 8 weeks  updated 5/22/23  MET  1.Report decreased in pain at worse less than  <   / =  6  /10  to increase tolerance for functional mobility. MET  2. Pt to improve PROM of flexion, scaption and abduction to 90deg and IR/ER to 30deg and R elbow to 0-130deg to allow for improved functional mobility to allow for improvement in IADLs. Met 4/12/23  3. Increased BUE MMT 1/2 grade to increase tolerance for ADL and work activities.  MET  4. Pt to report be conscious of impaired sitting and standing posture daily to decrease pain. MET  5. Pt to tolerate HEP to improve ROM and independence with ADL's.  MET     Long Term Goals: 16 weeks   in progress  1.Report decreased in pain at worse less than  <   / =  4  /10  to increase tolerance for functional mobility. On going  2.  Patient will report clinically significant improvements on FOTO score.On going  3.Increased BUE MMT 1 grade to increase tolerance for ADL and work activities.On going  4. Pt to report and demonstrate proper posture in standing and sitting to decrease pain. On going  5. Pt to be Independent with HEP to improve ROM and independence with ADL's.On going  6. Pt to improve AROM of flexion, scaption and abduction to 150deg and IR/ER to 60deg and R elbow to 0-150deg to allow for improved functional mobility to allow for improvement in IADLs. On going    PLAN     Progress shoulder stabilization activities next visit per RTC repair  protocol.     Kyler Miner, PT  06/02/2023

## 2023-06-03 ENCOUNTER — PATIENT MESSAGE (OUTPATIENT)
Dept: INTERNAL MEDICINE | Facility: CLINIC | Age: 43
End: 2023-06-03
Payer: COMMERCIAL

## 2023-06-05 ENCOUNTER — CLINICAL SUPPORT (OUTPATIENT)
Dept: REHABILITATION | Facility: HOSPITAL | Age: 43
End: 2023-06-05
Payer: OTHER MISCELLANEOUS

## 2023-06-05 DIAGNOSIS — M25.611 DECREASED RANGE OF MOTION OF RIGHT SHOULDER: ICD-10-CM

## 2023-06-05 DIAGNOSIS — Z98.890 S/P RIGHT ROTATOR CUFF REPAIR: Primary | ICD-10-CM

## 2023-06-05 DIAGNOSIS — R29.898 IMPAIRED STRENGTH OF SHOULDER MUSCLES: ICD-10-CM

## 2023-06-05 DIAGNOSIS — M67.813 BICEPS TENDONOSIS OF RIGHT SHOULDER: ICD-10-CM

## 2023-06-05 PROCEDURE — 97112 NEUROMUSCULAR REEDUCATION: CPT | Mod: PN,CQ

## 2023-06-05 PROCEDURE — 97110 THERAPEUTIC EXERCISES: CPT | Mod: PN,CQ

## 2023-06-07 ENCOUNTER — PATIENT MESSAGE (OUTPATIENT)
Dept: REHABILITATION | Facility: HOSPITAL | Age: 43
End: 2023-06-07
Payer: COMMERCIAL

## 2023-06-07 NOTE — PROGRESS NOTES
A1c 8.8 from 9.9  Has upcoming f/u with DM NP  Results to pt via [place]. 
Pt aware Adderall written RX is ready for pickup.
negative

## 2023-06-12 RX ORDER — LAMOTRIGINE 100 MG/1
150 TABLET ORAL DAILY
Qty: 135 TABLET | Refills: 3 | Status: SHIPPED | OUTPATIENT
Start: 2023-06-12 | End: 2024-06-11

## 2023-06-13 ENCOUNTER — PATIENT MESSAGE (OUTPATIENT)
Dept: ADMINISTRATIVE | Facility: HOSPITAL | Age: 43
End: 2023-06-13
Payer: COMMERCIAL

## 2023-06-14 ENCOUNTER — OFFICE VISIT (OUTPATIENT)
Dept: ORTHOPEDICS | Facility: CLINIC | Age: 43
End: 2023-06-14
Payer: COMMERCIAL

## 2023-06-14 DIAGNOSIS — S46.011A TRAUMATIC ROTATOR CUFF TEAR, RIGHT, INITIAL ENCOUNTER: Primary | ICD-10-CM

## 2023-06-14 PROCEDURE — 99999 PR PBB SHADOW E&M-EST. PATIENT-LVL IV: CPT | Mod: PBBFAC,,, | Performed by: ORTHOPAEDIC SURGERY

## 2023-06-14 PROCEDURE — 99024 POSTOP FOLLOW-UP VISIT: CPT | Mod: S$GLB,,, | Performed by: ORTHOPAEDIC SURGERY

## 2023-06-14 PROCEDURE — 99999 PR PBB SHADOW E&M-EST. PATIENT-LVL IV: ICD-10-PCS | Mod: PBBFAC,,, | Performed by: ORTHOPAEDIC SURGERY

## 2023-06-14 PROCEDURE — 4010F ACE/ARB THERAPY RXD/TAKEN: CPT | Mod: CPTII,S$GLB,, | Performed by: ORTHOPAEDIC SURGERY

## 2023-06-14 PROCEDURE — 99024 PR POST-OP FOLLOW-UP VISIT: ICD-10-PCS | Mod: S$GLB,,, | Performed by: ORTHOPAEDIC SURGERY

## 2023-06-14 PROCEDURE — 3052F HG A1C>EQUAL 8.0%<EQUAL 9.0%: CPT | Mod: CPTII,S$GLB,, | Performed by: ORTHOPAEDIC SURGERY

## 2023-06-14 PROCEDURE — 1160F PR REVIEW ALL MEDS BY PRESCRIBER/CLIN PHARMACIST DOCUMENTED: ICD-10-PCS | Mod: CPTII,S$GLB,, | Performed by: ORTHOPAEDIC SURGERY

## 2023-06-14 PROCEDURE — 1159F MED LIST DOCD IN RCRD: CPT | Mod: CPTII,S$GLB,, | Performed by: ORTHOPAEDIC SURGERY

## 2023-06-14 PROCEDURE — 4010F PR ACE/ARB THEARPY RXD/TAKEN: ICD-10-PCS | Mod: CPTII,S$GLB,, | Performed by: ORTHOPAEDIC SURGERY

## 2023-06-14 PROCEDURE — 3052F PR MOST RECENT HEMOGLOBIN A1C LEVEL 8.0 - < 9.0%: ICD-10-PCS | Mod: CPTII,S$GLB,, | Performed by: ORTHOPAEDIC SURGERY

## 2023-06-14 PROCEDURE — 1160F RVW MEDS BY RX/DR IN RCRD: CPT | Mod: CPTII,S$GLB,, | Performed by: ORTHOPAEDIC SURGERY

## 2023-06-14 PROCEDURE — 1159F PR MEDICATION LIST DOCUMENTED IN MEDICAL RECORD: ICD-10-PCS | Mod: CPTII,S$GLB,, | Performed by: ORTHOPAEDIC SURGERY

## 2023-06-14 NOTE — LETTER
June 14, 2023    Tony Gordillo  [address] [place] [address]         [facility]  [address]  [place] [address]  Phone: [phone] June 14, 2023     Patient: Tony Gordillo   YOB: 1980   Date of Visit: 6/14/2023     To Whom It May Concern:    Tony Gordillo  is currently under my care for an orthopedic injury/condition requiring activity restriction.     Start date of restrictions: 2/28/23    Patient has been under my care since: 3/8/23    I anticipate that he He will be out of work until 9/14/23.  Date of release to work may change depending on his recovery progress.    Date of next evaluation:  6 weeks from 6/14/23    If you have any questions or concerns, please don't hesitate to call.    Sincerely,        Crissy Bradford MD

## 2023-06-14 NOTE — PROGRESS NOTES
Postoperative Visit    History of Present Illness:   Tony is 12 weeks s/p right shoulder arthroscopy , rotator cuff repair, and arthroscopic biceps tenodesis  (DOS-3/14/23)    Full thickness SS and SSC, ATS biceps tenodesis  He is doing well   Still feels a little weak especially with rotation   Still doing PT  Soreness with PT but otherwise doesn't have pain     Physical Examination:    NAD  right upper extremity: AROM: , ER 40  Good cuff strength  2+ RP, BCR in all digits.     Assessment/Plan:  42 y.o. male 12 weeks s/p right shoulder arthroscopy , rotator cuff repair, and arthroscopic biceps tenodesis  (DOS-3/14/23)    Plan  Continue PT  Follow up in 6 weeks      All questions were answered in detail. The patient is in full agreement with the treatment plan and will proceed accordingly.

## 2023-06-16 ENCOUNTER — PATIENT MESSAGE (OUTPATIENT)
Dept: ORTHOPEDICS | Facility: CLINIC | Age: 43
End: 2023-06-16
Payer: COMMERCIAL

## 2023-06-16 ENCOUNTER — CLINICAL SUPPORT (OUTPATIENT)
Dept: REHABILITATION | Facility: HOSPITAL | Age: 43
End: 2023-06-16
Payer: OTHER MISCELLANEOUS

## 2023-06-16 DIAGNOSIS — M67.813 BICEPS TENDONOSIS OF RIGHT SHOULDER: ICD-10-CM

## 2023-06-16 DIAGNOSIS — Z98.890 S/P RIGHT ROTATOR CUFF REPAIR: Primary | ICD-10-CM

## 2023-06-16 DIAGNOSIS — M25.611 DECREASED RANGE OF MOTION OF RIGHT SHOULDER: ICD-10-CM

## 2023-06-16 DIAGNOSIS — S46.011A TRAUMATIC ROTATOR CUFF TEAR, RIGHT, INITIAL ENCOUNTER: ICD-10-CM

## 2023-06-16 DIAGNOSIS — R29.898 IMPAIRED STRENGTH OF SHOULDER MUSCLES: ICD-10-CM

## 2023-06-16 PROCEDURE — 97110 THERAPEUTIC EXERCISES: CPT | Mod: PN,CQ

## 2023-06-16 NOTE — PROGRESS NOTES
[facility]   Physical Therapy progress Note     Name: Tony Gordillo  [facility] Number: [id number]    Therapy Diagnosis:   Encounter Diagnoses   Name Primary?    Traumatic rotator cuff tear, right, initial encounter     S/P right rotator cuff repair Yes    Biceps tendonosis of right shoulder     Decreased range of motion of right shoulder     Impaired strength of shoulder muscles        Physician: Crissy Bradford MD    Visit Date: 6/16/2023    Physician Orders: PT Eval and Treat   Medical Diagnosis from Referral: S46.011A (ICD-10-CM) - Traumatic rotator cuff tear, right, initial encounter   Evaluation Date: 3/15/2023  Authorization Period Expiration: 3/12/2024  Plan of Care Expiration: 8/1/23  Visit # / Visits authorized: 11/12   Progress Note Due: 6/22/23  FOTO: 1/1  HEP: Access Code: [email or web address]     Precautions: Diabetes;      POSTOPERATIVE PLAN: Plan to follow subscapularis-supraspinatus repair protocol (<3 cm in total size). Passive motion may begin at 4 weeks. Active motion at 6 weeks. No biceps resistive activity x 8 weeks. No cuff resistive activity x 12 weeks.      DATE OF PROCEDURE: 3/14/2023       PREOPERATIVE DIAGNOSES:   Right     1. Rotator cuff tear   2. Biceps tendinopathy    POSTOPERATIVE DIAGNOSES:   Right   1. Rotator cuff tear, full thickness involving the subscapularis and supraspinatus,   2. Biceps tendinopathy     OPERATION:   Right Shoulder  1. Arthroscopic  subscapularis and supraspinatus rotator cuff repair   2. Arthroscopic biceps tenodesis  3. 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 He took it easy for a little while, and isn't overdoing it anymore    He was compliant with home exercise program.  Response to previous treatment: good  Functional change: improved ADL's    Pain: 3/10  Location: right shoulder      OBJECTIVE   13 weeks and 2 days 06/16/2023  AROM:  Flexion WNL (180 deg)  Abduction WNL (180 deg)  IR 66 deg  ER 88 deg   TREATMENT     Tony received the treatments listed below:      Bold = performed today     Tony received therapeutic exercises to develop strength, endurance, ROM, flexibility, posture, and core stabilization for 38 minutes including:    UBE 4 fwd, 4 bwd  Serratus press 5lbs 3 x 15  Supine Wand Flexion 5lbs 3x15  Sidelying ER 2 lb 3x15  Scapular wall slides with pillow case YTB 3 x 10  Freemotion rows 20# (x2) 3x10  Seated Freemotion Lat Pulldowns 20# (x2) 3x10  Standing Bicep Curls Freemotion 3# 3x10  Shoulder Flexion +Abd circuit w/ 1lb DB 8x (P!)    Resume NV:  Sidelying Shoulder Flexion 2# 2x10  Bicep curl 3 x 10 RTB  Prone shoulder ext 3 x 10 +6  Rows BTB 3x10  Standing full can scaption 0# 2x10  Wax on/off +yellow weighted ball on wall in scaption CKC  2 x 30  Chest pass vs rebounder yellow weighted ball 3 x 12 seated on blue t-ball      Tony received the following manual therapy techniques: Soft tissue Mobilization were applied to the: R shoulder complex for 08 minutes, including:  Manual GH PROM   Objective Measurements    neuromuscular re-education activities to improve: Balance, Coordination, Kinesthetic, Sense, Proprioception, and Posture for 00 minutes. The following activities were included:    IR walkouts RTB 3 x 15  ER walkouts YTB 3 x 15  Rhythmic stabilization supine at 90 deg 3x30"    Cold pack 10'    PATIENT EDUCATION AND HOME EXERCISES     Home Exercises Provided and Patient Education Provided     Education provided:   - HEP   - post op precautions     Written Home Exercises Provided: Patient instructed to cont prior HEP. " Exercises were reviewed and Tony was able to demonstrate them prior to the end of the session.  Tony demonstrated fair  understanding of the education provided. See EMR under Patient Instructions for exercises provided during therapy sessions    ASSESSMENT   Tony presented to PT with reports of decreased pain overall. Increased resistance to multiple therex for improved RTC strength and stability. Progressed periscapular and shoulder strengthening this date with mild increase in pain/fatigue with circuit at end of session. Provided verbal and tactile cues throughout session for proper form and technique. Tony continues to report occasional right shoulder fatigue, but was able to complete today's session without many reports of increased right shoulder pain or discomfort. Will continue to progress right RTC stability and strength per post op RTC protocol.     Pt prognosis is Guarded.     Pt will continue to benefit from skilled outpatient physical therapy to address the deficits listed in the problem list box on initial evaluation, provide pt/family education and to maximize pt's level of independence in the home and community environment.     Pt's spiritual, cultural and educational needs considered and pt agreeable to plan of care and goals.     Anticipated Barriers for therapy: compliance with post-op precautions     GOALS: Short Term Goals: 8 weeks  updated 5/22/23  MET  1.Report decreased in pain at worse less than  <   / =  6  /10  to increase tolerance for functional mobility. MET  2. Pt to improve PROM of flexion, scaption and abduction to 90deg and IR/ER to 30deg and R elbow to 0-130deg to allow for improved functional mobility to allow for improvement in IADLs. Met 4/12/23  3. Increased BUE MMT 1/2 grade to increase tolerance for ADL and work activities.  MET  4. Pt to report be conscious of impaired sitting and standing posture daily to decrease pain. MET  5. Pt to tolerate HEP to improve ROM and  independence with ADL's.  MET     Long Term Goals: 16 weeks   in progress  1.Report decreased in pain at worse less than  <   / =  4  /10  to increase tolerance for functional mobility. On going  2.  Patient will report clinically significant improvements on FOTO score.On going  3.Increased BUE MMT 1 grade to increase tolerance for ADL and work activities.On going  4. Pt to report and demonstrate proper posture in standing and sitting to decrease pain. On going  5. Pt to be Independent with HEP to improve ROM and independence with ADL's.On going  6. Pt to improve AROM of flexion, scaption and abduction to 150deg and IR/ER to 60deg and R elbow to 0-150deg to allow for improved functional mobility to allow for improvement in IADLs. On going    PLAN     Progress shoulder stabilization activities next visit per RTC repair protocol.     Rebeca Richardson, PTA  06/16/2023

## 2023-06-21 ENCOUNTER — CLINICAL SUPPORT (OUTPATIENT)
Dept: REHABILITATION | Facility: HOSPITAL | Age: 43
End: 2023-06-21
Attending: ORTHOPAEDIC SURGERY
Payer: OTHER MISCELLANEOUS

## 2023-06-21 DIAGNOSIS — R29.898 IMPAIRED STRENGTH OF SHOULDER MUSCLES: ICD-10-CM

## 2023-06-21 DIAGNOSIS — M25.611 DECREASED RANGE OF MOTION OF RIGHT SHOULDER: ICD-10-CM

## 2023-06-21 DIAGNOSIS — Z98.890 S/P RIGHT ROTATOR CUFF REPAIR: Primary | ICD-10-CM

## 2023-06-21 DIAGNOSIS — M67.813 BICEPS TENDONOSIS OF RIGHT SHOULDER: ICD-10-CM

## 2023-06-21 PROCEDURE — 97110 THERAPEUTIC EXERCISES: CPT | Mod: PN

## 2023-06-21 PROCEDURE — 97140 MANUAL THERAPY 1/> REGIONS: CPT | Mod: PN

## 2023-06-21 PROCEDURE — 97112 NEUROMUSCULAR REEDUCATION: CPT | Mod: PN

## 2023-06-21 NOTE — PROGRESS NOTES
[facility]   Physical Therapy progress Note     Name: Tony Gordillo  [facility] Number: [id number]    Therapy Diagnosis:   Encounter Diagnoses   Name Primary?    S/P right rotator cuff repair Yes    Biceps tendonosis of right shoulder     Decreased range of motion of right shoulder     Impaired strength of shoulder muscles          Physician: Crissy Bradford MD    Visit Date: 6/21/2023    Physician Orders: PT Eval and Treat   Medical Diagnosis from Referral: S46.011A (ICD-10-CM) - Traumatic rotator cuff tear, right, initial encounter   Evaluation Date: 3/15/2023  Authorization Period Expiration:12/7/2023  Plan of Care Expiration: 8/1/23  Visit # / Visits authorized: 2/12   Progress Note Due: 6/22/23  FOTO: 1/1  HEP: Access Code: [email or web address]     Precautions: Diabetes;      POSTOPERATIVE PLAN: Plan to follow subscapularis-supraspinatus repair protocol (<3 cm in total size). Passive motion may begin at 4 weeks. Active motion at 6 weeks. No biceps resistive activity x 8 weeks. No cuff resistive activity x 12 weeks.      DATE OF PROCEDURE: 3/14/2023       PREOPERATIVE DIAGNOSES:   Right     1. Rotator cuff tear   2. Biceps tendinopathy    POSTOPERATIVE DIAGNOSES:   Right   1. Rotator cuff tear, full thickness involving the subscapularis and supraspinatus,   2. Biceps tendinopathy     OPERATION:   Right Shoulder  1. Arthroscopic  subscapularis and supraspinatus rotator cuff repair   2. Arthroscopic biceps tenodesis  3. 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 He even has a little bruising on  "the back of his Right upper arm but denies anything that may have caused it.     He was compliant with home exercise program.  Response to previous treatment: good  Functional change: improved ADL's    Pain: 3/10  Location: right shoulder      OBJECTIVE   14 weeks and 0 days 06/21/2023  AROM:  Flexion WNL (180 deg)  Abduction WNL (180 deg)  IR 66 deg  ER 88 deg   TREATMENT     Tony received the treatments listed below:      Bold = performed today     oTny received therapeutic exercises to develop strength, endurance, ROM, flexibility, posture, and core stabilization for 15 minutes including:    UBE 4 fwd, 4 bwd  Sidelying ER 2 lb 3x15    Scapular wall slides with pillow case YTB 3 x 10  Freemotion rows 20# (x2) 3x10  Seated Freemotion Lat Pulldowns 20# (x2) 3x10  Standing Bicep Curls Freemotion 3# 3x10  Shoulder Flexion +Abd circuit w/ 1lb DB 8x (P!)    Resume NV:  Serratus press 5lbs 3 x 15  Supine Wand Flexion 5lbs 3x15  Sidelying Shoulder Flexion 2# 2x10  Bicep curl 3 x 10 RTB  Prone shoulder ext 3 x 10 +6  Rows BTB 3x10  Standing full can scaption 0# 2x10  Wax on/off +yellow weighted ball on wall in scaption CKC  2 x 30  Chest pass vs rebounder yellow weighted ball 3 x 12 seated on blue t-ball      Tony received the following manual therapy techniques: Soft tissue Mobilization were applied to the: R shoulder complex for 20 minutes, including:  Manual GH PROM   Inferior grade ii-iii GHJ mobilization  AP GHJ mobilization grade ii-iii  Scapular mobilization all direction      neuromuscular re-education activities to improve: Balance, Coordination, Kinesthetic, Sense, Proprioception, and Posture for 32 minutes. The following activities were included:    Prone T's 5" 3X10   Prone lvl 2 lower trap 5" 3x10   Hitchhiker into shoulder scaption 3x10     IR walkouts GTB 2 x 15  ER walkouts RTB 2 x 15  Rhythmic stabilization supine at 90 deg 3x30"      PATIENT EDUCATION AND HOME EXERCISES     Home Exercises Provided and " Patient Education Provided     Education provided:   - HEP   - post op precautions     Written Home Exercises Provided: Patient instructed to cont prior HEP. Exercises were reviewed and Tony was able to demonstrate them prior to the end of the session.  Tony demonstrated fair  understanding of the education provided. See EMR under Patient Instructions for exercises provided during therapy sessions    ASSESSMENT   Tony arrived to therapy with increase in soreness of right upper extremity today. He continues to display slight shoulder hike with some pain between 80 deg to 110 deg. He displays increase in UT compensation and therefore PT added postural strengthening for middle and lower trap today in which patient was challenged and fatigued. He also continues to be challenged with external rotation strengthening and was challenged with exercises targeting these structures. He would continue to benefit from skilled PT to address mobility and strength deficits to allow him to return to work related tasks without difficulty or risk of reinjury.     Pt prognosis is Guarded.     Pt will continue to benefit from skilled outpatient physical therapy to address the deficits listed in the problem list box on initial evaluation, provide pt/family education and to maximize pt's level of independence in the home and community environment.     Pt's spiritual, cultural and educational needs considered and pt agreeable to plan of care and goals.     Anticipated Barriers for therapy: compliance with post-op precautions     GOALS: Short Term Goals: 8 weeks  updated 5/22/23  MET  1.Report decreased in pain at worse less than  <   / =  6  /10  to increase tolerance for functional mobility. MET  2. Pt to improve PROM of flexion, scaption and abduction to 90deg and IR/ER to 30deg and R elbow to 0-130deg to allow for improved functional mobility to allow for improvement in IADLs. Met 4/12/23  3. Increased BUE MMT 1/2 grade to increase  tolerance for ADL and work activities.  MET  4. Pt to report be conscious of impaired sitting and standing posture daily to decrease pain. MET  5. Pt to tolerate HEP to improve ROM and independence with ADL's.  MET     Long Term Goals: 16 weeks   in progress  1.Report decreased in pain at worse less than  <   / =  4  /10  to increase tolerance for functional mobility. On going  2.  Patient will report clinically significant improvements on FOTO score.On going  3.Increased BUE MMT 1 grade to increase tolerance for ADL and work activities.On going  4. Pt to report and demonstrate proper posture in standing and sitting to decrease pain. On going  5. Pt to be Independent with HEP to improve ROM and independence with ADL's.On going  6. Pt to improve AROM of flexion, scaption and abduction to 150deg and IR/ER to 60deg and R elbow to 0-150deg to allow for improved functional mobility to allow for improvement in IADLs. On going    PLAN     Progress mobility and postural strengthening as well as stabilization.     Scarlet Navarrete, PT,DPT  06/21/2023

## 2023-06-22 ENCOUNTER — PATIENT MESSAGE (OUTPATIENT)
Dept: INTERNAL MEDICINE | Facility: CLINIC | Age: 43
End: 2023-06-22
Payer: COMMERCIAL

## 2023-06-23 ENCOUNTER — TELEPHONE (OUTPATIENT)
Dept: ORTHOPEDICS | Facility: CLINIC | Age: 43
End: 2023-06-23
Payer: COMMERCIAL

## 2023-06-23 ENCOUNTER — CLINICAL SUPPORT (OUTPATIENT)
Dept: REHABILITATION | Facility: HOSPITAL | Age: 43
End: 2023-06-23
Attending: ORTHOPAEDIC SURGERY
Payer: OTHER MISCELLANEOUS

## 2023-06-23 DIAGNOSIS — M67.813 BICEPS TENDONOSIS OF RIGHT SHOULDER: ICD-10-CM

## 2023-06-23 DIAGNOSIS — Z98.890 S/P RIGHT ROTATOR CUFF REPAIR: Primary | ICD-10-CM

## 2023-06-23 DIAGNOSIS — R29.898 IMPAIRED STRENGTH OF SHOULDER MUSCLES: ICD-10-CM

## 2023-06-23 DIAGNOSIS — M25.611 DECREASED RANGE OF MOTION OF RIGHT SHOULDER: ICD-10-CM

## 2023-06-23 PROCEDURE — 97112 NEUROMUSCULAR REEDUCATION: CPT | Mod: PN

## 2023-06-23 PROCEDURE — 97110 THERAPEUTIC EXERCISES: CPT | Mod: PN

## 2023-06-23 NOTE — PROGRESS NOTES
[facility]   Physical Therapy progress Note     Name: Tony Gordillo  [facility] Number: [id number]    Therapy Diagnosis:   Encounter Diagnoses   Name Primary?    S/P right rotator cuff repair Yes    Biceps tendonosis of right shoulder     Decreased range of motion of right shoulder     Impaired strength of shoulder muscles            Physician: Crissy Bradford MD    Visit Date: 6/23/2023    Physician Orders: PT Eval and Treat   Medical Diagnosis from Referral: S46.011A (ICD-10-CM) - Traumatic rotator cuff tear, right, initial encounter   Evaluation Date: 3/15/2023  Authorization Period Expiration:12/7/2023  Plan of Care Expiration: 8/1/23  Visit # / Visits authorized: 2/12   Progress Note Due: 6/22/23  FOTO: 1/1  HEP: Access Code: [email or web address]     Precautions: Diabetes;      POSTOPERATIVE PLAN: Plan to follow subscapularis-supraspinatus repair protocol (<3 cm in total size). Passive motion may begin at 4 weeks. Active motion at 6 weeks. No biceps resistive activity x 8 weeks. No cuff resistive activity x 12 weeks.      DATE OF PROCEDURE: 3/14/2023       PREOPERATIVE DIAGNOSES:   Right     1. Rotator cuff tear   2. Biceps tendinopathy    POSTOPERATIVE DIAGNOSES:   Right   1. Rotator cuff tear, full thickness involving the subscapularis and supraspinatus,   2. Biceps tendinopathy     OPERATION:   Right Shoulder  1. Arthroscopic  subscapularis and supraspinatus rotator cuff repair   2. Arthroscopic biceps tenodesis  3. 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 He started back on some  "medication to see if it is making him sick and he thinks it may be causing him to feel bad again.     He was compliant with home exercise program.  Response to previous treatment: soreness  Functional change: improved ADL's    Pain: 3/10  Location: right shoulder      OBJECTIVE   14 weeks and 0 days 06/23/2023  AROM:  Flexion WNL (180 deg)  Abduction WNL (180 deg)  IR 66 deg  ER 88 deg         TREATMENT     Tony received the treatments listed below:      Bold = performed today     Tony received therapeutic exercises to develop strength, endurance, ROM, flexibility, posture, and core stabilization for 15 minutes including:    UBE 4 fwd, 4 bwd +lvl4  Sidelying ER +3 lb 3x15      Resume NV:  Scapular wall slides with pillow case YTB 3 x 10  Freemotion rows 20# (x2) 3x10  Seated Freemotion Lat Pulldowns 20# (x2) 3x10  Standing Bicep Curls Freemotion 3# 3x10  Shoulder Flexion +Abd circuit w/ 1lb DB 8x (P!)  Serratus press 5lbs 3 x 15  Supine Wand Flexion 5lbs 3x15  Sidelying Shoulder Flexion 2# 2x10  Bicep curl 3 x 10 RTB  Prone shoulder ext 3 x 10 +6  Rows BTB 3x10  Standing full can scaption 0# 2x10  Wax on/off +yellow weighted ball on wall in scaption CKC  2 x 30  Chest pass vs rebounder yellow weighted ball 3 x 12 seated on blue t-ball      Tony received the following manual therapy techniques: Soft tissue Mobilization were applied to the: R shoulder complex for 5 minutes, including:  Manual GH PROM   Inferior grade ii-iii GHJ mobilization  AP GHJ mobilization grade ii-iii  Scapular mobilization all direction      neuromuscular re-education activities to improve: Balance, Coordination, Kinesthetic, Sense, Proprioception, and Posture for 38 minutes. The following activities were included:    Prone T's 5" 3X10   Prone lvl 2 lower trap 5" 3x10   Hitchhiker into shoulder scaption 3x10     IR walkouts GTB 2 x 15  ER walkouts GTB 2 x 15  Body blade external rotation/external rotation 30"x4  Rhythmic stabilization supine at " "90 deg 3x30"      PATIENT EDUCATION AND HOME EXERCISES     Home Exercises Provided and Patient Education Provided     Education provided:   - HEP   - post op precautions     Written Home Exercises Provided: Patient instructed to cont prior HEP. Exercises were reviewed and Tony was able to demonstrate them prior to the end of the session.  Tony demonstrated fair  understanding of the education provided. See EMR under Patient Instructions for exercises provided during therapy sessions    ASSESSMENT   Tony arrived to therapy with increase in aching and discomfort that he attributes to recent change in medication. PT continued with postural and RTC endurance strength training. Patient was challenged with all strengthening. Due to increase in pain and discomfort today PT did not perform reassessment but will perform at next visit.     Pt prognosis is Guarded.     Pt will continue to benefit from skilled outpatient physical therapy to address the deficits listed in the problem list box on initial evaluation, provide pt/family education and to maximize pt's level of independence in the home and community environment.     Pt's spiritual, cultural and educational needs considered and pt agreeable to plan of care and goals.     Anticipated Barriers for therapy: compliance with post-op precautions     GOALS: Short Term Goals: 8 weeks  updated 5/22/23  MET  1.Report decreased in pain at worse less than  <   / =  6  /10  to increase tolerance for functional mobility. MET  2. Pt to improve PROM of flexion, scaption and abduction to 90deg and IR/ER to 30deg and R elbow to 0-130deg to allow for improved functional mobility to allow for improvement in IADLs. Met 4/12/23  3. Increased BUE MMT 1/2 grade to increase tolerance for ADL and work activities.  MET  4. Pt to report be conscious of impaired sitting and standing posture daily to decrease pain. MET  5. Pt to tolerate HEP to improve ROM and independence with ADL's.  MET   "   Long Term Goals: 16 weeks   in progress  1.Report decreased in pain at worse less than  <   / =  4  /10  to increase tolerance for functional mobility. On going  2.  Patient will report clinically significant improvements on FOTO score.On going  3.Increased BUE MMT 1 grade to increase tolerance for ADL and work activities.On going  4. Pt to report and demonstrate proper posture in standing and sitting to decrease pain. On going  5. Pt to be Independent with HEP to improve ROM and independence with ADL's.On going  6. Pt to improve AROM of flexion, scaption and abduction to 150deg and IR/ER to 60deg and R elbow to 0-150deg to allow for improved functional mobility to allow for improvement in IADLs. On going    PLAN     Progress mobility and postural strengthening as well as stabilization.     Scarlet Navarrete, PT,DPT  06/23/2023

## 2023-06-26 ENCOUNTER — CLINICAL SUPPORT (OUTPATIENT)
Dept: REHABILITATION | Facility: HOSPITAL | Age: 43
End: 2023-06-26
Attending: ORTHOPAEDIC SURGERY
Payer: OTHER MISCELLANEOUS

## 2023-06-26 DIAGNOSIS — Z98.890 S/P RIGHT ROTATOR CUFF REPAIR: Primary | ICD-10-CM

## 2023-06-26 DIAGNOSIS — M25.611 DECREASED RANGE OF MOTION OF RIGHT SHOULDER: ICD-10-CM

## 2023-06-26 DIAGNOSIS — M67.813 BICEPS TENDONOSIS OF RIGHT SHOULDER: ICD-10-CM

## 2023-06-26 DIAGNOSIS — R29.898 IMPAIRED STRENGTH OF SHOULDER MUSCLES: ICD-10-CM

## 2023-06-26 PROCEDURE — 97112 NEUROMUSCULAR REEDUCATION: CPT | Mod: PN

## 2023-06-26 PROCEDURE — 97140 MANUAL THERAPY 1/> REGIONS: CPT | Mod: PN

## 2023-06-26 PROCEDURE — 97110 THERAPEUTIC EXERCISES: CPT | Mod: PN

## 2023-06-26 NOTE — PROGRESS NOTES
[facility]   Physical Therapy progress Note     Name: Tony Gordillo  Clinic Number: [id number]    Therapy Diagnosis:   Encounter Diagnoses   Name Primary?    S/P right rotator cuff repair Yes    Biceps tendonosis of right shoulder     Decreased range of motion of right shoulder     Impaired strength of shoulder muscles          Physician: Crissy Bradford MD    Visit Date: 6/26/2023    Physician Orders: PT Eval and Treat   Medical Diagnosis from Referral: S46.011A (ICD-10-CM) - Traumatic rotator cuff tear, right, initial encounter   Evaluation Date: 3/15/2023  Authorization Period Expiration:12/7/2023  Plan of Care Expiration: 8/1/23  Visit # / Visits authorized: 4/12   Progress Note Due: 6/22/23  FOTO: 1/1     Precautions: Diabetes;      POSTOPERATIVE PLAN: Plan to follow subscapularis-supraspinatus repair protocol (<3 cm in total size). Passive motion may begin at 4 weeks. Active motion at 6 weeks. No biceps resistive activity x 8 weeks. No cuff resistive activity x 12 weeks.      DATE OF PROCEDURE: 3/14/2023       PREOPERATIVE DIAGNOSES:   Right     1. Rotator cuff tear   2. Biceps tendinopathy    POSTOPERATIVE DIAGNOSES:   Right   1. Rotator cuff tear, full thickness involving the subscapularis and supraspinatus,   2. Biceps tendinopathy     OPERATION:   Right Shoulder  1. Arthroscopic  subscapularis and supraspinatus rotator cuff repair   2. Arthroscopic biceps tenodesis  3. 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     He was compliant with home exercise program.  Response to  "previous treatment: soreness  Functional change: improved ADL's    Pain: 3/10  Location: right shoulder      OBJECTIVE   14 weeks and 0 days 06/26/2023  AROM:  Flexion WNL (180 deg)  Abduction WNL (180 deg)  IR 66 deg  ER 88 deg     *=pain   Left (kgf) Right (kgf) LSI%   Shoulder flexion 23.2 8.5 36.6%   Shoulder abduction 19.9 5.1 25.6%   Shoulder external rotation  46.6 18.6 39.9%   Shoulder internal rotation  32.5 52.6 106%   Elbow flexion 5/5 4+/5    Elbow extension 5/5 4+/4        TREATMENT     Tony received the treatments listed below:      Bold = performed today     Tony received therapeutic exercises to develop strength, endurance, ROM, flexibility, posture, and core stabilization for 15 minutes including:    UBE 4 fwd, 4 bwd +lvl4  Sidelying ER +3 lb 3x15      Resume NV:  Scapular wall slides with pillow case YTB 3 x 10  Freemotion rows 20# (x2) 3x10  Seated Freemotion Lat Pulldowns 20# (x2) 3x10  Standing Bicep Curls Freemotion 3# 3x10  Shoulder Flexion +Abd circuit w/ 1lb DB 8x (P!)  Serratus press 5lbs 3 x 15  Supine Wand Flexion 5lbs 3x15  Sidelying Shoulder Flexion 2# 2x10  Bicep curl 3 x 10 RTB  Prone shoulder ext 3 x 10 +6  Rows BTB 3x10  Standing full can scaption 0# 2x10  Wax on/off +yellow weighted ball on wall in scaption CKC  2 x 30  Chest pass vs rebounder yellow weighted ball 3 x 12 seated on blue t-ball      Tony received the following manual therapy techniques: Soft tissue Mobilization were applied to the: R shoulder complex for 20 minutes, including:  Manual GH PROM   Inferior grade ii-iii GHJ mobilization  AP GHJ mobilization grade ii-iii  Scapular mobilization all direction  reassessment    neuromuscular re-education activities to improve: Balance, Coordination, Kinesthetic, Sense, Proprioception, and Posture for 28 minutes. The following activities were included:    Prone T's 5" 3X10   Prone lvl 2 lower trap 5" 3x10   Hitchhiker into shoulder scaption 2x10     IR walkouts GTB 3x 15  ER " "walkouts GTB 3x 15  Body blade external rotation/external rotation 30"x4  Rhythmic stabilization supine at 90 deg 3x30"      PATIENT EDUCATION AND HOME EXERCISES     Home Exercises Provided and Patient Education Provided     Education provided:   - HEP   - post op precautions     Written Home Exercises Provided: Patient instructed to cont prior HEP. Exercises were reviewed and Tony was able to demonstrate them prior to the end of the session.  Tony demonstrated fair  understanding of the education provided. See EMR under Patient Instructions for exercises provided during therapy sessions    ASSESSMENT   Tony was reassessed and displayed significant strength deficits with right upper extremity as compared to Left. He displayed decrease in overall strength as well as endurance of strength as seen by decrease in power with reps. He would continue to benefit from skilled PT to improve postural strength as well as global RTC of right upper extremity.     Pt prognosis is Guarded.     Pt will continue to benefit from skilled outpatient physical therapy to address the deficits listed in the problem list box on initial evaluation, provide pt/family education and to maximize pt's level of independence in the home and community environment.     Pt's spiritual, cultural and educational needs considered and pt agreeable to plan of care and goals.     Anticipated Barriers for therapy: compliance with post-op precautions     GOALS: Short Term Goals: 8 weeks  updated 5/22/23  MET  1.Report decreased in pain at worse less than  <   / =  6  /10  to increase tolerance for functional mobility. MET  2. Pt to improve PROM of flexion, scaption and abduction to 90deg and IR/ER to 30deg and R elbow to 0-130deg to allow for improved functional mobility to allow for improvement in IADLs. Met 4/12/23  3. Increased BUE MMT 1/2 grade to increase tolerance for ADL and work activities.  MET  4. Pt to report be conscious of impaired sitting and " standing posture daily to decrease pain. MET  5. Pt to tolerate HEP to improve ROM and independence with ADL's.  MET     Long Term Goals: 16 weeks   in progress  1.Report decreased in pain at worse less than  <   / =  4  /10  to increase tolerance for functional mobility. On going  2.  Patient will report clinically significant improvements on FOTO score.On going  3.Increased BUE MMT 1 grade to increase tolerance for ADL and work activities.On going  4. Pt to report and demonstrate proper posture in standing and sitting to decrease pain. On going  5. Pt to be Independent with HEP to improve ROM and independence with ADL's.On going  6. Pt to improve AROM of flexion, scaption and abduction to 150deg and IR/ER to 60deg and R elbow to 0-150deg to allow for improved functional mobility to allow for improvement in IADLs. On going    PLAN     Progress mobility and postural strengthening as well as stabilization.     Scarlet Navarrete, PT,DPT  06/26/2023

## 2023-06-28 ENCOUNTER — CLINICAL SUPPORT (OUTPATIENT)
Dept: REHABILITATION | Facility: HOSPITAL | Age: 43
End: 2023-06-28
Attending: ORTHOPAEDIC SURGERY
Payer: OTHER MISCELLANEOUS

## 2023-06-28 DIAGNOSIS — M67.813 BICEPS TENDONOSIS OF RIGHT SHOULDER: ICD-10-CM

## 2023-06-28 DIAGNOSIS — M25.611 DECREASED RANGE OF MOTION OF RIGHT SHOULDER: ICD-10-CM

## 2023-06-28 DIAGNOSIS — R29.898 IMPAIRED STRENGTH OF SHOULDER MUSCLES: ICD-10-CM

## 2023-06-28 DIAGNOSIS — Z98.890 S/P RIGHT ROTATOR CUFF REPAIR: Primary | ICD-10-CM

## 2023-06-28 PROCEDURE — 97140 MANUAL THERAPY 1/> REGIONS: CPT | Mod: PN

## 2023-06-28 PROCEDURE — 97110 THERAPEUTIC EXERCISES: CPT | Mod: PN

## 2023-06-28 NOTE — PROGRESS NOTES
[facility]   Physical Therapy progress Note     Name: Tony Gordillo  Clinic Number: [id number]    Therapy Diagnosis:   Encounter Diagnoses   Name Primary?    S/P right rotator cuff repair Yes    Biceps tendonosis of right shoulder     Decreased range of motion of right shoulder     Impaired strength of shoulder muscles            Physician: Crissy Bradford MD    Visit Date: 6/28/2023    Physician Orders: PT Eval and Treat   Medical Diagnosis from Referral: S46.011A (ICD-10-CM) - Traumatic rotator cuff tear, right, initial encounter   Evaluation Date: 3/15/2023  Authorization Period Expiration:12/7/2023  Plan of Care Expiration: 8/1/23  Visit # / Visits authorized: 5/12   Progress Note Due: 7/26/23  FOTO: 1/1     Precautions: Diabetes;      POSTOPERATIVE PLAN: Plan to follow subscapularis-supraspinatus repair protocol (<3 cm in total size). Passive motion may begin at 4 weeks. Active motion at 6 weeks. No biceps resistive activity x 8 weeks. No cuff resistive activity x 12 weeks.      DATE OF PROCEDURE: 3/14/2023       PREOPERATIVE DIAGNOSES:   Right     1. Rotator cuff tear   2. Biceps tendinopathy    POSTOPERATIVE DIAGNOSES:   Right   1. Rotator cuff tear, full thickness involving the subscapularis and supraspinatus,   2. Biceps tendinopathy     OPERATION:   Right Shoulder  1. Arthroscopic  subscapularis and supraspinatus rotator cuff repair   2. Arthroscopic biceps tenodesis  3. 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 He felt okay yesterday with some achiness that eased throughout the day. He  "woke up this morning around 2am with pain. He tried to take some Tylenol and has had no relief. He reports that yesterday he did some mild cleaning around the house with some mopping bu did not have pain. He localizes his pain to the anterior of his shoulder and down the side of his arm to his elbow. At first it felt like he slept wrong but then the pain kept going up.  He does report that he has a history of radicular symptoms from his neck and has seen a chiropractor in the past but has not gone in awhile due to his shoulder surgery.     He was compliant with home exercise program.  Response to previous treatment: soreness  Functional change: improved ADL's    Pain: 7/10  Location: right shoulder      OBJECTIVE   15 weeks and 1 days 06/28/2023    Spurling + bilaterally      TREATMENT     Tony received the treatments listed below:      Bold = performed today     Tony received therapeutic exercises to develop strength, endurance, ROM, flexibility, posture, and core stabilization for 10 minutes including:    Supine pec stretch over towel roll 3'   Seated thoracic extension over white foam roll 10"x10      Resume NV:  UBE 4 fwd, 4 bwd +lvl4  Sidelying ER +3 lb 3x15  Scapular wall slides with pillow case YTB 3 x 10  Freemotion rows 20# (x2) 3x10  Seated Freemotion Lat Pulldowns 20# (x2) 3x10  Standing Bicep Curls Freemotion 3# 3x10  Shoulder Flexion +Abd circuit w/ 1lb DB 8x (P!)  Serratus press 5lbs 3 x 15  Supine Wand Flexion 5lbs 3x15  Sidelying Shoulder Flexion 2# 2x10  Bicep curl 3 x 10 RTB  Prone shoulder ext 3 x 10 +6  Rows BTB 3x10  Standing full can scaption 0# 2x10  Wax on/off +yellow weighted ball on wall in scaption CKC  2 x 30  Chest pass vs rebounder yellow weighted ball 3 x 12 seated on blue t-ball      Tony received the following manual therapy techniques: Soft tissue Mobilization were applied to the: R shoulder complex for 43 minutes, including:  Manual GH PROM   Inferior grade I-ii GHJ " "mobilization  AP GHJ mobilization grade I-ii  Scapular mobilization all direction  1st rib mobilization grade ii-iii      neuromuscular re-education activities to improve: Balance, Coordination, Kinesthetic, Sense, Proprioception, and Posture for 00 minutes. The following activities were included:    Prone T's 5" 3X10   Prone lvl 2 lower trap 5" 3x10   Hitchhiker into shoulder scaption 2x10     IR walkouts GTB 3x 15  ER walkouts GTB 3x 15  Body blade external rotation/external rotation 30"x4  Rhythmic stabilization supine at 90 deg 3x30"      PATIENT EDUCATION AND HOME EXERCISES     Home Exercises Provided and Patient Education Provided     Education provided:   - HEP   - post op precautions     Written Home Exercises Provided: Patient instructed to cont prior HEP. Exercises were reviewed and Tony was able to demonstrate them prior to the end of the session.  Tony demonstrated fair  understanding of the education provided. See EMR under Patient Instructions for exercises provided during therapy sessions    ASSESSMENT   Tony arrived to today's treatment session with significant increase in pain. Upon assessment PT believes that pain may be radicular in nature. Patient displayed hypomobility of Right first rib as compared to Left as well as muscle hypertonicity of scalenes and Right upper trap. After manual techniques of 1st rib mobilization patient pain decrease to 5/10. This is a significant changed from 7/10 at beginning of session. Pt may continue to benefit from postural strengthening to continue to decrease upper trap activation with right upper extremity functional use.     Pt prognosis is Guarded.     Pt will continue to benefit from skilled outpatient physical therapy to address the deficits listed in the problem list box on initial evaluation, provide pt/family education and to maximize pt's level of independence in the home and community environment.     Pt's spiritual, cultural and educational needs " considered and pt agreeable to plan of care and goals.     Anticipated Barriers for therapy: compliance with post-op precautions     GOALS: Short Term Goals: 8 weeks  updated 5/22/23  MET  1.Report decreased in pain at worse less than  <   / =  6  /10  to increase tolerance for functional mobility. MET  2. Pt to improve PROM of flexion, scaption and abduction to 90deg and IR/ER to 30deg and R elbow to 0-130deg to allow for improved functional mobility to allow for improvement in IADLs. Met 4/12/23  3. Increased BUE MMT 1/2 grade to increase tolerance for ADL and work activities.  MET  4. Pt to report be conscious of impaired sitting and standing posture daily to decrease pain. MET  5. Pt to tolerate HEP to improve ROM and independence with ADL's.  MET     Long Term Goals: 16 weeks   in progress  1.Report decreased in pain at worse less than  <   / =  4  /10  to increase tolerance for functional mobility. On going  2.  Patient will report clinically significant improvements on FOTO score.On going  3.Increased BUE MMT 1 grade to increase tolerance for ADL and work activities.On going  4. Pt to report and demonstrate proper posture in standing and sitting to decrease pain. On going  5. Pt to be Independent with HEP to improve ROM and independence with ADL's.On going  6. Pt to improve AROM of flexion, scaption and abduction to 150deg and IR/ER to 60deg and R elbow to 0-150deg to allow for improved functional mobility to allow for improvement in IADLs. On going    PLAN     Progress mobility and postural strengthening as well as stabilization.     Scarlet Navarrete, PT,DPT  06/28/2023

## 2023-06-29 ENCOUNTER — PATIENT MESSAGE (OUTPATIENT)
Dept: REHABILITATION | Facility: HOSPITAL | Age: 43
End: 2023-06-29
Payer: COMMERCIAL

## 2023-06-29 NOTE — PROGRESS NOTES
[facility]   Physical Therapy progress Note     Name: Tony Gordillo  Clinic Number: [id number]    Therapy Diagnosis:   Encounter Diagnoses   Name Primary?    S/P right rotator cuff repair Yes    Biceps tendonosis of right shoulder     Decreased range of motion of right shoulder     Impaired strength of shoulder muscles              Physician: Crissy Bradford MD    Visit Date: 6/30/2023    Physician Orders: PT Eval and Treat   Medical Diagnosis from Referral: S46.011A (ICD-10-CM) - Traumatic rotator cuff tear, right, initial encounter   Evaluation Date: 3/15/2023  Authorization Period Expiration:12/7/2023  Plan of Care Expiration: 8/1/23  Visit # / Visits authorized: 6/12   Progress Note Due: 7/26/23  FOTO: 1/1     Precautions: Diabetes;      POSTOPERATIVE PLAN: Plan to follow subscapularis-supraspinatus repair protocol (<3 cm in total size). Passive motion may begin at 4 weeks. Active motion at 6 weeks. No biceps resistive activity x 8 weeks. No cuff resistive activity x 12 weeks.      DATE OF PROCEDURE: 3/14/2023       PREOPERATIVE DIAGNOSES:   Right     1. Rotator cuff tear   2. Biceps tendinopathy    POSTOPERATIVE DIAGNOSES:   Right   1. Rotator cuff tear, full thickness involving the subscapularis and supraspinatus,   2. Biceps tendinopathy     OPERATION:   Right Shoulder  1. Arthroscopic  subscapularis and supraspinatus rotator cuff repair   2. Arthroscopic biceps tenodesis  3. 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 He saw the chiropractor and had his back worked on and is just  "sore today.     He was compliant with home exercise program.  Response to previous treatment: soreness  Functional change: improved ADL's    Pain: 2/10  Location: right shoulder      OBJECTIVE   15 weeks and 3 days 06/30/2023      TREATMENT     Tony received the treatments listed below:      Bold = performed today     Tony received therapeutic exercises to develop strength, endurance, ROM, flexibility, posture, and core stabilization for 24 minutes including:  UBE 4 fwd, 4 bwd +lvl4    Seated thoracic extension over 1/2 foam roll 10" 3x10   External rotation with wand 5" x 5 minutes    Resume NV:    Sidelying ER +3 lb 3x15  Scapular wall slides with pillow case YTB 3 x 10  Freemotion rows 20# (x2) 3x10  Seated Freemotion Lat Pulldowns 20# (x2) 3x10  Standing Bicep Curls Freemotion 3# 3x10  Shoulder Flexion +Abd circuit w/ 1lb DB 8x (P!)  Serratus press 5lbs 3 x 15  Supine Wand Flexion 5lbs 3x15  Sidelying Shoulder Flexion 2# 2x10  Bicep curl 3 x 10 RTB  Prone shoulder ext 3 x 10 +6  Rows BTB 3x10  Standing full can scaption 0# 2x10  Wax on/off +yellow weighted ball on wall in scaption CKC  2 x 30  Chest pass vs rebounder yellow weighted ball 3 x 12 seated on blue t-ball      Tony received the following manual therapy techniques: Soft tissue Mobilization were applied to the: R shoulder complex for 5 minutes, including:  External rotation passive range of motion   AP GHJ mobilization grade I-ii    NP:  Manual GH PROM   Inferior grade I-ii GHJ mobilization    Scapular mobilization all direction  1st rib mobilization grade ii-iii      neuromuscular re-education activities to improve: Balance, Coordination, Kinesthetic, Sense, Proprioception, and Posture for 30 minutes. The following activities were included:    Prone Lvl 2 middle trap 5" 3X10   Prone lvl 2 lower trap 5" 3x10   Prone extension 5" 3x10   Supine serratus punch 2# dowel 3x10       Hitchhiker into shoulder scaption 2x10   Body blade external " "rotation/external rotation 30"x4  IR walkouts GTB 3x 15  ER walkouts GTB 3x 15    Rhythmic stabilization supine at 90 deg 3x30"      PATIENT EDUCATION AND HOME EXERCISES     Home Exercises Provided and Patient Education Provided     Education provided:   - updated HEP   - post op precautions     Written Home Exercises Provided: yes. Exercises were reviewed and Tony was able to demonstrate them prior to the end of the session.  Tony demonstrated good  understanding of the education provided. See EMR under Patient Instructions for exercises provided during therapy sessions    ASSESSMENT   Tony arrived to today's session with significant improvement in pain as compared to previous session. Session focused on thoracic mobility and postural strengthening. Tactile and verbal cues for proper scapular positioning with exercises. PT updated Home exercise program to continue to improve postural strength as well as scapular mobility with functional exercises.     Pt prognosis is Guarded.     Pt will continue to benefit from skilled outpatient physical therapy to address the deficits listed in the problem list box on initial evaluation, provide pt/family education and to maximize pt's level of independence in the home and community environment.     Pt's spiritual, cultural and educational needs considered and pt agreeable to plan of care and goals.     Anticipated Barriers for therapy: compliance with post-op precautions     GOALS: Short Term Goals: 8 weeks  updated 5/22/23  MET  1.Report decreased in pain at worse less than  <   / =  6  /10  to increase tolerance for functional mobility. MET  2. Pt to improve PROM of flexion, scaption and abduction to 90deg and IR/ER to 30deg and R elbow to 0-130deg to allow for improved functional mobility to allow for improvement in IADLs. Met 4/12/23  3. Increased BUE MMT 1/2 grade to increase tolerance for ADL and work activities.  MET  4. Pt to report be conscious of impaired sitting and " standing posture daily to decrease pain. MET  5. Pt to tolerate HEP to improve ROM and independence with ADL's.  MET     Long Term Goals: 16 weeks   in progress  1.Report decreased in pain at worse less than  <   / =  4  /10  to increase tolerance for functional mobility. On going  2.  Patient will report clinically significant improvements on FOTO score.On going  3.Increased BUE MMT 1 grade to increase tolerance for ADL and work activities.On going  4. Pt to report and demonstrate proper posture in standing and sitting to decrease pain. On going  5. Pt to be Independent with HEP to improve ROM and independence with ADL's.On going  6. Pt to improve AROM of flexion, scaption and abduction to 150deg and IR/ER to 60deg and R elbow to 0-150deg to allow for improved functional mobility to allow for improvement in IADLs. On going    PLAN     Progress mobility and postural strengthening as well as stabilization.     Scarlet Navarrete, PT,DPT  06/30/2023

## 2023-06-30 ENCOUNTER — CLINICAL SUPPORT (OUTPATIENT)
Dept: REHABILITATION | Facility: HOSPITAL | Age: 43
End: 2023-06-30
Attending: ORTHOPAEDIC SURGERY
Payer: OTHER MISCELLANEOUS

## 2023-06-30 DIAGNOSIS — M67.813 BICEPS TENDONOSIS OF RIGHT SHOULDER: ICD-10-CM

## 2023-06-30 DIAGNOSIS — M25.611 DECREASED RANGE OF MOTION OF RIGHT SHOULDER: ICD-10-CM

## 2023-06-30 DIAGNOSIS — R29.898 IMPAIRED STRENGTH OF SHOULDER MUSCLES: ICD-10-CM

## 2023-06-30 DIAGNOSIS — Z98.890 S/P RIGHT ROTATOR CUFF REPAIR: Primary | ICD-10-CM

## 2023-06-30 PROCEDURE — 97110 THERAPEUTIC EXERCISES: CPT | Mod: PN

## 2023-06-30 PROCEDURE — 97112 NEUROMUSCULAR REEDUCATION: CPT | Mod: PN

## 2023-07-03 ENCOUNTER — PATIENT MESSAGE (OUTPATIENT)
Dept: NEUROLOGY | Facility: CLINIC | Age: 43
End: 2023-07-03
Payer: COMMERCIAL

## 2023-07-03 ENCOUNTER — CLINICAL SUPPORT (OUTPATIENT)
Dept: REHABILITATION | Facility: HOSPITAL | Age: 43
End: 2023-07-03
Attending: ORTHOPAEDIC SURGERY
Payer: OTHER MISCELLANEOUS

## 2023-07-03 DIAGNOSIS — M25.611 DECREASED RANGE OF MOTION OF RIGHT SHOULDER: ICD-10-CM

## 2023-07-03 DIAGNOSIS — M67.813 BICEPS TENDONOSIS OF RIGHT SHOULDER: ICD-10-CM

## 2023-07-03 DIAGNOSIS — R29.898 IMPAIRED STRENGTH OF SHOULDER MUSCLES: ICD-10-CM

## 2023-07-03 DIAGNOSIS — Z98.890 S/P RIGHT ROTATOR CUFF REPAIR: Primary | ICD-10-CM

## 2023-07-03 PROCEDURE — 97110 THERAPEUTIC EXERCISES: CPT | Mod: PN

## 2023-07-03 PROCEDURE — 97140 MANUAL THERAPY 1/> REGIONS: CPT | Mod: PN

## 2023-07-03 PROCEDURE — 97112 NEUROMUSCULAR REEDUCATION: CPT | Mod: PN

## 2023-07-03 NOTE — PROGRESS NOTES
[facility]   Physical Therapy progress Note     Name: Tony Gordillo  Clinic Number: [id number]    Therapy Diagnosis:   Encounter Diagnoses   Name Primary?    S/P right rotator cuff repair Yes    Biceps tendonosis of right shoulder     Decreased range of motion of right shoulder     Impaired strength of shoulder muscles            Physician: Crissy Bradford MD    Visit Date: 7/3/2023    Physician Orders: PT Eval and Treat   Medical Diagnosis from Referral: S46.011A (ICD-10-CM) - Traumatic rotator cuff tear, right, initial encounter   Evaluation Date: 3/15/2023  Authorization Period Expiration:12/7/2023  Plan of Care Expiration: 8/1/23  Visit # / Visits authorized: 7/12   Progress Note Due: 7/26/23  FOTO: 1/1     Precautions: Diabetes;      POSTOPERATIVE PLAN: Plan to follow subscapularis-supraspinatus repair protocol (<3 cm in total size). Passive motion may begin at 4 weeks. Active motion at 6 weeks. No biceps resistive activity x 8 weeks. No cuff resistive activity x 12 weeks.      DATE OF PROCEDURE: 3/14/2023       PREOPERATIVE DIAGNOSES:   Right     1. Rotator cuff tear   2. Biceps tendinopathy    POSTOPERATIVE DIAGNOSES:   Right   1. Rotator cuff tear, full thickness involving the subscapularis and supraspinatus,   2. Biceps tendinopathy     OPERATION:   Right Shoulder  1. Arthroscopic  subscapularis and supraspinatus rotator cuff repair   2. Arthroscopic biceps tenodesis  3. 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  "He is just feeling stiff today. He slept most of the day yesterday and today due to a migraine so he has not really been using his shoulder.     He was compliant with home exercise program.  Response to previous treatment: soreness  Functional change: improved ADL's    Pain: 0/10  Location: right shoulder      OBJECTIVE   15 weeks and 6 days 07/03/2023      TREATMENT     Tony received the treatments listed below:      Bold = performed today     Tony received therapeutic exercises to develop strength, endurance, ROM, flexibility, posture, and core stabilization for 20 minutes including:  Pulley 5'   +LLLD shoulder abduction with heat +4# 5'  +Side lying shoulder abduction +2# 3x10   Sidelying ER 3 lb 2x15    UBE 4 fwd, 4 bwd +lvl4    Seated thoracic extension over 1/2 foam roll 10" 3x10   External rotation with wand 5" x 5 minutes      Tony received the following manual therapy techniques: Soft tissue Mobilization were applied to the: R shoulder complex for 15 minutes, including:  External rotation passive range of motion   AP GHJ mobilization grade Ii-iii  Inferior GHJ mobilization grade ii-iii    NP:  Manual GH PROM   Inferior grade I-ii GHJ mobilization    Scapular mobilization all direction  1st rib mobilization grade ii-iii      neuromuscular re-education activities to improve: Balance, Coordination, Kinesthetic, Sense, Proprioception, and Posture for 25 minutes. The following activities were included:    Prone Lvl 2 middle trap 5" 3X10   Prone lvl 2 lower trap 5" 3x10   +landmine press 7# 3x10   Prone extension 5" 3x10   Supine serratus punch 2# dowel 3x10       Hitchhiker into shoulder scaption 2x10   Body blade external rotation/external rotation 30"x4  IR walkouts GTB 3x 15  ER walkouts GTB 3x 15    Rhythmic stabilization supine at 90 deg 3x30"      PATIENT EDUCATION AND HOME EXERCISES     Home Exercises Provided and Patient Education Provided     Education provided:   - updated HEP   - post op precautions "     Written Home Exercises Provided: yes. Exercises were reviewed and Tony was able to demonstrate them prior to the end of the session.  Tony demonstrated good  understanding of the education provided. See EMR under Patient Instructions for exercises provided during therapy sessions    ASSESSMENT   GHJ joint mobilizations performed today to improve arthro kinematics. Patient continues to have greatest difficulty with shoulder abduction which improved with LLLD stretch and side lying abduction for gravity elimination. Patient responded well to treatment session without increase in pain.     Pt prognosis is Guarded.     Pt will continue to benefit from skilled outpatient physical therapy to address the deficits listed in the problem list box on initial evaluation, provide pt/family education and to maximize pt's level of independence in the home and community environment.     Pt's spiritual, cultural and educational needs considered and pt agreeable to plan of care and goals.     Anticipated Barriers for therapy: compliance with post-op precautions     GOALS: Short Term Goals: 8 weeks  updated 5/22/23  MET  1.Report decreased in pain at worse less than  <   / =  6  /10  to increase tolerance for functional mobility. MET  2. Pt to improve PROM of flexion, scaption and abduction to 90deg and IR/ER to 30deg and R elbow to 0-130deg to allow for improved functional mobility to allow for improvement in IADLs. Met 4/12/23  3. Increased BUE MMT 1/2 grade to increase tolerance for ADL and work activities.  MET  4. Pt to report be conscious of impaired sitting and standing posture daily to decrease pain. MET  5. Pt to tolerate HEP to improve ROM and independence with ADL's.  MET     Long Term Goals: 16 weeks   in progress  1.Report decreased in pain at worse less than  <   / =  4  /10  to increase tolerance for functional mobility. On going  2.  Patient will report clinically significant improvements on FOTO score.On  going  3.Increased BUE MMT 1 grade to increase tolerance for ADL and work activities.On going  4. Pt to report and demonstrate proper posture in standing and sitting to decrease pain. On going  5. Pt to be Independent with HEP to improve ROM and independence with ADL's.On going  6. Pt to improve AROM of flexion, scaption and abduction to 150deg and IR/ER to 60deg and R elbow to 0-150deg to allow for improved functional mobility to allow for improvement in IADLs. On going    PLAN     Progress mobility and postural strengthening as well as stabilization.     Scarlet Navarrete, PT,DPT  07/03/2023

## 2023-07-05 ENCOUNTER — CLINICAL SUPPORT (OUTPATIENT)
Dept: REHABILITATION | Facility: HOSPITAL | Age: 43
End: 2023-07-05
Attending: ORTHOPAEDIC SURGERY
Payer: OTHER MISCELLANEOUS

## 2023-07-05 ENCOUNTER — OFFICE VISIT (OUTPATIENT)
Dept: ORTHOPEDICS | Facility: CLINIC | Age: 43
End: 2023-07-05
Payer: COMMERCIAL

## 2023-07-05 DIAGNOSIS — M25.611 DECREASED RANGE OF MOTION OF RIGHT SHOULDER: ICD-10-CM

## 2023-07-05 DIAGNOSIS — M17.0 PRIMARY OSTEOARTHRITIS OF BOTH KNEES: Primary | ICD-10-CM

## 2023-07-05 DIAGNOSIS — Z98.890 S/P RIGHT ROTATOR CUFF REPAIR: Primary | ICD-10-CM

## 2023-07-05 DIAGNOSIS — R29.898 IMPAIRED STRENGTH OF SHOULDER MUSCLES: ICD-10-CM

## 2023-07-05 DIAGNOSIS — M67.813 BICEPS TENDONOSIS OF RIGHT SHOULDER: ICD-10-CM

## 2023-07-05 PROCEDURE — 20610 DRAIN/INJ JOINT/BURSA W/O US: CPT | Mod: RT,S$GLB,, | Performed by: ORTHOPAEDIC SURGERY

## 2023-07-05 PROCEDURE — 97140 MANUAL THERAPY 1/> REGIONS: CPT | Mod: PN,CQ

## 2023-07-05 PROCEDURE — 20610 LARGE JOINT ASPIRATION/INJECTION: R KNEE: ICD-10-PCS | Mod: RT,S$GLB,, | Performed by: ORTHOPAEDIC SURGERY

## 2023-07-05 PROCEDURE — 99213 OFFICE O/P EST LOW 20 MIN: CPT | Mod: 25,S$GLB,, | Performed by: ORTHOPAEDIC SURGERY

## 2023-07-05 PROCEDURE — 99213 PR OFFICE/OUTPT VISIT, EST, LEVL III, 20-29 MIN: ICD-10-PCS | Mod: 25,S$GLB,, | Performed by: ORTHOPAEDIC SURGERY

## 2023-07-05 PROCEDURE — 97110 THERAPEUTIC EXERCISES: CPT | Mod: PN,CQ

## 2023-07-05 PROCEDURE — 97112 NEUROMUSCULAR REEDUCATION: CPT | Mod: PN,CQ

## 2023-07-05 PROCEDURE — 99999 PR PBB SHADOW E&M-EST. PATIENT-LVL IV: CPT | Mod: PBBFAC,,, | Performed by: ORTHOPAEDIC SURGERY

## 2023-07-05 PROCEDURE — 99999 PR PBB SHADOW E&M-EST. PATIENT-LVL IV: ICD-10-PCS | Mod: PBBFAC,,, | Performed by: ORTHOPAEDIC SURGERY

## 2023-07-05 RX ADMIN — TRIAMCINOLONE ACETONIDE 40 MG: 40 INJECTION, SUSPENSION INTRA-ARTICULAR; INTRAMUSCULAR at 02:07

## 2023-07-05 NOTE — PROCEDURES
Large Joint Aspiration/Injection: R knee    Date/Time: 7/5/2023 2:45 PM  Performed by: Crissy Bradford MD  Authorized by: Crissy Bradford MD     Consent Done?:  Yes (Verbal)  Indications:  Arthritis  Timeout: prior to procedure the correct patient, procedure, and site was verified    Prep: patient was prepped and draped in usual sterile fashion      Local anesthesia used?: Yes    Local anesthetic:  Topical anesthetic    Details:  Needle Size:  22 G  Approach:  Anteromedial  Location:  Knee  Site:  R knee  Medications:  40 mg triamcinolone acetonide 40 mg/mL  Patient tolerance:  Patient tolerated the procedure well with no immediate complications

## 2023-07-05 NOTE — PROGRESS NOTES
[facility]   Physical Therapy progress Note     Name: Tony Gordillo  Clinic Number: [id number]    Therapy Diagnosis:   Encounter Diagnoses   Name Primary?    S/P right rotator cuff repair Yes    Biceps tendonosis of right shoulder     Decreased range of motion of right shoulder     Impaired strength of shoulder muscles            Physician: Crissy Bradford MD    Visit Date: 7/5/2023    Physician Orders: PT Eval and Treat   Medical Diagnosis from Referral: S46.011A (ICD-10-CM) - Traumatic rotator cuff tear, right, initial encounter   Evaluation Date: 3/15/2023  Authorization Period Expiration:12/7/2023  Plan of Care Expiration: 8/1/23  Visit # / Visits authorized: 7/12   Progress Note Due: 7/26/23  FOTO: 1/1     Precautions: Diabetes;      POSTOPERATIVE PLAN: Plan to follow subscapularis-supraspinatus repair protocol (<3 cm in total size). Passive motion may begin at 4 weeks. Active motion at 6 weeks. No biceps resistive activity x 8 weeks. No cuff resistive activity x 12 weeks.      DATE OF PROCEDURE: 3/14/2023       PREOPERATIVE DIAGNOSES:   Right     1. Rotator cuff tear   2. Biceps tendinopathy    POSTOPERATIVE DIAGNOSES:   Right   1. Rotator cuff tear, full thickness involving the subscapularis and supraspinatus,   2. Biceps tendinopathy     OPERATION:   Right Shoulder  1. Arthroscopic  subscapularis and supraspinatus rotator cuff repair   2. Arthroscopic biceps tenodesis  3. 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    He was compliant with home exercise program.  Response  "to previous treatment: soreness  Functional change: improved ADL's    Pain: 0/10  Location: right shoulder      OBJECTIVE   15 weeks and 6 days 07/05/2023      TREATMENT     Tony received the treatments listed below:      Bold = performed today     Tony received therapeutic exercises to develop strength, endurance, ROM, flexibility, posture, and core stabilization for 30 minutes including:  UBE 3min fwd, 3 min bwd  Pulley 5'   LLLD shoulder abduction with heat +4# 5'  +Side lying shoulder abduction +2# 3x10   Sidelying ER 3 lb 2x15    Resume NV:   Seated thoracic extension over 1/2 foam roll 10" 3x10   External rotation with wand 5" x 5 minutes      Tony received the following manual therapy techniques: Soft tissue Mobilization were applied to the: R shoulder complex for 15 minutes, including:  External rotation passive range of motion   AP GHJ mobilization grade Ii-iii  Inferior GHJ mobilization grade ii-iii    NP:  Manual GH PROM   Inferior grade I-ii GHJ mobilization  Scapular mobilization all direction  1st rib mobilization grade ii-iii      neuromuscular re-education activities to improve: Balance, Coordination, Kinesthetic, Sense, Proprioception, and Posture for 15 minutes. The following activities were included:    Prone Lvl 2 middle trap 5" 3X10   Prone lvl 2 lower trap 5" 3x10   Landmine press 7# 3x10     Prone extension 5" 3x10   Supine serratus punch 2# dowel 3x10   Hitchhiker into shoulder scaption 2x10   Body blade external rotation/external rotation 30"x4  IR walkouts GTB 3x 15  ER walkouts GTB 3x 15  Rhythmic stabilization supine at 90 deg 3x30"      PATIENT EDUCATION AND HOME EXERCISES     Home Exercises Provided and Patient Education Provided     Education provided:   - updated HEP   - post op precautions     Written Home Exercises Provided: yes. Exercises were reviewed and Tony was able to demonstrate them prior to the end of the session.  Tony demonstrated good  understanding of the education " provided. See EMR under Patient Instructions for exercises provided during therapy sessions    ASSESSMENT   Tony tolerated the above tx session well with minimal c/o pain. Continue with GHJ joint mobilizations to improve arthro kinematics as well as therEx for strength, stability, and ROM. Patient continues to have greatest difficulty with shoulder abduction which improves with LLLD stretch and MT techniques. Patient responded well to treatment session without increase in pain.     Pt prognosis is Guarded.     Pt will continue to benefit from skilled outpatient physical therapy to address the deficits listed in the problem list box on initial evaluation, provide pt/family education and to maximize pt's level of independence in the home and community environment.     Pt's spiritual, cultural and educational needs considered and pt agreeable to plan of care and goals.     Anticipated Barriers for therapy: compliance with post-op precautions     GOALS: Short Term Goals: 8 weeks  updated 5/22/23  MET  1.Report decreased in pain at worse less than  <   / =  6  /10  to increase tolerance for functional mobility. MET  2. Pt to improve PROM of flexion, scaption and abduction to 90deg and IR/ER to 30deg and R elbow to 0-130deg to allow for improved functional mobility to allow for improvement in IADLs. Met 4/12/23  3. Increased BUE MMT 1/2 grade to increase tolerance for ADL and work activities.  MET  4. Pt to report be conscious of impaired sitting and standing posture daily to decrease pain. MET  5. Pt to tolerate HEP to improve ROM and independence with ADL's.  MET     Long Term Goals: 16 weeks   in progress  1.Report decreased in pain at worse less than  <   / =  4  /10  to increase tolerance for functional mobility. On going  2.  Patient will report clinically significant improvements on FOTO score.On going  3.Increased BUE MMT 1 grade to increase tolerance for ADL and work activities.On going  4. Pt to report and  demonstrate proper posture in standing and sitting to decrease pain. On going  5. Pt to be Independent with HEP to improve ROM and independence with ADL's.On going  6. Pt to improve AROM of flexion, scaption and abduction to 150deg and IR/ER to 60deg and R elbow to 0-150deg to allow for improved functional mobility to allow for improvement in IADLs. On going    PLAN     Progress mobility and postural strengthening as well as stabilization.     Rebeca Richardson, PTA  07/05/2023

## 2023-07-05 NOTE — PROGRESS NOTES
Follow up visit    History of Present Illness:   Tony comes to the office for follow up evaluation of bilateral knee pain.  In the past he has gotten good relief with corticosteroid injections to the right knee and Euflexxa series to the left knee.  Euflexxa is not helpful to the right knee and corticosteroid injections have not been helpful to the left knee.  He is currently having pain to both knees and would like to discuss repeating injections.    ROS: unremarkable and no change since last visit    Physical Examination:    NAD  Left and Right knee  No swelling or erythema  Tender palpation medial joint line   No change in range of motion    Radiographic imaging:  No new, previous x-rays show kl 3 osteoarthritis of bilateral knees    Assessment/Plan:  42 y.o. male  with Bilateral knee osteoarthritis    We discussed the etiology of persistent pain and further treatment options.  Injection of the right knee  performed, please see procedure note for more details.  Prior to the injection risks and benefits of corticosteroid injection were discussed with the patient including pain, infection, bleeding, skin color changes, swelling, steroid flare. We discussed that over time injections can result in chondral damage, acceleration of arthritis formation, damage to tendons and damage to joints.  The patient consented for the procedure.  Post-injection instructions were given to the patient in writing.  Pre authorization for Euflexxa series for the left knee placed  Return for follow up visit: 3 weeks    All questions were answered in detail. The patient  verbalized the understanding of the treatment plan and is in full agreement with the treatment plan.    MEDICAL NECESSITY FOR VISCOSUPPLEMENTATION:  A thorough evaluation of the patient, have determined that viscosupplementation is medically necessary.  The patient has painful DJD of the knee with failure of conservative therapy including lifestyle modifications and  rehabilitation exercises.  Oral analgesics/NSAIDs have not adequately controlled symptoms and there is radiographic evidence of joint space narrowing, subchondral sclerosis, and osteophyte formation - Kellgren Tj grade 2 or greater.

## 2023-07-06 ENCOUNTER — LAB VISIT (OUTPATIENT)
Dept: LAB | Facility: HOSPITAL | Age: 43
End: 2023-07-06
Attending: INTERNAL MEDICINE
Payer: COMMERCIAL

## 2023-07-06 DIAGNOSIS — E78.5 HYPERLIPIDEMIA, UNSPECIFIED HYPERLIPIDEMIA TYPE: ICD-10-CM

## 2023-07-06 DIAGNOSIS — Z79.4 TYPE 2 DIABETES MELLITUS WITHOUT COMPLICATION, WITH LONG-TERM CURRENT USE OF INSULIN: ICD-10-CM

## 2023-07-06 DIAGNOSIS — E11.9 TYPE 2 DIABETES MELLITUS WITHOUT COMPLICATION, WITH LONG-TERM CURRENT USE OF INSULIN: ICD-10-CM

## 2023-07-06 LAB
CHOLEST SERPL-MCNC: 142 MG/DL (ref 120–199)
CHOLEST/HDLC SERPL: 2.5 {RATIO} (ref 2–5)
ESTIMATED AVG GLUCOSE: 166 MG/DL (ref 68–131)
HBA1C MFR BLD: 7.4 % (ref 4–5.6)
HDLC SERPL-MCNC: 57 MG/DL (ref 40–75)
HDLC SERPL: 40.1 % (ref 20–50)
LDLC SERPL CALC-MCNC: 72.8 MG/DL (ref 63–159)
NONHDLC SERPL-MCNC: 85 MG/DL
TRIGL SERPL-MCNC: 61 MG/DL (ref 30–150)

## 2023-07-06 PROCEDURE — 36415 COLL VENOUS BLD VENIPUNCTURE: CPT | Mod: PO | Performed by: NURSE PRACTITIONER

## 2023-07-06 PROCEDURE — 80061 LIPID PANEL: CPT | Performed by: NURSE PRACTITIONER

## 2023-07-06 PROCEDURE — 83036 HEMOGLOBIN GLYCOSYLATED A1C: CPT | Performed by: NURSE PRACTITIONER

## 2023-07-07 ENCOUNTER — CLINICAL SUPPORT (OUTPATIENT)
Dept: REHABILITATION | Facility: HOSPITAL | Age: 43
End: 2023-07-07
Attending: ORTHOPAEDIC SURGERY
Payer: OTHER MISCELLANEOUS

## 2023-07-07 DIAGNOSIS — Z98.890 S/P RIGHT ROTATOR CUFF REPAIR: Primary | ICD-10-CM

## 2023-07-07 DIAGNOSIS — M67.813 BICEPS TENDONOSIS OF RIGHT SHOULDER: ICD-10-CM

## 2023-07-07 DIAGNOSIS — M25.611 DECREASED RANGE OF MOTION OF RIGHT SHOULDER: ICD-10-CM

## 2023-07-07 DIAGNOSIS — R29.898 IMPAIRED STRENGTH OF SHOULDER MUSCLES: ICD-10-CM

## 2023-07-07 PROCEDURE — 97110 THERAPEUTIC EXERCISES: CPT | Mod: PN,CQ

## 2023-07-07 PROCEDURE — 97530 THERAPEUTIC ACTIVITIES: CPT | Mod: PN,CQ

## 2023-07-07 PROCEDURE — 97140 MANUAL THERAPY 1/> REGIONS: CPT | Mod: PN,CQ

## 2023-07-07 RX ORDER — TRIAMCINOLONE ACETONIDE 40 MG/ML
40 INJECTION, SUSPENSION INTRA-ARTICULAR; INTRAMUSCULAR
Status: DISCONTINUED | OUTPATIENT
Start: 2023-07-05 | End: 2023-07-07 | Stop reason: HOSPADM

## 2023-07-07 NOTE — PROGRESS NOTES
[facility]   Physical Therapy progress Note     Name: Tony Gordillo  Clinic Number: [id number]    Therapy Diagnosis:   Encounter Diagnoses   Name Primary?    S/P right rotator cuff repair Yes    Biceps tendonosis of right shoulder     Decreased range of motion of right shoulder     Impaired strength of shoulder muscles        Physician: Crissy Bradford MD    Visit Date: 7/7/2023    Physician Orders: PT Eval and Treat   Medical Diagnosis from Referral: S46.011A (ICD-10-CM) - Traumatic rotator cuff tear, right, initial encounter   Evaluation Date: 3/15/2023  Authorization Period Expiration:12/7/2023  Plan of Care Expiration: 8/1/23  Visit # / Visits authorized: 9/12   Progress Note Due: 7/26/23  FOTO: 1/1     Precautions: Diabetes;      POSTOPERATIVE PLAN: Plan to follow subscapularis-supraspinatus repair protocol (<3 cm in total size). Passive motion may begin at 4 weeks. Active motion at 6 weeks. No biceps resistive activity x 8 weeks. No cuff resistive activity x 12 weeks.      DATE OF PROCEDURE: 3/14/2023       PREOPERATIVE DIAGNOSES:   Right     1. Rotator cuff tear   2. Biceps tendinopathy    POSTOPERATIVE DIAGNOSES:   Right   1. Rotator cuff tear, full thickness involving the subscapularis and supraspinatus,   2. Biceps tendinopathy     OPERATION:   Right Shoulder  1. Arthroscopic  subscapularis and supraspinatus rotator cuff repair   2. Arthroscopic biceps tenodesis  3. 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    He was compliant with home exercise program.  Response to  "previous treatment: soreness  Functional change: improved ADL's    Pain: 0/10  Location: right shoulder      OBJECTIVE   16 weeks and 3 days 07/07/2023      TREATMENT     Tony received the treatments listed below:      Bold = performed today     Tony received therapeutic exercises to develop strength, endurance, ROM, flexibility, posture, and core stabilization for 30 minutes including:    UBE 3min fwd, 3 min bwd  Pulley 5'  Freemotion:   Shld Ext 13# 3x10   Shld Rows 13# 3x10   Shld IR 13# 3x10   Shld ER 3# 10x  Bilateral ER w/ RTB 3x10  +Side lying shoulder abduction +2# 3x10   Sidelying ER 3 lb 2x15    Resume NV:   Seated thoracic extension over 1/2 foam roll 10" 3x10   External rotation with wand 5" x 5 minutes    Tony received the following manual therapy techniques: Soft tissue Mobilization were applied to the: R shoulder complex for 15 minutes, including:    AP GHJ mobilization grade Ii-iii  Inferior GHJ mobilization grade ii-iii    NP:  External rotation passive range of motion   Manual GH PROM   Inferior grade I-ii GHJ mobilization  Scapular mobilization all direction  1st rib mobilization grade ii-iii    neuromuscular re-education activities to improve: Balance, Coordination, Kinesthetic, Sense, Proprioception, and Posture for 15 minutes. The following activities were included:    Prone Lvl 2 middle trap 5" 3X10   Prone lvl 2 lower trap 5" 3x10   Landmine press 7# 3x10     Prone extension 5" 3x10   Supine serratus punch 2# dowel 3x10   Hitchhiker into shoulder scaption 2x10   Body blade external rotation/external rotation 30"x4  IR walkouts GTB 3x 15  ER walkouts GTB 3x 15  Rhythmic stabilization supine at 90 deg 3x30"      PATIENT EDUCATION AND HOME EXERCISES     Home Exercises Provided and Patient Education Provided     Education provided:   - updated HEP   - post op precautions     Written Home Exercises Provided: yes. Exercises were reviewed and Tony was able to demonstrate them prior to the end of the " session.  Tony demonstrated good  understanding of the education provided. See EMR under Patient Instructions for exercises provided during therapy sessions    ASSESSMENT   Tony tolerated the above tx session well with minimal c/o pain. Continued with GHJ joint mobilizations to improve arthro kinematics as well as therEx for strength, stability, and ROM. Progressed periscapular and RTC strengthening. Patient continues to have greatest difficulty with shoulder abduction which improves with LLLD stretch (+MHP) and MT techniques. Patient responded well to treatment session without increase in pain.     Pt prognosis is Guarded.     Pt will continue to benefit from skilled outpatient physical therapy to address the deficits listed in the problem list box on initial evaluation, provide pt/family education and to maximize pt's level of independence in the home and community environment.     Pt's spiritual, cultural and educational needs considered and pt agreeable to plan of care and goals.     Anticipated Barriers for therapy: compliance with post-op precautions     GOALS: Short Term Goals: 8 weeks  updated 5/22/23  MET  1.Report decreased in pain at worse less than  <   / =  6  /10  to increase tolerance for functional mobility. MET  2. Pt to improve PROM of flexion, scaption and abduction to 90deg and IR/ER to 30deg and R elbow to 0-130deg to allow for improved functional mobility to allow for improvement in IADLs. Met 4/12/23  3. Increased BUE MMT 1/2 grade to increase tolerance for ADL and work activities.  MET  4. Pt to report be conscious of impaired sitting and standing posture daily to decrease pain. MET  5. Pt to tolerate HEP to improve ROM and independence with ADL's.  MET     Long Term Goals: 16 weeks   in progress  1.Report decreased in pain at worse less than  <   / =  4  /10  to increase tolerance for functional mobility. On going  2.  Patient will report clinically significant improvements on FOTO score.On  going  3.Increased BUE MMT 1 grade to increase tolerance for ADL and work activities.On going  4. Pt to report and demonstrate proper posture in standing and sitting to decrease pain. On going  5. Pt to be Independent with HEP to improve ROM and independence with ADL's.On going  6. Pt to improve AROM of flexion, scaption and abduction to 150deg and IR/ER to 60deg and R elbow to 0-150deg to allow for improved functional mobility to allow for improvement in IADLs. On going    PLAN     Progress mobility and postural strengthening as well as stabilization.     Rebeca Richardson, PTA  07/07/2023

## 2023-07-10 ENCOUNTER — OFFICE VISIT (OUTPATIENT)
Dept: ENDOCRINOLOGY | Facility: CLINIC | Age: 43
End: 2023-07-10
Payer: COMMERCIAL

## 2023-07-10 ENCOUNTER — CLINICAL SUPPORT (OUTPATIENT)
Dept: REHABILITATION | Facility: HOSPITAL | Age: 43
End: 2023-07-10
Attending: ORTHOPAEDIC SURGERY
Payer: OTHER MISCELLANEOUS

## 2023-07-10 VITALS
HEART RATE: 93 BPM | TEMPERATURE: 98 F | DIASTOLIC BLOOD PRESSURE: 90 MMHG | BODY MASS INDEX: 38.92 KG/M2 | SYSTOLIC BLOOD PRESSURE: 138 MMHG | WEIGHT: 295 LBS

## 2023-07-10 DIAGNOSIS — E66.01 SEVERE OBESITY (BMI 35.0-39.9) WITH COMORBIDITY: ICD-10-CM

## 2023-07-10 DIAGNOSIS — R29.898 IMPAIRED STRENGTH OF SHOULDER MUSCLES: ICD-10-CM

## 2023-07-10 DIAGNOSIS — M67.813 BICEPS TENDONOSIS OF RIGHT SHOULDER: ICD-10-CM

## 2023-07-10 DIAGNOSIS — E78.5 HYPERLIPIDEMIA, UNSPECIFIED HYPERLIPIDEMIA TYPE: ICD-10-CM

## 2023-07-10 DIAGNOSIS — Z98.890 S/P RIGHT ROTATOR CUFF REPAIR: Primary | ICD-10-CM

## 2023-07-10 DIAGNOSIS — M25.611 DECREASED RANGE OF MOTION OF RIGHT SHOULDER: ICD-10-CM

## 2023-07-10 DIAGNOSIS — Z79.4 TYPE 2 DIABETES MELLITUS WITHOUT COMPLICATION, WITH LONG-TERM CURRENT USE OF INSULIN: Primary | ICD-10-CM

## 2023-07-10 DIAGNOSIS — M17.0 PRIMARY OSTEOARTHRITIS OF BOTH KNEES: Primary | ICD-10-CM

## 2023-07-10 DIAGNOSIS — E11.9 TYPE 2 DIABETES MELLITUS WITHOUT COMPLICATION, WITH LONG-TERM CURRENT USE OF INSULIN: Primary | ICD-10-CM

## 2023-07-10 DIAGNOSIS — S46.011A TRAUMATIC ROTATOR CUFF TEAR, RIGHT, INITIAL ENCOUNTER: ICD-10-CM

## 2023-07-10 PROCEDURE — 99215 PR OFFICE/OUTPT VISIT, EST, LEVL V, 40-54 MIN: ICD-10-PCS | Mod: S$GLB,,, | Performed by: NURSE PRACTITIONER

## 2023-07-10 PROCEDURE — 95251 PR GLUCOSE MONITOR, 72 HOUR, PHYS INTERP: ICD-10-PCS | Mod: S$GLB,,, | Performed by: NURSE PRACTITIONER

## 2023-07-10 PROCEDURE — 99999 PR PBB SHADOW E&M-EST. PATIENT-LVL V: CPT | Mod: PBBFAC,,, | Performed by: NURSE PRACTITIONER

## 2023-07-10 PROCEDURE — 95251 CONT GLUC MNTR ANALYSIS I&R: CPT | Mod: S$GLB,,, | Performed by: NURSE PRACTITIONER

## 2023-07-10 PROCEDURE — 99999 PR PBB SHADOW E&M-EST. PATIENT-LVL V: ICD-10-PCS | Mod: PBBFAC,,, | Performed by: NURSE PRACTITIONER

## 2023-07-10 PROCEDURE — 99215 OFFICE O/P EST HI 40 MIN: CPT | Mod: S$GLB,,, | Performed by: NURSE PRACTITIONER

## 2023-07-10 PROCEDURE — 97110 THERAPEUTIC EXERCISES: CPT | Mod: PN

## 2023-07-10 PROCEDURE — 97140 MANUAL THERAPY 1/> REGIONS: CPT | Mod: PN

## 2023-07-10 PROCEDURE — 97112 NEUROMUSCULAR REEDUCATION: CPT | Mod: PN

## 2023-07-10 RX ORDER — TIRZEPATIDE 5 MG/.5ML
5 INJECTION, SOLUTION SUBCUTANEOUS
Qty: 4 PEN | Refills: 5 | Status: SHIPPED | OUTPATIENT
Start: 2023-07-10 | End: 2024-01-11

## 2023-07-10 RX ORDER — INSULIN ASPART 100 [IU]/ML
INJECTION, SOLUTION INTRAVENOUS; SUBCUTANEOUS
Qty: 30 ML | Refills: 5 | Status: SHIPPED | OUTPATIENT
Start: 2023-07-10 | End: 2024-01-11 | Stop reason: SDUPTHER

## 2023-07-10 RX ORDER — INSULIN PMP CART,AUT,G6/7,CNTR
1 EACH SUBCUTANEOUS DAILY
Qty: 6 EACH | Refills: 0 | Status: SHIPPED | OUTPATIENT
Start: 2023-07-10 | End: 2023-08-21

## 2023-07-10 RX ORDER — INSULIN GLARGINE 300 U/ML
30 INJECTION, SOLUTION SUBCUTANEOUS DAILY
Qty: 3 PEN | Refills: 5 | Status: SHIPPED | OUTPATIENT
Start: 2023-07-10 | End: 2024-01-11 | Stop reason: SDUPTHER

## 2023-07-10 RX ORDER — TIRZEPATIDE 2.5 MG/.5ML
2.5 INJECTION, SOLUTION SUBCUTANEOUS
COMMUNITY
End: 2023-07-10

## 2023-07-10 NOTE — PATIENT INSTRUCTIONS
Continue Mounjaro 5 mg once weekly. Advised against aspirating from Mounjaro pen.   Continue Jardiance and Toujeo.   Continue Fiasp 30 units prior to meals with carbs, recommend 20 units before high protein meals, 15-20 units before snacks.     Avoid eating out/high fat meals/bedtime snacking.     Rx for Omnipod 5 sent to [facility] to check cost and coverage. Pt understands he is not to start Omnipod on his own. If it's covered, he is to contact office so that he can scheduled with [employer] for insulin pump evaluation.     Return to clinic in 3 months with labs prior.

## 2023-07-10 NOTE — PROGRESS NOTES
[facility]   Physical Therapy progress Note     Name: Tony Gordillo  Clinic Number: [id number]    Therapy Diagnosis:   Encounter Diagnoses   Name Primary?    S/P right rotator cuff repair Yes    Biceps tendonosis of right shoulder     Decreased range of motion of right shoulder     Impaired strength of shoulder muscles          Physician: Crissy Bradford MD    Visit Date: 7/10/2023    Physician Orders: PT Eval and Treat   Medical Diagnosis from Referral: S46.011A (ICD-10-CM) - Traumatic rotator cuff tear, right, initial encounter   Evaluation Date: 3/15/2023  Authorization Period Expiration:12/7/2023  Plan of Care Expiration: 8/1/23  Visit # / Visits authorized: 10/12   Progress Note Due: 7/26/23  FOTO: 1/1     Precautions: Diabetes;      POSTOPERATIVE PLAN: Plan to follow subscapularis-supraspinatus repair protocol (<3 cm in total size). Passive motion may begin at 4 weeks. Active motion at 6 weeks. No biceps resistive activity x 8 weeks. No cuff resistive activity x 12 weeks.      DATE OF PROCEDURE: 3/14/2023       PREOPERATIVE DIAGNOSES:   Right     1. Rotator cuff tear   2. Biceps tendinopathy    POSTOPERATIVE DIAGNOSES:   Right   1. Rotator cuff tear, full thickness involving the subscapularis and supraspinatus,   2. Biceps tendinopathy     OPERATION:   Right Shoulder  1. Arthroscopic  subscapularis and supraspinatus rotator cuff repair   2. Arthroscopic biceps tenodesis  3. 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 He has been working on heat and stretching at home and he feels  "good and then he will be a little sore after.     He was compliant with home exercise program.  Response to previous treatment: soreness  Functional change: improved ADL's    Pain: 0/10  Location: right shoulder      OBJECTIVE   16 weeks and 6 days 07/10/2023      TREATMENT     Tony received the treatments listed below:      Bold = performed today     Tony received therapeutic exercises to develop strength, endurance, ROM, flexibility, posture, and core stabilization for 36 minutes including:    UBE 3min fwd, 3 min bwd  Pulley 5'  +bilateral shoulder external rotation yellow theraband with shoulder elevation 2x10   Sidelying ER 3 lb 3x10  Wall clocks yellow theraband 2x10  LLLD abduction with noist heat and 4# x5'     Resume NV:  Side lying shoulder abduction 2# 3x10   Bilateral ER w/ RTB 3x10  Freemotion:   Shld Ext 13# 3x10   Shld Rows 13# 3x10   Shld IR 13# 3x10   Shld ER 3# 10x   Seated thoracic extension over 1/2 foam roll 10" 3x10   External rotation with wand 5" x 5 minutes    Tony received the following manual therapy techniques: Soft tissue Mobilization were applied to the: R shoulder complex for 10 minutes, including:    AP GHJ mobilization grade Ii-iii  Inferior GHJ mobilization grade ii-iii      neuromuscular re-education activities to improve: Balance, Coordination, Kinesthetic, Sense, Proprioception, and Posture for 25 minutes. The following activities were included:    Body blade external rotation/external rotation 30"x5  ProneT 5" 3X10   Prone Y  5" 3x10   Wall ABCs red weighted ball x2   IR walkouts GTB 3x 15  ER walkouts GTB 3x 15  Landmine press 7# 3x10     Prone extension 5" 3x10   Supine serratus punch 2# dowel 3x10   Hitchhiker into shoulder scaption 2x10       Rhythmic stabilization supine at 90 deg 3x30"      PATIENT EDUCATION AND HOME EXERCISES     Home Exercises Provided and Patient Education Provided     Education provided:   - updated HEP   - post op precautions     Written Home Exercises " Provided: yes. Exercises were reviewed and Tony was able to demonstrate them prior to the end of the session.  Tony demonstrated good  understanding of the education provided. See EMR under Patient Instructions for exercises provided during therapy sessions    ASSESSMENT     Patient arrived to therapy without pain. Some popping felt with active range of motion and passive range of motion between  degrees without pain. PT continued with postural and RTC strengthening. Patient able to improve to ~120 degrees of shoulder abduction at end of session before popping sensation. Patient has 2 more approved visits at this time and PT messaged referring provider for more visits.     Pt prognosis is Guarded.     Pt will continue to benefit from skilled outpatient physical therapy to address the deficits listed in the problem list box on initial evaluation, provide pt/family education and to maximize pt's level of independence in the home and community environment.     Pt's spiritual, cultural and educational needs considered and pt agreeable to plan of care and goals.     Anticipated Barriers for therapy: compliance with post-op precautions     GOALS: Short Term Goals: 8 weeks  updated 5/22/23  MET  1.Report decreased in pain at worse less than  <   / =  6  /10  to increase tolerance for functional mobility. MET  2. Pt to improve PROM of flexion, scaption and abduction to 90deg and IR/ER to 30deg and R elbow to 0-130deg to allow for improved functional mobility to allow for improvement in IADLs. Met 4/12/23  3. Increased BUE MMT 1/2 grade to increase tolerance for ADL and work activities.  MET  4. Pt to report be conscious of impaired sitting and standing posture daily to decrease pain. MET  5. Pt to tolerate HEP to improve ROM and independence with ADL's.  MET     Long Term Goals: 16 weeks   in progress  1.Report decreased in pain at worse less than  <   / =  4  /10  to increase tolerance for functional mobility. On  going  2.  Patient will report clinically significant improvements on FOTO score.On going  3.Increased BUE MMT 1 grade to increase tolerance for ADL and work activities.On going  4. Pt to report and demonstrate proper posture in standing and sitting to decrease pain. On going  5. Pt to be Independent with HEP to improve ROM and independence with ADL's.On going  6. Pt to improve AROM of flexion, scaption and abduction to 150deg and IR/ER to 60deg and R elbow to 0-150deg to allow for improved functional mobility to allow for improvement in IADLs. On going    PLAN     Progress mobility and postural strengthening as well as stabilization.     Scarlet Navarrete, PT,DPT  07/10/2023

## 2023-07-10 NOTE — PROGRESS NOTES
CC: This 42 y.o. White male  is here for evaluation of  T2DM along with comorbidities indicated in the Visit Diagnosis section of this encounter.    HPI: Tony Gordillo was diagnosed with T2DM in 2008. He was without health insurance for 2017 and mid 2018.           Prior visit 4/2023  Pt presented to ED earlier this week with severe n/v and diarrhea. He had just increased Mounjaro from 5 to 10 mg a few days prior.   He reports great glucose control on Mounjaro 5 mg and was able to reduce Fiasp from 40 to 20-30 units ac. There were nights he did not take Novolin N. Appetite was quite lower on Mounjaro as well.   Plan Discussed resuming Mounjaro at lower dose versus Ozempic and titrating to 2 mg. He preferred the latter for now. Advised:   Resume Toujeo at 38 units once daily at night and Jardiance now.   Resume Novolin N 14 units starting next week or when fasting glucoses start rising about 140.   Inject Fiasp 20-30 units before meals for now. Increase back to 40 units if blood sugars 2 hours after meals stay at 200s.   Resume Ozempic at 0.25 mg once weekly for 1 week.   Then increase to 0.5 mg once weekly for 3 weeks.   Then increase to 1 mg once weekly x 1 month.   Then increase to 2 mg once weekly. Ok to dial down to 36 clicks (= 1 mg) if 2 mg results in side effects.   Return to clinic in 3 months with labs prior.       Interval hx arrives with wife.   A1c is down from 8 to 7.4%.  5 lb weight loss since lov.   Pt did resume Ozempic after lov and titrated to 1 mg once weekly. Three weeks ago, he switched to Mounjaro. He took 1/2 the Mounjaro 10 mg pen weekly. He aspirated 1/2 the dose into an insulin syringe to inject 5 mg weekly. He wanted to switch to Mounjaro d/t high numbers on Ozempic. Appetite is suppressed for about 3 days after each Mounjaro dose.     He has been adjusting insulin doses as well. See below for  current doses.       LAST DIABETES EDUCATION: 2019    HOSPITALIZED FOR DIABETES -   "No.    DIABETES MEDICATIONS:    Jardiance 25 mg once daily   Toujeo Max 30 units hs     Fiasp 30 units ac - takes about 1x/day depending on diet or if he is eating more than 10 grams CHO      Novolin N 14 units between 8-10 pm - has taken it 3x/week if bedtime BG > 200         Missed doses - denies   Rotates injections: yes   Changes pen needles - yes     DM COMPLICATIONS: none    SIGNIFICANT DIABETES MED HISTORY:   Has previously used Novolin 70/30 morning only   glyburide--hypoglycemia  Metformin - diarrhea and vomiting (has not tried metformin XR)   Metformin topical - ineffective, stopped on his own 10/2021  Pioglitazone - altered mood, reportedly became angry   Novolin N at bedtime started 3/2022    Victoza - nausea and vomiting at 1.2 mg; felt "run down" at 0.6 mg.   Trulicity less effective than Ozempic, switched to Ozempic 4/2022, switched from Ozempic to Mounjaro 2/2023;  switched back to Ozempic 4/2023 d/t GI s/e on Mounjaro 10 mg.     SELF MONITORING BLOOD GLUCOSE: Checks blood glucose at home several times a day with [employer]ens[employer] CGM raul.     Previously used dexcom CGM     CGM interpretation:  glucoses overall at goal with highest BGs in the afternoon/evening d/t diet. No Brea Phenomenon noted. A few mild lows overnight after taking Novolin N. Pt is quite insulin resistant - BG spiked to low 200s today after injecting Novolog 30 units before eating a slice of toast (12 grams CHO).                 HYPOGLYCEMIC EPISODES:  as above      CURRENT DIET: drinks water mostly. Sometimes coffee at work.  Eats 2-3 meals/day.     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     PHYSICAL EXAM:  GENERAL: Well developed, well nourished. No acute distress.   PSYCH: AAOx3, appropriate mood and affect, conversant, " well-groomed. Judgement and insight good.   NEURO: Cranial nerves grossly intact. Speech clear, no tremor.   CHEST: Respirations even and unlabored.          Hemoglobin A1C   Date Value Ref Range Status   07/06/2023 7.4 (H) 4.0 - 5.6 % Final     Comment:     ADA Screening Guidelines:  5.7-6.4%  Consistent with prediabetes  >or=6.5%  Consistent with diabetes    High levels of fetal hemoglobin interfere with the HbA1C  assay. Heterozygous hemoglobin variants (HbS, HgC, etc)do  not significantly interfere with this assay.   However, presence of multiple variants may affect accuracy.     01/19/2023 8.0 (H) 4.0 - 5.6 % Final     Comment:     ADA Screening Guidelines:  5.7-6.4%  Consistent with prediabetes  >or=6.5%  Consistent with diabetes    High levels of fetal hemoglobin interfere with the HbA1C  assay. Heterozygous hemoglobin variants (HbS, HgC, etc)do  not significantly interfere with this assay.   However, presence of multiple variants may affect accuracy.     05/12/2022 8.8 (H) 4.0 - 5.6 % Final     Comment:     ADA Screening Guidelines:  5.7-6.4%  Consistent with prediabetes  >or=6.5%  Consistent with diabetes    High levels of fetal hemoglobin interfere with the HbA1C  assay. Heterozygous hemoglobin variants (HbS, HgC, etc)do  not significantly interfere with this assay.   However, presence of multiple variants may affect accuracy.       Lab Results   Component Value Date    TSH 2.562 01/19/2023    TSH 2.562 01/19/2023           Chemistry        Component Value Date/Time     04/03/2023 1548    K 4.8 04/03/2023 1548     04/03/2023 1548    CO2 17 (L) 04/03/2023 1548    BUN 17 04/03/2023 1548    CREATININE 1.1 04/03/2023 1548     (H) 04/03/2023 1548        Component Value Date/Time    CALCIUM 9.4 04/03/2023 1548    ALKPHOS 76 04/03/2023 1548    AST 17 04/03/2023 1548    ALT 29 04/03/2023 1548    BILITOT 0.7 04/03/2023 1548    ESTGFRAFRICA >60 03/25/2022 0617    EGFRNONAA >60 03/25/2022 0617          Latest Reference Range & Units Most Recent   BUN 6 - 20 mg/dL 17  4/3/23 15:48   Creatinine 0.5 - 1.4 mg/dL 1.1  4/3/23 15:48   eGFR >60 mL/min/1.73 m^2 >60  4/3/23 15:48     Lab Results   Component Value Date    LDLCALC 72.8 07/06/2023        Latest Reference Range & Units 07/06/23 10:05   Cholesterol 120 - 199 mg/dL 142   HDL 40 - 75 mg/dL 57   HDL/Cholesterol Ratio 20.0 - 50.0 % 40.1   LDL Cholesterol External 63.0 - 159.0 mg/dL 72.8   Non-HDL Cholesterol mg/dL 85   Total Cholesterol/HDL Ratio 2.0 - 5.0  2.5   Triglycerides 30 - 150 mg/dL 61       Lab Results   Component Value Date    MICALBCREAT 9.9 07/06/2023             ASSESSMENT and PLAN:    A1C GOAL: < 7 %     1. Type 2 diabetes mellitus without complication, with long-term current use of insulin  Continue Mounjaro 5 mg once weekly. Advised against aspirating from Mounjaro pen. New rx for Mounjaro 5 mg sent.   Continue Jardiance and Toujeo.   Continue Fiasp 30 units prior to meals with carbs; recommend at least 15-20 units before meals/snacks even if not eating carbs since protein intake is also spiking BGs. Pt is very insulin resistant.     Avoid eating out/high fat meals/bedtime snacking.     Rx for Omnipod 5 sent to [facility] to check cost and coverage. Pt understands he is not to start Omnipod on his own. If it's covered, he is to contact office so that he can scheduled with Education for insulin pump evaluation.     Return to clinic in 3 months with labs prior, sooner if pump is started.       insulin pump cart,automated,BT (OMNIPOD 5 G6 PODS, GEN 5,) Crtg    tirzepatide (MOUNJARO) 5 mg/0.5 mL PnIj    insulin glargine U-300 conc (TOUJEO MAX U-300 SOLOSTAR) 300 unit/mL (3 mL) insulin pen    insulin aspart U-100 (NOVOLOG) 100 unit/mL (3 mL) InPn pen    Hemoglobin A1C      2. Severe obesity (BMI 35.0-39.9) with comorbidity  tirzepatide (MOUNJARO) 5 mg/0.5 mL PnIj      3. Hyperlipidemia, unspecified hyperlipidemia type  Continue atorvastatin.                 Patient is testing blood sugars 4x/day and taking 3 injections of insulin a day. The patient's insulin treatment regimen requires frequent adjustments by the patient on the basis of therapeutic CGM testing results.       Orders Placed This Encounter   Procedures    Hemoglobin A1C     Standing Status:   Future     Standing Expiration Date:   9/7/2024        Follow up in about 3 months (around 10/10/2023).

## 2023-07-12 ENCOUNTER — CLINICAL SUPPORT (OUTPATIENT)
Dept: REHABILITATION | Facility: HOSPITAL | Age: 43
End: 2023-07-12
Attending: ORTHOPAEDIC SURGERY
Payer: OTHER MISCELLANEOUS

## 2023-07-12 ENCOUNTER — PATIENT MESSAGE (OUTPATIENT)
Dept: NEUROLOGY | Facility: CLINIC | Age: 43
End: 2023-07-12
Payer: COMMERCIAL

## 2023-07-12 DIAGNOSIS — Z98.890 S/P RIGHT ROTATOR CUFF REPAIR: Primary | ICD-10-CM

## 2023-07-12 DIAGNOSIS — M67.813 BICEPS TENDONOSIS OF RIGHT SHOULDER: ICD-10-CM

## 2023-07-12 DIAGNOSIS — R29.898 IMPAIRED STRENGTH OF SHOULDER MUSCLES: ICD-10-CM

## 2023-07-12 DIAGNOSIS — M25.611 DECREASED RANGE OF MOTION OF RIGHT SHOULDER: ICD-10-CM

## 2023-07-12 PROCEDURE — 97110 THERAPEUTIC EXERCISES: CPT | Mod: PN

## 2023-07-12 PROCEDURE — 97112 NEUROMUSCULAR REEDUCATION: CPT | Mod: PN

## 2023-07-12 NOTE — PROGRESS NOTES
[facility]   Physical Therapy progress Note     Name: Tony Gordillo  Clinic Number: [id number]    Therapy Diagnosis:   Encounter Diagnoses   Name Primary?    S/P right rotator cuff repair Yes    Biceps tendonosis of right shoulder     Decreased range of motion of right shoulder     Impaired strength of shoulder muscles          Physician: Crissy Bradford MD    Visit Date: 7/12/2023    Physician Orders: PT Eval and Treat   Medical Diagnosis from Referral: S46.011A (ICD-10-CM) - Traumatic rotator cuff tear, right, initial encounter   Evaluation Date: 3/15/2023  Authorization Period Expiration:12/7/2023  Plan of Care Expiration: 8/1/23  Visit # / Visits authorized: 10/12   Progress Note Due: 7/26/23  FOTO: 1/1     Precautions: Diabetes;      POSTOPERATIVE PLAN: Plan to follow subscapularis-supraspinatus repair protocol (<3 cm in total size). Passive motion may begin at 4 weeks. Active motion at 6 weeks. No biceps resistive activity x 8 weeks. No cuff resistive activity x 12 weeks.      DATE OF PROCEDURE: 3/14/2023       PREOPERATIVE DIAGNOSES:   Right     1. Rotator cuff tear   2. Biceps tendinopathy    POSTOPERATIVE DIAGNOSES:   Right   1. Rotator cuff tear, full thickness involving the subscapularis and supraspinatus,   2. Biceps tendinopathy     OPERATION:   Right Shoulder  1. Arthroscopic  subscapularis and supraspinatus rotator cuff repair   2. Arthroscopic biceps tenodesis  3. 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 He still has some popping with shoulder abduction     He was  "compliant with home exercise program.  Response to previous treatment: soreness  Functional change: improved ADL's    Pain: 0/10  Location: right shoulder      OBJECTIVE   17 weeks and 1 days 07/12/2023      TREATMENT     Tony received the treatments listed below:      Bold = performed today     Tony received therapeutic exercises to develop strength, endurance, ROM, flexibility, posture, and core stabilization for 25 minutes including:    UBE 3min fwd, 3 min bwd  Pulley 5'  Sidelying ER 3 lb 3x10  Wall clocks yellow theraband 2x10  LLLD abduction with moist heat and 4# x5'   Landmine press 9# 3x10     Resume NV:  Side lying shoulder abduction 2# 3x10   Bilateral ER w/ RTB 3x10  Freemotion:   Shld Ext 13# 3x10   Shld Rows 13# 3x10   Shld IR 13# 3x10   Shld ER 3# 10x   Seated thoracic extension over 1/2 foam roll 10" 3x10   External rotation with wand 5" x 5 minutes    Tony received the following manual therapy techniques: Soft tissue Mobilization were applied to the: R shoulder complex for 00 minutes, including:    AP GHJ mobilization grade Ii-iii  Inferior GHJ mobilization grade ii-iii      neuromuscular re-education activities to improve: Balance, Coordination, Kinesthetic, Sense, Proprioception, and Posture for 25 minutes. The following activities were included:    Body blade external rotation/external rotation 30"x5  ProneT 5" 3X10   Prone Y  5" 3x10   Prone extension 5" 3x10  Wall ABCs red weighted ball x2   IR walkouts GTB 3x 15  ER walkouts GTB 3x 15      Supine serratus punch 2# dowel 3x10   [place]hiker into shoulder scaption 2x10       Rhythmic stabilization supine at 90 deg 3x30"      PATIENT EDUCATION AND HOME EXERCISES     Home Exercises Provided and Patient Education Provided     Education provided:   - updated HEP   - post op precautions     Written Home Exercises Provided: yes. Exercises were reviewed and Tony was able to demonstrate them prior to the end of the session.  Tony demonstrated good  " understanding of the education provided. See EMR under Patient Instructions for exercises provided during therapy sessions    ASSESSMENT     Patient continues to display popping with shoulder abduction that improve with external rotation of upper extremity. Patient has 1 more visit approved after today. PT to cancel visit on 7/14 to allow shoulder to rest and not aggravate tendon with abduction as well as to allow time for more approval of visits. He was challenged with all postural strengthening today.     Pt prognosis is Guarded.     Pt will continue to benefit from skilled outpatient physical therapy to address the deficits listed in the problem list box on initial evaluation, provide pt/family education and to maximize pt's level of independence in the home and community environment.     Pt's spiritual, cultural and educational needs considered and pt agreeable to plan of care and goals.     Anticipated Barriers for therapy: compliance with post-op precautions     GOALS: Short Term Goals: 8 weeks  updated 5/22/23  MET  1.Report decreased in pain at worse less than  <   / =  6  /10  to increase tolerance for functional mobility. MET  2. Pt to improve PROM of flexion, scaption and abduction to 90deg and IR/ER to 30deg and R elbow to 0-130deg to allow for improved functional mobility to allow for improvement in IADLs. Met 4/12/23  3. Increased BUE MMT 1/2 grade to increase tolerance for ADL and work activities.  MET  4. Pt to report be conscious of impaired sitting and standing posture daily to decrease pain. MET  5. Pt to tolerate HEP to improve ROM and independence with ADL's.  MET     Long Term Goals: 16 weeks   in progress  1.Report decreased in pain at worse less than  <   / =  4  /10  to increase tolerance for functional mobility. On going  2.  Patient will report clinically significant improvements on FOTO score.On going  3.Increased BUE MMT 1 grade to increase tolerance for ADL and work activities.On  going  4. Pt to report and demonstrate proper posture in standing and sitting to decrease pain. On going  5. Pt to be Independent with HEP to improve ROM and independence with ADL's.On going  6. Pt to improve AROM of flexion, scaption and abduction to 150deg and IR/ER to 60deg and R elbow to 0-150deg to allow for improved functional mobility to allow for improvement in IADLs. On going    PLAN     Progress mobility and postural strengthening as well as stabilization.     Scarlet Navarrete, PT,DPT  07/12/2023

## 2023-07-14 DIAGNOSIS — G47.33 OSA (OBSTRUCTIVE SLEEP APNEA): Primary | ICD-10-CM

## 2023-07-14 DIAGNOSIS — R41.840 ATTENTION AND CONCENTRATION DEFICIT: ICD-10-CM

## 2023-07-14 RX ORDER — MODAFINIL 100 MG/1
TABLET ORAL
Qty: 30 TABLET | Refills: 2 | Status: SHIPPED | OUTPATIENT
Start: 2023-07-14 | End: 2023-10-26

## 2023-07-18 ENCOUNTER — PATIENT OUTREACH (OUTPATIENT)
Dept: ADMINISTRATIVE | Facility: HOSPITAL | Age: 43
End: 2023-07-18
Payer: COMMERCIAL

## 2023-07-18 ENCOUNTER — OFFICE VISIT (OUTPATIENT)
Dept: FAMILY MEDICINE | Facility: CLINIC | Age: 43
End: 2023-07-18
Payer: COMMERCIAL

## 2023-07-18 VITALS
BODY MASS INDEX: 39.35 KG/M2 | HEIGHT: 73 IN | WEIGHT: 296.88 LBS | HEART RATE: 93 BPM | TEMPERATURE: 98 F | SYSTOLIC BLOOD PRESSURE: 120 MMHG | OXYGEN SATURATION: 95 % | DIASTOLIC BLOOD PRESSURE: 80 MMHG

## 2023-07-18 DIAGNOSIS — R51.9 NONINTRACTABLE HEADACHE, UNSPECIFIED CHRONICITY PATTERN, UNSPECIFIED HEADACHE TYPE: ICD-10-CM

## 2023-07-18 DIAGNOSIS — Z20.822 EXPOSURE TO COVID-19 VIRUS: ICD-10-CM

## 2023-07-18 DIAGNOSIS — J06.9 VIRAL URI: Primary | ICD-10-CM

## 2023-07-18 DIAGNOSIS — Z79.4 TYPE 2 DIABETES MELLITUS WITH LEFT EYE AFFECTED BY MILD NONPROLIFERATIVE RETINOPATHY WITHOUT MACULAR EDEMA, WITH LONG-TERM CURRENT USE OF INSULIN: ICD-10-CM

## 2023-07-18 DIAGNOSIS — I10 ESSENTIAL HYPERTENSION: ICD-10-CM

## 2023-07-18 DIAGNOSIS — S06.9XAS MILD NEUROCOGNITIVE DISORDER DUE TO TRAUMATIC BRAIN INJURY: ICD-10-CM

## 2023-07-18 DIAGNOSIS — R79.89 LOW TESTOSTERONE IN MALE: ICD-10-CM

## 2023-07-18 DIAGNOSIS — E11.3292 TYPE 2 DIABETES MELLITUS WITH LEFT EYE AFFECTED BY MILD NONPROLIFERATIVE RETINOPATHY WITHOUT MACULAR EDEMA, WITH LONG-TERM CURRENT USE OF INSULIN: ICD-10-CM

## 2023-07-18 DIAGNOSIS — F06.70 MILD NEUROCOGNITIVE DISORDER DUE TO TRAUMATIC BRAIN INJURY: ICD-10-CM

## 2023-07-18 DIAGNOSIS — E03.4 HYPOTHYROIDISM DUE TO ACQUIRED ATROPHY OF THYROID: ICD-10-CM

## 2023-07-18 DIAGNOSIS — E78.5 HYPERLIPIDEMIA LDL GOAL <70: ICD-10-CM

## 2023-07-18 LAB — SARS-COV-2 RNA RESP QL NAA+PROBE: NOT DETECTED

## 2023-07-18 PROCEDURE — 99214 PR OFFICE/OUTPT VISIT, EST, LEVL IV, 30-39 MIN: ICD-10-PCS | Mod: S$GLB,,, | Performed by: INTERNAL MEDICINE

## 2023-07-18 PROCEDURE — 87635 SARS-COV-2 COVID-19 AMP PRB: CPT | Performed by: INTERNAL MEDICINE

## 2023-07-18 PROCEDURE — 99214 OFFICE O/P EST MOD 30 MIN: CPT | Mod: S$GLB,,, | Performed by: INTERNAL MEDICINE

## 2023-07-18 PROCEDURE — 99999 PR PBB SHADOW E&M-EST. PATIENT-LVL V: CPT | Mod: PBBFAC,,, | Performed by: INTERNAL MEDICINE

## 2023-07-18 PROCEDURE — 99999 PR PBB SHADOW E&M-EST. PATIENT-LVL V: ICD-10-PCS | Mod: PBBFAC,,, | Performed by: INTERNAL MEDICINE

## 2023-07-18 RX ORDER — METHYLPREDNISOLONE 4 MG/1
TABLET ORAL
Qty: 1 EACH | Refills: 0 | Status: SHIPPED | OUTPATIENT
Start: 2023-07-18 | End: 2023-09-18

## 2023-07-18 NOTE — PROGRESS NOTES
Chief Complaint  Chief Complaint   Patient presents with    Sinus Problem       HPI  Tony Gordillo is a 42 y.o. male with multiple medical diagnoses as listed in the medical history and problem list that presents for sinus pain and headaches.  Their last appointment with primary care was Visit date not found.      Since Sunday, he has had headache, sinus pressure and overall feeling ill. Generalised headache towards the front of his head. Macarena seltzer effervescence for sinus - states that it helps him breath. Also uses Rhina pot. Takes ibruprofen and tylenol for headaches, but that hasn't helped. Took phenergan yesterday, which helped him for a little and was able to go to sleep. Also felt nauseous and almost vomited yesterday.     He has experienced this years before (about 20 years ago) and he was given antibiotics and steroids.     His children at home have been sick for about a week and they have been on antibiotics for about 5 days.     Denies fevers or chills.   Denies abdominal pain, diarrhoea, constipation.     ROS  Review of Systems   Constitutional:  Negative for chills and fever.   HENT:  Positive for congestion, sinus pressure and sinus pain. Negative for rhinorrhea and sore throat.    Eyes:  Positive for pain.   Respiratory:  Negative for cough and shortness of breath.    Cardiovascular:  Negative for chest pain.   Gastrointestinal:  Positive for nausea. Negative for abdominal pain, constipation, diarrhea and vomiting.   Neurological:  Positive for dizziness, light-headedness and headaches.     PAST MEDICAL HISTORY:  Past Medical History:   Diagnosis Date    Diabetes mellitus, type 2     Hyperlipidemia     Hypertension     Obesity     NAINA (obstructive sleep apnea)        PAST SURGICAL HISTORY:  Past Surgical History:   Procedure Laterality Date    ARTHROSCOPIC DEBRIDEMENT OF SHOULDER  3/14/2023    Procedure: DEBRIDEMENT, SHOULDER, ARTHROSCOPIC;  Surgeon: Crissy Bradford MD;  Location: [facility] OR;   Service: Orthopedics;;    ARTHROSCOPIC REPAIR OF ROTATOR CUFF OF SHOULDER Right 3/14/2023    Procedure: REPAIR, ROTATOR CUFF, ARTHROSCOPIC;  Surgeon: Crissy Bradford MD;  Location: [facility] OR;  Service: Orthopedics;  Laterality: Right;  KARY PAYNE [phone]. TEXTED ELMER ON 3/9/2023 @ 12:10PM. 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     Employer: [employer]   Tobacco Use    Smoking status: Never    Smokeless tobacco: Never   Substance and Sexual Activity    Alcohol use: Yes     Comment: 1-2 beers every 2 weeks    Drug use: No     Social Determinants of Health     Financial Resource Strain: Low Risk     Difficulty of Paying Living Expenses: Not hard at all   Food Insecurity: No Food Insecurity    Worried About Running Out of Food in the Last Year: Never true    Ran Out of Food in the Last Year: Never true   Transportation Needs: No Transportation Needs    Lack of Transportation (Medical): No    Lack of Transportation (Non-Medical): No   Physical Activity: Insufficiently Active    Days of Exercise per Week: 3 days    Minutes of Exercise per Session: 40 min   Stress: No Stress Concern Present    Feeling of Stress : Only a little   Social Connections: Unknown    Frequency of Communication with Friends and Family: More than three times a week    Frequency of Social Gatherings with Friends and Family: Once a week    Active Member of Clubs or Organizations: Patient refused    Attends Club or Organization Meetings: Patient  refused    Marital Status: Living with partner   Housing Stability: Low Risk     Unable to Pay for Housing in the Last Year: No    Number of Places Lived in the Last Year: 1    Unstable Housing in the Last Year: No       FAMILY HISTORY:  Family History   Problem Relation Age of Onset    Diabetes Mother     Diabetes Father     No Known Problems Brother     Autism Son     No Known Problems Daughter        ALLERGIES AND MEDICATIONS: updated and reviewed.  Review of patient's allergies indicates:   Allergen Reactions    Influenza virus vaccine, specific Hives    Pioglitazone      Altered mood     Victoza [liraglutide] Nausea And Vomiting    Farxiga [dapagliflozin] Rash     Erectile dysfunction and rash      Current Outpatient Medications   Medication Sig Dispense Refill    atorvastatin (LIPITOR) 40 MG tablet Take 1 tablet (40 mg total) by mouth once daily. 90 tablet 3    azelastine (ASTELIN) 137 mcg (0.1 %) nasal spray 1 spray (137 mcg total) by Nasal route 2 (two) times daily. 30 mL 3    azelastine 205.5 mcg (0.15 %) Spry azelastine 205.5 mcg (0.15 %) nasal spray  INHALE 2 SPRAYS TO EACH NOSTRIL TWICE A DAY 30 mL 11    blood sugar diagnostic Strp To check BG 4 times daily, to use with insurance preferred meter 400 strip 3    blood sugar diagnostic Strp OneTouch Ultra Test strips      blood-glucose meter kit OneTouch Ultra2 Meter kit      blood-glucose sensor (FREESTYLE SAMANTHA 3 SENSOR) Filomena Change every 14 days 2 each 11    cyanocobalamin, vitamin B-12, 5,000 mcg Subl Place one under tongue once a day for 1 month, then continue to take under tongue per week 30 tablet 3    diclofenac sodium (VOLTAREN) 1 % Gel Apply the gel (2 g) to the affected area 4 times daily. Do not apply more than 8 g daily to any one affected joint 100 g 1    empagliflozin (JARDIANCE) 25 mg tablet Take 1 tablet (25 mg total) by mouth once daily. 90 tablet 2    fenofibrate 160 MG Tab fenofibrate 160 mg tablet      fluticasone propionate (FLONASE)  "50 mcg/actuation nasal spray fluticasone propionate 50 mcg/actuation nasal spray,suspension 16 g 3    insulin aspart U-100 (NOVOLOG) 100 unit/mL (3 mL) InPn pen INJECT 15-30 UNITS BEFORE EACH MEAL WITH SLIDNING SCALE, MAX DAILY DOSE 100 UNITS 30 mL 5    insulin glargine U-300 conc (TOUJEO MAX U-300 SOLOSTAR) 300 unit/mL (3 mL) insulin pen Inject 30 Units into the skin once daily. 3 pen 5    insulin NPH isoph U-100 human (NOVOLIN N FLEXPEN) 100 unit/mL (3 mL) InPn INJECT 14 UNITS INTO THE SKIN ONCE DAILY AT NIGHT 8 PM. 15 mL 2    insulin pump cart,automated,BT (OMNIPOD 5 G6 PODS, GEN 5,) Crtg Inject 1 each into the skin once daily. 6 each 0    ketoconazole (NIZORAL) 2 % cream Apply topically once daily. 1 Tube 3    L-METHYLFOLATE 15 mg Tab TAKE 15 MG BY MOUTH ONCE DAILY. 30 tablet 11    lamoTRIgine (LAMICTAL) 100 MG tablet Take 1.5 tablets (150 mg total) by mouth once daily. 135 tablet 3    lancets Misc To check BG 4 times daily, to use with insurance preferred meter 400 each 3    levothyroxine (SYNTHROID) 50 MCG tablet TAKE 1 TABLET (50 MCG TOTAL) BY MOUTH BEFORE BREAKFAST. 90 tablet 2    lisinopriL (PRINIVIL,ZESTRIL) 20 MG tablet TAKE 1 TABLET BY MOUTH EVERY DAY 90 tablet 3    modafiniL (PROVIGIL) 100 MG Tab Take 1/4 tablet in AM for 1 week, then 1/2 tablet in AM for 1 week, then increase to 1 tablet in AM as need for attention/concentration/drowsiness 30 tablet 2    pen needle, diabetic (BD ULTRA-FINE KEN PEN NEEDLE) 32 gauge x 5/32" Ndle 1 Device by Misc.(Non-Drug; Combo Route) route 5 (five) times daily. 550 each 4    syringe, disposable, 1 mL Syrg Testosterone every 2 weeks 25 Syringe 1    tirzepatide (MOUNJARO) 5 mg/0.5 mL PnIj Inject 5 mg into the skin every 7 days. 4 pen 5    fluticasone-umeclidin-vilanter (TRELEGY ELLIPTA) 200-62.5-25 mcg inhaler Inhale 1 puff into the lungs once daily. Stop symbicort (Patient not taking: Reported on 7/18/2023) 60 each 5    methylPREDNISolone (MEDROL DOSEPACK) 4 mg tablet " "use as directed 1 each 0    ondansetron (ZOFRAN) 4 MG tablet Take 1 tablet (4 mg total) by mouth every 8 (eight) hours as needed (Nausea and vomiting). 12 tablet 0     No current facility-administered medications for this visit.         Physical Exam  Vitals:    07/18/23 1004   BP: 120/80   BP Location: Left arm   Patient Position: Sitting   BP Method: Large (Manual)   Pulse: 93   Temp: 98 °F (36.7 °C)   TempSrc: Oral   SpO2: 95%   Weight: 134.6 kg (296 lb 13.6 oz)   Height: 6' 1" (1.854 m)    Body mass index is 39.16 kg/m².  Weight: 134.6 kg (296 lb 13.6 oz)   Height: 6' 1" (185.4 cm)   Physical Exam  HENT:      Right Ear: Tympanic membrane normal.      Left Ear: Tympanic membrane normal.      Mouth/Throat:      Mouth: Mucous membranes are moist.      Pharynx: Oropharynx is clear.   Cardiovascular:      Rate and Rhythm: Normal rate and regular rhythm.      Pulses: Normal pulses.      Heart sounds: Normal heart sounds.   Pulmonary:      Effort: Pulmonary effort is normal. No respiratory distress.      Breath sounds: Wheezing present.      Comments: Bilateral wheezing that improves slightly with coughing.   Abdominal:      General: Abdomen is flat. Bowel sounds are normal. There is no distension.      Tenderness: There is no abdominal tenderness.   Musculoskeletal:         General: No swelling or deformity. Normal range of motion.   Skin:     General: Skin is warm.      Capillary Refill: Capillary refill takes less than 2 seconds.   Neurological:      Mental Status: He is alert.         Health Maintenance         Date Due Completion Date    Pneumococcal Vaccines (Age 0-64) (2 - PCV) 09/28/2019 9/28/2018    COVID-19 Vaccine (6 - Moderna series) 01/03/2022 11/8/2021    Foot Exam 02/16/2023 2/16/2022    Override on 12/11/2012: Done    Eye Exam 05/18/2023 5/18/2022    Influenza Vaccine (1) 09/01/2023 9/6/2019    Hemoglobin A1c 01/06/2024 7/6/2023    Override on 7/11/2015: Done (HGB A1C 9.9H - [facility]ULTS/[facility] - DR. MANN)    Diabetes Urine Screening 07/06/2024 7/6/2023    Lipid Panel 07/06/2024 7/6/2023    Override on 7/11/2015: Done ([facility] RESULTS - DR. MANN)    Low Dose Statin 07/18/2024 7/18/2023    TETANUS VACCINE 07/18/2029 7/18/2019              Assessment and Plan:  Tony Gordillo is a 43 y/o male who presents for sinus pressure and headache since Sunday.     Viral URI  Exposure to COVID-19 virus  Nonintractable headache, unspecified chronicity pattern, unspecified headache type  Symptoms ongoing since Sunday morning.He states that he has tried Macarena [place] effervescence and Rhina pot for the congestion that provided minimal relief. He has tried ibuprofen, tylenol and phenergen for headache, with minimal relief. States that his 2 children at home have also been sick for the past week.   Will swab for COVID today.   Methylprednisolone prescribed to possibly help with headache.   -     COVID-19 Routine Screening; Future; Expected date: 07/18/2023  -     methylPREDNISolone (MEDROL DOSEPACK) 4 mg tablet; use as directed  Dispense: 1 each; Refill: 0    Nadia Jo MS4  [facility]

## 2023-07-18 NOTE — PROGRESS NOTES
Health Maintenance and immunizations reviewed/ updated.  CALLED PT PERTAINING TO [facility] REFERRAL. (LVM)   Health Maintenance Due   Topic Date Due    Pneumococcal Vaccines (Age 0-64) (2 - PCV) 09/28/2019    COVID-19 Vaccine (6 - Moderna series) 01/03/2022    Foot Exam  02/16/2023    Eye Exam  05/18/2023

## 2023-07-18 NOTE — PROGRESS NOTES
This note was created by combination of typed  and M-Modal dictation.  Transcription errors may be present.  If there are any questions, please contact me.    Assessment and Plan:   Assessment and Plan   Viral URI  Exposure to COVID-19 virus  Nonintractable headache, unspecified chronicity pattern, unspecified headache type  -migraine from sinusitis? Or just plain migraine?  Viral illness by timing  Check covid; if positive would send in molnupiravir with covid risk score 4  Medrol dose pack for the headache.  -     COVID-19 Routine Screening; Future; Expected date: 07/18/2023  -     methylPREDNISolone (MEDROL DOSEPACK) 4 mg tablet; use as directed  Dispense: 1 each; Refill: 0    Hyperlipidemia LDL goal <70  -on statin  Lab Results   Component Value Date    CHOL 142 07/06/2023    TRIG 61 07/06/2023    HDL 57 07/06/2023    LDLCALC 72.8 07/06/2023    NONHDLCHOL 85 07/06/2023       Hypothyroidism due to acquired atrophy of thyroid  -on LT last check 1/2023 normal    Type 2 diabetes mellitus with left eye affected by mild nonproliferative retinopathy without macular edema, with long-term current use of insulin  -managed by DM NP, on jardiance, basal and mealtime insulin, mounjaro low dose, intolerant of higher dose    Low testosterone in male; pituitary axis normal. MRI negative. 11/2019 started on testosterone    Mild neurocognitive disorder due to traumatic brain injury  -followed by neuro. On methylfolate, lamictal and recent start on modafinil  Will need clearance from neuro to return back to work    Essential hypertension  -BP OK today.    Low testosterone   -on testosterone/DHEA, anastrozole, and another estrogen blocker, [facility] but out-of-pocket expense is high with current unemployment.    I am unable to assume management of these medications, is a be on my scope  Similarly I think endocrinology has previously expressed that such treatments would be beyond the scope  Will see if  there is anyone within the [facility] system would be familiar      Medications Discontinued During This Encounter   Medication Reason    oxyCODONE (ROXICODONE) 5 MG immediate release tablet     pregabalin (LYRICA) 75 MG capsule     montelukast (SINGULAIR) 10 mg tablet Therapy not effective       meds sent this encounter:         Follow Up:   Future Appointments   Date Time Provider Department Center   7/18/2023 10:00 AM Jovany Ford MD [facility] FAM MED [place]   7/19/2023  1:45 PM Scarlet Navarrete, PT [facility] [place]   7/21/2023 12:00 PM Scarlet Navarrete, PT [facility] OP [facility] [place]   7/26/2023 11:30 AM Crissy Bradford MD [facility] [place] [facility]   7/27/2023  1:00 PM Crissy Bradford MD [facility] ORTHO [place]   8/3/2023  1:30 PM Crissy Bradford MD [facility] [place] [facility]   8/14/2023  3:00 PM Crissy Bradford MD [facility] ORTHO [place] [facility]   10/3/2023  8:30 AM [facility] LAB [place]   10/10/2023 11:30 AM Kate Mansfield NP [facility] Cli          Subjective:   Subjective   No chief complaint on file.      GONZALES Jimenez is a 42 y.o. male.     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 Last visit with me 5/18/2022   To summarize last visit and events leading up to today:  HTN, lipid/statin  Incidental coronary artery calcifications on cardiac PET. Cardiac PET stress test was negative for any significant perfusion abnormalities.  Hypothyroid on LT  DM followed by DM NP  2/2023 changed to mounjaro  4/2023 mounjaro with SE at igher dose. Revert back to ozempic  But then reverted back to mounjaro due  to not control on ozempic  7/10 DM NP visit mounaro, jardiance, toujeo, fiasp  Hx of concussion; migraines; referred to neuro  Saw neuro 9/2022 11/2022 MRI brain neg  11/2022 EEG normal.  Saw neuropsych for testing. Results c/w hx of mod-severe TBI  Saw neuro 1/2023.  MCI. Hold on donepezil. Consider depakote in the future. 1/2023 started on lamictal  Saw neuro in f/u 4/2023. Start levo methylfolate; incr lamictal  7/3/23 messaged neuro - start modafenil  Hx of colitis, saw [facility] - due to quick resolution, did not require colonoscopy.   Fibroscan was ordered but not covered.  Covid long haul symptoms, improved to return to work 5/2022  Saw ENT for headaches, sinus congestion, hx of NAINA, eval for Inspire, rec's were need for weight loss. ordered PSG    3/2023 ortho for R rotator tear  3/14/23 R rotator repair    1/2023 labs  A1c 8.0 from 8.8  Overdue for f/u with NP Shyla  TSH WNL on dose 50 no changes  Results to pt via MyChart. No changes.  Seeing neuro about TBI with memory loss    Today's visit:  Please note: Chronic medical problems that are stable but are being addressed at this visit may not be listed/documented here, but may be addressed in more detail in the Assessment and Plan section.   Below are salient features of chronic medical conditions, or new/acute conditions and their details.    Sx started Sunday morning 2 days ago  Severe headache  Sinus congestion  Neti pot and say martin without relief  Sick children at home both initially dx'd with viral illness but one has been rx'd antibiotic  Similar symptoms with them but no headache  Has not been tested for covid  No chest congestion  Not taking singulair - ineffective  Astelin nasal spray without relief.    Has migraines does feel like this has migraine component    Currently out of work  [facility]  Gets testosterone cypionate combined with DHEA  And anastrazole  And another compound  Out of pocket  expense is too high currently  He is inquiring about whether I can assume care of these medications  I am not familiar with dosing/monitoring of these and am unable to assume management    Is inquiring about whether he can get release to return to work note from me  Currently out of work  Most immediate limitation is his shoulder, has not been cleared by ortho  He anticipates that occupational medicine will need clearance from me as well as his specialists  Has not sought clearance from his neurologist  Discussed that when he has obtained clearance from ortho and neuro then he can message me for clearance, as those are the main limiters for his return at this point.      Patient Care Team:  Jovany Ford MD as PCP - General (Internal Medicine)  Janneth Johnson RN (Inactive) as Registered Nurse (Diabetes)  Rocky Hewitt MD as Consulting Physician (Ophthalmology)  Preethi Monaco RD (Inactive) as Dietitian (Nutrition)  Christofer Rowley MD as Consulting Physician (Orthopedic Surgery)  Singh Rizo MD as Consulting Physician (Sports Medicine)  Shilpa Gordon NP as Nurse Practitioner (Urology)  Nicole Wynn MD (Family Medicine)  Rafael Meraz MA as Care Coordinator    Patient Active Problem List    Diagnosis Date Noted    S/P right rotator cuff repair 03/17/2023    Biceps tendonosis of right shoulder 03/17/2023    Decreased range of motion of right shoulder 03/17/2023    Impaired strength of shoulder muscles 03/17/2023    Traumatic rotator cuff tear, right, initial encounter 03/14/2023    Other amnesia 11/02/2022    Mild neurocognitive disorder due to traumatic brain injury 10/19/2022    Outbursts of anger 10/19/2022    Left hand pain 09/24/2022    Decreased strength, endurance, and mobility 03/08/2022    Dyspnea 02/18/2022      started after covid 19. cxr unremarkable.  Clinically improve with symbicort.  PFT with restrictive physiology with preserved dlco c/w habitus.  Stress echo  with ?ischemic changes on echo but ST changes on ekg.        Rib pain on left side 08/08/2021    Right foot pain 10/10/2019    Decreased strength of lower extremity 10/10/2019    Decreased range of motion of both ankles 10/10/2019    Gait abnormality 10/10/2019    Low testosterone in male; pituitary axis normal. MRI negative. 11/2019 started on testosterone 09/04/2019 11/06/2019 MRI pituitary with and without contrast from [facility]  Impression:  1.  No pituitary mass seen.  2.  Absence of the septum pellucidum.      NAINA (obstructive sleep apnea) 8/2019 sleep study AHI 11      -ahi of 11.  Stopped apap for over month due to gasping sensation.  ?excessive leakage a/w nasal congestion while having covid 19.  Nasal pantency is adequate.  Recommend resumption of apap.  However, APAP is being recall by [employer]  -we discussed at length about [employer] recall.  Patient already register his apap  -will switch to nasal pillow to alleviate pressure on ridge of nose.        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, with long-term current use of insulin     Essential hypertension     Hypothyroidism 07/29/2015    Hyperlipidemia LDL goal <70 06/03/2015    Insulin long-term use 06/03/2015    Tinea pedis 06/03/2015    Morbid obesity 06/03/2015       PAST MEDICAL PROBLEMS, PAST SURGICAL HISTORY: please see relevant portions of the electronic medical record    ALLERGIES AND MEDICATIONS: updated and reviewed.  Medication List with Changes/Refills   Current Medications    ATORVASTATIN (LIPITOR) 40 MG TABLET    Take 1 tablet (40 mg total) by  mouth once daily.    AZELASTINE (ASTELIN) 137 MCG (0.1 %) NASAL SPRAY    1 spray (137 mcg total) by Nasal route 2 (two) times daily.    AZELASTINE 205.5 MCG (0.15 %) SPRY    azelastine 205.5 mcg (0.15 %) nasal spray  INHALE 2 SPRAYS TO EACH NOSTRIL TWICE A DAY    BLOOD SUGAR DIAGNOSTIC STRP    To check BG 4 times daily, to use with insurance preferred meter    BLOOD SUGAR DIAGNOSTIC STRP    OneTouch Ultra Test strips    BLOOD-GLUCOSE METER KIT    OneTouch Ultra2 Meter kit    BLOOD-GLUCOSE SENSOR (FREESTYLE SAAMNTHA 3 SENSOR) CAT    Change every 14 days    CYANOCOBALAMIN, VITAMIN B-12, 5,000 MCG SUBL    Place one under tongue once a day for 1 month, then continue to take under tongue per week    DICLOFENAC SODIUM (VOLTAREN) 1 % GEL    Apply the gel (2 g) to the affected area 4 times daily. Do not apply more than 8 g daily to any one affected joint    EMPAGLIFLOZIN (JARDIANCE) 25 MG TABLET    Take 1 tablet (25 mg total) by mouth once daily.    FENOFIBRATE 160 MG TAB    fenofibrate 160 mg tablet    FLUTICASONE PROPIONATE (FLONASE) 50 MCG/ACTUATION NASAL SPRAY    fluticasone propionate 50 mcg/actuation nasal spray,suspension    FLUTICASONE-UMECLIDIN-VILANTER (TRELEGY ELLIPTA) 200-62.5-25 MCG INHALER    Inhale 1 puff into the lungs once daily. Stop symbicort    INSULIN ASPART U-100 (NOVOLOG) 100 UNIT/ML (3 ML) INPN PEN    INJECT 15-30 UNITS BEFORE EACH MEAL WITH SLIDNING SCALE, MAX DAILY DOSE 100 UNITS    INSULIN GLARGINE U-300 CONC (TOUJEO MAX U-300 SOLOSTAR) 300 UNIT/ML (3 ML) INSULIN PEN    Inject 30 Units into the skin once daily.    INSULIN NPH ISOPH U-100 HUMAN (NOVOLIN N FLEXPEN) 100 UNIT/ML (3 ML) INPN    INJECT 14 UNITS INTO THE SKIN ONCE DAILY AT NIGHT 8 PM.    INSULIN PUMP CART,AUTOMATED,BT (OMNIPOD 5 G6 PODS, GEN 5,) CRTG    Inject 1 each into the skin once daily.    KETOCONAZOLE (NIZORAL) 2 % CREAM    Apply topically once daily.    L-METHYLFOLATE 15 MG TAB    TAKE 15 MG BY MOUTH ONCE DAILY.    LAMOTRIGINE  "(LAMICTAL) 100 MG TABLET    Take 1.5 tablets (150 mg total) by mouth once daily.    LANCETS [facility]    To check BG 4 times daily, to use with insurance preferred meter    LEVOTHYROXINE (SYNTHROID) 50 MCG TABLET    TAKE 1 TABLET (50 MCG TOTAL) BY MOUTH BEFORE BREAKFAST.    LISINOPRIL (PRINIVIL,ZESTRIL) 20 MG TABLET    TAKE 1 TABLET BY MOUTH EVERY DAY    MODAFINIL (PROVIGIL) 100 MG TAB    Take 1/4 tablet in AM for 1 week, then 1/2 tablet in AM for 1 week, then increase to 1 tablet in AM as need for attention/concentration/drowsiness    MONTELUKAST (SINGULAIR) 10 MG TABLET    TAKE 1 TABLET BY MOUTH EVERY EVENING    ONDANSETRON (ZOFRAN) 4 MG TABLET    Take 1 tablet (4 mg total) by mouth every 8 (eight) hours as needed (Nausea and vomiting).    OXYCODONE (ROXICODONE) 5 MG IMMEDIATE RELEASE TABLET    Take 1 tablet (5 mg total) by mouth every 6 (six) hours as needed for Pain.    PEN NEEDLE, DIABETIC (BD ULTRA-FINE KEN PEN NEEDLE) 32 GAUGE X 5/32" NDLE    1 Device by Misc.(Non-Drug; Combo Route) route 5 (five) times daily.    PREGABALIN (LYRICA) 75 MG CAPSULE    Take 1 capsule (75 mg total) by mouth 2 (two) times daily. for 7 days    SYRINGE, DISPOSABLE, 1 ML SYRG    Testosterone every 2 weeks    TIRZEPATIDE (MOUNJARO) 5 MG/0.5 ML PNIJ    Inject 5 mg into the skin every 7 days.         Objective:   Objective   Physical Exam   Vitals:    07/18/23 1004   BP: 120/80   BP Location: Left arm   Patient Position: Sitting   BP Method: Large (Manual)   Pulse: 93   Temp: 98 °F (36.7 °C)   TempSrc: Oral   SpO2: 95%   Weight: 134.6 kg (296 lb 13.6 oz)   Height: 6' 1" (1.854 m)    Body mass index is 39.16 kg/m².            Physical Exam  Constitutional:       General: He is not in acute distress.     Appearance: He is well-developed.   HENT:      Head: Normocephalic.      Right Ear: Tympanic membrane and ear canal normal.      Left Ear: Tympanic membrane and ear canal normal.   Eyes:      General: No scleral icterus.        Right eye: No " discharge.         Left eye: No discharge.      Extraocular Movements: Extraocular movements intact.   Cardiovascular:      Rate and Rhythm: Normal rate and regular rhythm.      Heart sounds: Normal heart sounds. No murmur heard.  Pulmonary:      Effort: Pulmonary effort is normal.      Breath sounds: Normal breath sounds. No wheezing.   Musculoskeletal:         General: Normal range of motion.      Cervical back: Neck supple.   Lymphadenopathy:      Cervical: No cervical adenopathy.   Skin:     General: Skin is warm and dry.   Neurological:      Mental Status: He is alert and oriented to person, place, and time.      Coordination: Coordination normal.   Psychiatric:         Behavior: Behavior normal.         Thought Content: Thought content normal.

## 2023-07-19 ENCOUNTER — PATIENT MESSAGE (OUTPATIENT)
Dept: FAMILY MEDICINE | Facility: CLINIC | Age: 43
End: 2023-07-19
Payer: COMMERCIAL

## 2023-07-19 ENCOUNTER — CLINICAL SUPPORT (OUTPATIENT)
Dept: REHABILITATION | Facility: HOSPITAL | Age: 43
End: 2023-07-19
Attending: ORTHOPAEDIC SURGERY
Payer: OTHER MISCELLANEOUS

## 2023-07-19 DIAGNOSIS — M67.813 BICEPS TENDONOSIS OF RIGHT SHOULDER: ICD-10-CM

## 2023-07-19 DIAGNOSIS — R29.898 IMPAIRED STRENGTH OF SHOULDER MUSCLES: ICD-10-CM

## 2023-07-19 DIAGNOSIS — M25.611 DECREASED RANGE OF MOTION OF RIGHT SHOULDER: ICD-10-CM

## 2023-07-19 DIAGNOSIS — Z98.890 S/P RIGHT ROTATOR CUFF REPAIR: Primary | ICD-10-CM

## 2023-07-19 PROCEDURE — 97112 NEUROMUSCULAR REEDUCATION: CPT | Mod: PN

## 2023-07-19 PROCEDURE — 97110 THERAPEUTIC EXERCISES: CPT | Mod: PN

## 2023-07-19 NOTE — PROGRESS NOTES
Discussed with endo - no one with endo dept manages testosterone replacement that requires anything beyond testosterone.

## 2023-07-19 NOTE — PROGRESS NOTES
[facility]   Physical Therapy progress Note     Name: Tony Gordillo  Clinic Number: [id number]    Therapy Diagnosis:   Encounter Diagnoses   Name Primary?    S/P right rotator cuff repair Yes    Biceps tendonosis of right shoulder     Decreased range of motion of right shoulder     Impaired strength of shoulder muscles            Physician: Crissy Bradford MD    Visit Date: 7/19/2023    Physician Orders: PT Eval and Treat   Medical Diagnosis from Referral: S46.011A (ICD-10-CM) - Traumatic rotator cuff tear, right, initial encounter   Evaluation Date: 3/15/2023  Authorization Period Expiration:12/7/2023  Plan of Care Expiration: 8/1/23  Visit # / Visits authorized: 10/12   Progress Note Due: 7/26/23  FOTO: 1/1     Precautions: Diabetes;      POSTOPERATIVE PLAN: Plan to follow subscapularis-supraspinatus repair protocol (<3 cm in total size). Passive motion may begin at 4 weeks. Active motion at 6 weeks. No biceps resistive activity x 8 weeks. No cuff resistive activity x 12 weeks.      DATE OF PROCEDURE: 3/14/2023       PREOPERATIVE DIAGNOSES:   Right     1. Rotator cuff tear   2. Biceps tendinopathy    POSTOPERATIVE DIAGNOSES:   Right   1. Rotator cuff tear, full thickness involving the subscapularis and supraspinatus,   2. Biceps tendinopathy     OPERATION:   Right Shoulder  1. Arthroscopic  subscapularis and supraspinatus rotator cuff repair   2. Arthroscopic biceps tenodesis  3. 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 His shoulder is feeling fine  "though.     He was compliant with home exercise program.  Response to previous treatment: soreness  Functional change: improved ADL's    Pain: 0/10  Location: right shoulder      OBJECTIVE   17 weeks and 1 days 07/19/2023      TREATMENT     Tony received the treatments listed below:      Bold = performed today     Tony received therapeutic exercises to develop strength, endurance, ROM, flexibility, posture, and core stabilization for 50 minutes including:    UBE 3min fwd, 3 min bwd    Sidelying ER 3 lb 3x10  Wall clocks yellow theraband 2x10  LLLD abduction with moist heat and 4# x5'   Side lying shoulder abduction  3x10   +shoulder taps EOT 2x10 ea  Landmine press barbell 3x20   OH shoulder oscillations with yellow theraband 3xburn  15# kettlebell overhead farmer's carry 3 laps on turf    Resume NV:    Bilateral ER w/ RTB 3x10  Freemotion:   Shld Ext 13# 3x10   Shld Rows 13# 3x10   Shld IR 13# 3x10   Shld ER 3# 10x   Seated thoracic extension over 1/2 foam roll 10" 3x10   External rotation with wand 5" x 5 minutes    Tony received the following manual therapy techniques: Soft tissue Mobilization were applied to the: R shoulder complex for 00 minutes, including:    AP GHJ mobilization grade Ii-iii  Inferior GHJ mobilization grade ii-iii      neuromuscular re-education activities to improve: Balance, Coordination, Kinesthetic, Sense, Proprioception, and Posture for 10 minutes. The following activities were included:  Rhythmic stabilization in supine 30"x3  Body blade 90/90 30"x4  ProneT 5" 3X10   Prone Y  5" 3x10   Prone extension 5" 3x10      PATIENT EDUCATION AND HOME EXERCISES     Home Exercises Provided and Patient Education Provided     Education provided:   - updated HEP   - post op precautions     Written Home Exercises Provided: yes. Exercises were reviewed and Tony was able to demonstrate them prior to the end of the session.  Tony demonstrated good  understanding of the education provided. See EMR under " Patient Instructions for exercises provided during therapy sessions    ASSESSMENT     Patient arrived to clinic without pain in Right shoulder. He continues to have some popping at end range with shoulder abduction. PT progressed with shoulder strengthening and stabilization. Patient reported ease with landmine press with barbell and PT to consider adding weight at next visit. Patient was challenged with stabilization with OH kettlebell carry. Patient was not challenged with shoulder taps on EOT and PT to consider progressing to over ground at next visit.     Pt prognosis is Guarded.     Pt will continue to benefit from skilled outpatient physical therapy to address the deficits listed in the problem list box on initial evaluation, provide pt/family education and to maximize pt's level of independence in the home and community environment.     Pt's spiritual, cultural and educational needs considered and pt agreeable to plan of care and goals.     Anticipated Barriers for therapy: compliance with post-op precautions     GOALS: Short Term Goals: 8 weeks  updated 5/22/23  MET  1.Report decreased in pain at worse less than  <   / =  6  /10  to increase tolerance for functional mobility. MET  2. Pt to improve PROM of flexion, scaption and abduction to 90deg and IR/ER to 30deg and R elbow to 0-130deg to allow for improved functional mobility to allow for improvement in IADLs. Met 4/12/23  3. Increased BUE MMT 1/2 grade to increase tolerance for ADL and work activities.  MET  4. Pt to report be conscious of impaired sitting and standing posture daily to decrease pain. MET  5. Pt to tolerate HEP to improve ROM and independence with ADL's.  MET     Long Term Goals: 16 weeks   in progress  1.Report decreased in pain at worse less than  <   / =  4  /10  to increase tolerance for functional mobility. On going  2.  Patient will report clinically significant improvements on FOTO score.On going  3.Increased BUE MMT 1 grade to  increase tolerance for ADL and work activities.On going  4. Pt to report and demonstrate proper posture in standing and sitting to decrease pain. On going  5. Pt to be Independent with HEP to improve ROM and independence with ADL's.On going  6. Pt to improve AROM of flexion, scaption and abduction to 150deg and IR/ER to 60deg and R elbow to 0-150deg to allow for improved functional mobility to allow for improvement in IADLs. On going    PLAN     Progress mobility and postural strengthening as well as stabilization.     Scarlet Navarrete, PT,DPT  07/19/2023

## 2023-07-20 ENCOUNTER — PATIENT MESSAGE (OUTPATIENT)
Dept: NEUROLOGY | Facility: CLINIC | Age: 43
End: 2023-07-20
Payer: COMMERCIAL

## 2023-07-20 ENCOUNTER — PATIENT OUTREACH (OUTPATIENT)
Dept: ADMINISTRATIVE | Facility: HOSPITAL | Age: 43
End: 2023-07-20
Payer: COMMERCIAL

## 2023-07-20 ENCOUNTER — PATIENT MESSAGE (OUTPATIENT)
Dept: ENDOCRINOLOGY | Facility: CLINIC | Age: 43
End: 2023-07-20
Payer: COMMERCIAL

## 2023-07-20 DIAGNOSIS — Z79.4 TYPE 2 DIABETES MELLITUS WITHOUT COMPLICATION, WITH LONG-TERM CURRENT USE OF INSULIN: Primary | ICD-10-CM

## 2023-07-20 DIAGNOSIS — E11.9 TYPE 2 DIABETES MELLITUS WITHOUT COMPLICATION, WITH LONG-TERM CURRENT USE OF INSULIN: Primary | ICD-10-CM

## 2023-07-20 LAB
LEFT EYE DM RETINOPATHY: NEGATIVE
RIGHT EYE DM RETINOPATHY: NEGATIVE

## 2023-07-20 NOTE — PROGRESS NOTES
Health Maintenance and immunizations reviewed/ updated.  CALLED PT PERTAINING TO [facility] REFERRAL. (LVM) [facility] LETTER MAILED OUT.  Health Maintenance Due   Topic Date Due    Pneumococcal Vaccines (Age 0-64) (2 - PCV) 09/28/2019    COVID-19 Vaccine (6 - Moderna series) 01/03/2022    Foot Exam  02/16/2023    Eye Exam  05/18/2023

## 2023-07-21 ENCOUNTER — CLINICAL SUPPORT (OUTPATIENT)
Dept: REHABILITATION | Facility: HOSPITAL | Age: 43
End: 2023-07-21
Attending: ORTHOPAEDIC SURGERY
Payer: OTHER MISCELLANEOUS

## 2023-07-21 DIAGNOSIS — S46.011A TRAUMATIC ROTATOR CUFF TEAR, RIGHT, INITIAL ENCOUNTER: ICD-10-CM

## 2023-07-21 DIAGNOSIS — Z98.890 S/P RIGHT ROTATOR CUFF REPAIR: Primary | ICD-10-CM

## 2023-07-21 DIAGNOSIS — M25.611 DECREASED RANGE OF MOTION OF RIGHT SHOULDER: ICD-10-CM

## 2023-07-21 DIAGNOSIS — R29.898 IMPAIRED STRENGTH OF SHOULDER MUSCLES: ICD-10-CM

## 2023-07-21 DIAGNOSIS — M67.813 BICEPS TENDONOSIS OF RIGHT SHOULDER: ICD-10-CM

## 2023-07-21 PROCEDURE — 97110 THERAPEUTIC EXERCISES: CPT | Mod: PN

## 2023-07-21 PROCEDURE — 97140 MANUAL THERAPY 1/> REGIONS: CPT | Mod: PN

## 2023-07-21 NOTE — PROGRESS NOTES
[facility]   Physical Therapy progress Note     Name: Tony Gordillo  [facility] Number: [id number]    Therapy Diagnosis:   Encounter Diagnoses   Name Primary?    Traumatic rotator cuff tear, right, initial encounter     S/P right rotator cuff repair Yes    Biceps tendonosis of right shoulder     Decreased range of motion of right shoulder     Impaired strength of shoulder muscles          Physician: Crissy Bradford MD    Visit Date: 7/21/2023    Physician Orders: PT Eval and Treat   Medical Diagnosis from Referral: S46.011A (ICD-10-CM) - Traumatic rotator cuff tear, right, initial encounter   Evaluation Date: 3/15/2023  Authorization Period Expiration:1/18/2024  Plan of Care Expiration: 8/1/23  Visit # / Visits authorized: 1/6  Progress Note Due: 7/26/23  FOTO: 1/1     Precautions: Diabetes;      POSTOPERATIVE PLAN: Plan to follow subscapularis-supraspinatus repair protocol (<3 cm in total size). Passive motion may begin at 4 weeks. Active motion at 6 weeks. No biceps resistive activity x 8 weeks. No cuff resistive activity x 12 weeks.      DATE OF PROCEDURE: 3/14/2023       PREOPERATIVE DIAGNOSES:   Right     1. Rotator cuff tear   2. Biceps tendinopathy    POSTOPERATIVE DIAGNOSES:   Right   1. Rotator cuff tear, full thickness involving the subscapularis and supraspinatus,   2. Biceps tendinopathy     OPERATION:   Right Shoulder  1. Arthroscopic  subscapularis and supraspinatus rotator cuff repair   2. Arthroscopic biceps tenodesis  3. 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  "    He was compliant with home exercise program.  Response to previous treatment: soreness  Functional change: improved ADL's    Pain: 0/10  Location: right shoulder      OBJECTIVE   17 weeks and 1 days 07/21/2023      TREATMENT     Tony received the treatments listed below:      Bold = performed today     Tony received therapeutic exercises to develop strength, endurance, ROM, flexibility, posture, and core stabilization for 50 minutes including:    UBE 3min fwd, 3 min bwd lvl6    Sidelying ER 3 lb 3x10  Wall clocks yellow theraband 2x10  LLLD abduction with moist heat and 5# x5'   Side lying shoulder abduction +2# 3x10   +shoulder taps on 24" plyo 2x10 ea  Landmine press barbell 3x20   OH shoulder oscillations with green theraband 3xburn  15# kettlebell overhead farmer's carry 3 laps on turf  +Scaption with contralateral hold 3# 3x10       Tony received the following manual therapy techniques: Soft tissue Mobilization were applied to the: R shoulder complex for 00 minutes, including:    AP GHJ mobilization grade Ii-iii  Inferior GHJ mobilization grade ii-iii      neuromuscular re-education activities to improve: Balance, Coordination, Kinesthetic, Sense, Proprioception, and Posture for 10 minutes. The following activities were included:  Rhythmic stabilization in supine 30"x3  Body blade 90/90 30"x4  ProneT 5" +4X10   Prone Y  5" +3x10   Prone extension 5" 3x10      PATIENT EDUCATION AND HOME EXERCISES     Home Exercises Provided and Patient Education Provided     Education provided:   - updated HEP   - post op precautions     Written Home Exercises Provided: yes. Exercises were reviewed and Tony was able to demonstrate them prior to the end of the session.  Tony demonstrated good  understanding of the education provided. See EMR under Patient Instructions for exercises provided during therapy sessions    ASSESSMENT     Patient arrived to clinic without migraine or pain in shoulder. PT progressed strengthening and " patient was easily fatigued. He continues to display weakness of shoulder with weight bearing and scaption. PT to assess strength at next visit prior to MD visit.     Pt prognosis is Guarded.     Pt will continue to benefit from skilled outpatient physical therapy to address the deficits listed in the problem list box on initial evaluation, provide pt/family education and to maximize pt's level of independence in the home and community environment.     Pt's spiritual, cultural and educational needs considered and pt agreeable to plan of care and goals.     Anticipated Barriers for therapy: compliance with post-op precautions     GOALS: Short Term Goals: 8 weeks  updated 5/22/23  MET  1.Report decreased in pain at worse less than  <   / =  6  /10  to increase tolerance for functional mobility. MET  2. Pt to improve PROM of flexion, scaption and abduction to 90deg and IR/ER to 30deg and R elbow to 0-130deg to allow for improved functional mobility to allow for improvement in IADLs. Met 4/12/23  3. Increased BUE MMT 1/2 grade to increase tolerance for ADL and work activities.  MET  4. Pt to report be conscious of impaired sitting and standing posture daily to decrease pain. MET  5. Pt to tolerate HEP to improve ROM and independence with ADL's.  MET     Long Term Goals: 16 weeks   in progress  1.Report decreased in pain at worse less than  <   / =  4  /10  to increase tolerance for functional mobility. On going  2.  Patient will report clinically significant improvements on FOTO score.On going  3.Increased BUE MMT 1 grade to increase tolerance for ADL and work activities.On going  4. Pt to report and demonstrate proper posture in standing and sitting to decrease pain. On going  5. Pt to be Independent with HEP to improve ROM and independence with ADL's.On going  6. Pt to improve AROM of flexion, scaption and abduction to 150deg and IR/ER to 60deg and R elbow to 0-150deg to allow for improved functional mobility to  allow for improvement in IADLs. On going    PLAN     Progress mobility and postural strengthening as well as stabilization.     Scarlet Navarrete, PT,DPT  07/21/2023

## 2023-07-25 ENCOUNTER — CLINICAL SUPPORT (OUTPATIENT)
Dept: REHABILITATION | Facility: HOSPITAL | Age: 43
End: 2023-07-25
Payer: OTHER MISCELLANEOUS

## 2023-07-25 ENCOUNTER — PATIENT MESSAGE (OUTPATIENT)
Dept: REHABILITATION | Facility: HOSPITAL | Age: 43
End: 2023-07-25
Payer: COMMERCIAL

## 2023-07-25 DIAGNOSIS — R29.898 IMPAIRED STRENGTH OF SHOULDER MUSCLES: ICD-10-CM

## 2023-07-25 DIAGNOSIS — Z98.890 S/P RIGHT ROTATOR CUFF REPAIR: Primary | ICD-10-CM

## 2023-07-25 DIAGNOSIS — M67.813 BICEPS TENDONOSIS OF RIGHT SHOULDER: ICD-10-CM

## 2023-07-25 DIAGNOSIS — M25.611 DECREASED RANGE OF MOTION OF RIGHT SHOULDER: ICD-10-CM

## 2023-07-25 PROCEDURE — 97110 THERAPEUTIC EXERCISES: CPT | Mod: PN

## 2023-07-25 PROCEDURE — 97112 NEUROMUSCULAR REEDUCATION: CPT | Mod: PN

## 2023-07-25 RX ORDER — BLOOD-GLUCOSE SENSOR
EACH MISCELLANEOUS
Qty: 3 EACH | Refills: 11 | Status: SHIPPED | OUTPATIENT
Start: 2023-07-25

## 2023-07-25 RX ORDER — BLOOD-GLUCOSE TRANSMITTER
EACH MISCELLANEOUS
Qty: 1 EACH | Refills: 3 | Status: SHIPPED | OUTPATIENT
Start: 2023-07-25

## 2023-07-25 RX ORDER — INSULIN PMP CART,AUT,G6/7,CNTR
1 EACH SUBCUTANEOUS DAILY
Qty: 1 EACH | Refills: 0 | Status: SHIPPED | OUTPATIENT
Start: 2023-07-25 | End: 2023-08-01

## 2023-07-25 NOTE — PROGRESS NOTES
[facility]   Physical Therapy progress Note     Name: Tony Gordillo  Clinic Number: [id number]    Therapy Diagnosis:   Encounter Diagnoses   Name Primary?    S/P right rotator cuff repair Yes    Biceps tendonosis of right shoulder     Decreased range of motion of right shoulder     Impaired strength of shoulder muscles            Physician: Crissy Bradford MD    Visit Date: 7/25/2023    Physician Orders: PT Eval and Treat   Medical Diagnosis from Referral: S46.011A (ICD-10-CM) - Traumatic rotator cuff tear, right, initial encounter   Evaluation Date: 3/15/2023  Authorization Period Expiration:1/18/2024  Plan of Care Expiration: extended 10/25/23  Visit # / Visits authorized: 2/6  Progress Note Due: 8/26/23  FOTO: 1/1     Precautions: Diabetes;      POSTOPERATIVE PLAN: Plan to follow subscapularis-supraspinatus repair protocol (<3 cm in total size). Passive motion may begin at 4 weeks. Active motion at 6 weeks. No biceps resistive activity x 8 weeks. No cuff resistive activity x 12 weeks.      DATE OF PROCEDURE: 3/14/2023       PREOPERATIVE DIAGNOSES:   Right     1. Rotator cuff tear   2. Biceps tendinopathy    POSTOPERATIVE DIAGNOSES:   Right   1. Rotator cuff tear, full thickness involving the subscapularis and supraspinatus,   2. Biceps tendinopathy     OPERATION:   Right Shoulder  1. Arthroscopic  subscapularis and supraspinatus rotator cuff repair   2. Arthroscopic biceps tenodesis  3. 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     He was compliant with home exercise program.  Response to previous  "treatment: soreness  Functional change: improved ADL's    Pain: 0/10  Location: right shoulder      OBJECTIVE     Updated Plan of Care 7/25/23    TREATMENT     Tony received the treatments listed below:      Bold = performed today     Tony received therapeutic exercises to develop strength, endurance, ROM, flexibility, posture, and core stabilization for 42 minutes including:    UBE 3min fwd, 3 min bwd lvl6    Sidelying ER 3 lb 3x10  Wall clocks yellow theraband 2x10  LLLD abduction with moist heat and 5# x5'   Side lying shoulder abduction +3# 3x10   shoulder taps on 24" plyo 3x10 ea  Landmine press barbell 3x20   OH shoulder oscillations with green theraband 3xburn  15# kettlebell overhead farmer's carry 3 laps on turf  Scaption with contralateral hold 3# 3x10     reassessment      Tony received the following manual therapy techniques: Soft tissue Mobilization were applied to the: R shoulder complex for 00 minutes, including:    AP GHJ mobilization grade Ii-iii  Inferior GHJ mobilization grade ii-iii      neuromuscular re-education activities to improve: Balance, Coordination, Kinesthetic, Sense, Proprioception, and Posture for 15 minutes. The following activities were included:  supine Rhythmic stabilization 30"x4  Body blade 90/90 30"x4  ProneT 5" +4X10   Prone Y  5" +4x10   Prone extension 5" 3x10      PATIENT EDUCATION AND HOME EXERCISES     Home Exercises Provided and Patient Education Provided     Education provided:   - updated HEP   - post op precautions     Written Home Exercises Provided: yes. Exercises were reviewed and Tony was able to demonstrate them prior to the end of the session.  Tony demonstrated good  understanding of the education provided. See EMR under Patient Instructions for exercises provided during therapy sessions    ASSESSMENT     See updated Plan of Care      Pt prognosis is Guarded.     Pt will continue to benefit from skilled outpatient physical therapy to address the deficits " listed in the problem list box on initial evaluation, provide pt/family education and to maximize pt's level of independence in the home and community environment.     Pt's spiritual, cultural and educational needs considered and pt agreeable to plan of care and goals.     Anticipated Barriers for therapy: compliance with post-op precautions     GOALS: Short Term Goals: 8 weeks    1.Report decreased in pain at worse less than  <   / =  6  /10  to increase tolerance for functional mobility. MET  2. Pt to improve PROM of flexion, scaption and abduction to 90deg and IR/ER to 30deg and R elbow to 0-130deg to allow for improved functional mobility to allow for improvement in IADLs. Met 4/12/23  3. Increased BUE MMT 1/2 grade to increase tolerance for ADL and work activities.  MET  4. Pt to report be conscious of impaired sitting and standing posture daily to decrease pain. MET  5. Pt to tolerate HEP to improve ROM and independence with ADL's.  MET     Long Term Goals: 16 weeks   in progress  1.Report decreased in pain at worse less than  <   / =  4  /10  to increase tolerance for functional mobility. On going  (goal deleted due to not completing initial FOTO)  3.Increased BUE MMT 1 grade to increase tolerance for ADL and work activities.On going  4. Pt to report and demonstrate proper posture in standing and sitting to decrease pain. MET  5. Pt to be Independent with HEP to improve ROM and independence with ADL's.On going  6. Pt to improve AROM of flexion, scaption and abduction to 150deg and IR/ER to 60deg and R elbow to 0-150deg to allow for improved functional mobility to allow for improvement in IADLs. MET  Updated goal: patient displays improvement in right upper extremity strength to be 85% of left upper extremity to return to work related tasks with decrease risk for in injury  Updated goal: Patient demonstrates ability to lift and carry 100-150lb without difficulty to return to work related tasks without  difficulty.  Updated goal: Patient demonstrates ability to push/pull 100-150lb to return to work related tasks without difficulty    PLAN     See updated Plan of Care     Scarlet Navarrete, PT,DPT  07/25/2023

## 2023-07-26 ENCOUNTER — OFFICE VISIT (OUTPATIENT)
Dept: ORTHOPEDICS | Facility: CLINIC | Age: 43
End: 2023-07-26
Payer: COMMERCIAL

## 2023-07-26 DIAGNOSIS — M17.11 PRIMARY OSTEOARTHRITIS OF RIGHT KNEE: Primary | ICD-10-CM

## 2023-07-26 DIAGNOSIS — M17.12 PRIMARY OSTEOARTHRITIS OF LEFT KNEE: ICD-10-CM

## 2023-07-26 DIAGNOSIS — S46.011A TRAUMATIC ROTATOR CUFF TEAR, RIGHT, INITIAL ENCOUNTER: ICD-10-CM

## 2023-07-26 PROCEDURE — 99999 PR PBB SHADOW E&M-EST. PATIENT-LVL V: CPT | Mod: PBBFAC,,, | Performed by: ORTHOPAEDIC SURGERY

## 2023-07-26 PROCEDURE — 99499 UNLISTED E&M SERVICE: CPT | Mod: S$GLB,,, | Performed by: ORTHOPAEDIC SURGERY

## 2023-07-26 PROCEDURE — 99999 PR PBB SHADOW E&M-EST. PATIENT-LVL V: ICD-10-PCS | Mod: PBBFAC,,, | Performed by: ORTHOPAEDIC SURGERY

## 2023-07-26 PROCEDURE — 20610 LARGE JOINT ASPIRATION/INJECTION: R KNEE: ICD-10-PCS | Mod: RT,S$GLB,, | Performed by: ORTHOPAEDIC SURGERY

## 2023-07-26 PROCEDURE — 20610 DRAIN/INJ JOINT/BURSA W/O US: CPT | Mod: RT,S$GLB,, | Performed by: ORTHOPAEDIC SURGERY

## 2023-07-26 PROCEDURE — 99499 NO LOS: ICD-10-PCS | Mod: S$GLB,,, | Performed by: ORTHOPAEDIC SURGERY

## 2023-07-26 NOTE — PROGRESS NOTES
Postoperative Visit    History of Present Illness:   Tony is 18 weeks s/p right shoulder arthroscopy , rotator cuff repair, and arthroscopic biceps tenodesis  (DOS-3/14/23)    Full thickness SS and SSC, ATS biceps tenodesis  He is doing well   Progressing well with strength in PT but not yet able to push or pull 100 lbs which is required for him to go back to work     Physical Examination:    NAD  right upper extremity: AROM: , ER 40  Good cuff strength, weak compared to contralateral side   2+ RP, BCR in all digits.     Assessment/Plan:  42 y.o. male 18 weeks s/p right shoulder arthroscopy , rotator cuff repair, and arthroscopic biceps tenodesis  (DOS-3/14/23)    Plan  Continue PT  Would not consider him to be at MMI or progressing with FCE until 1 year post op   Follow up in 4 weeks    4.   Euflexxa injection done to R knee    All questions were answered in detail. The patient is in full agreement with the treatment plan and will proceed accordingly.

## 2023-07-26 NOTE — PLAN OF CARE
[facility]  Workers[employer] Physical Therapy Plan of Care Note     Visit Date: 7/25/2023    Name: Tony Gordillo  Clinic Number: [id number]    Therapy Diagnosis:   Encounter Diagnoses   Name Primary?    S/P right rotator cuff repair Yes    Biceps tendonosis of right shoulder     Decreased range of motion of right shoulder     Impaired strength of shoulder muscles      Physician: Crissy Bradford MD    Physician Orders: PT Eval and Treat   Medical Diagnosis from Referral: S46.011A (ICD-10-CM) - Traumatic rotator cuff tear, right, initial encounter   Evaluation Date: 3/15/2023    Current Certification Period:  3/15/23 to 8/1/23  Authorization Period Expiration: 1/18/2023  Plan of Care Expiration: 8/1/23  Visit # / Visits authorized: 2/ 6    Total Visits Combined No Show and Cancelled: 1    POSTOPERATIVE PLAN: Plan to follow subscapularis-supraspinatus repair protocol (<3 cm in total size). Passive motion may begin at 4 weeks. Active motion at 6 weeks. No biceps resistive activity x 8 weeks. No cuff resistive activity x 12 weeks.      DATE OF PROCEDURE: 3/14/2023        PREOPERATIVE DIAGNOSES:   Right     1. Rotator cuff tear   2. Biceps tendinopathy    POSTOPERATIVE DIAGNOSES:   Right   1. Rotator cuff tear, full thickness involving the subscapularis and supraspinatus,   2. Biceps tendinopathy     OPERATION:   Right Shoulder  1. Arthroscopic  subscapularis and supraspinatus rotator cuff repair   2. Arthroscopic biceps tenodesis  3. 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 He still gets some popping with shoulder elevation. It is not getting better or worse at this time. He is hoping to return to work soon    Objective      Update: AROM:  Flexion WNL (180 deg)  Abduction WNL (180 deg)        *=pain    Left (kgf) Right (kgf) LSI%   Shoulder flexion 12.3 8.5 69%   Shoulder abduction 11.9 7.9 66.3%   Shoulder external rotation  17.4 12.6 72.4%   Shoulder internal rotation  19.6 22.0 112%   Elbow flexion 5/5 4+/5     Elbow extension 5/5 4+/4     Middle trap 4/5 4/5     Lower trap 4-/5 4-/5               Job Specific Task Job Demands Current Ability Deficit? (Yes or No)   1. lifting 0-100lb Unable to perform yes   2. pulling 50-150lb Unable to perform yes   3. carrying 50lb Unable to perform yes       Assessment     Update: Patient was reassessed today and displayed improvements in right upper extremity strength LSI% as compared to last reassessment. However, he continues to lack strength of right upper extremity as compared to Left and therefore continues to be at risk for re-injury with functional work tasks. At this time he would continue to benefit from skilled PT to address remaining deficits and return to work without difficulty.     GOALS: Short Term Goals: 8 weeks    1.Report decreased in pain at worse less than  <   / =  6  /10  to increase tolerance for functional mobility. MET  2. Pt to improve PROM of flexion, scaption and abduction to 90deg and IR/ER to 30deg and R elbow to 0-130deg to allow for improved functional mobility to allow for improvement in IADLs. Met 4/12/23  3. Increased BUE MMT 1/2 grade to increase tolerance for ADL and work activities.  MET  4. Pt to report be conscious of impaired sitting and standing posture daily to decrease pain. MET  5. Pt to tolerate HEP to improve ROM and independence with ADL's.  MET     Long Term Goals: 16 weeks   in progress  1.Report decreased in  pain at worse less than  <   / =  4  /10  to increase tolerance for functional mobility. On going  (goal deleted due to not completing initial FOTO)  3.Increased BUE MMT 1 grade to increase tolerance for ADL and work activities.On going  4. Pt to report and demonstrate proper posture in standing and sitting to decrease pain. MET  5. Pt to be Independent with HEP to improve ROM and independence with ADL's.On going  6. Pt to improve AROM of flexion, scaption and abduction to 150deg and IR/ER to 60deg and R elbow to 0-150deg to allow for improved functional mobility to allow for improvement in IADLs. MET  Updated goal: patient displays improvement in right upper extremity strength to be 85% of left upper extremity to return to work related tasks with decrease risk for in injury  Updated goal: Patient demonstrates ability to lift and carry 100-150lb without difficulty to return to work related tasks without difficulty.  Updated goal: Patient demonstrates ability to push/pull 100-150lb to return to work related tasks without difficulty  Reasons for Recertification of Therapy:   continue to improve strength and return to work related tasks without compensations    Plan     Updated Certification Period: 7/25/2023 to 10/25/23  Recommended Treatment Plan: 1-2 times per week for 12 weeks: Manual Therapy, Moist Heat/ Ice, Neuromuscular Re-ed, Patient Education, Therapeutic Activities, Therapeutic Exercise, and dry needling PRN  Other Recommendations: NA    Upon discharge from further skilled PT intervention it will determined if the need for a work conditioning program or Functional Capacity Evaluation is required to allow the injured worker to return to work with full potential achieved, continued improvement with body mechanics with advanced functional activities, and further prevention of future work-related injuries.     Scarlet Navarrete, PT,DPT  7/25/2023     .

## 2023-07-27 ENCOUNTER — PATIENT MESSAGE (OUTPATIENT)
Dept: ENDOCRINOLOGY | Facility: CLINIC | Age: 43
End: 2023-07-27
Payer: COMMERCIAL

## 2023-07-27 ENCOUNTER — PATIENT MESSAGE (OUTPATIENT)
Dept: REHABILITATION | Facility: HOSPITAL | Age: 43
End: 2023-07-27
Payer: COMMERCIAL

## 2023-07-28 NOTE — PROCEDURES
Large Joint Aspiration/Injection: R knee    Date/Time: 7/27/2023 11:30 AM  Performed by: Crissy Bradford MD  Authorized by: Crissy Bradford MD     Consent Done?:  Yes (Verbal)  Indications:  Arthritis  Timeout: prior to procedure the correct patient, procedure, and site was verified    Prep: patient was prepped and draped in usual sterile fashion      Local anesthesia used?: Yes    Local anesthetic:  Topical anesthetic    Details:  Needle Size:  22 G  Approach:  Superior  Location:  Knee  Site:  R knee  Medications:  20 mg sodium hyaluronate (EUFLEXXA) 10 mg/mL(mw 2.4 -3.6 million)  Patient tolerance:  Patient tolerated the procedure well with no immediate complications

## 2023-07-31 ENCOUNTER — NUTRITION (OUTPATIENT)
Dept: DIABETES | Facility: CLINIC | Age: 43
End: 2023-07-31
Payer: COMMERCIAL

## 2023-07-31 ENCOUNTER — CLINICAL SUPPORT (OUTPATIENT)
Dept: REHABILITATION | Facility: HOSPITAL | Age: 43
End: 2023-07-31
Payer: OTHER MISCELLANEOUS

## 2023-07-31 ENCOUNTER — PATIENT MESSAGE (OUTPATIENT)
Dept: ENDOCRINOLOGY | Facility: CLINIC | Age: 43
End: 2023-07-31
Payer: COMMERCIAL

## 2023-07-31 DIAGNOSIS — E11.9 TYPE 2 DIABETES MELLITUS WITHOUT COMPLICATION, WITH LONG-TERM CURRENT USE OF INSULIN: ICD-10-CM

## 2023-07-31 DIAGNOSIS — M25.611 DECREASED RANGE OF MOTION OF RIGHT SHOULDER: ICD-10-CM

## 2023-07-31 DIAGNOSIS — R29.898 IMPAIRED STRENGTH OF SHOULDER MUSCLES: ICD-10-CM

## 2023-07-31 DIAGNOSIS — Z98.890 S/P RIGHT ROTATOR CUFF REPAIR: Primary | ICD-10-CM

## 2023-07-31 DIAGNOSIS — Z79.4 TYPE 2 DIABETES MELLITUS WITHOUT COMPLICATION, WITH LONG-TERM CURRENT USE OF INSULIN: ICD-10-CM

## 2023-07-31 DIAGNOSIS — M67.813 BICEPS TENDONOSIS OF RIGHT SHOULDER: ICD-10-CM

## 2023-07-31 PROCEDURE — 99999 PR PBB SHADOW E&M-EST. PATIENT-LVL I: CPT | Mod: PBBFAC,,,

## 2023-07-31 PROCEDURE — 97110 THERAPEUTIC EXERCISES: CPT | Mod: PN

## 2023-07-31 PROCEDURE — 99999 PR PBB SHADOW E&M-EST. PATIENT-LVL I: ICD-10-PCS | Mod: PBBFAC,,,

## 2023-07-31 NOTE — PROGRESS NOTES
Diabetes Care Specialist Progress Note  Author: Beatris Nixon RD  Date: 7/31/2023    Program Intake  Reason for Diabetes Program Visit:: Intervention  Type of Intervention:: Individual  Individual: Education  Education: Insulin Pump Evaluation  Current diabetes risk level:: low  In the last 12 months, have you:: used emergency room services  Was the ER or hospital admission related to diabetes?: No  Permission to speak with others about care:: yes (Pt wife in office during appt)    Lab Results   Component Value Date    HGBA1C 7.4 (H) 07/06/2023     Clinical    Clinical Assessment  Current Diabetes Treatment: Oral Medication, Injectable, Insulin    Labs  Do you have regular lab work to monitor your medications?: Yes  Type of Regular Lab Work: A1c, Cholesterol, Microalbumin, CBC  Lab Compliance Barriers: No    Additional Social History    Support  Does anyone support you with your diabetes care?: yes  Who supports you?: self, spouse  Who takes you to your medical appointments?: self  Does the current support meet the patient's needs?: Yes  Is Support an area impacting ability to self-manage diabetes?: No    Access to Mass Media & Technology  Does the patient have access to any of the following devices or technologies?: Smart phone  Media or technology needs impacting ability to self-manage diabetes?: No    Cognitive/Behavioral Health  Alert and Oriented: Yes  Difficulty Thinking: No  Requires Prompting: No  Requires assistance for routine expression?: No  Cognitive or behavioral barriers impacting ability to self-manage diabetes?: No    Communication  Language preference: English  Hearing Problems: No  Vision Problems: No  Communication needs impacting ability to self-manage diabetes?: No    Health Literacy  Preferred Learning Method: Face to Face, Demonstration  Health literacy needs impacting ability to self-manage diabetes?: No    Diabetes Self-Management Skills Assessment    Diabetes Disease Process/Treatment  Options  Diabetes Disease Process/Treatment Options: Skills Assessment Completed: No  Deferred due to:: Time    Nutrition/Healthy Eating  Nutrition/Healthy Eating Skills Assessment Completed:: No  Deffered due to:: Time    Physical Activity/Exercise  Physical Activity/Exercise Skills Assessment Completed: : No  Deffered due to:: Time    Medications  Medication Skills Assessment Completed:: Yes  Assessment indicates:: Instruction Needed  Area of need?: Yes    Home Blood Glucose Monitoring  Patient states that blood sugar is checked at home daily.: yes  Monitoring Method:: personal continuous glucose monitor  Personal CGM type:: Dexcom G6  Patient is able to use personal CGM appropriately.: yes  Home Blood Glucose Monitoring Skills Assessment Completed: : Yes  Assessment indicates:: Adequate understanding  Area of need?: No    Acute Complications  Acute Complications Skills Assessment Completed: : No  Deffered due to:: Time    Chronic Complications  Patient is taking statin?: Yes  Chronic Complications Skills Assessment Completed: : No  Deferred due to:: Time    Psychosocial/Coping  Psychosocial/Coping Skills Assessment Completed: : No  Deffered due to:: Time    Diabetes Self Support Plan    Assessment Summary and Plan    Based on today's diabetes care assessment, the following areas of need were identified:      Social 7/31/2023   Support No   Access to Mass Media/Tech No   Cognitive/Behavioral Health No   Communication No   Health Literacy No        Clinical 7/31/2023   Lab Compliance No        Diabetes Self-Management Skills 7/31/2023   Medication Yes, see care plan.   Home Blood Glucose Monitoring No      Today's interventions were provided through individual discussion, instruction, and written materials were provided.      Patient verbalized understanding of instruction and written materials.  Pt was able to return back demonstration of instructions today. Patient understood key points, needs reinforcement and  further instruction.     Diabetes Self-Management Care Plan:    Today's Diabetes Self-Management Care Plan was developed with Tony's input. Tony has agreed to work toward the following goal(s) to improve his/her overall diabetes control.      Care Plan: Diabetes Management   Updates made since 7/1/2023 12:00 AM        Problem: Medications         Goal: Patient Agrees to take Diabetes Medications as prescribed.    Start Date: 7/31/2023   Expected End Date: 10/31/2023   This Visit's Progress: Deferred   Priority: High   Barriers: Knowledge deficit   Note:    DIABETES EDUCATOR NOTE - PUMP EVAL      Patient seen for initial assessment and education on Omnipod 5.  Patient is interested in an insulin pump and continuous glucose monitor.   Pt returned today for evaluation/education on self care measure of diabetes management, blood glucose testing, meal patterns/ability to carbohydrate count, and problem solving skills for managing hypo and hyperglycemia.     Covered basic details of pump therapy with patient.  Reviewed terms ISF, CHO ratio, goal BG, bolus, basal, active insulin and insulin on board.  Pt verbalized clearer understanding of pump and CGM therapy.   Pt came to appt with Omnipod 5.    During today's visit the patient was introduces to/educated on the following content areas:     Current Diabetic Medications: Jardiance, Toujeo, Novolog, Mounjaro    Nutrition:   Carb counting skills: Pt states comfortable with carbohydrate counting.    Glucose monitoring:  Patient is testing BG using Dexcom G6.      EDUCATION PLAN:   Information provided on Omnipod 5, which patient is most interested in.  Follow up for continued education on possible pump training .           Follow Up Plan     Follow up Omnipod 5 start.    Today's care plan and follow up schedule was discussed with patient.  Tony verbalized understanding of the care plan, goals, and agrees to follow up plan.        The patient was encouraged to communicate with  his/her health care provider/physician and care team regarding his/her condition(s) and treatment.  I provided the patient with my contact information today and encouraged to contact me via phone or [facility] Patient Portal as needed.     Length of Visit   Total Time: 60 Minutes

## 2023-07-31 NOTE — PROGRESS NOTES
[facility]   Physical Therapy progress Note     Name: Tony Gordillo  Clinic Number: [id number]    Therapy Diagnosis:   Encounter Diagnoses   Name Primary?    S/P right rotator cuff repair Yes    Biceps tendonosis of right shoulder     Decreased range of motion of right shoulder     Impaired strength of shoulder muscles          Physician: Crissy Bradford MD    Visit Date: 7/31/2023    Physician Orders: PT Eval and Treat   Medical Diagnosis from Referral: S46.011A (ICD-10-CM) - Traumatic rotator cuff tear, right, initial encounter   Evaluation Date: 3/15/2023  Authorization Period Expiration:1/18/2024  Plan of Care Expiration: extended 10/25/23  Visit # / Visits authorized: 3/6  Progress Note Due: 8/26/23  FOTO: 1/1     Precautions: Diabetes;      POSTOPERATIVE PLAN: Plan to follow subscapularis-supraspinatus repair protocol (<3 cm in total size). Passive motion may begin at 4 weeks. Active motion at 6 weeks. No biceps resistive activity x 8 weeks. No cuff resistive activity x 12 weeks.      DATE OF PROCEDURE: 3/14/2023       PREOPERATIVE DIAGNOSES:   Right     1. Rotator cuff tear   2. Biceps tendinopathy    POSTOPERATIVE DIAGNOSES:   Right   1. Rotator cuff tear, full thickness involving the subscapularis and supraspinatus,   2. Biceps tendinopathy     OPERATION:   Right Shoulder  1. Arthroscopic  subscapularis and supraspinatus rotator cuff repair   2. Arthroscopic biceps tenodesis  3. 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     He was compliant with home exercise program.  Response to  "previous treatment: soreness  Functional change: improved ADL's    Pain: 0/10  Location: right shoulder      OBJECTIVE     Updated Plan of Care 7/25/23    TREATMENT     Tony received the treatments listed below:      Bold = performed today     Tony received therapeutic exercises to develop strength, endurance, ROM, flexibility, posture, and core stabilization for 63 minutes including:    UBE 3min fwd, 3 min bwd lvl6  shoulder taps on 24" plyo 3x10 ea  15# kettlebell overhead farmer's carry 3 laps on turf    After being screened for any contraindications, assessed for precautions, and educated in the benefits/risks associated with the use of blood flow restriction training. The BFR device calculated a personalized pressure of 160 mmHg. Exercises performed at an 40% occlusion pressure of 64 mmHg via autoregulation feature for the following exercises with sets of 30/15/15/15     Sidelying ER 3 lb   Side lying shoulder abduction 3#   Landmine press barbell   Scaption with contralateral hold 4# in left upper extremity     NT:  Wall clocks yellow theraband 2x10  LLLD abduction with moist heat and 5# x5'       OH shoulder oscillations with green theraband 3xburn    Tony received the following manual therapy techniques: Soft tissue Mobilization were applied to the: R shoulder complex for 00 minutes, including:    AP GHJ mobilization grade Ii-iii  Inferior GHJ mobilization grade ii-iii      neuromuscular re-education activities to improve: Balance, Coordination, Kinesthetic, Sense, Proprioception, and Posture for 00 minutes. The following activities were included:  supine Rhythmic stabilization 30"x4  Body blade 90/90 30"x4  ProneT 5" +4X10   Prone Y  5" +4x10   Prone extension 5" 3x10      PATIENT EDUCATION AND HOME EXERCISES     Home Exercises Provided and Patient Education Provided     Education provided:   - updated HEP   - post op precautions     Written Home Exercises Provided: yes. Exercises were reviewed and Tony was " able to demonstrate them prior to the end of the session.  Tony demonstrated good  understanding of the education provided. See EMR under Patient Instructions for exercises provided during therapy sessions    ASSESSMENT     Blood flow restriction training added today to continue to improve right upper extremity strength. Patient was appropriately fatigued and challenged with progression. PT to assess tolerance to progressions at next visit.     Pt prognosis is Guarded.     Pt will continue to benefit from skilled outpatient physical therapy to address the deficits listed in the problem list box on initial evaluation, provide pt/family education and to maximize pt's level of independence in the home and community environment.     Pt's spiritual, cultural and educational needs considered and pt agreeable to plan of care and goals.     Anticipated Barriers for therapy: compliance with post-op precautions     GOALS: Short Term Goals: 8 weeks    1.Report decreased in pain at worse less than  <   / =  6  /10  to increase tolerance for functional mobility. MET  2. Pt to improve PROM of flexion, scaption and abduction to 90deg and IR/ER to 30deg and R elbow to 0-130deg to allow for improved functional mobility to allow for improvement in IADLs. Met 4/12/23  3. Increased BUE MMT 1/2 grade to increase tolerance for ADL and work activities.  MET  4. Pt to report be conscious of impaired sitting and standing posture daily to decrease pain. MET  5. Pt to tolerate HEP to improve ROM and independence with ADL's.  MET     Long Term Goals: 16 weeks   in progress  1.Report decreased in pain at worse less than  <   / =  4  /10  to increase tolerance for functional mobility. On going  (goal deleted due to not completing initial FOTO)  3.Increased BUE MMT 1 grade to increase tolerance for ADL and work activities.On going  4. Pt to report and demonstrate proper posture in standing and sitting to decrease pain. MET  5. Pt to be  Independent with HEP to improve ROM and independence with ADL's.On going  6. Pt to improve AROM of flexion, scaption and abduction to 150deg and IR/ER to 60deg and R elbow to 0-150deg to allow for improved functional mobility to allow for improvement in IADLs. MET  Updated goal: patient displays improvement in right upper extremity strength to be 85% of left upper extremity to return to work related tasks with decrease risk for in injury  Updated goal: Patient demonstrates ability to lift and carry 100-150lb without difficulty to return to work related tasks without difficulty.  Updated goal: Patient demonstrates ability to push/pull 100-150lb to return to work related tasks without difficulty    PLAN     See updated Plan of Care     Scarlet Navarrete, PT,DPT  07/31/2023

## 2023-08-01 DIAGNOSIS — E11.9 TYPE 2 DIABETES MELLITUS WITHOUT COMPLICATION, WITH LONG-TERM CURRENT USE OF INSULIN: ICD-10-CM

## 2023-08-01 DIAGNOSIS — Z79.4 TYPE 2 DIABETES MELLITUS WITHOUT COMPLICATION, WITH LONG-TERM CURRENT USE OF INSULIN: ICD-10-CM

## 2023-08-01 RX ORDER — INSULIN PMP CART,AUT,G6/7,CNTR
1 EACH SUBCUTANEOUS DAILY
Qty: 1 EACH | Refills: 0 | Status: SHIPPED | OUTPATIENT
Start: 2023-08-01 | End: 2023-10-18 | Stop reason: CLARIF

## 2023-08-01 RX ORDER — INSULIN ASPART 100 [IU]/ML
INJECTION, SOLUTION INTRAVENOUS; SUBCUTANEOUS
Qty: 40 ML | Refills: 1 | Status: SHIPPED | OUTPATIENT
Start: 2023-08-01 | End: 2023-10-06

## 2023-08-01 RX ORDER — INSULIN PMP CART,AUT,G6/7,CNTR
1 EACH SUBCUTANEOUS DAILY
Qty: 6 EACH | Refills: 1 | Status: SHIPPED | OUTPATIENT
Start: 2023-08-01 | End: 2023-08-21 | Stop reason: SDUPTHER

## 2023-08-01 NOTE — TELEPHONE ENCOUNTER
Insulin pump settings:     Basal rate 1.2 u/hr  Carb ratio 5   ISF 20  Active insulin time 4 hours   Glucose target 110

## 2023-08-02 ENCOUNTER — CLINICAL SUPPORT (OUTPATIENT)
Dept: DIABETES | Facility: CLINIC | Age: 43
End: 2023-08-02
Payer: COMMERCIAL

## 2023-08-02 DIAGNOSIS — Z79.4 TYPE 2 DIABETES MELLITUS WITHOUT COMPLICATION, WITH LONG-TERM CURRENT USE OF INSULIN: Primary | ICD-10-CM

## 2023-08-02 DIAGNOSIS — E11.9 TYPE 2 DIABETES MELLITUS WITHOUT COMPLICATION, WITH LONG-TERM CURRENT USE OF INSULIN: Primary | ICD-10-CM

## 2023-08-02 NOTE — PROGRESS NOTES
Diabetes Care Specialist Progress Note  Author: Beatris Nixon RD  Date: 8/2/2023    Program Intake  Reason for Diabetes Program Visit:: Intervention  Type of Intervention:: Individual  Individual: Device Training  Device Training: Insulin Pump Start  Current diabetes risk level:: low  In the last 12 months, have you:: used emergency room services  Was the ER or hospital admission related to diabetes?: No  Permission to speak with others about care:: yes (Pt wife in office during appt)    Lab Results   Component Value Date    HGBA1C 7.4 (H) 07/06/2023     Clinical    Clinical Assessment  Current Diabetes Treatment: Oral Medication, Injectable, Insulin    Medication Information  Medication adherence impacting ability to self-manage diabetes?: No    Labs  Do you have regular lab work to monitor your medications?: Yes  Type of Regular Lab Work: A1c, Cholesterol, Microalbumin, CBC  Lab Compliance Barriers: No    Additional Social History    Support  Does anyone support you with your diabetes care?: yes  Who supports you?: self, spouse  Who takes you to your medical appointments?: self  Does the current support meet the patient's needs?: Yes  Is Support an area impacting ability to self-manage diabetes?: No    Access to Mass Media & Technology  Does the patient have access to any of the following devices or technologies?: Smart phone  Media or technology needs impacting ability to self-manage diabetes?: No    Cognitive/Behavioral Health  Alert and Oriented: Yes  Difficulty Thinking: No  Requires Prompting: No  Requires assistance for routine expression?: No  Cognitive or behavioral barriers impacting ability to self-manage diabetes?: No    Communication  Language preference: English  Hearing Problems: No  Vision Problems: No  Communication needs impacting ability to self-manage diabetes?: No    Health Literacy  Preferred Learning Method: Face to Face, Demonstration  Health literacy needs impacting ability to self-manage  diabetes?: No    Diabetes Self-Management Skills Assessment    Diabetes Disease Process/Treatment Options  Diabetes Disease Process/Treatment Options: Skills Assessment Completed: No  Deferred due to:: Time    Nutrition/Healthy Eating  Nutrition/Healthy Eating Skills Assessment Completed:: No  Deffered due to:: Time    Physical Activity/Exercise  Physical Activity/Exercise Skills Assessment Completed: : No  Deffered due to:: Time    Medications  Patient is able to describe current diabetes management routine.: yes  Diabetes management routine:: diet, injectable medications, insulin, oral medications  Patient is able to identify current diabetes medications, dosages, and appropriate timing of medications.: yes (Toujeo, Novolog, Mounjaro, Jardiance, pt trained on Omnipod 5 during appt)  Patient reports problems or concerns with current medication regimen.: no  Medication Skills Assessment Completed:: Yes  Assessment indicates:: Instruction Needed, Knowledge deficit  Area of need?: Yes    Home Blood Glucose Monitoring  Patient states that blood sugar is checked at home daily.: yes  Monitoring Method:: personal continuous glucose monitor  Personal CGM type:: Dexcom G6  Patient is able to use personal CGM appropriately.: yes  Assessment indicates:: Adequate understanding  Area of need?: No    Acute Complications  Acute Complications Skills Assessment Completed: : No  Deffered due to:: Time    Chronic Complications  Patient is taking statin?: Yes  Chronic Complications Skills Assessment Completed: : No  Deferred due to:: Time    Psychosocial/Coping  Psychosocial/Coping Skills Assessment Completed: : No  Deffered due to:: Time    Diabetes Self Support Plan    Assessment Summary and Plan    Based on today's diabetes care assessment, the following areas of need were identified:      Social 8/2/2023   Support No   Access to Mass Media/Tech No   Cognitive/Behavioral Health No   Communication No   Health Literacy No         Clinical 8/2/2023   Medication Adherence No   Lab Compliance No        Diabetes Self-Management Skills 8/2/2023   Medication Yes, see care plan.   Home Blood Glucose Monitoring No      Today's interventions were provided through individual discussion, instruction, and written materials were provided.      Patient verbalized understanding of instruction and written materials.  Pt was able to return back demonstration of instructions today. Patient understood key points, needs reinforcement and further instruction.     Diabetes Self-Management Care Plan:    Today's Diabetes Self-Management Care Plan was developed with Tony's input. Tony has agreed to work toward the following goal(s) to improve his/her overall diabetes control.      Care Plan: Diabetes Management   Updates made since 7/3/2023 12:00 AM        Problem: Medications         Goal: Patient Agrees to take Diabetes Medications as prescribed.    Start Date: 7/31/2023   Expected End Date: 10/31/2023   This Visit's Progress: On track   Recent Progress: Deferred   Priority: High   Barriers: Knowledge deficit   Note:    DIABETES EDUCATOR NOTE - PUMP EVAL      Patient seen for initial assessment and education on Omnipod 5.  Patient is interested in an insulin pump and continuous glucose monitor.   Pt returned today for evaluation/education on self care measure of diabetes management, blood glucose testing, meal patterns/ability to carbohydrate count, and problem solving skills for managing hypo and hyperglycemia.     Covered basic details of pump therapy with patient.  Reviewed terms ISF, CHO ratio, goal BG, bolus, basal, active insulin and insulin on board.  Pt verbalized clearer understanding of pump and CGM therapy.   Pt came to appt with Omnipod 5.    During today's visit the patient was introduces to/educated on the following content areas:     Current Diabetic Medications: Jardiance, Toujeo, Novolog, Mounjaro    Nutrition:   Carb counting skills: Pt  states comfortable with carbohydrate counting.    Glucose monitoring:  Patient is testing BG using Dexcom G6.      EDUCATION PLAN:   Information provided on Omnipod 5, which patient is most interested in.  Follow up for continued education on possible pump training .     8/2/23:   OMNIPOD 5 INSULIN PUMP START Patient is here today for insulin pump training and will be starting on an Omni Pod 5 Insulin pump.     Patient demonstrated the ability to fill pod reservoir with 200 units, prime infusion set and insert pod into right arm per sterile technique.  Instructed pt on use of basic pump features. Reviewed site selection of pods, rotation of sites and hard stop on controller to change every 72 hrs.  Instructed pt on how to enter activity mode.  Reviewed features available in manual mode verses automated mode.   Educated pt on how to switch from automated mode to manual mode.  Patient demonstrated ability to program Dexcom transmitter into controller and start automated limited mode.  Pt Dexcom connected to controller during appt.     INITIAL SETTINGS : Basal rate: 1.2 u/hr Maximum basal: 2.0 u/hr     Bolus Menu:  Pump Settings per NP note  Basal rate 1.2 u/hr  Carb ratio 5   ISF 20  Active insulin time 4 hours   Glucose target 110      Correct over: 110  IAT: 4      Maximum bolus = 30 units     Patient was given time to practice on simulator entering carbs and glucose into pump     [employer] account information added in specialty comments.          Follow Up Plan     Follow up in about 4 weeks (around 8/30/2023) for 1-month F/U.    Today's care plan and follow up schedule was discussed with patient.  Tony verbalized understanding of the care plan, goals, and agrees to follow up plan.        The patient was encouraged to communicate with his/her health care provider/physician and care team regarding his/her condition(s) and treatment.  I provided the patient with my contact information today and encouraged to  contact me via phone or [facility] Patient Portal as needed.     Length of Visit   Total Time: 60 Minutes

## 2023-08-03 ENCOUNTER — OFFICE VISIT (OUTPATIENT)
Dept: ORTHOPEDICS | Facility: CLINIC | Age: 43
End: 2023-08-03
Payer: COMMERCIAL

## 2023-08-03 DIAGNOSIS — M17.12 PRIMARY OSTEOARTHRITIS OF LEFT KNEE: Primary | ICD-10-CM

## 2023-08-03 PROCEDURE — 20610 DRAIN/INJ JOINT/BURSA W/O US: CPT | Mod: LT,S$GLB,, | Performed by: ORTHOPAEDIC SURGERY

## 2023-08-03 PROCEDURE — 99999 PR PBB SHADOW E&M-EST. PATIENT-LVL IV: ICD-10-PCS | Mod: PBBFAC,,, | Performed by: ORTHOPAEDIC SURGERY

## 2023-08-03 PROCEDURE — 99499 UNLISTED E&M SERVICE: CPT | Mod: S$GLB,,, | Performed by: ORTHOPAEDIC SURGERY

## 2023-08-03 PROCEDURE — 99499 NO LOS: ICD-10-PCS | Mod: S$GLB,,, | Performed by: ORTHOPAEDIC SURGERY

## 2023-08-03 PROCEDURE — 99999 PR PBB SHADOW E&M-EST. PATIENT-LVL IV: CPT | Mod: PBBFAC,,, | Performed by: ORTHOPAEDIC SURGERY

## 2023-08-03 PROCEDURE — 20610 LARGE JOINT ASPIRATION/INJECTION: L KNEE: ICD-10-PCS | Mod: LT,S$GLB,, | Performed by: ORTHOPAEDIC SURGERY

## 2023-08-03 NOTE — PROGRESS NOTES
Follow up visit    History of Present Illness:   Tony Gordillo comes to the office for follow up evaluation of left knee arthritis   He has failed other treatment modalities including medications,  activity modification and steroid injection. He is here today for viscosupplementation - Euflexxa #2  He reports some relief of pain    MEDICAL NECESSITY FOR VISCOSUPPLEMENTATION:  A thorough evaluation of the patient, have determined that viscosupplementation is medically necessary.  The patient has painful DJD of the knee with failure of conservative therapy including lifestyle modifications and rehabilitation exercises.  Oral analgesics/NSAIDs have not adequately controlled symptoms and there is radiographic evidence of joint space narrowing, subchondral sclerosis, and osteophyte formation - Kellgren Tj grade 2 or greater.     ROS: unremarkable and no change since last visit    Physical Examination:    Vitals:    08/03/23 1347   PainSc:   3   PainLoc: Knee      NAD  Left knee  No change in exam   No evidence of adverse effect of injection     Radiographic imaging: no new imaging    Assessment/Plan:  43 y.o. male with Left knee arthritis     Injection to Left knee  performed, please see procedure note for details.  We discussed the risks and benefits of injection including pain, infection, bleeding, failure to improve pain, temporary increase in pain, synovitis, and damage to surrounding structures including nerves, blood vessels, tendons and muscles.   Would like to injection R knee at next visit with 80 mg kenalog   2.   Return for follow up visit: 1 week for next injection    All questions were answered in detail. The patient  verbalized the understanding of the treatment plan and is in full agreement with the treatment plan.

## 2023-08-03 NOTE — PROCEDURES
Large Joint Aspiration/Injection: L knee    Date/Time: 8/3/2023 1:30 PM    Performed by: Crissy Bradford MD  Authorized by: Crissy Bradford MD    Approach:  Superior  Location:  Knee  Site:  L knee  Medications:  20 mg sodium hyaluronate (EUFLEXXA) 10 mg/mL(mw 2.4 -3.6 million)

## 2023-08-04 ENCOUNTER — CLINICAL SUPPORT (OUTPATIENT)
Dept: REHABILITATION | Facility: HOSPITAL | Age: 43
End: 2023-08-04
Payer: OTHER MISCELLANEOUS

## 2023-08-04 DIAGNOSIS — Z98.890 S/P RIGHT ROTATOR CUFF REPAIR: Primary | ICD-10-CM

## 2023-08-04 DIAGNOSIS — S46.011A TRAUMATIC ROTATOR CUFF TEAR, RIGHT, INITIAL ENCOUNTER: Primary | ICD-10-CM

## 2023-08-04 DIAGNOSIS — R29.898 IMPAIRED STRENGTH OF SHOULDER MUSCLES: ICD-10-CM

## 2023-08-04 DIAGNOSIS — M67.813 BICEPS TENDONOSIS OF RIGHT SHOULDER: ICD-10-CM

## 2023-08-04 DIAGNOSIS — M25.611 DECREASED RANGE OF MOTION OF RIGHT SHOULDER: ICD-10-CM

## 2023-08-04 PROCEDURE — 97110 THERAPEUTIC EXERCISES: CPT | Mod: PN

## 2023-08-04 NOTE — PROGRESS NOTES
[facility]   Physical Therapy progress Note     Name: Tony Gordillo  Clinic Number: [id number]    Therapy Diagnosis:   Encounter Diagnoses   Name Primary?    S/P right rotator cuff repair Yes    Biceps tendonosis of right shoulder     Decreased range of motion of right shoulder     Impaired strength of shoulder muscles            Physician: Crissy Bradford MD    Visit Date: 8/4/2023    Physician Orders: PT Eval and Treat   Medical Diagnosis from Referral: S46.011A (ICD-10-CM) - Traumatic rotator cuff tear, right, initial encounter   Evaluation Date: 3/15/2023  Authorization Period Expiration:1/18/2024  Plan of Care Expiration: extended 10/25/23  Visit # / Visits authorized: 4/6  Progress Note Due: 8/26/23  FOTO: 1/1     Precautions: Diabetes;      POSTOPERATIVE PLAN: Plan to follow subscapularis-supraspinatus repair protocol (<3 cm in total size). Passive motion may begin at 4 weeks. Active motion at 6 weeks. No biceps resistive activity x 8 weeks. No cuff resistive activity x 12 weeks.      DATE OF PROCEDURE: 3/14/2023       PREOPERATIVE DIAGNOSES:   Right     1. Rotator cuff tear   2. Biceps tendinopathy    POSTOPERATIVE DIAGNOSES:   Right   1. Rotator cuff tear, full thickness involving the subscapularis and supraspinatus,   2. Biceps tendinopathy     OPERATION:   Right Shoulder  1. Arthroscopic  subscapularis and supraspinatus rotator cuff repair   2. Arthroscopic biceps tenodesis  3. 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 He was really sore after last time. More than when we do his  "regular strength training    He was compliant with home exercise program.  Response to previous treatment: soreness  Functional change: improved ADL's    Pain: 0/10  Location: right shoulder      OBJECTIVE     Updated Plan of Care 7/25/23    TREATMENT     Tony received the treatments listed below:      Bold = performed today     Tony received therapeutic exercises to develop strength, endurance, ROM, flexibility, posture, and core stabilization for 59 minutes including:    UBE 4min 4wd, 3 min bwd lvl6    OH shoulder oscillations with green theraband 3xburn  Wall clocks yellow theraband 2x10  Body blade 90/90 30"x4  ProneT 5" on physioball   +2# 4X10   Prone Y  5" n physioball  +2# 4x10     shoulder taps on 24" plyo 3x10 ea  15# kettlebell overhead farmer's carry 3 laps on turf  Sled pull with TRX 70# 3 laps on turf         NT:    LLLD abduction with moist heat and 5# x5'     After being screened for any contraindications, assessed for precautions, and educated in the benefits/risks associated with the use of blood flow restriction training. The BFR device calculated a personalized pressure of 160 mmHg. Exercises performed at an 40% occlusion pressure of 64 mmHg via autoregulation feature for the following exercises with sets of 30/15/15/15    Sidelying ER 3 lb   Side lying shoulder abduction 3#   Landmine press barbell   Scaption with contralateral hold 4# in left upper extremity     Tony received the following manual therapy techniques: Soft tissue Mobilization were applied to the: R shoulder complex for 00 minutes, including:    AP GHJ mobilization grade Ii-iii  Inferior GHJ mobilization grade ii-iii      neuromuscular re-education activities to improve: Balance, Coordination, Kinesthetic, Sense, Proprioception, and Posture for 00 minutes. The following activities were included:  supine Rhythmic stabilization 30"x4      Prone extension 5" 3x10      PATIENT EDUCATION AND HOME EXERCISES     Home Exercises Provided " and Patient Education Provided     Education provided:   - updated HEP   - post op precautions     Written Home Exercises Provided: yes. Exercises were reviewed and Tony was able to demonstrate them prior to the end of the session.  Tony demonstrated good  understanding of the education provided. See EMR under Patient Instructions for exercises provided during therapy sessions    ASSESSMENT     BFR not performed today due to insulin pump on right upper extremity today. Patient presented with continued soreness from previous session. He required rest breaks throughout session due to fatigue. PT added sled pulls with TRX straps with appropriate level of fatigue achieved.     Pt prognosis is Guarded.     Pt will continue to benefit from skilled outpatient physical therapy to address the deficits listed in the problem list box on initial evaluation, provide pt/family education and to maximize pt's level of independence in the home and community environment.     Pt's spiritual, cultural and educational needs considered and pt agreeable to plan of care and goals.     Anticipated Barriers for therapy: compliance with post-op precautions     GOALS: Short Term Goals: 8 weeks    1.Report decreased in pain at worse less than  <   / =  6  /10  to increase tolerance for functional mobility. MET  2. Pt to improve PROM of flexion, scaption and abduction to 90deg and IR/ER to 30deg and R elbow to 0-130deg to allow for improved functional mobility to allow for improvement in IADLs. Met 4/12/23  3. Increased BUE MMT 1/2 grade to increase tolerance for ADL and work activities.  MET  4. Pt to report be conscious of impaired sitting and standing posture daily to decrease pain. MET  5. Pt to tolerate HEP to improve ROM and independence with ADL's.  MET     Long Term Goals: 16 weeks   in progress  1.Report decreased in pain at worse less than  <   / =  4  /10  to increase tolerance for functional mobility. On going  (goal deleted due to  not completing initial FOTO)  3.Increased BUE MMT 1 grade to increase tolerance for ADL and work activities.On going  4. Pt to report and demonstrate proper posture in standing and sitting to decrease pain. MET  5. Pt to be Independent with HEP to improve ROM and independence with ADL's.On going  6. Pt to improve AROM of flexion, scaption and abduction to 150deg and IR/ER to 60deg and R elbow to 0-150deg to allow for improved functional mobility to allow for improvement in IADLs. MET  Updated goal: patient displays improvement in right upper extremity strength to be 85% of left upper extremity to return to work related tasks with decrease risk for in injury  Updated goal: Patient demonstrates ability to lift and carry 100-150lb without difficulty to return to work related tasks without difficulty.  Updated goal: Patient demonstrates ability to push/pull 100-150lb to return to work related tasks without difficulty    PLAN     See updated Plan of Care     Scarlet Navarrete, PT,DPT  08/04/2023

## 2023-08-09 ENCOUNTER — CLINICAL SUPPORT (OUTPATIENT)
Dept: REHABILITATION | Facility: HOSPITAL | Age: 43
End: 2023-08-09
Payer: OTHER MISCELLANEOUS

## 2023-08-09 DIAGNOSIS — Z98.890 S/P RIGHT ROTATOR CUFF REPAIR: Primary | ICD-10-CM

## 2023-08-09 DIAGNOSIS — R29.898 IMPAIRED STRENGTH OF SHOULDER MUSCLES: ICD-10-CM

## 2023-08-09 DIAGNOSIS — M67.813 BICEPS TENDONOSIS OF RIGHT SHOULDER: ICD-10-CM

## 2023-08-09 DIAGNOSIS — M25.611 DECREASED RANGE OF MOTION OF RIGHT SHOULDER: ICD-10-CM

## 2023-08-09 PROCEDURE — 97110 THERAPEUTIC EXERCISES: CPT | Mod: PN

## 2023-08-09 NOTE — PROGRESS NOTES
[facility]   Physical Therapy progress Note     Name: Tony Gordillo  Clinic Number: [id number]    Therapy Diagnosis:   Encounter Diagnoses   Name Primary?    S/P right rotator cuff repair Yes    Biceps tendonosis of right shoulder     Decreased range of motion of right shoulder     Impaired strength of shoulder muscles              Physician: Crissy Bradford MD    Visit Date: 8/9/2023    Physician Orders: PT Eval and Treat   Medical Diagnosis from Referral: S46.011A (ICD-10-CM) - Traumatic rotator cuff tear, right, initial encounter   Evaluation Date: 3/15/2023  Authorization Period Expiration:1/18/2024  Plan of Care Expiration: extended 10/25/23  Visit # / Visits authorized: 5/6  Progress Note Due: 8/26/23  FOTO: 1/1     Precautions: Diabetes;      POSTOPERATIVE PLAN: Plan to follow subscapularis-supraspinatus repair protocol (<3 cm in total size). Passive motion may begin at 4 weeks. Active motion at 6 weeks. No biceps resistive activity x 8 weeks. No cuff resistive activity x 12 weeks.      DATE OF PROCEDURE: 3/14/2023       PREOPERATIVE DIAGNOSES:   Right     1. Rotator cuff tear   2. Biceps tendinopathy    POSTOPERATIVE DIAGNOSES:   Right   1. Rotator cuff tear, full thickness involving the subscapularis and supraspinatus,   2. Biceps tendinopathy     OPERATION:   Right Shoulder  1. Arthroscopic  subscapularis and supraspinatus rotator cuff repair   2. Arthroscopic biceps tenodesis  3. 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 Says its a little sore but states that's normal.    He was  "compliant with home exercise program.  Response to previous treatment: soreness  Functional change: improved ADL's    Pain: 0/10  Location: right shoulder      OBJECTIVE     Updated Plan of Care 7/25/23    TREATMENT     Tony received the treatments listed below:      Bold = performed today     Tony received therapeutic exercises to develop strength, endurance, ROM, flexibility, posture, and core stabilization for 62 minutes including:    UBE 4min 4wd, 3 min bwd lvl6    After being screened for any contraindications, assessed for precautions, and educated in the benefits/risks associated with the use of blood flow restriction training. The BFR device calculated a personalized pressure of 160 mmHg. Exercises performed at an 45% occlusion pressure of 72 mmHg via autoregulation feature for the following exercises with sets of 30/15/15/15    Prone T: 1 lb  Prone Y: 1 lb  Landmine press barbell: 5 lb  Sidelying ER: 4 lb     15# kettlebell overhead farmer's carry 3 laps on turf    Straight Arm Plank: 3x: 10 sec, 10, sec, 10 sec, 7 sec       NT:  Sled pull with TRX 70# 3 laps on turf  LLLD abduction with moist heat and 5# x5'   Side lying shoulder abduction 3#   Scaption with contralateral hold 4# in left upper extremity   OH shoulder oscillations with green theraband 3xburn  Wall clocks yellow theraband 2x10  Body blade 90/90 30"x4  ProneT 5" on physioball   +2# 4X10   Prone Y  5" n physioball  +2# 4x10     shoulder taps on 24" plyo 3x10 ea    Tony received the following manual therapy techniques: Soft tissue Mobilization were applied to the: R shoulder complex for 00 minutes, including:    AP GHJ mobilization grade Ii-iii  Inferior GHJ mobilization grade ii-iii      neuromuscular re-education activities to improve: Balance, Coordination, Kinesthetic, Sense, Proprioception, and Posture for 00 minutes. The following activities were included:  supine Rhythmic stabilization 30"x4      Prone extension 5" 3x10      PATIENT " EDUCATION AND HOME EXERCISES     Home Exercises Provided and Patient Education Provided     Education provided:   - updated HEP   - post op precautions     Written Home Exercises Provided: yes. Exercises were reviewed and Tony was able to demonstrate them prior to the end of the session.  Tony demonstrated good  understanding of the education provided. See EMR under Patient Instructions for exercises provided during therapy sessions    ASSESSMENT       Tony presents to PT today with continued soreness in his R UE today. BFR was initiated today due to his insulin pump being moved to his L UE. BFR was performed at 45% occlusion or 72 mmHg today. Pt tolerated the BFR session well, however, he was fatigued following the completion of 4 BFR exercises. Overhead kettlebell stabilization was utilized today where core weakness was apparent with the exercise. A standard straight arm plank was initiated where the pt was unable to hold a plank for longer than 10 seconds. Pt demonstrated poor core strength that will be addressed in future sessions. Overall, his muscular strength and endurance is continuing to improve in his UE and the pt is experiencing proper amounts of fatigue following sessions. Pt will continue to benefit from skilled PT in order to return to his PLOF and to develop more muscular strength and endurance in his R UE as well as his core musculature.      Pt prognosis is Guarded.     Pt will continue to benefit from skilled outpatient physical therapy to address the deficits listed in the problem list box on initial evaluation, provide pt/family education and to maximize pt's level of independence in the home and community environment.     Pt's spiritual, cultural and educational needs considered and pt agreeable to plan of care and goals.     Anticipated Barriers for therapy: compliance with post-op precautions     GOALS: Short Term Goals: 8 weeks    1.Report decreased in pain at worse less than  <   / =  6   /10  to increase tolerance for functional mobility. MET  2. Pt to improve PROM of flexion, scaption and abduction to 90deg and IR/ER to 30deg and R elbow to 0-130deg to allow for improved functional mobility to allow for improvement in IADLs. Met 4/12/23  3. Increased BUE MMT 1/2 grade to increase tolerance for ADL and work activities.  MET  4. Pt to report be conscious of impaired sitting and standing posture daily to decrease pain. MET  5. Pt to tolerate HEP to improve ROM and independence with ADL's.  MET     Long Term Goals: 16 weeks   in progress  1.Report decreased in pain at worse less than  <   / =  4  /10  to increase tolerance for functional mobility. On going  (goal deleted due to not completing initial FOTO)  3.Increased BUE MMT 1 grade to increase tolerance for ADL and work activities.On going  4. Pt to report and demonstrate proper posture in standing and sitting to decrease pain. MET  5. Pt to be Independent with HEP to improve ROM and independence with ADL's.On going  6. Pt to improve AROM of flexion, scaption and abduction to 150deg and IR/ER to 60deg and R elbow to 0-150deg to allow for improved functional mobility to allow for improvement in IADLs. MET  Updated goal: patient displays improvement in right upper extremity strength to be 85% of left upper extremity to return to work related tasks with decrease risk for in injury  Updated goal: Patient demonstrates ability to lift and carry 100-150lb without difficulty to return to work related tasks without difficulty.  Updated goal: Patient demonstrates ability to push/pull 100-150lb to return to work related tasks without difficulty    PLAN     See updated Plan of Care     Jenni Doshi, PT  08/09/2023

## 2023-08-11 ENCOUNTER — CLINICAL SUPPORT (OUTPATIENT)
Dept: REHABILITATION | Facility: HOSPITAL | Age: 43
End: 2023-08-11
Payer: OTHER MISCELLANEOUS

## 2023-08-11 DIAGNOSIS — M67.813 BICEPS TENDONOSIS OF RIGHT SHOULDER: ICD-10-CM

## 2023-08-11 DIAGNOSIS — Z98.890 S/P RIGHT ROTATOR CUFF REPAIR: Primary | ICD-10-CM

## 2023-08-11 DIAGNOSIS — M25.611 DECREASED RANGE OF MOTION OF RIGHT SHOULDER: ICD-10-CM

## 2023-08-11 DIAGNOSIS — R29.898 IMPAIRED STRENGTH OF SHOULDER MUSCLES: ICD-10-CM

## 2023-08-11 PROCEDURE — 97110 THERAPEUTIC EXERCISES: CPT | Mod: PN

## 2023-08-11 NOTE — PROGRESS NOTES
[facility]   Physical Therapy progress Note     Name: Tony Gordillo  Clinic Number: [id number]    Therapy Diagnosis:   Encounter Diagnoses   Name Primary?    S/P right rotator cuff repair Yes    Biceps tendonosis of right shoulder     Decreased range of motion of right shoulder     Impaired strength of shoulder muscles        Physician: Crissy Bradford MD    Visit Date: 8/11/2023    Physician Orders: PT Eval and Treat   Medical Diagnosis from Referral: S46.011A (ICD-10-CM) - Traumatic rotator cuff tear, right, initial encounter   Evaluation Date: 3/15/2023  Authorization Period Expiration:1/18/2024  Plan of Care Expiration: extended 10/25/23  Visit # / Visits authorized: 6/6  Progress Note Due: 8/26/23  FOTO: 1/1     Precautions: Diabetes;      POSTOPERATIVE PLAN: Plan to follow subscapularis-supraspinatus repair protocol (<3 cm in total size). Passive motion may begin at 4 weeks. Active motion at 6 weeks. No biceps resistive activity x 8 weeks. No cuff resistive activity x 12 weeks.      DATE OF PROCEDURE: 3/14/2023       PREOPERATIVE DIAGNOSES:   Right     1. Rotator cuff tear   2. Biceps tendinopathy    POSTOPERATIVE DIAGNOSES:   Right   1. Rotator cuff tear, full thickness involving the subscapularis and supraspinatus,   2. Biceps tendinopathy     OPERATION:   Right Shoulder  1. Arthroscopic  subscapularis and supraspinatus rotator cuff repair   2. Arthroscopic biceps tenodesis  3. 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  Response to previous  "treatment: soreness  Functional change: improved ADL's    Pain: 0/10  Location: right shoulder      OBJECTIVE     Updated Plan of Care 7/25/23    TREATMENT     Tony received the treatments listed below:      Bold = performed today     Tony received therapeutic exercises to develop strength, endurance, ROM, flexibility, posture, and core stabilization for 52 minutes including:    UBE 4min 4wd, 3 min bwd lvl6  15# kettlebell overhead farmer's carry 3 laps on turf  shoulder taps on 24" plyo 3x10 ea      After being screened for any contraindications, assessed for precautions, and educated in the benefits/risks associated with the use of blood flow restriction training. The BFR device calculated a personalized pressure of 160 mmHg. Exercises performed at an 45% occlusion pressure of 72 mmHg via autoregulation feature for the following exercises with sets of 30/15/15/15    Prone T: 1 lb    Landmine press barbell: 5 lb  Sidelying ER: 4 lb           NT:  Prone Y: 1 lb  Straight Arm Plank: 3x: 10 sec, 10, sec, 10 sec, 7 sec   Sled pull with TRX 70# 3 laps on turf  LLLD abduction with moist heat and 5# x5'   Side lying shoulder abduction 3#   Scaption with contralateral hold 4# in left upper extremity   OH shoulder oscillations with green theraband 3xburn  Wall clocks yellow theraband 2x10  Body blade 90/90 30"x4  ProneT 5" on physioball   +2# 4X10   Prone Y  5" n physioball  +2# 4x10         Tony received the following manual therapy techniques: Soft tissue Mobilization were applied to the: R shoulder complex for 00 minutes, including:    AP GHJ mobilization grade Ii-iii  Inferior GHJ mobilization grade ii-iii      neuromuscular re-education activities to improve: Balance, Coordination, Kinesthetic, Sense, Proprioception, and Posture for 00 minutes. The following activities were included:  supine Rhythmic stabilization 30"x4      Prone extension 5" 3x10      PATIENT EDUCATION AND HOME EXERCISES     Home Exercises Provided " and Patient Education Provided     Education provided:   - updated HEP   - post op precautions     Written Home Exercises Provided: yes. Exercises were reviewed and Tony was able to demonstrate them prior to the end of the session.  Tony demonstrated good  understanding of the education provided. See EMR under Patient Instructions for exercises provided during therapy sessions    ASSESSMENT     Patient presents with continued soreness of right upper extremity. He was unable to complete Y's today due to pain. He continues to be appropriately challenged with BFR for strengthening. He was unable to perform planks today due to being fatigued. Patient is awaiting more visits to be approved at this time.       Pt prognosis is Guarded.     Pt will continue to benefit from skilled outpatient physical therapy to address the deficits listed in the problem list box on initial evaluation, provide pt/family education and to maximize pt's level of independence in the home and community environment.     Pt's spiritual, cultural and educational needs considered and pt agreeable to plan of care and goals.     Anticipated Barriers for therapy: compliance with post-op precautions     GOALS: Short Term Goals: 8 weeks    1.Report decreased in pain at worse less than  <   / =  6  /10  to increase tolerance for functional mobility. MET  2. Pt to improve PROM of flexion, scaption and abduction to 90deg and IR/ER to 30deg and R elbow to 0-130deg to allow for improved functional mobility to allow for improvement in IADLs. Met 4/12/23  3. Increased BUE MMT 1/2 grade to increase tolerance for ADL and work activities.  MET  4. Pt to report be conscious of impaired sitting and standing posture daily to decrease pain. MET  5. Pt to tolerate HEP to improve ROM and independence with ADL's.  MET     Long Term Goals: 16 weeks   in progress  1.Report decreased in pain at worse less than  <   / =  4  /10  to increase tolerance for functional mobility.  On going  (goal deleted due to not completing initial FOTO)  3.Increased BUE MMT 1 grade to increase tolerance for ADL and work activities.On going  4. Pt to report and demonstrate proper posture in standing and sitting to decrease pain. MET  5. Pt to be Independent with HEP to improve ROM and independence with ADL's.On going  6. Pt to improve AROM of flexion, scaption and abduction to 150deg and IR/ER to 60deg and R elbow to 0-150deg to allow for improved functional mobility to allow for improvement in IADLs. MET  Updated goal: patient displays improvement in right upper extremity strength to be 85% of left upper extremity to return to work related tasks with decrease risk for in injury  Updated goal: Patient demonstrates ability to lift and carry 100-150lb without difficulty to return to work related tasks without difficulty.  Updated goal: Patient demonstrates ability to push/pull 100-150lb to return to work related tasks without difficulty    PLAN     See updated Plan of Care     Scarlet Navarrete, PT,DPT  08/11/2023

## 2023-08-14 ENCOUNTER — OFFICE VISIT (OUTPATIENT)
Dept: ORTHOPEDICS | Facility: CLINIC | Age: 43
End: 2023-08-14
Payer: COMMERCIAL

## 2023-08-14 DIAGNOSIS — M17.11 PRIMARY OSTEOARTHRITIS OF RIGHT KNEE: ICD-10-CM

## 2023-08-14 DIAGNOSIS — M17.12 PRIMARY OSTEOARTHRITIS OF LEFT KNEE: Primary | ICD-10-CM

## 2023-08-14 PROCEDURE — 20610 DRAIN/INJ JOINT/BURSA W/O US: CPT | Mod: 50,S$GLB,, | Performed by: ORTHOPAEDIC SURGERY

## 2023-08-14 PROCEDURE — 99499 NO LOS: ICD-10-PCS | Mod: S$GLB,,, | Performed by: ORTHOPAEDIC SURGERY

## 2023-08-14 PROCEDURE — 20610 LARGE JOINT ASPIRATION/INJECTION: L KNEE: ICD-10-PCS | Mod: 50,S$GLB,, | Performed by: ORTHOPAEDIC SURGERY

## 2023-08-14 PROCEDURE — 99999 PR PBB SHADOW E&M-EST. PATIENT-LVL IV: ICD-10-PCS | Mod: PBBFAC,,, | Performed by: ORTHOPAEDIC SURGERY

## 2023-08-14 PROCEDURE — 99999 PR PBB SHADOW E&M-EST. PATIENT-LVL IV: CPT | Mod: PBBFAC,,, | Performed by: ORTHOPAEDIC SURGERY

## 2023-08-14 PROCEDURE — 99499 UNLISTED E&M SERVICE: CPT | Mod: S$GLB,,, | Performed by: ORTHOPAEDIC SURGERY

## 2023-08-14 RX ADMIN — TRIAMCINOLONE ACETONIDE 80 MG: 40 INJECTION, SUSPENSION INTRA-ARTICULAR; INTRAMUSCULAR at 03:08

## 2023-08-14 NOTE — PROGRESS NOTES
Follow up visit    History of Present Illness:   Tony Gordillo comes to the office for follow up evaluation of left knee arthritis   He has failed other treatment modalities including medications,  activity modification and steroid injection. He is here today for viscosupplementation - Euflexxa #3  He reports some relief of pain  Would also like to do injection of the right knee with cortisone    MEDICAL NECESSITY FOR VISCOSUPPLEMENTATION:  A thorough evaluation of the patient, have determined that viscosupplementation is medically necessary.  The patient has painful DJD of the knee with failure of conservative therapy including lifestyle modifications and rehabilitation exercises.  Oral analgesics/NSAIDs have not adequately controlled symptoms and there is radiographic evidence of joint space narrowing, subchondral sclerosis, and osteophyte formation - Kellgren Tj grade 2 or greater.     ROS: unremarkable and no change since last visit    Physical Examination:    Vitals:    08/14/23 1505   PainSc:   3   PainLoc: Knee      NAD  Left knee  No change in exam   No evidence of adverse effect of injection     Radiographic imaging: no new imaging    Assessment/Plan:  43 y.o. male with Left knee arthritis     Injection to Right and Left knee  performed, please see procedure note for details.  We discussed the risks and benefits of injection including pain, infection, bleeding, failure to improve pain, temporary increase in pain, synovitis, and damage to surrounding structures including nerves, blood vessels, tendons and muscles.   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 The patient  verbalized the understanding of the treatment plan and is in full agreement with the treatment plan.

## 2023-08-15 ENCOUNTER — PATIENT MESSAGE (OUTPATIENT)
Dept: REHABILITATION | Facility: HOSPITAL | Age: 43
End: 2023-08-15
Payer: COMMERCIAL

## 2023-08-15 ENCOUNTER — PATIENT MESSAGE (OUTPATIENT)
Dept: ORTHOPEDICS | Facility: CLINIC | Age: 43
End: 2023-08-15
Payer: COMMERCIAL

## 2023-08-15 ENCOUNTER — PATIENT MESSAGE (OUTPATIENT)
Dept: ADMINISTRATIVE | Facility: HOSPITAL | Age: 43
End: 2023-08-15
Payer: COMMERCIAL

## 2023-08-15 RX ORDER — TRIAMCINOLONE ACETONIDE 40 MG/ML
80 INJECTION, SUSPENSION INTRA-ARTICULAR; INTRAMUSCULAR
Status: DISCONTINUED | OUTPATIENT
Start: 2023-08-14 | End: 2023-08-15 | Stop reason: HOSPADM

## 2023-08-15 NOTE — PROCEDURES
Large Joint Aspiration/Injection: L knee    Date/Time: 8/14/2023 3:00 PM    Performed by: Crissy Bradford MD  Authorized by: Crissy Bradford MD      Details:  Needle Size:  22 G  Approach:  Anteromedial  Location:  Knee  Site:  L knee  Medications:  20 mg sodium hyaluronate (EUFLEXXA) 10 mg/mL(mw 2.4 -3.6 million)  Patient tolerance:  Patient tolerated the procedure well with no immediate complications

## 2023-08-15 NOTE — PROCEDURES
Large Joint Aspiration/Injection: R knee    Date/Time: 8/14/2023 3:00 PM    Performed by: Crissy Bradford MD  Authorized by: Crissy Bradford MD    Consent Done?:  Yes (Verbal)  Indications:  Arthritis  Timeout: prior to procedure the correct patient, procedure, and site was verified    Prep: patient was prepped and draped in usual sterile fashion      Local anesthesia used?: Yes    Local anesthetic:  Topical anesthetic    Details:  Needle Size:  22 G  Approach:  Anteromedial  Location:  Knee  Site:  R knee  Medications:  80 mg triamcinolone acetonide 40 mg/mL  Patient tolerance:  Patient tolerated the procedure well with no immediate complications

## 2023-08-20 DIAGNOSIS — Z79.4 TYPE 2 DIABETES MELLITUS WITHOUT COMPLICATION, WITH LONG-TERM CURRENT USE OF INSULIN: ICD-10-CM

## 2023-08-20 DIAGNOSIS — E11.9 TYPE 2 DIABETES MELLITUS WITHOUT COMPLICATION, WITH LONG-TERM CURRENT USE OF INSULIN: ICD-10-CM

## 2023-08-21 DIAGNOSIS — S46.011A TRAUMATIC ROTATOR CUFF TEAR, RIGHT, INITIAL ENCOUNTER: Primary | ICD-10-CM

## 2023-08-21 RX ORDER — INSULIN PMP CART,AUT,G6/7,CNTR
1 EACH SUBCUTANEOUS DAILY
Qty: 6 EACH | Refills: 1 | Status: SHIPPED | OUTPATIENT
Start: 2023-08-21 | End: 2023-11-15 | Stop reason: SDUPTHER

## 2023-08-23 ENCOUNTER — OFFICE VISIT (OUTPATIENT)
Dept: ORTHOPEDICS | Facility: CLINIC | Age: 43
End: 2023-08-23
Payer: COMMERCIAL

## 2023-08-23 DIAGNOSIS — S46.011A TRAUMATIC ROTATOR CUFF TEAR, RIGHT, INITIAL ENCOUNTER: Primary | ICD-10-CM

## 2023-08-23 PROCEDURE — 99213 OFFICE O/P EST LOW 20 MIN: CPT | Mod: S$GLB,,, | Performed by: ORTHOPAEDIC SURGERY

## 2023-08-23 PROCEDURE — 4010F ACE/ARB THERAPY RXD/TAKEN: CPT | Mod: CPTII,S$GLB,, | Performed by: ORTHOPAEDIC SURGERY

## 2023-08-23 PROCEDURE — 3051F HG A1C>EQUAL 7.0%<8.0%: CPT | Mod: CPTII,S$GLB,, | Performed by: ORTHOPAEDIC SURGERY

## 2023-08-23 PROCEDURE — 1160F RVW MEDS BY RX/DR IN RCRD: CPT | Mod: CPTII,S$GLB,, | Performed by: ORTHOPAEDIC SURGERY

## 2023-08-23 PROCEDURE — 3066F PR DOCUMENTATION OF TREATMENT FOR NEPHROPATHY: ICD-10-PCS | Mod: CPTII,S$GLB,, | Performed by: ORTHOPAEDIC SURGERY

## 2023-08-23 PROCEDURE — 1160F PR REVIEW ALL MEDS BY PRESCRIBER/CLIN PHARMACIST DOCUMENTED: ICD-10-PCS | Mod: CPTII,S$GLB,, | Performed by: ORTHOPAEDIC SURGERY

## 2023-08-23 PROCEDURE — 99999 PR PBB SHADOW E&M-EST. PATIENT-LVL IV: ICD-10-PCS | Mod: PBBFAC,,, | Performed by: ORTHOPAEDIC SURGERY

## 2023-08-23 PROCEDURE — 99999 PR PBB SHADOW E&M-EST. PATIENT-LVL IV: CPT | Mod: PBBFAC,,, | Performed by: ORTHOPAEDIC SURGERY

## 2023-08-23 PROCEDURE — 3061F NEG MICROALBUMINURIA REV: CPT | Mod: CPTII,S$GLB,, | Performed by: ORTHOPAEDIC SURGERY

## 2023-08-23 PROCEDURE — 1159F PR MEDICATION LIST DOCUMENTED IN MEDICAL RECORD: ICD-10-PCS | Mod: CPTII,S$GLB,, | Performed by: ORTHOPAEDIC SURGERY

## 2023-08-23 PROCEDURE — 3061F PR NEG MICROALBUMINURIA RESULT DOCUMENTED/REVIEW: ICD-10-PCS | Mod: CPTII,S$GLB,, | Performed by: ORTHOPAEDIC SURGERY

## 2023-08-23 PROCEDURE — 1159F MED LIST DOCD IN RCRD: CPT | Mod: CPTII,S$GLB,, | Performed by: ORTHOPAEDIC SURGERY

## 2023-08-23 PROCEDURE — 3051F PR MOST RECENT HEMOGLOBIN A1C LEVEL 7.0 - < 8.0%: ICD-10-PCS | Mod: CPTII,S$GLB,, | Performed by: ORTHOPAEDIC SURGERY

## 2023-08-23 PROCEDURE — 3066F NEPHROPATHY DOC TX: CPT | Mod: CPTII,S$GLB,, | Performed by: ORTHOPAEDIC SURGERY

## 2023-08-23 PROCEDURE — 99213 PR OFFICE/OUTPT VISIT, EST, LEVL III, 20-29 MIN: ICD-10-PCS | Mod: S$GLB,,, | Performed by: ORTHOPAEDIC SURGERY

## 2023-08-23 PROCEDURE — 4010F PR ACE/ARB THEARPY RXD/TAKEN: ICD-10-PCS | Mod: CPTII,S$GLB,, | Performed by: ORTHOPAEDIC SURGERY

## 2023-08-23 NOTE — LETTER
August 23, 2023    Tony Gordillo  [address]  [place] [address]         [facility]  [address], [place] [address]  Phone: [phone] August 23, 2023     Patient: Tony Gordillo   YOB: 1980   Date of Visit: 8/23/2023       To Whom It May Concern:    \Patient: Tony Gordillo   YOB: 1980  Date of Visit: 07/26/2023    To Whom It May Concern:    Dorothy Gordillo  was at [facility] on 07/26/2023.     Tony Gordillo is still under my care for an orthopedic injury/condition requiring activity restriction and may not return back to work. I anticipated that he would be out of work until 09/14/2023.  This will likely be delayed further due to interruption in PT realted to denials in therapy coverage.  Date of release to work may change depending on his recovery progress.      Date of next evaluation: 10/18/2023      Crissy Bradford MD    If you have any questions or concerns, please don't hesitate to call.    Sincerely,        Crissy Bradford MD

## 2023-08-23 NOTE — PROGRESS NOTES
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     Assessment/Plan:  43 y.o. male 5 months s/p right shoulder arthroscopy , rotator cuff repair, and arthroscopic biceps tenodesis  (DOS-3/14/23)    Plan  He needs to get back into PT ASAP. I still anticipate that he will be able to return to previous occupation. He was continuing to progress with PT and delaying therapy is only delaying his ability to return to work. Will check in with therapy later this week  Would not consider him to be at [facility] or progressing with [facility] until 1 year post op   Follow up in 8 weeks    All questions were answered in detail. The patient is in full agreement with the treatment plan and will proceed accordingly.

## 2023-08-24 ENCOUNTER — PATIENT MESSAGE (OUTPATIENT)
Dept: INTERNAL MEDICINE | Facility: CLINIC | Age: 43
End: 2023-08-24
Payer: COMMERCIAL

## 2023-08-31 NOTE — TELEPHONE ENCOUNTER
The patient's records were received in our office. Health Maintenance was updated today.    
hard copy, drawn during this pregnancy

## 2023-09-05 ENCOUNTER — CLINICAL SUPPORT (OUTPATIENT)
Dept: DIABETES | Facility: CLINIC | Age: 43
End: 2023-09-05
Payer: COMMERCIAL

## 2023-09-05 DIAGNOSIS — E06.3 HYPOTHYROIDISM DUE TO HASHIMOTO'S THYROIDITIS: ICD-10-CM

## 2023-09-05 DIAGNOSIS — M25.569 KNEE PAIN, UNSPECIFIED CHRONICITY, UNSPECIFIED LATERALITY: Primary | ICD-10-CM

## 2023-09-05 DIAGNOSIS — E03.8 HYPOTHYROIDISM DUE TO HASHIMOTO'S THYROIDITIS: ICD-10-CM

## 2023-09-05 DIAGNOSIS — Z79.4 TYPE 2 DIABETES MELLITUS WITHOUT COMPLICATION, WITH LONG-TERM CURRENT USE OF INSULIN: Primary | ICD-10-CM

## 2023-09-05 DIAGNOSIS — E11.9 TYPE 2 DIABETES MELLITUS WITHOUT COMPLICATION, WITH LONG-TERM CURRENT USE OF INSULIN: Primary | ICD-10-CM

## 2023-09-06 RX ORDER — LEVOTHYROXINE SODIUM 50 UG/1
50 TABLET ORAL
Qty: 90 TABLET | Refills: 3 | Status: SHIPPED | OUTPATIENT
Start: 2023-09-06

## 2023-09-06 NOTE — TELEPHONE ENCOUNTER
No care due was identified.  [facility] Embedded Care Due Messages. Reference number: [id number].   9/05/2023 8:11:07 PM CDT  
Refill Routing Note   Medication(s) are not appropriate for processing by [facility] for the following reason(s):      No active prescription written by provider    [facility] action(s):  Defer Care Due:  None identified     Medication Therapy Plan:  on the med list 23.      Appointments  past 12m or future 3m with PCP    Date Provider   Last Visit   2023 Jovany Ford MD   Next Visit   Visit date not found Jovany Ford MD   ED visits in past 90 days: 0        Note composed:8:34 PM 2023           
Universal Safety Interventions

## 2023-09-06 NOTE — PROGRESS NOTES
Diabetes Care Specialist Virtual Visit Note       The patient location is: Home  The chief complaint leading to consultation is: Diabetes  Visit type: audiovisual  Total time spent with patient: 30 min   Each patient to whom he or she provides medical services by telemedicine is:  (1) informed of the relationship between the physician and patient and the respective role of any other health care provider with respect to management of the patient; and (2) notified that he or she may decline to receive medical services by telemedicine and may withdraw from such care at any time.     Diabetes Care Specialist Progress Note  Author: Beatris Nixon RD  Date: 9/6/2023    Program Intake  Reason for Diabetes Program Visit:: Intervention  Type of Intervention:: Individual  Individual: Education  Education: Self-Management Skill Review, Pattern Management  Current diabetes risk level:: low  Was the ER or hospital admission related to diabetes?: No  Permission to speak with others about care:: yes (Pt wife in office during appt)    Lab Results   Component Value Date    HGBA1C 7.4 (H) 07/06/2023     Clinical      There is no height or weight on file to calculate BMI.    Clinical Assessment  Current Diabetes Treatment: Oral Medication, Injectable, Insulin pump, Insulin    Medication Information  Medication adherence impacting ability to self-manage diabetes?: No    Labs  Do you have regular lab work to monitor your medications?: Yes  Type of Regular Lab Work: A1c, Cholesterol, Microalbumin, CBC  Lab Compliance Barriers: No    Nutritional Status  Diet: Diabetic diet  Meal Plan 24 Hour Recall: Breakfast, Lunch, Dinner, Snack  Meal Plan 24 Hour Recall - Breakfast: Eggs, hernandez, coffee or fruit  Meal Plan 24 Hour Recall - Lunch: [place]  Meal Plan 24 Hour Recall - Dinner: Greek hummus with salad and Gyro  Meal Plan 24 Hour Recall - Snack: Drinks: unsweet tea, water  Current nutritional status an area of need that is impacting patient's  ability to self-manage diabetes?: No    Additional Social History    Support  Does anyone support you with your diabetes care?: yes  Who supports you?: self, spouse  Who takes you to your medical appointments?: self  Does the current support meet the patient's needs?: Yes  Is Support an area impacting ability to self-manage diabetes?: No    Access to Mass Media & Technology  Does the patient have access to any of the following devices or technologies?: Smart phone  Media or technology needs impacting ability to self-manage diabetes?: No    Cognitive/Behavioral Health  Alert and Oriented: Yes  Difficulty Thinking: No  Requires Prompting: No  Requires assistance for routine expression?: No  Cognitive or behavioral barriers impacting ability to self-manage diabetes?: No    Communication  Language preference: English  Hearing Problems: No  Vision Problems: No  Communication needs impacting ability to self-manage diabetes?: No    Health Literacy  Preferred Learning Method: Face to Face, Demonstration  Health literacy needs impacting ability to self-manage diabetes?: No    Diabetes Self-Management Skills Assessment    Diabetes Disease Process/Treatment Options  Diabetes Disease Process/Treatment Options: Skills Assessment Completed: No  Deferred due to:: Time    Nutrition/Healthy Eating  Nutrition/Healthy Eating Skills Assessment Completed:: No  Deffered due to:: Time    Physical Activity/Exercise  Physical Activity/Exercise Skills Assessment Completed: : No  Deffered due to:: Time    Medications  Patient is able to describe current diabetes management routine.: yes  Diabetes management routine:: diet, injectable medications, insulin, oral medications  Patient is able to identify current diabetes medications, dosages, and appropriate timing of medications.: yes (Toujeo, Novolog, Mounjaro, Jardiance, pt trained on Omnipod 5 during appt)  Patient reports problems or concerns with current medication regimen.: no  Medication  Skills Assessment Completed:: Yes  Assessment indicates:: Instruction Needed, Knowledge deficit  Area of need?: Yes    Home Blood Glucose Monitoring  Patient states that blood sugar is checked at home daily.: yes  Monitoring Method:: personal continuous glucose monitor  Personal CGM type:: Dexcom G6  Patient is able to use personal CGM appropriately.: yes  Assessment indicates:: Adequate understanding  Area of need?: No    Acute Complications  Acute Complications Skills Assessment Completed: : No  Deffered due to:: Time    Chronic Complications  Patient is taking statin?: Yes  Chronic Complications Skills Assessment Completed: : No  Deferred due to:: Time    Psychosocial/Coping  Psychosocial/Coping Skills Assessment Completed: : No  Deffered due to:: Time    Diabetes Self Support Plan    Assessment Summary and Plan    Based on today's diabetes care assessment, the following areas of need were identified:          9/5/2023    12:01 AM   Social   Support No   Access to [employer] No   Cognitive/Behavioral Health No   Communication No   Health Literacy No            9/5/2023    12:01 AM   Clinical   Medication Adherence No   Lab Compliance No   Nutritional Status No            9/5/2023    12:01 AM   Diabetes Self-Management Skills   Medication Yes, see care plan.   Home Blood Glucose Monitoring No      Today's interventions were provided through individual discussion, instruction, and written materials were provided.      Patient verbalized understanding of instruction and written materials.  Pt was able to return back demonstration of instructions today. Patient understood key points, needs reinforcement and further instruction.     Diabetes Self-Management Care Plan:    Today's Diabetes Self-Management Care Plan was developed with Tony's input. Tony has agreed to work toward the following goal(s) to improve his/her overall diabetes control.      Care Plan: Diabetes Management   Updates made since 8/7/2023 12:00  AM        Problem: Medications         Goal: Patient Agrees to take Diabetes Medications as prescribed.    Start Date: 7/31/2023   Expected End Date: 10/31/2023   This Visit's Progress: On track   Recent Progress: On track   Priority: High   Barriers: Knowledge deficit   Note:    DIABETES EDUCATOR NOTE - PUMP EVAL      Patient seen for initial assessment and education on Omnipod 5.  Patient is interested in an insulin pump and continuous glucose monitor.   Pt returned today for evaluation/education on self care measure of diabetes management, blood glucose testing, meal patterns/ability to carbohydrate count, and problem solving skills for managing hypo and hyperglycemia.     Covered basic details of pump therapy with patient.  Reviewed terms ISF, CHO ratio, goal BG, bolus, basal, active insulin and insulin on board.  Pt verbalized clearer understanding of pump and CGM therapy.   Pt came to appt with Omnipod 5.    During today's visit the patient was introduces to/educated on the following content areas:     Current Diabetic Medications: Jardiance, Toujeo, Novolog, Mounjaro    Nutrition:   Carb counting skills: Pt states comfortable with carbohydrate counting.    Glucose monitoring:  Patient is testing BG using Dexcom G6.      EDUCATION PLAN:   Information provided on Omnipod 5, which patient is most interested in.  Follow up for continued education on possible pump training .     8/2/23:   OMNIPOD 5 INSULIN PUMP START Patient is here today for insulin pump training and will be starting on an Omni Pod 5 Insulin pump.     Patient demonstrated the ability to fill pod reservoir with 200 units, prime infusion set and insert pod into right arm per sterile technique.  Instructed pt on use of basic pump features. Reviewed site selection of pods, rotation of sites and hard stop on controller to change every 72 hrs.  Instructed pt on how to enter activity mode.  Reviewed features available in manual mode verses automated  "mode.   Educated pt on how to switch from automated mode to manual mode.  Patient demonstrated ability to program Dexcom transmitter into controller and start automated limited mode.  Pt Dexcom connected to controller during appt.     INITIAL SETTINGS : Basal rate: 1.2 u/hr Maximum basal: 2.0 u/hr     Bolus Menu:  Pump Settings per NP note  Basal rate 1.2 u/hr  Carb ratio 5   ISF 20  Active insulin time 4 hours   Glucose target 110      Correct over: 110  IAT: 4      Maximum bolus = 30 units     Patient was given time to practice on simulator entering carbs and glucose into pump     Podder/Glooko account information added in specialty comments.    9/5/23: Pt reports adding an extra ~15-20g to his meal carbohydrates to avoid hyperglycemia. Educator messaged NP. NP stated: "let's increase carb ratio from 4 to 3." And to "avoid counting extra carbs after the change so that we can evaluate the effectiveness." Educator assisted pt with making pump setting changes.          Dexcom download/Glooko report uploaded into media manager.    Follow Up Plan     Follow up in about 3 months (around 12/5/2023) for 3-month F/U.    Today's care plan and follow up schedule was discussed with patient.  Tony verbalized understanding of the care plan, goals, and agrees to follow up plan.        The patient was encouraged to communicate with his/her health care provider/physician and care team regarding his/her condition(s) and treatment.  I provided the patient with my contact information today and encouraged to contact me via phone or [facility] Portal as needed.     Length of Visit   Total Time: 30 Minutes     "

## 2023-09-07 ENCOUNTER — OFFICE VISIT (OUTPATIENT)
Dept: ORTHOPEDICS | Facility: CLINIC | Age: 43
End: 2023-09-07
Attending: ORTHOPAEDIC SURGERY
Payer: COMMERCIAL

## 2023-09-07 VITALS — HEIGHT: 73 IN | BODY MASS INDEX: 39.23 KG/M2 | WEIGHT: 296 LBS

## 2023-09-07 DIAGNOSIS — S83.241A TEAR OF MEDIAL MENISCUS OF RIGHT KNEE, CURRENT, UNSPECIFIED TEAR TYPE, INITIAL ENCOUNTER: Primary | ICD-10-CM

## 2023-09-07 PROCEDURE — 3066F NEPHROPATHY DOC TX: CPT | Mod: CPTII,S$GLB,, | Performed by: ORTHOPAEDIC SURGERY

## 2023-09-07 PROCEDURE — 4010F PR ACE/ARB THEARPY RXD/TAKEN: ICD-10-PCS | Mod: CPTII,S$GLB,, | Performed by: ORTHOPAEDIC SURGERY

## 2023-09-07 PROCEDURE — 4010F ACE/ARB THERAPY RXD/TAKEN: CPT | Mod: CPTII,S$GLB,, | Performed by: ORTHOPAEDIC SURGERY

## 2023-09-07 PROCEDURE — 99999 PR PBB SHADOW E&M-EST. PATIENT-LVL IV: CPT | Mod: PBBFAC,,, | Performed by: ORTHOPAEDIC SURGERY

## 2023-09-07 PROCEDURE — 99214 OFFICE O/P EST MOD 30 MIN: CPT | Mod: S$GLB,,, | Performed by: ORTHOPAEDIC SURGERY

## 2023-09-07 PROCEDURE — 1159F MED LIST DOCD IN RCRD: CPT | Mod: CPTII,S$GLB,, | Performed by: ORTHOPAEDIC SURGERY

## 2023-09-07 PROCEDURE — 3066F PR DOCUMENTATION OF TREATMENT FOR NEPHROPATHY: ICD-10-PCS | Mod: CPTII,S$GLB,, | Performed by: ORTHOPAEDIC SURGERY

## 2023-09-07 PROCEDURE — 3051F HG A1C>EQUAL 7.0%<8.0%: CPT | Mod: CPTII,S$GLB,, | Performed by: ORTHOPAEDIC SURGERY

## 2023-09-07 PROCEDURE — 1159F PR MEDICATION LIST DOCUMENTED IN MEDICAL RECORD: ICD-10-PCS | Mod: CPTII,S$GLB,, | Performed by: ORTHOPAEDIC SURGERY

## 2023-09-07 PROCEDURE — 99214 PR OFFICE/OUTPT VISIT, EST, LEVL IV, 30-39 MIN: ICD-10-PCS | Mod: S$GLB,,, | Performed by: ORTHOPAEDIC SURGERY

## 2023-09-07 PROCEDURE — 99999 PR PBB SHADOW E&M-EST. PATIENT-LVL IV: ICD-10-PCS | Mod: PBBFAC,,, | Performed by: ORTHOPAEDIC SURGERY

## 2023-09-07 PROCEDURE — 3061F PR NEG MICROALBUMINURIA RESULT DOCUMENTED/REVIEW: ICD-10-PCS | Mod: CPTII,S$GLB,, | Performed by: ORTHOPAEDIC SURGERY

## 2023-09-07 PROCEDURE — 3008F BODY MASS INDEX DOCD: CPT | Mod: CPTII,S$GLB,, | Performed by: ORTHOPAEDIC SURGERY

## 2023-09-07 PROCEDURE — 3061F NEG MICROALBUMINURIA REV: CPT | Mod: CPTII,S$GLB,, | Performed by: ORTHOPAEDIC SURGERY

## 2023-09-07 PROCEDURE — 3008F PR BODY MASS INDEX (BMI) DOCUMENTED: ICD-10-PCS | Mod: CPTII,S$GLB,, | Performed by: ORTHOPAEDIC SURGERY

## 2023-09-07 PROCEDURE — 3051F PR MOST RECENT HEMOGLOBIN A1C LEVEL 7.0 - < 8.0%: ICD-10-PCS | Mod: CPTII,S$GLB,, | Performed by: ORTHOPAEDIC SURGERY

## 2023-09-07 NOTE — PROGRESS NOTES
NEW PATIENT ORTHOPAEDIC: Knee    PRIMARY CARE PHYSICIAN: Jovany Ford MD   REFERRING PROVIDER: Antonio Rolon MD  [address] [place] [address]  [place] [id number]     ASSESSMENT & PLAN:    Impression:  Right Knee Medial meniscal tear   Left knee moderate osteoarthritis  Left knee history of ACL reconstruction    Follow Up Plan: After MRI     Non operative care:    Tony Gordillo has physical exam evidence of above and wishes to pursue an non-operative care. I am recommending the following: MRI of right knee    Patient comes in with bilateral knee pains.  The left is more chronic.  History of 3 prior surgeries on that knee including multiple ACL reconstructions.  That knee is a little bit more straight forward in terms of his moderate arthritis and ongoing instability in that knee.  This knee would be likely 1 that would benefit from knee replacement in the future.  His primary issue today however is his right knee and ongoing mechanical symptoms and instability of his right knee.  This is not been previously evaluated aside from radiographs which demonstrate some very mild lateral compartment arthritis and he is had viscosupplementation injections and steroid injections into this knee without any improvement in his pain.  As a result I think at this time with the mechanical symptoms it is necessary to obtain an MRI to rule out internal derangement.  He is failed conservative measures today and if something mechanical like the meniscus is involved he may benefit from arthroscopic intervention.  We will continue to watch the left knee as this is more chronic issue.    Last A1C 7.4 two months ago.     The patient has been ordered:  MRI (Criteria for MRI - xray is equivocal)    CONSULTS:   None    ACTIVE PROBLEM LIST  Patient Active Problem List   Diagnosis    Hyperlipidemia LDL goal <70    Insulin long-term use    Tinea pedis    Morbid obesity    Hypothyroidism    Type 2 diabetes mellitus with left eye affected by mild  nonproliferative retinopathy without macular edema, with long-term current use of insulin    Essential hypertension    Migraine with aura and without status migrainosus, not intractable propranolol SE angry; topamax not helpful; imitrex ineffective; daith ear piercing helps    Non-seasonal allergic rhinitis; avoid antihistamines per opthalmology    Acute bilateral low back pain without sciatica    NAINA (obstructive sleep apnea) 8/2019 sleep study AHI 11    Low testosterone in male; pituitary axis normal. MRI negative. 11/2019 started on testosterone    Right foot pain    Decreased strength of lower extremity    Decreased range of motion of both ankles    Gait abnormality    Rib pain on left side    Dyspnea    Decreased strength, endurance, and mobility    Left hand pain    Mild neurocognitive disorder due to traumatic brain injury    Outbursts of anger    Other amnesia    Traumatic rotator cuff tear, right, initial encounter    S/P right rotator cuff repair    Biceps tendonosis of right shoulder    Decreased range of motion of right shoulder    Impaired strength of shoulder muscles           SUBJECTIVE    CHIEF COMPLAINT: Knee Pain    HPI:   Tony Gordillo is a 43 y.o. male here for evaluation and management of bilateral knee pain, right worse than left. There is not a specific incident that brought about this pain. he has had progressive problems with the knee(s) starting 3 months months ago but is now progressing to interfere with activities which include: walking, enjoying hobbies, exercise, rising from a sitting position, standing for prolonged periods of time, and climbing stairs     Currently the pain in the joint is rated at moderate with activity. The pain is intermittent and is located in the knee, at level of joint line, located medially, and located posterior. The pain is described as aching, severe, and sharp. Relieving factors include ice or heat, prescription medication, and repositioning.     There  is associated Clicking and Popping and instability.     Tony Gordillo has no additional complaints.     PROGRESSIVE SYMPTOMS:  Pain impacting work  Pain worsened by weight bearing  Pain effecting living situation    FUNCTIONAL STATUS:   Climb a flight of stairs or walk up a hill     PREVIOUS TREATMENTS:  Medical: OTC NSAIDS, RX NSAIDS, Steroid Injections, Biologic Injections, Narcotics, and Tylenol  Physical Therapy: Activities Modified  and Formal Physical Therapy for 6 weeks  Previous Orthopaedic Surgery: Left Knee ACL Reconstruction x3, Right Shoulder RCR with Dr. Bradford    REVIEW OF SYSTEMS:  PAIN ASSESSMENT:  See HPI.  MUSCULOSKELETAL: See HPI.  OTHER 10 point review of systems is negative except as stated in HPI above    PAST MEDICAL HISTORY   has a past medical history of Diabetes mellitus, type 2, Hyperlipidemia, Hypertension, Obesity, and NAINA (obstructive sleep apnea).     PAST SURGICAL HISTORY   has a past surgical history that includes left knee x 2; Knee surgery; Arthroscopic repair of rotator cuff of shoulder (Right, 3/14/2023); arthroscopy,shoulder,with biceps tenodesis (3/14/2023); and Arthroscopic debridement of shoulder (3/14/2023).     FAMILY HISTORY  family history includes Autism in his son; Diabetes in his father and mother; No Known Problems in his brother and daughter.     SOCIAL HISTORY   reports that he has never smoked. He has never used smokeless tobacco. He reports current alcohol use. He reports that he does not use drugs.     ALLERGIES   Review of patient's allergies indicates:   Allergen Reactions    Influenza virus vaccine, specific Hives    Pioglitazone      Altered mood     Victoza [liraglutide] Nausea And Vomiting    Farxiga [dapagliflozin] Rash     Erectile dysfunction and rash         MEDICATIONS  Current Outpatient Medications on File Prior to Visit   Medication Sig Dispense Refill    atorvastatin (LIPITOR) 40 MG tablet Take 1 tablet (40 mg total) by mouth once daily. 90  tablet 3    azelastine (ASTELIN) 137 mcg (0.1 %) nasal spray 1 spray (137 mcg total) by Nasal route 2 (two) times daily. 30 mL 3    azelastine 205.5 mcg (0.15 %) Spry azelastine 205.5 mcg (0.15 %) nasal spray  INHALE 2 SPRAYS TO EACH NOSTRIL TWICE A DAY 30 mL 11    blood sugar diagnostic Strp To check BG 4 times daily, to use with insurance preferred meter 400 strip 3    blood sugar diagnostic Strp OneTouch Ultra Test strips      blood-glucose meter kit OneTouch Ultra2 Meter kit      blood-glucose sensor (DEXCOM G6 SENSOR) Filomena Change sensor every 10 days 3 each 11    blood-glucose sensor (FREESTYLE SAMANTHA 3 SENSOR) Filomena Change every 14 days 2 each 11    blood-glucose transmitter (DEXCOM G6 TRANSMITTER) Filomena Change every 3 months 1 each 3    cyanocobalamin, vitamin B-12, 5,000 mcg Subl Place one under tongue once a day for 1 month, then continue to take under tongue per week 30 tablet 3    diclofenac sodium (VOLTAREN) 1 % Gel Apply the gel (2 g) to the affected area 4 times daily. Do not apply more than 8 g daily to any one affected joint 100 g 1    empagliflozin (JARDIANCE) 25 mg tablet Take 1 tablet (25 mg total) by mouth once daily. 90 tablet 2    fenofibrate 160 MG Tab fenofibrate 160 mg tablet      fluticasone propionate (FLONASE) 50 mcg/actuation nasal spray fluticasone propionate 50 mcg/actuation nasal spray,suspension 16 g 3    fluticasone-umeclidin-vilanter (TRELEGY ELLIPTA) 200-62.5-25 mcg inhaler Inhale 1 puff into the lungs once daily. Stop symbicort 60 each 5    insulin aspart U-100 (NOVOLOG U-100 INSULIN ASPART) 100 unit/mL injection Use max 120 units/day in insulin pump 40 mL 1    insulin aspart U-100 (NOVOLOG) 100 unit/mL (3 mL) InPn pen INJECT 15-30 UNITS BEFORE EACH MEAL WITH SLIDNING SCALE, MAX DAILY DOSE 100 UNITS 30 mL 5    insulin glargine U-300 conc (TOUJEO MAX U-300 SOLOSTAR) 300 unit/mL (3 mL) insulin pen Inject 30 Units into the skin once daily. 3 pen 5    insulin NPH isoph U-100 human  "(NOVOLIN N FLEXPEN) 100 unit/mL (3 mL) InPn INJECT 14 UNITS INTO THE SKIN ONCE DAILY AT NIGHT 8 PM. 15 mL 2    insulin pump cart,automated,BT (OMNIPOD 5 G6 PODS, GEN 5,) Crtg INJECT 1 EACH INTO THE SKIN ONCE DAILY. 6 each 1    ketoconazole (NIZORAL) 2 % cream Apply topically once daily. 1 Tube 3    L-METHYLFOLATE 15 mg Tab TAKE 15 MG BY MOUTH ONCE DAILY. 30 tablet 11    lamoTRIgine (LAMICTAL) 100 MG tablet Take 1.5 tablets (150 mg total) by mouth once daily. 135 tablet 3    lancets Misc To check BG 4 times daily, to use with insurance preferred meter 400 each 3    levothyroxine (SYNTHROID) 50 MCG tablet TAKE 1 TABLET BY MOUTH BEFORE BREAKFAST. 90 tablet 3    lisinopriL (PRINIVIL,ZESTRIL) 20 MG tablet TAKE 1 TABLET BY MOUTH EVERY DAY 90 tablet 3    methylPREDNISolone (MEDROL DOSEPACK) 4 mg tablet use as directed 1 each 0    modafiniL (PROVIGIL) 100 MG Tab Take 1/4 tablet in AM for 1 week, then 1/2 tablet in AM for 1 week, then increase to 1 tablet in AM as need for attention/concentration/drowsiness 30 tablet 2    OMNIPOD 5 G6 INTRO KIT, GEN 5, Crtg INJECT 1 EACH INTO THE SKIN DAILY. 1 each 0    pen needle, diabetic (BD ULTRA-FINE KEN PEN NEEDLE) 32 gauge x 5/32" Ndle 1 Device by Misc.(Non-Drug; Combo Route) route 5 (five) times daily. 550 each 4    syringe, disposable, 1 mL Syrg Testosterone every 2 weeks 25 Syringe 1    tirzepatide (MOUNJARO) 5 mg/0.5 mL PnIj Inject 5 mg into the skin every 7 days. 4 pen 5     No current facility-administered medications on file prior to visit.          PHYSICAL EXAM   vitals were not taken for this visit.   There is no height or weight on file to calculate BMI.      All other systems deferred.  GENERAL:  No acute distress  HABITUS: Obese  GAIT: Antalgic  SKIN: Normal     KNEE EXAM:    right:   Effusion: Minimal joint effusion  TTP: Yes over Medial Joint Line   Yes crepitus with passive knee ROM  Passive ROM: Extension 0, Flexion 130  No pain with manipulation of patella  Stable " to varus/valgus stress. No increased laxity to anterior/posterior drawer testing  positive Naida's test  No pain with IR/ER rotation of the hip  5/5 strength in knee flexion and extension, ankle plantarflexion and dorsiflexion  Neurovascular Status: Sensation intact to light touch in Sural, Saphenous, SPN, DPN, Tibial nerve distribution  2+ pulse DP/PT, normal capillary refill, foot has normal warmth    DATA:  Diagnostic tests reviewed for today's visit:     4v of the left knee reveal Moderate degenerative changes of the Lateral and Patellofemoral compartment. There is evidence of advanced osteoarthritis changes with Osteophyte formation and Joint space narrowing. The limb is in netural alignment. The patella is tracking midline.    4v of right knee radiographs appears mild degenerative changes. No fracture or other osseous abnormality.

## 2023-09-12 ENCOUNTER — PATIENT MESSAGE (OUTPATIENT)
Dept: INTERNAL MEDICINE | Facility: CLINIC | Age: 43
End: 2023-09-12
Payer: COMMERCIAL

## 2023-09-15 ENCOUNTER — HOSPITAL ENCOUNTER (OUTPATIENT)
Dept: RADIOLOGY | Facility: HOSPITAL | Age: 43
Discharge: HOME | End: 2023-09-15
Attending: ORTHOPAEDIC SURGERY
Payer: COMMERCIAL

## 2023-09-15 DIAGNOSIS — S83.241A TEAR OF MEDIAL MENISCUS OF RIGHT KNEE, CURRENT, UNSPECIFIED TEAR TYPE, INITIAL ENCOUNTER: ICD-10-CM

## 2023-09-15 PROCEDURE — 73721 MRI JNT OF LWR EXTRE W/O DYE: CPT | Mod: 26,RT,, | Performed by: RADIOLOGY

## 2023-09-15 PROCEDURE — 73721 MRI KNEE WITHOUT CONTRAST RIGHT: ICD-10-PCS | Mod: 26,RT,, | Performed by: RADIOLOGY

## 2023-09-15 PROCEDURE — 73721 MRI JNT OF LWR EXTRE W/O DYE: CPT | Mod: TC,RT

## 2023-09-18 ENCOUNTER — OFFICE VISIT (OUTPATIENT)
Dept: FAMILY MEDICINE | Facility: CLINIC | Age: 43
End: 2023-09-18
Payer: COMMERCIAL

## 2023-09-18 ENCOUNTER — OFFICE VISIT (OUTPATIENT)
Dept: ORTHOPEDICS | Facility: CLINIC | Age: 43
End: 2023-09-18
Payer: COMMERCIAL

## 2023-09-18 VITALS
OXYGEN SATURATION: 96 % | HEART RATE: 84 BPM | SYSTOLIC BLOOD PRESSURE: 130 MMHG | TEMPERATURE: 99 F | WEIGHT: 297.06 LBS | DIASTOLIC BLOOD PRESSURE: 76 MMHG | BODY MASS INDEX: 39.19 KG/M2

## 2023-09-18 VITALS — HEIGHT: 73 IN | WEIGHT: 296 LBS | BODY MASS INDEX: 39.23 KG/M2

## 2023-09-18 DIAGNOSIS — G43.109 MIGRAINE WITH AURA AND WITHOUT STATUS MIGRAINOSUS, NOT INTRACTABLE: ICD-10-CM

## 2023-09-18 DIAGNOSIS — S83.281A TEAR OF LATERAL MENISCUS OF RIGHT KNEE, CURRENT, UNSPECIFIED TEAR TYPE, INITIAL ENCOUNTER: ICD-10-CM

## 2023-09-18 DIAGNOSIS — Z79.4 TYPE 2 DIABETES MELLITUS WITH LEFT EYE AFFECTED BY MILD NONPROLIFERATIVE RETINOPATHY WITHOUT MACULAR EDEMA, WITH LONG-TERM CURRENT USE OF INSULIN: ICD-10-CM

## 2023-09-18 DIAGNOSIS — E11.3292 TYPE 2 DIABETES MELLITUS WITH LEFT EYE AFFECTED BY MILD NONPROLIFERATIVE RETINOPATHY WITHOUT MACULAR EDEMA, WITH LONG-TERM CURRENT USE OF INSULIN: ICD-10-CM

## 2023-09-18 DIAGNOSIS — E66.01 MORBID OBESITY: ICD-10-CM

## 2023-09-18 DIAGNOSIS — M17.11 PRIMARY OSTEOARTHRITIS OF RIGHT KNEE: Primary | ICD-10-CM

## 2023-09-18 DIAGNOSIS — J32.9 RECURRENT SINUSITIS: Primary | ICD-10-CM

## 2023-09-18 PROCEDURE — 3051F PR MOST RECENT HEMOGLOBIN A1C LEVEL 7.0 - < 8.0%: ICD-10-PCS | Mod: CPTII,S$GLB,, | Performed by: NURSE PRACTITIONER

## 2023-09-18 PROCEDURE — 3066F NEPHROPATHY DOC TX: CPT | Mod: CPTII,S$GLB,, | Performed by: ORTHOPAEDIC SURGERY

## 2023-09-18 PROCEDURE — 3066F PR DOCUMENTATION OF TREATMENT FOR NEPHROPATHY: ICD-10-PCS | Mod: CPTII,S$GLB,, | Performed by: NURSE PRACTITIONER

## 2023-09-18 PROCEDURE — 99999 PR PBB SHADOW E&M-EST. PATIENT-LVL IV: CPT | Mod: PBBFAC,,, | Performed by: ORTHOPAEDIC SURGERY

## 2023-09-18 PROCEDURE — 3051F PR MOST RECENT HEMOGLOBIN A1C LEVEL 7.0 - < 8.0%: ICD-10-PCS | Mod: CPTII,S$GLB,, | Performed by: ORTHOPAEDIC SURGERY

## 2023-09-18 PROCEDURE — 3008F BODY MASS INDEX DOCD: CPT | Mod: CPTII,S$GLB,, | Performed by: ORTHOPAEDIC SURGERY

## 2023-09-18 PROCEDURE — 99999 PR PBB SHADOW E&M-EST. PATIENT-LVL V: ICD-10-PCS | Mod: PBBFAC,,, | Performed by: NURSE PRACTITIONER

## 2023-09-18 PROCEDURE — 3075F PR MOST RECENT SYSTOLIC BLOOD PRESS GE 130-139MM HG: ICD-10-PCS | Mod: CPTII,S$GLB,, | Performed by: NURSE PRACTITIONER

## 2023-09-18 PROCEDURE — 3078F PR MOST RECENT DIASTOLIC BLOOD PRESSURE < 80 MM HG: ICD-10-PCS | Mod: CPTII,S$GLB,, | Performed by: NURSE PRACTITIONER

## 2023-09-18 PROCEDURE — 3061F NEG MICROALBUMINURIA REV: CPT | Mod: CPTII,S$GLB,, | Performed by: ORTHOPAEDIC SURGERY

## 2023-09-18 PROCEDURE — 3066F NEPHROPATHY DOC TX: CPT | Mod: CPTII,S$GLB,, | Performed by: NURSE PRACTITIONER

## 2023-09-18 PROCEDURE — 3061F NEG MICROALBUMINURIA REV: CPT | Mod: CPTII,S$GLB,, | Performed by: NURSE PRACTITIONER

## 2023-09-18 PROCEDURE — 99999 PR PBB SHADOW E&M-EST. PATIENT-LVL IV: ICD-10-PCS | Mod: PBBFAC,,, | Performed by: ORTHOPAEDIC SURGERY

## 2023-09-18 PROCEDURE — 99213 PR OFFICE/OUTPT VISIT, EST, LEVL III, 20-29 MIN: ICD-10-PCS | Mod: S$GLB,,, | Performed by: ORTHOPAEDIC SURGERY

## 2023-09-18 PROCEDURE — 3051F HG A1C>EQUAL 7.0%<8.0%: CPT | Mod: CPTII,S$GLB,, | Performed by: NURSE PRACTITIONER

## 2023-09-18 PROCEDURE — 3078F DIAST BP <80 MM HG: CPT | Mod: CPTII,S$GLB,, | Performed by: NURSE PRACTITIONER

## 2023-09-18 PROCEDURE — 3075F SYST BP GE 130 - 139MM HG: CPT | Mod: CPTII,S$GLB,, | Performed by: NURSE PRACTITIONER

## 2023-09-18 PROCEDURE — 4010F ACE/ARB THERAPY RXD/TAKEN: CPT | Mod: CPTII,S$GLB,, | Performed by: ORTHOPAEDIC SURGERY

## 2023-09-18 PROCEDURE — 1159F MED LIST DOCD IN RCRD: CPT | Mod: CPTII,S$GLB,, | Performed by: ORTHOPAEDIC SURGERY

## 2023-09-18 PROCEDURE — 3008F PR BODY MASS INDEX (BMI) DOCUMENTED: ICD-10-PCS | Mod: CPTII,S$GLB,, | Performed by: ORTHOPAEDIC SURGERY

## 2023-09-18 PROCEDURE — 3061F PR NEG MICROALBUMINURIA RESULT DOCUMENTED/REVIEW: ICD-10-PCS | Mod: CPTII,S$GLB,, | Performed by: ORTHOPAEDIC SURGERY

## 2023-09-18 PROCEDURE — 3066F PR DOCUMENTATION OF TREATMENT FOR NEPHROPATHY: ICD-10-PCS | Mod: CPTII,S$GLB,, | Performed by: ORTHOPAEDIC SURGERY

## 2023-09-18 PROCEDURE — 99214 PR OFFICE/OUTPT VISIT, EST, LEVL IV, 30-39 MIN: ICD-10-PCS | Mod: S$GLB,,, | Performed by: NURSE PRACTITIONER

## 2023-09-18 PROCEDURE — 99999 PR PBB SHADOW E&M-EST. PATIENT-LVL V: CPT | Mod: PBBFAC,,, | Performed by: NURSE PRACTITIONER

## 2023-09-18 PROCEDURE — 1159F PR MEDICATION LIST DOCUMENTED IN MEDICAL RECORD: ICD-10-PCS | Mod: CPTII,S$GLB,, | Performed by: ORTHOPAEDIC SURGERY

## 2023-09-18 PROCEDURE — 99214 OFFICE O/P EST MOD 30 MIN: CPT | Mod: S$GLB,,, | Performed by: NURSE PRACTITIONER

## 2023-09-18 PROCEDURE — 1159F MED LIST DOCD IN RCRD: CPT | Mod: CPTII,S$GLB,, | Performed by: NURSE PRACTITIONER

## 2023-09-18 PROCEDURE — 3061F PR NEG MICROALBUMINURIA RESULT DOCUMENTED/REVIEW: ICD-10-PCS | Mod: CPTII,S$GLB,, | Performed by: NURSE PRACTITIONER

## 2023-09-18 PROCEDURE — 4010F ACE/ARB THERAPY RXD/TAKEN: CPT | Mod: CPTII,S$GLB,, | Performed by: NURSE PRACTITIONER

## 2023-09-18 PROCEDURE — 3008F PR BODY MASS INDEX (BMI) DOCUMENTED: ICD-10-PCS | Mod: CPTII,S$GLB,, | Performed by: NURSE PRACTITIONER

## 2023-09-18 PROCEDURE — 99213 OFFICE O/P EST LOW 20 MIN: CPT | Mod: S$GLB,,, | Performed by: ORTHOPAEDIC SURGERY

## 2023-09-18 PROCEDURE — 3051F HG A1C>EQUAL 7.0%<8.0%: CPT | Mod: CPTII,S$GLB,, | Performed by: ORTHOPAEDIC SURGERY

## 2023-09-18 PROCEDURE — 4010F PR ACE/ARB THEARPY RXD/TAKEN: ICD-10-PCS | Mod: CPTII,S$GLB,, | Performed by: NURSE PRACTITIONER

## 2023-09-18 PROCEDURE — 3008F BODY MASS INDEX DOCD: CPT | Mod: CPTII,S$GLB,, | Performed by: NURSE PRACTITIONER

## 2023-09-18 PROCEDURE — 1159F PR MEDICATION LIST DOCUMENTED IN MEDICAL RECORD: ICD-10-PCS | Mod: CPTII,S$GLB,, | Performed by: NURSE PRACTITIONER

## 2023-09-18 PROCEDURE — 4010F PR ACE/ARB THEARPY RXD/TAKEN: ICD-10-PCS | Mod: CPTII,S$GLB,, | Performed by: ORTHOPAEDIC SURGERY

## 2023-09-18 RX ORDER — MONTELUKAST SODIUM 10 MG/1
10 TABLET ORAL NIGHTLY
Qty: 30 TABLET | Refills: 3 | Status: SHIPPED | OUTPATIENT
Start: 2023-09-18 | End: 2023-10-17 | Stop reason: SDUPTHER

## 2023-09-18 RX ORDER — METHYLPREDNISOLONE 4 MG/1
TABLET ORAL
Qty: 21 EACH | Refills: 0 | Status: SHIPPED | OUTPATIENT
Start: 2023-09-18 | End: 2023-10-05

## 2023-09-18 NOTE — PROGRESS NOTES
HPI     Chief Complaint:  Chief Complaint   Patient presents with    Headache    Nasal Congestion    Sinus Problem       Tony Gordillo is a 43 y.o. male with multiple medical diagnoses as listed in the medical history and problem list that presents for sinus congestion.  Pt is known to me with his his last appointment 2023.      Sinusitis  This is a recurrent problem. The current episode started 1 to 4 weeks ago. The problem has been gradually worsening since onset. There has been no fever. His pain is at a severity of 4/10. Associated symptoms include congestion, headaches and sinus pressure. Pertinent negatives include no chills, coughing, neck pain or shortness of breath. Past treatments include sitting up, oral decongestants, saline nose sprays, lying down and nasal decongestants (astelin, Oxymetazoline, nasal spray, neti pot, allegra). The treatment provided mild relief.       Assessment & Plan     Problem List Items Addressed This Visit          Neuro    Migraine with aura and without status migrainosus, not intractable propranolol SE angry; topamax not helpful; imitrex ineffective; daith ear piercing helps    The current medical regimen is effective;  continue present plan         Endocrine    Morbid obesity    We discussed weight issues and safe, effective ways of losing pounds, includin) diet:  low carbohydrate, low fat diet, stay away from fast food, fried and processed food, use whole grain, lot of fruits and vegetables, use healthy fat such as avocado, nuts and olive oil in reasonable quantity, stay away from sodas. Regular meals with lean proteins.  2) physical activity: ideally 150 min a week, with cardiovascular and resistance activity.  Patient was encouraged to set realistic attainable goals for weight loss, and we will follow up periodically.    Discussed Mediterranean Diet recommendations (Adopted from Elzbieta et al, NEJ, 2018.)  - Eat primarily plant-based foods, such as fruits  and vegetables, whole grains, legumes (beans) and nuts  - Limit refined carbohydrates (white pasta, bread, rice).  - Replace butter with healthy fats such as olive oil.  - Use herbs and spices instead of salt to flavor foods.  - Limit red meat and processed meats to no more than a few times a month.  - Avoid sugary sodas, bakery goods, and sweets.  - Eat fish and poultry at least twice a week.      Type 2 diabetes mellitus with left eye affected by mild nonproliferative retinopathy without macular edema, with long-term current use of insulin    -discussed with patient about routine diabetic care that includes but are not limited to regular eye exams, skin care, daily foot exam, proper nutrition, regular BG monitoring at home (fasting and mealtime) and medication compliance in a diabetic.  Target morning BS  and meal-time BS <180      Relevant Orders    Ambulatory referral/consult to Podiatry    Ambulatory referral/consult to Optometry     Other Visit Diagnoses       Recurrent sinusitis    -  Primary    Previously evaluated by ENT. Denies fever, chills, dyspnea. Reports home COVID-19 screen was negative. Bilateral maxillary sinus tenderness on exam. Pt is congested. VSS, afebrile. Minimal relief with current and OTC medications. Good effect with medrol in the past for similar symptoms. He has been using oxymetazoline. Previously prescribed singulair with good effect but has not taken singulair in > 1 yr.     Refer to allergy  Start medrol. Closely monitor BG.   Start singulair  D/c oxymetazoline.   Avoid triggers.    Discussed DDx, condition, and treatment.   Education sent to patient portal/included in after visit summary.  ED precautions given.   Notify provider if symptoms do not resolve or increase in severity.   Patient verbalizes understanding and agrees with plan of care.      Relevant Medications    methylPREDNISolone (MEDROL DOSEPACK) 4 mg tablet    montelukast (SINGULAIR) 10 mg tablet    Other  Relevant Orders    Ambulatory referral/consult to Allergy            --------------------------------------------      Health Maintenance:  Health Maintenance         Date Due Completion Date    Pneumococcal Vaccines (Age 0-64) (2 - PCV) 09/28/2019 9/28/2018    COVID-19 Vaccine (6 - Moderna series) 01/03/2022 11/8/2021    Foot Exam 02/16/2023 2/16/2022    Override on 12/11/2012: Done    Eye Exam 05/18/2023 5/18/2022    Influenza Vaccine (1) 09/01/2023 9/6/2019    Hemoglobin A1c 01/06/2024 7/6/2023    Override on 7/11/2015: Done (HGB A1C 9.9H - [facility]ULTS/[facility] - DR. MANN)    Diabetes Urine Screening 07/06/2024 7/6/2023    Lipid Panel 07/06/2024 7/6/2023    Override on 7/11/2015: Done ([facility] RESULTS - DR. MANN)    Low Dose Statin 09/18/2024 9/18/2023    TETANUS VACCINE 07/18/2029 7/18/2019            Diabetic eye screening ordered and Diabetic foot screening ordered    Follow Up:  No follow-ups on file.    Exam     Review of Systems:  (as noted above)  Review of Systems   Constitutional:  Negative for activity change, chills, fever and unexpected weight change.   HENT:  Positive for congestion, rhinorrhea and sinus pressure. Negative for hearing loss and trouble swallowing.    Eyes:  Negative for discharge and visual disturbance.   Respiratory:  Negative for cough, chest tightness, shortness of breath and wheezing.    Cardiovascular:  Negative for chest pain and palpitations.   Gastrointestinal:  Negative for blood in stool, constipation, diarrhea and vomiting.   Endocrine: Negative for polydipsia and polyuria.   Genitourinary:  Negative for difficulty urinating, hematuria and urgency.   Musculoskeletal:  Negative for arthralgias, joint swelling and neck pain.   Neurological:  Positive for headaches. Negative for weakness.   Psychiatric/Behavioral:  Negative for confusion and dysphoric mood.        Physical Exam:   Physical Exam  Constitutional:       General: He is not in acute  distress.     Appearance: He is obese. He is not ill-appearing or diaphoretic.   HENT:      Head: Normocephalic and atraumatic.      Nose: Congestion present.   Eyes:      General: No scleral icterus.  Cardiovascular:      Rate and Rhythm: Normal rate and regular rhythm.      Pulses: Normal pulses.      Heart sounds: No murmur heard.     No friction rub. No gallop.   Pulmonary:      Effort: No respiratory distress.   Chest:      Chest wall: No tenderness.   Musculoskeletal:      Cervical back: No rigidity.   Skin:     Capillary Refill: Capillary refill takes 2 to 3 seconds.   Neurological:      General: No focal deficit present.      Mental Status: He is alert and oriented to person, place, and time.       Vitals:    09/18/23 1628   BP: 130/76   BP Location: Right arm   Patient Position: Sitting   BP Method: Large (Manual)   Pulse: 84   Temp: 98.9 °F (37.2 °C)   TempSrc: Oral   SpO2: 96%   Weight: 134.8 kg (297 lb 1.1 oz)      Body mass index is 39.19 kg/m².        History     Past Medical History:  Past Medical History:   Diagnosis Date    Diabetes mellitus, type 2     Hyperlipidemia     Hypertension     Obesity     NAINA (obstructive sleep apnea)        Past Surgical History:  Past Surgical History:   Procedure Laterality Date    ARTHROSCOPIC DEBRIDEMENT OF SHOULDER  3/14/2023    Procedure: DEBRIDEMENT, SHOULDER, ARTHROSCOPIC;  Surgeon: Crissy Bradford MD;  Location: [facility] OR;  Service: Orthopedics;;    ARTHROSCOPIC REPAIR OF ROTATOR CUFF OF SHOULDER Right 3/14/2023    Procedure: REPAIR, ROTATOR CUFF, ARTHROSCOPIC;  Surgeon: Crissy Bradford MD;  Location: [facility] OR;  Service: Orthopedics;  Laterality: Right;  HARDY [facility] NEPHFLY PAYNE [phone]. TEXTED ELMER ON 3/9/2023 @ 12:10PM. ELMER RESPONDED ON 3/9/23 @ 12:23PM-SAG  RN PREOP 3/10/2023---CLEARED BY PCP AND CARDS    ARTHROSCOPY,SHOULDER,WITH BICEPS TENODESIS  3/14/2023    Procedure: ARTHROSCOPY,SHOULDER,WITH BICEPS TENODESIS;  Surgeon: Crissy Bradford MD;   Location: 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     Employer: [employer]   Tobacco Use    Smoking status: Never    Smokeless tobacco: Never   Substance and Sexual Activity    Alcohol use: Yes     Comment: 1-2 beers every 2 weeks    Drug use: No     Social Determinants of Health     Financial Resource Strain: Unknown (9/18/2023)    Overall Financial Resource Strain (CARDIA)     Difficulty of Paying Living Expenses: Patient refused   Food Insecurity: Unknown (9/18/2023)    Hunger Vital Sign     Worried About Running Out of Food in the Last Year: Patient refused     Ran Out of Food in the Last Year: Patient refused   Transportation Needs: Unknown (9/18/2023)    PRAPARE - Transportation     Lack of Transportation (Medical): Patient refused     Lack of Transportation (Non-Medical): Patient refused   Physical Activity: Unknown (9/18/2023)    Exercise Vital Sign     Days of Exercise per Week: Patient refused     Minutes of Exercise per Session: Patient refused   Recent Concern: Physical Activity - Insufficiently Active (9/5/2023)    Exercise Vital Sign     Days of Exercise per Week: 2 days     Minutes of Exercise per Session: 30 min   Stress: Unknown (9/18/2023)    [employer] - Occupational Stress Questionnaire     Feeling of Stress : Patient refused   Recent Concern: Stress - Stress Concern Present (9/5/2023)    [employer] - Occupational Stress Questionnaire     Feeling of Stress : To some extent   Social Connections: Unknown (9/18/2023)    Social Connection and Isolation Panel [NHANES]     Frequency of Communication with Friends and Family: Patient refused     Frequency of Social Gatherings with Friends and Family:  Patient refused     Active Member of Clubs or Organizations: Patient refused     Attends Club or Organization Meetings: Patient refused     Marital Status: Patient refused   Housing Stability: Unknown (9/18/2023)    Housing Stability Vital Sign     Unable to Pay for Housing in the Last Year: Patient refused     Number of Places Lived in the Last Year: 1     Unstable Housing in the Last Year: Patient refused       Family History:  Family History   Problem Relation Age of Onset    Diabetes Mother     Diabetes Father     No Known Problems Brother     Autism Son     No Known Problems Daughter        Allergies and Medications: (updated and reviewed)  Review of patient's allergies indicates:   Allergen Reactions    Influenza virus vaccine, specific Hives    Pioglitazone      Altered mood     Victoza [liraglutide] Nausea And Vomiting    Farxiga [dapagliflozin] Rash     Erectile dysfunction and rash      Current Outpatient Medications   Medication Sig Dispense Refill    atorvastatin (LIPITOR) 40 MG tablet Take 1 tablet (40 mg total) by mouth once daily. 90 tablet 3    azelastine 205.5 mcg (0.15 %) Spry azelastine 205.5 mcg (0.15 %) nasal spray  INHALE 2 SPRAYS TO EACH NOSTRIL TWICE A DAY 30 mL 11    blood sugar diagnostic Strp To check BG 4 times daily, to use with insurance preferred meter 400 strip 3    blood sugar diagnostic Strp OneTouch Ultra Test strips      blood-glucose meter kit OneTouch Ultra2 Meter kit      blood-glucose sensor (DEXCOM G6 SENSOR) Filomena Change sensor every 10 days 3 each 11    blood-glucose sensor (FREESTYLE SAMANTHA 3 SENSOR) Filomena Change every 14 days 2 each 11    blood-glucose transmitter (DEXCOM G6 TRANSMITTER) Filomena Change every 3 months 1 each 3    cyanocobalamin, vitamin B-12, 5,000 mcg Subl Place one under tongue once a day for 1 month, then continue to take under tongue per week 30 tablet 3    diclofenac sodium (VOLTAREN) 1 % Gel Apply the gel (2 g) to the affected area 4 times daily. Do not  "apply more than 8 g daily to any one affected joint 100 g 1    empagliflozin (JARDIANCE) 25 mg tablet Take 1 tablet (25 mg total) by mouth once daily. 90 tablet 2    fenofibrate 160 MG Tab fenofibrate 160 mg tablet      fluticasone propionate (FLONASE) 50 mcg/actuation nasal spray fluticasone propionate 50 mcg/actuation nasal spray,suspension 16 g 3    insulin aspart U-100 (NOVOLOG U-100 INSULIN ASPART) 100 unit/mL injection Use max 120 units/day in insulin pump 40 mL 1    insulin aspart U-100 (NOVOLOG) 100 unit/mL (3 mL) InPn pen INJECT 15-30 UNITS BEFORE EACH MEAL WITH SLIDNING SCALE, MAX DAILY DOSE 100 UNITS 30 mL 5    insulin glargine U-300 conc (TOUJEO MAX U-300 SOLOSTAR) 300 unit/mL (3 mL) insulin pen Inject 30 Units into the skin once daily. 3 pen 5    insulin NPH isoph U-100 human (NOVOLIN N FLEXPEN) 100 unit/mL (3 mL) InPn INJECT 14 UNITS INTO THE SKIN ONCE DAILY AT NIGHT 8 PM. 15 mL 2    insulin pump cart,automated,BT (OMNIPOD 5 G6 PODS, GEN 5,) Crtg INJECT 1 EACH INTO THE SKIN ONCE DAILY. 6 each 1    L-METHYLFOLATE 15 mg Tab TAKE 15 MG BY MOUTH ONCE DAILY. 30 tablet 11    lamoTRIgine (LAMICTAL) 100 MG tablet Take 1.5 tablets (150 mg total) by mouth once daily. 135 tablet 3    lancets Misc To check BG 4 times daily, to use with insurance preferred meter 400 each 3    levothyroxine (SYNTHROID) 50 MCG tablet TAKE 1 TABLET BY MOUTH BEFORE BREAKFAST. 90 tablet 3    lisinopriL (PRINIVIL,ZESTRIL) 20 MG tablet TAKE 1 TABLET BY MOUTH EVERY DAY 90 tablet 3    modafiniL (PROVIGIL) 100 MG Tab Take 1/4 tablet in AM for 1 week, then 1/2 tablet in AM for 1 week, then increase to 1 tablet in AM as need for attention/concentration/drowsiness 30 tablet 2    pen needle, diabetic (BD ULTRA-FINE KEN PEN NEEDLE) 32 gauge x 5/32" Ndle 1 Device by Misc.(Non-Drug; Combo Route) route 5 (five) times daily. 550 each 4    syringe, disposable, 1 mL Syrg Testosterone every 2 weeks 25 Syringe 1    tirzepatide (MOUNJARO) 5 mg/0.5 mL " PnIj Inject 5 mg into the skin every 7 days. 4 pen 5    azelastine (ASTELIN) 137 mcg (0.1 %) nasal spray 1 spray (137 mcg total) by Nasal route 2 (two) times daily. (Patient not taking: Reported on 9/18/2023) 30 mL 3    fluticasone-umeclidin-vilanter (TRELEGY ELLIPTA) 200-62.5-25 mcg inhaler Inhale 1 puff into the lungs once daily. Stop symbicort (Patient not taking: Reported on 9/18/2023) 60 each 5    ketoconazole (NIZORAL) 2 % cream Apply topically once daily. (Patient not taking: Reported on 9/18/2023) 1 Tube 3    methylPREDNISolone (MEDROL DOSEPACK) 4 mg tablet use as directed 21 each 0    montelukast (SINGULAIR) 10 mg tablet Take 1 tablet (10 mg total) by mouth every evening. 30 tablet 3    OMNIPOD 5 G6 INTRO KIT, GEN 5, Crtg INJECT 1 EACH INTO THE SKIN DAILY. (Patient not taking: Reported on 9/18/2023) 1 each 0     No current facility-administered medications for this visit.       Patient Care Team:  Jovany Ford MD as PCP - General (Internal Medicine)  Janneth Johnson RN (Inactive) as Registered Nurse (Diabetes)  Rocky Hewitt MD as Consulting Physician (Ophthalmology)  Preethi Monaco RD (Inactive) as Dietitian (Nutrition)  Christofer Rowley MD as Consulting Physician (Orthopedic Surgery)  Singh Rizo MD as Consulting Physician (Sports Medicine)  Shilpa Gordon NP as Nurse Practitioner (Urology)  Nicole Wynn MD (Family Medicine)  Rafael Meraz MA as Care Coordinator  Beatris Nixon RD as Dietitian (Diabetes)         - The patient is given an After Visit Summary that lists all medications with directions, allergies, education, orders placed during this encounter and follow-up instructions.      - I have reviewed the patient's medical information including past medical, family, and social history sections including the medications and allergies.      - We discussed the patient's current medications.     This note was created by combination of typed  and  MModal dictation.  Transcription errors may be present.  If there are any questions, please contact me.       Ridge Webb NP

## 2023-09-18 NOTE — PATIENT INSTRUCTIONS
Medical Fitness--[phone]  Imaging, Xray, CT, MRI, Ultrasound---[phone]  Bariatrics---[phone]  Breast Surgery---[phone]  Case Management---[phone]  Colonoscopy---[phone]  DME---[phone]  Infectious Disease---[phone]  Interventional Radiology---[phone]  Medical Records---[phone]  [facility] On [facility]---[phone]  Optometry/Ophthalmology---[phone]  [facility]---[phone]  Physical Therapy---[phone]  Psychiatry---[phone] or [phone]  Plastic Surgery---[phone]  Recovery--[phone] option 2, or [phone].  Sleep Study---[phone]  Smoking Cessation---[phone]  Wound Care---[phone]  Referral Desk---[phone]      Patient Education       Sinusitis Discharge Instructions, Adult   About this topic   Your sinuses are hollow areas in the bones of your face. They have a thin lining that normally makes a small amount of mucus. When you have sinusitis, the lining gets swollen and makes extra mucus. You may have sinusitis with or after a cold. Most of the time sinusitis will get better in 1 to 2 weeks.  Sinusitis is most often caused by a virus, so antibiotics wont help. But some people do need antibiotics. If the doctor ordered antibiotics for you, be sure to follow the instructions. It is important to take all of your antibiotics even if you start to feel better.       What care is needed at home?   Ask your doctor what you need to do when you go home. Make sure you ask questions if you do not understand what you need to do.  Try to thin the mucus.  Drink lots of liquids to stay hydrated.  Use a cool mist humidifier to avoid dry air.  Use saline nose drops or a saline nose rinse to relieve stuffiness.  Wash your hands often. This will help keep others healthy.  Do not smoke or be in smoke-filled places. Avoid things that may cause breathing problems like fumes, pollution, dust, and other common allergens and irritants.  You may want to take  medicine like ibuprofen, naproxen, or acetaminophen to help with pain.  What follow-up care is needed?   Your doctor may ask you to make visits to the office to check on your progress. Be sure to keep your visits.  Your doctor will tell you if other tests are needed.  Your doctor may send you for allergy tests or to an allergy expert.   What lifestyle changes are needed?   Avoid drinks that contain caffeine or alcohol.  Try to stop smoking. Avoid being around others who smoke. Smoke can damage the small hairs inside your sinuses.  What drugs may be needed?   The doctor may order drugs to:  Help with pain and swelling  Fight an infection  Control coughing  Dry up the sinuses  Help a runny or stuffy nose  Will physical activity be limited?   You do not have to limit your activity. You may want to rest more if you have a fever or headache.   What problems could happen?   Infections that happen again and again  Asthma attack  Coughing  Loss of voice  What can be done to prevent this health problem?   Keep your nose as moist as possible. Use saline sprays, washes, and a humidifier often.  Avoid being around cigarette and cigar smoke or strong odors from chemicals.  Avoid long periods of swimming in pools treated with chlorine. This can bother the lining of the nose and sinuses.  Avoid water diving. This forces water into the sinuses from the nasal passages.  Manage your allergies with your doctor's help.  Use an air conditioner if allergies are a problem.  Before air travel, use a drug to dry up mucus. As the plane takes off or lands, the pressure can cause sinus pain.  When do I need to call the doctor?   You have a stiff neck, especially if you also have fever, chills, vomiting, or severe headache  You have trouble thinking clearly.  You have trouble seeing or have double vision.  You have swelling or redness or pain around one or both eyes.  You have a fever of 102°F (38.9°C) or higher, or have shaking chills or  sweats.  You have an upset stomach and throwing up.  You have more pain in your face and head.  You are not getting better within 1 to 2 weeks.  Teach Back: Helping You Understand   The Teach Back Method helps you understand the information we are giving you. After you talk with the staff, tell them in your own words what you learned. This helps to make sure the staff has covered each thing clearly. It also helps to explain things that may have been confusing. Before going home, make sure you can do these:  I can tell you about my condition.  I can tell you what may help ease my breathing.  I can tell you what I will do if I have a fever, chills, or trouble breathing.  Where can I learn more?   American Academy of Family Physicians  [email or web address]   Centers for Disease Control and Prevention  https://www.cdc.gov/antibiotic-use/community/for-hcp/outpatient-hcp/adult-treatment-rec.html   Last Reviewed Date   2021-06-09  Consumer Information Use and Disclaimer   This information is not specific medical advice and does not replace information you receive from your health care provider. This is only a brief summary of general information. It does NOT include all information about conditions, illnesses, injuries, tests, procedures, treatments, therapies, discharge instructions or life-style choices that may apply to you. You must talk with your health care provider for complete information about your health and treatment options. This information should not be used to decide whether or not to accept your health care providers advice, instructions or recommendations. Only your health care provider has the knowledge and training to provide advice that is right for you.  Copyright   Copyright © 2021 UpToDate, Inc. and its affiliates and/or licensors. All rights reserved.  Patient Education       Sinusitis in Adults   The Basics   Written by the doctors and editors at [employer]   What is  sinusitis? -- Sinusitis is a condition that can cause a stuffy nose, pain in the face, and discharge (mucus) from the nose. The sinuses are hollow areas in the bones of the face (figure 1). They have a thin lining that normally makes a small amount of mucus. When this lining gets irritated or infected, it swells and makes extra mucus. This causes symptoms.  Sinusitis can occur when a person gets sick with a cold. The germs causing the cold can also infect the sinuses. Many times, a person feels like their cold is getting better. But then they get sinusitis and begin to feel sick again.  What are the symptoms of sinusitis? -- Common symptoms of sinusitis include:  Stuffy or blocked nose  Thick white, yellow, or green discharge from the nose  Pain in the teeth  Pain or pressure in the face - This often feels worse when a person bends forward.  People with sinusitis can also have other symptoms that include:  Fever  Cough  Trouble smelling  Ear pressure or fullness  Headache  Bad breath  Feeling tired  Most of the time, symptoms start to improve in 7 to 10 days.  Should I see a doctor or nurse? -- See your doctor or nurse if your symptoms last more than 10 days, or if your symptoms first get better but then get worse.  Rarely, sinusitis can lead to serious problems. See your doctor or nurse right away (do not wait 10 days) if you have:  Fever higher than 102°F (38.9°C)  Sudden and severe pain in the face and head  Trouble seeing or seeing double  Trouble thinking clearly  Swelling or redness around one or both eyes  A stiff neck  Is there anything I can do on my own to feel better? -- Yes. To reduce your symptoms, you can:  Take an over-the-counter pain reliever to reduce the pain  Rinse your nose and sinuses with salt water a few times a day - Ask your doctor or nurse about the best way to do this.  Your doctor might also prescribe a steroid nose spray to reduce the swelling in your nose. (These kinds of steroid nose  "sprays are safe to take, and do not contain the same steroids that some athletes take illegally.)  How is sinusitis treated? -- Most of the time, sinusitis does not need to be treated with antibiotic medicines. This is because most sinusitis is caused by viruses, not bacteria, and antibiotics do not kill viruses. In fact, even sinusitis caused by bacteria will usually get better on its own without antibiotics.   Some people with sinusitis do need treatment with antibiotics. If your symptoms have not improved after 10 days, ask your doctor if you should take antibiotics. Your doctor might recommend that you wait 1 more week to see if your symptoms improve. But if you have symptoms such as a fever or a lot of pain, they might prescribe antibiotics. It is important to follow your doctor's instructions about taking your antibiotics.  What if my symptoms do not get better? -- If your symptoms do not get better, talk with your doctor or nurse. They might order tests to figure out why you still have symptoms. These can include:  CT scan or other imaging tests - Imaging tests create pictures of the inside of the body.  A test to look inside the sinuses - For this test, a doctor puts a thin tube with a camera on the end into the nose and up into the sinuses.  Some people get a lot of sinus infections or have symptoms that last at least 3 months. These people can have a different type of sinusitis called "chronic sinusitis." Chronic sinusitis can be caused by different things. For example, some people have growths inside their nose or sinuses that are called "polyps." Other people have allergies that cause their symptoms.  Chronic sinusitis can be treated in different ways. If you have chronic sinusitis, talk with your doctor about which treatments are right for you.  All topics are updated as new evidence becomes available and our peer review process is complete.  This topic retrieved from [employer] on: Sep 21, 2021.  Topic " 00491 Version 17.0  Release: 29.4.2 - C29.263  © 2021 UpToDate, Inc. and/or its affiliates. All rights reserved.  figure 1: Sinuses of the face     This drawing shows the sinuses of the face, from the side and front views.  Graphic 958840 Version 1.0    Consumer Information Use and Disclaimer   This information is not specific medical advice and does not replace information you receive from your health care provider. This is only a brief summary of general information. It does NOT include all information about conditions, illnesses, injuries, tests, procedures, treatments, therapies, discharge instructions or life-style choices that may apply to you. You must talk with your health care provider for complete information about your health and treatment options. This information should not be used to decide whether or not to accept your health care provider's advice, instructions or recommendations. Only your health care provider has the knowledge and training to provide advice that is right for you. The use of this information is governed by the [employer] End User License Agreement, available at [email or web address]://www.[employer]/en/solutions/[employer]/about/jeff.The use of [employer] content is governed by the [employer] Terms of Use. ©2021 UpToDate, Inc. All rights reserved.  Copyright   © 2021 UpToDate, Inc. and/or its affiliates. All rights reserved.    Patient Education       Sinusitis Discharge Instructions, Adult   About this topic   Your sinuses are hollow areas in the bones of your face. They have a thin lining that normally makes a small amount of mucus. When you have sinusitis, the lining gets swollen and makes extra mucus. You may have sinusitis with or after a cold. Most of the time sinusitis will get better in 1 to 2 weeks.  Sinusitis is most often caused by a virus, so antibiotics wont help. But some people do need antibiotics. If the doctor ordered antibiotics for you, be sure to follow the instructions. It is  important to take all of your antibiotics even if you start to feel better.       What care is needed at home?   Ask your doctor what you need to do when you go home. Make sure you ask questions if you do not understand what you need to do.  Try to thin the mucus.  Drink lots of liquids to stay hydrated.  Use a cool mist humidifier to avoid dry air.  Use saline nose drops or a saline nose rinse to relieve stuffiness.  Wash your hands often. This will help keep others healthy.  Do not smoke or be in smoke-filled places. Avoid things that may cause breathing problems like fumes, pollution, dust, and other common allergens and irritants.  You may want to take medicine like ibuprofen, naproxen, or acetaminophen to help with pain.  What follow-up care is needed?   Your doctor may ask you to make visits to the office to check on your progress. Be sure to keep your visits.  Your doctor will tell you if other tests are needed.  Your doctor may send you for allergy tests or to an allergy expert.   What lifestyle changes are needed?   Avoid drinks that contain caffeine or alcohol.  Try to stop smoking. Avoid being around others who smoke. Smoke can damage the small hairs inside your sinuses.  What drugs may be needed?   The doctor may order drugs to:  Help with pain and swelling  Fight an infection  Control coughing  Dry up the sinuses  Help a runny or stuffy nose  Will physical activity be limited?   You do not have to limit your activity. You may want to rest more if you have a fever or headache.   What problems could happen?   Infections that happen again and again  Asthma attack  Coughing  Loss of voice  What can be done to prevent this health problem?   Keep your nose as moist as possible. Use saline sprays, washes, and a humidifier often.  Avoid being around cigarette and cigar smoke or strong odors from chemicals.  Avoid long periods of swimming in pools treated with chlorine. This can bother the lining of the nose and  sinuses.  Avoid water diving. This forces water into the sinuses from the nasal passages.  Manage your allergies with your doctor's help.  Use an air conditioner if allergies are a problem.  Before air travel, use a drug to dry up mucus. As the plane takes off or lands, the pressure can cause sinus pain.  When do I need to call the doctor?   You have a stiff neck, especially if you also have fever, chills, vomiting, or severe headache  You have trouble thinking clearly.  You have trouble seeing or have double vision.  You have swelling or redness or pain around one or both eyes.  You have a fever of 102°F (38.9°C) or higher, or have shaking chills or sweats.  You have an upset stomach and throwing up.  You have more pain in your face and head.  You are not getting better within 1 to 2 weeks.  Teach Back: Helping You Understand   The Teach Back Method helps you understand the information we are giving you. After you talk with the staff, tell them in your own words what you learned. This helps to make sure the staff has covered each thing clearly. It also helps to explain things that may have been confusing. Before going home, make sure you can do these:  I can tell you about my condition.  I can tell you what may help ease my breathing.  I can tell you what I will do if I have a fever, chills, or trouble breathing.  Where can I learn more?   American Academy of Family Physicians  https://[email or web address]/[email or web address]/   Centers for Disease Control and Prevention  https://www.cdc.gov/antibiotic-use/community/for-hcp/outpatient-hcp/adult-treatment-rec.html   Last Reviewed Date   2021-06-09  Consumer Information Use and Disclaimer   This information is not specific medical advice and does not replace information you receive from your health care provider. This is only a brief summary of general information. It does NOT include all information about conditions, illnesses, injuries, tests, procedures, treatments,  therapies, discharge instructions or life-style choices that may apply to you. You must talk with your health care provider for complete information about your health and treatment options. This information should not be used to decide whether or not to accept your health care providers advice, instructions or recommendations. Only your health care provider has the knowledge and training to provide advice that is right for you.  Copyright   Copyright © 2021 UpToDate, Inc. and its affiliates and/or licensors. All rights reserved.  Patient Education       Sinusitis in Adults   The Basics   Written by the doctors and editors at [employer]   What is sinusitis? -- Sinusitis is a condition that can cause a stuffy nose, pain in the face, and discharge (mucus) from the nose. The sinuses are hollow areas in the bones of the face (figure 1). They have a thin lining that normally makes a small amount of mucus. When this lining gets irritated or infected, it swells and makes extra mucus. This causes symptoms.  Sinusitis can occur when a person gets sick with a cold. The germs causing the cold can also infect the sinuses. Many times, a person feels like their cold is getting better. But then they get sinusitis and begin to feel sick again.  What are the symptoms of sinusitis? -- Common symptoms of sinusitis include:  Stuffy or blocked nose  Thick white, yellow, or green discharge from the nose  Pain in the teeth  Pain or pressure in the face - This often feels worse when a person bends forward.  People with sinusitis can also have other symptoms that include:  Fever  Cough  Trouble smelling  Ear pressure or fullness  Headache  Bad breath  Feeling tired  Most of the time, symptoms start to improve in 7 to 10 days.  Should I see a doctor or nurse? -- See your doctor or nurse if your symptoms last more than 10 days, or if your symptoms first get better but then get worse.  Rarely, sinusitis can lead to serious problems. See your  doctor or nurse right away (do not wait 10 days) if you have:  Fever higher than 102°F (38.9°C)  Sudden and severe pain in the face and head  Trouble seeing or seeing double  Trouble thinking clearly  Swelling or redness around one or both eyes  A stiff neck  Is there anything I can do on my own to feel better? -- Yes. To reduce your symptoms, you can:  Take an over-the-counter pain reliever to reduce the pain  Rinse your nose and sinuses with salt water a few times a day - Ask your doctor or nurse about the best way to do this.  Your doctor might also prescribe a steroid nose spray to reduce the swelling in your nose. (These kinds of steroid nose sprays are safe to take, and do not contain the same steroids that some athletes take illegally.)  How is sinusitis treated? -- Most of the time, sinusitis does not need to be treated with antibiotic medicines. This is because most sinusitis is caused by viruses, not bacteria, and antibiotics do not kill viruses. In fact, even sinusitis caused by bacteria will usually get better on its own without antibiotics.   Some people with sinusitis do need treatment with antibiotics. If your symptoms have not improved after 10 days, ask your doctor if you should take antibiotics. Your doctor might recommend that you wait 1 more week to see if your symptoms improve. But if you have symptoms such as a fever or a lot of pain, they might prescribe antibiotics. It is important to follow your doctor's instructions about taking your antibiotics.  What if my symptoms do not get better? -- If your symptoms do not get better, talk with your doctor or nurse. They might order tests to figure out why you still have symptoms. These can include:  CT scan or other imaging tests - Imaging tests create pictures of the inside of the body.  A test to look inside the sinuses - For this test, a doctor puts a thin tube with a camera on the end into the nose and up into the sinuses.  Some people get a lot  "of sinus infections or have symptoms that last at least 3 months. These people can have a different type of sinusitis called "chronic sinusitis." Chronic sinusitis can be caused by different things. For example, some people have growths inside their nose or sinuses that are called "polyps." Other people have allergies that cause their symptoms.  Chronic sinusitis can be treated in different ways. If you have chronic sinusitis, talk with your doctor about which treatments are right for you.  All topics are updated as new evidence becomes available and our peer review process is complete.  This topic retrieved from [employer] on: Sep 21, 2021.  Topic 54607 Version 17.0  Release: 29.4.2 - C29.263  © 2021 UpToDate, Inc. and/or its affiliates. All rights reserved.  figure 1: Sinuses of the face     This drawing shows the sinuses of the face, from the side and front views.  Graphic [id number] Version 1.0    Consumer Information Use and Disclaimer   This information is not specific medical advice and does not replace information you receive from your health care provider. This is only a brief summary of general information. It does NOT include all information about conditions, illnesses, injuries, tests, procedures, treatments, therapies, discharge instructions or life-style choices that may apply to you. You must talk with your health care provider for complete information about your health and treatment options. This information should not be used to decide whether or not to accept your health care provider's advice, instructions or recommendations. Only your health care provider has the knowledge and training to provide advice that is right for you. The use of this information is governed by the [employer] End User License Agreement, available at [email or web address]/jeff.The use of [employer] content is governed by the [employer] Terms of Use. ©2021 UpToDate, Inc. All rights " reserved.  Copyright   © 2021 [employer] and/or its affiliates. All rights reserved.

## 2023-09-18 NOTE — PROGRESS NOTES
EST PATIENT ORTHOPAEDIC: Knee    PRIMARY CARE PHYSICIAN: Jovany Ford MD   REFERRING PROVIDER: No referring provider defined for this encounter.     ASSESSMENT & PLAN:    Impression:  Right Knee lateral meniscal tear  Right Knee mild degenerative changes  Right Knee subchondral edema      Left knee moderate osteoarthritis  Left knee history of ACL reconstruction    Follow Up Plan: PRN for left knee or if fails right knee scope    Non operative care:    Tony Gordillo has physical exam evidence of above and wishes to pursue an non-operative care. I am recommending the following: referral to Dr. Bradford for arthroscopsic considerations    Patient comes in with bilateral knee pains.  The left is more chronic.  History of 3 prior surgeries on that knee including multiple ACL reconstructions.  That knee is a little bit more straight forward in terms of his moderate arthritis and ongoing instability in that knee.  This knee would be likely 1 that would benefit from knee replacement in the future.  His primary issue today however is his right knee and ongoing mechanical symptoms and instability of his right knee.  This is not been previously evaluated aside from radiographs which demonstrate some very mild lateral compartment arthritis and he is had viscosupplementation injections and steroid injections into this knee without any improvement in his pain.  As a result I obtained an MRI to rule out internal derangement, he has degenerative chondral changes of the lateral compartment of his right knee along with associated degenerative meniscal pathology and subchondral edema.  He is failed conservative measures and I think he may benefit from arthroscopic intervention.  I can not guarantee that he would have significant improvement in the totality of his pain however secondary to his age I do not think other measures such as lateral unicompartmental knee arthroplasty or total knee arthroplasty would be ideal.  I would  be more interested to see if subchondroplasty verses arthroscopic and biologics would help improve his pains in this knee.  We will continue to watch the left knee as this is more chronic issue.    Last A1C 7.4 two months ago.     The patient has been ordered:  None    CONSULTS:   None    ACTIVE PROBLEM LIST  Patient Active Problem List   Diagnosis    Hyperlipidemia LDL goal <70    Insulin long-term use    Tinea pedis    Morbid obesity    Hypothyroidism    Type 2 diabetes mellitus 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 MRI negative. 11/2019 started on testosterone    Right foot pain    Decreased strength of lower extremity    Decreased range of motion of both ankles    Gait abnormality    Rib pain on left side    Dyspnea    Decreased strength, endurance, and mobility    Left hand pain    Mild neurocognitive disorder due to traumatic brain injury    Outbursts of anger    Other amnesia    Traumatic rotator cuff tear, right, initial encounter    S/P right rotator cuff repair    Biceps tendonosis of right shoulder    Decreased range of motion of right shoulder    Impaired strength of shoulder muscles           SUBJECTIVE    CHIEF COMPLAINT: Knee Pain    HPI:   Tony Gordillo is a 43 y.o. male here for evaluation and management of bilateral knee pain, right worse than left. There is not a specific incident that brought about this pain. he has had progressive problems with the knee(s) starting 3 months months ago but is now  progressing to interfere with activities which include: walking, enjoying hobbies, exercise, rising from a sitting position, standing for prolonged periods of time, and climbing stairs     Currently the pain in the joint is rated at moderate with activity. The pain is intermittent and is located in the knee, at level of joint line, located medially, and located posterior. The pain is described as aching, severe, and sharp. Relieving factors include ice or heat, prescription medication, and repositioning.     There is associated Clicking and Popping and instability.     Tony Gordillo has no additional complaints.     9/18/23: here for MRI follow up.     PROGRESSIVE SYMPTOMS:  Pain impacting work  Pain worsened by weight bearing  Pain effecting living situation    FUNCTIONAL STATUS:   Climb a flight of stairs or walk up a hill     PREVIOUS TREATMENTS:  Medical: OTC NSAIDS, RX NSAIDS, Steroid Injections, Biologic Injections, Narcotics, and Tylenol  Physical Therapy: Activities Modified  and Formal Physical Therapy for 6 weeks  Previous Orthopaedic Surgery: Left Knee ACL Reconstruction x3, Right Shoulder RCR with Dr. Bradford    REVIEW OF SYSTEMS:  PAIN ASSESSMENT:  See HPI.  MUSCULOSKELETAL: See HPI.  OTHER 10 point review of systems is negative except as stated in HPI above    PAST MEDICAL HISTORY   has a past medical history of Diabetes mellitus, type 2, Hyperlipidemia, Hypertension, Obesity, and NAINA (obstructive sleep apnea).     PAST SURGICAL HISTORY   has a past surgical history that includes left knee x 2; Knee surgery; Arthroscopic repair of rotator cuff of shoulder (Right, 3/14/2023); arthroscopy,shoulder,with biceps tenodesis (3/14/2023); and Arthroscopic debridement of shoulder (3/14/2023).     FAMILY HISTORY  family history includes Autism in his son; Diabetes in his father and mother; No Known Problems in his brother and daughter.     SOCIAL HISTORY   reports that he has never smoked. He has never used  smokeless tobacco. He reports current alcohol use. He reports that he does not use drugs.     ALLERGIES   Review of patient's allergies indicates:   Allergen Reactions    Influenza virus vaccine, specific Hives    Pioglitazone      Altered mood     Victoza [liraglutide] Nausea And Vomiting    Farxiga [dapagliflozin] Rash     Erectile dysfunction and rash         MEDICATIONS  Current Outpatient Medications on File Prior to Visit   Medication Sig Dispense Refill    atorvastatin (LIPITOR) 40 MG tablet Take 1 tablet (40 mg total) by mouth once daily. 90 tablet 3    azelastine (ASTELIN) 137 mcg (0.1 %) nasal spray 1 spray (137 mcg total) by Nasal route 2 (two) times daily. 30 mL 3    azelastine 205.5 mcg (0.15 %) Spry azelastine 205.5 mcg (0.15 %) nasal spray  INHALE 2 SPRAYS TO EACH NOSTRIL TWICE A DAY 30 mL 11    blood sugar diagnostic Strp To check BG 4 times daily, to use with insurance preferred meter 400 strip 3    blood sugar diagnostic Strp OneTouch Ultra Test strips      blood-glucose meter kit OneTouch Ultra2 Meter kit      blood-glucose sensor (DEXCOM G6 SENSOR) Filomena Change sensor every 10 days 3 each 11    blood-glucose sensor (FREESTYLE SAMANTHA 3 SENSOR) Filomena Change every 14 days 2 each 11    blood-glucose transmitter (DEXCOM G6 TRANSMITTER) Filomena Change every 3 months 1 each 3    cyanocobalamin, vitamin B-12, 5,000 mcg Subl Place one under tongue once a day for 1 month, then continue to take under tongue per week 30 tablet 3    diclofenac sodium (VOLTAREN) 1 % Gel Apply the gel (2 g) to the affected area 4 times daily. Do not apply more than 8 g daily to any one affected joint 100 g 1    empagliflozin (JARDIANCE) 25 mg tablet Take 1 tablet (25 mg total) by mouth once daily. 90 tablet 2    fenofibrate 160 MG Tab fenofibrate 160 mg tablet      fluticasone propionate (FLONASE) 50 mcg/actuation nasal spray fluticasone propionate 50 mcg/actuation nasal spray,suspension 16 g 3    fluticasone-umeclidin-vilanter  "(TRELEGY ELLIPTA) 200-62.5-25 mcg inhaler Inhale 1 puff into the lungs once daily. Stop symbicort 60 each 5    insulin aspart U-100 (NOVOLOG U-100 INSULIN ASPART) 100 unit/mL injection Use max 120 units/day in insulin pump 40 mL 1    insulin aspart U-100 (NOVOLOG) 100 unit/mL (3 mL) InPn pen INJECT 15-30 UNITS BEFORE EACH MEAL WITH SLIDNING SCALE, MAX DAILY DOSE 100 UNITS 30 mL 5    insulin glargine U-300 conc (TOUJEO MAX U-300 SOLOSTAR) 300 unit/mL (3 mL) insulin pen Inject 30 Units into the skin once daily. 3 pen 5    insulin NPH isoph U-100 human (NOVOLIN N FLEXPEN) 100 unit/mL (3 mL) InPn INJECT 14 UNITS INTO THE SKIN ONCE DAILY AT NIGHT 8 PM. 15 mL 2    insulin pump cart,automated,BT (OMNIPOD 5 G6 PODS, GEN 5,) Crtg INJECT 1 EACH INTO THE SKIN ONCE DAILY. 6 each 1    ketoconazole (NIZORAL) 2 % cream Apply topically once daily. 1 Tube 3    L-METHYLFOLATE 15 mg Tab TAKE 15 MG BY MOUTH ONCE DAILY. 30 tablet 11    lamoTRIgine (LAMICTAL) 100 MG tablet Take 1.5 tablets (150 mg total) by mouth once daily. 135 tablet 3    lancets Misc To check BG 4 times daily, to use with insurance preferred meter 400 each 3    levothyroxine (SYNTHROID) 50 MCG tablet TAKE 1 TABLET BY MOUTH BEFORE BREAKFAST. 90 tablet 3    lisinopriL (PRINIVIL,ZESTRIL) 20 MG tablet TAKE 1 TABLET BY MOUTH EVERY DAY 90 tablet 3    methylPREDNISolone (MEDROL DOSEPACK) 4 mg tablet use as directed 1 each 0    modafiniL (PROVIGIL) 100 MG Tab Take 1/4 tablet in AM for 1 week, then 1/2 tablet in AM for 1 week, then increase to 1 tablet in AM as need for attention/concentration/drowsiness 30 tablet 2    OMNIPOD 5 G6 INTRO KIT, GEN 5, Crtg INJECT 1 EACH INTO THE SKIN DAILY. 1 each 0    pen needle, diabetic (BD ULTRA-FINE KEN PEN NEEDLE) 32 gauge x 5/32" Ndle 1 Device by Misc.(Non-Drug; Combo Route) route 5 (five) times daily. 550 each 4    syringe, disposable, 1 mL Syrg Testosterone every 2 weeks 25 Syringe 1    tirzepatide (MOUNJARO) 5 mg/0.5 mL PnIj Inject 5 " "mg into the skin every 7 days. 4 pen 5     No current facility-administered medications on file prior to visit.          PHYSICAL EXAM   height is 6' 1" (1.854 m) and weight is 134.3 kg (296 lb).   Body mass index is 39.05 kg/m².      All other systems deferred.  GENERAL:  No acute distress  HABITUS: Obese  GAIT: Antalgic  SKIN: Normal     KNEE EXAM:    right:   Effusion: Minimal joint effusion  TTP: Yes over Medial and lateral Joint Line   Yes crepitus with passive knee ROM  Passive ROM: Extension 0, Flexion 130  No pain with manipulation of patella  Stable to varus/valgus stress. No increased laxity to anterior/posterior drawer testing  positive Naida's test  No pain with IR/ER rotation of the hip  5/5 strength in knee flexion and extension, ankle plantarflexion and dorsiflexion  Neurovascular Status: Sensation intact to light touch in Sural, Saphenous, SPN, DPN, Tibial nerve distribution  2+ pulse DP/PT, normal capillary refill, foot has normal warmth    DATA:  Diagnostic tests reviewed for today's visit:     4v of the left knee reveal Moderate degenerative changes of the Lateral and Patellofemoral compartment. There is evidence of advanced osteoarthritis changes with Osteophyte formation and Joint space narrowing. The limb is in netural alignment. The patella is tracking midline.    4v of right knee radiographs appears mild degenerative changes. No fracture or other osseous abnormality.     MRI report and images reviewed by myself which show degenerative change predominantly lateral compartment with diffuse fraying/altered morphology of the anterior horn and body segment lateral meniscus with subluxation of the remaining meniscal tissue. Joint space narrowing with overlying lateral compartmental cartilage abnormality and subchondral edema predominantly the central weight-bearing aspect lateral tibial plateau       "

## 2023-09-22 ENCOUNTER — TELEPHONE (OUTPATIENT)
Dept: ORTHOPEDICS | Facility: CLINIC | Age: 43
End: 2023-09-22
Payer: COMMERCIAL

## 2023-09-22 ENCOUNTER — PATIENT MESSAGE (OUTPATIENT)
Dept: ORTHOPEDICS | Facility: CLINIC | Age: 43
End: 2023-09-22
Payer: COMMERCIAL

## 2023-09-26 ENCOUNTER — OFFICE VISIT (OUTPATIENT)
Dept: PODIATRY | Facility: CLINIC | Age: 43
End: 2023-09-26
Payer: COMMERCIAL

## 2023-09-26 VITALS — WEIGHT: 297.19 LBS | HEIGHT: 73 IN | BODY MASS INDEX: 39.39 KG/M2

## 2023-09-26 DIAGNOSIS — E11.3292 TYPE 2 DIABETES MELLITUS WITH LEFT EYE AFFECTED BY MILD NONPROLIFERATIVE RETINOPATHY WITHOUT MACULAR EDEMA, WITH LONG-TERM CURRENT USE OF INSULIN: ICD-10-CM

## 2023-09-26 DIAGNOSIS — Z79.4 TYPE 2 DIABETES MELLITUS WITH LEFT EYE AFFECTED BY MILD NONPROLIFERATIVE RETINOPATHY WITHOUT MACULAR EDEMA, WITH LONG-TERM CURRENT USE OF INSULIN: ICD-10-CM

## 2023-09-26 PROCEDURE — 3008F BODY MASS INDEX DOCD: CPT | Mod: CPTII,S$GLB,, | Performed by: PODIATRIST

## 2023-09-26 PROCEDURE — 4010F ACE/ARB THERAPY RXD/TAKEN: CPT | Mod: CPTII,S$GLB,, | Performed by: PODIATRIST

## 2023-09-26 PROCEDURE — 99213 OFFICE O/P EST LOW 20 MIN: CPT | Mod: S$GLB,,, | Performed by: PODIATRIST

## 2023-09-26 PROCEDURE — 99999 PR PBB SHADOW E&M-EST. PATIENT-LVL IV: CPT | Mod: PBBFAC,,, | Performed by: PODIATRIST

## 2023-09-26 PROCEDURE — 3066F NEPHROPATHY DOC TX: CPT | Mod: CPTII,S$GLB,, | Performed by: PODIATRIST

## 2023-09-26 PROCEDURE — 3061F PR NEG MICROALBUMINURIA RESULT DOCUMENTED/REVIEW: ICD-10-PCS | Mod: CPTII,S$GLB,, | Performed by: PODIATRIST

## 2023-09-26 PROCEDURE — 4010F PR ACE/ARB THEARPY RXD/TAKEN: ICD-10-PCS | Mod: CPTII,S$GLB,, | Performed by: PODIATRIST

## 2023-09-26 PROCEDURE — 99999 PR PBB SHADOW E&M-EST. PATIENT-LVL IV: ICD-10-PCS | Mod: PBBFAC,,, | Performed by: PODIATRIST

## 2023-09-26 PROCEDURE — 3008F PR BODY MASS INDEX (BMI) DOCUMENTED: ICD-10-PCS | Mod: CPTII,S$GLB,, | Performed by: PODIATRIST

## 2023-09-26 PROCEDURE — 3061F NEG MICROALBUMINURIA REV: CPT | Mod: CPTII,S$GLB,, | Performed by: PODIATRIST

## 2023-09-26 PROCEDURE — 3066F PR DOCUMENTATION OF TREATMENT FOR NEPHROPATHY: ICD-10-PCS | Mod: CPTII,S$GLB,, | Performed by: PODIATRIST

## 2023-09-26 PROCEDURE — 3051F HG A1C>EQUAL 7.0%<8.0%: CPT | Mod: CPTII,S$GLB,, | Performed by: PODIATRIST

## 2023-09-26 PROCEDURE — 99213 PR OFFICE/OUTPT VISIT, EST, LEVL III, 20-29 MIN: ICD-10-PCS | Mod: S$GLB,,, | Performed by: PODIATRIST

## 2023-09-26 PROCEDURE — 3051F PR MOST RECENT HEMOGLOBIN A1C LEVEL 7.0 - < 8.0%: ICD-10-PCS | Mod: CPTII,S$GLB,, | Performed by: PODIATRIST

## 2023-09-27 ENCOUNTER — OFFICE VISIT (OUTPATIENT)
Dept: ORTHOPEDICS | Facility: CLINIC | Age: 43
End: 2023-09-27
Payer: COMMERCIAL

## 2023-09-27 DIAGNOSIS — S83.281A TEAR OF LATERAL MENISCUS OF RIGHT KNEE, CURRENT, UNSPECIFIED TEAR TYPE, INITIAL ENCOUNTER: Primary | ICD-10-CM

## 2023-09-27 PROCEDURE — 1160F RVW MEDS BY RX/DR IN RCRD: CPT | Mod: CPTII,S$GLB,, | Performed by: ORTHOPAEDIC SURGERY

## 2023-09-27 PROCEDURE — 99999 PR PBB SHADOW E&M-EST. PATIENT-LVL IV: CPT | Mod: PBBFAC,,, | Performed by: ORTHOPAEDIC SURGERY

## 2023-09-27 PROCEDURE — 99213 OFFICE O/P EST LOW 20 MIN: CPT | Mod: S$GLB,,, | Performed by: ORTHOPAEDIC SURGERY

## 2023-09-27 PROCEDURE — 3051F PR MOST RECENT HEMOGLOBIN A1C LEVEL 7.0 - < 8.0%: ICD-10-PCS | Mod: CPTII,S$GLB,, | Performed by: ORTHOPAEDIC SURGERY

## 2023-09-27 PROCEDURE — 1159F MED LIST DOCD IN RCRD: CPT | Mod: CPTII,S$GLB,, | Performed by: ORTHOPAEDIC SURGERY

## 2023-09-27 PROCEDURE — 3066F NEPHROPATHY DOC TX: CPT | Mod: CPTII,S$GLB,, | Performed by: ORTHOPAEDIC SURGERY

## 2023-09-27 PROCEDURE — 3061F PR NEG MICROALBUMINURIA RESULT DOCUMENTED/REVIEW: ICD-10-PCS | Mod: CPTII,S$GLB,, | Performed by: ORTHOPAEDIC SURGERY

## 2023-09-27 PROCEDURE — 3051F HG A1C>EQUAL 7.0%<8.0%: CPT | Mod: CPTII,S$GLB,, | Performed by: ORTHOPAEDIC SURGERY

## 2023-09-27 PROCEDURE — 4010F ACE/ARB THERAPY RXD/TAKEN: CPT | Mod: CPTII,S$GLB,, | Performed by: ORTHOPAEDIC SURGERY

## 2023-09-27 PROCEDURE — 99999 PR PBB SHADOW E&M-EST. PATIENT-LVL IV: ICD-10-PCS | Mod: PBBFAC,,, | Performed by: ORTHOPAEDIC SURGERY

## 2023-09-27 PROCEDURE — 99213 PR OFFICE/OUTPT VISIT, EST, LEVL III, 20-29 MIN: ICD-10-PCS | Mod: S$GLB,,, | Performed by: ORTHOPAEDIC SURGERY

## 2023-09-27 PROCEDURE — 3066F PR DOCUMENTATION OF TREATMENT FOR NEPHROPATHY: ICD-10-PCS | Mod: CPTII,S$GLB,, | Performed by: ORTHOPAEDIC SURGERY

## 2023-09-27 PROCEDURE — 1160F PR REVIEW ALL MEDS BY PRESCRIBER/CLIN PHARMACIST DOCUMENTED: ICD-10-PCS | Mod: CPTII,S$GLB,, | Performed by: ORTHOPAEDIC SURGERY

## 2023-09-27 PROCEDURE — 1159F PR MEDICATION LIST DOCUMENTED IN MEDICAL RECORD: ICD-10-PCS | Mod: CPTII,S$GLB,, | Performed by: ORTHOPAEDIC SURGERY

## 2023-09-27 PROCEDURE — 4010F PR ACE/ARB THEARPY RXD/TAKEN: ICD-10-PCS | Mod: CPTII,S$GLB,, | Performed by: ORTHOPAEDIC SURGERY

## 2023-09-27 PROCEDURE — 3061F NEG MICROALBUMINURIA REV: CPT | Mod: CPTII,S$GLB,, | Performed by: ORTHOPAEDIC SURGERY

## 2023-09-28 NOTE — PROGRESS NOTES
Assessment: 43 y.o. male with R knee meniscus tear, subchondral edema, mild degenerative changes    I explained my diagnostic impression and the reasoning behind it in detail, using layman's terms.     Plan:   - he has failed multiple nonoperative treatments have any further nonoperative treatments to offer him.  - we had a discussion about his options including nonoperative treatment and operative treatment in the form of right knee arthroscopy, meniscus debridement, subchondroplasty. He would like to proceed with this. Will let us know when he would like to schedule     We have discussed the surgery and anticipated recovery.  He understands that there may be limited mobility up to several weeks after surgery depending on procedures that are performed at the time of surgery.    The details of the surgical procedure were explained, including the location of probable incisions and a description of likely hardware and/or grafts to be used.  The patient understands the likely convalescence after surgery, in particular the expected postop rehab and recovery course. Alternatives both operative and non-operative with associated risks and benefits discussed. The outlined risks and potential complications of the proposed procedure include but are not limited to: bleeding, infection, vessel and/or nerve damage, pain, numbness, tingling, compartment syndrome, need for additional surgery, failure to return to pre-injury and/or preoperative functional status, inability to return to work, scar sensitivity, delayed healing, complex regional pain syndrome, weakness, partial and/or incomplete relief of symptoms, persistence of and/or worsening of symptoms, hardware and/or surgical failure, prominent and/or symptomatic hardware possibly necessitating future removal,  stiffness, dysfunction,  need for prolonged postoperative rehabilitation, deep venous thrombosis, pulmonary embolism, arthritis and death.  The patient states an  understanding and wishes to proceed with surgery.   All questions were answered.  No guarantees were implied or stated.  Written informed consent was obtained.    He was also informed and understands the risks of surgery are greater for patients with a current condition or history of heart disease, obesity, diabetes, clotting disorders, recurrent infections, steroid use, current or past smoking, and factors such as sedentary lifestyle and noncompliance with medications, therapy or follow-up. The degree of the increased risk is hard to estimate with any degree of precision.    All questions were answered. The patient has verbalized understanding of these issues and wishes to proceed as discussed.   Will proceed with medical clearance.  Hold mounjaro    All questions were answered in detail. The patient is in full agreement with the treatment plan and will proceed accordingly.    Chief Complaint   Patient presents with    Right Knee - Pain        (9/29/23): Tony Gordillo is a 43 y.o. male who presents today complaining of Pain of the Right Knee    Bilateral knee pain, left knee pain is controlled with injections   Right knee pain did not respond to last injection that we did beginning of September   Pain is worse with activity.  It significantly limits ability to perform ADLs and work related duties  The steroid elevate his blood sugar significantly   He does have some degenerative changes so I referred him to Dr. Rolon for evaluation for total knee arthroplasty.  Dr. Rolon does not feel that his knee arthritis is advanced enough to warrant total knee replacement   MRI shows some mild degenerative changes to the knee as well as meniscus tearing and subchondral edema   He returns today to discuss knee arthroscopy      This is the extent of the patient's complaints at this time.        Review of patient's allergies indicates:   Allergen Reactions    Influenza virus vaccine, specific Hives    Pioglitazone       Altered mood     Victoza [liraglutide] Nausea And Vomiting    Farxiga [dapagliflozin] Rash     Erectile dysfunction and rash          Current Outpatient Medications:     atorvastatin (LIPITOR) 40 MG tablet, Take 1 tablet (40 mg total) by mouth once daily., Disp: 90 tablet, Rfl: 3    azelastine (ASTELIN) 137 mcg (0.1 %) nasal spray, 1 spray (137 mcg total) by Nasal route 2 (two) times daily., Disp: 30 mL, Rfl: 3    azelastine 205.5 mcg (0.15 %) Spry, azelastine 205.5 mcg (0.15 %) nasal spray  INHALE 2 SPRAYS TO EACH NOSTRIL TWICE A DAY, Disp: 30 mL, Rfl: 11    blood sugar diagnostic Strp, To check BG 4 times daily, to use with insurance preferred meter, Disp: 400 strip, Rfl: 3    blood sugar diagnostic Strp, OneTouch Ultra Test strips, Disp: , Rfl:     blood-glucose meter kit, OneTouch Ultra2 Meter kit, Disp: , Rfl:     blood-glucose sensor (DEXCOM G6 SENSOR) Filomena, Change sensor every 10 days, Disp: 3 each, Rfl: 11    blood-glucose sensor (FREESTYLE SAMANTHA 3 SENSOR) Filomena, Change every 14 days, Disp: 2 each, Rfl: 11    blood-glucose transmitter (DEXCOM G6 TRANSMITTER) Filomena, Change every 3 months, Disp: 1 each, Rfl: 3    cyanocobalamin, vitamin B-12, 5,000 mcg Subl, Place one under tongue once a day for 1 month, then continue to take under tongue per week, Disp: 30 tablet, Rfl: 3    diclofenac sodium (VOLTAREN) 1 % Gel, Apply the gel (2 g) to the affected area 4 times daily. Do not apply more than 8 g daily to any one affected joint, Disp: 100 g, Rfl: 1    empagliflozin (JARDIANCE) 25 mg tablet, Take 1 tablet (25 mg total) by mouth once daily., Disp: 90 tablet, Rfl: 2    fenofibrate 160 MG Tab, fenofibrate 160 mg tablet, Disp: , Rfl:     fluticasone propionate (FLONASE) 50 mcg/actuation nasal spray, fluticasone propionate 50 mcg/actuation nasal spray,suspension, Disp: 16 g, Rfl: 3    fluticasone-umeclidin-vilanter (TRELEGY ELLIPTA) 200-62.5-25 mcg inhaler, Inhale 1 puff into the lungs once daily. Stop symbicort, Disp:  60 each, Rfl: 5    insulin aspart U-100 (NOVOLOG U-100 INSULIN ASPART) 100 unit/mL injection, Use max 120 units/day in insulin pump, Disp: 40 mL, Rfl: 1    insulin aspart U-100 (NOVOLOG) 100 unit/mL (3 mL) InPn pen, INJECT 15-30 UNITS BEFORE EACH MEAL WITH SLIDNING SCALE, MAX DAILY DOSE 100 UNITS, Disp: 30 mL, Rfl: 5    insulin glargine U-300 conc (TOUJEO MAX U-300 SOLOSTAR) 300 unit/mL (3 mL) insulin pen, Inject 30 Units into the skin once daily., Disp: 3 pen , Rfl: 5    insulin NPH isoph U-100 human (NOVOLIN N FLEXPEN) 100 unit/mL (3 mL) InPn, INJECT 14 UNITS INTO THE SKIN ONCE DAILY AT NIGHT 8 PM., Disp: 15 mL, Rfl: 2    insulin pump cart,automated,BT (OMNIPOD 5 G6 PODS, GEN 5,) Crtg, INJECT 1 EACH INTO THE SKIN ONCE DAILY., Disp: 6 each, Rfl: 1    ketoconazole (NIZORAL) 2 % cream, Apply topically once daily., Disp: 1 Tube, Rfl: 3    L-METHYLFOLATE 15 mg Tab, TAKE 15 MG BY MOUTH ONCE DAILY., Disp: 30 tablet, Rfl: 11    lamoTRIgine (LAMICTAL) 100 MG tablet, Take 1.5 tablets (150 mg total) by mouth once daily., Disp: 135 tablet, Rfl: 3    lancets Misc, To check BG 4 times daily, to use with insurance preferred meter, Disp: 400 each, Rfl: 3    levothyroxine (SYNTHROID) 50 MCG tablet, TAKE 1 TABLET BY MOUTH BEFORE BREAKFAST., Disp: 90 tablet, Rfl: 3    lisinopriL (PRINIVIL,ZESTRIL) 20 MG tablet, TAKE 1 TABLET BY MOUTH EVERY DAY, Disp: 90 tablet, Rfl: 3    methylPREDNISolone (MEDROL DOSEPACK) 4 mg tablet, use as directed, Disp: 21 each, Rfl: 0    modafiniL (PROVIGIL) 100 MG Tab, Take 1/4 tablet in AM for 1 week, then 1/2 tablet in AM for 1 week, then increase to 1 tablet in AM as need for attention/concentration/drowsiness, Disp: 30 tablet, Rfl: 2    montelukast (SINGULAIR) 10 mg tablet, Take 1 tablet (10 mg total) by mouth every evening., Disp: 30 tablet, Rfl: 3    OMNIPOD 5 G6 INTRO KIT, GEN 5, Crtg, INJECT 1 EACH INTO THE SKIN DAILY., Disp: 1 each, Rfl: 0    pen needle, diabetic (BD ULTRA-FINE KEN PEN NEEDLE) 32  "gauge x 5/32" Ndle, 1 Device by Misc.(Non-Drug; Combo Route) route 5 (five) times daily., Disp: 550 each, Rfl: 4    syringe, disposable, 1 mL Syrg, Testosterone every 2 weeks, Disp: 25 Syringe, Rfl: 1    tirzepatide (MOUNJARO) 5 mg/0.5 mL PnIj, Inject 5 mg into the skin every 7 days., Disp: 4 pen , Rfl: 5    Physical Exam:   Vitals:    09/27/23 1526   PainSc:   4   PainLoc: Knee       General:  Patient is alert, awake and oriented to time, place and person. Mood and affect are appropriate.  Patient does not appear to be in any distress, denies any constitutional symptoms and appears stated age.   HEENT:  Pupils are equal and round, sclera are not injected. External examination of ears and nose reveals no abnormalities. Cranial nerves II-X are grossly intact  Skin:  no rashes, abrasions or open wounds on the affected extremity   Resp:  No respiratory distress or audible wheezing   CV: 2+  pulses, all extremities warm and well perfused   Bilateral KNEE EXAMINATION        OBSERVATION / INSPECTION   Gait:                            antaglic  Alignment:                  neutral  Scars:                         none  Muscle atrophy:          none  Effusion:                     none  Warmth:                      none   Discoloration:              none      TENDERNESS                                                                                                                               Patella                         -                       Lateral joint line                     +++  Peripatellar medial      -                       Medial joint line                       -     Peripatellar lateral       -                       Medial plica                             -             Patellar tendon            -                       Popliteal fossa                        -             Quad tendon               -                       Gastrocnemius                        -  Prepatellar Bursa        -                       " Quadricep                               -  Tibial tubercle              -                      Thigh/hamstring                      -  Pes anserine/HS         -                       Fibula                                      -  ITB                               -                      Tibia                                        -  Tib/fib joint                   -                      LCL                                          -                         MFC                            -                       MCL:    Proximal                      -                         LFC                             -                                   Distal                           -                                                                ROM:                          PASSIVE                                                 ACTIVE   Left :                            0 / 90                                 0 / 90    Right :                          0 / 120                                0 / 120      PATELLOFEMORAL EXAMINATION:  See above noted areas of tenderness.   Patella position    Subluxation / dislocation:         Normal   Sup / Inf;                                  Normal   Crepitus (PF):                                      Absent   Patellar Mobility:                                               Medial-lateral:                         Normal  Superior-inferior:                     Normal   Inferior tilt                                Normal  Patellar tendon:                       Normal  Lateral tilt:                               Normal  J-sign:                                      Normal  Patellofemoral grind:              not  painful        MENISCAL SIGNS:                              Pain on terminal extension:                painful   Pain on terminal flexion:                    painful  Naidas maneuver:                       Pos  Squat                                                  no tested      LIGAMENT EXAMINATION:  ACL / Lachman:         normal (0 - 2 mm)   PCL-Post.  drawer:   normal (0 - 2 mm)  MCL- Valgus:             normal (0 - 2 mm)  LCL- Varus:  normal (0 - 2 mm)     STRENGTH:   (* = with pain)           PAINFUL SIDE  Quadricep                               5/5  Hamstrin/5     EXTREMITY NEURO-VASCULAR EXAMINATION:     Sensation:  Grossly intact to light touch all dermatomal regions.   Motor Function:  Fully intact motor function at hip, knee, foot and ankle    DTRs;  quadriceps and  achilles 2+.    Vascular status:  DP and PT pulses 2+, brisk capillary refill, symmetric.      Imaging: 3 views of the right knee show maintained joint spaces    MRI of the R knee shows some cartilage loss in the lateral compartment. + complex tear of lateral meniscus. Subchondral edema lateral tibial plateau    I personally reviewed and interpreted the patient's imaging obtained prior to visit       This note was created by combination of typed  and M-Modal dictation. Transcription and phonetic errors may be present.  If there are any questions, please contact me.    Past Medical History:   Diagnosis Date    Diabetes mellitus, type 2     Hyperlipidemia     Hypertension     Obesity     NAINA (obstructive sleep apnea)        Active Problem List with Overview Notes    Diagnosis Date Noted    S/P right rotator cuff repair 2023    Biceps tendonosis of right shoulder 2023    Decreased range of motion of right shoulder 2023    Impaired strength of shoulder muscles 2023    Traumatic rotator cuff tear, right, initial encounter 2023    Other amnesia 2022    Mild neurocognitive disorder due to traumatic brain injury 10/19/2022    Outbursts of anger 10/19/2022    Left hand pain 2022    Decreased strength, endurance, and mobility 2022    Dyspnea 2022      started after covid 19. cxr unremarkable.  Clinically improve with symbicort.   PFT with restrictive physiology with preserved dlco c/w habitus.  Stress echo with ?ischemic changes on echo but ST changes on ekg.        Rib pain on left side 08/08/2021    Right foot pain 10/10/2019    Decreased strength of lower extremity 10/10/2019    Decreased range of motion of both ankles 10/10/2019    Gait abnormality 10/10/2019    Low testosterone in male; pituitary axis normal. MRI negative. 11/2019 started on testosterone 09/04/2019 11/06/2019 MRI pituitary with and without contrast from [facility]  Impression:  1.  No pituitary mass seen.  2.  Absence of the septum pellucidum.        NAINA (obstructive sleep apnea) 8/2019 sleep study [facility] 11      -ahi of 11.  Stopped apap for over month due to gasping sensation.  ?excessive leakage a/w nasal congestion while having covid 19.  Nasal pantency is adequate.  Recommend resumption of apap.  However, APAP is being recall by [employer]  -we discussed at length about [employer] recall.  Patient already register his apap  -will switch to nasal pillow to alleviate pressure on ridge of nose.          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, with long-term current use of insulin     Essential hypertension     Hypothyroidism 07/29/2015    Hyperlipidemia LDL goal <70 06/03/2015    Insulin long-term use 06/03/2015    Tinea pedis 06/03/2015    Morbid obesity 06/03/2015       Past Surgical History:   Procedure Laterality Date    ARTHROSCOPIC DEBRIDEMENT OF SHOULDER  3/14/2023    Procedure: DEBRIDEMENT, SHOULDER, ARTHROSCOPIC;  Surgeon: Crissy Bradford MD;  Location: [facility] OR;   Service: Orthopedics;;    ARTHROSCOPIC REPAIR OF ROTATOR CUFF OF SHOULDER Right 3/14/2023    Procedure: REPAIR, ROTATOR CUFF, ARTHROSCOPIC;  Surgeon: Crissy Bradford MD;  Location: [facility] OR;  Service: Orthopedics;  Laterality: Right;  HARDY LISA NEPHFLY PAYNE [phone]. TEXTED ELMER ON 3/9/2023 @ 12:10PM. ELMER RESPONDED ON 3/9/23 @ 12:23PM-SAG  RN PREOP 3/10/2023---CLEARED BY PCP AND CARDS    ARTHROSCOPY,SHOULDER,WITH BICEPS TENODESIS  3/14/2023    Procedure: ARTHROSCOPY,SHOULDER,WITH BICEPS TENODESIS;  Surgeon: Crissy Bradford MD;  Location: [facility] OR;  Service: Orthopedics;;    KNEE SURGERY      acl,mcl,pcl    left knee x 2         Family History   Problem Relation Age of Onset    Diabetes Mother     Diabetes Father     No Known Problems Brother     Autism Son     No Known Problems Daughter

## 2023-09-28 NOTE — PROGRESS NOTES
Subjective:     Patient ID: Tony Gordillo is a 43 y.o. male.    Chief Complaint: Diabetes Mellitus (9/18/23 MARION Webb) and Diabetic Foot Exam    Tony is a 43 y.o. male who presents to the clinic upon referral from Dr. Webb  for evaluation and treatment of diabetic feet. Tony has a past medical history of Diabetes mellitus, type 2, Hyperlipidemia, Hypertension, Obesity, and NAINA (obstructive sleep apnea). Presents for diabetic foot risk assessment.       PCP: Jovany Ford MD    Date Last Seen by PCP: per above    Current shoe gear: Tennis shoes    Hemoglobin A1C   Date Value Ref Range Status   07/06/2023 7.4 (H) 4.0 - 5.6 % Final     Comment:     ADA Screening Guidelines:  5.7-6.4%  Consistent with prediabetes  >or=6.5%  Consistent with diabetes    High levels of fetal hemoglobin interfere with the HbA1C  assay. Heterozygous hemoglobin variants (HbS, HgC, etc)do  not significantly interfere with this assay.   However, presence of multiple variants may affect accuracy.     01/19/2023 8.0 (H) 4.0 - 5.6 % Final     Comment:     ADA Screening Guidelines:  5.7-6.4%  Consistent with prediabetes  >or=6.5%  Consistent with diabetes    High levels of fetal hemoglobin interfere with the HbA1C  assay. Heterozygous hemoglobin variants (HbS, HgC, etc)do  not significantly interfere with this assay.   However, presence of multiple variants may affect accuracy.     05/12/2022 8.8 (H) 4.0 - 5.6 % Final     Comment:     ADA Screening Guidelines:  5.7-6.4%  Consistent with prediabetes  >or=6.5%  Consistent with diabetes    High levels of fetal hemoglobin interfere with the HbA1C  assay. Heterozygous hemoglobin variants (HbS, HgC, etc)do  not significantly interfere with this assay.   However, presence of multiple variants may affect accuracy.           Review of Systems   Constitutional: Negative for chills.   Cardiovascular:  Negative for chest pain and claudication.   Respiratory:  Negative for cough.    Skin:  Positive for  color change, dry skin and nail changes.   Musculoskeletal:  Positive for joint pain.   Gastrointestinal:  Negative for nausea.   Neurological:  Positive for paresthesias. Negative for numbness.   Psychiatric/Behavioral:  The patient is not nervous/anxious.         Objective:     Physical Exam  Constitutional:       Appearance: He is well-developed.      Comments: Oriented to time, place, and person.   Cardiovascular:      Comments: DP and PT pulses are palpable bilaterally. 3 sec capillary refill time and toes and feet are warm to touch proximally .  There is  hair growth on the feet and toes b/l. There is no edema b/l. No spider veins or varicosities present b/l.     Musculoskeletal:      Comments: Equinus noted b/l ankles with < 10 deg DF noted. MMT 5/5 in DF/PF/Inv/Ev resistance with no reproduction of pain in any direction. Passive range of motion of ankle and pedal joints is painless b/l.     Feet:      Right foot:      Skin integrity: No callus or dry skin.      Left foot:      Skin integrity: No callus or dry skin.   Lymphadenopathy:      Comments: Negative lymphadenopathy bilateral popliteal fossa and tarsal tunnel.   Skin:     Comments: No open lesions, lacerations or wounds noted.Interdigital spaces clean, dry and intact b/l. No erythema noted to b/l foot.  Nails normal color and trophic qualities.     Neurological:      Mental Status: He is alert.      Comments: Light touch, proprioception, and sharp/dull sensation are all intact bilaterally. Protective threshold with the Aguila-Wienstein monofilament is intact bilaterally.    Psychiatric:         Behavior: Behavior is cooperative.           Assessment:      Encounter Diagnosis   Name Primary?    Type 2 diabetes mellitus with left eye affected by mild nonproliferative retinopathy without macular edema, with long-term current use of insulin      Plan:     Tony was seen today for diabetes mellitus and diabetic foot exam.    Diagnoses and all orders for  this visit:    Type 2 diabetes mellitus with left eye affected by mild nonproliferative retinopathy without macular edema, with long-term current use of insulin  -     Ambulatory referral/consult to Podiatry      I counseled the patient on his conditions, their implications and medical management.      - Shoe inspection. Diabetic Foot Education. Patient reminded of the importance of good nutrition and blood sugar control to help prevent podiatric complications of diabetes. Patient instructed on proper foot hygeine. We discussed wearing proper shoe gear, daily foot inspections, never walking without protective shoe gear, caution putting sharp instruments to feet     - Discussed DM foot care:  Wear comfortable, proper fitting shoes. Wash feet daily. Dry well. After drying, apply moisturizer to feet (no lotion to webspaces). Inspect feet daily for skin breaks, blisters, swelling, or redness. Wear cotton socks (preferably white)  Change socks every day. Do NOT walk barefoot. Do NOT use heating pads or warm/hot water soaks     F/u one year DM foot exam sooner PRN.

## 2023-10-04 DIAGNOSIS — J32.1 CHRONIC FRONTAL SINUSITIS: ICD-10-CM

## 2023-10-04 DIAGNOSIS — J31.0 NONALLERGIC RHINITIS: ICD-10-CM

## 2023-10-04 RX ORDER — FLUTICASONE PROPIONATE 50 MCG
1 SPRAY, SUSPENSION (ML) NASAL DAILY
Qty: 48 G | Refills: 5 | Status: SHIPPED | OUTPATIENT
Start: 2023-10-04

## 2023-10-04 NOTE — TELEPHONE ENCOUNTER
Refill Routing Note   Medication(s) are not appropriate for processing by [facility] for the following reason(s):      Clarification of medication (Rx) details: please clarify dose and frequency    [facility] action(s):  Defer Care Due:  None identified          Appointments  past 12m or future 3m with PCP    Date Provider   Last Visit   7/18/2023 Jovany Ford MD   Next Visit   Visit date not found Jovany Ford MD   ED visits in past 90 days: 0        Note composed:4:19 PM 10/04/2023

## 2023-10-04 NOTE — TELEPHONE ENCOUNTER
No care due was identified.  [facility] Embedded Care Due Messages. Reference number: [id number].   10/04/2023 3:38:12 PM CDT

## 2023-10-05 ENCOUNTER — OFFICE VISIT (OUTPATIENT)
Dept: FAMILY MEDICINE | Facility: CLINIC | Age: 43
End: 2023-10-05
Payer: COMMERCIAL

## 2023-10-05 ENCOUNTER — PATIENT OUTREACH (OUTPATIENT)
Dept: ADMINISTRATIVE | Facility: HOSPITAL | Age: 43
End: 2023-10-05
Payer: COMMERCIAL

## 2023-10-05 VITALS
HEART RATE: 85 BPM | SYSTOLIC BLOOD PRESSURE: 116 MMHG | DIASTOLIC BLOOD PRESSURE: 74 MMHG | BODY MASS INDEX: 39.16 KG/M2 | WEIGHT: 295.44 LBS | OXYGEN SATURATION: 97 % | TEMPERATURE: 98 F | HEIGHT: 73 IN

## 2023-10-05 DIAGNOSIS — B96.89 ACUTE BACTERIAL SINUSITIS: Primary | ICD-10-CM

## 2023-10-05 DIAGNOSIS — Z79.4 TYPE 2 DIABETES MELLITUS WITH LEFT EYE AFFECTED BY MILD NONPROLIFERATIVE RETINOPATHY WITHOUT MACULAR EDEMA, WITH LONG-TERM CURRENT USE OF INSULIN: ICD-10-CM

## 2023-10-05 DIAGNOSIS — J01.90 ACUTE BACTERIAL SINUSITIS: Primary | ICD-10-CM

## 2023-10-05 DIAGNOSIS — E78.5 HYPERLIPIDEMIA LDL GOAL <70: ICD-10-CM

## 2023-10-05 DIAGNOSIS — E03.4 HYPOTHYROIDISM DUE TO ACQUIRED ATROPHY OF THYROID: ICD-10-CM

## 2023-10-05 DIAGNOSIS — I10 ESSENTIAL HYPERTENSION: ICD-10-CM

## 2023-10-05 DIAGNOSIS — E11.3292 TYPE 2 DIABETES MELLITUS WITH LEFT EYE AFFECTED BY MILD NONPROLIFERATIVE RETINOPATHY WITHOUT MACULAR EDEMA, WITH LONG-TERM CURRENT USE OF INSULIN: ICD-10-CM

## 2023-10-05 PROCEDURE — 3061F NEG MICROALBUMINURIA REV: CPT | Mod: CPTII,S$GLB,, | Performed by: INTERNAL MEDICINE

## 2023-10-05 PROCEDURE — 3078F PR MOST RECENT DIASTOLIC BLOOD PRESSURE < 80 MM HG: ICD-10-PCS | Mod: CPTII,S$GLB,, | Performed by: INTERNAL MEDICINE

## 2023-10-05 PROCEDURE — 3061F PR NEG MICROALBUMINURIA RESULT DOCUMENTED/REVIEW: ICD-10-PCS | Mod: CPTII,S$GLB,, | Performed by: INTERNAL MEDICINE

## 2023-10-05 PROCEDURE — 99999 PR PBB SHADOW E&M-EST. PATIENT-LVL III: ICD-10-PCS | Mod: PBBFAC,,, | Performed by: INTERNAL MEDICINE

## 2023-10-05 PROCEDURE — 3051F HG A1C>EQUAL 7.0%<8.0%: CPT | Mod: CPTII,S$GLB,, | Performed by: INTERNAL MEDICINE

## 2023-10-05 PROCEDURE — 3078F DIAST BP <80 MM HG: CPT | Mod: CPTII,S$GLB,, | Performed by: INTERNAL MEDICINE

## 2023-10-05 PROCEDURE — 1159F PR MEDICATION LIST DOCUMENTED IN MEDICAL RECORD: ICD-10-PCS | Mod: CPTII,S$GLB,, | Performed by: INTERNAL MEDICINE

## 2023-10-05 PROCEDURE — 3066F NEPHROPATHY DOC TX: CPT | Mod: CPTII,S$GLB,, | Performed by: INTERNAL MEDICINE

## 2023-10-05 PROCEDURE — 1160F PR REVIEW ALL MEDS BY PRESCRIBER/CLIN PHARMACIST DOCUMENTED: ICD-10-PCS | Mod: CPTII,S$GLB,, | Performed by: INTERNAL MEDICINE

## 2023-10-05 PROCEDURE — 1160F RVW MEDS BY RX/DR IN RCRD: CPT | Mod: CPTII,S$GLB,, | Performed by: INTERNAL MEDICINE

## 2023-10-05 PROCEDURE — 3074F PR MOST RECENT SYSTOLIC BLOOD PRESSURE < 130 MM HG: ICD-10-PCS | Mod: CPTII,S$GLB,, | Performed by: INTERNAL MEDICINE

## 2023-10-05 PROCEDURE — 4010F PR ACE/ARB THEARPY RXD/TAKEN: ICD-10-PCS | Mod: CPTII,S$GLB,, | Performed by: INTERNAL MEDICINE

## 2023-10-05 PROCEDURE — 3008F PR BODY MASS INDEX (BMI) DOCUMENTED: ICD-10-PCS | Mod: CPTII,S$GLB,, | Performed by: INTERNAL MEDICINE

## 2023-10-05 PROCEDURE — 1159F MED LIST DOCD IN RCRD: CPT | Mod: CPTII,S$GLB,, | Performed by: INTERNAL MEDICINE

## 2023-10-05 PROCEDURE — 4010F ACE/ARB THERAPY RXD/TAKEN: CPT | Mod: CPTII,S$GLB,, | Performed by: INTERNAL MEDICINE

## 2023-10-05 PROCEDURE — 99999 PR PBB SHADOW E&M-EST. PATIENT-LVL III: CPT | Mod: PBBFAC,,, | Performed by: INTERNAL MEDICINE

## 2023-10-05 PROCEDURE — 3066F PR DOCUMENTATION OF TREATMENT FOR NEPHROPATHY: ICD-10-PCS | Mod: CPTII,S$GLB,, | Performed by: INTERNAL MEDICINE

## 2023-10-05 PROCEDURE — 99214 OFFICE O/P EST MOD 30 MIN: CPT | Mod: S$GLB,,, | Performed by: INTERNAL MEDICINE

## 2023-10-05 PROCEDURE — 3008F BODY MASS INDEX DOCD: CPT | Mod: CPTII,S$GLB,, | Performed by: INTERNAL MEDICINE

## 2023-10-05 PROCEDURE — 3051F PR MOST RECENT HEMOGLOBIN A1C LEVEL 7.0 - < 8.0%: ICD-10-PCS | Mod: CPTII,S$GLB,, | Performed by: INTERNAL MEDICINE

## 2023-10-05 PROCEDURE — 99214 PR OFFICE/OUTPT VISIT, EST, LEVL IV, 30-39 MIN: ICD-10-PCS | Mod: S$GLB,,, | Performed by: INTERNAL MEDICINE

## 2023-10-05 PROCEDURE — 3074F SYST BP LT 130 MM HG: CPT | Mod: CPTII,S$GLB,, | Performed by: INTERNAL MEDICINE

## 2023-10-05 RX ORDER — AMOXICILLIN AND CLAVULANATE POTASSIUM 875; 125 MG/1; MG/1
1 TABLET, FILM COATED ORAL 2 TIMES DAILY
Qty: 20 TABLET | Refills: 0 | Status: SHIPPED | OUTPATIENT
Start: 2023-10-05 | End: 2023-10-15

## 2023-10-05 NOTE — PROGRESS NOTES
This note was created by combination of typed  and M-Modal dictation.  Transcription errors may be present.  If there are any questions, please contact me.    Assessment and Plan:   Assessment and Plan   Acute bacterial sinusitis  -with some evidence of early OM.  Augmentin.  Continue the nasal spray, oral antihistamine, singulair. Upcoming allergist consult.   -     amoxicillin-clavulanate 875-125mg (AUGMENTIN) 875-125 mg per tablet; Take 1 tablet by mouth 2 (two) times daily. for 10 days  Dispense: 20 tablet; Refill: 0    Hypothyroidism due to acquired atrophy of thyroid  -check future TSH on LT  -     TSH; Future; Expected date: 10/05/2023    Type 2 diabetes mellitus with left eye affected by mild nonproliferative retinopathy without macular edema, with long-term current use of insulin  -upcoming eye 1/2024, looking for 2nd opinion about photophobia with astigmatism.     Essential hypertension    Hyperlipidemia LDL goal <70             Medications Discontinued During This Encounter   Medication Reason    methylPREDNISolone (MEDROL DOSEPACK) 4 mg tablet     fluticasone-umeclidin-vilanter (TRELEGY ELLIPTA) 200-62.5-25 mcg inhaler     azelastine 205.5 mcg (0.15 %) Spry Duplicate Order    fenofibrate 160 MG Tab        meds sent this encounter:  Medications Ordered This Encounter   Medications    amoxicillin-clavulanate 875-125mg (AUGMENTIN) 875-125 mg per tablet     Sig: Take 1 tablet by mouth 2 (two) times daily. for 10 days     Dispense:  20 tablet     Refill:  0         Follow Up:   Future Appointments   Date Time Provider Department Center   10/10/2023 11:30 AM Kate Mansfield NP [facility] ENDOCRN [facility]   10/10/2023  1:00 PM Pema Gongora MD [facility] ALLERGY [place]   10/18/2023  9:15 AM Crissy Bradford MD [facility] [place]THO [facility]   12/5/2023  9:00 AM Beatris Nixon, TREVA [facility] CISCO [place] PCW   1/24/2024 11:45 AM Lona Celaya, ZOE [facility] OPTOMTY [place]          Subjective:   Subjective   Chief  Complaint   Patient presents with    Headache     X1 week     Nasal Congestion    Generalized Body Aches     X 5 days        HPI  Tony is a 43 y.o. male.     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 Last visit with me 7/18/2023   To summarize last visit and events leading up to today:  HTN, lipid/statin  Incidental coronary artery calcifications on cardiac PET. Cardiac PET stress test was negative for any significant perfusion abnormalities.  Hypothyroid on LT  DM followed by DM NP  2/2023 changed to mounjaro  4/2023 mounjaro with SE at igher dose. Revert back to ozempic  But then reverted back to mounjaro due to not control on ozempic  7/10 DM NP visit mounaro, jardiance, toujeo, fiasp  Hx of concussion; migraines; referred to neuro  Saw neuro 9/2022 11/2022 MRI brain neg  11/2022 EEG normal.  Saw neuropsych for testing. Results c/w hx of mod-severe TBI  Saw neuro 1/2023.  MCI. Hold on donepezil. Consider depakote in the future. 1/2023 started on lamictal  Saw neuro in f/u 4/2023. Start levo methylfolate; incr lamictal  7/3/23 messaged neuro - start modafenil  Hx of colitis, saw [facility] - due to quick resolution, did not require colonoscopy.   Fibroscan was ordered but not covered.  Covid long haul symptoms, improved to return to work 5/2022  Saw ENT for headaches, sinus congestion, hx of NAINA, eval for Inspire, rec's were need for weight loss. ordered PSG     3/2023 ortho for R rotator tear  3/14/23 R rotator repair     1/2023 labs  A1c 8.0 from 8.8  Overdue for f/u with NP Mansfield  TSH WNL on dose 50 no changes  Results to  pt via MyChart. No changes.  Seeing neuro about TBI with memory loss         Low testosterone   -on testosterone/DHEA, anastrozole, and another estrogen blocker, [facility] but out-of-pocket expense is high with current unemployment.    I am unable to assume management of these medications, it is beyond my scope  Similarly I think endocrinology has previously expressed that such treatments would be beyond the scope    Seeing orthopedics in follow-up after right rotator cuff repair   Also seen orthopedics for right knee medial meniscal tear with left OA  Plan is eventual surgical procedure    Saw nurse practitioner 9/18 for sinusitis.  Singulair, Medrol    Today's visit:    Sinus congestion  With headache and ear discomfort  And low grade fever and sweating at night.    Ongoing x 3 weeks  Not improving  The steroid did not really help.     Upcoming consult with allergy.  Minimal cough  No chest congestion.    Ear really started yesterday.  Left ear.     On mounjaro  And insulin pump    On astelin, flonase, claritin, singulair, notes that sinus issues have been problematic for him more so this year and has an upcoming consult with allergist    Reports he had an eye exam done earlier this year and reportedly had issues with astigmatism and resultant photophobia and was told he should wear sunglasses at night.  He is going to see Optometry for 2nd opinion        Patient Care Team:  Jovany Ford MD as PCP - General (Internal Medicine)  Janneth Johnson, RN (Inactive) as Registered Nurse (Diabetes)  Rocky Hewitt MD as Consulting Physician (Ophthalmology)  Preethi Monaco RD (Inactive) as Dietitian (Nutrition)  Christofer Rowley MD as Consulting Physician (Orthopedic Surgery)  Singh Rizo MD as Consulting Physician (Sports Medicine)  Shilpa Gordon NP as Nurse Practitioner (Urology)  Nicole Wynn MD (Family Medicine)  Rafael Meraz MA as Care  Coordinator  Beatris Nixon RD as Dietitian (Diabetes)    Patient Active Problem List    Diagnosis Date Noted    S/P right rotator cuff repair 03/17/2023    Biceps tendonosis of right shoulder 03/17/2023    Decreased range of motion of right shoulder 03/17/2023    Impaired strength of shoulder muscles 03/17/2023    Traumatic rotator cuff tear, right, initial encounter 03/14/2023    Other amnesia 11/02/2022    Mild neurocognitive disorder due to traumatic brain injury 10/19/2022    Outbursts of anger 10/19/2022    Left hand pain 09/24/2022    Decreased strength, endurance, and mobility 03/08/2022    Dyspnea 02/18/2022      started after covid 19. cxr unremarkable.  Clinically improve with symbicort.  PFT with restrictive physiology with preserved dlco c/w habitus.  Stress echo with ?ischemic changes on echo but ST changes on ekg.        Rib pain on left side 08/08/2021    Right foot pain 10/10/2019    Decreased strength of lower extremity 10/10/2019    Decreased range of motion of both ankles 10/10/2019    Gait abnormality 10/10/2019    Low testosterone in male; pituitary axis normal. MRI negative. 11/2019 started on testosterone 09/04/2019 11/06/2019 MRI pituitary with and without contrast from [facility]  Impression:  1.  No pituitary mass seen.  2.  Absence of the septum pellucidum.      NAINA (obstructive sleep apnea) 8/2019 sleep study AHI 11      -ahi of 11.  Stopped apap for over month due to gasping sensation.  ?excessive leakage a/w nasal congestion while having covid 19.  Nasal pantency is adequate.  Recommend resumption of apap.  However, APAP is being recall by [employer]  -we discussed at length about [employer] recall.  Patient already register his apap  -will switch to nasal pillow to alleviate pressure on ridge of nose.        Acute bilateral low back pain without sciatica 08/10/2019    Non-seasonal allergic rhinitis; avoid antihistamines per opthalmology 11/09/2018    Migraine with aura  and without status migrainosus, not intractable propranolol SE angry; topamax not helpful; imitrex ineffective; daith ear piercing helps 09/28/2018    Type 2 diabetes mellitus with left eye affected by mild nonproliferative retinopathy without macular edema, with long-term current use of insulin     Essential hypertension     Hypothyroidism 07/29/2015    Hyperlipidemia LDL goal <70 06/03/2015    Insulin long-term use 06/03/2015    Tinea pedis 06/03/2015    Morbid obesity 06/03/2015       PAST MEDICAL PROBLEMS, PAST SURGICAL HISTORY: please see relevant portions of the electronic medical record    ALLERGIES AND MEDICATIONS: updated and reviewed.  Medication List with Changes/Refills   Current Medications    ATORVASTATIN (LIPITOR) 40 MG TABLET    Take 1 tablet (40 mg total) by mouth once daily.    AZELASTINE (ASTELIN) 137 MCG (0.1 %) NASAL SPRAY    1 spray (137 mcg total) by Nasal route 2 (two) times daily.    AZELASTINE 205.5 MCG (0.15 %) SPRY    azelastine 205.5 mcg (0.15 %) nasal spray  INHALE 2 SPRAYS TO EACH NOSTRIL TWICE A DAY    BLOOD SUGAR DIAGNOSTIC STRP    To check BG 4 times daily, to use with insurance preferred meter    BLOOD SUGAR DIAGNOSTIC STRP    OneTouch Ultra Test strips    BLOOD-GLUCOSE METER KIT    OneTouch Ultra2 Meter kit    BLOOD-GLUCOSE SENSOR (DEXCOM G6 SENSOR) CAT    Change sensor every 10 days    BLOOD-GLUCOSE SENSOR (FREESTYLE SAMANTHA 3 SENSOR) CAT    Change every 14 days    BLOOD-GLUCOSE TRANSMITTER (DEXCOM G6 TRANSMITTER) CAT    Change every 3 months    CYANOCOBALAMIN, VITAMIN B-12, 5,000 MCG SUBL    Place one under tongue once a day for 1 month, then continue to take under tongue per week    DICLOFENAC SODIUM (VOLTAREN) 1 % GEL    Apply the gel (2 g) to the affected area 4 times daily. Do not apply more than 8 g daily to any one affected joint    EMPAGLIFLOZIN (JARDIANCE) 25 MG TABLET    Take 1 tablet (25 mg total) by mouth once daily.    FENOFIBRATE 160 MG TAB    fenofibrate 160 mg  "tablet    FLUTICASONE PROPIONATE (FLONASE) 50 MCG/ACTUATION NASAL SPRAY    1 spray (50 mcg total) by Each Nostril route once daily.    FLUTICASONE-UMECLIDIN-VILANTER (TRELEGY ELLIPTA) 200-62.5-25 MCG INHALER    Inhale 1 puff into the lungs once daily. Stop symbicort    INSULIN ASPART U-100 (NOVOLOG U-100 INSULIN ASPART) 100 UNIT/ML INJECTION    Use max 120 units/day in insulin pump    INSULIN ASPART U-100 (NOVOLOG) 100 UNIT/ML (3 ML) INPN PEN    INJECT 15-30 UNITS BEFORE EACH MEAL WITH SLIDNING SCALE, MAX DAILY DOSE 100 UNITS    INSULIN GLARGINE U-300 CONC (TOUJEO MAX U-300 SOLOSTAR) 300 UNIT/ML (3 ML) INSULIN PEN    Inject 30 Units into the skin once daily.    INSULIN NPH ISOPH U-100 HUMAN (NOVOLIN N FLEXPEN) 100 UNIT/ML (3 ML) INPN    INJECT 14 UNITS INTO THE SKIN ONCE DAILY AT NIGHT 8 PM.    INSULIN PUMP CART,AUTOMATED,BT (OMNIPOD 5 G6 PODS, GEN 5,) CRTG    INJECT 1 EACH INTO THE SKIN ONCE DAILY.    KETOCONAZOLE (NIZORAL) 2 % CREAM    Apply topically once daily.    L-METHYLFOLATE 15 MG TAB    TAKE 15 MG BY MOUTH ONCE DAILY.    LAMOTRIGINE (LAMICTAL) 100 MG TABLET    Take 1.5 tablets (150 mg total) by mouth once daily.    LANCETS MISC    To check BG 4 times daily, to use with insurance preferred meter    LEVOTHYROXINE (SYNTHROID) 50 MCG TABLET    TAKE 1 TABLET BY MOUTH BEFORE BREAKFAST.    LISINOPRIL (PRINIVIL,ZESTRIL) 20 MG TABLET    TAKE 1 TABLET BY MOUTH EVERY DAY    METHYLPREDNISOLONE (MEDROL DOSEPACK) 4 MG TABLET    use as directed    MODAFINIL (PROVIGIL) 100 MG TAB    Take 1/4 tablet in AM for 1 week, then 1/2 tablet in AM for 1 week, then increase to 1 tablet in AM as need for attention/concentration/drowsiness    MONTELUKAST (SINGULAIR) 10 MG TABLET    Take 1 tablet (10 mg total) by mouth every evening.    OMNIPOD 5 G6 INTRO KIT, GEN 5, CRTG    INJECT 1 EACH INTO THE SKIN DAILY.    PEN NEEDLE, DIABETIC (BD ULTRA-FINE KEN PEN NEEDLE) 32 GAUGE X 5/32" NDLE    1 Device by Misc.(Non-Drug; Combo Route) route 5 " "(five) times daily.    SYRINGE, DISPOSABLE, 1 ML SYRG    Testosterone every 2 weeks    TIRZEPATIDE (MOUNJARO) 5 MG/0.5 ML PNIJ    Inject 5 mg into the skin every 7 days.         Objective:   Objective   Physical Exam   Vitals:    10/05/23 0823   BP: 116/74   Pulse: 85   Temp: 98 °F (36.7 °C)   TempSrc: Oral   SpO2: 97%   Weight: 134 kg (295 lb 6.7 oz)   Height: 6' 1" (1.854 m)    Body mass index is 38.98 kg/m².  Weight: 134 kg (295 lb 6.7 oz)   Height: 6' 1" (185.4 cm)     Physical Exam  Constitutional:       Appearance: He is well-developed.   HENT:      Head: Normocephalic.      Right Ear: Tympanic membrane and ear canal normal.      Left Ear: Ear canal normal.      Ears:      Comments: L TM erythema no effusion  Eyes:      General: No scleral icterus.        Right eye: No discharge.         Left eye: No discharge.   Cardiovascular:      Rate and Rhythm: Normal rate and regular rhythm.      Heart sounds: Normal heart sounds.   Pulmonary:      Effort: Pulmonary effort is normal.      Breath sounds: Normal breath sounds. No wheezing.   Musculoskeletal:      Cervical back: Neck supple.   Lymphadenopathy:      Cervical: No cervical adenopathy.   Skin:     General: Skin is warm and dry.   Neurological:      Mental Status: He is alert and oriented to person, place, and time.   Psychiatric:         Behavior: Behavior normal.                "

## 2023-10-06 ENCOUNTER — PATIENT MESSAGE (OUTPATIENT)
Dept: PODIATRY | Facility: CLINIC | Age: 43
End: 2023-10-06
Payer: COMMERCIAL

## 2023-10-06 ENCOUNTER — PATIENT MESSAGE (OUTPATIENT)
Dept: ORTHOPEDICS | Facility: CLINIC | Age: 43
End: 2023-10-06
Payer: COMMERCIAL

## 2023-10-06 ENCOUNTER — PATIENT MESSAGE (OUTPATIENT)
Dept: NEUROLOGY | Facility: CLINIC | Age: 43
End: 2023-10-06
Payer: COMMERCIAL

## 2023-10-06 DIAGNOSIS — E11.9 TYPE 2 DIABETES MELLITUS WITHOUT COMPLICATION, WITH LONG-TERM CURRENT USE OF INSULIN: ICD-10-CM

## 2023-10-06 DIAGNOSIS — Z79.4 TYPE 2 DIABETES MELLITUS WITHOUT COMPLICATION, WITH LONG-TERM CURRENT USE OF INSULIN: ICD-10-CM

## 2023-10-06 RX ORDER — INSULIN ASPART 100 [IU]/ML
INJECTION, SOLUTION INTRAVENOUS; SUBCUTANEOUS
Qty: 40 ML | Refills: 1 | Status: SHIPPED | OUTPATIENT
Start: 2023-10-06 | End: 2023-10-10 | Stop reason: SDUPTHER

## 2023-10-08 RX ORDER — AMMONIUM LACTATE 12 G/100G
1 CREAM TOPICAL DAILY
Qty: 140 G | Refills: 5 | Status: SHIPPED | OUTPATIENT
Start: 2023-10-08

## 2023-10-09 ENCOUNTER — PATIENT OUTREACH (OUTPATIENT)
Dept: ADMINISTRATIVE | Facility: HOSPITAL | Age: 43
End: 2023-10-09
Payer: COMMERCIAL

## 2023-10-10 ENCOUNTER — OFFICE VISIT (OUTPATIENT)
Dept: ENDOCRINOLOGY | Facility: CLINIC | Age: 43
End: 2023-10-10
Payer: COMMERCIAL

## 2023-10-10 ENCOUNTER — OFFICE VISIT (OUTPATIENT)
Dept: ALLERGY | Facility: CLINIC | Age: 43
End: 2023-10-10
Payer: COMMERCIAL

## 2023-10-10 ENCOUNTER — LAB VISIT (OUTPATIENT)
Dept: LAB | Facility: HOSPITAL | Age: 43
End: 2023-10-10
Attending: INTERNAL MEDICINE
Payer: COMMERCIAL

## 2023-10-10 VITALS — BODY MASS INDEX: 39.18 KG/M2 | HEIGHT: 73 IN | WEIGHT: 295.63 LBS

## 2023-10-10 DIAGNOSIS — Z79.4 TYPE 2 DIABETES MELLITUS WITHOUT COMPLICATION, WITH LONG-TERM CURRENT USE OF INSULIN: Primary | ICD-10-CM

## 2023-10-10 DIAGNOSIS — E03.4 HYPOTHYROIDISM DUE TO ACQUIRED ATROPHY OF THYROID: ICD-10-CM

## 2023-10-10 DIAGNOSIS — E11.9 TYPE 2 DIABETES MELLITUS WITHOUT COMPLICATION, WITH LONG-TERM CURRENT USE OF INSULIN: ICD-10-CM

## 2023-10-10 DIAGNOSIS — J32.9 RECURRENT SINUSITIS: ICD-10-CM

## 2023-10-10 DIAGNOSIS — J31.0 CHRONIC RHINITIS: ICD-10-CM

## 2023-10-10 DIAGNOSIS — E78.5 HYPERLIPIDEMIA, UNSPECIFIED HYPERLIPIDEMIA TYPE: ICD-10-CM

## 2023-10-10 DIAGNOSIS — Z79.4 TYPE 2 DIABETES MELLITUS WITHOUT COMPLICATION, WITH LONG-TERM CURRENT USE OF INSULIN: ICD-10-CM

## 2023-10-10 DIAGNOSIS — E66.01 SEVERE OBESITY: ICD-10-CM

## 2023-10-10 DIAGNOSIS — J31.0 CHRONIC RHINITIS: Primary | ICD-10-CM

## 2023-10-10 DIAGNOSIS — E11.9 TYPE 2 DIABETES MELLITUS WITHOUT COMPLICATION, WITH LONG-TERM CURRENT USE OF INSULIN: Primary | ICD-10-CM

## 2023-10-10 PROCEDURE — 3051F HG A1C>EQUAL 7.0%<8.0%: CPT | Mod: CPTII,95,, | Performed by: NURSE PRACTITIONER

## 2023-10-10 PROCEDURE — 3066F PR DOCUMENTATION OF TREATMENT FOR NEPHROPATHY: ICD-10-PCS | Mod: CPTII,S$GLB,, | Performed by: STUDENT IN AN ORGANIZED HEALTH CARE EDUCATION/TRAINING PROGRAM

## 2023-10-10 PROCEDURE — 3051F PR MOST RECENT HEMOGLOBIN A1C LEVEL 7.0 - < 8.0%: ICD-10-PCS | Mod: CPTII,95,, | Performed by: NURSE PRACTITIONER

## 2023-10-10 PROCEDURE — 1159F PR MEDICATION LIST DOCUMENTED IN MEDICAL RECORD: ICD-10-PCS | Mod: CPTII,S$GLB,, | Performed by: STUDENT IN AN ORGANIZED HEALTH CARE EDUCATION/TRAINING PROGRAM

## 2023-10-10 PROCEDURE — 95251 CONT GLUC MNTR ANALYSIS I&R: CPT | Mod: NDTC,S$GLB,, | Performed by: NURSE PRACTITIONER

## 2023-10-10 PROCEDURE — 1159F MED LIST DOCD IN RCRD: CPT | Mod: CPTII,S$GLB,, | Performed by: STUDENT IN AN ORGANIZED HEALTH CARE EDUCATION/TRAINING PROGRAM

## 2023-10-10 PROCEDURE — 1160F PR REVIEW ALL MEDS BY PRESCRIBER/CLIN PHARMACIST DOCUMENTED: ICD-10-PCS | Mod: CPTII,S$GLB,, | Performed by: STUDENT IN AN ORGANIZED HEALTH CARE EDUCATION/TRAINING PROGRAM

## 2023-10-10 PROCEDURE — 3051F PR MOST RECENT HEMOGLOBIN A1C LEVEL 7.0 - < 8.0%: ICD-10-PCS | Mod: CPTII,S$GLB,, | Performed by: STUDENT IN AN ORGANIZED HEALTH CARE EDUCATION/TRAINING PROGRAM

## 2023-10-10 PROCEDURE — 4010F PR ACE/ARB THEARPY RXD/TAKEN: ICD-10-PCS | Mod: CPTII,S$GLB,, | Performed by: STUDENT IN AN ORGANIZED HEALTH CARE EDUCATION/TRAINING PROGRAM

## 2023-10-10 PROCEDURE — 3061F PR NEG MICROALBUMINURIA RESULT DOCUMENTED/REVIEW: ICD-10-PCS | Mod: CPTII,95,, | Performed by: NURSE PRACTITIONER

## 2023-10-10 PROCEDURE — 36415 COLL VENOUS BLD VENIPUNCTURE: CPT | Mod: PO | Performed by: INTERNAL MEDICINE

## 2023-10-10 PROCEDURE — 99204 OFFICE O/P NEW MOD 45 MIN: CPT | Mod: S$GLB,,, | Performed by: STUDENT IN AN ORGANIZED HEALTH CARE EDUCATION/TRAINING PROGRAM

## 2023-10-10 PROCEDURE — 2023F DILAT RTA XM W/O RTNOPTHY: CPT | Mod: CPTII,95,, | Performed by: NURSE PRACTITIONER

## 2023-10-10 PROCEDURE — 4010F ACE/ARB THERAPY RXD/TAKEN: CPT | Mod: CPTII,S$GLB,, | Performed by: STUDENT IN AN ORGANIZED HEALTH CARE EDUCATION/TRAINING PROGRAM

## 2023-10-10 PROCEDURE — 3066F NEPHROPATHY DOC TX: CPT | Mod: CPTII,S$GLB,, | Performed by: STUDENT IN AN ORGANIZED HEALTH CARE EDUCATION/TRAINING PROGRAM

## 2023-10-10 PROCEDURE — 4010F ACE/ARB THERAPY RXD/TAKEN: CPT | Mod: CPTII,95,, | Performed by: NURSE PRACTITIONER

## 2023-10-10 PROCEDURE — 3061F NEG MICROALBUMINURIA REV: CPT | Mod: CPTII,95,, | Performed by: NURSE PRACTITIONER

## 2023-10-10 PROCEDURE — 86317 IMMUNOASSAY INFECTIOUS AGENT: CPT | Performed by: STUDENT IN AN ORGANIZED HEALTH CARE EDUCATION/TRAINING PROGRAM

## 2023-10-10 PROCEDURE — 84443 ASSAY THYROID STIM HORMONE: CPT | Performed by: INTERNAL MEDICINE

## 2023-10-10 PROCEDURE — 99204 PR OFFICE/OUTPT VISIT, NEW, LEVL IV, 45-59 MIN: ICD-10-PCS | Mod: S$GLB,,, | Performed by: STUDENT IN AN ORGANIZED HEALTH CARE EDUCATION/TRAINING PROGRAM

## 2023-10-10 PROCEDURE — 86003 ALLG SPEC IGE CRUDE XTRC EA: CPT | Mod: 59 | Performed by: STUDENT IN AN ORGANIZED HEALTH CARE EDUCATION/TRAINING PROGRAM

## 2023-10-10 PROCEDURE — 95251 PR GLUCOSE MONITOR, 72 HOUR, PHYS INTERP: ICD-10-PCS | Mod: NDTC,S$GLB,, | Performed by: NURSE PRACTITIONER

## 2023-10-10 PROCEDURE — 3051F HG A1C>EQUAL 7.0%<8.0%: CPT | Mod: CPTII,S$GLB,, | Performed by: STUDENT IN AN ORGANIZED HEALTH CARE EDUCATION/TRAINING PROGRAM

## 2023-10-10 PROCEDURE — 3061F NEG MICROALBUMINURIA REV: CPT | Mod: CPTII,S$GLB,, | Performed by: STUDENT IN AN ORGANIZED HEALTH CARE EDUCATION/TRAINING PROGRAM

## 2023-10-10 PROCEDURE — 3008F BODY MASS INDEX DOCD: CPT | Mod: CPTII,S$GLB,, | Performed by: STUDENT IN AN ORGANIZED HEALTH CARE EDUCATION/TRAINING PROGRAM

## 2023-10-10 PROCEDURE — 3066F NEPHROPATHY DOC TX: CPT | Mod: CPTII,95,, | Performed by: NURSE PRACTITIONER

## 2023-10-10 PROCEDURE — 3061F PR NEG MICROALBUMINURIA RESULT DOCUMENTED/REVIEW: ICD-10-PCS | Mod: CPTII,S$GLB,, | Performed by: STUDENT IN AN ORGANIZED HEALTH CARE EDUCATION/TRAINING PROGRAM

## 2023-10-10 PROCEDURE — 82784 ASSAY IGA/IGD/IGG/IGM EACH: CPT | Performed by: STUDENT IN AN ORGANIZED HEALTH CARE EDUCATION/TRAINING PROGRAM

## 2023-10-10 PROCEDURE — 86003 ALLG SPEC IGE CRUDE XTRC EA: CPT | Performed by: STUDENT IN AN ORGANIZED HEALTH CARE EDUCATION/TRAINING PROGRAM

## 2023-10-10 PROCEDURE — 2023F PR DILATED RETINAL EXAM W/O EVID OF RETINOPATHY: ICD-10-PCS | Mod: CPTII,95,, | Performed by: NURSE PRACTITIONER

## 2023-10-10 PROCEDURE — 83036 HEMOGLOBIN GLYCOSYLATED A1C: CPT | Performed by: NURSE PRACTITIONER

## 2023-10-10 PROCEDURE — 99999 PR PBB SHADOW E&M-EST. PATIENT-LVL V: CPT | Mod: PBBFAC,,, | Performed by: STUDENT IN AN ORGANIZED HEALTH CARE EDUCATION/TRAINING PROGRAM

## 2023-10-10 PROCEDURE — 99999 PR PBB SHADOW E&M-EST. PATIENT-LVL V: ICD-10-PCS | Mod: PBBFAC,,, | Performed by: STUDENT IN AN ORGANIZED HEALTH CARE EDUCATION/TRAINING PROGRAM

## 2023-10-10 PROCEDURE — 3008F PR BODY MASS INDEX (BMI) DOCUMENTED: ICD-10-PCS | Mod: CPTII,S$GLB,, | Performed by: STUDENT IN AN ORGANIZED HEALTH CARE EDUCATION/TRAINING PROGRAM

## 2023-10-10 PROCEDURE — 1159F MED LIST DOCD IN RCRD: CPT | Mod: CPTII,95,, | Performed by: NURSE PRACTITIONER

## 2023-10-10 PROCEDURE — 99215 OFFICE O/P EST HI 40 MIN: CPT | Mod: 95,,, | Performed by: NURSE PRACTITIONER

## 2023-10-10 PROCEDURE — 1160F RVW MEDS BY RX/DR IN RCRD: CPT | Mod: CPTII,95,, | Performed by: NURSE PRACTITIONER

## 2023-10-10 PROCEDURE — 1159F PR MEDICATION LIST DOCUMENTED IN MEDICAL RECORD: ICD-10-PCS | Mod: CPTII,95,, | Performed by: NURSE PRACTITIONER

## 2023-10-10 PROCEDURE — 1160F PR REVIEW ALL MEDS BY PRESCRIBER/CLIN PHARMACIST DOCUMENTED: ICD-10-PCS | Mod: CPTII,95,, | Performed by: NURSE PRACTITIONER

## 2023-10-10 PROCEDURE — 4010F PR ACE/ARB THEARPY RXD/TAKEN: ICD-10-PCS | Mod: CPTII,95,, | Performed by: NURSE PRACTITIONER

## 2023-10-10 PROCEDURE — 99215 PR OFFICE/OUTPT VISIT, EST, LEVL V, 40-54 MIN: ICD-10-PCS | Mod: 95,,, | Performed by: NURSE PRACTITIONER

## 2023-10-10 PROCEDURE — 1160F RVW MEDS BY RX/DR IN RCRD: CPT | Mod: CPTII,S$GLB,, | Performed by: STUDENT IN AN ORGANIZED HEALTH CARE EDUCATION/TRAINING PROGRAM

## 2023-10-10 PROCEDURE — 3066F PR DOCUMENTATION OF TREATMENT FOR NEPHROPATHY: ICD-10-PCS | Mod: CPTII,95,, | Performed by: NURSE PRACTITIONER

## 2023-10-10 RX ORDER — INSULIN ASPART 100 [IU]/ML
INJECTION, SOLUTION INTRAVENOUS; SUBCUTANEOUS
Qty: 40 ML | Refills: 5 | Status: SHIPPED | OUTPATIENT
Start: 2023-10-10 | End: 2023-11-13

## 2023-10-10 NOTE — PROGRESS NOTES
ALLERGY & IMMUNOLOGY CLINIC - INITIAL CONSULTATION      HISTORY OF PRESENT ILLNESS     Patient ID: Tony Gordillo is a 43 y.o. male    CC: chronic rhinitis, recurrent sinusitis     HPI: Tony Gordillo is a 43 y.o. male with HTN, hypothyroidism, and DM2, presenting for chronic rhinitis and recurrent sinusitis.   Patient was referred by Ridge Webb NP.    He was prescribed oral steroids for sinusitis 9/18/23. He was also restarted on singulair at that time.  On 10/5/23, he was prescribed augmentin for sinusitis.  Based on rosenda review, within the [facility], this was his only time being prescribed antibiotics for sinusitis in the past 12 months.   He estimates he receives antibiotics for sinusitis/ear infections 2-3 times per year on average, but sometimes he can go a whole year without. He says his sinus infections often turn into ear infections. He says he had pneumonia once around 2019, but doesn't recall if it was diagnosed by a CXR. No other unusual infections.    He reports he has had rhinitis most of his life, but becoming harder to treat over the last year.  Patient endorses congestion, sneezing, rhinorrhea (sometimes), post nasal drip (on occasion), cough.  Patient denies nasal pruritus, ocular pruritus.  He says he also gets headaches and migraines when symptoms get bad.   Symptoms flare and subside perennially.   There is no noticeable difference between indoors and outdoors.   He says he is outdoors 90% of the time.   The patient has not identified any triggers.  The patient denies heartburn/reflux symptoms.  The patient denies anosmia currently, but he can get hyposmia when his symptoms flare.     He was recently restarted on montelukast. He says it is difficult to say whether it is helping (as he has had some benefit, but thinks this might be due to the steroids and antibiotics).  He is using flonase 2 SEN daily.  He is using azelastine 2 SEN daily.  He uses saline mist before the nasal  sprays. He uses saline sinus rinses prn.   He is currently taking allegra daily, but alternates his antihistamines.    No shortness of breath or wheezing.     MEDICAL HISTORY     Vaccines:    Immunization History   Administered Date(s) Administered    COVID-19 Vaccine 12/28/2020, 01/25/2021, 11/08/2021    COVID-19, MRNA, LN-S, PF (MODERNA FULL 0.5 ML DOSE) 12/28/2020, 01/25/2021, 11/08/2021    Influenza 09/06/2019    Pneumococcal Polysaccharide - 23 Valent 09/28/2018    Tdap 07/18/2019     Medical Hx:   Patient Active Problem List   Diagnosis    Hyperlipidemia LDL goal <70    Insulin long-term use    Tinea pedis    Morbid obesity    Hypothyroidism    Type 2 diabetes mellitus 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 MRI negative. 11/2019 started on testosterone    Right foot pain    Decreased strength of lower extremity    Decreased range of motion of both ankles    Gait abnormality    Rib pain on left side    Dyspnea    Decreased strength, endurance, and mobility    Left hand pain    Mild neurocognitive disorder due to traumatic brain injury    Outbursts of anger    Other amnesia    Traumatic rotator cuff tear, right, initial encounter    S/P right rotator cuff repair    Biceps tendonosis of right shoulder    Decreased range of motion of right shoulder    Impaired strength of shoulder muscles     Surgical Hx:   Past Surgical History:   Procedure Laterality Date    ARTHROSCOPIC DEBRIDEMENT OF SHOULDER  3/14/2023    Procedure:  DEBRIDEMENT, SHOULDER, ARTHROSCOPIC;  Surgeon: Crissy Bradford MD;  Location: [facility] OR;  Service: Orthopedics;;    ARTHROSCOPIC REPAIR OF ROTATOR CUFF OF SHOULDER Right 3/14/2023    Procedure: REPAIR, ROTATOR CUFF, ARTHROSCOPIC;  Surgeon: Crissy Bradford MD;  Location: [facility] OR;  Service: Orthopedics;  Laterality: Right;  KARY PAYNE [phone]. TEXTED ELMER ON 3/9/2023 @ 12:10PM. ELMER RESPONDED ON 3/9/23 @ 12:23PM-SAG  RN PREOP 3/10/2023---CLEARED BY PCP AND CARDS    ARTHROSCOPY,SHOULDER,WITH BICEPS TENODESIS  3/14/2023    Procedure: ARTHROSCOPY,SHOULDER,WITH BICEPS TENODESIS;  Surgeon: Crissy Bradford MD;  Location: [facility] OR;  Service: Orthopedics;;    KNEE SURGERY      acl,mcl,pcl    left knee x 2       Medications:   Current Outpatient Medications on File Prior to Visit   Medication Sig Dispense Refill    amoxicillin-clavulanate 875-125mg (AUGMENTIN) 875-125 mg per tablet Take 1 tablet by mouth 2 (two) times daily. for 10 days 20 tablet 0    atorvastatin (LIPITOR) 40 MG tablet Take 1 tablet (40 mg total) by mouth once daily. 90 tablet 3    azelastine (ASTELIN) 137 mcg (0.1 %) nasal spray 1 spray (137 mcg total) by Nasal route 2 (two) times daily. 30 mL 3    blood sugar diagnostic Strp To check BG 4 times daily, to use with insurance preferred meter 400 strip 3    blood sugar diagnostic Strp OneTouch Ultra Test strips      blood-glucose meter kit OneTouch Ultra2 Meter kit      blood-glucose sensor (DEXCOM G6 SENSOR) Filomena Change sensor every 10 days 3 each 11    blood-glucose sensor (FREESTYLE SAMANTHA 3 SENSOR) Filomena Change every 14 days 2 each 11    blood-glucose transmitter (DEXCOM G6 TRANSMITTER) Filomena Change every 3 months 1 each 3    cyanocobalamin, vitamin B-12, 5,000 mcg Subl Place one under tongue once a day for 1 month, then continue to take under tongue per week 30 tablet 3    diclofenac sodium (VOLTAREN) 1 % Gel Apply the gel (2 g) to the affected area 4 times daily. Do not apply more  "than 8 g daily to any one affected joint 100 g 1    insulin aspart U-100 (NOVOLOG) 100 unit/mL (3 mL) InPn pen INJECT 15-30 UNITS BEFORE EACH MEAL WITH SLIDNING SCALE, MAX DAILY DOSE 100 UNITS 30 mL 5    insulin aspart U-100 (NOVOLOG) 100 unit/mL injection Max daily dose 110 units in insulin pump. 40 mL 5    insulin glargine U-300 conc (TOUJEO MAX U-300 SOLOSTAR) 300 unit/mL (3 mL) insulin pen Inject 30 Units into the skin once daily. 3 pen 5    insulin NPH isoph U-100 human (NOVOLIN N FLEXPEN) 100 unit/mL (3 mL) InPn INJECT 14 UNITS INTO THE SKIN ONCE DAILY AT NIGHT 8 PM. 15 mL 2    insulin pump cart,automated,BT (OMNIPOD 5 G6 PODS, GEN 5,) Crtg INJECT 1 EACH INTO THE SKIN ONCE DAILY. 6 each 1    ketoconazole (NIZORAL) 2 % cream Apply topically once daily. 1 Tube 3    L-METHYLFOLATE 15 mg Tab TAKE 15 MG BY MOUTH ONCE DAILY. 30 tablet 11    lamoTRIgine (LAMICTAL) 100 MG tablet Take 1.5 tablets (150 mg total) by mouth once daily. 135 tablet 3    lancets Misc To check BG 4 times daily, to use with insurance preferred meter 400 each 3    levothyroxine (SYNTHROID) 50 MCG tablet TAKE 1 TABLET BY MOUTH BEFORE BREAKFAST. 90 tablet 3    lisinopriL (PRINIVIL,ZESTRIL) 20 MG tablet TAKE 1 TABLET BY MOUTH EVERY DAY 90 tablet 3    modafiniL (PROVIGIL) 100 MG Tab Take 1/4 tablet in AM for 1 week, then 1/2 tablet in AM for 1 week, then increase to 1 tablet in AM as need for attention/concentration/drowsiness 30 tablet 2    montelukast (SINGULAIR) 10 mg tablet Take 1 tablet (10 mg total) by mouth every evening. 30 tablet 3    OMNIPOD 5 G6 INTRO KIT, GEN 5, Crtg INJECT 1 EACH INTO THE SKIN DAILY. 1 each 0    pen needle, diabetic (BD ULTRA-FINE KEN PEN NEEDLE) 32 gauge x 5/32" Ndle 1 Device by Misc.(Non-Drug; Combo Route) route 5 (five) times daily. 550 each 4    syringe, disposable, 1 mL Syrg Testosterone every 2 weeks 25 Syringe 1    tirzepatide (MOUNJARO) 5 mg/0.5 mL PnIj Inject 5 mg into the skin every 7 days. 4 pen 5    " "tirzepatide 7.5 mg/0.5 mL PnIj Inject 7.5 mg into the skin every 7 days. 4 pen 4    ammonium lactate 12 % Crea Apply 1 application  topically once daily. (Patient not taking: Reported on 10/10/2023) 140 g 5    empagliflozin (JARDIANCE) 25 mg tablet Take 1 tablet (25 mg total) by mouth once daily. 90 tablet 2    fluticasone propionate (FLONASE) 50 mcg/actuation nasal spray 1 spray (50 mcg total) by Each Nostril route once daily. 48 g 5    [DISCONTINUED] insulin aspart U-100 (NOVOLOG) 100 unit/mL injection USE  UNITS/DAY IN INSULIN PUMP 40 mL 1     No current facility-administered medications on file prior to visit.     H/o Asthma: denies  H/o Rhinitis: endorses    Drug Allergies:   Review of patient's allergies indicates:   Allergen Reactions    Influenza virus vaccine, specific Hives    Pioglitazone      Altered mood     Victoza [liraglutide] Nausea And Vomiting    Farxiga [dapagliflozin] Rash     Erectile dysfunction and rash      Env/Occ:   Pets: 2 rabbits and a hamster. He says he sneezes a little when the rabbit fur gets in his nose (but thinks this is more related to it tickling his nose).  Occupation:  (but currently out due to a shoulder injury).    Social Hx:   Social History     Tobacco Use    Smoking status: Never     Passive exposure: Never    Smokeless tobacco: Never   Substance Use Topics    Alcohol use: Yes     Comment: 1-2 beers every 2 weeks    Drug use: No     Family Hx:   Family History   Problem Relation Age of Onset    Diabetes Mother     Diabetes Father     No Known Problems Brother     Autism Son     No Known Problems Daughter       PHYSICAL EXAM     VS: Ht 6' 1" (1.854 m)   Wt 134.1 kg (295 lb 10.2 oz)   BMI 39.00 kg/m²   GENERAL: Alert, NAD, well-appearing  EYES: EOMI, no conjunctival injection, no discharge, no infraorbital shiners  EARS: external auditory canals normal B/L, TM normal B/L  NOSE: NT 2+ B/L, no stringing mucus, no polyps visualized  ORAL: MMM, no ulcers, no " thrush  LUNGS: CTAB, no w/r/c, no increased WOB  HEART: RRR, normal S1/S2, no m/g/r  DERM: no rashes  NEURO: normal speech, normal gait, no facial asymmetry     LABORATORY STUDIES     Component      Latest Ref Rng 3/9/2023 4/3/2023 7/6/2023   WBC      3.90 - 12.70 K/uL 8.21  15.69 (H)     RBC      4.60 - 6.20 M/uL 5.61  6.74 (H)     Hemoglobin      14.0 - 18.0 g/dL 14.4  17.0     Hematocrit      40.0 - 54.0 % 46.4  55.2 (H)     MCV      82 - 98 fL 83  82     MCH      27.0 - 31.0 pg 25.7 (L)  25.2 (L)     MCHC      32.0 - 36.0 g/dL 31.0 (L)  30.8 (L)     RDW      11.5 - 14.5 % 17.3 (H)  18.0 (H)     Platelet Count      150 - 450 K/uL 240  292     MPV      9.2 - 12.9 fL 10.6  10.6     Immature Granulocytes      0.0 - 0.5 % 0.2  0.3     Gran # (ANC)      1.8 - 7.7 K/uL 4.9  13.0 (H)     Immature Grans (Abs)      0.00 - 0.04 K/uL 0.02  0.05 (H)     Lymph #      1.0 - 4.8 K/uL 2.4  1.2     Mono #      0.3 - 1.0 K/uL 0.8  1.3 (H)     Eos #      0.0 - 0.5 K/uL 0.1  0.1     Baso #      0.00 - 0.20 K/uL 0.06  0.02     Differential Method Automated  Automated     Sodium      136 - 145 mmol/L 141  138     Potassium      3.5 - 5.1 mmol/L 4.6  4.8     Chloride      95 - 110 mmol/L 106  110     CO2      23 - 29 mmol/L 26  17 (L)     Glucose      70 - 110 mg/dL 111 (H)  229 (H)     BUN      6 - 20 mg/dL 16  17     Creatinine      0.5 - 1.4 mg/dL 1.0  1.1     Calcium      8.7 - 10.5 mg/dL 9.6  9.4     PROTEIN TOTAL      6.0 - 8.4 g/dL 7.5  7.9     Albumin      3.5 - 5.2 g/dL 3.9  4.1     BILIRUBIN TOTAL      0.1 - 1.0 mg/dL 0.4  0.7     ALP      55 - 135 U/L 60  76     AST      10 - 40 U/L 16  17     ALT      10 - 44 U/L 23  29     Hemoglobin A1C External      4.0 - 5.6 %   7.4 (H)      Component      Latest Ref Rng 7/15/2021 1/19/2023   HIV 1/2 Ag/Ab      Negative  Negative     IgG      650 - 1600 mg/dL  813       ALLERGEN TESTING     Immunocaps: Ordered     PULMONARY FUNCTION TESTING     Date 2/24/22:  FVC:         77%ref -> +  4%  FEV1:         80%ref -> - 5%  FEV1/FVC: 83%  FEF 25-75: 104%ref  T%ref  DLCO:        90%ref  Interpretation: Spirometry shows a reduced vital capacity suggesting restriction. Spirometry remains unimproved following bronchodilator. Lung volumes show mild restriction is present. DLCO is normal.     IMAGING & OTHER DIAGNOSTICS     CXR 3/9/23:  FINDINGS:  The cardiac silhouette and superior mediastinal structures are unremarkable. Pulmonary vasculature is within normal limits. The lungs are well aerated and free of focal consolidations. There is no evidence for pneumothorax or pleural effusions. Bony structures are grossly intact.  Impression:  No acute chest disease identified.     CHART REVIEW     Reviewed [facility] med notes, ENT note, labs, imaging, PFT's.     ASSESSMENT & PLAN     Tony Gordillo is a 43 y.o. male with     # Chronic rhinitis: Symptoms flare and subside perennially, but have been more difficult to treat over the past 1-2 years.   -immunocaps for aeroallergens ordered.  -continue flonase 2 SEN daily, can increase to BID if needed.  -continue azelastine nasal spray 2 SEN daily, can increase to BID if needed.  -continue saline nasal mist prior to medicated nasal sprays.  -continue saline sinus rinses prn (patient knows to use distilled, not tap water).  -continue daily fexofenadine or other second generation antihistamine.  -continue montelukast for now (which he restarted a few weeks ago, but isn't sure if helping).    # Recurrent sinusitis: He reports his sinus infections often turn into ear infections, and he estimates he requires antibiotics 2-3 times per year on average. He thinks he has had pneumonia once before. He received pneumovax in 2018.  -checking total IgG/A/M and pneumococcal titers.  -if titers low, will give pneumovax (as it has been over 5 years) and recheck titers 4-6 weeks later.       Follow up: 12 weeks    I spent a total of 45 minutes on the day of the visit.  This  includes face to face time and non-face to face time preparing to see the patient (eg, review of tests), obtaining and/or reviewing separately obtained history, documenting clinical information in the electronic or other health record, independently interpreting results.    Pema Gongora MD  Allergy/Immunology

## 2023-10-10 NOTE — PROGRESS NOTES
CC: This 43 y.o. White male  is here for evaluation of  T2DM along with comorbidities indicated in the Visit Diagnosis section of this encounter.    HPI: Tony Gordillo was diagnosed with T2DM in 2008. He was without health insurance for 2017 and mid 2018.       Prior visit 7/10/23 arrives with wife.   A1c is down from 8 to 7.4%.  5 lb weight loss since lov.   Pt did resume Ozempic after lov and titrated to 1 mg once weekly. Three weeks ago, he switched to Mounjaro. He took 1/2 the Mounjaro 10 mg pen weekly. He aspirated 1/2 the dose into an insulin syringe to inject 5 mg weekly. He wanted to switch to Mounjaro d/t high numbers on Ozempic. Appetite is suppressed for about 3 days after each Mounjaro dose.   He has been adjusting insulin doses as well. See below for  current doses.   Plan Continue Mounjaro 5 mg once weekly. Advised against aspirating from Mounjaro pen. New rx for Mounjaro 5 mg sent.   Continue Jardiance and Toujeo.   Continue Fiasp 30 units prior to meals with carbs; recommend at least 15-20 units before meals/snacks even if not eating carbs since protein intake is also spiking BGs. Pt is very insulin resistant.   Avoid eating out/high fat meals/bedtime snacking.   Rx for Omnipod 5 sent to [facility] to check cost and coverage. Pt understands he is not to start Omnipod on his own. If it's covered, he is to contact office so that he can scheduled with [facility] for insulin pump evaluation.   Return to clinic in 3 months with labs prior, sooner if pump is started.       Interval hx virtual visit   No recent a1c available.   Pt started Omnipod 5 on 8/2/23 with ROMIE Nixon RD. Glucoses have improved.   Still complains of postprandial highs even with timing boluses 15 minutes ac, especially when consuming higher carb foods.     He has continued to take Mounjaro 5 mg; denies nausea but c/o sulphur taste in the AM. Does not find it affects appetite at all.      Needs to switch to new Omnipod controller  "which he just received.         LAST DIABETES EDUCATION: 2019    HOSPITALIZED FOR DIABETES -  No.    DIABETES MEDICATIONS:    Jardiance 25 mg once daily     Novolog in Omnipod 5   Basal rate 1.2 u/hr  ICR 3  ISF 20  Target glucose 110, correct above 110, active insulin time 4 hours.         DM COMPLICATIONS: none    SIGNIFICANT DIABETES MED HISTORY:   glyburide--hypoglycemia  Metformin - diarrhea and vomiting (has not tried metformin XR); Metformin topical - ineffective  Pioglitazone - altered mood, reportedly became angry   Novolin N at bedtime started 3/2022  Victoza - nausea and vomiting at 1.2 mg; felt "run down" at 0.6 mg.   Trulicity less effective than Ozempic, switched to Ozempic 4/2022, switched from Ozempic to Mounjaro 2/2023    MDI doses prior to pump start: Toujeo Max 30 units hs   Fiasp 30 units ac - takes about 1x/day depending on diet or if he is eating more than 10 grams CHO; Novolin N 14 units between 8-10 pm - has taken it 3x/week if bedtime BG > 200       SELF MONITORING BLOOD GLUCOSE: Dexcom G6    CGM interpretation:  glucoses overall at goal at goal, no hypoglycemia. BGs do tend to rise to low 200s after meals. No missed boluses noted.                  HYPOGLYCEMIC EPISODES: none     CURRENT DIET: drinks water mostly. Sometimes coffee at work.  Eats 2-3 meals/day.     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         ROS:   CONSTITUTIONAL: Appetite good, no  fatigue  Other: denies nausea or diarrhea.     PHYSICAL EXAM:  GENERAL: Well developed, well nourished. No acute distress.   PSYCH: AAOx3, appropriate mood and affect, conversant, well-groomed. Judgement and insight good.   NEURO: Cranial nerves grossly intact. Speech clear, no tremor.   CHEST: Respirations even and unlabored.          Hemoglobin A1C   Date Value Ref Range Status   07/06/2023 7.4 (H) 4.0 - 5.6 % Final     Comment:     ADA Screening Guidelines:  5.7-6.4%  " Consistent with prediabetes  >or=6.5%  Consistent with diabetes    High levels of fetal hemoglobin interfere with the HbA1C  assay. Heterozygous hemoglobin variants (HbS, HgC, etc)do  not significantly interfere with this assay.   However, presence of multiple variants may affect accuracy.     01/19/2023 8.0 (H) 4.0 - 5.6 % Final     Comment:     ADA Screening Guidelines:  5.7-6.4%  Consistent with prediabetes  >or=6.5%  Consistent with diabetes    High levels of fetal hemoglobin interfere with the HbA1C  assay. Heterozygous hemoglobin variants (HbS, HgC, etc)do  not significantly interfere with this assay.   However, presence of multiple variants may affect accuracy.     05/12/2022 8.8 (H) 4.0 - 5.6 % Final     Comment:     ADA Screening Guidelines:  5.7-6.4%  Consistent with prediabetes  >or=6.5%  Consistent with diabetes    High levels of fetal hemoglobin interfere with the HbA1C  assay. Heterozygous hemoglobin variants (HbS, HgC, etc)do  not significantly interfere with this assay.   However, presence of multiple variants may affect accuracy.       Lab Results   Component Value Date    TSH 2.562 01/19/2023    TSH 2.562 01/19/2023           Component Value Date/Time     04/03/2023 1548    K 4.8 04/03/2023 1548     04/03/2023 1548    CO2 17 (L) 04/03/2023 1548    BUN 17 04/03/2023 1548    CREATININE 1.1 04/03/2023 1548     (H) 04/03/2023 1548    CALCIUM 9.4 04/03/2023 1548    ALKPHOS 76 04/03/2023 1548    AST 17 04/03/2023 1548    ALT 29 04/03/2023 1548    BILITOT 0.7 04/03/2023 1548    EGFRNORACEVR >60 04/03/2023 1548    ESTGFRAFRICA >60 03/25/2022 0617         Lab Results   Component Value Date    LDLCALC 72.8 07/06/2023        Latest Reference Range & Units 07/06/23 10:05   Cholesterol 120 - 199 mg/dL 142   HDL 40 - 75 mg/dL 57   HDL/Cholesterol Ratio 20.0 - 50.0 % 40.1   LDL Cholesterol External 63.0 - 159.0 mg/dL 72.8   Non-HDL Cholesterol mg/dL 85   Total Cholesterol/HDL Ratio 2.0 - 5.0   2.5   Triglycerides 30 - 150 mg/dL 61       Lab Results   Component Value Date    MICALBCREAT 9.9 07/06/2023             ASSESSMENT and PLAN:    A1C GOAL: < 7 %     1. Type 2 diabetes mellitus without complication, with long-term current use of insulin  Glucoses much better controlled on Omnipod 5 insulin pump.   Increase carb ratio from 3 to 2.   Finish out Mounjaro 5 mg (1 box). Then increase to 7.5 mg once weekly.  - both these changes should help control postprandial glucoses.     F/u with diabetes educator to help with new Omnipod controller.   Rtc in 3 mo with a1c prior.   A1c today.         tirzepatide 7.5 mg/0.5 mL PnIj    Hemoglobin A1C      2. Severe obesity  Increase Mounjaro to 7.5 mg.       3. Hyperlipidemia, unspecified hyperlipidemia type  Continue atorvastatin.           PATIENT NEEDS TO CHANGE OMNIPOD ONCE DAILY DUE TO HIGH INSULIN USAGE (UP  UNITS PER DAY).      Patient is testing blood sugars 4x/day and taking 3 injections of insulin a day. The patient's insulin treatment regimen requires frequent adjustments by the patient on the basis of therapeutic CGM testing results.       No orders of the defined types were placed in this encounter.       No follow-ups on file.     The patient location is: [place]  The chief complaint leading to consultation is: type 2 diabetes    Visit type: audiovisual    Face to Face time with patient: 25 minutes of total time spent on the encounter, which includes face to face time and non-face to face time preparing to see the patient (eg, review of tests), Obtaining and/or reviewing separately obtained history, Documenting clinical information in the electronic or other health record, Independently interpreting results (not separately reported) and communicating results to the patient/family/caregiver, or Care coordination (not separately reported).         Each patient to whom he or she provides medical services by telemedicine is:  (1) informed of the  relationship between the physician and patient and the respective role of any other health care provider with respect to management of the patient; and (2) notified that he or she may decline to receive medical services by telemedicine and may withdraw from such care at any time.    Notes:

## 2023-10-10 NOTE — PATIENT INSTRUCTIONS
Increase carb ratio from 3 to 2.   Finish out Mounjaro 5 mg (1 box). Then increase to 7.5 mg once weekly.

## 2023-10-11 ENCOUNTER — CLINICAL SUPPORT (OUTPATIENT)
Dept: DIABETES | Facility: CLINIC | Age: 43
End: 2023-10-11
Payer: COMMERCIAL

## 2023-10-11 DIAGNOSIS — E11.9 TYPE 2 DIABETES MELLITUS WITHOUT COMPLICATION, WITH LONG-TERM CURRENT USE OF INSULIN: Primary | ICD-10-CM

## 2023-10-11 DIAGNOSIS — Z79.4 TYPE 2 DIABETES MELLITUS WITHOUT COMPLICATION, WITH LONG-TERM CURRENT USE OF INSULIN: Primary | ICD-10-CM

## 2023-10-11 LAB
ESTIMATED AVG GLUCOSE: 134 MG/DL (ref 68–131)
HBA1C MFR BLD: 6.3 % (ref 4–5.6)
IGA SERPL-MCNC: 237 MG/DL (ref 40–350)
IGG SERPL-MCNC: 809 MG/DL (ref 650–1600)
IGM SERPL-MCNC: 74 MG/DL (ref 50–300)
TSH SERPL DL<=0.005 MIU/L-ACNC: 2.15 UIU/ML (ref 0.4–4)

## 2023-10-12 DIAGNOSIS — S83.241A TEAR OF MEDIAL MENISCUS OF RIGHT KNEE, CURRENT, UNSPECIFIED TEAR TYPE, INITIAL ENCOUNTER: ICD-10-CM

## 2023-10-12 DIAGNOSIS — S83.281A TEAR OF LATERAL MENISCUS OF RIGHT KNEE, CURRENT, UNSPECIFIED TEAR TYPE, INITIAL ENCOUNTER: Primary | ICD-10-CM

## 2023-10-12 DIAGNOSIS — M23.321 MEDIAL MENISCUS, POSTERIOR HORN DERANGEMENT, RIGHT: ICD-10-CM

## 2023-10-12 RX ORDER — MUPIROCIN 20 MG/G
OINTMENT TOPICAL
Status: CANCELLED | OUTPATIENT
Start: 2023-10-12

## 2023-10-12 NOTE — PROGRESS NOTES
Diabetes Care Specialist Progress Note  Author: Beatris Nixon RD  Date: 10/12/2023    Program Intake  Reason for Diabetes Program Visit:: Intervention  Type of Intervention:: Individual  Individual: Device Training  Device Training: Other (Educator assisted pt with updating new Omnipod 5 PMD)  Current diabetes risk level:: low  In the last 12 months, have you:: used emergency room services  Was the ER or hospital admission related to diabetes?: No  Permission to speak with others about care:: yes (Pt wife in office during appt)    Lab Results   Component Value Date    HGBA1C 6.3 (H) 10/10/2023     Clinical      There is no height or weight on file to calculate BMI.    Clinical Assessment  Current Diabetes Treatment: Oral Medication, Injectable, Insulin pump, Insulin    Medication Information  Medication adherence impacting ability to self-manage diabetes?: No    Labs  Do you have regular lab work to monitor your medications?: Yes  Type of Regular Lab Work: A1c, Cholesterol, Microalbumin, CBC  Lab Compliance Barriers: No    Nutritional Status  Meal Plan 24 Hour Recall: Breakfast, Lunch, Dinner, Snack  Meal Plan 24 Hour Recall - Breakfast: Eggs, hernandez, coffee or fruit  Meal Plan 24 Hour Recall - Lunch: Rollins  Meal Plan 24 Hour Recall - Dinner: Greek hummus with salad and Gyro  Meal Plan 24 Hour Recall - Snack: Drinks: unsweet tea, water    Additional Social History    Support  Does anyone support you with your diabetes care?: yes  Who supports you?: self, spouse  Who takes you to your medical appointments?: self  Does the current support meet the patient's needs?: Yes  Is Support an area impacting ability to self-manage diabetes?: No    Access to Mass Media & Technology  Does the patient have access to any of the following devices or technologies?: Smart phone  Media or technology needs impacting ability to self-manage diabetes?: No    Cognitive/Behavioral Health  Alert and Oriented: Yes  Difficulty Thinking:  No  Requires Prompting: No  Requires assistance for routine expression?: No  Cognitive or behavioral barriers impacting ability to self-manage diabetes?: No    Communication  Language preference: English  Hearing Problems: No  Vision Problems: No  Communication needs impacting ability to self-manage diabetes?: No    Health Literacy  Preferred Learning Method: Face to Face, Demonstration  Health literacy needs impacting ability to self-manage diabetes?: No    Diabetes Self-Management Skills Assessment    Diabetes Disease Process/Treatment Options  Diabetes Disease Process/Treatment Options: Skills Assessment Completed: No  Deferred due to:: Time    Nutrition/Healthy Eating  Nutrition/Healthy Eating Skills Assessment Completed:: No  Deffered due to:: Time    Physical Activity/Exercise  Physical Activity/Exercise Skills Assessment Completed: : No  Deffered due to:: Time    Medications  Patient is able to describe current diabetes management routine.: yes  Diabetes management routine:: diet, injectable medications, insulin, oral medications  Patient is able to identify current diabetes medications, dosages, and appropriate timing of medications.: yes (Toujeo, Novolog, Mounjaro, Jardiance, pt trained on Omnipod 5 during appt)  Patient reports problems or concerns with current medication regimen.: no  Medication Skills Assessment Completed:: Yes  Assessment indicates:: Instruction Needed, Knowledge deficit  Area of need?: Yes    Home Blood Glucose Monitoring  Patient states that blood sugar is checked at home daily.: yes  Monitoring Method:: personal continuous glucose monitor  Personal CGM type:: Dexcom G6  Patient is able to use personal CGM appropriately.: yes  Assessment indicates:: Adequate understanding  Area of need?: No    Acute Complications  Acute Complications Skills Assessment Completed: : No  Deffered due to:: Time    Chronic Complications  Patient is taking statin?: Yes  Chronic Complications Skills  Assessment Completed: : No  Deferred due to:: Time    Psychosocial/Coping  Psychosocial/Coping Skills Assessment Completed: : No  Deffered due to:: Time    Assessment Summary and Plan    Based on today's diabetes care assessment, the following areas of need were identified:          10/11/2023    12:01 AM   Social   Support No   Access to Mass Media/Tech No   Cognitive/Behavioral Health No   Communication No   Health Literacy No            10/11/2023    12:01 AM   Clinical   Medication Adherence No   Lab Compliance No            10/11/2023    12:01 AM   Diabetes Self-Management Skills   Medication Yes, see care plan.   Home Blood Glucose Monitoring No      Today's interventions were provided through individual discussion, instruction, and written materials were provided.      Patient verbalized understanding of instruction and written materials.  Pt was able to return back demonstration of instructions today. Patient understood key points, needs reinforcement and further instruction.     Diabetes Self-Management Care Plan:    Today's Diabetes Self-Management Care Plan was developed with Tony's input. Tony has agreed to work toward the following goal(s) to improve his/her overall diabetes control.      Care Plan: Diabetes Management   Updates made since 9/12/2023 12:00 AM        Problem: Medications         Goal: Patient Agrees to take Diabetes Medications as prescribed.    Start Date: 7/31/2023   Expected End Date: 10/31/2023   This Visit's Progress: On track   Recent Progress: On track   Priority: High   Barriers: Knowledge deficit   Note:    DIABETES EDUCATOR NOTE - PUMP EVAL      Patient seen for initial assessment and education on Omnipod 5.  Patient is interested in an insulin pump and continuous glucose monitor.   Pt returned today for evaluation/education on self care measure of diabetes management, blood glucose testing, meal patterns/ability to carbohydrate count, and problem solving skills for managing  hypo and hyperglycemia.     Covered basic details of pump therapy with patient.  Reviewed terms ISF, CHO ratio, goal BG, bolus, basal, active insulin and insulin on board.  Pt verbalized clearer understanding of pump and CGM therapy.   Pt came to appt with Omnipod 5.    During today's visit the patient was introduces to/educated on the following content areas:     Current Diabetic Medications: Jardiance, Toujeo, Novolog, Mounjaro    Nutrition:   Carb counting skills: Pt states comfortable with carbohydrate counting.    Glucose monitoring:  Patient is testing BG using Dexcom G6.      EDUCATION PLAN:   Information provided on Omnipod 5, which patient is most interested in.  Follow up for continued education on possible pump training .     8/2/23:   OMNIPOD 5 INSULIN PUMP START Patient is here today for insulin pump training and will be starting on an Omni Pod 5 Insulin pump.     Patient demonstrated the ability to fill pod reservoir with 200 units, prime infusion set and insert pod into right arm per sterile technique.  Instructed pt on use of basic pump features. Reviewed site selection of pods, rotation of sites and hard stop on controller to change every 72 hrs.  Instructed pt on how to enter activity mode.  Reviewed features available in manual mode verses automated mode.   Educated pt on how to switch from automated mode to manual mode.  Patient demonstrated ability to program Dexcom transmitter into controller and start automated limited mode.  Pt Dexcom connected to controller during appt.     INITIAL SETTINGS : Basal rate: 1.2 u/hr Maximum basal: 2.0 u/hr     Bolus Menu:  Pump Settings per NP note  Basal rate 1.2 u/hr  Carb ratio 5   ISF 20  Active insulin time 4 hours   Glucose target 110      Correct over: 110  IAT: 4      Maximum bolus = 30 units     Patient was given time to practice on simulator entering carbs and glucose into pump     [employer] account information added in specialty  "comments.    9/5/23: Pt reports adding an extra ~15-20g to his meal carbohydrates to avoid hyperglycemia. Educator messaged NP. NP stated: "let's increase carb ratio from 4 to 3." And to "avoid counting extra carbs after the change so that we can evaluate the effectiveness." Educator assisted pt with making pump setting changes.    10/11/23: Educator assisted pt with placing settings in new Omnipod 5 PDM per NP recommendations:   Basal rate 1.2 u/hr  Carb ratio 2   ISF 20  Active insulin time 4 hours   Glucose target 110      Correct over: 110  IAT: 4      Maximum bolus = 30 units          Follow Up Plan     Follow up in about 2 months (around 12/11/2023) for 3-month F/U.    Today's care plan and follow up schedule was discussed with patient.  Tony verbalized understanding of the care plan, goals, and agrees to follow up plan.        The patient was encouraged to communicate with his/her health care provider/physician and care team regarding his/her condition(s) and treatment.  I provided the patient with my contact information today and encouraged to contact me via phone or [facility] Patient Portal as needed.     Length of Visit   Total Time: 10 Minutes     "

## 2023-10-13 ENCOUNTER — ANESTHESIA EVENT (OUTPATIENT)
Dept: SURGERY | Facility: HOSPITAL | Age: 43
End: 2023-10-13
Payer: COMMERCIAL

## 2023-10-13 LAB
A ALTERNATA IGE QN: <0.1 KU/L
A FUMIGATUS IGE QN: <0.1 KU/L
ALLERGEN BOXELDER MAPLE TREE IGE: <0.1 KU/L
ALLERGEN MAPLE (BOX ELDER) CLASS: NORMAL
ALLERGEN WHITE ASH TREE IGE: <0.1 KU/L
BAHIA GRASS IGE QN: 0.5 KU/L
BERMUDA GRASS IGE QN: 0.15 KU/L
CAT DANDER IGE QN: <0.1 KU/L
CEDAR IGE QN: <0.1 KU/L
COTTONWOOD IGE QN: <0.1 KU/L
D FARINAE IGE QN: 2.52 KU/L
D PTERONYSS IGE QN: 2.36 KU/L
DEPRECATED A ALTERNATA IGE RAST QL: NORMAL
DEPRECATED A FUMIGATUS IGE RAST QL: NORMAL
DEPRECATED BAHIA GRASS IGE RAST QL: ABNORMAL
DEPRECATED BERMUDA GRASS IGE RAST QL: ABNORMAL
DEPRECATED CAT DANDER IGE RAST QL: NORMAL
DEPRECATED CEDAR IGE RAST QL: NORMAL
DEPRECATED COTTONWOOD IGE RAST QL: NORMAL
DEPRECATED D FARINAE IGE RAST QL: ABNORMAL
DEPRECATED D PTERONYSS IGE RAST QL: ABNORMAL
DEPRECATED DOG DANDER IGE RAST QL: NORMAL
DEPRECATED ELDER IGE RAST QL: ABNORMAL
DEPRECATED ENGL PLANTAIN IGE RAST QL: NORMAL
DEPRECATED HMTR EPITH IGE RAST QL: NORMAL
DEPRECATED JOHNSON GRASS IGE RAST QL: ABNORMAL
DEPRECATED PECAN/HICK TREE IGE RAST QL: NORMAL
DEPRECATED RABBIT EPITH IGE RAST QL: NORMAL
DEPRECATED ROACH IGE RAST QL: ABNORMAL
DEPRECATED SALTWORT IGE RAST QL: NORMAL
DEPRECATED SHEEP SORREL IGE RAST QL: NORMAL
DEPRECATED TIMOTHY IGE RAST QL: ABNORMAL
DEPRECATED WEST RAGWEED IGE RAST QL: NORMAL
DEPRECATED WHITE OAK IGE RAST QL: NORMAL
DOG DANDER IGE QN: <0.1 KU/L
ELDER IGE QN: 0.12 KU/L
ENGL PLANTAIN IGE QN: <0.1 KU/L
HMTR EPITH IGE QN: <0.1 KU/L
JOHNSON GRASS IGE QN: 0.36 KU/L
PECAN/HICK TREE IGE QN: <0.1 KU/L
RABBIT EPITH IGE QN: <0.1 KU/L
ROACH IGE QN: 0.32 KU/L
SALTWORT IGE QN: <0.1 KU/L
SHEEP SORREL IGE QN: <0.1 KU/L
TIMOTHY IGE QN: 0.47 KU/L
WEST RAGWEED IGE QN: <0.1 KU/L
WHITE ASH CLASS: NORMAL
WHITE OAK IGE QN: <0.1 KU/L

## 2023-10-14 DIAGNOSIS — E78.5 HYPERLIPIDEMIA, UNSPECIFIED HYPERLIPIDEMIA TYPE: ICD-10-CM

## 2023-10-16 RX ORDER — ATORVASTATIN CALCIUM 40 MG/1
40 TABLET, FILM COATED ORAL
Qty: 90 TABLET | Refills: 1 | Status: SHIPPED | OUTPATIENT
Start: 2023-10-16 | End: 2024-03-30

## 2023-10-16 NOTE — TELEPHONE ENCOUNTER
No care due was identified.  [facility] Embedded Care Due Messages. Reference number: [id number].   10/16/2023 9:54:17 AM CDT

## 2023-10-16 NOTE — TELEPHONE ENCOUNTER
Refill Routing Note   Medication(s) are not appropriate for processing by [facility] for the following reason(s):      Due for refill >6 months ago    [facility] action(s):  Defer Care Due:  None identified            Appointments  past 12m or future 3m with PCP    Date Provider   Last Visit   10/5/2023 Jovany Ford MD   Next Visit   Visit date not found Jovany Ford MD   ED visits in past 90 days: 0        Note composed:10:18 AM 10/16/2023

## 2023-10-17 ENCOUNTER — PATIENT MESSAGE (OUTPATIENT)
Dept: ALLERGY | Facility: CLINIC | Age: 43
End: 2023-10-17
Payer: COMMERCIAL

## 2023-10-17 ENCOUNTER — LAB VISIT (OUTPATIENT)
Dept: LAB | Facility: HOSPITAL | Age: 43
End: 2023-10-17
Attending: STUDENT IN AN ORGANIZED HEALTH CARE EDUCATION/TRAINING PROGRAM
Payer: COMMERCIAL

## 2023-10-17 ENCOUNTER — OFFICE VISIT (OUTPATIENT)
Dept: FAMILY MEDICINE | Facility: CLINIC | Age: 43
End: 2023-10-17
Payer: COMMERCIAL

## 2023-10-17 VITALS
WEIGHT: 297.19 LBS | SYSTOLIC BLOOD PRESSURE: 148 MMHG | TEMPERATURE: 99 F | OXYGEN SATURATION: 95 % | DIASTOLIC BLOOD PRESSURE: 96 MMHG | BODY MASS INDEX: 39.21 KG/M2 | HEART RATE: 97 BPM

## 2023-10-17 DIAGNOSIS — J32.9 RECURRENT SINUSITIS: ICD-10-CM

## 2023-10-17 DIAGNOSIS — Z01.818 PREOP EXAM FOR INTERNAL MEDICINE: Primary | ICD-10-CM

## 2023-10-17 DIAGNOSIS — E03.4 HYPOTHYROIDISM DUE TO ACQUIRED ATROPHY OF THYROID: ICD-10-CM

## 2023-10-17 DIAGNOSIS — I10 ESSENTIAL HYPERTENSION: ICD-10-CM

## 2023-10-17 DIAGNOSIS — E78.5 HYPERLIPIDEMIA LDL GOAL <70: ICD-10-CM

## 2023-10-17 DIAGNOSIS — Z79.4 TYPE 2 DIABETES MELLITUS WITH LEFT EYE AFFECTED BY MILD NONPROLIFERATIVE RETINOPATHY WITHOUT MACULAR EDEMA, WITH LONG-TERM CURRENT USE OF INSULIN: ICD-10-CM

## 2023-10-17 DIAGNOSIS — E11.3292 TYPE 2 DIABETES MELLITUS WITH LEFT EYE AFFECTED BY MILD NONPROLIFERATIVE RETINOPATHY WITHOUT MACULAR EDEMA, WITH LONG-TERM CURRENT USE OF INSULIN: ICD-10-CM

## 2023-10-17 DIAGNOSIS — Z01.818 PREOP EXAM FOR INTERNAL MEDICINE: ICD-10-CM

## 2023-10-17 LAB
ALBUMIN SERPL BCP-MCNC: 4 G/DL (ref 3.5–5.2)
ALP SERPL-CCNC: 71 U/L (ref 55–135)
ALT SERPL W/O P-5'-P-CCNC: 27 U/L (ref 10–44)
ANION GAP SERPL CALC-SCNC: 12 MMOL/L (ref 8–16)
AST SERPL-CCNC: 18 U/L (ref 10–40)
BASOPHILS # BLD AUTO: 0.06 K/UL (ref 0–0.2)
BASOPHILS NFR BLD: 0.7 % (ref 0–1.9)
BILIRUB SERPL-MCNC: 0.5 MG/DL (ref 0.1–1)
BUN SERPL-MCNC: 18 MG/DL (ref 6–20)
CALCIUM SERPL-MCNC: 10.2 MG/DL (ref 8.7–10.5)
CHLORIDE SERPL-SCNC: 102 MMOL/L (ref 95–110)
CO2 SERPL-SCNC: 25 MMOL/L (ref 23–29)
CREAT SERPL-MCNC: 0.9 MG/DL (ref 0.5–1.4)
DIFFERENTIAL METHOD: ABNORMAL
EOSINOPHIL # BLD AUTO: 0.1 K/UL (ref 0–0.5)
EOSINOPHIL NFR BLD: 1.1 % (ref 0–8)
ERYTHROCYTE [DISTWIDTH] IN BLOOD BY AUTOMATED COUNT: 15.5 % (ref 11.5–14.5)
EST. GFR  (NO RACE VARIABLE): >60 ML/MIN/1.73 M^2
GLUCOSE SERPL-MCNC: 101 MG/DL (ref 70–110)
HCT VFR BLD AUTO: 48.7 % (ref 40–54)
HGB BLD-MCNC: 15.5 G/DL (ref 14–18)
IMM GRANULOCYTES # BLD AUTO: 0.04 K/UL (ref 0–0.04)
IMM GRANULOCYTES NFR BLD AUTO: 0.4 % (ref 0–0.5)
IMMUNOLOGIST REVIEW: NORMAL
LYMPHOCYTES # BLD AUTO: 2.4 K/UL (ref 1–4.8)
LYMPHOCYTES NFR BLD: 26.1 % (ref 18–48)
MCH RBC QN AUTO: 29.5 PG (ref 27–31)
MCHC RBC AUTO-ENTMCNC: 31.8 G/DL (ref 32–36)
MCV RBC AUTO: 93 FL (ref 82–98)
MONOCYTES # BLD AUTO: 0.9 K/UL (ref 0.3–1)
MONOCYTES NFR BLD: 10 % (ref 4–15)
NEUTROPHILS # BLD AUTO: 5.6 K/UL (ref 1.8–7.7)
NEUTROPHILS NFR BLD: 61.7 % (ref 38–73)
NRBC BLD-RTO: 0 /100 WBC
PLATELET # BLD AUTO: 238 K/UL (ref 150–450)
PMV BLD AUTO: 11.7 FL (ref 9.2–12.9)
POTASSIUM SERPL-SCNC: 5.1 MMOL/L (ref 3.5–5.1)
PROT SERPL-MCNC: 7.7 G/DL (ref 6–8.4)
RBC # BLD AUTO: 5.25 M/UL (ref 4.6–6.2)
S PN DA SERO 19F IGG SER-MCNC: 1.1 MCG/ML
S PNEUM DA 1 IGG SER-MCNC: 14.8 MCG/ML
S PNEUM DA 10A IGG SER-MCNC: 21.9 MCG/ML
S PNEUM DA 11A IGG SER-MCNC: 7.4 MCG/ML
S PNEUM DA 12F IGG SER-MCNC: 0.1 MCG/ML
S PNEUM DA 14 IGG SER-MCNC: >100 MCG/ML
S PNEUM DA 15B IGG SER-MCNC: 26.4 MCG/ML
S PNEUM DA 17F IGG SER-MCNC: 5.9 MCG/ML
S PNEUM DA 18C IGG SER-MCNC: 47.3 MCG/ML
S PNEUM DA 19A IGG SER-MCNC: 71.4 MCG/ML
S PNEUM DA 2 IGG SER-MCNC: 13.2 MCG/ML
S PNEUM DA 20A IGG SER-MCNC: 1.6 MCG/ML
S PNEUM DA 22F IGG SER-MCNC: 1.5 MCG/ML
S PNEUM DA 23F IGG SER-MCNC: 1.6 MCG/ML
S PNEUM DA 3 IGG SER-MCNC: <0.1 MCG/ML
S PNEUM DA 33F IGG SER-MCNC: >100 MCG/ML
S PNEUM DA 4 IGG SER-MCNC: 0.4 MCG/ML
S PNEUM DA 5 IGG SER-MCNC: 43.2 MCG/ML
S PNEUM DA 6B IGG SER-MCNC: 2.9 MCG/ML
S PNEUM DA 7F IGG SER-MCNC: 4 MCG/ML
S PNEUM DA 8 IGG SER-MCNC: 11.9 MCG/ML
S PNEUM DA 9N IGG SER-MCNC: 0.2 MCG/ML
S PNEUM DA 9V IGG SER-MCNC: 3 MCG/ML
SODIUM SERPL-SCNC: 139 MMOL/L (ref 136–145)
WBC # BLD AUTO: 9 K/UL (ref 3.9–12.7)

## 2023-10-17 PROCEDURE — 93010 EKG 12-LEAD: ICD-10-PCS | Mod: S$GLB,,, | Performed by: INTERNAL MEDICINE

## 2023-10-17 PROCEDURE — 99214 OFFICE O/P EST MOD 30 MIN: CPT | Mod: S$GLB,,, | Performed by: STUDENT IN AN ORGANIZED HEALTH CARE EDUCATION/TRAINING PROGRAM

## 2023-10-17 PROCEDURE — 99214 PR OFFICE/OUTPT VISIT, EST, LEVL IV, 30-39 MIN: ICD-10-PCS | Mod: S$GLB,,, | Performed by: STUDENT IN AN ORGANIZED HEALTH CARE EDUCATION/TRAINING PROGRAM

## 2023-10-17 PROCEDURE — 93005 ELECTROCARDIOGRAM TRACING: CPT | Mod: S$GLB,,, | Performed by: STUDENT IN AN ORGANIZED HEALTH CARE EDUCATION/TRAINING PROGRAM

## 2023-10-17 PROCEDURE — 85610 PROTHROMBIN TIME: CPT | Performed by: STUDENT IN AN ORGANIZED HEALTH CARE EDUCATION/TRAINING PROGRAM

## 2023-10-17 PROCEDURE — 99999 PR PBB SHADOW E&M-EST. PATIENT-LVL V: ICD-10-PCS | Mod: PBBFAC,,, | Performed by: STUDENT IN AN ORGANIZED HEALTH CARE EDUCATION/TRAINING PROGRAM

## 2023-10-17 PROCEDURE — 85025 COMPLETE CBC W/AUTO DIFF WBC: CPT | Performed by: STUDENT IN AN ORGANIZED HEALTH CARE EDUCATION/TRAINING PROGRAM

## 2023-10-17 PROCEDURE — 93005 EKG 12-LEAD: ICD-10-PCS | Mod: S$GLB,,, | Performed by: STUDENT IN AN ORGANIZED HEALTH CARE EDUCATION/TRAINING PROGRAM

## 2023-10-17 PROCEDURE — 36415 COLL VENOUS BLD VENIPUNCTURE: CPT | Mod: PO | Performed by: STUDENT IN AN ORGANIZED HEALTH CARE EDUCATION/TRAINING PROGRAM

## 2023-10-17 PROCEDURE — 99999 PR PBB SHADOW E&M-EST. PATIENT-LVL V: CPT | Mod: PBBFAC,,, | Performed by: STUDENT IN AN ORGANIZED HEALTH CARE EDUCATION/TRAINING PROGRAM

## 2023-10-17 PROCEDURE — 93010 ELECTROCARDIOGRAM REPORT: CPT | Mod: S$GLB,,, | Performed by: INTERNAL MEDICINE

## 2023-10-17 PROCEDURE — 85730 THROMBOPLASTIN TIME PARTIAL: CPT | Performed by: STUDENT IN AN ORGANIZED HEALTH CARE EDUCATION/TRAINING PROGRAM

## 2023-10-17 PROCEDURE — 80053 COMPREHEN METABOLIC PANEL: CPT | Performed by: STUDENT IN AN ORGANIZED HEALTH CARE EDUCATION/TRAINING PROGRAM

## 2023-10-17 RX ORDER — MONTELUKAST SODIUM 10 MG/1
10 TABLET ORAL NIGHTLY
Qty: 30 TABLET | Refills: 3 | Status: SHIPPED | OUTPATIENT
Start: 2023-10-17 | End: 2024-01-16

## 2023-10-17 NOTE — PROGRESS NOTES
Preop Note      SUBJECTIVE:     Tony Gordillo is a 43 y.o. male who presents to the office today for a preoperative consultation at the request of surgeon Dr. Bradford who plans on performing knee arthroscopy on October 24. This consultation is requested for the specific conditions prompting preoperative evaluation (i.e. because of potential affect on operative risk): HTN, HLD, Diabetes.    CARDIAC: WNL    PULMONARY: WNL    NUTRITION:  Good    ANESTHESIA: Yes, well tolerated.     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 Yes.    Review of Systems:  Review of Systems   Constitutional:  Negative for chills, fever and weight loss.   HENT:  Negative for ear discharge, ear pain and tinnitus.    Eyes:  Negative for blurred vision and double vision.   Respiratory:  Negative for cough and shortness of breath.    Cardiovascular:  Negative for chest pain, palpitations, orthopnea and leg swelling.   Gastrointestinal:  Negative for constipation and diarrhea.   Genitourinary:  Negative for dysuria and hematuria.   Musculoskeletal:  Negative for back pain and myalgias.   Skin:  Negative for itching and rash.   Neurological:  Negative for dizziness and headaches.   Endo/Heme/Allergies:  Does not bruise/bleed easily.   Psychiatric/Behavioral:  Negative for depression. The patient does not have insomnia.          Review of patient's allergies indicates:   Allergen Reactions    Influenza virus vaccine, specific Hives    Pioglitazone      Altered mood     Victoza [liraglutide] Nausea And Vomiting    Farxiga [dapagliflozin] Rash     Erectile dysfunction and rash      Current Outpatient Medications on File Prior to Visit   Medication Sig Dispense Refill    atorvastatin (LIPITOR) 40 MG tablet TAKE 1 TABLET BY MOUTH EVERY DAY 90 tablet 1    azelastine (ASTELIN) 137 mcg (0.1 %) nasal spray 1 spray (137 mcg total) by Nasal route 2 (two) times daily. 30 mL 3     blood sugar diagnostic Strp To check BG 4 times daily, to use with insurance preferred meter 400 strip 3    blood sugar diagnostic Strp OneTouch Ultra Test strips      blood-glucose meter kit OneTouch Ultra2 Meter kit      blood-glucose sensor (DEXCOM G6 SENSOR) Filomena Change sensor every 10 days 3 each 11    blood-glucose sensor (FREESTYLE SAMANTHA 3 SENSOR) Filomena Change every 14 days 2 each 11    blood-glucose transmitter (DEXCOM G6 TRANSMITTER) Filomena Change every 3 months 1 each 3    cyanocobalamin, vitamin B-12, 5,000 mcg Subl Place one under tongue once a day for 1 month, then continue to take under tongue per week 30 tablet 3    empagliflozin (JARDIANCE) 25 mg tablet Take 1 tablet (25 mg total) by mouth once daily. 90 tablet 2    fluticasone propionate (FLONASE) 50 mcg/actuation nasal spray 1 spray (50 mcg total) by Each Nostril route once daily. 48 g 5    insulin aspart U-100 (NOVOLOG) 100 unit/mL (3 mL) InPn pen INJECT 15-30 UNITS BEFORE EACH MEAL WITH SLIDNING SCALE, MAX DAILY DOSE 100 UNITS 30 mL 5    insulin aspart U-100 (NOVOLOG) 100 unit/mL injection Max daily dose 110 units in insulin pump. 40 mL 5    insulin glargine U-300 conc (TOUJEO MAX U-300 SOLOSTAR) 300 unit/mL (3 mL) insulin pen Inject 30 Units into the skin once daily. 3 pen 5    insulin NPH isoph U-100 human (NOVOLIN N FLEXPEN) 100 unit/mL (3 mL) InPn INJECT 14 UNITS INTO THE SKIN ONCE DAILY AT NIGHT 8 PM. 15 mL 2    insulin pump cart,automated,BT (OMNIPOD 5 G6 PODS, GEN 5,) Crtg INJECT 1 EACH INTO THE SKIN ONCE DAILY. 6 each 1    L-METHYLFOLATE 15 mg Tab TAKE 15 MG BY MOUTH ONCE DAILY. 30 tablet 11    lamoTRIgine (LAMICTAL) 100 MG tablet Take 1.5 tablets (150 mg total) by mouth once daily. 135 tablet 3    lancets Misc To check BG 4 times daily, to use with insurance preferred meter 400 each 3    levothyroxine (SYNTHROID) 50 MCG tablet TAKE 1 TABLET BY MOUTH BEFORE BREAKFAST. 90 tablet 3    lisinopriL (PRINIVIL,ZESTRIL) 20 MG tablet TAKE 1 TABLET  "BY MOUTH EVERY DAY 90 tablet 3    pen needle, diabetic (BD ULTRA-FINE KEN PEN NEEDLE) 32 gauge x 5/32" Ndle 1 Device by Misc.(Non-Drug; Combo Route) route 5 (five) times daily. 550 each 4    syringe, disposable, 1 mL Syrg Testosterone every 2 weeks 25 Syringe 1    tirzepatide (MOUNJARO) 5 mg/0.5 mL PnIj Inject 5 mg into the skin every 7 days. 4 pen 5    tirzepatide 7.5 mg/0.5 mL PnIj Inject 7.5 mg into the skin every 7 days. 4 pen 4    acetaminophen (TYLENOL) 500 MG tablet Take 1 tablet (500 mg total) by mouth every 6 (six) hours. for 14 days 56 tablet 0    ammonium lactate 12 % Crea Apply 1 application  topically once daily. 140 g 5    ibuprofen (ADVIL,MOTRIN) 800 MG tablet Take 1 tablet (800 mg total) by mouth 3 (three) times daily. for 14 days 42 tablet 0    oxyCODONE (ROXICODONE) 5 MG immediate release tablet Take 1 tablet (5 mg total) by mouth every 4 (four) hours as needed for Pain. 25 tablet 0     No current facility-administered medications on file prior to visit.     Past Medical History:   Diagnosis Date    Diabetes mellitus, type 2     Hyperlipidemia     Hypertension     Obesity     NAINA (obstructive sleep apnea)      Past Surgical History:   Procedure Laterality Date    ARTHROSCOPIC DEBRIDEMENT OF SHOULDER  3/14/2023    Procedure: DEBRIDEMENT, SHOULDER, ARTHROSCOPIC;  Surgeon: Crissy Bradford MD;  Location: [facility] OR;  Service: Orthopedics;;    ARTHROSCOPIC REPAIR OF ROTATOR CUFF OF SHOULDER Right 3/14/2023    Procedure: REPAIR, ROTATOR CUFF, ARTHROSCOPIC;  Surgeon: Crissy Bradford MD;  Location: [facility] OR;  Service: Orthopedics;  Laterality: Right;  KARY PAYNE [phone]. TEXTED ELMER ON 3/9/2023 @ 12:10PM. ELMER RESPONDED ON 3/9/23 @ 12:23PM-SAG  RN PREOP 3/10/2023---CLEARED BY PCP AND CARDS    ARTHROSCOPY,SHOULDER,WITH BICEPS TENODESIS  3/14/2023    Procedure: ARTHROSCOPY,SHOULDER,WITH BICEPS TENODESIS;  Surgeon: Crissy Bradford MD;  Location: [facility] OR;  Service: Orthopedics;;    " KNEE ARTHROSCOPY W/ MENISCECTOMY Right 10/24/2023    Procedure: ARTHROSCOPY, KNEE, WITH MENISCECTOMY;  Surgeon: Crissy Bradford MD;  Location: [facility] OR;  Service: Orthopedics;  Laterality: Right;  RN PREOP 10/18/203---CLEARED BY PCP  ELVIA [phone]    KNEE SURGERY      acl,mcl,pcl    left knee x 2      SUBCHONDROPLASTY Right 10/24/2023    Procedure: POSSIBLE SUBCHONDROPLASTY;  Surgeon: Crissy Bradford MD;  Location: Health system OR;  Service: Orthopedics;  Laterality: Right;     Family History   Problem Relation Age of Onset    Diabetes Mother     Diabetes Father     No Known Problems Brother     Autism Son     No Known Problems Daughter       Social History     Socioeconomic History    Marital status:    Occupational History    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     Employer: [employer]   Tobacco Use    Smoking status: Never     Passive exposure: Never    Smokeless tobacco: Never   Substance and Sexual Activity    Alcohol use: Yes     Comment: 1-2 beers every 2 weeks    Drug use: No     Social Determinants of Health     Financial Resource Strain: Unknown (10/11/2023)    Overall Financial Resource Strain (CARDIA)     Difficulty of Paying Living Expenses: Patient refused   Food Insecurity: Unknown (10/10/2023)    Hunger Vital Sign     Worried About Running Out of Food in the Last Year: Patient refused     Ran Out of Food in the Last Year: Patient refused   Transportation Needs: Unknown (10/10/2023)    PRAPARE - Transportation     Lack of Transportation (Medical): No     Lack of Transportation (Non-Medical): Patient refused   Physical Activity: Unknown (10/11/2023)    Exercise Vital Sign     Days of Exercise per Week: Patient refused     Minutes of Exercise per Session: Patient refused   Recent Concern: Physical Activity - Insufficiently Active (9/5/2023)    Exercise Vital Sign     Days of Exercise per Week: 2 days     Minutes of Exercise per  Session: 30 min   Stress: Unknown (10/11/2023)    [employer] - Occupational Stress Questionnaire     Feeling of Stress : Patient refused   Recent Concern: Stress - Stress Concern Present (10/10/2023)    [employer] - Occupational Stress Questionnaire     Feeling of Stress : To some extent   Social Connections: Unknown (10/11/2023)    Social Connection and Isolation Panel [NHANES]     Frequency of Communication with Friends and Family: Patient refused     Frequency of Social Gatherings with Friends and Family: Patient refused     Active Member of Clubs or Organizations: Patient refused     Attends Club or Organization Meetings: Patient refused     Marital Status:    Housing Stability: Unknown (10/11/2023)    Housing Stability Vital Sign     Unable to Pay for Housing in the Last Year: Patient refused     Number of Places Lived in the Last Year: 1     Unstable Housing in the Last Year: Patient refused   Recent Concern: Housing Stability - High Risk (10/10/2023)    Housing Stability Vital Sign     Unable to Pay for Housing in the Last Year: Yes     Number of Places Lived in the Last Year: 1     Unstable Housing in the Last Year: No         OBJECTIVE:     Vital Signs (Most Recent)  Temp: 98.9 °F (37.2 °C) (10/17/23 1443)  Pulse: 97 (10/17/23 1443)  BP: (!) 148/96 (10/17/23 1443)  SpO2: 95 % (10/17/23 1443)      Physical Exam:  Physical Exam  Vitals reviewed.   Constitutional:       General: He is not in acute distress.  HENT:      Right Ear: External ear normal.      Left Ear: External ear normal.      Nose: Nose normal.      Mouth/Throat:      Mouth: Mucous membranes are moist.   Eyes:      Extraocular Movements: Extraocular movements intact.      Conjunctiva/sclera: Conjunctivae normal.      Pupils: Pupils are equal, round, and reactive to light.   Pulmonary:      Effort: Pulmonary effort is normal.   Abdominal:      General: There is no distension.       Palpations: Abdomen is soft.   Musculoskeletal:         General: No swelling. Normal range of motion.      Cervical back: Normal range of motion.   Skin:     General: Skin is warm and dry.      Findings: No rash.   Neurological:      General: No focal deficit present.      Mental Status: He is alert and oriented to person, place, and time.   Psychiatric:         Mood and Affect: Mood normal.         Behavior: Behavior normal.               Diagnostic Results:  Will review when available.      ASSESSMENT/PLAN:     Preop exam for internal medicine  -     IN OFFICE EKG 12-LEAD (to Muse)  -     CBC Auto Differential; Future; Expected date: 10/17/2023  -     Comprehensive Metabolic Panel; Future; Expected date: 10/17/2023  -     APTT; Future; Expected date: 10/17/2023  -     PROTIME-INR; Future; Expected date: 10/17/2023    Hypothyroidism due to acquired atrophy of thyroid    Type 2 diabetes mellitus with left eye affected by mild nonproliferative retinopathy without macular edema, with long-term current use of insulin  - - Stable. Patient is encouraged to follow a diet low in carbohydrates and simple sugars. Advised to focus on good food choices and increased physical activity and encouraged to adhere to medication regimen and/or lifestyle adjustments, and to check glucose level as recommended.  Contact office if glucose levels are not improving over time.  Will monitor HbA1c appropriately.     -     CBC Auto Differential; Future; Expected date: 10/17/2023  -     Comprehensive Metabolic Panel; Future; Expected date: 10/17/2023  -     APTT; Future; Expected date: 10/17/2023  -     PROTIME-INR; Future; Expected date: 10/17/2023    Essential hypertension  - Patient was counseled and encouraged to maintain a low sodium diet, as well as increasing physical activity.  Recommend random BP checks at home on a regular basis. Repeat BP at end of visit was stable. Will continue medication at this time, and follow up in 3-6 months,  or sooner if blood pressure begins to increase.     -     CBC Auto Differential; Future; Expected date: 10/17/2023  -     Comprehensive Metabolic Panel; Future; Expected date: 10/17/2023  -     APTT; Future; Expected date: 10/17/2023  -     PROTIME-INR; Future; Expected date: 10/17/2023    Hyperlipidemia LDL goal <70  - Stable, continue current regimen. Due for laboratory monitoring, ordered. Follow up 1 year.  -     CBC Auto Differential; Future; Expected date: 10/17/2023  -     Comprehensive Metabolic Panel; Future; Expected date: 10/17/2023  -     APTT; Future; Expected date: 10/17/2023  -     PROTIME-INR; Future; Expected date: 10/17/2023        Ortega Score      HISTORY  Age >70 years (5 points)  Myocardial infarction within 6 months (10 points).    CARDIAC EXAM  Signs of CHF: ventricular gallop or JVD (11 points)  Significant aortic stenosis (3 points)    EKG  Arrhythmia other than sinus or premature atrial contractions (7 points)  5 or more PVCs per minute (7 points)    GENERAL MED CONDITIONS  PO2 <60 mmHg; PCO2 >50 mmHg; K <3 mEq/L; HCO3 <20 mEq/L; BUN >50 mg/dL; Creatinine >3 mg/dL; elevated SGOT; chronic liver disease; bedridden (3 points)    OPERATION  Emergency (4 points)  Intraperitoneal, intrathoracic or aortic (3 points)        0 to 5 Points: Class I 1% Complications   6 to 12 Points: Class II 7% Complications   13 to 25 Points: Class III 14% Complications   26 to 53 Points: Class IV 78% Complications     Ortega Score: 0        Surgery-Related Predictors for Risk of Perioperative Cardiac Complications  High risk   Emergency surgery   Anticipated increased blood loss   Aortic or peripheral vascular surgery     Intermediate risk   Abdominal or thoracic surgery   Head and neck surgery   Carotid endarterectomy   Orthopedic surgery   Prostate surgery     Low risk   Breast surgery   Cataract surgery   Superficial surgery   Endoscopy                Tony Gordillo is a 43 y.o. male who  has a past  medical history of Diabetes mellitus, type 2, Hyperlipidemia, Hypertension, Obesity, and NAINA (obstructive sleep apnea).   who presented for pre op evaluation. The primary encounter diagnosis was Preop exam for internal medicine. Diagnoses of Hypothyroidism due to acquired atrophy of thyroid, Type 2 diabetes mellitus with left eye affected by mild nonproliferative retinopathy without macular edema, with long-term current use of insulin, Essential hypertension, and Hyperlipidemia LDL goal <70 were also pertinent to this visit.    As with all procedures, there is inherent risk of multiple complications including those related to bleeding, infectious, cardiac, and pulmonary.    No chest pain or shortness of breath at rest or with exertion.  Normal exercise tolerance for age and can achieve 4 METS without chest pain or SOB.  Blood pressure is controlled and vital signs are within normal limits.  Patient is low risk for a low risk procedure.      Please stop Aspirin and NSAIDS one week prior to surgery.    Predisposing risk factors for pulmonary complications include cough, dyspnea, smoking, a history of lung disease, obesity and abdominal or thoracic surgery.    Any pts > 70 years old may be at risk for delirium or cardiac complications.  Furthermore, diabetic patients may be at risk for infection or prolonged healing.      Preop Evaluation      11/7/2023 Marcela Russ MD  2:51 PM

## 2023-10-18 ENCOUNTER — OFFICE VISIT (OUTPATIENT)
Dept: ORTHOPEDICS | Facility: CLINIC | Age: 43
End: 2023-10-18
Payer: COMMERCIAL

## 2023-10-18 ENCOUNTER — HOSPITAL ENCOUNTER (OUTPATIENT)
Dept: PREADMISSION TESTING | Facility: HOSPITAL | Age: 43
Discharge: HOME | End: 2023-10-18
Attending: ORTHOPAEDIC SURGERY
Payer: COMMERCIAL

## 2023-10-18 VITALS
WEIGHT: 301.81 LBS | OXYGEN SATURATION: 96 % | BODY MASS INDEX: 40 KG/M2 | HEIGHT: 73 IN | SYSTOLIC BLOOD PRESSURE: 144 MMHG | DIASTOLIC BLOOD PRESSURE: 83 MMHG | RESPIRATION RATE: 18 BRPM | TEMPERATURE: 96 F | HEART RATE: 86 BPM

## 2023-10-18 DIAGNOSIS — S46.011A TRAUMATIC ROTATOR CUFF TEAR, RIGHT, INITIAL ENCOUNTER: Primary | ICD-10-CM

## 2023-10-18 LAB
APTT PPP: 27.3 SEC (ref 21–32)
INR PPP: 1.1 (ref 0.8–1.2)
PROTHROMBIN TIME: 11.3 SEC (ref 9–12.5)

## 2023-10-18 PROCEDURE — 99999 PR PBB SHADOW E&M-EST. PATIENT-LVL IV: ICD-10-PCS | Mod: PBBFAC,,, | Performed by: ORTHOPAEDIC SURGERY

## 2023-10-18 PROCEDURE — 4010F PR ACE/ARB THEARPY RXD/TAKEN: ICD-10-PCS | Mod: CPTII,S$GLB,, | Performed by: ORTHOPAEDIC SURGERY

## 2023-10-18 PROCEDURE — 4010F ACE/ARB THERAPY RXD/TAKEN: CPT | Mod: CPTII,S$GLB,, | Performed by: ORTHOPAEDIC SURGERY

## 2023-10-18 PROCEDURE — 99999 PR PBB SHADOW E&M-EST. PATIENT-LVL IV: CPT | Mod: PBBFAC,,, | Performed by: ORTHOPAEDIC SURGERY

## 2023-10-18 PROCEDURE — 3066F PR DOCUMENTATION OF TREATMENT FOR NEPHROPATHY: ICD-10-PCS | Mod: CPTII,S$GLB,, | Performed by: ORTHOPAEDIC SURGERY

## 2023-10-18 PROCEDURE — 3044F PR MOST RECENT HEMOGLOBIN A1C LEVEL <7.0%: ICD-10-PCS | Mod: CPTII,S$GLB,, | Performed by: ORTHOPAEDIC SURGERY

## 2023-10-18 PROCEDURE — 99213 PR OFFICE/OUTPT VISIT, EST, LEVL III, 20-29 MIN: ICD-10-PCS | Mod: S$GLB,,, | Performed by: ORTHOPAEDIC SURGERY

## 2023-10-18 PROCEDURE — 3061F PR NEG MICROALBUMINURIA RESULT DOCUMENTED/REVIEW: ICD-10-PCS | Mod: CPTII,S$GLB,, | Performed by: ORTHOPAEDIC SURGERY

## 2023-10-18 PROCEDURE — 99213 OFFICE O/P EST LOW 20 MIN: CPT | Mod: S$GLB,,, | Performed by: ORTHOPAEDIC SURGERY

## 2023-10-18 PROCEDURE — 3044F HG A1C LEVEL LT 7.0%: CPT | Mod: CPTII,S$GLB,, | Performed by: ORTHOPAEDIC SURGERY

## 2023-10-18 PROCEDURE — 3066F NEPHROPATHY DOC TX: CPT | Mod: CPTII,S$GLB,, | Performed by: ORTHOPAEDIC SURGERY

## 2023-10-18 PROCEDURE — 3061F NEG MICROALBUMINURIA REV: CPT | Mod: CPTII,S$GLB,, | Performed by: ORTHOPAEDIC SURGERY

## 2023-10-18 NOTE — LETTER
October 18, 2023    Tony Gordillo  [address]  [place] [address]         [facility]  [address]  [address]  [place] [address]  Phone: [phone] October 18, 2023     Patient: Tony Gordillo   YOB: 1980   Date of Visit: 10/18/2023       To Whom It May Concern:    \Patient: Tony Gordillo   YOB: 1980  Date of Visit: 07/26/2023    To Whom It May Concern:    Dorothy Gordillo  was at [facility] on 10/18/2023 .     Tony Gordillo is still under my care for an orthopedic injury/condition requiring activity restriction and may not return back to work. I anticipated that he would be out of work until 09/14/2023.  Workers compensation has still not approved his requested therapy and he is not released to work at this time. Given the delays in approval of the recommended treatment by the workers comp insurance company, I do not have an anticipated release to work at this time.      Date of next evaluation: 12/13/2023      Crissy Bradford MD    If you have any questions or concerns, please don't hesitate to call.    Sincerely,        Crissy Bradford MD

## 2023-10-18 NOTE — ANESTHESIA PREPROCEDURE EVALUATION
10/18/2023  Tony Gordillo is a 43 y.o., male scheduled for ARTHROSCOPY, KNEE, WITH MENISCECTOMY (Right) on 10/24/2023.      Past Medical History:   Diagnosis Date    Diabetes mellitus, type 2     Hyperlipidemia     Hypertension     Obesity     NAINA (obstructive sleep apnea)        Past Surgical History:   Procedure Laterality Date    ARTHROSCOPIC DEBRIDEMENT OF SHOULDER  3/14/2023    Procedure: DEBRIDEMENT, SHOULDER, ARTHROSCOPIC;  Surgeon: Crissy Bradford MD;  Location: [facility] OR;  Service: Orthopedics;;    ARTHROSCOPIC REPAIR OF ROTATOR CUFF OF SHOULDER Right 3/14/2023    Procedure: REPAIR, ROTATOR CUFF, ARTHROSCOPIC;  Surgeon: Crissy Bradford MD;  Location: [facility] OR;  Service: Orthopedics;  Laterality: Right;  ANTONY AND NEPHFLY PAYNE [phone]. TEXTED ELMER ON 3/9/2023 @ 12:10PM. ELMER RESPONDED ON 3/9/23 @ 12:23PM-SAG  RN PREOP 3/10/2023---CLEARED BY PCP AND CARDS    ARTHROSCOPY,SHOULDER,WITH BICEPS TENODESIS  3/14/2023    Procedure: ARTHROSCOPY,SHOULDER,WITH BICEPS TENODESIS;  Surgeon: Crissy Bradford MD;  Location: [facility] OR;  Service: Orthopedics;;    KNEE SURGERY      acl,mcl,pcl    left knee x 2             Pre-op Assessment    I have reviewed the Patient Summary Reports.     I have reviewed the Nursing Notes. I have reviewed the NPO Status.   I have reviewed the Medications.     Review of Systems  Anesthesia Hx:  No problems with previous Anesthesia  Denies Family Hx of Anesthesia complications.   Denies Personal Hx of Anesthesia complications.   Social:  Non-Smoker, Social Alcohol Use    Hematology/Oncology:  Hematology Normal   Oncology Normal     EENT/Dental:EENT/Dental Normal   Cardiovascular:   Exercise tolerance: good Hypertension  Functional Capacity good / => 4 METS    Pulmonary:   Denies Shortness of breath. Sleep Apnea, CPAP    Renal/:  Renal/ Normal      Hepatic/GI:  Hepatic/GI Normal    Musculoskeletal:   Tear of lateral meniscus of right knee   Neurological:   Denies CVA. Denies Seizures.    Endocrine:   Diabetes Hypothyroidism  Obesity / BMI > 30  Dermatological:  Skin Normal    Psych:  Psychiatric Normal           Physical Exam  General: Well nourished    Airway:  Mallampati: I / I  Mouth Opening: Normal  TM Distance: Normal  Tongue: Normal  Neck ROM: Normal ROM    Dental:  Intact    Chest/Lungs:  Clear to auscultation    Heart:  Rate: Normal  Rhythm: Regular Rhythm  Sounds: Normal    PET 4/22:       There are no clinically significant perfusion abnormalities. There is a small (<10%) amount of mild resting heterogeneity in the basal to apical septal wall(s) that improves with stress consistent with endothelial dysfunction secondary to HTN, HLD, and DM.    The whole heart absolute myocardial perfusion values averaged 0.62 cc/min/g at rest, which is normal; 1.71 cc/min/g at stress, which is mildly reduced; and CFR is 2.84 , which is low normal.    CT attenuation images demonstrate mild diffuse coronary calcifications in the LAD territory and no aortic calcifications.    The gated perfusion images showed an ejection fraction of 60% at rest and 68% during stress. A normal ejection fraction is greater than 53%.    The wall motion is normal at rest and during stress.    The LV cavity size is normal at rest and stress.    The EKG portion of this study is negative for ischemia.    There were no arrhythmias during stress.    The patient reported no chest pain during the stress test.    There are no prior studies for comparison.      Anesthesia Plan  Type of Anesthesia, risks & benefits discussed:    Anesthesia Type: MAC, Gen ETT, Gen Supraglottic Airway, Gen Natural Airway, Regional  Intra-op Monitoring Plan: Standard ASA Monitors  Post Op Pain Control Plan: multimodal analgesia and peripheral nerve block  Induction:  IV  Airway Plan: Direct  Informed  "Consent: Informed consent signed with the Patient and all parties understand the risks and agree with anesthesia plan.  All questions answered.   ASA Score: 2  Day of Surgery Review of History & Physical: H&P Update referred to the surgeon/provider.  Anesthesia Plan Notes: Seen by PCP for pre-op exam-"Patient is low risk for a low risk procedure.  ."    Ready For Surgery From Anesthesia Perspective.     .      "

## 2023-10-18 NOTE — PROGRESS NOTES
"Postoperative Visit    History of Present Illness:   Tony is 7 months s/p right shoulder arthroscopy , rotator cuff repair, and arthroscopic biceps tenodesis  (DOS-3/14/23)    Full thickness SS and SSC, ATS biceps tenodesis   Pain unchanged   Some "catching" with abduction, unchanged   Waiting PT approval for worker [employer]     Physical Examination:    NAD  right upper extremity: AROM: , ER 40  Still has weakness of the cuff - SS, IS and SSc,  stable  2+ RP, BCR in all digits.     Assessment/Plan:  43 y.o. male 7 months s/p right shoulder arthroscopy , rotator cuff repair, and arthroscopic biceps tenodesis  (DOS-3/14/23)    Plan  He needs to get back into PT ASAP. I still anticipate that he will be able to return to previous occupation. He was continuing to progress with PT and delaying therapy is only delaying his ability to return to work. We had a conversation with his worker's comp company last week - they are looking at it. We will coordinate with [facility] to see if we can get this approved for him. He most certainly will not be able to return to work if he does not get the appropriate, recommended physical therapy.   Would not consider him to be at [facility] or progressing with FCE until 1 year post op   Follow up in 8 weeks    All questions were answered in detail. The patient is in full agreement with the treatment plan and will proceed accordingly.          "

## 2023-10-18 NOTE — H&P (VIEW-ONLY)
"Postoperative Visit    History of Present Illness:   Tony is 7 months s/p right shoulder arthroscopy , rotator cuff repair, and arthroscopic biceps tenodesis  (DOS-3/14/23)    Full thickness SS and SSC, ATS biceps tenodesis   Pain unchanged   Some "catching" with abduction, unchanged   Waiting PT approval for worker [employer]     Physical Examination:    NAD  right upper extremity: AROM: , ER 40  Still has weakness of the cuff - SS, IS and SSc,  stable  2+ RP, BCR in all digits.     Assessment/Plan:  43 y.o. male 7 months s/p right shoulder arthroscopy , rotator cuff repair, and arthroscopic biceps tenodesis  (DOS-3/14/23)    Plan  He needs to get back into PT ASAP. I still anticipate that he will be able to return to previous occupation. He was continuing to progress with PT and delaying therapy is only delaying his ability to return to work. We had a conversation with his worker's comp company last week - they are looking at it. We will coordinate with [facility] to see if we can get this approved for him. He most certainly will not be able to return to work if he does not get the appropriate, recommended physical therapy.   Would not consider him to be at [facility] or progressing with FCE until 1 year post op   Follow up in 8 weeks    All questions were answered in detail. The patient is in full agreement with the treatment plan and will proceed accordingly.          "

## 2023-10-18 NOTE — DISCHARGE INSTRUCTIONS
YOUR PROCEDURE WILL BE AT [facility] at [address] [place] [address]                  Before 7 AM, enter through the Emergency Entrance..   After 7 AM enter through the Main Entrance.                 Report to the Same Day Surgery Registration Desk in the hallway.(Just beside the Same Day Surgery Unit)      Your procedure  is scheduled for __10/24/2023________.    Call [phone] between 2pm and 5pm on __10/23/2023_____to find out your arrival time for the day of surgery.    You may have two visitors.  No children under 12 years old.     You will be going to the Same Day Surgery Unit on the 2nd floor of the hospital.    Important instructions:  Do not eat anything after midnight.  You may have plain water, non carbonated.  You may also have Gatorade or Powerade after midnight.    Stop all fluids 2 hours before your surgery.    It is okay to brush your teeth.  Do not have gum, candy or mints.    SEE MEDICATION SHEET.   TAKE MEDICATIONS AS DIRECTED WITH SIPS OF WATER.      Do not take any diabetic medication on the morning of surgery unless instructed to do so by your doctor or pre op nurse.      All GLP-1 weekly diabetic/weight loss medications must not be taken for one week before your surgery, or your surgery could be canceled.      STOP taking Aspirin, Ibuprofen,  Advil, Motrin, Mobic(meloxicam), Aleve (naproxen), Fish oil, and Vitamin E for at least 7 days before your surgery.     You may take Tylenol if needed which is not a blood thinner.    Please shower the night before and the morning of your surgery.      Follow any Prep Instructions given by your surgeon.    Use Chlorhexidine soap as instructed by your pre op nurse.   Please place clean linens on your bed the night before surgery. Please wear fresh clean clothing after each shower.    No shaving of procedural area at least 4-5 days before surgery due to increased risk of skin irritation and/or possible  infection.    Female patients may be asked for a urine specimen on the morning of the surgery.  Please check with your nurse before using the restroom.    Contact lenses and removable denture work may not be worn during your procedure.    You may wear deodorant only. If you are having breast surgery, do not wear deodorant on the operative side.    Do not wear powder, body lotion, perfume/cologne or make-up.    Do not wear any jewelry or have any metal on your body.    You will be asked to remove any dentures or partials for the procedure.    If you are going home on the same day of surgery, you must arrange for a family member or a friend to drive you home.  Public transportation is prohibited.  You will not be able to drive home if you were given anesthesia or sedation.    Patients who want to have their Post-op prescriptions filled from our in-house [facility], bring a Credit/Debit Card or cash with you. A co-pay may be required.  The pharmacy closes at 5:30 pm.    Wear loose fitting clothes allowing for bandages.    Please leave money and valuables home.      You may bring your cell phone.    Call the doctor if fever or illness should occur before your surgery.    Call [phone] to contact us here if needed.                            CLOTHES ON DAY OF SURGERY    SHOULDER surgery:  you must have a very oversized shirt.  Very, Very large.  You will probably have a large sling on with your arm strapped to your chest.  You will not be able to put the arm of the operated shoulder into a sleeve.  You can put the arm of the un-operated shoulder into the sleeve, but the shirt will need to be draped over the operated shoulder.       ARM or HAND surgery:  make sure that your sleeves are large and loose enough to pass over large dressings or cast.      BREAST or UNDERARM surgery:  wear a loose, button down shirt so that you can dress without raising your arms over your head.    ABDOMINAL surgery:  wear loose,  comfortable clothing.  Nothing tight around the abdomen.  NO JEANS    PENIS or SCROTAL surgery:  loose comfortable clothing.  Large sweat pants, pajama pants or a robe.  ABSOLUTELY NO JEANS      LEG or FOOT surgery:  wear large loose pants that are able to pass over any large dressings or casts.  You could also wear loose shorts or a skirt.

## 2023-10-23 ENCOUNTER — TELEPHONE (OUTPATIENT)
Dept: SURGERY | Facility: HOSPITAL | Age: 43
End: 2023-10-23
Payer: COMMERCIAL

## 2023-10-24 ENCOUNTER — ANESTHESIA (OUTPATIENT)
Dept: SURGERY | Facility: HOSPITAL | Age: 43
End: 2023-10-24
Payer: COMMERCIAL

## 2023-10-24 ENCOUNTER — HOSPITAL ENCOUNTER (OUTPATIENT)
Facility: HOSPITAL | Age: 43
Discharge: HOME | End: 2023-10-24
Attending: ORTHOPAEDIC SURGERY | Admitting: ORTHOPAEDIC SURGERY
Payer: COMMERCIAL

## 2023-10-24 VITALS
WEIGHT: 301.81 LBS | BODY MASS INDEX: 39.82 KG/M2 | TEMPERATURE: 98 F | RESPIRATION RATE: 18 BRPM | SYSTOLIC BLOOD PRESSURE: 135 MMHG | DIASTOLIC BLOOD PRESSURE: 82 MMHG | OXYGEN SATURATION: 95 % | HEART RATE: 96 BPM

## 2023-10-24 DIAGNOSIS — S83.271A COMPLEX TEAR OF LATERAL MENISCUS OF RIGHT KNEE AS CURRENT INJURY, INITIAL ENCOUNTER: ICD-10-CM

## 2023-10-24 DIAGNOSIS — E11.3292 TYPE 2 DIABETES MELLITUS WITH LEFT EYE AFFECTED BY MILD NONPROLIFERATIVE RETINOPATHY WITHOUT MACULAR EDEMA, WITH LONG-TERM CURRENT USE OF INSULIN: Primary | ICD-10-CM

## 2023-10-24 DIAGNOSIS — M23.321 MEDIAL MENISCUS, POSTERIOR HORN DERANGEMENT, RIGHT: ICD-10-CM

## 2023-10-24 DIAGNOSIS — S83.281A TEAR OF LATERAL MENISCUS OF RIGHT KNEE, CURRENT, UNSPECIFIED TEAR TYPE, INITIAL ENCOUNTER: ICD-10-CM

## 2023-10-24 DIAGNOSIS — Z79.4 TYPE 2 DIABETES MELLITUS WITH LEFT EYE AFFECTED BY MILD NONPROLIFERATIVE RETINOPATHY WITHOUT MACULAR EDEMA, WITH LONG-TERM CURRENT USE OF INSULIN: Primary | ICD-10-CM

## 2023-10-24 DIAGNOSIS — S83.241A TEAR OF MEDIAL MENISCUS OF RIGHT KNEE, CURRENT, UNSPECIFIED TEAR TYPE, INITIAL ENCOUNTER: ICD-10-CM

## 2023-10-24 LAB — POCT GLUCOSE: 105 MG/DL (ref 70–110)

## 2023-10-24 PROCEDURE — 63600175 PHARM REV CODE 636 W HCPCS: Performed by: ORTHOPAEDIC SURGERY

## 2023-10-24 PROCEDURE — 27201423 OPTIME MED/SURG SUP & DEVICES STERILE SUPPLY: Performed by: ORTHOPAEDIC SURGERY

## 2023-10-24 PROCEDURE — 64447 NJX AA&/STRD FEMORAL NRV IMG: CPT | Performed by: ANESTHESIOLOGY

## 2023-10-24 PROCEDURE — 36000710: Performed by: ORTHOPAEDIC SURGERY

## 2023-10-24 PROCEDURE — 37000008 HC ANESTHESIA 1ST 15 MINUTES: Performed by: ORTHOPAEDIC SURGERY

## 2023-10-24 PROCEDURE — 36000711: Performed by: ORTHOPAEDIC SURGERY

## 2023-10-24 PROCEDURE — 63600175 PHARM REV CODE 636 W HCPCS: Performed by: ANESTHESIOLOGY

## 2023-10-24 PROCEDURE — 63600175 PHARM REV CODE 636 W HCPCS: Performed by: NURSE ANESTHETIST, CERTIFIED REGISTERED

## 2023-10-24 PROCEDURE — D9220A PRA ANESTHESIA: Mod: ANES,,, | Performed by: ANESTHESIOLOGY

## 2023-10-24 PROCEDURE — 71000039 HC RECOVERY, EACH ADD'L HOUR: Performed by: ORTHOPAEDIC SURGERY

## 2023-10-24 PROCEDURE — D9220A PRA ANESTHESIA: ICD-10-PCS | Mod: ANES,,, | Performed by: ANESTHESIOLOGY

## 2023-10-24 PROCEDURE — 25000003 PHARM REV CODE 250: Performed by: ANESTHESIOLOGY

## 2023-10-24 PROCEDURE — 25000003 PHARM REV CODE 250: Performed by: NURSE ANESTHETIST, CERTIFIED REGISTERED

## 2023-10-24 PROCEDURE — 82962 GLUCOSE BLOOD TEST: CPT | Performed by: ORTHOPAEDIC SURGERY

## 2023-10-24 PROCEDURE — 71000016 HC POSTOP RECOV ADDL HR: Performed by: ORTHOPAEDIC SURGERY

## 2023-10-24 PROCEDURE — 29881 PR KNEE SCOPE SINGLE MENISECECTOMY: ICD-10-PCS | Mod: RT,,, | Performed by: ORTHOPAEDIC SURGERY

## 2023-10-24 PROCEDURE — 29881 ARTHRS KNE SRG MNISECTMY M/L: CPT | Mod: RT,,, | Performed by: ORTHOPAEDIC SURGERY

## 2023-10-24 PROCEDURE — D9220A PRA ANESTHESIA: ICD-10-PCS | Mod: CRNA,,, | Performed by: NURSE ANESTHETIST, CERTIFIED REGISTERED

## 2023-10-24 PROCEDURE — 71000015 HC POSTOP RECOV 1ST HR: Performed by: ORTHOPAEDIC SURGERY

## 2023-10-24 PROCEDURE — 37000009 HC ANESTHESIA EA ADD 15 MINS: Performed by: ORTHOPAEDIC SURGERY

## 2023-10-24 PROCEDURE — D9220A PRA ANESTHESIA: Mod: CRNA,,, | Performed by: NURSE ANESTHETIST, CERTIFIED REGISTERED

## 2023-10-24 PROCEDURE — 71000033 HC RECOVERY, INTIAL HOUR: Performed by: ORTHOPAEDIC SURGERY

## 2023-10-24 RX ORDER — OXYCODONE HYDROCHLORIDE 5 MG/1
5 TABLET ORAL EVERY 4 HOURS PRN
Qty: 25 TABLET | Refills: 0 | Status: SHIPPED | OUTPATIENT
Start: 2023-10-24

## 2023-10-24 RX ORDER — KETOROLAC TROMETHAMINE 30 MG/ML
30 INJECTION, SOLUTION INTRAMUSCULAR; INTRAVENOUS ONCE
Status: COMPLETED | OUTPATIENT
Start: 2023-10-24 | End: 2023-10-24

## 2023-10-24 RX ORDER — LIDOCAINE HYDROCHLORIDE 20 MG/ML
INJECTION INTRAVENOUS
Status: DISCONTINUED | OUTPATIENT
Start: 2023-10-24 | End: 2023-10-24

## 2023-10-24 RX ORDER — MUPIROCIN 20 MG/G
OINTMENT TOPICAL
Status: DISCONTINUED | OUTPATIENT
Start: 2023-10-24 | End: 2023-10-24 | Stop reason: HOSPADM

## 2023-10-24 RX ORDER — ROCURONIUM BROMIDE 10 MG/ML
INJECTION, SOLUTION INTRAVENOUS
Status: DISCONTINUED | OUTPATIENT
Start: 2023-10-24 | End: 2023-10-24

## 2023-10-24 RX ORDER — FENTANYL CITRATE 50 UG/ML
INJECTION, SOLUTION INTRAMUSCULAR; INTRAVENOUS
Status: DISCONTINUED | OUTPATIENT
Start: 2023-10-24 | End: 2023-10-24

## 2023-10-24 RX ORDER — OXYCODONE HYDROCHLORIDE 5 MG/1
5 TABLET ORAL
Status: DISCONTINUED | OUTPATIENT
Start: 2023-10-24 | End: 2023-10-24 | Stop reason: HOSPADM

## 2023-10-24 RX ORDER — ACETAMINOPHEN 500 MG
500 TABLET ORAL EVERY 6 HOURS
Qty: 56 TABLET | Refills: 0 | Status: SHIPPED | OUTPATIENT
Start: 2023-10-24 | End: 2023-11-07

## 2023-10-24 RX ORDER — SODIUM CHLORIDE 9 MG/ML
INJECTION, SOLUTION INTRAVENOUS CONTINUOUS
Status: DISCONTINUED | OUTPATIENT
Start: 2023-10-24 | End: 2023-10-24 | Stop reason: HOSPADM

## 2023-10-24 RX ORDER — CEFAZOLIN SODIUM 2 G/50ML
2 SOLUTION INTRAVENOUS
Status: COMPLETED | OUTPATIENT
Start: 2023-10-24 | End: 2023-10-24

## 2023-10-24 RX ORDER — PROPOFOL 10 MG/ML
VIAL (ML) INTRAVENOUS
Status: DISCONTINUED | OUTPATIENT
Start: 2023-10-24 | End: 2023-10-24

## 2023-10-24 RX ORDER — MIDAZOLAM HYDROCHLORIDE 1 MG/ML
INJECTION INTRAMUSCULAR; INTRAVENOUS
Status: DISCONTINUED | OUTPATIENT
Start: 2023-10-24 | End: 2023-10-24

## 2023-10-24 RX ORDER — LIDOCAINE HYDROCHLORIDE 10 MG/ML
1 INJECTION, SOLUTION EPIDURAL; INFILTRATION; INTRACAUDAL; PERINEURAL ONCE
Status: DISCONTINUED | OUTPATIENT
Start: 2023-10-24 | End: 2023-10-24 | Stop reason: HOSPADM

## 2023-10-24 RX ORDER — ONDANSETRON 2 MG/ML
4 INJECTION INTRAMUSCULAR; INTRAVENOUS ONCE
Status: DISCONTINUED | OUTPATIENT
Start: 2023-10-24 | End: 2023-10-24 | Stop reason: HOSPADM

## 2023-10-24 RX ORDER — BUPIVACAINE HYDROCHLORIDE 2.5 MG/ML
INJECTION, SOLUTION EPIDURAL; INFILTRATION; INTRACAUDAL
Status: COMPLETED | OUTPATIENT
Start: 2023-10-24 | End: 2023-10-24

## 2023-10-24 RX ORDER — FENTANYL CITRATE 50 UG/ML
25 INJECTION, SOLUTION INTRAMUSCULAR; INTRAVENOUS EVERY 5 MIN PRN
Status: COMPLETED | OUTPATIENT
Start: 2023-10-24 | End: 2023-10-24

## 2023-10-24 RX ORDER — EPINEPHRINE 1 MG/ML
INJECTION, SOLUTION, CONCENTRATE INTRAVENOUS
Status: DISCONTINUED | OUTPATIENT
Start: 2023-10-24 | End: 2023-10-24 | Stop reason: HOSPADM

## 2023-10-24 RX ORDER — SODIUM CHLORIDE, SODIUM LACTATE, POTASSIUM CHLORIDE, CALCIUM CHLORIDE 600; 310; 30; 20 MG/100ML; MG/100ML; MG/100ML; MG/100ML
INJECTION, SOLUTION INTRAVENOUS CONTINUOUS
Status: DISCONTINUED | OUTPATIENT
Start: 2023-10-24 | End: 2023-10-24 | Stop reason: HOSPADM

## 2023-10-24 RX ORDER — IBUPROFEN 800 MG/1
800 TABLET ORAL 3 TIMES DAILY
Qty: 42 TABLET | Refills: 0 | Status: SHIPPED | OUTPATIENT
Start: 2023-10-24 | End: 2023-11-07

## 2023-10-24 RX ADMIN — SODIUM CHLORIDE, POTASSIUM CHLORIDE, SODIUM LACTATE AND CALCIUM CHLORIDE: 600; 310; 30; 20 INJECTION, SOLUTION INTRAVENOUS at 09:10

## 2023-10-24 RX ADMIN — SUGAMMADEX 200 MG: 100 INJECTION, SOLUTION INTRAVENOUS at 12:10

## 2023-10-24 RX ADMIN — FENTANYL CITRATE 100 MCG: 50 INJECTION, SOLUTION INTRAMUSCULAR; INTRAVENOUS at 11:10

## 2023-10-24 RX ADMIN — FENTANYL CITRATE 25 MCG: 50 INJECTION, SOLUTION INTRAMUSCULAR; INTRAVENOUS at 01:10

## 2023-10-24 RX ADMIN — SODIUM CHLORIDE, POTASSIUM CHLORIDE, SODIUM LACTATE AND CALCIUM CHLORIDE: 600; 310; 30; 20 INJECTION, SOLUTION INTRAVENOUS at 11:10

## 2023-10-24 RX ADMIN — BUPIVACAINE HYDROCHLORIDE 15 ML: 2.5 INJECTION, SOLUTION EPIDURAL; INFILTRATION; INTRACAUDAL; PERINEURAL at 11:10

## 2023-10-24 RX ADMIN — KETOROLAC TROMETHAMINE 30 MG: 30 INJECTION, SOLUTION INTRAMUSCULAR; INTRAVENOUS at 02:10

## 2023-10-24 RX ADMIN — PROPOFOL 200 MG: 10 INJECTION, EMULSION INTRAVENOUS at 11:10

## 2023-10-24 RX ADMIN — OXYCODONE HYDROCHLORIDE 5 MG: 5 TABLET ORAL at 01:10

## 2023-10-24 RX ADMIN — CEFAZOLIN SODIUM 3 G: 2 SOLUTION INTRAVENOUS at 11:10

## 2023-10-24 RX ADMIN — LIDOCAINE HYDROCHLORIDE 100 MG: 20 INJECTION, SOLUTION INTRAVENOUS at 11:10

## 2023-10-24 RX ADMIN — MIDAZOLAM HYDROCHLORIDE 2 MG: 1 INJECTION INTRAMUSCULAR; INTRAVENOUS at 11:10

## 2023-10-24 RX ADMIN — ROCURONIUM BROMIDE 50 MG: 10 INJECTION, SOLUTION INTRAVENOUS at 11:10

## 2023-10-24 NOTE — ANESTHESIA PROCEDURE NOTES
Intubation    Date/Time: 10/24/2023 11:25 AM    Performed by: Gonzales Landry CRNA  Authorized by: Dana Ruiz MD    Intubation:     Induction:  Intravenous    Intubated:  Postinduction    Mask Ventilation:  Easy mask    Attempts:  1    Attempted By:  CRNA    Method of Intubation:  Direct and video laryngoscopy    Blade:  Chua 3    Laryngeal View Grade: Grade I - full view of cords      Difficult Airway Encountered?: No      Complications:  None    Airway Device:  Oral endotracheal tube    Airway Device Size:  7.5    Style/Cuff Inflation:  Cuffed    Inflation Amount (mL):  5    Tube secured:  21    Secured at:  The teeth    Placement Verified By:  Capnometry    Complicating Factors:  None

## 2023-10-24 NOTE — ANESTHESIA POSTPROCEDURE EVALUATION
Anesthesia Post Evaluation    Patient: Tony Gordillo    Procedure(s) Performed: Procedure(s) (LRB):  ARTHROSCOPY, KNEE, WITH MENISCECTOMY (Right)  POSSIBLE SUBCHONDROPLASTY (Right)    Final Anesthesia Type: general      Patient location during evaluation: PACU  Patient participation: Yes- Able to Participate  Level of consciousness: awake and alert  Post-procedure vital signs: reviewed and stable  Pain management: adequate  Airway patency: patent    PONV status at discharge: No PONV  Anesthetic complications: no      Cardiovascular status: blood pressure returned to baseline  Respiratory status: unassisted  Hydration status: euvolemic  Follow-up not needed.          Vitals Value Taken Time   /76 10/24/23 1317   Temp 36.4 °C (97.5 °F) 10/24/23 1308   Pulse 85 10/24/23 1317   Resp 19 10/24/23 1317   SpO2 92 % 10/24/23 1317   Vitals shown include unvalidated device data.      No case tracking events are documented in the log.      Pain/Bernabe Score: Bernabe Score: 9 (10/24/2023  1:03 PM)

## 2023-10-24 NOTE — OR NURSING
1049 - time out done for block per Dr Ruiz and dr Santillan  Pt on monitors  1105 - Block complete - pt tolerated well

## 2023-10-24 NOTE — OP NOTE
[facility]   OPERATIVE REPORT   ORTHOPAEDIC SURGERY   PROVIDER: Crissy Bradford MD    PATIENT INFORMATION   Tony Gordillo 43 y.o. male 1980   MRN: [id number]   LOCATION: [facility]    DATE OF PROCEDURE: 10/24/2023     PREOPERATIVE DIAGNOSES:   Right  1. Knee lateral meniscus tear   2. knee chondromalacia  3. knee bone stress edema     POSTOPERATIVE DIAGNOSES:   Right  1. knee lateral meniscus tear   2. knee chondromalacia  3. knee bone stress edema     PROCEDURE PERFORMED:   Right  1. knee arthroscopic partial lateral meniscectomy (CPT 02980)  2. knee arthroscopic chondroplasty (CPT 21589)  3. knee arthroscopically aided treatment of lateral tibial plateau fracture, unicondylar/ subchondroplasty (CPT 64720)    Surgeon(s) and Role:     * Crissy Bradford MD - Primary    Assistant: Shereen Meyer    ANESTHESIA: General/Regional    ESTIMATED BLOOD LOSS: Minimal    IMPLANTS: * No implants in log *     SPECIMENS:   Specimen (24h ago, onward)      None            COMPLICATIONS: None.     INTRAOPERATIVE COUNTS: Correct.     PROPHYLACTIC IV ANTIBIOTICS: Given per [facility] Protocol.    INDICATIONS FOR PROCEDURE:  The patient presented complaining of chronic knee pain.  Imaging has confirmed a meniscus tearing and chondromalacia of the knee. The patient has failed a course of conservative treatment.  Given the extent and duration of these complaints, the patient has been indicated for arthroscopic intervention.  Details of such were reviewed preoperatively to include the expected postoperative rehabilitation and recovery course.  Outlined risks and potential complications of surgery include but are not limited to: infection, stiffness, progression of arthritis, tissue re-tearing, neurovascular injury, blood clot formation, potential need further surgery. The patient has elected to proceed.    PROCEDURE IN DETAIL:  The patient was identified in preop holding. Permit was obtained for the above  procedure. IV antibiotic was initiated for prophylaxis and the patient was brought to the operating room.       After Anesthesia was administered, a Time Out was verbalized with all OR staff agreeing to the patient and procedure.      The right knee and leg were prepped and draped in the usual sterile fashion.      Diagnostic arthroscopy was undertaken after establishing routine anteromedial and anterolateral scope portals.      Significant Findings:  1. Patellofemoral compartment - intact patellofemoral cartilage.  2. Notch - normal PCL, degenerative changes to ACL but fibers intact  3. Medial Compartment - Meniscus, meniscus intact and stable  Cartilage, grade 0 femoral condyle, diffuse grade 3 tibial plateau.    4. Lateral compartment - Meniscus, degnerative tearing involving anterior and midbody of meniscus. Posterior horn intact and stable.  Cartilage, grade 0 lateral femoral condyle, large area of grade 4 lateral tibial plateau.      Detailed description:     1. Meniscectomy: Partial lateral meniscectomy was carried out from the anterior horn to mid body with a combination of biters and fei.  Trimming was completed to stable, contoured edges.     2. Chondroplasty and loose bodies: Debridement was carried out at the medial tibial plateau and lateral tibial plateau to smooth and stabilize the cartilage with a non-aggressive shaver.   3. Subchondroplasty: C-arm fluoroscopy was brought into the operative field for localization over the planned subchondroplasty site, based on location of subchondral stress reaction on MRI. The side dispensing trochar was introduced and 3 vials with a total of 3 cc cement was injected over this area. Following setting up of the cement, final fluoro shots confirmed the cement to be in appropriate position. The scope was reintroduced after the subchondroplasty procedure to confirm no intra-articular extravasation.    Photos were taken. The portals were closed in standard fashion  using 3-0 Nylon suture.    The dressing was placed after local was administered subcutaneously and the patient was awakened and transferred to the recovery room in satisfactory condition.  There were no apparent complications.     POSTOPERATIVE PLAN: Patient may weight bear as tolerates on crutches. Full range of motion to tolerance.

## 2023-10-24 NOTE — ANESTHESIA PROCEDURE NOTES
R Adductor SS    Patient location during procedure: pre-op   Block not for primary anesthetic.  Reason for block: at surgeon's request and post-op pain management   Post-op Pain Location: R knee pain   Start time: 10/24/2023 11:02 AM  Timeout: 10/24/2023 11:00 AM   End time: 10/24/2023 11:10 AM    Staffing  Authorizing Provider: Dana Ruiz MD  Performing Provider: Dana Ruiz MD    Staffing  Performed by: Dana Ruiz MD  Authorized by: Dana Ruiz MD    Preanesthetic Checklist  Completed: patient identified, IV checked, site marked, risks and benefits discussed, surgical consent, monitors and equipment checked, pre-op evaluation and timeout performed  Peripheral Block  Patient position: supine  Prep: ChloraPrep  Patient monitoring: heart rate, cardiac monitor, continuous pulse ox, continuous capnometry and frequent blood pressure checks  Block type: adductor canal  Laterality: right  Injection technique: single shot  Needle  Needle type: Stimuplex   Needle gauge: 21 G  Needle length: 4 in  Needle localization: anatomical landmarks and ultrasound guidance  Needle insertion depth: 4 cm   -ultrasound image captured on disc.  Assessment  Injection assessment: negative aspiration, negative parasthesia and local visualized surrounding nerve  Paresthesia pain: none  Heart rate change: no  Slow fractionated injection: yes  Pain Tolerance: comfortable throughout block and no complaints  Medications:    Medications: bupivacaine (pf) (MARCAINE) injection 0.25% - Perineural   15 mL - 10/24/2023 11:05:00 AM    Additional Notes  VSS.  [facility] RN monitoring vitals throughout procedure.  Patient tolerated procedure well.     0.25% bupiv with 1: 400 k epi

## 2023-10-24 NOTE — BRIEF OP NOTE
[facility] - Surgery  Brief Operative Note    Surgery Date: 10/24/2023     Surgeon(s) and Role:     * Crissy Bradford MD - Primary    Assisting Surgeon: None    Pre-op Diagnosis:  Tear of lateral meniscus of right knee, current, unspecified tear type, initial encounter [S83.281A]  Tear of medial meniscus of right knee, current, unspecified tear type, initial encounter [S83.241A]    Post-op Diagnosis:  * No Diagnosis Codes entered *    Procedure(s) (LRB):  ARTHROSCOPY, KNEE, WITH MENISCECTOMY (Right)  POSSIBLE SUBCHONDROPLASTY (Right)    Anesthesia: General/Regional    Operative Findings: total loss of lateral tibial plateau cartilages, degenerative tear of anterior horn of lateral meniscus     Estimated Blood Loss:     Specimens:   Specimen (24h ago, onward)      None          Discharge Note    OUTCOME: Patient tolerated treatment/procedure well without complication and is now ready for discharge.    DISPOSITION: Home or Self Care    FINAL DIAGNOSIS:  Tear of lateral meniscus of right knee, current    FOLLOWUP: In clinic    DISCHARGE INSTRUCTIONS:    Discharge Procedure Orders   Diet diabetic     Leave dressing on - Keep it clean, dry, and intact until clinic visit

## 2023-10-24 NOTE — TRANSFER OF CARE
Anesthesia Transfer of Care Note    Patient: Tony Gordillo    Procedure(s) Performed: Procedure(s) (LRB):  ARTHROSCOPY, KNEE, WITH MENISCECTOMY (Right)  POSSIBLE SUBCHONDROPLASTY (Right)    Patient location: PACU    Anesthesia Type: general    Transport from OR: Transported from OR on room air with adequate spontaneous ventilation    Post pain: adequate analgesia    Post assessment: no apparent anesthetic complications and tolerated procedure well    Post vital signs: stable    Level of consciousness: awake    Nausea/Vomiting: no nausea/vomiting    Complications: none    Transfer of care protocol was followed      Last vitals:   Visit Vitals  BP (!) 154/85   Pulse 89   Temp 36.4 °C (97.5 °F)   Resp 18   Wt (!) 136.9 kg (301 lb 13 oz)   SpO2 96%   BMI 39.82 kg/m²

## 2023-10-25 ENCOUNTER — PATIENT MESSAGE (OUTPATIENT)
Dept: ORTHOPEDICS | Facility: CLINIC | Age: 43
End: 2023-10-25
Payer: COMMERCIAL

## 2023-10-25 DIAGNOSIS — R41.840 ATTENTION AND CONCENTRATION DEFICIT: ICD-10-CM

## 2023-10-25 DIAGNOSIS — G47.33 OSA (OBSTRUCTIVE SLEEP APNEA): ICD-10-CM

## 2023-10-25 NOTE — INTERVAL H&P NOTE
The patient has been examined and the H&P has been reviewed:    I concur with the findings and no changes have occurred since H&P was written.    Surgery risks, benefits and alternative options discussed and understood by patient/family.          Active Hospital Problems    Diagnosis  POA    *Tear of lateral meniscus of right knee, current [B65.314A]  Yes      Resolved Hospital Problems   No resolved problems to display.

## 2023-10-26 RX ORDER — MODAFINIL 100 MG/1
100 TABLET ORAL EVERY MORNING
Qty: 90 TABLET | Refills: 1 | Status: SHIPPED | OUTPATIENT
Start: 2023-10-26 | End: 2024-04-23

## 2023-11-06 ENCOUNTER — PATIENT MESSAGE (OUTPATIENT)
Dept: ADMINISTRATIVE | Facility: HOSPITAL | Age: 43
End: 2023-11-06
Payer: COMMERCIAL

## 2023-11-08 ENCOUNTER — OFFICE VISIT (OUTPATIENT)
Dept: ORTHOPEDICS | Facility: CLINIC | Age: 43
End: 2023-11-08
Payer: COMMERCIAL

## 2023-11-08 DIAGNOSIS — S83.281A TEAR OF LATERAL MENISCUS OF RIGHT KNEE, CURRENT, UNSPECIFIED TEAR TYPE, INITIAL ENCOUNTER: Primary | ICD-10-CM

## 2023-11-08 PROCEDURE — 99024 PR POST-OP FOLLOW-UP VISIT: ICD-10-PCS | Mod: S$GLB,,, | Performed by: ORTHOPAEDIC SURGERY

## 2023-11-08 PROCEDURE — 3061F NEG MICROALBUMINURIA REV: CPT | Mod: CPTII,S$GLB,, | Performed by: ORTHOPAEDIC SURGERY

## 2023-11-08 PROCEDURE — 4010F PR ACE/ARB THEARPY RXD/TAKEN: ICD-10-PCS | Mod: CPTII,S$GLB,, | Performed by: ORTHOPAEDIC SURGERY

## 2023-11-08 PROCEDURE — 1159F PR MEDICATION LIST DOCUMENTED IN MEDICAL RECORD: ICD-10-PCS | Mod: CPTII,S$GLB,, | Performed by: ORTHOPAEDIC SURGERY

## 2023-11-08 PROCEDURE — 1159F MED LIST DOCD IN RCRD: CPT | Mod: CPTII,S$GLB,, | Performed by: ORTHOPAEDIC SURGERY

## 2023-11-08 PROCEDURE — 99999 PR PBB SHADOW E&M-EST. PATIENT-LVL IV: CPT | Mod: PBBFAC,,, | Performed by: ORTHOPAEDIC SURGERY

## 2023-11-08 PROCEDURE — 3044F PR MOST RECENT HEMOGLOBIN A1C LEVEL <7.0%: ICD-10-PCS | Mod: CPTII,S$GLB,, | Performed by: ORTHOPAEDIC SURGERY

## 2023-11-08 PROCEDURE — 3066F NEPHROPATHY DOC TX: CPT | Mod: CPTII,S$GLB,, | Performed by: ORTHOPAEDIC SURGERY

## 2023-11-08 PROCEDURE — 99999 PR PBB SHADOW E&M-EST. PATIENT-LVL IV: ICD-10-PCS | Mod: PBBFAC,,, | Performed by: ORTHOPAEDIC SURGERY

## 2023-11-08 PROCEDURE — 99024 POSTOP FOLLOW-UP VISIT: CPT | Mod: S$GLB,,, | Performed by: ORTHOPAEDIC SURGERY

## 2023-11-08 PROCEDURE — 3066F PR DOCUMENTATION OF TREATMENT FOR NEPHROPATHY: ICD-10-PCS | Mod: CPTII,S$GLB,, | Performed by: ORTHOPAEDIC SURGERY

## 2023-11-08 PROCEDURE — 1160F PR REVIEW ALL MEDS BY PRESCRIBER/CLIN PHARMACIST DOCUMENTED: ICD-10-PCS | Mod: CPTII,S$GLB,, | Performed by: ORTHOPAEDIC SURGERY

## 2023-11-08 PROCEDURE — 3044F HG A1C LEVEL LT 7.0%: CPT | Mod: CPTII,S$GLB,, | Performed by: ORTHOPAEDIC SURGERY

## 2023-11-08 PROCEDURE — 1160F RVW MEDS BY RX/DR IN RCRD: CPT | Mod: CPTII,S$GLB,, | Performed by: ORTHOPAEDIC SURGERY

## 2023-11-08 PROCEDURE — 4010F ACE/ARB THERAPY RXD/TAKEN: CPT | Mod: CPTII,S$GLB,, | Performed by: ORTHOPAEDIC SURGERY

## 2023-11-08 PROCEDURE — 3061F PR NEG MICROALBUMINURIA RESULT DOCUMENTED/REVIEW: ICD-10-PCS | Mod: CPTII,S$GLB,, | Performed by: ORTHOPAEDIC SURGERY

## 2023-11-08 NOTE — PROGRESS NOTES
Postoperative Visit    History of Present Illness:   Tony is 2 weeks s/p right Knee arthroscopy and Lateral meniscus debridement  and subchondroplasty (DOS-10/24/23)  He is doing well   Some increase in pain with PT  Denies fevers, chills, constitutional symptoms     Physical Examination:    Vitals:    11/08/23 1008   PainSc:   3   PainLoc: Knee      NAD  right lower extremity:  Incisions over the knee  healing well with suture in place   No erythema, drainage or other signs of infection. Appropriate post operative swelling is present      Assessment/Plan:  43 y.o. male  2  weeks s/p right Knee arthroscopy and Lateral meniscus debridement  and subchondroplasty (DOS-10/24/23)    Plan  Sutures were removed today and steri strips were placed   Continue PT  Follow up in 4 weeks      All questions were answered in detail. The patient is in full agreement with the treatment plan and will proceed accordingly.

## 2023-11-11 ENCOUNTER — PATIENT MESSAGE (OUTPATIENT)
Dept: OTOLARYNGOLOGY | Facility: CLINIC | Age: 43
End: 2023-11-11
Payer: COMMERCIAL

## 2023-11-11 ENCOUNTER — PATIENT MESSAGE (OUTPATIENT)
Dept: ENDOCRINOLOGY | Facility: CLINIC | Age: 43
End: 2023-11-11
Payer: COMMERCIAL

## 2023-11-11 DIAGNOSIS — Z79.4 TYPE 2 DIABETES MELLITUS WITHOUT COMPLICATION, WITH LONG-TERM CURRENT USE OF INSULIN: ICD-10-CM

## 2023-11-11 DIAGNOSIS — E11.9 TYPE 2 DIABETES MELLITUS WITHOUT COMPLICATION, WITH LONG-TERM CURRENT USE OF INSULIN: ICD-10-CM

## 2023-11-13 RX ORDER — INSULIN ASPART 100 [IU]/ML
INJECTION, SOLUTION INTRAVENOUS; SUBCUTANEOUS
Qty: 40 ML | Refills: 5 | Status: SHIPPED | OUTPATIENT
Start: 2023-11-13 | End: 2024-01-11 | Stop reason: SDUPTHER

## 2023-11-15 ENCOUNTER — PATIENT MESSAGE (OUTPATIENT)
Dept: INTERNAL MEDICINE | Facility: CLINIC | Age: 43
End: 2023-11-15
Payer: COMMERCIAL

## 2023-11-15 RX ORDER — INSULIN PMP CART,AUT,G6/7,CNTR
1 EACH SUBCUTANEOUS DAILY
Qty: 30 EACH | Refills: 11 | Status: SHIPPED | OUTPATIENT
Start: 2023-11-15 | End: 2023-12-05 | Stop reason: SDUPTHER

## 2023-11-17 ENCOUNTER — PATIENT MESSAGE (OUTPATIENT)
Dept: ENDOCRINOLOGY | Facility: CLINIC | Age: 43
End: 2023-11-17
Payer: COMMERCIAL

## 2023-11-22 ENCOUNTER — PATIENT MESSAGE (OUTPATIENT)
Dept: ORTHOPEDICS | Facility: CLINIC | Age: 43
End: 2023-11-22
Payer: COMMERCIAL

## 2023-11-27 ENCOUNTER — PATIENT MESSAGE (OUTPATIENT)
Dept: ENDOCRINOLOGY | Facility: CLINIC | Age: 43
End: 2023-11-27
Payer: COMMERCIAL

## 2023-11-27 DIAGNOSIS — Z79.4 TYPE 2 DIABETES MELLITUS WITHOUT COMPLICATION, WITH LONG-TERM CURRENT USE OF INSULIN: ICD-10-CM

## 2023-11-27 DIAGNOSIS — E11.9 TYPE 2 DIABETES MELLITUS WITHOUT COMPLICATION, WITH LONG-TERM CURRENT USE OF INSULIN: ICD-10-CM

## 2023-11-30 ENCOUNTER — TELEPHONE (OUTPATIENT)
Dept: NEUROLOGY | Facility: CLINIC | Age: 43
End: 2023-11-30
Payer: COMMERCIAL

## 2023-11-30 NOTE — TELEPHONE ENCOUNTER
Called and spoke to patient to reschedule appt today with Dr. South. Stated Dr. South was out sick this afternoon and stated I could reschedule this appt for 12/5 at 1:45. Patient confirmed this appt. Patient was rescheduled.

## 2023-12-04 ENCOUNTER — PATIENT MESSAGE (OUTPATIENT)
Dept: INTERNAL MEDICINE | Facility: CLINIC | Age: 43
End: 2023-12-04
Payer: COMMERCIAL

## 2023-12-05 ENCOUNTER — OFFICE VISIT (OUTPATIENT)
Dept: NEUROLOGY | Facility: CLINIC | Age: 43
End: 2023-12-05
Payer: COMMERCIAL

## 2023-12-05 ENCOUNTER — CLINICAL SUPPORT (OUTPATIENT)
Dept: DIABETES | Facility: CLINIC | Age: 43
End: 2023-12-05
Payer: COMMERCIAL

## 2023-12-05 ENCOUNTER — PATIENT MESSAGE (OUTPATIENT)
Dept: ENDOCRINOLOGY | Facility: CLINIC | Age: 43
End: 2023-12-05
Payer: COMMERCIAL

## 2023-12-05 DIAGNOSIS — G43.E11 CHRONIC MIGRAINE WITH AURA, INTRACTABLE, WITH STATUS MIGRAINOSUS: ICD-10-CM

## 2023-12-05 DIAGNOSIS — F63.81 INTERMITTENT EXPLOSIVE DISORDER IN ADULT: ICD-10-CM

## 2023-12-05 DIAGNOSIS — F06.70 MILD NEUROCOGNITIVE DISORDER DUE TO TRAUMATIC BRAIN INJURY: Primary | ICD-10-CM

## 2023-12-05 DIAGNOSIS — F07.81 TRAUMATIC ENCEPHALOPATHY: ICD-10-CM

## 2023-12-05 DIAGNOSIS — Z79.4 TYPE 2 DIABETES MELLITUS WITHOUT COMPLICATION, WITH LONG-TERM CURRENT USE OF INSULIN: Primary | ICD-10-CM

## 2023-12-05 DIAGNOSIS — S06.9XAS MILD NEUROCOGNITIVE DISORDER DUE TO TRAUMATIC BRAIN INJURY: Primary | ICD-10-CM

## 2023-12-05 DIAGNOSIS — F33.0 MILD EPISODE OF RECURRENT MAJOR DEPRESSIVE DISORDER: ICD-10-CM

## 2023-12-05 DIAGNOSIS — E11.9 TYPE 2 DIABETES MELLITUS WITHOUT COMPLICATION, WITH LONG-TERM CURRENT USE OF INSULIN: Primary | ICD-10-CM

## 2023-12-05 PROCEDURE — 99215 OFFICE O/P EST HI 40 MIN: CPT | Mod: 95,,, | Performed by: PSYCHIATRY & NEUROLOGY

## 2023-12-05 PROCEDURE — 3061F PR NEG MICROALBUMINURIA RESULT DOCUMENTED/REVIEW: ICD-10-PCS | Mod: CPTII,95,, | Performed by: PSYCHIATRY & NEUROLOGY

## 2023-12-05 PROCEDURE — 99215 PR OFFICE/OUTPT VISIT, EST, LEVL V, 40-54 MIN: ICD-10-PCS | Mod: 95,,, | Performed by: PSYCHIATRY & NEUROLOGY

## 2023-12-05 PROCEDURE — 96116 PR NEUROBEHAVIORAL STATUS EXAM BY PSYCH/PHYS: ICD-10-PCS | Mod: 95,59,, | Performed by: PSYCHIATRY & NEUROLOGY

## 2023-12-05 PROCEDURE — 96116 NUBHVL XM PHYS/QHP 1ST HR: CPT | Mod: 95,59,, | Performed by: PSYCHIATRY & NEUROLOGY

## 2023-12-05 PROCEDURE — 3044F PR MOST RECENT HEMOGLOBIN A1C LEVEL <7.0%: ICD-10-PCS | Mod: CPTII,95,, | Performed by: PSYCHIATRY & NEUROLOGY

## 2023-12-05 PROCEDURE — 3066F PR DOCUMENTATION OF TREATMENT FOR NEPHROPATHY: ICD-10-PCS | Mod: CPTII,95,, | Performed by: PSYCHIATRY & NEUROLOGY

## 2023-12-05 PROCEDURE — 95251 CONT GLUC MNTR ANALYSIS I&R: CPT | Mod: NDTC,S$GLB,, | Performed by: NURSE PRACTITIONER

## 2023-12-05 PROCEDURE — 1159F PR MEDICATION LIST DOCUMENTED IN MEDICAL RECORD: ICD-10-PCS | Mod: CPTII,95,, | Performed by: PSYCHIATRY & NEUROLOGY

## 2023-12-05 PROCEDURE — 95251 PR GLUCOSE MONITOR, 72 HOUR, PHYS INTERP: ICD-10-PCS | Mod: NDTC,S$GLB,, | Performed by: NURSE PRACTITIONER

## 2023-12-05 PROCEDURE — 1160F PR REVIEW ALL MEDS BY PRESCRIBER/CLIN PHARMACIST DOCUMENTED: ICD-10-PCS | Mod: CPTII,95,, | Performed by: PSYCHIATRY & NEUROLOGY

## 2023-12-05 PROCEDURE — 4010F PR ACE/ARB THEARPY RXD/TAKEN: ICD-10-PCS | Mod: CPTII,95,, | Performed by: PSYCHIATRY & NEUROLOGY

## 2023-12-05 RX ORDER — FLUOXETINE HYDROCHLORIDE 20 MG/1
20 CAPSULE ORAL DAILY
Qty: 30 CAPSULE | Refills: 11 | Status: SHIPPED | OUTPATIENT
Start: 2023-12-05 | End: 2023-12-05 | Stop reason: SDUPTHER

## 2023-12-05 RX ORDER — FLUOXETINE 10 MG/1
CAPSULE ORAL
Qty: 60 CAPSULE | Refills: 1 | Status: SHIPPED | OUTPATIENT
Start: 2023-12-05 | End: 2023-12-27 | Stop reason: SDUPTHER

## 2023-12-05 RX ORDER — RIMEGEPANT SULFATE 75 MG/75MG
75 TABLET, ORALLY DISINTEGRATING ORAL ONCE AS NEEDED
Qty: 48 TABLET | Refills: 0 | Status: SHIPPED | OUTPATIENT
Start: 2023-12-05 | End: 2023-12-14 | Stop reason: SDUPTHER

## 2023-12-05 RX ORDER — INSULIN PMP CART,AUT,G6/7,CNTR
1 EACH SUBCUTANEOUS DAILY
Qty: 30 EACH | Refills: 11 | Status: SHIPPED | OUTPATIENT
Start: 2023-12-05

## 2023-12-05 NOTE — PROGRESS NOTES
CGM report reviewed. Glucoses overall well controlled with highest glucoses following dinner d/t weak carb ratio. A message sent to pt with recommendation to increase carb ratio from 5 pm to 12 am from 2 to 1.7.          fell on dec 22, injuring right arm, bruising to right arm.  full ROM tor right arm.  denies hitting head or any other injuries from fall.

## 2023-12-05 NOTE — PROGRESS NOTES
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 or double vision.  The patient denies new loss of hearing or worsening tinnitus.  The patient denies anosmia.  Sleep:   The patient reports difficulty sleeping.  The patient does not have difficulty going to sleep.  The patient denies difficulty staying asleep or frequently awakening at night.  The patient does snore and/or have witnessed apneas while sleeping.  When he wakes up in the morning, he does feel well-rested.  He denies dream-enactment behavior.  He denies symptoms suggestive of restless leg syndrome.  Behavior:   The patient's personality has changed.  He does have symptoms of disinhibition and social inappropriateness.  He does not have symptoms to suggest a loss of manners or decorum.  He does not appear apathetic or has decreased motivation.  He does not appear to have a change in inertia.  There is no report that The patient has had a change in their emotional expression.  He does have emotional blunting or lability.  He does not have symptoms of irritability and mood lability.  He does not have  symptoms of agitation, aggression, or violent outbursts.  His insight into his health and situation is intact.  His personal hygiene is intact.  He is not exhibiting a diminished response to other people's needs and feelings.  He is not exhibiting a diminished social interest, interrelatedness, or personal warmth.  He denies restlessness.  He denies new and/or worsening simple repetitive behaviors.  His speech has not become simplified or become repetitive/stereotyped.  He denies new/worsening complex repetitive/ritualistic compulsions and behaviors.  He does not have symptoms of hyper-religiosity or dogmatism.  His interests/pleasures have not become restrictive, simplified, interrupting, or repetitive.  He denies a change of self-stimulating behavior.  He denies any changes in eating behavior.  He denies increased consumption of food or substances.  He denies oral exploration or consumption of inedible objects.  Psychiatric:   He does feel depressed.  He is exhibiting symptoms of social withdrawal/indifference.  He denies anxiety.  He does exhibit cycling behavior.  He does not exhibit hyperactive behavior.  He is not exhibited symptoms of paranoia.  He does not have delusions.  He does not have hallucinations.  He does not have a history of sensitivity to neuroleptic/psychotropic medications.  Medical Review of Systems:   The patient does not have constipation.  The patient does not have urinary incontinence.  The patient denies orthostatic lightheadedness.  The patient's weight is stable.  Functional status:  Difficulty performing the following Instrumental ADLs:  Housekeeping: No  Food Preparation: No  Shopping: No  Ability to Handle Finances: No  Transportation/Driving: No  Household Appliances/Stove: No  Laundry: No  Difficulty performing the following Basic ADLs:  Dressing: No  Bathing: No  Toileting: No  Personal hygiene and grooming: No  Feeding: No  Care Management:  Patient/Family Safety  Concerns:  Medication Adherence: No  Home Safety: No  Wandered: No  Firearms: No  Fall Risk: No  Home Alone: No          Past Medical History:   Diagnosis Date    Diabetes mellitus, type 2     Hyperlipidemia     Hypertension     Obesity     NAINA (obstructive sleep apnea)        Past Surgical History:   Procedure Laterality Date    ARTHROSCOPIC DEBRIDEMENT OF SHOULDER  3/14/2023    Procedure: DEBRIDEMENT, SHOULDER, ARTHROSCOPIC;  Surgeon: Crissy Bradford MD;  Location: [facility] OR;  Service: Orthopedics;;    ARTHROSCOPIC REPAIR OF ROTATOR CUFF OF SHOULDER Right 3/14/2023    Procedure: REPAIR, ROTATOR CUFF, ARTHROSCOPIC;  Surgeon: Crissy Bradford MD;  Location: [facility] OR;  Service: Orthopedics;  Laterality: Right;  HARDY AND NEPHFLY ELMER [phone]. TEXTED ELMER ON 3/9/2023 @ 12:10PM. ELMER RESPONDED ON 3/9/23 @ 12:23PM-SAG  RN PREOP 3/10/2023---CLEARED BY PCP AND [facility]    ARTHROSCOPY,SHOULDER,WITH BICEPS TENODESIS  3/14/2023    Procedure: ARTHROSCOPY,SHOULDER,WITH BICEPS TENODESIS;  Surgeon: Crissy Bradford MD;  Location: [facility] OR;  Service: Orthopedics;;    KNEE ARTHROSCOPY W/ MENISCECTOMY Right 10/24/2023    Procedure: ARTHROSCOPY, KNEE, WITH MENISCECTOMY;  Surgeon: Crissy Bradford MD;  Location: [facility] OR;  Service: Orthopedics;  Laterality: Right;  RN PREOP 10/18/203---CLEARED BY 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     Employer: [employer]   Tobacco Use    Smoking status: Never     Passive exposure: Never    Smokeless tobacco: Never   Substance and Sexual Activity    Alcohol use: Yes     Comment: 1-2 beers every 2 weeks    Drug use: No     Social Determinants of Health     Financial Resource Strain: Unknown (12/5/2023)    Overall Financial Resource Strain (CARDIA)     Difficulty of Paying Living Expenses: Patient refused   Food Insecurity: Unknown (12/5/2023)    Hunger Vital Sign     Worried About Running Out of Food in the Last Year: Patient refused     Ran Out of Food in the Last Year: Patient refused   Transportation Needs: Unknown (12/5/2023)    PRAPARE - Transportation     Lack of Transportation (Medical): Patient refused     Lack of Transportation (Non-Medical): Patient refused   Physical Activity: Sufficiently Active (12/5/2023)    Exercise Vital Sign     Days of Exercise per Week: 4 days     Minutes of Exercise per Session: 100 min   Stress: Unknown (12/5/2023)    [place] [employer] - Occupational Stress Questionnaire     Feeling of Stress : Patient refused   Recent Concern: Stress - Stress Concern Present (10/10/2023)    [place] [place] [employer] - Occupational Stress Questionnaire     Feeling of Stress : To some extent   Social Connections: Unknown (12/5/2023)    Social Connection and Isolation Panel [NHANES]     Frequency of Communication with Friends and Family: Once a week     Frequency of Social Gatherings with Friends and Family: Once a week     Active Member of Clubs or Organizations: No     Attends Club or Organization Meetings: Never     Marital Status:    Housing Stability: Unknown (12/5/2023)    Housing Stability Vital Sign     Unable to Pay for Housing in the Last Year: Patient refused     Number of Places Lived in the Last Year: 1     Unstable Housing in the Last Year: Patient refused   Recent Concern: Housing Stability - High Risk (10/10/2023)    Housing Stability Vital Sign      Unable to Pay for Housing in the Last Year: Yes     Number of Places Lived in the Last Year: 1     Unstable Housing in the Last Year: No       Medication:     Current Outpatient Medications on File Prior to Visit   Medication Sig Dispense Refill    ammonium lactate 12 % Crea Apply 1 application  topically once daily. 140 g 5    atorvastatin (LIPITOR) 40 MG tablet TAKE 1 TABLET BY MOUTH EVERY DAY 90 tablet 1    azelastine (ASTELIN) 137 mcg (0.1 %) nasal spray 1 spray (137 mcg total) by Nasal route 2 (two) times daily. 30 mL 3    blood sugar diagnostic Strp To check BG 4 times daily, to use with insurance preferred meter 400 strip 3    blood sugar diagnostic Strp OneTouch Ultra Test strips      blood-glucose meter kit OneTouch Ultra2 Meter kit      blood-glucose sensor (DEXCOM G6 SENSOR) Filomena Change sensor every 10 days 3 each 11    blood-glucose sensor (FREESTYLE SAMANTHA 3 SENSOR) Filomena Change every 14 days 2 each 11    blood-glucose transmitter (DEXCOM G6 TRANSMITTER) Filomena Change every 3 months 1 each 3    cyanocobalamin, vitamin B-12, 5,000 mcg Subl Place one under tongue once a day for 1 month, then continue to take under tongue per week 30 tablet 3    empagliflozin (JARDIANCE) 25 mg tablet Take 1 tablet (25 mg total) by mouth once daily. 90 tablet 2    fluticasone propionate (FLONASE) 50 mcg/actuation nasal spray 1 spray (50 mcg total) by Each Nostril route once daily. 48 g 5    insulin aspart U-100 (NOVOLOG) 100 unit/mL (3 mL) InPn pen INJECT 15-30 UNITS BEFORE EACH MEAL WITH SLIDNING SCALE, MAX DAILY DOSE 100 UNITS 30 mL 5    insulin aspart U-100 (NOVOLOG) 100 unit/mL injection Max daily dose 160 units in insulin pump. 40 mL 5    insulin glargine U-300 conc (TOUJEO MAX U-300 SOLOSTAR) 300 unit/mL (3 mL) insulin pen Inject 30 Units into the skin once daily. 3 pen 5    insulin NPH isoph U-100 human (NOVOLIN N FLEXPEN) 100 unit/mL (3 mL) InPn INJECT 14 UNITS INTO THE SKIN ONCE DAILY AT NIGHT 8 PM. 15 mL 2     "insulin pump cart,automated,BT (OMNIPOD 5 G6 PODS, GEN 5,) Crtg Inject 1 each into the skin once daily. 30 each 11    L-METHYLFOLATE 15 mg Tab TAKE 15 MG BY MOUTH ONCE DAILY. 30 tablet 11    lamoTRIgine (LAMICTAL) 100 MG tablet Take 1.5 tablets (150 mg total) by mouth once daily. 135 tablet 3    lancets Misc To check BG 4 times daily, to use with insurance preferred meter 400 each 3    levothyroxine (SYNTHROID) 50 MCG tablet TAKE 1 TABLET BY MOUTH BEFORE BREAKFAST. 90 tablet 3    lisinopriL (PRINIVIL,ZESTRIL) 20 MG tablet TAKE 1 TABLET BY MOUTH EVERY DAY 90 tablet 3    modafiniL (PROVIGIL) 100 MG Tab Take 1 tablet (100 mg total) by mouth every morning. 90 tablet 1    montelukast (SINGULAIR) 10 mg tablet Take 1 tablet (10 mg total) by mouth every evening. 30 tablet 3    oxyCODONE (ROXICODONE) 5 MG immediate release tablet Take 1 tablet (5 mg total) by mouth every 4 (four) hours as needed for Pain. 25 tablet 0    pen needle, diabetic (BD ULTRA-FINE KEN PEN NEEDLE) 32 gauge x 5/32" Ndle 1 Device by Misc.(Non-Drug; Combo Route) route 5 (five) times daily. 550 each 4    syringe, disposable, 1 mL Syrg Testosterone every 2 weeks 25 Syringe 1    tirzepatide (MOUNJARO) 5 mg/0.5 mL PnIj Inject 5 mg into the skin every 7 days. 4 pen 5    tirzepatide 7.5 mg/0.5 mL PnIj Inject 7.5 mg into the skin every 7 days. 4 pen 4    [DISCONTINUED] insulin pump cart,automated,BT (OMNIPOD 5 G6 PODS, GEN 5,) Crtg Inject 1 each into the skin once daily. 30 each 11     No current facility-administered medications on file prior to visit.        Review of patient's allergies indicates:   Allergen Reactions    Influenza virus vaccine, specific Hives    Pioglitazone      Altered mood     Victoza [liraglutide] Nausea And Vomiting    Farxiga [dapagliflozin] Rash     Erectile dysfunction and rash        Medications Reconciliation:   I have reconciled the patient's home medications and discharge medications with the patient/family. I have updated all " changes.  Refer to After-Visit Medication List.    Objective:  Vital Signs:  There were no vitals filed for this visit.  Wt Readings from Last 3 Encounters:   10/23/23 0728 (!) 136.9 kg (301 lb 13 oz)   10/18/23 1326 (!) 136.9 kg (301 lb 13 oz)   10/17/23 1443 134.8 kg (297 lb 2.9 oz)     There is no height or weight on file to calculate BMI.           Neurological examination:  Mental Status:   His appearance deviates from typical expectations given their age and context.  Throughout the interview, he is cooperative, his eye contact is appropriate.  His behavior is appropriate to the clinical context without impropriety or improper language/conduct.  His behavior was not characterized by episodes of sudden uncontrollable and inappropriate laughing or crying.  The patient is awake; His energy level appeared normal.  His orientation is normal; Spatial 5/5 (location, the floor of building, city, county, state) and temporal 5/5 (month, day, year, MANDA) dimensions are accurate.  His attention/concentration is impaired.  He can complete three-step commands.  His fund of knowledge was appropriate for age, culture, and level of education.  His thought process is logical and goal-oriented.  He demonstrated appropriate insight based on actions, awareness of his illness, plans for the future.  He demonstrated good judgment based on actions and plans for the future.  He has no evidence of hallucinations (auditory, visual, olfactory).  He has no evidence of delusions (paranoid, grandiose, bizarre).  Cranial Nerves:   His pupils were normal.  His visual fields were full to confrontation in all quadrants.  His ocular pursuit in the horizontal and vertical plane was complete.  His saccadic initiation, velocity, and amplitude are normal.  His facial strength was normal.  His facial expression was symmetric and appropriate to the context.  His oropharynx and soft palate appeared abnormal. Comment: mallampati 4;  His uvula is  mid-line.  His tongue showed no evidence of scalloping.  He tongue movement with normal.  Speech/Language:   The patient's speech was fluent, non-effortful, and his rate was appropriate to the context.  He has no articulation (segmental features) errors.  The patient's speech is not dysarthric.  The patient's speech was without evidence of anomia.  He makes no phonological loop errors.  Motor:   The patient's bilateral upper extremity muscle bulk is appropriate.  The patient's upper extremity muscle tone is increased. Comment: R, R>L, Mild;  The patient's bilateral upper extremity muscle tone does not suggest spasticity.  The patient's bilateral upper extremity muscle tone does not suggest rigidity/cogwheeling.  There is evidence of paratonia. Comment: Muscle tone is increased and there is evidence of paratonia.; Muscle tone is increased and there is evidence of paratonia.  Assessment of motor strength was symmetric and at minimal anti-gravity.  There is no pronator or downward drift.  There is no myoclonus observed in The patient's bilateral upper and lower extremities.  There are no fasciculations observed in The patient's bilateral upper and lower extremities.  Coordination:   He has no bilateral upper extremity limb dysmetria or past pointing on finger-nose-finger bilaterally.  He has no limb dysdiadochokinesia of the upper extremity on the pronation/supination test and screwing in a light bulb or lower extremity during tapping ball of each foot bilaterally.  He has no visible tremor.  He has evidence of interhemispheric motor control deficits.  He demonstrates evidence of motor overflow.  The patient's upper extremity fine motor coordination was abnormal. Comment: R, R>L, Mild;  The patient's upper extremity fine motor coordination was not slow. Comment: finger tapping, pronation/supination, and the open-close fist was slow.; finger tapping, pronation/supination, and the open-close fist was slow.  The patient's  upper extremity fine motor coordination was not hypometric. Comment: finger tapping, pronation/supination, and the open-close fist showed hypometria.; finger tapping, pronation/supination, and the open-close fist showed hypometria.  The patient's upper extremity fine motor coordination was not dysrhythmic. Comment: finger tapping, pronation/supination, and the open-close fist showed dysrhythmia.; finger tapping, pronation/supination, and the open-close fist showed dysrhythmia.  Higher Cortical Function:   The patient showed no evidence of simultanagnosia (Navon hierarchical letters).  He demonstrates no evidence of dorsal simultanagnosia (overlapping objects).  He demonstrates no evidence of ventral simultanagnosia (complex picture synthesis).  The patient showed no evidence of visuospatial constructional dysfunction.  The patient showed no evidence of apraxia.  He showed no dysexecutive behavior.  Sensory:   His cortical sensory assessment demonstrated no neglect bilaterally.  He he had no astereognosis (paper clip, ring, dime) bilaterally in the palms.  He he had no agraphesthesia (drawing numbers) in the palms.  His sensation was diminished to light touch, and vibratory sense. Comment: in the bilateral upper and lower extremities in a length-dependant pattern.; in the bilateral upper and lower extremities in a length-dependant pattern.  Reflexes:   Reflexes were symmetric and 2+ at biceps, 2+ triceps, and 2+ brachioradialis, 2+ at the knees bilaterally, there was no cross-abductor sign, 2+ in the bilateral ankles.  Gait:   He can arise from a chair and sit back down without using their arms.  His gait was normal.  When attempting to walk abnormally (heels, tiptoes, tandem), he makes no errors.  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  Neurocognitive/Neurobehavioral  Evaluation completed on 2023-12-05    Neuropsychiatric/Behavioral Focused Evaluation Assessment    BEHAV5+ 2/6 See ROS section for a full description   Laboratories:     Lab Date Value [Reference]   Coagulopathy Screening           aPTT 2023, Mar-09    24.5 [21.0 - 32.0 sec]      D-Dimer 2022, Feb-18    0.36      INR 2023, Mar-09    1.0 [0.8 - 1.2]      Protime 2023, Mar-09    10.2 [9.0 - 12.5 sec]      Metabolic Screening   Beta-Hydroxybutyrate 2023, Apr-03    0.1 [0.0 - 0.5 mmol/L]      Fat Qual Neutral, Stl 2022, Mar-25    Normal [Normal]      FECAL SPLIT FAT 2022, Mar-25    Normal [Normal]      Ferritin 03/25/2022  82 [20.0 - 300.0 ng/mL]      Free T4 02/03/2021  0.92 [0.71 - 1.51 ng/dL]      Hemoglobin A1C External 2023, Jan-19    8.0 (H) [4.0 - 5.6 %]      Lactate, Fernie 2022, Mar-25    1.9 [0.5 - 2.2 mmol/L]      Lipase 01/19/2023  45 [4 - 60 U/L]      Methlymalonic Acid 01/19/2023  0.12      Procalcitonin 01/19/2023  0.02      T4 Total 01/19/2023  6.5 [4.5 - 11.5 ug/dL]      TSH 02/03/2021  2.562 [0.400 - 4.000 uIU/mL]      Glucose 2023, Apr-03    229 (H) [70 - 110 mg/dL]      Albumin 2023, Apr-03    4.1 [3.5 - 5.2 g/dL]      Alkaline Phosphatase 2023, Apr-03    76 [55 - 135 U/L]      ALT 2023, Apr-03    29 [10 - 44 U/L]      AST 2023, Apr-03    17 [10 - 40 U/L]      BILIRUBIN TOTAL 2023, Apr-03    0.7 [0.1 - 1.0 mg/dL]      PROTEIN TOTAL 2023, Apr-03    7.9 [6.0 - 8.4 g/dL]      Cholesterol 2022, Feb-17    162 [120 - 199 mg/dL]      HDL 2022, Feb-17    52 [40 - 75 mg/dL]      Non-HDL Cholesterol 2022, Feb-17    110 [mg/dL]      Triglycerides 01/19/2023  141 [30 - 150 mg/dL]      B12 Def. Methylmalonic Acid 01/19/2023  0.10      Folate 01/19/2023  10.7 [4.0 - 24.0 ng/mL]      Thiamine 01/19/2023  38 [38 - 122 ug/L]      Vitamin B-12 01/19/2023  364 [180 - 914 ng/L]      Infectious Disease/Immunocompromised Screening   Cryptosporidium Antigen 2022, Mar-25    Negative      Giardia Antigen - EIA 2022, Mar-25     Negative      SARS Coronavirus 2 Antigen 2022, Jan-07    Negative      SARS-CoV-2 RNA, Amplification, Qual 2023, Jan-05    Negative      SARS-CoV2 (COVID-19) Qualitative PCR 2022, Feb-21    Not Detected [Not Detected]      Stool WBC 2022, Mar-25    No neutrophils seen [No neutrophils seen]      Hepatitis C Ab 07/15/2021  Negative      HIV 1/2 Ag/Ab 01/19/2023  Negative      Syphilis Treponemal Ab 01/19/2023  Nonreactive [Nonreactive]      Neuroendocrine/Electrolyte Screening   FSH 01/19/2023  0.40 (L) [0.95 - 11.95 mIU/mL]      Magnesium 03/25/2022  1.9 [1.6 - 2.6 mg/dL]      Phosphorus 2022, Mar-25    3.5 [2.7 - 4.5 mg/dL]      Sex Hormone Binding Globulin 2021, Feb-03    21 [10 - 50 nmol/L]      Testosterone, Total 2021, Apr-19    894 [304 - 1227 ng/dL]      BUN 2023, Apr-03    17 [6 - 20 mg/dL]      Chloride 2023, Apr-03    110 [95 - 110 mmol/L]      Creatinine 2023, Apr-03    1.1 [0.5 - 1.4 mg/dL]      Potassium 2023, Apr-03    4.8 [3.5 - 5.1 mmol/L]      Sodium 2023, Apr-03    138 [136 - 145 mmol/L]      Testosterone, Bioavailable 2021, Feb-03    47.5 (L) [110.0 - 575.0 ng/dL]      Testosterone, Free 2021, Feb-03    25.2 (L) [46.0 - 224.0 pg/mL]      Cerebrospinal Fluid Assessment   IgG 2023, Jan-19    813 [650 - 1600 mg/dL]      Neurodegenerative Serum Fluid Assessment   NEUROFILAMENT LIGHT CHAIN, PLASMA 2023, Jan-19    8.0      Standard Hematology Screen   Hematocrit 2023, Apr-03    55.2 (H) [40.0 - 54.0 %]      Hemoglobin 2023, Apr-03    17.0 [14.0 - 18.0 g/dL]      MCV 2023, Apr-03    82 [82 - 98 fL]      Platelets 2023, Apr-03    292 [150 - 450 K/uL]      Delirium Screening   Bacteria, UA 2023, Apr-03    Rare [None-Occ /hpf]      Glucose, UA 2023, Apr-03    4+ (A)      Ketones, UA 2023, Apr-03    Negative      Leukocytes, UA 2023, Apr-03    Negative      NITRITE UA 2023, Apr-03    Negative      Protein, UA 2023, Apr-03    Negative      RBC, UA 2023, Apr-03    3 [0 - 4 /hpf]           Neuroimaging:    MRI  brain/head without contrast on 11/2/2022  Formal interpretation by Radiology:  No acute intracranial abnormality  Independently reviewed radiological imaging by Srini Lima MD. MPH. 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 No significant cortical atrophy; tight sulci not suggestive of any focal atrophy or degenerative pathology. Subcortical structures of normal volume and ratio. Corpus callosum is of normal volume. Midbrain and kevin has normal volume ratio. Hippocampi shows no evidence of atrophy.  T2/FLAIR: no significant subcortical white matter changes.  DWI/ADC: No Significant DWI hyperintensities/hypointensities. No ADC correlation.  SWI/GRE: No Significant hypointensities to suggest cortical/subcortical hemosiderin deposition.  Impression: : No significant white matter changes regional atrophy however there is clear evidence of a lack of septum pellucidum likely traumatic in nature.     Procedures:    Electrocardiogram on 3/9/2023  Formal interpretation:  BPM    P-R Int : 174 ms          QRS Dur : 104 ms     QT Int : 338 ms       P-R-T Axes : 052 023 -01 degrees    QTc Int : 415 ms Normal sinus rhythm Possible Left atrial enlargement Septal infarct (cited on or before 09-MAR-2023) Abnormal ECG  Independently reviewed Electrocardiogram by Srini Lima MD. MPH. Behavioral Neurologist  Impression: : Received ECG has no evidence of sinus node disease. HR (>=50-60). Prolonged FL interval (>0.22 s). Broad QRS complex (> 0.12 s).    Electrocardiogram on 3/25/2022  Formal interpretation:  Vent. Rate : 107 BPM     Atrial Rate : 107 BPM    P-R Int : 166 ms          QRS Dur : 094 ms     QT Int : 334 ms       P-R-T Axes : 041 012 024 degrees    QTc Int : 445 ms Sinus tachycardia Nonspecific T wave abnormality Abnormal ECG  Independently reviewed Electrocardiogram by Srini Lima MD. MPH. Behavioral  Neurologist  Impression: : Received ECG has no evidence of sinus node disease. HR (>=50-60). Prolonged MD interval (>0.22 s). Broad QRS complex (> 0.12 s).     Clinical Summary:     The patient is a 43-year-old right-handed male with a relevant past medical history of DM with complications, HTN, HLD. NAINA, migraine headaches, who presents reporting a 24-year history of gradually progressive neurocognitive impairment.       The clinical history is suggestive of:  Memory Impairment: STM encoding impairment, LTM encoding-retrieval impairment  Attention Impairment: Attention, Selective attention, Sustained attention, Shifting attention  Executive Impairment: Energization, Working Memory, Response Inhibition  Behavior Impairment: Emotional Regulation  Psychiatric Impairment: Neurovegetative, Social Coherence, Mood Regulation  The neurological examination is significant for:  Cortical Transcallosal Disconnection: interhemispheric motor control (interhemispheric motor control ), motor efference (motor overflow)  Movement Disorder (Hypokinetic): paratonia (tone), parkinsonism (bradykinesia), dyskinesia (slowing, hypometria, dysrhythmia)  Sensory Dysfunction: peripheral (A? 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        Assessment:        The patient's clinical presentation is behavior/psychiatric predominant mild cognitive impairment insufficient to interfere with activities of daily living (CDR-SOB: 1 - Questionable cognitive impairment).     The patient's clinical presentation meets the criteria for Mild Cognitive Impairment  (MCI-ADRC) (Miquel SUH, et al. 2011 Alzheimer's & Dementia).     Concern regarding an intraindividual change in cognition  Impairment in one or more cognitive domains  Preservation of independence in functional abilities.  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  substantial exposure to repetitive head impacts (RHIs) from contact sports,  service, or other causes i.e. history of concussion, although may be limited to subconcussive  core clinical features of cognitive impairment (in episodic memory and/or executive functioning) and/or neurobehavioral dysregulation.  a progressive course (typically at least 2 years)  typically delayed clinical onset  that the clinical features are not fully accounted for by any other neurologic, psychiatric, or medical conditions.  Subtype Emotional dysregulation: including depression, anxiety, agitation, aggression, paranoid ideation, deterioration of interpersonal relationships, and suicidality  Subtype Behavioral change: including violence, poor impulse control, socially inappropriate behavior, avolition, apathy, change in personality, and comorbid substance abuse  Subtype Motor disturbance: including bradykinesia, tremor, rigidity, gait instability, dysarthria, dysphagia, ataxia, and gaze disturbance     At present, all neurodegenerative diseases can only be diagnosed with 100% certainty through a brain autopsy. 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  Plasma Protein Biomarkers: [01/19/2023] Neurofilament Light Chain (Nfl): 8. [01/19/2023] Neurofilament Light Chain (Nfl): 8.  There are no CSF protein biomarkers available on record.  There are no dermatological protein biomarkers available on record.  There is no known relevant genetic testing available.         The observations made above, were discussed with the patient and their supporting historian(s) (wife). We have discussed the additional diagnostic(s) and/or managenent below.     Care Management Plan:    #Diagnostic Screening for measurable forms of neurodegenerative pathology.  We have discussed opportunities for biomarker testing (CSF Osorio biomarkers, IDEAs Amyloid-PET, Syn-One skin biopsy).  #Optimize Neurocognitive Impairment and Quality  We have discussed the MIND Diet and other lifestyle behavior that may help maintain brain health.  We have provided written/digital reading material  Recommend continue CPAP compliance  Continue B12 5000 mcg Q weekly  Continue levo methyl folate 15 mg q.day for MTHFR mutation  #Optimize Behavioral Management and Quality.  No indication for memantine at this time  start Prozac 10 mg q.day for 1 month increase to 20 mg q.day  continue modafinil 100 mg q.a.m.  continue Lamictal 150 mg q.day  #Optimize Cerebrovascular Health.  The patient has a documented history of hyperlipidemia and/or hypercholesteremia with long-term complications such as cerebrovascular disease, peripheral vascular disease, and/or aortic atherosclerosis. Collectively these risk factors may contribute to cerebral atherosclerosis, and cerebral hypoperfusion compounded neurocognitive disorder. We discussed maximizing cerebrovascular-related medical therapy, including but not limited to cholesterol medications and antiplatelet agents. We have discussed the value of aggressively controlling vascular risk factors like hypertension, hyperlipidemia, and Diabetes SBP<130, LDL<100, and A1C<7.0. We discussed the need to optimize lifestyle choices, including a heart-healthy diet (e.g., Mediterranean or DASH), increased cardiovascular exercise (goal 150 minutes of moderate-intensity per week), and staying cognitively and socially active.  #Coordination of Care Management Planning.  We have recommended additional care  "management through social work support.  #Behavioral/Environmental Strategies  We recommend engaging in activities that stimulate cognitively and socially while avoiding excessive stimulation and fatigue in overwhelmingly complex situations.  We recommend integrating routine and schedule into your daily life. https://www.alzheimersproject.org/news/the-importance-of-routine-and-familiarity-to-persons-with-dementia/  #Health Maintenance/Lifestyle Advice  We have discussed the value in aggressively controlling vascular risk factors like hypertension, hyperlipidemia, and Diabetes SBP<130, LDL<100, A1C<7.0.  We discussed the need to optimize lifestyle choices including a heart-healthy diet (e.g., Mediterranean or DASH), increased cardiovascular exercise (goal 150 minutes of moderate-intensity per week), and stay cognitively and socially active.  #Support  We all need support sometimes. Get easy access to local resources, community programs, and services. https://www.communityresourcefinder.org/  Learn more about Cognitive Impairment in [place]: https://www.alz.org/professionals/public-health/state-overview[place]  #Safety  The Alzheimer's Association administers the nationwide "Safe Return" program with identification bracelets, necklaces, or clothing tags and 24-hour assistance. More information is available online at https://www.alz.org/help-support/caregiving/safety/medicalert-with-24-7-wandering-support  #Follow up:  Follow-up as needed.    Thank you for allowing us to participate in the care of your patient. Please do not hesitate to contact us with any questions or concerns.     It was a pleasure seeing The patient and we look forward to seeing them at their follow-up visit.     This note is dictated on M*Modal Fluency Direct word recognition program. There are word recognition mistakes that are occasionally missed on review.         Scheduled Follow-up :  Future Appointments   Date Time Provider Department " Center   12/6/2023  3:30 PM Crissy Bradford MD [place] [facility] [place] [facility]   12/13/2023  9:00 AM Crissy Bradford MD [place] [facility] - [facility]   1/4/2024  9:30 AM LAB, [facility] [place]   1/11/2024  1:30 PM Kate Mansfield, MARION [facility]OCR[facility] [place] [facility]   1/16/2024  1:40 PM Pema Gongora MD [facility] ALLERGY [place]   1/24/2024 11:45 AM Lona Celaya OD [facility] OPTOMTY [place]   3/5/2024  9:00 AM Beatris Nixon RD [facility]       After Visit Medication List :     Medication List            Accurate as of December 5, 2023  1:48 PM. If you have any questions, ask your nurse or doctor.                CONTINUE taking these medications      ammonium lactate 12 % Crea  Apply 1 application  topically once daily.     atorvastatin 40 MG tablet  Commonly known as: LIPITOR  TAKE 1 TABLET BY MOUTH EVERY DAY     azelastine 137 mcg (0.1 %) nasal spray  Commonly known as: ASTELIN  1 spray (137 mcg total) by Nasal route 2 (two) times daily.     * blood sugar diagnostic Strp     * blood sugar diagnostic Strp  To check BG 4 times daily, to use with insurance preferred meter     blood-glucose meter kit     cyanocobalamin (vitamin B-12) 5,000 mcg Subl  Place one under tongue once a day for 1 month, then continue to take under tongue per week     DEXCOM G6 TRANSMITTER Filomena  Generic drug: blood-glucose transmitter  Change every 3 months     fluticasone propionate 50 mcg/actuation nasal spray  Commonly known as: FLONASE  1 spray (50 mcg total) by Each Nostril route once daily.     * FREESTYLE SAMANTHA 3 SENSOR Filomena  Generic drug: blood-glucose sensor  Change every 14 days     * DEXCOM G6 SENSOR Filomena  Generic drug: blood-glucose sensor  Change sensor every 10 days     * insulin aspart U-100 100 unit/mL (3 mL) Inpn pen  Commonly known as: NovoLOG  INJECT 15-30 UNITS BEFORE EACH MEAL WITH SLIDNING SCALE, MAX DAILY DOSE 100 UNITS     * insulin aspart U-100 100 unit/mL injection  Commonly known as: NovoLOG  Max daily dose 160 units  "in insulin pump.     JARDIANCE 25 mg tablet  Generic drug: empagliflozin  Take 1 tablet (25 mg total) by mouth once daily.     L-METHYLFOLATE 15 mg Tab  Generic drug: levomefolate calcium  TAKE 15 MG BY MOUTH ONCE DAILY.     lamoTRIgine 100 MG tablet  Commonly known as: LAMICTAL  Take 1.5 tablets (150 mg total) by mouth once daily.     lancets [facility]  To check BG 4 times daily, to use with insurance preferred meter     levothyroxine 50 MCG tablet  Commonly known as: SYNTHROID  TAKE 1 TABLET BY MOUTH BEFORE BREAKFAST.     lisinopriL 20 MG tablet  Commonly known as: PRINIVIL,ZESTRIL  TAKE 1 TABLET BY MOUTH EVERY DAY     modafiniL 100 MG Tab  Commonly known as: PROVIGIL  Take 1 tablet (100 mg total) by mouth every morning.     montelukast 10 mg tablet  Commonly known as: SINGULAIR  Take 1 tablet (10 mg total) by mouth every evening.     * MOUNJARO 5 mg/0.5 mL Pnij  Generic drug: tirzepatide  Inject 5 mg into the skin every 7 days.     * MOUNJARO 7.5 mg/0.5 mL Pnij  Generic drug: tirzepatide  Inject 7.5 mg into the skin every 7 days.     NovoLIN N FlexPen 100 unit/mL (3 mL) Inpn  Generic drug: insulin NPH isoph U-100 human  INJECT 14 UNITS INTO THE SKIN ONCE DAILY AT NIGHT 8 PM.     OMNIPOD 5 G6 PODS (GEN 5) Crtg  Generic drug: insulin pump cart,automated,BT  Inject 1 each into the skin once daily.     oxyCODONE 5 MG immediate release tablet  Commonly known as: ROXICODONE  Take 1 tablet (5 mg total) by mouth every 4 (four) hours as needed for Pain.     pen needle, diabetic 32 gauge x 5/32" Ndle  Commonly known as: BD ULTRA-FINE KEN PEN NEEDLE  1 Device by Misc.(Non-Drug; Combo Route) route 5 (five) times daily.     syringe (disposable) 1 mL Syrg  Testosterone every 2 weeks     TOUJEO MAX U-300 SOLOSTAR 300 unit/mL (3 mL) insulin pen  Generic drug: insulin glargine U-300 conc  Inject 30 Units into the skin once daily.           * This list has 8 medication(s) that are the same as other medications prescribed for you. Read " the directions carefully, and ask your doctor or other care provider to review them with you.                  Signing Physician:  Srini South MD    Billing:        -----------------------------------------------------------------------------    I performed this consultation using real-time Telehealth tools, including a live video connection between my location and the patient's location (their home within the [facility]). Prior to initiating the consultation, I obtained informed verbal consent to perform this consultation using Telehealth tools and answered all the questions about the Telehealth interaction. The participants understand that only a limited neurological exam and limited neuropsychological testing can be performed using Telehealth tools.    I spent a total of 45 minutes (time-in: 13:45 PM; time-out: 14:30 PM) on 2023-12-05, virtually face-to-face with the patient and caregiver(s), >50% of that time was spent counseling regarding the symptoms, treatment plan, risks, therapeutic options, lifestyle modifications, and/or safety issues for the diagnoses above.    10/14 Review of Systems completed and is negative except as stated above in HPI (Systems reviewed: Const, Eyes, ENT, Resp, CV, GI, , MSK, Skin, Neuro)    I reviewed the summation of records from outside physicians for a total of 10 minutes on 2023-12-05. Reviewed and summation of records from an outside physician was performed as summarized above in HPI    I performed a neurobehavioral status examination that included a clinical assessment of thinking, reasoning, and judgment to ensure a comprehensive approach in managing the complex and evolving needs of the patient's neurocognitive condition. Please see above HPI and ROS for full details. This exam was performed on 2023-12-05 and included 11 minutes spent on direct face-to-face clinical observation and interview with the patient and 21 minutes spent interpreting test results and  preparing the report. The total time of 32 minutes spent on the neurobehavioral status examination is not included in the time spent on evaluation and management coding.    Total Billing time spent on encounter/documentation for this patient's evaluation and management, not including the neurobehavioral status examination: 44 minutes.

## 2023-12-05 NOTE — PROGRESS NOTES
Diabetes Care Specialist Virtual Visit Note       The patient location is: [place]  The chief complaint leading to consultation is: Diabetes  Visit type: audiovisual  Total time spent with patient: 30 min   Each patient to whom he or she provides medical services by telemedicine is:  (1) informed of the relationship between the physician and patient and the respective role of any other health care provider with respect to management of the patient; and (2) notified that he or she may decline to receive medical services by telemedicine and may withdraw from such care at any time.    Diabetes Care Specialist Progress Note  Author: Beatris Nixon RD  Date: 12/5/2023    Program Intake  Reason for Diabetes Program Visit:: Intervention  Type of Intervention:: Individual  Individual: Education  Education: Self-Management Skill Review, Pattern Management  Current diabetes risk level:: low  In the last 12 months, have you:: used emergency room services  Was the ER or hospital admission related to diabetes?: No  Permission to speak with others about care:: yes (Pt wife in office during appt)    Lab Results   Component Value Date    HGBA1C 6.3 (H) 10/10/2023     Clinical      There is no height or weight on file to calculate BMI.    Clinical Assessment  Current Diabetes Treatment: Oral Medication, Injectable, Insulin pump, Insulin    Medication Information  Medication adherence impacting ability to self-manage diabetes?: No    Labs  Do you have regular lab work to monitor your medications?: Yes  Type of Regular Lab Work: A1c, Cholesterol, Microalbumin, CBC  Lab Compliance Barriers: No    Nutritional Status  Diet: Diabetic diet  Meal Plan 24 Hour Recall: Breakfast, Lunch, Dinner, Snack  Meal Plan 24 Hour Recall - Breakfast: Eggs, hernandez, coffee  Meal Plan 24 Hour Recall - Lunch: Stanley  Meal Plan 24 Hour Recall - Dinner: Greek hummus with salad and Gyro  Meal Plan 24 Hour Recall - Snack: Drinks: unsweet tea, water  Current  nutritional status an area of need that is impacting patient's ability to self-manage diabetes?: No    Additional Social History    Support  Does anyone support you with your diabetes care?: yes  Who supports you?: self, spouse  Who takes you to your medical appointments?: self  Does the current support meet the patient's needs?: Yes  Is Support an area impacting ability to self-manage diabetes?: No    Access to Mass Media & Technology  Does the patient have access to any of the following devices or technologies?: Smart phone  Media or technology needs impacting ability to self-manage diabetes?: No    Cognitive/Behavioral Health  Alert and Oriented: Yes  Difficulty Thinking: No  Requires Prompting: No  Requires assistance for routine expression?: No  Cognitive or behavioral barriers impacting ability to self-manage diabetes?: No    Communication  Language preference: English  Hearing Problems: No  Vision Problems: No  Communication needs impacting ability to self-manage diabetes?: No    Health Literacy  Preferred Learning Method: Face to Face, Demonstration  Health literacy needs impacting ability to self-manage diabetes?: No    Diabetes Self-Management Skills Assessment    Diabetes Disease Process/Treatment Options  Diabetes Disease Process/Treatment Options: Skills Assessment Completed: No  Deferred due to:: Time  Area of need?: Deferred    Nutrition/Healthy Eating  Method of carbohydrate measurement:: carb counting/reading labels  Nutrition/Healthy Eating Skills Assessment Completed:: Yes  Assessment indicates:: Adequate understanding  Deffered due to:: Time  Area of need?: No    Physical Activity/Exercise  Physical Activity/Exercise Skills Assessment Completed: : No  Deffered due to:: Time  Area of need?: Deferred    Medications  Patient is able to describe current diabetes management routine.: yes  Diabetes management routine:: diet, injectable medications, insulin, oral medications  Patient is able to identify  current diabetes medications, dosages, and appropriate timing of medications.: yes (Toujeo, Novolog, Mounjaro, Jardiance, pt trained on Omnipod 5 during appt)  Patient reports problems or concerns with current medication regimen.: yes  Medication regimen problems/concerns:: financial concerns  Medication Skills Assessment Completed:: Yes  Assessment indicates:: Instruction Needed, Knowledge deficit  Area of need?: Yes    Home Blood Glucose Monitoring  Patient states that blood sugar is checked at home daily.: yes  Monitoring Method:: personal continuous glucose monitor  Personal CGM type:: Dexcom G6  Patient is able to use personal CGM appropriately.: yes  Assessment indicates:: Adequate understanding  Area of need?: No    Acute Complications  Acute Complications Skills Assessment Completed: : No  Deffered due to:: Time  Area of need?: Deferred    Chronic Complications  Patient is taking statin?: Yes  Chronic Complications Skills Assessment Completed: : No  Deferred due to:: Time  Area of need?: Deferred    Psychosocial/Coping  Psychosocial/Coping Skills Assessment Completed: : No  Deffered due to:: Time  Area of need?: Deferred    Assessment Summary and Plan    Based on today's diabetes care assessment, the following areas of need were identified:          12/5/2023    12:01 AM   Social   Support No   Access to Mass Media/Tech No   Cognitive/Behavioral Health No   Communication No   Health Literacy No            12/5/2023    12:01 AM   Clinical   Medication Adherence No   Lab Compliance No   Nutritional Status No            12/5/2023    12:01 AM   Diabetes Self-Management Skills   Diabetes Disease Process/Treatment Options Deferred   Nutrition/Healthy Eating No   Physical Activity/Exercise Deferred   Medication Yes, see care plan.   Home Blood Glucose Monitoring No   Acute Complications Deferred   Chronic Complications Deferred   Psychosocial/Coping Deferred      Today's interventions were provided through  individual discussion, instruction, and written materials were provided.      Patient verbalized understanding of instruction and written materials.  Pt was able to return back demonstration of instructions today. Patient understood key points, needs reinforcement and further instruction.     Diabetes Self-Management Care Plan:    Today's Diabetes Self-Management Care Plan was developed with Tony's input. Tony has agreed to work toward the following goal(s) to improve his/her overall diabetes control.      Care Plan: Diabetes Management   Updates made since 11/5/2023 12:00 AM        Problem: Medications         Goal: Patient Agrees to take Diabetes Medications as prescribed.    Start Date: 7/31/2023   Expected End Date: 3/5/2024   This Visit's Progress: On track   Recent Progress: On track   Priority: High   Barriers: Financial   Note:    DIABETES EDUCATOR NOTE - PUMP EVAL      Patient seen for initial assessment and education on Omnipod 5.  Patient is interested in an insulin pump and continuous glucose monitor.   Pt returned today for evaluation/education on self care measure of diabetes management, blood glucose testing, meal patterns/ability to carbohydrate count, and problem solving skills for managing hypo and hyperglycemia.     Covered basic details of pump therapy with patient.  Reviewed terms ISF, CHO ratio, goal BG, bolus, basal, active insulin and insulin on board.  Pt verbalized clearer understanding of pump and CGM therapy.   Pt came to appt with Omnipod 5.    During today's visit the patient was introduces to/educated on the following content areas:     Current Diabetic Medications: Jardiance, Toujeo, Novolog, Mounjaro    Nutrition:   Carb counting skills: Pt states comfortable with carbohydrate counting.    Glucose monitoring:  Patient is testing BG using Dexcom G6.      EDUCATION PLAN:   Information provided on Omnipod 5, which patient is most interested in.  Follow up for continued education on  "possible pump training .     8/2/23:   OMNIPOD 5 INSULIN PUMP START Patient is here today for insulin pump training and will be starting on an Omni Pod 5 Insulin pump.     Patient demonstrated the ability to fill pod reservoir with 200 units, prime infusion set and insert pod into right arm per sterile technique.  Instructed pt on use of basic pump features. Reviewed site selection of pods, rotation of sites and hard stop on controller to change every 72 hrs.  Instructed pt on how to enter activity mode.  Reviewed features available in manual mode verses automated mode.   Educated pt on how to switch from automated mode to manual mode.  Patient demonstrated ability to program Dexcom transmitter into controller and start automated limited mode.  Pt Dexcom connected to controller during appt.     INITIAL SETTINGS : Basal rate: 1.2 u/hr Maximum basal: 2.0 u/hr     Bolus Menu:  Pump Settings per NP note  Basal rate 1.2 u/hr  Carb ratio 5   ISF 20  Active insulin time 4 hours   Glucose target 110      Correct over: 110  IAT: 4      Maximum bolus = 30 units     Patient was given time to practice on simulator entering carbs and glucose into pump     Podder/Glooko account information added in specialty comments.    9/5/23: Pt reports adding an extra ~15-20g to his meal carbohydrates to avoid hyperglycemia. Educator messaged NP. NP stated: "let's increase carb ratio from 4 to 3." And to "avoid counting extra carbs after the change so that we can evaluate the effectiveness." Educator assisted pt with making pump setting changes.    10/11/23: Educator assisted pt with placing settings in new Omnipod 5 PDM per NP recommendations:   Basal rate 1.2 u/hr  Carb ratio 2   ISF 20  Active insulin time 4 hours   Glucose target 110      Correct over: 110  IAT: 4      Maximum bolus = 30 units    12/5/23: Pt reports concern with cost of Omnipods 2/2 insurance problems/concerns. Pt [facility] NP reports PA was " denied. Pt reports having insulin pens if needed.          Dexcom download/Glooko report uploaded into media manager.    Follow Up Plan     Follow up in about 3 months (around 3/5/2024) for 3-month F/U.    Today's care plan and follow up schedule was discussed with patient.  Toyn verbalized understanding of the care plan, goals, and agrees to follow up plan.        The patient was encouraged to communicate with his/her health care provider/physician and care team regarding his/her condition(s) and treatment.  I provided the patient with my contact information today and encouraged to contact me via phone or [facility] Patient Portal as needed.     Length of Visit   Total Time: 30 Minutes

## 2023-12-06 ENCOUNTER — OFFICE VISIT (OUTPATIENT)
Dept: ORTHOPEDICS | Facility: CLINIC | Age: 43
End: 2023-12-06
Payer: COMMERCIAL

## 2023-12-06 ENCOUNTER — PATIENT MESSAGE (OUTPATIENT)
Dept: NEUROLOGY | Facility: CLINIC | Age: 43
End: 2023-12-06
Payer: COMMERCIAL

## 2023-12-06 DIAGNOSIS — Z98.890 S/P RIGHT ROTATOR CUFF REPAIR: Primary | ICD-10-CM

## 2023-12-06 PROCEDURE — 99024 POSTOP FOLLOW-UP VISIT: CPT | Mod: S$GLB,,, | Performed by: ORTHOPAEDIC SURGERY

## 2023-12-06 PROCEDURE — 3061F NEG MICROALBUMINURIA REV: CPT | Mod: CPTII,S$GLB,, | Performed by: ORTHOPAEDIC SURGERY

## 2023-12-06 PROCEDURE — 3061F PR NEG MICROALBUMINURIA RESULT DOCUMENTED/REVIEW: ICD-10-PCS | Mod: CPTII,S$GLB,, | Performed by: ORTHOPAEDIC SURGERY

## 2023-12-06 PROCEDURE — 1160F RVW MEDS BY RX/DR IN RCRD: CPT | Mod: CPTII,S$GLB,, | Performed by: ORTHOPAEDIC SURGERY

## 2023-12-06 PROCEDURE — 3066F PR DOCUMENTATION OF TREATMENT FOR NEPHROPATHY: ICD-10-PCS | Mod: CPTII,S$GLB,, | Performed by: ORTHOPAEDIC SURGERY

## 2023-12-06 PROCEDURE — 3044F HG A1C LEVEL LT 7.0%: CPT | Mod: CPTII,S$GLB,, | Performed by: ORTHOPAEDIC SURGERY

## 2023-12-06 PROCEDURE — 1159F MED LIST DOCD IN RCRD: CPT | Mod: CPTII,S$GLB,, | Performed by: ORTHOPAEDIC SURGERY

## 2023-12-06 PROCEDURE — 3044F PR MOST RECENT HEMOGLOBIN A1C LEVEL <7.0%: ICD-10-PCS | Mod: CPTII,S$GLB,, | Performed by: ORTHOPAEDIC SURGERY

## 2023-12-06 PROCEDURE — 1159F PR MEDICATION LIST DOCUMENTED IN MEDICAL RECORD: ICD-10-PCS | Mod: CPTII,S$GLB,, | Performed by: ORTHOPAEDIC SURGERY

## 2023-12-06 PROCEDURE — 99024 PR POST-OP FOLLOW-UP VISIT: ICD-10-PCS | Mod: S$GLB,,, | Performed by: ORTHOPAEDIC SURGERY

## 2023-12-06 PROCEDURE — 1160F PR REVIEW ALL MEDS BY PRESCRIBER/CLIN PHARMACIST DOCUMENTED: ICD-10-PCS | Mod: CPTII,S$GLB,, | Performed by: ORTHOPAEDIC SURGERY

## 2023-12-06 PROCEDURE — 4010F PR ACE/ARB THEARPY RXD/TAKEN: ICD-10-PCS | Mod: CPTII,S$GLB,, | Performed by: ORTHOPAEDIC SURGERY

## 2023-12-06 PROCEDURE — 3066F NEPHROPATHY DOC TX: CPT | Mod: CPTII,S$GLB,, | Performed by: ORTHOPAEDIC SURGERY

## 2023-12-06 PROCEDURE — 99999 PR PBB SHADOW E&M-EST. PATIENT-LVL IV: CPT | Mod: PBBFAC,,, | Performed by: ORTHOPAEDIC SURGERY

## 2023-12-06 PROCEDURE — 4010F ACE/ARB THERAPY RXD/TAKEN: CPT | Mod: CPTII,S$GLB,, | Performed by: ORTHOPAEDIC SURGERY

## 2023-12-06 PROCEDURE — 99999 PR PBB SHADOW E&M-EST. PATIENT-LVL IV: ICD-10-PCS | Mod: PBBFAC,,, | Performed by: ORTHOPAEDIC SURGERY

## 2023-12-06 NOTE — PROGRESS NOTES
Postoperative Visit    History of Present Illness:   Tony is 6 weeks s/p right Knee arthroscopy and Lateral meniscus debridement  and subchondroplasty (DOS-10/24/23)  He is doing well   Mechanical symptoms have improved - giving way and popping have resolved  Still has pain, improved about 1 -2 points     He asked about the shoulder plan - I see him next week. Plan was for FCE. Will go ahead and order this so he is not waiting on it     Physical Examination:    Vitals:    12/06/23 1528   PainSc:   4   PainLoc: Knee      NAD  right lower extremity:  Incisions over the knee  healing well with suture in place   No erythema, drainage or other signs of infection. Appropriate post operative swelling is present      Assessment/Plan:  43 y.o. male  6 weeks s/p right Knee arthroscopy and Lateral meniscus debridement  and subchondroplasty (DOS-10/24/23)    Plan  Continue PT  FCE submitted  Follow up next week for shoulder  Follow up in 4 weeks  for knee. Will consider injection if no improvement in pain     All questions were answered in detail. The patient is in full agreement with the treatment plan and will proceed accordingly.

## 2023-12-07 ENCOUNTER — PATIENT MESSAGE (OUTPATIENT)
Dept: ORTHOPEDICS | Facility: CLINIC | Age: 43
End: 2023-12-07
Payer: COMMERCIAL

## 2023-12-08 ENCOUNTER — TELEPHONE (OUTPATIENT)
Dept: NEUROLOGY | Facility: CLINIC | Age: 43
End: 2023-12-08
Payer: COMMERCIAL

## 2023-12-08 NOTE — TELEPHONE ENCOUNTER
SW called patient's wife to offer care management support. Patient and spouse were present on the call.   SW offered self as a resource to navigate their journey. SW inquired about their main concerns at this time. Patient states that Dr. South is trying to get patient set up with psychology and psychiatry. He states that he doesn't have other main concerns at this time, but he is taking things day by day. He says that he may want to reach out to SW to discuss disability in the future, but states that they are not at that point right now. SW offered to share basic information about applying for disability benefits through the portal in case they would like to read through that material. He approved SW to send info.     Neither patient nor wife have any other questions at this time. SW provided contact information and encouraged them to reach out if they need any support.

## 2023-12-11 ENCOUNTER — PATIENT MESSAGE (OUTPATIENT)
Dept: NEUROLOGY | Facility: CLINIC | Age: 43
End: 2023-12-11
Payer: COMMERCIAL

## 2023-12-12 NOTE — TELEPHONE ENCOUNTER
Sw called patient regarding message in the portal.     He reports that he and is wife are researching disability, trying to gather as much information as possible. He states that he is trying to figure out how severe his TBI is according to Dr. South's perspective. He also had questions about how disability process works, asking if his accumulation of medical issues will be taken into account or if they only look in one health arena at a time.     He shared his experiences with his knee damage, injured rotator cuff and bicep, migraines, and memory loss.     Patient's wife is trying to find work; they have significant stress about finances due to his cognitive impairment.  He states that he has a Functionality test at his employer on the 19th to identify his capabilities.     He states that he wants to work for as long as he can, but he knows he can no longer sustain his previous employment due to cognitive and medical issues. He would like to hear more about Dr. South's insight into his functioning so that he can navigate steps ahead. He was requesting something verbal or written if needed that confirms that patient either has severe or mild TBI. PATRICIA will consult with neuro team.

## 2023-12-13 ENCOUNTER — OFFICE VISIT (OUTPATIENT)
Dept: ORTHOPEDICS | Facility: CLINIC | Age: 43
End: 2023-12-13
Payer: COMMERCIAL

## 2023-12-13 DIAGNOSIS — Z98.890 S/P RIGHT ROTATOR CUFF REPAIR: Primary | ICD-10-CM

## 2023-12-13 DIAGNOSIS — S46.011A TRAUMATIC ROTATOR CUFF TEAR, RIGHT, INITIAL ENCOUNTER: ICD-10-CM

## 2023-12-13 PROCEDURE — 99213 PR OFFICE/OUTPT VISIT, EST, LEVL III, 20-29 MIN: ICD-10-PCS | Mod: 24,S$GLB,, | Performed by: ORTHOPAEDIC SURGERY

## 2023-12-13 PROCEDURE — 99213 OFFICE O/P EST LOW 20 MIN: CPT | Mod: 24,S$GLB,, | Performed by: ORTHOPAEDIC SURGERY

## 2023-12-13 PROCEDURE — 99999 PR PBB SHADOW E&M-EST. PATIENT-LVL IV: ICD-10-PCS | Mod: PBBFAC,,, | Performed by: ORTHOPAEDIC SURGERY

## 2023-12-13 PROCEDURE — 99999 PR PBB SHADOW E&M-EST. PATIENT-LVL IV: CPT | Mod: PBBFAC,,, | Performed by: ORTHOPAEDIC SURGERY

## 2023-12-13 NOTE — LETTER
December 13, 2023    Tony Gordillo  [address]  [place] [address]         [facility]  [address]  [address]  [place] [address]  Phone: [phone] December 13, 2023     Patient: Tony Gordillo   YOB: 1980   Date of Visit: 12/13/2023       To Whom It May Concern:     Dorothy Gordillo  was at [facility] on 12/13/2023 .      Toyn Gordillo is still under my care for an orthopedic injury/condition requiring activity restriction and may not return back to work. 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 Will not clear to work at this time, further decision on work status pending FCE.     Date of next evaluation: 12/28/23        Crissy Bradford MD     If you have any questions or concerns, please don't hesitate to call.     Sincerely,           Crissy Bradford MD Liuzza, Lindsey G., MD

## 2023-12-13 NOTE — PROGRESS NOTES
Postoperative Visit    History of Present Illness:   Tony is 9 months s/p right shoulder arthroscopy , rotator cuff repair, and arthroscopic biceps tenodesis  (DOS-3/14/23)    Full thickness SS and SSC, ATS biceps tenodesis  Has been doing PT for worker hardening - he does not feel like it is helping much. We discussed this briefly at his visit for his knee last week - FCE ordered at that time   FCE scheduled 12/19  Cannot work overhead for longer than 30 -40 seconds     Physical Examination:    NAD  right upper extremity: AROM: , ER 40  Still has weakness of the cuff with SS testing. IS and SSc are a little stronger   2+ RP, BCR in all digits.     Assessment/Plan:  43 y.o. male 9 months s/p right shoulder arthroscopy , rotator cuff repair, and arthroscopic biceps tenodesis  (DOS-3/14/23)    Plan  FCE  Follow up after FCE complete     All questions were answered in detail. The patient is in full agreement with the treatment plan and will proceed accordingly.

## 2023-12-14 RX ORDER — RIMEGEPANT SULFATE 75 MG/75MG
75 TABLET, ORALLY DISINTEGRATING ORAL
Qty: 8 TABLET | Refills: 3 | Status: SHIPPED | OUTPATIENT
Start: 2023-12-14 | End: 2024-01-31 | Stop reason: SDUPTHER

## 2023-12-15 ENCOUNTER — PATIENT MESSAGE (OUTPATIENT)
Dept: ORTHOPEDICS | Facility: CLINIC | Age: 43
End: 2023-12-15
Payer: COMMERCIAL

## 2023-12-27 ENCOUNTER — OFFICE VISIT (OUTPATIENT)
Dept: ORTHOPEDICS | Facility: CLINIC | Age: 43
End: 2023-12-27
Payer: COMMERCIAL

## 2023-12-27 DIAGNOSIS — G43.E11 CHRONIC MIGRAINE WITH AURA, INTRACTABLE, WITH STATUS MIGRAINOSUS: ICD-10-CM

## 2023-12-27 DIAGNOSIS — Z98.890 S/P RIGHT ROTATOR CUFF REPAIR: Primary | ICD-10-CM

## 2023-12-27 PROCEDURE — 4010F ACE/ARB THERAPY RXD/TAKEN: CPT | Mod: CPTII,S$GLB,, | Performed by: ORTHOPAEDIC SURGERY

## 2023-12-27 PROCEDURE — 3044F PR MOST RECENT HEMOGLOBIN A1C LEVEL <7.0%: ICD-10-PCS | Mod: CPTII,S$GLB,, | Performed by: ORTHOPAEDIC SURGERY

## 2023-12-27 PROCEDURE — 99213 PR OFFICE/OUTPT VISIT, EST, LEVL III, 20-29 MIN: ICD-10-PCS | Mod: 24,S$GLB,, | Performed by: ORTHOPAEDIC SURGERY

## 2023-12-27 PROCEDURE — 99999 PR PBB SHADOW E&M-EST. PATIENT-LVL IV: ICD-10-PCS | Mod: PBBFAC,,, | Performed by: ORTHOPAEDIC SURGERY

## 2023-12-27 PROCEDURE — 99999 PR PBB SHADOW E&M-EST. PATIENT-LVL IV: CPT | Mod: PBBFAC,,, | Performed by: ORTHOPAEDIC SURGERY

## 2023-12-27 PROCEDURE — 3061F NEG MICROALBUMINURIA REV: CPT | Mod: CPTII,S$GLB,, | Performed by: ORTHOPAEDIC SURGERY

## 2023-12-27 PROCEDURE — 99213 OFFICE O/P EST LOW 20 MIN: CPT | Mod: 24,S$GLB,, | Performed by: ORTHOPAEDIC SURGERY

## 2023-12-27 PROCEDURE — 4010F PR ACE/ARB THEARPY RXD/TAKEN: ICD-10-PCS | Mod: CPTII,S$GLB,, | Performed by: ORTHOPAEDIC SURGERY

## 2023-12-27 PROCEDURE — 1160F RVW MEDS BY RX/DR IN RCRD: CPT | Mod: CPTII,S$GLB,, | Performed by: ORTHOPAEDIC SURGERY

## 2023-12-27 PROCEDURE — 3061F PR NEG MICROALBUMINURIA RESULT DOCUMENTED/REVIEW: ICD-10-PCS | Mod: CPTII,S$GLB,, | Performed by: ORTHOPAEDIC SURGERY

## 2023-12-27 PROCEDURE — 1160F PR REVIEW ALL MEDS BY PRESCRIBER/CLIN PHARMACIST DOCUMENTED: ICD-10-PCS | Mod: CPTII,S$GLB,, | Performed by: ORTHOPAEDIC SURGERY

## 2023-12-27 PROCEDURE — 3044F HG A1C LEVEL LT 7.0%: CPT | Mod: CPTII,S$GLB,, | Performed by: ORTHOPAEDIC SURGERY

## 2023-12-27 PROCEDURE — 3066F NEPHROPATHY DOC TX: CPT | Mod: CPTII,S$GLB,, | Performed by: ORTHOPAEDIC SURGERY

## 2023-12-27 PROCEDURE — 3066F PR DOCUMENTATION OF TREATMENT FOR NEPHROPATHY: ICD-10-PCS | Mod: CPTII,S$GLB,, | Performed by: ORTHOPAEDIC SURGERY

## 2023-12-27 PROCEDURE — 1159F PR MEDICATION LIST DOCUMENTED IN MEDICAL RECORD: ICD-10-PCS | Mod: CPTII,S$GLB,, | Performed by: ORTHOPAEDIC SURGERY

## 2023-12-27 PROCEDURE — 1159F MED LIST DOCD IN RCRD: CPT | Mod: CPTII,S$GLB,, | Performed by: ORTHOPAEDIC SURGERY

## 2023-12-27 RX ORDER — FLUOXETINE 10 MG/1
CAPSULE ORAL
Qty: 180 CAPSULE | Refills: 1 | OUTPATIENT
Start: 2023-12-27

## 2023-12-27 NOTE — PROGRESS NOTES
Postoperative Visit    History of Present Illness:   Tony is 9.5 months s/p right shoulder arthroscopy , rotator cuff repair, and arthroscopic biceps tenodesis  (DOS-3/14/23)    Full thickness SS and SSC, ATS biceps tenodesis  Has been doing PT for worker hardening - he does not feel like it is helping much   Cannot work overhead for longer than 30 -40 seconds     Here for FCE review     Physical Examination:    NAD  right upper extremity: AROM: , ER 40  Still has weakness of the cuff with SS testing. IS and SSc are a little stronger   2+ RP, BCR in all digits.     Assessment/Plan:  43 y.o. male 9.5 months s/p right shoulder arthroscopy , rotator cuff repair, and arthroscopic biceps tenodesis  (DOS-3/14/23)    Plan  Has met MMI for the R shoulder. I do not expect a drastic improvement in function over the next two months. His progress has plateaued  Work status: Per FCE is able to engage in occasional lifting with medium physical demand. Lifting from floor to waist limited to 70 lbs, from wait to shoulder 30 lbs, shoulder to overhead 15 lbs, two hand carry limited to 45 lbs     All questions were answered in detail. The patient is in full agreement with the treatment plan and will proceed accordingly.

## 2023-12-28 RX ORDER — FLUOXETINE HYDROCHLORIDE 20 MG/1
20 CAPSULE ORAL DAILY
Qty: 30 CAPSULE | Refills: 5 | Status: SHIPPED | OUTPATIENT
Start: 2023-12-28 | End: 2024-03-06 | Stop reason: DRUGHIGH

## 2024-01-04 ENCOUNTER — OFFICE VISIT (OUTPATIENT)
Dept: ORTHOPEDICS | Facility: CLINIC | Age: 44
End: 2024-01-04
Payer: COMMERCIAL

## 2024-01-04 ENCOUNTER — LAB VISIT (OUTPATIENT)
Dept: LAB | Facility: HOSPITAL | Age: 44
End: 2024-01-04
Payer: COMMERCIAL

## 2024-01-04 DIAGNOSIS — Z79.4 TYPE 2 DIABETES MELLITUS WITHOUT COMPLICATION, WITH LONG-TERM CURRENT USE OF INSULIN: ICD-10-CM

## 2024-01-04 DIAGNOSIS — E11.9 TYPE 2 DIABETES MELLITUS WITHOUT COMPLICATION, WITH LONG-TERM CURRENT USE OF INSULIN: ICD-10-CM

## 2024-01-04 DIAGNOSIS — S83.281A TEAR OF LATERAL MENISCUS OF RIGHT KNEE, CURRENT, UNSPECIFIED TEAR TYPE, INITIAL ENCOUNTER: Primary | ICD-10-CM

## 2024-01-04 LAB
ESTIMATED AVG GLUCOSE: 123 MG/DL (ref 68–131)
HBA1C MFR BLD: 5.9 % (ref 4–5.6)

## 2024-01-04 PROCEDURE — 99999 PR PBB SHADOW E&M-EST. PATIENT-LVL IV: CPT | Mod: PBBFAC,,, | Performed by: ORTHOPAEDIC SURGERY

## 2024-01-04 PROCEDURE — 99024 POSTOP FOLLOW-UP VISIT: CPT | Mod: S$GLB,,, | Performed by: ORTHOPAEDIC SURGERY

## 2024-01-04 PROCEDURE — 83036 HEMOGLOBIN GLYCOSYLATED A1C: CPT | Performed by: NURSE PRACTITIONER

## 2024-01-04 PROCEDURE — 1160F RVW MEDS BY RX/DR IN RCRD: CPT | Mod: CPTII,S$GLB,, | Performed by: ORTHOPAEDIC SURGERY

## 2024-01-04 PROCEDURE — 3044F HG A1C LEVEL LT 7.0%: CPT | Mod: CPTII,S$GLB,, | Performed by: ORTHOPAEDIC SURGERY

## 2024-01-04 PROCEDURE — 36415 COLL VENOUS BLD VENIPUNCTURE: CPT | Mod: PO | Performed by: NURSE PRACTITIONER

## 2024-01-04 PROCEDURE — 1159F MED LIST DOCD IN RCRD: CPT | Mod: CPTII,S$GLB,, | Performed by: ORTHOPAEDIC SURGERY

## 2024-01-07 NOTE — PROGRESS NOTES
Postoperative Visit    History of Present Illness:   Tony is 6 weeks s/p right Knee arthroscopy and Lateral meniscus debridement  and subchondroplasty (DOS-10/24/23)  He is doing well   Pain better but still bothersome    Physical Examination:    Vitals:    01/04/24 1529   PainSc:   5   PainLoc: Knee      NAD  right lower extremity:  Incisions over the knee well healed  No erythema, drainage or other signs of infection. Appropriate post operative swelling is present  ROM 0-130    Assessment/Plan:  43 y.o. male  6 weeks s/p right Knee arthroscopy and Lateral meniscus debridement  and subchondroplasty (DOS-10/24/23)    Plan  Injection of the right knee  performed, please see procedure note for more details.  Prior to the injection risks and benefits of corticosteroid injection were discussed with the patient including pain, infection, bleeding, skin color changes, swelling, steroid flare. We discussed that over time injections can result in chondral damage, acceleration of arthritis formation, damage to tendons and damage to joints.  The patient consented for the procedure.  Post-injection instructions were given to the patient in writing.  Follow up PRN    All questions were answered in detail. The patient is in full agreement with the treatment plan and will proceed accordingly.

## 2024-01-11 ENCOUNTER — OFFICE VISIT (OUTPATIENT)
Dept: ENDOCRINOLOGY | Facility: CLINIC | Age: 44
End: 2024-01-11
Payer: COMMERCIAL

## 2024-01-11 VITALS
TEMPERATURE: 99 F | DIASTOLIC BLOOD PRESSURE: 80 MMHG | HEART RATE: 100 BPM | WEIGHT: 287 LBS | SYSTOLIC BLOOD PRESSURE: 126 MMHG | BODY MASS INDEX: 37.87 KG/M2

## 2024-01-11 DIAGNOSIS — Z79.4 TYPE 2 DIABETES MELLITUS WITHOUT COMPLICATION, WITH LONG-TERM CURRENT USE OF INSULIN: Primary | ICD-10-CM

## 2024-01-11 DIAGNOSIS — E66.01 SEVERE OBESITY: ICD-10-CM

## 2024-01-11 DIAGNOSIS — E11.9 TYPE 2 DIABETES MELLITUS WITHOUT COMPLICATION, WITH LONG-TERM CURRENT USE OF INSULIN: Primary | ICD-10-CM

## 2024-01-11 PROCEDURE — 99214 OFFICE O/P EST MOD 30 MIN: CPT | Mod: S$GLB,,, | Performed by: NURSE PRACTITIONER

## 2024-01-11 PROCEDURE — 99999 PR PBB SHADOW E&M-EST. PATIENT-LVL III: CPT | Mod: PBBFAC,,, | Performed by: NURSE PRACTITIONER

## 2024-01-11 PROCEDURE — 95251 CONT GLUC MNTR ANALYSIS I&R: CPT | Mod: S$GLB,,, | Performed by: NURSE PRACTITIONER

## 2024-01-11 RX ORDER — INSULIN ASPART 100 [IU]/ML
INJECTION, SOLUTION INTRAVENOUS; SUBCUTANEOUS
Qty: 30 ML | Refills: 5 | Status: SHIPPED | OUTPATIENT
Start: 2024-01-11

## 2024-01-11 RX ORDER — INSULIN GLARGINE 300 U/ML
30 INJECTION, SOLUTION SUBCUTANEOUS DAILY
Qty: 3 PEN | Refills: 5 | Status: SHIPPED | OUTPATIENT
Start: 2024-01-11

## 2024-01-11 RX ORDER — INSULIN ASPART 100 [IU]/ML
INJECTION, SOLUTION INTRAVENOUS; SUBCUTANEOUS
Qty: 40 ML | Refills: 5 | Status: SHIPPED | OUTPATIENT
Start: 2024-01-11

## 2024-01-11 NOTE — PATIENT INSTRUCTIONS
Continue Mounjaro 7.5 mg, Jardiance and current pump settings.   Return to clinic in 4 months with labs prior.       Pump backup plan  If the insulin pump is non functional and discontinued for anticipated more than 20 hours, please give daily injections of:  Long acting insulin TOUJEO 30 units once daily  Short acting insulin for meals according to carb ratios and sensitivity factor in the pump.   CARB RATIO 2, CORRECTION FACTOR 20 WITH GLUCOSE TARGET 110     When the insulin pump is restarted, do not restart PUMP until 22-24 hours after last TOUJEO  insulin dose.      For any technical insulin pump issues, please contact the insulin pump company; the toll free number is printed on the label on the back of the insulin pump.

## 2024-01-11 NOTE — PROGRESS NOTES
CC: This 43 y.o. White male  is here for evaluation of  T2DM along with comorbidities indicated in the Visit Diagnosis section of this encounter.    HPI: Tony Gordillo was diagnosed with T2DM in 2008. He was without health insurance for 2017 and mid 2018.       Prior visit 7/10/23 arrives with wife.   A1c is down from 8 to 7.4%.  5 lb weight loss since lov.   Pt did resume Ozempic after lov and titrated to 1 mg once weekly. Three weeks ago, he switched to Mounjaro. He took 1/2 the Mounjaro 10 mg pen weekly. He aspirated 1/2 the dose into an insulin syringe to inject 5 mg weekly. He wanted to switch to Mounjaro d/t high numbers on Ozempic. Appetite is suppressed for about 3 days after each Mounjaro dose.   He has been adjusting insulin doses as well. See below for  current doses.   Plan Continue Mounjaro 5 mg once weekly. Advised against aspirating from Mounjaro pen. New rx for Mounjaro 5 mg sent.   Continue Jardiance and Toujeo.   Continue Fiasp 30 units prior to meals with carbs; recommend at least 15-20 units before meals/snacks even if not eating carbs since protein intake is also spiking BGs. Pt is very insulin resistant.   Avoid eating out/high fat meals/bedtime snacking.   Rx for Omnipod 5 sent to [facility] to check cost and coverage. Pt understands he is not to start Omnipod on his own. If it's covered, he is to contact office so that he can scheduled with Education for insulin pump evaluation.   Return to clinic in 3 months with labs prior, sooner if pump is started.       Prior visit 10/2023 virtual visit   No recent a1c available.   Pt started Omnipod 5 on 8/2/23 with ROMIE Nixon RD. Glucoses have improved.   Still complains of postprandial highs even with timing boluses 15 minutes ac, especially when consuming higher carb foods.   He has continued to take Mounjaro 5 mg; denies nausea but c/o sulphur taste in the AM. Does not find it affects appetite at all.    Needs to switch to new Omnipod  "controller which he just received.   Plan Glucoses much better controlled on Omnipod 5 insulin pump.   Increase carb ratio from 3 to 2.   Finish out Mounjaro 5 mg (1 box). Then increase to 7.5 mg once weekly.  - both these changes should help control postprandial glucoses.   F/u with diabetes educator to help with new Omnipod controller.   Rtc in 3 mo with a1c prior.   A1c today.     Interval hx  A1c is down from 6.3 to 5.9%.   He has lost 14 lb since lov.   Appetite is lower on Mounjaro 7.5 mg.   He may be losing both is jobs and his health insurance. He has been on worker's comp.     LAST DIABETES EDUCATION: 2019    HOSPITALIZED FOR DIABETES -  No.    DIABETES MEDICATIONS:    Jardiance 25 mg once daily   Mounjaro 7.5 mg weekly Tues     Novolog in Omnipod 5   Basal rate   12 am - 1.2 u/hr  5:30 pm - 1.7 u/hr     ICR 2    ISF 20  Target glucose 110, correct above 110, active insulin time 4 hours.         DM COMPLICATIONS: none    SIGNIFICANT DIABETES MED HISTORY:   glyburide--hypoglycemia  Metformin - diarrhea and vomiting (has not tried metformin XR); Metformin topical - ineffective  Pioglitazone - altered mood, reportedly became angry   Victoza - nausea and vomiting at 1.2 mg; felt "run down" at 0.6 mg.   Trulicity less effective than Ozempic, switched to Ozempic 4/2022, switched from Ozempic to Mounjaro 2/2023  Mounjaro - GI s/e when he increased from 5 to 10 mg.     MDI doses prior to pump start: Toujeo Max 30 units hs   Fiasp 30 units ac - takes about 1x/day depending on diet or if he is eating more than 10 grams CHO; Novolin N 14 units between 8-10 pm - has taken it 3x/week if bedtime BG > 200       SELF MONITORING BLOOD GLUCOSE: Dexcom G6    CGM interpretation:  glucoses overall well controlled with minimal fluctuation, no hypoglycemia. Some highs in the low to mid 200s after meals, returning back to baseline fairly quickly.                     HYPOGLYCEMIC EPISODES: none     CURRENT DIET: drinks water " mostly. Sometimes coffee at work.  Eats 2-3 meals/day.     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     PHYSICAL EXAM:  GENERAL: Well developed, well nourished. No acute distress.   PSYCH: AAOx3, appropriate mood and affect, conversant, well-groomed. Judgement and insight good.   NEURO: Cranial nerves grossly intact. Speech clear, no tremor.   CHEST: Respirations even and unlabored.          Hemoglobin A1C   Date Value Ref Range Status   01/04/2024 5.9 (H) 4.0 - 5.6 % Final     Comment:     ADA Screening Guidelines:  5.7-6.4%  Consistent with prediabetes  >or=6.5%  Consistent with diabetes    High levels of fetal hemoglobin interfere with the HbA1C  assay. Heterozygous hemoglobin variants (HbS, HgC, etc)do  not significantly interfere with this assay.   However, presence of multiple variants may affect accuracy.     10/10/2023 6.3 (H) 4.0 - 5.6 % Final     Comment:     ADA Screening Guidelines:  5.7-6.4%  Consistent with prediabetes  >or=6.5%  Consistent with diabetes    High levels of fetal hemoglobin interfere with the HbA1C  assay. Heterozygous hemoglobin variants (HbS, HgC, etc)do  not significantly interfere with this assay.   However, presence of multiple variants may affect accuracy.     07/06/2023 7.4 (H) 4.0 - 5.6 % Final     Comment:     ADA Screening Guidelines:  5.7-6.4%  Consistent with prediabetes  >or=6.5%  Consistent with diabetes    High levels of fetal hemoglobin interfere with the HbA1C  assay. Heterozygous hemoglobin variants (HbS, HgC, etc)do  not significantly interfere with this assay.   However, presence of multiple variants may affect accuracy.       Lab Results   Component Value Date    TSH 2.148 10/10/2023            Component Value Date/Time     10/17/2023 1530    K 5.1 10/17/2023 1530     10/17/2023 1530    CO2 25 10/17/2023 1530    BUN 18 10/17/2023 1530    CREATININE 0.9 10/17/2023 1530     10/17/2023 1530    CALCIUM 10.2 10/17/2023 1530    ALKPHOS 71 10/17/2023 1530    AST 18 10/17/2023 1530    ALT 27 10/17/2023 1530    BILITOT 0.5 10/17/2023 1530    EGFRNORACEVR >60.0 10/17/2023 1530    ESTGFRAFRICA >60 03/25/2022 0617         Lab Results   Component Value Date    LDLCALC 72.8 07/06/2023        Latest Reference Range & Units 07/06/23 10:05   Cholesterol 120 - 199 mg/dL 142   HDL 40 - 75 mg/dL 57   HDL/Cholesterol Ratio 20.0 - 50.0 % 40.1   LDL Cholesterol External 63.0 - 159.0 mg/dL 72.8   Non-HDL Cholesterol mg/dL 85   Total Cholesterol/HDL Ratio 2.0 - 5.0  2.5   Triglycerides 30 - 150 mg/dL 61       Lab Results   Component Value Date    MICALBCREAT 9.9 07/06/2023             ASSESSMENT and PLAN:    A1C GOAL: < 7 %     1. Type 2 diabetes mellitus without complication, with long-term current use of insulin  Continue Mounjaro 7.5 mg, Jardiance and current pump settings.   Return to clinic in 4 months with labs prior.     insulin glargine U-300 conc (TOUJEO MAX U-300 SOLOSTAR) 300 unit/mL (3 mL) insulin pen    insulin aspart U-100 (NOVOLOG) 100 unit/mL (3 mL) InPn pen    insulin aspart U-100 (NOVOLOG) 100 unit/mL injection    Hemoglobin A1C      2. Severe obesity  Weight loss noted.   Continue Mounjaro and exercise.           PATIENT NEEDS TO CHANGE OMNIPOD ONCE DAILY DUE TO HIGH INSULIN USAGE            Orders Placed This Encounter   Procedures    Hemoglobin A1C     Standing Status:   Future     Standing Expiration Date:   3/11/2025        Follow up in about 4 months (around 5/11/2024).

## 2024-01-16 ENCOUNTER — OFFICE VISIT (OUTPATIENT)
Dept: ALLERGY | Facility: CLINIC | Age: 44
End: 2024-01-16
Payer: COMMERCIAL

## 2024-01-16 VITALS — WEIGHT: 289.69 LBS | BODY MASS INDEX: 38.39 KG/M2 | HEIGHT: 73 IN

## 2024-01-16 DIAGNOSIS — J30.9 CHRONIC ALLERGIC RHINITIS: Primary | ICD-10-CM

## 2024-01-16 DIAGNOSIS — Z91.09 ALLERGY TO AMERICAN HOUSE DUST MITE: ICD-10-CM

## 2024-01-16 DIAGNOSIS — J32.9 RECURRENT SINUSITIS: ICD-10-CM

## 2024-01-16 PROCEDURE — 99214 OFFICE O/P EST MOD 30 MIN: CPT | Mod: S$GLB,,, | Performed by: STUDENT IN AN ORGANIZED HEALTH CARE EDUCATION/TRAINING PROGRAM

## 2024-01-16 PROCEDURE — 99999 PR PBB SHADOW E&M-EST. PATIENT-LVL IV: CPT | Mod: PBBFAC,,, | Performed by: STUDENT IN AN ORGANIZED HEALTH CARE EDUCATION/TRAINING PROGRAM

## 2024-01-16 RX ORDER — LORATADINE 10 MG/1
10 TABLET ORAL DAILY
Qty: 90 TABLET | Refills: 3 | Status: SHIPPED | OUTPATIENT
Start: 2024-01-16 | End: 2025-01-15

## 2024-01-16 RX ORDER — AZELASTINE 1 MG/ML
SPRAY, METERED NASAL
Qty: 90 ML | Refills: 3 | Status: SHIPPED | OUTPATIENT
Start: 2024-01-16

## 2024-01-16 NOTE — PROGRESS NOTES
ALLERGY & IMMUNOLOGY CLINIC - FOLLOW UP     HISTORY OF PRESENT ILLNESS     Patient ID: Tony Gordillo is a 43 y.o. male    CC: chronic allergic rhinitis    HPI: Tony Gordillo is a 43 y.o. male with HTN, hypothyroidism, and DM2, following up for allergic rhinitis.    He reports symptoms are about the same, up and down. Congestion seems worse when he tries to lie down at night.   He uses saline nasal spray before his nasal sprays.  He uses flonase 2 SEN daily.  He uses azelastine 2 SEN daily.   He usually uses his nasal sprays in the morning.   He takes montelukast nightly. He isn't sure if it is helping. He has been dealing with a lot of anxiety.   He is not on an antihistamine currently.   His health insurance will be switching soon. He may be temporarily without health insurance at the end of Feb/beginning of March.      MEDICAL HISTORY     Vaccines:    Immunization History   Administered Date(s) Administered    COVID-19 Vaccine 12/28/2020, 01/25/2021, 11/08/2021    COVID-19, MRNA, LN-S, PF (MODERNA FULL 0.5 ML DOSE) 12/28/2020, 01/25/2021, 11/08/2021    COVID-19, mRNA, LNP-S, PF, kayla-sucrose, 30 mcg/0.3 mL (Pfizer 2023 Ages 12+) 12/11/2023    Influenza 09/06/2019    Pneumococcal Polysaccharide - 23 Valent 09/28/2018    Tdap 07/18/2019     Medical Hx:   Patient Active Problem List   Diagnosis    Hyperlipidemia LDL goal <70    Insulin long-term use    Tinea pedis    Morbid obesity    Hypothyroidism    Type 2 diabetes mellitus with left eye affected by mild nonproliferative retinopathy without macular edema, with long-term current use of insulin    Essential hypertension    Migraine with aura and without status migrainosus, not intractable propranolol SE angry; topamax not helpful; imitrex ineffective; daith ear piercing helps    Non-seasonal allergic rhinitis; avoid antihistamines per opthalmology    Acute bilateral low back pain without sciatica    NAINA (obstructive sleep apnea) 8/2019 sleep study AHI 11     Low testosterone in male; pituitary axis normal. MRI negative. 11/2019 started on testosterone    Right foot pain    Decreased strength of lower extremity    Decreased range of motion of both ankles    Gait abnormality    Rib pain on left side    Dyspnea    Decreased strength, endurance, and mobility    Left hand pain    Mild neurocognitive disorder due to traumatic brain injury    Outbursts of anger    Other amnesia    Traumatic rotator cuff tear, right, initial encounter    S/P right rotator cuff repair    Biceps tendonosis of right shoulder    Decreased range of motion of right shoulder    Impaired strength of shoulder muscles    Allergic conjunctivitis    Cornea edema    Descemet's membrane fold    Herpes simplex keratitis    Uncontrolled type 2 diabetes mellitus    Tear of lateral meniscus of right knee, current     Surgical Hx:   Past Surgical History:   Procedure Laterality Date    ARTHROSCOPIC DEBRIDEMENT OF SHOULDER  3/14/2023    Procedure: DEBRIDEMENT, SHOULDER, ARTHROSCOPIC;  Surgeon: Crissy Brdaford MD;  Location: [facility] OR;  Service: Orthopedics;;    ARTHROSCOPIC REPAIR OF ROTATOR CUFF OF SHOULDER Right 3/14/2023    Procedure: REPAIR, ROTATOR CUFF, ARTHROSCOPIC;  Surgeon: Crissy Bradford MD;  Location: [facility] OR;  Service: Orthopedics;  Laterality: Right;  KARY PAYNE [phone]. TEXTED ELMER ON 3/9/2023 @ 12:10PM. ELMER RESPONDED ON 3/9/23 @ 12:23PM-SAG  RN PREOP 3/10/2023---CLEARED BY PCP AND CARDS    ARTHROSCOPY,SHOULDER,WITH BICEPS TENODESIS  3/14/2023    Procedure: ARTHROSCOPY,SHOULDER,WITH BICEPS TENODESIS;  Surgeon: Crissy Bradford MD;  Location: [facility] OR;  Service: Orthopedics;;    KNEE ARTHROSCOPY W/ MENISCECTOMY Right 10/24/2023    Procedure: ARTHROSCOPY, KNEE, WITH MENISCECTOMY;  Surgeon: Crissy Bradford MD;  Location: [facility] OR;  Service: Orthopedics;  Laterality: Right;  RN PREOP 10/18/203---CLEARED BY CHARLEEN [phone]    KNEE SURGERY       acl,mcl,pcl    left knee x 2      SUBCHONDROPLASTY Right 10/24/2023    Procedure: POSSIBLE SUBCHONDROPLASTY;  Surgeon: Crissy Bradford MD;  Location: [facility];  Service: Orthopedics;  Laterality: Right;     Medications:   Current Outpatient Medications on File Prior to Visit   Medication Sig Dispense Refill    ammonium lactate 12 % Crea Apply 1 application  topically once daily. 140 g 5    atorvastatin (LIPITOR) 40 MG tablet TAKE 1 TABLET BY MOUTH EVERY DAY 90 tablet 1    azelastine (ASTELIN) 137 mcg (0.1 %) nasal spray 1 spray (137 mcg total) by Nasal route 2 (two) times daily. 30 mL 3    blood sugar diagnostic Strp To check BG 4 times daily, to use with insurance preferred meter 400 strip 3    blood sugar diagnostic Strp OneTouch Ultra Test strips      blood-glucose meter kit OneTouch Ultra2 Meter kit      blood-glucose sensor (DEXCOM G6 SENSOR) Filoemna Change sensor every 10 days 3 each 11    blood-glucose sensor (FREESTYLE SAMANTHA 3 SENSOR) Filomena Change every 14 days 2 each 11    blood-glucose transmitter (DEXCOM G6 TRANSMITTER) Filomena Change every 3 months 1 each 3    cyanocobalamin, vitamin B-12, 5,000 mcg Subl Place one under tongue once a day for 1 month, then continue to take under tongue per week 30 tablet 3    empagliflozin (JARDIANCE) 25 mg tablet Take 1 tablet (25 mg total) by mouth once daily. 90 tablet 2    FLUoxetine 20 MG capsule Take 1 capsule (20 mg total) by mouth once daily. 30 capsule 5    fluticasone propionate (FLONASE) 50 mcg/actuation nasal spray 1 spray (50 mcg total) by Each Nostril route once daily. 48 g 5    insulin aspart U-100 (NOVOLOG) 100 unit/mL (3 mL) InPn pen INJECT 15-30 UNITS BEFORE EACH MEAL WITH SLIDNING SCALE, MAX DAILY DOSE 100 UNITS. Use in the event of insulin pump failure 30 mL 5    insulin aspart U-100 (NOVOLOG) 100 unit/mL injection Max daily dose 160 units in insulin pump. 40 mL 5    insulin glargine U-300 conc (TOUJEO MAX U-300 SOLOSTAR) 300 unit/mL (3 mL) insulin pen  "Inject 30 Units into the skin once daily. 3 pen 5    insulin pump cart,automated,BT (OMNIPOD 5 G6 PODS, GEN 5,) Crtg Inject 1 each into the skin once daily. 30 each 11    L-METHYLFOLATE 15 mg Tab TAKE 15 MG BY MOUTH ONCE DAILY. 30 tablet 11    lamoTRIgine (LAMICTAL) 100 MG tablet Take 1.5 tablets (150 mg total) by mouth once daily. 135 tablet 3    lancets Misc To check BG 4 times daily, to use with insurance preferred meter 400 each 3    levothyroxine (SYNTHROID) 50 MCG tablet TAKE 1 TABLET BY MOUTH BEFORE BREAKFAST. 90 tablet 3    lisinopriL (PRINIVIL,ZESTRIL) 20 MG tablet TAKE 1 TABLET BY MOUTH EVERY DAY 90 tablet 3    modafiniL (PROVIGIL) 100 MG Tab Take 1 tablet (100 mg total) by mouth every morning. 90 tablet 1    montelukast (SINGULAIR) 10 mg tablet Take 1 tablet (10 mg total) by mouth every evening. 30 tablet 3    oxyCODONE (ROXICODONE) 5 MG immediate release tablet Take 1 tablet (5 mg total) by mouth every 4 (four) hours as needed for Pain. 25 tablet 0    pen needle, diabetic (BD ULTRA-FINE KEN PEN NEEDLE) 32 gauge x 5/32" Ndle 1 Device by Misc.(Non-Drug; Combo Route) route 5 (five) times daily. 550 each 4    rimegepant (NURTEC) 75 mg odt Take 1 tablet (75 mg total) by mouth as needed for Migraine. Place ODT tablet on the tongue; alternatively the ODT tablet may be placed under the tongue 8 tablet 3    syringe, disposable, 1 mL Syrg Testosterone every 2 weeks 25 Syringe 1    tirzepatide 7.5 mg/0.5 mL PnIj Inject 7.5 mg into the skin every 7 days. 4 pen 4     No current facility-administered medications on file prior to visit.     Drug Allergies:   Review of patient's allergies indicates:   Allergen Reactions    Influenza virus vaccine, specific Hives    Pioglitazone      Altered mood     Victoza [liraglutide] Nausea And Vomiting    Farxiga [dapagliflozin] Rash     Erectile dysfunction and rash      Social Hx:   Social History     Tobacco Use    Smoking status: Never     Passive exposure: Never    Smokeless " "tobacco: Never   Substance Use Topics    Alcohol use: Yes     Comment: 1-2 beers every 2 weeks    Drug use: No     Additional History Obtained at Initial Visit:  H/o Asthma: denies  H/o Rhinitis: endorses  Env/Occ:   Pets: 2 rabbits and a hamster. He says he sneezes a little when the rabbit fur gets in his nose (but thinks this is more related to it tickling his nose).  Occupation:  (but currently out due to a shoulder injury).     PHYSICAL EXAM     VS: Ht 6' 1" (1.854 m)   Wt 131.4 kg (289 lb 11 oz)   BMI 38.22 kg/m²   GENERAL: Alert, NAD, well-appearing  EYES: EOMI, no conjunctival injection, no discharge, no infraorbital shiners  LUNGS: normal effort, no increased WOB  DERM: no rashes  NEURO: normal speech, normal gait, no facial asymmetry     LABORATORY STUDIES     Component      Latest Ref Rng 10/17/2023 1/4/2024   WBC      3.90 - 12.70 K/uL 9.00     RBC      4.60 - 6.20 M/uL 5.25     Hemoglobin      14.0 - 18.0 g/dL 15.5     Hematocrit      40.0 - 54.0 % 48.7     MCV      82 - 98 fL 93     MCH      27.0 - 31.0 pg 29.5     MCHC      32.0 - 36.0 g/dL 31.8 (L)     RDW      11.5 - 14.5 % 15.5 (H)     Platelet Count      150 - 450 K/uL 238     MPV      9.2 - 12.9 fL 11.7     Immature Granulocytes      0.0 - 0.5 % 0.4     Gran # (ANC)      1.8 - 7.7 K/uL 5.6     Immature Grans (Abs)      0.00 - 0.04 K/uL 0.04     Lymph #      1.0 - 4.8 K/uL 2.4     Mono #      0.3 - 1.0 K/uL 0.9     Eos #      0.0 - 0.5 K/uL 0.1     Baso #      0.00 - 0.20 K/uL 0.06     Differential Method Automated     Sodium      136 - 145 mmol/L 139     Potassium      3.5 - 5.1 mmol/L 5.1     Chloride      95 - 110 mmol/L 102     CO2      23 - 29 mmol/L 25     Glucose      70 - 110 mg/dL 101     BUN      6 - 20 mg/dL 18     Creatinine      0.5 - 1.4 mg/dL 0.9     Calcium      8.7 - 10.5 mg/dL 10.2     PROTEIN TOTAL      6.0 - 8.4 g/dL 7.7     Albumin      3.5 - 5.2 g/dL 4.0     BILIRUBIN TOTAL      0.1 - 1.0 mg/dL 0.5     ALP      55 " - 135 U/L 71     AST      10 - 40 U/L 18     ALT      10 - 44 U/L 27     Hemoglobin A1C External      4.0 - 5.6 %  5.9 (H)      Component      Latest Ref Rng 1/19/2023 10/10/2023   S.pneumoniae Type 1      >=1.0 mcg/mL  14.8    Pneumococcal Serotype 2 IgG (PNX)      >=1.0 mcg/mL  13.2    S.pneumoniae Type 3      >=1.0 mcg/mL  <0.1    Pneumococcal Serotype 4 IgG (P7,P13,PNX)      >=1.0 mcg/mL  0.4    S.pneumoniae Type 5      >=1.0 mcg/mL  43.2    S.pneumoniae Type 8      >=1.0 mcg/mL  11.9    S.pneumoniae Type 9N      >=1.0 mcg/mL  0.2    S.pneumoniae Type 12F      >=1.0 mcg/mL  0.1    Pneumococcal Serotype 14 IgG (P7,P13,PNX)      >=1.0 mcg/mL  >100.0    Pneumococcal Serotype 17F IgG (PNX)      >=1.0 mcg/mL  5.9    Pneumococcal Serotype 17F IgG (PNX)      >=1.0 mcg/mL  1.5    S.pneumoniae Type 19F      >=1.0 mcg/mL  1.1    Pneumococcal Serotype 20 IgG (PNX)      >=1.0 mcg/mL  1.6    S.pneumoniae Type 23F      >=1.0 mcg/mL  1.6    S.pneumoniae Type 6B      >=1.0 mcg/mL  2.9    Pneumococcal Serotype 10A IgG (PNX)      >=1.0 mcg/mL  21.9    Pneumococcal Serotype 11A IgG (PNX)      >=1.0 mcg/mL  7.4    S.pneumoniae Type 7F      >=1.0 mcg/mL  4.0    Pneumococcal Serotype 15B IgG (PNX)      >=1.0 mcg/mL  26.4    S.pneumoniae Type 18C      >=1.0 mcg/mL  47.3    Pneumococcal Serotype 19A IgG (P13,PNX)      >=1.0 mcg/mL  71.4    S.pneumoniae Type 9V Abs      >=1.0 mcg/mL  3.0    Pneumococcal Serotype 33 IgG (PNX)      >=1.0 mcg/mL  >100.0    PN23 Interpretation  SEE BELOW    IgG      650 - 1600 mg/dL 813  809    IgA      40 - 350 mg/dL  237    IgM      50 - 300 mg/dL  74      Component      Latest Ref Rng 7/15/2021   HIV 1/2 Ag/Ab      Negative  Negative       ALLERGEN TESTING     Immunocaps:   Component      Latest Ref Rng 10/10/2023   D. farinae      <0.10 kU/L 2.52 (H)    D. farinae Class CLASS 2    Mite Dust Pteronyssinus IgE      <0.10 kU/L 2.36 (H)    D. pteronyssinus Class CLASS 2    BERMUDA GRASS      <0.10 kU/L 0.15  (H)    Bermuda Grass Class CLASS 0/1    Dony Grass      <0.10 kU/L 0.47 (H)    Dony Grass Class CLASS 1    Cabell IgE      <0.10 kU/L <0.10    Cabell Class CLASS 0    Plantain      <0.10 kU/L <0.10    English Plantain Class CLASS 0    Argyle(Quercus alba) IgE      <0.10 kU/L <0.10    [place], Class CLASS 0    Pecan [place] Tree      <0.10 kU/L <0.10    Pecan, Class CLASS 0    Marshelder IgE      <0.10 kU/L 0.12 (H)    Marshelder Class CLASS 0/1    Ragweed, Western IgE      <0.10 kU/L <0.10    Ragweed, Western, Class CLASS 0    Alternaria alternata      <0.10 kU/L <0.10    Altern. alternata Class CLASS 0    Aspergillus Fumigatus IgE      <0.10 kU/L <0.10    A. fumigatus Class CLASS 0    Cat Dander      <0.10 kU/L <0.10    Cat Epithelium Class CLASS 0    Cockroach, IgE      <0.10 kU/L 0.32 (H)    Cockroach, IgE       CLASS 0/1    Dog Dander, IgE      <0.10 kU/L <0.10    Dog Dander Class CLASS 0    WHITE TAN TREE      <0.10 kU/L <0.10    White Tan Class CLASS 0    [place] IgE      <0.10 kU/L <0.10    [place] Class CLASS 0    Allergen Maple ([place]) IgE      <0.10 kU/L <0.10    Allergen Maple ([place]) Class CLASS 0    Russian Thistle IgE      <0.10 kU/L <0.10    Russian Thistle Class CLASS 0    Allergen Sheep [place] ([place]) IgE      <0.10 kU/L <0.10    Allergen Sheep [place] ([place]) Class CLASS 0    COOPER GRASS      <0.10 kU/L 0.36 (H)    [place]oper Grass Class CLASS 1    BAHIA GRASS      <0.10 kU/L 0.50 (H)    [place] Class CLASS 1    Rabbit Epith. IgE      <0.10 kU/L <0.10    Rabbit Epith. Class CLASS 0    Hamster Epithelium, IgE      <0.10 kU/L <0.10    Hamster Epithelium Class CLASS 0        PULMONARY FUNCTION TESTING     Date 22:  FVC:         77%ref -> + 4%  FEV1:         80%ref -> - 5%  FEV1/FVC: 83%  FEF 25-75: 104%ref  T%ref  DLCO:        90%ref  Interpretation: Spirometry shows a reduced vital capacity suggesting restriction. Spirometry remains unimproved following  bronchodilator. Lung volumes show mild restriction is present. DLCO is normal.     IMAGING & OTHER DIAGNOSTICS     CXR 3/9/23:  FINDINGS:  The cardiac silhouette and superior mediastinal structures are unremarkable. Pulmonary vasculature is within normal limits. The lungs are well aerated and free of focal consolidations. There is no evidence for pneumothorax or pleural effusions. Bony structures are grossly intact.  Impression:  No acute chest disease identified.     CHART REVIEW     Reviewed labs.     ASSESSMENT & PLAN     Tony Gordillo is a 43 y.o. male with     # Chronic allergic rhinitis: Immunocaps positive to dust mites and grass pollen; equivocal to cockroach and weed pollen. Symptoms perennial. He has been noticing congestion worse at night when he tries to lay down. Discussed immunotherapy options, and patient is interested in treating his dust mite allergy with sublingual immunotherapy. After discussing the risks, benefits, and logistics, patient would like to proceed with odactra, but his health insurance is in flux right now.  -he is expecting his health insurance to change in Feb or March, so will hold off on prescribing it until he sends me a message. Once he does, I will send prescription for odactra (along with epipen) to [facility].   -first dose of odactra to be given in clinic.   -increase flonase from 2 SEN daily to BID.  -increase azelastine nasal spray from 2 SEN daily to BID.  -continue saline nasal mist prior to medicated nasal sprays.  -continue saline sinus rinses prn.  -start claritin 10 mg daily.   -stop montelukast as he hasn't noticed it helping, and he has been dealing with increased anxiety (which can be a potential side effect of montelukast).    # History of recurrent sinusitis: He reported his sinus infections often turn into ear infections, and he estimated he requires antibiotics 2-3 times per year on average. He received pneumovax in 9/2018. In 10/2023, his  total IgG/AM were wnl and pneumococcal titers showed adequate protection.  -no further immune w/u required at this time.        Follow up: April (likely for first dose of odactra).    I spent a total of 35 minutes on the day of the visit.  This includes face to face time and non-face to face time preparing to see the patient (eg, review of tests), obtaining and/or reviewing separately obtained history, documenting clinical information in the electronic or other health record, independently interpreting results and communicating results to the patient/family/caregiver, or care coordinator.    Pema Gongora MD  Allergy/Immunology

## 2024-01-24 ENCOUNTER — OFFICE VISIT (OUTPATIENT)
Dept: OPTOMETRY | Facility: CLINIC | Age: 44
End: 2024-01-24
Payer: COMMERCIAL

## 2024-01-24 DIAGNOSIS — E11.3292 TYPE 2 DIABETES MELLITUS WITH LEFT EYE AFFECTED BY MILD NONPROLIFERATIVE RETINOPATHY WITHOUT MACULAR EDEMA, WITH LONG-TERM CURRENT USE OF INSULIN: Primary | ICD-10-CM

## 2024-01-24 DIAGNOSIS — Z79.4 TYPE 2 DIABETES MELLITUS WITH LEFT EYE AFFECTED BY MILD NONPROLIFERATIVE RETINOPATHY WITHOUT MACULAR EDEMA, WITH LONG-TERM CURRENT USE OF INSULIN: Primary | ICD-10-CM

## 2024-01-24 DIAGNOSIS — H52.7 REFRACTIVE ERROR: ICD-10-CM

## 2024-01-24 PROCEDURE — 99999 PR PBB SHADOW E&M-EST. PATIENT-LVL II: CPT | Mod: PBBFAC,,, | Performed by: OPTOMETRIST

## 2024-01-24 PROCEDURE — 92004 COMPRE OPH EXAM NEW PT 1/>: CPT | Mod: S$GLB,,, | Performed by: OPTOMETRIST

## 2024-01-24 PROCEDURE — 92015 DETERMINE REFRACTIVE STATE: CPT | Mod: S$GLB,,, | Performed by: OPTOMETRIST

## 2024-01-24 NOTE — PROGRESS NOTES
Subjective:       Patient ID: Tony Gordillo is a 43 y.o. male      Chief Complaint   Patient presents with    Concerns About Ocular Health    Diabetic Eye Exam     History of Present Illness  Dls: 8/2023    44 y/o male presents today for diabetic eye exam.   Pt states no changes in vision. Pt wears pal's.      today     No tearing  No itching  No burning  No pain  +  ha's  + ou off/on floaters   No flashes    Eye meds  Systane ultra OU PRN     Pohx:   PRK Bilateral 2010     Fohx:   None    Hemoglobin A1C       Date                     Value               Ref Range             Status                01/04/2024               5.9 (H)             4.0 - 5.6 %           Final                  10/10/2023               6.3 (H)             4.0 - 5.6 %           Final                   07/06/2023               7.4 (H)             4.0 - 5.6 %           Final                   Assessment/Plan:     1. Type 2 diabetes mellitus with left eye affected by mild nonproliferative retinopathy without macular edema, with long-term current use of insulin  No ocular treatment needed at this time. Educated patient about effects of diabetes on vision. Emphasized importance of good blood sugar control, compliance with medications, and follow up care with PCP. Return in 1 years for DFE OU, sooner PRN.    - Ambulatory referral/consult to Optometry    2. Refractive error  Educated patient on refractive error and discussed lens options. Dispensed updated spectacle Rx. Educated about adaptation period to new specs.    Eyeglass Final Rx       Eyeglass Final Rx         Sphere Cylinder Axis Add    Right -0.75 +0.75 010 +1.50    Left -1.25 +1.25 145 +1.50      Expiration Date: 1/24/2025                      Follow up in about 1 year (around 1/24/2025) for Diabetic Eye Exam.

## 2024-01-31 ENCOUNTER — OFFICE VISIT (OUTPATIENT)
Dept: NEUROLOGY | Facility: CLINIC | Age: 44
End: 2024-01-31
Payer: COMMERCIAL

## 2024-01-31 VITALS
DIASTOLIC BLOOD PRESSURE: 79 MMHG | WEIGHT: 287.25 LBS | HEIGHT: 73 IN | SYSTOLIC BLOOD PRESSURE: 132 MMHG | BODY MASS INDEX: 38.07 KG/M2 | HEART RATE: 101 BPM

## 2024-01-31 DIAGNOSIS — F07.81 TRAUMATIC ENCEPHALOPATHY: ICD-10-CM

## 2024-01-31 DIAGNOSIS — G31.84 MCI (MILD COGNITIVE IMPAIRMENT): ICD-10-CM

## 2024-01-31 DIAGNOSIS — G43.909 ACUTE MIGRAINE: ICD-10-CM

## 2024-01-31 DIAGNOSIS — R45.1 AGITATION: ICD-10-CM

## 2024-01-31 DIAGNOSIS — G43.E11 CHRONIC MIGRAINE WITH AURA, INTRACTABLE, WITH STATUS MIGRAINOSUS: Primary | ICD-10-CM

## 2024-01-31 DIAGNOSIS — G47.33 OSA (OBSTRUCTIVE SLEEP APNEA): ICD-10-CM

## 2024-01-31 DIAGNOSIS — G44.40 MEDICATION OVERUSE HEADACHE: ICD-10-CM

## 2024-01-31 PROCEDURE — 99999 PR PBB SHADOW E&M-EST. PATIENT-LVL III: CPT | Mod: PBBFAC,,, | Performed by: STUDENT IN AN ORGANIZED HEALTH CARE EDUCATION/TRAINING PROGRAM

## 2024-01-31 PROCEDURE — 99215 OFFICE O/P EST HI 40 MIN: CPT | Mod: S$GLB,,, | Performed by: STUDENT IN AN ORGANIZED HEALTH CARE EDUCATION/TRAINING PROGRAM

## 2024-01-31 RX ORDER — LANOLIN ALCOHOL/MO/W.PET/CERES
400 CREAM (GRAM) TOPICAL DAILY
Qty: 90 TABLET | Refills: 1 | Status: SHIPPED | OUTPATIENT
Start: 2024-01-31 | End: 2024-08-25

## 2024-01-31 RX ORDER — RIMEGEPANT SULFATE 75 MG/75MG
75 TABLET, ORALLY DISINTEGRATING ORAL
Qty: 16 TABLET | Refills: 3 | Status: SHIPPED | OUTPATIENT
Start: 2024-01-31 | End: 2024-05-30

## 2024-01-31 NOTE — PROGRESS NOTES
Chief Complaint and Duration     Migraines for years    History of Present Illness     Tony Gordillo is a 43 y.o. who is following with [facility] due to concerns related to progressive cognitive impairment, being followed by Dr. South, history of being a  since 2009, multiple TBIs, childhood history of playing catcher with concussions while playing football, was in the [employer].  Has been following for progressive memory impairment with behavioral issues, has not seen Psychiatry yet.  Patient is referred over for migraine control, was started on Nurtec for possible relief.    Patient states that migraines and headaches have been going on for quite some time, describes characteristics as being bitemporal, can move from 1 side to the other side, has a pulsatile quality.  Sensitive to light and noise, can have nausea.  Does have a piercing tragus that helps relieve it however has not take it out with him being a , is being let go from the  so he will be placed again.  Has tried Imitrex in the past.  Was recently placed on Nurtec in which had a tabs a month, not enough for him.  Has poor sleep.    Review of patient's allergies indicates:   Allergen Reactions    Influenza virus vaccine, specific Hives    Pioglitazone      Altered mood     Victoza [liraglutide] Nausea And Vomiting    Farxiga [dapagliflozin] Rash     Erectile dysfunction and rash      Current Outpatient Medications   Medication Sig Dispense Refill    ammonium lactate 12 % Crea Apply 1 application  topically once daily. 140 g 5    atorvastatin (LIPITOR) 40 MG tablet TAKE 1 TABLET BY MOUTH EVERY DAY 90 tablet 1    azelastine (ASTELIN) 137 mcg (0.1 %) nasal spray Use 1-2 sprays in each nostril twice daily 90 mL 3    blood sugar diagnostic Strp To check BG 4 times daily, to use with insurance preferred meter 400 strip 3    blood sugar diagnostic Strp OneTouch Ultra  Test strips      blood-glucose meter kit OneTouch Ultra2 Meter kit      blood-glucose sensor (DEXCOM G6 SENSOR) Filomena Change sensor every 10 days 3 each 11    blood-glucose sensor (FREESTYLE SAMANTHA 3 SENSOR) Filomena Change every 14 days 2 each 11    blood-glucose transmitter (DEXCOM G6 TRANSMITTER) Filomena Change every 3 months 1 each 3    cyanocobalamin, vitamin B-12, 5,000 mcg Subl Place one under tongue once a day for 1 month, then continue to take under tongue per week 30 tablet 3    empagliflozin (JARDIANCE) 25 mg tablet Take 1 tablet (25 mg total) by mouth once daily. 90 tablet 2    FLUoxetine 20 MG capsule Take 1 capsule (20 mg total) by mouth once daily. 30 capsule 5    fluticasone propionate (FLONASE) 50 mcg/actuation nasal spray 1 spray (50 mcg total) by Each Nostril route once daily. 48 g 5    insulin aspart U-100 (NOVOLOG) 100 unit/mL (3 mL) InPn pen INJECT 15-30 UNITS BEFORE EACH MEAL WITH SLIDNING SCALE, MAX DAILY DOSE 100 UNITS. Use in the event of insulin pump failure 30 mL 5    insulin aspart U-100 (NOVOLOG) 100 unit/mL injection Max daily dose 160 units in insulin pump. 40 mL 5    insulin glargine U-300 conc (TOUJEO MAX U-300 SOLOSTAR) 300 unit/mL (3 mL) insulin pen Inject 30 Units into the skin once daily. 3 pen 5    insulin pump cart,automated,BT (OMNIPOD 5 G6 PODS, GEN 5,) Crtg Inject 1 each into the skin once daily. 30 each 11    L-METHYLFOLATE 15 mg Tab TAKE 15 MG BY MOUTH ONCE DAILY. 30 tablet 11    lamoTRIgine (LAMICTAL) 100 MG tablet Take 1.5 tablets (150 mg total) by mouth once daily. 135 tablet 3    lancets Misc To check BG 4 times daily, to use with insurance preferred meter 400 each 3    levothyroxine (SYNTHROID) 50 MCG tablet TAKE 1 TABLET BY MOUTH BEFORE BREAKFAST. 90 tablet 3    lisinopriL (PRINIVIL,ZESTRIL) 20 MG tablet TAKE 1 TABLET BY MOUTH EVERY DAY 90 tablet 3    loratadine (CLARITIN) 10 mg tablet Take 1 tablet (10 mg total) by mouth once daily. 90 tablet 3    modafiniL (PROVIGIL) 100  "MG Tab Take 1 tablet (100 mg total) by mouth every morning. 90 tablet 1    oxyCODONE (ROXICODONE) 5 MG immediate release tablet Take 1 tablet (5 mg total) by mouth every 4 (four) hours as needed for Pain. 25 tablet 0    pen needle, diabetic (BD ULTRA-FINE KEN PEN NEEDLE) 32 gauge x 5/32" Ndle 1 Device by Misc.(Non-Drug; Combo Route) route 5 (five) times daily. 550 each 4    syringe, disposable, 1 mL Syrg Testosterone every 2 weeks 25 Syringe 1    tirzepatide 7.5 mg/0.5 mL PnIj Inject 7.5 mg into the skin every 7 days. 4 pen 4    atogepant 60 mg Tab Take 60 mg by mouth once daily. 90 tablet 1    magnesium oxide (MAG-OX) 400 mg (241.3 mg magnesium) tablet Take 1 tablet (400 mg total) by mouth once daily. 90 tablet 1    rimegepant (NURTEC) 75 mg odt Take 1 tablet (75 mg total) by mouth as needed for Migraine. Place ODT tablet on the tongue; alternatively the ODT tablet may be placed under the tongue. No more than 2 in 24 hrs. 16 tablet 3     No current facility-administered medications for this visit.       Medical History     Past Medical History:   Diagnosis Date    Diabetes mellitus, type 2     Hyperlipidemia     Hypertension     Obesity     NAINA (obstructive sleep apnea)      Past Surgical History:   Procedure Laterality Date    ARTHROSCOPIC DEBRIDEMENT OF SHOULDER  03/14/2023    Procedure: DEBRIDEMENT, SHOULDER, ARTHROSCOPIC;  Surgeon: Crissy Bradford MD;  Location: [facility] OR;  Service: Orthopedics;;    ARTHROSCOPIC REPAIR OF ROTATOR CUFF OF SHOULDER Right 03/14/2023    Procedure: REPAIR, ROTATOR CUFF, ARTHROSCOPIC;  Surgeon: Crissy Bradford MD;  Location: [facility] OR;  Service: Orthopedics;  Laterality: Right;  ANTONY AND NEPHFLY PAYNE [phone]. TEXTED ELMER ON 3/9/2023 @ 12:10PM. ELMER RESPONDED ON 3/9/23 @ 12:23PM-SAG  RN PREOP 3/10/2023---CLEARED BY PCP AND CARDS    ARTHROSCOPY,SHOULDER,WITH BICEPS TENODESIS  03/14/2023    Procedure: ARTHROSCOPY,SHOULDER,WITH BICEPS TENODESIS;  Surgeon: Crissy Bradford, " MD;  Location: [facility] OR;  Service: Orthopedics;;    KNEE ARTHROSCOPY W/ MENISCECTOMY Right 10/24/2023    Procedure: ARTHROSCOPY, KNEE, WITH MENISCECTOMY;  Surgeon: Crissy Bradford MD;  Location: [facility] OR;  Service: Orthopedics;  Laterality: Right;  RN PREOP 10/18/203---CLEARED BY PCP  ELVIA [phone]    KNEE SURGERY      acl,mcl,pcl    left knee x 2      PRK  Bilateral 2010    SUBCHONDROPLASTY Right 10/24/2023    Procedure: POSSIBLE SUBCHONDROPLASTY;  Surgeon: Crissy Bradford MD;  Location: [facility] OR;  Service: Orthopedics;  Laterality: Right;     Family History   Problem Relation Age of Onset    Diabetes Mother     Diabetes Father     No Known Problems Brother     No Known Problems Maternal Aunt     No Known Problems Maternal Uncle     No Known Problems Paternal Aunt     No Known Problems Paternal Uncle     No Known Problems Maternal Grandmother     No Known Problems Maternal Grandfather     No Known Problems Paternal Grandmother     No Known Problems Paternal Grandfather     No Known Problems Daughter     Autism Son     No Known Problems Other      Social History     Socioeconomic History    Marital status:    Occupational History    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     Employer: [employer]   Tobacco Use    Smoking status: Never     Passive exposure: Never    Smokeless tobacco: Never   Substance and Sexual Activity    Alcohol use: Yes     Comment: 1-2 beers every 2 weeks    Drug use: No     Social Determinants of Health     Financial Resource Strain: Patient Declined (12/5/2023)    Overall Financial Resource Strain (CARDIA)     Difficulty of Paying Living Expenses: Patient declined   Food Insecurity: Patient Declined (12/5/2023)    Hunger Vital Sign     Worried About Running Out of Food in the Last Year: Patient declined     Ran Out of Food in the Last Year: Patient declined   Transportation Needs: Patient Declined (12/5/2023)    IVAN  - Transportation     Lack of Transportation (Medical): Patient declined     Lack of Transportation (Non-Medical): Patient declined   Physical Activity: Sufficiently Active (12/5/2023)    Exercise Vital Sign     Days of Exercise per Week: 4 days     Minutes of Exercise per Session: 100 min   Stress: Patient Declined (12/5/2023)    [place] [place] [employer] - Occupational Stress Questionnaire     Feeling of Stress : Patient declined   Recent Concern: Stress - Stress Concern Present (10/10/2023)    [place] [place] [employer] - Occupational Stress Questionnaire     Feeling of Stress : To some extent   Social Connections: Unknown (12/5/2023)    Social Connection and Isolation Panel [NHANES]     Frequency of Communication with Friends and Family: Once a week     Frequency of Social Gatherings with Friends and Family: Once a week     Active Member of Clubs or Organizations: No     Attends Club or Organization Meetings: Never     Marital Status:    Housing Stability: Unknown (12/5/2023)    Housing Stability Vital Sign     Unable to Pay for Housing in the Last Year: Patient refused     Number of Places Lived in the Last Year: 1     Unstable Housing in the Last Year: Patient refused   Recent Concern: Housing Stability - High Risk (10/10/2023)    Housing Stability Vital Sign     Unable to Pay for Housing in the Last Year: Yes     Number of Places Lived in the Last Year: 1     Unstable Housing in the Last Year: No       Exam     Vitals:    01/31/24 1302   BP: 132/79   Pulse: 101      Physical Exam:  General: Not in acute distress. Not ill-appearing.   HENT: Normocephalic and atraumatic. Moist mucous membranes.  Eyes: Conjunctivae normal.   Pulmonary: Pulmonary effort is normal.   Abdominal: Abdomen is soft and flat.   Skin: Skin is warm and dry. No rashes.   Psychiatric: Mood normal.        Neurologic Exam   Mental status: oriented to person, place, and time  Attention: Normal. Concentration:  normal.  Speech: speech is normal.  Cranial Nerves: PERRL, EOMI intact, V1-V3 Facial sensation intact. Symmetric facies. Hearing grossly intact. Palate and uvula midline, symmetric. No tongue deviation. Trapezius strength intact.     Motor exam: bulk and tone normal. Strength 5/5 in bilateral upper extremities: deltoids, biceps, triceps, wrist flexion/extension, finger abduction/adduction. Strength 5/5 in bilateral lower extremities: hip flexion/extension, thigh adduction/abduction, knee flexion/extension, dorsiflexion/plantarflexion, foot eversion/inversion.    Reflexes: 2+ in bilateral upper extremities: biceps and brachiaradialis, 2+ in bilateral lower extremities: patellar and achilles  Plantar reflex: normal  Loco's/Clonus: negative    Sensory exam: light touch intact    Gait exam: normal  Romberg: negative  Coordination: normal    Tremor: none  Cogwheel rigidity: none    Labs and Imaging     Labs: reviewed  No results found for this or any previous visit (from the past 24 hour(s)).    Thyroid normal  HgA1C%:  5.9 down from 9.9 1 year ago    Imaging:   I have personally reviewed the images performed.   MRI of the brain obtained in 2022 with no acute intracranial abnormality  EEG in 2022 was unremarkable    Assessment and Plan     Problem List Items Addressed This Visit          Neuro    Acute migraine    Relevant Medications    rimegepant (NURTEC) 75 mg odt    MCI (mild cognitive impairment)    Medication overuse headache       Psychiatric    Agitation       Other    NAINA (obstructive sleep apnea) 8/2019 sleep study AHI 11    Overview     -ahi of 11.  Stopped apap for over month due to gasping sensation.  ?excessive leakage a/w nasal congestion while having covid 19.  Nasal pantency is adequate.  Recommend resumption of apap.  However, APAP is being recall by [employer]  -we discussed at length about [employer] recall.  Patient already register his apap  -will switch to nasal pillow to alleviate pressure on  ridge of nose.           Chronic migraine with aura, intractable, with status migrainosus - Primary    Relevant Medications    atogepant 60 mg Tab    magnesium oxide (MAG-OX) 400 mg (241.3 mg magnesium) tablet     Other Visit Diagnoses       Traumatic encephalopathy              This is a 43-year-old male with significant cognitive impairment being followed by [facility].  Following along with Dr. South.  Patient has had history of significant concussions, TBI, was in a , has been working as a  however we will soon be let go.  Patient also behavioral issues as well, pending psychiatry referral.  Patient is referred here for better migraine control.  Has tried Imitrex in the past without significant relief.  Would not add Topamax on this patient giving all a significant brain fog as well as propranolol.  Would not do Elavil given he is also on an antidepressant.  We will try patient for prevention on Quilepta, would not start patient on an injectable given the wear off.  Continue Nurtec for the patient for abortive.  Also discussed supplementing with magnesium 3-400 mg daily for headache prevention.  Patient to work on lifestyle modifications, downloading migraine mehul raul to assess for triggers.    Also needs compliance with the CPAP machine for his NAINA.  Discussed lifestyle modifications including diet and exercise.  Questions answered.    Follow-up:  3 months    Time spent on this encounter:  40 minutes. This includes face to face time and non-face to face time preparing to see the patient (eg, review of tests), obtaining and/or reviewing separately obtained history, documenting clinical information in the electronic or other health record, independently interpreting results and communicating results to the patient/family/caregiver, or care coordinator.     This note was created by combination of typed  and M-Modal dictation. Transcription and phonetic errors may be  present.  If there are any questions, please contact me.

## 2024-02-28 ENCOUNTER — PATIENT MESSAGE (OUTPATIENT)
Dept: FAMILY MEDICINE | Facility: CLINIC | Age: 44
End: 2024-02-28
Payer: COMMERCIAL

## 2024-02-28 DIAGNOSIS — K52.9 GASTROENTERITIS: Primary | ICD-10-CM

## 2024-02-29 RX ORDER — ONDANSETRON 4 MG/1
4 TABLET, ORALLY DISINTEGRATING ORAL EVERY 8 HOURS PRN
Qty: 20 TABLET | Refills: 0 | Status: SHIPPED | OUTPATIENT
Start: 2024-02-29

## 2024-03-04 ENCOUNTER — PATIENT MESSAGE (OUTPATIENT)
Dept: ORTHOPEDICS | Facility: CLINIC | Age: 44
End: 2024-03-04
Payer: COMMERCIAL

## 2024-03-05 ENCOUNTER — CLINICAL SUPPORT (OUTPATIENT)
Dept: DIABETES | Facility: CLINIC | Age: 44
End: 2024-03-05
Payer: COMMERCIAL

## 2024-03-05 DIAGNOSIS — E11.9 TYPE 2 DIABETES MELLITUS WITHOUT COMPLICATION, WITH LONG-TERM CURRENT USE OF INSULIN: Primary | ICD-10-CM

## 2024-03-05 DIAGNOSIS — Z79.4 TYPE 2 DIABETES MELLITUS WITHOUT COMPLICATION, WITH LONG-TERM CURRENT USE OF INSULIN: Primary | ICD-10-CM

## 2024-03-05 PROCEDURE — G0108 DIAB MANAGE TRN  PER INDIV: HCPCS | Mod: S$GLB,,,

## 2024-03-05 PROCEDURE — 95251 CONT GLUC MNTR ANALYSIS I&R: CPT | Mod: S$GLB,,, | Performed by: NURSE PRACTITIONER

## 2024-03-05 PROCEDURE — 99999 PR PBB SHADOW E&M-EST. PATIENT-LVL I: CPT | Mod: PBBFAC,,,

## 2024-03-06 ENCOUNTER — PATIENT MESSAGE (OUTPATIENT)
Dept: INTERNAL MEDICINE | Facility: CLINIC | Age: 44
End: 2024-03-06
Payer: COMMERCIAL

## 2024-03-06 ENCOUNTER — OFFICE VISIT (OUTPATIENT)
Dept: PSYCHIATRY | Facility: CLINIC | Age: 44
End: 2024-03-06
Payer: COMMERCIAL

## 2024-03-06 VITALS
BODY MASS INDEX: 36.76 KG/M2 | DIASTOLIC BLOOD PRESSURE: 74 MMHG | WEIGHT: 278.69 LBS | SYSTOLIC BLOOD PRESSURE: 130 MMHG | HEART RATE: 106 BPM

## 2024-03-06 DIAGNOSIS — G43.E11 CHRONIC MIGRAINE WITH AURA, INTRACTABLE, WITH STATUS MIGRAINOSUS: ICD-10-CM

## 2024-03-06 DIAGNOSIS — F41.1 GENERALIZED ANXIETY DISORDER: ICD-10-CM

## 2024-03-06 DIAGNOSIS — F33.1 MODERATE EPISODE OF RECURRENT MAJOR DEPRESSIVE DISORDER: Primary | ICD-10-CM

## 2024-03-06 PROCEDURE — 99417 PROLNG OP E/M EACH 15 MIN: CPT | Mod: S$GLB,,,

## 2024-03-06 PROCEDURE — 3078F DIAST BP <80 MM HG: CPT | Mod: CPTII,S$GLB,,

## 2024-03-06 PROCEDURE — 3075F SYST BP GE 130 - 139MM HG: CPT | Mod: CPTII,S$GLB,,

## 2024-03-06 PROCEDURE — 3008F BODY MASS INDEX DOCD: CPT | Mod: CPTII,S$GLB,,

## 2024-03-06 PROCEDURE — 3044F HG A1C LEVEL LT 7.0%: CPT | Mod: CPTII,S$GLB,,

## 2024-03-06 PROCEDURE — 90833 PSYTX W PT W E/M 30 MIN: CPT | Mod: S$GLB,,,

## 2024-03-06 PROCEDURE — 99205 OFFICE O/P NEW HI 60 MIN: CPT | Mod: S$GLB,,,

## 2024-03-06 PROCEDURE — 99999 PR PBB SHADOW E&M-EST. PATIENT-LVL II: CPT | Mod: PBBFAC,,,

## 2024-03-06 RX ORDER — FLUOXETINE HYDROCHLORIDE 40 MG/1
40 CAPSULE ORAL DAILY
Qty: 30 CAPSULE | Refills: 11 | Status: SHIPPED | OUTPATIENT
Start: 2024-03-06 | End: 2025-03-01

## 2024-03-06 NOTE — PROGRESS NOTES
Outpatient Psychiatry Initial Visit (MD/NP)    3/6/2024    Tony Gordillo, a 43 y.o. male, presenting for initial evaluation visit. Met with patient.    Reason for Encounter: self-referral. Patient complains of depression, anxiety.    History of Present Illness:    Anxiety  Patient is here for evaluation of anxiety.  He has the following anxiety symptoms: difficulty concentrating, fatigue, feelings of losing control, insomnia. Onset of symptoms was approximately several years ago.  Symptoms have been gradually worsening since that time. He denies current suicidal and homicidal ideation. Family history significant for alcoholism, anxiety and depression.Possible organic causes contributing are: drug abuse, medications, endocrine/metabolic, neuro, nutritional. Risk factors: negative life event recent loss of career as a .   Previous treatment includes individual therapy and medication Prozac.   He complains of the following medication side effects: none.    Depression  Patient complains of depression. He complains of anhedonia, depressed mood, difficulty concentrating, fatigue, and insomnia. Onset was approximately several  years  ago. Symptoms have been gradually worsening since that time. Current symptoms include: anhedonia, depressed mood, difficulty concentrating, fatigue, feelings of worthlessness/guilt, impaired memory, and insomnia. Patient denies suicidal attempt, suicidal thoughts with specific plan, and suicidal thoughts without plan. Family history significant for alcoholism, anxiety, and depression. Possible organic causes contributing are: medications, drug abuse, endocrine/metabolic, neuro. Risk factors: negative life event losing career as  and previous episode of depression. Previous treatment includes medication. He complains of the following side effects from the treatment: none.      Patient with a past medical history of   Past Medical History:   Diagnosis Date    Diabetes  mellitus, type 2     Hyperlipidemia     Hypertension     Obesity     NAINA (obstructive sleep apnea)      Presents to outpatient psychiatry for evaluation and medication management. Patient states he has recently lost his 20 year career as a  and has needed surgery, patient injured his shoulder from frequent wear and tear and tore his bicep. Since then patient endorses he has financial difficulty, was approx 3 months behind house payment but was able to redo it. He feels that he and his wife are 'hanging in there.' Wife was getting certifications to help her with employment but was forced to discontinue them in order to support the family, patient endorses significant stress and anxiety through this. Patient has had issue of depression and anxiety for years, sometimes gets anxious in large crowds, was anxious this past susannah gras for a parade and had to leave early. Patient endorses that many issues are effecting his mood negatively, including interpersonal, financial, and health-related stressors.    Patient grew up in [place]. Patient's father worked offshore 2-3 months at a time and would come home for two weeks, mother was a drinker and drug addict and frequently in and out of mental hospitals. Patient has a 10 year younger brother. Patient states out ofd his family only he and his brother are the ones that still speak. Patient states his family does not like him much because he does not deal with their problems as they frequently have significant amounts of drama. Patient has been  since 2004. Has up and downs but endorses good relationship. Patient states that he is having significant trouble managing his mood and anger, patient states his oldest non-biological son is 18 but suffers from autism but still has significant issues and is very forgetful ; he has some behavioral issues and patient endorses he has a lot of stress from having to deal with behavioral incidents and characteristics  associated with autism spectrum disorder. Patient states that he is having consistent issues with attention and forgetfulness, he enjoys doing leatherwork but feels that he has not been in the right mindset to be able to do it. His mood is confounded by frequent migraines. Patient lacks coping skills in dealing with stressors and anxiety, states he often will get angry or frustrated and want to punch a wall and cannot self-regulate very well. At this time patient denies HI/SI, endorses occasional auditory hallucinations where he 'hears things that other people can't.' But denies visual hallucinations. Discussed with patient, he feels that this may be related to his history of frequent head trauma or work as a , in EMS, or  service. Patient has had marital issues with wife due to infidelity, has attended couple's therapy and states it was helpful for their relationship. At this time patient is amenable to increasing dose of prozac as he does not feel current dose is helpful at this time. Overall, patient endorses a long history of psychosocial stressors that he has not unpacked or discussed with others, and he has not attended therapy since he was approx 9 years old, and states that he only went to 'maybe four' appointments. He is currently scheduled for psychotherapy with a therapist. Patient has a firearm at home, but it is locked up.         CURRENT PSYCHOTROPIC REGIMEN:  Prozac 20mg  Lamictal 100mg  Modafinil 100mg      Compliance: yes   reviewed without concern yes  Side effects: no  Patient's overall opinion of current regimen: Don't know if they are working or not.   Life event tracker/ stressors/ relationships: currently pursuing disability for physical injuries, having issues with financial uncertainty and loss of career.           PSYCHIATRIC ROS:  Depressed mood: yes   Sleep Disturbance: yes   interest/pleasure/anhedonia: yes  Negative-self talk /guilty/hopelessness:  no  energy/anergy: no  concentration: yes  Appetite disturbance: yes, on an appetite suppressant  Self-injurious /risky behavior: no   Psychomotor disturbance: no  Suicidal Ideation:  no  Chronic daily anxiety/panic: yes  Endorses:  severe panic associated with chest pain, palpitations, hyperventilation, sense of doom, diaphoresis.   Obsessions/Recurrent thoughts:  denies  Compulsions/ Recurrent behaviors:  denies       RISK PARAMETERS:  Patient reports no suicidal ideation  Patient reports no homicidal ideation  Patient reports no self-injurious behavior  Patient reports no violent behavior       SOCIAL HISTORY:  Lives in own home with wife and son.     Work - currently pursuing disability, lost career as  due to injury.   Relationship -    Children - no biological children, adopted three children, adopted younger brother.   Voodoo - no   - [facility] [place]        PAST PSYCHIATRIC HISTORY:  Family Psychiatric History:  Endorses family history of bipolar disorder in mother , grandmother schizophrenia, mother had many psychiatric hospitalizations, 'about 4 times a year' mother attempted suicide  Previous Psychiatric Care: no   Previous Psychiatric Hospitalizations: no; inpatient substance tx   Previous Suicide Attempts/ Non-suicidal self injury: no  Previous Medication Trials: no       LEGAL, VIOLENCE:  History of Violence: no  Legal history: no  DWI / DUI: no  Access to Gun: yes, are locked up       SUBSTANCE ABUSE HISTORY:  Smokes marijuana, sometimes vapes         NEUROLOGIC HISTORY:  Seizures: no  Head trauma: yes, approx 15 concussions per patient.         Review Of Systems:     GENERAL:  No weight gain or loss  SKIN:  No rashes or lacerations  HEAD:  No headaches  EYES:  No exophthalmos, jaundice or blindness  EARS:  No dizziness, tinnitus or hearing loss  NOSE:  No changes in smell  MOUTH & THROAT:  No dyskinetic movements or obvious goiter  CHEST:  No shortness of breath,  hyperventilation or cough  CARDIOVASCULAR:  No tachycardia or chest pain  ABDOMEN:  No nausea, vomiting, pain, constipation or diarrhea  URINARY:  No frequency, dysuria or sexual dysfunction  ENDOCRINE:  No polydipsia, polyuria  MUSCULOSKELETAL:  No pain or stiffness of the joints  NEUROLOGIC:  No weakness, sensory changes, seizures, confusion, memory loss, tremor or other abnormal movements    Current Evaluation:     Nutritional Screening: Considering the patient's height and weight, medications, medical history and preferences, should a referral be made to the dietitian? no    Constitutional  Vitals:  Most recent vital signs, dated less than 90 days prior to this appointment, were reviewed.    Vitals:    03/06/24 1441   BP: 130/74   Pulse: 106          General:  unremarkable, age appropriate     Musculoskeletal  Muscle Strength/Tone:  not examined   Gait & Station:  non-ataxic     Psychiatric  Speech:  no latency; no press   Mood & Affect:  depressed  congruent and appropriate   Thought Process:  normal and logical   Associations:  intact   Thought Content:  normal, no suicidality, no homicidality, delusions, or paranoia   Insight:  intact   Judgement: behavior is adequate to circumstances   Orientation:  grossly intact   Memory: intact for content of interview   Language: grossly intact   Attention Span & Concentration:  able to focus   Fund of Knowledge:  intact and appropriate to age and level of education       Relevant Elements of Neurological Exam: normal gait    Functioning in Relationships:  Spouse/partner: Endorses fluctuating relationship with ups and downs  Peers: Has a few peer relationships he can confide in  Employers: n/a    Laboratory Data  No visits with results within 1 Month(s) from this visit.   Latest known visit with results is:   Lab Visit on 01/04/2024   Component Date Value Ref Range Status    Hemoglobin A1C 01/04/2024 5.9 (H)  4.0 - 5.6 % Final    Estimated Avg Glucose 01/04/2024 123  68  - 131 mg/dL Final         Medications  Outpatient Encounter Medications as of 3/6/2024   Medication Sig Dispense Refill    ammonium lactate 12 % Crea Apply 1 application  topically once daily. 140 g 5    atogepant 60 mg Tab Take 60 mg by mouth once daily. 90 tablet 1    atorvastatin (LIPITOR) 40 MG tablet TAKE 1 TABLET BY MOUTH EVERY DAY 90 tablet 1    azelastine (ASTELIN) 137 mcg (0.1 %) nasal spray Use 1-2 sprays in each nostril twice daily 90 mL 3    blood sugar diagnostic Strp To check BG 4 times daily, to use with insurance preferred meter 400 strip 3    blood sugar diagnostic Strp OneTouch Ultra Test strips      blood-glucose meter kit OneTouch Ultra2 Meter kit      blood-glucose sensor (DEXCOM G6 SENSOR) Filomena Change sensor every 10 days 3 each 11    blood-glucose sensor (FREESTYLE SAMANTHA 3 SENSOR) Filomena Change every 14 days 2 each 11    blood-glucose transmitter (DEXCOM G6 TRANSMITTER) Filomena Change every 3 months 1 each 3    cyanocobalamin, vitamin B-12, 5,000 mcg Subl Place one under tongue once a day for 1 month, then continue to take under tongue per week 30 tablet 3    empagliflozin (JARDIANCE) 25 mg tablet Take 1 tablet (25 mg total) by mouth once daily. 90 tablet 2    FLUoxetine 20 MG capsule Take 1 capsule (20 mg total) by mouth once daily. 30 capsule 5    fluticasone propionate (FLONASE) 50 mcg/actuation nasal spray 1 spray (50 mcg total) by Each Nostril route once daily. 48 g 5    insulin aspart U-100 (NOVOLOG) 100 unit/mL (3 mL) InPn pen INJECT 15-30 UNITS BEFORE EACH MEAL WITH SLIDNING SCALE, MAX DAILY DOSE 100 UNITS. Use in the event of insulin pump failure 30 mL 5    insulin aspart U-100 (NOVOLOG) 100 unit/mL injection Max daily dose 160 units in insulin pump. 40 mL 5    insulin glargine U-300 conc (TOUJEO MAX U-300 SOLOSTAR) 300 unit/mL (3 mL) insulin pen Inject 30 Units into the skin once daily. 3 pen 5    insulin pump cart,automated,BT (OMNIPOD 5 G6 PODS, GEN 5,) Crtg Inject 1 each into the skin  "once daily. 30 each 11    L-METHYLFOLATE 15 mg Tab TAKE 15 MG BY MOUTH ONCE DAILY. 30 tablet 11    lamoTRIgine (LAMICTAL) 100 MG tablet Take 1.5 tablets (150 mg total) by mouth once daily. 135 tablet 3    lancets Misc To check BG 4 times daily, to use with insurance preferred meter 400 each 3    levothyroxine (SYNTHROID) 50 MCG tablet TAKE 1 TABLET BY MOUTH BEFORE BREAKFAST. 90 tablet 3    lisinopriL (PRINIVIL,ZESTRIL) 20 MG tablet TAKE 1 TABLET BY MOUTH EVERY DAY 90 tablet 3    loratadine (CLARITIN) 10 mg tablet Take 1 tablet (10 mg total) by mouth once daily. 90 tablet 3    magnesium oxide (MAG-OX) 400 mg (241.3 mg magnesium) tablet Take 1 tablet (400 mg total) by mouth once daily. 90 tablet 1    modafiniL (PROVIGIL) 100 MG Tab Take 1 tablet (100 mg total) by mouth every morning. 90 tablet 1    ondansetron (ZOFRAN-ODT) 4 MG TbDL Take 1 tablet (4 mg total) by mouth every 8 (eight) hours as needed (nausea). 20 tablet 0    oxyCODONE (ROXICODONE) 5 MG immediate release tablet Take 1 tablet (5 mg total) by mouth every 4 (four) hours as needed for Pain. 25 tablet 0    pen needle, diabetic (BD ULTRA-FINE KEN PEN NEEDLE) 32 gauge x 5/32" Ndle 1 Device by Misc.(Non-Drug; Combo Route) route 5 (five) times daily. 550 each 4    rimegepant (NURTEC) 75 mg odt Take 1 tablet (75 mg total) by mouth as needed for Migraine. Place ODT tablet on the tongue; alternatively the ODT tablet may be placed under the tongue. No more than 2 in 24 hrs. 16 tablet 3    syringe, disposable, 1 mL Syrg Testosterone every 2 weeks 25 Syringe 1    tirzepatide 7.5 mg/0.5 mL PnIj Inject 7.5 mg into the skin every 7 days. 4 pen 4    [DISCONTINUED] empagliflozin (JARDIANCE) 25 mg tablet Take 1 tablet (25 mg total) by mouth once daily. 90 tablet 2     No facility-administered encounter medications on file as of 3/6/2024.           Assessment - Diagnosis - Goals:     Impression:   1. Moderate episode of recurrent major depressive disorder        2. " Generalized anxiety disorder        3. Chronic migraine with aura, intractable, with status migrainosus            Strengths and Liabilities: Strength: Patient accepts guidance/feedback, Strength: Patient is expressive/articulate., Strength: Patient is intelligent., Strength: Patient is motivated for change., Strength: Patient is physically healthy., Strength: Patient has positive support network., Strength: Patient has reasonable judgment., Liability: Patient lacks coping skills.    Treatment Goals:  Specify outcomes written in observable, behavioral terms:   Anxiety: acquiring relapse prevention skills, reducing negative automatic thoughts, reducing physical symptoms of anxiety, and reducing time spent worrying (<30 minutes/day)  Depression: acquiring relapse prevention skills, increasing energy, increasing interest in usual activities, increasing motivation, increasing social contacts (three/week), reducing excessive guilt, reducing fatigue, and reducing negative automatic thoughts    Treatment Plan/Recommendations:   Medication Management: Continue current medications. The risks and benefits of medication were discussed with the patient. Increase Fluoxetine to 40mg, assess for efficacy, titrate as needed.  Referral for further treatment to psychologist for psychotherapy and Patient is currenty scheduled with a therapist.  The treatment plan and follow up plan were reviewed with the patient.      Labs: reviewed most recent. The treatment plan and follow up plan were reviewed with the patient. Discussed with patient informed consent, risks vs. benefits, alternative treatments, side effect profile and the inherent unpredictability of individual responses to these treatments. The patient expresses understanding of the above and displays the capacity to agree with this current plan and had no other questions. Encouraged Patient to keep future appointments. Take medications as prescribed and abstain from substance  abuse. In the event of an emergency patient was advised to go to the emergency room.    Return to Clinic: 1 month      PSYCHOTHERAPY ADD-ON +[id number]   (16-37*) minutes    Time: 16 minutes  Participants: Met with patient    Therapeutic Intervention Type: CBT/behavior modifying psychotherapy/ supportive psychotherapy/Insight oriented  Why chosen therapy is appropriate versus another modality: relevant to diagnosis, patient responds to this modality, evidence based practice    Target symptoms: depression, anxiety , substance abuse, mood swings, adjustment  Primary focus: Coping mechanisms for episodes of depression and anxiety, managing mood, adjusting to loss of career and marijuana use.  Psychotherapeutic techniques: Practice effective CBT skills, deep breathing, meditation, and mindfulness, self-regulation to manage current symptoms    Outcome monitoring methods: self-report, observation    Patient's response to intervention:  The patient's response to intervention is accepting.    Progress toward goals:  The patient's progress toward goals is fair .    Total time: I spent a total of 115 minutes on the day of the visit.  This includes face to face time and non-face to face time preparing to see the patient (eg, review of tests), obtaining and/or reviewing separately obtained history, documenting clinical information in the electronic or other health record, independently interpreting results and communicating results to the patient/family/caregiver, or care coordinator.    Consulting clinician was informed of the encounter and consult note.

## 2024-03-06 NOTE — PROGRESS NOTES
Diabetes Care Specialist Progress Note  Author: Beatris Nixon RD  Date: 3/6/2024    Program Intake  Reason for Diabetes Program Visit:: Intervention  Type of Intervention:: Individual  Individual: Education  Education: Self-Management Skill Review, Pattern Management  Current diabetes risk level:: low  In the last 12 months, have you:: used emergency room services  Was the ER or hospital admission related to diabetes?: No  Permission to speak with others about care:: yes (Pt wife in office during appt)  Continuous Glucose Monitoring  Patient has CGM: Yes  Personal CGM type:: Dexcom G6  GMI Date: 03/05/24  GMI Value: 6.3 %    Lab Results   Component Value Date    HGBA1C 5.9 (H) 01/04/2024     Clinical    There is no height or weight on file to calculate BMI.    Clinical Assessment  Current Diabetes Treatment: Oral Medication, Injectable, Insulin pump, Insulin    Medication Information  Medication adherence impacting ability to self-manage diabetes?: No    Labs  Do you have regular lab work to monitor your medications?: Yes  Type of Regular Lab Work: A1c, Cholesterol, Microalbumin, CBC  Lab Compliance Barriers: No    Nutritional Status  Meal Plan 24 Hour Recall: Breakfast, Lunch, Dinner, Snack  Meal Plan 24 Hour Recall - Breakfast: Eggs, hernandez, coffee  Meal Plan 24 Hour Recall - Lunch: Orient  Meal Plan 24 Hour Recall - Dinner: Greek hummus with salad and Gyro  Meal Plan 24 Hour Recall - Snack: Drinks: unsweet tea, water  Current nutritional status an area of need that is impacting patient's ability to self-manage diabetes?: No    Additional Social History    Support  Does anyone support you with your diabetes care?: yes  Who supports you?: self, spouse  Who takes you to your medical appointments?: self  Does the current support meet the patient's needs?: Yes  Is Support an area impacting ability to self-manage diabetes?: No    Access to [employer] Technology  Does the patient have access to any of the following  devices or technologies?: Smart phone  Media or technology needs impacting ability to self-manage diabetes?: No    Cognitive/Behavioral Health  Alert and Oriented: Yes  Difficulty Thinking: No  Requires Prompting: No  Requires assistance for routine expression?: No  Cognitive or behavioral barriers impacting ability to self-manage diabetes?: No    Communication  Language preference: English  Hearing Problems: No  Vision Problems: No  Communication needs impacting ability to self-manage diabetes?: No    Health Literacy  Preferred Learning Method: Face to Face, Demonstration  Health literacy needs impacting ability to self-manage diabetes?: No    Diabetes Self-Management Skills Assessment    Diabetes Disease Process/Treatment Options  Diabetes Disease Process/Treatment Options: Skills Assessment Completed: No  Deferred due to:: Time  Area of need?: Deferred    Nutrition/Healthy Eating  Method of carbohydrate measurement:: carb counting/reading labels  Assessment indicates:: Adequate understanding  Deffered due to:: Time  Area of need?: No    Physical Activity/Exercise  Physical Activity/Exercise Skills Assessment Completed: : No  Deffered due to:: Time  Area of need?: Deferred    Medications  Patient is able to describe current diabetes management routine.: yes  Diabetes management routine:: diet, injectable medications, insulin, oral medications  Patient is able to identify current diabetes medications, dosages, and appropriate timing of medications.: yes (Toujeo, Novolog, Mounjaro, Jardiance, Omnipod 5)  Patient reports problems or concerns with current medication regimen.: yes  Medication regimen problems/concerns:: financial concerns  Medication Skills Assessment Completed:: Yes  Assessment indicates:: Instruction Needed  Area of need?: Yes    Home Blood Glucose Monitoring  Patient states that blood sugar is checked at home daily.: yes  Monitoring Method:: personal continuous glucose monitor  Personal CGM type::  Dexcom G6  Patient is able to use personal CGM appropriately.: yes  CGM Report reviewed?: yes  Assessment indicates:: Adequate understanding  Area of need?: No    Acute Complications  Acute Complications Skills Assessment Completed: : No  Deffered due to:: Time  Area of need?: Deferred    Chronic Complications  Patient is taking statin?: Yes  Chronic Complications Skills Assessment Completed: : No  Deferred due to:: Time  Area of need?: Deferred    Psychosocial/Coping  Psychosocial/Coping Skills Assessment Completed: : No  Deffered due to:: Time  Area of need?: Deferred    Assessment Summary and Plan    Based on today's diabetes care assessment, the following areas of need were identified:          3/5/2024    12:01 AM   Social   Support No   Access to Mass Media/Tech No   Cognitive/Behavioral Health No   Communication No   Health Literacy No            3/5/2024    12:01 AM   Clinical   Medication Adherence No   Lab Compliance No   Nutritional Status No            3/5/2024    12:01 AM   Diabetes Self-Management Skills   Diabetes Disease Process/Treatment Options Deferred   Nutrition/Healthy Eating No   Physical Activity/Exercise Deferred   Medication Yes, see care plan.   Home Blood Glucose Monitoring No   Acute Complications Deferred   Chronic Complications Deferred   Psychosocial/Coping Deferred      Today's interventions were provided through individual discussion, instruction, and written materials were provided.      Patient verbalized understanding of instruction and written materials.  Pt was able to return back demonstration of instructions today. Patient understood key points, needs reinforcement and further instruction.     Diabetes Self-Management Care Plan:    Today's Diabetes Self-Management Care Plan was developed with Tony's input. Tony has agreed to work toward the following goal(s) to improve his/her overall diabetes control.      Care Plan: Diabetes Management   Updates made since 2/5/2024 12:00 AM         Problem: Medications         Goal: Patient Agrees to take Diabetes Medications as prescribed.    Start Date: 7/31/2023   Expected End Date: 6/6/2024   This Visit's Progress: On track   Recent Progress: On track   Priority: High   Barriers: Financial   Note:    DIABETES EDUCATOR NOTE - PUMP EVAL      Patient seen for initial assessment and education on Omnipod 5.  Patient is interested in an insulin pump and continuous glucose monitor.   Pt returned today for evaluation/education on self care measure of diabetes management, blood glucose testing, meal patterns/ability to carbohydrate count, and problem solving skills for managing hypo and hyperglycemia.     Covered basic details of pump therapy with patient.  Reviewed terms ISF, CHO ratio, goal BG, bolus, basal, active insulin and insulin on board.  Pt verbalized clearer understanding of pump and CGM therapy.   Pt came to appt with Omnipod 5.    During today's visit the patient was introduces to/educated on the following content areas:     Current Diabetic Medications: Jardiance, Toujeo, Novolog, Mounjaro    Nutrition:   Carb counting skills: Pt states comfortable with carbohydrate counting.    Glucose monitoring:  Patient is testing BG using Dexcom G6.      EDUCATION PLAN:   Information provided on Omnipod 5, which patient is most interested in.  Follow up for continued education on possible pump training .     8/2/23:   OMNIPOD 5 INSULIN PUMP START Patient is here today for insulin pump training and will be starting on an Omni Pod 5 Insulin pump.     Patient demonstrated the ability to fill pod reservoir with 200 units, prime infusion set and insert pod into right arm per sterile technique.  Instructed pt on use of basic pump features. Reviewed site selection of pods, rotation of sites and hard stop on controller to change every 72 hrs.  Instructed pt on how to enter activity mode.  Reviewed features available in manual mode verses automated mode.   Educated  "pt on how to switch from automated mode to manual mode.  Patient demonstrated ability to program Dexcom transmitter into controller and start automated limited mode.  Pt Dexcom connected to controller during appt.     INITIAL SETTINGS : Basal rate: 1.2 u/hr Maximum basal: 2.0 u/hr     Bolus Menu:  Pump Settings per NP note  Basal rate 1.2 u/hr  Carb ratio 5   ISF 20  Active insulin time 4 hours   Glucose target 110      Correct over: 110  IAT: 4      Maximum bolus = 30 units     Patient was given time to practice on simulator entering carbs and glucose into pump     Podder/Glooko account information added in specialty comments.    9/5/23: Pt reports adding an extra ~15-20g to his meal carbohydrates to avoid hyperglycemia. Educator messaged NP. NP stated: "let's increase carb ratio from 4 to 3." And to "avoid counting extra carbs after the change so that we can evaluate the effectiveness." Educator assisted pt with making pump setting changes.    10/11/23: Educator assisted pt with placing settings in new Omnipod 5 PDM per NP recommendations:   Basal rate 1.2 u/hr  Carb ratio 2   ISF 20  Active insulin time 4 hours   Glucose target 110      Correct over: 110  IAT: 4      Maximum bolus = 30 units    12/5/23: Pt reports concern with cost of Omnipods 2/2 insurance problems/concerns. Pt reports NP sent PA to [facility]. NP reports PA was denied. Pt reports having insulin pens if needed.    3/5/24: Pt reports using Omnipod 5 as recommended.          Dexcom download/Glooko report uploaded into media manager.    Follow Up Plan     Follow up in about 6 months (around 9/5/2024) for 6-month F/U.    Today's care plan and follow up schedule was discussed with patient.  Tony verbalized understanding of the care plan, goals, and agrees to follow up plan.        The patient was encouraged to communicate with his/her health care provider/physician and care team regarding his/her condition(s) and treatment.  I provided the " patient with my contact information today and encouraged to contact me via phone or [facility] Patient Portal as needed.     Length of Visit   Total Time: 30 Minutes

## 2024-03-08 ENCOUNTER — TELEPHONE (OUTPATIENT)
Dept: ORTHOPEDICS | Facility: CLINIC | Age: 44
End: 2024-03-08
Payer: COMMERCIAL

## 2024-03-12 ENCOUNTER — OFFICE VISIT (OUTPATIENT)
Dept: FAMILY MEDICINE | Facility: CLINIC | Age: 44
End: 2024-03-12
Payer: COMMERCIAL

## 2024-03-12 VITALS
HEART RATE: 91 BPM | DIASTOLIC BLOOD PRESSURE: 76 MMHG | SYSTOLIC BLOOD PRESSURE: 118 MMHG | WEIGHT: 290.56 LBS | BODY MASS INDEX: 38.51 KG/M2 | TEMPERATURE: 98 F | HEIGHT: 73 IN | OXYGEN SATURATION: 97 %

## 2024-03-12 DIAGNOSIS — S06.9XAS MILD NEUROCOGNITIVE DISORDER DUE TO TRAUMATIC BRAIN INJURY: ICD-10-CM

## 2024-03-12 DIAGNOSIS — H66.002 NON-RECURRENT ACUTE SUPPURATIVE OTITIS MEDIA OF LEFT EAR WITHOUT SPONTANEOUS RUPTURE OF TYMPANIC MEMBRANE: Primary | ICD-10-CM

## 2024-03-12 DIAGNOSIS — I10 ESSENTIAL HYPERTENSION: ICD-10-CM

## 2024-03-12 DIAGNOSIS — F06.70 MILD NEUROCOGNITIVE DISORDER DUE TO TRAUMATIC BRAIN INJURY: ICD-10-CM

## 2024-03-12 PROCEDURE — 99999 PR PBB SHADOW E&M-EST. PATIENT-LVL V: CPT | Mod: PBBFAC,,,

## 2024-03-12 PROCEDURE — 99214 OFFICE O/P EST MOD 30 MIN: CPT | Mod: S$GLB,,,

## 2024-03-12 RX ORDER — AMOXICILLIN AND CLAVULANATE POTASSIUM 875; 125 MG/1; MG/1
1 TABLET, FILM COATED ORAL EVERY 12 HOURS
Qty: 14 TABLET | Refills: 0 | Status: SHIPPED | OUTPATIENT
Start: 2024-03-12 | End: 2024-03-19

## 2024-03-12 NOTE — PROGRESS NOTES
HPI     Chief Complaint:  Chief Complaint   Patient presents with    pain in jaw       Tony Gordillo is a 43 y.o. male with multiple medical diagnoses as listed in the medical history and problem list that presents for jaw pain.  Pt is not known to me with his last appointment 10/5/2023.      Dental Pain   This is a new problem. The current episode started yesterday (has history of TBI with broken jaw in the 1990s). The pain is at a severity of 4/10. The pain is mild. Associated symptoms include facial pain. Pertinent negatives include no difficulty swallowing or fever. He has tried NSAIDs and heat for the symptoms. The treatment provided moderate relief.   Denies teeth grinding or TMJ. Has history of recurrent ear infections.        Assessment & Plan     Problem List Items Addressed This Visit          Neuro    Mild neurocognitive disorder due to traumatic brain injury  Stable. Followed by neurology. The current medical regimen is effective;  continue present plan and medications.         Cardiac/Vascular    Essential hypertension  BP in clinic today 118/76. The current medical regimen is effective;  continue present plan and medications.     Other Visit Diagnoses       Non-recurrent acute suppurative otitis media of left ear without spontaneous rupture of tympanic membrane    -  Primary  Left-sided jaw and ear pain that started yesterday.  On physical exam TM and canal with redness and bulging to TM palpable tenderness to parotid gland.  We will order Augmentin.    Relevant Medications    amoxicillin-clavulanate 875-125mg (AUGMENTIN) 875-125 mg per tablet              --------------------------------------------      Health Maintenance:  Health Maintenance         Date Due Completion Date    Pneumococcal Vaccines (Age 0-64) (2 of 2 - PCV) 03/12/2025 (Originally 9/28/2019) 9/28/2018    Hemoglobin A1c 07/04/2024 1/4/2024    Override on 7/11/2015: Done (HGB A1C 9.9H - [facility] -   JOHNATHAN)    Diabetes Urine Screening 07/06/2024 7/6/2023    Lipid Panel 07/06/2024 7/6/2023    Override on 7/11/2015: Done ([facility] RESULTS - DR. MANN)    Foot Exam 09/26/2024 9/26/2023 (Done)    Override on 9/26/2023: Done    Override on 12/11/2012: Done    Eye Exam 01/24/2025 1/24/2024    Low Dose Statin 03/12/2025 3/12/2024    TETANUS VACCINE 07/18/2029 7/18/2019            Health maintenance reviewed    Follow Up:  No follow-ups on file.    Exam     Review of Systems:  (as noted above)  Review of Systems   Constitutional:  Negative for fever.   HENT:  Positive for ear pain. Negative for dental problem and trouble swallowing.    Eyes:  Negative for visual disturbance.   Respiratory:  Negative for chest tightness and shortness of breath.    Cardiovascular:  Negative for chest pain.   Gastrointestinal:  Negative for blood in stool.   Musculoskeletal:  Positive for arthralgias.       Physical Exam:   Physical Exam  Constitutional:       General: He is not in acute distress.     Appearance: He is obese. He is not ill-appearing or diaphoretic.   HENT:      Head: Normocephalic and atraumatic.      Jaw: Tenderness and pain on movement present.      Salivary Glands: Left salivary gland is tender.      Comments: Audible popping with jaw opening     Left Ear: Tenderness present. Tympanic membrane is erythematous and bulging.   Eyes:      General: No scleral icterus.  Cardiovascular:      Rate and Rhythm: Normal rate and regular rhythm.      Pulses: Normal pulses.      Heart sounds: No murmur heard.     No friction rub. No gallop.   Pulmonary:      Effort: No respiratory distress.   Chest:      Chest wall: No tenderness.   Musculoskeletal:      Cervical back: No rigidity.   Neurological:      Mental Status: He is alert and oriented to person, place, and time.       Vitals:    03/12/24 1040   BP: 118/76   Pulse: 91   Temp: 98.4 °F (36.9 °C)   TempSrc: Oral   SpO2: 97%   Weight: 131.8 kg (290 lb 9.1 oz)  "  Height: 6' 1" (1.854 m)      Body mass index is 38.34 kg/m².        History     Past Medical History:  Past Medical History:   Diagnosis Date    Diabetes mellitus, type 2     Hyperlipidemia     Hypertension     Obesity     NAINA (obstructive sleep apnea)        Past Surgical History:  Past Surgical History:   Procedure Laterality Date    ARTHROSCOPIC DEBRIDEMENT OF SHOULDER  03/14/2023    Procedure: DEBRIDEMENT, SHOULDER, ARTHROSCOPIC;  Surgeon: Crissy Bradford MD;  Location: [facility] OR;  Service: Orthopedics;;    ARTHROSCOPIC REPAIR OF ROTATOR CUFF OF SHOULDER Right 03/14/2023    Procedure: REPAIR, ROTATOR CUFF, ARTHROSCOPIC;  Surgeon: Crissy Bradford MD;  Location: [facility] OR;  Service: Orthopedics;  Laterality: Right;  H[facility]Y [facility] NEPHFLY PAYNE [phone]. TEXTED ELMER ON 3/9/2023 @ 12:10PM. ELMER RESPONDED ON 3/9/23 @ 12:23PM-SAG  RN PREOP 3/10/2023---CLEARED BY PCP AND CARDS    ARTHROSCOPY,SHOULDER,WITH BICEPS TENODESIS  03/14/2023    Procedure: ARTHROSCOPY,SHOULDER,WITH BICEPS TENODESIS;  Surgeon: Crissy Bradford MD;  Location: [facility] OR;  Service: Orthopedics;;    KNEE ARTHROSCOPY W/ MENISCECTOMY Right 10/24/2023    Procedure: ARTHROSCOPY, KNEE, WITH MENISCECTOMY;  Surgeon: Crissy Bradford MD;  Location: [facility] OR;  Service: Orthopedics;  Laterality: Right;  RN PREOP 10/18/203---CLEARED BY CHARLEEN -[phone]    KNEE SURGERY      acl,mcl,pcl    left knee x 2      PRK  Bilateral 2010    SUBCHONDROPLASTY Right 10/24/2023    Procedure: POSSIBLE SUBCHONDROPLASTY;  Surgeon: Crissy Bradford MD;  Location: [facility] OR;  Service: Orthopedics;  Laterality: Right;       Social History:  Social History     Socioeconomic History    Marital status:    Occupational History    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     Employer: [employer]   Tobacco Use    Smoking status: Never     Passive exposure: Never    Smokeless tobacco: Never   Substance and " Sexual Activity    Alcohol use: Yes     Comment: 1-2 beers every 2 weeks    Drug use: No     Social Determinants of Health     Financial Resource Strain: Patient Declined (12/5/2023)    Overall Financial Resource Strain (CARDIA)     Difficulty of Paying Living Expenses: Patient declined   Food Insecurity: Patient Declined (12/5/2023)    Hunger Vital Sign     Worried About Running Out of Food in the Last Year: Patient declined     Ran Out of Food in the Last Year: Patient declined   Transportation Needs: Patient Declined (12/5/2023)    PRAPARE - Transportation     Lack of Transportation (Medical): Patient declined     Lack of Transportation (Non-Medical): Patient declined   Physical Activity: Sufficiently Active (12/5/2023)    Exercise Vital Sign     Days of Exercise per Week: 4 days     Minutes of Exercise per Session: 100 min   Stress: Patient Declined (12/5/2023)    [employer] - Occupational Stress Questionnaire     Feeling of Stress : Patient declined   Recent Concern: Stress - Stress Concern Present (10/10/2023)    [employer] [place] - Occupational Stress Questionnaire     Feeling of Stress : To some extent   Social Connections: Unknown (12/5/2023)    Social Connection and Isolation Panel [NHANES]     Frequency of Communication with Friends and Family: Once a week     Frequency of Social Gatherings with Friends and Family: Once a week     Active Member of Clubs or Organizations: No     Attends Club or Organization Meetings: Never     Marital Status:    Housing Stability: Unknown (12/5/2023)    Housing Stability Vital Sign     Unable to Pay for Housing in the Last Year: Patient refused     Unstable Housing in the Last Year: Patient refused   Recent Concern: Housing Stability - High Risk (10/10/2023)    Housing Stability Vital Sign     Unable to Pay for Housing in the Last Year: Yes     Number of Places Lived in the Last Year: 1     Unstable Housing in the  Last Year: No       Family History:  Family History   Problem Relation Age of Onset    Diabetes Mother     Diabetes Father     No Known Problems Brother     No Known Problems Maternal Aunt     No Known Problems Maternal Uncle     No Known Problems Paternal Aunt     No Known Problems Paternal Uncle     No Known Problems Maternal Grandmother     No Known Problems Maternal Grandfather     No Known Problems Paternal Grandmother     No Known Problems Paternal Grandfather     No Known Problems Daughter     Autism Son     No Known Problems Other        Allergies and Medications: (updated and reviewed)  Review of patient's allergies indicates:   Allergen Reactions    Influenza virus vaccine, specific Hives    Pioglitazone      Altered mood     Victoza [liraglutide] Nausea And Vomiting    Farxiga [dapagliflozin] Rash     Erectile dysfunction and rash      Current Outpatient Medications   Medication Sig Dispense Refill    ammonium lactate 12 % Crea Apply 1 application  topically once daily. 140 g 5    atogepant 60 mg Tab Take 60 mg by mouth once daily. 90 tablet 1    atorvastatin (LIPITOR) 40 MG tablet TAKE 1 TABLET BY MOUTH EVERY DAY 90 tablet 1    azelastine (ASTELIN) 137 mcg (0.1 %) nasal spray Use 1-2 sprays in each nostril twice daily 90 mL 3    blood sugar diagnostic Strp To check BG 4 times daily, to use with insurance preferred meter 400 strip 3    blood sugar diagnostic Strp OneTouch Ultra Test strips      blood-glucose meter kit OneTouch Ultra2 Meter kit      blood-glucose sensor (DEXCOM G6 SENSOR) Filomena Change sensor every 10 days 3 each 11    blood-glucose sensor (FREESTYLE SAMANTHA 3 SENSOR) Filomena Change every 14 days 2 each 11    blood-glucose transmitter (DEXCOM G6 TRANSMITTER) Filomena Change every 3 months 1 each 3    cyanocobalamin, vitamin B-12, 5,000 mcg Subl Place one under tongue once a day for 1 month, then continue to take under tongue per week 30 tablet 3    empagliflozin (JARDIANCE) 25 mg tablet Take 1 tablet  (25 mg total) by mouth once daily. 90 tablet 2    FLUoxetine 40 MG capsule Take 1 capsule (40 mg total) by mouth once daily. 30 capsule 11    fluticasone propionate (FLONASE) 50 mcg/actuation nasal spray 1 spray (50 mcg total) by Each Nostril route once daily. 48 g 5    insulin aspart U-100 (NOVOLOG) 100 unit/mL (3 mL) InPn pen INJECT 15-30 UNITS BEFORE EACH MEAL WITH SLIDNING SCALE, MAX DAILY DOSE 100 UNITS. Use in the event of insulin pump failure 30 mL 5    insulin aspart U-100 (NOVOLOG) 100 unit/mL injection Max daily dose 160 units in insulin pump. 40 mL 5    insulin glargine U-300 conc (TOUJEO MAX U-300 SOLOSTAR) 300 unit/mL (3 mL) insulin pen Inject 30 Units into the skin once daily. 3 pen 5    insulin pump cart,automated,BT (OMNIPOD 5 G6 PODS, GEN 5,) Crtg Inject 1 each into the skin once daily. 30 each 11    L-METHYLFOLATE 15 mg Tab TAKE 15 MG BY MOUTH ONCE DAILY. 30 tablet 11    lamoTRIgine (LAMICTAL) 100 MG tablet Take 1.5 tablets (150 mg total) by mouth once daily. 135 tablet 3    lancets Misc To check BG 4 times daily, to use with insurance preferred meter 400 each 3    levothyroxine (SYNTHROID) 50 MCG tablet TAKE 1 TABLET BY MOUTH BEFORE BREAKFAST. 90 tablet 3    lisinopriL (PRINIVIL,ZESTRIL) 20 MG tablet TAKE 1 TABLET BY MOUTH EVERY DAY 90 tablet 3    loratadine (CLARITIN) 10 mg tablet Take 1 tablet (10 mg total) by mouth once daily. 90 tablet 3    magnesium oxide (MAG-OX) 400 mg (241.3 mg magnesium) tablet Take 1 tablet (400 mg total) by mouth once daily. 90 tablet 1    modafiniL (PROVIGIL) 100 MG Tab Take 1 tablet (100 mg total) by mouth every morning. 90 tablet 1    ondansetron (ZOFRAN-ODT) 4 MG TbDL Take 1 tablet (4 mg total) by mouth every 8 (eight) hours as needed (nausea). 20 tablet 0    oxyCODONE (ROXICODONE) 5 MG immediate release tablet Take 1 tablet (5 mg total) by mouth every 4 (four) hours as needed for Pain. 25 tablet 0    pen needle, diabetic (BD ULTRA-FINE KEN PEN NEEDLE) 32 gauge x  "5/32" Ndle 1 Device by Misc.(Non-Drug; Combo Route) route 5 (five) times daily. 550 each 4    rimegepant (NURTEC) 75 mg odt Take 1 tablet (75 mg total) by mouth as needed for Migraine. Place ODT tablet on the tongue; alternatively the ODT tablet may be placed under the tongue. No more than 2 in 24 hrs. 16 tablet 3    syringe, disposable, 1 mL Syrg Testosterone every 2 weeks 25 Syringe 1    tirzepatide 7.5 mg/0.5 mL PnIj Inject 7.5 mg into the skin every 7 days. 4 pen 4    amoxicillin-clavulanate 875-125mg (AUGMENTIN) 875-125 mg per tablet Take 1 tablet by mouth every 12 (twelve) hours. for 7 days 14 tablet 0     No current facility-administered medications for this visit.       Patient Care Team:  Jovany Ford MD as PCP - General (Internal Medicine)  Janneth Johnson RN (Inactive) as Registered Nurse (Diabetes)  Rocky Hewitt MD as Consulting Physician (Ophthalmology)  Preethi Monaco RD (Inactive) as Dietitian (Nutrition)  Christofer Rowley MD as Consulting Physician (Orthopedic Surgery)  Singh Rizo MD as Consulting Physician (Sports Medicine)  Shilpa Gordon NP as Nurse Practitioner (Urology)  Nicole Wynn MD (Family Medicine)  Rafael Meraz MA as Care Coordinator  Beatris Nixon RD as Dietitian (Diabetes)  Reynaldo Delacruz OD as Consulting Physician (Ophthalmology)         - The patient is given an After Visit Summary that lists all medications with directions, allergies, education, orders placed during this encounter and follow-up instructions.      - I have reviewed the patient's medical information including past medical, family, and social history sections including the medications and allergies.      - We discussed the patient's current medications.     This note was created by combination of typed  and MModal dictation.  Transcription errors may be present.  If there are any questions, please contact me.       Monica Welch NP               "

## 2024-03-20 ENCOUNTER — PATIENT MESSAGE (OUTPATIENT)
Dept: ORTHOPEDICS | Facility: CLINIC | Age: 44
End: 2024-03-20
Payer: COMMERCIAL

## 2024-03-20 RX ORDER — MELOXICAM 15 MG/1
15 TABLET ORAL DAILY
Qty: 30 TABLET | Refills: 0 | Status: SHIPPED | OUTPATIENT
Start: 2024-03-20 | End: 2024-06-18 | Stop reason: CLARIF

## 2024-03-22 ENCOUNTER — PATIENT MESSAGE (OUTPATIENT)
Dept: NEUROLOGY | Facility: CLINIC | Age: 44
End: 2024-03-22
Payer: COMMERCIAL

## 2024-03-22 ENCOUNTER — PATIENT MESSAGE (OUTPATIENT)
Dept: FAMILY MEDICINE | Facility: CLINIC | Age: 44
End: 2024-03-22
Payer: COMMERCIAL

## 2024-03-25 ENCOUNTER — OFFICE VISIT (OUTPATIENT)
Dept: NEUROLOGY | Facility: CLINIC | Age: 44
End: 2024-03-25
Payer: COMMERCIAL

## 2024-03-25 ENCOUNTER — OFFICE VISIT (OUTPATIENT)
Dept: PSYCHIATRY | Facility: CLINIC | Age: 44
End: 2024-03-25
Payer: COMMERCIAL

## 2024-03-25 DIAGNOSIS — F33.1 MAJOR DEPRESSIVE DISORDER, RECURRENT, MODERATE: Primary | ICD-10-CM

## 2024-03-25 DIAGNOSIS — G44.40 MEDICATION OVERUSE HEADACHE: ICD-10-CM

## 2024-03-25 DIAGNOSIS — G43.E11 CHRONIC MIGRAINE WITH AURA, INTRACTABLE, WITH STATUS MIGRAINOSUS: Primary | ICD-10-CM

## 2024-03-25 DIAGNOSIS — G31.84 MCI (MILD COGNITIVE IMPAIRMENT): ICD-10-CM

## 2024-03-25 DIAGNOSIS — R45.1 AGITATION: ICD-10-CM

## 2024-03-25 DIAGNOSIS — F07.81 TRAUMATIC ENCEPHALOPATHY: ICD-10-CM

## 2024-03-25 DIAGNOSIS — G47.33 OSA (OBSTRUCTIVE SLEEP APNEA): ICD-10-CM

## 2024-03-25 PROCEDURE — 99214 OFFICE O/P EST MOD 30 MIN: CPT | Mod: 95,,, | Performed by: STUDENT IN AN ORGANIZED HEALTH CARE EDUCATION/TRAINING PROGRAM

## 2024-03-25 PROCEDURE — 99999 PR PBB SHADOW E&M-EST. PATIENT-LVL I: CPT | Mod: PBBFAC,,, | Performed by: SOCIAL WORKER

## 2024-03-25 PROCEDURE — 90791 PSYCH DIAGNOSTIC EVALUATION: CPT | Mod: S$GLB,,, | Performed by: SOCIAL WORKER

## 2024-03-25 NOTE — PROGRESS NOTES
The patient location is: [place]  The chief complaint leading to consultation is: Headache    Visit type: audiovisual    Each patient to whom he or she provides medical services by telemedicine is:  (1) informed of the relationship between the physician and patient and the respective role of any other health care provider with respect to management of the patient; and (2) notified that he or she may decline to receive medical services by telemedicine and may withdraw from such care at any time.    Chief Complaint and Duration     Migraines for years    History of Present Illness     Tony Gordillo is a 43 y.o. who is following with [facility] due to concerns related to progressive cognitive impairment, being followed by Dr. South, history of being a  since 2009, multiple TBIs, childhood history of playing catcher with concussions while playing football, was in the [employer].  Has been following for progressive memory impairment with behavioral issues, has not seen Psychiatry yet.  Patient is referred over for migraine control, was started on Nurtec for possible relief.    Patient states that migraines and headaches have been going on for quite some time, describes characteristics as being bitemporal, can move from 1 side to the other side, has a pulsatile quality.  Sensitive to light and noise, can have nausea.  Does have a piercing tragus that helps relieve it however has not take it out with him being a , is being let go from the  so he will be placed again.  Has tried Imitrex in the past.  Was recently placed on Nurtec in which had a tabs a month, not enough for him.  Has poor sleep.    3/25/24 - ER visit. Responded to cocktail. On quilepta and nurtec.  Changes in quality of the headache, also with intractable nausea.  Through up in the CT machine.      Review of patient's allergies indicates:   Allergen Reactions     Influenza virus vaccine, specific Hives    Pioglitazone      Altered mood     Victoza [liraglutide] Nausea And Vomiting    Farxiga [dapagliflozin] Rash     Erectile dysfunction and rash      Current Outpatient Medications   Medication Sig Dispense Refill    ammonium lactate 12 % Crea Apply 1 application  topically once daily. 140 g 5    atogepant 60 mg Tab Take 60 mg by mouth once daily. 90 tablet 1    atorvastatin (LIPITOR) 40 MG tablet TAKE 1 TABLET BY MOUTH EVERY DAY 90 tablet 1    azelastine (ASTELIN) 137 mcg (0.1 %) nasal spray Use 1-2 sprays in each nostril twice daily 90 mL 3    blood sugar diagnostic Strp To check BG 4 times daily, to use with insurance preferred meter 400 strip 3    blood sugar diagnostic Strp OneTouch Ultra Test strips      blood-glucose meter kit OneTouch Ultra2 Meter kit      blood-glucose sensor (DEXCOM G6 SENSOR) Filomena Change sensor every 10 days 3 each 11    blood-glucose sensor (FREESTYLE SAMANTHA 3 SENSOR) Filomena Change every 14 days 2 each 11    blood-glucose transmitter (DEXCOM G6 TRANSMITTER) Filomnea Change every 3 months 1 each 3    cyanocobalamin, vitamin B-12, 5,000 mcg Subl Place one under tongue once a day for 1 month, then continue to take under tongue per week 30 tablet 3    empagliflozin (JARDIANCE) 25 mg tablet Take 1 tablet (25 mg total) by mouth once daily. 90 tablet 2    FLUoxetine 40 MG capsule Take 1 capsule (40 mg total) by mouth once daily. 30 capsule 11    fluticasone propionate (FLONASE) 50 mcg/actuation nasal spray 1 spray (50 mcg total) by Each Nostril route once daily. 48 g 5    insulin aspart U-100 (NOVOLOG) 100 unit/mL (3 mL) InPn pen INJECT 15-30 UNITS BEFORE EACH MEAL WITH SLIDNING SCALE, MAX DAILY DOSE 100 UNITS. Use in the event of insulin pump failure 30 mL 5    insulin aspart U-100 (NOVOLOG) 100 unit/mL injection Max daily dose 160 units in insulin pump. 40 mL 5    insulin glargine U-300 conc (TOUJEO MAX U-300 SOLOSTAR) 300 unit/mL (3 mL) insulin pen Inject 30  "Units into the skin once daily. 3 pen 5    insulin pump cart,automated,BT (OMNIPOD 5 G6 PODS, GEN 5,) Crtg Inject 1 each into the skin once daily. 30 each 11    L-METHYLFOLATE 15 mg Tab TAKE 15 MG BY MOUTH ONCE DAILY. 30 tablet 11    lamoTRIgine (LAMICTAL) 100 MG tablet Take 1.5 tablets (150 mg total) by mouth once daily. 135 tablet 3    lancets Misc To check BG 4 times daily, to use with insurance preferred meter 400 each 3    levothyroxine (SYNTHROID) 50 MCG tablet TAKE 1 TABLET BY MOUTH BEFORE BREAKFAST. 90 tablet 3    lisinopriL (PRINIVIL,ZESTRIL) 20 MG tablet TAKE 1 TABLET BY MOUTH EVERY DAY 90 tablet 3    loratadine (CLARITIN) 10 mg tablet Take 1 tablet (10 mg total) by mouth once daily. 90 tablet 3    magnesium oxide (MAG-OX) 400 mg (241.3 mg magnesium) tablet Take 1 tablet (400 mg total) by mouth once daily. 90 tablet 1    meloxicam (MOBIC) 15 MG tablet Take 1 tablet (15 mg total) by mouth once daily. 30 tablet 0    modafiniL (PROVIGIL) 100 MG Tab Take 1 tablet (100 mg total) by mouth every morning. 90 tablet 1    ondansetron (ZOFRAN-ODT) 4 MG TbDL Take 1 tablet (4 mg total) by mouth every 8 (eight) hours as needed (nausea). 20 tablet 0    oxyCODONE (ROXICODONE) 5 MG immediate release tablet Take 1 tablet (5 mg total) by mouth every 4 (four) hours as needed for Pain. 25 tablet 0    pen needle, diabetic (BD ULTRA-FINE KEN PEN NEEDLE) 32 gauge x 5/32" Ndle 1 Device by Misc.(Non-Drug; Combo Route) route 5 (five) times daily. 550 each 4    rimegepant (NURTEC) 75 mg odt Take 1 tablet (75 mg total) by mouth as needed for Migraine. Place ODT tablet on the tongue; alternatively the ODT tablet may be placed under the tongue. No more than 2 in 24 hrs. 16 tablet 3    syringe, disposable, 1 mL Syrg Testosterone every 2 weeks 25 Syringe 1    tirzepatide 7.5 mg/0.5 mL PnIj Inject 7.5 mg into the skin every 7 days. 4 pen 4     No current facility-administered medications for this visit.       Medical History     Past " Medical History:   Diagnosis Date    Diabetes mellitus, type 2     Hyperlipidemia     Hypertension     Obesity     NAINA (obstructive sleep apnea)      Past Surgical History:   Procedure Laterality Date    ARTHROSCOPIC DEBRIDEMENT OF SHOULDER  03/14/2023    Procedure: DEBRIDEMENT, SHOULDER, ARTHROSCOPIC;  Surgeon: Crissy Bradford MD;  Location: [facility] OR;  Service: Orthopedics;;    ARTHROSCOPIC REPAIR OF ROTATOR CUFF OF SHOULDER Right 03/14/2023    Procedure: REPAIR, ROTATOR CUFF, ARTHROSCOPIC;  Surgeon: Crissy Bradford MD;  Location: [facility] OR;  Service: Orthopedics;  Laterality: Right;  HARDY AND NEPHEW ELMER [phone]. TEXTED ELMER ON 3/9/2023 @ 12:10PM. ELMER RESPONDED ON 3/9/23 @ 12:23PM-SAG  RN PREOP 3/10/2023---CLEARED BY PCP AND [facility]    ARTHROSCOPY,SHOULDER,WITH BICEPS TENODESIS  03/14/2023    Procedure: ARTHROSCOPY,SHOULDER,WITH BICEPS TENODESIS;  Surgeon: Crissy Bradford MD;  Location: [facility] OR;  Service: Orthopedics;;    KNEE ARTHROSCOPY W/ MENISCECTOMY Right 10/24/2023    Procedure: ARTHROSCOPY, KNEE, WITH MENISCECTOMY;  Surgeon: Crissy Bradford MD;  Location: [facility] OR;  Service: Orthopedics;  Laterality: Right;  RN PREOP 10/18/203---CLEARED BY PCP  ELVIA [phone]    KNEE SURGERY      acl,mcl,pcl    left knee x 2      PRK  Bilateral 2010    SUBCHONDROPLASTY Right 10/24/2023    Procedure: POSSIBLE SUBCHONDROPLASTY;  Surgeon: Crissy Bradford MD;  Location: [facility] OR;  Service: Orthopedics;  Laterality: Right;     Family History   Problem Relation Age of Onset    Diabetes Mother     Diabetes Father     No Known Problems Brother     No Known Problems Maternal Aunt     No Known Problems Maternal Uncle     No Known Problems Paternal Aunt     No Known Problems Paternal Uncle     No Known Problems Maternal Grandmother     No Known Problems Maternal Grandfather     No Known Problems Paternal Grandmother     No Known Problems Paternal Grandfather     No Known Problems Daughter      Autism Son     No Known Problems Other      Social History     Socioeconomic History    Marital status:    Occupational History    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     Employer: [employer]   Tobacco Use    Smoking status: Never     Passive exposure: Never    Smokeless tobacco: Never   Substance and Sexual Activity    Alcohol use: Yes     Comment: 1-2 beers every 2 weeks    Drug use: No     Social Determinants of Health     Financial Resource Strain: Patient Declined (12/5/2023)    Overall Financial Resource Strain (CARDIA)     Difficulty of Paying Living Expenses: Patient declined   Food Insecurity: Patient Declined (12/5/2023)    Hunger Vital Sign     Worried About Running Out of Food in the Last Year: Patient declined     Ran Out of Food in the Last Year: Patient declined   Transportation Needs: Patient Declined (12/5/2023)    PRAPARE - Transportation     Lack of Transportation (Medical): Patient declined     Lack of Transportation (Non-Medical): Patient declined   Physical Activity: Sufficiently Active (12/5/2023)    Exercise Vital Sign     Days of Exercise per Week: 4 days     Minutes of Exercise per Session: 100 min   Stress: Patient Declined (12/5/2023)    [place] [employer] - Occupational Stress Questionnaire     Feeling of Stress : Patient declined   Recent Concern: Stress - Stress Concern Present (10/10/2023)    [place] [employer] - Occupational Stress Questionnaire     Feeling of Stress : To some extent   Social Connections: Unknown (12/5/2023)    Social Connection and Isolation Panel [NHANES]     Frequency of Communication with Friends and Family: Once a week     Frequency of Social Gatherings with Friends and Family: Once a week     Active Member of Clubs or Organizations: No     Attends Club or Organization Meetings: Never     Marital Status:    Housing Stability: Unknown (12/5/2023)    Housing  Stability Vital Sign     Unable to Pay for Housing in the Last Year: Patient refused     Unstable Housing in the Last Year: Patient refused   Recent Concern: Housing Stability - High Risk (10/10/2023)    Housing Stability Vital Sign     Unable to Pay for Housing in the Last Year: Yes     Number of Places Lived in the Last Year: 1     Unstable Housing in the Last Year: No       Exam     There were no vitals filed for this visit.     Physical Exam:  General: Not in acute distress. Not ill-appearing.   Psychiatric: Mood normal.        Neurologic Exam   Mental status: oriented to person, place, and time  Attention: Normal. Concentration: normal.  Speech: speech is normal.  Cranial Nerves: Symmetric facies.     Motor exam: moving extremities symmetrically      Labs and Imaging     Labs: reviewed  No results found for this or any previous visit (from the past 24 hour(s)).    Thyroid normal  HgA1C%:  5.9 down from 9.9 1 year ago    Imaging:   I have personally reviewed the images performed.   MRI of the brain obtained in 2022 with no acute intracranial abnormality  EEG in 2022 was unremarkable    Assessment and Plan     Problem List Items Addressed This Visit          Neuro    MCI (mild cognitive impairment)    Medication overuse headache    Traumatic encephalopathy       Psychiatric    Agitation       Other    NAINA (obstructive sleep apnea) 8/2019 sleep study [facility] 11    Overview     -ahi of 11.  Stopped apap for over month due to gasping sensation.  ?excessive leakage a/w nasal congestion while having covid 19.  Nasal pantency is adequate.  Recommend resumption of apap.  However, APAP is being recall by [employer]  -we discussed at length about [employer] recall.  Patient already register his apap  -will switch to nasal pillow to alleviate pressure on ridge of nose.           Chronic migraine with aura, intractable, with status migrainosus - Primary    Relevant Orders    MRI Brain Without Contrast       This is a  43-year-old male with significant cognitive impairment being followed by [facility].  Following along with Dr. South.  Patient has had history of significant concussions, TBI, was in a , has been working as a  however will soon be let go.  Patient also behavioral issues as well, pending psychiatry referral.  Patient is referred here for better migraine control.  Has tried Imitrex in the past without significant relief.  Would not add Topamax on this patient giving all a significant brain fog as well as propranolol.  Would not do Elavil given he is also on an antidepressant.  Continue to try patient for prevention on Quilepta, would not start patient on an injectable given the wear off.  Continue Nurtec for the patient for abortive.  Also discussed supplementing with magnesium 3-400 mg daily for headache prevention.  Patient to work on lifestyle modifications, downloading migraine mehul raul to assess for triggers.    Also needs compliance with the CPAP machine for his NAINA.  Discussed lifestyle modifications including diet and exercise.  Questions answered.    Recent visit to ER. Severe headache, states different quality of the pain. States more of an electrical feeling. Received cocktail and responded.  Has a lingering headache at this time.  Discussed with the patient that given he had intractable nausea, changes in characteristics of his headache, he was appropriate and calling for evaluation, we will get an MRI of the brain to make sure to rule out underlying pathology.    Questions answered.    Follow-up:  Getting MRI brain scheduled, we will follow up after imaging    This note was created by combination of typed  and M-Modal dictation. Transcription and phonetic errors may be present.  If there are any questions, please contact me.
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
## 2024-03-26 ENCOUNTER — PATIENT MESSAGE (OUTPATIENT)
Dept: ORTHOPEDICS | Facility: CLINIC | Age: 44
End: 2024-03-26
Payer: COMMERCIAL

## 2024-03-29 DIAGNOSIS — E78.5 HYPERLIPIDEMIA, UNSPECIFIED HYPERLIPIDEMIA TYPE: ICD-10-CM

## 2024-03-29 NOTE — TELEPHONE ENCOUNTER
No care due was identified.  [facility] Embedded Care Due Messages. Reference number: [id number].   3/29/2024 1:06:02 AM CDT

## 2024-03-30 RX ORDER — ATORVASTATIN CALCIUM 40 MG/1
40 TABLET, FILM COATED ORAL
Qty: 90 TABLET | Refills: 1 | Status: SHIPPED | OUTPATIENT
Start: 2024-03-30

## 2024-03-30 NOTE — TELEPHONE ENCOUNTER
Refill Decision Note   Tony Gordillo  is requesting a refill authorization.  Brief Assessment and Rationale for Refill:  Approve     Medication Therapy Plan:         Comments:     Note composed:6:59 AM 03/30/2024                - Loves at home by herself  - PT/OT consulted: KRISTIN

## 2024-04-05 ENCOUNTER — OFFICE VISIT (OUTPATIENT)
Dept: ALLERGY | Facility: CLINIC | Age: 44
End: 2024-04-05
Payer: COMMERCIAL

## 2024-04-05 VITALS
HEART RATE: 93 BPM | WEIGHT: 285.06 LBS | BODY MASS INDEX: 37.61 KG/M2 | SYSTOLIC BLOOD PRESSURE: 119 MMHG | DIASTOLIC BLOOD PRESSURE: 83 MMHG

## 2024-04-05 DIAGNOSIS — J30.9 CHRONIC ALLERGIC RHINITIS: Primary | ICD-10-CM

## 2024-04-05 DIAGNOSIS — Z51.6 NEED FOR DESENSITIZATION TO ALLERGENS: ICD-10-CM

## 2024-04-05 DIAGNOSIS — Z91.09 ALLERGY TO AMERICAN HOUSE DUST MITE: ICD-10-CM

## 2024-04-05 PROCEDURE — 99999 PR PBB SHADOW E&M-EST. PATIENT-LVL V: CPT | Mod: PBBFAC,,, | Performed by: STUDENT IN AN ORGANIZED HEALTH CARE EDUCATION/TRAINING PROGRAM

## 2024-04-05 PROCEDURE — 99214 OFFICE O/P EST MOD 30 MIN: CPT | Mod: S$GLB,,, | Performed by: STUDENT IN AN ORGANIZED HEALTH CARE EDUCATION/TRAINING PROGRAM

## 2024-04-05 RX ORDER — DERMATOPHAGOIDES PTERONYSSINUS AND DERMATOPHAGOIDES FARINAE 6; 6 [ARB'U]/1; [ARB'U]/1
1 TABLET SUBLINGUAL NIGHTLY
Qty: 30 TABLET | Refills: 11 | Status: SHIPPED | OUTPATIENT
Start: 2024-04-05 | End: 2024-04-05

## 2024-04-05 RX ORDER — DERMATOPHAGOIDES PTERONYSSINUS AND DERMATOPHAGOIDES FARINAE 6; 6 [ARB'U]/1; [ARB'U]/1
1 TABLET SUBLINGUAL NIGHTLY
Qty: 30 TABLET | Refills: 11 | Status: ACTIVE | OUTPATIENT
Start: 2024-04-05

## 2024-04-05 RX ORDER — EPINEPHRINE 0.3 MG/.3ML
1 INJECTION SUBCUTANEOUS
Qty: 2 EACH | Refills: 0 | Status: ACTIVE | OUTPATIENT
Start: 2024-04-05

## 2024-04-05 NOTE — PROGRESS NOTES
ALLERGY & IMMUNOLOGY CLINIC - FOLLOW UP     HISTORY OF PRESENT ILLNESS     Patient ID: Tony Gordillo is a 43 y.o. male    CC: chronic allergic rhinitis     HPI: Tony Gordillo is a 43 y.o. male with HTN, hypothyroidism, and DM2, following up for allergic rhinitis.     He reports his symptoms have been about the same.   He is using flonase 2 SEN BID. He thinks the increase in dose has helped a little.  He is using his azelastine 2 SEN daily.  He continues to use the saline mist before nasal sprays.  And saline sinus rinses prn.  He is using loratadine daily.  He stopped the montelukast, but it hasn't helped the anxiety. He is working on finding a new psychiatrist.   No sinus infections since last visit.      MEDICAL HISTORY     Vaccines:    Immunization History   Administered Date(s) Administered    COVID-19 Vaccine 12/28/2020, 01/25/2021, 11/08/2021    COVID-19, MRNA, LN-S, PF (MODERNA FULL 0.5 ML DOSE) 12/28/2020, 01/25/2021, 11/08/2021    COVID-19, mRNA, LNP-S, PF, kayla-sucrose, 30 mcg/0.3 mL (Pfizer 2023 Ages 12+) 12/11/2023    Influenza 09/06/2019    Pneumococcal Polysaccharide - 23 Valent 09/28/2018    Tdap 07/18/2019     Medical Hx:   Patient Active Problem List   Diagnosis    Hyperlipidemia LDL goal <70    Insulin long-term use    Tinea pedis    Morbid obesity    Hypothyroidism    Type 2 diabetes mellitus 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 MRI negative. 11/2019 started on testosterone    Right foot pain     Decreased strength of lower extremity    Decreased range of motion of both ankles    Gait abnormality    Rib pain on left side    Dyspnea    Decreased strength, endurance, and mobility    Left hand pain    Mild neurocognitive disorder due to traumatic brain injury    Outbursts of anger    Other amnesia    Traumatic rotator cuff tear, right, initial encounter    S/P right rotator cuff repair    Biceps tendonosis of right shoulder    Decreased range of motion of right shoulder    Impaired strength of shoulder muscles    Allergic conjunctivitis    Cornea edema    Descemet's membrane fold    Herpes simplex keratitis    Uncontrolled type 2 diabetes mellitus    Tear of lateral meniscus of right knee, current    Refractive error    Acute migraine    MCI (mild cognitive impairment)    Medication overuse headache    Chronic migraine with aura, intractable, with status migrainosus    Agitation    Traumatic encephalopathy     Surgical Hx:   Past Surgical History:   Procedure Laterality Date    ARTHROSCOPIC DEBRIDEMENT OF SHOULDER  03/14/2023    Procedure: DEBRIDEMENT, SHOULDER, ARTHROSCOPIC;  Surgeon: Crissy Bradford MD;  Location: [facility] OR;  Service: Orthopedics;;    ARTHROSCOPIC REPAIR OF ROTATOR CUFF OF SHOULDER Right 03/14/2023    Procedure: REPAIR, ROTATOR CUFF, ARTHROSCOPIC;  Surgeon: Crissy Bradford MD;  Location: [facility] OR;  Service: Orthopedics;  Laterality: Right;  KARY PAYNE [phone]. TEXTED ELMER ON 3/9/2023 @ 12:10PM. ELMER RESPONDED ON 3/9/23 @ 12:23PM-SAG  RN PREOP 3/10/2023---CLEARED BY PCP AND CARDS    ARTHROSCOPY,SHOULDER,WITH BICEPS TENODESIS  03/14/2023    Procedure: ARTHROSCOPY,SHOULDER,WITH BICEPS TENODESIS;  Surgeon: Crissy Bradford MD;  Location: [facility] OR;  Service: Orthopedics;;    KNEE ARTHROSCOPY W/ MENISCECTOMY Right 10/24/2023    Procedure: ARTHROSCOPY, KNEE, WITH MENISCECTOMY;  Surgeon: Crissy Bradford MD;  Location: [facility] OR;  Service: Orthopedics;  Laterality: Right;  RN  PREOP 10/18/203---CLEARED BY PCP  ELVIA [phone]    KNEE SURGERY      acl,mcl,pcl    left knee x 2      PRK  Bilateral 2010    SUBCHONDROPLASTY Right 10/24/2023    Procedure: POSSIBLE SUBCHONDROPLASTY;  Surgeon: Crissy Bradford MD;  Location: [facility];  Service: Orthopedics;  Laterality: Right;     Medications:   Current Outpatient Medications on File Prior to Visit   Medication Sig Dispense Refill    ammonium lactate 12 % Crea Apply 1 application  topically once daily. 140 g 5    atogepant 60 mg Tab Take 60 mg by mouth once daily. 90 tablet 1    atorvastatin (LIPITOR) 40 MG tablet TAKE 1 TABLET BY MOUTH EVERY DAY 90 tablet 1    azelastine (ASTELIN) 137 mcg (0.1 %) nasal spray Use 1-2 sprays in each nostril twice daily 90 mL 3    blood sugar diagnostic Strp To check BG 4 times daily, to use with insurance preferred meter 400 strip 3    blood sugar diagnostic Strp OneTouch Ultra Test strips      blood-glucose meter kit OneTouch Ultra2 Meter kit      blood-glucose sensor (DEXCOM G6 SENSOR) Filomena Change sensor every 10 days 3 each 11    blood-glucose sensor (FREESTYLE SAMANTHA 3 SENSOR) Filomena Change every 14 days 2 each 11    blood-glucose transmitter (DEXCOM G6 TRANSMITTER) Filomena Change every 3 months 1 each 3    cyanocobalamin, vitamin B-12, 5,000 mcg Subl Place one under tongue once a day for 1 month, then continue to take under tongue per week 30 tablet 3    empagliflozin (JARDIANCE) 25 mg tablet Take 1 tablet (25 mg total) by mouth once daily. 90 tablet 2    FLUoxetine 40 MG capsule Take 1 capsule (40 mg total) by mouth once daily. 30 capsule 11    fluticasone propionate (FLONASE) 50 mcg/actuation nasal spray 1 spray (50 mcg total) by Each Nostril route once daily. 48 g 5    insulin aspart U-100 (NOVOLOG) 100 unit/mL (3 mL) InPn pen INJECT 15-30 UNITS BEFORE EACH MEAL WITH SLIDNING SCALE, MAX DAILY DOSE 100 UNITS. Use in the event of insulin pump failure 30 mL 5    insulin aspart U-100 (NOVOLOG)  "100 unit/mL injection Max daily dose 160 units in insulin pump. 40 mL 5    insulin glargine U-300 conc (TOUJEO MAX U-300 SOLOSTAR) 300 unit/mL (3 mL) insulin pen Inject 30 Units into the skin once daily. 3 pen 5    insulin pump cart,automated,BT (OMNIPOD 5 G6 PODS, GEN 5,) Crtg Inject 1 each into the skin once daily. 30 each 11    L-METHYLFOLATE 15 mg Tab TAKE 15 MG BY MOUTH ONCE DAILY. 30 tablet 11    lamoTRIgine (LAMICTAL) 100 MG tablet Take 1.5 tablets (150 mg total) by mouth once daily. 135 tablet 3    lancets Misc To check BG 4 times daily, to use with insurance preferred meter 400 each 3    levothyroxine (SYNTHROID) 50 MCG tablet TAKE 1 TABLET BY MOUTH BEFORE BREAKFAST. 90 tablet 3    lisinopriL (PRINIVIL,ZESTRIL) 20 MG tablet TAKE 1 TABLET BY MOUTH EVERY DAY 90 tablet 3    loratadine (CLARITIN) 10 mg tablet Take 1 tablet (10 mg total) by mouth once daily. 90 tablet 3    magnesium oxide (MAG-OX) 400 mg (241.3 mg magnesium) tablet Take 1 tablet (400 mg total) by mouth once daily. 90 tablet 1    meloxicam (MOBIC) 15 MG tablet Take 1 tablet (15 mg total) by mouth once daily. 30 tablet 0    modafiniL (PROVIGIL) 100 MG Tab Take 1 tablet (100 mg total) by mouth every morning. 90 tablet 1    ondansetron (ZOFRAN-ODT) 4 MG TbDL Take 1 tablet (4 mg total) by mouth every 8 (eight) hours as needed (nausea). 20 tablet 0    oxyCODONE (ROXICODONE) 5 MG immediate release tablet Take 1 tablet (5 mg total) by mouth every 4 (four) hours as needed for Pain. 25 tablet 0    pen needle, diabetic (BD ULTRA-FINE KEN PEN NEEDLE) 32 gauge x 5/32" Ndle 1 Device by Misc.(Non-Drug; Combo Route) route 5 (five) times daily. 550 each 4    rimegepant (NURTEC) 75 mg odt Take 1 tablet (75 mg total) by mouth as needed for Migraine. Place ODT tablet on the tongue; alternatively the ODT tablet may be placed under the tongue. No more than 2 in 24 hrs. 16 tablet 3    syringe, disposable, 1 mL Syrg Testosterone every 2 weeks 25 Syringe 1    " tirzepatide 10 mg/0.5 mL PnIj Inject 10 mg into the skin every 7 days. 4 Pen 3     No current facility-administered medications on file prior to visit.     Drug Allergies:   Review of patient's allergies indicates:   Allergen Reactions    Influenza virus vaccine, specific Hives    Pioglitazone      Altered mood     Victoza [liraglutide] Nausea And Vomiting    Farxiga [dapagliflozin] Rash     Erectile dysfunction and rash      Social Hx:   Social History     Tobacco Use    Smoking status: Never     Passive exposure: Never    Smokeless tobacco: Never   Substance Use Topics    Alcohol use: Yes     Comment: 1-2 beers every 2 weeks    Drug use: No     Additional History Obtained at Initial Visit:  H/o Asthma: denies  H/o Rhinitis: endorses  Env/Occ:   Pets: 2 rabbits and a hamster. He says he sneezes a little when the rabbit fur gets in his nose (but thinks this is more related to it tickling his nose).  Occupation:  (but currently out due to a shoulder injury).     PHYSICAL EXAM     VS: /83 (BP Location: Right arm, Patient Position: Sitting, BP Method: Large (Automatic))   Pulse 93   Wt 129.3 kg (285 lb 0.9 oz)   BMI 37.61 kg/m²   GENERAL: Alert, NAD, well-appearing  EYES: EOMI, no conjunctival injection, no discharge, no infraorbital shiners  NOSE: NT 2-3+ B/L, no stringing mucus, no polyps visualized  ORAL: MMM, no ulcers, no thrush  LUNGS: CTAB, no w/r/c, no increased WOB  HEART: RRR, normal S1/S2, no m/g/r  DERM: no rashes  NEURO: normal speech, normal gait, no facial asymmetry     LABORATORY STUDIES     3/22/24:     CMP - K slighly low at 3.5     CBC/diff - within acceptable ranges, total eos 100    Component      Latest Ref Rng 10/17/2023 1/4/2024   WBC      3.90 - 12.70 K/uL 9.00     RBC      4.60 - 6.20 M/uL 5.25     Hemoglobin      14.0 - 18.0 g/dL 15.5     Hematocrit      40.0 - 54.0 % 48.7     MCV      82 - 98 fL 93     MCH      27.0 - 31.0 pg 29.5     MCHC      32.0 - 36.0 g/dL 31.8 (L)      RDW      11.5 - 14.5 % 15.5 (H)     Platelet Count      150 - 450 K/uL 238     MPV      9.2 - 12.9 fL 11.7     Immature Granulocytes      0.0 - 0.5 % 0.4     Gran # (ANC)      1.8 - 7.7 K/uL 5.6     Immature Grans (Abs)      0.00 - 0.04 K/uL 0.04     Lymph #      1.0 - 4.8 K/uL 2.4     Mono #      0.3 - 1.0 K/uL 0.9     Eos #      0.0 - 0.5 K/uL 0.1     Baso #      0.00 - 0.20 K/uL 0.06     Differential Method Automated     Sodium      136 - 145 mmol/L 139     Potassium      3.5 - 5.1 mmol/L 5.1     Chloride      95 - 110 mmol/L 102     CO2      23 - 29 mmol/L 25     Glucose      70 - 110 mg/dL 101     BUN      6 - 20 mg/dL 18     Creatinine      0.5 - 1.4 mg/dL 0.9     Calcium      8.7 - 10.5 mg/dL 10.2     PROTEIN TOTAL      6.0 - 8.4 g/dL 7.7     Albumin      3.5 - 5.2 g/dL 4.0     BILIRUBIN TOTAL      0.1 - 1.0 mg/dL 0.5     ALP      55 - 135 U/L 71     AST      10 - 40 U/L 18     ALT      10 - 44 U/L 27     Hemoglobin A1C External      4.0 - 5.6 %  5.9 (H)      Component      Latest Ref Rn 1/19/2023 10/10/2023   S.pneumoniae Type 1      >=1.0 mcg/mL  14.8    Pneumococcal Serotype 2 IgG (PNX)      >=1.0 mcg/mL  13.2    S.pneumoniae Type 3      >=1.0 mcg/mL  <0.1    Pneumococcal Serotype 4 IgG (P7,P13,PNX)      >=1.0 mcg/mL  0.4    S.pneumoniae Type 5      >=1.0 mcg/mL  43.2    S.pneumoniae Type 8      >=1.0 mcg/mL  11.9    S.pneumoniae Type 9N      >=1.0 mcg/mL  0.2    S.pneumoniae Type 12F      >=1.0 mcg/mL  0.1    Pneumococcal Serotype 14 IgG (P7,P13,PNX)      >=1.0 mcg/mL  >100.0    Pneumococcal Serotype 17F IgG (PNX)      >=1.0 mcg/mL  5.9    Pneumococcal Serotype 17F IgG (PNX)      >=1.0 mcg/mL  1.5    S.pneumoniae Type 19F      >=1.0 mcg/mL  1.1    Pneumococcal Serotype 20 IgG (PNX)      >=1.0 mcg/mL  1.6    S.pneumoniae Type 23F      >=1.0 mcg/mL  1.6    S.pneumoniae Type 6B      >=1.0 mcg/mL  2.9    Pneumococcal Serotype 10A IgG (PNX)      >=1.0 mcg/mL  21.9    Pneumococcal Serotype 11A IgG (PNX)       >=1.0 mcg/mL  7.4    S.pneumoniae Type 7F      >=1.0 mcg/mL  4.0    Pneumococcal Serotype 15B IgG (PNX)      >=1.0 mcg/mL  26.4    S.pneumoniae Type 18C      >=1.0 mcg/mL  47.3    Pneumococcal Serotype 19A IgG (P13,PNX)      >=1.0 mcg/mL  71.4    S.pneumoniae Type 9V Abs      >=1.0 mcg/mL  3.0    Pneumococcal Serotype 33 IgG (PNX)      >=1.0 mcg/mL  >100.0    PN23 Interpretation  SEE BELOW    IgG      650 - 1600 mg/dL 813  809    IgA      40 - 350 mg/dL  237    IgM      50 - 300 mg/dL  74      Component      Latest Ref [facility] 7/15/2021   HIV 1/2 Ag/Ab      Negative  Negative       ALLERGEN TESTING     Immunocaps:   Component      Latest Ref [facility] 10/10/2023   D. farinae      <0.10 kU/L 2.52 (H)    D. farinae Class CLASS 2    Mite Dust Pteronyssinus IgE      <0.10 kU/L 2.36 (H)    D. pteronyssinus Class CLASS 2    BERMUDA GRASS      <0.10 kU/L 0.15 (H)    [place] Grass Class CLASS 0/1    Dony Grass      <0.10 kU/L 0.47 (H)    Dony Grass Class CLASS 1    [place] IgE      <0.10 kU/L <0.10    [place] Class CLASS 0    Plantain      <0.10 kU/L <0.10    English Plantain Class CLASS 0    [place](Quercus alba) IgE      <0.10 kU/L <0.10    [place], Class CLASS 0    Pecan [place]      <0.10 kU/L <0.10    Pecan, Class CLASS 0    [place] IgE      <0.10 kU/L 0.12 (H)    [place] Class CLASS 0/1    Ragweed, Western IgE      <0.10 kU/L <0.10    Ragweed, Western, Class CLASS 0    Alternaria alternata      <0.10 kU/L <0.10    Altern. alternata Class CLASS 0    Aspergillus Fumigatus IgE      <0.10 kU/L <0.10    A. fumigatus Class CLASS 0    Cat Dander      <0.10 kU/L <0.10    Cat Epithelium Class CLASS 0    Cockroach, IgE      <0.10 kU/L 0.32 (H)    Cockroach, IgE       CLASS 0/1    Dog Dander, IgE      <0.10 kU/L <0.10    Dog Dander Class CLASS 0    WHITE TAN TREE      <0.10 kU/L <0.10    White Tan Class CLASS 0    [place] IgE      <0.10 kU/L <0.10    [place] Class CLASS 0    Allergen Maple ([place]) IgE      <0.10 kU/L  "<0.10    Allergen Maple ([place]) Class CLASS 0    Russian Thistle IgE      <0.10 kU/L <0.10    Russian Thistle Class CLASS 0    Allergen Sheep [place] ([place]) IgE      <0.10 kU/L <0.10    Allergen Sheep [place] ([place]) Class CLASS 0    [place] GRASS      <0.10 kU/L 0.36 (H)    [place]op[place] Grass Class CLASS 1    BAHIA GRASS      <0.10 kU/L 0.50 (H)    [place] Class CLASS 1    Rabbit Epith. IgE      <0.10 kU/L <0.10    Rabbit Epith. Class CLASS 0    Hamster Epithelium, IgE      <0.10 kU/L <0.10    Hamster Epithelium Class CLASS 0        PULMONARY FUNCTION TESTING     Date 22:  FVC:         77%ref -> + 4%  FEV1:         80%ref -> - 5%  FEV1/FVC: 83%  FEF 25-75: 104%ref  T%ref  DLCO:        90%ref  Interpretation: Spirometry shows a reduced vital capacity suggesting restriction. Spirometry remains unimproved following bronchodilator. Lung volumes show mild restriction is present. DLCO is normal.     IMAGING & OTHER DIAGNOSTICS     CT head 3/22/24 notes that "The visualized paranasal sinuses appear clear with no mucoperiosteal thickening or air fluid levels identified."     CXR 3/9/23:  FINDINGS:  The cardiac silhouette and superior mediastinal structures are unremarkable. Pulmonary vasculature is within normal limits. The lungs are well aerated and free of focal consolidations. There is no evidence for pneumothorax or pleural effusions. Bony structures are grossly intact.  Impression:  No acute chest disease identified.     CHART REVIEW     Reviewed labs, imaging.      ASSESSMENT & PLAN     Tony Gordillo is a 43 y.o. male with     # Chronic allergic rhinitis, dust mite allergy: Immunocaps positive to dust mites and grass pollen; equivocal to cockroach and weed pollen. Symptoms perennial. Congestion worse at night when he tries to lay down. Stopped montelukast as he didn't notice benefit and was concerned it was contributing to anxiety (but anxiety hasn't improved since discontinuing it). " Previously discussed immunotherapy options, and patient was interested in treating his dust mite allergy with sublingual immunotherapy, but his insurance was in flux at the time. Now that he has new insurance, he would like to proceed with odactra. Again discussed the risks, benefits, and logistics.  -prescription for odactra (along with epipen) sent to [facility].   -first dose of odactra to be given in clinic.   -continue flonase 2 SEN BID.  -continue azelastine nasal spray 2 SEN daily, can increase to BID.  -continue saline nasal mist prior to medicated nasal sprays.  -continue saline sinus rinses prn.  -continue loratadine 10 mg daily.        Follow up: 1 month for first dose of odactra    I spent a total of 35 minutes on the day of the visit.  This includes face to face time and non-face to face time preparing to see the patient (eg, review of tests), obtaining and/or reviewing separately obtained history, documenting clinical information in the electronic or other health record.    Pema Gongora MD  Allergy/Immunology

## 2024-04-06 ENCOUNTER — HOSPITAL ENCOUNTER (OUTPATIENT)
Dept: RADIOLOGY | Facility: HOSPITAL | Age: 44
Discharge: HOME | End: 2024-04-06
Attending: STUDENT IN AN ORGANIZED HEALTH CARE EDUCATION/TRAINING PROGRAM
Payer: COMMERCIAL

## 2024-04-06 DIAGNOSIS — G43.E11 CHRONIC MIGRAINE WITH AURA, INTRACTABLE, WITH STATUS MIGRAINOSUS: ICD-10-CM

## 2024-04-06 PROCEDURE — 70551 MRI BRAIN STEM W/O DYE: CPT | Mod: 26,,, | Performed by: RADIOLOGY

## 2024-04-06 PROCEDURE — 70551 MRI BRAIN STEM W/O DYE: CPT | Mod: TC

## 2024-04-08 ENCOUNTER — OFFICE VISIT (OUTPATIENT)
Dept: PSYCHIATRY | Facility: CLINIC | Age: 44
End: 2024-04-08
Payer: COMMERCIAL

## 2024-04-08 VITALS
WEIGHT: 284.19 LBS | SYSTOLIC BLOOD PRESSURE: 116 MMHG | HEART RATE: 109 BPM | DIASTOLIC BLOOD PRESSURE: 74 MMHG | BODY MASS INDEX: 37.49 KG/M2

## 2024-04-08 DIAGNOSIS — F33.1 MAJOR DEPRESSIVE DISORDER, RECURRENT, MODERATE: Primary | ICD-10-CM

## 2024-04-08 DIAGNOSIS — G43.E11 CHRONIC MIGRAINE WITH AURA, INTRACTABLE, WITH STATUS MIGRAINOSUS: ICD-10-CM

## 2024-04-08 PROCEDURE — 99999 PR PBB SHADOW E&M-EST. PATIENT-LVL II: CPT | Mod: PBBFAC,,,

## 2024-04-08 PROCEDURE — 99214 OFFICE O/P EST MOD 30 MIN: CPT | Mod: S$GLB,,,

## 2024-04-08 RX ORDER — ARIPIPRAZOLE 2 MG/1
2 TABLET ORAL DAILY
Qty: 30 TABLET | Refills: 11 | Status: SHIPPED | OUTPATIENT
Start: 2024-04-08 | End: 2025-04-08

## 2024-04-08 NOTE — PROGRESS NOTES
Outpatient Psychiatry Follow-Up Visit (MD/NP)    4/8/2024    Clinical Status of Patient:  Outpatient (Ambulatory)    Chief Complaint:  Tony Gordillo is a 43 y.o. male who presents today for follow-up of depression and anxiety.  Met with patient.      Interval History and Content of Current Session:  Interim Events/Subjective Report/Content of Current Session:     Patient presents for follow up of depression and anxiety. States that he does not feel his therapy appointment went well, that the therapist was not prepared for his negative past experiences and the trauma he has gone through in his life. Discussed with patient therapy goals, and patient given further resources to hopefully find a therapist that he feels works well with him. He has gone through several negative events in the past month, the most recent was having another head injury via a fall and an episode of migraine and vomiting beforehand. He states nothing was found on MRI and other tests and is concerned about his symptoms. He feels the prozac is helping somewhat but states he does not feel it is enough and does not want to be on too many medication. He is getting some new pets, when discussing this his mood and affect noticeably brighten. He continues to have problems with his son who has autism and states he is worried about how he will take care of himself in the future. Patient continues to have pain from multikple sources, issues with knee surgeries and shoulder surgeries in the past, and endorses continued depression and anxiety. At this time he denies any AH/VH/SI/HI.  Psychotherapy:  Target symptoms: depression, anxiety   Why chosen therapy is appropriate versus another modality: relevant to diagnosis  Outcome monitoring methods: self-report, observation  Therapeutic intervention type: insight oriented psychotherapy  Topics discussed/themes: building skills sets for symptom management, symptom recognition  The patient's response to the  intervention is accepting. The patient's progress toward treatment goals is fair.   Duration of intervention: 05 minutes.    Review of Systems   PSYCHIATRIC: Pertinant items are noted in the narrative.    Past Medical, Family and Social History: The patient's past medical, family and social history have been reviewed and updated as appropriate within the electronic medical record - see encounter notes.    Compliance: yes    Side effects: None    Risk Parameters:  Patient reports no suicidal ideation  Patient reports no homicidal ideation  Patient reports no self-injurious behavior  Patient reports no violent behavior    Exam (detailed: at least 9 elements; comprehensive: all 15 elements)   Constitutional  Vitals:  Most recent vital signs, dated less than 90 days prior to this appointment, were reviewed.   Vitals:    04/08/24 1503   BP: 116/74   Pulse: 109          General:  unremarkable, age appropriate     Musculoskeletal  Muscle Strength/Tone:  not examined   Gait & Station:  non-ataxic     Psychiatric  Speech:  no latency; no press   Mood & Affect:  steady  congruent and appropriate   Thought Process:  normal and logical   Associations:  intact   Thought Content:  normal, no suicidality, no homicidality, delusions, or paranoia   Insight:  intact   Judgement: behavior is adequate to circumstances   Orientation:  grossly intact   Memory: intact for content of interview   Language: grossly intact   Attention Span & Concentration:  able to focus   Fund of Knowledge:  intact and appropriate to age and level of education     Assessment and Diagnosis   Status/Progress: Based on the examination today, the patient's problem(s) is/are adequately but not ideally controlled.  New problems have been presented today.   Co-morbidities, Diagnostic uncertainty, and Lack of compliance are complicating management of the primary condition.  There are no active rule-out diagnoses for this patient at this time.     General Impression:      1. Major depressive disorder, recurrent, moderate        2. Chronic migraine with aura, intractable, with status migrainosus                Intervention/Counseling/Treatment Plan   Medication Management: Continue current medications. The risks and benefits of medication were discussed with the patient. Continue Fluoxetine 40mg, Begin Abilify 2mg, assess for efficacy and titrate as needed  Patient given further resources to help find a therapist that he feels works well for him.    Labs: reviewed most recent. The treatment plan and follow up plan were reviewed with the patient. Discussed with patient informed consent, risks vs. benefits, alternative treatments, side effect profile and the inherent unpredictability of individual responses to these treatments. The patient expresses understanding of the above and displays the capacity to agree with this current plan and had no other questions. Encouraged Patient to keep future appointments. Take medications as prescribed and abstain from substance abuse. In the event of an emergency patient was advised to go to the emergency room.      Return to Clinic: 1 week

## 2024-04-12 ENCOUNTER — OFFICE VISIT (OUTPATIENT)
Dept: NEUROLOGY | Facility: CLINIC | Age: 44
End: 2024-04-12
Payer: COMMERCIAL

## 2024-04-12 DIAGNOSIS — G31.84 MCI (MILD COGNITIVE IMPAIRMENT): ICD-10-CM

## 2024-04-12 DIAGNOSIS — Z87.820 HISTORY OF CONCUSSION: Primary | ICD-10-CM

## 2024-04-12 DIAGNOSIS — F07.81 TRAUMATIC ENCEPHALOPATHY: ICD-10-CM

## 2024-04-12 PROCEDURE — 99214 OFFICE O/P EST MOD 30 MIN: CPT | Mod: 95,,, | Performed by: STUDENT IN AN ORGANIZED HEALTH CARE EDUCATION/TRAINING PROGRAM

## 2024-04-12 NOTE — PROGRESS NOTES
The patient location is: [place]  The chief complaint leading to consultation is: Headache, here for MRI results    Visit type: audiovisual    Each patient to whom he or she provides medical services by telemedicine is:  (1) informed of the relationship between the physician and patient and the respective role of any other health care provider with respect to management of the patient; and (2) notified that he or she may decline to receive medical services by telemedicine and may withdraw from such care at any time.    Chief Complaint and Duration     Migraines for years    History of Present Illness     Tony Gordillo is a 43 y.o. who is following with [facility] due to concerns related to progressive cognitive impairment, being followed by Dr. South, history of being a  since 2009, multiple TBIs, childhood history of playing catcher with concussions while playing football, was in the [employer].  Has been following for progressive memory impairment with behavioral issues, has not seen Psychiatry yet.  Patient is referred over for migraine control, was started on Nurtec for possible relief.    Patient states that migraines and headaches have been going on for quite some time, describes characteristics as being bitemporal, can move from 1 side to the other side, has a pulsatile quality.  Sensitive to light and noise, can have nausea.  Does have a piercing tragus that helps relieve it however has not take it out with him being a , is being let go from the  so he will be placed again.  Has tried Imitrex in the past.  Was recently placed on Nurtec in which had a tabs a month, not enough for him.  Has poor sleep.    3/25/24 - ER visit. Responded to cocktail. On quilepta and nurtec.  Changes in quality of the headache, also with intractable nausea.  Through up in the CT machine.    04/12/2024 patient states that headaches do not  respond to the current migraine regimen.  Has more of attention, tight band.  Poor sleep.  Here for MRI brain  Results.    Review of patient's allergies indicates:   Allergen Reactions    Influenza virus vaccine, specific Hives    Pioglitazone      Altered mood     Victoza [liraglutide] Nausea And Vomiting    Farxiga [dapagliflozin] Rash     Erectile dysfunction and rash      Current Outpatient Medications   Medication Sig Dispense Refill    allerg xt,D.farinae-D.pteronys (ODACTRA) 12 SQ-HDM Subl Place 1 tablet under the tongue every evening. 30 tablet 11    ammonium lactate 12 % Crea Apply 1 application  topically once daily. 140 g 5    ARIPiprazole (ABILIFY) 2 MG Tab Take 1 tablet (2 mg total) by mouth once daily. 30 tablet 11    atogepant 60 mg Tab Take 60 mg by mouth once daily. 90 tablet 1    atorvastatin (LIPITOR) 40 MG tablet TAKE 1 TABLET BY MOUTH EVERY DAY 90 tablet 1    azelastine (ASTELIN) 137 mcg (0.1 %) nasal spray Use 1-2 sprays in each nostril twice daily 90 mL 3    blood sugar diagnostic Strp To check BG 4 times daily, to use with insurance preferred meter 400 strip 3    blood sugar diagnostic Strp OneTouch Ultra Test strips      blood-glucose meter kit OneTouch Ultra2 Meter kit      blood-glucose sensor (DEXCOM G6 SENSOR) Filomena Change sensor every 10 days 3 each 11    blood-glucose sensor (FREESTYLE SAMANTHA 3 SENSOR) Filomena Change every 14 days 2 each 11    blood-glucose transmitter (DEXCOM G6 TRANSMITTER) Filomena Change every 3 months 1 each 3    cyanocobalamin, vitamin B-12, 5,000 mcg Subl Place one under tongue once a day for 1 month, then continue to take under tongue per week 30 tablet 3    empagliflozin (JARDIANCE) 25 mg tablet Take 1 tablet (25 mg total) by mouth once daily. 90 tablet 2    EPINEPHrine (EPIPEN 2-AYALA) 0.3 mg/0.3 mL AtIn Inject 0.3 mLs (0.3 mg total) into the muscle as needed (Anaphylaxis). 2 each 0    FLUoxetine 40 MG capsule Take 1 capsule (40 mg total) by mouth once daily. 30 capsule  "11    fluticasone propionate (FLONASE) 50 mcg/actuation nasal spray 1 spray (50 mcg total) by Each Nostril route once daily. 48 g 5    insulin aspart U-100 (NOVOLOG) 100 unit/mL (3 mL) InPn pen INJECT 15-30 UNITS BEFORE EACH MEAL WITH SLIDNING SCALE, MAX DAILY DOSE 100 UNITS. Use in the event of insulin pump failure 30 mL 5    insulin aspart U-100 (NOVOLOG) 100 unit/mL injection Max daily dose 160 units in insulin pump. 40 mL 5    insulin glargine U-300 conc (TOUJEO MAX U-300 SOLOSTAR) 300 unit/mL (3 mL) insulin pen Inject 30 Units into the skin once daily. 3 pen 5    insulin pump cart,automated,BT (OMNIPOD 5 G6 PODS, GEN 5,) Crtg Inject 1 each into the skin once daily. 30 each 11    L-METHYLFOLATE 15 mg Tab TAKE 15 MG BY MOUTH ONCE DAILY. 30 tablet 11    lamoTRIgine (LAMICTAL) 100 MG tablet Take 1.5 tablets (150 mg total) by mouth once daily. 135 tablet 3    lancets Misc To check BG 4 times daily, to use with insurance preferred meter 400 each 3    levothyroxine (SYNTHROID) 50 MCG tablet TAKE 1 TABLET BY MOUTH BEFORE BREAKFAST. 90 tablet 3    lisinopriL (PRINIVIL,ZESTRIL) 20 MG tablet TAKE 1 TABLET BY MOUTH EVERY DAY 90 tablet 3    loratadine (CLARITIN) 10 mg tablet Take 1 tablet (10 mg total) by mouth once daily. 90 tablet 3    magnesium oxide (MAG-OX) 400 mg (241.3 mg magnesium) tablet Take 1 tablet (400 mg total) by mouth once daily. 90 tablet 1    meloxicam (MOBIC) 15 MG tablet Take 1 tablet (15 mg total) by mouth once daily. 30 tablet 0    modafiniL (PROVIGIL) 100 MG Tab Take 1 tablet (100 mg total) by mouth every morning. 90 tablet 1    ondansetron (ZOFRAN-ODT) 4 MG TbDL Take 1 tablet (4 mg total) by mouth every 8 (eight) hours as needed (nausea). 20 tablet 0    oxyCODONE (ROXICODONE) 5 MG immediate release tablet Take 1 tablet (5 mg total) by mouth every 4 (four) hours as needed for Pain. 25 tablet 0    pen needle, diabetic (BD ULTRA-FINE KEN PEN NEEDLE) 32 gauge x 5/32" Ndle 1 Device by Misc.(Non-Drug; " Combo Route) route 5 (five) times daily. 550 each 4    rimegepant (NURTEC) 75 mg odt Take 1 tablet (75 mg total) by mouth as needed for Migraine. Place ODT tablet on the tongue; alternatively the ODT tablet may be placed under the tongue. No more than 2 in 24 hrs. 16 tablet 3    syringe, disposable, 1 mL Syrg Testosterone every 2 weeks 25 Syringe 1    tirzepatide 10 mg/0.5 mL PnIj Inject 10 mg into the skin every 7 days. 4 Pen 3     No current facility-administered medications for this visit.       Medical History     Past Medical History:   Diagnosis Date    Diabetes mellitus, type 2     Hyperlipidemia     Hypertension     Obesity     NAINA (obstructive sleep apnea)      Past Surgical History:   Procedure Laterality Date    ARTHROSCOPIC DEBRIDEMENT OF SHOULDER  03/14/2023    Procedure: DEBRIDEMENT, SHOULDER, ARTHROSCOPIC;  Surgeon: Crissy Bradford MD;  Location: [facility] OR;  Service: Orthopedics;;    ARTHROSCOPIC REPAIR OF ROTATOR CUFF OF SHOULDER Right 03/14/2023    Procedure: REPAIR, ROTATOR CUFF, ARTHROSCOPIC;  Surgeon: Crissy Bradford MD;  Location: [facility] OR;  Service: Orthopedics;  Laterality: Right;  KARY PAYNE [phone]. TEXTED ELMER ON 3/9/2023 @ 12:10PM. ELMER RESPONDED ON 3/9/23 @ 12:23PM-SAG  RN PREOP 3/10/2023---CLEARED BY PCP AND CARDS    ARTHROSCOPY,SHOULDER,WITH BICEPS TENODESIS  03/14/2023    Procedure: ARTHROSCOPY,SHOULDER,WITH BICEPS TENODESIS;  Surgeon: Crissy Bradford MD;  Location: [facility] OR;  Service: Orthopedics;;    KNEE ARTHROSCOPY W/ MENISCECTOMY Right 10/24/2023    Procedure: ARTHROSCOPY, KNEE, WITH MENISCECTOMY;  Surgeon: Crissy Bradford MD;  Location: [facility] OR;  Service: Orthopedics;  Laterality: Right;  RN PREOP 10/18/203---CLEARED BY PCP  ELVIA [phone]    KNEE SURGERY      acl,mcl,pcl    left knee x 2      PRK  Bilateral 2010    SUBCHONDROPLASTY Right 10/24/2023    Procedure: POSSIBLE SUBCHONDROPLASTY;  Surgeon: Crissy Bradford MD;   Location: [facility] OR;  Service: Orthopedics;  Laterality: Right;     Family History   Problem Relation Age of Onset    Diabetes Mother     Diabetes Father     No Known Problems Brother     No Known Problems Maternal Aunt     No Known Problems Maternal Uncle     No Known Problems Paternal Aunt     No Known Problems Paternal Uncle     No Known Problems Maternal Grandmother     No Known Problems Maternal Grandfather     No Known Problems Paternal Grandmother     No Known Problems Paternal Grandfather     No Known Problems Daughter     Autism Son     No Known Problems Other      Social History     Socioeconomic History    Marital status:    Occupational History    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     Employer: [employer]   Tobacco Use    Smoking status: Never     Passive exposure: Never    Smokeless tobacco: Never   Substance and Sexual Activity    Alcohol use: Yes     Comment: 1-2 beers every 2 weeks    Drug use: No     Social Determinants of Health     Financial Resource Strain: Patient Declined (12/5/2023)    Overall Financial Resource Strain (CARDIA)     Difficulty of Paying Living Expenses: Patient declined   Food Insecurity: Patient Declined (12/5/2023)    Hunger Vital Sign     Worried About Running Out of Food in the Last Year: Patient declined     Ran Out of Food in the Last Year: Patient declined   Transportation Needs: Patient Declined (12/5/2023)    PRAPARE - Transportation     Lack of Transportation (Medical): Patient declined     Lack of Transportation (Non-Medical): Patient declined   Physical Activity: Sufficiently Active (12/5/2023)    Exercise Vital Sign     Days of Exercise per Week: 4 days     Minutes of Exercise per Session: 100 min   Stress: Patient Declined (12/5/2023)    [employer] - Occupational Stress Questionnaire     Feeling of Stress : Patient declined   Recent Concern: Stress - Stress Concern Present  (10/10/2023)    [employer] - Occupational Stress Questionnaire     Feeling of Stress : To some extent   Social Connections: Unknown (12/5/2023)    Social Connection and Isolation Panel [NHANES]     Frequency of Communication with Friends and Family: Once a week     Frequency of Social Gatherings with Friends and Family: Once a week     Active Member of Clubs or Organizations: No     Attends Club or Organization Meetings: Never     Marital Status:    Housing Stability: Unknown (12/5/2023)    Housing Stability Vital Sign     Unable to Pay for Housing in the Last Year: Patient refused     Unstable Housing in the Last Year: Patient refused   Recent Concern: Housing Stability - High Risk (10/10/2023)    Housing Stability Vital Sign     Unable to Pay for Housing in the Last Year: Yes     Number of Places Lived in the Last Year: 1     Unstable Housing in the Last Year: No       Exam     There were no vitals filed for this visit.     Physical Exam:  General: Not in acute distress. Not ill-appearing.   Psychiatric: Mood normal.        Neurologic Exam   Mental status: oriented to person, place, and time  Attention: Normal. Concentration: normal.  Speech: speech is normal.  Cranial Nerves: Symmetric facies.     Motor exam: moving extremities symmetrically      Labs and Imaging     Labs: reviewed  No results found for this or any previous visit (from the past 24 hour(s)).    Thyroid normal  HgA1C%:  5.9 down from 9.9 1 year ago    Imaging:   I have personally reviewed the images performed.   MRI of the brain obtained in 2022 with no acute intracranial abnormality  EEG in 2022 was unremarkable    MRI of the brain obtained April 2024 unremarkable  Assessment and Plan     Problem List Items Addressed This Visit          Neuro    MCI (mild cognitive impairment)    Relevant Orders    Ambulatory referral/consult to Neurology    Traumatic encephalopathy    Relevant Orders    Ambulatory  referral/consult to Neurology     Other Visit Diagnoses       History of concussion    -  Primary    Relevant Orders    Ambulatory referral/consult to Neurology            This is a 43-year-old male with significant cognitive impairment being followed by [facility].  Following along with Dr. South.  Patient has had history of significant concussions, TBI, was in a , has been working as a  however will soon be let go.  Patient also behavioral issues as well, pending psychiatry referral.  Patient is referred here for better migraine control.  Has tried Imitrex in the past without significant relief.  Would not add Topamax on this patient giving all a significant brain fog as well as propranolol.  Would not do Elavil given he is also on an antidepressant.  We Continued to try patient for prevention on Quilepta, would not start patient on an injectable given the wear off.  Also continue the patient on Nurtec for abortive.    After trying these migraine medications, patient again still has poor response to CGRP and migraine medications.  Suspect large multifactorial nature, tension.  Discussed continuing supplementing with magnesium 300-400 mg daily for headache prevention.  Patient to work on lifestyle modifications, downloading migraine mehul raul to assess for triggers.    Also needs compliance with the CPAP machine for his NAINA.  Discussed lifestyle modifications including diet and exercise.  Questions answered.    Recent visit to ER. Severe headache, states different quality of the pain. Stated more of an electrical feeling. Received cocktail and responded.  Has a lingering headache at this time.  MRI of the brain was obtained given the change in characteristics, unremarkable.  Discussed with the patient given the multifactorial nature with his history of TBI and concussion, we will place referral for him to be seen by concussion Clinic.  He may need more multidisciplinary approach.   Appreciate the assistance.    Questions answered.    Follow-up:  P.r.n. to this clinic, otherwise referral placed for patient to be seen in TBI Clinic for continuation of care.  Patient continues to be seen by [facility] as well with Dr South.  Appreciate the assistance and continued care for this patient.    This note was created by combination of typed  and M-Modal dictation. Transcription and phonetic errors may be present.  If there are any questions, please contact me.

## 2024-04-15 ENCOUNTER — TELEPHONE (OUTPATIENT)
Dept: NEUROLOGY | Facility: CLINIC | Age: 44
End: 2024-04-15
Payer: COMMERCIAL

## 2024-04-24 ENCOUNTER — OFFICE VISIT (OUTPATIENT)
Dept: ORTHOPEDICS | Facility: CLINIC | Age: 44
End: 2024-04-24
Payer: OTHER MISCELLANEOUS

## 2024-04-24 DIAGNOSIS — M25.511 CHRONIC RIGHT SHOULDER PAIN: Primary | ICD-10-CM

## 2024-04-24 DIAGNOSIS — G89.29 CHRONIC RIGHT SHOULDER PAIN: Primary | ICD-10-CM

## 2024-04-24 PROCEDURE — 99213 OFFICE O/P EST LOW 20 MIN: CPT | Mod: S$GLB,,, | Performed by: ORTHOPAEDIC SURGERY

## 2024-04-24 PROCEDURE — 99999 PR PBB SHADOW E&M-EST. PATIENT-LVL IV: CPT | Mod: PBBFAC,,, | Performed by: ORTHOPAEDIC SURGERY

## 2024-04-24 NOTE — PROGRESS NOTES
Postoperative Visit    History of Present Illness:   Tony is 13 months s/p right shoulder arthroscopy , rotator cuff repair, and arthroscopic biceps tenodesis  (DOS-3/14/23)    Full thickness SS and SSC, ATS biceps tenodesis  Having more pain   Severe pain with using a screw    Worsening weakness overhead    Was have discussed a repeat MRI in the past, he is interested in proceeding with this since he is getting worse     Physical Examination:    NAD  right upper extremity: AROM: , ER 40  SS 4/5, painful; IS, SSc 4/5  2+ RP, BCR in all digits.     Assessment/Plan:  43 y.o. male 13 months s/p right shoulder arthroscopy , rotator cuff repair, and arthroscopic biceps tenodesis  (DOS-3/14/23)    Plan  MRI R shoulder - worsening pain and weakness   RTC once MRI complete     All questions were answered in detail. The patient is in full agreement with the treatment plan and will proceed accordingly.

## 2024-04-26 DIAGNOSIS — R41.840 ATTENTION AND CONCENTRATION DEFICIT: ICD-10-CM

## 2024-04-26 DIAGNOSIS — G47.33 OSA (OBSTRUCTIVE SLEEP APNEA): ICD-10-CM

## 2024-04-27 RX ORDER — MODAFINIL 100 MG/1
100 TABLET ORAL EVERY MORNING
Qty: 90 TABLET | Refills: 1 | Status: SHIPPED | OUTPATIENT
Start: 2024-04-27 | End: 2024-05-27 | Stop reason: SDUPTHER

## 2024-05-01 ENCOUNTER — TELEPHONE (OUTPATIENT)
Dept: ORTHOPEDICS | Facility: CLINIC | Age: 44
End: 2024-05-01
Payer: COMMERCIAL

## 2024-05-06 ENCOUNTER — OFFICE VISIT (OUTPATIENT)
Dept: ALLERGY | Facility: CLINIC | Age: 44
End: 2024-05-06
Payer: COMMERCIAL

## 2024-05-06 ENCOUNTER — TELEPHONE (OUTPATIENT)
Dept: NEUROLOGY | Facility: CLINIC | Age: 44
End: 2024-05-06
Payer: COMMERCIAL

## 2024-05-06 ENCOUNTER — OFFICE VISIT (OUTPATIENT)
Dept: PSYCHIATRY | Facility: CLINIC | Age: 44
End: 2024-05-06
Payer: COMMERCIAL

## 2024-05-06 VITALS
HEART RATE: 91 BPM | SYSTOLIC BLOOD PRESSURE: 130 MMHG | BODY MASS INDEX: 38.99 KG/M2 | DIASTOLIC BLOOD PRESSURE: 64 MMHG | WEIGHT: 295.5 LBS

## 2024-05-06 VITALS
DIASTOLIC BLOOD PRESSURE: 76 MMHG | BODY MASS INDEX: 39.41 KG/M2 | HEART RATE: 102 BPM | SYSTOLIC BLOOD PRESSURE: 111 MMHG | WEIGHT: 298.75 LBS

## 2024-05-06 DIAGNOSIS — F41.1 GENERALIZED ANXIETY DISORDER: ICD-10-CM

## 2024-05-06 DIAGNOSIS — G43.E11 CHRONIC MIGRAINE WITH AURA, INTRACTABLE, WITH STATUS MIGRAINOSUS: ICD-10-CM

## 2024-05-06 DIAGNOSIS — F33.1 MAJOR DEPRESSIVE DISORDER, RECURRENT, MODERATE: Primary | ICD-10-CM

## 2024-05-06 DIAGNOSIS — J30.9 CHRONIC ALLERGIC RHINITIS: Primary | ICD-10-CM

## 2024-05-06 PROBLEM — Z51.6 ALLERGY DESENSITIZATION THERAPY: Status: ACTIVE | Noted: 2024-05-06

## 2024-05-06 PROCEDURE — 99214 OFFICE O/P EST MOD 30 MIN: CPT | Mod: S$GLB,,,

## 2024-05-06 PROCEDURE — 90833 PSYTX W PT W E/M 30 MIN: CPT | Mod: S$GLB,,,

## 2024-05-06 PROCEDURE — 99999 PR PBB SHADOW E&M-EST. PATIENT-LVL IV: CPT | Mod: PBBFAC,,,

## 2024-05-06 PROCEDURE — 99499 UNLISTED E&M SERVICE: CPT | Mod: S$GLB,,, | Performed by: STUDENT IN AN ORGANIZED HEALTH CARE EDUCATION/TRAINING PROGRAM

## 2024-05-06 PROCEDURE — 99999 PR PBB SHADOW E&M-EST. PATIENT-LVL V: CPT | Mod: PBBFAC,,, | Performed by: STUDENT IN AN ORGANIZED HEALTH CARE EDUCATION/TRAINING PROGRAM

## 2024-05-06 NOTE — PROGRESS NOTES
Outpatient Psychiatry Follow-Up Visit (MD/NP)    5/6/2024    Clinical Status of Patient:  Outpatient (Ambulatory)    Chief Complaint:  Tony Gordillo is a 43 y.o. male who presents today for follow-up of depression and anxiety.  Met with patient.      Interval History and Content of Current Session:  Interim Events/Subjective Report/Content of Current Session:     Patient presents for follow up. He is currently seeing a concussion specialist, migraines are very painful. He is willing to try therapy but admits he has not had the chance to follow up yet with the resources provided. Patient is having issues with workman's comp not wanting to pay him, and now wanting him to find a job despite his long and extensive health history, these have been causing him anxiety and stress as he never hears back from any job he applies for. Due to his various injuries he is pursuing disability. Patient feels the abilify has made a significant difference in his mood, he is levelling off more and managing his frustration and anger better. Patient endorses he is sleeping better as well. They continue to struggle with financial problems and debt, approx 10 days behind on house note but the bank has been helpful and pushed it back. At this time he would prefer not to make any medication adjustments, denies any AH/VH/SI/HI.         Psychotherapy:  Target symptoms: depression, anxiety   Why chosen therapy is appropriate versus another modality: relevant to diagnosis  Outcome monitoring methods: self-report, observation  Therapeutic intervention type: insight oriented psychotherapy  Topics discussed/themes: building skills sets for symptom management, symptom recognition, financial stressors  The patient's response to the intervention is accepting. The patient's progress toward treatment goals is good.   Duration of intervention: 16 minutes.    Review of Systems   PSYCHIATRIC: Pertinant items are noted in the narrative.    Past Medical,  Family and Social History: The patient's past medical, family and social history have been reviewed and updated as appropriate within the electronic medical record - see encounter notes.    Compliance: yes    Side effects: None    Risk Parameters:  Patient reports no suicidal ideation  Patient reports no homicidal ideation  Patient reports no self-injurious behavior  Patient reports no violent behavior    Exam (detailed: at least 9 elements; comprehensive: all 15 elements)   Constitutional  Vitals:  Most recent vital signs, dated less than 90 days prior to this appointment, were reviewed.   Vitals:    05/06/24 1112   BP: 130/64   Pulse: 91   Weight: 134.1 kg (295 lb 8.4 oz)        General:  unremarkable, age appropriate     Musculoskeletal  Muscle Strength/Tone:  not examined   Gait & Station:  non-ataxic     Psychiatric  Speech:  no latency; no press   Mood & Affect:  depressed  congruent and appropriate   Thought Process:  normal and logical   Associations:  intact   Thought Content:  normal, no suicidality, no homicidality, delusions, or paranoia   Insight:  intact   Judgement: behavior is adequate to circumstances   Orientation:  grossly intact   Memory: intact for content of interview   Language: grossly intact   Attention Span & Concentration:  able to focus   Fund of Knowledge:  intact and appropriate to age and level of education     Assessment and Diagnosis   Status/Progress: Based on the examination today, the patient's problem(s) is/are adequately but not ideally controlled.  New problems have not been presented today.   Co-morbidities, Diagnostic uncertainty, and Lack of compliance are complicating management of the primary condition.  There are no active rule-out diagnoses for this patient at this time.     General Impression:   1. Major depressive disorder, recurrent, moderate        2. Generalized anxiety disorder        3. Chronic migraine with aura, intractable, with status migrainosus               Intervention/Counseling/Treatment Plan   Medication Management: Continue current medications. The risks and benefits of medication were discussed with the patient.  Continue to encourage therapy for depression and anxiety      Labs: reviewed most recent. The treatment plan and follow up plan were reviewed with the patient. Discussed with patient informed consent, risks vs. benefits, alternative treatments, side effect profile and the inherent unpredictability of individual responses to these treatments. The patient expresses understanding of the above and displays the capacity to agree with this current plan and had no other questions. Encouraged Patient to keep future appointments. Take medications as prescribed and abstain from substance abuse. In the event of an emergency patient was advised to go to the emergency room.    I spent a total of 56 minutes on the day of the visit.  This includes face to face time and non-face to face time preparing to see the patient (eg, review of tests), obtaining and/or reviewing separately obtained history, documenting clinical information in the electronic or other health record, independently interpreting results and communicating results to the patient/family/caregiver, or care coordinator.      Return to Clinic: 1 month

## 2024-05-06 NOTE — PROGRESS NOTES
Patient came in for first dose of odactra, but the odactra hasn't been received yet at [facility]. I spoke with [facility] who is working on figuring out why it hasn't been filled yet. They said they will get it filled and sent over. Rescheduling the patient and billing no level of service for this appt.

## 2024-05-06 NOTE — TELEPHONE ENCOUNTER
Spoke to Pt and confirmed tomorrow's appt. Pt was advised to arrive 30 mins early and informed about the 15 min layne period.

## 2024-05-07 ENCOUNTER — PATIENT MESSAGE (OUTPATIENT)
Dept: NEUROLOGY | Facility: CLINIC | Age: 44
End: 2024-05-07

## 2024-05-07 ENCOUNTER — OFFICE VISIT (OUTPATIENT)
Dept: NEUROLOGY | Facility: CLINIC | Age: 44
End: 2024-05-07
Payer: COMMERCIAL

## 2024-05-07 ENCOUNTER — TELEPHONE (OUTPATIENT)
Dept: NEUROLOGY | Facility: CLINIC | Age: 44
End: 2024-05-07
Payer: COMMERCIAL

## 2024-05-07 VITALS — SYSTOLIC BLOOD PRESSURE: 121 MMHG | DIASTOLIC BLOOD PRESSURE: 82 MMHG | HEART RATE: 96 BPM

## 2024-05-07 DIAGNOSIS — G47.9 FATIGUE DUE TO SLEEP PATTERN DISTURBANCE: ICD-10-CM

## 2024-05-07 DIAGNOSIS — R41.89 COGNITIVE CHANGE: ICD-10-CM

## 2024-05-07 DIAGNOSIS — S06.0X0S CONCUSSION WITHOUT LOSS OF CONSCIOUSNESS, SEQUELA: Primary | ICD-10-CM

## 2024-05-07 DIAGNOSIS — F07.81 TRAUMATIC ENCEPHALOPATHY: ICD-10-CM

## 2024-05-07 DIAGNOSIS — G31.84 MCI (MILD COGNITIVE IMPAIRMENT): ICD-10-CM

## 2024-05-07 DIAGNOSIS — G44.86 CERVICOGENIC HEADACHE: ICD-10-CM

## 2024-05-07 DIAGNOSIS — H55.81 SACCADIC EYE MOVEMENT DEFICIENCY: ICD-10-CM

## 2024-05-07 DIAGNOSIS — F34.89 OTHER SPECIFIED PERSISTENT MOOD DISORDERS: ICD-10-CM

## 2024-05-07 DIAGNOSIS — M54.2 CERVICALGIA OF OCCIPITO-ATLANTO-AXIAL REGION: ICD-10-CM

## 2024-05-07 DIAGNOSIS — S13.4XXA WHIPLASH INJURY TO NECK, INITIAL ENCOUNTER: ICD-10-CM

## 2024-05-07 DIAGNOSIS — R53.83 FATIGUE DUE TO SLEEP PATTERN DISTURBANCE: ICD-10-CM

## 2024-05-07 DIAGNOSIS — M54.81 OCCIPITAL NEURITIS: ICD-10-CM

## 2024-05-07 DIAGNOSIS — S06.0X9S CONCUSSION WITH LOSS OF CONSCIOUSNESS, SEQUELA: ICD-10-CM

## 2024-05-07 PROBLEM — S06.0X9A CONCUSSION WITH LOSS OF CONSCIOUSNESS: Status: ACTIVE | Noted: 2024-05-07

## 2024-05-07 PROBLEM — S06.0X0A CONCUSSION WITH NO LOSS OF CONSCIOUSNESS: Status: ACTIVE | Noted: 2024-05-07

## 2024-05-07 PROCEDURE — 99999 PR PBB SHADOW E&M-EST. PATIENT-LVL V: CPT | Mod: PBBFAC,,, | Performed by: PSYCHIATRY & NEUROLOGY

## 2024-05-07 PROCEDURE — 99215 OFFICE O/P EST HI 40 MIN: CPT | Mod: S$GLB,,, | Performed by: PSYCHIATRY & NEUROLOGY

## 2024-05-07 PROCEDURE — 99417 PROLNG OP E/M EACH 15 MIN: CPT | Mod: S$GLB,,, | Performed by: PSYCHIATRY & NEUROLOGY

## 2024-05-07 RX ORDER — PREDNISONE 10 MG/1
TABLET ORAL
Qty: 40 TABLET | Refills: 0 | Status: SHIPPED | OUTPATIENT
Start: 2024-05-07 | End: 2024-05-23

## 2024-05-07 NOTE — PATIENT INSTRUCTIONS
5 day prednisone taper prescribed: 100 mg (10 tabs), 90 mg (9 tabs), 80 mg (8 tabs), 70 mg (7 tabs), 60 mg (6 tabs). Split up doses with meals. Day 1: Take 4 tabs with breakfast, 3 tabs with lunch, 3 tabs with dinner. Day 2: Take 3 tabs with each meal. Day 3: Take 3 tabs with breakfast, 3 tabs with lunch, 2 tabs with dinner. Day 4: Take 3 tabs with breakfast, 2 tabs with lunch and dinner. Day 5: Take 2 tabs with each meal  The patient was instructed to ice the occipital region for no more than 20 minutes at least once a day but may repeat this as many times as they would like.   Discussed ergonomic accommodations for occipital neuritis/neuralgia. Mainly perform all work at eye level to minimize continued neck flexion which will aggravate the nerve.  Patient was encouraged to do daily light cardio exercise but instructed to limit physical activities to walking, walking in water up to the waist only or riding a stationary bike, recumbent preferred. No weight lifting in upper body, no neck massage, no acupuncture of neck, and no dry needling of upper neck. No neck PT unless otherwise stated. If neck PT is recommended, the therapist may do joint manipulation at this time but no suboccipital soft tissue therapy. Any of your therapists may also do passive neck range of motion activities. No chiropractor work on neck. Lower body strengthening with resistant bands, leg machines, and strapping weights to legs okay. No core body workouts. No running or use of cardio equipment other than stationary bike. No swimming or body surfing. No amusement park rides. No lifting more than 5-10 lbs and bend at the knees, not the waist.  Discussed care plan in detail for post traumatic occipital neuritis including a trial of oral medications followed by series of trigger point steroid injections with occipital nerve blocks. To be referred for consultation for occipital nerve release procedure if initially clinically responsive to  injections but always with a return of symptoms.  Referral for vestibular PT for eye movements  Referral for ST for cognition  Continue to follow with Dr. South  Continue to follow with psychiatry  Agree with starting talk therapy for mood  Encourage you to [facility] thru the [facility] to evaluate to see if you qualify for coverage for traumatic brain injury during  service

## 2024-05-07 NOTE — TELEPHONE ENCOUNTER
Spoke to Pt about his appt today. Pt was asked if he can come in at 1 pm instead 2 pm which Pt agreed to. Pt will come in at 1 pm.

## 2024-05-07 NOTE — PROGRESS NOTES
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 headaches with jump sign and twitch response  and referred pain to hair band, saccadic dysmetria. We discussed that there is currently no universally accepted definition of concussion. We discussed that concussion is a traumatic brain injury. We discussed that concussion can occur as a result of an impact to the head or to the body. We discussed that our clinic considers concussion definitive if there is transient disruption of brain activity such as with loss of consciousness, amnesia as it relates to the day of the injury, or reports of neurological dysfunction on the day of injury. We discussed typical course, signs & symptoms, diagnostic findings, and treatment for concussion. We discussed that whiplash injury is a neck injury that can occur at a lower impact speed or force than concussion. We discussed that whiplash symptoms can be the same as concussion symptoms. We discussed typical course, signs & symptoms, diagnostic findings, and treatment for whiplash. Patient's exam and symptoms are the result of a kinetic force injury with resultant cranio cervical trauma and occipital neuritis. We discussed at length about the anatomy, symptomology, etiologies, and exam findings of occipital neuritis. We discussed the risks vs benefits of waiting vs acute therapy for occipital neuritis in that there is a risk of waiting for treatment in that permanent nerve damage could result as the nerve is chronically scarred into the muscle but there is no way to predict whether damage has already occurred until we start the treatment plan as described below. We discussed that head and/or neck injury can result in pain as well as cognitive, mood, and sleep disruption. We discussed that pain disturbances can disrupt sleep, cognition, and mood. We discussed that sleep disturbances can disrupt cognition, mood, and worsen pain. We discussed that mood disturbances can disrupt sleep, cognition, and worsen pain. Counseled the patient that steroids suppress the immune response, which  means that they are at increased risk for ximena bacterial or viral infections including COVID19 and if they were to become infected, they may have a more severe disease course. We discussed that any form of steroids including oral or injectable should not be taken within 2 weeks of COVID19 vaccination/booster. The patient has elected to take steroids. The patient's last COVID19 vaccination/booster was more than 2 weeks ago. We discussed that based on history, he has had concussion with and without LOC and his exam indicates a prior neck injury or neck strain. We discussed base on his  service, he is at risk for having had Breachers syndrome as well. 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 We discussed if ignored neck pain/occipital tenderness then headaches meet criteria for migraine without aura. However, history and exam are more consistent with occipital neuritis, which does not respond well to most migraine medications but responds well to anti-inflammatory treatment. We discussed occipital neuritis can exacerbate underlying migraines or other headache disorders. As a result, we discussed treating occipital neuritis first and then will see what types of headaches are left after occipital neuritis is effectively treated. Will do higher dose steroid given Medrol has not helped.     Plan:     5 day prednisone taper prescribed: 100 mg (10 tabs), 90 mg (9 tabs), 80 mg (8 tabs), 70 mg (7 tabs), 60 mg (6 tabs). Split up doses with meals. Day 1: Take 4 tabs with breakfast, 3 tabs with lunch, 3 tabs with dinner. Day 2: Take 3 tabs with each meal. Day 3: Take 3 tabs with breakfast, 3 tabs with lunch, 2 tabs with dinner. Day 4: Take 3 tabs with breakfast, 2 tabs with lunch and dinner. Day 5: Take 2 tabs with each meal  The patient was instructed to ice the occipital region for no more than 20 minutes at least once a day but may repeat this as many times as they  would like.   Discussed ergonomic accommodations for occipital neuritis/neuralgia. Mainly perform all work at eye level to minimize continued neck flexion which will aggravate the nerve.  Patient was encouraged to do daily light cardio exercise but instructed to limit physical activities to walking, walking in water up to the waist only or riding a stationary bike, recumbent preferred. No weight lifting in upper body, no neck massage, no acupuncture of neck, and no dry needling of upper neck. No neck PT unless otherwise stated. If neck PT is recommended, the therapist may do joint manipulation at this time but no suboccipital soft tissue therapy. Any of your therapists may also do passive neck range of motion activities. No chiropractor work on neck. Lower body strengthening with resistant bands, leg machines, and strapping weights to legs okay. No core body workouts. No running or use of cardio equipment other than stationary bike. No swimming or body surfing. No amusement park rides. No lifting more than 5-10 lbs and bend at the knees, not the waist.  Discussed care plan in detail for post traumatic occipital neuritis including a trial of oral medications followed by series of trigger point steroid injections with occipital nerve blocks. To be referred for consultation for occipital nerve release procedure if initially clinically responsive to injections but always with a return of symptoms.  Referral for vestibular PT for eye movements  Referral for ST for cognition  Continue to follow with Dr. South  Continue to follow with psychiatry  Agree with starting talk therapy for mood  [facility] to see if you qualify for coverage for traumatic brain injury during  service     109 minutes were spent on the date of this patient encounter, which includes: preparing to see the patient, reviewing previous history, obtaining new patient history, performing the physical exam, counseling  and educating the patient and/or family/caregiver, ordering necessary medications or tests or referrals, documenting in the electronic medical record, coordinating care.    Damaso Cota MD  Sports Neurology

## 2024-05-08 ENCOUNTER — PATIENT MESSAGE (OUTPATIENT)
Dept: ALLERGY | Facility: CLINIC | Age: 44
End: 2024-05-08
Payer: COMMERCIAL

## 2024-05-08 ENCOUNTER — LAB VISIT (OUTPATIENT)
Dept: LAB | Facility: HOSPITAL | Age: 44
End: 2024-05-08
Payer: COMMERCIAL

## 2024-05-08 DIAGNOSIS — Z79.4 TYPE 2 DIABETES MELLITUS WITHOUT COMPLICATION, WITH LONG-TERM CURRENT USE OF INSULIN: ICD-10-CM

## 2024-05-08 DIAGNOSIS — E11.9 TYPE 2 DIABETES MELLITUS WITHOUT COMPLICATION, WITH LONG-TERM CURRENT USE OF INSULIN: ICD-10-CM

## 2024-05-08 LAB
ESTIMATED AVG GLUCOSE: 117 MG/DL (ref 68–131)
HBA1C MFR BLD: 5.7 % (ref 4–5.6)

## 2024-05-08 PROCEDURE — 36415 COLL VENOUS BLD VENIPUNCTURE: CPT | Mod: PO | Performed by: NURSE PRACTITIONER

## 2024-05-08 PROCEDURE — 83036 HEMOGLOBIN GLYCOSYLATED A1C: CPT | Performed by: NURSE PRACTITIONER

## 2024-05-08 NOTE — PROGRESS NOTES
"[facility]  Speech Therapy Evaluation     Date: 5/9/2024     Name: Tony Gordillo   MRN: [id number]    Therapy Diagnosis: No diagnosis found.   Physician: Damaso Cota MD  Physician Orders: Ambulatory Referral to Speech Therapy   Medical Diagnosis: Concussion with unknown loss of consciousness status, initial encounter [S06.0XAA]    Visit # / Visits Authorized:  1 / 1   Date of Evaluation:  5/9/2024   Insurance Authorization Period: 5/7/2024 - 5/7/2025   Plan of Care Certification:    5/9/2024 to 7/5/2024      Time In: 8:00   Time Out: 8:45   Procedure Min.   Cognitive Communication Evaluation - including administration, scoring and interpretation   45     Precautions: Standard  Subjective   Date of Onset: Multiple head injuries beginning in elementary through adulthood; see MD not from 05/07/2024  History of Current Condition:  Tony Gordillo is a 43 y.o. male who presents to [facility] for evaluation and treatment secondary to post-concussion syndrome. Patient was referred to therapy by Dr. Cota. Patient with extensive history of concussion beginning in elementary. Per chart review, "He has cognitive fogginess on a regular basis for the last 2 years, concentration difficulties since childhood that has progressively worsened per wife but no sudden worsening per wife. Per wife, since start of Covid in 2022 he started to have worse short and long term memory difficulties that have progressively worsened. Per wife, he uses apps and writes things down to help and he and wife meet weekly to go over his weekly schedule. Per wife, he has never slept well since childhood and shift work did not help and no permanent sleep changes from his injuries or from something else."  Currently, pt is complaining of "associated photophobia to all lights, phonophobia a few times per wife but not usually, generalized weakness with severe headaches, numbness/tingling " "below elbows to hands that occurs with all of his symptoms that worsened with most right shoulder surgery, nausea, imbalance and lightheadedness that started within the last 1 month, bilateral blurred vision, horizontal diplopia and has not tried closing one eye, seeing halos around objects and floaters and he has not been able to wear his corrective glasses since headaches worsened as makes him feel worse." Deficits in memory and has worsened lately. Difficulties with word finding. Patient endorsed changes in mood (been OKAY as of late). Patient endorsed fatigue. Patient does feel as though he has returned to baseline cognitive communication functioning.    Previous history of:  Previous concussions: endorsed  Anxiety: endorsed  Depression: endorsed  Learning Disabilities: endorsed (dyslexia and difficulties with reading comprehension)  ADHD: endorsed  Headaches and/or Migraines: endorsed    Past Medical History: Tony Gordillo  has a past medical history of Diabetes mellitus, type 2, Hyperlipidemia, Hypertension, Obesity, and NAINA (obstructive sleep apnea).  Tony Gordillo  has a past surgical history that includes left knee x 2; Knee surgery; Arthroscopic repair of rotator cuff of shoulder (Right, 03/14/2023); arthroscopy,shoulder,with biceps tenodesis (03/14/2023); Arthroscopic debridement of shoulder (03/14/2023); Knee arthroscopy w/ meniscectomy (Right, 10/24/2023); Subchondroplasty (Right, 10/24/2023); and PRK  (Bilateral, 2010).  Medical Hx and Allergies: Tony has a current medication list which includes the following prescription(s): odactra, ammonium lactate, aripiprazole, atogepant, atorvastatin, azelastine, blood sugar diagnostic, blood sugar diagnostic, blood-glucose meter, dexcom g6 sensor, freestyle joe 3 sensor, dexcom g6 transmitter, cyanocobalamin (vitamin b-12), empagliflozin, epinephrine, fluoxetine, fluticasone propionate, insulin aspart u-100, insulin aspart u-100, toujeo max u-300 " "solostar, omnipod 5 g6 pods (gen 5), l-methylfolate, lamotrigine, lancets, levothyroxine, lisinopril, loratadine, magnesium oxide, meloxicam, modafinil, ondansetron, pen needle, diabetic, prednisone, nurtec, syringe (disposable), and tirzepatide.   Review of patient's allergies indicates:   Allergen Reactions    Influenza virus vaccine, specific Hives    Pioglitazone      Altered mood     Victoza [liraglutide] Nausea And Vomiting    Farxiga [dapagliflozin] Rash     Erectile dysfunction and rash        Prior Therapy: none  Social History:   Lives with: wife (8 in total 2 in the house)  Family responsibilities: limited  Occupation:   Computer usage: 8+  Driving: yes    Education Level: some college (technical)    Prior Level of Function: independent   Current Level of Function: modified independence    Pain:   Location: Headache  0 /10 currently  5/10 on average  5/10 at best  10/10 at worst  Description: Aching, Throbbing, Electrical, and piercing  Aggravating Factors: "Doing anything"  Easing Factors:  Asprin, motrin, tylenol    Nutrition:  No deficits, Oral, Thin liquids (IDDSI 0) and Regular consistencies (IDDSI 7)   Patient's Therapy Goals:  "  Objective   Formal Assessment:    The Repeatable Battery for the Assessment of Neuropsychological Status (RBANS) Version B was administered to measure the patient's attention, language, visuospatial/constructional abilities, and immediate and delayed memory. The results are outlined below:    Domain Subtest Total Score Index Score   Immediate Memory List Learning 18   69    Story Memory 13    Visuospatial/  Constructional Figure Copy 20   102    Line Orientation 15    Language Picture Naming 10   95    Semantic Fluency 19    Attention Digit Span 4   49    Coding 34      Delayed Memory List Recall 3     56    List Recognition 16     Story Recall 5     Figure Recall 8        Total Scale   67     Percentile   1     Descriptor   69 and below = Extremely Low "   Interpretation:  Index score: mean of 100, standard deviation of 15. Therefore, 130+ = Very Superior; 120-129 = Superior; 110-119 = High average;  = Average; 80-89 = Low Average; 70-79 = Borderline; 69 and below = Extremely Low. Apparently the most reliable score; factor in education level.    Immediate Memory Score: Recalling information following immediate presentation is assessed through the List Learning and Story Memory subtests. In the List Learning subtest, the patient is given 10 words to remember. This list is presented four times overall. In this subtest, the patient did demonstrate learning over the 4 trials. The patient recalled 3 items on trial one, 4 items on trial two, 6 items on trial three, and 5 items in trial 4. In the Story Memory subtest, the patient recalled 6 details on the first presentation and 7 details on the second presentation. Results of this domain indicate Low average performance.  Visuospatial score: Perceiving spatial relations and constructing a spatially accurate copy of a drawing is assessed through the Figure Copy and Line Orientation subtests. The Figure Copy subtest asks the patient to copy a complex line drawing. The patient was able to adequately copy figure. The Line Orientation subtest the presents the patient with 12 line displays and asks the patient to match two given lines at the bottom to the display at the top.  The patient correctly identified 15/20. Results of this domain indicate Low average performance.  Language score: Naming common items and retrieving learned material is assessed through the Picture Naming and Semantic Fluency subtests. The Picture Naming subtest asks the patient to name 10 line drawings. The patient was able to accurately name 10/10 items. The Semantic Fluency subtest asks the patient to name as many animals as he can in 60 seconds. The patient was able to name 15 animals. These results indicate Average performance.   Attention score:  Attending to, holding and manipulating information presented visually and orally in working memory is assessed with use of the Digit Span and Coding subtests. The Digit Span subtest asks the patient to repeat progressively lengthening strings of numbers. The Coding subtest asks the patient to alternate attention between a key the given work and then to decode symbols to numbers. The patients results on these subtest indicate Borderline  performance.  Delayed Memory score: Anterograde memory capacity is assessed through the List Recall, List Recognition, Story Recall, and Figure Recall subtests. The List Recall subtest asks the patient to recall items from the list presented at the beginning of the test. The patient was able to recall 3/10 items. The List Recognition subtest has the patient recall whether a word was or was not in the original list. The patient accurately identified whether a word was or was not on the list in 16 of 20 items. On the Story Recall subtest, the patient was able to recall 5 details. Finally, on the Figure Recall, the patient recalled 8 details. Results of this domain indicate Borderline  performance.    Treatment   Total Treatment Time Separate from Evaluation: not applicable   No treatment performed secondary to time to complete evaluation.     Education provided:   -role of Speech Therapy, goals/plan of care, scheduling/cancellations, insurance limitations with patient  -Additional Education provided:   General concussion education     Patient verbalized understanding.     Home Program: not yet established  Assessment     Tony presents to [facility] status post medical diagnosis of Concussion with unknown loss of consciousness status, initial encounter [S06.0XAA].      Interpretation of objective assessment: Overall, according to the RBANS research, total Scale index is a good indicator of general cognitive functioning. The patient presents with a  severe cognitive communication disorder charaterized by deficits in memory, attention, and reduced processing speed.     Demonstrates impairments including limitations as described in the problem list.     Positive prognostic factors: patient motivation  Negative prognostic factors: history of multiple concussion  Barriers to therapy: Barriers to therapy include history of multiple concussions and time since onset without therapy      Patient's spiritual, cultural, and educational needs considered and patient agreeable to plan of care and goals.    Patient will benefit from skilled therapy.    Rehab Potential: fair    Short Term Goals: (6 weeks) Current Progress:   1. Patient will sustain attention to complete moderate to complex reasoning tasks for 2 minutes with one request for clarification to increase sustained attention.    Progressing/ Not Met 5/9/2024   Established this date   2. Patient will use attention shifting strategies to shift attention between two tasks with no more than 3 cues or 90% accuracy to improve alternating attention.     Progressing/ Not Met 5/9/2024   Established this date    3. Patient will complete short term recall tasks after a 5 minutes delay with 90% accuracy  independently  with use of memory strategies to improve recall of information and generalization of memory strategies.    Progressing/ Not Met 5/9/2024   Established this date    4. Patient will complete high level problem solving based tasks with 90% accuracy independently.    Progressing/ Not Met 5/9/2024   Established this date    5. Patient will complete mental manipulation tasks with 90% acc to improve working memory.    Progressing/ Not Met 5/9/2024   Established this date    6. Patient will demonstrate awareness of cognitive-communication difficulties in 1-2 situations in his day in which cognitive deficits could or did compromise efficiency and performance with minimal cueing.     Progressing/ Not Met 5/9/2024    Established this date    7. Patient will independently generate strategies to improve ability to complete tasks with enhanced accuracy and time, based on review of objective previous performance on trials of functional tasks with 80% accuracy.     Progressing/ Not Met 5/9/2024   Established this date        Long Term Goals: (10 weeks) Current Progress:   1. Patient will improve  attention skills to effectively attend to and communicate in simple-moderate daily living tasks in functional living environment.    Established this date     2. Patient will predict objective performance on completion of actual tasks to increase independence with required return to daily living environment.     Established this date     3. Patient will use appropriate memory strategies to schedule and recall weekly activities, express needs and recall names to maintain safety and participate socially in functional living environment.     Established this date       Plan     Recommended Treatment Plan:  Patient will participate in the [facility] program for speech therapy 2 times per week for 8 weeks to address his Cognition deficits, to educate patient and their family, and to participate in a home exercise program.    Follow up will occur at [facility] for skilled Speech Therapy services.    Other Recommendations:   No other recommendation    Therapist's Name:   Alfreda Noriega CCC-SLP   5/9/2024

## 2024-05-09 ENCOUNTER — CLINICAL SUPPORT (OUTPATIENT)
Dept: REHABILITATION | Facility: HOSPITAL | Age: 44
End: 2024-05-09
Attending: PSYCHIATRY & NEUROLOGY
Payer: COMMERCIAL

## 2024-05-09 DIAGNOSIS — R41.89 COGNITIVE CHANGE: ICD-10-CM

## 2024-05-09 DIAGNOSIS — R41.841 COGNITIVE COMMUNICATION DEFICIT: ICD-10-CM

## 2024-05-09 DIAGNOSIS — S06.0X0S CONCUSSION WITHOUT LOSS OF CONSCIOUSNESS, SEQUELA: Primary | ICD-10-CM

## 2024-05-09 PROCEDURE — 96125 COGNITIVE TEST BY HC PRO: CPT | Mod: PN

## 2024-05-10 ENCOUNTER — OFFICE VISIT (OUTPATIENT)
Dept: ENDOCRINOLOGY | Facility: CLINIC | Age: 44
End: 2024-05-10
Payer: COMMERCIAL

## 2024-05-10 DIAGNOSIS — E78.5 HYPERLIPIDEMIA, UNSPECIFIED HYPERLIPIDEMIA TYPE: ICD-10-CM

## 2024-05-10 DIAGNOSIS — E11.9 TYPE 2 DIABETES MELLITUS WITHOUT COMPLICATION, WITH LONG-TERM CURRENT USE OF INSULIN: Primary | ICD-10-CM

## 2024-05-10 DIAGNOSIS — Z79.4 TYPE 2 DIABETES MELLITUS WITHOUT COMPLICATION, WITH LONG-TERM CURRENT USE OF INSULIN: Primary | ICD-10-CM

## 2024-05-10 PROCEDURE — 95251 CONT GLUC MNTR ANALYSIS I&R: CPT | Mod: NDTC,,, | Performed by: NURSE PRACTITIONER

## 2024-05-10 PROCEDURE — 99214 OFFICE O/P EST MOD 30 MIN: CPT | Mod: 95,,, | Performed by: NURSE PRACTITIONER

## 2024-05-10 RX ORDER — INSULIN PMP CART,AUT,G6/7,CNTR
1 EACH SUBCUTANEOUS DAILY
Qty: 30 EACH | Refills: 11 | Status: SHIPPED | OUTPATIENT
Start: 2024-05-10

## 2024-05-10 RX ORDER — BLOOD-GLUCOSE TRANSMITTER
EACH MISCELLANEOUS
Qty: 1 EACH | Refills: 3 | Status: SHIPPED | OUTPATIENT
Start: 2024-05-10

## 2024-05-10 RX ORDER — BLOOD-GLUCOSE SENSOR
EACH MISCELLANEOUS
Qty: 3 EACH | Refills: 11 | Status: SHIPPED | OUTPATIENT
Start: 2024-05-10

## 2024-05-10 RX ORDER — INSULIN ASPART 100 [IU]/ML
INJECTION, SOLUTION INTRAVENOUS; SUBCUTANEOUS
Qty: 40 ML | Refills: 5 | Status: SHIPPED | OUTPATIENT
Start: 2024-05-10 | End: 2024-06-18

## 2024-05-10 NOTE — PROGRESS NOTES
"CC: This 43 y.o. White male  is here for evaluation of  T2DM along with comorbidities indicated in the Visit Diagnosis section of this encounter.    HPI: Tony Gordillo was diagnosed with T2DM in 2008. He was without health insurance for 2017 and mid 2018.     Prior visit 1/2024  A1c is down from 6.3 to 5.9%.   He has lost 14 lb since lov.   Appetite is lower on Mounjaro 7.5 mg.   He may be losing both is jobs and his health insurance. He has been on worker's comp.   Plan Continue Mounjaro 7.5 mg, Jardiance and current pump settings.   Return to clinic in 4 months with labs prior.     Interval hx virtual   A1c is down from 5.9 to 5.7%.   Started high dose steroid taper this week rx'd by Neurology. Pt c/o frequent headaches. 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 Pt has entered in fake carbs - double the amount of carbs consumed to control BGs since starting prednisone.  May need to do another steroid taper in another week, possibly followed by steroid injection.   Tolerating Mounjaro 10 mg dose ok. Appetite is not very depressed.                 LAST DIABETES EDUCATION: 2019  Pt started Omnipod 5 on 8/2/23 with ROMIE Nixon RD.  F/u in 3/2024    HOSPITALIZED FOR DIABETES -  No.    DIABETES MEDICATIONS:    Jardiance 25 mg once daily   Mounjaro 10 mg weekly Tues     Novolog in Omnipod 5   Basal rate   12 am - 1.2 u/hr  5:30 pm - 1.7 u/hr     ICR 2    ISF 20  Target glucose 110, correct above 110, active insulin time 4 hours.         DM COMPLICATIONS: none    SIGNIFICANT DIABETES MED HISTORY:   glyburide--hypoglycemia  Metformin - diarrhea and vomiting (has not tried metformin XR); Metformin topical - ineffective  Pioglitazone - altered mood, reportedly became angry   Victoza - nausea and vomiting at 1.2 mg; felt "run down" at 0.6 mg.   Trulicity less effective than Ozempic, switched to Ozempic 4/2022, switched from Ozempic to Mounjaro 2/2023  Mounjaro - GI s/e when he increased from 5 to 10 mg.     MDI doses prior to pump " start: Toujeo Max 30 units hs   Fiasp 30 units ac - takes about 1x/day depending on diet or if he is eating more than 10 grams CHO; Novolin N 14 units between 8-10 pm - has taken it 3x/week if bedtime BG > 200       SELF MONITORING BLOOD GLUCOSE: Dexcom G6    CGM interpretation:  glucoses overall well controlled with minimal fluctuation, no hypoglycemia. Some highs  after meals, returning back to baseline fairly quickly. Highest BG in the low 300s on day 1 of steroid taper - postprandial.                     HYPOGLYCEMIC EPISODES: none     CURRENT DIET: drinks water mostly. Sometimes coffee at work.  Eats 2-3 meals/day.     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         ROS:   CONSTITUTIONAL: Appetite good, +   fatigue  GI: denies nausea/vomiting or diarrhea.   + lightheadedness, positional     PHYSICAL EXAM:  GENERAL: Well developed, well nourished. No acute distress.   PSYCH: AAOx3, appropriate mood and affect, conversant, well-groomed. Judgement and insight good.   NEURO: Cranial nerves grossly intact. Speech clear, no tremor.   CHEST: Respirations even and unlabored.          Hemoglobin A1C   Date Value Ref Range Status   05/08/2024 5.7 (H) 4.0 - 5.6 % Final     Comment:     ADA Screening Guidelines:  5.7-6.4%  Consistent with prediabetes  >or=6.5%  Consistent with diabetes    High levels of fetal hemoglobin interfere with the HbA1C  assay. Heterozygous hemoglobin variants (HbS, HgC, etc)do  not significantly interfere with this assay.   However, presence of multiple variants may affect accuracy.     01/04/2024 5.9 (H) 4.0 - 5.6 % Final     Comment:     ADA Screening Guidelines:  5.7-6.4%  Consistent with prediabetes  >or=6.5%  Consistent with diabetes    High levels of fetal hemoglobin interfere with the HbA1C  assay. Heterozygous hemoglobin variants (HbS, HgC, etc)do  not significantly interfere with this assay.   However, presence of  multiple variants may affect accuracy.     10/10/2023 6.3 (H) 4.0 - 5.6 % Final     Comment:     ADA Screening Guidelines:  5.7-6.4%  Consistent with prediabetes  >or=6.5%  Consistent with diabetes    High levels of fetal hemoglobin interfere with the HbA1C  assay. Heterozygous hemoglobin variants (HbS, HgC, etc)do  not significantly interfere with this assay.   However, presence of multiple variants may affect accuracy.       Lab Results   Component Value Date    TSH 2.148 10/10/2023           Component Value Date/Time     10/17/2023 1530    K 5.1 10/17/2023 1530     10/17/2023 1530    CO2 25 10/17/2023 1530    BUN 18 10/17/2023 1530    CREATININE 0.9 10/17/2023 1530     10/17/2023 1530    CALCIUM 10.2 10/17/2023 1530    ALKPHOS 71 10/17/2023 1530    AST 18 10/17/2023 1530    ALT 27 10/17/2023 1530    BILITOT 0.5 10/17/2023 1530    EGFRNORACEVR >60.0 10/17/2023 1530    ESTGFRAFRICA >60 03/25/2022 0617         Lab Results   Component Value Date    LDLCALC 72.8 07/06/2023        Latest Reference Range & Units 07/06/23 10:05   Cholesterol 120 - 199 mg/dL 142   HDL 40 - 75 mg/dL 57   HDL/Cholesterol Ratio 20.0 - 50.0 % 40.1   LDL Cholesterol External 63.0 - 159.0 mg/dL 72.8   Non-HDL Cholesterol mg/dL 85   Total Cholesterol/HDL Ratio 2.0 - 5.0  2.5   Triglycerides 30 - 150 mg/dL 61       Lab Results   Component Value Date    MICALBCREAT 9.9 07/06/2023             ASSESSMENT and PLAN:    A1C GOAL: < 7 %       1. Type 2 diabetes mellitus without complication, with long-term current use of insulin  Glucoses well controlled.   Continue insulin pump settings.     Avoid eating carbs while on steroid taper.   If glucoses remain uncontrolled especially with steroid treatment, then may need stronger carb ratio. Pt will contact office if another f/u appt is needed.     Will not change Mounjaro dose at this time since pt is starting another medication and we want to minimize potential s/e.      Return to clinic  in 4 months with labs prior.     Hemoglobin A1C    Lipid Panel    Microalbumin/Creatinine Ratio, Urine    blood-glucose sensor (DEXCOM G6 SENSOR) Filomena    blood-glucose transmitter (DEXCOM G6 TRANSMITTER) Filomena    insulin aspart U-100 (NOVOLOG) 100 unit/mL injection    insulin pump cart,automated,BT (OMNIPOD 5 G6 PODS, GEN 5,) Crtg      2. Hyperlipidemia, unspecified hyperlipidemia type  Lipid Panel          PATIENT NEEDS TO CHANGE OMNIPOD ONCE DAILY DUE TO HIGH INSULIN USAGE            Orders Placed This Encounter   Procedures    Hemoglobin A1C     Standing Status:   Future     Standing Expiration Date:   7/9/2025    Lipid Panel     Standing Status:   Future     Standing Expiration Date:   5/10/2025    Microalbumin/Creatinine Ratio, Urine     Standing Status:   Future     Standing Expiration Date:   7/9/2025     Order Specific Question:   Specimen Source     Answer:   Urine        Follow up in about 4 months (around 9/10/2024) for Virtual Visit.       The patient location is: [place]   The chief complaint leading to consultation is: type 2 diabetes     Visit type: audiovisual    Face to Face time with patient: 25  30 minutes of total time spent on the encounter, which includes face to face time and non-face to face time preparing to see the patient (eg, review of tests), Obtaining and/or reviewing separately obtained history, Documenting clinical information in the electronic or other health record, Independently interpreting results (not separately reported) and communicating results to the patient/family/caregiver, or Care coordination (not separately reported).         Each patient to whom he or she provides medical services by telemedicine is:  (1) informed of the relationship between the physician and patient and the respective role of any other health care provider with respect to management of the patient; and (2) notified that he or she may decline to receive medical services by telemedicine and may  withdraw from such care at any time.    Notes:

## 2024-05-10 NOTE — PATIENT INSTRUCTIONS
Glucoses well controlled.   Continue insulin pump settings.     Avoid eating carbs while on steroid taper.   If glucoses remain uncontrolled especially with steroid treatment, then may need stronger carb ratio. Pt will contact office if another f/u appt is needed.     Will not change Mounjaro dose at this time since pt is starting another medication for allergies.     Return to clinic in 4 months with labs prior.

## 2024-05-11 PROBLEM — R41.841 COGNITIVE COMMUNICATION DEFICIT: Status: ACTIVE | Noted: 2024-05-11

## 2024-05-11 NOTE — PLAN OF CARE
"[facility]  Speech Therapy Evaluation     Date: 5/9/2024     Name: Tony Gordillo   MRN: [id number]    Therapy Diagnosis: No diagnosis found.   Physician: Damaso Cota MD  Physician Orders: Ambulatory Referral to Speech Therapy   Medical Diagnosis: Concussion with unknown loss of consciousness status, initial encounter [S06.0XAA]    Visit # / Visits Authorized:  1 / 1   Date of Evaluation:  5/9/2024   Insurance Authorization Period: 5/7/2024 - 5/7/2025   Plan of Care Certification:    5/9/2024 to 7/5/2024      Time In: 8:00   Time Out: 8:45   Procedure Min.   Cognitive Communication Evaluation - including administration, scoring and interpretation   45     Precautions: Standard  Subjective   Date of Onset: Multiple head injuries beginning in elementary through adulthood; see MD not from 05/07/2024  History of Current Condition:  Tony Gordillo is a 43 y.o. male who presents to [facility] for evaluation and treatment secondary to post-concussion syndrome. Patient was referred to therapy by Dr. Cota. Patient with extensive history of concussion beginning in elementary. Per chart review, "He has cognitive fogginess on a regular basis for the last 2 years, concentration difficulties since childhood that has progressively worsened per wife but no sudden worsening per wife. Per wife, since start of Covid in 2022 he started to have worse short and long term memory difficulties that have progressively worsened. Per wife, he uses apps and writes things down to help and he and wife meet weekly to go over his weekly schedule. Per wife, he has never slept well since childhood and shift work did not help and no permanent sleep changes from his injuries or from something else."  Currently, pt is complaining of "associated photophobia to all lights, phonophobia a few times per wife but not usually, generalized weakness with severe headaches, numbness/tingling " "below elbows to hands that occurs with all of his symptoms that worsened with most right shoulder surgery, nausea, imbalance and lightheadedness that started within the last 1 month, bilateral blurred vision, horizontal diplopia and has not tried closing one eye, seeing halos around objects and floaters and he has not been able to wear his corrective glasses since headaches worsened as makes him feel worse." Deficits in memory and has worsened lately. Difficulties with word finding. Patient endorsed changes in mood (been OKAY as of late). Patient endorsed fatigue. Patient does feel as though he has returned to baseline cognitive communication functioning.    Previous history of:  Previous concussions: endorsed  Anxiety: endorsed  Depression: endorsed  Learning Disabilities: endorsed (dyslexia and difficulties with reading comprehension)  ADHD: endorsed  Headaches and/or Migraines: endorsed    Past Medical History: Tony Gordillo  has a past medical history of Diabetes mellitus, type 2, Hyperlipidemia, Hypertension, Obesity, and NAINA (obstructive sleep apnea).  Tony Gordillo  has a past surgical history that includes left knee x 2; Knee surgery; Arthroscopic repair of rotator cuff of shoulder (Right, 03/14/2023); arthroscopy,shoulder,with biceps tenodesis (03/14/2023); Arthroscopic debridement of shoulder (03/14/2023); Knee arthroscopy w/ meniscectomy (Right, 10/24/2023); Subchondroplasty (Right, 10/24/2023); and PRK  (Bilateral, 2010).  Medical Hx and Allergies: Tony has a current medication list which includes the following prescription(s): odactra, ammonium lactate, aripiprazole, atogepant, atorvastatin, azelastine, blood sugar diagnostic, blood sugar diagnostic, blood-glucose meter, dexcom g6 sensor, freestyle joe 3 sensor, dexcom g6 transmitter, cyanocobalamin (vitamin b-12), empagliflozin, epinephrine, fluoxetine, fluticasone propionate, insulin aspart u-100, insulin aspart u-100, toujeo max u-300 " "solostar, omnipod 5 g6 pods (gen 5), l-methylfolate, lamotrigine, lancets, levothyroxine, lisinopril, loratadine, magnesium oxide, meloxicam, modafinil, ondansetron, pen needle, diabetic, prednisone, nurtec, syringe (disposable), and tirzepatide.   Review of patient's allergies indicates:   Allergen Reactions    Influenza virus vaccine, specific Hives    Pioglitazone      Altered mood     Victoza [liraglutide] Nausea And Vomiting    Farxiga [dapagliflozin] Rash     Erectile dysfunction and rash        Prior Therapy: none  Social History:   Lives with: wife (8 in total 2 in the house)  Family responsibilities: limited  Occupation:   Computer usage: 8+  Driving: yes    Education Level: some college (technical)    Prior Level of Function: independent   Current Level of Function: modified independence    Pain:   Location: Headache  0/10 currently  5/10 on average  5/10 at best  10/10 at worst  Description: Aching, Throbbing, Electrical, and piercing  Aggravating Factors: "Doing anything"  Easing Factors: Asprin, motrin, tylenol    Nutrition:  No deficits, Oral, Thin liquids (IDDSI 0) and Regular consistencies (IDDSI 7)   Patient's Therapy Goals:  "  Objective   Formal Assessment:    The Repeatable Battery for the Assessment of Neuropsychological Status (RBANS) Version B was administered to measure the patient's attention, language, visuospatial/constructional abilities, and immediate and delayed memory. The results are outlined below:    Domain Subtest Total Score Index Score   Immediate Memory List Learning 18   69    Story Memory 13    Visuospatial/  Constructional Figure Copy 20   102    Line Orientation 15    Language Picture Naming 10   95    Semantic Fluency 19    Attention Digit Span 4   49    Coding 34      Delayed Memory List Recall 3     56    List Recognition 16     Story Recall 5     Figure Recall 8        Total Scale   67     Percentile   1     Descriptor   69 and below = Extremely Low "   Interpretation:  Index score: mean of 100, standard deviation of 15. Therefore, 130+ = Very Superior; 120-129 = Superior; 110-119 = High average;  = Average; 80-89 = Low Average; 70-79 = Borderline; 69 and below = Extremely Low. Apparently the most reliable score; factor in education level.    Immediate Memory Score: Recalling information following immediate presentation is assessed through the List Learning and Story Memory subtests. In the List Learning subtest, the patient is given 10 words to remember. This list is presented four times overall. In this subtest, the patient did demonstrate learning over the 4 trials. The patient recalled 3 items on trial one, 4 items on trial two, 6 items on trial three, and 5 items in trial 4. In the Story Memory subtest, the patient recalled 6 details on the first presentation and 7 details on the second presentation. Results of this domain indicate Low average performance.  Visuospatial score: Perceiving spatial relations and constructing a spatially accurate copy of a drawing is assessed through the Figure Copy and Line Orientation subtests. The Figure Copy subtest asks the patient to copy a complex line drawing. The patient was able to adequately copy figure. The Line Orientation subtest the presents the patient with 12 line displays and asks the patient to match two given lines at the bottom to the display at the top.  The patient correctly identified 15/20. Results of this domain indicate Low average performance.  Language score: Naming common items and retrieving learned material is assessed through the Picture Naming and Semantic Fluency subtests. The Picture Naming subtest asks the patient to name 10 line drawings. The patient was able to accurately name 10/10 items. The Semantic Fluency subtest asks the patient to name as many animals as he can in 60 seconds. The patient was able to name 15 animals. These results indicate Average performance.   Attention score:  Attending to, holding and manipulating information presented visually and orally in working memory is assessed with use of the Digit Span and Coding subtests. The Digit Span subtest asks the patient to repeat progressively lengthening strings of numbers. The Coding subtest asks the patient to alternate attention between a key the given work and then to decode symbols to numbers. The patients results on these subtest indicate Borderline  performance.  Delayed Memory score: Anterograde memory capacity is assessed through the List Recall, List Recognition, Story Recall, and Figure Recall subtests. The List Recall subtest asks the patient to recall items from the list presented at the beginning of the test. The patient was able to recall 3/10 items. The List Recognition subtest has the patient recall whether a word was or was not in the original list. The patient accurately identified whether a word was or was not on the list in 16 of 20 items. On the Story Recall subtest, the patient was able to recall 5 details. Finally, on the Figure Recall, the patient recalled 8 details. Results of this domain indicate Borderline  performance.    Treatment   Total Treatment Time Separate from Evaluation: not applicable   No treatment performed secondary to time to complete evaluation.     Education provided:   -role of Speech Therapy, goals/plan of care, scheduling/cancellations, insurance limitations with patient  -Additional Education provided:   General concussion education     Patient verbalized understanding.     Home Program: not yet established  Assessment     Tony presents to [facility] status post medical diagnosis of Concussion with unknown loss of consciousness status, initial encounter [S06.0XAA].      Interpretation of objective assessment: Overall, according to the RBANS research, total Scale index is a good indicator of general cognitive functioning. The patient presents with a  severe cognitive communication disorder charaterized by deficits in memory, attention, and reduced processing speed.     Demonstrates impairments including limitations as described in the problem list.     Positive prognostic factors: patient motivation  Negative prognostic factors: history of multiple concussion  Barriers to therapy: Barriers to therapy include history of multiple concussions and time since onset without therapy     Patient's spiritual, cultural, and educational needs considered and patient agreeable to plan of care and goals.    Patient will benefit from skilled therapy.    Rehab Potential: fair    Short Term Goals: (6 weeks) Current Progress:   1. Patient will sustain attention to complete moderate to complex reasoning tasks for 2 minutes with one request for clarification to increase sustained attention.    Progressing/ Not Met 5/9/2024   Established this date   2. Patient will use attention shifting strategies to shift attention between two tasks with no more than 3 cues or 90% accuracy to improve alternating attention.     Progressing/ Not Met 5/9/2024   Established this date    3. Patient will complete short term recall tasks after a 5 minutes delay with 90% accuracy  independently  with use of memory strategies to improve recall of information and generalization of memory strategies.    Progressing/ Not Met 5/9/2024   Established this date    4. Patient will complete high level problem solving based tasks with 90% accuracy independently.    Progressing/ Not Met 5/9/2024   Established this date    5. Patient will complete mental manipulation tasks with 90% acc to improve working memory.    Progressing/ Not Met 5/9/2024   Established this date    6. Patient will demonstrate awareness of cognitive-communication difficulties in 1-2 situations in his day in which cognitive deficits could or did compromise efficiency and performance with minimal cueing.     Progressing/ Not Met 5/9/2024    Established this date    7. Patient will independently generate strategies to improve ability to complete tasks with enhanced accuracy and time, based on review of objective previous performance on trials of functional tasks with 80% accuracy.     Progressing/ Not Met 5/9/2024   Established this date        Long Term Goals: (10 weeks) Current Progress:   1. Patient will improve  attention skills to effectively attend to and communicate in simple-moderate daily living tasks in functional living environment.    Established this date     2. Patient will predict objective performance on completion of actual tasks to increase independence with required return to daily living environment.     Established this date     3. Patient will use appropriate memory strategies to schedule and recall weekly activities, express needs and recall names to maintain safety and participate socially in functional living environment.     Established this date       Plan     Recommended Treatment Plan:  Patient will participate in the [facility] program for speech therapy 2 times per week for 8 weeks to address his Cognition deficits, to educate patient and their family, and to participate in a home exercise program.    Follow up will occur at [facility] for skilled Speech Therapy services.    Other Recommendations:   No other recommendation    Therapist's Name:   Alfreda Noriega CCC-SLP   5/9/2024

## 2024-05-13 ENCOUNTER — CLINICAL SUPPORT (OUTPATIENT)
Dept: REHABILITATION | Facility: HOSPITAL | Age: 44
End: 2024-05-13
Payer: COMMERCIAL

## 2024-05-13 DIAGNOSIS — R41.841 COGNITIVE COMMUNICATION DEFICIT: Primary | ICD-10-CM

## 2024-05-13 PROCEDURE — 97129 THER IVNTJ 1ST 15 MIN: CPT | Mod: PN

## 2024-05-13 PROCEDURE — 97130 THER IVNTJ EA ADDL 15 MIN: CPT | Mod: PN

## 2024-05-13 NOTE — PROGRESS NOTES
[facility]  Speech Therapy Treatment Note- Neurological Rehabilitation  Date: 5/13/2024     Name: Tony Gordillo   MRN: [id number]   Therapy Diagnosis:   Encounter Diagnosis   Name Primary?    Cognitive communication deficit Yes     Physician: Damaso Cota MD  Physician Orders: Ambulatory Referral to Speech Therapy   Medical Diagnosis: Concussion without loss of consciousness, sequela [S06.0X0S], Cognitive change [R41.89]     Visit #/ Visits Authorized: 1/ 20  Date of Evaluation:  5/9/2024  Insurance Authorization Period: 5/9/2024 - 12/31/2024   Plan of Care Expiration Date:    7/5/2024  Extended Plan of Care:  n/a   Progress Note: 6/12/2024     Time In:  4:00  Time Out:  4:45  Total Billable Time: 45     Precautions: Standard  Subjective:   Patient reports: Patient pleasant.   He was compliant to home exercise program.   Response to previous treatment: fair  Pain Scale: no pain indicated throughout session  Objective:   TIMED  Procedure Min.   Cognitive Therapeutic Interventions, first 15 minutes CPT 58694  15   Cognitive Therapeutic Interventions, each additional 15 minutes CPT 87105  30       Short Term Goals: (6 weeks) Current Progress:   1. Patient will sustain attention to complete moderate to complex reasoning tasks for 2 minutes with one request for clarification to increase sustained attention.     Progressing/ Not Met 5/9/2024   Established this date   2. Patient will use attention shifting strategies to shift attention between two tasks with no more than 3 cues or 90% accuracy to improve alternating attention.      Progressing/ Not Met 5/9/2024   Established this date    3. Patient will complete short term recall tasks after a 5 minutes delay with 90% accuracy  independently  with use of memory strategies to improve recall of information and generalization of memory strategies.     Progressing/ Not Met 5/9/2024   5 minute delay- 100% accuracy  10 minute delay- 100%  accuracy        Met x1   4. Patient will complete high level problem solving based tasks with 90% accuracy independently.     Progressing/ Not Met 2024   Established this date    5. Patient will complete mental manipulation tasks with 90% acc to improve working memory.     Progressing/ Not Met 2024   Unscramble sentences- 100% accuracy   6. Patient will demonstrate awareness of cognitive-communication difficulties in 1-2 situations in his day in which cognitive deficits could or did compromise efficiency and performance with minimal cueing.      Progressing/ Not Met 2024   Established this date    7. Patient will independently generate strategies to improve ability to complete tasks with enhanced accuracy and time, based on review of objective previous performance on trials of functional tasks with 80% accuracy.      Progressing/ Not Met 2024   Patient was able to independently use association strategy to recall information independently following delay         Long Term Goals: (10 weeks) Current Progress:   1. Patient will improve  attention skills to effectively attend to and communicate in simple-moderate daily living tasks in functional living environment.     Established this date     2. Patient will predict objective performance on completion of actual tasks to increase independence with required return to daily living environment.     Established this date     3. Patient will use appropriate memory strategies to schedule and recall weekly activities, express needs and recall names to maintain safety and participate socially in functional living environment.     Established this date         Patient Education/Response:   Patient educated regarding the followin. Results of assessment  2. Memory strategies  3. Attention strategies    Home program established: yes-see above  Patient verbalized understanding to all above education provided.     See Electronic Medical Record under Patient  Instructions for exercises provided throughout therapy.  Assessment:   Tony is progressing well towards his goals. Patient was pleasant. Majority of session was spent in education and rapport building. Patient with adequate attention and memory recall across tasks. He was able to independently generate strategies to improve memory recall. Current goals remain appropriate. Goals to be updated as necessary.     Patient prognosis is Fair. Patient will continue to benefit from skilled outpatient speech and language therapy to address the deficits listed in the problem list on initial evaluation, provide patient/family education and to maximize patient's level of independence in the home and community environment.   Medical necessity is demonstrated by the following IMPAIRMENTS:  Cognition: Deficits in executive functioning, attention, and memory prevent the pt from relaying medically and safety relevant information in a timely manner in a state of emergency.   Barriers to Therapy: time since onset  Patient's spiritual, cultural and educational needs considered and patient agreeable to plan of care and goals.  Plan:   Continue Plan of Care with focus on rehabilitation and compensation for cognitive communication deficits    Alfreda Noriega CCC-SLP   5/13/2024

## 2024-05-14 NOTE — PROGRESS NOTES
[facility]  Speech Therapy Treatment Note- Neurological Rehabilitation  Date: 5/15/2024     Name: Tony Gordillo   MRN: [id number]   Therapy Diagnosis:   Encounter Diagnosis   Name Primary?    Cognitive communication deficit Yes     Physician: Damaso Cota MD  Physician Orders: Ambulatory Referral to Speech Therapy   Medical Diagnosis: Concussion without loss of consciousness, sequela [S06.0X0S], Cognitive change [R41.89]     Visit #/ Visits Authorized: 1/ 20  Date of Evaluation:  5/9/2024  Insurance Authorization Period: 5/9/2024 - 12/31/2024   Plan of Care Expiration Date:    7/5/2024  Extended Plan of Care:  n/a   Progress Note: 6/12/2024     Time In:  4:00  Time Out:  4:45  Total Billable Time: 45     Precautions: Standard  Subjective:   Patient reports: Patient pleasant.   He was compliant to home exercise program.   Response to previous treatment: fair  Pain Scale: no pain indicated throughout session  Objective:   TIMED  Procedure Min.   Cognitive Therapeutic Interventions, first 15 minutes CPT 39195  15   Cognitive Therapeutic Interventions, each additional 15 minutes CPT 42982  30       Short Term Goals: (6 weeks) Current Progress:   1. Patient will sustain attention to complete moderate to complex reasoning tasks for 2 minutes with one request for clarification to increase sustained attention.     Progressing/ Not Met 5/9/2024   Card separation (6 piles)- 100% accuracy given verbal distraction independently    2. Patient will use attention shifting strategies to shift attention between two tasks with no more than 3 cues or 90% accuracy to improve alternating attention.      Progressing/ Not Met 5/9/2024   Alternating words L2 given verbal distraction- 95% accuracy independently   3. Patient will complete short term recall tasks after a 5 minutes delay with 90% accuracy  independently  with use of memory strategies to improve recall of information and generalization of memory  strategies.     Progressing/ Not Met 2024   Names and faces (first names)- 100% accuracy        Met x1   4. Patient will complete high level problem solving based tasks with 90% accuracy independently.     Progressing/ Not Met 2024   Established this date    5. Patient will complete mental manipulation tasks with 90% acc to improve working memory.     Progressing/ Not Met 2024   Following instructions L3- 73% accuracy independently    6. Patient will demonstrate awareness of cognitive-communication difficulties in 1-2 situations in his day in which cognitive deficits could or did compromise efficiency and performance with minimal cueing.      Progressing/ Not Met 2024   Established this date    7. Patient will independently generate strategies to improve ability to complete tasks with enhanced accuracy and time, based on review of objective previous performance on trials of functional tasks with 80% accuracy.      Progressing/ Not Met 2024   Patient was able to independently use association strategy to recall information independently following delay         Long Term Goals: (10 weeks) Current Progress:   1. Patient will improve  attention skills to effectively attend to and communicate in simple-moderate daily living tasks in functional living environment.     Established this date     2. Patient will predict objective performance on completion of actual tasks to increase independence with required return to daily living environment.     Established this date     3. Patient will use appropriate memory strategies to schedule and recall weekly activities, express needs and recall names to maintain safety and participate socially in functional living environment.     Established this date         Patient Education/Response:   Patient educated regarding the followin. Results of assessment  2. Memory strategies  3. Attention strategies    Home program established: yes-see above  Patient  verbalized understanding to all above education provided.     See Electronic Medical Record under Patient Instructions for exercises provided throughout therapy.  Assessment:   Tony is progressing well towards his goals. Patient was pleasant. He was seen with some improvement in attention but demonstrated deficits  in memory when given metacognitive strategies patient seen with improved accuracy. ST notes some patient to demonstrate some impulse. When ST encouraged reductions of speed to complete tasks patient with noted improvements. Goals to be updated as necessary.     Patient prognosis is Fair. Patient will continue to benefit from skilled outpatient speech and language therapy to address the deficits listed in the problem list on initial evaluation, provide patient/family education and to maximize patient's level of independence in the home and community environment.   Medical necessity is demonstrated by the following IMPAIRMENTS:  Cognition: Deficits in executive functioning, attention, and memory prevent the pt from relaying medically and safety relevant information in a timely manner in a state of emergency.   Barriers to Therapy: time since onset  Patient's spiritual, cultural and educational needs considered and patient agreeable to plan of care and goals.  Plan:   Continue Plan of Care with focus on rehabilitation and compensation for cognitive communication deficits    Alfreda Noriega CCC-SLP   5/15/2024

## 2024-05-15 ENCOUNTER — CLINICAL SUPPORT (OUTPATIENT)
Dept: REHABILITATION | Facility: HOSPITAL | Age: 44
End: 2024-05-15
Payer: COMMERCIAL

## 2024-05-15 ENCOUNTER — TELEPHONE (OUTPATIENT)
Dept: ORTHOPEDICS | Facility: CLINIC | Age: 44
End: 2024-05-15
Payer: COMMERCIAL

## 2024-05-15 DIAGNOSIS — R41.841 COGNITIVE COMMUNICATION DEFICIT: Primary | ICD-10-CM

## 2024-05-15 PROCEDURE — 97130 THER IVNTJ EA ADDL 15 MIN: CPT | Mod: PN

## 2024-05-15 PROCEDURE — 97129 THER IVNTJ 1ST 15 MIN: CPT | Mod: PN

## 2024-05-16 ENCOUNTER — HOSPITAL ENCOUNTER (OUTPATIENT)
Dept: RADIOLOGY | Facility: HOSPITAL | Age: 44
Discharge: HOME | End: 2024-05-16
Attending: ORTHOPAEDIC SURGERY
Payer: OTHER MISCELLANEOUS

## 2024-05-16 DIAGNOSIS — G89.29 CHRONIC RIGHT SHOULDER PAIN: ICD-10-CM

## 2024-05-16 DIAGNOSIS — M25.511 CHRONIC RIGHT SHOULDER PAIN: ICD-10-CM

## 2024-05-16 PROCEDURE — 73221 MRI JOINT UPR EXTREM W/O DYE: CPT | Mod: TC,RT

## 2024-05-16 PROCEDURE — 73221 MRI JOINT UPR EXTREM W/O DYE: CPT | Mod: 26,RT,, | Performed by: RADIOLOGY

## 2024-05-16 NOTE — PROGRESS NOTES
[facility]  Speech Therapy Treatment Note- Neurological Rehabilitation  Date: 5/21/2024     Name: Tony Gordillo   MRN: [id number]   Therapy Diagnosis:   Encounter Diagnosis   Name Primary?    Cognitive communication deficit Yes     Physician: Damaso Cota MD  Physician Orders: Ambulatory Referral to Speech Therapy   Medical Diagnosis: Concussion without loss of consciousness, sequela [S06.0X0S], Cognitive change [R41.89]     Visit #/ Visits Authorized: 3/ 20  Date of Evaluation:  5/9/2024  Insurance Authorization Period: 5/9/2024 - 12/31/2024   Plan of Care Expiration Date:    7/5/2024  Extended Plan of Care:  n/a   Progress Note: 6/12/2024     Time In:  4:00  Time Out:  4:45  Total Billable Time: 45     Precautions: Standard  Subjective:   Patient reports: Patient pleasant. He was seen with headache pains.   He was compliant to home exercise program.   Response to previous treatment: fair  Pain Scale: 5/10 on a Visual Analog Scale currently.  Pain Location: headache after cutting grass  no pain indicated throughout session  Objective:   TIMED  Procedure Min.   Cognitive Therapeutic Interventions, first 15 minutes CPT 62140  15   Cognitive Therapeutic Interventions, each additional 15 minutes CPT 56685  30       Short Term Goals: (6 weeks) Current Progress:   1. Patient will sustain attention to complete moderate to complex reasoning tasks for 2 minutes with one request for clarification to increase sustained attention.     Progressing/ Not Met 5/9/2024   Unscramble words- not addressed% accuracy independently     Deductive reasoning task- unable to complete% accuracy independently   2. Patient will use attention shifting strategies to shift attention between two tasks with no more than 3 cues or 90% accuracy to improve alternating attention.      Progressing/ Not Met 5/9/2024   Alternating words L3 given verbal distraction- 88% accuracy independently    Understanding stories L2- 60%  accuracy independently    3. Patient will complete short term recall tasks after a 5 minutes delay with 90% accuracy  independently  with use of memory strategies to improve recall of information and generalization of memory strategies.     Progressing/ Not Met 5/9/2024   Names and faces (first names)- 1/5 following 3 minute delay        Met x1   4. Patient will complete high level problem solving based tasks with 90% accuracy independently.     Progressing/ Not Met 5/9/2024   Not addressed in today's session.        Met x1    5. Patient will complete mental manipulation tasks with 90% acc to improve working memory.     Progressing/ Not Met 5/9/2024   Following instructions L3- 84% accuracy independently    6. Patient will demonstrate awareness of cognitive-communication difficulties in 1-2 situations in his day in which cognitive deficits could or did compromise efficiency and performance with minimal cueing.      Progressing/ Not Met 5/9/2024   Patient shares no difficulties in regular life. Besides headache pain.  ST expressed in depth how it is a large factor preventing him from being able to sustain attain to a 100% ability. Patient verbalized understanding   7. Patient will independently generate strategies to improve ability to complete tasks with enhanced accuracy and time, based on review of objective previous performance on trials of functional tasks with 80% accuracy.      Progressing/ Not Met 5/9/2024   Patient was able to independently use association strategy to recall information independently following delay         Long Term Goals: (10 weeks) Current Progress:   1. Patient will improve  attention skills to effectively attend to and communicate in simple-moderate daily living tasks in functional living environment.     Established this date     2. Patient will predict objective performance on completion of actual tasks to increase independence with required return to daily living environment.      "Established this date     3. Patient will use appropriate memory strategies to schedule and recall weekly activities, express needs and recall names to maintain safety and participate socially in functional living environment.     Established this date         Patient Education/Response:   Patient educated regarding the followin. Metacognitive strategies (take more time to think)    Home program established: yes-see above  Patient verbalized understanding to all above education provided.     See Electronic Medical Record under Patient Instructions for exercises provided throughout therapy.  Assessment:   Tony is progressing well towards his goals. Patient with headache pain that initially begun at a 5 and increased to a 7 after attempting deductive reasoning puzzle. Patient seen to work slower and was not as alert as typically. ST voiced how this is normal and that he is still making great progress. Patient agrees and voiced, "yeah, today is just a bad day." Given metacognitive strategies (allowing self more time to think) patient with some improvements. Goals to be updated as necessary.     Patient prognosis is Fair. Patient will continue to benefit from skilled outpatient speech and language therapy to address the deficits listed in the problem list on initial evaluation, provide patient/family education and to maximize patient's level of independence in the home and community environment.   Medical necessity is demonstrated by the following IMPAIRMENTS:  Cognition: Deficits in executive functioning, attention, and memory prevent the pt from relaying medically and safety relevant information in a timely manner in a state of emergency.   Barriers to Therapy: time since onset  Patient's spiritual, cultural and educational needs considered and patient agreeable to plan of care and goals.  Plan:   Continue Plan of Care with focus on rehabilitation and compensation for cognitive communication deficits    Alfreda" Hari, DIA-SLP   5/21/2024

## 2024-05-20 ENCOUNTER — OFFICE VISIT (OUTPATIENT)
Dept: ALLERGY | Facility: CLINIC | Age: 44
End: 2024-05-20
Payer: COMMERCIAL

## 2024-05-20 VITALS
SYSTOLIC BLOOD PRESSURE: 114 MMHG | OXYGEN SATURATION: 96 % | BODY MASS INDEX: 39.3 KG/M2 | DIASTOLIC BLOOD PRESSURE: 76 MMHG | HEART RATE: 98 BPM | WEIGHT: 296.5 LBS | HEIGHT: 73 IN

## 2024-05-20 DIAGNOSIS — J30.9 CHRONIC ALLERGIC RHINITIS: Primary | ICD-10-CM

## 2024-05-20 DIAGNOSIS — Z91.09 ALLERGY TO AMERICAN HOUSE DUST MITE: ICD-10-CM

## 2024-05-20 DIAGNOSIS — Z51.6 ALLERGY DESENSITIZATION THERAPY: ICD-10-CM

## 2024-05-20 PROCEDURE — 99214 OFFICE O/P EST MOD 30 MIN: CPT | Mod: S$GLB,,, | Performed by: STUDENT IN AN ORGANIZED HEALTH CARE EDUCATION/TRAINING PROGRAM

## 2024-05-20 PROCEDURE — 99999 PR PBB SHADOW E&M-EST. PATIENT-LVL V: CPT | Mod: PBBFAC,,, | Performed by: STUDENT IN AN ORGANIZED HEALTH CARE EDUCATION/TRAINING PROGRAM

## 2024-05-20 NOTE — PROGRESS NOTES
ALLERGY & IMMUNOLOGY CLINIC - FOLLOW UP     HISTORY OF PRESENT ILLNESS     Patient ID: Tony Gordillo is a 43 y.o. male    CC: chronic allergic rhinitis, first dose of odactra    HPI: Tony Gordillo is a 43 y.o. male with HTN, hypothyroidism, and DM2, following up for allergic rhinitis and to receive his first dose of odactra.    He says he was sniffly this morning, but allergy symptoms overall mild today. He remains on his claritin daily. He remains on flonase 2 SEN BID, azelastine 2 SEN daily. He continues to use saline nasal spray prior to flonase and azelastine.   No shortness of breath or wheezing.      MEDICAL HISTORY     Vaccines:    Immunization History   Administered Date(s) Administered    COVID-19 Vaccine 12/28/2020, 01/25/2021, 11/08/2021    COVID-19, MRNA, LN-S, PF (MODERNA FULL 0.5 ML DOSE) 12/28/2020, 01/25/2021, 11/08/2021    COVID-19, mRNA, LNP-S, PF, kayla-sucrose, 30 mcg/0.3 mL (Pfizer 2023 Ages 12+) 12/11/2023    Influenza 09/06/2019    Pneumococcal Polysaccharide - 23 Valent 09/28/2018    Tdap 07/18/2019     Medical Hx:   Patient Active Problem List   Diagnosis    Hyperlipidemia LDL goal <70    Insulin long-term use    Tinea pedis    Morbid obesity    Hypothyroidism    Type 2 diabetes mellitus 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 MRI negative. 11/2019 started on testosterone    Right foot pain    Decreased strength of lower extremity    Decreased range of motion of both ankles    Gait abnormality     Rib pain on left side    Dyspnea    Decreased strength, endurance, and mobility    Left hand pain    Mild neurocognitive disorder due to traumatic brain injury    Outbursts of anger    Other amnesia    Traumatic rotator cuff tear, right, initial encounter    S/P right rotator cuff repair    Biceps tendonosis of right shoulder    Decreased range of motion of right shoulder    Impaired strength of shoulder muscles    Allergic conjunctivitis    Cornea edema    Descemet's membrane fold    Herpes simplex keratitis    Uncontrolled type 2 diabetes mellitus    Tear of lateral meniscus of right knee, current    Refractive error    Acute migraine    MCI (mild cognitive impairment)    Medication overuse headache    Chronic migraine with aura, intractable, with status migrainosus    Agitation    Traumatic encephalopathy    Allergy desensitization therapy    Concussion with no loss of consciousness    Concussion with loss of consciousness    Other specified persistent mood disorders    Cognitive communication deficit     Surgical Hx:   Past Surgical History:   Procedure Laterality Date    ARTHROSCOPIC DEBRIDEMENT OF SHOULDER  03/14/2023    Procedure: DEBRIDEMENT, SHOULDER, ARTHROSCOPIC;  Surgeon: Crissy Bradford MD;  Location: [facility] OR;  Service: Orthopedics;;    ARTHROSCOPIC REPAIR OF ROTATOR CUFF OF SHOULDER Right 03/14/2023    Procedure: REPAIR, ROTATOR CUFF, ARTHROSCOPIC;  Surgeon: Crissy Bradford MD;  Location: [facility] OR;  Service: Orthopedics;  Laterality: Right;  KARY PAYNE [phone]. TEXTED ELMER ON 3/9/2023 @ 12:10PM. ELMER RESPONDED ON 3/9/23 @ 12:23PM-SAG  RN PREOP 3/10/2023---CLEARED BY PCP AND CARDS    ARTHROSCOPY,SHOULDER,WITH BICEPS TENODESIS  03/14/2023    Procedure: ARTHROSCOPY,SHOULDER,WITH BICEPS TENODESIS;  Surgeon: Crissy Bradford MD;  Location: [facility] OR;  Service: Orthopedics;;    KNEE ARTHROSCOPY W/ MENISCECTOMY Right 10/24/2023    Procedure: ARTHROSCOPY, KNEE, WITH MENISCECTOMY;   Surgeon: Crissy Bradford MD;  Location: [facility] OR;  Service: Orthopedics;  Laterality: Right;  RN PREOP 10/18/203---CLEARED BY PCP  ELVIA [phone]    KNEE SURGERY      acl,mcl,pcl    left knee x 2      PRK  Bilateral 2010    SUBCHONDROPLASTY Right 10/24/2023    Procedure: POSSIBLE SUBCHONDROPLASTY;  Surgeon: Crissy Bradford MD;  Location: [facility] OR;  Service: Orthopedics;  Laterality: Right;     Medications:   Current Outpatient Medications on File Prior to Visit   Medication Sig Dispense Refill    allerg xt,D.farinae-D.pteronys (ODACTRA) 12 SQ-HDM Subl Place 1 tablet under the tongue every evening. 30 tablet 11    ammonium lactate 12 % Crea Apply 1 application  topically once daily. 140 g 5    ARIPiprazole (ABILIFY) 2 MG Tab Take 1 tablet (2 mg total) by mouth once daily. 30 tablet 11    atorvastatin (LIPITOR) 40 MG tablet TAKE 1 TABLET BY MOUTH EVERY DAY 90 tablet 1    azelastine (ASTELIN) 137 mcg (0.1 %) nasal spray Use 1-2 sprays in each nostril twice daily 90 mL 3    blood sugar diagnostic Strp To check BG 4 times daily, to use with insurance preferred meter 400 strip 3    blood sugar diagnostic Strp OneTouch Ultra Test strips      blood-glucose meter kit OneTouch Ultra2 Meter kit      blood-glucose sensor (DEXCOM G6 SENSOR) Filomena Change sensor every 10 days 3 each 11    blood-glucose transmitter (DEXCOM G6 TRANSMITTER) Filomena Change every 3 months 1 each 3    cyanocobalamin, vitamin B-12, 5,000 mcg Subl Place one under tongue once a day for 1 month, then continue to take under tongue per week 30 tablet 3    empagliflozin (JARDIANCE) 25 mg tablet Take 1 tablet (25 mg total) by mouth once daily. 90 tablet 2    EPINEPHrine (EPIPEN 2-AYALA) 0.3 mg/0.3 mL AtIn Inject 0.3 mLs (0.3 mg total) into the muscle as needed (Anaphylaxis). 2 each 0    FLUoxetine 40 MG capsule Take 1 capsule (40 mg total) by mouth once daily. 30 capsule 11    fluticasone propionate (FLONASE) 50 mcg/actuation nasal spray 1  "spray (50 mcg total) by Each Nostril route once daily. 48 g 5    insulin aspart U-100 (NOVOLOG) 100 unit/mL (3 mL) InPn pen INJECT 15-30 UNITS BEFORE EACH MEAL WITH SLIDNING SCALE, MAX DAILY DOSE 100 UNITS. Use in the event of insulin pump failure 30 mL 5    insulin aspart U-100 (NOVOLOG) 100 unit/mL injection Max daily dose 160 units in insulin pump. 40 mL 5    insulin glargine U-300 conc (TOUJEO MAX U-300 SOLOSTAR) 300 unit/mL (3 mL) insulin pen Inject 30 Units into the skin once daily. 3 pen 5    insulin pump cart,automated,BT (OMNIPOD 5 G6 PODS, GEN 5,) Crtg Inject 1 each into the skin once daily. 30 each 11    L-METHYLFOLATE 15 mg Tab TAKE 15 MG BY MOUTH ONCE DAILY. 30 tablet 11    lamoTRIgine (LAMICTAL) 100 MG tablet Take 1.5 tablets (150 mg total) by mouth once daily. 135 tablet 3    lancets Misc To check BG 4 times daily, to use with insurance preferred meter 400 each 3    levothyroxine (SYNTHROID) 50 MCG tablet TAKE 1 TABLET BY MOUTH BEFORE BREAKFAST. 90 tablet 3    lisinopriL (PRINIVIL,ZESTRIL) 20 MG tablet TAKE 1 TABLET BY MOUTH EVERY DAY 90 tablet 3    loratadine (CLARITIN) 10 mg tablet Take 1 tablet (10 mg total) by mouth once daily. 90 tablet 3    magnesium oxide (MAG-OX) 400 mg (241.3 mg magnesium) tablet Take 1 tablet (400 mg total) by mouth once daily. 90 tablet 1    meloxicam (MOBIC) 15 MG tablet Take 1 tablet (15 mg total) by mouth once daily. 30 tablet 0    modafiniL (PROVIGIL) 100 MG Tab Take 1 tablet (100 mg total) by mouth every morning. 90 tablet 1    ondansetron (ZOFRAN-ODT) 4 MG TbDL Take 1 tablet (4 mg total) by mouth every 8 (eight) hours as needed (nausea). 20 tablet 0    pen needle, diabetic (BD ULTRA-FINE KEN PEN NEEDLE) 32 gauge x 5/32" Ndle 1 Device by Misc.(Non-Drug; Combo Route) route 5 (five) times daily. 550 each 4    rimegepant (NURTEC) 75 mg odt Take 1 tablet (75 mg total) by mouth as needed for Migraine. Place ODT tablet on the tongue; alternatively the ODT tablet may be " "placed under the tongue. No more than 2 in 24 hrs. 16 tablet 3    syringe, disposable, 1 mL Syrg Testosterone every 2 weeks 25 Syringe 1    tirzepatide 10 mg/0.5 mL PnIj Inject 10 mg into the skin every 7 days. 4 Pen 3    [DISCONTINUED] atogepant 60 mg Tab Take 60 mg by mouth once daily. 90 tablet 1     No current facility-administered medications on file prior to visit.     Drug Allergies:   Review of patient's allergies indicates:   Allergen Reactions    Influenza virus vaccine, specific Hives    Pioglitazone      Altered mood     Victoza [liraglutide] Nausea And Vomiting    Farxiga [dapagliflozin] Rash     Erectile dysfunction and rash      Social Hx:   Social History     Tobacco Use    Smoking status: Some Days     Types: Cigars     Passive exposure: Never    Smokeless tobacco: Never    Tobacco comments:     Has not had any cigars this year   Substance Use Topics    Alcohol use: Yes     Comment: 1-2 beers every 2 weeks    Drug use: Yes     Types: Marijuana     Comment: Non-prescription cannabis     Additional History Obtained at Initial Visit:  H/o Asthma: denies  H/o Rhinitis: endorses  Env/Occ:   Pets: 2 rabbits and a hamster. He says he sneezes a little when the rabbit fur gets in his nose (but thinks this is more related to it tickling his nose).  Occupation:  (but currently out due to a shoulder injury).     PHYSICAL EXAM     VS: /76 (BP Location: Left arm, Patient Position: Sitting, BP Method: Large (Automatic))   Pulse 98   Ht 6' 1" (1.854 m)   Wt 134.5 kg (296 lb 8.3 oz)   SpO2 96%   BMI 39.12 kg/m²   VS: /76 (BP Location: Left arm, Patient Position: Sitting, BP Method: Large (Automatic))   Pulse 98   Ht 6' 1" (1.854 m)   Wt 134.5 kg (296 lb 8.3 oz)   SpO2 96%   BMI 39.12 kg/m²   GENERAL: Alert, NAD, well-appearing  EYES: EOMI, no conjunctival injection, no discharge, no infraorbital shiners  LUNGS: normal effort, no increased WOB  DERM: no rashes  NEURO: normal speech, " normal gait, no facial asymmetry     LABORATORY STUDIES     Component      Latest Ref Rng 1/4/2024 5/8/2024   Hemoglobin A1C External      4.0 - 5.6 % 5.9 (H)  5.7 (H)      3/22/24:     CMP - K slighly low at 3.5     CBC/diff - within acceptable ranges, total eos 100    Component      Latest Ref Rng 10/17/2023   WBC      3.90 - 12.70 K/uL 9.00    RBC      4.60 - 6.20 M/uL 5.25    Hemoglobin      14.0 - 18.0 g/dL 15.5    Hematocrit      40.0 - 54.0 % 48.7    MCV      82 - 98 fL 93    MCH      27.0 - 31.0 pg 29.5    MCHC      32.0 - 36.0 g/dL 31.8 (L)    RDW      11.5 - 14.5 % 15.5 (H)    Platelet Count      150 - 450 K/uL 238    MPV      9.2 - 12.9 fL 11.7    Immature Granulocytes      0.0 - 0.5 % 0.4    Gran # (ANC)      1.8 - 7.7 K/uL 5.6    Immature Grans (Abs)      0.00 - 0.04 K/uL 0.04    Lymph #      1.0 - 4.8 K/uL 2.4    Mono #      0.3 - 1.0 K/uL 0.9    Eos #      0.0 - 0.5 K/uL 0.1    Baso #      0.00 - 0.20 K/uL 0.06    Differential Method Automated    Sodium      136 - 145 mmol/L 139    Potassium      3.5 - 5.1 mmol/L 5.1    Chloride      95 - 110 mmol/L 102    CO2      23 - 29 mmol/L 25    Glucose      70 - 110 mg/dL 101    BUN      6 - 20 mg/dL 18    Creatinine      0.5 - 1.4 mg/dL 0.9    Calcium      8.7 - 10.5 mg/dL 10.2    PROTEIN TOTAL      6.0 - 8.4 g/dL 7.7    Albumin      3.5 - 5.2 g/dL 4.0    BILIRUBIN TOTAL      0.1 - 1.0 mg/dL 0.5    ALP      55 - 135 U/L 71    AST      10 - 40 U/L 18    ALT      10 - 44 U/L 27      Component      Latest Ref Rng 1/19/2023 10/10/2023   S.pneumoniae Type 1      >=1.0 mcg/mL  14.8    Pneumococcal Serotype 2 IgG (PNX)      >=1.0 mcg/mL  13.2    S.pneumoniae Type 3      >=1.0 mcg/mL  <0.1    Pneumococcal Serotype 4 IgG (P7,P13,PNX)      >=1.0 mcg/mL  0.4    S.pneumoniae Type 5      >=1.0 mcg/mL  43.2    S.pneumoniae Type 8      >=1.0 mcg/mL  11.9    S.pneumoniae Type 9N      >=1.0 mcg/mL  0.2    S.pneumoniae Type 12F      >=1.0 mcg/mL  0.1    Pneumococcal Serotype  14 IgG (P7,P13,PNX)      >=1.0 mcg/mL  >100.0    Pneumococcal Serotype 17F IgG (PNX)      >=1.0 mcg/mL  5.9    Pneumococcal Serotype 17F IgG (PNX)      >=1.0 mcg/mL  1.5    S.pneumoniae Type 19F      >=1.0 mcg/mL  1.1    Pneumococcal Serotype 20 IgG (PNX)      >=1.0 mcg/mL  1.6    S.pneumoniae Type 23F      >=1.0 mcg/mL  1.6    S.pneumoniae Type 6B      >=1.0 mcg/mL  2.9    Pneumococcal Serotype 10A IgG (PNX)      >=1.0 mcg/mL  21.9    Pneumococcal Serotype 11A IgG (PNX)      >=1.0 mcg/mL  7.4    S.pneumoniae Type 7F      >=1.0 mcg/mL  4.0    Pneumococcal Serotype 15B IgG (PNX)      >=1.0 mcg/mL  26.4    S.pneumoniae Type 18C      >=1.0 mcg/mL  47.3    Pneumococcal Serotype 19A IgG (P13,PNX)      >=1.0 mcg/mL  71.4    S.pneumoniae Type 9V Abs      >=1.0 mcg/mL  3.0    Pneumococcal Serotype 33 IgG (PNX)      >=1.0 mcg/mL  >100.0    PN23 Interpretation  SEE BELOW    IgG      650 - 1600 mg/dL 813  809    IgA      40 - 350 mg/dL  237    IgM      50 - 300 mg/dL  74      Component      Latest Ref Rn 7/15/2021   HIV 1/2 Ag/Ab      Negative  Negative       ALLERGEN TESTING     Immunocaps:   Component      Latest Ref Rn 10/10/2023   D. farinae      <0.10 kU/L 2.52 (H)    D. farinae Class CLASS 2    Mite Dust Pteronyssinus IgE      <0.10 kU/L 2.36 (H)    D. pteronyssinus Class CLASS 2    BERMUDA GRASS      <0.10 kU/L 0.15 (H)    [place] Grass Class CLASS 0/1    Dony Grass      <0.10 kU/L 0.47 (H)    Dony Grass Class CLASS 1    O'Brien IgE      <0.10 kU/L <0.10    O'Brien Class CLASS 0    Plantain      <0.10 kU/L <0.10    English Plantain Class CLASS 0    [place](Quercus alba) IgE      <0.10 kU/L <0.10    [place], Class CLASS 0    Pecan [place] Tree      <0.10 kU/L <0.10    Pecan, Class CLASS 0    Marshelder IgE      <0.10 kU/L 0.12 (H)    Marshelder Class CLASS 0/1    Ragweed, Western IgE      <0.10 kU/L <0.10    Ragweed, Western, Class CLASS 0    Alternaria alternata      <0.10 kU/L <0.10    Altern. alternata Class CLASS 0  "   Aspergillus Fumigatus IgE      <0.10 kU/L <0.10    A. fumigatus Class CLASS 0    Cat Dander      <0.10 kU/L <0.10    Cat Epithelium Class CLASS 0    Cockroach, IgE      <0.10 kU/L 0.32 (H)    Cockroach, IgE       CLASS 0/1    Dog Dander, IgE      <0.10 kU/L <0.10    Dog Dander Class CLASS 0    WHITE TAN TREE      <0.10 kU/L <0.10    White Tan Class CLASS 0    Garrett IgE      <0.10 kU/L <0.10    Garrett Class CLASS 0    Allergen Maple ([place]) IgE      <0.10 kU/L <0.10    Allergen Maple ([place]) Class CLASS 0    Russian Thistle IgE      <0.10 kU/L <0.10    Russian Thistle Class CLASS 0    Allergen Sheep [place] ([place]) IgE      <0.10 kU/L <0.10    Allergen Sheep [place] ([place]) Class CLASS 0    COOPER GRASS      <0.10 kU/L 0.36 (H)    [place]oper Grass Class CLASS 1    BAHIA GRASS      <0.10 kU/L 0.50 (H)    [place] Class CLASS 1    Rabbit Epith. IgE      <0.10 kU/L <0.10    Rabbit Epith. Class CLASS 0    Hamster Epithelium, IgE      <0.10 kU/L <0.10    Hamster Epithelium Class CLASS 0        PULMONARY FUNCTION TESTING     Date 22:  FVC:         77%ref -> + 4%  FEV1:         80%ref -> - 5%  FEV1/FVC: 83%  FEF 25-75: 104%ref  T%ref  DLCO:        90%ref  Interpretation: Spirometry shows a reduced vital capacity suggesting restriction. Spirometry remains unimproved following bronchodilator. Lung volumes show mild restriction is present. DLCO is normal.     IMAGING & OTHER DIAGNOSTICS     CT head 3/22/24 notes that "The visualized paranasal sinuses appear clear with no mucoperiosteal thickening or air fluid levels identified."     CXR 3/9/23:  FINDINGS:  The cardiac silhouette and superior mediastinal structures are unremarkable. Pulmonary vasculature is within normal limits. The lungs are well aerated and free of focal consolidations. There is no evidence for pneumothorax or pleural effusions. Bony structures are grossly intact.  Impression:  No acute chest disease identified.     " ASSESSMENT & PLAN     Tony Gordillo is a 43 y.o. male with     # Chronic allergic rhinitis, dust mite allergy, on sublingual immunotherapy: Immunocaps positive to dust mites and grass pollen; equivocal to cockroach and weed pollen. Symptoms perennial. Congestion worse at night when he tries to lay down. Stopped montelukast as he didn't notice benefit and was concerned it was contributing to anxiety (but anxiety didn't improved after discontinuing it). Patient elected to proceed with sublingual dust mite immunotherapy (odactra). Risks, benefits, and instructions for use were discussed. First dose of odactra was given today in clinic. Patient was observed for 30 minutes after. Patient developed oropharyngeal pruritus, but no systemic symptoms.  -continue odactra, one sublingual tablet nightly.  -patient has been supplied with auvi-q. Today, I instructed him on how/when to use it.  -continue loratadine 10 mg daily.   -continue flonase 2 SEN BID.  -continue azelastine nasal spray 2 SEN daily, can increase to BID if needed.  -continue saline nasal mist prior to medicated nasal sprays.  -continue saline sinus rinses prn.      Follow up: October or sooner if needed    I spent a total of 35 minutes on the day of the visit.  This includes face to face time and non-face to face time preparing to see the patient (eg, review of tests), obtaining and/or reviewing separately obtained history, documenting clinical information in the electronic or other health record.    Pema Gongora MD  Allergy/Immunology

## 2024-05-21 ENCOUNTER — CLINICAL SUPPORT (OUTPATIENT)
Dept: REHABILITATION | Facility: HOSPITAL | Age: 44
End: 2024-05-21
Payer: COMMERCIAL

## 2024-05-21 DIAGNOSIS — R41.841 COGNITIVE COMMUNICATION DEFICIT: Primary | ICD-10-CM

## 2024-05-21 PROCEDURE — 97129 THER IVNTJ 1ST 15 MIN: CPT | Mod: PN

## 2024-05-21 PROCEDURE — 97130 THER IVNTJ EA ADDL 15 MIN: CPT | Mod: PN

## 2024-05-23 ENCOUNTER — OFFICE VISIT (OUTPATIENT)
Dept: NEUROLOGY | Facility: CLINIC | Age: 44
End: 2024-05-23
Payer: COMMERCIAL

## 2024-05-23 VITALS — DIASTOLIC BLOOD PRESSURE: 76 MMHG | SYSTOLIC BLOOD PRESSURE: 118 MMHG | HEART RATE: 106 BPM

## 2024-05-23 DIAGNOSIS — F07.81 TRAUMATIC ENCEPHALOPATHY: ICD-10-CM

## 2024-05-23 DIAGNOSIS — F34.89 OTHER SPECIFIED PERSISTENT MOOD DISORDERS: ICD-10-CM

## 2024-05-23 DIAGNOSIS — G44.86 CERVICOGENIC HEADACHE: ICD-10-CM

## 2024-05-23 DIAGNOSIS — R26.89 IMBALANCE: ICD-10-CM

## 2024-05-23 DIAGNOSIS — M54.81 OCCIPITAL NEURITIS: ICD-10-CM

## 2024-05-23 DIAGNOSIS — M54.2 CERVICALGIA OF OCCIPITO-ATLANTO-AXIAL REGION: ICD-10-CM

## 2024-05-23 DIAGNOSIS — G31.84 MCI (MILD COGNITIVE IMPAIRMENT): ICD-10-CM

## 2024-05-23 DIAGNOSIS — R41.89 COGNITIVE CHANGE: ICD-10-CM

## 2024-05-23 DIAGNOSIS — S13.4XXD WHIPLASH INJURY TO NECK, SUBSEQUENT ENCOUNTER: ICD-10-CM

## 2024-05-23 DIAGNOSIS — G47.9 FATIGUE DUE TO SLEEP PATTERN DISTURBANCE: ICD-10-CM

## 2024-05-23 DIAGNOSIS — S06.0X9S CONCUSSION WITH LOSS OF CONSCIOUSNESS, SEQUELA: ICD-10-CM

## 2024-05-23 DIAGNOSIS — S06.0X0S CONCUSSION WITHOUT LOSS OF CONSCIOUSNESS, SEQUELA: Primary | ICD-10-CM

## 2024-05-23 DIAGNOSIS — R53.83 FATIGUE DUE TO SLEEP PATTERN DISTURBANCE: ICD-10-CM

## 2024-05-23 PROCEDURE — 99214 OFFICE O/P EST MOD 30 MIN: CPT | Mod: S$GLB,,, | Performed by: PSYCHIATRY & NEUROLOGY

## 2024-05-23 PROCEDURE — 99999 PR PBB SHADOW E&M-EST. PATIENT-LVL V: CPT | Mod: PBBFAC,,, | Performed by: PSYCHIATRY & NEUROLOGY

## 2024-05-23 RX ORDER — PREDNISONE 10 MG/1
TABLET ORAL
Qty: 40 TABLET | Refills: 0 | Status: SHIPPED | OUTPATIENT
Start: 2024-05-23 | End: 2024-05-28
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
## 2024-05-23 NOTE — PATIENT INSTRUCTIONS
5 day prednisone taper prescribed: 100 mg (10 tabs), 90 mg (9 tabs), 80 mg (8 tabs), 70 mg (7 tabs), 60 mg (6 tabs). Split up doses with meals. Day 1: Take 4 tabs with breakfast, 3 tabs with lunch, 3 tabs with dinner. Day 2: Take 3 tabs with each meal. Day 3: Take 3 tabs with breakfast, 3 tabs with lunch, 2 tabs with dinner. Day 4: Take 3 tabs with breakfast, 2 tabs with lunch and dinner. Day 5: Take 2 tabs with each meal  Monitor sugar while on prednisone and if goes greater than 200, stop the prednisone  The patient was instructed to ice the occipital region for no more than 20 minutes at least once a day but may repeat this as many times as they would like.   Discussed ergonomic accommodations for occipital neuritis/neuralgia. Mainly perform all work at eye level to minimize continued neck flexion which will aggravate the nerve.  Patient was encouraged to do daily light cardio exercise but instructed to limit physical activities to walking, walking in water up to the waist only or riding a stationary bike, recumbent preferred. No weight lifting in upper body, no neck massage, no acupuncture of neck, and no dry needling of upper neck. No neck PT unless otherwise stated. If neck PT is recommended, the therapist may do joint manipulation at this time but no suboccipital soft tissue therapy. Any of your therapists may also do passive neck range of motion activities. No chiropractor work on neck. Lower body strengthening with resistant bands, leg machines, and strapping weights to legs okay. No core body workouts. No running or use of cardio equipment other than stationary bike. No swimming or body surfing. No amusement park rides. No lifting more than 5-10 lbs and bend at the knees, not the waist.  Discussed care plan in detail for post traumatic occipital neuritis including a trial of oral medications followed by series of trigger point steroid injections with occipital nerve blocks. To be referred for consultation  for occipital nerve release procedure if initially clinically responsive to injections but always with a return of symptoms.  Referral for vestibular PT for eye movements placed previously  Continue ST for cognition  Continue to follow with Dr. South  Continue to follow with psychiatry  Continue talk therapy for mood  Encourage you to [facility] to evaluate to see if you qualify for coverage for traumatic brain injury during  service

## 2024-05-24 ENCOUNTER — OFFICE VISIT (OUTPATIENT)
Dept: ORTHOPEDICS | Facility: CLINIC | Age: 44
End: 2024-05-24
Payer: OTHER MISCELLANEOUS

## 2024-05-24 DIAGNOSIS — S46.011A TRAUMATIC ROTATOR CUFF TEAR, RIGHT, INITIAL ENCOUNTER: Primary | ICD-10-CM

## 2024-05-24 DIAGNOSIS — M75.101 RIGHT ROTATOR CUFF TEAR: ICD-10-CM

## 2024-05-24 PROCEDURE — 99213 OFFICE O/P EST LOW 20 MIN: CPT | Mod: S$GLB,,, | Performed by: ORTHOPAEDIC SURGERY

## 2024-05-24 PROCEDURE — 99999 PR PBB SHADOW E&M-EST. PATIENT-LVL V: CPT | Mod: PBBFAC,,, | Performed by: ORTHOPAEDIC SURGERY

## 2024-05-24 RX ORDER — CEFAZOLIN SODIUM 2 G/50ML
2 SOLUTION INTRAVENOUS
OUTPATIENT
Start: 2024-05-24

## 2024-05-24 RX ORDER — MUPIROCIN 20 MG/G
OINTMENT TOPICAL
OUTPATIENT
Start: 2024-05-24

## 2024-05-24 NOTE — PROGRESS NOTES
Postoperative Visit    History of Present Illness:   Tony is 15 months s/p right shoulder arthroscopy , rotator cuff repair, and arthroscopic biceps tenodesis  (DOS-3/14/23)    Increasing pain and weakness with overhead motion - was previously declared MMI but has worsened. MRI ordered to eval integrity of the cuff     Assessment/Plan:  43 y.o. male 15 months s/p right shoulder arthroscopy , rotator cuff repair, and arthroscopic biceps tenodesis  (DOS-3/14/23)    Plan     Review of patient's allergies indicates:   Allergen Reactions    Influenza virus vaccine, specific Hives    Pioglitazone      Altered mood     Victoza [liraglutide] Nausea And Vomiting    Farxiga [dapagliflozin] Rash     Erectile dysfunction and rash      Physical Exam:   Vitals:    05/24/24 1151   PainSc:   2   PainLoc: Shoulder     General:  Patient is alert, awake and oriented to time, place and person. Mood and affect are appropriate.  Patient does not appear to be in any distress, denies any constitutional symptoms and appears stated age.   HEENT: Pupils are equal and round, sclera are not injected. External examination of ears and nose reveals no abnormalities. Cranial nerves II-X are grossly intact  Skin: no rashes, abrasions or open wounds on the affected extremity   Resp: No respiratory distress or audible wheezing   CV: 2+  pulses, all extremities warm and well perfused     Physical Examination:    NAD    Right Shoulder    Shoulder Range of Motion    Right     Left   (Active/Passive)       Forward Elevation     165/165            165/165  External rotation (arm at side)  40/40             40/40   Internal rotation behind the back  L5             L5     Range of motion is painful     Scapular winging no  Scapular dyskinesia no    Examination of the back shows no atrophy of     Acromioclavicular joint is not tender  Crossbody test: negative    Neer's positive   Hawkin's positive    Lisa's positive  Drop arm negative  Belly press  negative  Liftoff negative      Cuff Strength     Right     Left   Supraspinatus        5/5    5/5  Infraspinatus     5/5    5/5  Subscapularis     5/5    5/5  Deltoid testing            5/5    5/5    Wiggins's test negative  Speeds negative  Yergasons negative    Elbow examination demonstrates no tenderness to palpation and has normal range of motion.     ltsi C5-T1  + epl, io, fds, fdp   2+ RP      Imaging:  3 views of the right shoulder:  positive for degenerative changes of the AC joint. The humeral head is well centered on the AP and axillary views.  There is calcification at the rotator cuff insertion on the greater tuberosity. There is not significant degenerative change of the glenohumeral joint or posterior subluxation of the humeral head. No acute changes or fracture.      MRI shows retear of supraspinatus tendon, subscapularis and infraspinatus intact. Biceps tenodesis intact     I personally reviewed and interpreted the patient's imaging obtained today in clinic     This note was created by combination of typed  and M-Modal dictation. Transcription and phonetic errors may be present.  If there are any questions, please contact me.      Current Outpatient Medications:     allerg xt,DAmaliafarinae-D.pteronys (ODACTRA) 12 SQ-HDM Subl, Place 1 tablet under the tongue every evening., Disp: 30 tablet, Rfl: 11    ammonium lactate 12 % Crea, Apply 1 application  topically once daily., Disp: 140 g, Rfl: 5    ARIPiprazole (ABILIFY) 2 MG Tab, Take 1 tablet (2 mg total) by mouth once daily., Disp: 30 tablet, Rfl: 11    atorvastatin (LIPITOR) 40 MG tablet, TAKE 1 TABLET BY MOUTH EVERY DAY, Disp: 90 tablet, Rfl: 1    azelastine (ASTELIN) 137 mcg (0.1 %) nasal spray, Use 1-2 sprays in each nostril twice daily, Disp: 90 mL, Rfl: 3    blood sugar diagnostic Strp, To check BG 4 times daily, to use with insurance preferred meter, Disp: 400 strip, Rfl: 3    blood sugar diagnostic Strp, OneTouch Ultra Test strips, Disp:  , Rfl:     blood-glucose meter kit, OneTouch Ultra2 Meter kit, Disp: , Rfl:     blood-glucose sensor (DEXCOM G6 SENSOR) Filomena, Change sensor every 10 days, Disp: 3 each, Rfl: 11    blood-glucose transmitter (DEXCOM G6 TRANSMITTER) Filomena, Change every 3 months, Disp: 1 each, Rfl: 3    cyanocobalamin, vitamin B-12, 5,000 mcg Subl, Place one under tongue once a day for 1 month, then continue to take under tongue per week, Disp: 30 tablet, Rfl: 3    empagliflozin (JARDIANCE) 25 mg tablet, Take 1 tablet (25 mg total) by mouth once daily., Disp: 90 tablet, Rfl: 2    EPINEPHrine (EPIPEN 2-AYALA) 0.3 mg/0.3 mL AtIn, Inject 0.3 mLs (0.3 mg total) into the muscle as needed (Anaphylaxis)., Disp: 2 each, Rfl: 0    FLUoxetine 40 MG capsule, Take 1 capsule (40 mg total) by mouth once daily., Disp: 30 capsule, Rfl: 11    fluticasone propionate (FLONASE) 50 mcg/actuation nasal spray, 1 spray (50 mcg total) by Each Nostril route once daily., Disp: 48 g, Rfl: 5    insulin aspart U-100 (NOVOLOG) 100 unit/mL (3 mL) InPn pen, INJECT 15-30 UNITS BEFORE EACH MEAL WITH SLIDNING SCALE, MAX DAILY DOSE 100 UNITS. Use in the event of insulin pump failure, Disp: 30 mL, Rfl: 5    insulin aspart U-100 (NOVOLOG) 100 unit/mL injection, Max daily dose 160 units in insulin pump., Disp: 40 mL, Rfl: 5    insulin glargine U-300 conc (TOUJEO MAX U-300 SOLOSTAR) 300 unit/mL (3 mL) insulin pen, Inject 30 Units into the skin once daily., Disp: 3 pen , Rfl: 5    insulin pump cart,automated,BT (OMNIPOD 5 G6 PODS, GEN 5,) Crtg, Inject 1 each into the skin once daily., Disp: 30 each, Rfl: 11    L-METHYLFOLATE 15 mg Tab, TAKE 15 MG BY MOUTH ONCE DAILY., Disp: 30 tablet, Rfl: 11    lamoTRIgine (LAMICTAL) 100 MG tablet, Take 1.5 tablets (150 mg total) by mouth once daily., Disp: 135 tablet, Rfl: 3    lancets Misc, To check BG 4 times daily, to use with insurance preferred meter, Disp: 400 each, Rfl: 3    levothyroxine (SYNTHROID) 50 MCG tablet, TAKE 1 TABLET BY MOUTH  "BEFORE BREAKFAST., Disp: 90 tablet, Rfl: 3    lisinopriL (PRINIVIL,ZESTRIL) 20 MG tablet, TAKE 1 TABLET BY MOUTH EVERY DAY, Disp: 90 tablet, Rfl: 3    loratadine (CLARITIN) 10 mg tablet, Take 1 tablet (10 mg total) by mouth once daily., Disp: 90 tablet, Rfl: 3    magnesium oxide (MAG-OX) 400 mg (241.3 mg magnesium) tablet, Take 1 tablet (400 mg total) by mouth once daily., Disp: 90 tablet, Rfl: 1    meloxicam (MOBIC) 15 MG tablet, Take 1 tablet (15 mg total) by mouth once daily., Disp: 30 tablet, Rfl: 0    ondansetron (ZOFRAN-ODT) 4 MG TbDL, Take 1 tablet (4 mg total) by mouth every 8 (eight) hours as needed (nausea)., Disp: 20 tablet, Rfl: 0    pen needle, diabetic (BD ULTRA-FINE KEN PEN NEEDLE) 32 gauge x 5/32" Ndle, 1 Device by Misc.(Non-Drug; Combo Route) route 5 (five) times daily., Disp: 550 each, Rfl: 4    predniSONE (DELTASONE) 10 MG tablet, Take 10 tabs by mouth once daily for 1 day, THEN 9 tabs once daily for 1 day, THEN 8 tabs once daily for 1 day, THEN 7 tabs once daily for 1 day, THEN 6 tabs once daily for 1 day., Disp: 40 tablet, Rfl: 0    rimegepant (NURTEC) 75 mg odt, Take 1 tablet (75 mg total) by mouth as needed for Migraine. Place ODT tablet on the tongue; alternatively the ODT tablet may be placed under the tongue. No more than 2 in 24 hrs., Disp: 16 tablet, Rfl: 3    syringe, disposable, 1 mL Syrg, Testosterone every 2 weeks, Disp: 25 Syringe, Rfl: 1    tirzepatide 10 mg/0.5 mL PnIj, Inject 10 mg into the skin every 7 days., Disp: 4 Pen, Rfl: 3    modafiniL (PROVIGIL) 100 MG Tab, Take 1 tablet (100 mg total) by mouth every morning., Disp: 90 tablet, Rfl: 1    Past Medical History:   Diagnosis Date    Diabetes mellitus, type 2     Hyperlipidemia     Hypertension     Obesity     NAINA (obstructive sleep apnea)        Active Problem List with Overview Notes    Diagnosis Date Noted    Cognitive communication deficit 05/11/2024    Concussion with no loss of consciousness 05/07/2024    Concussion with " loss of consciousness 05/07/2024    Other specified persistent mood disorders 05/07/2024    Allergy desensitization therapy 05/06/2024    Traumatic encephalopathy 03/25/2024    Acute migraine 01/31/2024    MCI (mild cognitive impairment) 01/31/2024    Medication overuse headache 01/31/2024    Chronic migraine with aura, intractable, with status migrainosus 01/31/2024    Agitation 01/31/2024    Refractive error 01/24/2024    Tear of lateral meniscus of right knee, current 10/24/2023    Uncontrolled type 2 diabetes mellitus 10/17/2023    S/P right rotator cuff repair 03/17/2023    Biceps tendonosis of right shoulder 03/17/2023    Decreased range of motion of right shoulder 03/17/2023    Impaired strength of shoulder muscles 03/17/2023    Traumatic rotator cuff tear, right, initial encounter 03/14/2023    Other amnesia 11/02/2022    Mild neurocognitive disorder due to traumatic brain injury 10/19/2022    Outbursts of anger 10/19/2022    Left hand pain 09/24/2022    Decreased strength, endurance, and mobility 03/08/2022    Dyspnea 02/18/2022      started after covid 19. cxr unremarkable.  Clinically improve with symbicort.  PFT with restrictive physiology with preserved dlco c/w habitus.  Stress echo with ?ischemic changes on echo but ST changes on ekg.        Rib pain on left side 08/08/2021    Right foot pain 10/10/2019    Decreased strength of lower extremity 10/10/2019    Decreased range of motion of both ankles 10/10/2019    Gait abnormality 10/10/2019    Low testosterone in male; pituitary axis normal. MRI negative. 11/2019 started on testosterone 09/04/2019 11/06/2019 MRI pituitary with and without contrast from [facility]  Impression:  1.  No pituitary mass seen.  2.  Absence of the septum pellucidum.      NAINA (obstructive sleep apnea) 8/2019 sleep study [facility] 11      -ahi of 11.  Stopped apap for over month due to gasping sensation.  ?excessive leakage a/w nasal congestion while having covid 19.  Nasal  pantency is adequate.  Recommend resumption of apap.  However, APAP is being recall by [employer]  -we discussed at length about [employer] recall.  Patient already register his apap  -will switch to nasal pillow to alleviate pressure on ridge of nose.        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, with long-term current use of insulin     Essential hypertension     Hypothyroidism 07/29/2015    Hyperlipidemia LDL goal <70 06/03/2015    Insulin long-term use 06/03/2015    Tinea pedis 06/03/2015    Morbid obesity 06/03/2015    Allergic conjunctivitis 01/29/2015    Descemet's membrane fold 01/21/2014    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 TEXTED ELMER ON 3/9/2023 @ 12:10PM. ELMER RESPONDED ON 3/9/23 @ 12:23PM-SAG  RN PREOP 3/10/2023---CLEARED BY PCP AND CARDS    ARTHROSCOPY,SHOULDER,WITH BICEPS TENODESIS  03/14/2023    Procedure: ARTHROSCOPY,SHOULDER,WITH BICEPS TENODESIS;  Surgeon: Crissy Bradford MD;  Location: [facility] OR;  Service: Orthopedics;;    KNEE ARTHROSCOPY W/ MENISCECTOMY Right 10/24/2023    Procedure: ARTHROSCOPY, KNEE,  WITH MENISCECTOMY;  Surgeon: Crissy Bradford MD;  Location: [facility] OR;  Service: Orthopedics;  Laterality: Right;  RN PREOP 10/18/203---CLEARED BY PCP  ELVIA [phone]    KNEE SURGERY      acl,mcl,pcl    left knee x 2      PRK  Bilateral 2010    SUBCHONDROPLASTY Right 10/24/2023    Procedure: POSSIBLE SUBCHONDROPLASTY;  Surgeon: Crissy Bradford MD;  Location: [facility] OR;  Service: Orthopedics;  Laterality: Right;       Social History     Socioeconomic History    Marital status:    Occupational History    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     Employer: [employer]   Tobacco Use    Smoking status: Some Days     Types: Cigars     Passive exposure: Never    Smokeless tobacco: Never    Tobacco comments:     Has not had any cigars this year   Substance and Sexual Activity    Alcohol use: Yes     Comment: 1-2 beers every 2 weeks    Drug use: Yes     Types: Marijuana     Comment: Non-prescription cannabis     Social Determinants of Health     Financial Resource Strain: Patient Declined (12/5/2023)    Overall Financial Resource Strain (CARDIA)     Difficulty of Paying Living Expenses: Patient declined   Food Insecurity: Patient Declined (12/5/2023)    Hunger Vital Sign     Worried About Running Out of Food in the Last Year: Patient declined     Ran Out of Food in the Last Year: Patient declined   Transportation Needs: Patient Declined (12/5/2023)    PRAPARE - Transportation     Lack of Transportation (Medical): Patient declined     Lack of Transportation (Non-Medical): Patient declined   Physical Activity: Sufficiently Active (12/5/2023)    Exercise Vital Sign     Days of Exercise per Week: 4 days     Minutes of Exercise per Session: 100 min   Stress: Patient Declined (12/5/2023)    [place] [employer] - Occupational Stress Questionnaire     Feeling of Stress : Patient declined   Recent Concern: Stress - Stress Concern Present  (10/10/2023)    [place] [employer] - Occupational Stress Questionnaire     Feeling of Stress : To some extent   Housing Stability: Unknown (12/5/2023)    Housing Stability Vital Sign     Unable to Pay for Housing in the Last Year: Patient refused     Unstable Housing in the Last Year: Patient refused   Recent Concern: Housing Stability - High Risk (10/10/2023)    Housing Stability Vital Sign     Unable to Pay for Housing in the Last Year: Yes     Number of Places Lived in the Last Year: 1     Unstable Housing in the Last Year: No

## 2024-05-27 DIAGNOSIS — G47.33 OSA (OBSTRUCTIVE SLEEP APNEA): ICD-10-CM

## 2024-05-27 DIAGNOSIS — R41.840 ATTENTION AND CONCENTRATION DEFICIT: ICD-10-CM

## 2024-05-27 RX ORDER — MODAFINIL 100 MG/1
100 TABLET ORAL EVERY MORNING
Qty: 90 TABLET | Refills: 1 | Status: SHIPPED | OUTPATIENT
Start: 2024-05-27 | End: 2024-11-23

## 2024-05-28 ENCOUNTER — ANESTHESIA EVENT (OUTPATIENT)
Dept: SURGERY | Facility: HOSPITAL | Age: 44
End: 2024-05-28
Payer: COMMERCIAL

## 2024-05-29 ENCOUNTER — PATIENT MESSAGE (OUTPATIENT)
Dept: ORTHOPEDICS | Facility: CLINIC | Age: 44
End: 2024-05-29
Payer: COMMERCIAL

## 2024-06-05 ENCOUNTER — OFFICE VISIT (OUTPATIENT)
Dept: PSYCHIATRY | Facility: CLINIC | Age: 44
End: 2024-06-05
Payer: COMMERCIAL

## 2024-06-05 VITALS
BODY MASS INDEX: 39.14 KG/M2 | DIASTOLIC BLOOD PRESSURE: 57 MMHG | HEART RATE: 86 BPM | SYSTOLIC BLOOD PRESSURE: 126 MMHG | WEIGHT: 296.63 LBS

## 2024-06-05 DIAGNOSIS — F41.1 GENERALIZED ANXIETY DISORDER: ICD-10-CM

## 2024-06-05 DIAGNOSIS — F33.1 MAJOR DEPRESSIVE DISORDER, RECURRENT, MODERATE: Primary | ICD-10-CM

## 2024-06-05 DIAGNOSIS — G43.E11 CHRONIC MIGRAINE WITH AURA, INTRACTABLE, WITH STATUS MIGRAINOSUS: ICD-10-CM

## 2024-06-05 PROCEDURE — 99214 OFFICE O/P EST MOD 30 MIN: CPT | Mod: S$GLB,,,

## 2024-06-05 PROCEDURE — 99999 PR PBB SHADOW E&M-EST. PATIENT-LVL I: CPT | Mod: PBBFAC,,,

## 2024-06-05 NOTE — PROGRESS NOTES
Outpatient Psychiatry Follow-Up Visit (MD/NP)    6/5/2024    Clinical Status of Patient:  Outpatient (Ambulatory)    Chief Complaint:  Tony Gordillo is a 43 y.o. male who presents today for follow-up of depression and anxiety.  Met with patient.      Interval History and Content of Current Session:  Interim Events/Subjective Report/Content of Current Session:     Patient presents for follow up, still having significant issues with anxiety and depression, but feels as though his mood has been improving slightly. Continues to have issues with workman's comp which wishes to meet him for settlement and mediation. Patient has shoulder surgery scheduled, they were supposed to pay him approx 10 years worth of his salary but he does not know if it will go through or what will happen next about it and this causes him significant stress. States things are going okay at home, he is continuing to have migraines which also affect his mood and affect, he is worried about finances as well as his myriad health concerns. He is tolerating the medications well at this point and his wife is seeking employment as well. At this time he denies any AH/VH/SI/HI, would like to continue the current medication regimen with close follow ups.       Psychotherapy:  Target symptoms: depression, anxiety   Why chosen therapy is appropriate versus another modality: relevant to diagnosis  Outcome monitoring methods: self-report, observation  Therapeutic intervention type: insight oriented psychotherapy  Topics discussed/themes: difficulty managing affect in interpersonal relationships, building skills sets for symptom management  The patient's response to the intervention is accepting. The patient's progress toward treatment goals is good.   Duration of intervention: 06 minutes.    Review of Systems   PSYCHIATRIC: Pertinant items are noted in the narrative.    Past Medical, Family and Social History: The patient's past medical, family and social  history have been reviewed and updated as appropriate within the electronic medical record - see encounter notes.    Compliance: yes    Side effects: None    Risk Parameters:  Patient reports no suicidal ideation  Patient reports no homicidal ideation  Patient reports no self-injurious behavior  Patient reports no violent behavior    Exam (detailed: at least 9 elements; comprehensive: all 15 elements)   Constitutional  Vitals:  Most recent vital signs, dated less than 90 days prior to this appointment, were reviewed.   There were no vitals filed for this visit.     General:  unremarkable, age appropriate     Musculoskeletal  Muscle Strength/Tone:  no tremor, no tic   Gait & Station:  non-ataxic     Psychiatric  Speech:  no latency; no press   Mood & Affect:  anxious, depressed  congruent and appropriate   Thought Process:  normal and logical   Associations:  intact   Thought Content:  normal, no suicidality, no homicidality, delusions, or paranoia   Insight:  intact   Judgement: behavior is adequate to circumstances   Orientation:  grossly intact   Memory: intact for content of interview   Language: grossly intact   Attention Span & Concentration:  able to focus   Fund of Knowledge:  intact and appropriate to age and level of education     Assessment and Diagnosis   Status/Progress: Based on the examination today, the patient's problem(s) is/are adequately but not ideally controlled.  New problems have not been presented today.   Co-morbidities, Diagnostic uncertainty, and Lack of compliance are not complicating management of the primary condition.  There are no active rule-out diagnoses for this patient at this time.     General Impression:   1. Major depressive disorder, recurrent, moderate        2. Generalized anxiety disorder        3. Chronic migraine with aura, intractable, with status migrainosus              Intervention/Counseling/Treatment Plan   Medication Management: Continue current medications. The  risks and benefits of medication were discussed with the patient.  Continue and encourage therapy.     Labs: reviewed most recent. The treatment plan and follow up plan were reviewed with the patient. Discussed with patient informed consent, risks vs. benefits, alternative treatments, side effect profile and the inherent unpredictability of individual responses to these treatments. The patient expresses understanding of the above and displays the capacity to agree with this current plan and had no other questions. Encouraged Patient to keep future appointments. Take medications as prescribed and abstain from substance abuse. In the event of an emergency patient was advised to go to the emergency room.    Return to Clinic: 1 month

## 2024-06-07 ENCOUNTER — CLINICAL SUPPORT (OUTPATIENT)
Dept: REHABILITATION | Facility: HOSPITAL | Age: 44
End: 2024-06-07
Attending: PSYCHIATRY & NEUROLOGY
Payer: COMMERCIAL

## 2024-06-07 DIAGNOSIS — S06.0X0S CONCUSSION WITHOUT LOSS OF CONSCIOUSNESS, SEQUELA: ICD-10-CM

## 2024-06-07 DIAGNOSIS — R26.89 IMBALANCE: ICD-10-CM

## 2024-06-07 DIAGNOSIS — Z78.9 IMPAIRED MOBILITY AND ADLS: Primary | ICD-10-CM

## 2024-06-07 DIAGNOSIS — Z74.09 IMPAIRED MOBILITY AND ADLS: Primary | ICD-10-CM

## 2024-06-07 DIAGNOSIS — S46.011A TRAUMATIC ROTATOR CUFF TEAR, RIGHT, INITIAL ENCOUNTER: Primary | ICD-10-CM

## 2024-06-07 PROCEDURE — 97166 OT EVAL MOD COMPLEX 45 MIN: CPT | Mod: PN

## 2024-06-07 NOTE — PLAN OF CARE
[facility] Occupational Therapy  Initial Neurological Evaluation Post Concussion     Date: 6/7/2024  Patient: Tony Gordillo  Chart Number: [id number]    Therapy Diagnosis: No diagnosis found.  Physician: Damaso Cota MD    Physician Orders: Eval and treat - vestibular therapy   Medical Diagnosis:   S06.0X0S (ICD-10-CM) - Concussion without loss of consciousness, sequela  H55.81 (ICD-10-CM) - Saccadic eye movement deficiency     Evaluation Date: 6/7/2024  Plan of Care Expiration Period: 09/06/2024  Insurance Authorization period Expiration: 05/07/2025  Date of Return to MD: Dr. Cota in August  Visit # / Visits Authorized: 1 / 99  FOTO:       Time In:1120  Time Out: 1153  Total Billable (one on one) Time: 33 minutes    Precautions: Standard, Fall, and history of several TBI     Subjective     History of Current Condition: Tony Gordillo is a 43 y.o. male who presents to [facility] for evaluation and treatment secondary to history of concussions and TBIs. See note from Dr. Cota on 5/23/24 for concussions and other relevant PMHx.   PMHx: (+) prior concussions; (+) personal history of headaches/migraines; (--) familial history of headaches/migraines; (+) depression/anxiety; (+) ADD/ADHD; (+) learning disability (dyslexia); (+) neck/shoulder/back pain prior to injury.     Patient reports:  Has have several TBIs and post-concussion syndrome. He has had this since the 90s. Experiences migraines, headaches, blurred vision, dizziness, imbalance, and intermittent photophonia and photophobia. The symptoms will last for days at a time and originate from a nerve cluster in the back of his neck which is currently being addressed with steroid packs. Headaches are the most exacerbating factor for the rest of his symptoms. Reports feelings of fogginess the day after a bad headache, due to a combination of symptoms and side effects from headache  medication.     Feels dizzy when he moves positions, for example: from prone --> standing or when bending with head down. Sometimes the dizziness will hit when he is just standing.     Vision is getting better but is still changing.     Falls: 2 (not related to concussion); just loss of balance    Dominant Side: Right  Imaging: MRI studies     Previous Therapy: ortho and neuro PT previously, currently participating in neuro speech    Past Medical History/Physical Systems Review:     Past Medical History:  Tony Gordillo  has a past medical history of Diabetes mellitus, type 2, Hyperlipidemia, Hypertension, Obesity, and NAINA (obstructive sleep apnea).    Past Surgical History:  Tony Gordillo  has a past surgical history that includes left knee x 2; Knee surgery; Arthroscopic repair of rotator cuff of shoulder (Right, 03/14/2023); arthroscopy,shoulder,with biceps tenodesis (03/14/2023); Arthroscopic debridement of shoulder (03/14/2023); Knee arthroscopy w/ meniscectomy (Right, 10/24/2023); Subchondroplasty (Right, 10/24/2023); and PRK  (Bilateral, 2010).    Current Medications:  Tony has a current medication list which includes the following prescription(s): odactra, ammonium lactate, aripiprazole, atorvastatin, azelastine, blood sugar diagnostic, blood sugar diagnostic, blood-glucose meter, dexcom g6 sensor, dexcom g6 transmitter, cyanocobalamin (vitamin b-12), empagliflozin, epinephrine, fluoxetine, fluticasone propionate, insulin aspart u-100, insulin aspart u-100, toujeo max u-300 solostar, omnipod 5 g6 pods (gen 5), l-methylfolate, lamotrigine, lancets, levothyroxine, lisinopril, loratadine, magnesium oxide, meloxicam, modafinil, ondansetron, pen needle, diabetic, syringe (disposable), and tirzepatide.    Allergies:  Review of patient's allergies indicates:   Allergen Reactions    Influenza virus vaccine, specific Hives    Pioglitazone      Altered mood     Victoza [liraglutide] Nausea And Vomiting     Farxiga [dapagliflozin] Rash     Erectile dysfunction and rash         Other: --    Patient's Goals for Therapy: get back to doing all I/ADLs; return to video games and leatherwork    Pain:  Pain Related Behaviors Observed: No   Functional Pain Scale Rating 0-10:   Location: Headache  6/10 on average  0/10 at best -- rare  10/10 at worst  Description: see MD Note  Aggravating Factors: --  Easing Factors: tylenol and advil; tried migraine medication previously with no improvement     Location: Neck/back/shoulder   Constant pain in traps  Reports that he has tried everything and pain persists; has been persisting several years     Occupation:  Working presently: unemployed  Duties: self care, managing health and appointments     Functional Limitations/Social History:    Prior Level of Function: independent  Current Level of Function: independent for self care ADLs; currently limits participation in leisure and other activities secondary to symptoms     Home/Living environment: family  Home Access: threshold to enter [facility]   DME: none     Leisure: leatherwork, building things, video games    Driving: yes     Adult Vision Questionnaire:   Directions: please check the answer that best describes your situation. If you wear glasses or contact lenses, answer the questions assuming that you are wearing them.   Never = never  Occasionally = less than 1 time/week  Frequently = at least 1 time/week  Always = everyday     Do you have headaches or facial pain? Frequently  Do you have pain in your eyes with eye movement? Occasionally   Do you experience neck or shoulder discomfort? Frequently  Do you have dizziness, light headed or nausea while performing close up work? (computer work; reading; writing) Frequently  Do you have dizziness, light headed or nausea while performing far distance activities? (driving, tv, movies) Never  Do you experience dizziness when bending down and standing back up, or when getting up quickly?  Always  Do you feel unsteady with walking, or drift to one side? Occasionally   Do you feel overwhelmed or anxious while walking in a large department store or walking in a crowd? Occasionally   Do you feel dizzy or off balance when walking down a long hallway or on bold patterned carpeting? Never  Does riding in a car make you feel dizzy or uncomfortable? Occasionally   Do you find yourself with your head tilted to one side? Never  Does your posture tend to be leaning more forward or backward than you're used to? Occasionally   Do you experience poor depth perception or have difficulty estimating distances accurately? Never  Do you experience double/overlapping/shadowed vision at far distances? Frequently  Do you experience double/overlapping/shadowed vision at near distances? Frequently  Do you experience glare or have sensitivity to bright lights? Frequently  Do you close or cover one eye with near or far tasks? Never  Do you skip lines or lose your place while reading? Always  Do you use your finger to keep your place on the page? Occasionally   Do you tire easily with close-up tasks? Always  Do you experience blurred vision with far distance tasks? (driving, television, movies, chalkboard at school) Occasionally   Do you experience blurred vision with close up tasks? (computer, reading) Occasionally   Do you experience words running together or appearing to move on the page? Never    Date Always Frequently Occasionally Never   06/07/2024 3 6  8 6     History:  Have you ever been diagnosed with:  Traumatic brain injury (TBI) or concussion? Yes  Reading disability? Yes  Lazy eye? No  Have you ever had an eye operation? 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     Dizziness Handicap Inventory: 46/100   16-34= mild   36-52= moderate   >/= 54= severe    Objective     Cognitive Exam  See speech plan of care     Physical Exam  Head Control/Neck Mobility: WFL    Oculomotor  "Exam  Vestibular/Ocular-Motor Screening (VOMS) for Concussion  Vestibular/Ocular Motor Test: Not Tested Headache   0-10 Dizziness  0-10 Nausea   0-10 Fogginess   0-10 Comments   Baseline N/A 1 0 0 4    Smooth Pursuit  (1.5 feet left/right of center; 3 feet away; 2 times; 30 bpm each direction; horizontal and vertical)  2  Returns after 0 0 4    Saccades - Horizontal  (1.5 feet left/right of center; 3 feet away; 10 times; performed as quickly as possible)  4 0 0 4 2+ refixations to right    Saccades - Vertical   (1.5 feet up/down of center; 3 feet away; 10 times; performed as quickly as possible)  4 0 0 4    Convergence (Near Point)  (14 pt font; stop when pt reports diplopia or outward deviation of eye is observed, blurring is ignored; measure distance between target and tip of nose; abnormal finding is >/= 6 cm)  Unable to continue test Unable to continue test Unable to continue test Unable to continue test (Near Point in cm):  Measure 1:  Measure 2:  Measure 3:   VOR - Horizontal  (14 pt font; 20 degrees rotation left/right; 10 reps; 180 bpm)         VOR - Vertical  (14 pt font; 20 degrees rotation up/down; 10 times; 180 bpm)         Visual Motion Sensitivity Test  (80 degrees left/right; 5 times each direction; 50 bpm)           Oculomotor ROM: WNL  Eye Alignment: WNL  Visual Field: WNL  Acuity: corrected by glasses; Believes acuity was ~20/50     Spontaneous Nystagmus: --  Gaze Holding Nystagmus: --  Gaze Holding (No Fixation): NT  Smooth Pursuits: symptomatic   Horizontal:    Vertical:   Saccades: 2+ refixations to right; latency in vertical   Horizontal:    Vertical:   Near Point Convergence (cm): NT   Accommodation (gaze shift between near target (16") and far target (10ft)): NT   Fixation (5 second sustained visual attention): NT    VOR Slow Head Movement: NT  Head Thrust: NT  Dynamic Visual Acuity (DVA) NT  Head Shake: NT    Cover/Cross Cover: NT  Cover/Uncover: NT    Perceptual Testing:   NA    Modified " VAS (Vertebral Artery Screen), in sitting (rotation, then extension):  R: NT  L: NT    Positional Canal Testing:   Not indicated based on subjective report      Intake Outcome Measure for FOTO Vestibular Survey    Therapist reviewed FOTO scores for Tony Gordillo on 6/7/2024.   FOTO documents entered into [facility] - see Media section.         Treatment     Eval only; HEP provided and briefly reviewed    Home Exercises and Patient Education Provided    Education provided:   - Role of OT, goals for OT, scheduling/cancellations, insurance limitations with patient.  - Additional Education provided:     Written Home Exercises Provided: yes.  Exercises were reviewed and Tony was able to demonstrate them prior to the end of the session.    Tony demonstrated good  understanding of the education provided.     See EMR under Patient Instructions for exercises provided 6/7/2024.    Assessment     Tony Gordillo is a 43 y.o. male referred to outpatient occupational therapy and presents with a medical diagnosis of Concussion without loss of consciousness, sequela and Saccadic eye movement deficiency, resulting in headaches, dizziness, imbalance, and oculomotor dysfunction and demonstrates limitations as described in the chart below. Pt's score on the DHI indicates a 'moderate handicap'. Following medical record review it is determined that patient will benefit from occupational therapy services in order to maximize oculomotor functioning and gaze stabilization, habituation for desensitization to environments/movements that elicit/increase symptoms, pt education on mindfulness/relaxation strategies, and HEP/HAP guidance to improve functional participation with meaningful occupations.    Patient prognosis is Fair due to time since concussions, chronicity and intensity of symptoms, complex co-morbidities   Patient will benefit from skilled outpatient Occupational Therapy to address the deficits stated above and in the chart below,  provide patient/family education, and to maximize patient's level of independence.     Plan of care discussed with patient: Yes  Patient's spiritual, cultural and educational needs considered and patient is agreeable to the plan of care and goals as stated below:     Anticipated Barriers for therapy: comorbidities    Medical Necessity is demonstrated by the following  Occupational Profile/History  Co-morbidities and personal factors that may impact the plan of care [] LOW: Brief chart review  [x] MODERATE: Expanded chart review   [] HIGH: Extensive chart review    Moderate / High Support Documentation: reviewed MD notes, previous therapy notes, imaging, extended time spent on chart review      Examination  Performance deficits relating to physical, cognitive or psychosocial skills that result in activity limitations and/or participation restrictions  [] LOW: addressing 1-3 Performance deficits  [x] MODERATE: 3-5 Performance deficits  [] HIGH: 5+ Performance deficits (please support below)    Moderate / High Support Documentation:    Physical:  Visual Functions  Pain  Oculomotor function  Balance    Cognitive:  See ST    Psychosocial:    Habits  Routines  Rituals     Treatment Options [] LOW: Limited options  [x] MODERATE: Several options  [] HIGH: Multiple options      Decision Making/ Complexity Score: moderate       The following goals were discussed with the patient and patient is in agreement with them as to be addressed in the treatment plan.     Goals:  Short term goals: to be met within 6 weeks  Status   Pt to be modified independent in HEP with compliance of >/=80% for max carryover of therapeutic gains Progressing, not met   MCTSIB to be assessed with goals established  Progressing, not met   Pt will improve DHI score to </= 38 to demonstrate decreased symptoms of dizziness with activities of daily living  Progressing, not met   Pt to verbalize eye fatigue reduction strategies Progressing, not met   Pt will  "participate in remainder of VOMS with appropriate goals established Progressing, not met       Long term goals: to be met within 12 weeks Status    FGA to be assessed with goals established  Progressing, not met   Pt will report "always" or "frequently" no more than 5x on AVQ to demonstrate improved visual function  Progressing, not met   Pt will demonstrate improved balance confidence as evidenced by ABC score of >/= 75%  Progressing, not met   Pt will reach predicted FOTO outcome score of >/= 53 Progressing, not met   Pt will verbalize return to leisure activity of choice, such as leatherwork or increased tolerance to video games  Progressing, not met   Pt will participate in VOMS with symptoms not increasing > 2/10 with oculomotor activities  Progressing, not met         Plan     Certification Period/Plan of care expiration: 6/7/2024 to 09/06/2024.    Outpatient Occupational Therapy 2 times weekly for 12 weeks to include the following interventions: Neuromuscular Re-ed, Patient Education, Self Care, Therapeutic Activities, and Therapeutic Exercise.    Karley Guadalupe OT      I certify the need for these services furnished under this plan of treatment and while under my care.  ____________________________________ Physician/Referring Practitioner   Date of Signature      "

## 2024-06-07 NOTE — PROGRESS NOTES
[facility] Occupational Therapy  Initial Neurological Evaluation Post Concussion     Date: 6/7/2024  Patient: Tony Gordillo  Chart Number: [id number]    Therapy Diagnosis: No diagnosis found.  Physician: Damaso Cota MD    Physician Orders: Eval and treat - vestibular therapy   Medical Diagnosis:   S06.0X0S (ICD-10-CM) - Concussion without loss of consciousness, sequela  H55.81 (ICD-10-CM) - Saccadic eye movement deficiency     Evaluation Date: 6/7/2024  Plan of Care Expiration Period: 09/06/2024  Insurance Authorization period Expiration: 05/07/2025  Date of Return to MD: Dr. Cota in August  Visit # / Visits Authorized: 1 / 99  FOTO:       Time In:1120  Time Out: 1153  Total Billable (one on one) Time: 33 minutes    Precautions: Standard, Fall, and history of several TBI     Subjective     History of Current Condition: Tony Gordillo is a 43 y.o. male who presents to [facility] for evaluation and treatment secondary to history of concussions and TBIs. See note from Dr. Cota on 5/23/24 for concussions and other relevant PMHx.   PMHx: (+) prior concussions; (+) personal history of headaches/migraines; (--) familial history of headaches/migraines; (+) depression/anxiety; (+) ADD/ADHD; (+) learning disability (dyslexia); (+) neck/shoulder/back pain prior to injury.     Patient reports:  Has have several TBIs and post-concussion syndrome. He has had this since the 90s. Experiences migraines, headaches, blurred vision, dizziness, imbalance, and intermittent photophonia and photophobia. The symptoms will last for days at a time and originate from a nerve cluster in the back of his neck which is currently being addressed with steroid packs. Headaches are the most exacerbating factor for the rest of his symptoms. Reports feelings of fogginess the day after a bad headache, due to a combination of symptoms and side effects from headache  medication.     Feels dizzy when he moves positions, for example: from prone --> standing or when bending with head down. Sometimes the dizziness will hit when he is just standing.     Vision is getting better but is still changing.     Falls: 2 (not related to concussion); just loss of balance    Dominant Side: Right  Imaging: MRI studies     Previous Therapy: ortho and neuro PT previously, currently participating in neuro speech    Past Medical History/Physical Systems Review:     Past Medical History:  Tony Gordillo  has a past medical history of Diabetes mellitus, type 2, Hyperlipidemia, Hypertension, Obesity, and NAINA (obstructive sleep apnea).    Past Surgical History:  Tony Gordillo  has a past surgical history that includes left knee x 2; Knee surgery; Arthroscopic repair of rotator cuff of shoulder (Right, 03/14/2023); arthroscopy,shoulder,with biceps tenodesis (03/14/2023); Arthroscopic debridement of shoulder (03/14/2023); Knee arthroscopy w/ meniscectomy (Right, 10/24/2023); Subchondroplasty (Right, 10/24/2023); and PRK  (Bilateral, 2010).    Current Medications:  Tony has a current medication list which includes the following prescription(s): odactra, ammonium lactate, aripiprazole, atorvastatin, azelastine, blood sugar diagnostic, blood sugar diagnostic, blood-glucose meter, dexcom g6 sensor, dexcom g6 transmitter, cyanocobalamin (vitamin b-12), empagliflozin, epinephrine, fluoxetine, fluticasone propionate, insulin aspart u-100, insulin aspart u-100, toujeo max u-300 solostar, omnipod 5 g6 pods (gen 5), l-methylfolate, lamotrigine, lancets, levothyroxine, lisinopril, loratadine, magnesium oxide, meloxicam, modafinil, ondansetron, pen needle, diabetic, syringe (disposable), and tirzepatide.    Allergies:  Review of patient's allergies indicates:   Allergen Reactions    Influenza virus vaccine, specific Hives    Pioglitazone      Altered mood     Victoza [liraglutide] Nausea And Vomiting     Farxiga [dapagliflozin] Rash     Erectile dysfunction and rash         Other: --    Patient's Goals for Therapy: get back to doing all I/ADLs; return to video games and leatherwork    Pain:  Pain Related Behaviors Observed: No   Functional Pain Scale Rating 0-10:   Location: Headache  6/10 on average  0 /10 at best -- rare  10/10 at worst  Description: see MD Note  Aggravating Factors: --  Easing Factors: tylenol and advil; tried migraine medication previously with no improvement     Location: Neck/back/shoulder   Constant pain in traps  Reports that he has tried everything and pain persists; has been persisting several years     Occupation:  Working presently: unemployed  Duties: self care, managing health and appointments     Functional Limitations/Social History:    Prior Level of Function: independent  Current Level of Function: independent for self care ADLs; currently limits participation in leisure and other activities secondary to symptoms     Home/Living environment: family  Home Access: threshold to enter [facility]   DME: none     Leisure: leatherwork, building things, video games    Driving: yes     Adult Vision Questionnaire:   Directions: please check the answer that best describes your situation. If you wear glasses or contact lenses, answer the questions assuming that you are wearing them.   Never = never  Occasionally = less than 1 time/week  Frequently = at least 1 time/week  Always = everyday     Do you have headaches or facial pain? Frequently  Do you have pain in your eyes with eye movement? Occasionally   Do you experience neck or shoulder discomfort? Frequently  Do you have dizziness, light headed or nausea while performing close up work? (computer work; reading; writing) Frequently  Do you have dizziness, light headed or nausea while performing far distance activities? (driving, tv, movies) Never  Do you experience dizziness when bending down and standing back up, or when getting up quickly?  Always  Do you feel unsteady with walking, or drift to one side? Occasionally   Do you feel overwhelmed or anxious while walking in a large department store or walking in a crowd? Occasionally   Do you feel dizzy or off balance when walking down a long hallway or on bold patterned carpeting? Never  Does riding in a car make you feel dizzy or uncomfortable? Occasionally   Do you find yourself with your head tilted to one side? Never  Does your posture tend to be leaning more forward or backward than you're used to? Occasionally   Do you experience poor depth perception or have difficulty estimating distances accurately? Never  Do you experience double/overlapping/shadowed vision at far distances? Frequently  Do you experience double/overlapping/shadowed vision at near distances? Frequently  Do you experience glare or have sensitivity to bright lights? Frequently  Do you close or cover one eye with near or far tasks? Never  Do you skip lines or lose your place while reading? Always  Do you use your finger to keep your place on the page? Occasionally   Do you tire easily with close-up tasks? Always  Do you experience blurred vision with far distance tasks? (driving, television, movies, chalkboard at school) Occasionally   Do you experience blurred vision with close up tasks? (computer, reading) Occasionally   Do you experience words running together or appearing to move on the page? Never    Date Always Frequently Occasionally Never   06/07/2024 3 6  8 6     History:  Have you ever been diagnosed with:  Traumatic brain injury (TBI) or concussion? Yes  Reading disability? Yes  Lazy eye? No  Have you ever had an eye operation? 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     Dizziness Handicap Inventory: 46/100   16-34= mild   36-52= moderate   >/= 54= severe    Objective     Cognitive Exam  See speech plan of care     Physical Exam  Head Control/Neck Mobility: WFL    Oculomotor  "Exam  Vestibular/Ocular-Motor Screening (VOMS) for Concussion  Vestibular/Ocular Motor Test: Not Tested Headache   0-10 Dizziness  0-10 Nausea   0-10 Fogginess   0-10 Comments   Baseline N/A 1 0 0 4    Smooth Pursuit  (1.5 feet left/right of center; 3 feet away; 2 times; 30 bpm each direction; horizontal and vertical)  2  Returns after 0 0 4    Saccades - Horizontal  (1.5 feet left/right of center; 3 feet away; 10 times; performed as quickly as possible)  4 0 0 4 2+ refixations to right    Saccades - Vertical   (1.5 feet up/down of center; 3 feet away; 10 times; performed as quickly as possible)  4 0 0 4    Convergence (Near Point)  (14 pt font; stop when pt reports diplopia or outward deviation of eye is observed, blurring is ignored; measure distance between target and tip of nose; abnormal finding is >/= 6 cm)  Unable to continue test Unable to continue test Unable to continue test Unable to continue test (Near Point in cm):  Measure 1:  Measure 2:  Measure 3:   VOR - Horizontal  (14 pt font; 20 degrees rotation left/right; 10 reps; 180 bpm)         VOR - Vertical  (14 pt font; 20 degrees rotation up/down; 10 times; 180 bpm)         Visual Motion Sensitivity Test  (80 degrees left/right; 5 times each direction; 50 bpm)           Oculomotor ROM: WNL  Eye Alignment: WNL  Visual Field: WNL  Acuity: corrected by glasses; Believes acuity was ~20/50     Spontaneous Nystagmus: --  Gaze Holding Nystagmus: --  Gaze Holding (No Fixation): NT  Smooth Pursuits: symptomatic   Horizontal:    Vertical:   Saccades: 2+ refixations to right; latency in vertical   Horizontal:    Vertical:   Near Point Convergence (cm): NT   Accommodation (gaze shift between near target (16") and far target (10ft)): NT   Fixation (5 second sustained visual attention): NT    VOR Slow Head Movement: NT  Head Thrust: NT  Dynamic Visual Acuity (DVA) NT  Head Shake: NT    Cover/Cross Cover: NT  Cover/Uncover: NT    Perceptual Testing:   NA    Modified " VAS (Vertebral Artery Screen), in sitting (rotation, then extension):  R: NT  L: NT    Positional Canal Testing:   Not indicated based on subjective report      Intake Outcome Measure for FOTO Vestibular Survey    Therapist reviewed FOTO scores for Tony Gordillo on 6/7/2024.   FOTO documents entered into [facility] - see Media section.         Treatment     Eval only; HEP provided and briefly reviewed    Home Exercises and Patient Education Provided    Education provided:   - Role of OT, goals for OT, scheduling/cancellations, insurance limitations with patient.  - Additional Education provided:     Written Home Exercises Provided: yes.  Exercises were reviewed and Tony was able to demonstrate them prior to the end of the session.    Tony demonstrated good  understanding of the education provided.     See EMR under Patient Instructions for exercises provided 6/7/2024.    Assessment     Tony Gordillo is a 43 y.o. male referred to outpatient occupational therapy and presents with a medical diagnosis of Concussion without loss of consciousness, sequela and Saccadic eye movement deficiency, resulting in headaches, dizziness, imbalance, and oculomotor dysfunction and demonstrates limitations as described in the chart below. Pt's score on the DHI indicates a 'moderate handicap'. Following medical record review it is determined that patient will benefit from occupational therapy services in order to maximize oculomotor functioning and gaze stabilization, habituation for desensitization to environments/movements that elicit/increase symptoms, pt education on mindfulness/relaxation strategies, and HEP/HAP guidance to improve functional participation with meaningful occupations.    Patient prognosis is Fair due to time since concussions, chronicity and intensity of symptoms, complex co-morbidities   Patient will benefit from skilled outpatient Occupational Therapy to address the deficits stated above and in the chart below,  provide patient/family education, and to maximize patient's level of independence.     Plan of care discussed with patient: Yes  Patient's spiritual, cultural and educational needs considered and patient is agreeable to the plan of care and goals as stated below:     Anticipated Barriers for therapy: comorbidities    Medical Necessity is demonstrated by the following  Occupational Profile/History  Co-morbidities and personal factors that may impact the plan of care [] LOW: Brief chart review  [x] MODERATE: Expanded chart review   [] HIGH: Extensive chart review    Moderate / High Support Documentation: reviewed MD notes, previous therapy notes, imaging, extended time spent on chart review      Examination  Performance deficits relating to physical, cognitive or psychosocial skills that result in activity limitations and/or participation restrictions  [] LOW: addressing 1-3 Performance deficits  [x] MODERATE: 3-5 Performance deficits  [] HIGH: 5+ Performance deficits (please support below)    Moderate / High Support Documentation:    Physical:  Visual Functions  Pain  Oculomotor function  Balance    Cognitive:  See ST    Psychosocial:    Habits  Routines  Rituals     Treatment Options [] LOW: Limited options  [x] MODERATE: Several options  [] HIGH: Multiple options      Decision Making/ Complexity Score: moderate       The following goals were discussed with the patient and patient is in agreement with them as to be addressed in the treatment plan.     Goals:  Short term goals: to be met within 6 weeks  Status   Pt to be modified independent in HEP with compliance of >/=80% for max carryover of therapeutic gains Progressing, not met   MCTSIB to be assessed with goals established  Progressing, not met   Pt will improve DHI score to </= 38 to demonstrate decreased symptoms of dizziness with activities of daily living  Progressing, not met   Pt to verbalize eye fatigue reduction strategies Progressing, not met      "      Long term goals: to be met within 12 weeks Status    FGA to be assessed with goals established  Progressing, not met   Pt will report "always" or "frequently" no more than 5x on AVQ to demonstrate improved visual function  Progressing, not met   Pt will demonstrate improved balance confidence as evidenced by ABC score of >/= 75%  Progressing, not met   Pt will reach predicted FOTO outcome score of >/= 53 Progressing, not met   Pt will verbalize return to leisure activity of choice, such as leatherwork or increased tolerance to video games  Progressing, not met         Plan     Certification Period/Plan of care expiration: 6/7/2024 to 09/06/2024.    Outpatient Occupational Therapy 2 times weekly for 12 weeks to include the following interventions: Neuromuscular Re-ed, Patient Education, Self Care, Therapeutic Activities, and Therapeutic Exercise.    Karley Guadalupe OT      I certify the need for these services furnished under this plan of treatment and while under my care.  ____________________________________ Physician/Referring Practitioner   Date of Signature      "

## 2024-06-10 ENCOUNTER — PATIENT MESSAGE (OUTPATIENT)
Dept: ORTHOPEDICS | Facility: CLINIC | Age: 44
End: 2024-06-10
Payer: COMMERCIAL

## 2024-06-12 ENCOUNTER — PATIENT MESSAGE (OUTPATIENT)
Dept: NEUROLOGY | Facility: CLINIC | Age: 44
End: 2024-06-12
Payer: COMMERCIAL

## 2024-06-13 RX ORDER — LAMOTRIGINE 100 MG/1
150 TABLET ORAL
Qty: 135 TABLET | Refills: 3 | Status: SHIPPED | OUTPATIENT
Start: 2024-06-13

## 2024-06-17 ENCOUNTER — CLINICAL SUPPORT (OUTPATIENT)
Dept: REHABILITATION | Facility: HOSPITAL | Age: 44
End: 2024-06-17
Payer: COMMERCIAL

## 2024-06-17 DIAGNOSIS — R26.89 IMBALANCE: ICD-10-CM

## 2024-06-17 DIAGNOSIS — Z74.09 IMPAIRED MOBILITY AND ADLS: Primary | ICD-10-CM

## 2024-06-17 DIAGNOSIS — Z78.9 IMPAIRED MOBILITY AND ADLS: Primary | ICD-10-CM

## 2024-06-17 PROCEDURE — 97530 THERAPEUTIC ACTIVITIES: CPT | Mod: PN

## 2024-06-17 NOTE — PROGRESS NOTES
Occupational Therapy Treatment Note     Date: 6/17/2024  Name: Tony Gordillo  Clinic Number: [id number]    Therapy Diagnosis:   Encounter Diagnoses   Name Primary?    Impaired mobility and ADLs Yes    Imbalance      Physician: Damaso Cota MD    Physician Orders: Eval and treat - vestibular therapy   Medical Diagnosis:   S06.0X0S (ICD-10-CM) - Concussion without loss of consciousness, sequela  H55.81 (ICD-10-CM) - Saccadic eye movement deficiency      Evaluation Date: 6/7/2024  Plan of Care Expiration Period: 09/06/2024  Insurance Authorization period Expiration: 05/07/2025  Date of Return to MD: Dr. Cota in August  Visit # / Visits Authorized: 1 / 20   FOTO:        Time In:1300  Time Out: 1340  Total Billable (one on one) Time: 33 minutes     Precautions: Standard, Fall, and history of several TBI     Subjective     Pt reports: Doing alright; has upcoming shoulder surgery   He was compliant with home exercise program given last session.   Response to previous treatment:no complaint  Functional change: independence in hep     Pain: 0/10  Location: no pain noted     Objective      Oculomotor Exam  Vestibular/Ocular-Motor Screening (VOMS) for Concussion  Vestibular/Ocular Motor Test: Not Tested Headache   0-10 Dizziness  0-10 Nausea   0-10 Fogginess   0-10 Comments   Baseline N/A 1 0 0 4     Smooth Pursuit  (1.5 feet left/right of center; 3 feet away; 2 times; 30 bpm each direction; horizontal and vertical)   2  Returns after 0 0 4     Saccades - Horizontal  (1.5 feet left/right of center; 3 feet away; 10 times; performed as quickly as possible)   4 0 0 4 2+ refixations to right    Saccades - Vertical   (1.5 feet up/down of center; 3 feet away; 10 times; performed as quickly as possible)   4 0 0 4     Convergence (Near Point)  (14 pt font; stop when pt reports diplopia or outward deviation of eye is observed, blurring is ignored; measure distance between target and tip of nose; abnormal finding is >/= 6 cm)    0 0 0 0 29 cm  31 cm  31 cm    VOR - Horizontal  (14 pt font; 20 degrees rotation left/right; 10 reps; 180 bpm)    0 0  4 0     VOR - Vertical  (14 pt font; 20 degrees rotation up/down; 10 times; 180 bpm)    0 0 0 0     Visual Motion Sensitivity Test  (80 degrees left/right; 5 times each direction; 50 bpm)    0 5 0 0       Balance/Functional Mobility Assessment:     Postural control: MCTSIB: 6/17/2024   1. Eyes Open/feet together/Firm: 30 seconds   2. Eyes Closed/feet together/Firm:  30 seconds with sway   3. Eyes Open/feet together/Foam:  30 seconds   4. Eyes Closed/feet together/Foam:  30 seconds with sway   TOTAL 120/120     Static Postural Control:   - Sharpened Romberg:    Eyes Open: 30 seconds    Eyes Closed: 2 seconds     Tony participated in dynamic functional therapeutic activities to improve functional performance for 33 minutes, including:  -Assessment of measures including VOMS, MCTSIB  - Review of concussion management symptoms with pt verbalizing understanding    Home Exercises and Education Provided      Education provided:   - Concussion management   - Progress towards goals    Written Home Exercises Provided: Patient instructed to cont prior HEP.  Exercises were reviewed and Tony was able to demonstrate them prior to the end of the session.    Tony demonstrated good  understanding of the education provided.     See EMR under Patient Instructions for exercises provided prior visit.    Assessment      Tony would continue to benefit from skilled OT. Tolerated VOMS with fair tolerance; several rest breaks required. Postural stability intact per MCTSIB.      Tony is progressing well towards his goals and there are no updates to goals at this time. Pt prognosis is Good.     Pt will continue to benefit from skilled outpatient occupational therapy to address the deficits listed in the problem list on initial evaluation provide pt/family education and to maximize pt's level of independence in the home and  "community environment.     Pt's spiritual, cultural and educational needs considered and pt agreeable to plan of care and goals.    Anticipated barriers to occupational therapy: none    Goals:  Short term goals: to be met within 6 weeks  Status   Pt to be modified independent in HEP with compliance of >/=80% for max carryover of therapeutic gains Progressing, not met   MCTSIB to be assessed with goals established  Progressing, not met   Pt will improve DHI score to </= 38 to demonstrate decreased symptoms of dizziness with activities of daily living  Progressing, not met   Pt to verbalize eye fatigue reduction strategies Progressing, not met   Pt will participate in remainder of VOMS with appropriate goals established Progressing, not met         Long term goals: to be met within 12 weeks Status    FGA to be assessed with goals established  Progressing, not met   Pt will report "always" or "frequently" no more than 5x on AVQ to demonstrate improved visual function  Progressing, not met   Pt will demonstrate improved balance confidence as evidenced by ABC score of >/= 75%  Progressing, not met   Pt will reach predicted FOTO outcome score of >/= 53 Progressing, not met   Pt will verbalize return to leisure activity of choice, such as leatherwork or increased tolerance to video games  Progressing, not met   Pt will participate in VOMS with symptoms not increasing > 2/10 with oculomotor activities  Progressing, not met            Plan      Certification Period/Plan of care expiration: 6/7/2024 to 09/06/2024.     Outpatient Occupational Therapy 2 times weekly for 12 weeks to include the following interventions: Neuromuscular Re-ed, Patient Education, Self Care, Therapeutic Activities, and Therapeutic Exercise.      Updates/Grading for next session: positional change activity for ronn Guadalupe OT     "

## 2024-06-17 NOTE — PROGRESS NOTES
[facility]  Speech Therapy Progress Note- Neurological Rehabilitation  Date: 6/18/2024     Name: Tony Gordillo   MRN: [id number]   Therapy Diagnosis:   Encounter Diagnosis   Name Primary?    Cognitive communication deficit Yes     Physician: Damaso Cota MD  Physician Orders: Ambulatory Referral to Speech Therapy   Medical Diagnosis: Concussion without loss of consciousness, sequela [S06.0X0S], Cognitive change [R41.89]     Visit #/ Visits Authorized: 4/ 20  Date of Evaluation:  5/9/2024  Insurance Authorization Period: 5/9/2024 - 12/31/2024   Plan of Care Expiration Date:  7/5/2024  Extended Plan of Care:  n/a   Progress Note: 6/12/2024     Time In:  2:40  Time Out:  3:25  Total Billable Time: 45     Precautions: Standard  Subjective:   Patient reports: Patient pleasant and attended session early. ST able to accommodate due to schedule. Patient reports this being first time being without a headache for 24 hours  He was compliant to home exercise program.   Response to previous treatment: fair  Pain Scale: 0/10 on a Visual Analog Scale currently.  Pain Location: .nop  Objective:   TIMED  Procedure Min.   Cognitive Therapeutic Interventions, first 15 minutes CPT 58512  15   Cognitive Therapeutic Interventions, each additional 15 minutes CPT 85850  30       Short Term Goals: (6 weeks) Current Progress:   1. Patient will sustain attention to complete moderate to complex reasoning tasks for 2 minutes with one request for clarification to increase sustained attention.     Progressing/ Not Met 5/9/2024   Unscramble words- 100% accuracy independently     Deductive reasoning task- 90% accuracy independently    Met x2   2. Patient will use attention shifting strategies to shift attention between two tasks with no more than 3 cues or 90% accuracy to improve alternating attention.      Progressing/ Not Met 5/9/2024   Not addressed in today's session.       3. Patient will complete short term recall  tasks after a 5 minutes delay with 90% accuracy  independently  with use of memory strategies to improve recall of information and generalization of memory strategies.     Progressing/ Not Met 5/9/2024   Patient recalled visual picture scene details with 90% accuracy following 7 minute delay with distraction of verbal communication          Met x2   4. Patient will complete high level problem solving based tasks with 90% accuracy independently.     Progressing/ Not Met 5/9/2024   Deductive reasoning task- 90% accuracy independently         Met x2   5. Patient will complete mental manipulation tasks with 90% acc to improve working memory.     Progressing/ Not Met 5/9/2024   Following instructions L3- 81% accuracy independently     Unscrambling words- 100% independently    6. Patient will demonstrate awareness of cognitive-communication difficulties in 1-2 situations in his day in which cognitive deficits could or did compromise efficiency and performance with minimal cueing.      Progressing/ Not Met 5/9/2024   Patient shares no difficulties in regular life. Besides headache pain.  ST expressed in depth how it is a large factor preventing him from being able to sustain attain to a 100% ability. Patient verbalized understanding   7. Patient will independently generate strategies to improve ability to complete tasks with enhanced accuracy and time, based on review of objective previous performance on trials of functional tasks with 80% accuracy.      Progressing/ Not Met 5/9/2024   Patient was able to independently use association strategy to recall information independently following delay         Long Term Goals: (10 weeks) Current Progress:   1. Patient will improve  attention skills to effectively attend to and communicate in simple-moderate daily living tasks in functional living environment.     Established this date     2. Patient will predict objective performance on completion of actual tasks to increase  "independence with required return to daily living environment.     Established this date     3. Patient will use appropriate memory strategies to schedule and recall weekly activities, express needs and recall names to maintain safety and participate socially in functional living environment.     Established this date         Patient Education/Response:   Patient educated regarding the followin. Metacognitive strategies (take more time to think)  2. Importance of taking breaks when needed.    Home program established: yes-see above  Patient verbalized understanding to all above education provided.     See Electronic Medical Record under Patient Instructions for exercises provided throughout therapy.  Assessment:   Progress note- Tony is progressing well towards his goals. Patient arrived early to session without headache pain. He is seen with improved memory recall despite verbal distraction. In addition to improvements in problem solving. Patient reports, "I have my good days and bad days." Patient to be transferred to [facility] following surgery on shoulder. Goals to be updated as necessary.     Patient prognosis is Fair. Patient will continue to benefit from skilled outpatient speech and language therapy to address the deficits listed in the problem list on initial evaluation, provide patient/family education and to maximize patient's level of independence in the home and community environment.   Medical necessity is demonstrated by the following IMPAIRMENTS:  Cognition: Deficits in executive functioning, attention, and memory prevent the pt from relaying medically and safety relevant information in a timely manner in a state of emergency.   Barriers to Therapy: time since onset  Patient's spiritual, cultural and educational needs considered and patient agreeable to plan of care and goals.  Plan:   Continue Plan of Care with focus on rehabilitation and compensation for cognitive communication " deficits    Alfreda Noriega, CCC-SLP   6/18/2024

## 2024-06-18 ENCOUNTER — HOSPITAL ENCOUNTER (OUTPATIENT)
Dept: RADIOLOGY | Facility: HOSPITAL | Age: 44
Discharge: HOME | End: 2024-06-18
Attending: ORTHOPAEDIC SURGERY
Payer: COMMERCIAL

## 2024-06-18 ENCOUNTER — HOSPITAL ENCOUNTER (OUTPATIENT)
Dept: PREADMISSION TESTING | Facility: HOSPITAL | Age: 44
Discharge: HOME | End: 2024-06-18
Attending: ORTHOPAEDIC SURGERY
Payer: COMMERCIAL

## 2024-06-18 ENCOUNTER — CLINICAL SUPPORT (OUTPATIENT)
Dept: REHABILITATION | Facility: HOSPITAL | Age: 44
End: 2024-06-18
Attending: PSYCHIATRY & NEUROLOGY
Payer: COMMERCIAL

## 2024-06-18 VITALS
OXYGEN SATURATION: 96 % | RESPIRATION RATE: 18 BRPM | DIASTOLIC BLOOD PRESSURE: 74 MMHG | HEART RATE: 86 BPM | HEIGHT: 73 IN | SYSTOLIC BLOOD PRESSURE: 135 MMHG | TEMPERATURE: 97 F | BODY MASS INDEX: 38.79 KG/M2 | WEIGHT: 292.69 LBS

## 2024-06-18 DIAGNOSIS — R41.841 COGNITIVE COMMUNICATION DEFICIT: Primary | ICD-10-CM

## 2024-06-18 DIAGNOSIS — Z01.818 PREOP TESTING: Primary | ICD-10-CM

## 2024-06-18 DIAGNOSIS — Z79.4 TYPE 2 DIABETES MELLITUS WITHOUT COMPLICATION, WITH LONG-TERM CURRENT USE OF INSULIN: ICD-10-CM

## 2024-06-18 DIAGNOSIS — E11.9 TYPE 2 DIABETES MELLITUS WITHOUT COMPLICATION, WITH LONG-TERM CURRENT USE OF INSULIN: ICD-10-CM

## 2024-06-18 LAB
ALBUMIN SERPL BCP-MCNC: 3.8 G/DL (ref 3.5–5.2)
ALP SERPL-CCNC: 72 U/L (ref 55–135)
ALT SERPL W/O P-5'-P-CCNC: 24 U/L (ref 10–44)
ANION GAP SERPL CALC-SCNC: 9 MMOL/L (ref 8–16)
AST SERPL-CCNC: 15 U/L (ref 10–40)
BASOPHILS # BLD AUTO: 0.03 K/UL (ref 0–0.2)
BASOPHILS NFR BLD: 0.5 % (ref 0–1.9)
BILIRUB SERPL-MCNC: 0.6 MG/DL (ref 0.1–1)
BUN SERPL-MCNC: 12 MG/DL (ref 6–20)
CALCIUM SERPL-MCNC: 9.7 MG/DL (ref 8.7–10.5)
CHLORIDE SERPL-SCNC: 106 MMOL/L (ref 95–110)
CO2 SERPL-SCNC: 26 MMOL/L (ref 23–29)
CREAT SERPL-MCNC: 0.9 MG/DL (ref 0.5–1.4)
DIFFERENTIAL METHOD BLD: NORMAL
EOSINOPHIL # BLD AUTO: 0.1 K/UL (ref 0–0.5)
EOSINOPHIL NFR BLD: 0.8 % (ref 0–8)
ERYTHROCYTE [DISTWIDTH] IN BLOOD BY AUTOMATED COUNT: 12.6 % (ref 11.5–14.5)
EST. GFR  (NO RACE VARIABLE): >60 ML/MIN/1.73 M^2
GLUCOSE SERPL-MCNC: 97 MG/DL (ref 70–110)
HCT VFR BLD AUTO: 48.1 % (ref 40–54)
HGB BLD-MCNC: 15.6 G/DL (ref 14–18)
IMM GRANULOCYTES # BLD AUTO: 0.03 K/UL (ref 0–0.04)
IMM GRANULOCYTES NFR BLD AUTO: 0.5 % (ref 0–0.5)
LYMPHOCYTES # BLD AUTO: 1.8 K/UL (ref 1–4.8)
LYMPHOCYTES NFR BLD: 27.6 % (ref 18–48)
MCH RBC QN AUTO: 29.8 PG (ref 27–31)
MCHC RBC AUTO-ENTMCNC: 32.4 G/DL (ref 32–36)
MCV RBC AUTO: 92 FL (ref 82–98)
MONOCYTES # BLD AUTO: 0.7 K/UL (ref 0.3–1)
MONOCYTES NFR BLD: 10.5 % (ref 4–15)
NEUTROPHILS # BLD AUTO: 4 K/UL (ref 1.8–7.7)
NEUTROPHILS NFR BLD: 60.1 % (ref 38–73)
NRBC BLD-RTO: 0 /100 WBC
PLATELET # BLD AUTO: 205 K/UL (ref 150–450)
PMV BLD AUTO: 11 FL (ref 9.2–12.9)
POTASSIUM SERPL-SCNC: 4.5 MMOL/L (ref 3.5–5.1)
PROT SERPL-MCNC: 7.3 G/DL (ref 6–8.4)
RBC # BLD AUTO: 5.24 M/UL (ref 4.6–6.2)
SODIUM SERPL-SCNC: 141 MMOL/L (ref 136–145)
WBC # BLD AUTO: 6.6 K/UL (ref 3.9–12.7)

## 2024-06-18 PROCEDURE — 85025 COMPLETE CBC W/AUTO DIFF WBC: CPT | Performed by: ORTHOPAEDIC SURGERY

## 2024-06-18 PROCEDURE — 93005 ELECTROCARDIOGRAM TRACING: CPT

## 2024-06-18 PROCEDURE — 71046 X-RAY EXAM CHEST 2 VIEWS: CPT | Mod: 26,,, | Performed by: RADIOLOGY

## 2024-06-18 PROCEDURE — 97130 THER IVNTJ EA ADDL 15 MIN: CPT | Mod: PN

## 2024-06-18 PROCEDURE — 71046 X-RAY EXAM CHEST 2 VIEWS: CPT | Mod: TC,FY

## 2024-06-18 PROCEDURE — 97129 THER IVNTJ 1ST 15 MIN: CPT | Mod: PN

## 2024-06-18 PROCEDURE — 80053 COMPREHEN METABOLIC PANEL: CPT | Performed by: ORTHOPAEDIC SURGERY

## 2024-06-18 PROCEDURE — 93010 ELECTROCARDIOGRAM REPORT: CPT | Mod: ,,, | Performed by: INTERNAL MEDICINE

## 2024-06-18 RX ORDER — INSULIN ASPART 100 [IU]/ML
INJECTION, SOLUTION INTRAVENOUS; SUBCUTANEOUS
Qty: 40 ML | Refills: 7 | Status: SHIPPED | OUTPATIENT
Start: 2024-06-18

## 2024-06-18 NOTE — PLAN OF CARE
Pre-operative instructions, medication directives and pain scales reviewed with patient. All questions the patient had were answered. Re-assurance about surgical procedure and day of surgery routine given as needed, patient verbalized understanding of the pre-op instructions.  Patient instructed to report to [facility] for surgery.   No

## 2024-06-18 NOTE — ANESTHESIA PREPROCEDURE EVALUATION
06/18/2024  Tony Gordillo is a 43 y.o., male scheduled for REPAIR, ROTATOR CUFF, ARTHROSCOPIC (Right: Shoulder) - on 6/25/2024.      Past Medical History:   Diagnosis Date    Diabetes mellitus, type 2     Hyperlipidemia     Hypertension     Obesity     NAINA (obstructive sleep apnea)          Past Surgical History:   Procedure Laterality Date    ARTHROSCOPIC DEBRIDEMENT OF SHOULDER  03/14/2023    Procedure: DEBRIDEMENT, SHOULDER, ARTHROSCOPIC;  Surgeon: Crissy Bradford MD;  Location: [facility] OR;  Service: Orthopedics;;    ARTHROSCOPIC REPAIR OF ROTATOR CUFF OF SHOULDER Right 03/14/2023    Procedure: REPAIR, ROTATOR CUFF, ARTHROSCOPIC;  Surgeon: Crissy Bradford MD;  Location: [facility] OR;  Service: Orthopedics;  Laterality: Right;  HARDY AND NEPHFLY PAYNE [phone]. TEXTED ELMER ON 3/9/2023 @ 12:10PM. ELMER RESPONDED ON 3/9/23 @ 12:23PM-SAG  RN PREOP 3/10/2023---CLEARED BY PCP AND [facility]    ARTHROSCOPY,SHOULDER,WITH BICEPS TENODESIS  03/14/2023    Procedure: ARTHROSCOPY,SHOULDER,WITH BICEPS TENODESIS;  Surgeon: Crissy Bradford MD;  Location: [facility] OR;  Service: Orthopedics;;    KNEE ARTHROSCOPY W/ MENISCECTOMY Right 10/24/2023    Procedure: ARTHROSCOPY, KNEE, WITH MENISCECTOMY;  Surgeon: Crissy Bradford MD;  Location: [facility] OR;  Service: Orthopedics;  Laterality: Right;  RN PREOP 10/18/203---CLEARED BY CHARLE[facility] [phone]    KNEE SURGERY      acl,mcl,pcl    left knee x 2      PRK  Bilateral 2010    SUBCHONDROPLASTY Right 10/24/2023    Procedure: POSSIBLE SUBCHONDROPLASTY;  Surgeon: Crissy Bradford MD;  Location: [facility] OR;  Service: Orthopedics;  Laterality: Right;             Pre-op Assessment    I have reviewed the Patient Summary Reports.     I have reviewed the Nursing Notes.       Review of Systems  Anesthesia Hx:  No problems with previous Anesthesia             Denies Family  Hx of Anesthesia complications.    Denies Personal Hx of Anesthesia complications.                    Social:  No Alcohol Use, Non-Smoker       Hematology/Oncology:  Hematology Normal   Oncology Normal                                   EENT/Dental:  EENT/Dental Normal           Cardiovascular:  Exercise tolerance: good   Hypertension   Denies MI.      Denies Dysrhythmias.   Denies Angina.          Functional Capacity good / => 4 METS                         Pulmonary:       Denies Shortness of breath.  Sleep Apnea, CPAP                Renal/:  Renal/ Normal                 Hepatic/GI:  Hepatic/GI Normal                 Musculoskeletal:  Arthritis   Traumatic rotator cuff tear, right            Neurological:      Headaches                                 Endocrine:  Diabetes Hypothyroidism        Obesity / BMI > 30  Dermatological:  Skin Normal    Psych:  Psychiatric History                  Physical Exam  General: Well nourished, Cooperative, Alert and Oriented    Airway:  Mallampati: III   Mouth Opening: Normal  TM Distance: 4 - 6 cm  Tongue: Normal  Neck ROM: Normal ROM    Dental:  Intact    Chest/Lungs:  Normal Respiratory Rate, Clear to auscultation    Heart:  Rate: Normal  Rhythm: Regular Rhythm      Wt Readings from Last 3 Encounters:   07/18/24 136.1 kg (300 lb)   07/15/24 136.1 kg (300 lb)   07/15/24 (!) 136.4 kg (300 lb 13.1 oz)     Temp Readings from Last 3 Encounters:   07/23/24 36.7 °C (98 °F) (Oral)   07/11/24 37.1 °C (98.8 °F) (Oral)   06/18/24 36.1 °C (97 °F) (Oral)     BP Readings from Last 3 Encounters:   07/23/24 130/75   07/15/24 (!) 147/82   07/11/24 138/80     Pulse Readings from Last 3 Encounters:   07/23/24 73   07/15/24 87   07/11/24 104     Lab Results   Component Value Date    WBC 6.60 06/18/2024    HGB 15.6 06/18/2024    HCT 48.1 06/18/2024    MCV 92 06/18/2024     06/18/2024       CMP  Sodium   Date Value Ref Range Status   06/18/2024 141 136 - 145 mmol/L Final     Potassium    Date Value Ref Range Status   06/18/2024 4.5 3.5 - 5.1 mmol/L Final     Chloride   Date Value Ref Range Status   06/18/2024 106 95 - 110 mmol/L Final     CO2   Date Value Ref Range Status   06/18/2024 26 23 - 29 mmol/L Final     Glucose   Date Value Ref Range Status   06/18/2024 97 70 - 110 mg/dL Final     BUN   Date Value Ref Range Status   06/18/2024 12 6 - 20 mg/dL Final     Creatinine   Date Value Ref Range Status   06/18/2024 0.9 0.5 - 1.4 mg/dL Final     Calcium   Date Value Ref Range Status   06/18/2024 9.7 8.7 - 10.5 mg/dL Final     Total Protein   Date Value Ref Range Status   06/18/2024 7.3 6.0 - 8.4 g/dL Final     Albumin   Date Value Ref Range Status   06/18/2024 3.8 3.5 - 5.2 g/dL Final   02/03/2021 4.1 3.6 - 5.1 g/dL Final     Comment:     For additional information, please refer to   http://education.[employer]/faq/TUA072 (This link is   being provided for informational/ educational purposes only.)    This test was developed and its analytical performance   characteristics have been determined by [facility]. It has not been cleared or approved by the   [place] Food and Drug Administration. This assay has been validated   pursuant to the CLIA regulations and is used for clinical   purposes.  @ Test Performed By:  [facility]  Joseph Trevino M.D.,   [address] [place] [id number]  [place]  [id number]       Total Bilirubin   Date Value Ref Range Status   06/18/2024 0.6 0.1 - 1.0 mg/dL Final     Comment:     For infants and newborns, interpretation of results should be based  on gestational age, weight and in agreement with clinical  observations.    Premature Infant recommended reference ranges:  Up to 24 hours.............<8.0 mg/dL  Up to 48 hours............<12.0 mg/dL  3-5 days..................<15.0 mg/dL  6-29 days.................<15.0 mg/dL       Alkaline Phosphatase   Date Value Ref Range Status    06/18/2024 72 55 - 135 U/L Final     AST   Date Value Ref Range Status   06/18/2024 15 10 - 40 U/L Final     ALT   Date Value Ref Range Status   06/18/2024 24 10 - 44 U/L Final     Anion Gap   Date Value Ref Range Status   06/18/2024 9 8 - 16 mmol/L Final     eGFR   Date Value Ref Range Status   06/18/2024 >60 >60 mL/min/1.73 m^2 Final         Anesthesia Plan  Type of Anesthesia, risks & benefits discussed:    Anesthesia Type: Gen ETT  Intra-op Monitoring Plan: Standard ASA Monitors  Post Op Pain Control Plan: multimodal analgesia and IV/PO Opioids PRN  Induction:  IV  Informed Consent: Informed consent signed with the Patient and all parties understand the risks and agree with anesthesia plan.  All questions answered.   ASA Score: 3  Day of Surgery Review of History & Physical: H&P Update referred to the surgeon/provider.  Anesthesia Plan Notes: Pt is cleared for shoulder surgery from a primary care standpoint.     Ready For Surgery From Anesthesia Perspective.     .

## 2024-06-18 NOTE — DISCHARGE INSTRUCTIONS
YOUR PROCEDURE WILL BE AT [facility] at [address] [place] [address]                  Before 7 AM, enter through the Emergency Entrance..   After 7 AM enter through the Main Entrance.                 Report to the Same Day Surgery Registration Desk in the hallway.(Just beside the Same Day Surgery Unit)      Your procedure  is scheduled for __6/25/2024________.    Call [phone] between 2pm and 5pm on ____6/24/2024___to find out your arrival time for the day of surgery.    You may have two visitors.  No children under 12 years old.     You will be going to the Same Day Surgery Unit on the 2nd floor of the hospital.    Important instructions:  Do not eat anything after midnight.  You may have plain water, non carbonated.  You may also have Gatorade or Powerade after midnight.    Stop all fluids 2 hours before your surgery.    It is okay to brush your teeth.  Do not have gum, candy or mints.    SEE MEDICATION SHEET.   TAKE MEDICATIONS AS DIRECTED WITH SIPS OF WATER.      Do not take any diabetic medication on the morning of surgery unless instructed to do so by your doctor or pre op nurse.      All GLP-1 weekly diabetic/weight loss medications must not be taken for one week before your surgery, or your surgery could be canceled.      STOP taking Aspirin, Ibuprofen,  Advil, Motrin, Mobic(meloxicam), Aleve (naproxen), Fish oil, and Vitamin E for at least 7 days before your surgery.     You may take Tylenol if needed which is not a blood thinner.    Please shower the night before and the morning of your surgery.      Follow any Prep Instructions given by your surgeon.    Use Chlorhexidine soap as instructed by your pre op nurse.   Please place clean linens on your bed the night before surgery. Please wear fresh clean clothing after each shower.    No shaving of procedural area at least 4-5 days before surgery due to increased risk of skin irritation and/or possible  infection.    Female patients may be asked for a urine specimen on the morning of the surgery.  Please check with your nurse before using the restroom.    Contact lenses and removable denture work may not be worn during your procedure.    You may wear deodorant only. If you are having breast surgery, do not wear deodorant on the operative side.    Do not wear powder, body lotion, perfume/cologne or make-up.    Do not wear any jewelry or have any metal on your body.    You will be asked to remove any dentures or partials for the procedure.    If you are going home on the same day of surgery, you must arrange for a family member or a friend to drive you home.  Public transportation is prohibited.  You will not be able to drive home if you were given anesthesia or sedation.    Patients who want to have their Post-op prescriptions filled from our in-house [facility], bring a Credit/Debit Card or cash with you. A co-pay may be required.  The pharmacy closes at 5:30 pm.    Wear loose fitting clothes allowing for bandages.    Please leave money and valuables home.      You may bring your cell phone.    Call the doctor if fever or illness should occur before your surgery.    Call [phone] to contact us here if needed.                            CLOTHES ON DAY OF SURGERY    SHOULDER surgery:  you must have a very oversized shirt.  Very, Very large.  You will probably have a large sling on with your arm strapped to your chest.  You will not be able to put the arm of the operated shoulder into a sleeve.  You can put the arm of the un-operated shoulder into the sleeve, but the shirt will need to be draped over the operated shoulder.       ARM or HAND surgery:  make sure that your sleeves are large and loose enough to pass over large dressings or cast.      BREAST or UNDERARM surgery:  wear a loose, button down shirt so that you can dress without raising your arms over your head.    ABDOMINAL surgery:  wear loose,  comfortable clothing.  Nothing tight around the abdomen.  NO JEANS    PENIS or SCROTAL surgery:  loose comfortable clothing.  Large sweat pants, pajama pants or a robe.  ABSOLUTELY NO JEANS      LEG or FOOT surgery:  wear large loose pants that are able to pass over any large dressings or casts.  You could also wear loose shorts or a skirt.

## 2024-06-19 LAB
OHS QRS DURATION: 96 MS
OHS QTC CALCULATION: 409 MS

## 2024-06-20 ENCOUNTER — TELEPHONE (OUTPATIENT)
Dept: ORTHOPEDICS | Facility: CLINIC | Age: 44
End: 2024-06-20
Payer: COMMERCIAL

## 2024-06-20 ENCOUNTER — PATIENT MESSAGE (OUTPATIENT)
Dept: ORTHOPEDICS | Facility: CLINIC | Age: 44
End: 2024-06-20
Payer: COMMERCIAL

## 2024-06-23 RX ORDER — LISINOPRIL 20 MG/1
TABLET ORAL
Qty: 90 TABLET | Refills: 0 | Status: SHIPPED | OUTPATIENT
Start: 2024-06-23

## 2024-06-24 ENCOUNTER — PATIENT MESSAGE (OUTPATIENT)
Dept: REHABILITATION | Facility: HOSPITAL | Age: 44
End: 2024-06-24
Payer: COMMERCIAL

## 2024-06-24 ENCOUNTER — PATIENT MESSAGE (OUTPATIENT)
Dept: ORTHOPEDICS | Facility: CLINIC | Age: 44
End: 2024-06-24
Payer: COMMERCIAL

## 2024-06-25 ENCOUNTER — ANESTHESIA (OUTPATIENT)
Dept: SURGERY | Facility: HOSPITAL | Age: 44
End: 2024-06-25
Payer: COMMERCIAL

## 2024-07-03 ENCOUNTER — CLINICAL SUPPORT (OUTPATIENT)
Dept: REHABILITATION | Facility: HOSPITAL | Age: 44
End: 2024-07-03
Attending: PSYCHIATRY & NEUROLOGY
Payer: COMMERCIAL

## 2024-07-03 DIAGNOSIS — R26.89 IMBALANCE: ICD-10-CM

## 2024-07-03 DIAGNOSIS — Z78.9 IMPAIRED MOBILITY AND ADLS: Primary | ICD-10-CM

## 2024-07-03 DIAGNOSIS — Z74.09 IMPAIRED MOBILITY AND ADLS: Primary | ICD-10-CM

## 2024-07-03 PROCEDURE — 97530 THERAPEUTIC ACTIVITIES: CPT | Mod: PN

## 2024-07-03 PROCEDURE — 97112 NEUROMUSCULAR REEDUCATION: CPT | Mod: PN

## 2024-07-03 NOTE — PROGRESS NOTES
Occupational Therapy Treatment Note     Date: 7/3/2024  Name: Tony Gordillo  Clinic Number: [id number]    Therapy Diagnosis:   Encounter Diagnoses   Name Primary?    Impaired mobility and ADLs Yes    Imbalance        Physician: Damaso Cota MD    Physician Orders: Eval and treat - vestibular therapy   Medical Diagnosis:   S06.0X0S (ICD-10-CM) - Concussion without loss of consciousness, sequela  H55.81 (ICD-10-CM) - Saccadic eye movement deficiency      Evaluation Date: 6/7/2024  Plan of Care Expiration Period: 09/06/2024  Insurance Authorization period Expiration: 05/07/2025  Date of Return to MD: Dr. Cota in August  Visit # / Visits Authorized: 2 / 20   FOTO:        Time In: 1300  Time Out: 1345  Total Billable (one on one) Time: 45 minutes     Precautions: Standard, Fall, and history of several TBIs    Subjective     Pt reports: Doing alright; has slight limp after popping knee last week  He was compliant with home exercise program given last session.   Response to previous treatment: no complaint  Functional change: independence in hep     Pain: 7/10  Location: right knee    Objective        Tony participated in neuromuscular re-education activities to improve: oculomotor function for 25 minutes, including:   - Smooth pursuits 2x30 seconds in horizontal and vertical planes  - VOR x1: horizontal 1x30 seconds 70 bpm; vertical 1x30 seconds 70 bpm  - Saccades with x15 Spot It cards in horizontal plane identifying similar objects on each card for improved tolerance to oculomotor exercises   - Walking with head turns x2 in horizontal plane and x2 in vertical plane for habituation training within busy environment   - Saccades identifying corresponding letters and numbers on x2 busy cards on airex for habituation training within busy environment and improved tolerance to oculomotor exercises on unstable surface    Tony participated in dynamic functional therapeutic activities to improve functional performance  for 20 minutes, including:  - Rolling soccer ball 2x10 repetitions back and forth while reciting nearest letter for habituation training for quick positional changes in vertical plane  - Positional change activity: tapping 2x15 blaze pods with alternating upper extremity and lower extremity for improved habituation of symptoms and improved balance with positional changes and single leg stance     Home Exercises and Education Provided      Education provided:   - Concussion management   - Progress towards goals    Written Home Exercises Provided: Patient instructed to cont prior HEP.  Exercises were reviewed and Tony was able to demonstrate them prior to the end of the session.    Tony demonstrated good  understanding of the education provided.     See EMR under Patient Instructions for exercises provided prior visit.    Assessment      Tony would continue to benefit from skilled OT. Pt reports blurriness and movement of objects in background during oculomotor exercises which triggered symptoms. Pt unable to complete full duration of all exercises due to presentation of exacerbated symptoms. Pt reported nausea triggered with VOR at 70 bpm in horizontal plane. Pt had mild sway and reported dizziness during horizontal walking with head turns. Pt unable to complete busy card on airex activity due to onset of nausea symptoms and bilateral knee pain. Pt able to tolerate soccer ball positional change activity well with no onset of symptoms.     Tony is progressing well towards his goals and there are no updates to goals at this time. Pt prognosis is Good.     Pt will continue to benefit from skilled outpatient occupational therapy to address the deficits listed in the problem list on initial evaluation provide pt/family education and to maximize pt's level of independence in the home and community environment.     Pt's spiritual, cultural and educational needs considered and pt agreeable to plan of care and  "goals.    Anticipated barriers to occupational therapy: none    Goals:  Short term goals: to be met within 6 weeks  Status   Pt to be modified independent in HEP with compliance of >/=80% for max carryover of therapeutic gains Progressing, not met   MCTSIB to be assessed with goals established  Progressing, not met   Pt will improve DHI score to </= 38 to demonstrate decreased symptoms of dizziness with activities of daily living  Progressing, not met   Pt to verbalize eye fatigue reduction strategies Progressing, not met   Pt will participate in remainder of VOMS with appropriate goals established Progressing, not met         Long term goals: to be met within 12 weeks Status    FGA to be assessed with goals established  Progressing, not met   Pt will report "always" or "frequently" no more than 5x on AVQ to demonstrate improved visual function  Progressing, not met   Pt will demonstrate improved balance confidence as evidenced by ABC score of >/= 75%  Progressing, not met   Pt will reach predicted FOTO outcome score of >/= 53 Progressing, not met   Pt will verbalize return to leisure activity of choice, such as leatherwork or increased tolerance to video games  Progressing, not met   Pt will participate in VOMS with symptoms not increasing > 2/10 with oculomotor activities  Progressing, not met            Plan      Certification Period/Plan of care expiration: 6/7/2024 to 09/06/2024.     Outpatient Occupational Therapy 2 times weekly for 12 weeks to include the following interventions: Neuromuscular Re-ed, Patient Education, Self Care, Therapeutic Activities, and Therapeutic Exercise.    Updates/Grading for next session: continue positional change activity for habituation     CHINA Santos OT       "

## 2024-07-08 ENCOUNTER — OFFICE VISIT (OUTPATIENT)
Dept: PSYCHIATRY | Facility: CLINIC | Age: 44
End: 2024-07-08
Payer: COMMERCIAL

## 2024-07-08 VITALS
DIASTOLIC BLOOD PRESSURE: 84 MMHG | BODY MASS INDEX: 38.89 KG/M2 | HEART RATE: 94 BPM | SYSTOLIC BLOOD PRESSURE: 131 MMHG | WEIGHT: 294.75 LBS

## 2024-07-08 DIAGNOSIS — F33.1 MAJOR DEPRESSIVE DISORDER, RECURRENT, MODERATE: Primary | ICD-10-CM

## 2024-07-08 DIAGNOSIS — F41.1 GENERALIZED ANXIETY DISORDER: ICD-10-CM

## 2024-07-08 DIAGNOSIS — G43.E11 CHRONIC MIGRAINE WITH AURA, INTRACTABLE, WITH STATUS MIGRAINOSUS: ICD-10-CM

## 2024-07-08 PROCEDURE — 99999 PR PBB SHADOW E&M-EST. PATIENT-LVL II: CPT | Mod: PBBFAC,,,

## 2024-07-08 PROCEDURE — 99214 OFFICE O/P EST MOD 30 MIN: CPT | Mod: S$GLB,,,

## 2024-07-08 RX ORDER — FLUOXETINE HYDROCHLORIDE 20 MG/1
20 CAPSULE ORAL DAILY
Qty: 30 CAPSULE | Refills: 11 | Status: SHIPPED | OUTPATIENT
Start: 2024-07-08 | End: 2024-07-08 | Stop reason: SDUPTHER

## 2024-07-08 RX ORDER — MIRTAZAPINE 15 MG/1
15 TABLET, FILM COATED ORAL NIGHTLY
Qty: 30 TABLET | Refills: 11 | Status: SHIPPED | OUTPATIENT
Start: 2024-07-08 | End: 2025-07-08

## 2024-07-08 RX ORDER — MIRTAZAPINE 15 MG/1
15 TABLET, FILM COATED ORAL NIGHTLY
Qty: 30 TABLET | Refills: 11 | Status: SHIPPED | OUTPATIENT
Start: 2024-07-08 | End: 2024-07-08 | Stop reason: SDUPTHER

## 2024-07-08 RX ORDER — FLUOXETINE HYDROCHLORIDE 20 MG/1
20 CAPSULE ORAL DAILY
Qty: 30 CAPSULE | Refills: 11 | Status: SHIPPED | OUTPATIENT
Start: 2024-07-08 | End: 2025-07-08

## 2024-07-08 NOTE — PROGRESS NOTES
Outpatient Psychiatry Follow-Up Visit (MD/NP)    7/8/2024    Clinical Status of Patient:  Outpatient (Ambulatory)    Chief Complaint:  Tony Gordillo is a 43 y.o. male who presents today for follow-up of depression and anxiety.  Met with patient.      Interval History and Content of Current Session:  Interim Events/Subjective Report/Content of Current Session:     Patient presents for follow up of depression and anxiety. Patient had a good 4th of July, he is getting very frustrated as the process of his disability paperwork has been dragging and the process has been ongoing for a long time with little change. Patient will be getting a shoulder repair soon, of late he has noticed that he is having negative sexual effects, patient suspects it is the prozac and states he generally does not advocate for himself but the problem has gotten so noticeable that he feels he must say something. Discussed options with patient, he would like to decrease the zoloft and trial remeron to help with his mood and sleep. At this time he denies any AH/VH/SI/HI. Patient has been seeing therapist, Herson Borrero, states it has been very helpful and he is starting to realize some of his triggers and help him out, he will be continuing with therapy.         Psychotherapy:  Target symptoms: depression, anxiety   Why chosen therapy is appropriate versus another modality: relevant to diagnosis  Outcome monitoring methods: self-report, observation  Therapeutic intervention type: insight oriented psychotherapy  Topics discussed/themes: building skills sets for symptom management, symptom recognition  The patient's response to the intervention is accepting. The patient's progress toward treatment goals is good.   Duration of intervention: 05 minutes.    Review of Systems   PSYCHIATRIC: Pertinant items are noted in the narrative.    Past Medical, Family and Social History: The patient's past medical, family and social history have been  reviewed and updated as appropriate within the electronic medical record - see encounter notes.    Compliance: yes    Side effects: None    Risk Parameters:  Patient reports no suicidal ideation  Patient reports no homicidal ideation  Patient reports no self-injurious behavior  Patient reports no violent behavior    Exam (detailed: at least 9 elements; comprehensive: all 15 elements)   Constitutional  Vitals:  Most recent vital signs, dated less than 90 days prior to this appointment, were reviewed.   There were no vitals filed for this visit.     General:  unremarkable, age appropriate     Musculoskeletal  Muscle Strength/Tone:  no tremor, no tic   Gait & Station:  non-ataxic, unsteady     Psychiatric  Speech:  no latency; no press   Mood & Affect:  steady, euthymic  congruent and appropriate   Thought Process:  normal and logical   Associations:  intact   Thought Content:  normal, no suicidality, no homicidality, delusions, or paranoia   Insight:  intact   Judgement: behavior is adequate to circumstances   Orientation:  grossly intact   Memory: intact for content of interview   Language: grossly intact   Attention Span & Concentration:  able to focus   Fund of Knowledge:  intact and appropriate to age and level of education     Assessment and Diagnosis   Status/Progress: Based on the examination today, the patient's problem(s) is/are improved.  New problems have been presented today.   Co-morbidities, Diagnostic uncertainty, and Lack of compliance are complicating management of the primary condition.  There are no active rule-out diagnoses for this patient at this time.     General Impression:   1. Major depressive disorder, recurrent, moderate        2. Generalized anxiety disorder        3. Chronic migraine with aura, intractable, with status migrainosus              Intervention/Counseling/Treatment Plan   Medication Management: Continue current medications. The risks and benefits of medication were discussed  with the patient.  Begin Remeron 15mg, assess efficacy, titrate as needed  Decrease prozac to 20mg  Continue psychotherapy      Labs: reviewed most recent. The treatment plan and follow up plan were reviewed with the patient. Discussed with patient informed consent, risks vs. benefits, alternative treatments, side effect profile and the inherent unpredictability of individual responses to these treatments. The patient expresses understanding of the above and displays the capacity to agree with this current plan and had no other questions. Encouraged Patient to keep future appointments. Take medications as prescribed and abstain from substance abuse. In the event of an emergency patient was advised to go to the emergency room.    Return to Clinic: 1 month, 2 months, as scheduled, as needed      I spent a total of 31 minutes on the day of the visit.  This includes face to face time and non-face to face time preparing to see the patient (eg, review of tests), obtaining and/or reviewing separately obtained history, documenting clinical information in the electronic or other health record, independently interpreting results and communicating results to the patient/family/caregiver, or care coordinator.

## 2024-07-09 DIAGNOSIS — M25.562 PAIN IN BOTH KNEES, UNSPECIFIED CHRONICITY: Primary | ICD-10-CM

## 2024-07-09 DIAGNOSIS — M25.561 PAIN IN BOTH KNEES, UNSPECIFIED CHRONICITY: Primary | ICD-10-CM

## 2024-07-09 NOTE — PROGRESS NOTES
Occupational Therapy Treatment Note     Date: 7/10/2024  Name: Tony Gordillo  Clinic Number: [id number]    Therapy Diagnosis:   No diagnosis found.      Physician: Damaso Cota MD    Physician Orders: Eval and treat - vestibular therapy   Medical Diagnosis:   S06.0X0S (ICD-10-CM) - Concussion without loss of consciousness, sequela  H55.81 (ICD-10-CM) - Saccadic eye movement deficiency      Evaluation Date: 6/7/2024  Plan of Care Expiration Period: 09/06/2024  Insurance Authorization period Expiration: 12/31/2024  Date of Return to MD: Dr. Cota in August  Visit # / Visits Authorized: 3/ 20   FOTO:        Time In: 1115  Time Out: 1200  Total Billable (one on one) Time: 45 minutes     Precautions: Standard, Fall, and history of several TBIs    Subjective     Pt reports: *** Doing alright; has slight limp after popping knee last week  He was compliant with home exercise program given last session.   Response to previous treatment: no complaint  Functional change: independence in hep     Pain: 7/10  Location: right knee    Objective        Tony participated in neuromuscular re-education activities to improve: oculomotor function for 25 minutes, including:   - Smooth pursuits 2x30 seconds in horizontal and vertical planes  - VOR x1: horizontal 1x30 seconds 70 bpm; vertical 1x30 seconds 70 bpm  - Saccades with x15 Spot It cards in horizontal plane identifying similar objects on each card for improved tolerance to oculomotor exercises   - Walking with head turns x2 in horizontal plane and x2 in vertical plane for habituation training within busy environment   - Saccades identifying corresponding letters and numbers on x2 busy cards on airex for habituation training within busy environment and improved tolerance to oculomotor exercises on unstable surface    Tony participated in dynamic functional therapeutic activities to improve functional performance for 20 minutes, including:  - Rolling soccer ball 2x10  repetitions back and forth while reciting nearest letter for habituation training for quick positional changes in vertical plane  - Positional change activity: tapping 2x15 blaze pods with alternating upper extremity and lower extremity for improved habituation of symptoms and improved balance with positional changes and single leg stance     Home Exercises and Education Provided      Education provided:   - Concussion management   - Progress towards goals    Written Home Exercises Provided: Patient instructed to cont prior HEP.  Exercises were reviewed and Tony was able to demonstrate them prior to the end of the session.    Tony demonstrated good  understanding of the education provided.     See EMR under Patient Instructions for exercises provided prior visit.    Assessment      Tony would continue to benefit from skilled OT. Pt reports blurriness and movement of objects in background during oculomotor exercises which triggered symptoms. Pt unable to complete full duration of all exercises due to presentation of exacerbated symptoms. Pt reported nausea triggered with VOR at 70 bpm in horizontal plane. Pt had mild sway and reported dizziness during horizontal walking with head turns. Pt unable to complete busy card on airex activity due to onset of nausea symptoms and bilateral knee pain. Pt able to tolerate soccer ball positional change activity well with no onset of symptoms.     Tony is progressing well towards his goals and there are no updates to goals at this time. Pt prognosis is Good.     Pt will continue to benefit from skilled outpatient occupational therapy to address the deficits listed in the problem list on initial evaluation provide pt/family education and to maximize pt's level of independence in the home and community environment.     Pt's spiritual, cultural and educational needs considered and pt agreeable to plan of care and goals.    Anticipated barriers to occupational therapy:  "none    Goals:  Short term goals: to be met within 6 weeks  Status   Pt to be modified independent in HEP with compliance of >/=80% for max carryover of therapeutic gains Progressing, not met   MCTSIB to be assessed with goals established  Progressing, not met   Pt will improve DHI score to </= 38 to demonstrate decreased symptoms of dizziness with activities of daily living  Progressing, not met   Pt to verbalize eye fatigue reduction strategies Progressing, not met   Pt will participate in remainder of VOMS with appropriate goals established Progressing, not met         Long term goals: to be met within 12 weeks Status    FGA to be assessed with goals established  Progressing, not met   Pt will report "always" or "frequently" no more than 5x on AVQ to demonstrate improved visual function  Progressing, not met   Pt will demonstrate improved balance confidence as evidenced by ABC score of >/= 75%  Progressing, not met   Pt will reach predicted FOTO outcome score of >/= 53 Progressing, not met   Pt will verbalize return to leisure activity of choice, such as leatherwork or increased tolerance to video games  Progressing, not met   Pt will participate in VOMS with symptoms not increasing > 2/10 with oculomotor activities  Progressing, not met            Plan      Certification Period/Plan of care expiration: 6/7/2024 to 09/06/2024.     Outpatient Occupational Therapy 2 times weekly for 12 weeks to include the following interventions: Neuromuscular Re-ed, Patient Education, Self Care, Therapeutic Activities, and Therapeutic Exercise.    Updates/Grading for next session: continue positional change activity for habituation     CHINA Santos, OT         "

## 2024-07-10 ENCOUNTER — CLINICAL SUPPORT (OUTPATIENT)
Dept: REHABILITATION | Facility: HOSPITAL | Age: 44
End: 2024-07-10
Payer: COMMERCIAL

## 2024-07-10 DIAGNOSIS — Z74.09 IMPAIRED MOBILITY AND ADLS: Primary | ICD-10-CM

## 2024-07-10 DIAGNOSIS — R26.89 IMBALANCE: ICD-10-CM

## 2024-07-10 DIAGNOSIS — Z78.9 IMPAIRED MOBILITY AND ADLS: Primary | ICD-10-CM

## 2024-07-10 PROCEDURE — 97112 NEUROMUSCULAR REEDUCATION: CPT | Mod: PN

## 2024-07-10 PROCEDURE — 97530 THERAPEUTIC ACTIVITIES: CPT | Mod: PN

## 2024-07-10 NOTE — PROGRESS NOTES
Occupational Therapy Treatment Note     Date: 7/10/2024  Name: Tony Gordillo  Clinic Number: [id number]    Therapy Diagnosis:   Encounter Diagnoses   Name Primary?    Impaired mobility and ADLs Yes    Imbalance          Physician: Damaso Cota MD    Physician Orders: Eval and treat - vestibular therapy   Medical Diagnosis:   S06.0X0S (ICD-10-CM) - Concussion without loss of consciousness, sequela  H55.81 (ICD-10-CM) - Saccadic eye movement deficiency      Evaluation Date: 6/7/2024  Plan of Care Expiration Period: 09/06/2024  Insurance Authorization period Expiration: 05/07/2025  Date of Return to MD: Dr. Cota in August  Visit # / Visits Authorized: 3 / 20   FOTO:        Time In: 1115  Time Out: 1200  Total Billable (one on one) Time: 45 minutes     Precautions: Standard, Fall, and history of several TBIs    Subjective     Pt reports: Doing alright; has significant limp after popping knee last week and re-twisting it yesterday  He was compliant with home exercise program given last session.   Response to previous treatment: no complaint  Functional change: independence in hep     Pain: 7/10  Location: right knee    Objective      Tony participated in neuromuscular re-education activities to improve: oculomotor function for 30 minutes, including:   - Smooth pursuits 2x30 seconds in horizontal and vertical planes  - VOR x1: horizontal 1x30 seconds 80 bpm; vertical 1x30 seconds 80 bpm  - Saccades with x15 Spot It cards in horizontal plane identifying similar objects on each card for improved tolerance to oculomotor exercises   - VOR x2: horizontal 2x30 seconds with no metronome  - Saccades identifying corresponding letters and numbers on x2 busy cards for habituation training within busy environment and improved tolerance to oculomotor exercises     Tony participated in dynamic functional therapeutic activities to improve functional performance for 15 minutes, including:  - Rolling soccer ball 2x10  repetitions back and forth while reciting nearest letter for habituation training for quick positional changes in vertical plane  - Optokinetic grocery store video simulation for improved habituation of attending grocery store and other busy environments for completing daily tasks    Home Exercises and Education Provided      Education provided:   - Concussion management   - Progress towards goals    Written Home Exercises Provided: Patient instructed to cont prior HEP.  Exercises were reviewed and Tony was able to demonstrate them prior to the end of the session.    Tony demonstrated good  understanding of the education provided.     See EMR under Patient Instructions for exercises provided prior visit.    Assessment      Tony would continue to benefit from skilled OT. Pt able to complete full duration of all oculomotor exercises with no presentation of exacerbated symptoms. Pt able to tolerate VOR x1 at 80 bpm in both horizontal and vertical planes. Pt unable to complete saccade busy card activity on airex due to significant right knee pain. Pt introduced to VOR x2 exercises with great tolerance. Pt tolerated optokinetic grocery store habituation video but reported slight increase in headache symptoms. Pt able to tolerate soccer ball positional change activity well with no onset of symptoms.     Tony is progressing well towards his goals and there are no updates to goals at this time. Pt prognosis is Good.     Pt will continue to benefit from skilled outpatient occupational therapy to address the deficits listed in the problem list on initial evaluation provide pt/family education and to maximize pt's level of independence in the home and community environment.     Pt's spiritual, cultural and educational needs considered and pt agreeable to plan of care and goals.    Anticipated barriers to occupational therapy: none    Goals:  Short term goals: to be met within 6 weeks  Status   Pt to be modified independent in  "HEP with compliance of >/=80% for max carryover of therapeutic gains Progressing, not met   MCTSIB to be assessed with goals established  Progressing, not met   Pt will improve DHI score to </= 38 to demonstrate decreased symptoms of dizziness with activities of daily living  Progressing, not met   Pt to verbalize eye fatigue reduction strategies Progressing, not met   Pt will participate in remainder of VOMS with appropriate goals established Progressing, not met         Long term goals: to be met within 12 weeks Status    FGA to be assessed with goals established  Progressing, not met   Pt will report "always" or "frequently" no more than 5x on AVQ to demonstrate improved visual function  Progressing, not met   Pt will demonstrate improved balance confidence as evidenced by ABC score of >/= 75%  Progressing, not met   Pt will reach predicted FOTO outcome score of >/= 53 Progressing, not met   Pt will verbalize return to leisure activity of choice, such as leatherwork or increased tolerance to video games  Progressing, not met   Pt will participate in VOMS with symptoms not increasing > 2/10 with oculomotor activities  Progressing, not met            Plan      Certification Period/Plan of care expiration: 6/7/2024 to 09/06/2024.     Outpatient Occupational Therapy 2 times weekly for 12 weeks to include the following interventions: Neuromuscular Re-ed, Patient Education, Self Care, Therapeutic Activities, and Therapeutic Exercise.    Updates/Grading for next session: continue positional change activity for habituation     CHINA Santos OT         "

## 2024-07-11 ENCOUNTER — OFFICE VISIT (OUTPATIENT)
Dept: FAMILY MEDICINE | Facility: CLINIC | Age: 44
End: 2024-07-11
Payer: COMMERCIAL

## 2024-07-11 VITALS
HEART RATE: 104 BPM | DIASTOLIC BLOOD PRESSURE: 80 MMHG | SYSTOLIC BLOOD PRESSURE: 138 MMHG | HEIGHT: 73 IN | BODY MASS INDEX: 39.6 KG/M2 | WEIGHT: 298.81 LBS | OXYGEN SATURATION: 97 % | TEMPERATURE: 99 F

## 2024-07-11 DIAGNOSIS — M25.611 DECREASED RANGE OF MOTION OF RIGHT SHOULDER: ICD-10-CM

## 2024-07-11 DIAGNOSIS — Z01.818 PREOPERATIVE CLEARANCE: Primary | ICD-10-CM

## 2024-07-11 DIAGNOSIS — I10 ESSENTIAL HYPERTENSION: ICD-10-CM

## 2024-07-11 DIAGNOSIS — Z79.4 TYPE 2 DIABETES MELLITUS WITH LEFT EYE AFFECTED BY MILD NONPROLIFERATIVE RETINOPATHY WITHOUT MACULAR EDEMA, WITH LONG-TERM CURRENT USE OF INSULIN: ICD-10-CM

## 2024-07-11 DIAGNOSIS — E11.3292 TYPE 2 DIABETES MELLITUS WITH LEFT EYE AFFECTED BY MILD NONPROLIFERATIVE RETINOPATHY WITHOUT MACULAR EDEMA, WITH LONG-TERM CURRENT USE OF INSULIN: ICD-10-CM

## 2024-07-11 PROCEDURE — 99999 PR PBB SHADOW E&M-EST. PATIENT-LVL V: CPT | Mod: PBBFAC,,,

## 2024-07-11 PROCEDURE — 99214 OFFICE O/P EST MOD 30 MIN: CPT | Mod: S$GLB,,,

## 2024-07-11 NOTE — PROGRESS NOTES
HPI     Chief Complaint:  Chief Complaint   Patient presents with    Pre-op Exam       Tony Gordillo is a 43 y.o. male with multiple medical diagnoses as listed in the medical history and problem list that presents for   Chief Complaint   Patient presents with    Pre-op Exam    .     Patient is know to me with his last appointment with me on 3/12/2024.     HPI    This patient is not new to me  Procedure to be performed: right shoulder, 7/23  Pt states that he has had no limitations in activities.   he has had prior left knee x 3, right knee, right shoulder surgery without any perioperative complications. Denies issues with anesthesia.    Patient is not a smoker.  Does not take any blood-thinning medications.  is able to   climb one flight of stairs  without getting CP/SOB/PALACIOS.  No personal Hx of cardiac diseases  Denies F/C/N/V/palpitations/claudication.  Denies weakness/tingling/numbness/vertigo/unsteadiness/changes in mental status/blackouts.      Preop Assessment    Pulmonary risk factors:  Reports NAINA   Systemic risk factors: Reports Diabetes Mellitus Type 2, hypertension    Revised Cardiac Risk Index for Pre-Operative Risk = 6.0%    Preoperative Mortality Predictor () Score = 4 points    The 10-year ASCVD risk score (Shahzad TREVIÑO, et al., 2019) is: 6%    Values used to calculate the score:      Age: 43 years      Sex: Male      Is Non- : No      Diabetic: Yes      Tobacco smoker: Yes      Systolic Blood Pressure: 138 mmHg      Is BP treated: Yes      HDL Cholesterol: 57 mg/dL      Total Cholesterol: 142 mg/dL    ROS, Vitals reviewed.   Patient has acceptable exercise capacity as demonstrated in the office today.    Able to achieve 4 METs. RSI Class III (6.6% risk).    Advised healthy diet/exercise/weight loss in anticipation of surgery  Hold NSAIDs for 5 days prior to surgery    EKG today showed NSR, no ST changes, no QRS or Twave abnormalities, normal R-wave  progression.    Patient has no hx of nor symptoms suggestive of myocardial ischemia, heart failure, arrhythmia and CVA.     DIABETICS:  Pt is diabetic but is not on insulin.   Decrease dose of basal insulin by 25% on day of surgery.  Recommend to have pt receive 25% of usual long acting insulin dose on day of surgery.  Hold metformin and sulfonylureas the morning of surgery.   Hold SGLT2 inhibitor 3 days prior to surgery.   Surgery team to monitor and adjust insulin with insulin pump.    No pre-op labs required by surgeon, but surgeon may order any tests at his/her discretion    Labs reviewed:  Creatinine is below 2 mg/dl.    A1C 5.7    pt is optimized for surgery from a primary care standpoint and is at low risk for davy-operative CV event.        Assessment & Plan     Problem List Items Addressed This Visit          Cardiac/Vascular    Essential hypertension  BP in clinic today 138/80. The current medical regimen is effective;  continue present plan and medications.         Endocrine    Type 2 diabetes mellitus with left eye affected by mild nonproliferative retinopathy without macular edema, with long-term current use of insulin  Stable. Followed by endocrinology.   Hemoglobin A1C   Date Value Ref Range Status   05/08/2024 5.7 (H) 4.0 - 5.6 % Final     Comment:     ADA Screening Guidelines:  5.7-6.4%  Consistent with prediabetes  >or=6.5%  Consistent with diabetes    High levels of fetal hemoglobin interfere with the HbA1C  assay. Heterozygous hemoglobin variants (HbS, HgC, etc)do  not significantly interfere with this assay.   However, presence of multiple variants may affect accuracy.     01/04/2024 5.9 (H) 4.0 - 5.6 % Final     Comment:     ADA Screening Guidelines:  5.7-6.4%  Consistent with prediabetes  >or=6.5%  Consistent with diabetes    High levels of fetal hemoglobin interfere with the HbA1C  assay. Heterozygous hemoglobin variants (HbS, HgC, etc)do  not significantly interfere with this assay.    However, presence of multiple variants may affect accuracy.     10/10/2023 6.3 (H) 4.0 - 5.6 % Final     Comment:     ADA Screening Guidelines:  5.7-6.4%  Consistent with prediabetes  >or=6.5%  Consistent with diabetes    High levels of fetal hemoglobin interfere with the HbA1C  assay. Heterozygous hemoglobin variants (HbS, HgC, etc)do  not significantly interfere with this assay.   However, presence of multiple variants may affect accuracy.              Orthopedic    Decreased range of motion of right shoulder  Scheduled for right shoulder surgery 7/23. Pt is cleared for shoulder surgery from a primary care standpoint.      Other Visit Diagnoses       Preoperative clearance    -  Primary  Scheduled for right shoulder surgery 7/23. CBC and CMP WNL. EKG NSR. Diabetes controlled, BP controlled. Pt is cleared for shoulder surgery from a primary care standpoint.               --------------------------------------------      Health Maintenance:  Health Maintenance         Date Due Completion Date    Diabetes Urine Screening 07/06/2024 7/6/2023    Lipid Panel 07/06/2024 7/6/2023    Override on 7/11/2015: Done ([facility] RESULTS - DR. MANN)    Pneumococcal Vaccines (Age 0-64) (2 of 2 - PCV) 03/12/2025 (Originally 9/28/2019) 9/28/2018    Influenza Vaccine (1) 09/01/2024 10/5/2023 (Declined)    Override on 10/5/2023: Declined    Foot Exam 09/26/2024 9/26/2023 (Done)    Override on 9/26/2023: Done    Override on 12/11/2012: Done    Hemoglobin A1c 11/08/2024 5/8/2024    Override on 7/11/2015: Done (HGB A1C 9.9H - [facility] - DR. MANN)    Eye Exam 01/24/2025 1/24/2024    Low Dose Statin 07/11/2025 7/11/2024    TETANUS VACCINE 07/18/2029 7/18/2019            Health maintenance reviewed    Follow Up:  No follow-ups on file.    Exam     Review of Systems:  (as noted above)  Review of Systems    Physical Exam:   Physical Exam  Constitutional:       Appearance: Normal appearance. He is  "obese.   Cardiovascular:      Rate and Rhythm: Normal rate and regular rhythm.   Pulmonary:      Effort: Pulmonary effort is normal.      Breath sounds: Normal breath sounds.   Musculoskeletal:      Right shoulder: Bony tenderness present. Decreased range of motion. Decreased strength.   Neurological:      Mental Status: He is alert.       Vitals:    07/11/24 1429   BP: 138/80   BP Location: Right arm   Patient Position: Sitting   BP Method: Large (Manual)   Pulse: 104   Temp: 98.8 °F (37.1 °C)   TempSrc: Oral   SpO2: 97%   Weight: 135.6 kg (298 lb 13.3 oz)   Height: 6' 1" (1.854 m)      Body mass index is 39.43 kg/m².        History     Past Medical History:  Past Medical History:   Diagnosis Date    Diabetes mellitus, type 2     Hyperlipidemia     Hypertension     Obesity     NAINA (obstructive sleep apnea)        Past Surgical History:  Past Surgical History:   Procedure Laterality Date    ARTHROSCOPIC DEBRIDEMENT OF SHOULDER  03/14/2023    Procedure: DEBRIDEMENT, SHOULDER, ARTHROSCOPIC;  Surgeon: Crissy Bradford MD;  Location: [facility] OR;  Service: Orthopedics;;    ARTHROSCOPIC REPAIR OF ROTATOR CUFF OF SHOULDER Right 03/14/2023    Procedure: REPAIR, ROTATOR CUFF, ARTHROSCOPIC;  Surgeon: Crissy Bradford MD;  Location: [facility] OR;  Service: Orthopedics;  Laterality: Right;  KARY PAYNE [phone]. TEXTED ELMER ON 3/9/2023 @ 12:10PM. ELMER RESPONDED ON 3/9/23 @ 12:23PM-SAG  RN PREOP 3/10/2023---CLEARED BY PCP AND CARDS    ARTHROSCOPY,SHOULDER,WITH BICEPS TENODESIS  03/14/2023    Procedure: ARTHROSCOPY,SHOULDER,WITH BICEPS TENODESIS;  Surgeon: Crissy Bradford MD;  Location: [facility] OR;  Service: Orthopedics;;    KNEE ARTHROSCOPY W/ MENISCECTOMY Right 10/24/2023    Procedure: ARTHROSCOPY, KNEE, WITH MENISCECTOMY;  Surgeon: Crissy Bradford MD;  Location: [facility] OR;  Service: Orthopedics;  Laterality: Right;  RN PREOP 10/18/203---CLEARED BY PCP  ELVIA [phone]    KNEE SURGERY      " acl,mcl,pcl    left knee x 2      PRK  Bilateral 2010    SUBCHONDROPLASTY Right 10/24/2023    Procedure: POSSIBLE SUBCHONDROPLASTY;  Surgeon: Crissy Bradford MD;  Location: 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     Employer: [employer]   Tobacco Use    Smoking status: Some Days     Types: Cigars     Passive exposure: Never    Smokeless tobacco: Never    Tobacco comments:     Has not had any cigars this year   Substance and Sexual Activity    Alcohol use: Yes     Comment: 1-2 beers every 2 weeks    Drug use: Yes     Types: Marijuana     Comment: Non-prescription cannabis     Social Determinants of Health     Financial Resource Strain: Patient Declined (12/5/2023)    Overall Financial Resource Strain (CARDIA)     Difficulty of Paying Living Expenses: Patient declined   Food Insecurity: Patient Declined (12/5/2023)    Hunger Vital Sign     Worried About Running Out of Food in the Last Year: Patient declined     Ran Out of Food in the Last Year: Patient declined   Transportation Needs: Patient Declined (12/5/2023)    PRAPARE - Transportation     Lack of Transportation (Medical): Patient declined     Lack of Transportation (Non-Medical): Patient declined   Physical Activity: Sufficiently Active (12/5/2023)    Exercise Vital Sign     Days of Exercise per Week: 4 days     Minutes of Exercise per Session: 100 min   Stress: Patient Declined (12/5/2023)    [employer] - Occupational Stress Questionnaire     Feeling of Stress : Patient declined   Recent Concern: Stress - Stress Concern Present (10/10/2023)    [employer] - Occupational Stress Questionnaire     Feeling of Stress : To some extent   Housing Stability: Unknown (12/5/2023)    Housing Stability  Vital Sign     Unable to Pay for Housing in the Last Year: Patient refused     Unstable Housing in the Last Year: Patient refused   Recent Concern: Housing Stability - High Risk (10/10/2023)    Housing Stability Vital Sign     Unable to Pay for Housing in the Last Year: Yes     Number of Places Lived in the Last Year: 1     Unstable Housing in the Last Year: No       Family History:  Family History   Problem Relation Name Age of Onset    Diabetes Mother      Diabetes Father      No Known Problems Brother      Diabetes Maternal Grandmother      No Known Problems Maternal Grandfather      No Known Problems Paternal Grandmother      No Known Problems Paternal Grandfather      No Known Problems Daughter adopted     Autism Son adopted     No Known Problems Maternal Aunt      No Known Problems Maternal Uncle      No Known Problems Paternal Aunt      No Known Problems Paternal Uncle      No Known Problems Other         Allergies and Medications: (updated and reviewed)  Review of patient's allergies indicates:   Allergen Reactions    Influenza virus vaccine, specific Hives    Pioglitazone      Altered mood     Victoza [liraglutide] Nausea And Vomiting    Farxiga [dapagliflozin] Rash     Erectile dysfunction and rash      Current Outpatient Medications   Medication Sig Dispense Refill    ammonium lactate 12 % Crea Apply 1 application  topically once daily. 140 g 5    ARIPiprazole (ABILIFY) 2 MG Tab Take 1 tablet (2 mg total) by mouth once daily. 30 tablet 11    atorvastatin (LIPITOR) 40 MG tablet TAKE 1 TABLET BY MOUTH EVERY DAY 90 tablet 1    azelastine (ASTELIN) 137 mcg (0.1 %) nasal spray Use 1-2 sprays in each nostril twice daily 90 mL 3    blood sugar diagnostic Strp To check BG 4 times daily, to use with insurance preferred meter 400 strip 3    blood sugar diagnostic Strp OneTouch Ultra Test strips      blood-glucose meter kit OneTouch Ultra2 Meter kit      blood-glucose sensor (DEXCOM G6 SENSOR) Filomena Change sensor  every 10 days 3 each 11    blood-glucose transmitter (DEXCOM G6 TRANSMITTER) Filomena Change every 3 months 1 each 3    cyanocobalamin, vitamin B-12, 5,000 mcg Subl Place one under tongue once a day for 1 month, then continue to take under tongue per week 30 tablet 3    empagliflozin (JARDIANCE) 25 mg tablet Take 1 tablet (25 mg total) by mouth once daily. 90 tablet 2    EPINEPHrine (EPIPEN 2-AYALA) 0.3 mg/0.3 mL AtIn Inject 0.3 mLs (0.3 mg total) into the muscle as needed (Anaphylaxis). 2 each 0    FLUoxetine 20 MG capsule Take 1 capsule (20 mg total) by mouth once daily. 30 capsule 11    fluticasone propionate (FLONASE) 50 mcg/actuation nasal spray 1 spray (50 mcg total) by Each Nostril route once daily. 48 g 5    insulin aspart U-100 (NOVOLOG U-100 INSULIN ASPART) 100 unit/mL injection MAX DAILY DOSE 160 UNITS IN INSULIN PUMP. 40 mL 7    insulin aspart U-100 (NOVOLOG) 100 unit/mL (3 mL) InPn pen INJECT 15-30 UNITS BEFORE EACH MEAL WITH SLIDNING SCALE, MAX DAILY DOSE 100 UNITS. Use in the event of insulin pump failure 30 mL 5    insulin glargine U-300 conc (TOUJEO MAX U-300 SOLOSTAR) 300 unit/mL (3 mL) insulin pen Inject 30 Units into the skin once daily. 3 pen 5    insulin pump cart,automated,BT (OMNIPOD 5 G6 PODS, GEN 5,) Crtg Inject 1 each into the skin once daily. 30 each 11    L-METHYLFOLATE 15 mg Tab TAKE 15 MG BY MOUTH ONCE DAILY. 30 tablet 11    lamoTRIgine (LAMICTAL) 100 MG tablet TAKE 1 AND 1/2 TABLETS BY MOUTH ONCE DAILY. 135 tablet 3    lancets Misc To check BG 4 times daily, to use with insurance preferred meter 400 each 3    levothyroxine (SYNTHROID) 50 MCG tablet TAKE 1 TABLET BY MOUTH BEFORE BREAKFAST. 90 tablet 3    lisinopriL (PRINIVIL,ZESTRIL) 20 MG tablet TAKE 1 TABLET BY MOUTH EVERY DAY 90 tablet 0    loratadine (CLARITIN) 10 mg tablet Take 1 tablet (10 mg total) by mouth once daily. 90 tablet 3    magnesium oxide (MAG-OX) 400 mg (241.3 mg magnesium) tablet Take 1 tablet (400 mg total) by mouth once  "daily. 90 tablet 1    mirtazapine (REMERON) 15 MG tablet Take 1 tablet (15 mg total) by mouth every evening. 30 tablet 11    modafiniL (PROVIGIL) 100 MG Tab Take 1 tablet (100 mg total) by mouth every morning. 90 tablet 1    ondansetron (ZOFRAN-ODT) 4 MG TbDL Take 1 tablet (4 mg total) by mouth every 8 (eight) hours as needed (nausea). 20 tablet 0    pen needle, diabetic (BD ULTRA-FINE EKN PEN NEEDLE) 32 gauge x 5/32" Ndle 1 Device by Misc.(Non-Drug; Combo Route) route 5 (five) times daily. 550 each 4    syringe, disposable, 1 mL Syrg Testosterone every 2 weeks 25 Syringe 1    tirzepatide 10 mg/0.5 mL PnIj Inject 10 mg into the skin every 7 days. 4 Pen 3    meloxicam (MOBIC) 15 MG tablet Take 1 tablet (15 mg total) by mouth once daily. 30 tablet 1     No current facility-administered medications for this visit.       Patient Care Team:  Jovany Ford MD as PCP - General (Internal Medicine)  Janneth Johnson, RN (Inactive) as Registered Nurse (Diabetes)  Rocky Hewitt MD as Consulting Physician (Ophthalmology)  Preethi Monaco RD (Inactive) as Dietitian (Nutrition)  Christofer Rowley MD as Consulting Physician (Orthopedic Surgery)  Singh Rizo MD as Consulting Physician (Sports Medicine)  Shilpa Gordon NP as Nurse Practitioner (Urology)  Nicole Wynn MD (Family Medicine)  Rafael Meraz MA as Care Coordinator  Beatris Nixon RD as Dietitian (Diabetes)  Reynaldo Delacruz OD as Consulting Physician (Ophthalmology)         - The patient is given an After Visit Summary that lists all medications with directions, allergies, education, orders placed during this encounter and follow-up instructions.      - I have reviewed the patient's medical information including past medical, family, and social history sections including the medications and allergies.      - We discussed the patient's current medications.     This note was created by combination of typed  and MModal " dictation.  Transcription errors may be present.  If there are any questions, please contact me.       Monica Welch NP

## 2024-07-12 ENCOUNTER — OFFICE VISIT (OUTPATIENT)
Dept: ORTHOPEDICS | Facility: CLINIC | Age: 44
End: 2024-07-12
Payer: COMMERCIAL

## 2024-07-12 ENCOUNTER — HOSPITAL ENCOUNTER (OUTPATIENT)
Dept: RADIOLOGY | Facility: HOSPITAL | Age: 44
Discharge: HOME | End: 2024-07-12
Attending: ORTHOPAEDIC SURGERY
Payer: COMMERCIAL

## 2024-07-12 VITALS — BODY MASS INDEX: 39.69 KG/M2 | WEIGHT: 299.5 LBS | HEIGHT: 73 IN

## 2024-07-12 DIAGNOSIS — M25.562 PAIN IN BOTH KNEES, UNSPECIFIED CHRONICITY: ICD-10-CM

## 2024-07-12 DIAGNOSIS — M25.561 PAIN IN BOTH KNEES, UNSPECIFIED CHRONICITY: ICD-10-CM

## 2024-07-12 DIAGNOSIS — M17.0 PRIMARY OSTEOARTHRITIS OF BOTH KNEES: Primary | ICD-10-CM

## 2024-07-12 PROCEDURE — 99999 PR PBB SHADOW E&M-EST. PATIENT-LVL V: CPT | Mod: PBBFAC,,, | Performed by: ORTHOPAEDIC SURGERY

## 2024-07-12 PROCEDURE — 99214 OFFICE O/P EST MOD 30 MIN: CPT | Mod: S$GLB,,, | Performed by: ORTHOPAEDIC SURGERY

## 2024-07-12 PROCEDURE — 73564 X-RAY EXAM KNEE 4 OR MORE: CPT | Mod: TC,50

## 2024-07-12 PROCEDURE — 73564 X-RAY EXAM KNEE 4 OR MORE: CPT | Mod: 26,50,, | Performed by: RADIOLOGY

## 2024-07-12 RX ORDER — MELOXICAM 15 MG/1
15 TABLET ORAL DAILY
Qty: 30 TABLET | Refills: 1 | Status: SHIPPED | OUTPATIENT
Start: 2024-07-12 | End: 2024-07-16

## 2024-07-15 ENCOUNTER — HOSPITAL ENCOUNTER (OUTPATIENT)
Dept: PREADMISSION TESTING | Facility: HOSPITAL | Age: 44
Discharge: HOME | End: 2024-07-15
Attending: ORTHOPAEDIC SURGERY
Payer: COMMERCIAL

## 2024-07-15 ENCOUNTER — OFFICE VISIT (OUTPATIENT)
Dept: PAIN MEDICINE | Facility: CLINIC | Age: 44
End: 2024-07-15
Payer: COMMERCIAL

## 2024-07-15 VITALS
WEIGHT: 300.81 LBS | BODY MASS INDEX: 40.24 KG/M2 | DIASTOLIC BLOOD PRESSURE: 82 MMHG | HEART RATE: 87 BPM | OXYGEN SATURATION: 96 % | SYSTOLIC BLOOD PRESSURE: 147 MMHG | RESPIRATION RATE: 18 BRPM

## 2024-07-15 VITALS — BODY MASS INDEX: 39.76 KG/M2 | WEIGHT: 300 LBS | HEIGHT: 73 IN

## 2024-07-15 DIAGNOSIS — M25.562 BILATERAL CHRONIC KNEE PAIN: Primary | ICD-10-CM

## 2024-07-15 DIAGNOSIS — M17.0 PRIMARY OSTEOARTHRITIS OF BOTH KNEES: ICD-10-CM

## 2024-07-15 DIAGNOSIS — Z01.818 PREOP TESTING: Primary | ICD-10-CM

## 2024-07-15 DIAGNOSIS — G89.29 BILATERAL CHRONIC KNEE PAIN: Primary | ICD-10-CM

## 2024-07-15 DIAGNOSIS — M25.561 BILATERAL CHRONIC KNEE PAIN: Primary | ICD-10-CM

## 2024-07-15 PROCEDURE — 99204 OFFICE O/P NEW MOD 45 MIN: CPT | Mod: S$GLB,,, | Performed by: PAIN MEDICINE

## 2024-07-15 PROCEDURE — 99999 PR PBB SHADOW E&M-EST. PATIENT-LVL V: CPT | Mod: PBBFAC,,, | Performed by: PAIN MEDICINE

## 2024-07-15 NOTE — PROGRESS NOTES
Subjective:     Patient ID: Tony Gordillo is a 43 y.o. male    Chief Complaint: Knee Pain (Bilateral constant achy pain)      Referred by: Miguel Dimas MD      HPI:    Initial Encounter (7/15/24):  Tony Gordillo is a 43 y.o. male who presents today with chronic bilateral knee pain.  Patient has had multiple surgeries to both knees.  Is followed by Orthopedic surgery.  Was told that he may be a candidate for knee replacement surgery, but other options should be exhausted 1st.  He has tried and failed intra-articular steroid and viscosupplementation injections.  The pain is constant and worsened with activity.   This pain is described in detail below.    Physical Therapy:  Yes.    Non-pharmacologic Treatment:  Rest helps         TENS?  No    Pain Medications:         Currently taking:  Meloxicam    Has tried in the past:  NSAIDs, Tylenol, opioids    Has not tried:  Muscle relaxants, TCAs, SNRIs, anticonvulsants, topical creams    Blood thinners:  None    Interventional Therapies:  None    Relevant Surgeries:    Multiple bilateral knee surgeries    Affecting sleep?  Yes    Affecting daily activities? yes    Depressive symptoms? No          SI/HI? No    Work status: Unemployed    Pain Scores:    Best:       4/10  Worst:     9/10  Usually:   6/10  Today:    6/10    Pain Disability Index  Family/Home Responsibilities:: 9  Social Activity:: 6  Sexual Behavior:: 10  Self Care:: 7  Life-Support Activities:: 10    Review of Systems   Constitutional:  Negative for activity change, appetite change, chills, fatigue, fever and unexpected weight change.   HENT:  Negative for hearing loss.    Eyes:  Negative for visual disturbance.   Respiratory:  Negative for chest tightness and shortness of breath.    Cardiovascular:  Negative for chest pain.   Gastrointestinal:  Negative for abdominal pain, constipation, diarrhea, nausea and vomiting.   Genitourinary:  Negative for difficulty urinating.   Musculoskeletal:  Positive  for arthralgias, back pain, gait problem and myalgias. Negative for neck pain.   Skin:  Negative for rash.   Neurological:  Negative for dizziness, weakness, light-headedness, numbness and headaches.   Psychiatric/Behavioral:  Positive for sleep disturbance. Negative for hallucinations and suicidal ideas. The patient is not nervous/anxious.        Past Medical History:   Diagnosis Date    Diabetes mellitus, type 2     Hyperlipidemia     Hypertension     Obesity     NAINA (obstructive sleep apnea)        Past Surgical History:   Procedure Laterality Date    ARTHROSCOPIC DEBRIDEMENT OF SHOULDER  03/14/2023    Procedure: DEBRIDEMENT, SHOULDER, ARTHROSCOPIC;  Surgeon: Crissy Bradford MD;  Location: [facility] OR;  Service: Orthopedics;;    ARTHROSCOPIC REPAIR OF ROTATOR CUFF OF SHOULDER Right 03/14/2023    Procedure: REPAIR, ROTATOR CUFF, ARTHROSCOPIC;  Surgeon: Crissy Bradford MD;  Location: [facility] OR;  Service: Orthopedics;  Laterality: Right;  KARY PAYNE [phone]. TEXTED ELMER ON 3/9/2023 @ 12:10PM. ELMER RESPONDED ON 3/9/23 @ 12:23PM-SAG  RN PREOP 3/10/2023---CLEARED BY PCP AND CARDS    ARTHROSCOPY,SHOULDER,WITH BICEPS TENODESIS  03/14/2023    Procedure: ARTHROSCOPY,SHOULDER,WITH BICEPS TENODESIS;  Surgeon: Crissy Bradford MD;  Location: [facility] OR;  Service: Orthopedics;;    KNEE ARTHROSCOPY W/ MENISCECTOMY Right 10/24/2023    Procedure: ARTHROSCOPY, KNEE, WITH MENISCECTOMY;  Surgeon: Crissy Bradford MD;  Location: [facility] OR;  Service: Orthopedics;  Laterality: Right;  RN PREOP 10/18/203---CLEARED BY PCP  ELVIA [phone]    KNEE SURGERY      acl,mcl,pcl    left knee x 2      PRK  Bilateral 2010    SUBCHONDROPLASTY Right 10/24/2023    Procedure: POSSIBLE SUBCHONDROPLASTY;  Surgeon: Crissy Bradford MD;  Location: [facility] OR;  Service: Orthopedics;  Laterality: Right;       Social History     Socioeconomic History    Marital status:    Occupational History    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     Employer: [employer]   Tobacco Use    Smoking status: Some Days     Types: Cigars     Passive exposure: Never    Smokeless tobacco: Never    Tobacco comments:     Has not had any cigars this year   Substance and Sexual Activity    Alcohol use: Yes     Comment: 1-2 beers every 2 weeks    Drug use: Yes     Types: Marijuana     Comment: Non-prescription cannabis     Social Determinants of Health     Financial Resource Strain: Patient Declined (12/5/2023)    Overall Financial Resource Strain (CARDIA)     Difficulty of Paying Living Expenses: Patient declined   Food Insecurity: Patient Declined (12/5/2023)    Hunger Vital Sign     Worried About Running Out of Food in the Last Year: Patient declined     Ran Out of Food in the Last Year: Patient declined   Transportation Needs: Patient Declined (12/5/2023)    PRAPARE - Transportation     Lack of Transportation (Medical): Patient declined     Lack of Transportation (Non-Medical): Patient declined   Physical Activity: Sufficiently Active (12/5/2023)    Exercise Vital Sign     Days of Exercise per Week: 4 days     Minutes of Exercise per Session: 100 min   Stress: Patient Declined (12/5/2023)    Citizen of [place] of [employer] - Occupational Stress Questionnaire     Feeling of Stress : Patient declined   Recent Concern: Stress - Stress Concern Present (10/10/2023)    Citizen of [place] of [employer]ational [employer] - Occupational Stress Questionnaire     Feeling of Stress : To some extent   Housing Stability: Unknown (12/5/2023)    Housing Stability Vital Sign     Unable to Pay for Housing in the Last Year: Patient refused     Unstable Housing in the Last Year: Patient refused   Recent Concern: Housing Stability - High Risk (10/10/2023)    Housing Stability Vital Sign     Unable to Pay for Housing in the Last Year: Yes     Number of Places Lived in the Last Year: 1     Unstable Housing in the Last Year: No        Review of patient's allergies indicates:   Allergen Reactions    Influenza virus vaccine, specific Hives    Pioglitazone      Altered mood     Victoza [liraglutide] Nausea And Vomiting    Farxiga [dapagliflozin] Rash     Erectile dysfunction and rash        Current Outpatient Medications on File Prior to Visit   Medication Sig Dispense Refill    ammonium lactate 12 % Crea Apply 1 application  topically once daily. 140 g 5    ARIPiprazole (ABILIFY) 2 MG Tab Take 1 tablet (2 mg total) by mouth once daily. 30 tablet 11    atorvastatin (LIPITOR) 40 MG tablet TAKE 1 TABLET BY MOUTH EVERY DAY 90 tablet 1    azelastine (ASTELIN) 137 mcg (0.1 %) nasal spray Use 1-2 sprays in each nostril twice daily 90 mL 3    blood sugar diagnostic Strp To check BG 4 times daily, to use with insurance preferred meter 400 strip 3    blood sugar diagnostic Strp OneTouch Ultra Test strips      blood-glucose meter kit OneTouch Ultra2 Meter kit      blood-glucose sensor (DEXCOM G6 SENSOR) Filomena Change sensor every 10 days 3 each 11    blood-glucose transmitter (DEXCOM G6 TRANSMITTER) Filomena Change every 3 months 1 each 3    cyanocobalamin, vitamin B-12, 5,000 mcg Subl Place one under tongue once a day for 1 month, then continue to take under tongue per week 30 tablet 3    empagliflozin (JARDIANCE) 25 mg tablet Take 1 tablet (25 mg total) by mouth once daily. 90 tablet 2    EPINEPHrine (EPIPEN 2-AYALA) 0.3 mg/0.3 mL AtIn Inject 0.3 mLs (0.3 mg total) into the muscle as needed (Anaphylaxis). 2 each 0    FLUoxetine 20 MG capsule Take 1 capsule (20 mg total) by mouth once daily. 30 capsule 11    fluticasone propionate (FLONASE) 50 mcg/actuation nasal spray 1 spray (50 mcg total) by Each Nostril route once daily. 48 g 5    insulin aspart U-100 (NOVOLOG U-100 INSULIN ASPART) 100 unit/mL injection MAX DAILY DOSE 160 UNITS IN INSULIN PUMP. 40 mL 7    insulin aspart U-100 (NOVOLOG) 100 unit/mL (3 mL) InPn pen INJECT 15-30 UNITS BEFORE EACH MEAL WITH  "SLIDNING SCALE, MAX DAILY DOSE 100 UNITS. Use in the event of insulin pump failure 30 mL 5    insulin glargine U-300 conc (TOUJEO MAX U-300 SOLOSTAR) 300 unit/mL (3 mL) insulin pen Inject 30 Units into the skin once daily. 3 pen 5    insulin pump cart,automated,BT (OMNIPOD 5 G6 PODS, GEN 5,) Crtg Inject 1 each into the skin once daily. 30 each 11    L-METHYLFOLATE 15 mg Tab TAKE 15 MG BY MOUTH ONCE DAILY. 30 tablet 11    lamoTRIgine (LAMICTAL) 100 MG tablet TAKE 1 AND 1/2 TABLETS BY MOUTH ONCE DAILY. 135 tablet 3    lancets Misc To check BG 4 times daily, to use with insurance preferred meter 400 each 3    levothyroxine (SYNTHROID) 50 MCG tablet TAKE 1 TABLET BY MOUTH BEFORE BREAKFAST. 90 tablet 3    lisinopriL (PRINIVIL,ZESTRIL) 20 MG tablet TAKE 1 TABLET BY MOUTH EVERY DAY 90 tablet 0    loratadine (CLARITIN) 10 mg tablet Take 1 tablet (10 mg total) by mouth once daily. 90 tablet 3    magnesium oxide (MAG-OX) 400 mg (241.3 mg magnesium) tablet Take 1 tablet (400 mg total) by mouth once daily. 90 tablet 1    meloxicam (MOBIC) 15 MG tablet Take 1 tablet (15 mg total) by mouth once daily. 30 tablet 1    mirtazapine (REMERON) 15 MG tablet Take 1 tablet (15 mg total) by mouth every evening. 30 tablet 11    modafiniL (PROVIGIL) 100 MG Tab Take 1 tablet (100 mg total) by mouth every morning. 90 tablet 1    ondansetron (ZOFRAN-ODT) 4 MG TbDL Take 1 tablet (4 mg total) by mouth every 8 (eight) hours as needed (nausea). 20 tablet 0    pen needle, diabetic (BD ULTRA-FINE KEN PEN NEEDLE) 32 gauge x 5/32" Ndle 1 Device by Misc.(Non-Drug; Combo Route) route 5 (five) times daily. 550 each 4    syringe, disposable, 1 mL Syrg Testosterone every 2 weeks 25 Syringe 1    tirzepatide 10 mg/0.5 mL PnIj Inject 10 mg into the skin every 7 days. 4 Pen 3     No current facility-administered medications on file prior to visit.       Objective:      BP (!) 147/82 (BP Location: Right arm, Patient Position: Sitting, BP Method: Medium " "(Automatic))   Pulse 87   Resp 18   Wt (!) 136.4 kg (300 lb 13.1 oz)   SpO2 96%   BMI 40.24 kg/m²     Exam:  GEN:  Well developed, well nourished.  No acute distress.   HEENT:  No trauma.  Mucous membranes moist.  Nares patent bilaterally.  PSYCH: Normal affect. Thought content appropriate.  CHEST:  Breathing symmetric.  No audible wheezing.  ABD: Soft, non-distended.  SKIN:  Warm, pink, dry.  No rash on exposed areas.    EXT:  No cyanosis, clubbing, or edema.  No color change or changes in nail or hair growth.  NEURO/MUSCULOSKELETAL:  Fully alert, oriented, and appropriate. Speech normal lee. No cranial nerve deficits.   Gait:  Antalgic.  No focal motor deficits.     No gross warmth, swelling, erythema to bilateral knees.    Full active range of motion both knees         Imaging:    Narrative & Impression    EXAMINATION:  XR KNEE ORTHO BILAT WITH FLEXION     CLINICAL HISTORY:  Pain in right knee     TECHNIQUE:  AP standing of both knees, PA flexion standing views of both knees, and Merchant views of both knees were performed.  Lateral views of both knees were also performed.     COMPARISON:  September 7, 2023 standard images and after review of MRI of right knee from September 15, 2023     FINDINGS:  Right knee:     In this patient whose earlier MRI had documented "diffusely abnormal tibial and femoral articular cartilage with more focal subchondral osseous abnormality at the level of the central weight-bearing aspect of the lateral tibial plateau and posterior nonweightbearing compartment of the lateral femoral condyle as well as underlying osseous edema at the level of the lateral tibial plateau centrally associated with diffuse cartilaginous irregularity ", the present exam demonstrates intervally developed areas of subarticular sclerosis involving the lateral tibial plateau as well as some interval progression in the degree of now severe narrowing about the lateral compartment and mild genu valgum.  No " narrowing of medial compartment or patellofemoral compartment.  Minimal spurring posterior patella.  No definite suprapatellar bursal effusion or prepatellar soft tissue swelling.     Left knee:     Reconfirmed postsurgical changes and hardware related to prior cruciate ligament repair.  No findings hardware malfunction.  No acute fractures.  Reconfirmed narrowing of medial and lateral compartments.  No narrowing of patellofemoral compartment.  Stable mild spurring about the medial compartment and posterior patella.  No suprapatellar bursal effusion or prepatellar soft tissue swelling.  Left knee findings unchanged to earlier exam.     Impression:     As above        Electronically signed by:Gary Mitchell  Date:                                            07/12/2024  Time:                                           14:05       Assessment:       Encounter Diagnoses   Name Primary?    Primary osteoarthritis of both knees     Bilateral chronic knee pain Yes     Plan:       Tony was seen today for knee pain.    Diagnoses and all orders for this visit:    Bilateral chronic knee pain    Primary osteoarthritis of both knees  -     Ambulatory referral/consult to Pain Clinic        Tony Gordillo is a 43 y.o. male with chronic bilateral knee pain secondary to osteoarthritis.  Also status post multiple knee surgeries including left ACL repair.    Pertinent imaging studies reviewed by me. Imaging results were discussed with patient.  Schedule for bilateral genicular nerve blocks under ultrasound x2.  If diagnostic can proceed with cooled radiofrequency ablations.  Of note, on the left we will only be performing the right suprapatellar and superomedial location secondary to ACL anchors at the other 2 sites.  Return to clinic after procedure to discuss results.          This note was created by combination of typed  and M-Modal dictation. Transcription and phonetic errors may be present.  If there are any questions,  please contact me.

## 2024-07-16 RX ORDER — MELOXICAM 15 MG/1
15 TABLET ORAL
Qty: 30 TABLET | Refills: 1 | Status: ON HOLD | OUTPATIENT
Start: 2024-07-16 | End: 2024-07-23 | Stop reason: HOSPADM

## 2024-07-18 NOTE — PROGRESS NOTES
Subjective:       Patient ID: Tony Gordillo is a 43 y.o. male.    Chief Complaint:   Pain of the Right Knee and Pain of the Left Knee  He comes in for chronic bilateral knee pain, up to 8/10.  He has had 3 surgeries on the left knee for ACL tear.  He has been subsequently told that the ACL repair was gone.  He has had a corticosteroid injection which only helped for a couple of weeks and made his blood sugar go up too much.  He has more pain in the right than the left.  No groin pain, distal numbness or tingling.  No recent fall, accident, injury.  He saw Dr. Rolon for this about a year ago, and we reviewed the detailed notes and confirmed:  Patient comes in with bilateral knee pains.  The left is more chronic.  History of 3 prior surgeries on that knee including multiple ACL reconstructions.  That knee is a little bit more straight forward in terms of his moderate arthritis and ongoing instability in that knee.  This knee would be likely 1 that would benefit from knee replacement in the future.  His primary issue today however is his right knee and ongoing mechanical symptoms and instability of his right knee.  This is not been previously evaluated aside from radiographs which demonstrate some very mild lateral compartment arthritis and he is had viscosupplementation injections and steroid injections into this knee without any improvement in his pain.  As a result I obtained an MRI to rule out internal derangement, he has degenerative chondral changes of the lateral compartment of his right knee along with associated degenerative meniscal pathology and subchondral edema.  He is failed conservative measures and I think he may benefit from arthroscopic intervention.  I can not guarantee that he would have significant improvement in the totality of his pain however secondary to his age I do not think other measures such as lateral unicompartmental knee arthroplasty or total knee arthroplasty would be ideal.  I  would be more interested to see if subchondroplasty verses arthroscopic and biologics would help improve his pains in this knee.  We will continue to watch the left knee as this is more chronic issue.     After that visit, in 12/23, the patient was treated as follows for the right knee:  1. knee arthroscopic partial lateral meniscectomy (CPT 35855)  2. knee arthroscopic chondroplasty (CPT 93006)  3. knee arthroscopically aided treatment of lateral tibial plateau fracture, unicondylar/ subchondroplasty (CPT 53744)       Past Medical History:   Diagnosis Date    Diabetes mellitus, type 2     Hyperlipidemia     Hypertension     Obesity     NAINA (obstructive sleep apnea)      Past Surgical History:   Procedure Laterality Date    ARTHROSCOPIC DEBRIDEMENT OF SHOULDER  03/14/2023    Procedure: DEBRIDEMENT, SHOULDER, ARTHROSCOPIC;  Surgeon: Crissy Bradford MD;  Location: [facility] OR;  Service: Orthopedics;;    ARTHROSCOPIC REPAIR OF ROTATOR CUFF OF SHOULDER Right 03/14/2023    Procedure: REPAIR, ROTATOR CUFF, ARTHROSCOPIC;  Surgeon: Crissy Bradford MD;  Location: [facility] OR;  Service: Orthopedics;  Laterality: Right;  KARY PAYNE [phone]. TEXTED ELMER ON 3/9/2023 @ 12:10PM. ELMER RESPONDED ON 3/9/23 @ 12:23PM-SAG  RN PREOP 3/10/2023---CLEARED BY PCP AND CARDS    ARTHROSCOPY,SHOULDER,WITH BICEPS TENODESIS  03/14/2023    Procedure: ARTHROSCOPY,SHOULDER,WITH BICEPS TENODESIS;  Surgeon: Crissy Bradford MD;  Location: [facility] OR;  Service: Orthopedics;;    KNEE ARTHROSCOPY W/ MENISCECTOMY Right 10/24/2023    Procedure: ARTHROSCOPY, KNEE, WITH MENISCECTOMY;  Surgeon: Crissy Bradford MD;  Location: [facility] OR;  Service: Orthopedics;  Laterality: Right;  RN PREOP 10/18/203---CLEARED BY PCP  ELVIA [phone]    KNEE SURGERY      acl,mcl,pcl    left knee x 2      PRK  Bilateral 2010    SUBCHONDROPLASTY Right 10/24/2023    Procedure: POSSIBLE SUBCHONDROPLASTY;  Surgeon: Crissy Bradford MD;   Location: [facility] OR;  Service: Orthopedics;  Laterality: Right;     Family History   Problem Relation Name Age of Onset    Diabetes Mother      Diabetes Father      No Known Problems Brother      Diabetes Maternal Grandmother      No Known Problems Maternal Grandfather      No Known Problems Paternal Grandmother      No Known Problems Paternal Grandfather      No Known Problems Daughter adopted     Autism Son adopted     No Known Problems Maternal Aunt      No Known Problems Maternal Uncle      No Known Problems Paternal Aunt      No Known Problems Paternal Uncle      No Known Problems Other       Social History     Socioeconomic History    Marital status:    Occupational History    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     Employer: [employer]   Tobacco Use    Smoking status: Some Days     Types: Cigars     Passive exposure: Never    Smokeless tobacco: Never    Tobacco comments:     Has not had any cigars this year   Substance and Sexual Activity    Alcohol use: Yes     Comment: 1-2 beers every 2 weeks    Drug use: Yes     Types: Marijuana     Comment: Non-prescription cannabis     Social Determinants of Health     Financial Resource Strain: Patient Declined (12/5/2023)    Overall Financial Resource Strain (CARDIA)     Difficulty of Paying Living Expenses: Patient declined   Food Insecurity: Patient Declined (12/5/2023)    Hunger Vital Sign     Worried About Running Out of Food in the Last Year: Patient declined     Ran Out of Food in the Last Year: Patient declined   Transportation Needs: Patient Declined (12/5/2023)    PRAPARE - Transportation     Lack of Transportation (Medical): Patient declined     Lack of Transportation (Non-Medical): Patient declined   Physical Activity: Sufficiently Active (12/5/2023)    Exercise Vital Sign     Days of Exercise per Week: 4 days     Minutes of Exercise per Session: 100 min   Stress: Patient Declined (12/5/2023)    [place]  [place] [employer] - Occupational Stress Questionnaire     Feeling of Stress : Patient declined   Recent Concern: Stress - Stress Concern Present (10/10/2023)    [place] [employer] - Occupational Stress Questionnaire     Feeling of Stress : To some extent   Housing Stability: Unknown (12/5/2023)    Housing Stability Vital Sign     Unable to Pay for Housing in the Last Year: Patient refused     Unstable Housing in the Last Year: Patient refused   Recent Concern: Housing Stability - High Risk (10/10/2023)    Housing Stability Vital Sign     Unable to Pay for Housing in the Last Year: Yes     Number of Places Lived in the Last Year: 1     Unstable Housing in the Last Year: No       Current Outpatient Medications   Medication Sig Dispense Refill    ammonium lactate 12 % Crea Apply 1 application  topically once daily. 140 g 5    ARIPiprazole (ABILIFY) 2 MG Tab Take 1 tablet (2 mg total) by mouth once daily. 30 tablet 11    atorvastatin (LIPITOR) 40 MG tablet TAKE 1 TABLET BY MOUTH EVERY DAY 90 tablet 1    azelastine (ASTELIN) 137 mcg (0.1 %) nasal spray Use 1-2 sprays in each nostril twice daily 90 mL 3    blood sugar diagnostic Strp To check BG 4 times daily, to use with insurance preferred meter 400 strip 3    blood sugar diagnostic Strp OneTouch Ultra Test strips      blood-glucose meter kit OneTouch Ultra2 Meter kit      blood-glucose sensor (DEXCOM G6 SENSOR) Filomena Change sensor every 10 days 3 each 11    blood-glucose transmitter (DEXCOM G6 TRANSMITTER) Filomena Change every 3 months 1 each 3    cyanocobalamin, vitamin B-12, 5,000 mcg Subl Place one under tongue once a day for 1 month, then continue to take under tongue per week 30 tablet 3    empagliflozin (JARDIANCE) 25 mg tablet Take 1 tablet (25 mg total) by mouth once daily. 90 tablet 2    EPINEPHrine (EPIPEN 2-AYALA) 0.3 mg/0.3 mL AtIn Inject 0.3 mLs (0.3 mg total) into the muscle as needed (Anaphylaxis). 2 each 0    FLUoxetine 20  "MG capsule Take 1 capsule (20 mg total) by mouth once daily. 30 capsule 11    fluticasone propionate (FLONASE) 50 mcg/actuation nasal spray 1 spray (50 mcg total) by Each Nostril route once daily. 48 g 5    insulin aspart U-100 (NOVOLOG U-100 INSULIN ASPART) 100 unit/mL injection MAX DAILY DOSE 160 UNITS IN INSULIN PUMP. 40 mL 7    insulin aspart U-100 (NOVOLOG) 100 unit/mL (3 mL) InPn pen INJECT 15-30 UNITS BEFORE EACH MEAL WITH SLIDNING SCALE, MAX DAILY DOSE 100 UNITS. Use in the event of insulin pump failure 30 mL 5    insulin glargine U-300 conc (TOUJEO MAX U-300 SOLOSTAR) 300 unit/mL (3 mL) insulin pen Inject 30 Units into the skin once daily. 3 pen 5    insulin pump cart,automated,BT (OMNIPOD 5 G6 PODS, GEN 5,) Crtg Inject 1 each into the skin once daily. 30 each 11    L-METHYLFOLATE 15 mg Tab TAKE 15 MG BY MOUTH ONCE DAILY. 30 tablet 11    lamoTRIgine (LAMICTAL) 100 MG tablet TAKE 1 AND 1/2 TABLETS BY MOUTH ONCE DAILY. 135 tablet 3    lancets Misc To check BG 4 times daily, to use with insurance preferred meter 400 each 3    levothyroxine (SYNTHROID) 50 MCG tablet TAKE 1 TABLET BY MOUTH BEFORE BREAKFAST. 90 tablet 3    lisinopriL (PRINIVIL,ZESTRIL) 20 MG tablet TAKE 1 TABLET BY MOUTH EVERY DAY 90 tablet 0    loratadine (CLARITIN) 10 mg tablet Take 1 tablet (10 mg total) by mouth once daily. 90 tablet 3    magnesium oxide (MAG-OX) 400 mg (241.3 mg magnesium) tablet Take 1 tablet (400 mg total) by mouth once daily. 90 tablet 1    meloxicam (MOBIC) 15 MG tablet TAKE 1 TABLET BY MOUTH EVERY DAY 30 tablet 1    mirtazapine (REMERON) 15 MG tablet Take 1 tablet (15 mg total) by mouth every evening. 30 tablet 11    modafiniL (PROVIGIL) 100 MG Tab Take 1 tablet (100 mg total) by mouth every morning. 90 tablet 1    pen needle, diabetic (BD ULTRA-FINE KEN PEN NEEDLE) 32 gauge x 5/32" Ndle 1 Device by Misc.(Non-Drug; Combo Route) route 5 (five) times daily. 550 each 4    syringe, disposable, 1 mL Syrg Testosterone every " "2 weeks 25 Syringe 1    tirzepatide 10 mg/0.5 mL PnIj Inject 10 mg into the skin every 7 days. 4 Pen 3     No current facility-administered medications for this visit.     Review of patient's allergies indicates:   Allergen Reactions    Influenza virus vaccine, specific Hives    Pioglitazone      Altered mood     Victoza [liraglutide] Nausea And Vomiting    Farxiga [dapagliflozin] Rash     Erectile dysfunction and rash          Objective:      Vitals:    07/12/24 0932   Weight: 135.9 kg (299 lb 7.9 oz)   Height: 6' 0.5" (1.842 m)     Physical Exam  Right knee:  There is is a valgus deformity, which is partially passively correctable.  Active range of motion is 0-115 degrees.  Anterior crepitus with active extension.  Patellar mobility is limited.  Boggy synovitis without effusion.  Medial joint line tenderness predominates.  No instability to varus/valgus/Lachman's stressing.  No pain in the groin with flexion and internal rotation of the hip which is not limited.  Skin intact.  Distal neurovascular intact.  Flip test negative.    Left knee:  There is no significant coronal plane deformity, and well healed scars from previous ACL surgery.  No erythema or scar tenderness throughout..  Active range of motion is 0-115 degrees.  Anterior crepitus with active extension.  Patellar mobility is limited.  Boggy synovitis without effusion.  Medial joint line tenderness predominates.  No instability to varus/valgus stressing, but positive anterior drawer.  No pain in the groin with flexion and internal rotation of the hip which is not limited.  Skin intact.  Distal neurovascular intact.  Flip test negative.    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  The left knee has stigmata of previous ACL reconstruction with only mild joint space narrowing and small marginal osteophytes.  Assessment:   ESTHER, knees, " right greater than left  Plan:       He might be a candidate for right TKA, but 1st he is scheduled for rotator cuff surgery.  He will get in touch with us after he has gotten through that process, and we will further discuss his knee pain issues.  In the meantime, we made a referral to pain management to help address his multiple pain generators and a more comprehensive way.  The patient's pathophysiology was explained in detail with reference to x-rays, models, other visual aids as appropriate.  Treatment options were discussed in detail.  Questions were invited and answered to the patient's satisfaction.    Miguel Dimas MD  Orthopaedic Surgery

## 2024-07-22 ENCOUNTER — TELEPHONE (OUTPATIENT)
Dept: SURGERY | Facility: HOSPITAL | Age: 44
End: 2024-07-22
Payer: COMMERCIAL

## 2024-07-23 ENCOUNTER — HOSPITAL ENCOUNTER (OUTPATIENT)
Facility: HOSPITAL | Age: 44
Discharge: HOME | End: 2024-07-23
Attending: ORTHOPAEDIC SURGERY | Admitting: ORTHOPAEDIC SURGERY
Payer: COMMERCIAL

## 2024-07-23 VITALS
OXYGEN SATURATION: 98 % | TEMPERATURE: 98 F | BODY MASS INDEX: 40.13 KG/M2 | WEIGHT: 300 LBS | DIASTOLIC BLOOD PRESSURE: 83 MMHG | SYSTOLIC BLOOD PRESSURE: 149 MMHG | HEART RATE: 86 BPM | RESPIRATION RATE: 18 BRPM

## 2024-07-23 DIAGNOSIS — S46.011A TRAUMATIC ROTATOR CUFF TEAR, RIGHT, INITIAL ENCOUNTER: ICD-10-CM

## 2024-07-23 DIAGNOSIS — M75.101 RIGHT ROTATOR CUFF TEAR: ICD-10-CM

## 2024-07-23 DIAGNOSIS — Z79.4 INSULIN LONG-TERM USE: Primary | ICD-10-CM

## 2024-07-23 LAB — POCT GLUCOSE: 108 MG/DL (ref 70–110)

## 2024-07-23 PROCEDURE — 36000710: Performed by: ORTHOPAEDIC SURGERY

## 2024-07-23 PROCEDURE — 25000003 PHARM REV CODE 250: Performed by: ORTHOPAEDIC SURGERY

## 2024-07-23 PROCEDURE — 63600175 PHARM REV CODE 636 W HCPCS: Performed by: ORTHOPAEDIC SURGERY

## 2024-07-23 PROCEDURE — 25000003 PHARM REV CODE 250: Performed by: NURSE ANESTHETIST, CERTIFIED REGISTERED

## 2024-07-23 PROCEDURE — 36000711: Performed by: ORTHOPAEDIC SURGERY

## 2024-07-23 PROCEDURE — 63600175 PHARM REV CODE 636 W HCPCS: Performed by: NURSE ANESTHETIST, CERTIFIED REGISTERED

## 2024-07-23 PROCEDURE — C1889 IMPLANT/INSERT DEVICE, NOC: HCPCS | Performed by: ORTHOPAEDIC SURGERY

## 2024-07-23 PROCEDURE — 63600175 PHARM REV CODE 636 W HCPCS: Performed by: ANESTHESIOLOGY

## 2024-07-23 PROCEDURE — C1713 ANCHOR/SCREW BN/BN,TIS/BN: HCPCS | Performed by: ORTHOPAEDIC SURGERY

## 2024-07-23 PROCEDURE — 64415 NJX AA&/STRD BRCH PLXS IMG: CPT | Performed by: ANESTHESIOLOGY

## 2024-07-23 PROCEDURE — 29823 SHO ARTHRS SRG XTNSV DBRDMT: CPT | Mod: 51,RT,, | Performed by: ORTHOPAEDIC SURGERY

## 2024-07-23 PROCEDURE — 71000015 HC POSTOP RECOV 1ST HR: Performed by: ORTHOPAEDIC SURGERY

## 2024-07-23 PROCEDURE — 37000009 HC ANESTHESIA EA ADD 15 MINS: Performed by: ORTHOPAEDIC SURGERY

## 2024-07-23 PROCEDURE — 27201423 OPTIME MED/SURG SUP & DEVICES STERILE SUPPLY: Performed by: ORTHOPAEDIC SURGERY

## 2024-07-23 PROCEDURE — 37000008 HC ANESTHESIA 1ST 15 MINUTES: Performed by: ORTHOPAEDIC SURGERY

## 2024-07-23 PROCEDURE — 71000033 HC RECOVERY, INTIAL HOUR: Performed by: ORTHOPAEDIC SURGERY

## 2024-07-23 PROCEDURE — 71000039 HC RECOVERY, EACH ADD'L HOUR: Performed by: ORTHOPAEDIC SURGERY

## 2024-07-23 PROCEDURE — 29827 SHO ARTHRS SRG RT8TR CUF RPR: CPT | Mod: RT,,, | Performed by: ORTHOPAEDIC SURGERY

## 2024-07-23 DEVICE — IMPLANTABLE DEVICE
Type: IMPLANTABLE DEVICE | Site: SHOULDER | Status: FUNCTIONAL
Brand: ROTATION MEDICAL TENDON STAPLES

## 2024-07-23 DEVICE — IMPLANTABLE DEVICE
Type: IMPLANTABLE DEVICE | Site: SHOULDER | Status: FUNCTIONAL
Brand: BIOINDUCTIVE IMPLANT WITH ARTHROSCOPIC DELIVERY SYSTEM - MEDIUM

## 2024-07-23 DEVICE — HEALICOIL KNOTLESS RGNST
Type: IMPLANTABLE DEVICE | Site: SHOULDER | Status: FUNCTIONAL
Brand: HEALICOIL

## 2024-07-23 DEVICE — HEALICOIL RG SA 5.5MM W/3 UB-BL CB                                    BL BK
Type: IMPLANTABLE DEVICE | Site: SHOULDER | Status: FUNCTIONAL
Brand: HEALICOIL / ULTRABRAID

## 2024-07-23 DEVICE — HEALICOIL RG SA 4.75MM W/2 UB-BL                                    CBRD BL
Type: IMPLANTABLE DEVICE | Site: SHOULDER | Status: FUNCTIONAL
Brand: HEALICOIL

## 2024-07-23 DEVICE — BONE ANCHORS 3 WITH ARTHROSCOPIC DELIVERY SYSTEM ADVANCED
Type: IMPLANTABLE DEVICE | Site: SHOULDER | Status: FUNCTIONAL
Brand: BONE ANCHORS WITH ARTHROSCOPIC DELIVERY SYSTEM - ADVANCED

## 2024-07-23 RX ORDER — LIDOCAINE HYDROCHLORIDE 10 MG/ML
1 INJECTION, SOLUTION EPIDURAL; INFILTRATION; INTRACAUDAL; PERINEURAL ONCE
Status: DISCONTINUED | OUTPATIENT
Start: 2024-07-23 | End: 2024-07-23 | Stop reason: HOSPADM

## 2024-07-23 RX ORDER — SODIUM CHLORIDE, SODIUM LACTATE, POTASSIUM CHLORIDE, CALCIUM CHLORIDE 600; 310; 30; 20 MG/100ML; MG/100ML; MG/100ML; MG/100ML
INJECTION, SOLUTION INTRAVENOUS CONTINUOUS
Status: DISCONTINUED | OUTPATIENT
Start: 2024-07-23 | End: 2024-07-23 | Stop reason: HOSPADM

## 2024-07-23 RX ORDER — ROCURONIUM BROMIDE 10 MG/ML
INJECTION, SOLUTION INTRAVENOUS
Status: DISCONTINUED | OUTPATIENT
Start: 2024-07-23 | End: 2024-07-23

## 2024-07-23 RX ORDER — ACETAMINOPHEN 500 MG
500 TABLET ORAL EVERY 6 HOURS
Qty: 56 TABLET | Refills: 0 | Status: SHIPPED | OUTPATIENT
Start: 2024-07-23 | End: 2024-08-06

## 2024-07-23 RX ORDER — OXYCODONE HYDROCHLORIDE 5 MG/1
5 TABLET ORAL EVERY 4 HOURS PRN
Qty: 25 TABLET | Refills: 0 | Status: SHIPPED | OUTPATIENT
Start: 2024-07-23

## 2024-07-23 RX ORDER — TRANEXAMIC ACID 10 MG/ML
1000 INJECTION, SOLUTION INTRAVENOUS
Status: COMPLETED | OUTPATIENT
Start: 2024-07-23 | End: 2024-07-23

## 2024-07-23 RX ORDER — MUPIROCIN 20 MG/G
OINTMENT TOPICAL
Status: DISCONTINUED | OUTPATIENT
Start: 2024-07-23 | End: 2024-07-23 | Stop reason: HOSPADM

## 2024-07-23 RX ORDER — IBUPROFEN 800 MG/1
800 TABLET ORAL 3 TIMES DAILY
Qty: 42 TABLET | Refills: 0 | Status: SHIPPED | OUTPATIENT
Start: 2024-07-23 | End: 2024-08-06

## 2024-07-23 RX ORDER — PHENYLEPHRINE HYDROCHLORIDE 10 MG/ML
INJECTION INTRAVENOUS
Status: DISCONTINUED | OUTPATIENT
Start: 2024-07-23 | End: 2024-07-23

## 2024-07-23 RX ORDER — MIDAZOLAM HYDROCHLORIDE 1 MG/ML
INJECTION INTRAMUSCULAR; INTRAVENOUS
Status: DISCONTINUED | OUTPATIENT
Start: 2024-07-23 | End: 2024-07-23

## 2024-07-23 RX ORDER — ROPIVACAINE HYDROCHLORIDE 5 MG/ML
INJECTION, SOLUTION EPIDURAL; INFILTRATION; PERINEURAL
Status: COMPLETED | OUTPATIENT
Start: 2024-07-23 | End: 2024-07-23

## 2024-07-23 RX ORDER — PREGABALIN 75 MG/1
75 CAPSULE ORAL 2 TIMES DAILY
Qty: 28 CAPSULE | Refills: 0 | Status: SHIPPED | OUTPATIENT
Start: 2024-07-23 | End: 2024-08-06

## 2024-07-23 RX ORDER — FENTANYL CITRATE 50 UG/ML
INJECTION, SOLUTION INTRAMUSCULAR; INTRAVENOUS
Status: DISCONTINUED | OUTPATIENT
Start: 2024-07-23 | End: 2024-07-23

## 2024-07-23 RX ORDER — PROPOFOL 10 MG/ML
VIAL (ML) INTRAVENOUS
Status: DISCONTINUED | OUTPATIENT
Start: 2024-07-23 | End: 2024-07-23

## 2024-07-23 RX ORDER — LIDOCAINE HYDROCHLORIDE 20 MG/ML
INJECTION INTRAVENOUS
Status: DISCONTINUED | OUTPATIENT
Start: 2024-07-23 | End: 2024-07-23

## 2024-07-23 RX ORDER — EPINEPHRINE 0.1 MG/ML
INJECTION INTRAVENOUS
Status: DISCONTINUED | OUTPATIENT
Start: 2024-07-23 | End: 2024-07-23 | Stop reason: HOSPADM

## 2024-07-23 RX ADMIN — CEFAZOLIN 1 G: 2 INJECTION, POWDER, FOR SOLUTION INTRAMUSCULAR; INTRAVENOUS at 08:07

## 2024-07-23 RX ADMIN — PROPOFOL 200 MG: 10 INJECTION, EMULSION INTRAVENOUS at 07:07

## 2024-07-23 RX ADMIN — MUPIROCIN: 20 OINTMENT TOPICAL at 06:07

## 2024-07-23 RX ADMIN — PHENYLEPHRINE HYDROCHLORIDE 100 MCG: 10 INJECTION INTRAVENOUS at 08:07

## 2024-07-23 RX ADMIN — ROCURONIUM BROMIDE 20 MG: 10 INJECTION INTRAVENOUS at 09:07

## 2024-07-23 RX ADMIN — MIDAZOLAM HYDROCHLORIDE 2 MG: 1 INJECTION INTRAMUSCULAR; INTRAVENOUS at 07:07

## 2024-07-23 RX ADMIN — SODIUM CHLORIDE, SODIUM LACTATE, POTASSIUM CHLORIDE, AND CALCIUM CHLORIDE: .6; .31; .03; .02 INJECTION, SOLUTION INTRAVENOUS at 07:07

## 2024-07-23 RX ADMIN — ROCURONIUM BROMIDE 10 MG: 10 INJECTION INTRAVENOUS at 10:07

## 2024-07-23 RX ADMIN — LIDOCAINE HYDROCHLORIDE 100 MG: 20 INJECTION, SOLUTION INTRAVENOUS at 07:07

## 2024-07-23 RX ADMIN — FENTANYL CITRATE 100 MCG: 50 INJECTION, SOLUTION INTRAMUSCULAR; INTRAVENOUS at 07:07

## 2024-07-23 RX ADMIN — TRANEXAMIC ACID 1000 MG: 10 INJECTION, SOLUTION INTRAVENOUS at 07:07

## 2024-07-23 RX ADMIN — SUGAMMADEX 200 MG: 100 INJECTION, SOLUTION INTRAVENOUS at 11:07

## 2024-07-23 RX ADMIN — ROCURONIUM BROMIDE 50 MG: 10 INJECTION INTRAVENOUS at 07:07

## 2024-07-23 RX ADMIN — SODIUM CHLORIDE, SODIUM LACTATE, POTASSIUM CHLORIDE, AND CALCIUM CHLORIDE: .6; .31; .03; .02 INJECTION, SOLUTION INTRAVENOUS at 10:07

## 2024-07-23 RX ADMIN — PHENYLEPHRINE HYDROCHLORIDE 200 MCG: 10 INJECTION INTRAVENOUS at 09:07

## 2024-07-23 RX ADMIN — ROPIVACAINE HYDROCHLORIDE 15 ML: 5 INJECTION, SOLUTION EPIDURAL; INFILTRATION; PERINEURAL at 07:07

## 2024-07-23 RX ADMIN — CEFAZOLIN 2 G: 2 INJECTION, POWDER, FOR SOLUTION INTRAMUSCULAR; INTRAVENOUS at 07:07

## 2024-07-23 NOTE — OP NOTE
Surgeon: Crissy Bradford MD     PATIENT INFORMATION   Tony Gordillo 43 y.o. male 1980  MRN: [id number]  LOCATION: [facility]     DATE OF PROCEDURE: 7/23/2024     PREOPERATIVE DIAGNOSES:   Right    1. Rotator cuff tear   2. Biceps tendinopathy  3. Shoulder impingement, bursitis    POSTOPERATIVE DIAGNOSES:   Right   1. Rotator cuff tear, full thickness involving the supraspinatus, partial thickness tear involving the  subscapularis    OPERATION:   Right Shoulder  1. Arthroscopic  subscapularis and supraspinatus rotator cuff repair   2. Extensive debridement       Surgeon(s) and Role:     * Crissy Bradford MD - Primary     ANESTHESIA: Regional with General Anesthesia     ESTIMATED BLOOD LOSS: less than 50 mL     IMPLANTS:   Implant Name Type Inv. Item Serial No.  Lot No. LRB No. Used Action   ANCHOR HEALICOIL SUT 4.75MM - [id number]  ANCHOR HEALICOIL SUT 4.75MM  [employer] [id number] Right 1 Implanted   ANCHOR HEALICOIL 3 SUT 5.5MM - [id number]  ANCHOR HEALICOIL 3 SUT 5.5MM  [employer] [id number] Right 1 Implanted   ANCHOR HEALICOIL RGNSRB 5.5MM - [id number]  ANCHOR HEALICOIL RGNSRB 5.5MM  [employer] [id number] Right 1 Implanted   ANCHOR TENDON 8 - [id number]  ANCHOR TENDON 8  [employer] [id number] Right 1 Implanted   IMPLANT ARTHSCP BIOINDUCTV MED - [id number]  IMPLANT ARTHSCP BIOINDUCTV MED  [employer] [id number] Right 1 Implanted   ANCHOR BONE ARTHSCP DEL SYS - [id number]  ANCHOR BONE ARTHSCP DEL SYS  [employer] [id number] Right 1 Implanted           SPECIMENS:       Specimen (12h ago, onward)     None          COMPLICATIONS: None.      INTRAOPERATIVE COUNTS: Correct.      PROPHYLACTIC IV ANTIBIOTICS: Given per [facility] Protocol.     INDICATIONS FOR OPERATION:  Tony Gordillo is a 43 y.o. male who was previously treated for a rotator cuff tear with an arthroscopic rotator cuff repair.  He had persistent weakness in the postoperative period an MRI showed re-tear of the  supraspinatus tendon.  We discussed risks and benefits of revision rotator cuff repair and he elected to proceed     DESCRIPTION OF PROCEDURE:  The patient was identified and marked in the preoperative holding area.  All questions and concerns were addressed.  Regional anesthesia was performed by anesthesia personnel. The patient was then brought to the operating room and underwent general anesthesia and was positioned in the beach chair position. All bony prominences were padded. The right upper extremity was prepped and draped in the normal sterile fashion. A time-out was called confirming the correct patient, site, side, procedure, and that appropriate antibiotics had been administered within 30 min of incision. Diagnostic arthroscopy was performed through the standard posterior portal.  Systematic review of the joint demonstrated partial re-tear of the subscapularis.  It appeared that the anchor had not held the suture of upper border of the tendon in place.  The supraspinatus torn with delamination of the tendon.  The inferior stump was retracted to the medial humeral head.  There was synovitis of the anterior joint.   No chondromalacia of the humeral head or glenoid was noted    Sutures from the previous subscapularis repair were removed.  The lesser tuberosity was prepared using the shaver.  A loop suture was passed through the superior rolled edge of the tendon in a luggage tag fashion.  The suture was then loaded into a micro raptor anchor.  The tuberosity was prepared for anchor insertion using a punch and tap and the anchor was inserted.  The sutures were tensioned prior to tendon insertion and the tendon was reduced to the tuberosity with good compression.  Microfractures were performed lateral to the repair using a punch.    The lateral portal was established while the camera was still in the glenohumeral joint.  The suture Passer was used to pass a suture through the inferior leaflet of the  supraspinatus.  It was noted that the leaflet had adequate mobility to be reduced to the footprint.  The scope was then introduced the subacromial space. A bursectomy was performed using a combination of the radiofrequency device and shaver.  The previous repair was examined.  It was noted that the anterior supraspinatus failed through type 2 failure with extension of the tear posteriorly through the posterior aspect of the supraspinatus off of the footprint.  Remaining tendon stump was removed with the radiofrequency device and the shaver.  The previous sutures were removed with a grasper.  The previous anchors were buried under bone and were left in place.  The footprint  was then prepared with a shaver and keeley.  The tear was repaired using  a anterior double loaded anchor and posterior triple loaded anchor.  All sutures were passed in a horizontal mattress fashion and tied.  The suture tails were dunked into 2 lateral row anchors.  The tension suture passed at the beginning of the procedure was dunked into the anterior anchor along with the tails from the anterior medial row anchor.  Following repair, the tendon was probed and found to be well compressed to the repair site.  The head was noted to be well covered with the repair.  A medium Regeneten was applied over the rotator cuff.    There was not a significant acromial osteophyte so no acromioplasty was performed.      The instruments were removed from the shoulder.  Portal sites were closed.          Sterile dressings were applied followed a polar Care wrap and Super Sling the patient was awakened from anesthesia and brought to recovery room without complication     POSTOPERATIVE PLAN: Plan to follow subscapularis-supraspinatus repair protocol (>3 cm in total size). Passive motion and active motion may begin at 6 weeks.  No cuff resistive activity x 12 weeks.

## 2024-07-23 NOTE — TRANSFER OF CARE
Anesthesia Transfer of Care Note    Patient: Tony Gordillo    Procedure(s) Performed: Procedure(s) (LRB):  REPAIR, ROTATOR CUFF, ARTHROSCOPIC (Right)    Patient location: PACU    Anesthesia Type: general    Transport from OR: Transported from OR on 2-3 L/min O2 by NC with adequate spontaneous ventilation    Post pain: adequate analgesia    Post assessment: tolerated procedure well and no apparent anesthetic complications    Post vital signs: stable    Level of consciousness: awake    Nausea/Vomiting: no nausea/vomiting    Complications: none    Transfer of care protocol was followed      Last vitals: Visit Vitals  /60   Pulse 79   Temp 36.4 °C (97.5 °F)   Resp (!) 26   Wt 136.1 kg (300 lb)   SpO2 98%   BMI 40.13 kg/m²

## 2024-07-23 NOTE — ANESTHESIA PROCEDURE NOTES
Peripheral Block    Patient location during procedure: pre-op   Block not for primary anesthetic.  Reason for block: at surgeon's request and post-op pain management   Post-op Pain Location: Right shoulder   Start time: 7/23/2024 7:12 AM  Timeout: 7/23/2024 7:11 AM   End time: 7/23/2024 7:16 AM    Staffing  Authorizing Provider: Tyler Nunes Chi, MD  Performing Provider: Tyler Nunes Chi, MD    Staffing  Performed by: Tyler Nunes Chi, MD  Authorized by: Tyler Nunes Chi, MD    Preanesthetic Checklist  Completed: patient identified, IV checked, site marked, risks and benefits discussed, surgical consent, monitors and equipment checked, pre-op evaluation and timeout performed  Peripheral Block  Patient position: supine  Prep: ChloraPrep  Patient monitoring: heart rate, cardiac monitor, continuous pulse ox, continuous capnometry and frequent blood pressure checks  Block type: interscalene  Laterality: right  Injection technique: single shot  Needle  Needle type: Stimuplex   Needle gauge: 22 G  Needle length: 2 in  Needle localization: anatomical landmarks and ultrasound guidance   -ultrasound image captured on disc.  Assessment  Injection assessment: negative aspiration, negative parasthesia and local visualized surrounding nerve  Paresthesia pain: none  Heart rate change: no  Slow fractionated injection: yes  Pain Tolerance: comfortable throughout block and no complaints  Medications:    Medications: ropivacaine (NAROPIN) injection 0.5% - Perineural   15 mL - 7/23/2024 7:16:00 AM    Additional Notes  VSS.  [facility] RN monitoring vitals throughout procedure.  Patient tolerated procedure well. Ropiv given in 5 mL increments, with negative aspiration in between.

## 2024-07-23 NOTE — H&P
Postoperative Visit     History of Present Illness:   Tony is 15 months s/p right shoulder arthroscopy , rotator cuff repair, and arthroscopic biceps tenodesis  (DOS-3/14/23)     Increasing pain and weakness with overhead motion - was previously declared MMI but has worsened. MRI ordered to eval integrity of the cuff       Assessment/Plan:  43 y.o. male 15 months s/p right shoulder arthroscopy , rotator cuff repair, and arthroscopic biceps tenodesis  (DOS-3/14/23)     Plan:  Right shoulder arthroscopy with revision rotator cuff repair, possible regenten possible SCR    We have discussed the surgery and anticipated recovery.  The patient understands that there may be limited mobility up to several weeks after surgery depending on procedures that are performed at the time of surgery.    The details of the surgical procedure were explained, including the location of probable incisions and a description of likely hardware and/or grafts to be used.  The patient understands the likely convalescence after surgery, in particular the expected postop rehab and recovery course. Alternatives both operative and non-operative with associated risks and benefits discussed. The outlined risks and potential complications of the proposed procedure include but are not limited to: bleeding, infection, vessel and/or nerve damage, pain, numbness, tingling, compartment syndrome, need for additional surgery, failure to return to pre-injury and/or preoperative functional status, inability to return to work, scar sensitivity, delayed healing, complex regional pain syndrome, weakness, partial and/or incomplete relief of symptoms, persistence of and/or worsening of symptoms, hardware and/or surgical failure, prominent and/or symptomatic hardware possibly necessitating future removal, failure of repair to heal, retear, loss of function, stiffness, functional debility, dysfunction, need for prolonged postoperative rehabilitation, fracture, deep  venous thrombosis, pulmonary embolism, arthritis and death.  The patient states an understanding and wishes to proceed with surgery.   All questions were answered.  No guarantees were implied or stated.  Written informed consent was obtained.                 Review of patient's allergies indicates:   Allergen Reactions    Influenza virus vaccine, specific Hives    Pioglitazone         Altered mood     Victoza [liraglutide] Nausea And Vomiting    Farxiga [dapagliflozin] Rash       Erectile dysfunction and rash       Physical Exam:       Vitals:     05/24/24 1151   PainSc:   2   PainLoc: Shoulder      General:  Patient is alert, awake and oriented to time, place and person. Mood and affect are appropriate.  Patient does not appear to be in any distress, denies any constitutional symptoms and appears stated age.   HEENT: Pupils are equal and round, sclera are not injected. External examination of ears and nose reveals no abnormalities. Cranial nerves II-X are grossly intact  Skin: no rashes, abrasions or open wounds on the affected extremity   Resp: No respiratory distress or audible wheezing   CV: 2+  pulses, all extremities warm and well perfused      Physical Examination:    NAD     Right Shoulder     Shoulder Range of Motion                           Right                                       Left   (Active/Passive)                                        Forward Elevation                                           165/165                                 165/165  External rotation (arm at side)                        40/40                                     40/40       Internal rotation behind the back                    L5                                          L5               Range of motion is painful      Scapular winging no  Scapular dyskinesia no     Examination of the back shows no atrophy of      Acromioclavicular joint is not tender  Crossbody test: negative     Neer's positive   Hawkin's positive      Lisa's positive  Drop arm negative  Belly press negative  Liftoff negative        Cuff Strength                                                 Right                                       Left   Supraspinatus                                                 5/5                                           5/5  Infraspinatus                                                   5/5                                           5/5  Subscapularis                                                 5/5                                           5/5  Deltoid testing                                                 5/5                                           5/5     Wiggins's test negative  Speeds negative  Yergasons negative     Elbow examination demonstrates no tenderness to palpation and has normal range of motion.      ltsi C5-T1  + epl, io, fds, fdp   2+ RP      Imaging:  3 views of the right shoulder:  positive for degenerative changes of the AC joint. The humeral head is well centered on the AP and axillary views.  There is calcification at the rotator cuff insertion on the greater tuberosity. There is not significant degenerative change of the glenohumeral joint or posterior subluxation of the humeral head. No acute changes or fracture.       MRI shows retear of supraspinatus tendon, subscapularis and infraspinatus intact. Biceps tenodesis intact      I personally reviewed and interpreted the patient's imaging obtained today in clinic     This note was created by combination of typed  and M-Modal dictation. Transcription and phonetic errors may be present.  If there are any questions, please contact me.       Current Medications      Current Outpatient Medications:     allerg xt,D.farinae-Toyin (ODACTRA) 12 SQ-HDM Subl, Place 1 tablet under the tongue every evening., Disp: 30 tablet, Rfl: 11    ammonium lactate 12 % Crea, Apply 1 application  topically once daily., Disp: 140 g, Rfl: 5    ARIPiprazole (ABILIFY) 2  MG Tab, Take 1 tablet (2 mg total) by mouth once daily., Disp: 30 tablet, Rfl: 11    atorvastatin (LIPITOR) 40 MG tablet, TAKE 1 TABLET BY MOUTH EVERY DAY, Disp: 90 tablet, Rfl: 1    azelastine (ASTELIN) 137 mcg (0.1 %) nasal spray, Use 1-2 sprays in each nostril twice daily, Disp: 90 mL, Rfl: 3    blood sugar diagnostic Strp, To check BG 4 times daily, to use with insurance preferred meter, Disp: 400 strip, Rfl: 3    blood sugar diagnostic Strp, OneTouch Ultra Test strips, Disp: , Rfl:     blood-glucose meter kit, OneTouch Ultra2 Meter kit, Disp: , Rfl:     blood-glucose sensor (DEXCOM G6 SENSOR) Filomena, Change sensor every 10 days, Disp: 3 each, Rfl: 11    blood-glucose transmitter (DEXCOM G6 TRANSMITTER) Filomena, Change every 3 months, Disp: 1 each, Rfl: 3    cyanocobalamin, vitamin B-12, 5,000 mcg Subl, Place one under tongue once a day for 1 month, then continue to take under tongue per week, Disp: 30 tablet, Rfl: 3    empagliflozin (JARDIANCE) 25 mg tablet, Take 1 tablet (25 mg total) by mouth once daily., Disp: 90 tablet, Rfl: 2    EPINEPHrine (EPIPEN 2-AYALA) 0.3 mg/0.3 mL AtIn, Inject 0.3 mLs (0.3 mg total) into the muscle as needed (Anaphylaxis)., Disp: 2 each, Rfl: 0    FLUoxetine 40 MG capsule, Take 1 capsule (40 mg total) by mouth once daily., Disp: 30 capsule, Rfl: 11    fluticasone propionate (FLONASE) 50 mcg/actuation nasal spray, 1 spray (50 mcg total) by Each Nostril route once daily., Disp: 48 g, Rfl: 5    insulin aspart U-100 (NOVOLOG) 100 unit/mL (3 mL) InPn pen, INJECT 15-30 UNITS BEFORE EACH MEAL WITH SLIDNING SCALE, MAX DAILY DOSE 100 UNITS. Use in the event of insulin pump failure, Disp: 30 mL, Rfl: 5    insulin aspart U-100 (NOVOLOG) 100 unit/mL injection, Max daily dose 160 units in insulin pump., Disp: 40 mL, Rfl: 5    insulin glargine U-300 conc (TOUJEO MAX U-300 SOLOSTAR) 300 unit/mL (3 mL) insulin pen, Inject 30 Units into the skin once daily., Disp: 3 pen , Rfl: 5    insulin pump  "cart,automated,BT (OMNIPOD 5 G6 PODS, GEN 5,) Crtg, Inject 1 each into the skin once daily., Disp: 30 each, Rfl: 11    L-METHYLFOLATE 15 mg Tab, TAKE 15 MG BY MOUTH ONCE DAILY., Disp: 30 tablet, Rfl: 11    lamoTRIgine (LAMICTAL) 100 MG tablet, Take 1.5 tablets (150 mg total) by mouth once daily., Disp: 135 tablet, Rfl: 3    lancets Misc, To check BG 4 times daily, to use with insurance preferred meter, Disp: 400 each, Rfl: 3    levothyroxine (SYNTHROID) 50 MCG tablet, TAKE 1 TABLET BY MOUTH BEFORE BREAKFAST., Disp: 90 tablet, Rfl: 3    lisinopriL (PRINIVIL,ZESTRIL) 20 MG tablet, TAKE 1 TABLET BY MOUTH EVERY DAY, Disp: 90 tablet, Rfl: 3    loratadine (CLARITIN) 10 mg tablet, Take 1 tablet (10 mg total) by mouth once daily., Disp: 90 tablet, Rfl: 3    magnesium oxide (MAG-OX) 400 mg (241.3 mg magnesium) tablet, Take 1 tablet (400 mg total) by mouth once daily., Disp: 90 tablet, Rfl: 1    meloxicam (MOBIC) 15 MG tablet, Take 1 tablet (15 mg total) by mouth once daily., Disp: 30 tablet, Rfl: 0    ondansetron (ZOFRAN-ODT) 4 MG TbDL, Take 1 tablet (4 mg total) by mouth every 8 (eight) hours as needed (nausea)., Disp: 20 tablet, Rfl: 0    pen needle, diabetic (BD ULTRA-FINE KEN PEN NEEDLE) 32 gauge x 5/32" Ndle, 1 Device by Misc.(Non-Drug; Combo Route) route 5 (five) times daily., Disp: 550 each, Rfl: 4    predniSONE (DELTASONE) 10 MG tablet, Take 10 tabs by mouth once daily for 1 day, THEN 9 tabs once daily for 1 day, THEN 8 tabs once daily for 1 day, THEN 7 tabs once daily for 1 day, THEN 6 tabs once daily for 1 day., Disp: 40 tablet, Rfl: 0    rimegepant (NURTEC) 75 mg odt, Take 1 tablet (75 mg total) by mouth as needed for Migraine. Place ODT tablet on the tongue; alternatively the ODT tablet may be placed under the tongue. No more than 2 in 24 hrs., Disp: 16 tablet, Rfl: 3    syringe, disposable, 1 mL Syrg, Testosterone every 2 weeks, Disp: 25 Syringe, Rfl: 1    tirzepatide 10 mg/0.5 mL PnIj, Inject 10 mg into the " skin every 7 days., Disp: 4 Pen, Rfl: 3    modafiniL (PROVIGIL) 100 MG Tab, Take 1 tablet (100 mg total) by mouth every morning., Disp: 90 tablet, Rfl: 1             Past Medical History:   Diagnosis Date    Diabetes mellitus, type 2      Hyperlipidemia      Hypertension      Obesity      NAINA (obstructive sleep apnea)                 Active Problem List with Overview Notes     Diagnosis Date Noted    Cognitive communication deficit 05/11/2024    Concussion with no loss of consciousness 05/07/2024    Concussion with loss of consciousness 05/07/2024    Other specified persistent mood disorders 05/07/2024    Allergy desensitization therapy 05/06/2024    Traumatic encephalopathy 03/25/2024    Acute migraine 01/31/2024    MCI (mild cognitive impairment) 01/31/2024    Medication overuse headache 01/31/2024    Chronic migraine with aura, intractable, with status migrainosus 01/31/2024    Agitation 01/31/2024    Refractive error 01/24/2024    Tear of lateral meniscus of right knee, current 10/24/2023    Uncontrolled type 2 diabetes mellitus 10/17/2023    S/P right rotator cuff repair 03/17/2023    Biceps tendonosis of right shoulder 03/17/2023    Decreased range of motion of right shoulder 03/17/2023    Impaired strength of shoulder muscles 03/17/2023    Traumatic rotator cuff tear, right, initial encounter 03/14/2023    Other amnesia 11/02/2022    Mild neurocognitive disorder due to traumatic brain injury 10/19/2022    Outbursts of anger 10/19/2022    Left hand pain 09/24/2022    Decreased strength, endurance, and mobility 03/08/2022    Dyspnea 02/18/2022        started after covid 19. cxr unremarkable.  Clinically improve with symbicort.  PFT with restrictive physiology with preserved dlco c/w habitus.  Stress echo with ?ischemic changes on echo but ST changes on ekg.         Rib pain on left side 08/08/2021    Right foot pain 10/10/2019    Decreased strength of lower extremity 10/10/2019    Decreased range of motion of  both ankles 10/10/2019    Gait abnormality 10/10/2019    Low testosterone in male; pituitary axis normal. MRI negative. 11/2019 started on testosterone 09/04/2019 11/06/2019 MRI pituitary with and without contrast from [facility]  Impression:  1.  No pituitary mass seen.  2.  Absence of the septum pellucidum.       NAINA (obstructive sleep apnea) 8/2019 sleep study [facility] 11         -ahi of 11.  Stopped apap for over month due to gasping sensation.  ?excessive leakage a/w nasal congestion while having covid 19.  Nasal pantency is adequate.  Recommend resumption of apap.  However, APAP is being recall by [employer]  -we discussed at length about [employer] recall.  Patient already register his apap  -will switch to nasal pillow to alleviate pressure on ridge of nose.         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, with long-term current use of insulin      Essential hypertension      Hypothyroidism 07/29/2015    Hyperlipidemia LDL goal <70 06/03/2015    Insulin long-term use 06/03/2015    Tinea pedis 06/03/2015    Morbid obesity 06/03/2015    Allergic conjunctivitis 01/29/2015    Descemet's membrane fold 01/21/2014    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 TEXTED ELMER ON 3/9/2023 @ 12:10PM. ELMER RESPONDED ON 3/9/23 @ 12:23PM-SAG  RN PREOP 3/10/2023---CLEARED BY PCP AND CARDS    ARTHROSCOPY,SHOULDER,WITH BICEPS TENODESIS   03/14/2023     Procedure: ARTHROSCOPY,SHOULDER,WITH BICEPS TENODESIS;  Surgeon: Crissy Bradford MD;  Location: [facility] OR;  Service: Orthopedics;;    KNEE ARTHROSCOPY W/ MENISCECTOMY Right 10/24/2023     Procedure: ARTHROSCOPY, KNEE, WITH MENISCECTOMY;  Surgeon: Crissy Bradford MD;  Location: [facility] OR;  Service: Orthopedics;  Laterality: Right;  RN PREOP 10/18/203---CLEARED BY CHARLEEN -[phone]    KNEE SURGERY         acl,mcl,pcl    left knee x 2        PRK  Bilateral 2010    SUBCHONDROPLASTY Right 10/24/2023     Procedure: POSSIBLE SUBCHONDROPLASTY;  Surgeon: Crissy Bradford MD;  Location: [facility] OR;  Service: Orthopedics;  Laterality: Right;         Social History            Socioeconomic History    Marital status:    Occupational History    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       Employer: [employer]   Tobacco Use    Smoking status: Some Days       Types: Cigars       Passive exposure: Never    Smokeless tobacco: Never    Tobacco comments:       Has not had any cigars this year   Substance and Sexual Activity    Alcohol use: Yes       Comment: 1-2 beers every 2 weeks    Drug use: Yes       Types: Marijuana       Comment: Non-prescription cannabis      Social Determinants of Health           Financial Resource Strain: Patient Declined (12/5/2023)     Overall Financial Resource Strain (CARDIA)      Difficulty of Paying Living Expenses: Patient declined   Food Insecurity: Patient Declined (12/5/2023)     Hunger Vital Sign      Worried About Running Out of Food in the Last Year: Patient declined      Ran Out of Food in the  Last Year: Patient declined   Transportation Needs: Patient Declined (12/5/2023)     PRAPARE - Transportation      Lack of Transportation (Medical): Patient declined      Lack of Transportation (Non-Medical): Patient declined   Physical Activity: Sufficiently Active (12/5/2023)     Exercise Vital Sign      Days of Exercise per Week: 4 days      Minutes of Exercise per Session: 100 min   Stress: Patient Declined (12/5/2023)     [employer] - Occupational Stress Questionnaire      Feeling of Stress : Patient declined   Recent Concern: Stress - Stress Concern Present (10/10/2023)     [employer] - Occupational Stress Questionnaire      Feeling of Stress : To some extent   Housing Stability: Unknown (12/5/2023)     Housing Stability Vital Sign      Unable to Pay for Housing in the Last Year: Patient refused      Unstable Housing in the Last Year: Patient refused   Recent Concern: Housing Stability - High Risk (10/10/2023)     Housing Stability Vital Sign      Unable to Pay for Housing in the Last Year: Yes      Number of Places Lived in the Last Year: 1      Unstable Housing in the Last Year: No

## 2024-07-23 NOTE — OR NURSING
0711 - Time out done for Block per Dr Nunes  Pt on monitors  0716 - Block complete - pt tolerated well

## 2024-07-23 NOTE — BRIEF OP NOTE
[facility] [place]  Brief Operative Note    Surgery Date: 7/23/2024     Surgeons and Role:     * Crissy Bradford MD - Primary    Assisting Surgeon: None    Pre-op Diagnosis:  Traumatic rotator cuff tear, right, initial encounter [S46.011A]    Post-op Diagnosis:  Post-Op Diagnosis Codes:     * Traumatic rotator cuff tear, right, initial encounter [S46.011A]    Procedure(s) (LRB):  REPAIR, ROTATOR CUFF, ARTHROSCOPIC (Right)    Anesthesia: General/Regional    Operative Findings: Re tear of supraspinatus (type 2) with extension of tear through posterior supraspinatus. Partial retear of subscap    Estimated Blood Loss:         Specimens:   Specimen (24h ago, onward)      None              Discharge Note    OUTCOME: Patient tolerated treatment/procedure well without complication and is now ready for discharge.    DISPOSITION: Home or Self Care    FINAL DIAGNOSIS:  Re tear of right rotator cuff     FOLLOWUP: In clinic    DISCHARGE INSTRUCTIONS:    Discharge Procedure Orders   Diet Adult Regular   Order Comments: Resume previous home diet     Change dressing (specify)   Order Comments: See patient instructions     Lifting restrictions     Ice to affected area     Weight bearing restrictions (specify):

## 2024-07-23 NOTE — DISCHARGE INSTRUCTIONS
Post operative pain control     You have been prescribed multiple medications to help with pain control and minimize use of narcotic pain medications. This is called multimodal pain control.   Take these medications scheduled for the first week (take on the below schedule whether or not you are having pain)   Ibuprofen 800 mg every 8 hours  Tylenol 500 mg every 6 hours   Lyrica 75 mg twice a day     Take oxycodone 5 mg every 6 hours as needed for breakthrough pain (only if pain is not well controlled with the above medications)    If you use a CPAP for sleep apnea it is extremely important to use this as instructed while taking the pain medication. Untreated sleep apnea and narcotic use can cause respiratory arrest and death      Rotator Cuff Tear Post-Operative Instructions     Crissy Bradford MD  Clinic phone number: [phone]    Call your surgeon for:   Uncontrolled nausea or vomiting  Persistent numbness or tingling in the arm after the block has worn off   Fevers greater than 101.4  Redness surrounding the incision (swelling is normal)  Shortness of breath/difficulty breathing   Chest pain  Any other concerns you may have   Pain:  You will be sent home from the hospital with a pain medication e-prescribed to your pharmacy   The anesthesiologist will perform a block which will numb your arm for several hours after the surgery. When you start to feel sensation return (in the form of pain or tingling) start taking your pain medication. The medicine takes about an hour to work do you do not want to wait until the block has fully worn off.   The first two days will be the most painful. After the pain starts to lessen you should start weaning the narcotic medication  An anti-nausea medicine will be sent with the pain medication as many people do experience nausea as a side effect  An over the counter stool softener such as Miralax can be taken to avoid constipation as a side effect of the pain medication   The  pain medication is intended to make your pain more tolerable. It is not intended to relieve 100% of pain.  Do not drive while taking pain medications   Avoid taking other sedative medications such as sleeping pills while taking narcotics.   Ice  You will be sent home with a polar care wrap as part of your dressings. It will be hooked up to a polar care ice pump.   For the first 4 days, use the machine for 30 minutes on and then 30 minutes off. Turning the unit off is especially important when your arm is numb from the block to avoid thermal injury to the skin.  After day 5 use as needed for pain control.   To fill the unit:   Unlock handles and remove lid   Fill with cold water to the indicated line and then with ice  Replace lid and lock   Keep unit upright   To use the polar care:  Before turning on make sure the pad is connected  Plug in  -- this will turn the unit on  Refilling:  Unplug unit to turn off  Disconnect the wrap by pushing down on the metal tab and gently pulling the connectors apart  Drain unit and refill  Reattach connector by pushing down on metal piece and pushing the connectors together   After the dressing is removed, keep the blue towel between the skin and the cooling pad so the pad does not directly contact your skin.    You will be sent home with instructions of how the pad should be re-applied after your dressing change.  Dressing care:    Leave your dressing on for three days. You may remove it and then recover each incision with a large band-aid   If you prefer to leave the dressing on until seen in clinic that is fine as long as the dressing is not soaked through  It is normal for the dressing to have a large amount of fluid on it.  This will be mixed with some blood.  Fluid is used during the surgery and will slowly leak out of the wounds over the first few days.  Do not get the dressing or incisions wet.  You will need to carefully shower or take a sponge bath to avoid wetting the  dressing.  To wash under your arm safely, bend forward slightly to allow the arm to gently move forward so you can clean under your arm without raising it to the side   Sling Instructions:  Wear sling at all times until instructed otherwise. It will be worn a total of 4-6 weeks depending on the size of your rotator cuff tear  Keep the pillow between the sling and your body in place as well  The sling may be removed for hygiene and dressing.    You may take the sling off to perform elbow range of motion exercises.  If a biceps tenodesis has been performed this should be avoided the first two weeks.   Post op activity and precautions:  Keep sling on as described above   Do not actively move the arm at the shoulder until instructed to do so by your surgeon.   No lifting   When lying on your back, put a pillow or towel behind the elbow to support the arm   No pushing yourself up out of a chair or off of your bed with the operative arm  You may find it more comfortable to sleep in about 30-40 degrees of inclination - in a recliner or on multiple pillows  Do not lay on the same side of the operative arm   To wash under your arm safely, bend forward slightly to allow the arm to gently move forward so you can clean under your arm without raising it to the side  Avoid any activities or places that may result in a fall (walking on uneven ground, crowded places, etc)  Physical Therapy  Physical therapy will start at 2-6 weeks post op depending on the size of your tear   In general, strengthening is not started until 12 weeks and after motion has been restored   Follow up appointment  You will be seen in clinic in 2 weeks following your procedure.  This will be made before you leave the hospital   Sutures will be removed  More detailed activity instructions will be given at that time  Fall Prevention  Millions of people fall every year and injure themselves. You may have had anesthesia or sedation which may increase your risk  of falling. You may have health issues that put you at an increased risk of falling.     Here are ways to reduce your risk of falling.    Make your home safe by keeping walkways clear of objects you may trip over.  Use non-slip pads under rugs. Do not use area rugs or small throw rugs.  Use non-slip mats in bathtubs and showers.  Install handrails and lights on staircases.  Do not walk in poorly lit areas.  Do not stand on chairs or wobbly ladders.  Use caution when reaching overhead or looking upward. This position can cause a loss of balance.  Be sure your shoes fit properly, have non-slip bottoms and are in good condition.   Wear shoes both inside and out. Avoid going barefoot or wearing slippers.  Be cautious when going up and down stairs, curbs, and when walking on uneven sidewalks.  If your balance is poor, consider using a cane or walker.  If your fall was related to alcohol use, stop or limit alcohol intake.   If your fall was related to use of sleeping medicines, talk to your doctor about this. You may need to reduce your dosage at bedtime if you awaken during the night to go to the bathroom.    To reduce the need for nighttime bathroom trips:  Avoid drinking fluids for several hours before going to bed  Empty your bladder before going to bed  Men can keep a urinal at the bedside  Stay as active as you can. Balance, flexibility, strength, and endurance all come from exercise. They all play a role in preventing falls. Ask your healthcare provider which types of activity are right for you.  Get your vision checked on a regular basis.  If you have pets, know where they are before you stand up or walk so you don't trip over them.  Use night lights.

## 2024-07-23 NOTE — ANESTHESIA POSTPROCEDURE EVALUATION
Anesthesia Post Evaluation    Patient: Tony Gordillo    Procedure(s) Performed: Procedure(s) (LRB):  REPAIR, ROTATOR CUFF, ARTHROSCOPIC (Right)    Final Anesthesia Type: general      Patient location during evaluation: PACU  Patient participation: Yes- Able to Participate  Level of consciousness: awake and alert  Post-procedure vital signs: reviewed and stable  Pain management: adequate  Airway patency: patent    PONV status at discharge: No PONV  Anesthetic complications: no      Cardiovascular status: hemodynamically stable  Respiratory status: unassisted and spontaneous ventilation  Hydration status: euvolemic  Follow-up not needed.              Vitals Value Taken Time   /77 07/23/24 1247   Temp 36.4 °C (97.5 °F) 07/23/24 1146   Pulse 81 07/23/24 1249   Resp 20 07/23/24 1249   SpO2 94 % 07/23/24 1249   Vitals shown include unfiled device data.      No case tracking events are documented in the log.      Pain/Bernabe Score: Bernabe Score: 8 (7/23/2024 11:39 AM)

## 2024-07-29 ENCOUNTER — TELEPHONE (OUTPATIENT)
Dept: NEUROLOGY | Facility: CLINIC | Age: 44
End: 2024-07-29
Payer: COMMERCIAL

## 2024-07-29 NOTE — TELEPHONE ENCOUNTER
Spoke to Pt about the multiple follows up he has scheduled. Pt stated he was taking the first available. Pt is rescheduled for July 31. Pt was advised to arrive early. All other appts have been canceled.
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
## 2024-07-31 ENCOUNTER — OFFICE VISIT (OUTPATIENT)
Dept: NEUROLOGY | Facility: CLINIC | Age: 44
End: 2024-07-31
Payer: COMMERCIAL

## 2024-07-31 ENCOUNTER — PATIENT MESSAGE (OUTPATIENT)
Dept: ORTHOPEDICS | Facility: CLINIC | Age: 44
End: 2024-07-31
Payer: COMMERCIAL

## 2024-07-31 VITALS — DIASTOLIC BLOOD PRESSURE: 87 MMHG | SYSTOLIC BLOOD PRESSURE: 130 MMHG | HEART RATE: 80 BPM

## 2024-07-31 DIAGNOSIS — G31.84 MILD COGNITIVE IMPAIRMENT: ICD-10-CM

## 2024-07-31 DIAGNOSIS — F07.81 TRAUMATIC ENCEPHALOPATHY: ICD-10-CM

## 2024-07-31 DIAGNOSIS — G44.86 CERVICOGENIC HEADACHE: ICD-10-CM

## 2024-07-31 DIAGNOSIS — M54.2 CERVICALGIA OF OCCIPITO-ATLANTO-AXIAL REGION: ICD-10-CM

## 2024-07-31 DIAGNOSIS — F34.89 OTHER SPECIFIED PERSISTENT MOOD DISORDERS: ICD-10-CM

## 2024-07-31 DIAGNOSIS — M54.81 OCCIPITAL NEURITIS: ICD-10-CM

## 2024-07-31 DIAGNOSIS — R53.83 FATIGUE DUE TO SLEEP PATTERN DISTURBANCE: ICD-10-CM

## 2024-07-31 DIAGNOSIS — S06.0X9S CONCUSSION WITH LOSS OF CONSCIOUSNESS, SEQUELA: ICD-10-CM

## 2024-07-31 DIAGNOSIS — S13.4XXD WHIPLASH INJURY TO NECK, SUBSEQUENT ENCOUNTER: ICD-10-CM

## 2024-07-31 DIAGNOSIS — H51.11 CONVERGENCE INSUFFICIENCY: ICD-10-CM

## 2024-07-31 DIAGNOSIS — G47.9 FATIGUE DUE TO SLEEP PATTERN DISTURBANCE: ICD-10-CM

## 2024-07-31 DIAGNOSIS — S06.0X0S CONCUSSION WITHOUT LOSS OF CONSCIOUSNESS, SEQUELA: Primary | ICD-10-CM

## 2024-07-31 PROCEDURE — 99999 PR PBB SHADOW E&M-EST. PATIENT-LVL V: CPT | Mod: PBBFAC,,, | Performed by: PSYCHIATRY & NEUROLOGY

## 2024-07-31 NOTE — PATIENT INSTRUCTIONS
Referral for lower neck/upper back to mid back PT with dry needling. No soft tissue manipulation of suboccipital region if you start to feel worse. All joint manipulation is fine.  The patient was instructed to ice the occipital region for no more than 20 minutes at least once a day but may repeat this as many times as they would like.   Discussed ergonomic accommodations for occipital neuritis/neuralgia. Mainly perform all work at eye level to minimize continued neck flexion which will aggravate the nerve.  Patient was encouraged to do daily light cardio exercise but instructed to limit physical activities to walking, walking in water up to the waist only or riding a stationary bike, recumbent preferred. No weight lifting in upper body, no neck massage, no acupuncture of neck, and no dry needling of upper neck. No neck PT unless otherwise stated. If neck PT is recommended, the therapist may do joint manipulation at this time but no suboccipital soft tissue therapy. Any of your therapists may also do passive neck range of motion activities. No chiropractor work on neck. Lower body strengthening with resistant bands, leg machines, and strapping weights to legs okay. No core body workouts. No running or use of cardio equipment other than stationary bike. No swimming or body surfing. No amusement park rides. No lifting more than 5-10 lbs and bend at the knees, not the waist.  Discussed care plan in detail for post traumatic occipital neuritis including a trial of oral medications followed by series of trigger point steroid injections with occipital nerve blocks. To be referred for consultation for occipital nerve release procedure if initially clinically responsive to injections but always with a return of symptoms.  Continue vestibular PT for eye movements and balance  Re-referral for ST for cognition  Continue to follow with Dr. South  Continue to follow with psychiatry  Continue talk therapy for mood  Encourage you  to [facility] to [facility] to see if you qualify for coverage for traumatic brain injury during  service

## 2024-08-07 ENCOUNTER — OFFICE VISIT (OUTPATIENT)
Dept: ORTHOPEDICS | Facility: CLINIC | Age: 44
End: 2024-08-07
Payer: COMMERCIAL

## 2024-08-07 DIAGNOSIS — S46.011A TRAUMATIC ROTATOR CUFF TEAR, RIGHT, INITIAL ENCOUNTER: Primary | ICD-10-CM

## 2024-08-07 PROCEDURE — 99024 POSTOP FOLLOW-UP VISIT: CPT | Mod: S$GLB,,, | Performed by: ORTHOPAEDIC SURGERY

## 2024-08-07 PROCEDURE — 99999 PR PBB SHADOW E&M-EST. PATIENT-LVL V: CPT | Mod: PBBFAC,,, | Performed by: ORTHOPAEDIC SURGERY

## 2024-08-08 ENCOUNTER — HOSPITAL ENCOUNTER (OUTPATIENT)
Dept: NEUROLOGY | Facility: CLINIC | Age: 44
Discharge: HOME | End: 2024-08-08
Payer: COMMERCIAL

## 2024-08-08 DIAGNOSIS — F07.81 TRAUMATIC ENCEPHALOPATHY: ICD-10-CM

## 2024-08-12 ENCOUNTER — PATIENT MESSAGE (OUTPATIENT)
Dept: PSYCHIATRY | Facility: CLINIC | Age: 44
End: 2024-08-12
Payer: COMMERCIAL

## 2024-08-13 ENCOUNTER — PATIENT MESSAGE (OUTPATIENT)
Dept: ENDOCRINOLOGY | Facility: CLINIC | Age: 44
End: 2024-08-13
Payer: COMMERCIAL

## 2024-08-13 DIAGNOSIS — E11.9 TYPE 2 DIABETES MELLITUS WITHOUT COMPLICATION, WITH LONG-TERM CURRENT USE OF INSULIN: ICD-10-CM

## 2024-08-13 DIAGNOSIS — Z79.4 TYPE 2 DIABETES MELLITUS WITHOUT COMPLICATION, WITH LONG-TERM CURRENT USE OF INSULIN: ICD-10-CM

## 2024-08-14 DIAGNOSIS — Z79.4 TYPE 2 DIABETES MELLITUS WITHOUT COMPLICATION, WITH LONG-TERM CURRENT USE OF INSULIN: ICD-10-CM

## 2024-08-14 DIAGNOSIS — E11.9 TYPE 2 DIABETES MELLITUS WITHOUT COMPLICATION, WITH LONG-TERM CURRENT USE OF INSULIN: ICD-10-CM

## 2024-08-14 RX ORDER — BLOOD-GLUCOSE TRANSMITTER
EACH MISCELLANEOUS
Qty: 1 EACH | Refills: 3 | Status: SHIPPED | OUTPATIENT
Start: 2024-08-14

## 2024-08-14 RX ORDER — INSULIN GLARGINE 300 [IU]/ML
35 INJECTION, SOLUTION SUBCUTANEOUS DAILY
Qty: 3 PEN | Refills: 0 | Status: SHIPPED | OUTPATIENT
Start: 2024-08-14 | End: 2024-08-15

## 2024-08-14 RX ORDER — BLOOD-GLUCOSE SENSOR
EACH MISCELLANEOUS
Qty: 9 EACH | Refills: 3 | Status: SHIPPED | OUTPATIENT
Start: 2024-08-14

## 2024-08-14 RX ORDER — INSULIN ASPART 100 [IU]/ML
INJECTION, SOLUTION INTRAVENOUS; SUBCUTANEOUS
Qty: 150 ML | Refills: 2 | Status: SHIPPED | OUTPATIENT
Start: 2024-08-14

## 2024-08-15 RX ORDER — INSULIN GLARGINE 300 [IU]/ML
36 INJECTION, SOLUTION SUBCUTANEOUS DAILY
Qty: 3 PEN | Refills: 1 | Status: SHIPPED | OUTPATIENT
Start: 2024-08-15

## 2024-08-29 DIAGNOSIS — Z79.4 TYPE 2 DIABETES MELLITUS WITHOUT COMPLICATION, WITH LONG-TERM CURRENT USE OF INSULIN: ICD-10-CM

## 2024-08-29 DIAGNOSIS — E11.9 TYPE 2 DIABETES MELLITUS WITHOUT COMPLICATION, WITH LONG-TERM CURRENT USE OF INSULIN: ICD-10-CM

## 2024-08-29 RX ORDER — EMPAGLIFLOZIN 25 MG/1
25 TABLET, FILM COATED ORAL
Qty: 90 TABLET | Refills: 2 | Status: SHIPPED | OUTPATIENT
Start: 2024-08-29

## 2024-09-06 ENCOUNTER — PATIENT OUTREACH (OUTPATIENT)
Dept: ADMINISTRATIVE | Facility: HOSPITAL | Age: 44
End: 2024-09-06
Payer: COMMERCIAL

## 2024-09-09 ENCOUNTER — OFFICE VISIT (OUTPATIENT)
Dept: ORTHOPEDICS | Facility: CLINIC | Age: 44
End: 2024-09-09
Payer: COMMERCIAL

## 2024-09-09 VITALS — BODY MASS INDEX: 40.64 KG/M2 | HEIGHT: 72 IN | WEIGHT: 300.06 LBS

## 2024-09-09 DIAGNOSIS — M17.11 PRIMARY OSTEOARTHRITIS OF RIGHT KNEE: Primary | ICD-10-CM

## 2024-09-09 PROCEDURE — 20610 DRAIN/INJ JOINT/BURSA W/O US: CPT | Mod: 79,RT,S$GLB, | Performed by: ORTHOPAEDIC SURGERY

## 2024-09-09 PROCEDURE — 99213 OFFICE O/P EST LOW 20 MIN: CPT | Mod: 25,24,S$GLB, | Performed by: ORTHOPAEDIC SURGERY

## 2024-09-09 PROCEDURE — 99999 PR PBB SHADOW E&M-EST. PATIENT-LVL III: CPT | Mod: PBBFAC,,, | Performed by: ORTHOPAEDIC SURGERY

## 2024-09-09 RX ORDER — TRIAMCINOLONE ACETONIDE 40 MG/ML
40 INJECTION, SUSPENSION INTRA-ARTICULAR; INTRAMUSCULAR
Status: DISCONTINUED | OUTPATIENT
Start: 2024-09-09 | End: 2024-09-09 | Stop reason: HOSPADM

## 2024-09-09 RX ADMIN — TRIAMCINOLONE ACETONIDE 40 MG: 40 INJECTION, SUSPENSION INTRA-ARTICULAR; INTRAMUSCULAR at 03:09

## 2024-09-09 NOTE — PROCEDURES
Large Joint Aspiration/Injection: R knee    Date/Time: 9/9/2024 3:20 PM    Performed by: Antonio Rolon MD  Authorized by: Antonio Rolon MD    Consent Done?:  Yes (Verbal)  Indications:  Arthritis and pain  Prep: patient was prepped and draped in usual sterile fashion      Local anesthesia used?: Yes    Anesthesia:  Local infiltration  Local anesthetic:  Topical anesthetic and lidocaine 1% without epinephrine    Details:  Needle Size:  22 G  Approach:  Anterolateral  Location:  Knee  Site:  R knee  Medications:  40 mg triamcinolone acetonide 40 mg/mL  Patient tolerance:  Patient tolerated the procedure well with no immediate complications

## 2024-09-09 NOTE — PROGRESS NOTES
EST PATIENT ORTHOPAEDIC: Knee    PRIMARY CARE PHYSICIAN: Jovany Ford MD   REFERRING PROVIDER: No referring provider defined for this encounter.     ASSESSMENT & PLAN:    Impression:  Right Knee severe degenerative changes  Left knee severe osteoarthritis  Left knee history of ACL reconstruction    Follow Up Plan: November for pre-surgical planning    Non operative care:    Tony Gordillo has physical exam evidence of above and wishes to pursue an non-operative care. I am recommending the following: TKA    To review:  Patient comes in with bilateral knee pains.  The left is more chronic.  History of 3 prior surgeries on that knee including multiple ACL reconstructions.  That knee is a little bit more straight forward in terms of his arthritis and ongoing instability in that knee.  This knee would be likely one that would benefit from knee replacement in the future.  His primary issue today however is his right knee and ongoing mechanical symptoms and instability of his right knee.  He is had viscosupplementation injections and steroid injections into this knee without any improvement in his pain.  As a result I obtained an MRI to rule out internal derangement, he has degenerative chondral changes of the lateral compartment of his right knee along with associated degenerative meniscal pathology and subchondral edema.  He is failed conservative measures and I thought he may benefit from arthroscopic intervention.  He would undergo arthroscopy and chondroplasty.  Since that surgery is gone onto develop more severe pain in that knee.  I have repeat radiographs today that now demonstrate progression of his arthritis and end-stage bone-on-bone articulation.  I think this is likely now his cause for pain and would benefit from replacement considerations.  Unfortunately he recently underwent revision right shoulder arthroscopy.  He is still recovering from that surgery.  As a result I need to have him cleared and  being able to use a walker for his recovery.  So we will wait a few months for him to regain full motion and strength in that shoulder.  In the interim we talked about a temporary steroid injection.  He is agreeable to all this.  We gave him a tentative surgical date of December 11th.  We will see him back in November for pre-surgical planning.    We will continue to watch the left knee as this is more chronic issue.    No case request submitted.     The patient has been ordered:  Injection    CONSULTS:   None    ACTIVE PROBLEM LIST  Patient Active Problem List   Diagnosis    Hyperlipidemia LDL goal <70    Insulin long-term use    Tinea pedis    Morbid obesity    Hypothyroidism    Type 2 diabetes mellitus 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 MRI negative. 11/2019 started on testosterone    Right foot pain    Decreased strength of lower extremity    Decreased range of motion of both ankles    Gait abnormality    Rib pain on left side    Dyspnea    Decreased strength, endurance, and mobility    Left hand pain    Mild neurocognitive disorder due to traumatic brain injury    Outbursts of anger    Other amnesia    Traumatic rotator cuff tear, right, initial encounter    S/P right rotator cuff repair    Biceps tendonosis of right shoulder    Decreased range of motion of right shoulder    Impaired strength of shoulder muscles    Allergic conjunctivitis    Cornea edema    Descemet's membrane fold    Herpes simplex keratitis     Uncontrolled type 2 diabetes mellitus    Tear of lateral meniscus of right knee, current    Refractive error    Acute migraine    MCI (mild cognitive impairment)    Medication overuse headache    Chronic migraine with aura, intractable, with status migrainosus    Agitation    Traumatic encephalopathy    Allergy desensitization therapy    Concussion with no loss of consciousness    Concussion with loss of consciousness    Other specified persistent mood disorders    Cognitive communication deficit    Impaired mobility and ADLs    Imbalance    Primary osteoarthritis of both knees    Bilateral chronic knee pain           SUBJECTIVE    CHIEF COMPLAINT: Knee Pain    HPI:   Tony Gordillo is a 44 y.o. male here for evaluation and management of bilateral knee pain, right worse than left. There is not a specific incident that brought about this pain. he has had progressive problems with the knee(s) starting 3 months months ago but is now progressing to interfere with activities which include: walking, enjoying hobbies, exercise, rising from a sitting position, standing for prolonged periods of time, and climbing stairs     Currently the pain in the joint is rated at moderate with activity. The pain is intermittent and is located in the knee, at level of joint line, located medially, and located posterior. The pain is described as aching, severe, and sharp. Relieving factors include ice or heat, prescription medication, and repositioning.     There is associated Clicking and Popping and instability.     Tony Gordillo has no additional complaints.     9/18/23: here for MRI follow up.     9/9/24:  Here for follow up regarding ongoing right knee pain worse than left.  Underwent subchondroplasty and arthroscopy for finding seen on MRI by Dr. Bradford.  He has a worsening pain today compared to previous to surgery.  He reports more instability in his knee.  He recently underwent revision right shoulder arthroscopy as well in  his in his sling today.    PROGRESSIVE SYMPTOMS:  Pain impacting work  Pain worsened by weight bearing  Pain effecting living situation    FUNCTIONAL STATUS:   Climb a flight of stairs or walk up a hill     PREVIOUS TREATMENTS:  Medical: OTC NSAIDS, RX NSAIDS, Steroid Injections, Biologic Injections, Narcotics, and Tylenol  Physical Therapy: Activities Modified  and Formal Physical Therapy for 6 weeks  Previous Orthopaedic Surgery: Left Knee ACL Reconstruction x3, Right Shoulder RCR with Dr. Bradford    REVIEW OF SYSTEMS:  PAIN ASSESSMENT:  See HPI.  MUSCULOSKELETAL: See HPI.  OTHER 10 point review of systems is negative except as stated in HPI above    PAST MEDICAL HISTORY   has a past medical history of Diabetes mellitus, type 2, Hyperlipidemia, Hypertension, Obesity, and NAINA (obstructive sleep apnea).     PAST SURGICAL HISTORY   has a past surgical history that includes left knee x 2; Knee surgery; Arthroscopic repair of rotator cuff of shoulder (Right, 03/14/2023); arthroscopy,shoulder,with biceps tenodesis (03/14/2023); Arthroscopic debridement of shoulder (03/14/2023); Knee arthroscopy w/ meniscectomy (Right, 10/24/2023); Subchondroplasty (Right, 10/24/2023); PRK  (Bilateral, 2010); Arthroscopic repair of rotator cuff of shoulder (Right, 7/23/2024); and Arthroscopic debridement of shoulder (7/23/2024).     FAMILY HISTORY  family history includes Autism in his son; Diabetes in his father, maternal grandmother, and mother; No Known Problems in his brother, daughter, maternal aunt, maternal grandfather, maternal uncle, paternal aunt, paternal grandfather, paternal grandmother, paternal uncle, and another family member.     SOCIAL HISTORY   reports that he has been smoking cigars. He has never been exposed to tobacco smoke. He has never used smokeless tobacco. He reports current alcohol use. He reports current drug use. Drug: Marijuana.     ALLERGIES   Review of patient's allergies indicates:   Allergen Reactions     Influenza virus vaccine, specific Hives    Pioglitazone      Altered mood     Victoza [liraglutide] Nausea And Vomiting    Farxiga [dapagliflozin] Rash     Erectile dysfunction and rash         MEDICATIONS  Current Outpatient Medications on File Prior to Visit   Medication Sig Dispense Refill    ammonium lactate 12 % Crea Apply 1 application  topically once daily. 140 g 5    ARIPiprazole (ABILIFY) 2 MG Tab Take 1 tablet (2 mg total) by mouth once daily. 30 tablet 11    atorvastatin (LIPITOR) 40 MG tablet TAKE 1 TABLET BY MOUTH EVERY DAY 90 tablet 1    azelastine (ASTELIN) 137 mcg (0.1 %) nasal spray Use 1-2 sprays in each nostril twice daily 90 mL 3    blood sugar diagnostic Strp To check BG 4 times daily, to use with insurance preferred meter 400 strip 3    blood sugar diagnostic Strp OneTouch Ultra Test strips      blood-glucose meter kit OneTouch Ultra2 Meter kit      blood-glucose sensor (DEXCOM G6 SENSOR) Filomena Change sensor every 10 days 9 each 3    blood-glucose transmitter (DEXCOM G6 TRANSMITTER) Filomena Change every 3 months 1 each 3    cyanocobalamin, vitamin B-12, 5,000 mcg Subl Place one under tongue once a day for 1 month, then continue to take under tongue per week 30 tablet 3    empagliflozin (JARDIANCE) 25 mg tablet TAKE 1 TABLET ONCE DAILY. 90 tablet 2    EPINEPHrine (EPIPEN 2-AYALA) 0.3 mg/0.3 mL AtIn Inject 0.3 mLs (0.3 mg total) into the muscle as needed (Anaphylaxis). 2 each 0    FLUoxetine 20 MG capsule Take 1 capsule (20 mg total) by mouth once daily. 30 capsule 11    fluticasone propionate (FLONASE) 50 mcg/actuation nasal spray 1 spray (50 mcg total) by Each Nostril route once daily. 48 g 5    insulin aspart U-100 (NOVOLOG U-100 INSULIN ASPART) 100 unit/mL injection MAX DAILY DOSE 160 UNITS IN INSULIN PUMP. 150 mL 2    insulin aspart U-100 (NOVOLOG) 100 unit/mL (3 mL) InPn pen INJECT 15-30 UNITS BEFORE EACH MEAL WITH SLIDNING SCALE, MAX DAILY DOSE 100 UNITS. Use in the event of insulin pump failure  "30 mL 5    insulin glargine U-300 conc (TOUJEO MAX SOLOSTAR) 300 unit/mL (3 mL) insulin pen Inject 36 Units into the skin once daily. 3 Pen 1    insulin pump cart,automated,BT (OMNIPOD 5 G6 PODS, GEN 5,) Crtg Inject 1 each into the skin once daily. 30 each 11    L-METHYLFOLATE 15 mg Tab TAKE 15 MG BY MOUTH ONCE DAILY. 30 tablet 11    lamoTRIgine (LAMICTAL) 100 MG tablet TAKE 1 AND 1/2 TABLETS BY MOUTH ONCE DAILY. 135 tablet 3    lancets Misc To check BG 4 times daily, to use with insurance preferred meter 400 each 3    levothyroxine (SYNTHROID) 50 MCG tablet TAKE 1 TABLET BY MOUTH BEFORE BREAKFAST. 90 tablet 3    lisinopriL (PRINIVIL,ZESTRIL) 20 MG tablet TAKE 1 TABLET BY MOUTH EVERY DAY 90 tablet 0    loratadine (CLARITIN) 10 mg tablet Take 1 tablet (10 mg total) by mouth once daily. 90 tablet 3    mirtazapine (REMERON) 15 MG tablet Take 1 tablet (15 mg total) by mouth every evening. 30 tablet 11    modafiniL (PROVIGIL) 100 MG Tab Take 1 tablet (100 mg total) by mouth every morning. 90 tablet 1    oxyCODONE (ROXICODONE) 5 MG immediate release tablet Take 1 tablet (5 mg total) by mouth every 4 (four) hours as needed for Pain. 25 tablet 0    pen needle, diabetic (BD ULTRA-FINE KEN PEN NEEDLE) 32 gauge x 5/32" Ndle 1 Device by Misc.(Non-Drug; Combo Route) route 5 (five) times daily. 550 each 4    syringe, disposable, 1 mL Syrg Testosterone every 2 weeks 25 Syringe 1    tirzepatide 10 mg/0.5 mL PnIj Inject 10 mg into the skin every 7 days. 12 Pen 1    pregabalin (LYRICA) 75 MG capsule Take 1 capsule (75 mg total) by mouth 2 (two) times daily. for 14 days 28 capsule 0     No current facility-administered medications on file prior to visit.          PHYSICAL EXAM   height is 6" (0.152 m) (abnormal) and weight is 136.1 kg (300 lb 0.7 oz) (abnormal).   Body mass index is 5,859.88 kg/m².      All other systems deferred.  GENERAL:  No acute distress  HABITUS: Obese  GAIT: Antalgic  SKIN: Normal     KNEE " EXAM:    right:   Effusion: Minimal joint effusion  TTP: Yes over Medial and lateral Joint Line   Yes crepitus with passive knee ROM  Passive ROM: Extension 0, Flexion 130  No pain with manipulation of patella  Stable to varus/valgus stress. No increased laxity to anterior/posterior drawer testing  positive Naida's test  No pain with IR/ER rotation of the hip  5/5 strength in knee flexion and extension, ankle plantarflexion and dorsiflexion  Neurovascular Status: Sensation intact to light touch in Sural, Saphenous, SPN, DPN, Tibial nerve distribution  2+ pulse DP/PT, normal capillary refill, foot has normal warmth    DATA:  Diagnostic tests reviewed for today's visit:     4v of the left knee reveal Moderate degenerative changes of the Lateral and Patellofemoral compartment. There is evidence of advanced osteoarthritis changes with Osteophyte formation and Joint space narrowing. The limb is in netural alignment. The patella is tracking midline.    Repeat 4v of right knee radiographs appears with progressive severe degenerative changes in particularly of the lateral compartment and now associated valgus based alignment.

## 2024-09-13 ENCOUNTER — OFFICE VISIT (OUTPATIENT)
Dept: ENDOCRINOLOGY | Facility: CLINIC | Age: 44
End: 2024-09-13
Payer: COMMERCIAL

## 2024-09-13 ENCOUNTER — OFFICE VISIT (OUTPATIENT)
Dept: ORTHOPEDICS | Facility: CLINIC | Age: 44
End: 2024-09-13
Payer: COMMERCIAL

## 2024-09-13 DIAGNOSIS — S46.011A TRAUMATIC ROTATOR CUFF TEAR, RIGHT, INITIAL ENCOUNTER: Primary | ICD-10-CM

## 2024-09-13 DIAGNOSIS — E78.5 HYPERLIPIDEMIA, UNSPECIFIED HYPERLIPIDEMIA TYPE: ICD-10-CM

## 2024-09-13 DIAGNOSIS — Z79.4 TYPE 2 DIABETES MELLITUS WITHOUT COMPLICATION, WITH LONG-TERM CURRENT USE OF INSULIN: Primary | ICD-10-CM

## 2024-09-13 DIAGNOSIS — E11.9 TYPE 2 DIABETES MELLITUS WITHOUT COMPLICATION, WITH LONG-TERM CURRENT USE OF INSULIN: Primary | ICD-10-CM

## 2024-09-13 DIAGNOSIS — E66.01 MORBID OBESITY: ICD-10-CM

## 2024-09-13 PROCEDURE — 99999 PR PBB SHADOW E&M-EST. PATIENT-LVL IV: CPT | Mod: PBBFAC,,, | Performed by: ORTHOPAEDIC SURGERY

## 2024-09-13 RX ORDER — TIRZEPATIDE 12.5 MG/.5ML
12.5 INJECTION, SOLUTION SUBCUTANEOUS
Qty: 4 PEN | Refills: 3 | Status: SHIPPED | OUTPATIENT
Start: 2024-09-13

## 2024-09-13 NOTE — PATIENT INSTRUCTIONS
Increase Mounjaro to 12.5 mg once weekly. Reviewed s/e. Hold a week prior to surgery.   Decrease carb ratio to 2.5 since Mounjaro will be increased.   Continue Jardiance. Hold 3 days prior to surgery.   Return to clinic in 4 months with labs prior.

## 2024-09-13 NOTE — PROGRESS NOTES
CC: This 44 y.o. White male  is here for evaluation of  T2DM along with comorbidities indicated in the Visit Diagnosis section of this encounter.    HPI: Tony Gordillo was diagnosed with T2DM in 2008. He was without health insurance for 2017 and mid 2018.       Prior visit 5/2024 virtual   A1c is down from 5.9 to 5.7%.   Started high dose steroid taper this week rx'd by Neurology. Pt c/o frequent headaches. 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 Pt has entered in fake carbs - double the amount of carbs consumed to control BGs since starting prednisone.  May need to do another steroid taper in another week, possibly followed by steroid injection.   Tolerating Mounjaro 10 mg dose ok. Appetite is not very depressed.   Plan Glucoses well controlled.   Continue insulin pump settings.   Avoid eating carbs while on steroid taper.   If glucoses remain uncontrolled especially with steroid treatment, then may need stronger carb ratio. Pt will contact office if another f/u appt is needed.   Will not change Mounjaro dose at this time since pt is starting another medication and we want to minimize potential s/e.    Return to clinic in 4 months with labs prior.     Interval hx virtual   No recent A1c available. He hasn't been able to drive s/p shoulder sx. He was able to use insulin pump without changes during surgery.   90 day CGM average 142, SD 34.   He will need 2 knee replacements -  1st one scheduled for Dec.   He hasn't been able to cook for himself. Needs to get back to a healthier diet.   Interested in higher dose of Mounjaro. Appetite suppression is wearing off.                 LAST DIABETES EDUCATION: 2019  Pt started Omnipod 5 on 8/2/23 with ROMIE Nixon RD.  F/u in 3/2024    HOSPITALIZED FOR DIABETES -  No.    DIABETES MEDICATIONS:    Jardiance 25 mg once daily   Mounjaro 10 mg weekly on Sundays      Novolog in Omnipod 5   Basal rate   12 am - 1.2 u/hr  5:30 pm - 1.75 u/hr     ICR 2    ISF 20  Target glucose 110, correct  "above 110, active insulin time 4 hours.         DM COMPLICATIONS: none    SIGNIFICANT DIABETES MED HISTORY:   glyburide--hypoglycemia  Metformin - diarrhea and vomiting (has not tried metformin XR); Metformin topical - ineffective  Pioglitazone - altered mood, reportedly became angry   Victoza - nausea and vomiting at 1.2 mg; felt "run down" at 0.6 mg.   Trulicity less effective than Ozempic, switched to Ozempic 4/2022, switched from Ozempic to Mounjaro 2/2023  Mounjaro - GI s/e when he increased from 5 to 10 mg.     MDI doses prior to pump start: Toujeo Max 30 units hs   Fiasp 30 units ac - takes about 1x/day depending on diet or if he is eating more than 10 grams CHO; Novolin N 14 units between 8-10 pm - has taken it 3x/week if bedtime BG > 200       SELF MONITORING BLOOD GLUCOSE: Dexcom G6    CGM interpretation:  glucoses overall well controlled with minimal fluctuation, no hypoglycemia. Some highs  after meals, returning back to baseline fairly quickly.                    HYPOGLYCEMIC EPISODES: none     CURRENT DIET: drinks water mostly. Sometimes coffee at work.  Eats 2-3 meals/day.     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         ROS:   CONSTITUTIONAL: Appetite good, +  fatigue    PHYSICAL EXAM:  GENERAL: Well developed, well nourished. No acute distress.   PSYCH: AAOx3, appropriate mood and affect, conversant, well-groomed. Judgement and insight good.   NEURO: Cranial nerves grossly intact. Speech clear, no tremor.   CHEST: Respirations even and unlabored.          Hemoglobin A1C   Date Value Ref Range Status   05/08/2024 5.7 (H) 4.0 - 5.6 % Final     Comment:     ADA Screening Guidelines:  5.7-6.4%  Consistent with prediabetes  >or=6.5%  Consistent with diabetes    High levels of fetal hemoglobin interfere with the HbA1C  assay. Heterozygous hemoglobin variants (HbS, HgC, etc)do  not significantly interfere with this assay.   However, " presence of multiple variants may affect accuracy.     01/04/2024 5.9 (H) 4.0 - 5.6 % Final     Comment:     ADA Screening Guidelines:  5.7-6.4%  Consistent with prediabetes  >or=6.5%  Consistent with diabetes    High levels of fetal hemoglobin interfere with the HbA1C  assay. Heterozygous hemoglobin variants (HbS, HgC, etc)do  not significantly interfere with this assay.   However, presence of multiple variants may affect accuracy.     10/10/2023 6.3 (H) 4.0 - 5.6 % Final     Comment:     ADA Screening Guidelines:  5.7-6.4%  Consistent with prediabetes  >or=6.5%  Consistent with diabetes    High levels of fetal hemoglobin interfere with the HbA1C  assay. Heterozygous hemoglobin variants (HbS, HgC, etc)do  not significantly interfere with this assay.   However, presence of multiple variants may affect accuracy.       Lab Results   Component Value Date    TSH 2.148 10/10/2023           Component Value Date/Time     06/18/2024 1400    K 4.5 06/18/2024 1400     06/18/2024 1400    CO2 26 06/18/2024 1400    BUN 12 06/18/2024 1400    CREATININE 0.9 06/18/2024 1400    GLU 97 06/18/2024 1400    CALCIUM 9.7 06/18/2024 1400    ALKPHOS 72 06/18/2024 1400    AST 15 06/18/2024 1400    ALT 24 06/18/2024 1400    BILITOT 0.6 06/18/2024 1400    EGFRNORACEVR >60 06/18/2024 1400    ESTGFRAFRICA >60 03/25/2022 0617         Lab Results   Component Value Date    LDLCALC 72.8 07/06/2023        Latest Reference Range & Units 07/06/23 10:05   Cholesterol 120 - 199 mg/dL 142   HDL 40 - 75 mg/dL 57   HDL/Cholesterol Ratio 20.0 - 50.0 % 40.1   LDL Cholesterol External 63.0 - 159.0 mg/dL 72.8   Non-HDL Cholesterol mg/dL 85   Total Cholesterol/HDL Ratio 2.0 - 5.0  2.5   Triglycerides 30 - 150 mg/dL 61       Lab Results   Component Value Date    MICALBCREAT 9.9 07/06/2023             ASSESSMENT and PLAN:    A1C GOAL: < 7 %       1. Type 2 diabetes mellitus without complication, with long-term current use of insulin  Diabetes well  controlled.   Increase Mounjaro to 12.5 mg once weekly. Reviewed s/e. Hold a week prior to surgery.   Decrease carb ratio to 2.5 since Mounjaro will be increased.   Continue Jardiance. Hold 3 days prior to surgery.   Return to clinic in 4 months with labs prior.     tirzepatide (MOUNJARO) 12.5 mg/0.5 mL PnIj      2. Hyperlipidemia, unspecified hyperlipidemia type  Lipid panel      3. Morbid obesity  tirzepatide (MOUNJARO) 12.5 mg/0.5 mL PnIj             PATIENT NEEDS TO CHANGE OMNIPOD ONCE DAILY DUE TO HIGH INSULIN USAGE            No orders of the defined types were placed in this encounter.       Follow up in about 4 months (around 1/13/2025).       The patient location is: [place]   The chief complaint leading to consultation is: type 2 diabetes     Visit type: audiovisual    Face to Face time with patient: 26 minutes of total time spent on the encounter, which includes face to face time and non-face to face time preparing to see the patient (eg, review of tests), Obtaining and/or reviewing separately obtained history, Documenting clinical information in the electronic or other health record, Independently interpreting results (not separately reported) and communicating results to the patient/family/caregiver, or Care coordination (not separately reported).         Each patient to whom he or she provides medical services by telemedicine is:  (1) informed of the relationship between the physician and patient and the respective role of any other health care provider with respect to management of the patient; and (2) notified that he or she may decline to receive medical services by telemedicine and may withdraw from such care at any time.    Notes:

## 2024-09-25 ENCOUNTER — OFFICE VISIT (OUTPATIENT)
Dept: PSYCHIATRY | Facility: CLINIC | Age: 44
End: 2024-09-25
Payer: COMMERCIAL

## 2024-09-25 DIAGNOSIS — G43.E11 CHRONIC MIGRAINE WITH AURA, INTRACTABLE, WITH STATUS MIGRAINOSUS: ICD-10-CM

## 2024-09-25 DIAGNOSIS — F41.1 GENERALIZED ANXIETY DISORDER: ICD-10-CM

## 2024-09-25 DIAGNOSIS — R45.4 OUTBURSTS OF ANGER: ICD-10-CM

## 2024-09-25 DIAGNOSIS — F33.1 MAJOR DEPRESSIVE DISORDER, RECURRENT, MODERATE: Primary | ICD-10-CM

## 2024-09-25 PROCEDURE — G2211 COMPLEX E/M VISIT ADD ON: HCPCS | Mod: 95,,,

## 2024-09-25 PROCEDURE — 99214 OFFICE O/P EST MOD 30 MIN: CPT | Mod: 95,,,

## 2024-09-25 RX ORDER — QUETIAPINE FUMARATE 25 MG/1
25 TABLET, FILM COATED ORAL DAILY
Qty: 30 TABLET | Refills: 11 | Status: SHIPPED | OUTPATIENT
Start: 2024-09-25 | End: 2025-09-25

## 2024-09-25 NOTE — PROGRESS NOTES
The patient location is: home  The chief complaint leading to consultation is: mood disorder    Visit type: audiovisual    Face to Face time with patient: 10  34 minutes of total time spent on the encounter, which includes face to face time and non-face to face time preparing to see the patient (eg, review of tests), Obtaining and/or reviewing separately obtained history, Documenting clinical information in the electronic or other health record, Independently interpreting results (not separately reported) and communicating results to the patient/family/caregiver, or Care coordination (not separately reported).         Each patient to whom he or she provides medical services by telemedicine is:  (1) informed of the relationship between the physician and patient and the respective role of any other health care provider with respect to management of the patient; and (2) notified that he or she may decline to receive medical services by telemedicine and may withdraw from such care at any time.    Notes:       Outpatient Psychiatry Follow-Up Visit (MD/NP)    9/25/2024    Clinical Status of Patient:  Outpatient (Ambulatory)    Chief Complaint:  Tony Gordillo is a 44 y.o. male who presents today for follow-up of mood disorder.  Met with patient.      Interval History and Content of Current Session:  Interim Events/Subjective Report/Content of Current Session:     Patient checked in 31 minutes late for appointment. Patient got shoulder surgery done, did rehab will be doing that for a couple months, he is no longer taking prozac due to sexual side effects. He is interested in seroquel to help with his aggression and sleep, discussed options, will trial on a low dose. His depression has lessened but he still has significant anxiety, particularly in public with large amounts of people. Discussed with patient that medication will be tried on low dose and primary side effect will be risk of weight gain. At this time should  tolerate well given he is no longer taking the prozac. Denies current AH/VH/SI/HI, still waiting on disability to make a decision.     Psychotherapy:  Target symptoms: depression, anxiety   Why chosen therapy is appropriate versus another modality: relevant to diagnosis  Outcome monitoring methods: self-report, observation  Therapeutic intervention type: insight oriented psychotherapy  Topics discussed/themes: building skills sets for symptom management, symptom recognition  The patient's response to the intervention is accepting. The patient's progress toward treatment goals is good.   Duration of intervention: 05 minutes.    Review of Systems   PSYCHIATRIC: Pertinant items are noted in the narrative.    Past Medical, Family and Social History: The patient's past medical, family and social history have been reviewed and updated as appropriate within the electronic medical record - see encounter notes.    Compliance: yes    Side effects: None    Risk Parameters:  Patient reports no suicidal ideation  Patient reports no homicidal ideation  Patient reports no self-injurious behavior  Patient reports no violent behavior    Exam (detailed: at least 9 elements; comprehensive: all 15 elements)   Constitutional  Vitals:  Most recent vital signs, dated less than 90 days prior to this appointment, were reviewed.   There were no vitals filed for this visit.     General:  unremarkable, age appropriate     Musculoskeletal  Muscle Strength/Tone:  no tremor, no tic   Gait & Station:  non-ataxic     Psychiatric  Speech:  no latency; no press   Mood & Affect:  anxious  congruent and appropriate   Thought Process:  normal and logical   Associations:  intact   Thought Content:  normal, no suicidality, no homicidality, delusions, or paranoia   Insight:  intact   Judgement: behavior is adequate to circumstances   Orientation:  grossly intact   Memory: intact for content of interview   Language: grossly intact   Attention Span &  Concentration:  able to focus   Fund of Knowledge:  intact and appropriate to age and level of education     Assessment and Diagnosis   Status/Progress: Based on the examination today, the patient's problem(s) is/are adequately but not ideally controlled.  New problems have been presented today.   Co-morbidities, Diagnostic uncertainty, and Lack of compliance are complicating management of the primary condition.  There are no active rule-out diagnoses for this patient at this time.     General Impression:   1. Major depressive disorder, recurrent, moderate        2. Generalized anxiety disorder        3. Chronic migraine with aura, intractable, with status migrainosus        4. Outbursts of anger              Intervention/Counseling/Treatment Plan   Medication Management: Continue current medications. The risks and benefits of medication were discussed with the patient. Begin seroquel 25mg      Labs: reviewed most recent. The treatment plan and follow up plan were reviewed with the patient. Discussed with patient informed consent, risks vs. benefits, alternative treatments, side effect profile and the inherent unpredictability of individual responses to these treatments. The patient expresses understanding of the above and displays the capacity to agree with this current plan and had no other questions. Encouraged Patient to keep future appointments. Take medications as prescribed and abstain from substance abuse. In the event of an emergency patient was advised to go to the emergency room.    Return to Clinic: 1 month, as scheduled, as needed    I spent a total of 34 minutes on the day of the visit.  This includes face to face time and non-face to face time preparing to see the patient (eg, review of tests), obtaining and/or reviewing separately obtained history, documenting clinical information in the electronic or other health record, independently interpreting results and communicating results to the  patient/family/caregiver, or care coordinator.

## 2024-10-02 ENCOUNTER — OFFICE VISIT (OUTPATIENT)
Dept: NEUROLOGY | Facility: CLINIC | Age: 44
End: 2024-10-02
Payer: COMMERCIAL

## 2024-10-02 VITALS — SYSTOLIC BLOOD PRESSURE: 125 MMHG | DIASTOLIC BLOOD PRESSURE: 86 MMHG | HEART RATE: 89 BPM

## 2024-10-02 DIAGNOSIS — S06.0X0S CONCUSSION WITHOUT LOSS OF CONSCIOUSNESS, SEQUELA: Primary | ICD-10-CM

## 2024-10-02 DIAGNOSIS — G44.86 CERVICOGENIC HEADACHE: ICD-10-CM

## 2024-10-02 DIAGNOSIS — H51.11 CONVERGENCE INSUFFICIENCY: ICD-10-CM

## 2024-10-02 DIAGNOSIS — S13.4XXD WHIPLASH INJURY TO NECK, SUBSEQUENT ENCOUNTER: ICD-10-CM

## 2024-10-02 DIAGNOSIS — R26.89 IMBALANCE: ICD-10-CM

## 2024-10-02 DIAGNOSIS — F34.89 OTHER SPECIFIED PERSISTENT MOOD DISORDERS: ICD-10-CM

## 2024-10-02 DIAGNOSIS — M54.2 CERVICALGIA OF OCCIPITO-ATLANTO-AXIAL REGION: ICD-10-CM

## 2024-10-02 DIAGNOSIS — M54.81 OCCIPITAL NEURITIS: ICD-10-CM

## 2024-10-02 DIAGNOSIS — F07.81 TRAUMATIC ENCEPHALOPATHY: ICD-10-CM

## 2024-10-02 DIAGNOSIS — R41.89 COGNITIVE CHANGE: ICD-10-CM

## 2024-10-02 PROCEDURE — 99999 PR PBB SHADOW E&M-EST. PATIENT-LVL V: CPT | Mod: PBBFAC,,, | Performed by: PSYCHIATRY & NEUROLOGY

## 2024-10-02 NOTE — PROGRESS NOTES
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 psychiatry  Continue talk therapy for mood  Encourage you to [facility] the [facility] to [facility] to see if you qualify for coverage for traumatic brain injury during  service     21 minutes were spent on the date of this patient encounter, which includes: preparing to see the patient, reviewing previous history, obtaining new patient history, performing the physical exam, counseling and educating the patient and/or family/caregiver, ordering necessary medications or tests or referrals, documenting in the electronic medical record, coordinating care.    Damaso Cota MD  Sports Neurology

## 2024-10-02 NOTE — PATIENT INSTRUCTIONS
Continue neck PT exercises  The patient was instructed to ice the occipital region for no more than 20 minutes at least once a day but may repeat this as many times as they would like.   Discussed ergonomic accommodations for occipital neuritis/neuralgia. Mainly perform all work at eye level to minimize continued neck flexion which will aggravate the nerve.  Patient was encouraged to do daily light cardio exercise but instructed to limit physical activities to walking, walking in water up to the waist only or riding a stationary bike, recumbent preferred. No weight lifting in upper body, no neck massage, no acupuncture of neck, and no dry needling of upper neck. No neck PT unless otherwise stated. If neck PT is recommended, the therapist may do joint manipulation at this time but no suboccipital soft tissue therapy. Any of your therapists may also do passive neck range of motion activities. No chiropractor work on neck. Lower body strengthening with resistant bands, leg machines, and strapping weights to legs okay. No core body workouts. No running or use of cardio equipment other than stationary bike. No swimming or body surfing. No amusement park rides. No lifting more than 5-10 lbs and bend at the knees, not the waist.  Discussed care plan in detail for post traumatic occipital neuritis including a trial of oral medications followed by series of trigger point steroid injections with occipital nerve blocks. To be referred for consultation for occipital nerve release procedure if initially clinically responsive to injections but always with a return of symptoms.  Continue vestibular PT exercises  Re-referral for ST for cognition placed previously  Continue to follow with Dr. South  Continue to follow with psychiatry  Continue talk therapy for mood  Encourage you to go [facility] the [facility] to evaluate to see if you qualify for coverage for traumatic brain injury during  service

## 2024-10-10 ENCOUNTER — PATIENT MESSAGE (OUTPATIENT)
Dept: ORTHOPEDICS | Facility: CLINIC | Age: 44
End: 2024-10-10
Payer: COMMERCIAL

## 2024-10-10 ENCOUNTER — TELEPHONE (OUTPATIENT)
Dept: ORTHOPEDICS | Facility: CLINIC | Age: 44
End: 2024-10-10
Payer: COMMERCIAL

## 2024-10-10 NOTE — TELEPHONE ENCOUNTER
LVM with patient. Need to reschedule appointment for 10/21. Asked for a call back for rescheduling. Follow-up portal message sent.     Angela Hilton MS, ATC, OTC  Clinical/Surgical Assistant - Dr. Crissy Bradford  Orthopedics  Phone: [phone]

## 2024-10-16 ENCOUNTER — PATIENT MESSAGE (OUTPATIENT)
Dept: ORTHOPEDICS | Facility: CLINIC | Age: 44
End: 2024-10-16

## 2024-10-17 NOTE — PROGRESS NOTES
Postoperative Visit    History of Present Illness:   Tony is 12 weeks s/p right shoulder arthroscopy  with revision cuff repair with regeneten - retear of subscap and supraspinstus. Had previous biceps tenodesis  (DOS-7/23/24)  He is doing well -- Feels much better this post op period than last     Physical Examination:    NAD  right upper extremity:  AROM , ER 40   Good strength with all cuff testing - has more strength than he did at this time last post op period     Assessment/Plan:  44 y.o. male 12 weeks s/p right shoulder arthroscopy  with revision cuff repair with regeneten - retear of subscap and supraspinstus. Had previous biceps tenodesis  (DOS-7/23/24)    Plan  Continue PT. No restrictions  Follow up in 12 weeks       All questions were answered in detail. The patient is in full agreement with the treatment plan and will proceed accordingly.

## 2024-10-20 RX ORDER — LISINOPRIL 20 MG/1
20 TABLET ORAL DAILY
Qty: 90 TABLET | Refills: 0 | Status: CANCELLED | OUTPATIENT
Start: 2024-10-20

## 2024-10-21 ENCOUNTER — OFFICE VISIT (OUTPATIENT)
Dept: ORTHOPEDICS | Facility: CLINIC | Age: 44
End: 2024-10-21
Payer: COMMERCIAL

## 2024-10-21 DIAGNOSIS — S46.011A TRAUMATIC ROTATOR CUFF TEAR, RIGHT, INITIAL ENCOUNTER: Primary | ICD-10-CM

## 2024-10-21 PROCEDURE — 99999 PR PBB SHADOW E&M-EST. PATIENT-LVL IV: CPT | Mod: PBBFAC,,, | Performed by: ORTHOPAEDIC SURGERY

## 2024-10-21 PROCEDURE — 99024 POSTOP FOLLOW-UP VISIT: CPT | Mod: S$GLB,,, | Performed by: ORTHOPAEDIC SURGERY

## 2024-10-21 NOTE — TELEPHONE ENCOUNTER
Patient requesting refill, last seen 3/10/2023. Attempted to contact patient to schedule follow up. No answer. [email or web address]

## 2024-11-04 ENCOUNTER — OFFICE VISIT (OUTPATIENT)
Dept: ORTHOPEDICS | Facility: CLINIC | Age: 44
End: 2024-11-04
Payer: COMMERCIAL

## 2024-11-04 VITALS — BODY MASS INDEX: 40.64 KG/M2 | WEIGHT: 300.06 LBS | HEIGHT: 72 IN

## 2024-11-04 DIAGNOSIS — M17.11 PRIMARY OSTEOARTHRITIS OF RIGHT KNEE: Primary | ICD-10-CM

## 2024-11-04 PROCEDURE — 99213 OFFICE O/P EST LOW 20 MIN: CPT | Mod: S$GLB,,, | Performed by: ORTHOPAEDIC SURGERY

## 2024-11-04 PROCEDURE — 99999 PR PBB SHADOW E&M-EST. PATIENT-LVL V: CPT | Mod: PBBFAC,,, | Performed by: ORTHOPAEDIC SURGERY

## 2024-11-04 NOTE — PROGRESS NOTES
EST PATIENT ORTHOPAEDIC: Knee    PRIMARY CARE PHYSICIAN: Jovany Ford MD   REFERRING PROVIDER: No referring provider defined for this encounter.     ASSESSMENT & PLAN:    Impression:  Right Knee severe degenerative changes  Left knee severe osteoarthritis  Left knee history of ACL reconstruction    Follow Up Plan: 3 weeks after surgery    Non operative care:    Tony Gordillo has physical exam evidence of above and wishes to pursue an non-operative care. I am recommending the following: TKA    Tony Gordillo has radiograph and physical exam evidence of the aforementioned impression and wishes to pursue surgery. This patient appears to have sufficient symptoms to warrant surgical intervention and is an appropriate candidate for Right Primary Total Knee Arthroplasty as evidenced by months of unsuccessful non-operative treatment as outlined in the HPI below and progressive symptoms.    We had a lengthy discussion regarding the risk and benefit of surgery, the alternatives, limitations and personnel involved. These included but were not limited to infection, persistent pain, instability, nerve injury, blood clots, dislocation, loosening, leg length inequality and medical complications. We also discussed the pre-operative course, surgery itself and rehabilitation.    Patricia-operative blood management and transfusion issues were discussed, and options clearly outlined. The patient has consented to the use of the banked allogenic blood if medically necessary.    The patient has elected to schedule surgery at this time or intends to call the office with a surgical date. Shared decision making occurred while obtaining informed consent. The patient will be scheduled for a pre-operative education class at which time they will have their nasal swab completed and will be given CHG cloths along with the verbal and written instructions for their use.    We will plan for use of [place] components. 12/11/24. Same Day      To  review:  Patient comes in with bilateral knee pains.  The left is more chronic.  History of 3 prior surgeries on that knee including multiple ACL reconstructions.  That knee is a little bit more straight forward in terms of his arthritis and ongoing instability in that knee.  This knee would be likely one that would benefit from knee replacement in the future.  His primary issue today however is his right knee and ongoing mechanical symptoms and instability of his right knee.  He is had viscosupplementation injections and steroid injections into this knee without any improvement in his pain.  As a result I obtained an MRI to rule out internal derangement, he has degenerative chondral changes of the lateral compartment of his right knee along with associated degenerative meniscal pathology and subchondral edema.  He is failed conservative measures and I thought he may benefit from arthroscopic intervention.  He would undergo arthroscopy and chondroplasty.  Since that surgery is gone onto develop more severe pain in that knee.  I have repeat radiographs today that now demonstrate progression of his arthritis and end-stage bone-on-bone articulation.  I think this is likely now his cause for pain and would benefit from replacement considerations.  Unfortunately he recently underwent revision right shoulder arthroscopy.  He is still recovering from that surgery but much improved.     The patient has been ordered:  KOSTAS CT    CONSULTS:   Anesthesia for optimization    ACTIVE PROBLEM LIST  Patient Active Problem List   Diagnosis    Hyperlipidemia LDL goal <70    Insulin long-term use    Tinea pedis    Morbid obesity    Hypothyroidism    Type 2 diabetes mellitus with left eye affected by mild nonproliferative retinopathy without macular edema, with long-term current use of insulin    Essential hypertension    Migraine with aura and without status migrainosus, not intractable propranolol SE angry; topamax not helpful;  imitrex ineffective; daith ear piercing helps    Non-seasonal allergic rhinitis; avoid antihistamines per opthalmology    Acute bilateral low back pain without sciatica    NAINA (obstructive sleep apnea) 8/2019 sleep study AHI 11    Low testosterone in male; pituitary axis normal. MRI negative. 11/2019 started on testosterone    Right foot pain    Decreased strength of lower extremity    Decreased range of motion of both ankles    Gait abnormality    Rib pain on left side    Dyspnea    Decreased strength, endurance, and mobility    Left hand pain    Mild neurocognitive disorder due to traumatic brain injury    Outbursts of anger    Other amnesia    Traumatic rotator cuff tear, right, initial encounter    S/P right rotator cuff repair    Biceps tendonosis of right shoulder    Decreased range of motion of right shoulder    Impaired strength of shoulder muscles    Allergic conjunctivitis    Cornea edema    Descemet's membrane fold    Herpes simplex keratitis    Uncontrolled type 2 diabetes mellitus    Tear of lateral meniscus of right knee, current    Refractive error    Acute migraine    MCI (mild cognitive impairment)    Medication overuse headache    Chronic migraine with aura, intractable, with status migrainosus    Agitation    Traumatic encephalopathy    Allergy desensitization therapy    Concussion with no loss of consciousness    Concussion with loss of consciousness    Other specified persistent mood disorders    Cognitive communication deficit    Impaired mobility and ADLs    Imbalance    Primary osteoarthritis of both knees    Bilateral chronic knee pain           SUBJECTIVE    CHIEF COMPLAINT: Knee Pain    HPI:   Tony Gordillo is a 44 y.o. male here for evaluation and management of bilateral knee pain, right worse than left. There is not a specific incident that brought about this pain. he has had progressive problems with the knee(s) starting 3 months months ago but is now progressing to interfere with  activities which include: walking, enjoying hobbies, exercise, rising from a sitting position, standing for prolonged periods of time, and climbing stairs     Currently the pain in the joint is rated at moderate with activity. The pain is intermittent and is located in the knee, at level of joint line, located medially, and located posterior. The pain is described as aching, severe, and sharp. Relieving factors include ice or heat, prescription medication, and repositioning.     There is associated Clicking and Popping and instability.     Tony Gordillo has no additional complaints.     9/18/23: here for MRI follow up.     9/9/24:  Here for follow up regarding ongoing right knee pain worse than left.  Underwent subchondroplasty and arthroscopy for finding seen on MRI by Dr. Bradford.  He has a worsening pain today compared to previous to surgery.  He reports more instability in his knee.  He recently underwent revision right shoulder arthroscopy as well in his in his sling today.    11/4/24: Here for pre surgical planning.     PROGRESSIVE SYMPTOMS:  Pain impacting work  Pain worsened by weight bearing  Pain effecting living situation    FUNCTIONAL STATUS:   Climb a flight of stairs or walk up a hill     PREVIOUS TREATMENTS:  Medical: OTC NSAIDS, RX NSAIDS, Steroid Injections, Biologic Injections, Narcotics, and Tylenol  Physical Therapy: Activities Modified  and Formal Physical Therapy for 6 weeks  Previous Orthopaedic Surgery: Left Knee ACL Reconstruction x3, Right Shoulder RCR with Dr. Bradford    REVIEW OF SYSTEMS:  PAIN ASSESSMENT:  See HPI.  MUSCULOSKELETAL: See HPI.  OTHER 10 point review of systems is negative except as stated in HPI above    PAST MEDICAL HISTORY   has a past medical history of Diabetes mellitus, type 2, Hyperlipidemia, Hypertension, Obesity, and NAINA (obstructive sleep apnea).     PAST SURGICAL HISTORY   has a past surgical history that includes left knee x 2; Knee surgery; Arthroscopic  repair of rotator cuff of shoulder (Right, 03/14/2023); arthroscopy,shoulder,with biceps tenodesis (03/14/2023); Arthroscopic debridement of shoulder (03/14/2023); Knee arthroscopy w/ meniscectomy (Right, 10/24/2023); Subchondroplasty (Right, 10/24/2023); PRK  (Bilateral, 2010); Arthroscopic repair of rotator cuff of shoulder (Right, 7/23/2024); and Arthroscopic debridement of shoulder (7/23/2024).     FAMILY HISTORY  family history includes Autism in his son; Diabetes in his father, maternal grandmother, and mother; No Known Problems in his brother, daughter, maternal aunt, maternal grandfather, maternal uncle, paternal aunt, paternal grandfather, paternal grandmother, paternal uncle, and another family member.     SOCIAL HISTORY   reports that he has been smoking cigars. He has never been exposed to tobacco smoke. He has never used smokeless tobacco. He reports current alcohol use. He reports current drug use. Drug: Marijuana.     ALLERGIES   Review of patient's allergies indicates:   Allergen Reactions    Influenza virus vaccine, specific Hives    Pioglitazone      Altered mood     Victoza [liraglutide] Nausea And Vomiting    Farxiga [dapagliflozin] Rash     Erectile dysfunction and rash         MEDICATIONS  Current Outpatient Medications on File Prior to Visit   Medication Sig Dispense Refill    ammonium lactate 12 % Crea Apply 1 application  topically once daily. 140 g 5    ARIPiprazole (ABILIFY) 2 MG Tab Take 1 tablet (2 mg total) by mouth once daily. 30 tablet 11    atorvastatin (LIPITOR) 40 MG tablet TAKE 1 TABLET BY MOUTH EVERY DAY 90 tablet 1    azelastine (ASTELIN) 137 mcg (0.1 %) nasal spray Use 1-2 sprays in each nostril twice daily 90 mL 3    blood sugar diagnostic Strp To check BG 4 times daily, to use with insurance preferred meter 400 strip 3    blood sugar diagnostic Strp OneTouch Ultra Test strips      blood-glucose meter kit OneTouch Ultra2 Meter kit      blood-glucose sensor (DEXCOM G6 SENSOR)  Filomena Change sensor every 10 days 9 each 3    blood-glucose transmitter (DEXCOM G6 TRANSMITTER) Filomena Change every 3 months 1 each 3    cyanocobalamin, vitamin B-12, 5,000 mcg Subl Place one under tongue once a day for 1 month, then continue to take under tongue per week 30 tablet 3    empagliflozin (JARDIANCE) 25 mg tablet TAKE 1 TABLET ONCE DAILY. 90 tablet 2    EPINEPHrine (EPIPEN 2-AYALA) 0.3 mg/0.3 mL AtIn Inject 0.3 mLs (0.3 mg total) into the muscle as needed (Anaphylaxis). 2 each 0    fluticasone propionate (FLONASE) 50 mcg/actuation nasal spray 1 spray (50 mcg total) by Each Nostril route once daily. 48 g 5    insulin aspart U-100 (NOVOLOG U-100 INSULIN ASPART) 100 unit/mL injection MAX DAILY DOSE 160 UNITS IN INSULIN PUMP. 150 mL 2    insulin aspart U-100 (NOVOLOG) 100 unit/mL (3 mL) InPn pen INJECT 15-30 UNITS BEFORE EACH MEAL WITH SLIDNING SCALE, MAX DAILY DOSE 100 UNITS. Use in the event of insulin pump failure 30 mL 5    insulin glargine U-300 conc (TOUJEO MAX SOLOSTAR) 300 unit/mL (3 mL) insulin pen Inject 36 Units into the skin once daily. 3 Pen 1    insulin pump cart,automated,BT (OMNIPOD 5 G6 PODS, GEN 5,) Crtg Inject 1 each into the skin once daily. 30 each 11    L-METHYLFOLATE 15 mg Tab TAKE 15 MG BY MOUTH ONCE DAILY. 30 tablet 11    lamoTRIgine (LAMICTAL) 100 MG tablet TAKE 1 AND 1/2 TABLETS BY MOUTH ONCE DAILY. 135 tablet 3    lancets Misc To check BG 4 times daily, to use with insurance preferred meter 400 each 3    levothyroxine (SYNTHROID) 50 MCG tablet TAKE 1 TABLET BY MOUTH BEFORE BREAKFAST. 90 tablet 3    lisinopriL (PRINIVIL,ZESTRIL) 20 MG tablet TAKE 1 TABLET BY MOUTH EVERY DAY 90 tablet 0    loratadine (CLARITIN) 10 mg tablet Take 1 tablet (10 mg total) by mouth once daily. 90 tablet 3    mirtazapine (REMERON) 15 MG tablet Take 1 tablet (15 mg total) by mouth every evening. 30 tablet 11    modafiniL (PROVIGIL) 100 MG Tab Take 1 tablet (100 mg total) by mouth every morning. 90 tablet 1     "oxyCODONE (ROXICODONE) 5 MG immediate release tablet Take 1 tablet (5 mg total) by mouth every 4 (four) hours as needed for Pain. 25 tablet 0    pen needle, diabetic (BD ULTRA-FINE EKN PEN NEEDLE) 32 gauge x 5/32" Ndle 1 Device by Misc.(Non-Drug; Combo Route) route 5 (five) times daily. 550 each 4    QUEtiapine (SEROQUEL) 25 MG Tab Take 1 tablet (25 mg total) by mouth once daily in the evening 30 tablet 11    syringe, disposable, 1 mL Syrg Testosterone every 2 weeks 25 Syringe 1    tirzepatide (MOUNJARO) 12.5 mg/0.5 mL PnIj Inject 12.5 mg (one pen) into the skin every 7 days. 4 Pen 3    pregabalin (LYRICA) 75 MG capsule Take 1 capsule (75 mg total) by mouth 2 (two) times daily. for 14 days 28 capsule 0     No current facility-administered medications on file prior to visit.          PHYSICAL EXAM   height is 6' (1.829 m) and weight is 136.1 kg (300 lb 0.7 oz) (abnormal).   Body mass index is 40.69 kg/m².      All other systems deferred.  GENERAL:  No acute distress  HABITUS: Obese  GAIT: Antalgic  SKIN: Normal     KNEE EXAM:    right:   Effusion: Minimal joint effusion  TTP: Yes over Medial and lateral Joint Line   Yes crepitus with passive knee ROM  Passive ROM: Extension 0, Flexion 130  No pain with manipulation of patella  Stable to varus/valgus stress. No increased laxity to anterior/posterior drawer testing  positive Naida's test  No pain with IR/ER rotation of the hip  5/5 strength in knee flexion and extension, ankle plantarflexion and dorsiflexion  Neurovascular Status: Sensation intact to light touch in Sural, Saphenous, SPN, DPN, Tibial nerve distribution  2+ pulse DP/PT, normal capillary refill, foot has normal warmth    DATA:  Diagnostic tests reviewed for today's visit:     4v of the left knee reveal Moderate degenerative changes of the Lateral and Patellofemoral compartment. There is evidence of advanced osteoarthritis changes with Osteophyte formation and Joint space narrowing. The limb is in " netural alignment. The patella is tracking midline.    Repeat 4v of right knee radiographs appears with progressive severe degenerative changes in particularly of the lateral compartment and now associated valgus based alignment.

## 2024-11-05 ENCOUNTER — ANESTHESIA EVENT (OUTPATIENT)
Dept: SURGERY | Facility: HOSPITAL | Age: 44
End: 2024-11-05
Payer: COMMERCIAL

## 2024-11-05 ENCOUNTER — TELEPHONE (OUTPATIENT)
Dept: PREADMISSION TESTING | Facility: HOSPITAL | Age: 44
End: 2024-11-05
Payer: COMMERCIAL

## 2024-11-06 ENCOUNTER — HOSPITAL ENCOUNTER (OUTPATIENT)
Dept: PREADMISSION TESTING | Facility: HOSPITAL | Age: 44
Discharge: HOME | End: 2024-11-06
Attending: ORTHOPAEDIC SURGERY
Payer: COMMERCIAL

## 2024-11-06 VITALS
DIASTOLIC BLOOD PRESSURE: 83 MMHG | RESPIRATION RATE: 18 BRPM | HEART RATE: 81 BPM | WEIGHT: 307 LBS | SYSTOLIC BLOOD PRESSURE: 127 MMHG | HEIGHT: 72 IN | OXYGEN SATURATION: 95 % | TEMPERATURE: 96 F | BODY MASS INDEX: 41.58 KG/M2

## 2024-11-06 DIAGNOSIS — Z01.818 PREOP TESTING: Primary | ICD-10-CM

## 2024-11-06 LAB
ALBUMIN SERPL BCP-MCNC: 3.8 G/DL (ref 3.5–5.2)
ALP SERPL-CCNC: 77 U/L (ref 40–150)
ALT SERPL W/O P-5'-P-CCNC: 23 U/L (ref 10–44)
ANION GAP SERPL CALC-SCNC: 11 MMOL/L (ref 8–16)
APTT PPP: 27.8 SEC (ref 21–32)
AST SERPL-CCNC: 18 U/L (ref 10–40)
BACTERIA #/AREA URNS HPF: NORMAL /HPF
BASOPHILS # BLD AUTO: 0.04 K/UL (ref 0–0.2)
BASOPHILS NFR BLD: 0.5 % (ref 0–1.9)
BILIRUB SERPL-MCNC: 0.5 MG/DL (ref 0.1–1)
BILIRUB UR QL STRIP: NEGATIVE
BUN SERPL-MCNC: 12 MG/DL (ref 6–20)
CALCIUM SERPL-MCNC: 9.5 MG/DL (ref 8.7–10.5)
CHLORIDE SERPL-SCNC: 107 MMOL/L (ref 95–110)
CLARITY UR: CLEAR
CO2 SERPL-SCNC: 22 MMOL/L (ref 23–29)
COLOR UR: YELLOW
CREAT SERPL-MCNC: 0.8 MG/DL (ref 0.5–1.4)
DIFFERENTIAL METHOD BLD: NORMAL
EOSINOPHIL # BLD AUTO: 0.2 K/UL (ref 0–0.5)
EOSINOPHIL NFR BLD: 2.3 % (ref 0–8)
ERYTHROCYTE [DISTWIDTH] IN BLOOD BY AUTOMATED COUNT: 13.3 % (ref 11.5–14.5)
EST. GFR  (NO RACE VARIABLE): >60 ML/MIN/1.73 M^2
ESTIMATED AVG GLUCOSE: 134 MG/DL (ref 68–131)
GLUCOSE SERPL-MCNC: 156 MG/DL (ref 70–110)
GLUCOSE UR QL STRIP: ABNORMAL
HBA1C MFR BLD: 6.3 % (ref 4–5.6)
HCT VFR BLD AUTO: 45.8 % (ref 40–54)
HGB BLD-MCNC: 14.7 G/DL (ref 14–18)
HGB UR QL STRIP: NEGATIVE
IMM GRANULOCYTES # BLD AUTO: 0.03 K/UL (ref 0–0.04)
IMM GRANULOCYTES NFR BLD AUTO: 0.4 % (ref 0–0.5)
INR PPP: 0.9 (ref 0.8–1.2)
KETONES UR QL STRIP: NEGATIVE
LEUKOCYTE ESTERASE UR QL STRIP: NEGATIVE
LYMPHOCYTES # BLD AUTO: 2.4 K/UL (ref 1–4.8)
LYMPHOCYTES NFR BLD: 31.6 % (ref 18–48)
MCH RBC QN AUTO: 29.6 PG (ref 27–31)
MCHC RBC AUTO-ENTMCNC: 32.1 G/DL (ref 32–36)
MCV RBC AUTO: 92 FL (ref 82–98)
MICROSCOPIC COMMENT: NORMAL
MONOCYTES # BLD AUTO: 0.8 K/UL (ref 0.3–1)
MONOCYTES NFR BLD: 10.7 % (ref 4–15)
NEUTROPHILS # BLD AUTO: 4.1 K/UL (ref 1.8–7.7)
NEUTROPHILS NFR BLD: 54.5 % (ref 38–73)
NITRITE UR QL STRIP: NEGATIVE
NRBC BLD-RTO: 0 /100 WBC
PH UR STRIP: 6 [PH] (ref 5–8)
PLATELET # BLD AUTO: 191 K/UL (ref 150–450)
PMV BLD AUTO: 11.4 FL (ref 9.2–12.9)
POTASSIUM SERPL-SCNC: 3.9 MMOL/L (ref 3.5–5.1)
PROT SERPL-MCNC: 7.3 G/DL (ref 6–8.4)
PROT UR QL STRIP: NEGATIVE
PROTHROMBIN TIME: 10.3 SEC (ref 9–12.5)
RBC # BLD AUTO: 4.96 M/UL (ref 4.6–6.2)
RBC #/AREA URNS HPF: 0 /HPF (ref 0–4)
SODIUM SERPL-SCNC: 140 MMOL/L (ref 136–145)
SP GR UR STRIP: >1.03 (ref 1–1.03)
SQUAMOUS #/AREA URNS HPF: 0 /HPF
URN SPEC COLLECT METH UR: ABNORMAL
UROBILINOGEN UR STRIP-ACNC: NEGATIVE EU/DL
WBC # BLD AUTO: 7.49 K/UL (ref 3.9–12.7)
WBC #/AREA URNS HPF: 2 /HPF (ref 0–5)
YEAST URNS QL MICRO: NORMAL

## 2024-11-06 PROCEDURE — 80053 COMPREHEN METABOLIC PANEL: CPT | Performed by: ORTHOPAEDIC SURGERY

## 2024-11-06 PROCEDURE — 81000 URINALYSIS NONAUTO W/SCOPE: CPT | Performed by: ORTHOPAEDIC SURGERY

## 2024-11-06 PROCEDURE — 36415 COLL VENOUS BLD VENIPUNCTURE: CPT | Performed by: ORTHOPAEDIC SURGERY

## 2024-11-06 PROCEDURE — 85610 PROTHROMBIN TIME: CPT | Performed by: ORTHOPAEDIC SURGERY

## 2024-11-06 PROCEDURE — 85025 COMPLETE CBC W/AUTO DIFF WBC: CPT | Performed by: ORTHOPAEDIC SURGERY

## 2024-11-06 PROCEDURE — 83036 HEMOGLOBIN GLYCOSYLATED A1C: CPT | Performed by: ORTHOPAEDIC SURGERY

## 2024-11-06 PROCEDURE — 85730 THROMBOPLASTIN TIME PARTIAL: CPT | Performed by: ORTHOPAEDIC SURGERY

## 2024-11-06 NOTE — ANESTHESIA PREPROCEDURE EVALUATION
11/06/2024  Tony Gordillo is a 44 y.o., male  To undergo Procedure(s) (LRB):  ARTHROPLASTY, KNEE, TOTAL, USING COMPUTER-ASSISTED NAVIGATION (Right)     Denies CP/SOB/GERD/MI/CVA/URI symptoms.  METS > 4  NPO > 8    Past Medical History:  Past Medical History:   Diagnosis Date    Diabetes mellitus, type 2     Hyperlipidemia     Hypertension     Obesity     NAINA (obstructive sleep apnea)        Past Surgical History:  Past Surgical History:   Procedure Laterality Date    ARTHROSCOPIC DEBRIDEMENT OF SHOULDER  03/14/2023    Procedure: DEBRIDEMENT, SHOULDER, ARTHROSCOPIC;  Surgeon: Crissy Bradford MD;  Location: Health system OR;  Service: Orthopedics;;    ARTHROSCOPIC DEBRIDEMENT OF SHOULDER  7/23/2024    Procedure: DEBRIDEMENT, SHOULDER, ARTHROSCOPIC;  Surgeon: Crissy Bradford MD;  Location: Health system OR;  Service: Orthopedics;;    ARTHROSCOPIC REPAIR OF ROTATOR CUFF OF SHOULDER Right 03/14/2023    Procedure: REPAIR, ROTATOR CUFF, ARTHROSCOPIC;  Surgeon: Crissy Bradford MD;  Location: Health system OR;  Service: Orthopedics;  Laterality: Right;  KARY PAYNE [phone]. TEXTED ELMER ON 3/9/2023 @ 12:10PM. ELMER RESPONDED ON 3/9/23 @ 12:23PM-SAG  RN PREOP 3/10/2023---CLEARED BY PCP AND CARDS    ARTHROSCOPIC REPAIR OF ROTATOR CUFF OF SHOULDER Right 7/23/2024    Procedure: REPAIR, ROTATOR CUFF, ARTHROSCOPIC;  Surgeon: Crissy Bradford MD;  Location: Health system OR;  Service: Orthopedics;  Laterality: Right;  TOMÁS PAYNE [phone], NURYS [phone]  CLEARED BY PCP  RN PREOP 6/18/2024    ARTHROSCOPY,SHOULDER,WITH BICEPS TENODESIS  03/14/2023    Procedure: ARTHROSCOPY,SHOULDER,WITH BICEPS TENODESIS;  Surgeon: Crissy Bradford MD;  Location: Health system OR;  Service: Orthopedics;;    KNEE ARTHROSCOPY W/ MENISCECTOMY Right 10/24/2023    Procedure: ARTHROSCOPY, KNEE, WITH MENISCECTOMY;  Surgeon: Crissy Bradford MD;  Location: Health system OR;  Service: Orthopedics;  Laterality: Right;  RN PREOP 10/18/203---CLEARED BY  PCP  ELVIA [phone]    KNEE SURGERY      acl,mcl,pcl    left knee x 2      PRK  Bilateral 2010    SUBCHONDROPLASTY Right 10/24/2023    Procedure: POSSIBLE SUBCHONDROPLASTY;  Surgeon: Crissy Bradford MD;  Location: 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     Employer: [employer]   Tobacco Use    Smoking status: Some Days     Types: Cigars     Passive exposure: Never    Smokeless tobacco: Never    Tobacco comments:     Has not had any cigars this year   Substance and Sexual Activity    Alcohol use: Yes     Comment: 1-2 beers every 2 weeks    Drug use: Yes     Types: Marijuana     Comment: Non-prescription cannabis     Social Drivers of Health     Financial Resource Strain: Patient Declined (12/5/2023)    Overall Financial Resource Strain (CARDIA)     Difficulty of Paying Living Expenses: Patient declined   Food Insecurity: Patient Declined (12/5/2023)    Hunger Vital Sign     Worried About Running Out of Food in the Last Year: Patient declined     Ran Out of Food in the Last Year: Patient declined   Transportation Needs: Unknown (12/5/2023)    PRAPARE - Transportation     Lack of Transportation (Medical): Patient declined   Physical Activity: Unknown (12/5/2023)    Exercise Vital Sign     Days of Exercise per Week: 4 days   Stress: Patient Declined (12/5/2023)    [employer] [place] [employer] - Occupational Stress Questionnaire     Feeling of Stress : Patient declined   Recent Concern: Stress - Stress Concern Present (10/10/2023)    [employer] [place] [employer] [employer] - Occupational Stress Questionnaire     Feeling of Stress : To some extent   Housing Stability: Unknown (12/5/2023)    Housing Stability Vital Sign     Unable to Pay for Housing in the Last Year: Patient  refused     Unstable Housing in the Last Year: Patient refused   Recent Concern: Housing Stability - High Risk (10/10/2023)    Housing Stability Vital Sign     Unable to Pay for Housing in the Last Year: Yes     Number of Places Lived in the Last Year: 1     Unstable Housing in the Last Year: No       Medications:  No current facility-administered medications on file prior to encounter.     Current Outpatient Medications on File Prior to Encounter   Medication Sig Dispense Refill    ammonium lactate 12 % Crea Apply 1 application  topically once daily. 140 g 5    atorvastatin (LIPITOR) 40 MG tablet TAKE 1 TABLET BY MOUTH EVERY DAY 90 tablet 1    azelastine (ASTELIN) 137 mcg (0.1 %) nasal spray Use 1-2 sprays in each nostril twice daily 90 mL 3    empagliflozin (JARDIANCE) 25 mg tablet TAKE 1 TABLET ONCE DAILY. 90 tablet 2    insulin aspart U-100 (NOVOLOG U-100 INSULIN ASPART) 100 unit/mL injection MAX DAILY DOSE 160 UNITS IN INSULIN PUMP. 150 mL 2    insulin aspart U-100 (NOVOLOG) 100 unit/mL (3 mL) InPn pen INJECT 15-30 UNITS BEFORE EACH MEAL WITH SLIDNING SCALE, MAX DAILY DOSE 100 UNITS. Use in the event of insulin pump failure 30 mL 5    insulin pump cart,automated,BT (OMNIPOD 5 G6 PODS, GEN 5,) Crtg Inject 1 each into the skin once daily. 30 each 11    L-METHYLFOLATE 15 mg Tab TAKE 15 MG BY MOUTH ONCE DAILY. 30 tablet 11    lamoTRIgine (LAMICTAL) 100 MG tablet TAKE 1 AND 1/2 TABLETS BY MOUTH ONCE DAILY. 135 tablet 3    levothyroxine (SYNTHROID) 50 MCG tablet TAKE 1 TABLET BY MOUTH BEFORE BREAKFAST. 90 tablet 3    lisinopriL (PRINIVIL,ZESTRIL) 20 MG tablet TAKE 1 TABLET BY MOUTH EVERY DAY 90 tablet 0    loratadine (CLARITIN) 10 mg tablet Take 1 tablet (10 mg total) by mouth once daily. 90 tablet 3    mirtazapine (REMERON) 15 MG tablet Take 1 tablet (15 mg total) by mouth every evening. 30 tablet 11    modafiniL (PROVIGIL) 100 MG Tab Take 1 tablet (100 mg total) by mouth every morning. 90 tablet 1    QUEtiapine  "(SEROQUEL) 25 MG Tab Take 1 tablet (25 mg total) by mouth once daily in the evening 30 tablet 11    blood sugar diagnostic Strp To check BG 4 times daily, to use with insurance preferred meter 400 strip 3    blood sugar diagnostic Strp OneTouch Ultra Test strips      blood-glucose meter kit OneTouch Ultra2 Meter kit      blood-glucose sensor (DEXCOM G6 SENSOR) Filomena Change sensor every 10 days 9 each 3    blood-glucose transmitter (DEXCOM G6 TRANSMITTER) Filomena Change every 3 months 1 each 3    cyanocobalamin, vitamin B-12, 5,000 mcg Subl Place one under tongue once a day for 1 month, then continue to take under tongue per week 30 tablet 3    EPINEPHrine (EPIPEN 2-AYALA) 0.3 mg/0.3 mL AtIn Inject 0.3 mLs (0.3 mg total) into the muscle as needed (Anaphylaxis). 2 each 0    fluticasone propionate (FLONASE) 50 mcg/actuation nasal spray 1 spray (50 mcg total) by Each Nostril route once daily. 48 g 5    insulin glargine U-300 conc (TOUJEO MAX SOLOSTAR) 300 unit/mL (3 mL) insulin pen Inject 36 Units into the skin once daily. 3 Pen 1    lancets Misc To check BG 4 times daily, to use with insurance preferred meter 400 each 3    pen needle, diabetic (BD ULTRA-FINE KEN PEN NEEDLE) 32 gauge x 5/32" Ndle 1 Device by Misc.(Non-Drug; Combo Route) route 5 (five) times daily. 550 each 4    syringe, disposable, 1 mL Syrg Testosterone every 2 weeks 25 Syringe 1    tirzepatide (MOUNJARO) 12.5 mg/0.5 mL PnIj Inject 12.5 mg (one pen) into the skin every 7 days. 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 MRI negative. 11/2019 started on testosterone    Right foot pain    Decreased strength of lower extremity    Decreased range of motion of both ankles    Gait abnormality    Rib pain on left side    Dyspnea    Decreased strength, endurance, and mobility    Left hand pain    Mild neurocognitive disorder due to traumatic brain injury    Outbursts of anger    Other amnesia    Traumatic rotator cuff tear, right, initial encounter    S/P right rotator cuff repair    Biceps tendonosis of right shoulder    Decreased range of motion of right shoulder    Impaired strength of shoulder muscles    Allergic conjunctivitis    Cornea edema    Descemet's membrane fold    Herpes simplex keratitis    Uncontrolled type 2 diabetes mellitus    Tear of lateral meniscus of right knee, current    Refractive error    Acute migraine    MCI (mild cognitive impairment)    Medication overuse headache    Chronic migraine with aura, intractable, with status migrainosus    Agitation    Traumatic encephalopathy    Allergy desensitization therapy    Concussion with no loss of consciousness    Concussion with loss of consciousness    Other specified persistent mood disorders    Cognitive communication deficit    Impaired mobility and ADLs    Imbalance    Primary osteoarthritis of both knees    Bilateral chronic knee pain       Diagnostic Studies:   Latest Reference Range & Units 11/06/24 09:35   WBC 3.90 - 12.70 K/uL 7.49   RBC  4.60 - 6.20 M/uL 4.96   Hemoglobin 14.0 - 18.0 g/dL 14.7   Hematocrit 40.0 - 54.0 % 45.8   MCV 82 - 98 fL 92   MCH 27.0 - 31.0 pg 29.6   MCHC 32.0 - 36.0 g/dL 32.1   RDW 11.5 - 14.5 % 13.3   Platelet Count 150 - 450 K/uL 191   MPV 9.2 - 12.9 fL 11.4   Gran % 38.0 - 73.0 % 54.5   Lymph % 18.0 - 48.0 % 31.6   Mono % 4.0 - 15.0 % 10.7   Eos % 0.0 - 8.0 % 2.3   Basophil % 0.0 - 1.9 % 0.5   Immature Granulocytes 0.0 - 0.5 % 0.4   Gran # (ANC) 1.8 - 7.7 K/uL 4.1   Lymph # 1.0 - 4.8 K/uL 2.4   Mono # 0.3 - 1.0 K/uL 0.8   Eos # 0.0 - 0.5 K/uL 0.2   Baso # 0.00 - 0.20 K/uL 0.04   Immature Grans (Abs) 0.00 - 0.04 K/uL 0.03   nRBC 0 /100 WBC 0   Differential Method  Automated      Latest Reference Range & Units 11/06/24 09:35   Sodium 136 - 145 mmol/L 140   Potassium 3.5 - 5.1 mmol/L 3.9   Chloride 95 - 110 mmol/L 107   CO2 23 - 29 mmol/L 22 (L)   Anion Gap 8 - 16 mmol/L 11   BUN 6 - 20 mg/dL 12   Creatinine 0.5 - 1.4 mg/dL 0.8   eGFR >60 mL/min/1.73 m^2 >60   Glucose 70 - 110 mg/dL 156 (H)   Calcium 8.7 - 10.5 mg/dL 9.5   ALP 40 - 150 U/L 77   PROTEIN TOTAL 6.0 - 8.4 g/dL 7.3   Albumin 3.5 - 5.2 g/dL 3.8   BILIRUBIN TOTAL 0.1 - 1.0 mg/dL 0.5   AST 10 - 40 U/L 18   ALT 10 - 44 U/L 23     EKG (6/18/24):  NSR    TTE (3/3/22):  There were no arrhythmias during stress.  Concentric hypertrophy and normal systolic function.  The ECG portion of this study is positive for myocardial ischemia.  The estimated ejection fraction is 60%.  Normal left ventricular diastolic function.  Normal right ventricular size with normal right ventricular systolic function.  Mild left atrial enlargement.  The stress echo portion of this study is negative for myocardial ischemia.    24 Hour Vitals:  Temp:  [36.7 °C (98.1 °F)] 36.7 °C (98.1 °F)  Pulse:  [83] 83  Resp:  [18] 18  SpO2:  [97 %] 97 %  BP: (135)/(83) 135/83   See Nursing Charting For Additional Vitals    Pre-op Assessment    I have reviewed the Patient Summary Reports.     I have reviewed the  Nursing Notes. I have reviewed the NPO Status.   I have reviewed the Medications.     Review of Systems  Anesthesia Hx:  No problems with previous Anesthesia             Denies Family Hx of Anesthesia complications.    Denies Personal Hx of Anesthesia complications.                    Social:  No Alcohol Use, Smoker, Recreational Drugs marijuana      Cardiovascular:  Exercise tolerance: good   Hypertension           hyperlipidemia   ECG has been reviewed.    Functional Capacity good / => 4 METS                         Pulmonary:      Shortness of breath  Sleep Apnea, CPAP SOB at random times since COVID, stable               Hepatic/GI:  Hepatic/GI Normal                    Musculoskeletal:  Arthritis   OA R knee            Neurological:      Headaches                                 Endocrine:  Diabetes Hypothyroidism        Morbid Obesity / BMI > 40      Physical Exam  General: Well nourished and Cooperative    Airway:  Mallampati: II   Mouth Opening: Normal  TM Distance: Normal    Dental:  Intact    Chest/Lungs:  Clear to auscultation, Normal Respiratory Rate    Heart:  Rate: Normal  Rhythm: Regular Rhythm        Anesthesia Plan  Type of Anesthesia, risks & benefits discussed:    Anesthesia Type: Gen ETT, Regional, Spinal  Intra-op Monitoring Plan: Standard ASA Monitors  Post Op Pain Control Plan: multimodal analgesia and IV/PO Opioids PRN  Induction:  IV  Informed Consent: Informed consent signed with the Patient and all parties understand the risks and agree with anesthesia plan.  All questions answered. Patient consented to blood products? Yes  ASA Score: 3  Anesthesia Plan Notes:   R adductor canal PNB  Spinal anesthesia  Standard ASA monitors  Recovery in PACU    Ready For Surgery From Anesthesia Perspective.     .

## 2024-11-06 NOTE — DISCHARGE INSTRUCTIONS
YOUR PROCEDURE WILL BE AT [facility] at [address] [place] [address]                 Enter through the Main Entrance facing Marjorie Salazar.                 Report to the Same Day Surgery Registration Desk in the hallway.(Just beside the Same Day Surgery Unit)      Your procedure  is scheduled for 11/18/2024__________.    Call [phone] between 2pm and 5pm on _11/15/2024______to find out your arrival time for the day of surgery.    You may have two visitors.  No children under 12 years old.     You will be going to the Same Day Surgery Unit on the 2nd floor of the hospital.    Important instructions:  Do not eat anything after midnight.  You may have plain water, non carbonated.  You may also have Gatorade or Powerade after midnight.    Stop all fluids 2 hours before your surgery.    It is okay to brush your teeth.  Do not have gum, candy or mints.    SEE MEDICATION SHEET.   TAKE MEDICATIONS AS DIRECTED.      Do not take any diabetic medication on the morning of surgery unless instructed to do so by your doctor or pre op nurse.      All GLP-1 weekly diabetic/weight loss medications must not be taken for one week before your surgery, or your surgery could be canceled.      STOP taking for 7 days before surgery:    Aspirin           Voltaren (Diclofenac)  Ibuprofen  (Advil, Motrin)                Indomethocin  Mobic (meloxicam, celebrex)      Etodolac   Aleve (naproxen)          Toradol (ketoralac)  Fish oil, Krill oil and Vitamin E  Headache Powders (BC Powder, Goody's Powder, [place])                           You may take Tylenol if needed which is not a blood thinner.    Please shower the night before and the morning of your surgery.      Follow any Prep Instructions given by your surgeon.    Use Chlorhexidine soap as instructed by your pre op nurse.   Please place clean linens on your bed the night before surgery. Please wear fresh clean clothing after each  shower.    No shaving of procedural area at least 4-5 days before surgery due to increased risk of skin irritation and/or possible infection.    Female patients may be asked for a urine specimen on the morning of the surgery.  Please check with your nurse before using the restroom.    Contact lenses and removable denture work may not be worn during your procedure.    You may wear deodorant only. If you are having breast surgery, do not wear deodorant on the operative side.    Do not wear powder, body lotion, perfume/cologne or make-up.    Do not wear any jewelry or have any metal on your body.    You will be asked to remove any dentures or partials for the procedure.    If you are going home on the same day of surgery, you must arrange for a family member or a friend to drive you home.  Public transportation is prohibited.  You will not be able to drive home if you were given anesthesia or sedation.    Patients who want to have their Post-op prescriptions filled from our in-house [facility], bring a Credit/Debit Card or cash with you. A co-pay may be required.  The pharmacy closes at 5:30 pm.    Wear loose fitting clothes allowing for bandages.    Please leave money and valuables home.      You may bring your cell phone.    Call the doctor if fever or illness should occur before your surgery.    Call [phone] to contact us here if needed.                            CLOTHES ON DAY OF SURGERY    SHOULDER surgery:  you must have a very oversized shirt.  Very, Very large.  You will probably have a large sling on with your arm strapped to your chest.  You will not be able to put the arm of the operated shoulder into a sleeve.  You can put the arm of the un-operated shoulder into the sleeve, but the shirt will need to be draped over the operated shoulder.       ARM or HAND surgery:  make sure that your sleeves are large and loose enough to pass over large dressings or cast.      BREAST or UNDERARM surgery:  wear a  loose, button down shirt so that you can dress without raising your arms over your head.    ABDOMINAL surgery:  wear loose, comfortable clothing.  Nothing tight around the abdomen.  NO JEANS    PENIS or SCROTAL surgery:  loose comfortable clothing.  Large sweat pants, pajama pants or a robe.  ABSOLUTELY NO JEANS      LEG or FOOT surgery:  wear large loose pants that are able to pass over any large dressings or casts.  You could also wear loose shorts or a skirt.

## 2024-11-08 ENCOUNTER — OFFICE VISIT (OUTPATIENT)
Dept: FAMILY MEDICINE | Facility: CLINIC | Age: 44
End: 2024-11-08
Payer: COMMERCIAL

## 2024-11-08 VITALS
WEIGHT: 302.69 LBS | BODY MASS INDEX: 41 KG/M2 | HEIGHT: 72 IN | SYSTOLIC BLOOD PRESSURE: 138 MMHG | DIASTOLIC BLOOD PRESSURE: 72 MMHG | OXYGEN SATURATION: 95 % | TEMPERATURE: 98 F | HEART RATE: 94 BPM

## 2024-11-08 DIAGNOSIS — Z12.11 SCREENING FOR COLORECTAL CANCER: ICD-10-CM

## 2024-11-08 DIAGNOSIS — E78.5 HYPERLIPIDEMIA LDL GOAL <70: ICD-10-CM

## 2024-11-08 DIAGNOSIS — M17.0 PRIMARY OSTEOARTHRITIS OF BOTH KNEES: ICD-10-CM

## 2024-11-08 DIAGNOSIS — Z79.4 TYPE 2 DIABETES MELLITUS WITH LEFT EYE AFFECTED BY MILD NONPROLIFERATIVE RETINOPATHY WITHOUT MACULAR EDEMA, WITH LONG-TERM CURRENT USE OF INSULIN: ICD-10-CM

## 2024-11-08 DIAGNOSIS — Z01.818 PREOP EXAMINATION: Primary | ICD-10-CM

## 2024-11-08 DIAGNOSIS — E03.4 HYPOTHYROIDISM DUE TO ACQUIRED ATROPHY OF THYROID: ICD-10-CM

## 2024-11-08 DIAGNOSIS — Z79.4 INSULIN LONG-TERM USE: ICD-10-CM

## 2024-11-08 DIAGNOSIS — I10 ESSENTIAL HYPERTENSION: ICD-10-CM

## 2024-11-08 DIAGNOSIS — M54.81 OCCIPITAL NEURITIS: ICD-10-CM

## 2024-11-08 DIAGNOSIS — E11.3292 TYPE 2 DIABETES MELLITUS WITH LEFT EYE AFFECTED BY MILD NONPROLIFERATIVE RETINOPATHY WITHOUT MACULAR EDEMA, WITH LONG-TERM CURRENT USE OF INSULIN: ICD-10-CM

## 2024-11-08 DIAGNOSIS — Z12.12 SCREENING FOR COLORECTAL CANCER: ICD-10-CM

## 2024-11-08 PROCEDURE — 99999 PR PBB SHADOW E&M-EST. PATIENT-LVL III: CPT | Mod: PBBFAC,,, | Performed by: INTERNAL MEDICINE

## 2024-11-08 NOTE — PROGRESS NOTES
This note was created by combination of typed  and M-Modal dictation.  Transcription errors may be present.   This note was also generated with the assistance of ambient listening technology. Verbal consent was obtained by the patient and accompanying visitor(s) for the recording of patient appointment to facilitate this note. I attest to having reviewed and edited the generated note for accuracy, though some syntax or spelling errors may persist. Please contact the author of this note for any clarification.    Assessment and Plan:   Assessment and Plan   Preop examination  Primary osteoarthritis of both knees  -preop labs done normal   Cardiac PET-CT 2022 was normal/negative  > 4 METS equivalent  May proceed to surgery without further testing  He notes that he uses an insulin pump and that it is continued during the surgery but he unlocks the controls so that they can adjust according to his blood glucose  He has held his Mounjaro in anticipation of surgery    Type 2 diabetes mellitus with left eye affected by mild nonproliferative retinopathy without macular edema, with long-term current use of insulin  Insulin long-term use  -on insulin pump.  On Mounjaro and Jardiance.  Has already held the Mounjaro.    Essential hypertension  -BP stable on lisinopril    Hyperlipidemia LDL goal <70  -on statin  Lab Results   Component Value Date    CHOL 142 07/06/2023    TRIG 61 07/06/2023    HDL 57 07/06/2023    LDLCALC 72.8 07/06/2023    NONHDLCHOL 85 07/06/2023       Hypothyroidism due to acquired atrophy of thyroid  -on levothyroxine  Lab Results   Component Value Date    TSH 2.148 10/10/2023       Occipital neuritis  MDD/KYLE  -followed by Neurology.  On modafinil, Abilify, Lamictal, mirtazapine, Seroquel    Screening for colorectal cancer  -FitKit was given to patient on 11/8/2024 4:24 PM   -     Fecal Immunochemical Test (iFOBT); Future; Expected date: 11/09/2024          Medications Discontinued During This  Encounter   Medication Reason    oxyCODONE (ROXICODONE) 5 MG immediate release tablet Therapy completed       meds sent this encounter:         Follow Up:   Future Appointments   Date Time Provider Department Center   11/8/2024  4:00 PM Jovany Ford MD [facility] MED [place]   11/11/2024 11:40 AM [facility] CT1 LIMIT 400 LBS [facility] CT SCAN [facility]   11/13/2024 10:30 AM David Justice NP [facility] PSYCH [address]   11/13/2024 11:00 AM LAB, [facility] LAB [facility]   12/10/2024 11:40 AM Antonio Rolon MD [facility] ORTHO [facility]   1/6/2025  1:00 PM LAB, [facility] LAB [facility]   1/6/2025  1:10 PM LAB, [facility] LAB [facility]   1/9/2025 10:30 AM Damaso Cota MD [facility] NEURO [address]   1/13/2025  2:30 PM Kate Mansfield NP [facility] ENDOCRN [facility]   1/22/2025  1:00 PM Crissy Bradford MD [facility]TH[facility]          Subjective:   Subjective   Chief Complaint   Patient presents with    Pre-op Exam       HPI  Tony is a 44 y.o. male.     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     Employer: [employer]      Social History     Social History Narrative    Not on file       Patient Care Team:  Jovany Ford MD as PCP - General (Internal Medicine)  Janneth Johnson, RN (Inactive) as Registered Nurse (Diabetes)  Rocky Hewitt MD as Consulting Physician (Ophthalmology)  Preethi Monaco RD (Inactive) as Dietitian (Nutrition)  Christofer Rowley MD as Consulting Physician (Orthopedic Surgery)  Singh Rizo MD as Consulting Physician (Sports Medicine)  Shilpa Gordon NP as Nurse Practitioner (Urology)  Nicole Wynn  MD ELIZA (Family Medicine)  Beatris Nixon RD as Dietitian (Diabetes)  Reynaldo Delacruz OD as Consulting Physician (Ophthalmology)    Last appointment with this clinic was 7/11/2024. Last visit with me 10/5/2023   To summarize last visit and events leading up to today:  HTN, lipid/statin  Incidental coronary artery calcifications on cardiac PET. Cardiac PET stress test was negative for any significant perfusion abnormalities.  Hypothyroid on LT  DM followed by DM NP  2/2023 changed to mounjaro  4/2023 mounjaro with SE at igher dose. Revert back to ozempic  But then reverted back to mounjaro due to not control on ozempic  7/10 DM NP visit mounaro, jardiance, toujeo, fiasp  Hx of colitis, saw [facility] - due to quick resolution, did not require colonoscopy.   Fibroscan was ordered but not covered.  Hx of concussion; migraines; referred to neuro  Saw neuro 9/2022 11/2022 MRI brain neg  11/2022 EEG normal.  Saw neuropsych for testing. Results c/w hx of mod-severe TBI  Saw neuro 1/2023.  MCI. Hold on donepezil. Consider depakote in the future. 1/2023 started on lamictal  Saw neuro in f/u 4/2023. Start levo methylfolate; incr lamictal  7/3/23 messaged neuro - start modafenil  History of concussion with traumatic encephalopathy.  Most recent evaluation with Neurology 10/02/2024.  At the prior visit has been given prednisone taper and it did help though side effect of hyperglycemia.  did some physical therapy but insurance limits the number of sessions and focuses on PT for the knee.  History of neuropsych evaluation with cognitive impairment from TBI.  History and exam consistent with occipital neuritis which typically does not respond to most migraine medications but does respond to anti-inflammatories.  Consider trigger point injections continue with Psychiatry  Covid long haul symptoms, improved to return to work 5/2022  Saw ENT for headaches, sinus congestion, hx of NAINA, eval for Inspire, rec's were need for weight  loss. ordered PSG  3/14/23 R rotator repair  history of testosterone/DHEA, anastrozole, and another estrogen blocker, followed [facility] but out-of-pocket expense is high with current unemployment.       1/2023 labs  A1c 8.0 from 8.8  Overdue for f/u with NP Mansfield  TSH WNL on dose 50 no changes  Results to pt via MyChart. No changes.  Seeing neuro about TBI with memory loss      Seeing orthopedics in follow-up after right rotator cuff repair   Also seen orthopedics for right knee medial meniscal tear with left OA  Plan is eventual surgical procedure     History of concussion with traumatic encephalopathy.  Most recent evaluation with Neurology 10/02/2024.  At the prior visit has been given prednisone taper and it did help though side effect of hyperglycemia.  did some physical therapy but insurance limits the number of sessions and focuses on PT for the knee.  History of neuropsych evaluation with cognitive impairment from TBI.  History and exam consistent with occipital neuritis which typically does not respond to most migraine medications but does respond to anti-inflammatories.  Consider trigger point injections continue with Psychiatry    4/6/24 MRI brain   No acute intracranial process.  Stable appearance of the brain.     Most recent visit with Psychiatry 09/25/2024.  MDD, KYLE.  Start Seroquel continue aripiprazole    Most recent visit with endocrinology 09/13/2024.  Increase Mounjaro continue Jardiance    05/20/2024 saw allergist.  Allergic rhinitis, desensitization, odactra    7/23/24 Arthroscopic  subscapularis and supraspinatus rotator cuff repair    11/04/2024 ortho severe right knee OA plan is right TKR    Preop labs   A1c 6.3  CMP normal   CBC normal   Urinalysis unremarkable   PT INR, PTT normal      Today's visit:    Planned surgery:  Right TKR  Surgeon:  Dr. Antonio Rolon  Date of surgery: 11/18/24    Surgical risk score:  Intermediate    Preop risk factors:  Diabetes mellitus    ASA  classification: 2    Revised cardiac risk index predictors  insulin requiring diabetes mellitus    Duke Activity Status Index (DASI) (if > 4 METS without worrisome symptoms, may proceed): > 4    Had normal stress testing within 2 years (if normal can proceed to surgery without further testing)? 2022 PET negative    If diabetic, takes oral medicines? yes  If diabetic, takes insulin? yes  HBA1C is indicated if the Hemoglobin A1C is consistently elevated, and if the patient has not had one drawn in the last three months.   The latest value available is:  Lab Results   Component Value Date    HGBA1C 6.3 (H) 11/06/2024     Patient Medical Risk Stratification  4 - high risk - insulin dependent DM    Cardiac Risk Factors  Low risk    Pulmonary Risk Factors  Low risk      Liver RIsk  MELD score  MELD 3.0: 6 at 11/6/2024  9:35 AM  MELD-Na: 6 at 11/6/2024  9:35 AM  Calculated from:  Serum Creatinine: 0.8 mg/dL (Using min of 1 mg/dL) at 11/6/2024  9:35 AM  Serum Sodium: 140 mmol/L (Using max of 137 mmol/L) at 11/6/2024  9:35 AM  Total Bilirubin: 0.5 mg/dL (Using min of 1 mg/dL) at 11/6/2024  9:35 AM  Serum Albumin: 3.8 g/dL (Using max of 3.5 g/dL) at 11/6/2024  9:35 AM  INR(ratio): 0.9 (Using min of 1) at 11/6/2024  9:35 AM  Age at listing (hypothetical): 44 years  Sex: Male at 11/6/2024  9:35 AM       Renal Risk  -normal risk    History of Present Illness    Tony works as a .    Tony is scheduled for a right total knee replacement on the 18th, with plans for a left knee replacement at a later date. His right knee has been deteriorating significantly between visits. He had a bone cement procedure in the right knee in October of last year, which unexpectedly resulted in the destruction of cartilage, necessitating a total knee replacement.    Tony's A1C is reported as 6.3, which is higher than desired. He attributes this to changes in cooking habits following a recent shoulder surgery, relying more on family  members to prepare meals.    Tony reports an adverse reaction to the flu vaccine, stating that it causes illness for about 2 weeks, whereas influenza only affects him for 3-4 days.    Regarding pain management, the patient expresses a strong aversion to oxycodone. He reports past experiences where it caused generalized numbness without effectively managing pain and induced mood changes, including increased irritability.    Tony is currently able to walk a flight of stairs and a city block without chest pain or shortness of breath. He can also bring in groceries, mop, and vacuum without these symptoms. He is not currently lifting heavy furniture as he has not been released to do so, likely due to the recent shoulder surgery.    Tony reports excessive sweating in his feet, leading him to wear open-toed shoes for the past couple of months for relief.    Tony denies numbness, burning, or tingling in his feet. He also denies chest pain, heart skipping beats, irregular heartbeat, palpitations, feeling like he is going to pass out, lightheadedness, or itching of the feet.    Tony is on Modafinil for focus and attention, Atorvastatin for cholesterol, and Levothyroxine for thyroid. He is taking Mounjaro 12.5 mg, which is currently on hold for surgery, and Jardiance. Tony uses Toujeo (insulin) daily and has an insulin pump. He is on Seroquel for mood and memory, Mirtazapine (Remeron) for sleep at night, Lamictal 1.5 for mood, Abilify for mood, and Lisinopril for blood pressure. Tony also has a NovoLog (insulin) pen as a backup for the pump, which is currently on hold.      ROS:  Cardiovascular: no chest pain, no palpitations  Respiratory: no shortness of breath  Musculoskeletal: reports joint pain  Neurological: no numbness, no tingling  Psychiatric: reports depression, reports mood swings  Endocrine: reports excessive sweating  Allergic: reports allergic reactions         Answers submitted by the patient for this  visit:  Review of Systems Questionnaire (Submitted on 11/5/2024)  activity change: No  unexpected weight change: No  neck pain: No  hearing loss: No  rhinorrhea: No  trouble swallowing: No  eye discharge: No  visual disturbance: No  chest tightness: No  wheezing: No  chest pain: No  palpitations: No  blood in stool: No  constipation: No  vomiting: No  diarrhea: No  polydipsia: No  polyuria: No  difficulty urinating: No  urgency: No  hematuria: No  joint swelling: No  arthralgias: Yes  headaches: Yes  weakness: No  confusion: No  dysphoric mood: No    ALLERGIES AND MEDICATIONS: updated and reviewed.  Medication List with Changes/Refills   Current Medications    AMMONIUM LACTATE 12 % CREA    Apply 1 application  topically once daily.    ARIPIPRAZOLE (ABILIFY) 2 MG TAB    Take 1 tablet (2 mg total) by mouth once daily.    ATORVASTATIN (LIPITOR) 40 MG TABLET    TAKE 1 TABLET BY MOUTH EVERY DAY    AZELASTINE (ASTELIN) 137 MCG (0.1 %) NASAL SPRAY    Use 1-2 sprays in each nostril twice daily    BLOOD SUGAR DIAGNOSTIC STRP    To check BG 4 times daily, to use with insurance preferred meter    BLOOD SUGAR DIAGNOSTIC STRP    OneTouch Ultra Test strips    BLOOD-GLUCOSE METER KIT    OneTouch Ultra2 Meter kit    BLOOD-GLUCOSE SENSOR (DEXCOM G6 SENSOR) CAT    Change sensor every 10 days    BLOOD-GLUCOSE TRANSMITTER (DEXCOM G6 TRANSMITTER) CAT    Change every 3 months    CYANOCOBALAMIN, VITAMIN B-12, 5,000 MCG SUBL    Place one under tongue once a day for 1 month, then continue to take under tongue per week    EMPAGLIFLOZIN (JARDIANCE) 25 MG TABLET    TAKE 1 TABLET ONCE DAILY.    EPINEPHRINE (EPIPEN 2-AYALA) 0.3 MG/0.3 ML ATIN    Inject 0.3 mLs (0.3 mg total) into the muscle as needed (Anaphylaxis).    FLUTICASONE PROPIONATE (FLONASE) 50 MCG/ACTUATION NASAL SPRAY    1 spray (50 mcg total) by Each Nostril route once daily.    INSULIN ASPART U-100 (NOVOLOG U-100 INSULIN ASPART) 100 UNIT/ML INJECTION    MAX DAILY DOSE 160 UNITS IN  "INSULIN PUMP.    INSULIN ASPART U-100 (NOVOLOG) 100 UNIT/ML (3 ML) INPN PEN    INJECT 15-30 UNITS BEFORE EACH MEAL WITH SLIDNING SCALE, MAX DAILY DOSE 100 UNITS. Use in the event of insulin pump failure    INSULIN GLARGINE U-300 CONC (TOUJEO MAX SOLOSTAR) 300 UNIT/ML (3 ML) INSULIN PEN    Inject 36 Units into the skin once daily.    INSULIN PUMP CART,AUTOMATED,BT (OMNIPOD 5 G6 PODS, GEN 5,) CRTG    Inject 1 each into the skin once daily.    L-METHYLFOLATE 15 MG TAB    TAKE 15 MG BY MOUTH ONCE DAILY.    LAMOTRIGINE (LAMICTAL) 100 MG TABLET    TAKE 1 AND 1/2 TABLETS BY MOUTH ONCE DAILY.    LANCETS [facility]    To check BG 4 times daily, to use with insurance preferred meter    LEVOTHYROXINE (SYNTHROID) 50 MCG TABLET    TAKE 1 TABLET BY MOUTH BEFORE BREAKFAST.    LISINOPRIL (PRINIVIL,ZESTRIL) 20 MG TABLET    TAKE 1 TABLET BY MOUTH EVERY DAY    LORATADINE (CLARITIN) 10 MG TABLET    Take 1 tablet (10 mg total) by mouth once daily.    MIRTAZAPINE (REMERON) 15 MG TABLET    Take 1 tablet (15 mg total) by mouth every evening.    MODAFINIL (PROVIGIL) 100 MG TAB    Take 1 tablet (100 mg total) by mouth every morning.    OXYCODONE (ROXICODONE) 5 MG IMMEDIATE RELEASE TABLET    Take 1 tablet (5 mg total) by mouth every 4 (four) hours as needed for Pain.    PEN NEEDLE, DIABETIC (BD ULTRA-FINE KEN PEN NEEDLE) 32 GAUGE X 5/32" NDLE    1 Device by Misc.(Non-Drug; Combo Route) route 5 (five) times daily.    QUETIAPINE (SEROQUEL) 25 MG TAB    Take 1 tablet (25 mg total) by mouth once daily in the evening    SYRINGE, DISPOSABLE, 1 ML SYRG    Testosterone every 2 weeks    TIRZEPATIDE (MOUNJARO) 12.5 MG/0.5 ML PNIJ    Inject 12.5 mg (one pen) into the skin every 7 days.         Objective:   Objective   Physical Exam   Vitals:    11/08/24 1549   BP: 138/72   Pulse: 94   Temp: 98.3 °F (36.8 °C)   TempSrc: Oral   SpO2: 95%   Weight: (!) 137.3 kg (302 lb 11.1 oz)   Height: 6' (1.829 m)    Body mass index is 41.05 kg/m².  Weight: (!) 137.3 kg (302 " lb 11.1 oz)   Height: 6' (182.9 cm)     Physical Exam  Constitutional:       General: He is not in acute distress.     Appearance: He is well-developed.   Eyes:      Extraocular Movements: Extraocular movements intact.   Cardiovascular:      Rate and Rhythm: Normal rate and regular rhythm.      Pulses:           Dorsalis pedis pulses are 3+ on the right side and 3+ on the left side.        Posterior tibial pulses are 3+ on the right side and 3+ on the left side.      Heart sounds: Normal heart sounds. No murmur heard.  Pulmonary:      Effort: Pulmonary effort is normal.      Breath sounds: Normal breath sounds.   Musculoskeletal:         General: Normal range of motion.      Right lower leg: No edema.      Left lower leg: No edema.      Right foot: No deformity.      Left foot: No deformity.   Feet:      Right foot:      Protective Sensation: 5 sites tested.  5 sites sensed.      Skin integrity: Dry skin present. No ulcer, blister, skin breakdown, erythema or warmth.      Toenail Condition: Right toenails are normal.      Left foot:      Protective Sensation: 5 sites tested.  5 sites sensed.      Skin integrity: Dry skin present. No ulcer, blister, skin breakdown, erythema or warmth.      Toenail Condition: Left toenails are normal.   Skin:     General: Skin is warm and dry.   Neurological:      Mental Status: He is alert and oriented to person, place, and time.      Coordination: Coordination normal.   Psychiatric:         Behavior: Behavior normal.         Thought Content: Thought content normal.         Component      Latest Ref Rn 5/8/2024 6/18/2024 11/6/2024   WBC      3.90 - 12.70 K/uL  6.60  7.49    RBC      4.60 - 6.20 M/uL  5.24  4.96    Hemoglobin      14.0 - 18.0 g/dL  15.6  14.7    Hematocrit      40.0 - 54.0 %  48.1  45.8    MCV      82 - 98 fL  92  92    MCH      27.0 - 31.0 pg  29.8  29.6    MCHC      32.0 - 36.0 g/dL  32.4  32.1    RDW      11.5 - 14.5 %  12.6  13.3    Platelet Count      150 - 450  K/uL  205  191    MPV      9.2 - 12.9 fL  11.0  11.4    Immature Granulocytes      0.0 - 0.5 %  0.5  0.4    Gran # (ANC)      1.8 - 7.7 K/uL  4.0  4.1    Immature Grans (Abs)      0.00 - 0.04 K/uL  0.03  0.03    Lymph #      1.0 - 4.8 K/uL  1.8  2.4    Mono #      0.3 - 1.0 K/uL  0.7  0.8    Eos #      0.0 - 0.5 K/uL  0.1  0.2    Baso #      0.00 - 0.20 K/uL  0.03  0.04    nRBC      0 /100 WBC  0  0    Gran %      38.0 - 73.0 %  60.1  54.5    Lymph %      18.0 - 48.0 %  27.6  31.6    Mono %      4.0 - 15.0 %  10.5  10.7    Eos %      0.0 - 8.0 %  0.8  2.3    Basophil %      0.0 - 1.9 %  0.5  0.5    Differential Method  Automated  Automated    Sodium      136 - 145 mmol/L  141  140    Potassium      3.5 - 5.1 mmol/L  4.5  3.9    Chloride      95 - 110 mmol/L  106  107    CO2      23 - 29 mmol/L  26  22 (L)    Glucose      70 - 110 mg/dL  97  156 (H)    BUN      6 - 20 mg/dL  12  12    Creatinine      0.5 - 1.4 mg/dL  0.9  0.8    Calcium      8.7 - 10.5 mg/dL  9.7  9.5    PROTEIN TOTAL      6.0 - 8.4 g/dL  7.3  7.3    Albumin      3.5 - 5.2 g/dL  3.8  3.8    BILIRUBIN TOTAL      0.1 - 1.0 mg/dL  0.6  0.5    ALP      40 - 150 U/L  72  77    AST      10 - 40 U/L  15  18    ALT      10 - 44 U/L  24  23    eGFR      >60 mL/min/1.73 m^2  >60  >60    Anion Gap      8 - 16 mmol/L  9  11    Specimen UA   Urine, Clean Catch    Color, UA      Yellow, Straw, Natalia    Yellow    Appearance, UA      Clear    Clear    pH, UA      5.0 - 8.0    6.0    Spec Grav UA      1.005 - 1.030    >1.030 !    Protein, UA      Negative    Negative    Glucose, UA      Negative    4+ !    Ketones, UA      Negative    Negative    Bilirubin (UA)      Negative    Negative    Blood, UA      Negative    Negative    NITRITE UA      Negative    Negative    UROBILINOGEN UA      <2.0 EU/dL   Negative    Leukocyte Esterase, UA      Negative    Negative    RBC, UA      0 - 4 /hpf   0    WBC, UA      0 - 5 /hpf   2    Bacteria, UA      None-Occ /hpf   None     Yeast, UA      None    None    Squam Epithel, UA      /hpf   0    Microscopic Comment   SEE COMMENT    Hemoglobin A1C External      4.0 - 5.6 % 5.7 (H)   6.3 (H)    Estimated Avg Glucose      68 - 131 mg/dL 117   134 (H)    PT      9.0 - 12.5 sec   10.3    INR      0.8 - 1.2    0.9    PTT      21.0 - 32.0 sec   27.8       Legend:  (H) High  (L) Low  ! Abnormal

## 2024-11-11 ENCOUNTER — HOSPITAL ENCOUNTER (OUTPATIENT)
Dept: RADIOLOGY | Facility: HOSPITAL | Age: 44
Discharge: HOME | End: 2024-11-11
Attending: ORTHOPAEDIC SURGERY
Payer: COMMERCIAL

## 2024-11-11 DIAGNOSIS — M17.11 PRIMARY OSTEOARTHRITIS OF RIGHT KNEE: ICD-10-CM

## 2024-11-11 PROCEDURE — 73700 CT LOWER EXTREMITY W/O DYE: CPT | Mod: TC,RT

## 2024-11-11 PROCEDURE — 73700 CT LOWER EXTREMITY W/O DYE: CPT | Mod: 26,RT,, | Performed by: RADIOLOGY

## 2024-11-11 RX ORDER — ARIPIPRAZOLE 2 MG/1
2 TABLET ORAL DAILY
Qty: 30 TABLET | Refills: 11 | Status: SHIPPED | OUTPATIENT
Start: 2024-11-11 | End: 2025-11-11

## 2024-11-13 ENCOUNTER — OFFICE VISIT (OUTPATIENT)
Dept: PSYCHIATRY | Facility: CLINIC | Age: 44
End: 2024-11-13
Payer: COMMERCIAL

## 2024-11-13 ENCOUNTER — LAB VISIT (OUTPATIENT)
Dept: LAB | Facility: HOSPITAL | Age: 44
End: 2024-11-13
Attending: ORTHOPAEDIC SURGERY
Payer: COMMERCIAL

## 2024-11-13 VITALS
WEIGHT: 304.13 LBS | SYSTOLIC BLOOD PRESSURE: 126 MMHG | HEART RATE: 94 BPM | BODY MASS INDEX: 41.25 KG/M2 | DIASTOLIC BLOOD PRESSURE: 70 MMHG

## 2024-11-13 DIAGNOSIS — F41.1 GENERALIZED ANXIETY DISORDER: ICD-10-CM

## 2024-11-13 DIAGNOSIS — R45.4 OUTBURSTS OF ANGER: ICD-10-CM

## 2024-11-13 DIAGNOSIS — G43.E11 CHRONIC MIGRAINE WITH AURA, INTRACTABLE, WITH STATUS MIGRAINOSUS: ICD-10-CM

## 2024-11-13 DIAGNOSIS — F33.1 MAJOR DEPRESSIVE DISORDER, RECURRENT, MODERATE: Primary | ICD-10-CM

## 2024-11-13 DIAGNOSIS — Z01.818 PREOP TESTING: ICD-10-CM

## 2024-11-13 LAB
ABO + RH BLD: NORMAL
BLD GP AB SCN CELLS X3 SERPL QL: NORMAL
SPECIMEN OUTDATE: NORMAL

## 2024-11-13 PROCEDURE — 99214 OFFICE O/P EST MOD 30 MIN: CPT | Mod: S$GLB,,,

## 2024-11-13 PROCEDURE — 86900 BLOOD TYPING SEROLOGIC ABO: CPT | Performed by: ORTHOPAEDIC SURGERY

## 2024-11-13 PROCEDURE — 99999 PR PBB SHADOW E&M-EST. PATIENT-LVL II: CPT | Mod: PBBFAC,,,

## 2024-11-13 PROCEDURE — 36415 COLL VENOUS BLD VENIPUNCTURE: CPT | Performed by: ORTHOPAEDIC SURGERY

## 2024-11-13 NOTE — PROGRESS NOTES
Outpatient Psychiatry Follow-Up Visit (MD/NP)    11/13/2024    Clinical Status of Patient:  Outpatient (Ambulatory)    Chief Complaint:  Tony Gordillo is a 44 y.o. male who presents today for follow-up of mood disorder.  Met with patient.      Interval History and Content of Current Session:  Interim Events/Subjective Report/Content of Current Session:     Patient presents today, mood issues have significantly improved overall with medication. he is doing well, still waiting on disability investigations both through his primary work and the [facility]. He is doing well on the abilify, lamictal, and remeron currently, denies any significant issues, weight has been stable. He is ongoing with therapy and is doing very well, feels it is helpful. He denies any AH/VH/SI/HI , will have a knee replacement this coming Monday, he is hopeful it will alleviate some of his pain. Denies ah/vh/si/hi. Despite improvements, patient still struggles with mood, necessity of medication regimen ongoing as patient would likely do very poorly without them.     Psychotherapy:  Target symptoms: depression, anxiety   Why chosen therapy is appropriate versus another modality: relevant to diagnosis  Outcome monitoring methods: self-report, observation  Therapeutic intervention type: insight oriented psychotherapy  Topics discussed/themes: building skills sets for symptom management, symptom recognition  The patient's response to the intervention is accepting. The patient's progress toward treatment goals is good.   Duration of intervention: 05 minutes.    Review of Systems   PSYCHIATRIC: Pertinant items are noted in the narrative.    Past Medical, Family and Social History: The patient's past medical, family and social history have been reviewed and updated as appropriate within the electronic medical record - see encounter notes.    Compliance: yes    Side effects: None    Risk Parameters:  Patient reports no suicidal ideation  Patient reports no  homicidal ideation  Patient reports no self-injurious behavior  Patient reports no violent behavior    Exam (detailed: at least 9 elements; comprehensive: all 15 elements)   Constitutional  Vitals:  Most recent vital signs, dated less than 90 days prior to this appointment, were reviewed.   Vitals:    11/13/24 1040   BP: 126/70   Pulse: 94          General:  unremarkable, age appropriate     Musculoskeletal  Muscle Strength/Tone:  no tremor, no tic   Gait & Station:  non-ataxic     Psychiatric  Speech:  no latency; no press   Mood & Affect:  steady, euthymic  congruent and appropriate   Thought Process:  normal and logical   Associations:  intact   Thought Content:  normal, no suicidality, no homicidality, delusions, or paranoia   Insight:  intact   Judgement: behavior is adequate to circumstances   Orientation:  grossly intact   Memory: intact for content of interview   Language: grossly intact   Attention Span & Concentration:  able to focus   Fund of Knowledge:  intact and appropriate to age and level of education     Assessment and Diagnosis   Status/Progress: Based on the examination today, the patient's problem(s) is/are improved.  New problems have not been presented today.   Co-morbidities, Diagnostic uncertainty, and Lack of compliance are complicating management of the primary condition.  There are no active rule-out diagnoses for this patient at this time.     General Impression:   1. Major depressive disorder, recurrent, moderate        2. Generalized anxiety disorder        3. Chronic migraine with aura, intractable, with status migrainosus        4. Outbursts of anger              Intervention/Counseling/Treatment Plan   Medication Management: Continue current medications. The risks and benefits of medication were discussed with the patient.      Labs: reviewed most recent. The treatment plan and follow up plan were reviewed with the patient. Discussed with patient informed consent, risks vs. benefits,  alternative treatments, side effect profile and the inherent unpredictability of individual responses to these treatments. The patient expresses understanding of the above and displays the capacity to agree with this current plan and had no other questions. Encouraged Patient to keep future appointments. Take medications as prescribed and abstain from substance abuse. In the event of an emergency patient was advised to go to the emergency room.    Return to Clinic: 3 months, as scheduled, as needed    I spent a total of 30 minutes on the day of the visit.  This includes face to face time and non-face to face time preparing to see the patient (eg, review of tests), obtaining and/or reviewing separately obtained history, documenting clinical information in the electronic or other health record, independently interpreting results and communicating results to the patient/family/caregiver, or care coordinator.

## 2024-11-15 ENCOUNTER — TELEPHONE (OUTPATIENT)
Dept: SURGERY | Facility: HOSPITAL | Age: 44
End: 2024-11-15
Payer: COMMERCIAL

## 2024-11-18 ENCOUNTER — HOSPITAL ENCOUNTER (OUTPATIENT)
Facility: HOSPITAL | Age: 44
Discharge: HOME | End: 2024-11-18
Attending: ORTHOPAEDIC SURGERY | Admitting: ORTHOPAEDIC SURGERY
Payer: COMMERCIAL

## 2024-11-18 ENCOUNTER — PATIENT MESSAGE (OUTPATIENT)
Dept: SURGERY | Facility: HOSPITAL | Age: 44
End: 2024-11-18
Payer: COMMERCIAL

## 2024-11-18 ENCOUNTER — ANESTHESIA (OUTPATIENT)
Dept: SURGERY | Facility: HOSPITAL | Age: 44
End: 2024-11-18
Payer: COMMERCIAL

## 2024-11-18 VITALS
HEART RATE: 87 BPM | RESPIRATION RATE: 18 BRPM | TEMPERATURE: 98 F | DIASTOLIC BLOOD PRESSURE: 71 MMHG | BODY MASS INDEX: 40.97 KG/M2 | SYSTOLIC BLOOD PRESSURE: 144 MMHG | WEIGHT: 302.13 LBS | OXYGEN SATURATION: 96 %

## 2024-11-18 DIAGNOSIS — M17.11 OSTEOARTHRITIS OF RIGHT KNEE, UNSPECIFIED OSTEOARTHRITIS TYPE: ICD-10-CM

## 2024-11-18 DIAGNOSIS — Z79.4 TYPE 2 DIABETES MELLITUS WITH LEFT EYE AFFECTED BY MILD NONPROLIFERATIVE RETINOPATHY WITHOUT MACULAR EDEMA, WITH LONG-TERM CURRENT USE OF INSULIN: ICD-10-CM

## 2024-11-18 DIAGNOSIS — E11.3292 TYPE 2 DIABETES MELLITUS WITH LEFT EYE AFFECTED BY MILD NONPROLIFERATIVE RETINOPATHY WITHOUT MACULAR EDEMA, WITH LONG-TERM CURRENT USE OF INSULIN: ICD-10-CM

## 2024-11-18 DIAGNOSIS — M17.11 PRIMARY OSTEOARTHRITIS OF RIGHT KNEE: Primary | ICD-10-CM

## 2024-11-18 LAB — POCT GLUCOSE: 110 MG/DL (ref 70–110)

## 2024-11-18 PROCEDURE — 71000039 HC RECOVERY, EACH ADD'L HOUR: Performed by: ORTHOPAEDIC SURGERY

## 2024-11-18 PROCEDURE — 0055T BONE SRGRY CMPTR CT/MRI IMAG: CPT | Mod: ,,, | Performed by: ORTHOPAEDIC SURGERY

## 2024-11-18 PROCEDURE — 63600175 PHARM REV CODE 636 W HCPCS: Performed by: ANESTHESIOLOGY

## 2024-11-18 PROCEDURE — 63600175 PHARM REV CODE 636 W HCPCS

## 2024-11-18 PROCEDURE — 36000713 HC OR TIME LEV V EA ADD 15 MIN: Performed by: ORTHOPAEDIC SURGERY

## 2024-11-18 PROCEDURE — 97162 PT EVAL MOD COMPLEX 30 MIN: CPT

## 2024-11-18 PROCEDURE — 37000009 HC ANESTHESIA EA ADD 15 MINS: Performed by: ORTHOPAEDIC SURGERY

## 2024-11-18 PROCEDURE — 63600175 PHARM REV CODE 636 W HCPCS: Performed by: ORTHOPAEDIC SURGERY

## 2024-11-18 PROCEDURE — 37000008 HC ANESTHESIA 1ST 15 MINUTES: Performed by: ORTHOPAEDIC SURGERY

## 2024-11-18 PROCEDURE — 27200671 HC STIMUCATH NEEDLE/ CATHETER: Performed by: ANESTHESIOLOGY

## 2024-11-18 PROCEDURE — 71000015 HC POSTOP RECOV 1ST HR: Performed by: ORTHOPAEDIC SURGERY

## 2024-11-18 PROCEDURE — 27201423 OPTIME MED/SURG SUP & DEVICES STERILE SUPPLY: Performed by: ORTHOPAEDIC SURGERY

## 2024-11-18 PROCEDURE — 36000712 HC OR TIME LEV V 1ST 15 MIN: Performed by: ORTHOPAEDIC SURGERY

## 2024-11-18 PROCEDURE — 25000003 PHARM REV CODE 250

## 2024-11-18 PROCEDURE — C1776 JOINT DEVICE (IMPLANTABLE): HCPCS | Performed by: ORTHOPAEDIC SURGERY

## 2024-11-18 PROCEDURE — 27200688 HC TRAY, SPINAL-HYPER/ ISOBARIC: Performed by: ANESTHESIOLOGY

## 2024-11-18 PROCEDURE — 97535 SELF CARE MNGMENT TRAINING: CPT

## 2024-11-18 PROCEDURE — 71000033 HC RECOVERY, INTIAL HOUR: Performed by: ORTHOPAEDIC SURGERY

## 2024-11-18 PROCEDURE — 25000003 PHARM REV CODE 250: Performed by: ORTHOPAEDIC SURGERY

## 2024-11-18 PROCEDURE — 71000016 HC POSTOP RECOV ADDL HR: Performed by: ORTHOPAEDIC SURGERY

## 2024-11-18 PROCEDURE — 97116 GAIT TRAINING THERAPY: CPT

## 2024-11-18 PROCEDURE — 27447 TOTAL KNEE ARTHROPLASTY: CPT | Mod: RT,,, | Performed by: ORTHOPAEDIC SURGERY

## 2024-11-18 PROCEDURE — C1713 ANCHOR/SCREW BN/BN,TIS/BN: HCPCS | Performed by: ORTHOPAEDIC SURGERY

## 2024-11-18 PROCEDURE — 64447 NJX AA&/STRD FEMORAL NRV IMG: CPT | Mod: 59,RT | Performed by: ANESTHESIOLOGY

## 2024-11-18 PROCEDURE — 97165 OT EVAL LOW COMPLEX 30 MIN: CPT

## 2024-11-18 DEVICE — CRUCIATE RETAINING FEMORAL
Type: IMPLANTABLE DEVICE | Site: KNEE | Status: FUNCTIONAL
Brand: TRIATHLON

## 2024-11-18 DEVICE — TIBIAL BEARING INSERT - CS
Type: IMPLANTABLE DEVICE | Site: KNEE | Status: FUNCTIONAL
Brand: TRIATHLON

## 2024-11-18 DEVICE — TIBIAL COMPONENT
Type: IMPLANTABLE DEVICE | Site: KNEE | Status: FUNCTIONAL
Brand: TRIATHLON

## 2024-11-18 RX ORDER — ACETAMINOPHEN 500 MG
1000 TABLET ORAL
Status: COMPLETED | OUTPATIENT
Start: 2024-11-18 | End: 2024-11-18

## 2024-11-18 RX ORDER — ACETAMINOPHEN 500 MG
1000 TABLET ORAL EVERY 8 HOURS PRN
Qty: 42 TABLET | Refills: 0 | Status: SHIPPED | OUTPATIENT
Start: 2024-11-18

## 2024-11-18 RX ORDER — LIDOCAINE HYDROCHLORIDE 10 MG/ML
1 INJECTION, SOLUTION EPIDURAL; INFILTRATION; INTRACAUDAL; PERINEURAL
Status: DISCONTINUED | OUTPATIENT
Start: 2024-11-18 | End: 2024-11-18 | Stop reason: HOSPADM

## 2024-11-18 RX ORDER — GLUCAGON 1 MG
1 KIT INJECTION
Status: DISCONTINUED | OUTPATIENT
Start: 2024-11-18 | End: 2024-11-18 | Stop reason: HOSPADM

## 2024-11-18 RX ORDER — NALOXONE HCL 0.4 MG/ML
0.02 VIAL (ML) INJECTION
Status: DISCONTINUED | OUTPATIENT
Start: 2024-11-18 | End: 2024-11-18 | Stop reason: HOSPADM

## 2024-11-18 RX ORDER — ONDANSETRON HYDROCHLORIDE 2 MG/ML
INJECTION, SOLUTION INTRAVENOUS
Status: DISCONTINUED | OUTPATIENT
Start: 2024-11-18 | End: 2024-11-18

## 2024-11-18 RX ORDER — ONDANSETRON HYDROCHLORIDE 2 MG/ML
4 INJECTION, SOLUTION INTRAVENOUS DAILY PRN
Status: DISCONTINUED | OUTPATIENT
Start: 2024-11-18 | End: 2024-11-18 | Stop reason: HOSPADM

## 2024-11-18 RX ORDER — CELECOXIB 200 MG/1
200 CAPSULE ORAL 2 TIMES DAILY
Qty: 14 CAPSULE | Refills: 0 | Status: SHIPPED | OUTPATIENT
Start: 2024-11-18

## 2024-11-18 RX ORDER — METHOCARBAMOL 750 MG/1
750 TABLET, FILM COATED ORAL 3 TIMES DAILY
Status: DISCONTINUED | OUTPATIENT
Start: 2024-11-18 | End: 2024-11-18 | Stop reason: HOSPADM

## 2024-11-18 RX ORDER — KETAMINE HYDROCHLORIDE 50 MG/ML
INJECTION, SOLUTION INTRAMUSCULAR; INTRAVENOUS
Status: DISCONTINUED | OUTPATIENT
Start: 2024-11-18 | End: 2024-11-18

## 2024-11-18 RX ORDER — PREGABALIN 75 MG/1
75 CAPSULE ORAL 2 TIMES DAILY
Qty: 28 CAPSULE | Refills: 0 | Status: SHIPPED | OUTPATIENT
Start: 2024-11-18 | End: 2024-12-02

## 2024-11-18 RX ORDER — SODIUM CHLORIDE 9 MG/ML
INJECTION, SOLUTION INTRAVENOUS CONTINUOUS
Status: DISCONTINUED | OUTPATIENT
Start: 2024-11-18 | End: 2024-11-18 | Stop reason: HOSPADM

## 2024-11-18 RX ORDER — METHOCARBAMOL 500 MG/1
500 TABLET, FILM COATED ORAL 4 TIMES DAILY
Qty: 40 TABLET | Refills: 0 | Status: SHIPPED | OUTPATIENT
Start: 2024-11-18 | End: 2024-11-28

## 2024-11-18 RX ORDER — ONDANSETRON HYDROCHLORIDE 2 MG/ML
4 INJECTION, SOLUTION INTRAVENOUS EVERY 8 HOURS PRN
Status: DISCONTINUED | OUTPATIENT
Start: 2024-11-18 | End: 2024-11-18 | Stop reason: HOSPADM

## 2024-11-18 RX ORDER — CELECOXIB 100 MG/1
400 CAPSULE ORAL ONCE
Status: COMPLETED | OUTPATIENT
Start: 2024-11-18 | End: 2024-11-18

## 2024-11-18 RX ORDER — PROCHLORPERAZINE EDISYLATE 5 MG/ML
5 INJECTION INTRAMUSCULAR; INTRAVENOUS EVERY 6 HOURS PRN
Status: DISCONTINUED | OUTPATIENT
Start: 2024-11-18 | End: 2024-11-18 | Stop reason: HOSPADM

## 2024-11-18 RX ORDER — LIDOCAINE HYDROCHLORIDE 20 MG/ML
INJECTION INTRAVENOUS
Status: DISCONTINUED | OUTPATIENT
Start: 2024-11-18 | End: 2024-11-18

## 2024-11-18 RX ORDER — TRANEXAMIC ACID 10 MG/ML
1000 INJECTION, SOLUTION INTRAVENOUS
Status: DISCONTINUED | OUTPATIENT
Start: 2024-11-18 | End: 2024-11-18 | Stop reason: HOSPADM

## 2024-11-18 RX ORDER — POLYETHYLENE GLYCOL 3350 17 G/17G
17 POWDER, FOR SOLUTION ORAL DAILY
Status: DISCONTINUED | OUTPATIENT
Start: 2024-11-18 | End: 2024-11-18 | Stop reason: HOSPADM

## 2024-11-18 RX ORDER — PROCHLORPERAZINE EDISYLATE 5 MG/ML
5 INJECTION INTRAMUSCULAR; INTRAVENOUS EVERY 30 MIN PRN
Status: DISCONTINUED | OUTPATIENT
Start: 2024-11-18 | End: 2024-11-18 | Stop reason: HOSPADM

## 2024-11-18 RX ORDER — FAMOTIDINE 20 MG/1
20 TABLET, FILM COATED ORAL 2 TIMES DAILY
Status: DISCONTINUED | OUTPATIENT
Start: 2024-11-18 | End: 2024-11-18 | Stop reason: HOSPADM

## 2024-11-18 RX ORDER — PROPOFOL 10 MG/ML
VIAL (ML) INTRAVENOUS
Status: DISCONTINUED | OUTPATIENT
Start: 2024-11-18 | End: 2024-11-18

## 2024-11-18 RX ORDER — AMOXICILLIN 250 MG
1 CAPSULE ORAL 2 TIMES DAILY
Status: DISCONTINUED | OUTPATIENT
Start: 2024-11-18 | End: 2024-11-18 | Stop reason: HOSPADM

## 2024-11-18 RX ORDER — ROPIVACAINE HYDROCHLORIDE 5 MG/ML
INJECTION, SOLUTION EPIDURAL; INFILTRATION; PERINEURAL
Status: COMPLETED | OUTPATIENT
Start: 2024-11-18 | End: 2024-11-18

## 2024-11-18 RX ORDER — MUPIROCIN 20 MG/G
1 OINTMENT TOPICAL
Status: COMPLETED | OUTPATIENT
Start: 2024-11-18 | End: 2024-11-18

## 2024-11-18 RX ORDER — TRANEXAMIC ACID 10 MG/ML
1000 INJECTION, SOLUTION INTRAVENOUS
Status: COMPLETED | OUTPATIENT
Start: 2024-11-18 | End: 2024-11-18

## 2024-11-18 RX ORDER — OXYCODONE HYDROCHLORIDE 5 MG/1
10 TABLET ORAL
Status: DISCONTINUED | OUTPATIENT
Start: 2024-11-18 | End: 2024-11-18 | Stop reason: HOSPADM

## 2024-11-18 RX ORDER — ASPIRIN 81 MG/1
81 TABLET ORAL 2 TIMES DAILY
Qty: 60 TABLET | Refills: 0 | Status: SHIPPED | OUTPATIENT
Start: 2024-11-18 | End: 2024-12-18

## 2024-11-18 RX ORDER — OXYCODONE HYDROCHLORIDE 5 MG/1
5-10 TABLET ORAL EVERY 4 HOURS PRN
Qty: 40 TABLET | Refills: 0 | Status: SHIPPED | OUTPATIENT
Start: 2024-11-18

## 2024-11-18 RX ORDER — ASPIRIN 81 MG/1
81 TABLET ORAL 2 TIMES DAILY
Status: DISCONTINUED | OUTPATIENT
Start: 2024-11-18 | End: 2024-11-18 | Stop reason: HOSPADM

## 2024-11-18 RX ORDER — DEXAMETHASONE SODIUM PHOSPHATE 4 MG/ML
INJECTION, SOLUTION INTRA-ARTICULAR; INTRALESIONAL; INTRAMUSCULAR; INTRAVENOUS; SOFT TISSUE
Status: DISCONTINUED | OUTPATIENT
Start: 2024-11-18 | End: 2024-11-18

## 2024-11-18 RX ORDER — DOCUSATE SODIUM 100 MG/1
100 CAPSULE, LIQUID FILLED ORAL 2 TIMES DAILY
Qty: 30 CAPSULE | Refills: 0 | Status: SHIPPED | OUTPATIENT
Start: 2024-11-18

## 2024-11-18 RX ORDER — CEFAZOLIN 2 G/1
2 INJECTION, POWDER, FOR SOLUTION INTRAMUSCULAR; INTRAVENOUS
Status: DISCONTINUED | OUTPATIENT
Start: 2024-11-18 | End: 2024-11-18 | Stop reason: HOSPADM

## 2024-11-18 RX ORDER — ROPIVACAINE/EPI/CLONIDINE/KET 2.46-0.005
SYRINGE (ML) INJECTION
Status: DISCONTINUED | OUTPATIENT
Start: 2024-11-18 | End: 2024-11-18 | Stop reason: HOSPADM

## 2024-11-18 RX ORDER — HYDROMORPHONE HYDROCHLORIDE 2 MG/ML
0.2 INJECTION, SOLUTION INTRAMUSCULAR; INTRAVENOUS; SUBCUTANEOUS EVERY 5 MIN PRN
Status: DISCONTINUED | OUTPATIENT
Start: 2024-11-18 | End: 2024-11-18 | Stop reason: HOSPADM

## 2024-11-18 RX ORDER — SODIUM CHLORIDE 9 MG/ML
INJECTION, SOLUTION INTRAVENOUS
Status: DISCONTINUED | OUTPATIENT
Start: 2024-11-18 | End: 2024-11-18 | Stop reason: HOSPADM

## 2024-11-18 RX ORDER — MIDAZOLAM HYDROCHLORIDE 1 MG/ML
INJECTION INTRAMUSCULAR; INTRAVENOUS
Status: DISCONTINUED | OUTPATIENT
Start: 2024-11-18 | End: 2024-11-18

## 2024-11-18 RX ORDER — SODIUM CHLORIDE 0.9 % (FLUSH) 0.9 %
10 SYRINGE (ML) INJECTION
Status: DISCONTINUED | OUTPATIENT
Start: 2024-11-18 | End: 2024-11-18 | Stop reason: HOSPADM

## 2024-11-18 RX ORDER — CEFAZOLIN 2 G/1
2 INJECTION, POWDER, FOR SOLUTION INTRAMUSCULAR; INTRAVENOUS
Status: COMPLETED | OUTPATIENT
Start: 2024-11-18 | End: 2024-11-18

## 2024-11-18 RX ORDER — FENTANYL CITRATE 50 UG/ML
INJECTION, SOLUTION INTRAMUSCULAR; INTRAVENOUS
Status: DISCONTINUED | OUTPATIENT
Start: 2024-11-18 | End: 2024-11-18

## 2024-11-18 RX ORDER — ACETAMINOPHEN 500 MG
1000 TABLET ORAL EVERY 6 HOURS
Status: DISCONTINUED | OUTPATIENT
Start: 2024-11-18 | End: 2024-11-18 | Stop reason: HOSPADM

## 2024-11-18 RX ORDER — OXYCODONE HYDROCHLORIDE 5 MG/1
5 TABLET ORAL
Status: DISCONTINUED | OUTPATIENT
Start: 2024-11-18 | End: 2024-11-18 | Stop reason: HOSPADM

## 2024-11-18 RX ADMIN — CELECOXIB 400 MG: 100 CAPSULE ORAL at 06:11

## 2024-11-18 RX ADMIN — PROPOFOL 50 MCG/KG/MIN: 10 INJECTION, EMULSION INTRAVENOUS at 07:11

## 2024-11-18 RX ADMIN — KETAMINE HYDROCHLORIDE 10 MG: 50 INJECTION, SOLUTION, CONCENTRATE INTRAMUSCULAR; INTRAVENOUS at 07:11

## 2024-11-18 RX ADMIN — FENTANYL CITRATE 50 MCG: 50 INJECTION, SOLUTION INTRAMUSCULAR; INTRAVENOUS at 07:11

## 2024-11-18 RX ADMIN — PREGABALIN 75 MG: 50 CAPSULE ORAL at 06:11

## 2024-11-18 RX ADMIN — ACETAMINOPHEN 1000 MG: 500 TABLET ORAL at 12:11

## 2024-11-18 RX ADMIN — PROPOFOL 50 MG: 10 INJECTION, EMULSION INTRAVENOUS at 07:11

## 2024-11-18 RX ADMIN — FENTANYL CITRATE 25 MCG: 50 INJECTION, SOLUTION INTRAMUSCULAR; INTRAVENOUS at 08:11

## 2024-11-18 RX ADMIN — SODIUM CHLORIDE: 9 INJECTION, SOLUTION INTRAVENOUS at 10:11

## 2024-11-18 RX ADMIN — HYDROMORPHONE HYDROCHLORIDE 0.2 MG: 2 INJECTION, SOLUTION INTRAMUSCULAR; INTRAVENOUS; SUBCUTANEOUS at 11:11

## 2024-11-18 RX ADMIN — MEPIVACAINE HYDROCHLORIDE 2.8 ML: 15 INJECTION, SOLUTION EPIDURAL; INFILTRATION at 07:11

## 2024-11-18 RX ADMIN — ONDANSETRON 4 MG: 2 INJECTION, SOLUTION INTRAMUSCULAR; INTRAVENOUS at 08:11

## 2024-11-18 RX ADMIN — METHOCARBAMOL TABLETS 750 MG: 750 TABLET, COATED ORAL at 10:11

## 2024-11-18 RX ADMIN — HYDROMORPHONE HYDROCHLORIDE 0.2 MG: 2 INJECTION, SOLUTION INTRAMUSCULAR; INTRAVENOUS; SUBCUTANEOUS at 10:11

## 2024-11-18 RX ADMIN — TRANEXAMIC ACID 1000 MG: 10 INJECTION, SOLUTION INTRAVENOUS at 07:11

## 2024-11-18 RX ADMIN — LIDOCAINE HYDROCHLORIDE 60 MG: 20 INJECTION, SOLUTION INTRAVENOUS at 07:11

## 2024-11-18 RX ADMIN — KETAMINE HYDROCHLORIDE 10 MG: 50 INJECTION, SOLUTION, CONCENTRATE INTRAMUSCULAR; INTRAVENOUS at 08:11

## 2024-11-18 RX ADMIN — KETAMINE HYDROCHLORIDE 20 MG: 50 INJECTION, SOLUTION, CONCENTRATE INTRAMUSCULAR; INTRAVENOUS at 07:11

## 2024-11-18 RX ADMIN — OXYCODONE 5 MG: 5 TABLET ORAL at 10:11

## 2024-11-18 RX ADMIN — ACETAMINOPHEN 1000 MG: 500 TABLET ORAL at 06:11

## 2024-11-18 RX ADMIN — SODIUM CHLORIDE, SODIUM LACTATE, POTASSIUM CHLORIDE, AND CALCIUM CHLORIDE: .6; .31; .03; .02 INJECTION, SOLUTION INTRAVENOUS at 07:11

## 2024-11-18 RX ADMIN — SENNOSIDES AND DOCUSATE SODIUM 1 TABLET: 50; 8.6 TABLET ORAL at 10:11

## 2024-11-18 RX ADMIN — FAMOTIDINE 20 MG: 20 TABLET ORAL at 10:11

## 2024-11-18 RX ADMIN — POLYETHYLENE GLYCOL 3350 17 G: 17 POWDER, FOR SOLUTION ORAL at 10:11

## 2024-11-18 RX ADMIN — ASPIRIN 81 MG: 81 TABLET, COATED ORAL at 10:11

## 2024-11-18 RX ADMIN — DEXAMETHASONE SODIUM PHOSPHATE 4 MG: 4 INJECTION, SOLUTION INTRAMUSCULAR; INTRAVENOUS at 07:11

## 2024-11-18 RX ADMIN — MIDAZOLAM HYDROCHLORIDE 2 MG: 1 INJECTION INTRAMUSCULAR; INTRAVENOUS at 07:11

## 2024-11-18 RX ADMIN — ROPIVACAINE HYDROCHLORIDE 20 ML: 5 INJECTION, SOLUTION EPIDURAL; INFILTRATION; PERINEURAL at 07:11

## 2024-11-18 RX ADMIN — MUPIROCIN 1 G: 20 OINTMENT TOPICAL at 06:11

## 2024-11-18 RX ADMIN — CEFAZOLIN 3 G: 2 INJECTION, POWDER, FOR SOLUTION INTRAMUSCULAR; INTRAVENOUS at 07:11

## 2024-11-18 NOTE — PT/OT/SLP EVAL
Physical Therapy Evaluation and Discharge Note    Patient Name:  Tony Gordillo   MRN:  [id number]    Recommendations:     Discharge Recommendations: Low Intensity Therapy  Discharge Equipment Recommendations: walker, rolling (Wide Rolling Walker)   Barriers to discharge: None    Assessment:     Tony Gordillo is a 44 y.o. male admitted with a medical diagnosis of <principal problem not specified>. .  At this time, patient is functioning at their prior level of function and does not require further acute PT services.     Recent Surgery: Procedure(s) (LRB):  ARTHROPLASTY, KNEE, TOTAL, USING COMPUTER-ASSISTED NAVIGATION (Right) Day of Surgery    Plan:     During this hospitalization, patient does not require further acute PT services.  Please re-consult if situation changes.      Subjective     Chief Complaint: Pt states that his knee is still bothering him but toelrable  Patient/Family Comments/goals: Return Home  Pain/Comfort:  Pain Rating 1: 5/10  Location - Side 1: Right  Location 1: knee  Pain Addressed 1: Pre-medicate for activity    Patients cultural, spiritual, Pentec[employer]alism conflicts given the current situation: yes    Living Environment:  Lives home with wife, 0 JHON  Prior to admission, patients level of function was independent.  Equipment used at home: none.  DME owned (not currently used): none.  Upon discharge, patient will have assistance from wife.    Objective:     Communicated with nursing prior to session.  Patient found HOB elevated with peripheral IV, telemetry, pulse ox (continuous), blood pressure cuff, cryotherapy upon PT entry to room.    General Precautions: Standard, fall    Orthopedic Precautions:RLE weight bearing as tolerated   Braces: N/A  Respiratory Status: Room air    Exams:  Cognitive Exam:  Patient is oriented to Person, Place, Time, and Situation  Gross Motor Coordination:  WFL  Postural Exam:  Patient presented with the following abnormalities:    -       No postural  abnormalities identified  Skin Integrity/Edema:      -       Skin integrity: Wound under R+ ace wrap and p/o bandage  -       Edema: Mild R+ LE  RLE ROM: WFL except knee flexion  RLE Strength: pt able to perform SLR with <15 deg quad lag , all other MMT's 4/5  LLE ROM: WNL  LLE Strength: WNL    Functional Mobility:  Bed Mobility:     Rolling Left:  modified independence  Scooting: modified independence  Supine to Sit: modified independence  Sit to Supine: modified independence  Transfers:     Sit to Stand:  modified independence with rolling walker  Gait: 150 ft, 2x's with standing RB in between, verbal cues to activate quad during stance phase, RW   Balance: Static Sit and stand Good    AM-PAC 6 CLICK MOBILITY  Total Score:24       Treatment and Education:  Eval  GT 1:1 x 8 min see above - step to gait pattern, decreased WB on R+, occasional controlled knee buckling when pt distracted.     AM-PAC 6 CLICK MOBILITY  Total Score:24     Patient left HOB elevated with all lines intact, call button in reach, and nursing notified.    GOALS:   Multidisciplinary Problems       Physical Therapy Goals       Not on file                    History:     Past Medical History:   Diagnosis Date    Diabetes mellitus, type 2     Hyperlipidemia     Hypertension     Obesity     NAINA (obstructive sleep apnea)        Past Surgical History:   Procedure Laterality Date    ARTHROSCOPIC DEBRIDEMENT OF SHOULDER  03/14/2023    Procedure: DEBRIDEMENT, SHOULDER, ARTHROSCOPIC;  Surgeon: Crissy Bradford MD;  Location: [facility] OR;  Service: Orthopedics;;    ARTHROSCOPIC DEBRIDEMENT OF SHOULDER  7/23/2024    Procedure: DEBRIDEMENT, SHOULDER, ARTHROSCOPIC;  Surgeon: Crissy Bradford MD;  Location: [facility] OR;  Service: Orthopedics;;    ARTHROSCOPIC REPAIR OF ROTATOR CUFF OF SHOULDER Right 03/14/2023    Procedure: REPAIR, ROTATOR CUFF, ARTHROSCOPIC;  Surgeon: Crissy Bradford MD;  Location: [facility] OR;  Service: Orthopedics;  Laterality: Right;  SMITH  MICHAEL GRAYNT [phone]. TEXTED ELMER ON 3/9/2023 @ 12:10PM. ELMER RESPONDED ON 3/9/23 @ 12:23PM-SAG  RN PREOP 3/10/2023---CLEARED BY PCP AND CARDS    ARTHROSCOPIC REPAIR OF ROTATOR CUFF OF SHOULDER Right 7/23/2024    Procedure: REPAIR, ROTATOR CUFF, ARTHROSCOPIC;  Surgeon: Crissy Bradford MD;  Location: [facility] OR;  Service: Orthopedics;  Laterality: Right;  HARDY & BONIFACIO PAYNE [phone], NURYS [phone]  CLEARED BY PCP  RN PREOP 6/18/2024    ARTHROSCOPY,SHOULDER,WITH BICEPS TENODESIS  03/14/2023    Procedure: ARTHROSCOPY,SHOULDER,WITH BICEPS TENODESIS;  Surgeon: Crissy Bradford MD;  Location: [facility] OR;  Service: Orthopedics;;    KNEE ARTHROSCOPY W/ MENISCECTOMY Right 10/24/2023    Procedure: ARTHROSCOPY, KNEE, WITH MENISCECTOMY;  Surgeon: Crissy Bradford MD;  Location: [facility] OR;  Service: Orthopedics;  Laterality: Right;  RN PREOP 10/18/203---CLEARED BY CHARLEEN [phone]    KNEE SURGERY      acl,mcl,pcl    left knee x 2      PRK  Bilateral 2010    SUBCHONDROPLASTY Right 10/24/2023    Procedure: POSSIBLE SUBCHONDROPLASTY;  Surgeon: Crissy Bradford MD;  Location: [facility] OR;  Service: Orthopedics;  Laterality: Right;       Time Tracking:     PT Received On:    PT Start Time: 1149     PT Stop Time: 1212  PT Total Time (min): 23 min     Billable Minutes: Evaluation 1 and Gait Training 1      11/18/2024

## 2024-11-18 NOTE — ANESTHESIA PROCEDURE NOTES
Spinal    Diagnosis: OA R knee  Patient location during procedure: OR  Start time: 11/18/2024 7:31 AM  Timeout: 11/18/2024 7:30 AM  End time: 11/18/2024 7:35 AM    Staffing  Authorizing Provider: Baron Manzo MD  Performing Provider: Baron Manzo MD    Staffing  Performed by: Baron Manzo MD  Authorized by: Baron Manzo MD    Preanesthetic Checklist  Completed: patient identified, IV checked, site marked, risks and benefits discussed, surgical consent, monitors and equipment checked, pre-op evaluation and timeout performed  Spinal Block  Patient position: sitting  Prep: ChloraPrep  Patient monitoring: heart rate, cardiac monitor, continuous pulse ox, continuous capnometry and frequent blood pressure checks  Approach: midline  Location: L4-5  Injection technique: single shot  CSF Fluid: clear free-flowing CSF  Needle  Needle type: pencil-tip   Needle gauge: 25 G  Additional Documentation: no paresthesia on injection  Needle localization: anatomical landmarks  Assessment  Ease of block: easy  Patient's tolerance of the procedure: comfortable throughout block and no complaints  Medications:    Medications: mepivacaine (CARBOCAINE) injection 15 mg/mL (1.5%) - Other   2.8 mL - 11/18/2024 7:35:00 AM

## 2024-11-18 NOTE — PLAN OF CARE
Case Management Final Discharge Note      Discharge Disposition: Home with [facility]  I provided the patient a choice of post acute providers and offered a list of CMS rated hhs.     Patient/family chose the following as their preferred providers:  [facility]  Patient accepted for services per Selina at [facility]    New DME ordered / company name: n/a  RW order but [employer] unable to supply due to unpaid balance.  Sent [employer] with no response.  Spoke with patient and wife who agreed that they have access to borrow one.  Patient also has crutches.  Dr Rolon aware.    1458:  Call received from [employer].  Wide RW will be supplied at no cost to the patient.  CM called patient and he accepted.    Relevant SDOH / Transition of Care Barriers:  none    Primary Caretaker and contact information: Independent but wife available to help as needed    Scheduled followup appointment: 12/10 Hospital f/u with Dr Rolon on AVS    Referrals placed: none    Transportation: private vehicle    Patient and family educated on discharge services and updated on DC plan. Bedside RN notified, patient clear to discharge from Case Management Perspective.        11/18/24 1353   Final Note   Assessment Type Final Discharge Note   Anticipated Discharge Disposition [facility] Resources/Appts/Education Provided Appointments scheduled and added to AVS   Post-Acute Status   Post-Acute Authorization Home Health   Home Health Status Set-up Complete/Auth obtained   Discharge Delays None known at this time

## 2024-11-18 NOTE — PLAN OF CARE
Discharge Recommendations: Low Intensity, DME = Wide Rolling Walker   Problem: Physical Therapy  Goal: Physical Therapy Goal  Description: PT Eval and Discharge due to independent  Outcome: Met

## 2024-11-18 NOTE — PLAN OF CARE
Discharge Orders: Home [place]         Expected Discharge Date: POD1    Diagnoses:  Post-op knee replacement    Patient is homebound due to:   Pt requires home health services due to taxing effort to leave the home as a result of immobility from Post-op knee replacement    Weight Bearing Status:   full weight bearing: Progress to cane as able.  Set up for outpatient PT as soon as able after 2 weeks, once patient is MOD I with cane.    Physical Therapy:   3 times a week for 2 weeks (Call for further orders beyond 2 weeks)  - Ambulate with a rolling walker  - Progress to cane  - Instruct on ROM and strengthening of knee    Aide to provide assistance with personal care and  ADLs 2 times a week for 2 weeks    Wound Care:   Surgical dressing change: Starting on  post op day 7-10, dressing can be moved. Incision is glued. Protective dressing should be placed with island dressing or apply gauze and cover with tegaderm.  Pt may shower if surgical dressing is waterproof, once removed on POD7 can shower but protect surgical dressing from getting wet by using press and seal saranwrap or garbage bag. Removal and replacement of dressing after shower only needed if incision is suspected to have gotten wet during shower. No soaking in the tub or hot tub use for 6 weeks.    Cold therapy/Ice: Encouraged at least 20 minutes 2-3 times daily or more if desired.  Incision must be kept waterproof while icing.  Wear TEDS Bilateral Thigh High Stockings for 3 weeks. Dre hose x 3 weeks. Ok to remove dre hose 1-2 hours/day max if desired.       Contact:  Please contact [phone] with concerns.         BLOOD THINNER:    If sent home on Lovenox: 28 days post-op for TKR/OLGA  If sent home home on ASA: 81mg   BID x 4 weeks   If on Plavix, ok to stop hospital prescribed blood thinner and restart Plavix on POD5 after surgery    Once finished with prescribed blood thinner, patients can return to pre-surgical ASA dosage if they took ASA before  surgery.       Outpatient Therapy: [facility] is preference (to begin 2 weeks post op)  [address] [facility]  [place] [address]      DME:  - Per PT/OT recommendation

## 2024-11-18 NOTE — TRANSFER OF CARE
Anesthesia Transfer of Care Note    Patient: Tony Gordillo    Procedure(s) Performed: Procedure(s) (LRB):  ARTHROPLASTY, KNEE, TOTAL, USING COMPUTER-ASSISTED NAVIGATION (Right)    Patient location: PACU    Anesthesia Type: MAC    Transport from OR: Transported from OR on 2-3 L/min O2 by NC with adequate spontaneous ventilation    Post pain: adequate analgesia    Post assessment: no apparent anesthetic complications and tolerated procedure well    Post vital signs: stable    Level of consciousness: awake and alert    Nausea/Vomiting: no nausea/vomiting    Complications: none    Transfer of care protocol was followed      Last vitals: Visit Vitals  /68 (BP Location: Left arm, Patient Position: Lying)   Pulse 87   Temp 36.4 °C (97.5 °F) (Temporal)   Resp 15   Wt (!) 137 kg (302 lb 1.8 oz)   SpO2 97%   BMI 40.97 kg/m²

## 2024-11-18 NOTE — OR NURSING
0715 - Time out done for Block per Dr Manzo   Pt on monitors   0719 - Block complete - pt tolerated well

## 2024-11-18 NOTE — H&P
EST PATIENT ORTHOPAEDIC: Knee    PRIMARY CARE PHYSICIAN: Jovany Ford MD   REFERRING PROVIDER: Antonio Rolon MD  [address] [place] [address]  KAT[place] [id number]     ASSESSMENT & PLAN:    Impression:  Right Knee severe degenerative changes  Left knee severe osteoarthritis  Left knee history of ACL reconstruction    Follow Up Plan: 3 weeks after surgery    Non operative care:    Tony Gordillo has physical exam evidence of above and wishes to pursue an non-operative care. I am recommending the following: TKA    Tony Gordillo has radiograph and physical exam evidence of the aforementioned impression and wishes to pursue surgery. This patient appears to have sufficient symptoms to warrant surgical intervention and is an appropriate candidate for Right Primary Total Knee Arthroplasty as evidenced by months of unsuccessful non-operative treatment as outlined in the HPI below and progressive symptoms.    We had a lengthy discussion regarding the risk and benefit of surgery, the alternatives, limitations and personnel involved. These included but were not limited to infection, persistent pain, instability, nerve injury, blood clots, dislocation, loosening, leg length inequality and medical complications. We also discussed the pre-operative course, surgery itself and rehabilitation.    Patricia-operative blood management and transfusion issues were discussed, and options clearly outlined. The patient has consented to the use of the banked allogenic blood if medically necessary.    The patient has elected to schedule surgery at this time or intends to call the office with a surgical date. Shared decision making occurred while obtaining informed consent. The patient will be scheduled for a pre-operative education class at which time they will have their nasal swab completed and will be given CHG cloths along with the verbal and written instructions for their use.    We will plan for use of Blue components. 12/11/24. Same  Day    Justification of Rolling Walker:  The mobility limitation can not be sufficiently resolved by the use of a cane.  Patient's functional mobility deficit can be sufficiently resolved with the use of a rolling walker.  Patient has mobility limitation significantly impairs their ability to participate in 1 or more activities of daily living.  The use of the rolling walker we will significantly improve the patient's ability to participate in MRADLS and the patient will use it on a regular basis in the home.      To review:  Patient comes in with bilateral knee pains.  The left is more chronic.  History of 3 prior surgeries on that knee including multiple ACL reconstructions.  That knee is a little bit more straight forward in terms of his arthritis and ongoing instability in that knee.  This knee would be likely one that would benefit from knee replacement in the future.  His primary issue today however is his right knee and ongoing mechanical symptoms and instability of his right knee.  He is had viscosupplementation injections and steroid injections into this knee without any improvement in his pain.  As a result I obtained an MRI to rule out internal derangement, he has degenerative chondral changes of the lateral compartment of his right knee along with associated degenerative meniscal pathology and subchondral edema.  He is failed conservative measures and I thought he may benefit from arthroscopic intervention.  He would undergo arthroscopy and chondroplasty.  Since that surgery is gone onto develop more severe pain in that knee.  I have repeat radiographs today that now demonstrate progression of his arthritis and end-stage bone-on-bone articulation.  I think this is likely now his cause for pain and would benefit from replacement considerations.  Unfortunately he recently underwent revision right shoulder arthroscopy.  He is still recovering from that surgery but much improved.     The patient has been  ordered:  [facility] CT    CONSULTS:   Anesthesia for optimization    ACTIVE PROBLEM LIST  Patient Active Problem List   Diagnosis    Hyperlipidemia LDL goal <70    Insulin long-term use    Tinea pedis    Morbid obesity    Hypothyroidism    Type 2 diabetes mellitus 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 MRI negative. 11/2019 started on testosterone    Right foot pain    Decreased strength of lower extremity    Decreased range of motion of both ankles    Gait abnormality    Rib pain on left side    Dyspnea    Decreased strength, endurance, and mobility    Left hand pain    Mild neurocognitive disorder due to traumatic brain injury    Outbursts of anger    Other amnesia    Traumatic rotator cuff tear, right, initial encounter    S/P right rotator cuff repair    Biceps tendonosis of right shoulder    Decreased range of motion of right shoulder    Impaired strength of shoulder muscles    Allergic conjunctivitis    Cornea edema    Descemet's membrane fold    Herpes simplex keratitis    Uncontrolled type 2 diabetes mellitus    Tear of lateral meniscus of right knee, current    Refractive error    Acute migraine    MCI (mild cognitive impairment)    Medication overuse headache    Chronic migraine with aura, intractable, with status migrainosus    Agitation    Traumatic encephalopathy    Allergy desensitization therapy    Concussion with no loss of consciousness    Concussion with loss of consciousness    Other specified persistent mood  disorders    Cognitive communication deficit    Impaired mobility and ADLs    Imbalance    Primary osteoarthritis of both knees    Bilateral chronic knee pain           SUBJECTIVE    CHIEF COMPLAINT: Knee Pain    HPI:   Tony Gordillo is a 44 y.o. male here for evaluation and management of bilateral knee pain, right worse than left. There is not a specific incident that brought about this pain. he has had progressive problems with the knee(s) starting 3 months months ago but is now progressing to interfere with activities which include: walking, enjoying hobbies, exercise, rising from a sitting position, standing for prolonged periods of time, and climbing stairs     Currently the pain in the joint is rated at moderate with activity. The pain is intermittent and is located in the knee, at level of joint line, located medially, and located posterior. The pain is described as aching, severe, and sharp. Relieving factors include ice or heat, prescription medication, and repositioning.     There is associated Clicking and Popping and instability.     Tony Gordillo has no additional complaints.     9/18/23: here for MRI follow up.     9/9/24:  Here for follow up regarding ongoing right knee pain worse than left.  Underwent subchondroplasty and arthroscopy for finding seen on MRI by Dr. Bradford.  He has a worsening pain today compared to previous to surgery.  He reports more instability in his knee.  He recently underwent revision right shoulder arthroscopy as well in his in his sling today.    11/4/24: Here for pre surgical planning.     PROGRESSIVE SYMPTOMS:  Pain impacting work  Pain worsened by weight bearing  Pain effecting living situation    FUNCTIONAL STATUS:   Climb a flight of stairs or walk up a hill     PREVIOUS TREATMENTS:  Medical: OTC NSAIDS, RX NSAIDS, Steroid Injections, Biologic Injections, Narcotics, and Tylenol  Physical Therapy: Activities Modified  and Formal Physical Therapy for 6  weeks  Previous Orthopaedic Surgery: Left Knee ACL Reconstruction x3, Right Shoulder RCR with Dr. Bradford    REVIEW OF SYSTEMS:  PAIN ASSESSMENT:  See HPI.  MUSCULOSKELETAL: See HPI.  OTHER 10 point review of systems is negative except as stated in HPI above    PAST MEDICAL HISTORY   has a past medical history of Diabetes mellitus, type 2, Hyperlipidemia, Hypertension, Obesity, and NAINA (obstructive sleep apnea).     PAST SURGICAL HISTORY   has a past surgical history that includes left knee x 2; Knee surgery; Arthroscopic repair of rotator cuff of shoulder (Right, 03/14/2023); arthroscopy,shoulder,with biceps tenodesis (03/14/2023); Arthroscopic debridement of shoulder (03/14/2023); Knee arthroscopy w/ meniscectomy (Right, 10/24/2023); Subchondroplasty (Right, 10/24/2023); PRK  (Bilateral, 2010); Arthroscopic repair of rotator cuff of shoulder (Right, 7/23/2024); and Arthroscopic debridement of shoulder (7/23/2024).     FAMILY HISTORY  family history includes Autism in his son; Diabetes in his father, maternal grandmother, and mother; No Known Problems in his brother, daughter, maternal aunt, maternal grandfather, maternal uncle, paternal aunt, paternal grandfather, paternal grandmother, paternal uncle, and another family member.     SOCIAL HISTORY   reports that he has been smoking cigars. He has never been exposed to tobacco smoke. He has never used smokeless tobacco. He reports current alcohol use. He reports current drug use. Drug: Marijuana.     ALLERGIES   Review of patient's allergies indicates:   Allergen Reactions    Influenza virus vaccine, specific Hives    Pioglitazone      Altered mood     Victoza [liraglutide] Nausea And Vomiting    Farxiga [dapagliflozin] Rash     Erectile dysfunction and rash         MEDICATIONS  No current facility-administered medications on file prior to encounter.     Current Outpatient Medications on File Prior to Encounter   Medication Sig Dispense Refill    ammonium lactate 12  % Crea Apply 1 application  topically once daily. 140 g 5    atorvastatin (LIPITOR) 40 MG tablet TAKE 1 TABLET BY MOUTH EVERY DAY 90 tablet 1    azelastine (ASTELIN) 137 mcg (0.1 %) nasal spray Use 1-2 sprays in each nostril twice daily 90 mL 3    empagliflozin (JARDIANCE) 25 mg tablet TAKE 1 TABLET ONCE DAILY. 90 tablet 2    insulin aspart U-100 (NOVOLOG U-100 INSULIN ASPART) 100 unit/mL injection MAX DAILY DOSE 160 UNITS IN INSULIN PUMP. 150 mL 2    insulin aspart U-100 (NOVOLOG) 100 unit/mL (3 mL) InPn pen INJECT 15-30 UNITS BEFORE EACH MEAL WITH SLIDNING SCALE, MAX DAILY DOSE 100 UNITS. Use in the event of insulin pump failure 30 mL 5    insulin pump cart,automated,BT (OMNIPOD 5 G6 PODS, GEN 5,) Crtg Inject 1 each into the skin once daily. 30 each 11    L-METHYLFOLATE 15 mg Tab TAKE 15 MG BY MOUTH ONCE DAILY. 30 tablet 11    lamoTRIgine (LAMICTAL) 100 MG tablet TAKE 1 AND 1/2 TABLETS BY MOUTH ONCE DAILY. 135 tablet 3    levothyroxine (SYNTHROID) 50 MCG tablet TAKE 1 TABLET BY MOUTH BEFORE BREAKFAST. 90 tablet 3    lisinopriL (PRINIVIL,ZESTRIL) 20 MG tablet TAKE 1 TABLET BY MOUTH EVERY DAY 90 tablet 0    loratadine (CLARITIN) 10 mg tablet Take 1 tablet (10 mg total) by mouth once daily. 90 tablet 3    mirtazapine (REMERON) 15 MG tablet Take 1 tablet (15 mg total) by mouth every evening. 30 tablet 11    modafiniL (PROVIGIL) 100 MG Tab Take 1 tablet (100 mg total) by mouth every morning. 90 tablet 1    QUEtiapine (SEROQUEL) 25 MG Tab Take 1 tablet (25 mg total) by mouth once daily in the evening 30 tablet 11    blood sugar diagnostic Strp To check BG 4 times daily, to use with insurance preferred meter 400 strip 3    blood sugar diagnostic Strp OneTouch Ultra Test strips      blood-glucose meter kit OneTouch Ultra2 Meter kit      blood-glucose sensor (DEXCOM G6 SENSOR) Filomena Change sensor every 10 days 9 each 3    blood-glucose transmitter (DEXCOM G6 TRANSMITTER) Filomena Change every 3 months 1 each 3     "cyanocobalamin, vitamin B-12, 5,000 mcg Subl Place one under tongue once a day for 1 month, then continue to take under tongue per week 30 tablet 3    EPINEPHrine (EPIPEN 2-AYALA) 0.3 mg/0.3 mL AtIn Inject 0.3 mLs (0.3 mg total) into the muscle as needed (Anaphylaxis). 2 each 0    fluticasone propionate (FLONASE) 50 mcg/actuation nasal spray 1 spray (50 mcg total) by Each Nostril route once daily. 48 g 5    insulin glargine U-300 conc (TOUJEO MAX SOLOSTAR) 300 unit/mL (3 mL) insulin pen Inject 36 Units into the skin once daily. 3 Pen 1    lancets Misc To check BG 4 times daily, to use with insurance preferred meter 400 each 3    pen needle, diabetic (BD ULTRA-FINE KEN PEN NEEDLE) 32 gauge x 5/32" Ndle 1 Device by Misc.(Non-Drug; Combo Route) route 5 (five) times daily. 550 each 4    syringe, disposable, 1 mL Syrg Testosterone every 2 weeks 25 Syringe 1    tirzepatide (MOUNJARO) 12.5 mg/0.5 mL PnIj Inject 12.5 mg (one pen) into the skin every 7 days. 4 Pen 3          PHYSICAL EXAM   weight is 137 kg (302 lb 1.8 oz) (abnormal). His oral temperature is 98.1 °F (36.7 °C). His blood pressure is 135/83 and his pulse is 83. His respiration is 18 and oxygen saturation is 97%.   Body mass index is 40.97 kg/m².      All other systems deferred.  GENERAL:  No acute distress  HABITUS: Obese  GAIT: Antalgic  SKIN: Normal     KNEE EXAM:    right:   Effusion: Minimal joint effusion  TTP: Yes over Medial and lateral Joint Line   Yes crepitus with passive knee ROM  Passive ROM: Extension 0, Flexion 130  No pain with manipulation of patella  Stable to varus/valgus stress. No increased laxity to anterior/posterior drawer testing  positive Naida's test  No pain with IR/ER rotation of the hip  5/5 strength in knee flexion and extension, ankle plantarflexion and dorsiflexion  Neurovascular Status: Sensation intact to light touch in Sural, Saphenous, SPN, DPN, Tibial nerve distribution  2+ pulse DP/PT, normal capillary refill, foot has " normal warmth    DATA:  Diagnostic tests reviewed for today's visit:     4v of the left knee reveal Moderate degenerative changes of the Lateral and Patellofemoral compartment. There is evidence of advanced osteoarthritis changes with Osteophyte formation and Joint space narrowing. The limb is in netural alignment. The patella is tracking midline.    Repeat 4v of right knee radiographs appears with progressive severe degenerative changes in particularly of the lateral compartment and now associated valgus based alignment.

## 2024-11-18 NOTE — DISCHARGE SUMMARY
"[facility] - Surgery  Discharge Note  Short Stay    Procedure(s) (LRB):  ARTHROPLASTY, KNEE, TOTAL, USING COMPUTER-ASSISTED NAVIGATION (Right)      OUTCOME: Patient tolerated treatment/procedure well without complication and is now ready for discharge.    DISPOSITION: Home-Health Care [facility] [place]    FINAL DIAGNOSIS:  right knee OA    FOLLOWUP: In clinic    DISCHARGE INSTRUCTIONS:      Justification of Rolling Walker:  The mobility limitation can not be sufficiently resolved by the use of a cane.  Patient's functional mobility deficit can be sufficiently resolved with the use of a rolling walker.  Patient has mobility limitation significantly impairs their ability to participate in 1 or more activities of daily living.  The use of the rolling walker we will significantly improve the patient's ability to participate in MRADLS and the patient will use it on a regular basis in the home.    Discharge Procedure Orders   WALKER FOR HOME USE     Order Specific Question Answer Comments   Type of Walker: Adult (5'4"-6'6")    With wheels? Yes    Height: 6    Weight: 137 kg (302 lb 1.8 oz)    Length of need (1-99 months): 99    Please check all that apply: Walker will be used for gait training.      Activity as tolerated     Keep surgical extremity elevated when not ambulating     Lifting restrictions   Order Comments: No strenuous exercise or lifting of > 10 lbs     Weight bearing as tolerated     No driving, operating heavy equipment or signing legal documents while taking pain medication     Call MD for:  temperature >100.4     Call MD for:  persistent nausea and vomiting     Call MD for:  severe uncontrolled pain     Call MD for:  difficulty breathing, headache or visual disturbances     Call MD for:  redness, tenderness, or signs of infection (pain, swelling, redness, odor or green/yellow discharge around incision site)     Call MD for:  hives     Call MD for:  persistent dizziness or light-headedness     Call MD for:  extreme " fatigue        TIME SPENT ON DISCHARGE: 20 minutes

## 2024-11-18 NOTE — ANESTHESIA POSTPROCEDURE EVALUATION
Anesthesia Post Evaluation    Patient: Tony Gordillo    Procedure(s) Performed: Procedure(s) (LRB):  ARTHROPLASTY, KNEE, TOTAL, USING COMPUTER-ASSISTED NAVIGATION (Right)    Final Anesthesia Type: spinal      Patient location during evaluation: PACU  Patient participation: Yes- Able to Participate  Level of consciousness: awake and alert and oriented  Post-procedure vital signs: reviewed and stable  Pain management: adequate  Airway patency: patent    PONV status at discharge: No PONV  Anesthetic complications: no      Cardiovascular status: hemodynamically stable and blood pressure returned to baseline  Respiratory status: spontaneous ventilation, room air and unassisted  Hydration status: euvolemic  Follow-up not needed.              Vitals Value Taken Time   /98 11/18/24 1233   Temp 36.7 °C (98.1 °F) 11/18/24 1233   Pulse 91 11/18/24 1233   Resp 18 11/18/24 1233   SpO2 96 % 11/18/24 1233         Event Time   Out of Recovery 12:27:28         Pain/Bernabe Score: Pain Rating Prior to Med Admin: 6 (11/18/2024 12:07 PM)  Pain Rating Post Med Admin: 5 (11/18/2024 12:15 PM)  Bernabe Score: 10 (11/18/2024 12:27 PM)  Modified Bernabe Score: 19 (11/18/2024 12:27 PM)

## 2024-11-18 NOTE — PLAN OF CARE
Problem: Occupational Therapy  Goal: Occupational Therapy Goal  Description: Patient recommended for low intensive therapy and a wider RW for home use. Occupational therapy to sign off.   Outcome: Met

## 2024-11-18 NOTE — ANESTHESIA PROCEDURE NOTES
Peripheral Block    Patient location during procedure: pre-op   Block not for primary anesthetic.  Reason for block: at surgeon's request and post-op pain management   Post-op Pain Location: R knee   Start time: 11/18/2024 7:03 AM  Timeout: 11/18/2024 7:02 AM   End time: 11/18/2024 7:13 AM    Staffing  Authorizing Provider: Baron Manzo MD  Performing Provider: Baron Manzo MD    Staffing  Performed by: Baron Manzo MD  Authorized by: Baron Manzo MD    Preanesthetic Checklist  Completed: patient identified, IV checked, site marked, risks and benefits discussed, surgical consent, monitors and equipment checked, pre-op evaluation and timeout performed  Peripheral Block  Patient position: sitting  Prep: ChloraPrep  Patient monitoring: heart rate, cardiac monitor, continuous pulse ox, continuous capnometry and frequent blood pressure checks  Block type: adductor canal  Laterality: right  Injection technique: single shot  Needle  Needle type: Stimuplex   Needle gauge: 22 G  Needle length: 4 in  Needle localization: ultrasound guidance   -ultrasound image captured on disc.  Assessment  Injection assessment: negative aspiration, negative parasthesia and local visualized surrounding nerve  Paresthesia pain: none  Heart rate change: no  Slow fractionated injection: yes  Pain Tolerance: comfortable throughout block and no complaints  Medications:    Medications: ropivacaine (NAROPIN) injection 0.5% - Perineural   20 mL - 11/18/2024 7:23:00 AM

## 2024-11-18 NOTE — OP NOTE
OPERATIVE NOTE     PATIENT NAME: Tony Gordillo   MRN: [id number]      Surgery Date:11/18/24    Surgeon(s) and Assistant(s): MD. Fany Mo CST. Travis CFA     Procedure(s): right Primary Total Knee Arthroplasty, KOSTAS     BMI:  Body mass index is 40.97 kg/m².      Anesthesia:  Spinal     Attestation:   I was present for all of the critical portions of the operation and performed all critical portions.  The medical assistant performed the superficial closure under supervision, and assisted during the operation. I was immediately available for the duration of the entire case.      Preoperative Diagnosis:  right  Knee Arthritis     Postoperative Diagnosis: Same     Findings:  See Full Operative Report    Complications: None     EBL: 50cc     Implants:   Implant Name Type Inv. Item Serial No.  Lot No. LRB No. Used Action   COMPONENT FEM TRI CR SZ5 R - [id number]  COMPONENT FEM TRI CR SZ5 R  [employer]. [id number] Right 1 Implanted   BASEPLATE TRIATHLON TIB SZ 6 - [id number]  BASEPLATE TRIATHLON TIB SZ 6  [employer]. [id number] Right 1 Implanted   INSERT X3 BEAR TIB 9MM SZ 6 - [id number]  INSERT X3 BEAR TIB 9MM SZ 6  [employer]UN [employer]. [id number] Right 1 Implanted       Drains: None     Problem List:   Problem List Items Addressed This Visit          Endocrine    Type 2 diabetes mellitus with left eye affected by mild nonproliferative retinopathy without macular edema, with long-term current use of insulin     Other Visit Diagnoses       Primary osteoarthritis of right knee    -  Primary    Relevant Medications    oxyCODONE (ROXICODONE) 5 MG immediate release tablet    pregabalin (LYRICA) 75 MG capsule    Other Relevant Orders    Vital signs    Perform Warner Agitation Sedation Scale (RASS)    Insert peripheral IV    FOOT COMPRESSION PUMP    Document RASS    Chlorhexidine (CHG) 2% Wipes    Ortho Pathway Patient    Notify Physician -  Potential Need of Opioid Reversal    Notify Anesthesiologist if patient on home insulin pump    Vital signs - notify MD    Diet NPO    Monitor EtCO2    Oxygen Continuous    Transfer patient (Completed)    Vital signs    Perform Warner Agitation Sedation Scale (RASS)    Nursing to set up ETCO2 module on pump    Pulse Oximetry Continuous    END TIDAL CO2 MONITOR Q4H    Neurovascular checks:  RLE    Discontinue IV    Activity as tolerated    Keep surgical extremity elevated when not ambulating (Completed)    Lifting restrictions    Weight bearing as tolerated    No driving, operating heavy equipment or signing legal documents while taking pain medication    Call MD for:  temperature >100.4    Call MD for:  persistent nausea and vomiting    Call MD for:  severe uncontrolled pain    Call MD for:  difficulty breathing, headache or visual disturbances    Call MD for:  redness, tenderness, or signs of infection (pain, swelling, redness, odor or green/yellow discharge around incision site)    Call MD for:  hives    Call MD for:  persistent dizziness or light-headedness    Call MD for:  extreme fatigue    Place in Outpatient (Completed)    WALKER FOR HOME USE    Place SHAN hose    Place sequential compression device    X-Ray Knee 1 or 2 View Right    Inpatient consult to Social Work/Case Management    Osteoarthritis of right knee, unspecified osteoarthritis type                   OPERATIVE INDICATIONS: The patient has a long history of progressive right knee pain, arthritis, and degeneration. They has developed deformity in the knee from predominantly wear and bone loss. Non-operative treatment and injections have been attempted, but have not improved or controlled the symptoms and pain that occurs during normal daily activities. Knee motion has also become limited and is restricting the patient. A total knee arthroplasty was recommended. The risks, benefits and potential complications of the arthroplasty surgery were  discussed with the patient in detail. Specific details of the surgical procedure, hospitalization, recovery, rehabilitation, and long-term precautions were also presented. Pre-operative teaching was provided. Implant/prosthesis selection was outlined, and the many options available were explained; the final choice will be made at the time of the procedure to match the anatomy and condition of the bone, ligaments, tendons, and muscles.      The patient was seen by Internal Medicine/Anesthesia for pre-operative optimization, and risk assessment. Patricia-operative blood management and the potential for blood transfusion were discussed with risks and options clearly outlined. We discussed the risks of the procedure in detail, which included but is not limited to infection, bleeding, scarring, injury to blood vessels, nerves, muscular structures. We discussed specifically fracture and arthrofibrotic complications. Understanding of all topics was conveyed to me by the patient pre-operatively, and patient consent was given to proceed with the total knee replacement.      OPERATIVE PROCEDURE:  The patient was identified and brought into the Operating Room by the anesthesia and nursing teams. Anesthesia was successfully performed with spinal. The patient was then positioned supine on the operating room table, and all bony prominences were padded.  Intravenous antibiotic prophylaxis with Cefazolin dosing was confirmed. A tourniquet was applied to the upper thigh. A full knee exam was done after anesthesia was in full effect.  The leg was prepped and draped in the usual sterile fashion.  A surgical time-out was performed immediately preceding the incision with all personnel in the operating room; the patient identity was again confirmed, the knee-surgical site and extremity were identified and confirmed, X-rays were reviewed, and availability of the appropriate surgical equipment was established. The knee was exsanguinated and  exposed using a limited anterior-midline skin incision. 1g of TXA was administed. Dissection was carried down through skin and subcutaneous tissue to the extensor mechanism with a scalpel.  A median parapatellar arthrotomy was made to enter the knee space sharply. A large amount of normal appearing joint fluid was encountered and suctioned.  The synovium was thickened, hypertrophic, and inflamed. A partial synovectomy was performed for exposure, and the medial and lateral gutters were cleared of scar and synovial reflections. The superficial medial collateral ligament was carefully elevated off. The patellar synovial reflections were released and the patella exposed to reveal no wear through the articular surface.  The patella was then subluxed laterally. The femoral and tibial arrays were placed as well as the checkpoints.     The knee was then flexed up to 90 degrees.  Assessment of the knee joint revealed severe end-stage articular damage with no remaining lateral weight bearing cartilage.  The compartment was severely eburnated with bone loss on the posterior tibia and posterior femoral condyles, resulting in the valgus deformity.  The anterior cruciate ligament was found to be functionally compromised and it was excised.  Assessment of the soft tissues were made utilizing 1-6 mm feeler gauges in flexion and extension.  Changes to the preoperative plan were then carried out.     Reassessment of the soft tissue balance was carried out to confirm equal tension in flexion, extension, and medial/lateral balance. The robotic arm was then utilized to execute the plan. We began with the distal femur and chamfer cut. Then blade was swtiched and the remaining femur cuts were excuted. Then the tibia was cut. Soft tissue structures were protected with Z-retractors, ranawat. Osteophytes were then further debrided from the femur and the tibia. Degenerative meniscal remnants were excised medially and laterally. We cleaned up  posterior osteophytes with curved osteotome and currette. A portion of the pain cocktail was injected into the posterior capsule. Trailing was then initated. Femur trial was placed, then the knee was then brought to extension and the appropriate sized tibial spacer block was placed. This brought the knee to full extension, mid flexion, and 130 degrees of flexion with excellent medial lateral balance. The tray was rotated so that its midpoint was at the junction of middle one-third and lateral two-thirds of the tibial tubercle. Punches and pins fixed the trial in this position. Check points and arrays were removed. The patella was sized with the asymmetric guide, and drill holes were made. A trial button was placed and tracking of the patella and the entire knee trial was excellent; range of motion was excellent. No instability.  Patella tracked midline. The PCL was intact and function, with a solid endoint to posterior drawer testing.     Punches and drills were then placed through the trials to accommodate the final implants.  All trials were removed.  The wound was copiously lavaged with a pulse irrigation/suction system. This included a solution of dilute betadine. The posterior recess of the knee and areas of known bleeding were treated with the electrocautery to reduce post-operative bleeding. The cut bone surfaces were then irrigated again, suctioned, and dried. The final implants were placed, tibia followed by femur. The final CS polyethylene was impacted into place. This was a press-fit design. The tourniquet was released and no excessive bleeding was encountered. Synovial bleeding was further treated with the electrocautery.  The wound was again irrigated. The extensor mechanism and capsule was then anatomically closed with Stratafix suture.  Knee stability and range of motion with the capsule closed was excellent, and range of motion was 0 to 130 without excessive stress on the repair. Instrument and  sponge count was completed and confirmed correct. Deep and superficial subcutaneous tissue was closed with interrupted 2-0 Vicryl suture. A running 4-0 Monocryl subcuticular stitch was used to re-approximate the skin edges. Dermabond adhesive was applied.  A sterile silver impregnated dressing was placed.  PAS stockings were placed.  The patient tolerated the procedure, was transferred from the operating room table to a hospital bed, and taken to Recovery/PACU in stable condition.     PAIN COCKTAIL: BOO Formula     POST OPERATIVE PLAN:  Patient will be transferred to the floor once in stable condition and after radiographs confirm no immediate complications. The patient will be treated with multimodal pain management protocols. They will be placed on anticoagulation of ASA 81mg to start on POD1. The patient will be weight bearing as tolerated. They will work with physical therapy, have a graduated diet, and once medially stable and safe for home can be discharged. Patient will follow up with me 3 weeks post operatively. Dressing should be removed by patient 7 days post operatively.    IMPLANTS:  Blue Triathlon  Femur: #5 CR  Tibia: #6  Poly: 9mm CS  Patella: unresurfaced

## 2024-11-18 NOTE — DISCHARGE INSTRUCTIONS
Post-Operative Discharge Instructions: Dr. Rolon    Physical Therapy  You will have home health/physical therapy working with you 2-3x a week for the first two weeks. After this, it will be necessary to begin formal outpatient physical therapy for an additional 2 weeks for hip replacements and about 4-6 weeks for knee replacements.   Knee replacements can get stiff and as such the post operative therapy is critical after a knee replacement. Range of motion exercises are not limited to therapy sessions and should be done every 1-2 hours on your own.   Hip replacements your therapy for the first month is mostly walking and gradually returning to activities as tolerated. However, you will have to wean off assistive devices and maintain hip precautions for 6 weeks post operatively.     Pain Management  Some degree of pain is normal and expected. You will improve gradually as your muscles become stronger and healing continues. This speed of recovery is different for every patient and you should not compare your recovery to another friend or relative.   You will be discharged with pain medications. You should wean off of these as tolerated by 4-6 weeks. but it is expected you will need them in the immediate post operative period and for therapy.   Pain medications can have common side effects of constipation which you should take a laxative, nausea which you should take medication with food, itching which can be alleviated with Benadryl, and confusion which you should stop taking pain medications and call our office if this occurs.   Be aware of your ingestion of Tylenol if your pain medication has this in it, you should not exceed 3000mg of Acetaminophen as this can damage your liver.   If you require a pain medication refill, you will need to contact our office at least 3 days in advance to prescription running out. Prescriptions cannot be called into a pharmacy. You will need to  a prescription in one of our  office locations depending on our clinic availability.     Swelling  You will have swelling of the post operative leg. Swelling may get worse with activity. It will likely get worse in the 2-3 days after discharge from the hospital. Typically swelling is correlated to pain. It can be helpful to elevate your extremity above the level of your heart and consistent use of ice. Elevating your extremity should not be done as to violate your hip precautions after a hip replacement, and no pillows should be placed under the knee after knee replacement surgery.   You will have compression stockings that should remain on for 3 weeks following surgery. They should only be removed for hygiene purposes. These will help with the swelling.       Anticoagulation  You will be placed on a blood thinner to prevent blood clots. You will need to take this as directed.        Wound Care  Your dressing should be changed after 7 days and every few days thereafter. You may shower over this dressing but it should be covered or kept dry (jp wrap or garbage bag). We will remove a portion of your bottom glue/gauze dressing at your follow up appointment. You can continue to shower but NO scrubbing the incision, should pat dry. NO soaking the wound in a bathtub, hot tub, pool, ocean etc. for at least 6 weeks after surgery. Your incision will likely be glued on the skin and no sutures should need to be removed post operatively.  You can begin putting on Vitamin E based lotions on the wound around 6-8 weeks post operatively when there is no remaining scabbing of the incision.   If there is any drainage post operatively you should contact our office immediately    Antibiotics  You will need to be placed on prophylactic antibiotics prior to any dental procedure or cleanings, any colonoscopy, cystoscopy, prostate or bladder surgery, kidney surgery or endoscopy. This will avoid risk of subsequent infection of your replacement. We prefer Amoxicillin  2g one hour prior to procedure. This can be given by our office or your dentist. Although controversial, we prefer lifetime dental prophylaxis.     Other Considerations  No additional anti-inflammatories should be used for 3 weeks following surgery  No driving for about 2-4 weeks following surgery on the left leg and about 4-6 weeks after surgery on the right leg. You will need to be off pain medications completely. You will need to be able to perform evasive driving maneuvers without hesitation.    You can fly about 2 weeks post-operatively. However, we may recommend alterative blood thinners if this will take place.   Return to work is patient specific. If a desk job, it may be 2-4 weeks for motivated individuals. Patients in strenuous or physical jobs may be out for 12 weeks.     Follow-up appointments  Your first post operative visit will be about 3 weeks after surgery. You will have x-rays at this appointment  Your second post operative visit will be about 3 months after surgery. This will be for a clinical check only unless a reason arises for an x-ray  Your third post operative visit will be about 1 year after surgery. X-rays are taken at this time.      You will be sent home with a polar care wrap as part of your care. It will be hooked up to an ice pump.  For the first 4 days , use the machine for 30 minutes on and then 30 minutes off. ( or  time and or number of days specified by your doctor)   . You may use it after day 5 to use as needed for pain control. Be very cautious when anesthesia has given you a block to numb your extremity to monitor the ice therapy to avoid thermal injury to your skin. ( Do not leave the machine on , turn it on and off  at the 30 minute intervals and use a towel or cloth under the wrap to protect the skin )      To fill the unit:         Unlock the handles and remove lid        Fill with cold water to the indicated line and then with ice to the indicated line        Replace the  lid and lock         Keep unit upright   To use the polar care:         Before turning on make sure the pad is connected         Plug in -- this will turn the unit on   Refilling:         UNPLUG unit to turn OFF         Disconnect the wrap by pushing down on the metal tab and gently pulling the connectors apart         Drain unit and refill          Reattach connector by pushing down on metal piece and pushing the connectors together   To reapply the wrap  follow directions given to you or follow the numbers on the wrap 1,2,3    You will be sent home with a polar care wrap as part of your care. It will be hooked up to an ice pump.  For the first 4 days , use the machine for 30 minutes on and then 30 minutes off. ( or  time and or number of days specified by your doctor)   . You may use it after day 5 to use as needed for pain control. Be very cautious when anesthesia has given you a block to numb your extremity to monitor the ice therapy to avoid thermal injury to your skin. ( Do not leave the machine on , turn it on and off  at the 30 minute intervals and use a towel or cloth under the wrap to protect the skin )      To fill the unit:         Unlock the handles and remove lid        Fill with cold water to the indicated line and then with ice to the indicated line        Replace the lid and lock         Keep unit upright   To use the polar care:         Before turning on make sure the pad is connected         Plug in -- this will turn the unit on   Refilling:         UNPLUG unit to turn OFF         Disconnect the wrap by pushing down on the metal tab and gently pulling the connectors apart         Drain unit and refill          Reattach connector by pushing down on metal piece and pushing the connectors together   To reapply the wrap  follow directions given to you or follow the numbers on the wrap 1,2,3

## 2024-11-18 NOTE — PT/OT/SLP EVAL
Occupational Therapy Evaluation     Name: Tony Gordillo  MRN: [id number]  Admitting Diagnosis: <principal problem not specified>  Recent Surgery: Procedure(s) (LRB):  ARTHROPLASTY, KNEE, TOTAL, USING COMPUTER-ASSISTED NAVIGATION (Right) Day of Surgery    Recommendations:     Discharge Recommendations: Low Intensity Therapy  Level of Assistance Recommended: Intermittent assistance and Intermittent supervision  Discharge Equipment Recommendations: other (see comments), walker, rolling (wider RW)  Barriers to discharge: None    Assessment:     Tony Gordillo is a 44 y.o. male with a medical diagnosis of <principal problem not specified>. He presents with performance deficits affecting function including impaired endurance, impaired sensation, impaired functional mobility, gait instability, impaired balance, decreased safety awareness, pain, decreased ROM, impaired joint extensibility, orthopedic precautions. Patient said he has had some reconstructive surgery on left knee 3x and once on right knee prior to this surgery, but he only has his mother-in-law's old RW at home.     Rehab Prognosis: Good;    Plan:     Plan of Care Expires: 11/18/24  Plan of Care Reviewed with: patient    Subjective     Chief Complaint: pain after walking to and from bathroom from stretcher in PACU   Patient Comments/Goals: to return home with wife and son   Pain/Comfort:  Pain Rating 1: 0/10  Pain Addressed 1: Pre-medicate for activity, Reposition, Cessation of Activity  Pain Rating Post-Intervention 1: other (see comments) (not rated, but said it was hurting more post walking)    Patients cultural, spiritual, Jain conflicts given the current situation: no    Social History:  Living Environment: Patient lives with their spouse and son 18 y/o autistic  in a single story home with number of outside stair(s): 1   Prior Level of Function: Prior to admission, patient was independent  Roles and Routines: Patient was driving and working as  an EMT,  and Marine    prior to admission.  Equipment Used at Home: none  DME owned (not currently used): none  Assistance Upon Discharge: family    Objective:     Communicated with RN, Leandra, prior to session. Patient found HOB elevated with telemetry, blood pressure cuff, pulse ox (continuous), cryotherapy upon OT entry to room.    General Precautions: Standard, fall, respiratory   Orthopedic Precautions: Full weight bearing   Braces: N/A    Respiratory Status: Room air    Occupational Performance    Gait belt applied - Yes    Bed Mobility:   Rolling/Turning to Left with stand by assistance  Scooting to HOB in supine: stand by assistance  Scooting anteriorly to EOB to have both feet planted on floor: stand by assistance  Supine to sit from left side of bed with stand by assistance  Sit to Supine with stand by assistance on Left side of bed    Functional Mobility/Transfers:  Sit <> Stand Transfer with contact guard assistance with rolling walker  Toilet Transfer Step Transfer technique with contact guard assistance with rolling walker  Functional Mobility: Patient walked from stretcher to bathroom with RW with CGA due to pain in right lower extremity and needing to strengthen right lower extremity while stepping per PT. He walked from bathroom and returned to stretcher after walking a total of >100'.     Activities of Daily Living:  Upper Body Dressing: set up assistance with donning outer gown   Toileting: modified independence; t/f--stood at toilet's edge with CGA    Cognitive/Visual Perceptual:  Cognitive/Psychosocial Skills:    -     Oriented to: Person, Place, Time, Situation  -     Follows Commands/attention: Follows multistep  commands  -     Communication: clear/fluent  -     Memory: No Deficits noted  -     Safety awareness/insight to disability: impaired  -     Mood/Affect/Coping skills/emotional control: Appropriate to situation  Visual/Perceptual:    -     Intact    Physical  Exam:  Balance:    -     Sitting: modified independence  -     Standing: contact guard assistance  Postural examination/scapula alignment:    -       Rounded shoulders  -       Forward head  Skin integrity: Visible skin intact  Edema:  Mild right lower extremity   Sensation:    -       Intact  Motor Planning: Intact  Dominant hand: Right  Upper Extremity Range of Motion:     -       Right Upper Extremity: WNL  -       Left Upper Extremity: WNL  Upper Extremity Strength:    -       Right Upper Extremity: WFL  -       Left Upper Extremity: WFL   Strength:    -       Right Upper Extremity: WFL  -       Left Upper Extremity: WFL  Fine Motor Coordination:    -       Intact  Gross motor coordination:   WFL    AMPAC 6 Click ADL:  AMPAC Total Score: 19    Treatment & Education:  Patient educated on role of OT, POC, and goals for therapy  Patient educated on importance of OOB activities with staff member assistance and sitting OOB majority of the day  Patient said he could not get a TTB or BSC into bathroom, but will need a wider RW for more comfort when walking.     Patient clear to ambulate to/from bathroom with RN/PCT, assist x1  .    Patient left HOB elevated on PACU stretcher with all lines intact, call button in reach, RN notified, and RN present.    GOALS:   Multidisciplinary Problems       Occupational Therapy Goals       Not on file              Multidisciplinary Problems (Resolved)          Problem: Occupational Therapy    Goal Priority Disciplines Outcome Interventions   Occupational Therapy Goal   (Resolved)     OT, PT/OT Met    Description: Patient recommended for low intensive therapy and a wider RW for home use. Occupational therapy to sign off.                        History:     Past Medical History:   Diagnosis Date    Diabetes mellitus, type 2     Hyperlipidemia     Hypertension     Obesity     NAINA (obstructive sleep apnea)          Past Surgical History:   Procedure Laterality Date    ARTHROSCOPIC  DEBRIDEMENT OF SHOULDER  03/14/2023    Procedure: DEBRIDEMENT, SHOULDER, ARTHROSCOPIC;  Surgeon: Crissy Bradford MD;  Location: [facility] OR;  Service: Orthopedics;;    ARTHROSCOPIC DEBRIDEMENT OF SHOULDER  7/23/2024    Procedure: DEBRIDEMENT, SHOULDER, ARTHROSCOPIC;  Surgeon: Crissy Bradford MD;  Location: [facility] OR;  Service: Orthopedics;;    ARTHROSCOPIC REPAIR OF ROTATOR CUFF OF SHOULDER Right 03/14/2023    Procedure: REPAIR, ROTATOR CUFF, ARTHROSCOPIC;  Surgeon: Crissy Bradford MD;  Location: [facility] OR;  Service: Orthopedics;  Laterality: Right;  KARY ELMER [phone]. TEXTED ELMER ON 3/9/2023 @ 12:10PM. ELMER RESPONDED ON 3/9/23 @ 12:23PM-SAG  RN PREOP 3/10/2023---CLEARED BY PCP AND CARDS    ARTHROSCOPIC REPAIR OF ROTATOR CUFF OF SHOULDER Right 7/23/2024    Procedure: REPAIR, ROTATOR CUFF, ARTHROSCOPIC;  Surgeon: Crissy Bradford MD;  Location: [facility] OR;  Service: Orthopedics;  Laterality: Right;  ANTONY & NEPHFLY ELMER [phone], NURYS [phone]  CLEARED BY PCP  RN PREOP 6/18/2024    ARTHROSCOPY,SHOULDER,WITH BICEPS TENODESIS  03/14/2023    Procedure: ARTHROSCOPY,SHOULDER,WITH BICEPS TENODESIS;  Surgeon: Crissy Bradford MD;  Location: [facility] OR;  Service: Orthopedics;;    KNEE ARTHROSCOPY W/ MENISCECTOMY Right 10/24/2023    Procedure: ARTHROSCOPY, KNEE, WITH MENISCECTOMY;  Surgeon: Crissy Bradford MD;  Location: [facility] OR;  Service: Orthopedics;  Laterality: Right;  RN PREOP 10/18/203---CLEARED BY PCP  ELVIA [phone]    KNEE SURGERY      acl,mcl,pcl    left knee x 2      PRK  Bilateral 2010    SUBCHONDROPLASTY Right 10/24/2023    Procedure: POSSIBLE SUBCHONDROPLASTY;  Surgeon: Crissy Bradford MD;  Location: [facility] OR;  Service: Orthopedics;  Laterality: Right;       Time Tracking:     OT Date of Treatment: 11/18/24  OT Start Time: 1149  OT Stop Time: 1212  OT Total Time (min): 23 min    Billable Minutes: Evaluation 15  and Self Care/Home Management 8   Co-eval with  PT   11/18/2024

## 2024-11-19 ENCOUNTER — PATIENT MESSAGE (OUTPATIENT)
Dept: ORTHOPEDICS | Facility: CLINIC | Age: 44
End: 2024-11-19
Payer: COMMERCIAL

## 2024-11-19 DIAGNOSIS — Z96.651 STATUS POST RIGHT KNEE REPLACEMENT: Primary | ICD-10-CM

## 2024-11-22 RX ORDER — MIDAZOLAM HYDROCHLORIDE 1 MG/ML
INJECTION INTRAMUSCULAR; INTRAVENOUS
Status: DISCONTINUED | OUTPATIENT
Start: 2024-11-18 | End: 2024-11-22

## 2024-11-22 NOTE — ADDENDUM NOTE
Addendum  created 11/22/24 1602 by Baron Manzo MD    Attestation recorded in Intraprocedure, Clinical Note Signed, Diagnosis association updated, Intraprocedure Attestations deleted, Intraprocedure Attestations filed, Intraprocedure Blocks edited, Intraprocedure Meds edited, SmartForm saved

## 2024-11-25 NOTE — PROCEDURES
Large Joint Aspiration/Injection: L knee  Performed by: Singh Rizo MD  Authorized by: Singh Rizo MD  Date/Time: 2/12/2020 10:30 AM    Consent Done?:  Yes (Verbal)  Indications:   Timeout: Immediately prior to procedure a time out was called to verify the correct patient, procedure, equipment, support staff and site/side marked as required.  Prep:Patient was prepped and draped in the usual sterile fashion.        Details:   Needle size: 21 G   Approach: anterolateral  Location:  Knee  Site:  L knee    Medications: 20 mg sodium hyaluronate (EUFLEXXA) 10 mg/mL(mw 2.4 -3.6 million)  Patient tolerance:  patient tolerated the procedure well with no immediate complications         Warm

## 2024-11-27 DIAGNOSIS — Z96.651 STATUS POST RIGHT KNEE REPLACEMENT: Primary | ICD-10-CM

## 2024-12-02 ENCOUNTER — PATIENT MESSAGE (OUTPATIENT)
Dept: ORTHOPEDICS | Facility: CLINIC | Age: 44
End: 2024-12-02
Payer: COMMERCIAL

## 2024-12-03 ENCOUNTER — PATIENT MESSAGE (OUTPATIENT)
Dept: ORTHOPEDICS | Facility: CLINIC | Age: 44
End: 2024-12-03
Payer: COMMERCIAL

## 2024-12-03 DIAGNOSIS — M17.11 PRIMARY OSTEOARTHRITIS OF RIGHT KNEE: ICD-10-CM

## 2024-12-03 RX ORDER — TRAMADOL HYDROCHLORIDE 50 MG/1
50-100 TABLET ORAL EVERY 6 HOURS PRN
Qty: 40 TABLET | Refills: 0 | Status: SHIPPED | OUTPATIENT
Start: 2024-12-03

## 2024-12-10 ENCOUNTER — OFFICE VISIT (OUTPATIENT)
Dept: ORTHOPEDICS | Facility: CLINIC | Age: 44
End: 2024-12-10
Payer: COMMERCIAL

## 2024-12-10 ENCOUNTER — APPOINTMENT (OUTPATIENT)
Dept: RADIOLOGY | Facility: HOSPITAL | Age: 44
End: 2024-12-10
Attending: ORTHOPAEDIC SURGERY
Payer: COMMERCIAL

## 2024-12-10 VITALS — WEIGHT: 302 LBS | HEIGHT: 72 IN | BODY MASS INDEX: 40.9 KG/M2

## 2024-12-10 DIAGNOSIS — Z96.651 STATUS POST RIGHT KNEE REPLACEMENT: Primary | ICD-10-CM

## 2024-12-10 DIAGNOSIS — Z96.651 STATUS POST RIGHT KNEE REPLACEMENT: ICD-10-CM

## 2024-12-10 PROCEDURE — 73562 X-RAY EXAM OF KNEE 3: CPT | Mod: TC,FY,PN,RT

## 2024-12-10 PROCEDURE — 99999 PR PBB SHADOW E&M-EST. PATIENT-LVL IV: CPT | Mod: PBBFAC,,, | Performed by: ORTHOPAEDIC SURGERY

## 2024-12-10 PROCEDURE — 99024 POSTOP FOLLOW-UP VISIT: CPT | Mod: S$GLB,,, | Performed by: ORTHOPAEDIC SURGERY

## 2024-12-10 PROCEDURE — 73562 X-RAY EXAM OF KNEE 3: CPT | Mod: 26,RT,, | Performed by: RADIOLOGY

## 2024-12-10 RX ORDER — KETOCONAZOLE 20 MG/G
CREAM TOPICAL DAILY
Qty: 60 G | Refills: 0 | Status: SHIPPED | OUTPATIENT
Start: 2024-12-10

## 2024-12-10 NOTE — PROGRESS NOTES
Ortho Knee Follow Up Note    PCP: Jovany Ford MD   Referring Provider: No referring provider defined for this encounter.        Assessment:  44 y.o. male status post right total Knee Primary completed on 11/18/24.  Patient doing well.  He is on a cane for ambulation.  He has been working hard with outpatient therapy.  He has regained full extension and has good motion to about 115° today.  His incision looks good.  He does have 3 circular areas that are medial to his incision that appear consistent with ringworm.  We will discuss with dermatology regarding treatment for this.  He has been using mupirocin ointment without improvement.  He reports that he has had these in the past in his associated them with bandages.  His pain is reasonably controlled.  His x-rays look good.  Can discuss left knee replacement at three-month follow up.    Plan:  Follow up 3 months  Future Radiographs Indicated at next visit: No    Pain Management: Continue current pain management and Wean narcotic medications as tolerated  Anticoagulation: Continue ASA for total of 4 weeks duration post operatively  Wound Care: Ok to shower. No scrubbing incision. No soaking in pools or tubs for 6 weeks post operative.     Patient Reassurance:   Post-operative course discussed with patient. Patient reassured and supported. All questions answered.    ACTIVE PROBLEM LIST  Patient Active Problem List   Diagnosis    Hyperlipidemia LDL goal <70    Insulin long-term use    Tinea pedis    Morbid obesity    Hypothyroidism    Type 2 diabetes mellitus with left eye affected by mild nonproliferative retinopathy without macular edema, with long-term current use of insulin    Essential hypertension    Migraine with aura and without status migrainosus, not intractable propranolol SE angry; topamax not helpful; imitrex ineffective; daith ear piercing helps    Non-seasonal allergic rhinitis; avoid antihistamines per opthalmology    Acute bilateral low back pain  without sciatica    NAINA (obstructive sleep apnea) 8/2019 sleep study AHI 11    Low testosterone in male; pituitary axis normal. MRI negative. 11/2019 started on testosterone    Right foot pain    Decreased strength of lower extremity    Decreased range of motion of both ankles    Gait abnormality    Rib pain on left side    Dyspnea    Decreased strength, endurance, and mobility    Left hand pain    Mild neurocognitive disorder due to traumatic brain injury    Outbursts of anger    Other amnesia    Traumatic rotator cuff tear, right, initial encounter    S/P right rotator cuff repair    Biceps tendonosis of right shoulder    Decreased range of motion of right shoulder    Impaired strength of shoulder muscles    Allergic conjunctivitis    Cornea edema    Descemet's membrane fold    Herpes simplex keratitis    Uncontrolled type 2 diabetes mellitus    Tear of lateral meniscus of right knee, current    Refractive error    Acute migraine    MCI (mild cognitive impairment)    Medication overuse headache    Chronic migraine with aura, intractable, with status migrainosus    Agitation    Traumatic encephalopathy    Allergy desensitization therapy    Concussion with no loss of consciousness    Concussion with loss of consciousness    Other specified persistent mood disorders    Cognitive communication deficit    Impaired mobility and ADLs    Imbalance    Primary osteoarthritis of both knees    Bilateral chronic knee pain           HPI:  Tony Gordillo presents today for a post-op visit.     STATUS POST:  right total Knee Primary  BMI: Body mass index is 40.96 kg/m².    Post operative recovery was complicated by: None    Patient rates his condition as improving. Pleased with surgical outcome to date.     Functional Assessment:  Started Outpatient PT  Functional Difficulties:  Interferes with sleep, Stair climbing, and Walking  Pain Medication:  Non-Narcotic  Anticoagulation: Aspirin     EXAM:    right POST-OPERATIVE  KNEE    Ht 6' (1.829 m)   Wt (!) 137 kg (302 lb 0.5 oz)   BMI 40.96 kg/m²     Skin:  Appropriate post op appearance, No evidence of erythema, warmth, discharge, or drainage, and Incision clean/dry/intact  Range of Motion: Flexion: 115, Extension 0  Neurovascular Status: Sensation intact in Sural, Saphenous, SPN, DPN and Tibial nerve distribution. 5 out of 5 strength in hip flexion, knee flexion/extension, ankle plantarflexion/dorsiflexion. 2+ dorsalis pedis    IMAGING:  X-ray Knee:   Implants are well fixed and aligned. There is no evidence of loosening.

## 2024-12-13 ENCOUNTER — PATIENT MESSAGE (OUTPATIENT)
Dept: ORTHOPEDICS | Facility: CLINIC | Age: 44
End: 2024-12-13
Payer: COMMERCIAL

## 2024-12-30 ENCOUNTER — EXTERNAL HOME HEALTH (OUTPATIENT)
Dept: HOME HEALTH SERVICES | Facility: HOSPITAL | Age: 44
End: 2024-12-30
Payer: COMMERCIAL

## 2024-12-31 DIAGNOSIS — G47.33 OSA (OBSTRUCTIVE SLEEP APNEA): ICD-10-CM

## 2024-12-31 DIAGNOSIS — R41.840 ATTENTION AND CONCENTRATION DEFICIT: ICD-10-CM

## 2025-01-01 RX ORDER — MODAFINIL 100 MG/1
100 TABLET ORAL EVERY MORNING
Qty: 90 TABLET | Refills: 1 | Status: SHIPPED | OUTPATIENT
Start: 2025-01-01 | End: 2025-06-30

## 2025-01-02 ENCOUNTER — PATIENT MESSAGE (OUTPATIENT)
Dept: NEUROLOGY | Facility: CLINIC | Age: 45
End: 2025-01-02
Payer: COMMERCIAL

## 2025-01-06 ENCOUNTER — LAB VISIT (OUTPATIENT)
Dept: LAB | Facility: HOSPITAL | Age: 45
End: 2025-01-06
Payer: COMMERCIAL

## 2025-01-06 DIAGNOSIS — E11.9 TYPE 2 DIABETES MELLITUS WITHOUT COMPLICATION, WITH LONG-TERM CURRENT USE OF INSULIN: ICD-10-CM

## 2025-01-06 DIAGNOSIS — Z79.4 TYPE 2 DIABETES MELLITUS WITHOUT COMPLICATION, WITH LONG-TERM CURRENT USE OF INSULIN: ICD-10-CM

## 2025-01-06 DIAGNOSIS — E78.5 HYPERLIPIDEMIA, UNSPECIFIED HYPERLIPIDEMIA TYPE: ICD-10-CM

## 2025-01-06 LAB
CHOLEST SERPL-MCNC: 118 MG/DL (ref 120–199)
CHOLEST/HDLC SERPL: 2.8 {RATIO} (ref 2–5)
ESTIMATED AVG GLUCOSE: 128 MG/DL (ref 68–131)
HBA1C MFR BLD: 6.1 % (ref 4–5.6)
HDLC SERPL-MCNC: 42 MG/DL (ref 40–75)
HDLC SERPL: 35.6 % (ref 20–50)
LDLC SERPL CALC-MCNC: 52.2 MG/DL (ref 63–159)
NONHDLC SERPL-MCNC: 76 MG/DL
TRIGL SERPL-MCNC: 119 MG/DL (ref 30–150)

## 2025-01-06 PROCEDURE — 83036 HEMOGLOBIN GLYCOSYLATED A1C: CPT | Performed by: NURSE PRACTITIONER

## 2025-01-06 PROCEDURE — 80061 LIPID PANEL: CPT | Performed by: NURSE PRACTITIONER

## 2025-01-06 PROCEDURE — 36415 COLL VENOUS BLD VENIPUNCTURE: CPT | Mod: PO | Performed by: NURSE PRACTITIONER

## 2025-01-07 RX ORDER — LAMOTRIGINE 100 MG/1
150 TABLET ORAL DAILY
Qty: 135 TABLET | Refills: 3 | Status: SHIPPED | OUTPATIENT
Start: 2025-01-07 | End: 2026-01-07

## 2025-01-09 ENCOUNTER — OFFICE VISIT (OUTPATIENT)
Facility: CLINIC | Age: 45
End: 2025-01-09
Payer: COMMERCIAL

## 2025-01-09 VITALS — SYSTOLIC BLOOD PRESSURE: 130 MMHG | HEART RATE: 91 BPM | DIASTOLIC BLOOD PRESSURE: 87 MMHG

## 2025-01-09 DIAGNOSIS — F34.89 OTHER SPECIFIED PERSISTENT MOOD DISORDERS: ICD-10-CM

## 2025-01-09 DIAGNOSIS — G44.209 TENSION HEADACHE: ICD-10-CM

## 2025-01-09 DIAGNOSIS — R26.89 IMBALANCE: ICD-10-CM

## 2025-01-09 DIAGNOSIS — R41.89 COGNITIVE CHANGE: ICD-10-CM

## 2025-01-09 DIAGNOSIS — S06.0X0S CONCUSSION WITHOUT LOSS OF CONSCIOUSNESS, SEQUELA: Primary | ICD-10-CM

## 2025-01-09 DIAGNOSIS — S13.4XXD WHIPLASH INJURY TO NECK, SUBSEQUENT ENCOUNTER: ICD-10-CM

## 2025-01-09 PROCEDURE — 99999 PR PBB SHADOW E&M-EST. PATIENT-LVL V: CPT | Mod: PBBFAC,,, | Performed by: PSYCHIATRY & NEUROLOGY

## 2025-01-09 NOTE — PROGRESS NOTES
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 - 30.0 ug/mg Final     11/6/24:  CBC WNL, CMP WNL except glucose 156    I personally reviewed all current labs noted in today's chart.    Imaging:    No new neurological studies    Assessment:       ICD-10-CM ICD-9-CM    1. Concussion without loss of consciousness, sequela  S06.0X0S 907.0       2. Whiplash injury to neck, subsequent encounter  S13.4XXD V58.89      847.0       3. Tension headache  G44.209 307.81       4. Imbalance  R26.89 781.2       5. Cognitive change  R41.89 799.59       6. Other specified persistent mood disorders  F34.89 296.99          44 y.o. male with h/o DM, HLD, HTN, NAINA on CPAP, hypothyroidism, mild cognitive impairment, traumatic encephalopathy, concussion with and without LOC, kinetic force injury with resultant cranio cervical trauma and occipital neuritis s/p prednisone taper x2 who presents for follow up. No focal neurological findings on exam at this time. We discussed previously that based on history, he has had concussion with and without LOC and his exam indicates a prior neck injury or neck strain. We discussed previously based on his  service, he is at risk for having had Breachers syndrome as well. 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 We discussed previously if ignored neck pain/occipital tenderness then headaches  meet criteria for migraine without aura. However, history and exam are more consistent with occipital neuritis, which does not respond well to most migraine medications but responds well to anti-inflammatory treatment. We discussed previously occipital neuritis can exacerbate underlying migraines or other headache disorders. As a result, we discussed previously treating occipital neuritis first and then will see what types of headaches are left after occipital neuritis is effectively treated. Overall, his symptoms are improving and discussed how his shoulder may be contributing and would expect as shoulder improves after surgery, his neck musculature will improve, which in turn will help with neck related symptoms.     This patient's diagnoses of concussion and whiplash are chronic complicated injuries with multiple resultant symptoms as noted in the HPI as well as multiple subsequent diagnoses as a result of these injuries. I will be the primary provider who will both be providing and guiding ongoing medical care for these complex conditions.    Today's medical decision making was based on the number and complexity of problems addressed as documented in today's encounter, analysis of combination of unique data points (4 unique labs) as documented in today's encounter    Plan:     Continue neck PT exercises  The patient was instructed to ice the occipital region for no more than 20 minutes at least once a day but may repeat this as many times as they would like.   Discussed ergonomic accommodations for occipital neuritis/neuralgia. Mainly perform all work at eye level to minimize continued neck flexion which will aggravate the nerve.  Patient was encouraged to do daily light cardio exercise but instructed to limit physical activities to walking, walking in water up to the waist only or riding a stationary bike, recumbent preferred. No weight lifting in upper body, no neck massage, no acupuncture of neck, and no dry  needling of upper neck. No neck PT unless otherwise stated. If neck PT is recommended, the therapist may do joint manipulation at this time but no suboccipital soft tissue therapy. Any of your therapists may also do passive neck range of motion activities. No chiropractor work on neck. Lower body strengthening with resistant bands, leg machines, and strapping weights to legs okay. No core body workouts. No running or use of cardio equipment other than stationary bike. No swimming or body surfing. No amusement park rides. No lifting more than 5-10 lbs and bend at the knees, not the waist.  Discussed care plan in detail for post traumatic occipital neuritis including a trial of oral medications followed by series of trigger point steroid injections with occipital nerve blocks. To be referred for consultation for occipital nerve release procedure if initially clinically responsive to injections but always with a return of symptoms.  Continue vestibular PT exercises as able and agree with going back once cleared with your knee  Re-referral for ST for cognition placed previously  Continue to follow with Dr. South  Continue to follow with psychiatry  Continue talk therapy for mood  Encourage [facility] to [facility] to [facility] to see if you qualify for coverage for traumatic brain injury during  service    Damaso Cota MD  Sports Neurology

## 2025-01-09 NOTE — PATIENT INSTRUCTIONS
Continue neck PT exercises  The patient was instructed to ice the occipital region for no more than 20 minutes at least once a day but may repeat this as many times as they would like.   Discussed ergonomic accommodations for occipital neuritis/neuralgia. Mainly perform all work at eye level to minimize continued neck flexion which will aggravate the nerve.  Patient was encouraged to do daily light cardio exercise but instructed to limit physical activities to walking, walking in water up to the waist only or riding a stationary bike, recumbent preferred. No weight lifting in upper body, no neck massage, no acupuncture of neck, and no dry needling of upper neck. No neck PT unless otherwise stated. If neck PT is recommended, the therapist may do joint manipulation at this time but no suboccipital soft tissue therapy. Any of your therapists may also do passive neck range of motion activities. No chiropractor work on neck. Lower body strengthening with resistant bands, leg machines, and strapping weights to legs okay. No core body workouts. No running or use of cardio equipment other than stationary bike. No swimming or body surfing. No amusement park rides. No lifting more than 5-10 lbs and bend at the knees, not the waist.  Discussed care plan in detail for post traumatic occipital neuritis including a trial of oral medications followed by series of trigger point steroid injections with occipital nerve blocks. To be referred for consultation for occipital nerve release procedure if initially clinically responsive to injections but always with a return of symptoms.  Continue vestibular PT exercises as able and agree with going back once cleared with your knee  Re-referral for ST for cognition placed previously  Continue to follow with Dr. South  Continue to follow with psychiatry  Continue talk therapy for mood  Encourage you to [facility] to [facility] to see if you qualify for coverage for traumatic brain injury  during  service

## 2025-01-13 ENCOUNTER — OFFICE VISIT (OUTPATIENT)
Dept: ENDOCRINOLOGY | Facility: CLINIC | Age: 45
End: 2025-01-13
Payer: COMMERCIAL

## 2025-01-13 VITALS
BODY MASS INDEX: 40.25 KG/M2 | TEMPERATURE: 98 F | HEART RATE: 97 BPM | DIASTOLIC BLOOD PRESSURE: 88 MMHG | WEIGHT: 296.81 LBS | SYSTOLIC BLOOD PRESSURE: 155 MMHG

## 2025-01-13 DIAGNOSIS — E78.5 HYPERLIPIDEMIA, UNSPECIFIED HYPERLIPIDEMIA TYPE: ICD-10-CM

## 2025-01-13 DIAGNOSIS — E11.9 TYPE 2 DIABETES MELLITUS WITHOUT COMPLICATION, WITH LONG-TERM CURRENT USE OF INSULIN: Primary | ICD-10-CM

## 2025-01-13 DIAGNOSIS — Z79.4 TYPE 2 DIABETES MELLITUS WITHOUT COMPLICATION, WITH LONG-TERM CURRENT USE OF INSULIN: Primary | ICD-10-CM

## 2025-01-13 PROCEDURE — 99214 OFFICE O/P EST MOD 30 MIN: CPT | Mod: S$GLB,,, | Performed by: NURSE PRACTITIONER

## 2025-01-13 PROCEDURE — 95251 CONT GLUC MNTR ANALYSIS I&R: CPT | Mod: S$GLB,,, | Performed by: NURSE PRACTITIONER

## 2025-01-13 PROCEDURE — 99999 PR PBB SHADOW E&M-EST. PATIENT-LVL V: CPT | Mod: PBBFAC,,, | Performed by: NURSE PRACTITIONER

## 2025-01-13 PROCEDURE — G2211 COMPLEX E/M VISIT ADD ON: HCPCS | Mod: S$GLB,,, | Performed by: NURSE PRACTITIONER

## 2025-01-13 RX ORDER — INSULIN ASPART 100 [IU]/ML
INJECTION, SOLUTION INTRAVENOUS; SUBCUTANEOUS
Qty: 110 ML | Refills: 2 | Status: SHIPPED | OUTPATIENT
Start: 2025-01-13

## 2025-01-13 RX ORDER — INSULIN PMP CART,AUT,G6/7,CNTR
EACH SUBCUTANEOUS
Qty: 45 EACH | Refills: 2 | Status: SHIPPED | OUTPATIENT
Start: 2025-01-13

## 2025-01-13 RX ORDER — BLOOD-GLUCOSE SENSOR
EACH MISCELLANEOUS
Qty: 12 EACH | Refills: 3 | Status: SHIPPED | OUTPATIENT
Start: 2025-01-13

## 2025-01-13 NOTE — PATIENT INSTRUCTIONS
Continue current pump settings, Jardiance and Mounjaro.     Return to clinic in 6 months with A1c prior, virtual.     Rx for Dexcom g7 sent with compatible pods.

## 2025-01-13 NOTE — PROGRESS NOTES
CC: This 44 y.o. White male  is here for evaluation of  T2DM along with comorbidities indicated in the Visit Diagnosis section of this encounter.    HPI: Tony Gordillo was diagnosed with T2DM in 2008. He was without health insurance for 2017 and mid 2018.         Prior visit 9/2024 virtual   No recent A1c available. He hasn't been able to drive s/p shoulder sx. He was able to use insulin pump without changes during surgery.   90 day CGM average 142, SD 34.   He will need 2 knee replacements -  1st one scheduled for Dec.   He hasn't been able to cook for himself. Needs to get back to a healthier diet.   Interested in higher dose of Mounjaro. Appetite suppression is wearing off.   Plan Diabetes well controlled.   Increase Mounjaro to 12.5 mg once weekly. Reviewed s/e. Hold a week prior to surgery.   Decrease carb ratio to 2.5 since Mounjaro will be increased.   Continue Jardiance. Hold 3 days prior to surgery.   Return to clinic in 4 months with labs prior.         Interval hx arrives with daughter.   A1c remains low now at 6.1%.   Denies lows after meals. Trying to bolus 15-20 minutes ac to ideally control BGs.     He has increased Mounjaro to 12.5 mg. Denies n/v, constipation, or diarrhea. No change in appetite or glucose control.   Has  left TKA scheduled in the spring. S/p right TKA.   Pt lost both his jobs.                      LAST DIABETES EDUCATION: 2019  Pt started Omnipod 5 on 8/2/23 with ROMIE Nixon RD.  F/u in 3/2024    HOSPITALIZED FOR DIABETES -  No.    DIABETES MEDICATIONS:    Jardiance 25 mg once daily   Mounjaro 12.5 mg weekly on Sundays      Novolog in Omnipod 5   Basal rate   12 am - 1.2 u/hr  5:30 pm - 1.75 u/hr     ICR 2    ISF 20  Target glucose 110, correct above 110, active insulin time 4 hours.         DM COMPLICATIONS: none    SIGNIFICANT DIABETES MED HISTORY:   glyburide--hypoglycemia  Metformin - diarrhea and vomiting (has not tried metformin XR); Metformin topical -  "ineffective  Pioglitazone - altered mood, reportedly became angry   Victoza - nausea and vomiting at 1.2 mg; felt "run down" at 0.6 mg.   Trulicity less effective than Ozempic, switched to Ozempic 4/2022, switched from Ozempic to Mounjaro 2/2023  Mounjaro - GI s/e when he increased from 5 to 10 mg.     MDI doses prior to pump start: Toujeo Max 30 units hs   Fiasp 30 units ac - takes about 1x/day depending on diet or if he is eating more than 10 grams CHO; Novolin N 14 units between 8-10 pm - has taken it 3x/week if bedtime BG > 200       SELF MONITORING BLOOD GLUCOSE: Dexcom G6    CGM interpretation:  glucoses very well controlled with minimal fluctuation, no hypoglycemia.      HYPOGLYCEMIC EPISODES: none     CURRENT DIET: drinks water mostly. Sometimes coffee at work.  Eats 2-3 meals/day.     CURRENT EXERCISE:     SOCIAL: previously worked as  and EMS       BP (!) 155/88 (BP Location: Left arm, Patient Position: Sitting)   Pulse 97   Temp 98.3 °F (36.8 °C)   Wt 134.6 kg (296 lb 12.8 oz)   BMI 40.25 kg/m²          ROS:   CONSTITUTIONAL: Appetite good, +  fatigue      PHYSICAL EXAM:  GENERAL: Well developed, well nourished. No acute distress.   PSYCH: AAOx3, appropriate mood and affect, conversant, well-groomed. Judgement and insight good.   NEURO: Cranial nerves grossly intact. Speech clear, no tremor.   CHEST: Respirations even and unlabored.          Hemoglobin A1C   Date Value Ref Range Status   01/06/2025 6.1 (H) 4.0 - 5.6 % Final     Comment:     ADA Screening Guidelines:  5.7-6.4%  Consistent with prediabetes  >or=6.5%  Consistent with diabetes    High levels of fetal hemoglobin interfere with the HbA1C  assay. Heterozygous hemoglobin variants (HbS, HgC, etc)do  not significantly interfere with this assay.   However, presence of multiple variants may affect accuracy.     11/06/2024 6.3 (H) 4.0 - 5.6 % Final     Comment:     ADA Screening Guidelines:  5.7-6.4%  Consistent with " prediabetes  >or=6.5%  Consistent with diabetes    High levels of fetal hemoglobin interfere with the HbA1C  assay. Heterozygous hemoglobin variants (HbS, HgC, etc)do  not significantly interfere with this assay.   However, presence of multiple variants may affect accuracy.     05/08/2024 5.7 (H) 4.0 - 5.6 % Final     Comment:     ADA Screening Guidelines:  5.7-6.4%  Consistent with prediabetes  >or=6.5%  Consistent with diabetes    High levels of fetal hemoglobin interfere with the HbA1C  assay. Heterozygous hemoglobin variants (HbS, HgC, etc)do  not significantly interfere with this assay.   However, presence of multiple variants may affect accuracy.       Lab Results   Component Value Date    TSH 2.148 10/10/2023           Component Value Date/Time     11/06/2024 0935    K 3.9 11/06/2024 0935     11/06/2024 0935    CO2 22 (L) 11/06/2024 0935    BUN 12 11/06/2024 0935    CREATININE 0.8 11/06/2024 0935     (H) 11/06/2024 0935    CALCIUM 9.5 11/06/2024 0935    ALKPHOS 77 11/06/2024 0935    AST 18 11/06/2024 0935    ALT 23 11/06/2024 0935    BILITOT 0.5 11/06/2024 0935    EGFRNORACEVR >60 11/06/2024 0935    ESTGFRAFRICA >60 03/25/2022 0617       Lab Results   Component Value Date    CHOL 118 (L) 01/06/2025    CHOL 142 07/06/2023    CHOL 162 02/17/2022     Lab Results   Component Value Date    HDL 42 01/06/2025    HDL 57 07/06/2023    HDL 52 02/17/2022     Lab Results   Component Value Date    LDLCALC 52.2 (L) 01/06/2025    LDLCALC 72.8 07/06/2023    LDLCALC 81.8 02/17/2022     Lab Results   Component Value Date    TRIG 119 01/06/2025    TRIG 61 07/06/2023    TRIG 141 02/17/2022       Lab Results   Component Value Date    CHOLHDL 35.6 01/06/2025    CHOLHDL 40.1 07/06/2023    CHOLHDL 32.1 02/17/2022         Lab Results   Component Value Date    MICALBCREAT Unable to calculate 01/06/2025      Latest Reference Range & Units Most Recent   Urine Creatinine 23.0 - 375.0 mg/dL 74.0  1/6/25 13:15   Urine  Microalbumin ug/mL <5.0  1/6/25 13:15   MICROALB/CREAT RATIO 0.0 - 30.0 ug/mg Unable to calculate  1/6/25 13:15           ASSESSMENT and PLAN:    A1C GOAL: < 7 %       1. Type 2 diabetes mellitus without complication, with long-term current use of insulin  Glucoses very well controlled.     Continue current pump settings, Jardiance and Mounjaro.     Rx for Dexcom g7 sent with compatible pods.   Return to clinic in 6 months with A1c prior, virtual.         insulin aspart U-100 (NOVOLOG U-100 INSULIN ASPART) 100 unit/mL injection    empagliflozin (JARDIANCE) 25 mg tablet    Hemoglobin A1C      2. Hyperlipidemia, unspecified hyperlipidemia type  Continue atorvastatin                    Orders Placed This Encounter   Procedures    Hemoglobin A1C     Standing Status:   Future     Standing Expiration Date:   3/14/2026     Order Specific Question:   Send normal result to authorizing provider's In Basket if patient is active on MyChart:     Answer:   Yes        Follow up in about 6 months (around 7/13/2025) for Virtual Visit.

## 2025-01-14 ENCOUNTER — PATIENT MESSAGE (OUTPATIENT)
Dept: ORTHOPEDICS | Facility: CLINIC | Age: 45
End: 2025-01-14
Payer: COMMERCIAL

## 2025-01-14 DIAGNOSIS — E06.3 HYPOTHYROIDISM DUE TO HASHIMOTO'S THYROIDITIS: ICD-10-CM

## 2025-01-14 NOTE — TELEPHONE ENCOUNTER
Refill Routing Note   Medication(s) are not appropriate for processing by [facility] for the following reason(s):        Required labs outdated    [facility] action(s):  Defer   Requires labs : Yes             Appointments  past 12m or future 3m with PCP    Date Provider   Last Visit   11/8/2024 Jovany Ford MD   Next Visit   Visit date not found Jovany Ford MD   ED visits in past 90 days: 0        Note composed:2:51 PM 01/14/2025

## 2025-01-14 NOTE — TELEPHONE ENCOUNTER
Care Due:                  Date            Visit Type   Department     Provider  --------------------------------------------------------------------------------                                [facility]                              FOLLOWUP/OF  MED/ INTERNAL  Last Visit: 11-      FICE VISIT   MED/ SANJEEV Ford  Next Visit: None Scheduled  None         None Found                                                            Last  Test          Frequency    Reason                     Performed    Due Date  --------------------------------------------------------------------------------    TSH.........  12 months..  levothyroxine............  10-   10-    [facility] Embedded Care Due Messages. Reference number: [id number].   1/14/2025 11:19:13 AM CST

## 2025-01-15 RX ORDER — LEVOTHYROXINE SODIUM 50 UG/1
50 TABLET ORAL
Qty: 90 TABLET | Refills: 1 | Status: SHIPPED | OUTPATIENT
Start: 2025-01-15

## 2025-01-20 DIAGNOSIS — Z96.651 STATUS POST RIGHT KNEE REPLACEMENT: ICD-10-CM

## 2025-01-21 ENCOUNTER — OFFICE VISIT (OUTPATIENT)
Dept: ORTHOPEDICS | Facility: CLINIC | Age: 45
End: 2025-01-21
Payer: COMMERCIAL

## 2025-01-21 DIAGNOSIS — S46.011A TRAUMATIC ROTATOR CUFF TEAR, RIGHT, INITIAL ENCOUNTER: Primary | ICD-10-CM

## 2025-01-21 RX ORDER — KETOCONAZOLE 20 MG/G
CREAM TOPICAL DAILY
Qty: 60 G | Refills: 0 | OUTPATIENT
Start: 2025-01-21

## 2025-01-21 NOTE — PROGRESS NOTES
The patient location is: Home ([place])  The chief complaint leading to consultation is: s/p R revision cuff repair    Visit type: audiovisual    Face to Face time with patient: 10 minutes of total time spent on the encounter, which includes face to face time and non-face to face time preparing to see the patient (eg, review of tests), Obtaining and/or reviewing separately obtained history, Documenting clinical information in the electronic or other health record, Independently interpreting results (not separately reported) and communicating results to the patient/family/caregiver, or Care coordination (not separately reported).         Each patient to whom he or she provides medical services by telemedicine is:  (1) informed of the relationship between the physician and patient and the respective role of any other health care provider with respect to management of the patient; and (2) notified that he or she may decline to receive medical services by telemedicine and may withdraw from such care at any time.    Notes:     Postoperative Visit    History of Present Illness:   Tony is 12 weeks s/p right shoulder arthroscopy  with revision cuff repair with regeneten - retear of subscap and supraspinstus. Had previous biceps tenodesis  (DOS-7/23/24)  He is doing well -- Feels much better this post op period than last   No pain  Has some stiffness with IR  Still doing PT  Some weakness, improving  Had TKA with Dr Rolon recently, has next knee scheduled in a couple months - doing well with this     Physical Examination:    NAD  right upper extremity:  Unabel to assess cuff strength     Assessment/Plan:  44 y.o. male 12 weeks s/p right shoulder arthroscopy  with revision cuff repair with regeneten - retear of subscap and supraspinstus. Had previous biceps tenodesis  (DOS-7/23/24)    Plan  Continue PT. No restrictions  Follow up in  6 months. He and his wife are planning a move after he has recovered from second TKA.  He  estimates they will still be around in 6 months. If they decide to move before then I would like to see him earlier.    All questions were answered in detail. The patient is in full agreement with the treatment plan and will proceed accordingly.

## 2025-01-29 ENCOUNTER — PATIENT MESSAGE (OUTPATIENT)
Dept: ORTHOPEDICS | Facility: CLINIC | Age: 45
End: 2025-01-29
Payer: COMMERCIAL

## 2025-02-06 ENCOUNTER — PATIENT MESSAGE (OUTPATIENT)
Dept: ORTHOPEDICS | Facility: CLINIC | Age: 45
End: 2025-02-06
Payer: COMMERCIAL

## 2025-02-17 DIAGNOSIS — E78.5 HYPERLIPIDEMIA, UNSPECIFIED HYPERLIPIDEMIA TYPE: ICD-10-CM

## 2025-02-17 NOTE — TELEPHONE ENCOUNTER
No care due was identified.  [facility] Embedded Care Due Messages. Reference number: [id number].   2/17/2025 4:27:32 PM CST

## 2025-02-18 RX ORDER — ATORVASTATIN CALCIUM 40 MG/1
40 TABLET, FILM COATED ORAL DAILY
Qty: 90 TABLET | Refills: 3 | Status: SHIPPED | OUTPATIENT
Start: 2025-02-18

## 2025-02-18 NOTE — TELEPHONE ENCOUNTER
Refill Decision Note   Tony Gordillo  is requesting a refill authorization.  Brief Assessment and Rationale for Refill:  Approve     Medication Therapy Plan:         Comments:     Note composed:2:28 PM 02/18/2025             Appointments     Last Visit   11/8/2024 Jovany Ford MD   Next Visit   Visit date not found Jovany Ford MD

## 2025-02-20 NOTE — TELEPHONE ENCOUNTER
Spoke to Pt about his appt with Dr. Cota. He was notified that it was scheduled incorrectly and would be rescheduled. Pt will be rescheduled for May 7. Pt was advised to arrive 30 min early and informed about the 15 min layne period.   
No

## 2025-02-25 ENCOUNTER — OFFICE VISIT (OUTPATIENT)
Dept: FAMILY MEDICINE | Facility: CLINIC | Age: 45
End: 2025-02-25
Payer: COMMERCIAL

## 2025-02-25 ENCOUNTER — RESULTS FOLLOW-UP (OUTPATIENT)
Dept: FAMILY MEDICINE | Facility: CLINIC | Age: 45
End: 2025-02-25

## 2025-02-25 ENCOUNTER — LAB VISIT (OUTPATIENT)
Dept: LAB | Facility: HOSPITAL | Age: 45
End: 2025-02-25
Payer: COMMERCIAL

## 2025-02-25 VITALS
BODY MASS INDEX: 39.53 KG/M2 | HEART RATE: 90 BPM | OXYGEN SATURATION: 95 % | TEMPERATURE: 99 F | SYSTOLIC BLOOD PRESSURE: 140 MMHG | DIASTOLIC BLOOD PRESSURE: 80 MMHG | WEIGHT: 291.44 LBS

## 2025-02-25 DIAGNOSIS — Z79.4 TYPE 2 DIABETES MELLITUS WITH LEFT EYE AFFECTED BY MILD NONPROLIFERATIVE RETINOPATHY WITHOUT MACULAR EDEMA, WITH LONG-TERM CURRENT USE OF INSULIN: ICD-10-CM

## 2025-02-25 DIAGNOSIS — E03.4 HYPOTHYROIDISM DUE TO ACQUIRED ATROPHY OF THYROID: ICD-10-CM

## 2025-02-25 DIAGNOSIS — E11.3292 TYPE 2 DIABETES MELLITUS WITH LEFT EYE AFFECTED BY MILD NONPROLIFERATIVE RETINOPATHY WITHOUT MACULAR EDEMA, WITH LONG-TERM CURRENT USE OF INSULIN: ICD-10-CM

## 2025-02-25 DIAGNOSIS — Z12.11 COLON CANCER SCREENING: ICD-10-CM

## 2025-02-25 DIAGNOSIS — J06.9 UPPER RESPIRATORY TRACT INFECTION, UNSPECIFIED TYPE: Primary | ICD-10-CM

## 2025-02-25 DIAGNOSIS — R68.83 CHILLS: ICD-10-CM

## 2025-02-25 DIAGNOSIS — J06.9 UPPER RESPIRATORY TRACT INFECTION, UNSPECIFIED TYPE: ICD-10-CM

## 2025-02-25 DIAGNOSIS — I10 ESSENTIAL HYPERTENSION: ICD-10-CM

## 2025-02-25 LAB
ALBUMIN SERPL BCP-MCNC: 3.5 G/DL (ref 3.5–5.2)
ALP SERPL-CCNC: 93 U/L (ref 40–150)
ALT SERPL W/O P-5'-P-CCNC: 19 U/L (ref 10–44)
ANION GAP SERPL CALC-SCNC: 12 MMOL/L (ref 8–16)
AST SERPL-CCNC: 22 U/L (ref 10–40)
BASOPHILS # BLD AUTO: 0.04 K/UL (ref 0–0.2)
BASOPHILS NFR BLD: 0.5 % (ref 0–1.9)
BILIRUB SERPL-MCNC: 0.4 MG/DL (ref 0.1–1)
BUN SERPL-MCNC: 14 MG/DL (ref 6–20)
CALCIUM SERPL-MCNC: 9.8 MG/DL (ref 8.7–10.5)
CHLORIDE SERPL-SCNC: 104 MMOL/L (ref 95–110)
CO2 SERPL-SCNC: 24 MMOL/L (ref 23–29)
CREAT SERPL-MCNC: 0.7 MG/DL (ref 0.5–1.4)
CTP QC/QA: YES
DIFFERENTIAL METHOD BLD: ABNORMAL
EOSINOPHIL # BLD AUTO: 0.1 K/UL (ref 0–0.5)
EOSINOPHIL NFR BLD: 1.5 % (ref 0–8)
ERYTHROCYTE [DISTWIDTH] IN BLOOD BY AUTOMATED COUNT: 13.4 % (ref 11.5–14.5)
EST. GFR  (NO RACE VARIABLE): >60 ML/MIN/1.73 M^2
GLUCOSE SERPL-MCNC: 114 MG/DL (ref 70–110)
HCT VFR BLD AUTO: 45.7 % (ref 40–54)
HGB BLD-MCNC: 13.7 G/DL (ref 14–18)
IMM GRANULOCYTES # BLD AUTO: 0.02 K/UL (ref 0–0.04)
IMM GRANULOCYTES NFR BLD AUTO: 0.2 % (ref 0–0.5)
LYMPHOCYTES # BLD AUTO: 2.2 K/UL (ref 1–4.8)
LYMPHOCYTES NFR BLD: 25.6 % (ref 18–48)
MCH RBC QN AUTO: 27.3 PG (ref 27–31)
MCHC RBC AUTO-ENTMCNC: 30 G/DL (ref 32–36)
MCV RBC AUTO: 91 FL (ref 82–98)
MONOCYTES # BLD AUTO: 0.7 K/UL (ref 0.3–1)
MONOCYTES NFR BLD: 8.4 % (ref 4–15)
NEUTROPHILS # BLD AUTO: 5.5 K/UL (ref 1.8–7.7)
NEUTROPHILS NFR BLD: 63.8 % (ref 38–73)
NRBC BLD-RTO: 0 /100 WBC
PLATELET # BLD AUTO: 352 K/UL (ref 150–450)
PMV BLD AUTO: 10.7 FL (ref 9.2–12.9)
POC MOLECULAR INFLUENZA A AGN: NEGATIVE
POC MOLECULAR INFLUENZA B AGN: NEGATIVE
POTASSIUM SERPL-SCNC: 4.2 MMOL/L (ref 3.5–5.1)
PROT SERPL-MCNC: 7.8 G/DL (ref 6–8.4)
RBC # BLD AUTO: 5.01 M/UL (ref 4.6–6.2)
SARS-COV-2 RNA RESP QL NAA+PROBE: NOT DETECTED
SODIUM SERPL-SCNC: 140 MMOL/L (ref 136–145)
T3FREE SERPL-MCNC: 3 PG/ML (ref 2.3–4.2)
T4 FREE SERPL-MCNC: 0.87 NG/DL (ref 0.71–1.51)
TSH SERPL DL<=0.005 MIU/L-ACNC: 2.56 UIU/ML (ref 0.4–4)
WBC # BLD AUTO: 8.67 K/UL (ref 3.9–12.7)

## 2025-02-25 PROCEDURE — 87635 SARS-COV-2 COVID-19 AMP PRB: CPT | Performed by: NURSE PRACTITIONER

## 2025-02-25 PROCEDURE — 84439 ASSAY OF FREE THYROXINE: CPT | Performed by: NURSE PRACTITIONER

## 2025-02-25 PROCEDURE — 85025 COMPLETE CBC W/AUTO DIFF WBC: CPT | Performed by: NURSE PRACTITIONER

## 2025-02-25 PROCEDURE — 84443 ASSAY THYROID STIM HORMONE: CPT | Performed by: NURSE PRACTITIONER

## 2025-02-25 PROCEDURE — 36415 COLL VENOUS BLD VENIPUNCTURE: CPT | Mod: PO | Performed by: NURSE PRACTITIONER

## 2025-02-25 PROCEDURE — 99999 PR PBB SHADOW E&M-EST. PATIENT-LVL V: CPT | Mod: PBBFAC,,, | Performed by: NURSE PRACTITIONER

## 2025-02-25 PROCEDURE — 84481 FREE ASSAY (FT-3): CPT | Performed by: NURSE PRACTITIONER

## 2025-02-25 PROCEDURE — 80053 COMPREHEN METABOLIC PANEL: CPT | Performed by: NURSE PRACTITIONER

## 2025-02-25 RX ORDER — BENZONATATE 100 MG/1
100 CAPSULE ORAL 3 TIMES DAILY PRN
Qty: 15 CAPSULE | Refills: 0 | Status: SHIPPED | OUTPATIENT
Start: 2025-02-25

## 2025-02-25 NOTE — PATIENT INSTRUCTIONS
Medical Fitness--[phone]  Imaging, Xray, CT, MRI, Ultrasound---[phone]  Bariatrics---[phone]  Breast Surgery---[phone]  Case Management---[phone]  Colonoscopy---[phone]  DME---[phone]  Infectious Disease---[phone]  Interventional Radiology---[phone]  Medical Records---[phone]  [facility] On [facility]---[phone]  Optometry/Ophthalmology---[phone]  [facility]---[phone]  Physical Therapy---[phone]  Psychiatry---[phone] or [phone]  Plastic Surgery---[phone]  Recovery--[phone] option 2, or [phone].  Sleep Study---[phone]  Smoking Cessation---[phone]  Wound Care---[phone]  Referral Desk---[phone]      Patient Education       Viral Upper Respiratory Infection Discharge Instructions, Adult   About this topic   You have an upper respiratory infection or URI. A URI can affect your nose, throat, ears, and sinuses. A virus is the cause of almost all URIs and antibiotics will not help you feel better more quickly. The common cold is an example of a viral URI.  URIs are easy to spread from person to person, most often through coughing or sneezing. A URI will almost always get better in a week or two without any treatment.         What care is needed at home?   Ask your doctor what you need to do when you go home. Make sure you ask questions if you do not understand what the doctor says.  If you smoke, try to quit. Your doctor or nurse can help.  Drink lots of fluids like water, juice, or broth. This will help replace any fluids lost if you have a runny nose or fever. Warm tea or soup can help soothe a sore throat.  If the air in your home feels dry, use a cool mist humidifier. This can help a stuffy nose and make it easier to breathe.  You can also use saline nose drops to relieve stuffiness.  If you decide to take over-the-counter cough or cold medicines, follow the directions on the label carefully. Be sure you do not take more  than 1 medicine that contains acetaminophen. Also, if you have a heart problem or high blood pressure, check with your doctor before you take any of these medicines.  Wash your hands often. Cough or sneeze into a tissue or your elbow instead of your hands. This will help keep others healthy.  What follow-up care is needed?   Your doctor may ask you to make visits to the office to check on your progress. Be sure to keep these visits.  What drugs may be needed?   The doctor may order drugs to:  Open up the tubes of your lungs  Treat viral infection  Relieve or stop coughing  Help with pain from a sore throat  Relieve runny and stuffy nose  Provide oxygen  Will physical activity be limited?   You need to rest for a few days to let your body recover from the infection.  What changes to diet are needed?   Eat soft foods like soup if swallowing is too painful.  What problems could happen?   Asthma attack  Sinus infections  Lung problems like pneumonia and bronchitis  Severe fluid loss. This is dehydration.  What can be done to prevent this health problem?   Wash your hands often with soap and water for at least 20 seconds, especially after coughing or sneezing. Alcohol-based hand sanitizers also work to kill the virus.  If you are sick, cover your mouth and nose with tissue when you cough or sneeze. You can also cough into your elbow. Throw away tissues in the trash and wash your hands after touching used tissues.  Do not get too close (kissing, hugging) to people who are sick.  Do not share towels or hankies with anyone who is sick. Clean commonly handled things like door handles, remotes, toys, and phones. Wipe them with a disinfectant.  Stay away from crowded places.  Cover your nose and mouth when you sneeze or cough.  Take vitamin C to help build up your body's ability to fight disease.  Get a flu shot each year.  When do I need to call the doctor?   You have trouble breathing when talking or sitting still.  You have  a fever of 100.4°F (38°C) or higher for several days, chills, a very bad sore throat, or ear or sinus pain.  You develop a new fever after several days of feeling the same or improving.  You develop chest pain when you cough.  You have a cough that lasts more than 10 days.  You cough up blood, or the color of the mucus you cough up changes.  Teach Back: Helping You Understand   The Teach Back Method helps you understand the information we are giving you. After you talk with the staff, tell them in your own words what you learned. This helps to make sure the staff has described each thing clearly. It also helps to explain things that may have been confusing. Before going home, make sure you can do these:  I can tell you about my condition.  I can tell you what may help ease my signs.  I can tell you what I will do if I have a fever, chills, breathing very fast, or trouble breathing.  Where can I learn more?   American Lung Association  https://www.lung.org/blog/can-you-exercise-with-a-cold   American Lung Association  https://www.lung.org/lung-health-diseases/lung-disease-lookup/influenza/facts-about-the-common-cold   NHS Choices  https://www.nhs.uk/conditions/respiratory-tract-infection/   UpToDate  https://www.uptodate.com/contents/the-common-cold-in-adults-beyond-the-basics   Last Reviewed Date   2021-06-08  Consumer Information Use and Disclaimer   This information is not specific medical advice and does not replace information you receive from your health care provider. This is only a brief summary of general information. It does NOT include all information about conditions, illnesses, injuries, tests, procedures, treatments, therapies, discharge instructions or life-style choices that may apply to you. You must talk with your health care provider for complete information about your health and treatment options. This information should not be used to decide whether or not to accept your health care providers  advice, instructions or recommendations. Only your health care provider has the knowledge and training to provide advice that is right for you.  Copyright   Copyright © 2021 UpToDate, Inc. and its affiliates and/or licensors. All rights reserved.  Patient Education       Cough, Runny Nose, and the Common Cold Discharge Instructions   About this topic   The common cold is an infection in the nose and throat. A tiny germ, called a virus, causes this infection. It often affects your nose, throat, ears, and sinuses. A cold can easily spread from person to person. Coughing, sneezing, or touching something with the germ on it spreads the cold.  Most colds go away on their own without treatment. Antibiotics will not help a cold. Your doctor may order drugs to help with the signs of a cold. Even though you may feel very sick, the common cold is not a health emergency.         What care is needed at home?   Ask your doctor what you need to do when you go home. Make sure you ask questions if you do not understand what the doctor says.  To help you feel better:  Use a cool mist humidifier to avoid breathing dry air.  Use hard candy or cough drops to soothe sore throat and cough.  Gargle with salt water. Mix 1/2 teaspoon (2.5 grams) salt with a cup (240 mL) of warm water a few times a day.  Spray saltwater mist in each nostril. Any normal saline spray works.  Sip warm liquids to keep your throat moist.  Take warm, steamy showers to help soothe the cough.  Do not smoke or be in smoke-filled places. Avoid things that may cause breathing problems like vaping, fumes, pollution, dust, and other common allergens.  You may want to use over-the-counter medicines for allergies or acid reflux if your cough is due to one of these problems.  You can also use an over-the-counter cough medicine.  Drink plenty of fluids whenever you are thirsty.  What follow-up care is needed?   Your doctor may ask you to make visits to the office to check on  your progress. Be sure to keep these visits.  What drugs may be needed?   Your doctor may order drugs to:  Help a stuffy nose  Lower a fever  Help with pain  Treat an allergy  Help with cough  Always talk to your doctor before you take any drugs. This includes over-the-counter (OTC) drugs and herbal supplements. This is very important if you have high blood pressure.  Will physical activity be limited?   Get lots of rest. Sleep when you are feeling tired. Avoid doing tiring activities.  What changes to diet are needed?   Chicken soup may be helpful. Warm fluids help thin mucus and help the body get rid of it easier.  What problems could happen?   Colds may cause signs of asthma for people who have asthma.  Sinus or ear infection  Lung infections  Bronchitis  What can be done to prevent this health problem?   Wash your hands often with soap and water for at least 20 seconds, especially after coughing or sneezing. Alcohol-based hand sanitizers also work to kill the virus.  If you are sick, cover your mouth and nose with tissue when you cough or sneeze. You can also cough into your elbow. Throw away tissues in the trash and wash your hands after touching used tissues.  Clean items and surfaces you most often touch like door handles, remotes, phones, or toys. Wipe them with a disinfectant. This can help reduce the spread of infection.  Do not get too close (kissing, hugging) to people who are sick.  Do not share towels or hankies with anyone who is sick.  Stay away from crowded places.  Keep your hands away from your eyes and nose because the virus can enter easily to those body areas.  Get a flu shot each year.  When do I need to call the doctor?   You have chest pain when you cough or trouble breathing.  You start to cough up blood or yellow or green mucus.  You have a fever of 100.4°F (38°C) or higher or chills.  You cough so hard you throw up.  You are still coughing in 10 days.  Helpful tips   It is normal that  mucus from your runny nose may become thicker and change color. Do not take an antibiotic. Instead, drink more water-based fluids, especially hot tea and soups.  Most colds go away within a week. If you are still sick after 14 days, see your doctor.  Teach Back: Helping You Understand   The Teach Back Method helps you understand the information we are giving you. After you talk with the staff, tell them in your own words what you learned. This helps to make sure the staff has described each thing clearly. It also helps to explain things that may have been confusing. Before going home, make sure you can do these:  I can tell you about my condition.  I can tell you what may help ease my breathing.  I can tell you what I can do to help avoid passing the infection to others.  I can tell you what I will do if I have a fever, chills, or trouble breathing.  Where can I learn more?   American Academy of Family Physicians  https://familydoctor.org/condition/colds-and-the-flu/   American Lung Association  http://www.lung.org/lung-health-and-diseases/lung-disease-lookup/influenza/facts-about-the-common-cold.html   Centers for Disease Control and Prevention  https://www.cdc.gov/features/rhinoviruses/   Kids Health  http://kidshealth.org/en/parents/cold.html?ref=search&WT.ac=xuv-t-cajw-nm-hggyqy-alp   Last Reviewed Date   2021-06-09  Consumer Information Use and Disclaimer   This information is not specific medical advice and does not replace information you receive from your health care provider. This is only a brief summary of general information. It does NOT include all information about conditions, illnesses, injuries, tests, procedures, treatments, therapies, discharge instructions or life-style choices that may apply to you. You must talk with your health care provider for complete information about your health and treatment options. This information should not be used to decide whether or not to accept your health care  providers advice, instructions or recommendations. Only your health care provider has the knowledge and training to provide advice that is right for you.  Copyright   Copyright © 2021 UpToDate, Inc. and its affiliates and/or licensors. All rights reserved.  Patient Education       Sinusitis Discharge Instructions, Adult   About this topic   Your sinuses are hollow areas in the bones of your face. They have a thin lining that normally makes a small amount of mucus. When you have sinusitis, the lining gets swollen and makes extra mucus. You may have sinusitis with or after a cold. Most of the time sinusitis will get better in 1 to 2 weeks.  Sinusitis is most often caused by a virus, so antibiotics wont help. But some people do need antibiotics. If the doctor ordered antibiotics for you, be sure to follow the instructions. It is important to take all of your antibiotics even if you start to feel better.       What care is needed at home?   Ask your doctor what you need to do when you go home. Make sure you ask questions if you do not understand what you need to do.  Try to thin the mucus.  Drink lots of liquids to stay hydrated.  Use a cool mist humidifier to avoid dry air.  Use saline nose drops or a saline nose rinse to relieve stuffiness.  Wash your hands often. This will help keep others healthy.  Do not smoke or be in smoke-filled places. Avoid things that may cause breathing problems like fumes, pollution, dust, and other common allergens and irritants.  You may want to take medicine like ibuprofen, naproxen, or acetaminophen to help with pain.  What follow-up care is needed?   Your doctor may ask you to make visits to the office to check on your progress. Be sure to keep your visits.  Your doctor will tell you if other tests are needed.  Your doctor may send you for allergy tests or to an allergy expert.   What lifestyle changes are needed?   Avoid drinks that contain caffeine or alcohol.  Try to stop smoking.  Avoid being around others who smoke. Smoke can damage the small hairs inside your sinuses.  What drugs may be needed?   The doctor may order drugs to:  Help with pain and swelling  Fight an infection  Control coughing  Dry up the sinuses  Help a runny or stuffy nose  Will physical activity be limited?   You do not have to limit your activity. You may want to rest more if you have a fever or headache.   What problems could happen?   Infections that happen again and again  Asthma attack  Coughing  Loss of voice  What can be done to prevent this health problem?   Keep your nose as moist as possible. Use saline sprays, washes, and a humidifier often.  Avoid being around cigarette and cigar smoke or strong odors from chemicals.  Avoid long periods of swimming in pools treated with chlorine. This can bother the lining of the nose and sinuses.  Avoid water diving. This forces water into the sinuses from the nasal passages.  Manage your allergies with your doctor's help.  Use an air conditioner if allergies are a problem.  Before air travel, use a drug to dry up mucus. As the plane takes off or lands, the pressure can cause sinus pain.  When do I need to call the doctor?   You have a stiff neck, especially if you also have fever, chills, vomiting, or severe headache  You have trouble thinking clearly.  You have trouble seeing or have double vision.  You have swelling or redness or pain around one or both eyes.  You have a fever of 102°F (38.9°C) or higher, or have shaking chills or sweats.  You have an upset stomach and throwing up.  You have more pain in your face and head.  You are not getting better within 1 to 2 weeks.  Teach Back: Helping You Understand   The Teach Back Method helps you understand the information we are giving you. After you talk with the staff, tell them in your own words what you learned. This helps to make sure the staff has covered each thing clearly. It also helps to explain things that may have  "been confusing. Before going home, make sure you can do these:  I can tell you about my condition.  I can tell you what may help ease my breathing.  I can tell you what I will do if I have a fever, chills, or trouble breathing.  Where can I learn more?   American Academy of Family Physicians  https://familydoctor.org/condition/sinusitis/   Centers for Disease Control and Prevention  https://www.cdc.gov/antibiotic-use/community/for-hcp/outpatient-hcp/adult-treatment-rec.html   Last Reviewed Date   2021-06-09  Consumer Information Use and Disclaimer   This information is not specific medical advice and does not replace information you receive from your health care provider. This is only a brief summary of general information. It does NOT include all information about conditions, illnesses, injuries, tests, procedures, treatments, therapies, discharge instructions or life-style choices that may apply to you. You must talk with your health care provider for complete information about your health and treatment options. This information should not be used to decide whether or not to accept your health care providers advice, instructions or recommendations. Only your health care provider has the knowledge and training to provide advice that is right for you.  Copyright   Copyright © 2021 UpToDate, Inc. and its affiliates and/or licensors. All rights reserved.  Patient Education       COVID-19 Overview   The Basics   Written by the doctors and editors at [facility]   View in ItalianView in [place] PortugueseView in GermanView in JapaneseView in FrenchView in SpanishView video in [place]   What is COVID-19?   COVID-19 stands for "coronavirus disease 2019." It is caused by a virus called SARS-CoV-2. The virus first appeared in late 2019 and quickly spread around the world.  What are the symptoms of COVID-19?   Symptoms usually start 4 or 5 days after a person is infected with the virus. But in some people, it can take up to " "2 weeks for symptoms to appear. Some people never show symptoms at all.  When symptoms do happen, they can include:  Fever  Cough  Trouble breathing  Feeling tired  Shaking chills  Muscle aches  Headache  Sore throat  Problems with sense of smell or taste  Some people have digestive problems like nausea or diarrhea. There have also been some reports of rashes or other skin symptoms. For example, some people with COVID-19 get reddish-purple spots on their fingers or toes. But it's not clear why or how often this happens.  For most people, symptoms will get better within a few weeks. But a small number of people get very sick and stop being able to breathe on their own. In severe cases, their organs stop working, which can lead to death.  Some people with COVID-19 continue to have some symptoms for weeks or months. This seems to be more likely in people who are sick enough to need to stay in the hospital. But this can also happen in people who did not get very sick. Doctors are still learning about the long-term effects of COVID-19.  While children can get COVID-19, they are less likely than adults to have severe symptoms. More information about COVID-19 and children is available separately. (See "Patient education: COVID-19 and children (The Basics)".)  Am I at risk for getting seriously ill?   It depends on your age and health. In some people, COVID-19 leads to serious problems like pneumonia, not getting enough oxygen, heart problems, or even death. This risk gets higher as people get older. It is also higher in people who have other health problems like serious heart disease, chronic kidney disease, type 2 diabetes, chronic obstructive pulmonary disease (COPD), sickle cell disease, or obesity. People who have a weak immune system for other reasons (for example, HIV infection or certain medicines), asthma, cystic fibrosis, type 1 diabetes, or high blood pressure might also be at higher risk for serious " "problems.  How is COVID-19 spread?   The virus that causes COVID-19 mainly spreads from person to person. This usually happens when an infected person coughs, sneezes, or talks near other people. The virus is passed through tiny particles from the infected person's lungs and airway. These particles can easily travel through the air to other people who are nearby. In some cases, like in indoor spaces where the same air keeps being blown around, virus in the particles might be able to spread to other people who are farther away.  The virus can be passed easily between people who live together. But it can also spread at gatherings where people are talking close together, shaking hands, hugging, sharing food, or even singing together. Eating at restaurants raises the risk of infection, since people tend to be close to each other and not covering their faces. Doctors also think it is possible to get infected if you touch a surface that has the virus on it and then touch your mouth, nose, or eyes. However, this is probably not very common.  A person can be infected, and spread the virus to others, even without having any symptoms.  Are there different variants of the virus that causes COVID-19?   Yes. Viruses constantly change or "mutate." When this happens, a new strain or "variant" can form. Most of the time, new variants do not change the way a virus works. But when a variant has changes in important parts of the virus, it can act differently.  Experts have discovered several new variants of the virus that causes COVID-19. Certain variants seem to spread more easily than the original virus. They might also make people sicker.  Experts are studying the different variants. This will help them better understand how far they have spread, whether they affect people differently, and how well different vaccines protect against them.  The more people who get vaccinated against COVID-19, the harder it will be for the virus to " "form new variants.  Is there a test for the virus that causes COVID-19?   Yes. If your doctor or nurse suspects you have COVID-19, they might take a swab from inside your nose or mouth for testing. In some cases, they might take a sample of your saliva. These tests can help your doctor figure out if you have COVID-19 or another illness.  There are 2 types of tests used to diagnose COVID-19:  Molecular tests - These look for the genetic material from the virus. They are also called "nucleic acid tests." You can get a molecular test at a doctor's office, clinic, or pharmacy. There are also places that make these tests available for lots of people, often at drive-through locations. Depending on the lab, it can take up to several days to get test results back.  Molecular tests are the best way to know if a person has COVID-19. That's because they can detect even very low levels of virus in the body.  Antigen tests - These look for proteins from the virus. They can give results faster than most molecular tests. You can do an antigen test at a doctor's office, clinic, pharmacy, or through some organizations that make testing available in other places. You can also do an antigen test at home.  Antigen tests are not as accurate as molecular tests. They are more likely to give "false negative" results. This is when the test comes back negative even though the person actually is infected. But antigen tests can still be useful in some situations, when results are needed quickly or a molecular test is not available. For example, if a person has early symptoms of COVID-19, an antigen test can be accurate enough to detect virus in their body. If a person gets an antigen test and the result is negative, a molecular test might be needed to confirm they do not have the virus in their body. This might be done if the person has symptoms or knows they were exposed the virus.  There is also a blood test that can show if a person has had " "COVID-19 in the past. This is called an "antibody" test. Antibody tests are generally not used on their own to diagnose COVID-19 or make decisions about care. But experts can use them to learn how many people in a certain area were infected without knowing it.  Can COVID-19 be prevented?   The best way to prevent COVID-19 is to get vaccinated. In the [place], the first vaccines became available in late 2020. People age 12 and older can get a vaccine.  If enough people get the vaccine, the virus will stop spreading so quickly. More information about COVID-19 vaccines, including what you can do after being vaccinated, is available separately. (See "Patient education: COVID-19 vaccines (The Basics)".)  Experts believe that vaccines will be one of the most important ways to control the COVID-19 pandemic. People who are fully vaccinated are at much lower risk of getting the virus.  If you are not yet vaccinated, there are other ways to help protect yourself and others:  Practice "social distancing." It's most important to avoid contact with people who are sick. But social distancing also means staying at least 6 feet (about 2 meters) from anyone outside your household. That's because the virus can spread easily through close contact, and it's not always possible to know who is infected.  Wear a face mask when you need to go be in public around other people. This is mostly so that if you are infected, even if you don't have any symptoms, you are less likely to spread the infection to other people. It might also help protect you from others who could be infected. Make sure your mask covers your mouth and nose.  You can buy cloth masks and disposable (non-medical) masks in stores or online. Cloth masks work best if they have several layers of fabric. Your mask should fit snugly over your face with no gaps. You can improve the fit by using a mask with an adjustable nose wire, adjusting or knotting the ear loops to make " "it tighter, or wearing a cloth mask on top of a disposable mask.  When you take your mask off, make sure you do not touch your eyes, nose, or mouth. And wash your hands after you touch the mask. You can wash cloth masks with the rest of your laundry.  When you are outdoors and not around other people, you might not need to wear a mask. But it's important to know what the rules are in your area. The [place] Centers for Disease Control and Prevention (CDC) has more information about how to wear a face mask: www.cdc.gov/coronavirus/2019-ncov/prevent-getting-sick/about-face-coverings.html.  Wash your hands with soap and water often. This is especially important after being out in public or touching surfaces that many other people also touch, like door handles or railings. The risk of getting infected by touching items like this is probably not very high. Still, it's a good idea to wash your hands often. This also helps protect you from other illnesses, like the flu or the common cold.  Make sure to rub your hands with soap for at least 20 seconds, cleaning your wrists, fingernails, and in between your fingers. Then rinse your hands and dry them with a paper towel you can throw away. If you are not near a sink, you can use a hand sanitizing gel to clean your hands. The gels with at least 60 percent alcohol work the best. But it is better to wash with soap and water if you can.  Avoid touching your face, especially your mouth, nose, and eyes.  Avoid or limit traveling if you can. Any form of travel, especially if you spend time in crowded places like airports, increases your risk of getting and spreading infection.  If you do need to travel, be sure to check whether there are any rules about COVID-19 in the area you are visiting. In the [place], some places require people to "self-quarantine" for some length of time if they are visiting (or returning) from another state. This means not going out in public or " "being around other people. The [place] also requires a negative COVID-19 test for anyone who enters, or returns to, the country. Many other countries have testing requirements for visiting, too. All of these rules are meant to help slow the spread of COVID-19.  Once you are fully vaccinated, you are much less likely to get the virus. "Fully vaccinated" means you have had all doses of the vaccine and it has been at least 2 weeks since the last dose. (If you had a single-dose vaccine, you are fully vaccinated 2 weeks after you get the shot.)  What should I do if I have symptoms?   If you have a fever, cough, trouble breathing, or other symptoms of COVID-19, call your doctor or nurse. They will ask about your symptoms. They might also ask about any recent travel and whether you have been around anyone who might have been infected. Then they can tell you if you should come in or go somewhere else to be tested.  If your symptoms are not severe, it is best to call before you go in. The staff can tell you what to do and whether you need to be seen in person. Many people with only mild symptoms should stay home and avoid other people until they get better. If you do need to go to the clinic or hospital, be sure to wear a mask. This helps protect other people. The staff might also have you wait someplace away from other people.  If you are severely ill and need to go to the clinic or hospital right away, you should still call ahead if possible. This way the staff can care for you while taking steps to protect others. If you think you are having a medical emergency, call for an ambulance (in the [place] and [place], dial [id number]).  What if I feel fine but think I was exposed?   If you think you were in close contact with someone with COVID-19, what to do next depends on whether you have already had COVID-19 or gotten the vaccine:  If you have not had COVID-19 or gotten the vaccine - You should get tested after a possible " exposure, even if you don't have any symptoms. Call your doctor or nurse if you aren't sure where to get a test. Then self-quarantine at home and monitor yourself for symptoms. This means staying home as much as possible, and staying at least 6 feet (2 meters) away from other people in your home.  The safest thing to do after a possible exposure is to self-quarantine for 14 days. This can be challenging with work, school, or other responsibilities. Because of this, some public health departments might allow people to stop quarantining sooner, especially if they get a negative test. If you're not sure how long to quarantine for, contact your local public health office or ask your doctor or nurse.  If you have had COVID-19 or gotten the vaccine - If you had COVID-19 within the last 3 months, you do not need to self-quarantine. If you had COVID-19 but it was more than 3 months ago, follow the steps above.  If you are fully vaccinated, you do not need to self-quarantine. But you should still get tested 3 to 5 days after you were in contact with the person who had COVID-19. Even though you are much less likely to get the infection after being vaccinated, it is still possible.  If you self-quarantine for less than 14 days, or if you do not need to self-quarantine, you should still monitor yourself for symptoms for the full 14 days. If you start to have any symptoms, call your doctor or nurse right away. You should also be extra careful about wearing a mask and social distancing during this time.  How is COVID-19 treated?   Many people will be able to stay home while they get better. But people with serious symptoms or other health problems might need to go to the hospital.  Mild illness - Mild illness means you might have symptoms like fever and cough, but you do not have trouble breathing. Most people with COVID-19 have mild illness and can rest at home until they get better. This usually takes about 2 weeks, but it's  "not the same for everyone.  If you are recovering from COVID-19, it's important to stay home and "self-isolate" until your doctor or nurse tells you it's safe to stop. Self-isolation means staying apart from other people, even the people you live with. When you can stop self-isolation will depend on how long it has been since you had symptoms, and in some cases, whether you have had a negative test (showing that the virus is no longer in your body).  Severe illness - If you have more severe illness with trouble breathing, you might need to stay in the hospital, possibly in the intensive care unit (also called the "ICU"). While you are there, you will most likely be in a special isolation room. Only medical staff will be allowed in the room, and they will have to wear special gowns, gloves, masks, and eye protection.  The doctors and nurses can monitor and support your breathing and other body functions and make you as comfortable as possible. You might need extra oxygen to help you breathe easily. If you are having a very hard time breathing, you might need a breathing tube. The tube goes down your throat and into your lungs. It is connected to a machine to help you breathe, called a "ventilator."  Doctors are studying several possible treatments for COVID-19. In certain cases, they might recommend treatments called "monoclonal antibodies." These treatments seem to help some people who are at risk of getting severely ill.  Doctors also might recommend being part of a clinical trial. A clinical trial is a scientific study that tests new medicines to see how well they work. Do not try any new medicines or treatments without talking to a doctor.  What should I do if someone in my home has COVID-19?   If someone in your home has COVID-19, there are additional things you can do to protect yourself and others:  Keep the sick person away from others - The sick person should stay in a separate room, and use a different " "bathroom if possible. They should also eat in their own room.  Experts also recommend that the person stay away from pets in the house until they are better.  Have them wear a mask - The sick person should wear a mask when they are in the same room as other people. If they can't wear a mask, you can help protect yourself by covering your face when you are in the room with them.  Wash hands - Wash your hands with soap and water often.  Clean often - Here are some specific things that can help:  Wear disposable gloves when you clean. It's also a good idea to wear gloves when you have to touch the sick person's laundry, dishes, utensils, or trash. Wash your hands after removing your gloves.  Regularly clean things that are touched a lot. This includes counters, bedside tables, doorknobs, computers, phones, and bathroom surfaces.  Clean things in your home with soap and water, but also use disinfectants on appropriate surfaces. Some cleaning products work well to kill bacteria, but not viruses, so it's important to check labels. The [place] Environmental Protection Agency (EPA) has a list of products here: www.epa.gov/pesticide-registration/list-n-disinfectants-use-against-sars-cov-2.  What if I am pregnant?   More information about COVID-19 and pregnancy is available separately. (See "Patient education: COVID-19 and pregnancy (The Basics)".)  If you are pregnant and you have questions about COVID-19, talk to your doctor, nurse, or midwife. They can help.  What can I do to cope with stress and anxiety?   It's normal to feel anxious or worried about COVID-19. It's also normal to feel stressed, lonely, or tired of not being able to do your usual activities. You can take care of yourself by trying to:  Take breaks from the news  Get regular exercise and eat healthy foods  Find activities that you enjoy and can do at home  Stay in touch with your friends and family members  It might help to remember that by doing " "things like getting vaccinated and following local guidelines, you are helping to protect other people in your community.  Where can I go to learn more?   As we learn more about this virus, expert recommendations will continue to change. Check with your doctor or public health official to get the most updated information about how to protect yourself and others.  For information about COVID-19 in your area, you can call your local public health office. In the [place], this usually means your city or town's Board of Health. Many states also have a "hotline" phone number you can call.  You can find more information about COVID-19 at the following websites:  [place] Centers for Disease Control and Prevention (CDC): www.cdc.gov/COVID19  World Health Organization (WHO): www.who.int/emergencies/diseases/novel-coronavirus-2019  All topics are updated as new evidence becomes available and our peer review process is complete.  This topic retrieved from [employer] on: Oct 28, 2021.  Topic 082404 Version 67.0  Release: 29.4.2 - C29.263  © 2021 [employer] and/or its affiliates. All rights reserved.  Consumer Information Use and Disclaimer   This information is not specific medical advice and does not replace information you receive from your health care provider. This is only a brief summary of general information. It does NOT include all information about conditions, illnesses, injuries, tests, procedures, treatments, therapies, discharge instructions or life-style choices that may apply to you. You must talk with your health care provider for complete information about your health and treatment options. This information should not be used to decide whether or not to accept your health care provider's advice, instructions or recommendations. Only your health care provider has the knowledge and training to provide advice that is right for you. The use of this information is governed by the [employer] End User " License Agreement, available at [email or web address]://[email or web address].The use of [employer] content is governed by the [employer] Terms of Use. ©2021 [employer] All rights reserved.  Copyright   © 2021 [employer] and/or its affiliates. All rights reserved.

## 2025-02-25 NOTE — PROGRESS NOTES
HPI     Tony Gordillo is a 44 y.o. male with multiple medical diagnoses as listed in the medical history and problem list that presents for   Chief Complaint   Patient presents with    URI       URI   This is a new problem. The current episode started yesterday. There has been no fever. The cough is Productive of sputum. Associated symptoms include congestion and coughing. Pertinent negatives include no chest pain. Treatments tried: say seltzer cold and flu. The treatment provided mild relief.   States possible sick contact with children but is unsure.  Reports feeling chills and sweating in the evening. Hx of hypothyroidism.       Assessment & Plan     1. Upper respiratory tract infection, unspecified type  -AFVSS in clinic today.  -Mild symptoms, most likely viral etiology.  -based on clinical findings today, does not warrant Abx treatment at this time. D/w pt about the potential risks of inappropriate Abx use and that if patient's Sx worsens, we will re-evaluate to assess the need to change the treatment plan.    -Warm tea with honey and lemon, and/or warm salt water gargles every 4 hours PRN for sore throat. May try OTC Cepacol if pt desires.  -advised frequent hand washing, rest, and plenty of fluids. Increase water intake to 64-80 oz daily to help thin mucus.  -Tylenol tablets as needed for fever, headaches, sore throat, ear pain, bodyaches, and/or nasal/sinus inflammation.   -Nasal Saline spray (Over the counter [place] spray or [place])  2 sprays each nostril 2-3 times a day for nasal congestion.     I counseled the patient on fluids, rest, OTC medications that can safely be used, hand/cough hygiene, expected course of illness, and when further medical attention would be warranted.      COVID19 pending  Flu negative     Discussed DDx, condition, and treatment.   Education sent to patient portal/included in after visit summary.  ED precautions given.   Notify provider if symptoms do not resolve or increase  in severity.   Patient verbalizes understanding and agrees with plan of care.     - POCT Influenza A/B Molecular  - COVID-19 Routine Screening  - Comprehensive Metabolic Panel; Future  - CBC Auto Differential; Future  - benzonatate (TESSALON) 100 MG capsule; Take 1 capsule (100 mg total) by mouth 3 (three) times daily as needed for Cough.  Dispense: 15 capsule; Refill: 0    2. Colon cancer screening  - Cologuard Screening (Multitarget Stool DNA); Future  - Cologuard Screening (Multitarget Stool DNA)    3. Type 2 diabetes mellitus with left eye affected by mild nonproliferative retinopathy without macular edema, with long-term current use of insulin  -discussed with patient about routine diabetic care that includes but are not limited to regular eye exams, skin care, daily foot exam, proper nutrition, regular BG monitoring at home (fasting and mealtime) and medication compliance in a diabetic.  Target morning BS  and meal-time BS <180   - Diabetic Eye Screening Photo - Automated Analysis; Future    4. Essential hypertension  BP Readings from Last 3 Encounters:   02/25/25 (!) 140/80   01/13/25 (!) 155/88   01/09/25 130/87     -pt did not take his BP medication this morning. He also feels ill.  -continue current medication regimen  -DASH diet, regular cardiovascular exercises, portion control  -weight loss  -f/u with BP logs in 2 weeks      5. Hypothyroidism due to acquired atrophy of thyroid  Reports chills and feeling hot intermittently. Unsure if this is an acute illness or his thyroid. Requesting screening. Labs as below  - TSH; Future  - T4, Free; Future  - T3, Free; Future    6. Chills  New onset associated with congestion. Denies fever.   As above  Check cmp, cbc  - Comprehensive Metabolic Panel; Future  - CBC Auto Differential; Future          --------------------------------------------      Health Maintenance:  Health Maintenance         Date Due Completion Date    Colorectal Cancer Screening Never done  ---    COVID-19 Vaccine (8 - 2024-25 season) 09/01/2024 12/11/2023    Diabetic Eye Exam 01/24/2025 1/24/2024    Pneumococcal Vaccines (Age 0-49) (2 of 2 - PCV) 03/12/2025 (Originally 9/28/2019) 9/28/2018    Hemoglobin A1c 07/06/2025 1/6/2025    Override on 7/11/2015: Done (HGB A1C 9.9H - [facility] RESULTS/OUTSIDE LAB - DR. MANN)    Foot Exam 11/08/2025 11/8/2024    Override on 9/26/2023: Done    Override on 12/11/2012: Done    Diabetes Urine Screening 01/06/2026 1/6/2025    Lipid Panel 01/06/2026 1/6/2025    Override on 7/11/2015: Done ([facility]TS[facility] RESULTS - DR. MANN)    Low Dose Statin 02/18/2026 2/18/2025    TETANUS VACCINE 07/18/2029 7/18/2019    RSV Vaccine (Age 60+ and Pregnant patients) (1 - 1-dose 75+ series) 08/01/2054 ---            Advised patient on the importance of completing overdue health maintenance items    Follow Up:  Follow up in about 2 weeks (around 3/11/2025), or if symptoms worsen or fail to improve.    Exam     Review of Systems:  (as noted above)  Review of Systems   Constitutional:  Positive for chills and fatigue. Negative for fever.   HENT:  Positive for congestion. Negative for trouble swallowing.    Eyes:  Negative for visual disturbance.   Respiratory:  Positive for cough. Negative for chest tightness and shortness of breath.    Cardiovascular:  Negative for chest pain.   Gastrointestinal:  Negative for blood in stool.   Musculoskeletal:  Positive for arthralgias and myalgias.       Physical Exam:   Physical Exam  Constitutional:       General: He is not in acute distress.     Appearance: He is not ill-appearing or diaphoretic.   HENT:      Head: Normocephalic and atraumatic.   Eyes:      General: No scleral icterus.  Cardiovascular:      Rate and Rhythm: Normal rate and regular rhythm.   Pulmonary:      Effort: No respiratory distress.   Chest:      Chest wall: No tenderness.   Neurological:      General: No focal deficit present.      Mental Status: He is alert  and oriented to person, place, and time.       Vitals:    02/25/25 1021   BP: (!) 140/80   BP Location: Left arm   Patient Position: Sitting   Pulse: 90   Temp: 98.7 °F (37.1 °C)   TempSrc: Oral   SpO2: 95%   Weight: 132.2 kg (291 lb 7.2 oz)      Body mass index is 39.53 kg/m².        History     Past Medical History:  Past Medical History:   Diagnosis Date    Diabetes mellitus, type 2     Hyperlipidemia     Hypertension     Obesity     NAINA (obstructive sleep apnea)        Past Surgical History:  Past Surgical History:   Procedure Laterality Date    ARTHROPLASTY, KNEE, TOTAL, USING COMPUTER-ASSISTED NAVIGATION Right 11/18/2024    Procedure: ARTHROPLASTY, KNEE, TOTAL, USING COMPUTER-ASSISTED NAVIGATION;  Surgeon: Antonio Rolon MD;  Location: [facility] OR;  Service: Orthopedics;  Laterality: Right;  Blue. Same Day  Blue Jean Claude and 1st Assist Kaitlin Notified-NC  -----CLEARED BY PCP  RN PREOP 11/6/2024 ---TYPE&SCREEN --done---BMI--40.97--- HAS INSULIN PUMP    ARTHROSCOPIC DEBRIDEMENT OF SHOULDER  03/14/2023    Procedure: DEBRIDEMENT, SHOULDER, ARTHROSCOPIC;  Surgeon: Crissy Bradford MD;  Location: [facility] OR;  Service: Orthopedics;;    ARTHROSCOPIC DEBRIDEMENT OF SHOULDER  7/23/2024    Procedure: DEBRIDEMENT, SHOULDER, ARTHROSCOPIC;  Surgeon: Crissy Bradford MD;  Location: [facility] OR;  Service: Orthopedics;;    ARTHROSCOPIC REPAIR OF ROTATOR CUFF OF SHOULDER Right 03/14/2023    Procedure: REPAIR, ROTATOR CUFF, ARTHROSCOPIC;  Surgeon: Crissy Bradford MD;  Location: [facility] OR;  Service: Orthopedics;  Laterality: Right;  KARY PAYNE [phone]. TEXTED ELMER ON 3/9/2023 @ 12:10PM. ELMER RESPONDED ON 3/9/23 @ 12:23PM-SAG  RN PREOP 3/10/2023---CLEARED BY PCP AND CARDS    ARTHROSCOPIC REPAIR OF ROTATOR CUFF OF SHOULDER Right 7/23/2024    Procedure: REPAIR, ROTATOR CUFF, ARTHROSCOPIC;  Surgeon: Crissy Bradford MD;  Location: [facility] OR;  Service: Orthopedics;  Laterality: Right;  TOMÁS PAYNE  [phone], NURYS [phone]  CLEARED BY PCP  RN PREOP 6/18/2024    ARTHROSCOPY,SHOULDER,WITH BICEPS TENODESIS  03/14/2023    Procedure: ARTHROSCOPY,SHOULDER,WITH BICEPS TENODESIS;  Surgeon: Crissy Bradford MD;  Location: [facility] OR;  Service: Orthopedics;;    KNEE ARTHROSCOPY W/ MENISCECTOMY Right 10/24/2023    Procedure: ARTHROSCOPY, KNEE, WITH MENISCECTOMY;  Surgeon: Crissy Bradford MD;  Location: [facility] OR;  Service: Orthopedics;  Laterality: Right;  RN PREOP 10/18/203---CLEARED BY PCP  ELVIA [phone]    KNEE SURGERY      acl,mcl,pcl    left knee x 2      PRK  Bilateral 2010    SUBCHONDROPLASTY Right 10/24/2023    Procedure: POSSIBLE SUBCHONDROPLASTY;  Surgeon: Crissy Bradford MD;  Location: [facility] OR;  Service: Orthopedics;  Laterality: Right;       Social History:  Social History[1]    Family History:  Family History   Problem Relation Name Age of Onset    Diabetes Mother      Diabetes Father      No Known Problems Brother      Diabetes Maternal Grandmother      No Known Problems Maternal Grandfather      No Known Problems Paternal Grandmother      No Known Problems Paternal Grandfather      No Known Problems Daughter adopted     Autism Son adopted     No Known Problems Maternal Aunt      No Known Problems Maternal Uncle      No Known Problems Paternal Aunt      No Known Problems Paternal Uncle      No Known Problems Other         Allergies and Medications: (updated and reviewed)  Review of patient's allergies indicates:   Allergen Reactions    Influenza virus vaccine, specific Hives    Pioglitazone      Altered mood     Victoza [liraglutide] Nausea And Vomiting    Farxiga [dapagliflozin] Rash     Erectile dysfunction and rash      Current Medications[2]    Patient Care Team:  Jovany Ford MD as PCP - General (Internal Medicine)  Janneth Johnson RN (Inactive) as Registered Nurse (Diabetes)  Rocky Hewitt MD as Consulting Physician (Ophthalmology)  Preethi Monaco RD  (Inactive) as Dietitian (Nutrition)  Christofer Rowley MD as Consulting Physician (Orthopedic Surgery)  Singh Rizo MD as Consulting Physician (Sports Medicine)  Shilpa Gordon NP as Nurse Practitioner (Urology)  Nicole Wynn MD (Family Medicine)  Beatris Nixon RD as Dietitian (Diabetes)  Reynaldo Delacruz OD as Consulting Physician (Ophthalmology)         - The patient is given an After Visit Summary that lists all medications with directions, allergies, education, orders placed during this encounter and follow-up instructions.      - I have reviewed the patient's medical information including past medical, family, and social history sections including the medications and allergies.      - We discussed the patient's current medications.     This note was created by combination of typed  and MModal dictation.  Transcription errors may be present.  If there are any questions, please contact me.                        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     Employer: [employer]   Tobacco Use    Smoking status: Some Days     Types: Cigars     Passive exposure: Never    Smokeless tobacco: Never    Tobacco comments:     Has not had any cigars this year   Substance and Sexual Activity    Alcohol use: Yes     Comment: 1-2 beers every 2 weeks    Drug use: Yes     Types: Marijuana     Comment: Non-prescription cannabis     Social Drivers of Health     Financial Resource Strain: Low Risk  (1/9/2025)    Overall Financial Resource Strain (CARDIA)     Difficulty of Paying Living Expenses: Not very hard   Food Insecurity: Food Insecurity Present (1/9/2025)    Hunger Vital Sign     Worried About Running Out of Food in the Last Year: Sometimes true     Ran Out of Food in the Last Year: Patient declined   Transportation Needs: Unknown (12/5/2023)     PRAPARE - Transportation     Lack of Transportation (Medical): Patient declined   Physical Activity: Sufficiently Active (1/9/2025)    Exercise Vital Sign     Days of Exercise per Week: 3 days     Minutes of Exercise per Session: 50 min   Stress: Stress Concern Present (1/9/2025)    [employer] - Occupational Stress Questionnaire     Feeling of Stress : Rather much   Housing Stability: High Risk (1/9/2025)    Housing Stability Vital Sign     Unable to Pay for Housing in the Last Year: Yes   [2]   Current Outpatient Medications   Medication Sig Dispense Refill    acetaminophen (TYLENOL) 500 MG tablet Take 2 tablets (1,000 mg total) by mouth every 8 (eight) hours as needed for Pain. 42 tablet 0    ammonium lactate 12 % Crea Apply 1 application  topically once daily. 140 g 5    ARIPiprazole (ABILIFY) 2 MG Tab Take 1 tablet (2 mg total) by mouth once daily. 30 tablet 11    atorvastatin (LIPITOR) 40 MG tablet Take 1 tablet (40 mg total) by mouth once daily. 90 tablet 3    azelastine (ASTELIN) 137 mcg (0.1 %) nasal spray Use 1-2 sprays in each nostril twice daily 90 mL 3    blood sugar diagnostic Strp To check BG 4 times daily, to use with insurance preferred meter 400 strip 3    blood sugar diagnostic Strp OneTouch Ultra Test strips      blood-glucose meter kit OneTouch Ultra2 Meter kit      blood-glucose sensor (DEXCOM G7 SENSOR) Filomena Change every 10 days 12 each 3    celecoxib (CELEBREX) 200 MG capsule Take 1 capsule (200 mg total) by mouth 2 (two) times daily. 14 capsule 0    cyanocobalamin, vitamin B-12, 5,000 mcg Subl Place one under tongue once a day for 1 month, then continue to take under tongue per week 30 tablet 3    docusate sodium (COLACE) 100 MG capsule Take 1 capsule (100 mg total) by mouth 2 (two) times daily. 30 capsule 0    empagliflozin (JARDIANCE) 25 mg tablet Take 1 tablet (25 mg total) by mouth once daily. 90 tablet 2    EPINEPHrine (EPIPEN 2-AYALA) 0.3 mg/0.3 mL AtIn  "Inject 0.3 mLs (0.3 mg total) into the muscle as needed (Anaphylaxis). 2 each 0    fluticasone propionate (FLONASE) 50 mcg/actuation nasal spray 1 spray (50 mcg total) by Each Nostril route once daily. 48 g 5    insulin aspart U-100 (NOVOLOG U-100 INSULIN ASPART) 100 unit/mL injection MAX DAILY DOSE 120 UNITS IN INSULIN PUMP. 110 mL 2    insulin pump cart,auto,BT,G6/7 (OMNIPOD 5 G6-G7 PODS, GEN 5,) Crtg Change every 2 days 45 each 2    insulin pump cart,automated,BT (OMNIPOD 5 G6 PODS, GEN 5,) Crtg Inject 1 each into the skin once daily. 30 each 11    ketoconazole (NIZORAL) 2 % cream Apply topically once daily. 60 g 0    L-METHYLFOLATE 15 mg Tab TAKE 15 MG BY MOUTH ONCE DAILY. 30 tablet 11    lamoTRIgine (LAMICTAL) 100 MG tablet Take 1.5 tablets (150 mg total) by mouth once daily. 135 tablet 3    lancets Misc To check BG 4 times daily, to use with insurance preferred meter 400 each 3    levothyroxine (SYNTHROID) 50 MCG tablet Take 1 tablet (50 mcg total) by mouth before breakfast. 90 tablet 1    lisinopriL (PRINIVIL,ZESTRIL) 20 MG tablet TAKE 1 TABLET BY MOUTH EVERY DAY 90 tablet 0    mirtazapine (REMERON) 15 MG tablet Take 1 tablet (15 mg total) by mouth every evening. 30 tablet 11    modafiniL (PROVIGIL) 100 MG Tab Take 1 tablet (100 mg total) by mouth every morning. 90 tablet 1    pen needle, diabetic (BD ULTRA-FINE KEN PEN NEEDLE) 32 gauge x 5/32" Ndle 1 Device by Misc.(Non-Drug; Combo Route) route 5 (five) times daily. 550 each 4    QUEtiapine (SEROQUEL) 25 MG Tab Take 1 tablet (25 mg total) by mouth once daily in the evening 30 tablet 11    syringe, disposable, 1 mL Syrg Testosterone every 2 weeks 25 Syringe 1    tirzepatide (MOUNJARO) 12.5 mg/0.5 mL PnIj Inject 12.5 mg (one pen) into the skin every 7 days. 4 Pen 3    traMADoL (ULTRAM) 50 mg tablet Take 1-2 tablets ( mg total) by mouth every 6 (six) hours as needed for Pain. 40 tablet 0    aspirin (ECOTRIN) 81 MG EC tablet Take 1 tablet (81 mg total) by " mouth 2 (two) times a day. 60 tablet 0    benzonatate (TESSALON) 100 MG capsule Take 1 capsule (100 mg total) by mouth 3 (three) times daily as needed for Cough. 15 capsule 0    loratadine (CLARITIN) 10 mg tablet Take 1 tablet (10 mg total) by mouth once daily. 90 tablet 3    pregabalin (LYRICA) 75 MG capsule Take 1 capsule (75 mg total) by mouth 2 (two) times daily. for 14 days 28 capsule 0     No current facility-administered medications for this visit.

## 2025-02-25 NOTE — LETTER
February 25, 2025    Tony Gordillo  [facility] [address]         [address] [address]  Phone: [phone]  Fax: [phone] February 25, 2025     Patient: Tony Gordillo   YOB: 1980   Date of Visit: 2/25/2025       To Whom It May Concern:    It is my medical opinion that Tony Gordillo may return to work on 2/28/25 .    If you have any questions or concerns, please don't hesitate to call.    Sincerely,        Ridge Webb NP

## 2025-02-25 NOTE — LETTER
February 25, 2025    Tony Gordillo  [facility] [address]         [address] [address]  Phone: [phone]  Fax: [phone] February 25, 2025     Patient: Tony Gordillo   YOB: 1980   Date of Visit: 2/25/2025       To Whom It May Concern:    It is my medical opinion that Tony Gordillo may return to work on 2/27/25 .    If you have any questions or concerns, please don't hesitate to call.    Sincerely,        Ridge Webb NP

## 2025-03-10 ENCOUNTER — OFFICE VISIT (OUTPATIENT)
Dept: ORTHOPEDICS | Facility: CLINIC | Age: 45
End: 2025-03-10
Payer: COMMERCIAL

## 2025-03-10 ENCOUNTER — PATIENT MESSAGE (OUTPATIENT)
Dept: ORTHOPEDICS | Facility: CLINIC | Age: 45
End: 2025-03-10

## 2025-03-10 VITALS — WEIGHT: 291.44 LBS | HEIGHT: 72 IN | BODY MASS INDEX: 39.47 KG/M2

## 2025-03-10 DIAGNOSIS — E66.01 MORBID OBESITY: ICD-10-CM

## 2025-03-10 DIAGNOSIS — M17.12 PRIMARY OSTEOARTHRITIS OF LEFT KNEE: ICD-10-CM

## 2025-03-10 DIAGNOSIS — Z79.4 TYPE 2 DIABETES MELLITUS WITHOUT COMPLICATION, WITH LONG-TERM CURRENT USE OF INSULIN: ICD-10-CM

## 2025-03-10 DIAGNOSIS — E11.9 TYPE 2 DIABETES MELLITUS WITHOUT COMPLICATION, WITH LONG-TERM CURRENT USE OF INSULIN: ICD-10-CM

## 2025-03-10 DIAGNOSIS — Z96.651 STATUS POST RIGHT KNEE REPLACEMENT: Primary | ICD-10-CM

## 2025-03-10 PROCEDURE — 99999 PR PBB SHADOW E&M-EST. PATIENT-LVL IV: CPT | Mod: PBBFAC,,,

## 2025-03-10 PROCEDURE — 99213 OFFICE O/P EST LOW 20 MIN: CPT | Mod: S$GLB,,,

## 2025-03-10 RX ORDER — METHYLPREDNISOLONE 4 MG/1
TABLET ORAL
Qty: 21 EACH | Refills: 0 | Status: SHIPPED | OUTPATIENT
Start: 2025-03-10 | End: 2025-03-31

## 2025-03-10 RX ORDER — TIRZEPATIDE 12.5 MG/.5ML
12.5 INJECTION, SOLUTION SUBCUTANEOUS
Qty: 4 PEN | Refills: 3 | Status: SHIPPED | OUTPATIENT
Start: 2025-03-10

## 2025-03-10 RX ORDER — MELOXICAM 15 MG/1
15 TABLET ORAL DAILY
Qty: 30 TABLET | Refills: 1 | Status: SHIPPED | OUTPATIENT
Start: 2025-03-10

## 2025-03-10 RX ORDER — LORATADINE 10 MG/1
10 TABLET ORAL DAILY
Qty: 90 TABLET | Refills: 3 | Status: SHIPPED | OUTPATIENT
Start: 2025-03-10 | End: 2026-03-10

## 2025-03-10 NOTE — PROGRESS NOTES
Ortho Knee Follow Up Note    PCP: Jovany Ford MD   Referring Provider: No referring provider defined for this encounter.        Assessment:  12/10/25 (3 week post-operative visit Dr. Rolon): 44 y.o. male status post right total Knee Primary completed on 11/18/24.  Patient doing well.  He is on a cane for ambulation.  He has been working hard with outpatient therapy.  He has regained full extension and has good motion to about 115° today.  His incision looks good.  He does have 3 circular areas that are medial to his incision that appear consistent with ringworm.  We will discuss with dermatology regarding treatment for this.  He has been using mupirocin ointment without improvement.  He reports that he has had these in the past in his associated them with bandages.  His pain is reasonably controlled.  His x-rays look good.  Can discuss left knee replacement at three-month follow up.  3/10/25 (3 month post-operative visit PA): Patient returns to clinic today s/p R TKA. He has been struggling since last visit in regards to pain and swelling. He is still using cane for ambulation. He reports he was doing really well initially from a pain standpoint for the first month or so, but over the last month he feels he has regressed. He has good days and bad days. On the bad days, he has difficulty participating in PT. Some days he is unable to stand for longer than 3 minutes on the knee without pain. He is frustrated at this point with this knee because he feels limited due to pain. He is still in therapy at PT [facility] in [place]. He has been taking tylenol and OTC ibuprofen daily. He stopped taking oxycodone 2 weeks after surgery and takes tramadol very sparingly when the pain is severe. Case discussed with Dr. Rolon. He agrees with plan to try rx anti-inflammatory, medrol dose pack, continue PT, and return for re-evaluation in 1 month.  In regards to the left knee, he is not ready to discuss left TKA due to halted  progress with right knee recently. He would like to repeat gel injections for the left knee. Had euflexxa series back in 2023. Will put in orders for synvisc series for left knee.    Plan:  Follow up 3-4 weeks to initiate synvisc series for left knee and to re-evaluate right knee   Future Radiographs Indicated at next visit: No     Pain Management: Continue tylenol.  Will send in meloxicam and medrol dose pack. Do not take OTC NSAIDs with meloxicam  Anticoagulation: none  Wound Care: none    Patient Reassurance:   Post-operative course discussed with patient. Patient reassured and supported. All questions answered.    ACTIVE PROBLEM LIST  Patient Active Problem List   Diagnosis    Hyperlipidemia LDL goal <70    Insulin long-term use    Tinea pedis    Morbid obesity    Hypothyroidism    Type 2 diabetes mellitus 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 MRI negative. 11/2019 started on testosterone    Right foot pain    Decreased strength of lower extremity    Decreased range of motion of both ankles    Gait abnormality    Rib pain on left side    Dyspnea    Decreased strength, endurance, and mobility    Left hand pain    Mild neurocognitive disorder due to traumatic brain injury    Outbursts of anger    Other amnesia    Traumatic rotator cuff tear, right, initial encounter    S/P right rotator cuff repair    Biceps tendonosis of right shoulder    Decreased range of motion of right shoulder    Impaired strength of shoulder  muscles    Allergic conjunctivitis    Cornea edema    Descemet's membrane fold    Herpes simplex keratitis    Uncontrolled type 2 diabetes mellitus    Tear of lateral meniscus of right knee, current    Refractive error    Acute migraine    MCI (mild cognitive impairment)    Medication overuse headache    Chronic migraine with aura, intractable, with status migrainosus    Agitation    Traumatic encephalopathy    Allergy desensitization therapy    Concussion with no loss of consciousness    Concussion with loss of consciousness    Other specified persistent mood disorders    Cognitive communication deficit    Impaired mobility and ADLs    Imbalance    Primary osteoarthritis of both knees    Bilateral chronic knee pain           HPI:  Tony Gordillo presents today for a post-op visit.     STATUS POST:  right total Knee Primary  BMI: There is no height or weight on file to calculate BMI.    Post operative recovery was complicated by: None    Patient rates his condition as improving. Pleased with surgical outcome to date.     Functional Assessment:  Continuing outpatient PT  Functional Difficulties:  Walking, standing prolonged periods of time  Pain Medication:  Non-Narcotic   Anticoagulation: none    EXAM:    right POST-OPERATIVE KNEE    Ht 6' (1.829 m)   Wt 132.2 kg (291 lb 7.2 oz)   BMI 39.53 kg/m²     Skin:  Incision appears well healed. No signs of infection. Previous ring-worm appearing rashes have resolved   Range of Motion: Flexion: 115, Extension 0  Neurovascular Status: Sensation intact in Sural, Saphenous, SPN, DPN and Tibial nerve distribution. 5 out of 5 strength in hip flexion, knee flexion/extension, ankle plantarflexion/dorsiflexion. 2+ dorsalis pedis    IMAGING:  X-ray Knee:   Implants are well fixed and aligned. There is no evidence of loosening.

## 2025-03-17 ENCOUNTER — PATIENT MESSAGE (OUTPATIENT)
Dept: ORTHOPEDICS | Facility: CLINIC | Age: 45
End: 2025-03-17
Payer: COMMERCIAL

## 2025-03-17 DIAGNOSIS — M17.12 PRIMARY OSTEOARTHRITIS OF LEFT KNEE: Primary | ICD-10-CM

## 2025-03-19 ENCOUNTER — OFFICE VISIT (OUTPATIENT)
Facility: CLINIC | Age: 45
End: 2025-03-19
Payer: COMMERCIAL

## 2025-03-19 VITALS — DIASTOLIC BLOOD PRESSURE: 87 MMHG | SYSTOLIC BLOOD PRESSURE: 147 MMHG | HEART RATE: 109 BPM

## 2025-03-19 DIAGNOSIS — S13.4XXD WHIPLASH INJURY TO NECK, SUBSEQUENT ENCOUNTER: ICD-10-CM

## 2025-03-19 DIAGNOSIS — R26.89 IMBALANCE: ICD-10-CM

## 2025-03-19 DIAGNOSIS — G44.209 TENSION HEADACHE: ICD-10-CM

## 2025-03-19 DIAGNOSIS — S06.0X0S CONCUSSION WITHOUT LOSS OF CONSCIOUSNESS, SEQUELA: Primary | ICD-10-CM

## 2025-03-19 DIAGNOSIS — R41.89 COGNITIVE CHANGE: ICD-10-CM

## 2025-03-19 PROCEDURE — 99999 PR PBB SHADOW E&M-EST. PATIENT-LVL V: CPT | Mod: PBBFAC,,, | Performed by: PSYCHIATRY & NEUROLOGY

## 2025-03-19 NOTE — PROGRESS NOTES
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 previously occipital neuritis can exacerbate underlying migraines or other headache disorders. As a result, we discussed previously treating occipital neuritis first and then will see what types of headaches are left after occipital neuritis is effectively treated. Overall, his symptoms are improving.     This patient's diagnoses of concussion and whiplash are chronic complicated injuries with multiple resultant symptoms as noted in the HPI as well as multiple subsequent diagnoses as a result of these injuries. I will be the primary provider who will both be providing and guiding ongoing medical care for these complex conditions.    Today's medical decision making was based on the number and complexity of problems addressed as documented in today's encounter, analysis of combination of 4 unique data points ( unique labs) as documented in today's encounter    Plan:     Continue neck PT exercises  The patient was instructed to ice the occipital region for no more than 20 minutes at least once a day as needed but may repeat this as many times as they would like.   May progressively increase physical activity as tolerated from neurological perspective  Continue vestibular PT exercises as able and agree with going back once cleared with your knee  Re-referral for ST for  cognition placed previously  Continue to follow with Dr. South  Continue to follow with psychiatry  Continue talk therapy for mood  Enc[facility] to see if you qualify for coverage for traumatic brain injury during  service    Damaso Cota MD  Sports Neurology       [1]   Current Outpatient Medications on File Prior to Visit   Medication Sig Dispense Refill    acetaminophen (TYLENOL) 500 MG tablet Take 2 tablets (1,000 mg total) by mouth every 8 (eight) hours as needed for Pain. 42 tablet 0    ammonium lactate 12 % Crea Apply 1 application  topically once daily. 140 g 5    ARIPiprazole (ABILIFY) 2 MG Tab Take 1 tablet (2 mg total) by mouth once daily. 30 tablet 11    atorvastatin (LIPITOR) 40 MG tablet Take 1 tablet (40 mg total) by mouth once daily. 90 tablet 3    azelastine (ASTELIN) 137 mcg (0.1 %) nasal spray Use 1-2 sprays in each nostril twice daily 90 mL 3    benzonatate (TESSALON) 100 MG capsule Take 1 capsule (100 mg total) by mouth 3 (three) times daily as needed for Cough. 15 capsule 0    blood sugar diagnostic Strp To check BG 4 times daily, to use with insurance preferred meter 400 strip 3    blood sugar diagnostic Strp OneTouch Ultra Test strips      blood-glucose meter kit OneTouch Ultra2 Meter kit      blood-glucose sensor (DEXCOM G7 SENSOR) Filomena Change every 10 days 12 each 3    celecoxib (CELEBREX) 200 MG capsule Take 1 capsule (200 mg total) by mouth 2 (two) times daily. 14 capsule 0    cyanocobalamin, vitamin B-12, 5,000 mcg Subl Place one under tongue once a day for 1 month, then continue to take under tongue per week 30 tablet 3    docusate sodium (COLACE) 100 MG capsule Take 1 capsule (100 mg total) by mouth 2 (two) times daily. 30 capsule 0    empagliflozin (JARDIANCE) 25 mg tablet Take 1 tablet (25 mg total) by mouth once daily. 90 tablet 2    EPINEPHrine (EPIPEN 2-AYALA) 0.3 mg/0.3 mL AtIn Inject 0.3 mLs (0.3 mg total) into the muscle as needed  "(Anaphylaxis). 2 each 0    fluticasone propionate (FLONASE) 50 mcg/actuation nasal spray 1 spray (50 mcg total) by Each Nostril route once daily. 48 g 5    insulin aspart U-100 (NOVOLOG U-100 INSULIN ASPART) 100 unit/mL injection MAX DAILY DOSE 120 UNITS IN INSULIN PUMP. 110 mL 2    insulin pump cart,auto,BT,G6/7 (OMNIPOD 5 G6-G7 PODS, GEN 5,) Crtg Change every 2 days 45 each 2    insulin pump cart,automated,BT (OMNIPOD 5 G6 PODS, GEN 5,) Crtg Inject 1 each into the skin once daily. 30 each 11    ketoconazole (NIZORAL) 2 % cream Apply topically once daily. 60 g 0    L-METHYLFOLATE 15 mg Tab TAKE 15 MG BY MOUTH ONCE DAILY. 30 tablet 11    lamoTRIgine (LAMICTAL) 100 MG tablet Take 1.5 tablets (150 mg total) by mouth once daily. 135 tablet 3    lancets Misc To check BG 4 times daily, to use with insurance preferred meter 400 each 3    levothyroxine (SYNTHROID) 50 MCG tablet Take 1 tablet (50 mcg total) by mouth before breakfast. 90 tablet 1    lisinopriL (PRINIVIL,ZESTRIL) 20 MG tablet TAKE 1 TABLET BY MOUTH EVERY DAY 90 tablet 0    loratadine (CLARITIN) 10 mg tablet Take 1 tablet (10 mg total) by mouth once daily. 90 tablet 3    meloxicam (MOBIC) 15 MG tablet Take 1 tablet (15 mg total) by mouth once daily. 30 tablet 1    mirtazapine (REMERON) 15 MG tablet Take 1 tablet (15 mg total) by mouth every evening. 30 tablet 11    modafiniL (PROVIGIL) 100 MG Tab Take 1 tablet (100 mg total) by mouth every morning. 90 tablet 1    pen needle, diabetic (BD ULTRA-FINE KEN PEN NEEDLE) 32 gauge x 5/32" Ndle 1 Device by Misc.(Non-Drug; Combo Route) route 5 (five) times daily. 550 each 4    QUEtiapine (SEROQUEL) 25 MG Tab Take 1 tablet (25 mg total) by mouth once daily in the evening 30 tablet 11    syringe, disposable, 1 mL Syrg Testosterone every 2 weeks 25 Syringe 1    tirzepatide (MOUNJARO) 12.5 mg/0.5 mL PnIj Inject 12.5 mg (one pen) into the skin every 7 days. 4 Pen 3    traMADoL (ULTRAM) 50 mg tablet Take 1-2 tablets ( " mg total) by mouth every 6 (six) hours as needed for Pain. 40 tablet 0    aspirin (ECOTRIN) 81 MG EC tablet Take 1 tablet (81 mg total) by mouth 2 (two) times a day. 60 tablet 0    methylPREDNISolone (MEDROL DOSEPACK) 4 mg tablet Use as directed on package 21 each 0    pregabalin (LYRICA) 75 MG capsule Take 1 capsule (75 mg total) by mouth 2 (two) times daily. for 14 days 28 capsule 0     Current Facility-Administered Medications on File Prior to Visit   Medication Dose Route Frequency Provider Last Rate Last Admin    hylan g-f 20 (SYNVISC ONE) 48 mg/6 mL injection 48 mg  48 mg Intra-articular 1 time in Clinic/HOD        [2]   Social History  Tobacco Use    Smoking status: Some Days     Types: Cigars     Passive exposure: Never    Smokeless tobacco: Never    Tobacco comments:     Has not had any cigars this year   Substance Use Topics    Alcohol use: Yes     Comment: 1-2 beers every 2 weeks    Drug use: Yes     Types: Marijuana     Comment: Non-prescription cannabis

## 2025-03-19 NOTE — PATIENT INSTRUCTIONS
Continue neck PT exercises  The patient was instructed to ice the occipital region for no more than 20 minutes at least once a day as needed but may repeat this as many times as they would like.   May progressively increase physical activity as tolerated from neurological perspective  Continue vestibular PT exercises as able and agree with going back once cleared with your knee  Re-referral for ST for cognition placed previously  Continue to follow with Dr. South  Continue to follow with psychiatry  Continue talk therapy for mood  Encourage [facility] to [facility] to see if you qualify for coverage for traumatic brain injury during  service

## 2025-03-24 ENCOUNTER — PATIENT MESSAGE (OUTPATIENT)
Dept: ADMINISTRATIVE | Facility: HOSPITAL | Age: 45
End: 2025-03-24
Payer: COMMERCIAL

## 2025-04-08 ENCOUNTER — PATIENT MESSAGE (OUTPATIENT)
Dept: ADMINISTRATIVE | Facility: HOSPITAL | Age: 45
End: 2025-04-08
Payer: COMMERCIAL

## 2025-04-10 ENCOUNTER — OFFICE VISIT (OUTPATIENT)
Dept: ORTHOPEDICS | Facility: CLINIC | Age: 45
End: 2025-04-10
Payer: COMMERCIAL

## 2025-04-10 ENCOUNTER — APPOINTMENT (OUTPATIENT)
Dept: RADIOLOGY | Facility: HOSPITAL | Age: 45
End: 2025-04-10
Attending: ORTHOPAEDIC SURGERY
Payer: COMMERCIAL

## 2025-04-10 DIAGNOSIS — M17.12 PRIMARY OSTEOARTHRITIS OF LEFT KNEE: Primary | ICD-10-CM

## 2025-04-10 DIAGNOSIS — Z96.651 STATUS POST RIGHT KNEE REPLACEMENT: Primary | ICD-10-CM

## 2025-04-10 DIAGNOSIS — Z96.651 STATUS POST RIGHT KNEE REPLACEMENT: ICD-10-CM

## 2025-04-10 PROCEDURE — 99999 PR PBB SHADOW E&M-EST. PATIENT-LVL IV: CPT | Mod: PBBFAC,,, | Performed by: ORTHOPAEDIC SURGERY

## 2025-04-10 PROCEDURE — 73562 X-RAY EXAM OF KNEE 3: CPT | Mod: TC,FY,PN,RT

## 2025-04-10 PROCEDURE — 73562 X-RAY EXAM OF KNEE 3: CPT | Mod: 26,RT,, | Performed by: RADIOLOGY

## 2025-04-10 NOTE — PROGRESS NOTES
Ortho Knee Follow Up Note    PCP: Jovany Ford MD   Referring Provider: Lidia Loera PA-C  [address] [facility]  [place] [id number]        Assessment:  44 y.o. male status post right total Knee Primary completed on 11/18/24.  Patient here today for postoperative follow up now almost 5 months out from his right total knee.  He is on a cane for ambulation still but attributes this to both his right knee ongoing issues as well as his left knee pain.  He has been working hard with outpatient therapy.  He has regained full extension and has good motion to about 115° today.  But he has had intermittent episodes where his knees become swollen on him and he has had some difficulty with his range of motion and it is regression on his right.  His incision looks good.  His pain is reasonably controlled, but intermittently can be debilitating.  He reports some medial and lateral based pain.  He clinically does not have any extension mid flexion or flexion instability.  He I do notice some swelling in his knee.  At this time I think it is necessary to obtain repeat radiographs, get ESR and CRP to ensure no underlying infectious process.  If that looks okay may consider steroid taper for his swelling and pain.  He has been taking Mobic without significant improvement.  He is not yet ready to consider surgical intervention for his left knee.  He is doing a Synvisc 1 injection today that was previously scheduled for his left.    Repeat X-rays, there is increased lateral subluxation of his patella compared to immediate post op films, unclear significance at this stage.    Plan:  Follow up TBD  Future Radiographs Indicated at next visit: No    Pain Management: Rx NSAIDs  Anticoagulation: None  Wound Care: None    Patient Reassurance:   Post-operative course discussed with patient. Patient reassured and supported. All questions answered.    ACTIVE PROBLEM LIST  Patient Active Problem List   Diagnosis    Hyperlipidemia LDL goal  <70    Insulin long-term use    Tinea pedis    Morbid obesity    Hypothyroidism    Type 2 diabetes mellitus 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 MRI negative. 11/2019 started on testosterone    Right foot pain    Decreased strength of lower extremity    Decreased range of motion of both ankles    Gait abnormality    Rib pain on left side    Dyspnea    Decreased strength, endurance, and mobility    Left hand pain    Mild neurocognitive disorder due to traumatic brain injury    Outbursts of anger    Other amnesia    Traumatic rotator cuff tear, right, initial encounter    S/P right rotator cuff repair    Biceps tendonosis of right shoulder    Decreased range of motion of right shoulder    Impaired strength of shoulder muscles    Allergic conjunctivitis    Cornea edema    Descemet's membrane fold    Herpes simplex keratitis    Uncontrolled type 2 diabetes mellitus    Tear of lateral meniscus of right knee, current    Refractive error    Acute migraine    MCI (mild cognitive impairment)    Medication overuse headache    Chronic migraine with aura, intractable, with status migrainosus    Agitation    Traumatic encephalopathy    Allergy desensitization therapy    Concussion with no loss of consciousness    Concussion with loss of consciousness    Other specified persistent mood disorders    Cognitive communication deficit    Impaired mobility and ADLs    Imbalance    Primary osteoarthritis of both knees    Bilateral chronic knee  pain           HPI:  Tony Gordillo presents today for a post-op visit.     STATUS POST:  right total Knee Primary  BMI: There is no height or weight on file to calculate BMI.    Post operative recovery was complicated by: None    Patient rates his condition as improving. Pleased with surgical outcome to date.     Functional Assessment:  Started Outpatient PT  Functional Difficulties:  Interferes with sleep, Stair climbing, and Walking  Pain Medication:  Non-Narcotic  Anticoagulation: None    EXAM:    right POST-OPERATIVE KNEE    There were no vitals taken for this visit.    Skin:  Appropriate post op appearance, No evidence of erythema, warmth, discharge, or drainage, and Incision clean/dry/intact  Range of Motion: Flexion: 115, Extension 0  No extension or mid flexion instability  Tenderness along his medial joint line as well as his ITB band on the lateral side at its insertion.  Small effusion  Soft tissue swelling present  Neurovascular Status: Sensation intact in Sural, Saphenous, SPN, DPN and Tibial nerve distribution. 5 out of 5 strength in hip flexion, knee flexion/extension, ankle plantarflexion/dorsiflexion. 2+ dorsalis pedis    IMAGING:  X-ray Knee:   Implants are well fixed and aligned. There is no evidence of loosening.  There is increased lateral subluxation of his patella compared to preop films, unclear significance at this stage.

## 2025-04-10 NOTE — PROCEDURES
Large Joint Aspiration/Injection: L knee    Date/Time: 4/10/2025 9:00 AM    Performed by: Antonio Rolon MD  Authorized by: Antonio Rolon MD    Consent Done?:  Yes (Verbal)  Indications:  Arthritis and pain  Prep: patient was prepped and draped in usual sterile fashion      Local anesthesia used?: Yes    Anesthesia:  Local infiltration  Local anesthetic:  Topical anesthetic    Details:  Needle Size:  22 G  Approach:  Anterolateral  Location:  Knee  Site:  L knee  Medications:  48 mg hylan g-f 20 48 mg/6 mL  Patient tolerance:  Patient tolerated the procedure well with no immediate complications

## 2025-04-11 ENCOUNTER — LAB VISIT (OUTPATIENT)
Dept: LAB | Facility: HOSPITAL | Age: 45
End: 2025-04-11
Attending: ORTHOPAEDIC SURGERY
Payer: COMMERCIAL

## 2025-04-11 DIAGNOSIS — Z96.651 STATUS POST RIGHT KNEE REPLACEMENT: ICD-10-CM

## 2025-04-11 LAB
CRP SERPL-MCNC: 48.2 MG/L
ERYTHROCYTE [SEDIMENTATION RATE] IN BLOOD BY PHOTOMETRIC METHOD: 28 MM/HR

## 2025-04-11 PROCEDURE — 86140 C-REACTIVE PROTEIN: CPT

## 2025-04-11 PROCEDURE — 36415 COLL VENOUS BLD VENIPUNCTURE: CPT | Mod: PO

## 2025-04-11 PROCEDURE — 85652 RBC SED RATE AUTOMATED: CPT

## 2025-04-14 ENCOUNTER — TELEPHONE (OUTPATIENT)
Dept: ORTHOPEDICS | Facility: CLINIC | Age: 45
End: 2025-04-14
Payer: COMMERCIAL

## 2025-04-14 NOTE — TELEPHONE ENCOUNTER
Lvm for patient that his ESR AND CRP was elevated, that Dr. Rolon wants to aspirate his knee (synovasure) apt. Scheduled for tomorrow at 12:40. If the patient has any questions he can call the clinic.  Fany Mathur, CST  Clinical - OR Assistant to Dr. Ryan Charles [facility]   [phone]

## 2025-04-15 ENCOUNTER — OFFICE VISIT (OUTPATIENT)
Dept: ORTHOPEDICS | Facility: CLINIC | Age: 45
End: 2025-04-15
Payer: COMMERCIAL

## 2025-04-15 ENCOUNTER — PATIENT MESSAGE (OUTPATIENT)
Dept: ORTHOPEDICS | Facility: CLINIC | Age: 45
End: 2025-04-15

## 2025-04-15 VITALS — BODY MASS INDEX: 39.47 KG/M2 | HEIGHT: 72 IN | WEIGHT: 291.44 LBS

## 2025-04-15 DIAGNOSIS — Z96.651 STATUS POST RIGHT KNEE REPLACEMENT: Primary | ICD-10-CM

## 2025-04-15 DIAGNOSIS — M17.11 PRIMARY OSTEOARTHRITIS OF RIGHT KNEE: ICD-10-CM

## 2025-04-15 PROCEDURE — 99213 OFFICE O/P EST LOW 20 MIN: CPT | Mod: S$GLB,,, | Performed by: ORTHOPAEDIC SURGERY

## 2025-04-15 PROCEDURE — 99999 PR PBB SHADOW E&M-EST. PATIENT-LVL IV: CPT | Mod: PBBFAC,,, | Performed by: ORTHOPAEDIC SURGERY

## 2025-04-15 RX ORDER — PREGABALIN 75 MG/1
75 CAPSULE ORAL 2 TIMES DAILY
Qty: 60 CAPSULE | Refills: 0 | Status: SHIPPED | OUTPATIENT
Start: 2025-04-15 | End: 2025-05-15

## 2025-04-15 NOTE — PROGRESS NOTES
Ortho Knee Follow Up Note    PCP: Jovany Ford MD   Referring Provider: No referring provider defined for this encounter.        Assessment:  44 y.o. male status post right total Knee Primary completed on 11/18/24.  Patient here today for postoperative follow up now almost 5 months out from his right total knee.  He is on a cane for ambulation still but attributes this to both his right knee ongoing issues as well as his left knee pain.  He has been working hard with outpatient therapy.  He has regained full extension and has good motion to about 115° today.  But he has had intermittent episodes where his knees become swollen on him and he has had some difficulty with his range of motion and it is regression on his right.  His incision looks good.  His pain is reasonably controlled, but intermittently can be debilitating.  He reports the pain to be more neuropathic.  He reports some medial and lateral based pain.  He clinically does not have any extension mid flexion or flexion instability.  He I do notice some swelling in his knee.  At last visit I thought it is necessary to obtain repeat radiographs, get ESR and CRP to ensure no underlying infectious process.  He had elevated inflammatory markers at a CRP of 48 and an ESR of 28.  I also had x-rays obtained which demonstrated some lateral patellar tilt and tracking to his knee compared to films obtained from months prior.  I brought him back today for aspiration of his knee.  I attempted two times both from a superior lateral and anterior lateral portal to obtain fluid from his knee neither of these provided joint fluid.  As a result I think next step would be obtaining an MRI of his knee to evaluate further for intra-articular effusion versus extra articular soft tissue swelling or do evaluate if there is any type of loosening or component issue.  If that came back normal then may consider steroid taper for his swelling and pain.  We will need repeat ESR and  CRP prior to this.  He has been taking Mobic without significant improvement.  He is not yet ready to consider surgical intervention for his left knee.  Last week we did Synvisc 1 injection today that was previously scheduled for his left, he reports improvement in his pain and function of this knee.  He is not using a cane today for ambulation.    Plan:  Follow up After MRI  Future Radiographs Indicated at next visit: No    Pain Management: Rx NSAIDs  Anticoagulation: None  Wound Care: None    Patient Reassurance:   Post-operative course discussed with patient. Patient reassured and supported. All questions answered.    ACTIVE PROBLEM LIST  Patient Active Problem List   Diagnosis    Hyperlipidemia LDL goal <70    Insulin long-term use    Tinea pedis    Morbid obesity    Hypothyroidism    Type 2 diabetes mellitus 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 MRI negative. 11/2019 started on testosterone    Right foot pain    Decreased strength of lower extremity    Decreased range of motion of both ankles    Gait abnormality    Rib pain on left side    Dyspnea    Decreased strength, endurance, and mobility    Left hand pain    Mild neurocognitive disorder due to traumatic brain injury    Outbursts of anger    Other amnesia    Traumatic rotator cuff tear, right, initial encounter    S/P right rotator cuff repair    Biceps tendonosis of right shoulder    Decreased range of motion of right shoulder    Impaired strength of shoulder  muscles    Allergic conjunctivitis    Cornea edema    Descemet's membrane fold    Herpes simplex keratitis    Uncontrolled type 2 diabetes mellitus    Tear of lateral meniscus of right knee, current    Refractive error    Acute migraine    MCI (mild cognitive impairment)    Medication overuse headache    Chronic migraine with aura, intractable, with status migrainosus    Agitation    Traumatic encephalopathy    Allergy desensitization therapy    Concussion with no loss of consciousness    Concussion with loss of consciousness    Other specified persistent mood disorders    Cognitive communication deficit    Impaired mobility and ADLs    Imbalance    Primary osteoarthritis of both knees    Bilateral chronic knee pain           HPI:  Tony Rahmanlyndsay presents today for a post-op visit.     STATUS POST:  right total Knee Primary  BMI: Body mass index is 39.53 kg/m².    Post operative recovery was complicated by: None    Patient rates his condition as improving. Pleased with surgical outcome to date.     Functional Assessment:  Started Outpatient PT  Functional Difficulties:  Interferes with sleep, Stair climbing, and Walking  Pain Medication:  Non-Narcotic  Anticoagulation: None    EXAM:    right POST-OPERATIVE KNEE    Ht 6' (1.829 m)   Wt 132.2 kg (291 lb 7.2 oz)   BMI 39.53 kg/m²     Skin:  Appropriate post op appearance, No evidence of erythema, warmth, discharge, or drainage, and Incision clean/dry/intact  Range of Motion: Flexion: 115, Extension 0  No extension or mid flexion instability  Tenderness along his medial joint line as well as his ITB band on the lateral side at its insertion.  Small effusion  Soft tissue swelling present  Neurovascular Status: Sensation intact in Sural, Saphenous, SPN, DPN and Tibial nerve distribution. 5 out of 5 strength in hip flexion, knee flexion/extension, ankle plantarflexion/dorsiflexion. 2+ dorsalis pedis    IMAGING:  X-ray Knee:   Implants are well fixed and aligned. There  is no evidence of loosening.  There is increased lateral subluxation of his patella compared to preop films, unclear significance at this stage.

## 2025-04-21 ENCOUNTER — PATIENT MESSAGE (OUTPATIENT)
Dept: ORTHOPEDICS | Facility: CLINIC | Age: 45
End: 2025-04-21
Payer: COMMERCIAL

## 2025-04-25 ENCOUNTER — TELEPHONE (OUTPATIENT)
Facility: CLINIC | Age: 45
End: 2025-04-25
Payer: COMMERCIAL

## 2025-04-25 NOTE — TELEPHONE ENCOUNTER
Called and spoke with Pt in regards to scheduling a appt with Priscilla SCHULTZ, on 5/5 at 08:00 am per Pt request and JR request.

## 2025-05-02 ENCOUNTER — TELEPHONE (OUTPATIENT)
Facility: CLINIC | Age: 45
End: 2025-05-02
Payer: COMMERCIAL

## 2025-05-02 NOTE — TELEPHONE ENCOUNTER
Called and spoke with Pt in regards to confirming upcoming appt with Priscilla SCHULTZ, on 5/5, also gave a friendly reminder to bring in all current medications for discussion.

## 2025-05-05 ENCOUNTER — OFFICE VISIT (OUTPATIENT)
Facility: CLINIC | Age: 45
End: 2025-05-05
Payer: COMMERCIAL

## 2025-05-05 ENCOUNTER — PATIENT MESSAGE (OUTPATIENT)
Facility: CLINIC | Age: 45
End: 2025-05-05
Payer: COMMERCIAL

## 2025-05-05 DIAGNOSIS — G43.E11 CHRONIC MIGRAINE WITH AURA, INTRACTABLE, WITH STATUS MIGRAINOSUS: ICD-10-CM

## 2025-05-05 DIAGNOSIS — S06.0X0S CONCUSSION WITHOUT LOSS OF CONSCIOUSNESS, SEQUELA: ICD-10-CM

## 2025-05-05 DIAGNOSIS — Z87.820 HISTORY OF CONCUSSION: ICD-10-CM

## 2025-05-05 DIAGNOSIS — G47.33 OSA ON CPAP: Primary | ICD-10-CM

## 2025-05-05 DIAGNOSIS — Z79.4 INSULIN LONG-TERM USE: ICD-10-CM

## 2025-05-05 NOTE — PROGRESS NOTES
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 difficulty going to sleep.  The patient denies difficulty staying asleep or frequently awakening at night.  The patient does snore and/or have witnessed apneas while sleeping.  When he wakes up in the morning, he does feel well-rested.  He denies dream-enactment behavior.  He denies symptoms suggestive of restless leg syndrome.  Behavior:   The patient's personality has changed.  He does have symptoms of disinhibition and social inappropriateness.  He does not have symptoms to suggest a loss of manners or decorum.  He does not appear apathetic or has decreased motivation.  He does not appear to have a change in inertia.  There is no report that The patient has had a change in their emotional expression.  He does have emotional blunting or lability.  He does not have symptoms of irritability and mood lability.  He does not have symptoms of agitation, aggression, or violent outbursts.  His insight into his health and situation  is intact.  His personal hygiene is intact.  He is not exhibiting a diminished response to other people's needs and feelings.  He is not exhibiting a diminished social interest, interrelatedness, or personal warmth.  He denies restlessness.  He denies new and/or worsening simple repetitive behaviors.  His speech has not become simplified or become repetitive/stereotyped.  He denies new/worsening complex repetitive/ritualistic compulsions and behaviors.  He does not have symptoms of hyper-religiosity or dogmatism.  His interests/pleasures have not become restrictive, simplified, interrupting, or repetitive.  He denies a change of self-stimulating behavior.  He denies any changes in eating behavior.  He denies increased consumption of food or substances.  He denies oral exploration or consumption of inedible objects.  Psychiatric:   He does feel depressed.  He is exhibiting symptoms of social withdrawal/indifference.  He denies anxiety.  He does exhibit cycling behavior.  He does not exhibit hyperactive behavior.  He is not exhibited symptoms of paranoia.  He does not have delusions.  He does not have hallucinations.  He does not have a history of sensitivity to neuroleptic/psychotropic medications.  Medical Review of Systems:   The patient does not have constipation.  The patient does not have urinary incontinence.  The patient denies orthostatic lightheadedness.  The patient's weight is stable.  Functional status:  Difficulty performing the following Instrumental ADLs:  Housekeeping: No  Food Preparation: No  Shopping: No  Ability to Handle Finances: No  Transportation/Driving: No  Household Appliances/Stove: No  Laundry: No  Difficulty performing the following Basic ADLs:  Dressing: No  Bathing: No  Toileting: No  Personal hygiene and grooming: No  Feeding: No  Care Management:  Patient/Family Safety Concerns:  Medication Adherence: No  Home Safety: No  Wandered: No  Firearms: No  Fall Risk: No  Home Alone: No           Past Medical History:   Diagnosis Date    Diabetes mellitus, type 2     Hyperlipidemia     Hypertension     Obesity     NAINA (obstructive sleep apnea)        Past Surgical History:   Procedure Laterality Date    ARTHROPLASTY, KNEE, TOTAL, USING COMPUTER-ASSISTED NAVIGATION Right 11/18/2024    Procedure: ARTHROPLASTY, KNEE, TOTAL, USING COMPUTER-ASSISTED NAVIGATION;  Surgeon: Antonio Rolon MD;  Location: [facility];  Service: Orthopedics;  Laterality: Right;  Blue. Same Day  Blue Jean Claude and 1st Venecia Madrigal Notified-NC  -----CLEARED BY PCP  RN PREOP 11/6/2024 ---TYPE&SCREEN --done---BMI--40.97--- HAS INSULIN PUMP    ARTHROSCOPIC DEBRIDEMENT OF SHOULDER  03/14/2023    Procedure: DEBRIDEMENT, SHOULDER, ARTHROSCOPIC;  Surgeon: Crissy Bradford MD;  Location: [facility] OR;  Service: Orthopedics;;    ARTHROSCOPIC DEBRIDEMENT OF SHOULDER  7/23/2024    Procedure: DEBRIDEMENT, SHOULDER, ARTHROSCOPIC;  Surgeon: Crissy Bradford MD;  Location: [facility];  Service: Orthopedics;;    ARTHROSCOPIC REPAIR OF ROTATOR CUFF OF SHOULDER Right 03/14/2023    Procedure: REPAIR, ROTATOR CUFF, ARTHROSCOPIC;  Surgeon: Crissy Bradford MD;  Location: [facility];  Service: Orthopedics;  Laterality: Right;  HARDY AND NEPHFLY PAYNE [phone]. TEXTED ELMER ON 3/9/2023 @ 12:10PM. ELMER RESPONDED ON 3/9/23 @ 12:23PM-SAG  RN PREOP 3/10/2023---CLEARED BY PCP AND CARDS    ARTHROSCOPIC REPAIR OF ROTATOR CUFF OF SHOULDER Right 7/23/2024    Procedure: REPAIR, ROTATOR CUFF, ARTHROSCOPIC;  Surgeon: Crissy Bradford MD;  Location: [facility];  Service: Orthopedics;  Laterality: Right;  HARDY & NEPHEW ELMER [phone], NURYS [phone]  CLEARED BY PCP  RN PREOP 6/18/2024    ARTHROSCOPY,SHOULDER,WITH BICEPS TENODESIS  03/14/2023    Procedure: ARTHROSCOPY,SHOULDER,WITH BICEPS TENODESIS;  Surgeon: Crissy Bradford MD;  Location: [facility];  Service: Orthopedics;;    KNEE ARTHROSCOPY W/ MENISCECTOMY Right 10/24/2023    Procedure: ARTHROSCOPY, KNEE,  WITH MENISCECTOMY;  Surgeon: Crissy Bradford MD;  Location: [facility] OR;  Service: Orthopedics;  Laterality: Right;  RN PREOP 10/18/203---CLEARED BY PCP  ELVIA [phone]    KNEE SURGERY      acl,mcl,pcl    left knee x 2      PRK  Bilateral 2010    SUBCHONDROPLASTY Right 10/24/2023    Procedure: POSSIBLE SUBCHONDROPLASTY;  Surgeon: Crissy Bradford MD;  Location: [facility] OR;  Service: Orthopedics;  Laterality: Right;       Family History   Problem Relation Name Age of Onset    Diabetes Mother      Diabetes Father      No Known Problems Brother      Diabetes Maternal Grandmother      No Known Problems Maternal Grandfather      No Known Problems Paternal Grandmother      No Known Problems Paternal Grandfather      No Known Problems Daughter adopted     Autism Son adopted     No Known Problems Maternal Aunt      No Known Problems Maternal Uncle      No Known Problems Paternal Aunt      No Known Problems Paternal Uncle      No Known Problems Other         Social History     Socioeconomic History    Marital status:    Occupational History    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     Employer: [employer]   Tobacco Use    Smoking status: Some Days     Types: Cigars     Passive exposure: Never    Smokeless tobacco: Never    Tobacco comments:     Has not had any cigars this year   Substance and Sexual Activity    Alcohol use: Yes     Comment: 1-2 beers every 2 weeks    Drug use: Yes     Types: Marijuana     Comment: Non-prescription cannabis     Social Drivers of Health     Financial Resource Strain: Patient Declined (3/10/2025)    Overall Financial Resource Strain (CARDIA)     Difficulty of Paying Living Expenses: Patient declined   Food Insecurity: Patient Declined (3/10/2025)    Hunger Vital Sign     Worried About Running Out of Food in the Last Year: Patient declined     Ran Out of Food in the Last Year: Patient declined   Recent Concern: Food Insecurity  - Food Insecurity Present (1/9/2025)    Hunger Vital Sign     Worried About Running Out of Food in the Last Year: Sometimes true     Ran Out of Food in the Last Year: Patient declined   Transportation Needs: Patient Declined (3/10/2025)    PRAPARE - Transportation     Lack of Transportation (Medical): Patient declined     Lack of Transportation (Non-Medical): Patient declined   Physical Activity: Insufficiently Active (3/10/2025)    Exercise Vital Sign     Days of Exercise per Week: 2 days     Minutes of Exercise per Session: 20 min   Stress: Stress Concern Present (3/10/2025)    [place] - Occupational Stress Questionnaire     Feeling of Stress : To some extent   Housing Stability: Unknown (3/10/2025)    Housing Stability Vital Sign     Unable to Pay for Housing in the Last Year: Patient declined     Homeless in the Last Year: No   Recent Concern: Housing Stability - High Risk (1/9/2025)    Housing Stability Vital Sign     Unable to Pay for Housing in the Last Year: Yes       Medication:     Current Outpatient Medications on File Prior to Visit   Medication Sig Dispense Refill    acetaminophen (TYLENOL) 500 MG tablet Take 2 tablets (1,000 mg total) by mouth every 8 (eight) hours as needed for Pain. 42 tablet 0    ammonium lactate 12 % Crea Apply 1 application  topically once daily. 140 g 5    ARIPiprazole (ABILIFY) 2 MG Tab Take 1 tablet (2 mg total) by mouth once daily. 30 tablet 11    aspirin (ECOTRIN) 81 MG EC tablet Take 1 tablet (81 mg total) by mouth 2 (two) times a day. 60 tablet 0    atorvastatin (LIPITOR) 40 MG tablet Take 1 tablet (40 mg total) by mouth once daily. 90 tablet 3    azelastine (ASTELIN) 137 mcg (0.1 %) nasal spray Use 1-2 sprays in each nostril twice daily 90 mL 3    benzonatate (TESSALON) 100 MG capsule Take 1 capsule (100 mg total) by mouth 3 (three) times daily as needed for Cough. 15 capsule 0    blood sugar diagnostic Strp To check BG 4 times daily, to  use with insurance preferred meter 400 strip 3    blood sugar diagnostic Strp OneTouch Ultra Test strips      blood-glucose meter kit OneTouch Ultra2 Meter kit      blood-glucose sensor (DEXCOM G7 SENSOR) Filomena Change every 10 days 12 each 3    celecoxib (CELEBREX) 200 MG capsule Take 1 capsule (200 mg total) by mouth 2 (two) times daily. 14 capsule 0    cyanocobalamin, vitamin B-12, 5,000 mcg Subl Place one under tongue once a day for 1 month, then continue to take under tongue per week 30 tablet 3    docusate sodium (COLACE) 100 MG capsule Take 1 capsule (100 mg total) by mouth 2 (two) times daily. 30 capsule 0    empagliflozin (JARDIANCE) 25 mg tablet Take 1 tablet (25 mg total) by mouth once daily. 90 tablet 2    EPINEPHrine (EPIPEN 2-AYALA) 0.3 mg/0.3 mL AtIn Inject 0.3 mLs (0.3 mg total) into the muscle as needed (Anaphylaxis). 2 each 0    fluticasone propionate (FLONASE) 50 mcg/actuation nasal spray 1 spray (50 mcg total) by Each Nostril route once daily. 48 g 5    insulin aspart U-100 (NOVOLOG U-100 INSULIN ASPART) 100 unit/mL injection MAX DAILY DOSE 120 UNITS IN INSULIN PUMP. 110 mL 2    insulin pump cart,auto,BT,G6/7 (OMNIPOD 5 G6-G7 PODS, GEN 5,) Crtg Change every 2 days 45 each 2    insulin pump cart,automated,BT (OMNIPOD 5 G6 PODS, GEN 5,) Crtg Inject 1 each into the skin once daily. 30 each 11    ketoconazole (NIZORAL) 2 % cream Apply topically once daily. 60 g 0    L-METHYLFOLATE 15 mg Tab TAKE 15 MG BY MOUTH ONCE DAILY. 30 tablet 11    lamoTRIgine (LAMICTAL) 100 MG tablet Take 1.5 tablets (150 mg total) by mouth once daily. 135 tablet 3    lancets Misc To check BG 4 times daily, to use with insurance preferred meter 400 each 3    levothyroxine (SYNTHROID) 50 MCG tablet Take 1 tablet (50 mcg total) by mouth before breakfast. 90 tablet 1    lisinopriL (PRINIVIL,ZESTRIL) 20 MG tablet TAKE 1 TABLET BY MOUTH EVERY DAY 90 tablet 0    loratadine (CLARITIN) 10 mg tablet Take 1 tablet (10 mg total) by mouth  "once daily. 90 tablet 3    meloxicam (MOBIC) 15 MG tablet Take 1 tablet (15 mg total) by mouth once daily. 30 tablet 1    mirtazapine (REMERON) 15 MG tablet Take 1 tablet (15 mg total) by mouth every evening. 30 tablet 11    modafiniL (PROVIGIL) 100 MG Tab Take 1 tablet (100 mg total) by mouth every morning. 90 tablet 1    pen needle, diabetic (BD ULTRA-FINE KEN PEN NEEDLE) 32 gauge x 5/32" Ndle 1 Device by Misc.(Non-Drug; Combo Route) route 5 (five) times daily. 550 each 4    pregabalin (LYRICA) 75 MG capsule Take 1 capsule (75 mg total) by mouth 2 (two) times daily. 60 capsule 0    QUEtiapine (SEROQUEL) 25 MG Tab Take 1 tablet (25 mg total) by mouth once daily in the evening 30 tablet 11    syringe, disposable, 1 mL Syrg Testosterone every 2 weeks 25 Syringe 1    tirzepatide (MOUNJARO) 12.5 mg/0.5 mL PnIj Inject 12.5 mg (one pen) into the skin every 7 days. 4 Pen 3    traMADoL (ULTRAM) 50 mg tablet Take 1-2 tablets ( mg total) by mouth every 6 (six) hours as needed for Pain. 40 tablet 0     No current facility-administered medications on file prior to visit.        Review of patient's allergies indicates:   Allergen Reactions    Influenza virus vaccine, specific Hives    Pioglitazone      Altered mood     Victoza [liraglutide] Nausea And Vomiting    Farxiga [dapagliflozin] Rash     Erectile dysfunction and rash        Medications Reconciliation:   I have reconciled the patient's home medications and discharge medications with the patient/family. I have updated all changes.  Refer to After-Visit Medication List.    Objective:  Vital Signs:  There were no vitals filed for this visit.  Wt Readings from Last 3 Encounters:   04/15/25 1248 132.2 kg (291 lb 7.2 oz)   03/10/25 1129 132.2 kg (291 lb 7.2 oz)   02/25/25 1021 132.2 kg (291 lb 7.2 oz)     There is no height or weight on file to calculate BMI.           Neurological examination:  Mental Status:   His appearance deviates from typical expectations given " their age and context.  Throughout the interview, he is cooperative, his eye contact is appropriate.  His behavior is appropriate to the clinical context without impropriety or improper language/conduct.  His behavior was not characterized by episodes of sudden uncontrollable and inappropriate laughing or crying.  The patient is awake; His energy level appeared normal.  His orientation is normal; Spatial 5/5 (location, the floor of building, city, county, state) and temporal 5/5 (month, day, year, MANDA) dimensions are accurate.  His attention/concentration is impaired.  He can complete three-step commands.  His fund of knowledge was appropriate for age, culture, and level of education.  His thought process is logical and goal-oriented.  He demonstrated appropriate insight based on actions, awareness of his illness, plans for the future.  He demonstrated good judgment based on actions and plans for the future.  He has no evidence of hallucinations (auditory, visual, olfactory).  He has no evidence of delusions (paranoid, grandiose, bizarre).  Cranial Nerves:   His pupils were normal.  His visual fields were full to confrontation in all quadrants.  His facial strength was normal.  His facial expression was symmetric and appropriate to the context.  His uvula is mid-line.  His tongue showed no evidence of scalloping.  He tongue movement with normal.  Speech/Language:   The patient's speech was fluent, non-effortful, and his rate was appropriate to the context.  He has no articulation (segmental features) errors.  The patient's speech is not dysarthric.  The patient's speech was without evidence of anomia.  He makes no phonological loop errors.  Motor:   The patient's bilateral upper extremity muscle bulk is appropriate.  Assessment of motor strength was symmetric and at minimal anti-gravity.  There is no pronator or downward drift.  There is no myoclonus observed in The patient's bilateral upper extremities.  There are  no fasciculations observed in The patient's bilateral upper extremities.  Coordination:   He has no visible tremor  Higher Cortical Function:   The patient showed no evidence of apraxia.  He showed no dysexecutive behavior    Gait:   His posture is normal while sitting in a chair.  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  Neurocognitive/Neurobehavioral Evaluation completed on 2023-12-05    Neuropsychiatric/Behavioral Focused Evaluation Assessment    BEHAV5+ 2/6 See ROS section for a full description   Laboratories:     Lab Date Value [Reference]   Coagulopathy Screening           aPTT 2023, Mar-09    24.5 [21.0 - 32.0 sec]      D-Dimer 2022, Feb-18    0.36      INR 2023, Mar-09    1.0 [0.8 - 1.2]      Protime 2023, Mar-09    10.2 [9.0 - 12.5 sec]      Metabolic Screening   Beta-Hydroxybutyrate 2023, Apr-03    0.1 [0.0 - 0.5 mmol/L]      Fat Qual Neutral, Stl 2022, Mar-25    Normal [Normal]      FECAL SPLIT FAT 2022, Mar-25    Normal [Normal]      Ferritin 03/25/2022  82 [20.0 - 300.0 ng/mL]      Free T4 02/03/2021  0.92 [0.71 - 1.51 ng/dL]      Hemoglobin A1C External 2023, Jan-19    8.0 (H) [4.0 - 5.6 %]      Lactate, Fernie 2022, Mar-25    1.9 [0.5 - 2.2 mmol/L]      Lipase 01/19/2023  45 [4 - 60 U/L]      Methlymalonic Acid 01/19/2023  0.12      Procalcitonin 01/19/2023  0.02      T4 Total 01/19/2023  6.5 [4.5 - 11.5 ug/dL]      TSH 02/03/2021  2.562 [0.400 - 4.000 uIU/mL]      Glucose 2023, Apr-03    229 (H) [70 - 110 mg/dL]      Albumin 2023, Apr-03    4.1 [3.5 - 5.2 g/dL]      Alkaline Phosphatase 2023, Apr-03    76 [55 - 135 U/L]      ALT 2023, Apr-03    29 [10 - 44 U/L]      AST 2023, Apr-03    17 [10 - 40 U/L]      BILIRUBIN TOTAL 2023, Apr-03    0.7 [0.1 - 1.0 mg/dL]      PROTEIN TOTAL 2023, Apr-03    7.9 [6.0 - 8.4 g/dL]      Cholesterol 2022, Feb-17    162 [120  - 199 mg/dL]      HDL 2022, Feb-17    52 [40 - 75 mg/dL]      Non-HDL Cholesterol 2022, Feb-17    110 [mg/dL]      Triglycerides 01/19/2023  141 [30 - 150 mg/dL]      B12 Def. Methylmalonic Acid 01/19/2023  0.10      Folate 01/19/2023  10.7 [4.0 - 24.0 ng/mL]      Thiamine 01/19/2023  38 [38 - 122 ug/L]      Vitamin B-12 01/19/2023  364 [180 - 914 ng/L]      Infectious Disease/Immunocompromised Screening   Cryptosporidium Antigen 2022, Mar-25    Negative      Giardia Antigen - EIA 2022, Mar-25    Negative      SARS Coronavirus 2 Antigen 2022, Jan-07    Negative      SARS-CoV-2 RNA, Amplification, Qual 2023, Jan-05    Negative      SARS-CoV2 (COVID-19) Qualitative PCR 2022, Feb-21    Not Detected [Not Detected]      Stool WBC 2022, Mar-25    No neutrophils seen [No neutrophils seen]      Hepatitis C Ab 07/15/2021  Negative      HIV 1/2 Ag/Ab 01/19/2023  Negative      Syphilis Treponemal Ab 01/19/2023  Nonreactive [Nonreactive]      Neuroendocrine/Electrolyte Screening   FSH 01/19/2023  0.40 (L) [0.95 - 11.95 mIU/mL]      Magnesium 03/25/2022  1.9 [1.6 - 2.6 mg/dL]      Phosphorus 2022, Mar-25    3.5 [2.7 - 4.5 mg/dL]      Sex Hormone Binding Globulin 2021, Feb-03    21 [10 - 50 nmol/L]      Testosterone, Total 2021, Apr-19    894 [304 - 1227 ng/dL]      BUN 2023, Apr-03    17 [6 - 20 mg/dL]      Chloride 2023, Apr-03    110 [95 - 110 mmol/L]      Creatinine 2023, Apr-03    1.1 [0.5 - 1.4 mg/dL]      Potassium 2023, Apr-03    4.8 [3.5 - 5.1 mmol/L]      Sodium 2023, Apr-03    138 [136 - 145 mmol/L]      Testosterone, Bioavailable 2021, Feb-03    47.5 (L) [110.0 - 575.0 ng/dL]      Testosterone, Free 2021, Feb-03    25.2 (L) [46.0 - 224.0 pg/mL]      Cerebrospinal Fluid Assessment   IgG 2023, Jan-19    813 [650 - 1600 mg/dL]      Neurodegenerative Serum Fluid Assessment   NEUROFILAMENT LIGHT CHAIN, PLASMA 2023, Jan-19    8.0      Standard Hematology Screen   Hematocrit 2023, Apr-03    55.2 (H) [40.0 - 54.0 %]       Hemoglobin 2023, Apr-03    17.0 [14.0 - 18.0 g/dL]      MCV 2023, Apr-03    82 [82 - 98 fL]      Platelets 2023, Apr-03    292 [150 - 450 K/uL]      Delirium Screening   Bacteria, UA 2023, Apr-03    Rare [None-Occ /hpf]      Glucose, UA 2023, Apr-03    4+ (A)      Ketones, UA 2023, Apr-03    Negative      Leukocytes, UA 2023, Apr-03    Negative      NITRITE UA 2023, Apr-03    Negative      Protein, UA 2023, Apr-03    Negative      RBC, UA 2023, Apr-03    3 [0 - 4 /hpf]           Neuroimaging:    MRI brain/head without contrast on 11/2/2022  Formal interpretation by Radiology:  No acute intracranial abnormality  Independently reviewed radiological imaging by Srini Lima MD. MPH. 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 No significant cortical atrophy; tight sulci not suggestive of any focal atrophy or degenerative pathology. Subcortical structures of normal volume and ratio. Corpus callosum is of normal volume. Midbrain and kevin has normal volume ratio. Hippocampi shows no evidence of atrophy.  T2/FLAIR: no significant subcortical white matter changes.  DWI/ADC: No Significant DWI hyperintensities/hypointensities. No ADC correlation.  SWI/GRE: No Significant hypointensities to suggest cortical/subcortical hemosiderin deposition.  Impression: : No significant white matter changes regional atrophy however there is clear evidence of a lack of septum pellucidum likely traumatic in nature.     Procedures:    Electrocardiogram on 3/9/2023  Formal interpretation:  BPM    P-R Int : 174 ms          QRS Dur : 104 ms     QT Int : 338 ms       P-R-T Axes : 052 023 -01 degrees    QTc Int : 415 ms Normal sinus rhythm Possible Left atrial enlargement Septal infarct (cited on or before 09-MAR-2023) Abnormal ECG  Independently reviewed Electrocardiogram by Srini Lima MD. MPH. Behavioral Neurologist  Impression: : Received  ECG has no evidence of sinus node disease. HR (>=50-60). Prolonged DE interval (>0.22 s). Broad QRS complex (> 0.12 s).    Electrocardiogram on 3/25/2022  Formal interpretation:  Vent. Rate : 107 BPM     Atrial Rate : 107 BPM    P-R Int : 166 ms          QRS Dur : 094 ms     QT Int : 334 ms       P-R-T Axes : 041 012 024 degrees    QTc Int : 445 ms Sinus tachycardia Nonspecific T wave abnormality Abnormal ECG  Independently reviewed Electrocardiogram by Srini Lima MD. MPH. Behavioral Neurologist  Impression: : Received ECG has no evidence of sinus node disease. HR (>=50-60). Prolonged DE interval (>0.22 s). Broad QRS complex (> 0.12 s).     Clinical Summary:     The patient is a 44-year-old right-handed male with a relevant past medical history of DM with complications, HTN, HLD. NAINA, migraine headaches, who presents reporting a 24-year history of gradually progressive neurocognitive impairment.       The clinical history is suggestive of:  Memory Impairment: STM encoding impairment, LTM encoding-retrieval impairment  Attention Impairment: Attention, Selective attention, Sustained attention, Shifting attention  Executive Impairment: Energization, Working Memory, Response Inhibition  Behavior Impairment: Emotional Regulation  Psychiatric Impairment: Neurovegetative, Social Coherence, Mood Regulation  The neurological examination is significant for:  Cortical Transcallosal Disconnection: interhemispheric motor control (interhemispheric motor control ), motor efference (motor overflow)  Movement Disorder (Hypokinetic): paratonia (tone), parkinsonism (bradykinesia), dyskinesia (slowing, hypometria, dysrhythmia)  Sensory Dysfunction: peripheral (A? 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        Assessment:        The patient's clinical presentation is behavior/psychiatric predominant mild cognitive impairment insufficient to interfere with activities of daily living (CDR-SOB: 1 - Questionable cognitive impairment).     The patient's clinical presentation meets the criteria for Mild Cognitive Impairment (MCI-ADRC) (Miquel MS, et al. 2011 Alzheimer's & Dementia).     Concern regarding an intraindividual change in cognition  Impairment in one or more cognitive domains  Preservation of independence in functional abilities.  Not demented     The patient's clinical syndrome is best described as Traumatic Encephalopathy (SHAYLA) (Howard et al., 2015; Leela et al., 2017; Adelina et al., 2021).  substantial exposure to repetitive head impacts (RHIs) from contact sports,  service, or other causes i.e. history of concussion, although may be limited to subconcussive  core clinical features of cognitive impairment (in episodic memory and/or executive functioning) and/or neurobehavioral dysregulation.  a progressive course (typically at least 2 years)  typically delayed clinical onset  that the clinical features are not fully accounted for by any other neurologic, psychiatric, or medical conditions.  Subtype Emotional dysregulation: including depression, anxiety, agitation, aggression, paranoid ideation, deterioration of interpersonal relationships, and suicidality  Subtype Behavioral change: including violence, poor impulse control, socially inappropriate behavior, avolition, apathy, change in personality, and comorbid substance abuse  Subtype Motor disturbance: including bradykinesia, tremor, rigidity, gait instability, dysarthria, dysphagia, ataxia, and gaze disturbance     At present, all neurodegenerative diseases can only be  diagnosed with 100% certainty through a brain autopsy. 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  Plasma Protein Biomarkers: [01/19/2023] Neurofilament Light Chain (Nfl): 8. [01/19/2023] Neurofilament Light Chain (Nfl): 8.  There are no CSF protein biomarkers available on record.  There are no dermatological protein biomarkers available on record.  There is no known relevant genetic testing available.        The observations made above, were discussed with the patient and their supporting historian(s) (wife). We have discussed the additional diagnostic(s) and/or managenent below.     Care Management Plan:    #Optimize Neurocognitive Impairment and Quality  Continue  the MIND Diet and other lifestyle behavior that may help maintain brain health.  We have provided written/digital reading material  Recommend continue CPAP compliance-- I have sent an order for supplies and [facility]   Continue B12 5000 mcg Q weekly  #Optimize Behavioral Management and Quality.  No indication for memantine at this time  continue modafinil 100 mg q.a.m.  continue Lamictal 150 mg q.day  #Optimize Cerebrovascular Health.  The patient has a documented history of hyperlipidemia and/or hypercholesteremia with long-term complications such as cerebrovascular disease, peripheral vascular disease, and/or aortic atherosclerosis. Collectively these risk factors may contribute to cerebral atherosclerosis, and cerebral hypoperfusion compounded neurocognitive disorder. We discussed maximizing cerebrovascular-related medical therapy, including but not limited to cholesterol medications and antiplatelet agents. We have discussed the value of aggressively controlling vascular risk factors like hypertension, hyperlipidemia, and Diabetes SBP<130, LDL<100, and A1C<7.0. We discussed the need to optimize lifestyle choices, including a  "heart-healthy diet (e.g., Mediterranean or DASH), increased cardiovascular exercise (goal 150 minutes of moderate-intensity per week), and staying cognitively and socially active.  #Coordination of Care Management Planning.  We have recommended additional care management through social work support.  #Behavioral/Environmental Strategies  We recommend engaging in activities that stimulate cognitively and socially while avoiding excessive stimulation and fatigue in overwhelmingly complex situations.  We recommend integrating routine and schedule into your daily life. https://www.alzheimersproject.org/news/the-importance-of-routine-and-familiarity-to-persons-with-dementia/  #Health Maintenance/Lifestyle Advice  We have discussed the value in aggressively controlling vascular risk factors like hypertension, hyperlipidemia, and Diabetes SBP<130, LDL<100, A1C<7.0.  We discussed the need to optimize lifestyle choices including a heart-healthy diet (e.g., Mediterranean or DASH), increased cardiovascular exercise (goal 150 minutes of moderate-intensity per week), and stay cognitively and socially active.  #Support  We all need support sometimes. Get easy access to local resources, community programs, and services. https://www.communityresourcefinder.org/  Learn more about Cognitive Impairment in [place]: https://www.alz.org/professionals/public-health/state-overview/[place]  #Safety  The [employer] administers the nationwide "Safe Return" program with identification bracelets, necklaces, or clothing tags and 24-hour assistance. More information is available online at https://www.alz.org/help-support/caregiving/safety/medicalert-with-24-7-wandering-support  #Follow up:  Follow-up as needed.( One month for medication check)     Thank you for allowing us to participate in the care of your patient. Please do not hesitate to contact us with any questions or concerns.     It was a pleasure seeing The patient and " we look forward to seeing them at their follow-up visit.     This note is dictated on M*Modal Fluency Direct word recognition program. There are word recognition mistakes that are occasionally missed on review.         Scheduled Follow-up :  Future Appointments   Date Time Provider Department Center   5/5/2025  8:00 AM Priscilla Wadsworth NP [facility] BRIAN [place]   5/13/2025  1:20 PM Hussain Winston MD [facility] CARDIO [facility]   5/22/2025  8:00 AM Jovany Ford MD [facility] FAM MED [place]   6/24/2025  9:30 AM Lona Celaya OD [facility] OPTOMTY [place]   7/11/2025  2:00 PM LAB, [facility] LAB [facility]   7/18/2025  1:00 PM Kate Mansfield NP [facility] ENDOCRN [facility]       After Visit Medication List :     Medication List            Accurate as of May 5, 2025  7:31 AM. If you have any questions, ask your nurse or doctor.                CONTINUE taking these medications      acetaminophen 500 MG tablet  Commonly known as: TYLENOL  Take 2 tablets (1,000 mg total) by mouth every 8 (eight) hours as needed for Pain.     ammonium lactate 12 % Crea  Apply 1 application  topically once daily.     ARIPiprazole 2 MG Tab  Commonly known as: ABILIFY  Take 1 tablet (2 mg total) by mouth once daily.     aspirin 81 MG EC tablet  Commonly known as: ECOTRIN  Take 1 tablet (81 mg total) by mouth 2 (two) times a day.     atorvastatin 40 MG tablet  Commonly known as: LIPITOR  Take 1 tablet (40 mg total) by mouth once daily.     AUVI-Q 0.3 mg/0.3 mL Atin  Generic drug: EPINEPHrine  Inject 0.3 mLs (0.3 mg total) into the muscle as needed (Anaphylaxis).     azelastine 137 mcg (0.1 %) nasal spray  Commonly known as: ASTELIN  Use 1-2 sprays in each nostril twice daily     benzonatate 100 MG capsule  Commonly known as: TESSALON  Take 1 capsule (100 mg total) by mouth 3 (three) times daily as needed for Cough.     * blood sugar diagnostic Strp     * blood sugar diagnostic Strp  To check BG 4 times daily, to use with insurance preferred meter      blood-glucose meter kit     celecoxib 200 MG capsule  Commonly known as: CeleBREX  Take 1 capsule (200 mg total) by mouth 2 (two) times daily.     cyanocobalamin (vitamin B-12) 5,000 mcg Subl  Place one under tongue once a day for 1 month, then continue to take under tongue per week     DEXCOM G7 SENSOR Filomena  Generic drug: blood-glucose sensor  Change every 10 days     docusate sodium 100 MG capsule  Commonly known as: COLACE  Take 1 capsule (100 mg total) by mouth 2 (two) times daily.     empagliflozin 25 mg tablet  Commonly known as: JARDIANCE  Take 1 tablet (25 mg total) by mouth once daily.     fluticasone propionate 50 mcg/actuation nasal spray  Commonly known as: FLONASE  1 spray (50 mcg total) by Each Nostril route once daily.     insulin aspart U-100 100 unit/mL injection  Commonly known as: NovoLOG U-100 Insulin aspart  MAX DAILY DOSE 120 UNITS IN INSULIN PUMP.     ketoconazole 2 % cream  Commonly known as: NIZORAL  Apply topically once daily.     L-METHYLFOLATE 15 mg Tab  Generic drug: levomefolate calcium  TAKE 15 MG BY MOUTH ONCE DAILY.     lamoTRIgine 100 MG tablet  Commonly known as: LAMICTAL  Take 1.5 tablets (150 mg total) by mouth once daily.     lancets Misc  To check BG 4 times daily, to use with insurance preferred meter     levothyroxine 50 MCG tablet  Commonly known as: SYNTHROID  Take 1 tablet (50 mcg total) by mouth before breakfast.     lisinopriL 20 MG tablet  Commonly known as: PRINIVIL,ZESTRIL  TAKE 1 TABLET BY MOUTH EVERY DAY     loratadine 10 mg tablet  Commonly known as: CLARITIN  Take 1 tablet (10 mg total) by mouth once daily.     meloxicam 15 MG tablet  Commonly known as: MOBIC  Take 1 tablet (15 mg total) by mouth once daily.     mirtazapine 15 MG tablet  Commonly known as: REMERON  Take 1 tablet (15 mg total) by mouth every evening.     modafiniL 100 MG Tab  Commonly known as: PROVIGIL  Take 1 tablet (100 mg total) by mouth every morning.     MOUNJARO 12.5 mg/0.5 mL  "Pnij  Generic drug: tirzepatide  Inject 12.5 mg (one pen) into the skin every 7 days.     OMNIPOD 5 G6 PODS (GEN 5) Crtg  Generic drug: insulin pump cart,automated,BT  Inject 1 each into the skin once daily.     OMNIPOD 5 G6-G7 PODS (GEN 5) Crtg  Generic drug: insulin pump cart,auto,BT,G6/7  Change every 2 days     pen needle, diabetic 32 gauge x 5/32" Ndle  Commonly known as: BD ULTRA-FINE KEN PEN NEEDLE  1 Device by Misc.(Non-Drug; Combo Route) route 5 (five) times daily.     pregabalin 75 MG capsule  Commonly known as: LYRICA  Take 1 capsule (75 mg total) by mouth 2 (two) times daily.     QUEtiapine 25 MG Tab  Commonly known as: SEROQUEL  Take 1 tablet (25 mg total) by mouth once daily in the evening     syringe (disposable) 1 mL Syrg  Testosterone every 2 weeks     traMADoL 50 mg tablet  Commonly known as: ULTRAM  Take 1-2 tablets ( mg total) by mouth every 6 (six) hours as needed for Pain.           * This list has 2 medication(s) that are the same as other medications prescribed for you. Read the directions carefully, and ask your doctor or other care provider to review them with you.                  Signing Physician:  Priscilla Wadsworth NP    Billing Statement:     I performed this consultation using real-time Telehealth tools, including a live video connection between my location and the patient's location (their home within the [facility]). Prior to initiating the consultation, I obtained informed verbal consent to perform this consultation using Telehealth tools and answered all the questions about the Telehealth interaction. The participants understood that only a limited neurological exam and limited neuropsychological testing could be performed using Telehealth tools.     I spent a total of 43 minutes (from 08:00 AM to 08:43 AM) on 05/05/2025, engaging in person in a face-to-face consultation with the patient. More than 50% of this time was devoted to counseling on symptoms, treatment plans, " risks, therapeutic options, lifestyle modifications, and safety concerns related to the above diagnoses.     A Review of Systems was completed, encompassing 10 of the 14 systems. All findings were negative, except those noted in the History of Present Illness (HPI) and Review of Systems (ROS) Sections. The systems reviewed were Constitutional (Const), Eyes, Ear/Nose/Throat (ENT), Respiratory (Resp), Cardiovascular (CV), Gastrointestinal (GI), Genitourinary (), Musculoskeletal (MSK), Skin, and Neurological (Neuro).     I spent a total of 10 minutes reviewing and summarizing records from outside physicians on the day of the clinical evaluation. This review and summary were conducted as described in the History of Present Illness (HPI) and Assessment.     I performed a neurobehavioral status examination on the day of clinical evaluation that included a clinical assessment of thinking, reasoning, and judgment to ensure a comprehensive approach in managing the complex and evolving needs of the patient's neurocognitive condition. Please see above HPI and ROS for full details. This exam was performed on the day of clinical evaluation and included 18 minutes spent on direct face-to-face clinical observation and interview with the patient and 14 minutes spent interpreting test results and preparing the report. The total time of 32 minutes spent on the neurobehavioral status examination is not included in the time spent on evaluation and management coding.     Total Billing time spent on encounter/documentation for this patient's evaluation and management, not including the Neurobehavioral Status Examination: 35 minutes.

## 2025-05-08 ENCOUNTER — OFFICE VISIT (OUTPATIENT)
Dept: FAMILY MEDICINE | Facility: CLINIC | Age: 45
End: 2025-05-08
Payer: COMMERCIAL

## 2025-05-08 ENCOUNTER — HOSPITAL ENCOUNTER (OUTPATIENT)
Dept: RADIOLOGY | Facility: OTHER | Age: 45
Discharge: HOME | End: 2025-05-08
Attending: ORTHOPAEDIC SURGERY
Payer: COMMERCIAL

## 2025-05-08 VITALS
HEART RATE: 105 BPM | OXYGEN SATURATION: 96 % | HEIGHT: 72 IN | TEMPERATURE: 99 F | BODY MASS INDEX: 39.61 KG/M2 | DIASTOLIC BLOOD PRESSURE: 76 MMHG | SYSTOLIC BLOOD PRESSURE: 146 MMHG | WEIGHT: 292.44 LBS

## 2025-05-08 DIAGNOSIS — I10 ESSENTIAL HYPERTENSION: ICD-10-CM

## 2025-05-08 DIAGNOSIS — M25.561 CHRONIC PAIN OF RIGHT KNEE: ICD-10-CM

## 2025-05-08 DIAGNOSIS — H93.8X1 PRESSURE SENSATION IN RIGHT EAR: ICD-10-CM

## 2025-05-08 DIAGNOSIS — G89.29 CHRONIC PAIN OF RIGHT KNEE: ICD-10-CM

## 2025-05-08 DIAGNOSIS — H65.191 OTHER NON-RECURRENT ACUTE NONSUPPURATIVE OTITIS MEDIA OF RIGHT EAR: Primary | ICD-10-CM

## 2025-05-08 DIAGNOSIS — Z96.651 STATUS POST RIGHT KNEE REPLACEMENT: ICD-10-CM

## 2025-05-08 PROCEDURE — 99214 OFFICE O/P EST MOD 30 MIN: CPT | Mod: S$GLB,,,

## 2025-05-08 PROCEDURE — 73721 MRI JNT OF LWR EXTRE W/O DYE: CPT | Mod: TC,RT

## 2025-05-08 PROCEDURE — 99999 PR PBB SHADOW E&M-EST. PATIENT-LVL V: CPT | Mod: PBBFAC,,,

## 2025-05-08 PROCEDURE — G2211 COMPLEX E/M VISIT ADD ON: HCPCS | Mod: S$GLB,,,

## 2025-05-08 PROCEDURE — 73721 MRI JNT OF LWR EXTRE W/O DYE: CPT | Mod: 26,RT,, | Performed by: RADIOLOGY

## 2025-05-08 RX ORDER — AMOXICILLIN 875 MG/1
875 TABLET, COATED ORAL EVERY 12 HOURS
Qty: 20 TABLET | Refills: 0 | Status: SHIPPED | OUTPATIENT
Start: 2025-05-08 | End: 2025-05-18

## 2025-05-08 RX ORDER — BENAZEPRIL HYDROCHLORIDE 20 MG/1
20 TABLET ORAL DAILY
Qty: 90 TABLET | Refills: 3 | Status: SHIPPED | OUTPATIENT
Start: 2025-05-08 | End: 2026-05-08

## 2025-05-08 NOTE — PROGRESS NOTES
HPI     Tony Gordillo is a 44 y.o. male with multiple medical diagnoses as listed in the medical history and problem list that presents for discussion of BP meds and right ear fullness. PCP Dr. Ford with last visit in this clinic on 2/25/25.     Chief Complaint   Patient presents with    Med Change Request    Tinnitus     right     HPI    CHIEF COMPLAINT:  Patient presents with ear pressure, tinnitus, and associated nausea and vomiting, as well as to discuss a change in blood pressure medication.    HPI:  Patient reports ear symptoms, including pressure and tinnitus in the right ear, which started 4-5 nights ago. The initial episode involved increasing pressure and intensifying tinnitus, culminating in vomiting after about 10 minutes. A similar episode occurred the following night. On a subsequent occasion, while preparing to cook, he had pressure and nausea without vomiting. He has since had pressure in the ear 2-3 more times, without tinnitus or nausea.    When nausea occurs, it affects his entire body, suggesting a systemic impact. During one episode, he found relief by lying still and focusing on a fixed point on the ceiling. Any movement worsened the symptoms.     He reports a slight fever of 99°F, higher than his usual temperature, and denies chills. He mentions a history of TBIs (Traumatic Brain Injuries), which complicates differentiating between TBI symptoms and potential ear infection symptoms.    He discusses the need to change his blood pressure medication. He has been on lisinopril 20mg for nearly 15 years, but his insurance company is refusing to refill the prescription. His blood pressure has been slightly elevated over the past 2 weeks.    He mentions ongoing right knee issues, with an MRI scheduled for 5 PM today. One knee was to be replaced in March, with a possibility of needing both knees replaced or receiving steroid treatments.    He denies dizziness, lightheadedness, or vertigo when not  having an acute episode of ear symptoms, and denies frequent ear infections in the past.    MEDICATIONS:  Patient is on Lisinopril 20 mg daily for blood pressure, which he has been taking for almost 15 years. However, this medication is to be discontinued due to insurance issues.    MEDICAL HISTORY:  Patient has a history of TBI (Traumatic Brain Injury) and hypertension. His hypertension has been treated with lisinopril for almost 15 years.    SURGICAL HISTORY:  Patient has a planned right knee replacement scheduled for March.      Assessment & Plan     Problem List Items Addressed This Visit       Essential hypertension     Patient reports blood pressures have been slightly elevated for the last 2 weeks. Denies headaches, no CP/SOB.   Measured blood pressure at 142/78 during visit.   Discontinued lisinopril 20 mg due to insurance coverage, and switched to benazepril 20 mg daily for BP management. Encouraged to continue to check regularly at home and keep BP log. Discussed DASH diet and lifestyle modifications.   Has follow up next week with Cardiology for further evaluation and management.   Follow up with clinic for any worsening symptoms, or if symptoms fail to improve.         Relevant Medications    benazepriL (LOTENSIN) 20 MG tablet     Other Visit Diagnoses         Other non-recurrent acute nonsuppurative otitis media of right ear    -  Primary    Pressure sensation in right ear          Patient experiences dizziness and lightheadedness with sensation of room spinning when nausea occurs.   Laying still helps alleviate symptoms.   Patient reached out to his Neurologist, who advised to check for ear infection.   Examination of right ear revealed TM redness and bulging, possibly indicating the start of an infection.   Given clinical appearance and patient's symptoms, will treat for OM.    Prescribed amoxicillin to treat potential ear infection, to be taken every 12 hours for 10 days.  Follow up with clinic  and/or Neurology for any worsening symptoms, or if symptoms fail to improve.         Relevant Medications    amoxicillin (AMOXIL) 875 MG tablet          Chronic pain of right knee         Patient reports knee pain and is scheduled for an MRI at [facility] to evaluate the condition and determine appropriate treatment plan. Right knee is noted to be swollen and slightly erythematous. Followed closely by Dr. Rolon with Ortho.    Advised patient to contact the office if symptoms worsen or if Motrin is not helping.                  Discussed DDx, condition, and treatment.   Education sent to patient portal/included in after visit summary.  ED precautions given.   Notify provider if symptoms do not resolve or increase in severity.   Patient verbalizes understanding and agrees with plan of care.  --------------------------------------------      Health Maintenance:  Health Maintenance         Date Due Completion Date    Pneumococcal Vaccines (Age 0-49) (2 of 2 - PCV) 09/28/2019 9/28/2018    Colorectal Cancer Screening Never done ---    COVID-19 Vaccine (8 - 2024-25 season) 09/01/2024 12/11/2023    Diabetic Eye Exam 01/24/2025 1/24/2024    Hemoglobin A1c 07/06/2025 1/6/2025    Override on 7/11/2015: Done (HGB A1C 9.9H - [facility]ULTS/OUTSIDE LAB - DR. MANN)    Foot Exam 11/08/2025 11/8/2024    Override on 9/26/2023: Done    Override on 12/11/2012: Done    Diabetes Urine Screening 01/06/2026 1/6/2025    Lipid Panel 01/06/2026 1/6/2025    Override on 7/11/2015: Done ([facility] RESULTS - DR. MANN)    Low Dose Statin 05/08/2026 5/8/2025    TETANUS VACCINE 07/18/2029 7/18/2019    RSV Vaccine (Age 60+ and Pregnant patients) (1 - 1-dose 75+ series) 08/01/2054 ---            Discussed the importance of overdue vaccines which were offered during this encounter. Patient declined overdue vaccines at this time and Health maintenance reviewed    Follow Up:  Follow up if symptoms worsen or fail to  improve.    Exam     Review of Systems:  (as noted above)  Review of Systems   Constitutional:  Negative for fever.   HENT:  Positive for ear pain. Negative for trouble swallowing.    Respiratory:  Negative for shortness of breath.    Cardiovascular:  Negative for chest pain.   Gastrointestinal:  Positive for nausea. Negative for blood in stool.       Physical Exam:   Physical Exam  Constitutional:       General: He is not in acute distress.     Appearance: Normal appearance. He is obese. He is not ill-appearing.   HENT:      Head: Normocephalic and atraumatic.      Right Ear: Tympanic membrane is erythematous and bulging.      Left Ear: Hearing normal. Tympanic membrane is not erythematous or bulging.      Nose: Nose normal.      Mouth/Throat:      Mouth: Mucous membranes are moist.      Pharynx: No oropharyngeal exudate or posterior oropharyngeal erythema.   Cardiovascular:      Rate and Rhythm: Normal rate and regular rhythm.      Pulses: Normal pulses.      Heart sounds: Normal heart sounds. No murmur heard.  Pulmonary:      Effort: Pulmonary effort is normal. No respiratory distress.      Breath sounds: Normal breath sounds. No wheezing.   Skin:     General: Skin is warm and dry.      Capillary Refill: Capillary refill takes less than 2 seconds.   Neurological:      Mental Status: He is alert and oriented to person, place, and time.   Psychiatric:         Mood and Affect: Mood normal.         Behavior: Behavior normal.       Vitals:    05/08/25 1330   BP: (!) 146/76   Pulse: 105   Temp: 99.2 °F (37.3 °C)   TempSrc: Oral   SpO2: 96%   Weight: 132.6 kg (292 lb 7 oz)   Height: 6' (1.829 m)      Body mass index is 39.66 kg/m².        History     Past Medical History:  Past Medical History:   Diagnosis Date    Diabetes mellitus, type 2     Hyperlipidemia     Hypertension     Obesity     NAINA (obstructive sleep apnea)        Past Surgical History:  Past Surgical History:   Procedure Laterality Date    ARTHROPLASTY,  KNEE, TOTAL, USING COMPUTER-ASSISTED NAVIGATION Right 11/18/2024    Procedure: ARTHROPLASTY, KNEE, TOTAL, USING COMPUTER-ASSISTED NAVIGATION;  Surgeon: Antonio Rolon MD;  Location: [facility] OR;  Service: Orthopedics;  Laterality: Right;  Blue. Same Day  Lamykenneth Silverio and 1st Venecia Madrigal Notified-NC  -----CLEARED BY PCP  RN PREOP 11/6/2024 ---TYPE&SCREEN --done---BMI--40.97--- HAS INSULIN PUMP    ARTHROSCOPIC DEBRIDEMENT OF SHOULDER  03/14/2023    Procedure: DEBRIDEMENT, SHOULDER, ARTHROSCOPIC;  Surgeon: Crissy Bradford MD;  Location: [facility];  Service: Orthopedics;;    ARTHROSCOPIC DEBRIDEMENT OF SHOULDER  7/23/2024    Procedure: DEBRIDEMENT, SHOULDER, ARTHROSCOPIC;  Surgeon: Crissy Bradford MD;  Location: [facility] OR;  Service: Orthopedics;;    ARTHROSCOPIC REPAIR OF ROTATOR CUFF OF SHOULDER Right 03/14/2023    Procedure: REPAIR, ROTATOR CUFF, ARTHROSCOPIC;  Surgeon: Crissy Bradford MD;  Location: [facility] OR;  Service: Orthopedics;  Laterality: Right;  ANTONY AND NEPHFLY PAYNE [phone]. TEXTED ELMER ON 3/9/2023 @ 12:10PM. ELMER RESPONDED ON 3/9/23 @ 12:23PM-SAG  RN PREOP 3/10/2023---CLEARED BY PCP AND CARDS    ARTHROSCOPIC REPAIR OF ROTATOR CUFF OF SHOULDER Right 7/23/2024    Procedure: REPAIR, ROTATOR CUFF, ARTHROSCOPIC;  Surgeon: Crissy Bradford MD;  Location: [facility] OR;  Service: Orthopedics;  Laterality: Right;  HARDY [employer] NEPHEW ELMER [phone], NURYS [phone]  CLEARED BY PCP  RN PREOP 6/18/2024    ARTHROSCOPY,SHOULDER,WITH BICEPS TENODESIS  03/14/2023    Procedure: ARTHROSCOPY,SHOULDER,WITH BICEPS TENODESIS;  Surgeon: Crissy Bradford MD;  Location: [facility] OR;  Service: Orthopedics;;    KNEE ARTHROSCOPY W/ MENISCECTOMY Right 10/24/2023    Procedure: ARTHROSCOPY, KNEE, WITH MENISCECTOMY;  Surgeon: Crissy Bradford MD;  Location: [facility] OR;  Service: Orthopedics;  Laterality: Right;  RN PREOP 10/18/203---CLEARED BY PCP  ELVIA [phone]    KNEE SURGERY      acl,mcl,pcl    left  knee x 2      PRK  Bilateral 2010    SUBCHONDROPLASTY Right 10/24/2023    Procedure: POSSIBLE SUBCHONDROPLASTY;  Surgeon: Crissy Bradford MD;  Location: [facility];  Service: Orthopedics;  Laterality: Right;       Social History:  Social History[1]    Family History:  Family History   Problem Relation Name Age of Onset    Diabetes Mother      Diabetes Father      No Known Problems Brother      Diabetes Maternal Grandmother      No Known Problems Maternal Grandfather      No Known Problems Paternal Grandmother      No Known Problems Paternal Grandfather      No Known Problems Daughter adopted     Autism Son adopted     No Known Problems Maternal Aunt      No Known Problems Maternal Uncle      No Known Problems Paternal Aunt      No Known Problems Paternal Uncle      No Known Problems Other         Allergies and Medications: (updated and reviewed)  Review of patient's allergies indicates:   Allergen Reactions    Influenza virus vaccine, specific Hives    Pioglitazone      Altered mood     Victoza [liraglutide] Nausea And Vomiting    Farxiga [dapagliflozin] Rash     Erectile dysfunction and rash      Current Medications[2]    Patient Care Team:  Jovany Ford MD as PCP - General (Internal Medicine)  Janneth Johnson RN (Inactive) as Registered Nurse (Diabetes)  Rocky Hewitt MD as Consulting Physician (Ophthalmology)  Preethi Monaco RD (Inactive) as Dietitian (Nutrition)  Christofer Rowley MD as Consulting Physician (Orthopedic Surgery)  Singh Rizo MD as Consulting Physician (Sports Medicine)  Shilpa Gordon NP as Nurse Practitioner (Urology)  Nicole Wynn MD (Family Medicine)  Beatris Nixon RD as Dietitian (Diabetes)  Reynaldo Delacruz OD as Consulting Physician (Ophthalmology)         - The patient is given an After Visit Summary that lists all medications with directions, allergies, education, orders placed during this encounter and follow-up instructions.      - I have  reviewed the patient's medical information including past medical, family, and social history sections including the medications and allergies.      - We discussed the patient's current medications.     This note was created by combination of typed  and MModal dictation.  Transcription errors may be present.  If there are any questions, please contact me.     This note was generated with the assistance of ambient listening technology. Verbal consent was obtained by the patient and accompanying visitor(s) for the recording of patient appointment to facilitate this note. I attest to having reviewed and edited the generated note for accuracy, though some syntax or spelling errors may persist. Please contact the author of this note for any clarification.      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     Employer: [employer]   Tobacco Use    Smoking status: Former     Types: Cigars     Passive exposure: Never    Smokeless tobacco: Never    Tobacco comments:     Has not had any cigars this year   Substance and Sexual Activity    Alcohol use: Yes     Comment: 1-2 beers every 2 weeks    Drug use: Yes     Types: Marijuana     Comment: Non-prescription cannabis     Social Drivers of Health     Financial Resource Strain: Patient Declined (3/10/2025)    Overall Financial Resource Strain (CARDIA)     Difficulty of Paying Living Expenses: Patient declined   Food Insecurity: Patient Declined (3/10/2025)    Hunger Vital Sign     Worried About Running Out of Food in the Last Year: Patient declined     Ran Out of Food in the Last Year: Patient declined   Recent Concern: Food Insecurity - Food Insecurity Present (1/9/2025)    Hunger Vital Sign     Worried About Running Out of Food in the Last Year: Sometimes true     Ran Out of Food in the Last Year:  Patient declined   Transportation Needs: Patient Declined (3/10/2025)    PRAPARE - Transportation     Lack of Transportation (Medical): Patient declined     Lack of Transportation (Non-Medical): Patient declined   Physical Activity: Insufficiently Active (3/10/2025)    Exercise Vital Sign     Days of Exercise per Week: 2 days     Minutes of Exercise per Session: 20 min   Stress: Stress Concern Present (3/10/2025)    [employer] [place] [employer] - Occupational Stress Questionnaire     Feeling of Stress : To some extent   Housing Stability: Unknown (3/10/2025)    Housing Stability Vital Sign     Unable to Pay for Housing in the Last Year: Patient declined     Homeless in the Last Year: No   Recent Concern: Housing Stability - High Risk (1/9/2025)    Housing Stability Vital Sign     Unable to Pay for Housing in the Last Year: Yes   [2]   Current Outpatient Medications   Medication Sig Dispense Refill    acetaminophen (TYLENOL) 500 MG tablet Take 2 tablets (1,000 mg total) by mouth every 8 (eight) hours as needed for Pain. 42 tablet 0    ammonium lactate 12 % Crea Apply 1 application  topically once daily. 140 g 5    ARIPiprazole (ABILIFY) 2 MG Tab Take 1 tablet (2 mg total) by mouth once daily. 30 tablet 11    atorvastatin (LIPITOR) 40 MG tablet Take 1 tablet (40 mg total) by mouth once daily. 90 tablet 3    azelastine (ASTELIN) 137 mcg (0.1 %) nasal spray Use 1-2 sprays in each nostril twice daily 90 mL 3    blood sugar diagnostic Strp To check BG 4 times daily, to use with insurance preferred meter 400 strip 3    blood sugar diagnostic Strp OneTouch Ultra Test strips      blood-glucose meter kit OneTouch Ultra2 Meter kit      blood-glucose sensor (DEXCOM G7 SENSOR) Filomena Change every 10 days 12 each 3    cyanocobalamin, vitamin B-12, 5,000 mcg Subl Place one under tongue once a day for 1 month, then continue to take under tongue per week 30 tablet 3    empagliflozin (JARDIANCE) 25 mg tablet Take 1 tablet  "(25 mg total) by mouth once daily. 90 tablet 2    EPINEPHrine (EPIPEN 2-AYALA) 0.3 mg/0.3 mL AtIn Inject 0.3 mLs (0.3 mg total) into the muscle as needed (Anaphylaxis). 2 each 0    fluticasone propionate (FLONASE) 50 mcg/actuation nasal spray 1 spray (50 mcg total) by Each Nostril route once daily. 48 g 5    insulin aspart U-100 (NOVOLOG U-100 INSULIN ASPART) 100 unit/mL injection MAX DAILY DOSE 120 UNITS IN INSULIN PUMP. 110 mL 2    insulin pump cart,auto,BT,G6/7 (OMNIPOD 5 G6-G7 PODS, GEN 5,) Crtg Change every 2 days 45 each 2    insulin pump cart,automated,BT (OMNIPOD 5 G6 PODS, GEN 5,) Crtg Inject 1 each into the skin once daily. 30 each 11    ketoconazole (NIZORAL) 2 % cream Apply topically once daily. 60 g 0    L-METHYLFOLATE 15 mg Tab TAKE 15 MG BY MOUTH ONCE DAILY. 30 tablet 11    lamoTRIgine (LAMICTAL) 100 MG tablet Take 1.5 tablets (150 mg total) by mouth once daily. 135 tablet 3    lancets Misc To check BG 4 times daily, to use with insurance preferred meter 400 each 3    levothyroxine (SYNTHROID) 50 MCG tablet Take 1 tablet (50 mcg total) by mouth before breakfast. 90 tablet 1    loratadine (CLARITIN) 10 mg tablet Take 1 tablet (10 mg total) by mouth once daily. 90 tablet 3    meloxicam (MOBIC) 15 MG tablet Take 1 tablet (15 mg total) by mouth once daily. 30 tablet 1    mirtazapine (REMERON) 15 MG tablet Take 1 tablet (15 mg total) by mouth every evening. 30 tablet 11    modafiniL (PROVIGIL) 100 MG Tab Take 1 tablet (100 mg total) by mouth every morning. 90 tablet 1    pen needle, diabetic (BD ULTRA-FINE KEN PEN NEEDLE) 32 gauge x 5/32" Ndle 1 Device by Misc.(Non-Drug; Combo Route) route 5 (five) times daily. 550 each 4    pregabalin (LYRICA) 75 MG capsule Take 1 capsule (75 mg total) by mouth 2 (two) times daily. 60 capsule 0    QUEtiapine (SEROQUEL) 25 MG Tab Take 1 tablet (25 mg total) by mouth once daily in the evening 30 tablet 11    syringe, disposable, 1 mL Syrg Testosterone every 2 weeks 25 " Syringe 1    tirzepatide (MOUNJARO) 12.5 mg/0.5 mL PnIj Inject 12.5 mg (one pen) into the skin every 7 days. 4 Pen 3    traMADoL (ULTRAM) 50 mg tablet Take 1-2 tablets ( mg total) by mouth every 6 (six) hours as needed for Pain. (Patient taking differently: Take  mg by mouth every 6 (six) hours as needed for Pain.) 40 tablet 0    amoxicillin (AMOXIL) 875 MG tablet Take 1 tablet (875 mg total) by mouth every 12 (twelve) hours. for 10 days 20 tablet 0    aspirin (ECOTRIN) 81 MG EC tablet Take 1 tablet (81 mg total) by mouth 2 (two) times a day. 60 tablet 0    benazepriL (LOTENSIN) 20 MG tablet Take 1 tablet (20 mg total) by mouth once daily. 90 tablet 3     No current facility-administered medications for this visit.

## 2025-05-09 ENCOUNTER — PATIENT MESSAGE (OUTPATIENT)
Dept: ORTHOPEDICS | Facility: CLINIC | Age: 45
End: 2025-05-09
Payer: COMMERCIAL

## 2025-05-09 ENCOUNTER — TELEPHONE (OUTPATIENT)
Dept: SPORTS MEDICINE | Facility: CLINIC | Age: 45
End: 2025-05-09
Payer: COMMERCIAL

## 2025-05-09 ENCOUNTER — RESULTS FOLLOW-UP (OUTPATIENT)
Dept: ORTHOPEDICS | Facility: CLINIC | Age: 45
End: 2025-05-09

## 2025-05-09 ENCOUNTER — TELEPHONE (OUTPATIENT)
Dept: ORTHOPEDICS | Facility: CLINIC | Age: 45
End: 2025-05-09
Payer: COMMERCIAL

## 2025-05-09 DIAGNOSIS — T84.50XA INFECTION OF PROSTHETIC JOINT, INITIAL ENCOUNTER: Primary | ICD-10-CM

## 2025-05-09 DIAGNOSIS — Z96.651 STATUS POST RIGHT KNEE REPLACEMENT: ICD-10-CM

## 2025-05-09 NOTE — TELEPHONE ENCOUNTER
Spoke with patient about MRI results. I did let him know this is not a definitive solution and Dr. Rolon does want another synovasure tried under ultrasound with Dr. Jacquie Berger to prove infection. I let the patient know I did message her staff and they may be reaching out to him for an apt. Tentative Revision surgery is 6/18.    Patient understood and had no further questions at this time.  Patient is awaiting apt for synovasure.      I told him to reach out once this apt. Was scheduled.      Fany Mathur, [facility]  Clinical - OR Assistant to Dr. [facility]   [email or web address]  O: [phone]  F: [phone]

## 2025-05-09 NOTE — PROGRESS NOTES
Called in discuss MRI results with the patient today.  MRI I had findings concerning for infection.  Not proven yet at this stage with aspiration is my attempted aspiration did not yield any joint fluid.  I am going to send him for ultrasound-guided aspiration.  Assuming that proves positive with regard to infection he is going to require explant and antibiotic spacer placement.  I am tentatively setting this up for May 19th.

## 2025-05-09 NOTE — TELEPHONE ENCOUNTER
Called pt, scheduled for 5/13.    Yakelin Martin MS, OTC  Clinical Assistant to Dr. Jacquie Berger      ----- Message from Tech Fany sent at 5/9/2025  9:50 AM CDT -----  Regarding: Synovasure?  Good morning,Dr. Rolon has this patient that's been complaining awhile about his right knee replacement.He tried to aspirate for a synovasure and was unsuccessful. The radiologist just sent Dr. Rolon a message as he thinks this knee is infected due to the MRI. Dr. Rolon is wondering if Dr. Berger would get him in and do a Synovasure with ultrasound to prove the infection before we take him in for a revision.Let me know.Thanks,Fany Mathur, [facility] - [facility] Assistant [facility] [email or web address]: [phone]: [phone]

## 2025-05-12 ENCOUNTER — ANESTHESIA EVENT (OUTPATIENT)
Dept: SURGERY | Facility: HOSPITAL | Age: 45
End: 2025-05-12
Payer: COMMERCIAL

## 2025-05-12 ENCOUNTER — TELEPHONE (OUTPATIENT)
Dept: PREADMISSION TESTING | Facility: HOSPITAL | Age: 45
End: 2025-05-12
Payer: COMMERCIAL

## 2025-05-12 DIAGNOSIS — E06.3 HYPOTHYROIDISM DUE TO HASHIMOTO'S THYROIDITIS: ICD-10-CM

## 2025-05-12 RX ORDER — LEVOTHYROXINE SODIUM 50 UG/1
50 TABLET ORAL
Qty: 90 TABLET | Refills: 1 | Status: SHIPPED | OUTPATIENT
Start: 2025-05-12

## 2025-05-12 NOTE — TELEPHONE ENCOUNTER
No care due was identified.  [facility] Embedded Care Due Messages. Reference number: [id number].   5/12/2025 11:53:02 AM CDT

## 2025-05-13 ENCOUNTER — OFFICE VISIT (OUTPATIENT)
Dept: SPORTS MEDICINE | Facility: CLINIC | Age: 45
End: 2025-05-13
Payer: COMMERCIAL

## 2025-05-13 ENCOUNTER — OFFICE VISIT (OUTPATIENT)
Dept: CARDIOLOGY | Facility: CLINIC | Age: 45
End: 2025-05-13
Payer: COMMERCIAL

## 2025-05-13 ENCOUNTER — HOSPITAL ENCOUNTER (OUTPATIENT)
Dept: PREADMISSION TESTING | Facility: HOSPITAL | Age: 45
Discharge: HOME | End: 2025-05-13
Attending: ORTHOPAEDIC SURGERY
Payer: COMMERCIAL

## 2025-05-13 ENCOUNTER — HOSPITAL ENCOUNTER (OUTPATIENT)
Dept: CARDIOLOGY | Facility: HOSPITAL | Age: 45
Discharge: HOME | End: 2025-05-13
Attending: INTERNAL MEDICINE
Payer: COMMERCIAL

## 2025-05-13 VITALS
OXYGEN SATURATION: 98 % | HEART RATE: 101 BPM | BODY MASS INDEX: 39.24 KG/M2 | DIASTOLIC BLOOD PRESSURE: 94 MMHG | WEIGHT: 289.69 LBS | HEIGHT: 72 IN | RESPIRATION RATE: 18 BRPM | SYSTOLIC BLOOD PRESSURE: 157 MMHG

## 2025-05-13 VITALS — BODY MASS INDEX: 39.28 KG/M2 | HEIGHT: 72 IN | WEIGHT: 290 LBS

## 2025-05-13 VITALS
HEART RATE: 86 BPM | BODY MASS INDEX: 39.39 KG/M2 | DIASTOLIC BLOOD PRESSURE: 85 MMHG | WEIGHT: 290.81 LBS | SYSTOLIC BLOOD PRESSURE: 130 MMHG | HEIGHT: 72 IN

## 2025-05-13 VITALS
HEIGHT: 72 IN | OXYGEN SATURATION: 100 % | BODY MASS INDEX: 39.31 KG/M2 | WEIGHT: 290.25 LBS | DIASTOLIC BLOOD PRESSURE: 84 MMHG | HEART RATE: 94 BPM | SYSTOLIC BLOOD PRESSURE: 137 MMHG | TEMPERATURE: 97 F | RESPIRATION RATE: 16 BRPM

## 2025-05-13 DIAGNOSIS — Z01.810 PREOP CARDIOVASCULAR EXAM: Primary | ICD-10-CM

## 2025-05-13 DIAGNOSIS — Z01.818 PRE-OP TESTING: Primary | ICD-10-CM

## 2025-05-13 DIAGNOSIS — I10 ESSENTIAL HYPERTENSION: ICD-10-CM

## 2025-05-13 DIAGNOSIS — E66.01 MORBID OBESITY: ICD-10-CM

## 2025-05-13 DIAGNOSIS — Z96.651 HISTORY OF TOTAL RIGHT KNEE REPLACEMENT: Primary | ICD-10-CM

## 2025-05-13 DIAGNOSIS — Z01.810 PREOP CARDIOVASCULAR EXAM: ICD-10-CM

## 2025-05-13 DIAGNOSIS — E78.5 HYPERLIPIDEMIA LDL GOAL <70: ICD-10-CM

## 2025-05-13 LAB
ABSOLUTE EOSINOPHIL (OHS): 0.13 K/UL
ABSOLUTE MONOCYTE (OHS): 0.95 K/UL (ref 0.3–1)
ABSOLUTE NEUTROPHIL COUNT (OHS): 5.79 K/UL (ref 1.8–7.7)
ALBUMIN SERPL BCP-MCNC: 3.6 G/DL (ref 3.5–5.2)
ALP SERPL-CCNC: 91 UNIT/L (ref 40–150)
ALT SERPL W/O P-5'-P-CCNC: 25 UNIT/L (ref 10–44)
ANION GAP (OHS): 12 MMOL/L (ref 8–16)
AST SERPL-CCNC: 22 UNIT/L (ref 11–45)
BASOPHILS # BLD AUTO: 0.05 K/UL
BASOPHILS NFR BLD AUTO: 0.6 %
BILIRUB SERPL-MCNC: 0.8 MG/DL (ref 0.1–1)
BUN SERPL-MCNC: 13 MG/DL (ref 6–20)
CALCIUM SERPL-MCNC: 9.7 MG/DL (ref 8.7–10.5)
CHLORIDE SERPL-SCNC: 105 MMOL/L (ref 95–110)
CO2 SERPL-SCNC: 23 MMOL/L (ref 23–29)
CREAT SERPL-MCNC: 0.7 MG/DL (ref 0.5–1.4)
ERYTHROCYTE [DISTWIDTH] IN BLOOD BY AUTOMATED COUNT: 14.9 % (ref 11.5–14.5)
GFR SERPLBLD CREATININE-BSD FMLA CKD-EPI: >60 ML/MIN/1.73/M2
GLUCOSE SERPL-MCNC: 93 MG/DL (ref 70–110)
HCT VFR BLD AUTO: 43.1 % (ref 40–54)
HGB BLD-MCNC: 13.5 GM/DL (ref 14–18)
IMM GRANULOCYTES # BLD AUTO: 0.03 K/UL (ref 0–0.04)
IMM GRANULOCYTES NFR BLD AUTO: 0.3 % (ref 0–0.5)
LYMPHOCYTES # BLD AUTO: 2.14 K/UL (ref 1–4.8)
MCH RBC QN AUTO: 26.9 PG (ref 27–31)
MCHC RBC AUTO-ENTMCNC: 31.3 G/DL (ref 32–36)
MCV RBC AUTO: 86 FL (ref 82–98)
NUCLEATED RBC (/100WBC) (OHS): 0 /100 WBC
OHS QRS DURATION: 94 MS
OHS QTC CALCULATION: 425 MS
PLATELET # BLD AUTO: 322 K/UL (ref 150–450)
PMV BLD AUTO: 10.6 FL (ref 9.2–12.9)
POTASSIUM SERPL-SCNC: 4.5 MMOL/L (ref 3.5–5.1)
PROT SERPL-MCNC: 8.4 GM/DL (ref 6–8.4)
RBC # BLD AUTO: 5.01 M/UL (ref 4.6–6.2)
RELATIVE EOSINOPHIL (OHS): 1.4 %
RELATIVE LYMPHOCYTE (OHS): 23.5 % (ref 18–48)
RELATIVE MONOCYTE (OHS): 10.5 % (ref 4–15)
RELATIVE NEUTROPHIL (OHS): 63.7 % (ref 38–73)
SODIUM SERPL-SCNC: 140 MMOL/L (ref 136–145)
WBC # BLD AUTO: 9.09 K/UL (ref 3.9–12.7)

## 2025-05-13 PROCEDURE — 83036 HEMOGLOBIN GLYCOSYLATED A1C: CPT | Performed by: ORTHOPAEDIC SURGERY

## 2025-05-13 PROCEDURE — 93306 TTE W/DOPPLER COMPLETE: CPT | Mod: 26,,, | Performed by: INTERNAL MEDICINE

## 2025-05-13 PROCEDURE — 93000 ELECTROCARDIOGRAM COMPLETE: CPT | Mod: S$GLB,,, | Performed by: INTERNAL MEDICINE

## 2025-05-13 PROCEDURE — G2211 COMPLEX E/M VISIT ADD ON: HCPCS | Mod: S$GLB,,, | Performed by: INTERNAL MEDICINE

## 2025-05-13 PROCEDURE — 99214 OFFICE O/P EST MOD 30 MIN: CPT | Mod: S$GLB,,, | Performed by: INTERNAL MEDICINE

## 2025-05-13 PROCEDURE — 84460 ALANINE AMINO (ALT) (SGPT): CPT | Performed by: ORTHOPAEDIC SURGERY

## 2025-05-13 PROCEDURE — 99999 PR PBB SHADOW E&M-EST. PATIENT-LVL V: CPT | Mod: PBBFAC,,, | Performed by: STUDENT IN AN ORGANIZED HEALTH CARE EDUCATION/TRAINING PROGRAM

## 2025-05-13 PROCEDURE — 85025 COMPLETE CBC W/AUTO DIFF WBC: CPT | Performed by: ORTHOPAEDIC SURGERY

## 2025-05-13 PROCEDURE — 93306 TTE W/DOPPLER COMPLETE: CPT

## 2025-05-13 PROCEDURE — 99999 PR PBB SHADOW E&M-EST. PATIENT-LVL V: CPT | Mod: PBBFAC,,, | Performed by: INTERNAL MEDICINE

## 2025-05-13 NOTE — TELEPHONE ENCOUNTER
Refill Decision Note   Tony Gordillo  is requesting a refill authorization.    Brief Assessment and Rationale for Refill:   Approve       Medication Therapy Plan:         Comments:     Note composed:10:40 PM 05/12/2025

## 2025-05-13 NOTE — PROGRESS NOTES
CARDIOVASCULAR CONSULTATION    REASON FOR CONSULT:   Tony Gordillo is a 44 y.o. male who presents for chest pain.      HISTORY OF PRESENT ILLNESS:     Patient is a pleasant 41-year-old man.  Main complaint is dyspnea on exertion and atypical chest pain.  Had a stress echo done.  Echo portion did not show any significant ischemia, EKG post abnormal.  It was a pharmacological stress echo because patient cannot run because of knee on knee.  States that he had COVID infection in December and since then his exercise tolerance has decreased significantly.  He can hardly walk and then has to stop because of shortness of breath.  Occasional gets chest heaviness when he gets very short of breath on exertion.      There were no arrhythmias during stress.  Concentric hypertrophy and normal systolic function.  The ECG portion of this study is positive for myocardial ischemia.  The estimated ejection fraction is 60%.  Normal left ventricular diastolic function.  Normal right ventricular size with normal right ventricular systolic function.  Mild left atrial enlargement.  The stress echo portion of this study is negative for myocardial ischemia.    Notes from April 2022    Patient here for follow-up.  PET scan did not show any significant coronary artery stenosis.  It was suggestive of mild endothelial dysfunction.  Patient states that his dyspnea on exertion predominantly got worse after he got the COVID.  He is in physical therapy for that.        There are no clinically significant perfusion abnormalities. There is a small (<10%) amount of mild resting heterogeneity in the basal to apical septal wall(s) that improves with stress consistent with endothelial dysfunction secondary to HTN, HLD, and DM.    The whole heart absolute myocardial perfusion values averaged 0.62 cc/min/g at rest, which is normal; 1.71 cc/min/g at stress, which is mildly reduced; and CFR is 2.84 , which is low normal.    CT attenuation images  demonstrate mild diffuse coronary calcifications in the LAD territory and no aortic calcifications.    The gated perfusion images showed an ejection fraction of 60% at rest and 68% during stress. A normal ejection fraction is greater than 53%.    The wall motion is normal at rest and during stress.    The LV cavity size is normal at rest and stress.    The EKG portion of this study is negative for ischemia.    There were no arrhythmias during stress.    The patient reported no chest pain during the stress test.    There are no prior studies for comparison.      Mar 23:  Issues.  Needs to go for rotator cuff surgery.  Patient here for follow-up. can go up 4 flights of stairs and can walk 5 miles without any issues.  No chest pains at rest on exertion, orthopnea, PND      May 25    History of Present Illness    CHIEF COMPLAINT:  Tony presents today for routine follow up and pre-operative clearance for knee surgery    MUSCULOSKELETAL:  He reports limited walking ability due to knee issues, with a maximum distance of 200 yards. Following his knee replacement in November, he initially had positive outcomes for 2-3 months post-op. However, his condition declined and the knee replacement became infected. He is scheduled for knee surgery on Monday to address these complications.    CARDIOVASCULAR:  He has LVH. He recently started Benazepril after being without BP medication for 2 weeks due to refill issues and delayed primary care access. He has been taking Benazepril for 3-4 days.    CURRENT MEDICATIONS:  He takes Benazepril 20mg and atorvastatin for hypercholesterolemia.    PAST MEDICAL HISTORY:     Past Medical History:   Diagnosis Date    Diabetes mellitus, type 2     Hyperlipidemia     Hypertension     Obesity     NAINA (obstructive sleep apnea)        PAST SURGICAL HISTORY:     Past Surgical History:   Procedure Laterality Date    ARTHROPLASTY, KNEE, TOTAL, USING COMPUTER-ASSISTED NAVIGATION Right 11/18/2024     Procedure: ARTHROPLASTY, KNEE, TOTAL, USING COMPUTER-ASSISTED NAVIGATION;  Surgeon: Antonio Rolon MD;  Location: [facility] OR;  Service: Orthopedics;  Laterality: Right;  Blue. Same Day  Blue Jean Claude and  Venecia Madrigal Notified-NC  -----CLEARED BY PCP  RN PREOP 11/6/2024 ---TYPE&SCREEN --done---BMI--40.97--- HAS INSULIN PUMP    ARTHROSCOPIC DEBRIDEMENT OF SHOULDER  03/14/2023    Procedure: DEBRIDEMENT, SHOULDER, ARTHROSCOPIC;  Surgeon: Crissy Bradford MD;  Location: [facility] OR;  Service: Orthopedics;;    ARTHROSCOPIC DEBRIDEMENT OF SHOULDER  7/23/2024    Procedure: DEBRIDEMENT, SHOULDER, ARTHROSCOPIC;  Surgeon: Crissy Bradford MD;  Location: [facility] OR;  Service: Orthopedics;;    ARTHROSCOPIC REPAIR OF ROTATOR CUFF OF SHOULDER Right 03/14/2023    Procedure: REPAIR, ROTATOR CUFF, ARTHROSCOPIC;  Surgeon: Crissy Bradford MD;  Location: [facility] OR;  Service: Orthopedics;  Laterality: Right;  ANTONY AND BONIFACIO PAYNE [phone]. TEXTED ELMER ON 3/9/2023 @ 12:10PM. ELMER RESPONDED ON 3/9/23 @ 12:23PM-SAG  RN PREOP 3/10/2023---CLEARED BY PCP AND KELLEY    ARTHROSCOPIC REPAIR OF ROTATOR CUFF OF SHOULDER Right 7/23/2024    Procedure: REPAIR, ROTATOR CUFF, ARTHROSCOPIC;  Surgeon: Crissy Bradford MD;  Location: [facility] OR;  Service: Orthopedics;  Laterality: Right;  HARDY & NEPHFLY PAYNE [phone], NURYS [phone]  CLEARED BY PCP  RN PREOP 6/18/2024    ARTHROSCOPY,SHOULDER,WITH BICEPS TENODESIS  03/14/2023    Procedure: ARTHROSCOPY,SHOULDER,WITH BICEPS TENODESIS;  Surgeon: Crissy Bradford MD;  Location: [facility] OR;  Service: Orthopedics;;    KNEE ARTHROSCOPY W/ MENISCECTOMY Right 10/24/2023    Procedure: ARTHROSCOPY, KNEE, WITH MENISCECTOMY;  Surgeon: Crissy Bradford MD;  Location: [facility] OR;  Service: Orthopedics;  Laterality: Right;  RN PREOP 10/18/203---CLEARED BY PCP  ELVIA [phone]    KNEE SURGERY      acl,mcl,pcl    left knee x 2      PRK  Bilateral 2010    SUBCHONDROPLASTY Right 10/24/2023     Procedure: POSSIBLE SUBCHONDROPLASTY;  Surgeon: Crissy Bradford MD;  Location: [facility];  Service: Orthopedics;  Laterality: Right;       ALLERGIES AND MEDICATION:     Review of patient's allergies indicates:   Allergen Reactions    Influenza virus vaccine, specific Hives    Pioglitazone      Altered mood     Victoza [liraglutide] Nausea And Vomiting    Farxiga [dapagliflozin] Rash     Erectile dysfunction and rash         Medication List            Accurate as of May 13, 2025  3:27 PM. If you have any questions, ask your nurse or doctor.                CONTINUE taking these medications      acetaminophen 500 MG tablet  Commonly known as: TYLENOL  Take 2 tablets (1,000 mg total) by mouth every 8 (eight) hours as needed for Pain.     ammonium lactate 12 % Crea  Apply 1 application  topically once daily.     amoxicillin 875 MG tablet  Commonly known as: AMOXIL  Take 1 tablet (875 mg total) by mouth every 12 (twelve) hours. for 10 days     ARIPiprazole 2 MG Tab  Commonly known as: ABILIFY  Take 1 tablet (2 mg total) by mouth once daily.     atorvastatin 40 MG tablet  Commonly known as: LIPITOR  Take 1 tablet (40 mg total) by mouth once daily.     AUVI-Q 0.3 mg/0.3 mL Atin  Generic drug: EPINEPHrine  Inject 0.3 mLs (0.3 mg total) into the muscle as needed (Anaphylaxis).     azelastine 137 mcg (0.1 %) nasal spray  Commonly known as: ASTELIN  Use 1-2 sprays in each nostril twice daily     benazepriL 20 MG tablet  Commonly known as: LOTENSIN  Take 1 tablet (20 mg total) by mouth once daily.     * blood sugar diagnostic Strp     * blood sugar diagnostic Strp  To check BG 4 times daily, to use with insurance preferred meter     blood-glucose meter kit     cyanocobalamin (vitamin B-12) 5,000 mcg Subl  Place one under tongue once a day for 1 month, then continue to take under tongue per week     DEXCOM G7 SENSOR Filomena  Generic drug: blood-glucose sensor  Change every 10 days     empagliflozin 25 mg tablet  Commonly known  "as: JARDIANCE  Take 1 tablet (25 mg total) by mouth once daily.     fluticasone propionate 50 mcg/actuation nasal spray  Commonly known as: FLONASE  1 spray (50 mcg total) by Each Nostril route once daily.     insulin aspart U-100 100 unit/mL injection  Commonly known as: NovoLOG U-100 Insulin aspart  MAX DAILY DOSE 120 UNITS IN INSULIN PUMP.     ketoconazole 2 % cream  Commonly known as: NIZORAL  Apply topically once daily.     L-METHYLFOLATE 15 mg Tab  Generic drug: levomefolate calcium  TAKE 15 MG BY MOUTH ONCE DAILY.     lamoTRIgine 100 MG tablet  Commonly known as: LAMICTAL  Take 1.5 tablets (150 mg total) by mouth once daily.     lancets [facility]  To check BG 4 times daily, to use with insurance preferred meter     levothyroxine 50 MCG tablet  Commonly known as: SYNTHROID  Take 1 tablet (50 mcg total) by mouth before breakfast.     loratadine 10 mg tablet  Commonly known as: CLARITIN  Take 1 tablet (10 mg total) by mouth once daily.     meloxicam 15 MG tablet  Commonly known as: MOBIC  Take 1 tablet (15 mg total) by mouth once daily.     mirtazapine 15 MG tablet  Commonly known as: REMERON  Take 1 tablet (15 mg total) by mouth every evening.     modafiniL 100 MG Tab  Commonly known as: PROVIGIL  Take 1 tablet (100 mg total) by mouth every morning.     MOUNJARO 12.5 mg/0.5 mL Pnij  Generic drug: tirzepatide  Inject 12.5 mg (one pen) into the skin every 7 days.     OMNIPOD 5 G6 PODS (GEN 5) Crtg  Generic drug: insulin pump cart,automated,BT  Inject 1 each into the skin once daily.     OMNIPOD 5 G6-G7 PODS (GEN 5) Crtg  Generic drug: insulin pump cart,auto,BT,G6/7  Change every 2 days     pen needle, diabetic 32 gauge x 5/32" Ndle  Commonly known as: BD ULTRA-FINE KEN PEN NEEDLE  1 Device by Misc.(Non-Drug; Combo Route) route 5 (five) times daily.     pregabalin 75 MG capsule  Commonly known as: LYRICA  Take 1 capsule (75 mg total) by mouth 2 (two) times daily.     QUEtiapine 25 MG Tab  Commonly known as: " SEROQUEL  Take 1 tablet (25 mg total) by mouth once daily in the evening     syringe (disposable) 1 mL Syrg  Testosterone every 2 weeks     traMADoL 50 mg tablet  Commonly known as: ULTRAM  Take 1-2 tablets ( mg total) by mouth every 6 (six) hours as needed for Pain.           * This list has 2 medication(s) that are the same as other medications prescribed for you. Read the directions carefully, and ask your doctor or other care provider to review them with you.                  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     Employer: [employer]   Tobacco Use    Smoking status: Former     Types: Cigars     Passive exposure: Never    Smokeless tobacco: Never    Tobacco comments:     Has not had any cigars this year   Substance and Sexual Activity    Alcohol use: Yes     Comment: 1-2 beers every 2 weeks    Drug use: Yes     Types: Marijuana     Comment: Non-prescription cannabis     Social Drivers of Health     Financial Resource Strain: Patient Declined (3/10/2025)    Overall Financial Resource Strain (CARDIA)     Difficulty of Paying Living Expenses: Patient declined   Food Insecurity: Patient Declined (3/10/2025)    Hunger Vital Sign     Worried About Running Out of Food in the Last Year: Patient declined     Ran Out of Food in the Last Year: Patient declined   Recent Concern: Food Insecurity - Food Insecurity Present (1/9/2025)    Hunger Vital Sign     Worried About Running Out of Food in the Last Year: Sometimes true     Ran Out of Food in the Last Year: Patient declined   Transportation Needs: Patient Declined (3/10/2025)    PRAPARE - Transportation     Lack of Transportation (Medical): Patient declined     Lack of Transportation (Non-Medical): Patient declined   Physical Activity: Insufficiently Active (3/10/2025)    Exercise Vital Sign     Days of  Exercise per Week: 2 days     Minutes of Exercise per Session: 20 min   Stress: Stress Concern Present (3/10/2025)    [place] [employer] - Occupational Stress Questionnaire     Feeling of Stress : To some extent   Housing Stability: Unknown (3/10/2025)    Housing Stability Vital Sign     Unable to Pay for Housing in the Last Year: Patient declined     Homeless in the Last Year: No   Recent Concern: Housing Stability - High Risk (1/9/2025)    Housing Stability Vital Sign     Unable to Pay for Housing in the Last Year: Yes       FAMILY HISTORY:     Family History   Problem Relation Name Age of Onset    Diabetes Mother      Diabetes Father      No Known Problems Brother      Diabetes Maternal Grandmother      No Known Problems Maternal Grandfather      No Known Problems Paternal Grandmother      No Known Problems Paternal Grandfather      No Known Problems Daughter adopted     Autism Son adopted     No Known Problems Maternal Aunt      No Known Problems Maternal Uncle      No Known Problems Paternal Aunt      No Known Problems Paternal Uncle      No Known Problems Other         REVIEW OF SYSTEMS:   Review of Systems   Constitutional: Negative.   HENT: Negative.     Eyes: Negative.    Cardiovascular:  Negative for chest pain.   Endocrine: Negative.    Hematologic/Lymphatic: Negative.    Skin: Negative.    Musculoskeletal: Negative.    Gastrointestinal: Negative.    Genitourinary: Negative.    Neurological: Negative.    Psychiatric/Behavioral: Negative.     Allergic/Immunologic: Negative.        A 10 point review of systems was performed and all the pertinent positives have been mentioned. Rest of review of systems was negative.        PHYSICAL EXAM:     Vitals:    05/13/25 1331   BP: (!) 157/94   Pulse: 101   Resp: 18    Body mass index is 39.29 kg/m².  Weight: 131.4 kg (289 lb 11 oz)   Height: 6' (182.9 cm)     Physical Exam  Vitals reviewed.   Constitutional:       Appearance: He is  well-developed.   HENT:      Head: Normocephalic.   Eyes:      Conjunctiva/sclera: Conjunctivae normal.      Pupils: Pupils are equal, round, and reactive to light.   Cardiovascular:      Rate and Rhythm: Normal rate and regular rhythm.      Heart sounds: Normal heart sounds.   Pulmonary:      Effort: Pulmonary effort is normal.      Breath sounds: Normal breath sounds.   Abdominal:      General: Bowel sounds are normal.      Palpations: Abdomen is soft.   Musculoskeletal:      Cervical back: Normal range of motion and neck supple.   Skin:     General: Skin is warm.   Neurological:      Mental Status: He is alert and oriented to person, place, and time.           DATA:     Laboratory:  CBC:  Recent Labs   Lab 11/06/24  0935 02/25/25  1056 05/13/25  1447   WBC 7.49 8.67 9.09   Hemoglobin 14.7 13.7 L  --    HGB  --   --  13.5 L   Hematocrit 45.8 45.7  --    HCT  --   --  43.1   Platelet Count  --   --  322   Platelets 191 352  --        CHEMISTRIES:  Recent Labs   Lab 01/19/23  1002 03/09/23  1604 06/18/24  1400 11/06/24  0935 02/25/25  1056   Glucose 182 H   < > 97 156 H 114 H   Sodium 137   < > 141 140 140   Potassium 4.0   < > 4.5 3.9 4.2   BUN 17   < > 12 12 14   Creatinine 0.8   < > 0.9 0.8 0.7   Calcium 9.7   < > 9.7 9.5 9.8   Magnesium 1.9  --   --   --   --     < > = values in this interval not displayed.       CARDIAC BIOMARKERS:        COAGS:  Recent Labs   Lab 03/09/23  1604 10/17/23  1530 11/06/24  0935   INR 1.0 1.1 0.9       LIPIDS/LFTS:  Recent Labs   Lab 07/06/23  1005 10/17/23  1530 06/18/24  1400 11/06/24  0935 01/06/25  1322 02/25/25  1056   Cholesterol 142  --   --   --  118 L  --    Triglycerides 61  --   --   --  119  --    HDL 57  --   --   --  42  --    LDL Cholesterol 72.8  --   --   --  52.2 L  --    Non-HDL Cholesterol 85  --   --   --  76  --    AST  --    < > 15 18  --  22   ALT  --    < > 24 23  --  19    < > = values in this interval not displayed.       Hemoglobin A1C   Date Value Ref  Range Status   01/06/2025 6.1 (H) 4.0 - 5.6 % Final     Comment:     ADA Screening Guidelines:  5.7-6.4%  Consistent with prediabetes  >or=6.5%  Consistent with diabetes    High levels of fetal hemoglobin interfere with the HbA1C  assay. Heterozygous hemoglobin variants (HbS, HgC, etc)do  not significantly interfere with this assay.   However, presence of multiple variants may affect accuracy.     11/06/2024 6.3 (H) 4.0 - 5.6 % Final     Comment:     ADA Screening Guidelines:  5.7-6.4%  Consistent with prediabetes  >or=6.5%  Consistent with diabetes    High levels of fetal hemoglobin interfere with the HbA1C  assay. Heterozygous hemoglobin variants (HbS, HgC, etc)do  not significantly interfere with this assay.   However, presence of multiple variants may affect accuracy.     05/08/2024 5.7 (H) 4.0 - 5.6 % Final     Comment:     ADA Screening Guidelines:  5.7-6.4%  Consistent with prediabetes  >or=6.5%  Consistent with diabetes    High levels of fetal hemoglobin interfere with the HbA1C  assay. Heterozygous hemoglobin variants (HbS, HgC, etc)do  not significantly interfere with this assay.   However, presence of multiple variants may affect accuracy.         TSH  Recent Labs   Lab 01/19/23  1002 10/10/23  1400 02/25/25  1056   TSH 2.562  2.562 2.148 2.557       The ASCVD Risk score (Shahzad TREVIÑO, et al., 2019) failed to calculate for the following reasons:    The valid total cholesterol range is 130 to 320 mg/dL             ASSESSMENT AND PLAN     Patient Active Problem List   Diagnosis    Hyperlipidemia LDL goal <70    Insulin long-term use    Tinea pedis    Morbid obesity    Hypothyroidism    Type 2 diabetes mellitus with left eye affected by mild nonproliferative retinopathy without macular edema, with long-term current use of insulin    Essential hypertension    Migraine with aura and without status migrainosus, not intractable propranolol SE angry; topamax not helpful; imitrex ineffective; daith ear piercing  helps    Non-seasonal allergic rhinitis; avoid antihistamines per opthalmology    Acute bilateral low back pain without sciatica    NAINA (obstructive sleep apnea) 8/2019 sleep study [facility] 11    Low testosterone in male; pituitary axis normal. MRI negative. 11/2019 started on testosterone    Right foot pain    Decreased strength of lower extremity    Decreased range of motion of both ankles    Gait abnormality    Rib pain on left side    Dyspnea    Decreased strength, endurance, and mobility    Left hand pain    Mild neurocognitive disorder due to traumatic brain injury    Outbursts of anger    Other amnesia    Traumatic rotator cuff tear, right, initial encounter    S/P right rotator cuff repair    Biceps tendonosis of right shoulder    Decreased range of motion of right shoulder    Impaired strength of shoulder muscles    Allergic conjunctivitis    Cornea edema    Descemet's membrane fold    Herpes simplex keratitis    Uncontrolled type 2 diabetes mellitus    Tear of lateral meniscus of right knee, current    Refractive error    Acute migraine    MCI (mild cognitive impairment)    Medication overuse headache    Chronic migraine with aura, intractable, with status migrainosus    Agitation    Traumatic encephalopathy    Allergy desensitization therapy    Concussion with no loss of consciousness    Concussion with loss of consciousness    Other specified persistent mood disorders    Cognitive communication deficit    Impaired mobility and ADLs    Imbalance    Primary osteoarthritis of both knees    Bilateral chronic knee pain     Assessment & Plan    IMPRESSION:  Pre-operative clearance assessment necessary due to patient's request and previous surgical history.  EKG reviewed.  Plan to clear patient for surgery if echocardiogram results are normal.  May increase Benazepril if blood pressure remains elevated.    PLAN SUMMARY:  - Ordered stat echocardiogram to evaluate cardiac function  - Continue atorvastatin  - Continue  Benazepril 20 mg  - Follow-up to review echocardiogram results and provide surgical clearance if normal    HYPERTENSIVE HEART DISEASE:  - Ordered echocardiogram to evaluate cardiac function,    ESSENTIAL HYPERTENSION:  - Tony to monitor blood pressure regularly.  - Continued Benazepril 20 mg.  - Contact the office if blood pressure remains elevated, as medication dosage may need adjustment.    HYPERLIPIDEMIA:  - Continued atorvastatin.    FOLLOW-UP:  - Follow up to review echocardiogram results and provide surgical clearance if results are normal.         Preoperative risk assessment prior to rotator cuff surgery.   PET scan did not show any significant ischemia.  Endothelial dysfunction.  Continue aggressive risk factor modification, weight loss control risk factors.  Recently underwent similar surgery in November.  No active cardiac symptoms, but limited exercise tolerance caused by knee pain.  Will get stat echo.  If echo shows no new abnormalities may proceed for surgery at low-to-moderate risk for coronary ischemia      Hypertension:  Well controlled at current therapy    Weight loss has been recommended    Thank you very much for involving me in the care of your patient.  Please do not hesitate to contact me if there are any questions.      Hussain Winston MD, FACC, [facility]  Interventional Cardiologist, [facility].     Visit today included increased complexity associated with the care of the episodic problem hypertension, obesity, preoperative risk assessment addressed and managing the longitudinal care of the patient due to the serious and/or complex managed problem(s) Problem List[1]  .      This note was dictated with the help of speech recognition software.  There might be un-intended errors and/or substitutions.                           [1]   Patient Active Problem List  Diagnosis    Hyperlipidemia LDL goal <70    Insulin long-term use    Tinea pedis    Morbid obesity    Hypothyroidism    Type 2  diabetes mellitus 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 MRI negative. 11/2019 started on testosterone    Right foot pain    Decreased strength of lower extremity    Decreased range of motion of both ankles    Gait abnormality    Rib pain on left side    Dyspnea    Decreased strength, endurance, and mobility    Left hand pain    Mild neurocognitive disorder due to traumatic brain injury    Outbursts of anger    Other amnesia    Traumatic rotator cuff tear, right, initial encounter    S/P right rotator cuff repair    Biceps tendonosis of right shoulder    Decreased range of motion of right shoulder    Impaired strength of shoulder muscles    Allergic conjunctivitis    Cornea edema    Descemet's membrane fold    Herpes simplex keratitis    Uncontrolled type 2 diabetes mellitus    Tear of lateral meniscus of right knee, current    Refractive error    Acute migraine    MCI (mild cognitive impairment)    Medication overuse headache    Chronic migraine with aura, intractable, with status migrainosus    Agitation    Traumatic encephalopathy    Allergy desensitization therapy    Concussion with no loss of consciousness    Concussion with loss of consciousness    Other specified persistent mood disorders    Cognitive communication deficit    Impaired mobility and ADLs    Imbalance    Primary osteoarthritis of both knees    Bilateral chronic knee pain

## 2025-05-13 NOTE — PRE-PROCEDURE INSTRUCTIONS
Pre-operative instructions, medication directives and pain scales reviewed with patient. All questions the patient had were answered. Re-assurance about surgical procedure and day of surgery routine given as needed, patient verbalized understanding of the pre-op instructions.  Patient instructed to report to [facility] for surgery. Pt to get Echo done for cardia clearance 5/13/2025, Pt to see PCP 5/14/2025 for clearance.

## 2025-05-13 NOTE — PROGRESS NOTES
CC: right knee pain    44 y.o. Male presents today for aspiration of his right knee. Pt was referred by Dr. Antonio Rolon.    Attempted treatments: none since last visit with Dr. Antonio Rolon  Pain score: 4/10  History of trauma/injury: none since last visit with Dr. Antonio Rolon  Affecting ADLs: yes     REVIEW OF SYSTEMS:   Constitution: Patient denies fever or chills.  Eyes: Patient denies eye pain or vision changes.  HEENT: Patient denies sore throat, or nasal discharge. Pt reports ear infection.  CVS: Patient denies chest pain.  Lungs: Patient denies shortness of breath or cough.  Skin: Patient denies skin rash or itching.    Musculoskeletal: Patient denies recent falls. See HPI.  Psych: Patient denies any current anxiety or nervousness.    PAST MEDICAL HISTORY:   Past Medical History:   Diagnosis Date    Diabetes mellitus, type 2     Hyperlipidemia     Hypertension     Obesity     NAINA (obstructive sleep apnea)        MEDICATIONS:   Current Medications[1]    ALLERGIES:   Review of patient's allergies indicates:   Allergen Reactions    Influenza virus vaccine, specific Hives    Pioglitazone      Altered mood     Victoza [liraglutide] Nausea And Vomiting    Farxiga [dapagliflozin] Rash     Erectile dysfunction and rash         PHYSICAL EXAMINATION:  /85 (BP Location: Left arm, Patient Position: Sitting)   Pulse 86   Ht 6' (1.829 m)   Wt 131.9 kg (290 lb 12.6 oz)   BMI 39.44 kg/m²   There are no signs of infection at the injection site, including no rubor, calor, or skin lesions.  Gen: NAD.  Psych: Affect & judgment nl.  Neuro: Grossly CNI. BLACKWELL.  HEENT: -Trach dev. -Eye d/c.   CV: Color nl. -E/C/C. WWPx4.  Pulm: -Dyspnea. -Cough.  Lymph: -Edema.  Int: -Rash/lesion noted. Skin is warm and dry    ASSESSMENT:      ICD-10-CM ICD-9-CM   1. History of total right knee replacement  Z96.651 V43.65         PLAN:  Ultrasound-guided aspiration of the right knee performed at visit today yielded 8 cc of bloody serous  fluid, which was sent for Synovasure.    Risks and benefits were discussed with patient prior to receiving aspiration.  Risks include the possibility of infection, pain, and cosmetic deformity at site of injection.    All questions were answered to the best of my ability and all concerns were addressed at this time.    This note is dictated using the M*Modal Fluency Direct word recognition program. There are word recognition mistakes that are occasionally missed on review.             [1]   Current Outpatient Medications:     acetaminophen (TYLENOL) 500 MG tablet, Take 2 tablets (1,000 mg total) by mouth every 8 (eight) hours as needed for Pain., Disp: 42 tablet, Rfl: 0    ammonium lactate 12 % Crea, Apply 1 application  topically once daily., Disp: 140 g, Rfl: 5    amoxicillin (AMOXIL) 875 MG tablet, Take 1 tablet (875 mg total) by mouth every 12 (twelve) hours. for 10 days, Disp: 20 tablet, Rfl: 0    ARIPiprazole (ABILIFY) 2 MG Tab, Take 1 tablet (2 mg total) by mouth once daily., Disp: 30 tablet, Rfl: 11    atorvastatin (LIPITOR) 40 MG tablet, Take 1 tablet (40 mg total) by mouth once daily., Disp: 90 tablet, Rfl: 3    azelastine (ASTELIN) 137 mcg (0.1 %) nasal spray, Use 1-2 sprays in each nostril twice daily, Disp: 90 mL, Rfl: 3    benazepriL (LOTENSIN) 20 MG tablet, Take 1 tablet (20 mg total) by mouth once daily., Disp: 90 tablet, Rfl: 3    blood sugar diagnostic Strp, To check BG 4 times daily, to use with insurance preferred meter, Disp: 400 strip, Rfl: 3    blood sugar diagnostic Strp, OneTouch Ultra Test strips, Disp: , Rfl:     blood-glucose meter kit, OneTouch Ultra2 Meter kit, Disp: , Rfl:     blood-glucose sensor (DEXCOM G7 SENSOR) Filomena, Change every 10 days, Disp: 12 each, Rfl: 3    cyanocobalamin, vitamin B-12, 5,000 mcg Subl, Place one under tongue once a day for 1 month, then continue to take under tongue per week, Disp: 30 tablet, Rfl: 3    empagliflozin (JARDIANCE) 25 mg tablet, Take 1 tablet  "(25 mg total) by mouth once daily., Disp: 90 tablet, Rfl: 2    EPINEPHrine (EPIPEN 2-AYALA) 0.3 mg/0.3 mL AtIn, Inject 0.3 mLs (0.3 mg total) into the muscle as needed (Anaphylaxis)., Disp: 2 each, Rfl: 0    fluticasone propionate (FLONASE) 50 mcg/actuation nasal spray, 1 spray (50 mcg total) by Each Nostril route once daily., Disp: 48 g, Rfl: 5    insulin aspart U-100 (NOVOLOG U-100 INSULIN ASPART) 100 unit/mL injection, MAX DAILY DOSE 120 UNITS IN INSULIN PUMP., Disp: 110 mL, Rfl: 2    insulin pump cart,auto,BT,G6/7 (OMNIPOD 5 G6-G7 PODS, GEN 5,) Crtg, Change every 2 days, Disp: 45 each, Rfl: 2    insulin pump cart,automated,BT (OMNIPOD 5 G6 PODS, GEN 5,) Crtg, Inject 1 each into the skin once daily., Disp: 30 each, Rfl: 11    ketoconazole (NIZORAL) 2 % cream, Apply topically once daily., Disp: 60 g, Rfl: 0    L-METHYLFOLATE 15 mg Tab, TAKE 15 MG BY MOUTH ONCE DAILY., Disp: 30 tablet, Rfl: 11    lamoTRIgine (LAMICTAL) 100 MG tablet, Take 1.5 tablets (150 mg total) by mouth once daily., Disp: 135 tablet, Rfl: 3    lancets Misc, To check BG 4 times daily, to use with insurance preferred meter, Disp: 400 each, Rfl: 3    levothyroxine (SYNTHROID) 50 MCG tablet, Take 1 tablet (50 mcg total) by mouth before breakfast., Disp: 90 tablet, Rfl: 1    loratadine (CLARITIN) 10 mg tablet, Take 1 tablet (10 mg total) by mouth once daily., Disp: 90 tablet, Rfl: 3    meloxicam (MOBIC) 15 MG tablet, Take 1 tablet (15 mg total) by mouth once daily., Disp: 30 tablet, Rfl: 1    mirtazapine (REMERON) 15 MG tablet, Take 1 tablet (15 mg total) by mouth every evening., Disp: 30 tablet, Rfl: 11    modafiniL (PROVIGIL) 100 MG Tab, Take 1 tablet (100 mg total) by mouth every morning., Disp: 90 tablet, Rfl: 1    pen needle, diabetic (BD ULTRA-FINE KEN PEN NEEDLE) 32 gauge x 5/32" Ndle, 1 Device by Misc.(Non-Drug; Combo Route) route 5 (five) times daily., Disp: 550 each, Rfl: 4    pregabalin (LYRICA) 75 MG capsule, Take 1 capsule (75 mg total) " by mouth 2 (two) times daily., Disp: 60 capsule, Rfl: 0    QUEtiapine (SEROQUEL) 25 MG Tab, Take 1 tablet (25 mg total) by mouth once daily in the evening, Disp: 30 tablet, Rfl: 11    syringe, disposable, 1 mL Syrg, Testosterone every 2 weeks, Disp: 25 Syringe, Rfl: 1    tirzepatide (MOUNJARO) 12.5 mg/0.5 mL PnIj, Inject 12.5 mg (one pen) into the skin every 7 days., Disp: 4 Pen, Rfl: 3    traMADoL (ULTRAM) 50 mg tablet, Take 1-2 tablets ( mg total) by mouth every 6 (six) hours as needed for Pain. (Patient taking differently: Take  mg by mouth every 6 (six) hours as needed for Pain.), Disp: 40 tablet, Rfl: 0    aspirin (ECOTRIN) 81 MG EC tablet, Take 1 tablet (81 mg total) by mouth 2 (two) times a day., Disp: 60 tablet, Rfl: 0

## 2025-05-13 NOTE — DISCHARGE INSTRUCTIONS
YOUR PROCEDURE WILL BE AT [facility] at [address], [place]. [address]                 Enter through the Main Entrance facing [address].      Your procedure  is scheduled for _5/19/2025________.    Call [phone] between 2pm and 5pm on __5/16/2025_____to find out your arrival time for the day of surgery.    You may have up to three visitors.  No children under 18 years old.     You will be going to the Same Day Surgery Unit on the 2nd floor of the hospital.    Report to the Same Day Surgery Registration Desk in the hallway.(Just beside the Same Day Surgery Unit)                    DO NOT PARK IN THE GARAGE.  THERE IS NO OPEN ENTRANCE TO THE HOSPITAL BUILDING AND NO ELEVATOR.      Important instructions:  Do not eat anything after midnight.  You may have plain water, non carbonated.  You may also have Gatorade or Powerade after midnight.    Stop all fluids 2 hours before your surgery.    It is okay to brush your teeth.  Do not have gum, candy or mints.    SEE MEDICATION SHEET.   TAKE MEDICATIONS AS DIRECTED.    Do not take any diabetic medication on the morning of surgery unless instructed to do so by your doctor or pre op nurse.    All GLP-1 weekly diabetic/weight loss medications must not be taken for one week before your surgery, or your surgery could be canceled.      STOP taking for 7 days before surgery:    Aspirin           Voltaren (Diclofenac)  Ibuprofen  (Advil, Motrin)                Indomethocin  Mobic (meloxicam, celebrex)      Etodolac   Aleve (naproxen)          Toradol (ketoralac)  Fish oil, Krill oil and Vitamin E  Headache Powders (BC Powder, Goody's Powder, Stanback)                           You may take Tylenol if needed which is not a blood thinner.    Please shower the night before and the morning of your surgery.      Follow any Prep Instructions given by your surgeon.               Use Chlorhexidine soap as instructed by your pre op nurse.    Please place clean linens on your bed the night before surgery. Please wear fresh clean clothing after each shower.    No shaving of procedural area at least 4-5 days before surgery due to increased risk of skin irritation and/or possible infection.      Contact lenses and removable denture work may not be worn during your procedure.    You may wear deodorant only. If you are having breast surgery, do not wear deodorant on the operative side.    Do not wear powder, body lotion, perfume/cologne or make-up.    Do not wear any jewelry or have any metal on your body.    You will be asked to remove any dentures or partials for the procedure.    If you are going home on the same day of surgery, you must arrange for a family member or a friend to drive you home.  Public transportation is prohibited.  You will not be able to drive home if you were given anesthesia or sedation.    Patients who want to have their Post-op prescriptions filled from our in-house [facility], bring a Credit/Debit Card or cash with you. A co-pay may be required.  The pharmacy closes at 5:30 pm.    Wear loose fitting clothes allowing for bandages.    Please leave money and valuables home.      You may bring your cell phone.    Call the doctor if fever or illness should occur before your surgery.    Call [phone] to contact us here if needed.                            CLOTHES ON DAY OF SURGERY    SHOULDER surgery:  you must have a very oversized shirt.  Very, Very large.  You will probably have a large sling on with your arm strapped to your chest.  You will not be able to put the arm of the operated shoulder into a sleeve.  You can put the arm of the un-operated shoulder into the sleeve, but the shirt will need to be draped over the operated shoulder.       ARM or HAND surgery:  make sure that your sleeves are large and loose enough to pass over large dressings or cast.      BREAST or UNDERARM surgery:  wear a loose, button down shirt so that  you can dress without raising your arms over your head.    ABDOMINAL surgery:  wear loose, comfortable clothing.  Nothing tight around the abdomen.  NO JEANS    PENIS or SCROTAL surgery:  loose comfortable clothing.  Large sweat pants, pajama pants or a robe.  ABSOLUTELY NO JEANS      LEG or FOOT surgery:  wear large loose pants that are able to pass over any large dressings or casts.  You could also wear loose shorts or a skirt.

## 2025-05-13 NOTE — PROCEDURES
Large Joint Aspiration/Injection: R knee    Date/Time: 5/13/2025 9:00 AM    Performed by: Jacquie Berger MD  Authorized by: Jacquie Berger MD    Consent Done?:  Yes (Verbal)  Indications:  Diagnostic evaluation, joint swelling and pain  Site marked: the procedure site was marked    Timeout: prior to procedure the correct patient, procedure, and site was verified      Local anesthesia used?: Yes    Anesthesia:  Local infiltration  Local anesthetic:  Co-phenylcaine spray  Anesthetic total (ml):  0      Details:  Needle Size:  18 G  Ultrasonic Guidance for needle placement?: Yes (Ultrasound guidance used to avoid neurovascular injury and/or to improve accuracy given body habitus.)    Images are saved and documented.  Approach: Superolateral.  Location:  Knee  Site:  R knee  Aspirate amount (mL):  8  Aspirate:  Bloody and serous  Lab: fluid sent for laboratory analysis    Patient tolerance:  Patient tolerated the procedure well with no immediate complications     TECHNIQUE: Real time ultrasound examination of the right knee was performed with SonoSite Edge 2, 9-L MHz linear probe(s). Ultrasound guidance was used for needle localization. Images were saved and stored for documentation. Dynamic visualization of the needle was continuous throughout the procedures and maintained in good position.

## 2025-05-14 ENCOUNTER — HOSPITAL ENCOUNTER (OUTPATIENT)
Dept: RADIOLOGY | Facility: HOSPITAL | Age: 45
Discharge: HOME | End: 2025-05-14
Payer: COMMERCIAL

## 2025-05-14 ENCOUNTER — OFFICE VISIT (OUTPATIENT)
Dept: FAMILY MEDICINE | Facility: CLINIC | Age: 45
End: 2025-05-14
Payer: COMMERCIAL

## 2025-05-14 VITALS
WEIGHT: 289.44 LBS | HEART RATE: 107 BPM | DIASTOLIC BLOOD PRESSURE: 70 MMHG | OXYGEN SATURATION: 98 % | HEIGHT: 72 IN | SYSTOLIC BLOOD PRESSURE: 120 MMHG | TEMPERATURE: 99 F | BODY MASS INDEX: 39.2 KG/M2

## 2025-05-14 DIAGNOSIS — H65.191 OTHER NON-RECURRENT ACUTE NONSUPPURATIVE OTITIS MEDIA OF RIGHT EAR: ICD-10-CM

## 2025-05-14 DIAGNOSIS — Z01.818 PREOP EXAMINATION: ICD-10-CM

## 2025-05-14 DIAGNOSIS — Z01.818 PREOP EXAMINATION: Primary | ICD-10-CM

## 2025-05-14 LAB
AORTIC SIZE INDEX (SOV): 1.2 CM/M2
AORTIC SIZE INDEX: 1.3 CM/M2
ASCENDING AORTA: 3.3 CM
AV INDEX (PROSTH): 0.78
AV MEAN GRADIENT: 5 MMHG
AV PEAK GRADIENT: 9 MMHG
AV VALVE AREA BY VELOCITY RATIO: 3 CM²
AV VALVE AREA: 2.9 CM²
AV VELOCITY RATIO: 0.8
BSA FOR ECHO PROCEDURE: 2.58 M2
CV ECHO LV RWT: 0.65 CM
DOP CALC AO PEAK VEL: 1.5 M/S
DOP CALC AO VTI: 27.2 CM
DOP CALC LVOT AREA: 3.8 CM2
DOP CALC LVOT DIAMETER: 2.2 CM
DOP CALC LVOT PEAK VEL: 1.2 M/S
DOP CALC LVOT STROKE VOLUME: 80.2 CM3
DOP CALCLVOT PEAK VEL VTI: 21.1 CM
E WAVE DECELERATION TIME: 197 MSEC
E/A RATIO: 0.83
E/E' RATIO: 9 M/S
EAG (OHS): 131 MG/DL (ref 68–131)
ECHO LV POSTERIOR WALL: 1.5 CM (ref 0.6–1.1)
FRACTIONAL SHORTENING: 41.3 % (ref 28–44)
HBA1C MFR BLD: 6.2 % (ref 4–5.6)
INTERVENTRICULAR SEPTUM: 1.2 CM (ref 0.6–1.1)
IVC DIAMETER: 1.72 CM
LA MAJOR: 4.7 CM
LA MINOR: 5.2 CM
LA WIDTH: 4.9 CM
LEFT ATRIUM SIZE: 4.6 CM
LEFT ATRIUM VOLUME INDEX: 38 ML/M2
LEFT ATRIUM VOLUME: 95 CM3
LEFT INTERNAL DIMENSION IN SYSTOLE: 2.7 CM (ref 2.1–4)
LEFT VENTRICLE DIASTOLIC VOLUME INDEX: 38.55 ML/M2
LEFT VENTRICLE DIASTOLIC VOLUME: 96 ML
LEFT VENTRICLE MASS INDEX: 97.7 G/M2
LEFT VENTRICLE SYSTOLIC VOLUME INDEX: 10.8 ML/M2
LEFT VENTRICLE SYSTOLIC VOLUME: 27 ML
LEFT VENTRICULAR INTERNAL DIMENSION IN DIASTOLE: 4.6 CM (ref 3.5–6)
LEFT VENTRICULAR MASS: 243.3 G
LV LATERAL E/E' RATIO: 7.7 M/S
LV SEPTAL E/E' RATIO: 9.6 M/S
LVED V (TEICH): 95.6 ML
LVES V (TEICH): 27.14 ML
LVOT MG: 3.4 MMHG
LVOT MV: 0.89 CM/S
MV PEAK A VEL: 0.93 M/S
MV PEAK E VEL: 0.77 M/S
MV STENOSIS PRESSURE HALF TIME: 57.16 MS
MV VALVE AREA P 1/2 METHOD: 3.85 CM2
OHS CV RV/LV RATIO: 0.91 CM
PISA TR MAX VEL: 1.9 M/S
PV PEAK GRADIENT: 6 MMHG
PV PEAK VELOCITY: 1.18 M/S
RA MAJOR: 4.48 CM
RA PRESSURE ESTIMATED: 3 MMHG
RA WIDTH: 3.9 CM
RIGHT VENTRICLE DIASTOLIC BASEL DIMENSION: 4.2 CM
RIGHT VENTRICULAR END-DIASTOLIC DIMENSION: 4.17 CM
RV TB RVSP: 5 MMHG
RV TISSUE DOPPLER FREE WALL SYSTOLIC VELOCITY 1 (APICAL 4 CHAMBER VIEW): 14.3 CM/S
SINUS: 3.11 CM
STJ: 2.8 CM
TDI LATERAL: 0.1 M/S
TDI SEPTAL: 0.08 M/S
TDI: 0.09 M/S
TR MAX PG: 15 MMHG
TRICUSPID ANNULAR PLANE SYSTOLIC EXCURSION: 2.8 CM
TV REST PULMONARY ARTERY PRESSURE: 17 MMHG
Z-SCORE OF LEFT VENTRICULAR DIMENSION IN END DIASTOLE: -10.82
Z-SCORE OF LEFT VENTRICULAR DIMENSION IN END SYSTOLE: -8.64

## 2025-05-14 PROCEDURE — 71046 X-RAY EXAM CHEST 2 VIEWS: CPT | Mod: 26,,, | Performed by: RADIOLOGY

## 2025-05-14 PROCEDURE — G2211 COMPLEX E/M VISIT ADD ON: HCPCS | Mod: S$GLB,,,

## 2025-05-14 PROCEDURE — 71046 X-RAY EXAM CHEST 2 VIEWS: CPT | Mod: TC,FY,PO

## 2025-05-14 PROCEDURE — 99214 OFFICE O/P EST MOD 30 MIN: CPT | Mod: S$GLB,,,

## 2025-05-14 PROCEDURE — 99999 PR PBB SHADOW E&M-EST. PATIENT-LVL V: CPT | Mod: PBBFAC,,,

## 2025-05-14 NOTE — PROGRESS NOTES
Patient ID: Tony Gordillo is a 44 y.o. male.    Chief Complaint: Pre-op Exam (Right knee)    Tony Gordillo is in the office for pre-op clearance. PCP Dr. Ford with last visit in this clinic on 5/8/25.     HPI    This patient is known to me.  Procedure to be performed: right knee irrigation and debridement by Dr. Rolon with Orthopedics.   Pt states that he has had no limitations in activities, except due to knee pain.  he has had prior surgery without any perioperative complications.    Patient is not a smoker.   Does not take any blood-thinning medications.  is able to walk around the block without getting CP/SOB/PALACIOS.  Personal Hx of cardiac diseases: HTN, HLD  Denies F/C/N/V/palpitations/claudication.  Denies weakness/tingling/numbness/vertigo/unsteadiness/changes in mental status/blackouts.    Preop Assessment    Pulmonary risk factors: Denies asthma, not a current smoker. Has NAINA on CPAP.  Systemic risk factors: Has Diabetes Mellitus Type 2, hypertension    Revised Cardiac Risk Index for Pre-Operative Risk = 6.0%    Preoperative Mortality Predictor () Score = 4 points/ 0.1%    The ASCVD Risk score (Shahzad TREVIÑO, et al., 2019) failed to calculate for the following reasons:    The valid total cholesterol range is 130 to 320 mg/dL    ROS, Vitals reviewed.   Patient has acceptable exercise capacity as demonstrated in the office today.    Able to achieve 4 METs. RSI Class III (6.6% risk).    Patient has appointment to see Cardiology on 5/15/25 for surgical clearance from cardiac standpoint.    Advised healthy diet/exercise/weight loss in anticipation of surgery  Advised tobacco cessation  Clearance by Cardiology pending    Hold NSAIDs for 5 days prior to surgery  Hold Meloxicam 10 days prior to surgery   Hold Ibuprofen 1 day prior to surgery     EKG today showed NSR, with sinus arrhythmia. No significant change from prior readings.     Patient has no hx of nor symptoms suggestive of myocardial ischemia,  heart failure, arrhythmia and CVA.     DIABETICS:  Pt is diabetic on insulin.   Has already discussed insulin plan with surgeon and anesthesia team.    No pre-op labs required by surgeon, but surgeon may order any tests at his/her discretion    Labs reviewed:  Creatinine is below 2 mg/dl.      Imaging reviewed:  Chest xray was unremarkable.     pt is optimized for surgery from a primary care standpoint and is at low risk for davy-operative CV event.    Past Medical History:   Diagnosis Date    Diabetes mellitus, type 2     Hyperlipidemia     Hypertension     Obesity     NAINA (obstructive sleep apnea)           Current Medications[1]    The ASCVD Risk score (Shahzad DK, et al., 2019) failed to calculate for the following reasons:    The valid total cholesterol range is 130 to 320 mg/dL     Wt Readings from Last 3 Encounters:   05/14/25 131.3 kg (289 lb 7.4 oz)   05/13/25 131.5 kg (290 lb)   05/13/25 131.7 kg (290 lb 3.8 oz)     Temp Readings from Last 3 Encounters:   05/14/25 98.8 °F (37.1 °C) (Oral)   05/13/25 97.2 °F (36.2 °C) (Oral)   05/08/25 99.2 °F (37.3 °C) (Oral)     BP Readings from Last 3 Encounters:   05/14/25 120/70   05/13/25 137/84   05/13/25 (!) 157/94     Pulse Readings from Last 3 Encounters:   05/14/25 107   05/13/25 94   05/13/25 101     Resp Readings from Last 3 Encounters:   05/13/25 16   05/13/25 18   11/18/24 18     PF Readings from Last 3 Encounters:   No data found for PF     SpO2 Readings from Last 3 Encounters:   05/14/25 98%   05/13/25 100%   05/13/25 98%        Lab Results   Component Value Date    HGBA1C 6.2 (H) 05/13/2025    HGBA1C 6.1 (H) 01/06/2025    HGBA1C 6.3 (H) 11/06/2024     Lab Results   Component Value Date    LDLCALC 52.2 (L) 01/06/2025    CREATININE 0.7 05/13/2025       Review of Systems   Constitutional:  Negative for fever.   HENT:  Negative for trouble swallowing.    Respiratory:  Negative for shortness of breath.    Cardiovascular:  Negative for chest pain.    Gastrointestinal:  Negative for blood in stool.   Musculoskeletal:  Positive for arthralgias and joint swelling.         Objective:      Physical Exam  Vitals reviewed.   Constitutional:       General: He is not in acute distress.     Appearance: Normal appearance. He is obese. He is not ill-appearing.   HENT:      Head: Normocephalic and atraumatic.      Right Ear: Tympanic membrane, ear canal and external ear normal.      Left Ear: Tympanic membrane, ear canal and external ear normal.   Cardiovascular:      Rate and Rhythm: Normal rate and regular rhythm.      Pulses: Normal pulses.      Heart sounds: Normal heart sounds.   Pulmonary:      Effort: Pulmonary effort is normal. No respiratory distress.      Breath sounds: Normal breath sounds. No wheezing.   Musculoskeletal:         General: Normal range of motion.      Cervical back: Normal range of motion and neck supple. No rigidity.      Right knee: Swelling and erythema present. Tenderness present.   Skin:     General: Skin is warm and dry.      Capillary Refill: Capillary refill takes less than 2 seconds.   Neurological:      General: No focal deficit present.      Mental Status: He is alert and oriented to person, place, and time.   Psychiatric:         Mood and Affect: Mood normal.         Behavior: Behavior normal.           Screening recommendations appropriate to age and health status were reviewed.    Preop examination    Recent labs and EKG reviewed. Has Cardiology appointment tomorrow for surgery clearance. Given history of NAINA, will obtain CXR today. Pending this, pre-op clearance given.    -     X-Ray Chest PA And Lateral; Future; Expected date: 05/14/2025    Other non-recurrent acute nonsuppurative otitis media of right ear    Improving; patient on day 4 of ABT for otitis media; right ear redness and swelling has decreased from last assessment. He reports improvement in pain. Continue amoxicillin as ordered; will be finished with ABT prior to  surgery.         RCRI risk factors include: diabetes requiring treatment with insulin. As such, per RCRI the risk of cardiac death, nonfatal myocardial infarction, or nonfatal cardiac arrest is 5.4% and the risk of myocardial infarction, pulmonary edema, ventricular fibrillation, primary cardiac arrest, or complete heart block is 3.6%.  Overall this patient can be considered low risk for this intermediate risk procedure. Will follow up with Cardiology for further clearance.     Patient is a non-smoker. We discussed the benefits of early mobilization and deep breathing after surgery.      Screened patient for alcohol misuse, use of illicit drugs, and personal or family history of anesthetic complications or bleeding diathesis and no substantial concerns were identified.     All current medications were reviewed and at this time no changes to medications are recommended prior to surgery.     I recommend use of standard pre-op and post-op precautions for this patient. In my opinion, he is medically optimized for this procedure, and can proceed without further evaluation.     ISHMAEL Rabago           [1]   Current Outpatient Medications:     acetaminophen (TYLENOL) 500 MG tablet, Take 2 tablets (1,000 mg total) by mouth every 8 (eight) hours as needed for Pain., Disp: 42 tablet, Rfl: 0    ammonium lactate 12 % Crea, Apply 1 application  topically once daily., Disp: 140 g, Rfl: 5    amoxicillin (AMOXIL) 875 MG tablet, Take 1 tablet (875 mg total) by mouth every 12 (twelve) hours. for 10 days, Disp: 20 tablet, Rfl: 0    ARIPiprazole (ABILIFY) 2 MG Tab, Take 1 tablet (2 mg total) by mouth once daily., Disp: 30 tablet, Rfl: 11    atorvastatin (LIPITOR) 40 MG tablet, Take 1 tablet (40 mg total) by mouth once daily., Disp: 90 tablet, Rfl: 3    azelastine (ASTELIN) 137 mcg (0.1 %) nasal spray, Use 1-2 sprays in each nostril twice daily, Disp: 90 mL, Rfl: 3    benazepriL (LOTENSIN) 20 MG tablet, Take 1 tablet (20 mg total) by  mouth once daily., Disp: 90 tablet, Rfl: 3    blood sugar diagnostic Strp, To check BG 4 times daily, to use with insurance preferred meter, Disp: 400 strip, Rfl: 3    blood sugar diagnostic Strp, OneTouch Ultra Test strips, Disp: , Rfl:     blood-glucose meter kit, OneTouch Ultra2 Meter kit, Disp: , Rfl:     blood-glucose sensor (DEXCOM G7 SENSOR) Filomena, Change every 10 days, Disp: 12 each, Rfl: 3    cyanocobalamin, vitamin B-12, 5,000 mcg Subl, Place one under tongue once a day for 1 month, then continue to take under tongue per week, Disp: 30 tablet, Rfl: 3    empagliflozin (JARDIANCE) 25 mg tablet, Take 1 tablet (25 mg total) by mouth once daily., Disp: 90 tablet, Rfl: 2    EPINEPHrine (EPIPEN 2-AYALA) 0.3 mg/0.3 mL AtIn, Inject 0.3 mLs (0.3 mg total) into the muscle as needed (Anaphylaxis)., Disp: 2 each, Rfl: 0    fluticasone propionate (FLONASE) 50 mcg/actuation nasal spray, 1 spray (50 mcg total) by Each Nostril route once daily., Disp: 48 g, Rfl: 5    insulin aspart U-100 (NOVOLOG U-100 INSULIN ASPART) 100 unit/mL injection, MAX DAILY DOSE 120 UNITS IN INSULIN PUMP., Disp: 110 mL, Rfl: 2    insulin pump cart,auto,BT,G6/7 (OMNIPOD 5 G6-G7 PODS, GEN 5,) Crtg, Change every 2 days, Disp: 45 each, Rfl: 2    insulin pump cart,automated,BT (OMNIPOD 5 G6 PODS, GEN 5,) Crtg, Inject 1 each into the skin once daily., Disp: 30 each, Rfl: 11    ketoconazole (NIZORAL) 2 % cream, Apply topically once daily., Disp: 60 g, Rfl: 0    L-METHYLFOLATE 15 mg Tab, TAKE 15 MG BY MOUTH ONCE DAILY., Disp: 30 tablet, Rfl: 11    lamoTRIgine (LAMICTAL) 100 MG tablet, Take 1.5 tablets (150 mg total) by mouth once daily., Disp: 135 tablet, Rfl: 3    lancets Misc, To check BG 4 times daily, to use with insurance preferred meter, Disp: 400 each, Rfl: 3    levothyroxine (SYNTHROID) 50 MCG tablet, Take 1 tablet (50 mcg total) by mouth before breakfast., Disp: 90 tablet, Rfl: 1    loratadine (CLARITIN) 10 mg tablet, Take 1 tablet (10 mg total) by  "mouth once daily., Disp: 90 tablet, Rfl: 3    meloxicam (MOBIC) 15 MG tablet, Take 1 tablet (15 mg total) by mouth once daily., Disp: 30 tablet, Rfl: 1    mirtazapine (REMERON) 15 MG tablet, Take 1 tablet (15 mg total) by mouth every evening., Disp: 30 tablet, Rfl: 11    modafiniL (PROVIGIL) 100 MG Tab, Take 1 tablet (100 mg total) by mouth every morning., Disp: 90 tablet, Rfl: 1    pen needle, diabetic (BD ULTRA-FINE KEN PEN NEEDLE) 32 gauge x 5/32" Ndle, 1 Device by Misc.(Non-Drug; Combo Route) route 5 (five) times daily., Disp: 550 each, Rfl: 4    pregabalin (LYRICA) 75 MG capsule, Take 1 capsule (75 mg total) by mouth 2 (two) times daily., Disp: 60 capsule, Rfl: 0    QUEtiapine (SEROQUEL) 25 MG Tab, Take 1 tablet (25 mg total) by mouth once daily in the evening, Disp: 30 tablet, Rfl: 11    syringe, disposable, 1 mL Syrg, Testosterone every 2 weeks, Disp: 25 Syringe, Rfl: 1    tirzepatide (MOUNJARO) 12.5 mg/0.5 mL PnIj, Inject 12.5 mg (one pen) into the skin every 7 days., Disp: 4 Pen, Rfl: 3    traMADoL (ULTRAM) 50 mg tablet, Take 1-2 tablets ( mg total) by mouth every 6 (six) hours as needed for Pain., Disp: 40 tablet, Rfl: 0    "

## 2025-05-14 NOTE — Clinical Note
Felipa Rolon,  He's good to go for surgery from primary care perspective. Let me know if you need anything else!  Thanks, Carie

## 2025-05-15 ENCOUNTER — OFFICE VISIT (OUTPATIENT)
Dept: CARDIOLOGY | Facility: CLINIC | Age: 45
End: 2025-05-15
Payer: COMMERCIAL

## 2025-05-15 ENCOUNTER — RESULTS FOLLOW-UP (OUTPATIENT)
Dept: FAMILY MEDICINE | Facility: CLINIC | Age: 45
End: 2025-05-15

## 2025-05-15 VITALS
HEART RATE: 88 BPM | HEIGHT: 72 IN | WEIGHT: 289.69 LBS | SYSTOLIC BLOOD PRESSURE: 147 MMHG | DIASTOLIC BLOOD PRESSURE: 89 MMHG | BODY MASS INDEX: 39.24 KG/M2 | OXYGEN SATURATION: 97 % | RESPIRATION RATE: 18 BRPM

## 2025-05-15 DIAGNOSIS — E66.01 MORBID OBESITY: ICD-10-CM

## 2025-05-15 DIAGNOSIS — E78.5 HYPERLIPIDEMIA LDL GOAL <70: ICD-10-CM

## 2025-05-15 DIAGNOSIS — I10 ESSENTIAL HYPERTENSION: ICD-10-CM

## 2025-05-15 DIAGNOSIS — Z01.810 PREOP CARDIOVASCULAR EXAM: Primary | ICD-10-CM

## 2025-05-15 PROCEDURE — 99999 PR PBB SHADOW E&M-EST. PATIENT-LVL V: CPT | Mod: PBBFAC,,, | Performed by: INTERNAL MEDICINE

## 2025-05-15 PROCEDURE — 99214 OFFICE O/P EST MOD 30 MIN: CPT | Mod: S$GLB,,, | Performed by: INTERNAL MEDICINE

## 2025-05-15 PROCEDURE — G2211 COMPLEX E/M VISIT ADD ON: HCPCS | Mod: S$GLB,,, | Performed by: INTERNAL MEDICINE

## 2025-05-15 NOTE — PROGRESS NOTES
CARDIOVASCULAR CONSULTATION    REASON FOR CONSULT:   Tony Gordillo is a 44 y.o. male who presents for chest pain.      HISTORY OF PRESENT ILLNESS:     Patient is a pleasant 41-year-old man.  Main complaint is dyspnea on exertion and atypical chest pain.  Had a stress echo done.  Echo portion did not show any significant ischemia, EKG post abnormal.  It was a pharmacological stress echo because patient cannot run because of knee on knee.  States that he had COVID infection in December and since then his exercise tolerance has decreased significantly.  He can hardly walk and then has to stop because of shortness of breath.  Occasional gets chest heaviness when he gets very short of breath on exertion.      There were no arrhythmias during stress.  Concentric hypertrophy and normal systolic function.  The ECG portion of this study is positive for myocardial ischemia.  The estimated ejection fraction is 60%.  Normal left ventricular diastolic function.  Normal right ventricular size with normal right ventricular systolic function.  Mild left atrial enlargement.  The stress echo portion of this study is negative for myocardial ischemia.    Notes from April 2022    Patient here for follow-up.  PET scan did not show any significant coronary artery stenosis.  It was suggestive of mild endothelial dysfunction.  Patient states that his dyspnea on exertion predominantly got worse after he got the COVID.  He is in physical therapy for that.        There are no clinically significant perfusion abnormalities. There is a small (<10%) amount of mild resting heterogeneity in the basal to apical septal wall(s) that improves with stress consistent with endothelial dysfunction secondary to HTN, HLD, and DM.    The whole heart absolute myocardial perfusion values averaged 0.62 cc/min/g at rest, which is normal; 1.71 cc/min/g at stress, which is mildly reduced; and CFR is 2.84 , which is low normal.    CT attenuation images  "demonstrate mild diffuse coronary calcifications in the LAD territory and no aortic calcifications.    The gated perfusion images showed an ejection fraction of 60% at rest and 68% during stress. A normal ejection fraction is greater than 53%.    The wall motion is normal at rest and during stress.    The LV cavity size is normal at rest and stress.    The EKG portion of this study is negative for ischemia.    There were no arrhythmias during stress.    The patient reported no chest pain during the stress test.    There are no prior studies for comparison.      Mar 23:  Issues.  Needs to go for rotator cuff surgery.  Patient here for follow-up. can go up 4 flights of stairs and can walk 5 miles without any issues.  No chest pains at rest on exertion, orthopnea, PND      May 25    History of Present Illness    CHIEF COMPLAINT:  Tony presents today for routine follow up and pre-operative clearance for knee surgery    MUSCULOSKELETAL:  He reports limited walking ability due to knee issues, with a maximum distance of 200 yards. Following his knee replacement in November, he initially had positive outcomes for 2-3 months post-op. However, his condition declined and the knee replacement became infected. He is scheduled for knee surgery on Monday to address these complications.    CARDIOVASCULAR:  He has LVH. He recently started Benazepril after being without BP medication for 2 weeks due to refill issues and delayed primary care access. He has been taking Benazepril for 3-4 days.    CURRENT MEDICATIONS:  He takes Benazepril 20mg and atorvastatin for hypercholesterolemia.      May 25:    History of Present Illness    CHIEF COMPLAINT:  Tony presents today for knee pain.    MUSCULOSKELETAL:  He reports worsening knee pain due to increased activity from multiple appointments and excessive walking, stating he has been "overexerting himself" over the past few days. He is scheduled for knee surgery on " Monday.    CARDIOVASCULAR:  He denies chest pain, chest tightness, or dyspnea.     Results for orders placed during the hospital encounter of 05/13/25    Echo    Interpretation Summary    Left Ventricle: The left ventricle is normal in size. There is mild concentric hypertrophy. There is normal systolic function with a visually estimated ejection fraction of 60 - 65%.    Right Ventricle: The right ventricle is normal in size Systolic function is normal.    Pulmonary Artery: The estimated pulmonary artery systolic pressure is 17 mmHg.    IVC/SVC: Normal venous pressure at 3 mmHg.        PAST MEDICAL HISTORY:     Past Medical History:   Diagnosis Date    Diabetes mellitus, type 2     Hyperlipidemia     Hypertension     Obesity     NAINA (obstructive sleep apnea)        PAST SURGICAL HISTORY:     Past Surgical History:   Procedure Laterality Date    ARTHROPLASTY, KNEE, TOTAL, USING COMPUTER-ASSISTED NAVIGATION Right 11/18/2024    Procedure: ARTHROPLASTY, KNEE, TOTAL, USING COMPUTER-ASSISTED NAVIGATION;  Surgeon: Antonio Rolon MD;  Location: [facility] OR;  Service: Orthopedics;  Laterality: Right;  [place]. Same Day  [facility] [place] and 1st Assist Kaitlin Notified-[place]  -----CLEARED BY PCP  RN PREOP 11/6/2024 ---TYPE&SCREEN --done---BMI--40.97--- HAS INSULIN PUMP    ARTHROSCOPIC DEBRIDEMENT OF SHOULDER  03/14/2023    Procedure: DEBRIDEMENT, SHOULDER, ARTHROSCOPIC;  Surgeon: Crissy Bradford MD;  Location: [facility] OR;  Service: Orthopedics;;    ARTHROSCOPIC DEBRIDEMENT OF SHOULDER  7/23/2024    Procedure: DEBRIDEMENT, SHOULDER, ARTHROSCOPIC;  Surgeon: Crissy Bradford MD;  Location: [facility] OR;  Service: Orthopedics;;    ARTHROSCOPIC REPAIR OF ROTATOR CUFF OF SHOULDER Right 03/14/2023    Procedure: REPAIR, ROTATOR CUFF, ARTHROSCOPIC;  Surgeon: Crissy Bradford MD;  Location: [facility] OR;  Service: Orthopedics;  Laterality: Right;  [facility]ON[facility] PA[facility]NE [phone]. TEXTED ELMER ON 3/9/2023 @ 12:10PM. [facility] RESPONDED ON 3/9/23 @  12:23PM-[facility] [place]  RN PREOP 3/10/2023---CLEARED BY PCP AND CARDS    ARTHROSCOPIC REPAIR OF ROTATOR CUFF OF SHOULDER Right 7/23/2024    Procedure: REPAIR, ROTATOR CUFF, ARTHROSCOPIC;  Surgeon: Crissy Bradford MD;  Location: [facility] OR;  Service: Orthopedics;  Laterality: Right;  [facility] [phone], NURYS [phone]  CLEARED BY PCP  RN PREOP 6/18/2024    ARTHROSCOPY,SHOULDER,WITH BICEPS TENODESIS  03/14/2023    Procedure: ARTHROSCOPY,SHOULDER,WITH BICEPS TENODESIS;  Surgeon: Crissy Bradford MD;  Location: [facility] OR;  Service: Orthopedics;;    KNEE ARTHROSCOPY W/ MENISCECTOMY Right 10/24/2023    Procedure: ARTHROSCOPY, KNEE, WITH MENISCECTOMY;  Surgeon: Crissy Bradford MD;  Location: [facility] OR;  Service: Orthopedics;  Laterality: Right;  RN PREOP 10/18/203---CLEARED BY CHARLEEN [phone]    KNEE SURGERY      acl,mcl,pcl    left knee x 2      PRK  Bilateral 2010    SUBCHONDROPLASTY Right 10/24/2023    Procedure: POSSIBLE SUBCHONDROPLASTY;  Surgeon: Crissy Bradford MD;  Location: [facility] OR;  Service: Orthopedics;  Laterality: Right;       ALLERGIES AND MEDICATION:     Review of patient's allergies indicates:   Allergen Reactions    Influenza virus vaccine, specific Hives    Pioglitazone      Altered mood     Victoza [liraglutide] Nausea And Vomiting    Farxiga [dapagliflozin] Rash     Erectile dysfunction and rash         Medication List            Accurate as of May 15, 2025 11:06 AM. If you have any questions, ask your nurse or doctor.                CONTINUE taking these medications      acetaminophen 500 MG tablet  Commonly known as: TYLENOL  Take 2 tablets (1,000 mg total) by mouth every 8 (eight) hours as needed for Pain.     ammonium lactate 12 % Crea  Apply 1 application  topically once daily.     amoxicillin 875 MG tablet  Commonly known as: AMOXIL  Take 1 tablet (875 mg total) by mouth every 12 (twelve) hours. for 10 days     ARIPiprazole 2 MG Tab  Commonly known as:  ABILIFY  Take 1 tablet (2 mg total) by mouth once daily.     atorvastatin 40 MG tablet  Commonly known as: LIPITOR  Take 1 tablet (40 mg total) by mouth once daily.     AUVI-Q 0.3 mg/0.3 mL Atin  Generic drug: EPINEPHrine  Inject 0.3 mLs (0.3 mg total) into the muscle as needed (Anaphylaxis).     azelastine 137 mcg (0.1 %) nasal spray  Commonly known as: ASTELIN  Use 1-2 sprays in each nostril twice daily     benazepriL 20 MG tablet  Commonly known as: LOTENSIN  Take 1 tablet (20 mg total) by mouth once daily.     * blood sugar diagnostic Strp     * blood sugar diagnostic Strp  To check BG 4 times daily, to use with insurance preferred meter     blood-glucose meter kit     cyanocobalamin (vitamin B-12) 5,000 mcg Subl  Place one under tongue once a day for 1 month, then continue to take under tongue per week     DEXCOM G7 SENSOR Filomena  Generic drug: blood-glucose sensor  Change every 10 days     empagliflozin 25 mg tablet  Commonly known as: JARDIANCE  Take 1 tablet (25 mg total) by mouth once daily.     fluticasone propionate 50 mcg/actuation nasal spray  Commonly known as: FLONASE  1 spray (50 mcg total) by Each Nostril route once daily.     insulin aspart U-100 100 unit/mL injection  Commonly known as: NovoLOG U-100 Insulin aspart  MAX DAILY DOSE 120 UNITS IN INSULIN PUMP.     ketoconazole 2 % cream  Commonly known as: NIZORAL  Apply topically once daily.     L-METHYLFOLATE 15 mg Tab  Generic drug: levomefolate calcium  TAKE 15 MG BY MOUTH ONCE DAILY.     lamoTRIgine 100 MG tablet  Commonly known as: LAMICTAL  Take 1.5 tablets (150 mg total) by mouth once daily.     lancets Misc  To check BG 4 times daily, to use with insurance preferred meter     levothyroxine 50 MCG tablet  Commonly known as: SYNTHROID  Take 1 tablet (50 mcg total) by mouth before breakfast.     loratadine 10 mg tablet  Commonly known as: CLARITIN  Take 1 tablet (10 mg total) by mouth once daily.     meloxicam 15 MG tablet  Commonly known as:  "MOBIC  Take 1 tablet (15 mg total) by mouth once daily.     mirtazapine 15 MG tablet  Commonly known as: REMERON  Take 1 tablet (15 mg total) by mouth every evening.     modafiniL 100 MG Tab  Commonly known as: PROVIGIL  Take 1 tablet (100 mg total) by mouth every morning.     MOUNJARO 12.5 mg/0.5 mL Pnij  Generic drug: tirzepatide  Inject 12.5 mg (one pen) into the skin every 7 days.     OMNIPOD 5 G6 PODS (GEN 5) Crtg  Generic drug: insulin pump cart,automated,BT  Inject 1 each into the skin once daily.     OMNIPOD 5 G6-G7 PODS (GEN 5) Crtg  Generic drug: insulin pump cart,auto,BT,G6/7  Change every 2 days     pen needle, diabetic 32 gauge x 5/32" Ndle  Commonly known as: BD ULTRA-FINE KEN PEN NEEDLE  1 Device by Misc.(Non-Drug; Combo Route) route 5 (five) times daily.     pregabalin 75 MG capsule  Commonly known as: LYRICA  Take 1 capsule (75 mg total) by mouth 2 (two) times daily.     QUEtiapine 25 MG Tab  Commonly known as: SEROQUEL  Take 1 tablet (25 mg total) by mouth once daily in the evening     syringe (disposable) 1 mL Syrg  Testosterone every 2 weeks     traMADoL 50 mg tablet  Commonly known as: ULTRAM  Take 1-2 tablets ( mg total) by mouth every 6 (six) hours as needed for Pain.           * This list has 2 medication(s) that are the same as other medications prescribed for you. Read the directions carefully, and ask your doctor or other care provider to review them with you.                  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     Employer: [employer]   Tobacco Use    Smoking status: Former     Types: Cigars     Passive exposure: Never    Smokeless tobacco: Never    Tobacco comments:     Has not had any cigars this year   Substance and Sexual Activity    Alcohol use: Yes     Comment: 1-2 beers every 2 weeks    Drug use: Yes     Types: " Marijuana     Comment: Non-prescription cannabis     Social Drivers of Health     Financial Resource Strain: Patient Declined (3/10/2025)    Overall Financial Resource Strain (CARDIA)     Difficulty of Paying Living Expenses: Patient declined   Food Insecurity: Patient Declined (3/10/2025)    Hunger Vital Sign     Worried About Running Out of Food in the Last Year: Patient declined     Ran Out of Food in the Last Year: Patient declined   Recent Concern: Food Insecurity - Food Insecurity Present (1/9/2025)    Hunger Vital Sign     Worried About Running Out of Food in the Last Year: Sometimes true     Ran Out of Food in the Last Year: Patient declined   Transportation Needs: Patient Declined (3/10/2025)    PRAPARE - Transportation     Lack of Transportation (Medical): Patient declined     Lack of Transportation (Non-Medical): Patient declined   Physical Activity: Insufficiently Active (3/10/2025)    Exercise Vital Sign     Days of Exercise per Week: 2 days     Minutes of Exercise per Session: 20 min   Stress: Stress Concern Present (3/10/2025)    [employer] [place] [employer] - Occupational Stress Questionnaire     Feeling of Stress : To some extent   Housing Stability: Unknown (3/10/2025)    Housing Stability Vital Sign     Unable to Pay for Housing in the Last Year: Patient declined     Homeless in the Last Year: No   Recent Concern: Housing Stability - High Risk (1/9/2025)    Housing Stability Vital Sign     Unable to Pay for Housing in the Last Year: Yes       FAMILY HISTORY:     Family History   Problem Relation Name Age of Onset    Diabetes Mother      Diabetes Father      No Known Problems Brother      Diabetes Maternal Grandmother      No Known Problems Maternal Grandfather      No Known Problems Paternal Grandmother      No Known Problems Paternal Grandfather      No Known Problems Daughter adopted     Autism Son adopted     No Known Problems Maternal Aunt      No Known Problems Maternal Uncle       No Known Problems Paternal Aunt      No Known Problems Paternal Uncle      No Known Problems Other         REVIEW OF SYSTEMS:   Review of Systems   Constitutional: Negative.   HENT: Negative.     Eyes: Negative.    Cardiovascular:  Negative for chest pain.   Endocrine: Negative.    Hematologic/Lymphatic: Negative.    Skin: Negative.    Musculoskeletal: Negative.    Gastrointestinal: Negative.    Genitourinary: Negative.    Neurological: Negative.    Psychiatric/Behavioral: Negative.     Allergic/Immunologic: Negative.        A 10 point review of systems was performed and all the pertinent positives have been mentioned. Rest of review of systems was negative.        PHYSICAL EXAM:     Vitals:    05/15/25 1100   BP: (!) 147/89   Pulse: 88   Resp: 18    Body mass index is 39.29 kg/m².  Weight: 131.4 kg (289 lb 11 oz)   Height: 6' (182.9 cm)     Physical Exam  Vitals reviewed.   Constitutional:       Appearance: He is well-developed.   HENT:      Head: Normocephalic.   Eyes:      Conjunctiva/sclera: Conjunctivae normal.      Pupils: Pupils are equal, round, and reactive to light.   Cardiovascular:      Rate and Rhythm: Normal rate and regular rhythm.      Heart sounds: Normal heart sounds.   Pulmonary:      Effort: Pulmonary effort is normal.      Breath sounds: Normal breath sounds.   Abdominal:      General: Bowel sounds are normal.      Palpations: Abdomen is soft.   Musculoskeletal:      Cervical back: Normal range of motion and neck supple.   Skin:     General: Skin is warm.   Neurological:      Mental Status: He is alert and oriented to person, place, and time.           DATA:     Laboratory:  CBC:  Recent Labs   Lab 11/06/24  0935 02/25/25  1056 05/13/25  1447   WBC 7.49 8.67 9.09   Hemoglobin 14.7 13.7 L  --    HGB  --   --  13.5 L   Hematocrit 45.8 45.7  --    HCT  --   --  43.1   Platelet Count  --   --  322   Platelets 191 352  --        CHEMISTRIES:  Recent Labs   Lab 01/19/23  1002 03/09/23  1604  11/06/24  0935 02/25/25  1056 05/13/25  1447   Glucose 182 H   < > 156 H 114 H 93   Sodium 137   < > 140 140 140   Potassium 4.0   < > 3.9 4.2 4.5   BUN 17   < > 12 14 13   Creatinine 0.8   < > 0.8 0.7 0.7   Calcium 9.7   < > 9.5 9.8 9.7   Magnesium 1.9  --   --   --   --     < > = values in this interval not displayed.       CARDIAC BIOMARKERS:        COAGS:  Recent Labs   Lab 03/09/23  1604 10/17/23  1530 11/06/24  0935   INR 1.0 1.1 0.9       LIPIDS/LFTS:  Recent Labs   Lab 07/06/23  1005 10/17/23  1530 11/06/24  0935 01/06/25  1322 02/25/25  1056 05/13/25  1447   Cholesterol 142  --   --  118 L  --   --    Triglycerides 61  --   --  119  --   --    HDL 57  --   --  42  --   --    LDL Cholesterol 72.8  --   --  52.2 L  --   --    Non-HDL Cholesterol 85  --   --  76  --   --    AST  --    < > 18  --  22 22   ALT  --    < > 23  --  19 25    < > = values in this interval not displayed.       Hemoglobin A1C   Date Value Ref Range Status   01/06/2025 6.1 (H) 4.0 - 5.6 % Final     Comment:     ADA Screening Guidelines:  5.7-6.4%  Consistent with prediabetes  >or=6.5%  Consistent with diabetes    High levels of fetal hemoglobin interfere with the HbA1C  assay. Heterozygous hemoglobin variants (HbS, HgC, etc)do  not significantly interfere with this assay.   However, presence of multiple variants may affect accuracy.     11/06/2024 6.3 (H) 4.0 - 5.6 % Final     Comment:     ADA Screening Guidelines:  5.7-6.4%  Consistent with prediabetes  >or=6.5%  Consistent with diabetes    High levels of fetal hemoglobin interfere with the HbA1C  assay. Heterozygous hemoglobin variants (HbS, HgC, etc)do  not significantly interfere with this assay.   However, presence of multiple variants may affect accuracy.     05/08/2024 5.7 (H) 4.0 - 5.6 % Final     Comment:     ADA Screening Guidelines:  5.7-6.4%  Consistent with prediabetes  >or=6.5%  Consistent with diabetes    High levels of fetal hemoglobin interfere with the HbA1C  assay.  Heterozygous hemoglobin variants (HbS, HgC, etc)do  not significantly interfere with this assay.   However, presence of multiple variants may affect accuracy.       Hemoglobin A1c   Date Value Ref Range Status   05/13/2025 6.2 (H) 4.0 - 5.6 % Final     Comment:     ADA Screening Guidelines:  5.7-6.4%  Consistent with prediabetes  >=6.5%  Consistent with diabetes    High levels of fetal hemoglobin interfere with the HbA1C  assay. Heterozygous hemoglobin variants (HbS, HgC, etc)do  not significantly interfere with this assay.   However, presence of multiple variants may affect accuracy.       TSH  Recent Labs   Lab 01/19/23  1002 10/10/23  1400 02/25/25  1056   TSH 2.562  2.562 2.148 2.557       The ASCVD Risk score (Shahzad TREVIÑO, et al., 2019) failed to calculate for the following reasons:    The valid total cholesterol range is 130 to 320 mg/dL             ASSESSMENT AND PLAN     Patient Active Problem List   Diagnosis    Hyperlipidemia LDL goal <70    Insulin long-term use    Tinea pedis    Morbid obesity    Hypothyroidism    Type 2 diabetes mellitus 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 MRI negative. 11/2019 started on testosterone    Right foot pain    Decreased strength of lower extremity    Decreased range of motion of both ankles    Gait abnormality    Rib pain on left side    Dyspnea    Decreased strength, endurance, and mobility    Left hand pain    Mild neurocognitive disorder due to traumatic brain injury     Outbursts of anger    Other amnesia    Traumatic rotator cuff tear, right, initial encounter    S/P right rotator cuff repair    Biceps tendonosis of right shoulder    Decreased range of motion of right shoulder    Impaired strength of shoulder muscles    Allergic conjunctivitis    Cornea edema    Descemet's membrane fold    Herpes simplex keratitis    Uncontrolled type 2 diabetes mellitus    Tear of lateral meniscus of right knee, current    Refractive error    Acute migraine    MCI (mild cognitive impairment)    Medication overuse headache    Chronic migraine with aura, intractable, with status migrainosus    Agitation    Traumatic encephalopathy    Allergy desensitization therapy    Concussion with no loss of consciousness    Concussion with loss of consciousness    Other specified persistent mood disorders    Cognitive communication deficit    Impaired mobility and ADLs    Imbalance    Primary osteoarthritis of both knees    Bilateral chronic knee pain     Assessment & Plan    IMPRESSION:  Cleared patient for scheduled knee surgery at low-to-moderate risk for coronary ischemia    PLAN SUMMARY:  - Follow-up in 6 months.    FOLLOW-UP:  - Follow up in 6 months.        ESSENTIAL HYPERTENSION:  - Tony to monitor blood pressure regularly.  - Continued Benazepril 20 mg.  - Contact the office if blood pressure remains elevated, as medication dosage may need adjustment.    HYPERLIPIDEMIA:  - Continued atorvastatin.    FOLLOW-UP:  - Follow up to review echocardiogram results and provide surgical clearance if results are normal.         Preoperative risk assessment prior to rotator cuff surgery.   PET scan did not show any significant ischemia.  Endothelial dysfunction.  Continue aggressive risk factor modification, weight loss control risk factors.  Recently underwent similar surgery in November.  No active cardiac symptoms, but limited exercise tolerance caused by knee pain.  Recent echo normal.  may proceed for surgery  at low-to-moderate risk for coronary ischemia    Weight loss has been recommended    Thank you very much for involving me in the care of your patient.  Please do not hesitate to contact me if there are any questions.      Hussain Winston MD, FAC, [facility]  Interventional Cardiologist, [facility].     Visit today included increased complexity associated with the care of the episodic problem hypertension, obesity, preoperative risk assessment addressed and managing the longitudinal care of the patient due to the serious and/or complex managed problem(s) Problem List[1]  .      This note was dictated with the help of speech recognition software.  There might be un-intended errors and/or substitutions.                             [1]   Patient Active Problem List  Diagnosis    Hyperlipidemia LDL goal <70    Insulin long-term use    Tinea pedis    Morbid obesity    Hypothyroidism    Type 2 diabetes mellitus 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 MRI negative. 11/2019 started on testosterone    Right foot pain    Decreased strength of lower extremity    Decreased range of motion of both ankles    Gait abnormality    Rib pain on left side    Dyspnea    Decreased strength, endurance, and mobility    Left hand pain    Mild neurocognitive disorder due to traumatic brain injury    Outbursts of anger    Other amnesia    Traumatic rotator cuff tear, right, initial encounter    S/P right rotator cuff repair    Biceps tendonosis of  right shoulder    Decreased range of motion of right shoulder    Impaired strength of shoulder muscles    Allergic conjunctivitis    Cornea edema    Descemet's membrane fold    Herpes simplex keratitis    Uncontrolled type 2 diabetes mellitus    Tear of lateral meniscus of right knee, current    Refractive error    Acute migraine    MCI (mild cognitive impairment)    Medication overuse headache    Chronic migraine with aura, intractable, with status migrainosus    Agitation    Traumatic encephalopathy    Allergy desensitization therapy    Concussion with no loss of consciousness    Concussion with loss of consciousness    Other specified persistent mood disorders    Cognitive communication deficit    Impaired mobility and ADLs    Imbalance    Primary osteoarthritis of both knees    Bilateral chronic knee pain

## 2025-05-16 ENCOUNTER — OFFICE VISIT (OUTPATIENT)
Dept: ORTHOPEDICS | Facility: CLINIC | Age: 45
End: 2025-05-16
Payer: COMMERCIAL

## 2025-05-16 ENCOUNTER — TELEPHONE (OUTPATIENT)
Dept: SURGERY | Facility: HOSPITAL | Age: 45
End: 2025-05-16
Payer: COMMERCIAL

## 2025-05-16 VITALS — BODY MASS INDEX: 39.14 KG/M2 | WEIGHT: 289 LBS | HEIGHT: 72 IN

## 2025-05-16 DIAGNOSIS — T84.50XA INFECTION OF PROSTHETIC JOINT, INITIAL ENCOUNTER: Primary | ICD-10-CM

## 2025-05-16 PROCEDURE — 99999 PR PBB SHADOW E&M-EST. PATIENT-LVL IV: CPT | Mod: PBBFAC,,, | Performed by: ORTHOPAEDIC SURGERY

## 2025-05-16 NOTE — PROGRESS NOTES
EST PATIENT ORTHOPAEDIC: Knee    PRIMARY CARE PHYSICIAN: Jovany Ford MD   REFERRING PROVIDER: No referring provider defined for this encounter.     ASSESSMENT & PLAN:    Impression:  Right Knee PJI  Left knee severe osteoarthritis  Left knee history of ACL reconstruction    Follow Up Plan: 1 weeks after surgery    44 y.o. male status post right total Knee Primary completed on 11/18/24.  Patient here today for postoperative follow up now almost 6 months out from his right total knee.  He is on a cane for ambulation still but attributes this to both his right knee ongoing issues as well as his left knee pain.  He has been working hard with outpatient therapy.  He has regained full extension and has good motion to about 115° today.  But he has had intermittent episodes where his knees become swollen on him and he has had some difficulty with his range of motion and it is regression on his right.  His incision looks good.  His pain is reasonably controlled, but intermittently can be debilitating.  He reports the pain to be more neuropathic.  He reports some medial and lateral based pain.  He clinically does not have any extension mid flexion or flexion instability.  He I do notice some swelling in his knee.  At last visit I thought it is necessary to obtain repeat radiographs, get ESR and CRP to ensure no underlying infectious process.  He had elevated inflammatory markers at a CRP of 48 and an ESR of 28.  I also had x-rays obtained which demonstrated some lateral patellar tilt and tracking to his knee compared to films obtained from months prior.  I brought him back today for aspiration of his knee.  I attempted two times both from a superior lateral and anterior lateral portal to obtain fluid from his knee neither of these provided joint fluid.  So I obtained an MRI which had concerning findings for osteomyelitis and infection of his joint.  I sent him for ultrasound-guided aspiration which was sent for  Synovasure.  This came back with alpha defense of positive, synovial CRP positive, significantly elevated white blood cell count, NGTD on cultures.  As a result I think he would benefit from explant and dynamic spacer placement with plans wants infections cleared for definitive revision knee replacement.     We had a lengthy discussion regarding the risk and benefit of surgery, the alternatives, limitations and personnel involved. These included but were not limited to infection, persistent pain, instability, nerve injury, blood clots, dislocation, loosening, leg length inequality and medical complications. We also discussed the pre-operative course, surgery itself and rehabilitation.    Patricia-operative blood management and transfusion issues were discussed, and options clearly outlined. The patient has consented to the use of the banked allogenic blood if medically necessary.    We will plan for use of [facility] components. 5/19/25    We discussed expected rehab, need for prolonged IV antibiotics, need for infectious disease consult and generalized timing for replant if infections cleared.  Would tentatively think about doing this July 28th    The patient has been ordered:  None    CONSULTS:   Anesthesia for optimization    ACTIVE PROBLEM LIST  Patient Active Problem List   Diagnosis    Hyperlipidemia LDL goal <70    Insulin long-term use    Tinea pedis    Morbid obesity    Hypothyroidism    Type 2 diabetes mellitus with left eye affected by mild nonproliferative retinopathy without macular edema, with long-term current use of insulin    Essential hypertension    Migraine with aura and without status migrainosus, not intractable propranolol SE angry; topamax not helpful; imitrex ineffective; daith ear piercing helps    Non-seasonal allergic rhinitis; avoid antihistamines per opthalmology    Acute bilateral low back pain without sciatica    NAINA (obstructive sleep apnea) 8/2019 sleep study [facility] 11    Low  testosterone in male; pituitary axis normal. MRI negative. 11/2019 started on testosterone    Right foot pain    Decreased strength of lower extremity    Decreased range of motion of both ankles    Gait abnormality    Rib pain on left side    Dyspnea    Decreased strength, endurance, and mobility    Left hand pain    Mild neurocognitive disorder due to traumatic brain injury    Outbursts of anger    Other amnesia    Traumatic rotator cuff tear, right, initial encounter    S/P right rotator cuff repair    Biceps tendonosis of right shoulder    Decreased range of motion of right shoulder    Impaired strength of shoulder muscles    Allergic conjunctivitis    Cornea edema    Descemet's membrane fold    Herpes simplex keratitis    Uncontrolled type 2 diabetes mellitus    Tear of lateral meniscus of right knee, current    Refractive error    Acute migraine    MCI (mild cognitive impairment)    Medication overuse headache    Chronic migraine with aura, intractable, with status migrainosus    Agitation    Traumatic encephalopathy    Allergy desensitization therapy    Concussion with no loss of consciousness    Concussion with loss of consciousness    Other specified persistent mood disorders    Cognitive communication deficit    Impaired mobility and ADLs    Imbalance    Primary osteoarthritis of both knees    Bilateral chronic knee pain           SUBJECTIVE    CHIEF COMPLAINT: Knee Pain    HPI:     44 y.o. male status post right total Knee Primary completed on 11/18/24.  Patient here today for postoperative follow up now almost 6 months out from his right total knee.  He is on a cane for ambulation still but attributes this to both his right knee ongoing issues as well as his left knee pain.  He has been working hard with outpatient therapy.  He has regained full extension and has good motion to about 115° today.  But he has had intermittent episodes where his knees become swollen on him and he has had some difficulty with  his range of motion and it is regression on his right.  His incision looks good.  His pain is reasonably controlled, but intermittently can be debilitating.  He reports the pain to be more neuropathic.  He reports some medial and lateral based pain.  He clinically does not have any extension mid flexion or flexion instability.  He I do notice some swelling in his knee.  At last visit I thought it is necessary to obtain repeat radiographs, get ESR and CRP to ensure no underlying infectious process.  He had elevated inflammatory markers at a CRP of 48 and an ESR of 28.  I also had x-rays obtained which demonstrated some lateral patellar tilt and tracking to his knee compared to films obtained from months prior.  I brought him back today for aspiration of his knee.  I attempted two times both from a superior lateral and anterior lateral portal to obtain fluid from his knee neither of these provided joint fluid.  So I obtained an MRI which had concerning findings for osteomyelitis and infection of his joint.  I sent him for ultrasound-guided aspiration which was sent for Synovasure.  This came back with alpha defense of positive, synovial CRP positive, significantly elevated white blood cell count, NGTD on cultures.     PROGRESSIVE SYMPTOMS:  Pain impacting work  Pain worsened by weight bearing  Pain effecting living situation    FUNCTIONAL STATUS:   Climb a flight of stairs or walk up a hill     PREVIOUS TREATMENTS:  Medical: OTC NSAIDS, RX NSAIDS, Steroid Injections, Biologic Injections, Narcotics, and Tylenol  Physical Therapy: Activities Modified  and Formal Physical Therapy for 6 weeks  Previous Orthopaedic Surgery: Left Knee ACL Reconstruction x3, Right Shoulder RCR with Dr. Bradford    REVIEW OF SYSTEMS:  PAIN ASSESSMENT:  See HPI.  MUSCULOSKELETAL: See HPI.  OTHER 10 point review of systems is negative except as stated in HPI above    PAST MEDICAL HISTORY   has a past medical history of Diabetes mellitus, type 2,  Hyperlipidemia, Hypertension, Obesity, and NAINA (obstructive sleep apnea).     PAST SURGICAL HISTORY   has a past surgical history that includes left knee x 2; Knee surgery; Arthroscopic repair of rotator cuff of shoulder (Right, 03/14/2023); arthroscopy,shoulder,with biceps tenodesis (03/14/2023); Arthroscopic debridement of shoulder (03/14/2023); Knee arthroscopy w/ meniscectomy (Right, 10/24/2023); Subchondroplasty (Right, 10/24/2023); PRK  (Bilateral, 2010); Arthroscopic repair of rotator cuff of shoulder (Right, 7/23/2024); Arthroscopic debridement of shoulder (7/23/2024); and arthroplasty, knee, total, using computer-assisted navigation (Right, 11/18/2024).     FAMILY HISTORY  family history includes Autism in his son; Diabetes in his father, maternal grandmother, and mother; No Known Problems in his brother, daughter, maternal aunt, maternal grandfather, maternal uncle, paternal aunt, paternal grandfather, paternal grandmother, paternal uncle, and another family member.     SOCIAL HISTORY   reports that he has quit smoking. His smoking use included cigars. He has never been exposed to tobacco smoke. He has never used smokeless tobacco. He reports current alcohol use. He reports current drug use. Drug: Marijuana.     ALLERGIES   Review of patient's allergies indicates:   Allergen Reactions    Influenza virus vaccine, specific Hives    Pioglitazone      Altered mood     Victoza [liraglutide] Nausea And Vomiting    Farxiga [dapagliflozin] Rash     Erectile dysfunction and rash         MEDICATIONS  Current Outpatient Medications on File Prior to Visit   Medication Sig Dispense Refill    acetaminophen (TYLENOL) 500 MG tablet Take 2 tablets (1,000 mg total) by mouth every 8 (eight) hours as needed for Pain. 42 tablet 0    ammonium lactate 12 % Crea Apply 1 application  topically once daily. 140 g 5    amoxicillin (AMOXIL) 875 MG tablet Take 1 tablet (875 mg total) by mouth every 12 (twelve) hours. for 10 days 20  tablet 0    ARIPiprazole (ABILIFY) 2 MG Tab Take 1 tablet (2 mg total) by mouth once daily. 30 tablet 11    atorvastatin (LIPITOR) 40 MG tablet Take 1 tablet (40 mg total) by mouth once daily. 90 tablet 3    azelastine (ASTELIN) 137 mcg (0.1 %) nasal spray Use 1-2 sprays in each nostril twice daily 90 mL 3    benazepriL (LOTENSIN) 20 MG tablet Take 1 tablet (20 mg total) by mouth once daily. 90 tablet 3    blood sugar diagnostic Strp To check BG 4 times daily, to use with insurance preferred meter 400 strip 3    blood sugar diagnostic Strp OneTouch Ultra Test strips      blood-glucose meter kit OneTouch Ultra2 Meter kit      blood-glucose sensor (DEXCOM G7 SENSOR) Filomena Change every 10 days 12 each 3    cyanocobalamin, vitamin B-12, 5,000 mcg Subl Place one under tongue once a day for 1 month, then continue to take under tongue per week 30 tablet 3    empagliflozin (JARDIANCE) 25 mg tablet Take 1 tablet (25 mg total) by mouth once daily. 90 tablet 2    EPINEPHrine (EPIPEN 2-AYALA) 0.3 mg/0.3 mL AtIn Inject 0.3 mLs (0.3 mg total) into the muscle as needed (Anaphylaxis). 2 each 0    fluticasone propionate (FLONASE) 50 mcg/actuation nasal spray 1 spray (50 mcg total) by Each Nostril route once daily. 48 g 5    insulin aspart U-100 (NOVOLOG U-100 INSULIN ASPART) 100 unit/mL injection MAX DAILY DOSE 120 UNITS IN INSULIN PUMP. 110 mL 2    insulin pump cart,auto,BT,G6/7 (OMNIPOD 5 G6-G7 PODS, GEN 5,) Crtg Change every 2 days 45 each 2    insulin pump cart,automated,BT (OMNIPOD 5 G6 PODS, GEN 5,) Crtg Inject 1 each into the skin once daily. 30 each 11    ketoconazole (NIZORAL) 2 % cream Apply topically once daily. 60 g 0    L-METHYLFOLATE 15 mg Tab TAKE 15 MG BY MOUTH ONCE DAILY. 30 tablet 11    lamoTRIgine (LAMICTAL) 100 MG tablet Take 1.5 tablets (150 mg total) by mouth once daily. 135 tablet 3    lancets Misc To check BG 4 times daily, to use with insurance preferred meter 400 each 3    levothyroxine (SYNTHROID) 50 MCG  "tablet Take 1 tablet (50 mcg total) by mouth before breakfast. 90 tablet 1    loratadine (CLARITIN) 10 mg tablet Take 1 tablet (10 mg total) by mouth once daily. 90 tablet 3    meloxicam (MOBIC) 15 MG tablet Take 1 tablet (15 mg total) by mouth once daily. 30 tablet 1    mirtazapine (REMERON) 15 MG tablet Take 1 tablet (15 mg total) by mouth every evening. 30 tablet 11    modafiniL (PROVIGIL) 100 MG Tab Take 1 tablet (100 mg total) by mouth every morning. 90 tablet 1    pen needle, diabetic (BD ULTRA-FINE KEN PEN NEEDLE) 32 gauge x 5/32" Ndle 1 Device by Misc.(Non-Drug; Combo Route) route 5 (five) times daily. 550 each 4    pregabalin (LYRICA) 75 MG capsule Take 1 capsule (75 mg total) by mouth 2 (two) times daily. 60 capsule 0    QUEtiapine (SEROQUEL) 25 MG Tab Take 1 tablet (25 mg total) by mouth once daily in the evening 30 tablet 11    syringe, disposable, 1 mL Syrg Testosterone every 2 weeks 25 Syringe 1    tirzepatide (MOUNJARO) 12.5 mg/0.5 mL PnIj Inject 12.5 mg (one pen) into the skin every 7 days. 4 Pen 3    traMADoL (ULTRAM) 50 mg tablet Take 1-2 tablets ( mg total) by mouth every 6 (six) hours as needed for Pain. 40 tablet 0     No current facility-administered medications on file prior to visit.          PHYSICAL EXAM   height is 6' (1.829 m) and weight is 131.1 kg (289 lb 0.4 oz).   Body mass index is 39.2 kg/m².      All other systems deferred.  GENERAL:  No acute distress  HABITUS: Obese  GAIT: Antalgic  SKIN: Normal   CV: RRR  Non labored breathing     KNEE EXAM:    right:   Effusion: Moderate joint effusion  TTP: Yes over Medial and lateral Joint Line   Yes crepitus with passive knee ROM  Passive ROM: Extension 0, Flexion 115  No pain with manipulation of patella  Stable to varus/valgus stress. No increased laxity to anterior/posterior drawer testing  No pain with IR/ER rotation of the hip  5/5 strength in knee flexion and extension, ankle plantarflexion and dorsiflexion  Neurovascular " Status: Sensation intact to light touch in Sural, Saphenous, SPN, DPN, Tibial nerve distribution  2+ pulse DP/PT, normal capillary refill, foot has normal warmth    DATA:  Diagnostic tests reviewed for today's visit:     4v of the left knee reveal Moderate degenerative changes of the Lateral and Patellofemoral compartment. There is evidence of advanced osteoarthritis changes with Osteophyte formation and Joint space narrowing. The limb is in netural alignment. The patella is tracking midline.    Repeat 3v of right knee radiographs demonstrate well-seated well-positioned components with a lateral subluxation of his patella compared to previous films    MRI report and images reviewed with findings concerning for periprosthetic septic arthritis and osteomyelitis

## 2025-05-19 ENCOUNTER — ANESTHESIA (OUTPATIENT)
Dept: SURGERY | Facility: HOSPITAL | Age: 45
End: 2025-05-19
Payer: COMMERCIAL

## 2025-05-19 ENCOUNTER — HOSPITAL ENCOUNTER (INPATIENT)
Facility: HOSPITAL | Age: 45
LOS: 3 days | Discharge: HOME HEALTH | DRG: 468 | End: 2025-05-22
Attending: ORTHOPAEDIC SURGERY | Admitting: ORTHOPAEDIC SURGERY
Payer: COMMERCIAL

## 2025-05-19 DIAGNOSIS — Z79.4 TYPE 2 DIABETES MELLITUS WITH LEFT EYE AFFECTED BY MILD NONPROLIFERATIVE RETINOPATHY WITHOUT MACULAR EDEMA, WITH LONG-TERM CURRENT USE OF INSULIN: ICD-10-CM

## 2025-05-19 DIAGNOSIS — T84.50XA PROSTHETIC JOINT INFECTION: Primary | ICD-10-CM

## 2025-05-19 DIAGNOSIS — T84.53XD INFECTION ASSOCIATED WITH INTERNAL RIGHT KNEE PROSTHESIS, SUBSEQUENT ENCOUNTER: ICD-10-CM

## 2025-05-19 DIAGNOSIS — E11.3292 TYPE 2 DIABETES MELLITUS WITH LEFT EYE AFFECTED BY MILD NONPROLIFERATIVE RETINOPATHY WITHOUT MACULAR EDEMA, WITH LONG-TERM CURRENT USE OF INSULIN: ICD-10-CM

## 2025-05-19 DIAGNOSIS — Z96.651 STATUS POST RIGHT KNEE REPLACEMENT: ICD-10-CM

## 2025-05-19 DIAGNOSIS — T84.50XA INFECTION OF PROSTHETIC JOINT, INITIAL ENCOUNTER: ICD-10-CM

## 2025-05-19 LAB
GRAM STN SPEC: NORMAL
GRAM STN SPEC: NORMAL
POCT GLUCOSE: 134 MG/DL (ref 70–110)
POCT GLUCOSE: 145 MG/DL (ref 70–110)
POCT GLUCOSE: 175 MG/DL (ref 70–110)
POCT GLUCOSE: 190 MG/DL (ref 70–110)

## 2025-05-19 PROCEDURE — 71000033 HC RECOVERY, INTIAL HOUR: Performed by: ORTHOPAEDIC SURGERY

## 2025-05-19 PROCEDURE — 63600175 PHARM REV CODE 636 W HCPCS: Performed by: ORTHOPAEDIC SURGERY

## 2025-05-19 PROCEDURE — 25000003 PHARM REV CODE 250: Performed by: ORTHOPAEDIC SURGERY

## 2025-05-19 PROCEDURE — 27488 REMOVAL OF KNEE PROSTHESIS: CPT | Mod: RT,,, | Performed by: ORTHOPAEDIC SURGERY

## 2025-05-19 PROCEDURE — 99900031 HC PATIENT EDUCATION (STAT)

## 2025-05-19 PROCEDURE — 37000008 HC ANESTHESIA 1ST 15 MINUTES: Performed by: ORTHOPAEDIC SURGERY

## 2025-05-19 PROCEDURE — 87206 SMEAR FLUORESCENT/ACID STAI: CPT | Performed by: ORTHOPAEDIC SURGERY

## 2025-05-19 PROCEDURE — 5A09357 ASSISTANCE WITH RESPIRATORY VENTILATION, LESS THAN 24 CONSECUTIVE HOURS, CONTINUOUS POSITIVE AIRWAY PRESSURE: ICD-10-PCS | Performed by: PEDIATRICS

## 2025-05-19 PROCEDURE — 87075 CULTR BACTERIA EXCEPT BLOOD: CPT | Performed by: ORTHOPAEDIC SURGERY

## 2025-05-19 PROCEDURE — 87205 SMEAR GRAM STAIN: CPT | Performed by: ORTHOPAEDIC SURGERY

## 2025-05-19 PROCEDURE — 87070 CULTURE OTHR SPECIMN AEROBIC: CPT | Performed by: ORTHOPAEDIC SURGERY

## 2025-05-19 PROCEDURE — 87102 FUNGUS ISOLATION CULTURE: CPT | Performed by: ORTHOPAEDIC SURGERY

## 2025-05-19 PROCEDURE — 27800903 OPTIME MED/SURG SUP & DEVICES OTHER IMPLANTS: Performed by: ORTHOPAEDIC SURGERY

## 2025-05-19 PROCEDURE — 36000707: Performed by: ORTHOPAEDIC SURGERY

## 2025-05-19 PROCEDURE — 0SRC0EZ REPLACEMENT OF RIGHT KNEE JOINT WITH ARTICULATING SPACER, OPEN APPROACH: ICD-10-PCS | Performed by: PEDIATRICS

## 2025-05-19 PROCEDURE — 99900035 HC TECH TIME PER 15 MIN (STAT)

## 2025-05-19 PROCEDURE — 87116 MYCOBACTERIA CULTURE: CPT | Performed by: ORTHOPAEDIC SURGERY

## 2025-05-19 PROCEDURE — 0SPC0JZ REMOVAL OF SYNTHETIC SUBSTITUTE FROM RIGHT KNEE JOINT, OPEN APPROACH: ICD-10-PCS | Performed by: PEDIATRICS

## 2025-05-19 PROCEDURE — 36000706: Performed by: ORTHOPAEDIC SURGERY

## 2025-05-19 PROCEDURE — 63600175 PHARM REV CODE 636 W HCPCS

## 2025-05-19 PROCEDURE — C1713 ANCHOR/SCREW BN/BN,TIS/BN: HCPCS | Performed by: ORTHOPAEDIC SURGERY

## 2025-05-19 PROCEDURE — 94660 CPAP INITIATION&MGMT: CPT

## 2025-05-19 PROCEDURE — 21400001 HC TELEMETRY ROOM

## 2025-05-19 PROCEDURE — 3E0U029 INTRODUCTION OF OTHER ANTI-INFECTIVE INTO JOINTS, OPEN APPROACH: ICD-10-PCS | Performed by: PEDIATRICS

## 2025-05-19 PROCEDURE — 25000003 PHARM REV CODE 250: Performed by: NURSE ANESTHETIST, CERTIFIED REGISTERED

## 2025-05-19 PROCEDURE — 27000190 HC CPAP FULL FACE MASK W/VALVE

## 2025-05-19 PROCEDURE — 37000009 HC ANESTHESIA EA ADD 15 MINS: Performed by: ORTHOPAEDIC SURGERY

## 2025-05-19 PROCEDURE — 27201423 OPTIME MED/SURG SUP & DEVICES STERILE SUPPLY: Performed by: ORTHOPAEDIC SURGERY

## 2025-05-19 PROCEDURE — 63600175 PHARM REV CODE 636 W HCPCS: Performed by: NURSE ANESTHETIST, CERTIFIED REGISTERED

## 2025-05-19 PROCEDURE — 94761 N-INVAS EAR/PLS OXIMETRY MLT: CPT

## 2025-05-19 DEVICE — STIMULAN® RAPID CURE PROVIDED STERILE FOR SINGLE PATIENT USE. STIMULAN® RAPID CURE CONTAINS CALCIUM SULFATE POWDER AND MIXING SOLUTION IN PRE-MEASURED QUANTITIES SO THAT WHEN MIXED TOGETHER IN A STERILE MIXING BOWL, THE RESULTANT PASTE IS TO BE DIGITALLY PACKED INTO OPEN BONE VOID/GAP TO SET INSITU OR PLACED INTO THE MOULD PROVIDED, THE MIXTURE SETS TO FORM BEADS. THE BIODEGRADABLE, RADIOPAQUE BEADS ARE RESORBED IN APPROXIMATELY 30 – 60 DAYS WHEN USED IN ACCORDANCE WITH THE DEVICE LABELLING. STIMULAN® RAPID CURE IS MANUFACTURED FROM SYNTHETIC IMPLANT GRADE CALCIUM SULFATE DIHYDRATE(CASO4.2H2O) THAT RESORBS AND IS REPLACED WITH BONE DURING THE HEALING PROCESS. ALSO, AS THE BONE VOID FILLER BEADS ARE BIODEGRADABLE AND BIOCOMPATIBLE, THEY MAY BE USED AT AN INFECTED SITE.
Type: IMPLANTABLE DEVICE | Site: KNEE | Status: FUNCTIONAL
Brand: STIMULAN® RAPID CURE

## 2025-05-19 RX ORDER — OXYCODONE HYDROCHLORIDE 5 MG/1
5 TABLET ORAL
Status: DISCONTINUED | OUTPATIENT
Start: 2025-05-19 | End: 2025-05-22 | Stop reason: HOSPADM

## 2025-05-19 RX ORDER — AMOXICILLIN 250 MG
1 CAPSULE ORAL 2 TIMES DAILY
Status: DISCONTINUED | OUTPATIENT
Start: 2025-05-19 | End: 2025-05-22 | Stop reason: HOSPADM

## 2025-05-19 RX ORDER — ASPIRIN 81 MG/1
81 TABLET ORAL 2 TIMES DAILY
Status: DISCONTINUED | OUTPATIENT
Start: 2025-05-19 | End: 2025-05-22 | Stop reason: HOSPADM

## 2025-05-19 RX ORDER — ACETAMINOPHEN 500 MG
1000 TABLET ORAL
Status: DISCONTINUED | OUTPATIENT
Start: 2025-05-19 | End: 2025-05-19 | Stop reason: HOSPADM

## 2025-05-19 RX ORDER — OXYCODONE HYDROCHLORIDE 5 MG/1
10 TABLET ORAL
Status: DISCONTINUED | OUTPATIENT
Start: 2025-05-19 | End: 2025-05-22 | Stop reason: HOSPADM

## 2025-05-19 RX ORDER — SODIUM CHLORIDE 9 MG/ML
INJECTION, SOLUTION INTRAVENOUS
Status: DISCONTINUED | OUTPATIENT
Start: 2025-05-19 | End: 2025-05-19 | Stop reason: HOSPADM

## 2025-05-19 RX ORDER — ONDANSETRON HYDROCHLORIDE 2 MG/ML
INJECTION, SOLUTION INTRAVENOUS
Status: DISCONTINUED | OUTPATIENT
Start: 2025-05-19 | End: 2025-05-19

## 2025-05-19 RX ORDER — KETAMINE HYDROCHLORIDE 100 MG/ML
INJECTION, SOLUTION INTRAMUSCULAR; INTRAVENOUS
Status: DISCONTINUED | OUTPATIENT
Start: 2025-05-19 | End: 2025-05-19

## 2025-05-19 RX ORDER — FENTANYL CITRATE 50 UG/ML
INJECTION, SOLUTION INTRAMUSCULAR; INTRAVENOUS
Status: DISCONTINUED | OUTPATIENT
Start: 2025-05-19 | End: 2025-05-19

## 2025-05-19 RX ORDER — VANCOMYCIN HYDROCHLORIDE 1 G/20ML
INJECTION, POWDER, LYOPHILIZED, FOR SOLUTION INTRAVENOUS
Status: DISCONTINUED | OUTPATIENT
Start: 2025-05-19 | End: 2025-05-19 | Stop reason: HOSPADM

## 2025-05-19 RX ORDER — ACETAMINOPHEN 500 MG
1000 TABLET ORAL
Status: DISCONTINUED | OUTPATIENT
Start: 2025-05-19 | End: 2025-05-19

## 2025-05-19 RX ORDER — CELECOXIB 100 MG/1
400 CAPSULE ORAL ONCE
Status: DISCONTINUED | OUTPATIENT
Start: 2025-05-19 | End: 2025-05-19 | Stop reason: HOSPADM

## 2025-05-19 RX ORDER — PROPOFOL 10 MG/ML
VIAL (ML) INTRAVENOUS
Status: DISCONTINUED | OUTPATIENT
Start: 2025-05-19 | End: 2025-05-19

## 2025-05-19 RX ORDER — NALOXONE HCL 0.4 MG/ML
0.02 VIAL (ML) INJECTION
Status: DISCONTINUED | OUTPATIENT
Start: 2025-05-19 | End: 2025-05-22 | Stop reason: HOSPADM

## 2025-05-19 RX ORDER — SODIUM CHLORIDE, SODIUM LACTATE, POTASSIUM CHLORIDE, CALCIUM CHLORIDE 600; 310; 30; 20 MG/100ML; MG/100ML; MG/100ML; MG/100ML
INJECTION, SOLUTION INTRAVENOUS CONTINUOUS
Status: DISCONTINUED | OUTPATIENT
Start: 2025-05-19 | End: 2025-05-22 | Stop reason: HOSPADM

## 2025-05-19 RX ORDER — METHYLENE BLUE 5 MG/ML
INJECTION, SOLUTION INTRAVENOUS
Status: DISCONTINUED | OUTPATIENT
Start: 2025-05-19 | End: 2025-05-19 | Stop reason: HOSPADM

## 2025-05-19 RX ORDER — MIDAZOLAM HYDROCHLORIDE 1 MG/ML
INJECTION INTRAMUSCULAR; INTRAVENOUS
Status: DISCONTINUED | OUTPATIENT
Start: 2025-05-19 | End: 2025-05-19

## 2025-05-19 RX ORDER — MORPHINE SULFATE 4 MG/ML
4 INJECTION, SOLUTION INTRAMUSCULAR; INTRAVENOUS EVERY 4 HOURS PRN
Status: DISCONTINUED | OUTPATIENT
Start: 2025-05-19 | End: 2025-05-22 | Stop reason: HOSPADM

## 2025-05-19 RX ORDER — CEFAZOLIN 2 G/1
2 INJECTION, POWDER, FOR SOLUTION INTRAMUSCULAR; INTRAVENOUS
Status: COMPLETED | OUTPATIENT
Start: 2025-05-19 | End: 2025-05-19

## 2025-05-19 RX ORDER — LIDOCAINE HYDROCHLORIDE 10 MG/ML
1 INJECTION, SOLUTION EPIDURAL; INFILTRATION; INTRACAUDAL; PERINEURAL
Status: DISCONTINUED | OUTPATIENT
Start: 2025-05-19 | End: 2025-05-19 | Stop reason: HOSPADM

## 2025-05-19 RX ORDER — HYDROMORPHONE HYDROCHLORIDE 2 MG/ML
INJECTION, SOLUTION INTRAMUSCULAR; INTRAVENOUS; SUBCUTANEOUS
Status: DISCONTINUED | OUTPATIENT
Start: 2025-05-19 | End: 2025-05-19

## 2025-05-19 RX ORDER — ONDANSETRON HYDROCHLORIDE 2 MG/ML
4 INJECTION, SOLUTION INTRAVENOUS EVERY 8 HOURS PRN
Status: DISCONTINUED | OUTPATIENT
Start: 2025-05-19 | End: 2025-05-22 | Stop reason: HOSPADM

## 2025-05-19 RX ORDER — METHOCARBAMOL 750 MG/1
750 TABLET, FILM COATED ORAL 3 TIMES DAILY
Status: DISCONTINUED | OUTPATIENT
Start: 2025-05-19 | End: 2025-05-22 | Stop reason: HOSPADM

## 2025-05-19 RX ORDER — PROCHLORPERAZINE EDISYLATE 5 MG/ML
5 INJECTION INTRAMUSCULAR; INTRAVENOUS EVERY 6 HOURS PRN
Status: DISCONTINUED | OUTPATIENT
Start: 2025-05-19 | End: 2025-05-22 | Stop reason: HOSPADM

## 2025-05-19 RX ORDER — MUPIROCIN 20 MG/G
1 OINTMENT TOPICAL
Status: COMPLETED | OUTPATIENT
Start: 2025-05-19 | End: 2025-05-19

## 2025-05-19 RX ORDER — TRANEXAMIC ACID 10 MG/ML
1000 INJECTION, SOLUTION INTRAVENOUS
Status: COMPLETED | OUTPATIENT
Start: 2025-05-19 | End: 2025-05-19

## 2025-05-19 RX ORDER — POLYETHYLENE GLYCOL 3350 17 G/17G
17 POWDER, FOR SOLUTION ORAL DAILY
Status: DISCONTINUED | OUTPATIENT
Start: 2025-05-19 | End: 2025-05-22 | Stop reason: HOSPADM

## 2025-05-19 RX ORDER — LIDOCAINE HYDROCHLORIDE 20 MG/ML
INJECTION INTRAVENOUS
Status: DISCONTINUED | OUTPATIENT
Start: 2025-05-19 | End: 2025-05-19

## 2025-05-19 RX ORDER — ACETAMINOPHEN 500 MG
1000 TABLET ORAL EVERY 6 HOURS
Status: DISCONTINUED | OUTPATIENT
Start: 2025-05-19 | End: 2025-05-22 | Stop reason: HOSPADM

## 2025-05-19 RX ORDER — ROCURONIUM BROMIDE 10 MG/ML
INJECTION, SOLUTION INTRAVENOUS
Status: DISCONTINUED | OUTPATIENT
Start: 2025-05-19 | End: 2025-05-19

## 2025-05-19 RX ORDER — SODIUM CHLORIDE 9 MG/ML
INJECTION, SOLUTION INTRAVENOUS CONTINUOUS
Status: ACTIVE | OUTPATIENT
Start: 2025-05-19 | End: 2025-05-20

## 2025-05-19 RX ORDER — LIDOCAINE HYDROCHLORIDE 10 MG/ML
1 INJECTION, SOLUTION EPIDURAL; INFILTRATION; INTRACAUDAL; PERINEURAL ONCE
Status: DISCONTINUED | OUTPATIENT
Start: 2025-05-19 | End: 2025-05-19 | Stop reason: HOSPADM

## 2025-05-19 RX ORDER — SODIUM CHLORIDE 0.9 % (FLUSH) 0.9 %
10 SYRINGE (ML) INJECTION
Status: DISCONTINUED | OUTPATIENT
Start: 2025-05-19 | End: 2025-05-19 | Stop reason: HOSPADM

## 2025-05-19 RX ORDER — FAMOTIDINE 20 MG/1
20 TABLET, FILM COATED ORAL 2 TIMES DAILY
Status: DISCONTINUED | OUTPATIENT
Start: 2025-05-19 | End: 2025-05-22 | Stop reason: HOSPADM

## 2025-05-19 RX ADMIN — ROCURONIUM BROMIDE 100 MG: 10 INJECTION, SOLUTION INTRAVENOUS at 11:05

## 2025-05-19 RX ADMIN — GLYCOPYRROLATE 0.1 MG: 0.2 INJECTION, SOLUTION INTRAMUSCULAR; INTRAVITREAL at 11:05

## 2025-05-19 RX ADMIN — ASPIRIN 81 MG: 81 TABLET, COATED ORAL at 09:05

## 2025-05-19 RX ADMIN — MIDAZOLAM HYDROCHLORIDE 2 MG: 1 INJECTION INTRAMUSCULAR; INTRAVENOUS at 11:05

## 2025-05-19 RX ADMIN — FENTANYL CITRATE 100 MCG: 50 INJECTION, SOLUTION INTRAMUSCULAR; INTRAVENOUS at 11:05

## 2025-05-19 RX ADMIN — SUGAMMADEX 200 MG: 100 INJECTION, SOLUTION INTRAVENOUS at 01:05

## 2025-05-19 RX ADMIN — LIDOCAINE HYDROCHLORIDE 100 MG: 20 INJECTION, SOLUTION INTRAVENOUS at 11:05

## 2025-05-19 RX ADMIN — HYDROMORPHONE HYDROCHLORIDE 1 MG: 2 INJECTION, SOLUTION INTRAMUSCULAR; INTRAVENOUS; SUBCUTANEOUS at 12:05

## 2025-05-19 RX ADMIN — VANCOMYCIN HYDROCHLORIDE 1500 MG: 1.5 INJECTION, POWDER, LYOPHILIZED, FOR SOLUTION INTRAVENOUS at 03:05

## 2025-05-19 RX ADMIN — SODIUM CHLORIDE, SODIUM LACTATE, POTASSIUM CHLORIDE, AND CALCIUM CHLORIDE: .6; .31; .03; .02 INJECTION, SOLUTION INTRAVENOUS at 01:05

## 2025-05-19 RX ADMIN — METHOCARBAMOL 750 MG: 750 TABLET ORAL at 09:05

## 2025-05-19 RX ADMIN — MORPHINE SULFATE 4 MG: 4 INJECTION INTRAVENOUS at 04:05

## 2025-05-19 RX ADMIN — OXYCODONE 5 MG: 5 TABLET ORAL at 11:05

## 2025-05-19 RX ADMIN — SODIUM CHLORIDE, SODIUM LACTATE, POTASSIUM CHLORIDE, AND CALCIUM CHLORIDE: .6; .31; .03; .02 INJECTION, SOLUTION INTRAVENOUS at 11:05

## 2025-05-19 RX ADMIN — SENNOSIDES AND DOCUSATE SODIUM 1 TABLET: 50; 8.6 TABLET ORAL at 09:05

## 2025-05-19 RX ADMIN — PREGABALIN 75 MG: 50 CAPSULE ORAL at 07:05

## 2025-05-19 RX ADMIN — CEFAZOLIN 2 G: 2 INJECTION, POWDER, FOR SOLUTION INTRAMUSCULAR; INTRAVENOUS at 12:05

## 2025-05-19 RX ADMIN — MUPIROCIN 1 G: 20 OINTMENT TOPICAL at 08:05

## 2025-05-19 RX ADMIN — FAMOTIDINE 20 MG: 20 TABLET, FILM COATED ORAL at 09:05

## 2025-05-19 RX ADMIN — OXYCODONE 10 MG: 5 TABLET ORAL at 02:05

## 2025-05-19 RX ADMIN — METHOCARBAMOL 750 MG: 750 TABLET ORAL at 02:05

## 2025-05-19 RX ADMIN — ACETAMINOPHEN 1000 MG: 500 TABLET ORAL at 06:05

## 2025-05-19 RX ADMIN — TRANEXAMIC ACID 1000 MG: 10 INJECTION, SOLUTION INTRAVENOUS at 01:05

## 2025-05-19 RX ADMIN — PROPOFOL 40 MG: 10 INJECTION, EMULSION INTRAVENOUS at 12:05

## 2025-05-19 RX ADMIN — KETAMINE HYDROCHLORIDE 20 MG: 100 INJECTION, SOLUTION, CONCENTRATE INTRAMUSCULAR; INTRAVENOUS at 11:05

## 2025-05-19 RX ADMIN — OXYCODONE 10 MG: 5 TABLET ORAL at 07:05

## 2025-05-19 RX ADMIN — FENTANYL CITRATE 50 MCG: 50 INJECTION, SOLUTION INTRAMUSCULAR; INTRAVENOUS at 01:05

## 2025-05-19 RX ADMIN — SODIUM CHLORIDE: 9 INJECTION, SOLUTION INTRAVENOUS at 09:05

## 2025-05-19 RX ADMIN — ONDANSETRON 4 MG: 2 INJECTION, SOLUTION INTRAMUSCULAR; INTRAVENOUS at 12:05

## 2025-05-19 RX ADMIN — PREGABALIN 75 MG: 50 CAPSULE ORAL at 09:05

## 2025-05-19 RX ADMIN — TRANEXAMIC ACID 1000 MG: 10 INJECTION, SOLUTION INTRAVENOUS at 11:05

## 2025-05-19 RX ADMIN — CEFAZOLIN 1 G: 2 INJECTION, POWDER, FOR SOLUTION INTRAMUSCULAR; INTRAVENOUS at 12:05

## 2025-05-19 RX ADMIN — CELECOXIB 400 MG: 100 CAPSULE ORAL at 07:05

## 2025-05-19 RX ADMIN — PROPOFOL 200 MG: 10 INJECTION, EMULSION INTRAVENOUS at 11:05

## 2025-05-19 RX ADMIN — ACETAMINOPHEN 1000 MG: 500 TABLET ORAL at 11:05

## 2025-05-19 RX ADMIN — PROPOFOL 50 MG: 10 INJECTION, EMULSION INTRAVENOUS at 11:05

## 2025-05-19 RX ADMIN — SODIUM CHLORIDE 150 ML/HR: 9 INJECTION, SOLUTION INTRAVENOUS at 02:05

## 2025-05-19 NOTE — NURSING
[facility] [place]  Nurses Note -- 4 Eyes        Date:  05/19/2025        Skin assessed on:  Admit        [x] No Pressure Injuries Present                 []Prevention Measures Documented     [] Yes LDA Previously documented      [] Yes New Pressure Injury Discovered              [] LDA Added        Attending RN:  ALEXANDRA Miller     Second RN:   CICI Damian

## 2025-05-19 NOTE — OR NURSING
Patient has insulin Pump that is currently on, Pt has monitor and controller with him. Dr. Manzo notified and verbalized understanding.

## 2025-05-19 NOTE — TRANSFER OF CARE
Anesthesia Transfer of Care Note    Patient: Tony Gordillo    Procedure(s) Performed: Procedure(s) (LRB):  IRRIGATION AND DEBRIDEMENT, KNEE, WITH ANTIBIOTIC BEADS OR ANTIBIOTIC SPACER INSERTION (Right)    Patient location: PACU    Anesthesia Type: general    Transport from OR: Transported from OR on 6-10 L/min O2 by face mask with adequate spontaneous ventilation    Post pain: adequate analgesia    Post assessment: no apparent anesthetic complications and tolerated procedure well    Post vital signs: stable    Level of consciousness: sedated and responds to stimulation    Nausea/Vomiting: no nausea/vomiting    Complications: none    Transfer of care protocol was followed    Last vitals: Visit Vitals  BP (!) 147/67 (BP Location: Right arm)   Pulse 97   Temp 36.7 °C (98 °F) (Temporal)   Resp 17   Wt 131.1 kg (289 lb 1.8 oz)   SpO2 100%   BMI 39.21 kg/m²

## 2025-05-19 NOTE — PT/OT/SLP PROGRESS
Occupational Therapy      Patient Name:  Tony Gordillo   MRN:  [id number]    Patient not seen today secondary to c/o increased Pain following sx. Will follow-up on 05/20/2025 in AM.    5/19/2025

## 2025-05-19 NOTE — ANESTHESIA PREPROCEDURE EVALUATION
05/19/2025  Tony Gordillo is a 44 y.o., male.  To undergo Procedure(s) (LRB):    IRRIGATION AND DEBRIDEMENT, KNEE, WITH ANTIBIOTIC BEADS OR ANTIBIOTIC SPACER INSERTION (Right)       Past Medical History:  Past Medical History:   Diagnosis Date    Diabetes mellitus, type 2     Hyperlipidemia     Hypertension     Obesity     NAINA (obstructive sleep apnea)        Past Surgical History:  Past Surgical History:   Procedure Laterality Date    ARTHROPLASTY, KNEE, TOTAL, USING COMPUTER-ASSISTED NAVIGATION Right 11/18/2024    Procedure: ARTHROPLASTY, KNEE, TOTAL, USING COMPUTER-ASSISTED NAVIGATION;  Surgeon: Antonio Rolon MD;  Location: [facility] OR;  Service: Orthopedics;  Laterality: Right;  [place]. Same Day  [place] and 1st Assist Kaitlin Notified-NC  -----CLEARED BY PCP  RN PREOP 11/6/2024 ---TYPE&SCREEN --done---BMI--40.97--- HAS INSULIN PUMP    ARTHROSCOPIC DEBRIDEMENT OF SHOULDER  03/14/2023    Procedure: DEBRIDEMENT, SHOULDER, ARTHROSCOPIC;  Surgeon: Crissy Bradford MD;  Location: [facility] OR;  Service: Orthopedics;;    ARTHROSCOPIC DEBRIDEMENT OF SHOULDER  7/23/2024    Procedure: DEBRIDEMENT, SHOULDER, ARTHROSCOPIC;  Surgeon: Crissy Bradford MD;  Location: [facility] OR;  Service: Orthopedics;;    ARTHROSCOPIC REPAIR OF ROTATOR CUFF OF SHOULDER Right 03/14/2023    Procedure: REPAIR, ROTATOR CUFF, ARTHROSCOPIC;  Surgeon: Crissy Bradford MD;  Location: [facility] OR;  Service: Orthopedics;  Laterality: Right;  KARY PAYNE [phone]. TEXTED ELMER ON 3/9/2023 @ 12:10PM. [place] RESPONDED ON 3/9/23 @ 12:23PM-SAG  RN PREOP 3/10/2023---CLEARED BY PCP AND CARDS    ARTHROSCOPIC REPAIR OF ROTATOR CUFF OF SHOULDER Right 7/23/2024    Procedure: REPAIR, ROTATOR CUFF, ARTHROSCOPIC;  Surgeon: Crissy Bradford MD;  Location: [facility] OR;  Service: Orthopedics;  Laterality: Right;  TOMÁS PAYNE  [phone], NURYS [phone]  CLEARED BY PCP  RN PREOP 6/18/2024    ARTHROSCOPY,SHOULDER,WITH BICEPS TENODESIS  03/14/2023    Procedure: ARTHROSCOPY,SHOULDER,WITH BICEPS TENODESIS;  Surgeon: Crissy Bradford MD;  Location: [facility] OR;  Service: Orthopedics;;    KNEE ARTHROSCOPY W/ MENISCECTOMY Right 10/24/2023    Procedure: ARTHROSCOPY, KNEE, WITH MENISCECTOMY;  Surgeon: Crissy Bradford MD;  Location: [facility] OR;  Service: Orthopedics;  Laterality: Right;  RN PREOP 10/18/203---CLEARED BY PCP  ELVIA [phone]    KNEE SURGERY      acl,mcl,pcl    left knee x 2      PRK  Bilateral 2010    SUBCHONDROPLASTY Right 10/24/2023    Procedure: POSSIBLE SUBCHONDROPLASTY;  Surgeon: Crissy Bradford MD;  Location: [facility] OR;  Service: Orthopedics;  Laterality: Right;       Social History:  Social History[1]    Medications:  Medications Ordered Prior to Encounter[2]    Allergies:  Review of patient's allergies indicates:   Allergen Reactions    Influenza virus vaccine, specific Hives    Pioglitazone      Altered mood     Victoza [liraglutide] Nausea And Vomiting    Farxiga [dapagliflozin] Rash     Erectile dysfunction and rash        Active Problems:  Problem List[3]    Diagnostic Studies:   Latest Reference Range & Units 05/13/25 14:47   WBC 3.90 - 12.70 K/uL 9.09   RBC 4.60 - 6.20 M/uL 5.01   Hemoglobin 14.0 - 18.0 gm/dL 13.5 (L)   Hematocrit 40.0 - 54.0 % 43.1   MCV 82 - 98 fL 86   MCH 27.0 - 31.0 pg 26.9 (L)   MCHC 32.0 - 36.0 g/dL 31.3 (L)   RDW 11.5 - 14.5 % 14.9 (H)   Platelet Count 150 - 450 K/uL 322      Latest Reference Range & Units 05/13/25 14:47   Sodium 136 - 145 mmol/L 140   Potassium 3.5 - 5.1 mmol/L 4.5   Chloride 95 - 110 mmol/L 105   CO2 23 - 29 mmol/L 23   Anion Gap 8 - 16 mmol/L 12   BUN 6 - 20 mg/dL 13   Creatinine 0.5 - 1.4 mg/dL 0.7   eGFR >60 mL/min/1.73/m2 >60   Glucose 70 - 110 mg/dL 93   Calcium 8.7 - 10.5 mg/dL 9.7     EKG (5/13/25):  Normal sinus rhythm with sinus arrhythmia      TTE (5/13/25):    Left Ventricle: The left ventricle is normal in size. There is mild concentric hypertrophy. There is normal systolic function with a visually estimated ejection fraction of 60 - 65%.    Right Ventricle: The right ventricle is normal in size Systolic function is normal.    Pulmonary Artery: The estimated pulmonary artery systolic pressure is 17 mmHg.    IVC/SVC: Normal venous pressure at 3 mmHg.    24 Hour Vitals:  Temp:  [37.1 °C (98.7 °F)] 37.1 °C (98.7 °F)  Pulse:  [95] 95  Resp:  [18] 18  SpO2:  [96 %] 96 %  BP: (142)/(84) 142/84   See Nursing Charting For Additional Vitals      Pre-op Assessment    I have reviewed the Patient Summary Reports.     I have reviewed the Nursing Notes.    I have reviewed the Medications.     Review of Systems  Anesthesia Hx:  No problems with previous Anesthesia             Denies Family Hx of Anesthesia complications.     Social:  Former Smoker, Social Alcohol Use, Recreational Drugs MJ use      Cardiovascular:     Hypertension           hyperlipidemia   ECG has been reviewed.                            Pulmonary:        Sleep Apnea                Hepatic/GI:  Hepatic/GI Normal                    Musculoskeletal:  Arthritis   Infected R knee prosthesis            Neurological:      Headaches     H/o TBI                            Endocrine:  Diabetes Hypothyroidism        Obesity / BMI > 30  Psych:  Psychiatric History                  Physical Exam  General: Well nourished, Cooperative and Alert    Airway:  Mallampati: II   Mouth Opening: Normal  TM Distance: Normal  Tongue: Normal  Neck ROM: Normal ROM    Dental:  Intact    Chest/Lungs:  Normal Respiratory Rate    Heart:  Rate: Normal        Anesthesia Plan  Type of Anesthesia, risks & benefits discussed:    Anesthesia Type: Gen ETT  Intra-op Monitoring Plan: Standard ASA Monitors  Induction:  IV  Informed Consent: Informed consent signed with the Patient and all parties understand the risks and agree with  anesthesia plan.  All questions answered. Patient consented to blood products? Yes  ASA Score: 3    Ready For Surgery From Anesthesia Perspective.     .           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     Employer: [employer]   Tobacco Use    Smoking status: Former     Types: Cigars     Passive exposure: Never    Smokeless tobacco: Never    Tobacco comments:     Has not had any cigars this year   Substance and Sexual Activity    Alcohol use: Yes     Comment: 1-2 beers every 2 weeks    Drug use: Yes     Types: Marijuana     Comment: Non-prescription cannabis     Social Drivers of Health     Financial Resource Strain: Patient Declined (3/10/2025)    Overall Financial Resource Strain (CARDIA)     Difficulty of Paying Living Expenses: Patient declined   Food Insecurity: Patient Declined (3/10/2025)    Hunger Vital Sign     Worried About Running Out of Food in the Last Year: Patient declined     Ran Out of Food in the Last Year: Patient declined   Recent Concern: Food Insecurity - Food Insecurity Present (1/9/2025)    Hunger Vital Sign     Worried About Running Out of Food in the Last Year: Sometimes true     Ran Out of Food in the Last Year: Patient declined   Transportation Needs: Patient Declined (3/10/2025)    PRAPARE - Transportation     Lack of Transportation (Medical): Patient declined     Lack of Transportation (Non-Medical): Patient declined   Physical Activity: Insufficiently Active (3/10/2025)    Exercise Vital Sign     Days of Exercise per Week: 2 days     Minutes of Exercise per Session: 20 min   Stress: Stress Concern Present (3/10/2025)    [employer] - Occupational Stress Questionnaire     Feeling of Stress : To some extent   Housing Stability: Unknown (3/10/2025)    Housing Stability Vital Sign     Unable to Pay for Housing in the Last  Year: Patient declined     Homeless in the Last Year: No   Recent Concern: Housing Stability - High Risk (1/9/2025)    Housing Stability Vital Sign     Unable to Pay for Housing in the Last Year: Yes   [2]   No current facility-administered medications on file prior to encounter.     Current Outpatient Medications on File Prior to Encounter   Medication Sig Dispense Refill    acetaminophen (TYLENOL) 500 MG tablet Take 2 tablets (1,000 mg total) by mouth every 8 (eight) hours as needed for Pain. 42 tablet 0    ammonium lactate 12 % Crea Apply 1 application  topically once daily. 140 g 5    amoxicillin (AMOXIL) 875 MG tablet Take 1 tablet (875 mg total) by mouth every 12 (twelve) hours. for 10 days 20 tablet 0    ARIPiprazole (ABILIFY) 2 MG Tab Take 1 tablet (2 mg total) by mouth once daily. 30 tablet 11    atorvastatin (LIPITOR) 40 MG tablet Take 1 tablet (40 mg total) by mouth once daily. 90 tablet 3    azelastine (ASTELIN) 137 mcg (0.1 %) nasal spray Use 1-2 sprays in each nostril twice daily 90 mL 3    benazepriL (LOTENSIN) 20 MG tablet Take 1 tablet (20 mg total) by mouth once daily. 90 tablet 3    cyanocobalamin, vitamin B-12, 5,000 mcg Subl Place one under tongue once a day for 1 month, then continue to take under tongue per week 30 tablet 3    empagliflozin (JARDIANCE) 25 mg tablet Take 1 tablet (25 mg total) by mouth once daily. 90 tablet 2    EPINEPHrine (EPIPEN 2-AYALA) 0.3 mg/0.3 mL AtIn Inject 0.3 mLs (0.3 mg total) into the muscle as needed (Anaphylaxis). 2 each 0    fluticasone propionate (FLONASE) 50 mcg/actuation nasal spray 1 spray (50 mcg total) by Each Nostril route once daily. 48 g 5    insulin aspart U-100 (NOVOLOG U-100 INSULIN ASPART) 100 unit/mL injection MAX DAILY DOSE 120 UNITS IN INSULIN PUMP. 110 mL 2    insulin pump cart,auto,BT,G6/7 (OMNIPOD 5 G6-G7 PODS, GEN 5,) Crtg Change every 2 days 45 each 2    insulin pump cart,automated,BT (OMNIPOD 5 G6 PODS, GEN 5,) Crtg Inject 1 each into the  "skin once daily. 30 each 11    ketoconazole (NIZORAL) 2 % cream Apply topically once daily. 60 g 0    L-METHYLFOLATE 15 mg Tab TAKE 15 MG BY MOUTH ONCE DAILY. 30 tablet 11    lamoTRIgine (LAMICTAL) 100 MG tablet Take 1.5 tablets (150 mg total) by mouth once daily. 135 tablet 3    loratadine (CLARITIN) 10 mg tablet Take 1 tablet (10 mg total) by mouth once daily. 90 tablet 3    mirtazapine (REMERON) 15 MG tablet Take 1 tablet (15 mg total) by mouth every evening. 30 tablet 11    modafiniL (PROVIGIL) 100 MG Tab Take 1 tablet (100 mg total) by mouth every morning. 90 tablet 1    pen needle, diabetic (BD ULTRA-FINE KEN PEN NEEDLE) 32 gauge x 5/32" Ndle 1 Device by Misc.(Non-Drug; Combo Route) route 5 (five) times daily. 550 each 4    pregabalin (LYRICA) 75 MG capsule Take 1 capsule (75 mg total) by mouth 2 (two) times daily. 60 capsule 0    QUEtiapine (SEROQUEL) 25 MG Tab Take 1 tablet (25 mg total) by mouth once daily in the evening 30 tablet 11    blood sugar diagnostic Strp To check BG 4 times daily, to use with insurance preferred meter 400 strip 3    blood sugar diagnostic Strp OneTouch Ultra Test strips      blood-glucose meter kit OneTouch Ultra2 Meter kit      blood-glucose sensor (DEXCOM G7 SENSOR) Filomena Change every 10 days 12 each 3    lancets Misc To check BG 4 times daily, to use with insurance preferred meter 400 each 3    meloxicam (MOBIC) 15 MG tablet Take 1 tablet (15 mg total) by mouth once daily. 30 tablet 1    syringe, disposable, 1 mL Syrg Testosterone every 2 weeks 25 Syringe 1    tirzepatide (MOUNJARO) 12.5 mg/0.5 mL PnIj Inject 12.5 mg (one pen) into the skin every 7 days. 4 Pen 3    traMADoL (ULTRAM) 50 mg tablet Take 1-2 tablets ( mg total) by mouth every 6 (six) hours as needed for Pain. 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 MRI negative. 11/2019 started on testosterone    Right foot pain    Decreased strength of lower extremity    Decreased range of motion of both ankles    Gait abnormality    Rib pain on left side    Dyspnea    Decreased strength, endurance, and mobility    Left hand pain    Mild neurocognitive disorder due to traumatic brain injury    Outbursts of anger    Other amnesia    Traumatic rotator cuff tear, right, initial encounter    S/P right rotator cuff repair    Biceps tendonosis of right shoulder    Decreased range of motion of right shoulder    Impaired strength of shoulder muscles    Allergic conjunctivitis    Cornea edema    Descemet's membrane fold    Herpes simplex keratitis    Uncontrolled type 2 diabetes mellitus    Tear of lateral meniscus of right knee, current    Refractive error    Acute migraine    MCI (mild cognitive impairment)    Medication overuse headache    Chronic migraine with aura, intractable, with status migrainosus    Agitation    Traumatic encephalopathy    Allergy desensitization therapy    Concussion with no loss of consciousness    Concussion with loss of consciousness    Other specified persistent mood disorders    Cognitive communication deficit    Impaired mobility and ADLs    Imbalance    Primary osteoarthritis of both knees    Bilateral chronic knee pain

## 2025-05-19 NOTE — NURSING
Patient arrived to unit via stretcher, eyes open, resp even and unlabored, IV fluids infusing, alert and oriented, wife at bedside, patient safely transferred into bed from stretcher, brace and polar ice to RLE, skin intact, SCD's applied, vitals obtained, bed locked and in low position, call light within reach,

## 2025-05-19 NOTE — H&P
EST PATIENT ORTHOPAEDIC: Knee    PRIMARY CARE PHYSICIAN: Jovany Ford MD   REFERRING PROVIDER: Antonio Rolon MD  [address] [place] [address]  KATARINA Harrington [id number]     ASSESSMENT & PLAN:    Impression:  Right Knee PJI  Left knee severe osteoarthritis  Left knee history of ACL reconstruction    Follow Up Plan: 1 weeks after surgery    44 y.o. male status post right total Knee Primary completed on 11/18/24.  Patient here today for postoperative follow up now almost 6 months out from his right total knee.  He is on a cane for ambulation still but attributes this to both his right knee ongoing issues as well as his left knee pain.  He has been working hard with outpatient therapy.  He has regained full extension and has good motion to about 115° today.  But he has had intermittent episodes where his knees become swollen on him and he has had some difficulty with his range of motion and it is regression on his right.  His incision looks good.  His pain is reasonably controlled, but intermittently can be debilitating.  He reports the pain to be more neuropathic.  He reports some medial and lateral based pain.  He clinically does not have any extension mid flexion or flexion instability.  He I do notice some swelling in his knee.  At last visit I thought it is necessary to obtain repeat radiographs, get ESR and CRP to ensure no underlying infectious process.  He had elevated inflammatory markers at a CRP of 48 and an ESR of 28.  I also had x-rays obtained which demonstrated some lateral patellar tilt and tracking to his knee compared to films obtained from months prior.  I brought him back today for aspiration of his knee.  I attempted two times both from a superior lateral and anterior lateral portal to obtain fluid from his knee neither of these provided joint fluid.  So I obtained an MRI which had concerning findings for osteomyelitis and infection of his joint.  I sent him for ultrasound-guided aspiration which was sent for  Synovasure.  This came back with alpha defense of positive, synovial CRP positive, significantly elevated white blood cell count, NGTD on cultures.  As a result I think he would benefit from explant and dynamic spacer placement with plans wants infections cleared for definitive revision knee replacement.     We had a lengthy discussion regarding the risk and benefit of surgery, the alternatives, limitations and personnel involved. These included but were not limited to infection, persistent pain, instability, nerve injury, blood clots, dislocation, loosening, leg length inequality and medical complications. We also discussed the pre-operative course, surgery itself and rehabilitation.    Patricia-operative blood management and transfusion issues were discussed, and options clearly outlined. The patient has consented to the use of the banked allogenic blood if medically necessary.    We will plan for use of [facility] components. 5/19/25    We discussed expected rehab, need for prolonged IV antibiotics, need for infectious disease consult and generalized timing for replant if infections cleared.  Would tentatively think about doing this July 28th    The patient has been ordered:  None    CONSULTS:   Anesthesia for optimization    ACTIVE PROBLEM LIST  Patient Active Problem List   Diagnosis    Hyperlipidemia LDL goal <70    Insulin long-term use    Tinea pedis    Morbid obesity    Hypothyroidism    Type 2 diabetes mellitus with left eye affected by mild nonproliferative retinopathy without macular edema, with long-term current use of insulin    Essential hypertension    Migraine with aura and without status migrainosus, not intractable propranolol SE angry; topamax not helpful; imitrex ineffective; daith ear piercing helps    Non-seasonal allergic rhinitis; avoid antihistamines per opthalmology    Acute bilateral low back pain without sciatica    NAINA (obstructive sleep apnea) 8/2019 sleep study AHI 11    Low  testosterone in male; pituitary axis normal. MRI negative. 11/2019 started on testosterone    Right foot pain    Decreased strength of lower extremity    Decreased range of motion of both ankles    Gait abnormality    Rib pain on left side    Dyspnea    Decreased strength, endurance, and mobility    Left hand pain    Mild neurocognitive disorder due to traumatic brain injury    Outbursts of anger    Other amnesia    Traumatic rotator cuff tear, right, initial encounter    S/P right rotator cuff repair    Biceps tendonosis of right shoulder    Decreased range of motion of right shoulder    Impaired strength of shoulder muscles    Allergic conjunctivitis    Cornea edema    Descemet's membrane fold    Herpes simplex keratitis    Uncontrolled type 2 diabetes mellitus    Tear of lateral meniscus of right knee, current    Refractive error    Acute migraine    MCI (mild cognitive impairment)    Medication overuse headache    Chronic migraine with aura, intractable, with status migrainosus    Agitation    Traumatic encephalopathy    Allergy desensitization therapy    Concussion with no loss of consciousness    Concussion with loss of consciousness    Other specified persistent mood disorders    Cognitive communication deficit    Impaired mobility and ADLs    Imbalance    Primary osteoarthritis of both knees    Bilateral chronic knee pain           SUBJECTIVE    CHIEF COMPLAINT: Knee Pain    HPI:     44 y.o. male status post right total Knee Primary completed on 11/18/24.  Patient here today for postoperative follow up now almost 6 months out from his right total knee.  He is on a cane for ambulation still but attributes this to both his right knee ongoing issues as well as his left knee pain.  He has been working hard with outpatient therapy.  He has regained full extension and has good motion to about 115° today.  But he has had intermittent episodes where his knees become swollen on him and he has had some difficulty with  his range of motion and it is regression on his right.  His incision looks good.  His pain is reasonably controlled, but intermittently can be debilitating.  He reports the pain to be more neuropathic.  He reports some medial and lateral based pain.  He clinically does not have any extension mid flexion or flexion instability.  He I do notice some swelling in his knee.  At last visit I thought it is necessary to obtain repeat radiographs, get ESR and CRP to ensure no underlying infectious process.  He had elevated inflammatory markers at a CRP of 48 and an ESR of 28.  I also had x-rays obtained which demonstrated some lateral patellar tilt and tracking to his knee compared to films obtained from months prior.  I brought him back today for aspiration of his knee.  I attempted two times both from a superior lateral and anterior lateral portal to obtain fluid from his knee neither of these provided joint fluid.  So I obtained an MRI which had concerning findings for osteomyelitis and infection of his joint.  I sent him for ultrasound-guided aspiration which was sent for Synovasure.  This came back with alpha defense of positive, synovial CRP positive, significantly elevated white blood cell count, NGTD on cultures.     PROGRESSIVE SYMPTOMS:  Pain impacting work  Pain worsened by weight bearing  Pain effecting living situation    FUNCTIONAL STATUS:   Climb a flight of stairs or walk up a hill     PREVIOUS TREATMENTS:  Medical: OTC NSAIDS, RX NSAIDS, Steroid Injections, Biologic Injections, Narcotics, and Tylenol  Physical Therapy: Activities Modified  and Formal Physical Therapy for 6 weeks  Previous Orthopaedic Surgery: Left Knee ACL Reconstruction x3, Right Shoulder RCR with Dr. Bradford    REVIEW OF SYSTEMS:  PAIN ASSESSMENT:  See HPI.  MUSCULOSKELETAL: See HPI.  OTHER 10 point review of systems is negative except as stated in HPI above    PAST MEDICAL HISTORY   has a past medical history of Diabetes mellitus, type 2,  Hyperlipidemia, Hypertension, Obesity, and NAINA (obstructive sleep apnea).     PAST SURGICAL HISTORY   has a past surgical history that includes left knee x 2; Knee surgery; Arthroscopic repair of rotator cuff of shoulder (Right, 03/14/2023); arthroscopy,shoulder,with biceps tenodesis (03/14/2023); Arthroscopic debridement of shoulder (03/14/2023); Knee arthroscopy w/ meniscectomy (Right, 10/24/2023); Subchondroplasty (Right, 10/24/2023); PRK  (Bilateral, 2010); Arthroscopic repair of rotator cuff of shoulder (Right, 7/23/2024); Arthroscopic debridement of shoulder (7/23/2024); and arthroplasty, knee, total, using computer-assisted navigation (Right, 11/18/2024).     FAMILY HISTORY  family history includes Autism in his son; Diabetes in his father, maternal grandmother, and mother; No Known Problems in his brother, daughter, maternal aunt, maternal grandfather, maternal uncle, paternal aunt, paternal grandfather, paternal grandmother, paternal uncle, and another family member.     SOCIAL HISTORY   reports that he has quit smoking. His smoking use included cigars. He has never been exposed to tobacco smoke. He has never used smokeless tobacco. He reports current alcohol use. He reports current drug use. Drug: Marijuana.     ALLERGIES   Review of patient's allergies indicates:   Allergen Reactions    Influenza virus vaccine, specific Hives    Pioglitazone      Altered mood     Victoza [liraglutide] Nausea And Vomiting    Farxiga [dapagliflozin] Rash     Erectile dysfunction and rash         MEDICATIONS  No current facility-administered medications on file prior to encounter.     Current Outpatient Medications on File Prior to Encounter   Medication Sig Dispense Refill    acetaminophen (TYLENOL) 500 MG tablet Take 2 tablets (1,000 mg total) by mouth every 8 (eight) hours as needed for Pain. 42 tablet 0    ammonium lactate 12 % Crea Apply 1 application  topically once daily. 140 g 5    amoxicillin (AMOXIL) 875 MG tablet  Take 1 tablet (875 mg total) by mouth every 12 (twelve) hours. for 10 days 20 tablet 0    ARIPiprazole (ABILIFY) 2 MG Tab Take 1 tablet (2 mg total) by mouth once daily. 30 tablet 11    atorvastatin (LIPITOR) 40 MG tablet Take 1 tablet (40 mg total) by mouth once daily. 90 tablet 3    azelastine (ASTELIN) 137 mcg (0.1 %) nasal spray Use 1-2 sprays in each nostril twice daily 90 mL 3    benazepriL (LOTENSIN) 20 MG tablet Take 1 tablet (20 mg total) by mouth once daily. 90 tablet 3    cyanocobalamin, vitamin B-12, 5,000 mcg Subl Place one under tongue once a day for 1 month, then continue to take under tongue per week 30 tablet 3    empagliflozin (JARDIANCE) 25 mg tablet Take 1 tablet (25 mg total) by mouth once daily. 90 tablet 2    EPINEPHrine (EPIPEN 2-AYALA) 0.3 mg/0.3 mL AtIn Inject 0.3 mLs (0.3 mg total) into the muscle as needed (Anaphylaxis). 2 each 0    fluticasone propionate (FLONASE) 50 mcg/actuation nasal spray 1 spray (50 mcg total) by Each Nostril route once daily. 48 g 5    insulin aspart U-100 (NOVOLOG U-100 INSULIN ASPART) 100 unit/mL injection MAX DAILY DOSE 120 UNITS IN INSULIN PUMP. 110 mL 2    insulin pump cart,auto,BT,G6/7 (OMNIPOD 5 G6-G7 PODS, GEN 5,) Crtg Change every 2 days 45 each 2    insulin pump cart,automated,BT (OMNIPOD 5 G6 PODS, GEN 5,) Crtg Inject 1 each into the skin once daily. 30 each 11    ketoconazole (NIZORAL) 2 % cream Apply topically once daily. 60 g 0    L-METHYLFOLATE 15 mg Tab TAKE 15 MG BY MOUTH ONCE DAILY. 30 tablet 11    lamoTRIgine (LAMICTAL) 100 MG tablet Take 1.5 tablets (150 mg total) by mouth once daily. 135 tablet 3    loratadine (CLARITIN) 10 mg tablet Take 1 tablet (10 mg total) by mouth once daily. 90 tablet 3    mirtazapine (REMERON) 15 MG tablet Take 1 tablet (15 mg total) by mouth every evening. 30 tablet 11    modafiniL (PROVIGIL) 100 MG Tab Take 1 tablet (100 mg total) by mouth every morning. 90 tablet 1    pen needle, diabetic (BD ULTRA-FINE KEN PEN  "NEEDLE) 32 gauge x 5/32" Ndle 1 Device by Misc.(Non-Drug; Combo Route) route 5 (five) times daily. 550 each 4    pregabalin (LYRICA) 75 MG capsule Take 1 capsule (75 mg total) by mouth 2 (two) times daily. 60 capsule 0    QUEtiapine (SEROQUEL) 25 MG Tab Take 1 tablet (25 mg total) by mouth once daily in the evening 30 tablet 11    blood sugar diagnostic Strp To check BG 4 times daily, to use with insurance preferred meter 400 strip 3    blood sugar diagnostic Strp OneTouch Ultra Test strips      blood-glucose meter kit OneTouch Ultra2 Meter kit      blood-glucose sensor (DEXCOM G7 SENSOR) Filomena Change every 10 days 12 each 3    lancets Misc To check BG 4 times daily, to use with insurance preferred meter 400 each 3    meloxicam (MOBIC) 15 MG tablet Take 1 tablet (15 mg total) by mouth once daily. 30 tablet 1    syringe, disposable, 1 mL Syrg Testosterone every 2 weeks 25 Syringe 1    tirzepatide (MOUNJARO) 12.5 mg/0.5 mL PnIj Inject 12.5 mg (one pen) into the skin every 7 days. 4 Pen 3    traMADoL (ULTRAM) 50 mg tablet Take 1-2 tablets ( mg total) by mouth every 6 (six) hours as needed for Pain. 40 tablet 0          PHYSICAL EXAM   weight is 131.1 kg (289 lb 1.8 oz). His oral temperature is 98.7 °F (37.1 °C). His blood pressure is 142/84 (abnormal) and his pulse is 95. His respiration is 18 and oxygen saturation is 96%.   Body mass index is 39.21 kg/m².      All other systems deferred.  GENERAL:  No acute distress  HABITUS: Obese  GAIT: Antalgic  SKIN: Normal   CV: RRR  Non labored breathing     KNEE EXAM:    right:   Effusion: Moderate joint effusion  TTP: Yes over Medial and lateral Joint Line   Yes crepitus with passive knee ROM  Passive ROM: Extension 0, Flexion 115  No pain with manipulation of patella  Stable to varus/valgus stress. No increased laxity to anterior/posterior drawer testing  No pain with IR/ER rotation of the hip  5/5 strength in knee flexion and extension, ankle plantarflexion and " dorsiflexion  Neurovascular Status: Sensation intact to light touch in Sural, Saphenous, SPN, DPN, Tibial nerve distribution  2+ pulse DP/PT, normal capillary refill, foot has normal warmth    DATA:  Diagnostic tests reviewed for today's visit:     4v of the left knee reveal Moderate degenerative changes of the Lateral and Patellofemoral compartment. There is evidence of advanced osteoarthritis changes with Osteophyte formation and Joint space narrowing. The limb is in netural alignment. The patella is tracking midline.    Repeat 3v of right knee radiographs demonstrate well-seated well-positioned components with a lateral subluxation of his patella compared to previous films    MRI report and images reviewed with findings concerning for periprosthetic septic arthritis and osteomyelitis

## 2025-05-19 NOTE — ANESTHESIA PROCEDURE NOTES
Intubation    Date/Time: 5/19/2025 11:17 AM    Performed by: Marco Ayers CRNA  Authorized by: Marga Borrero MD    Intubation:     Induction:  Intravenous    Intubated:  Postinduction    Mask Ventilation:  Not attempted    Attempts:  1    Attempted By:  CRNA    Method of Intubation:  Video laryngoscopy    Blade:  Chua 3 (X3 Blade)    Laryngeal View Grade: Grade I - full view of cords      Difficult Airway Encountered?: No      Complications:  None    Airway Device:  Oral endotracheal tube    Airway Device Size:  7.5    Style/Cuff Inflation:  Cuffed (inflated to minimal occlusive pressure)    Tube secured:  23    Secured at:  The lips    Placement Verified By:  Capnometry    Complicating Factors:  None    Findings Post-Intubation:  BS equal bilateral and atraumatic/condition of teeth unchanged

## 2025-05-19 NOTE — PT/OT/SLP PROGRESS
Physical Therapy      Patient Name:  Tony Gordillo   MRN:  [id number]    Patient not seen today secondary to Pain, Other (Comment) (Wife reports he is diabetic and hasn't eaten yet.). Will follow-up tomorrow am.

## 2025-05-19 NOTE — OP NOTE
OPERATIVE NOTE     PATIENT NAME: Tony Gordillo   MRN: [id number]      Surgery Date:5/19/25  Surgeon(s) and Assistant(s): Antonio Rolon MD. Fany Han CFA     Procedure(s): right knee explant and antibiotic spacer with antibiotic bead placement      Anesthesia:  General     Attestation:   I was present for all of the critical portions of the operation and performed all critical portions.  The medial assistant performed the superficial closure under supervision, and assisted during the operation. I was immediately available for the duration of the entire case.      Preoperative Diagnosis:  Right knee periprosthetic joint infection     Postoperative Diagnosis: Same     Findings:  See Full Operative Report    Complications: None     EBL: 75cc     Implants:   Implant Name Type Inv. Item Serial No.  Lot No. LRB No. Used Action   KIT GRAFT BONE/SUB STIM 10ML - [id number]  KIT GRAFT BONE/SUB STIM 10ML  BIOCOMPOSITE [id number] Right 2 Implanted   SPECTRUM GV BONE CEMENT     [id number] Right 2 Implanted   SPECTRUM GV BONE CEMENT     [id number] Right 1 Implanted   REMEDY TIBIAL COMPONENT     [id number] Right 1 Implanted   REMEDY FEMORAL COMPONENT     [id number] Right 1 Implanted       Drains: Provena Wound Vac    Tourniquet Time: 70 min     Problem List:   Problem List Items Addressed This Visit          Endocrine    Type 2 diabetes mellitus with left eye affected by mild nonproliferative retinopathy without macular edema, with long-term current use of insulin    Relevant Orders    POCT glucose     Other Visit Diagnoses         Infection of prosthetic joint, initial encounter    -  Primary    Relevant Orders    Vital signs    Perform Warner Agitation Sedation Scale (RASS)    Insert peripheral IV    FOOT COMPRESSION PUMP    Place foot compression device    Document RASS    Chlorhexidine (CHG) 2% Wipes    Ortho Pathway Patient    Notify Physician - Potential Need of Opioid Reversal    Notify Anesthesiologist  if patient on home insulin pump    Vital signs - notify MD    Diet NPO    Monitor EtCO2    Oxygen Continuous    Admit to Inpatient    Inpatient consult to Infectious Diseases      Prosthetic joint infection        Relevant Orders    Vital signs    Perform Warner Agitation Sedation Scale (RASS)    Insert peripheral IV    FOOT COMPRESSION PUMP    Place foot compression device    Document RASS    Chlorhexidine (CHG) 2% Wipes    Ortho Pathway Patient    Notify Physician - Potential Need of Opioid Reversal    Notify Anesthesiologist if patient on home insulin pump    Vital signs - notify MD    Diet NPO    Monitor EtCO2    Oxygen Continuous    Admit to Inpatient    Inpatient consult to Infectious Diseases             OPERATIVE INDICATIONS:     44 y.o. male status post right total Knee Primary completed on 11/18/24.  Patient here today for postoperative follow up now almost 6 months out from his right total knee.  He is on a cane for ambulation still but attributes this to both his right knee ongoing issues as well as his left knee pain.  He has been working hard with outpatient therapy.  He has regained full extension and has good motion to about 115° today.  But he has had intermittent episodes where his knees become swollen on him and he has had some difficulty with his range of motion and it is regression on his right.  His incision looks good.  His pain is reasonably controlled, but intermittently can be debilitating.  He reports the pain to be more neuropathic.  He reports some medial and lateral based pain.  He clinically does not have any extension mid flexion or flexion instability.  He I do notice some swelling in his knee.  At last visit I thought it is necessary to obtain repeat radiographs, get ESR and CRP to ensure no underlying infectious process.  He had elevated inflammatory markers at a CRP of 48 and an ESR of 28.  I also had x-rays obtained which demonstrated some lateral patellar tilt and tracking to  his knee compared to films obtained from months prior.  I brought him back today for aspiration of his knee.  I attempted two times both from a superior lateral and anterior lateral portal to obtain fluid from his knee neither of these provided joint fluid.  So I obtained an MRI which had concerning findings for osteomyelitis and infection of his joint.  I sent him for ultrasound-guided aspiration which was sent for Synovasure.  This came back with alpha defense of positive, synovial CRP positive, significantly elevated white blood cell count, NGTD on cultures.  As a result I think he would benefit from explant and dynamic spacer placement with plans wants infections cleared for definitive revision knee replacement.      Knee revision, benefits, and potential complications were outlined in detail.  These included but are not limited to fracture, infection, vascular injury, nerve injury, instability, patellar dislocation, and need for banked bone grafting. Understanding of all topics was conveyed to me by the patient pre-operatively, and patient consent was given to proceed with the planned revision knee arthroplasty to stabilize the joint.    CLINICAL EVIDENCE OF INFECTION: In joint fluid    OPERATIVE PROCEDURE:   The patient was identified and brought into the Operating Room by the anesthesia and nursing teams. The lower extremity and surgical site were marked prior to moving into the operating room. Intravenous antibiotic prophylaxis dosing was confirmed and allergies checked. General anesthesia was successfully performed, and appropriate IV access acquired. Antibiotics were held so cultures could be obtained. The patient was carefully re-positioned supine on the operating room table. The leg kothari was placed. All pressure points were checked and carefully padded.  Sequential compression stockings were applied to the non-operative lower extremity.  A high tourniquet was applied to the upper thigh. Tranexamic acid  was used during the procedure and at the time of closure for blood conservation.    A surgical time-out was performed immediately preceding the incision with all personnel in the operating room; the patient identity was again confirmed, the right knee-surgical site and extremity were identified and confirmed, X-rays were reviewed, and availability of the appropriate surgical equipment was established.    The knee was exsanguinated and exposed through the previous skin incision. This was extended proximally and distally for exposure. Dissection was carried down through skin and subcutaneous tissue with care taken to preserve well vascularized skin flaps medially and laterally. A median parapatellar arthrotomy was made from the open capsule rent proximally and distally to enter the knee space. Fluid samples were sent for culture and cell count. The patella was dislocated laterally. The synovium was thickened and hypertrophic. A near complete synovectomy was performed for exposure, and the medial and lateral gutters were cleared of scar and synovial reflections. Tissue samples were also sent for culture.       Attention was initially turned to the fixed femoral component.  A Ranawat bent Hoeman retractor was placed laterally at Gerdy's tubercle, and 2 Z-retractors were placed to expose and protect the collateral ligaments. Osteotomes and micosaggital saw were used to carefully break up the well-fixed implant-bone interfaces on all flat surfaces circumferentially. Using manual dis-impaction techniques that employed a simultaneous slap hammer and a bone tamp on the edges of the component, the implant was carefully removed from the distal femur. The femoral component was removed without much iatrogenic bone loss. The epicondyles remained intact and attached, but anterior bone was deficient but did not perforate through the anterior cortex. Membrane scrapings were taken from the tibia for culture.     We then moved on to  removal of the tibial tray. Retractors were placed medially and laterally to protect the collaterals, and a Mejia retractor was placed posterior-central in the intra-condylar notch. The tibia was then subluxed anteriorly for wide exposure.  Exposure along the joint line was broadened to further protect the extensor mechanism and tibial tubercle.  The tibial implant remained attached due to abundant fibrous tissue filling the interfaces and keeping it in place. The sagittal saw and multiple osteotomes were used to free the implant. This was done carefully so the soft tibial plateau did not fracture. There was some mild posterolateral bone loss. Membrane scrapings were taken from the tibia for culture.     After further extensive debridement of the posterior knee. A series of washes was used. In total 9L of NS was used, H202 mixed in NS, Betadine mixed in NS, and Chlorahexidine (Aricept). The latter two washes were allowed to sit for multiple minutes.      Next, the tibial and femoral [employer] trial was used to get a relative assessment of stability.  The femur was sized AP and ML, and I elected for a Large femur. Similarly, I choose a Large tibial implant. There was some tightness in extension, and some varus-valgus laxity in flexion but it was a reasonable construct overall for stability at 0 and 90 degrees.      Next, we mixed the antibotic cement. We added vancomyin and tobramycin to the preformed low viscosity packaged cement and added methelyne blue. This was hand mixed without a vacuum. I also used antibotic beads in the joint space which were placed at the end of the case.      The total antibiotic concentration for the spacer was 5g of Vancomycin and 7g of (Tobra/Gentamicin)      The tourniquet was released prior to cementing and hemostasis was insured.      The final implants was placed. The knee was held in extension until the cement cured.      The entire construct was checked for stability through  the range of motion.      The medial capsule was then re-approximated to the repaired tendon with #1 Stratafix. The deep and superficial subcutaneous tissue was closed in multiple layers with interrupted 2-0 PDS. The skin was closed with staples. A provena wound vac was placed. A T-scope brace was placed. The patient was then transferred from the operating room table to a hospital bed and taken to recovery in stable condition.     POSTOPERATIVE PLANS:      WEIGHT BEARING- partial weight bearing (flat foot) in T-scope brace. Unlocked from 0-60 degrees.      BRACING-  T Scope    Wound Care: Provena for 7-10 days.      PHYSICAL THERAPY-     Range of motion restricted in T Scope     Active flexion-extension only, no forced motion at any time during recovery.     ANTIBIOTICS-The patient will stay on IV antibiotics as directed by ID. Will need picc line and  for expected 6 weeks of IV abx. Will follow up on operative cultures. NGTD on Synovasure testing cultures.      SPECIMENS:  Knee arthroplasty implants for analysis, metallic and plastic. Fluid for cell count and culture. Synovium. Membranes from implant-bone and cement-bone interfaces.      DISPO: Home with Home Health

## 2025-05-20 PROBLEM — T84.53XA INFECTION OF PROSTHETIC RIGHT KNEE JOINT: Status: ACTIVE | Noted: 2025-05-20

## 2025-05-20 LAB
POCT GLUCOSE: 112 MG/DL (ref 70–110)
POCT GLUCOSE: 153 MG/DL (ref 70–110)
POCT GLUCOSE: 158 MG/DL (ref 70–110)
POCT GLUCOSE: 85 MG/DL (ref 70–110)

## 2025-05-20 PROCEDURE — 25000003 PHARM REV CODE 250: Performed by: ORTHOPAEDIC SURGERY

## 2025-05-20 PROCEDURE — 99900035 HC TECH TIME PER 15 MIN (STAT)

## 2025-05-20 PROCEDURE — 97535 SELF CARE MNGMENT TRAINING: CPT

## 2025-05-20 PROCEDURE — 36569 INSJ PICC 5 YR+ W/O IMAGING: CPT

## 2025-05-20 PROCEDURE — 02HV33Z INSERTION OF INFUSION DEVICE INTO SUPERIOR VENA CAVA, PERCUTANEOUS APPROACH: ICD-10-PCS | Performed by: PEDIATRICS

## 2025-05-20 PROCEDURE — 63600175 PHARM REV CODE 636 W HCPCS: Performed by: ORTHOPAEDIC SURGERY

## 2025-05-20 PROCEDURE — 21400001 HC TELEMETRY ROOM

## 2025-05-20 PROCEDURE — 97161 PT EVAL LOW COMPLEX 20 MIN: CPT

## 2025-05-20 PROCEDURE — 97116 GAIT TRAINING THERAPY: CPT

## 2025-05-20 PROCEDURE — 99223 1ST HOSP IP/OBS HIGH 75: CPT | Mod: ,,, | Performed by: INTERNAL MEDICINE

## 2025-05-20 PROCEDURE — 97165 OT EVAL LOW COMPLEX 30 MIN: CPT

## 2025-05-20 PROCEDURE — C1751 CATH, INF, PER/CENT/MIDLINE: HCPCS

## 2025-05-20 PROCEDURE — 63600175 PHARM REV CODE 636 W HCPCS: Performed by: INTERNAL MEDICINE

## 2025-05-20 RX ORDER — CEFTRIAXONE 2 G/1
2 INJECTION, POWDER, FOR SOLUTION INTRAMUSCULAR; INTRAVENOUS
Status: DISCONTINUED | OUTPATIENT
Start: 2025-05-20 | End: 2025-05-22 | Stop reason: HOSPADM

## 2025-05-20 RX ORDER — SODIUM CHLORIDE 0.9 % (FLUSH) 0.9 %
10 SYRINGE (ML) INJECTION EVERY 12 HOURS PRN
Status: DISCONTINUED | OUTPATIENT
Start: 2025-05-20 | End: 2025-05-22 | Stop reason: HOSPADM

## 2025-05-20 RX ADMIN — ACETAMINOPHEN 1000 MG: 500 TABLET ORAL at 06:05

## 2025-05-20 RX ADMIN — OXYCODONE 10 MG: 5 TABLET ORAL at 08:05

## 2025-05-20 RX ADMIN — ASPIRIN 81 MG: 81 TABLET, COATED ORAL at 08:05

## 2025-05-20 RX ADMIN — OXYCODONE 10 MG: 5 TABLET ORAL at 09:05

## 2025-05-20 RX ADMIN — VANCOMYCIN HYDROCHLORIDE 2000 MG: 2 INJECTION, POWDER, LYOPHILIZED, FOR SOLUTION INTRAVENOUS at 09:05

## 2025-05-20 RX ADMIN — FAMOTIDINE 20 MG: 20 TABLET, FILM COATED ORAL at 09:05

## 2025-05-20 RX ADMIN — METHOCARBAMOL 750 MG: 750 TABLET ORAL at 09:05

## 2025-05-20 RX ADMIN — ACETAMINOPHEN 1000 MG: 500 TABLET ORAL at 12:05

## 2025-05-20 RX ADMIN — OXYCODONE 10 MG: 5 TABLET ORAL at 04:05

## 2025-05-20 RX ADMIN — SENNOSIDES AND DOCUSATE SODIUM 1 TABLET: 50; 8.6 TABLET ORAL at 09:05

## 2025-05-20 RX ADMIN — SENNOSIDES AND DOCUSATE SODIUM 1 TABLET: 50; 8.6 TABLET ORAL at 08:05

## 2025-05-20 RX ADMIN — METHOCARBAMOL 750 MG: 750 TABLET ORAL at 08:05

## 2025-05-20 RX ADMIN — METHOCARBAMOL 750 MG: 750 TABLET ORAL at 02:05

## 2025-05-20 RX ADMIN — PREGABALIN 75 MG: 50 CAPSULE ORAL at 08:05

## 2025-05-20 RX ADMIN — CEFTRIAXONE 2 G: 2 INJECTION, POWDER, FOR SOLUTION INTRAMUSCULAR; INTRAVENOUS at 12:05

## 2025-05-20 RX ADMIN — ASPIRIN 81 MG: 81 TABLET, COATED ORAL at 09:05

## 2025-05-20 RX ADMIN — OXYCODONE 10 MG: 5 TABLET ORAL at 02:05

## 2025-05-20 RX ADMIN — POLYETHYLENE GLYCOL 3350 17 G: 17 POWDER, FOR SOLUTION ORAL at 09:05

## 2025-05-20 RX ADMIN — FAMOTIDINE 20 MG: 20 TABLET, FILM COATED ORAL at 08:05

## 2025-05-20 RX ADMIN — ACETAMINOPHEN 1000 MG: 500 TABLET ORAL at 11:05

## 2025-05-20 RX ADMIN — VANCOMYCIN HYDROCHLORIDE 2000 MG: 2 INJECTION, POWDER, LYOPHILIZED, FOR SOLUTION INTRAVENOUS at 10:05

## 2025-05-20 RX ADMIN — SODIUM CHLORIDE 150 ML: 9 INJECTION, SOLUTION INTRAVENOUS at 04:05

## 2025-05-20 RX ADMIN — MORPHINE SULFATE 4 MG: 4 INJECTION INTRAVENOUS at 04:05

## 2025-05-20 NOTE — PLAN OF CARE
Case Management Assessment     PCP: Jovany Ford MD    Pharmacy:   [facility] [address] [facility]  [address] [place] [address]  Phone: [phone] Fax: [phone]      Patient Arrived From: Home  Existing Help at Home: Marisol Gordillo (Spouse) [phone]        Barriers to Discharge: None    Discharge Plan:    A. Home Health   B. Home with family    Spoke with patient via telephone; he stated he lives with his spouse, mother-in-law, and dependent children.  Patient reported he is independent with ADLs and only uses a CPAP and glucometer at home; patient's spouse will provide transportation upon discharge.  CM to continue to assess for and until discharge.    05/20/25 7054   Discharge Assessment   Assessment Type Discharge Planning Assessment   Confirmed/corrected address, phone number and insurance Yes   Confirmed Demographics Correct on Facesheet   Source of Information patient   Communicated JESSICA with patient/caregiver Yes   People in Home child(fei), dependent;other relative(s);spouse   Name(s) of People in Home Marisol Gordillo (Spouse)  [phone]   Facility Arrived From: Home   Do you expect to return to your current living situation? Yes   Do you have help at home or someone to help you manage your care at home? Yes   Who are your caregiver(s) and their phone number(s)? Marisol Gordillo (Spouse)  [phone]   Prior to hospitilization cognitive status: Unable to Assess   Current cognitive status: Alert/Oriented   Walking or Climbing Stairs Difficulty no   Dressing/Bathing Difficulty no   Equipment Currently Used at Home none   Readmission within 30 days? No   Patient currently being followed by outpatient case management? No   Do you currently have service(s) that help you manage your care at home? No   Do you take prescription medications? Yes   Do you have prescription coverage? Yes   Is the patient taking medications as prescribed? yes   Who is going to help you get  home at discharge? Sangeetha Gordilloa (Spouse)  [phone]   How do you get to doctors appointments? car, drives self   Are you on dialysis? No   Do you take coumadin? No   Discharge Plan A Home Health   Discharge Plan B Home with family   DME Needed Upon Discharge  other (see comments)  (TBD)   Discharge Plan discussed with: Patient   Transition of Care Barriers None

## 2025-05-20 NOTE — PT/OT/SLP EVAL
Occupational Therapy   Evaluation    Name: Tony Gordillo  MRN: [id number]  Admitting Diagnosis: <principal problem not specified>  Recent Surgery: Procedure(s) (LRB):  IRRIGATION AND DEBRIDEMENT, KNEE, WITH ANTIBIOTIC BEADS OR ANTIBIOTIC SPACER INSERTION (Right) 1 Day Post-Op    Recommendations:     Discharge Recommendations: Low Intensity Therapy  Discharge Equipment Recommendations:  none  Barriers to discharge:  None    Assessment:     Tony Gordillo is a 44 y.o. male with a medical diagnosis of <principal problem not specified>.  He presents with cryotherapy, hinge brace and pain of R knee s/p right knee explant and antibiotic spacer with antibiotic bead placement.  Performance deficits affecting function: impaired endurance, weakness, impaired functional mobility, impaired self care skills, impaired balance, pain, edema, decreased lower extremity function.      Rehab Prognosis: Good; patient would benefit from acute skilled OT services to address these deficits and reach maximum level of function.       Plan:     Patient to be seen 4 x/week to address the above listed problems via self-care/home management, therapeutic activities, therapeutic exercises  Plan of Care Expires: 06/02/25  Plan of Care Reviewed with: patient    Subjective     Chief Complaint: Pain and weakness of RLE   Patient/Family Comments/goals: Reduce pain and improve mobility to return home independent.     Occupational Profile:  Living Environment: Pt resides with family in [address] [place] home [address].  Previous level of function: Pt reports he was Sly to independent for ADLs, functional mob and functional t/fs.   Roles and Routines: Performed selfcare  Equipment Used at Home: walker, rolling, cane, straight  Assistance upon Discharge: Pt has wife who will assist with self care skills as needed.     Pain/Comfort:  Pain Rating 1: 7/10  Location - Side 1: Right  Location - Orientation 1: lower  Location 1: knee  Pain Addressed 1: Nurse notified,  Reposition  Pain Rating Post-Intervention 1: 7/10    Patients cultural, spiritual, Mormonism conflicts given the current situation:      Objective:     Communicated with: Pt's  prior to session.  Patient found HOB elevated with cryotherapy, telemetry, wound vac upon OT entry to room.    General Precautions: Standard, fall  Orthopedic Precautions: RLE partial weight bearing (w/ flat foot)  Braces: Hinged knee brace  Respiratory Status: Room air    Occupational Performance:    Bed Mobility:    Patient completed Supine to Sit with minimum assistance    Functional Mobility/Transfers:  Patient completed Sit <> Stand Transfer with contact guard assistance and minimum assistance  with  rolling walker   Patient completed Bed <> Chair Transfer using Stand Pivot technique with contact guard assistance and minimum assistance with rolling walker  Functional Mobility: Pt performed functional mob using RW CGA/Olesya. Pt with limited mobility 2/2 increased complaints of pain.     Activities of Daily Living:  Grooming: independence with setup provided. Task performed seated in bedside recliner chair.  Upper Body Dressing: moderate assistance to don hospital gown around back for privacy  Lower Body Dressing: total assistance to don R sock 2/2 RLE pain and reduced knee ROM.     Cognitive/Visual Perceptual:  Cognitive/Psychosocial Skills:     -       Oriented to: Person, Place, Time, and Situation   -       Follows Commands/attention:Follows one-step commands  -       Safety awareness/insight to disability: intact . OT provided mod cues for improved safety and for proper hand and foot placement during transitional movements.      Physical Exam:  Balance:    -       Good sitting balance, Fair- balance demonstrated in supported stand.   Edema:  R LE  Upper Extremity Range of Motion:     -       Right Upper Extremity: WNL  -       Left Upper Extremity: WNL  Upper Extremity Strength:    -       Right Upper Extremity: WFL except shoulder  3+/5  -       Left Upper Extremity: WNL    AMPAC 6 Click ADL:  AMPAC Total Score: 15    Treatment & Education:  Pt educated on role of OT and POC. Pt educated on the importance of using call button for out of chair mobility with him verbalizing understanding.     Patient left up in chair with all lines intact, call button in reach, and pt's nurse notified    GOALS:   Multidisciplinary Problems       Occupational Therapy Goals          Problem: Occupational Therapy    Goal Priority Disciplines Outcome Interventions   Occupational Therapy Goal     OT, PT/OT Progressing    Description: Goals to be met by: 06/02/2025     Patient will increase functional independence with ADLs by performing:    LE Dressing with Modified [place].  Grooming while standing with [place].  Toileting from bedside commode with Modified [place] for hygiene and clothing management.   Toilet transfer to bedside commode with Modified [place].  Upper extremity exercise program 2/10 reps per handout, with independence.                         DME Justifications:  No DME recommended requiring DME justifications    History:     Past Medical History:   Diagnosis Date    Diabetes mellitus, type 2     Hyperlipidemia     Hypertension     Obesity     NAINA (obstructive sleep apnea)          Past Surgical History:   Procedure Laterality Date    ARTHROPLASTY, KNEE, TOTAL, USING COMPUTER-ASSISTED NAVIGATION Right 11/18/2024    Procedure: ARTHROPLASTY, KNEE, TOTAL, USING COMPUTER-ASSISTED NAVIGATION;  Surgeon: Antonio Rolon MD;  Location: [facility] OR;  Service: Orthopedics;  Laterality: Right;  [place]. Same Day  Blue Silverio and 1st Assist Kaitlin Notified-[place]  -----CLEARED BY PCP  RN PREOP 11/6/2024 ---TYPE&SCREEN --done---BMI--40.97--- HAS INSULIN PUMP    ARTHROSCOPIC DEBRIDEMENT OF SHOULDER  03/14/2023    Procedure: DEBRIDEMENT, SHOULDER, ARTHROSCOPIC;  Surgeon: Crissy Bradford MD;  Location: [facility] OR;  Service: Orthopedics;;     ARTHROSCOPIC DEBRIDEMENT OF SHOULDER  7/23/2024    Procedure: DEBRIDEMENT, SHOULDER, ARTHROSCOPIC;  Surgeon: Crissy Bradford MD;  Location: [facility] OR;  Service: Orthopedics;;    ARTHROSCOPIC REPAIR OF ROTATOR CUFF OF SHOULDER Right 03/14/2023    Procedure: REPAIR, ROTATOR CUFF, ARTHROSCOPIC;  Surgeon: Crissy Bradford MD;  Location: [facility] OR;  Service: Orthopedics;  Laterality: Right;  [facility] ELMER [phone]. TEXTED ELMER ON 3/9/2023 @ 12:10PM. ELMER RESPONDED ON 3/9/23 @ 12:23PM-SAG  RN PREOP 3/10/2023---CLEARED BY PCP AND CARDS    ARTHROSCOPIC REPAIR OF ROTATOR CUFF OF SHOULDER Right 7/23/2024    Procedure: REPAIR, ROTATOR CUFF, ARTHROSCOPIC;  Surgeon: Crissy Bradford MD;  Location: [facility] OR;  Service: Orthopedics;  Laterality: Right;  [facility] [employer] ELMER [phone], NURYS [phone]  CLEARED BY PCP  RN PREOP 6/18/2024    ARTHROSCOPY,SHOULDER,WITH BICEPS TENODESIS  03/14/2023    Procedure: ARTHROSCOPY,SHOULDER,WITH BICEPS TENODESIS;  Surgeon: Crissy Bradford MD;  Location: [facility] OR;  Service: Orthopedics;;    IRRIGATION AND DEBRIDEMENT, KNEE, WITH ANTIBIOTIC BEADS OR ANTIBIOTIC SPACER INSERTION Right 5/19/2025    Procedure: IRRIGATION AND DEBRIDEMENT, KNEE, WITH ANTIBIOTIC BEADS OR ANTIBIOTIC SPACER INSERTION;  Surgeon: Antonio Rolon MD;  Location: [facility] OR;  Service: Orthopedics;  Laterality: Right;  [facility]. Antony Hardy Notified- [place]    RN PRE OP 5/13/2025---NEED CONSENT    KNEE ARTHROSCOPY W/ MENISCECTOMY Right 10/24/2023    Procedure: ARTHROSCOPY, KNEE, WITH MENISCECTOMY;  Surgeon: Crissy Bradford MD;  Location: [facility] OR;  Service: Orthopedics;  Laterality: Right;  RN PREOP 10/18/203---CLEARED BY PCP  ELVIA [phone]    KNEE SURGERY      acl,mcl,pcl    left knee x 2      PRK  Bilateral 2010    SUBCHONDROPLASTY Right 10/24/2023    Procedure: POSSIBLE SUBCHONDROPLASTY;  Surgeon: Crissy Bradford MD;  Location: [facility];  Service: Orthopedics;  Laterality:  Right;       Time Tracking:     OT Date of Treatment: 05/20/25  OT Start Time: 0910  OT Stop Time: 0940  OT Total Time (min): 30 min    Billable Minutes:Evaluation 18  Self Care/Home Management 12    5/20/2025

## 2025-05-20 NOTE — ANESTHESIA POSTPROCEDURE EVALUATION
Anesthesia Post Evaluation    Patient: Tony Gordillo    Procedure(s) Performed: Procedure(s) (LRB):  IRRIGATION AND DEBRIDEMENT, KNEE, WITH ANTIBIOTIC BEADS OR ANTIBIOTIC SPACER INSERTION (Right)    Final Anesthesia Type: general      Patient location during evaluation: GI PACU  Patient participation: Yes- Able to Participate  Level of consciousness: awake and alert and oriented  Post-procedure vital signs: reviewed and stable  Pain management: adequate  Airway patency: patent    PONV status at discharge: No PONV  Anesthetic complications: no      Cardiovascular status: blood pressure returned to baseline and hemodynamically stable  Respiratory status: unassisted, spontaneous ventilation and room air  Hydration status: euvolemic  Follow-up not needed.              Vitals Value Taken Time   /90 05/20/25 11:01   Temp 36.8 °C (98.3 °F) 05/20/25 11:01   Pulse 81 05/20/25 11:18   Resp 21 05/20/25 11:01   SpO2 99 % 05/20/25 11:01         Event Time   Out of Recovery 14:58:14         Pain/Bernabe Score: Pain Rating Prior to Med Admin: 6 (5/20/2025 12:13 PM)  Pain Rating Post Med Admin: 4 (5/19/2025  2:50 PM)  Bernabe Score: 10 (5/19/2025  2:50 PM)

## 2025-05-20 NOTE — PLAN OF CARE
Problem: Physical Therapy  Goal: Physical Therapy Goal  Description: Goals to be met by: 25     Patient will increase functional independence with mobility by performin. Pt to be SBA with bed mobility.  2. Pt to transfer with SBA.  3. Pt to ambulate 15' w/RW, PWB RLE with brace.    Outcome: Progressing   Initial eval completed, see in chart for details.

## 2025-05-20 NOTE — CONSULTS
[facility] - [facility] Surg  Infectious Disease  Consult Note    Patient Name: Tony Gordillo  MRN: [id number]  Admission Date: 5/19/2025  Hospital Length of Stay: 1 days  Attending Physician: Antonio Rolon MD  Primary Care Provider: Jovany Ford MD     Isolation Status: No active isolations    Patient information was obtained from patient, past medical records, and ER records.      Inpatient consult to Infectious Diseases  Consult performed by: Gregory De La Cruz MD  Consult ordered by: Antonio Rolon MD        Assessment/Plan:     ID  Infection of prosthetic right knee joint  Mr. Parr is a pleasant 43 yo man with history of right TKA in November, 2024, now admitted with suspected infection. Synovasure was positive with negative micro. He is now s/p explantation and spacer placement. Anticipating 6 weks of culture-directed abx. If cultures remains sterile, will plan on 6 weeks of CTX and daptomycin.    Recommendations  Continue CTX and vancomycin pending culture results  If those are sterile, will plan on discharge with CTX and daptomycin, empirically        Thank you for your consult. I will follow-up with patient. Please contact us if you have any additional questions.    Gregory De La Cruz MD  Infectious Disease  [facility] - Med Surg    Subjective:     Principal Problem: <principal problem not specified>    HPI: Mr. Parr is a pleasant 43 yo lakshmi/p right TKA on 11/18/24. Did well until he got the flu in December and never bounced back. He was seen in clinic but attempts at fluid aspiration were unsuccessful. ESR was only 28. An MRI was performed  MRI which had concerning findings for osteomyelitis and infection of his joint.  He underwent aspiration vis US and his Synovasure was positive. The patient was taken to the OR for explantation and spacer placement. ID is consulted for IV abx treatment of a periprosthetic infection. Currently, the patient is on vancomycin.       Past Medical History:    Diagnosis Date    Diabetes mellitus, type 2     Hyperlipidemia     Hypertension     Obesity     NAINA (obstructive sleep apnea)        Past Surgical History:   Procedure Laterality Date    ARTHROPLASTY, KNEE, TOTAL, USING COMPUTER-ASSISTED NAVIGATION Right 11/18/2024    Procedure: ARTHROPLASTY, KNEE, TOTAL, USING COMPUTER-ASSISTED NAVIGATION;  Surgeon: Antonio Rolon MD;  Location: [facility] OR;  Service: Orthopedics;  Laterality: Right;  Blue. Same Day  Blue Silverio and 1st Venecia Madrigla Notified-NC  -----CLEARED BY PCP  RN PREOP 11/6/2024 ---TYPE&SCREEN --done---BMI--40.97--- HAS INSULIN PUMP    ARTHROSCOPIC DEBRIDEMENT OF SHOULDER  03/14/2023    Procedure: DEBRIDEMENT, SHOULDER, ARTHROSCOPIC;  Surgeon: Crissy Bradford MD;  Location: [facility] OR;  Service: Orthopedics;;    ARTHROSCOPIC DEBRIDEMENT OF SHOULDER  7/23/2024    Procedure: DEBRIDEMENT, SHOULDER, ARTHROSCOPIC;  Surgeon: Crissy Bradford MD;  Location: [facility] OR;  Service: Orthopedics;;    ARTHROSCOPIC REPAIR OF ROTATOR CUFF OF SHOULDER Right 03/14/2023    Procedure: REPAIR, ROTATOR CUFF, ARTHROSCOPIC;  Surgeon: Crissy Bradford MD;  Location: [facility] OR;  Service: Orthopedics;  Laterality: Right;  HARDY AND NEPHEW ELMER [phone]. TEXTED ELMER ON 3/9/2023 @ 12:10PM. ELMER RESPONDED ON 3/9/23 @ 12:23PM-SAG  RN PREOP 3/10/2023---CLEARED BY PCP AND CARDS    ARTHROSCOPIC REPAIR OF ROTATOR CUFF OF SHOULDER Right 7/23/2024    Procedure: REPAIR, ROTATOR CUFF, ARTHROSCOPIC;  Surgeon: Crissy Bradford MD;  Location: [facility] OR;  Service: Orthopedics;  Laterality: Right;  HARDY [employer] NEPHEW ELMER [phone], NURYS [phone]  CLEARED BY PCP  RN PREOP 6/18/2024    ARTHROSCOPY,SHOULDER,WITH BICEPS TENODESIS  03/14/2023    Procedure: ARTHROSCOPY,SHOULDER,WITH BICEPS TENODESIS;  Surgeon: Crissy Bradford MD;  Location: [facility] OR;  Service: Orthopedics;;    KNEE ARTHROSCOPY W/ MENISCECTOMY Right 10/24/2023    Procedure: ARTHROSCOPY, KNEE, WITH MENISCECTOMY;  Surgeon:  Crissy Bradford MD;  Location: [facility] OR;  Service: Orthopedics;  Laterality: Right;  RN PREOP 10/18/203---CLEARED BY PCP  ELVIA [phone]    KNEE SURGERY      acl,mcl,pcl    left knee x 2      PRK  Bilateral 2010    SUBCHONDROPLASTY Right 10/24/2023    Procedure: POSSIBLE SUBCHONDROPLASTY;  Surgeon: Crissy Bradford MD;  Location: [facility] OR;  Service: Orthopedics;  Laterality: Right;       Review of patient's allergies indicates:   Allergen Reactions    Influenza virus vaccine, specific Hives    Pioglitazone      Altered mood     Victoza [liraglutide] Nausea And Vomiting    Farxiga [dapagliflozin] Rash     Erectile dysfunction and rash        Medications:  Medications Prior to Admission   Medication Sig    acetaminophen (TYLENOL) 500 MG tablet Take 2 tablets (1,000 mg total) by mouth every 8 (eight) hours as needed for Pain.    ammonium lactate 12 % Crea Apply 1 application  topically once daily.    [] amoxicillin (AMOXIL) 875 MG tablet Take 1 tablet (875 mg total) by mouth every 12 (twelve) hours. for 10 days    ARIPiprazole (ABILIFY) 2 MG Tab Take 1 tablet (2 mg total) by mouth once daily.    atorvastatin (LIPITOR) 40 MG tablet Take 1 tablet (40 mg total) by mouth once daily.    azelastine (ASTELIN) 137 mcg (0.1 %) nasal spray Use 1-2 sprays in each nostril twice daily    benazepriL (LOTENSIN) 20 MG tablet Take 1 tablet (20 mg total) by mouth once daily.    cyanocobalamin, vitamin B-12, 5,000 mcg Subl Place one under tongue once a day for 1 month, then continue to take under tongue per week    empagliflozin (JARDIANCE) 25 mg tablet Take 1 tablet (25 mg total) by mouth once daily.    EPINEPHrine (EPIPEN 2-AYALA) 0.3 mg/0.3 mL AtIn Inject 0.3 mLs (0.3 mg total) into the muscle as needed (Anaphylaxis).    fluticasone propionate (FLONASE) 50 mcg/actuation nasal spray 1 spray (50 mcg total) by Each Nostril route once daily.    insulin aspart U-100 (NOVOLOG U-100 INSULIN ASPART) 100 unit/mL  "injection MAX DAILY DOSE 120 UNITS IN INSULIN PUMP.    insulin pump cart,auto,BT,G6/7 (OMNIPOD 5 G6-G7 PODS, GEN 5,) Crtg Change every 2 days    insulin pump cart,automated,BT (OMNIPOD 5 G6 PODS, GEN 5,) Crtg Inject 1 each into the skin once daily.    ketoconazole (NIZORAL) 2 % cream Apply topically once daily.    L-METHYLFOLATE 15 mg Tab TAKE 15 MG BY MOUTH ONCE DAILY.    lamoTRIgine (LAMICTAL) 100 MG tablet Take 1.5 tablets (150 mg total) by mouth once daily.    levothyroxine (SYNTHROID) 50 MCG tablet Take 1 tablet (50 mcg total) by mouth before breakfast.    loratadine (CLARITIN) 10 mg tablet Take 1 tablet (10 mg total) by mouth once daily.    mirtazapine (REMERON) 15 MG tablet Take 1 tablet (15 mg total) by mouth every evening.    modafiniL (PROVIGIL) 100 MG Tab Take 1 tablet (100 mg total) by mouth every morning.    pen needle, diabetic (BD ULTRA-FINE KEN PEN NEEDLE) 32 gauge x 5/32" Ndle 1 Device by Misc.(Non-Drug; Combo Route) route 5 (five) times daily.    pregabalin (LYRICA) 75 MG capsule Take 1 capsule (75 mg total) by mouth 2 (two) times daily.    QUEtiapine (SEROQUEL) 25 MG Tab Take 1 tablet (25 mg total) by mouth once daily in the evening    blood sugar diagnostic Strp To check BG 4 times daily, to use with insurance preferred meter    blood sugar diagnostic Strp OneTouch Ultra Test strips    blood-glucose meter kit OneTouch Ultra2 Meter kit    blood-glucose sensor (DEXCOM G7 SENSOR) Filomena Change every 10 days    lancets Misc To check BG 4 times daily, to use with insurance preferred meter    meloxicam (MOBIC) 15 MG tablet Take 1 tablet (15 mg total) by mouth once daily.    syringe, disposable, 1 mL Syrg Testosterone every 2 weeks    tirzepatide (MOUNJARO) 12.5 mg/0.5 mL PnIj Inject 12.5 mg (one pen) into the skin every 7 days.    traMADoL (ULTRAM) 50 mg tablet Take 1-2 tablets ( mg total) by mouth every 6 (six) hours as needed for Pain.     Antibiotics (From admission, onward)      Start     Stop " "Route Frequency Ordered    05/20/25 1200  cefTRIAXone injection 2 g         -- IV Every 24 hours (non-standard times) 05/20/25 1051    05/20/25 1007  vancomycin - pharmacy to dose  (vancomycin IVPB (PEDS and ADULTS))        Placed in "And" Linked Group    -- IV pharmacy to manage frequency 05/20/25 0907    05/20/25 1000  vancomycin 2,000 mg in 0.9% NaCl 500 mL IVPB (admixture device)         -- IV Every 12 hours (non-standard times) 05/20/25 0912          Antifungals (From admission, onward)      None          Antivirals (From admission, onward)      None             Immunization History   Administered Date(s) Administered    COVID-19 Vaccine 12/28/2020, 01/25/2021, 11/08/2021    COVID-19, MRNA, LN-S, PF (MODERNA FULL 0.5 ML DOSE) 12/28/2020, 01/25/2021, 11/08/2021    COVID-19, mRNA, LNP-S, PF, kayla-sucrose, 30 mcg/0.3 mL (Pfizer Ages 12+) 12/11/2023    Influenza 09/06/2019    Pneumococcal Polysaccharide - 23 Valent 09/28/2018    Tdap 07/18/2019       Family History       Problem Relation (Age of Onset)    Autism Son    Diabetes Mother, Father, Maternal Grandmother    No Known Problems Brother, Maternal Grandfather, Paternal Grandmother, Paternal Grandfather, Daughter, Maternal Aunt, Maternal Uncle, Paternal Aunt, Paternal Uncle, Other          Social History     Socioeconomic History    Marital status:    Occupational History    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     Employer: [employer]   Tobacco Use    Smoking status: Former     Types: Cigars     Passive exposure: Never    Smokeless tobacco: Never    Tobacco comments:     Has not had any cigars this year   Substance and Sexual Activity    Alcohol use: Yes     Comment: 1-2 beers every 2 weeks    Drug use: Yes     Types: Marijuana     Comment: Non-prescription cannabis     Social Drivers of Health     Financial Resource Strain: Patient Declined (5/19/2025)    Overall Financial Resource Strain (CARDIA)     " Difficulty of Paying Living Expenses: Patient declined   Food Insecurity: Patient Declined (5/19/2025)    Hunger Vital Sign     Worried About Running Out of Food in the Last Year: Patient declined     Ran Out of Food in the Last Year: Patient declined   Transportation Needs: Patient Declined (5/19/2025)    PRAPARE - Transportation     Lack of Transportation (Medical): Patient declined     Lack of Transportation (Non-Medical): Patient declined   Physical Activity: Insufficiently Active (3/10/2025)    Exercise Vital Sign     Days of Exercise per Week: 2 days     Minutes of Exercise per Session: 20 min   Stress: Patient Declined (5/19/2025)    [employer] - Occupational Stress Questionnaire     Feeling of Stress : Patient declined   Recent Concern: Stress - Stress Concern Present (3/10/2025)    [employer] - Occupational Stress Questionnaire     Feeling of Stress : To some extent   Housing Stability: Patient Declined (5/19/2025)    Housing Stability Vital Sign     Unable to Pay for Housing in the Last Year: Patient declined     Homeless in the Last Year: Patient declined     Review of Systems   Constitutional:  Negative for chills and fever.   Musculoskeletal:  Positive for arthralgias.   Skin:  Positive for wound.   All other systems reviewed and are negative.    Objective:     Vital Signs (Most Recent):  Temp: 98.7 °F (37.1 °C) (05/20/25 0736)  Pulse: 96 (05/20/25 0736)  Resp: 18 (05/20/25 0947)  BP: 137/83 (05/20/25 0736)  SpO2: 96 % (05/20/25 0736) Vital Signs (24h Range):  Temp:  [97.8 °F (36.6 °C)-99.4 °F (37.4 °C)] 98.7 °F (37.1 °C)  Pulse:  [] 96  Resp:  [15-20] 18  SpO2:  [94 %-100 %] 96 %  BP: (133-166)/(57-98) 137/83     Weight: 128.4 kg (283 lb)  Body mass index is 37.34 kg/m².    Estimated Creatinine Clearance: 187.2 mL/min (based on SCr of 0.7 mg/dL).     Physical Exam  Vitals and nursing note reviewed.   Constitutional:       Appearance:  "Normal appearance.   HENT:      Head: Normocephalic and atraumatic.   Eyes:      Extraocular Movements: Extraocular movements intact.      Pupils: Pupils are equal, round, and reactive to light.   Cardiovascular:      Rate and Rhythm: Normal rate.   Musculoskeletal:      Comments: Right leg in brace with Hemavac   Neurological:      General: No focal deficit present.      Mental Status: He is alert and oriented to person, place, and time.   Psychiatric:         Mood and Affect: Mood normal.         Behavior: Behavior normal.         Thought Content: Thought content normal.         Judgment: Judgment normal.          Significant Labs: CBC: No results for input(s): "WBC", "HGB", "HCT", "PLT" in the last 48 hours.  Microbiology Results (last 7 days)       Procedure Component Value Units Date/Time    Aerobic culture [[id number]] Collected: 05/19/25 1429    Order Status: Completed Specimen: Tissue from Knee, Right Updated: 05/20/25 1024     CULTURE, AEROBIC No Growth To Date    Aerobic culture [[id number]] Collected: 05/19/25 1428    Order Status: Completed Specimen: Tissue Updated: 05/20/25 1024     CULTURE, AEROBIC No Growth To Date    Aerobic culture [[id number]] Collected: 05/19/25 1426    Order Status: Completed Specimen: Tissue from Knee, Right Updated: 05/20/25 1023     CULTURE, AEROBIC No Growth To Date    Culture, Body Fluid (Aerobic) w/ GS [[id number]] Collected: 05/19/25 1231    Order Status: Completed Specimen: Body Fluid from Knee, Right Updated: 05/20/25 1022     CULTURE, FLUID No Growth To Date     GRAM STAIN Rare WBC seen      No organisms seen    Afb Culture Stain [[id number]] Collected: 05/19/25 1231    Order Status: Sent Specimen: Body Fluid from Knee, Right Updated: 05/20/25 0947    Gram stain [[id number]] Collected: 05/19/25 1231    Order Status: Completed Specimen: Body Fluid from Knee, Right Updated: 05/19/25 1529     GRAM STAIN Rare WBC seen      No organisms seen    Culture, Anaerobe [[id number]] " Collected: 05/19/25 1428    Order Status: Sent Specimen: Tissue Updated: 05/19/25 1526    Culture, Anaerobe [[id number]] Collected: 05/19/25 1426    Order Status: Sent Specimen: Tissue from Knee, Right Updated: 05/19/25 1526    Culture, Anaerobe [[id number]] Collected: 05/19/25 1429    Order Status: Sent Specimen: Tissue Updated: 05/19/25 1526    Culture, Anaerobic [[id number]] Collected: 05/19/25 1231    Order Status: Sent Specimen: Body Fluid from Knee, Right Updated: 05/19/25 1446    AFB Culture & Smear [[id number]] Collected: 05/19/25 1231    Order Status: Sent Specimen: Body Fluid from Knee, Right Updated: 05/19/25 1446    Fungus culture [[id number]] Collected: 05/19/25 1231    Order Status: Sent Specimen: Body Fluid from Knee, Right Updated: 05/19/25 1446            Significant Imaging: I have reviewed all pertinent imaging results/findings within the past 24 hours.

## 2025-05-20 NOTE — PLAN OF CARE
Problem: Wound  Goal: Optimal Coping  Outcome: Progressing  Intervention: Support Patient and Family Response  Flowsheets (Taken 5/20/2025 1844)  Supportive Measures:   active listening utilized   decision-making supported   positive reinforcement provided   self-care encouraged   verbalization of feelings encouraged  Family/Support System Care: support provided     Problem: Skin Injury Risk Increased  Goal: Skin Health and Integrity  Outcome: Progressing  Intervention: Optimize Skin Protection  Flowsheets (Taken 5/20/2025 1844)  Pressure Reduction Techniques:   frequent weight shift encouraged   heels elevated off bed  Pressure Reduction Devices: positioning supports utilized  Skin Protection: incontinence pads utilized  Activity Management: Sitting at edge of bed - L2  Head of Bed (HOB) Positioning: HOB elevated

## 2025-05-20 NOTE — HPI
Mr. Parr is a pleasant 45 yo lakshmi/p right TKA on 11/18/24. Did well until he got the flu in December and never bounced back. He was seen in clinic but attempts at fluid aspiration were unsuccessful. ESR was only 28. An MRI was performed  MRI which had concerning findings for osteomyelitis and infection of his joint.  He underwent aspiration vis US and his Synovasure was positive. The patient was taken to the OR for explantation and spacer placement. ID is consulted for IV abx treatment of a periprosthetic infection. Currently, the patient is on vancomycin.

## 2025-05-20 NOTE — PROGRESS NOTES
"ORTHOPAEDIC POSTOP PROGRESS NOTE       Subjective   Patient states that they are doing okay from a pain standpoint. Feels the t-scope brace is putting pressure on his thigh, we adjusted the strap this morning and he immediately felt relief. Provena in place.     Objective   VITAL SIGNS: /83 (BP Location: Right arm, Patient Position: Lying)   Pulse 96   Temp 98.7 °F (37.1 °C) (Oral)   Resp 20   Ht 6' 1" (1.854 m)   Wt 128.4 kg (283 lb)   SpO2 96%   BMI 37.34 kg/m²    INTAKE AND OUTPUT:   Intake/Output Summary (Last 24 hours) at 5/20/2025 0740  Last data filed at 5/20/2025 0644  Gross per 24 hour   Intake 2060 ml   Output 2400 ml   Net -340 ml        PHYSICAL EXAMINATION:  Right Lower Extremity:    T scope in place, provena in place, polar care in place    Dorsalis pedis pulses palpable     Dorsi flexion 5/5 strength    Plantar flexion 5/5 strength     Extensor hallucis extension: 5/5 strength    Sensory intact to light touch L1-S1    Dressing clean/dry/intact    LABS: Reviewed      Assessment/Plan:  WEIGHT BEARING- partial weight bearing (flat foot) in T-scope brace. Unlocked from 0-60 degrees.   BRACING-  T Scope  Wound Care: Provena for 7-10 days.   PHYSICAL THERAPY-  Range of motion restricted in T Scope  Active flexion-extension only, no forced motion at any time during recovery.  ANTIBIOTICS-The patient will stay on IV antibiotics as directed by ID. Will need picc line and HH for expected 6 weeks of IV abx. Will follow up on operative cultures. NGTD on SynovHepa Wash testing cultures.   SPECIMENS:  Knee arthroplasty implants for analysis, metallic and plastic. Fluid for cell count and culture. Synovium. Membranes from implant-bone and cement-bone interfaces.   DISPO: Home with Home Health  ASA 81mg BID [id number] for VTE prophylaxis  Restart Home Meds    Problem List:   Patient Active Problem List    Diagnosis Date Noted    Primary osteoarthritis of both knees 07/15/2024    Bilateral chronic knee pain " 07/15/2024    Impaired mobility and ADLs 06/07/2024    Imbalance 06/07/2024    Cognitive communication deficit 05/11/2024    Concussion with no loss of consciousness 05/07/2024    Concussion with loss of consciousness 05/07/2024    Other specified persistent mood disorders 05/07/2024    Allergy desensitization therapy 05/06/2024    Traumatic encephalopathy 03/25/2024    Acute migraine 01/31/2024    MCI (mild cognitive impairment) 01/31/2024    Medication overuse headache 01/31/2024    Chronic migraine with aura, intractable, with status migrainosus 01/31/2024    Agitation 01/31/2024    Refractive error 01/24/2024    Tear of lateral meniscus of right knee, current 10/24/2023    Uncontrolled type 2 diabetes mellitus 10/17/2023    S/P right rotator cuff repair 03/17/2023    Biceps tendonosis of right shoulder 03/17/2023    Decreased range of motion of right shoulder 03/17/2023    Impaired strength of shoulder muscles 03/17/2023    Traumatic rotator cuff tear, right, initial encounter 03/14/2023    Other amnesia 11/02/2022    Mild neurocognitive disorder due to traumatic brain injury 10/19/2022    Outbursts of anger 10/19/2022    Left hand pain 09/24/2022    Decreased strength, endurance, and mobility 03/08/2022    Dyspnea 02/18/2022    Rib pain on left side 08/08/2021    Right foot pain 10/10/2019    Decreased strength of lower extremity 10/10/2019    Decreased range of motion of both ankles 10/10/2019    Gait abnormality 10/10/2019    Low testosterone in male; pituitary axis normal. MRI negative. 11/2019 started on testosterone 09/04/2019    NAINA (obstructive sleep apnea) 8/2019 sleep study AHI 11     Acute bilateral low back pain without sciatica 08/10/2019    Non-seasonal allergic rhinitis; avoid antihistamines per opthalmology 11/09/2018    Migraine with aura and without status migrainosus, not intractable propranolol SE angry; topamax not helpful; imitrex ineffective; daith ear piercing helps 09/28/2018    Type 2  diabetes mellitus with left eye affected by mild nonproliferative retinopathy without macular edema, with long-term current use of insulin     Essential hypertension     Hypothyroidism 07/29/2015    Hyperlipidemia LDL goal <70 06/03/2015    Insulin long-term use 06/03/2015    Tinea pedis 06/03/2015    Morbid obesity 06/03/2015    Allergic conjunctivitis 01/29/2015    Descemet's membrane fold 01/21/2014    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

## 2025-05-20 NOTE — PT/OT/SLP EVAL
"Physical Therapy Evaluation and Treatment    Patient Name: Tony Gordillo   MRN: [id number]  Recent Surgery: Procedure(s) (LRB):  IRRIGATION AND DEBRIDEMENT, KNEE, WITH ANTIBIOTIC BEADS OR ANTIBIOTIC SPACER INSERTION (Right) 1 Day Post-Op    Recommendations:     Discharge Recommendations: Low Intensity Therapy   Discharge Equipment Recommendations: none     Assessment:     Tony Gordillo is a 44 y.o. male admitted with a medical diagnosis of prosthetic joint infection. He presents with the following impairments/functional limitations: weakness, impaired functional mobility, gait instability, decreased lower extremity function, pain, impaired skin, decreased ROM, orthopedic precautions.     Rehab Prognosis: Good; patient would benefit from acute PT services to address these deficits and reach maximum level of function.    Plan:     During this hospitalization, patient to be seen 5 x/week to address the above listed problems via gait training, therapeutic activities    Plan of Care Expires: 05/27/25    Subjective     Chief Complaint: Pain in joint with "twisting"  Patient Comments/Goals: Pt agreeable to therapy evaluations and treatment.  Pain/Comfort:  Pain Rating 1: 5/10  Location - Side 1: Right  Location 1: knee  Pain Addressed 1: Reposition, Nurse notified    Social History:  Living Environment: Patient lives with their mother-in-law, spouse, and child(fei) in a single story home with number of outside stair(s): 1  Prior Level of Function: Prior to admission, patient was modified independent  Equipment Used at Home: walker, rolling, cane, straight (bariatric)  DME owned (not currently used): crutches, bedside commode, and bath bench (mother-in-law's)  Assistance Upon Discharge: family    Objective:     Communicated with nurseAngela prior to session. Patient found HOB elevated with cryotherapy, telemetry, wound vac upon PT entry to room.    General Precautions: Standard,     Orthopedic Precautions: RLE partial " weight bearing (50%, no flexion >90 degrees)   Braces: Hinged knee brace    Respiratory Status: Room air    Exams:  RLE ROM: NT; limited by brace and precautions  RLE Strength: NT  LLE ROM: WFL  LLE Strength: WFL  Cognitive: Patient is oriented to Person, Place, Time, Situation  Sensation:    -       Intact  light/touch BLEs    Functional Mobility:  Gait belt applied - Yes  Bed Mobility  Supine to Sit: minimum assistance for LE management  Transfers  Sit to Stand: minimum assistance with rolling walker and with cues for hand placement  Gait  Patient ambulated 3 steps with BRW and contact guard assistance, PWB RLE with brace.  Balance  Sitting: Good  Standing: FAIR: Needs CONTACT GUARD during gait      Therapeutic Activities and Exercises:  Patient educated on role of acute care PT and PT POC and call light usage.  Educated about weightbearing precautions and provided cuing for adherence as appropriate during session.  Educated about importance of OOB mobility and remaining up in chair most of the day.  OOB, able to take a few steps to B/S chair.  Pt instructed on APs; reports he does that.    AM-PAC 6 CLICK MOBILITY  Total Score:16    Patient left reclined in chair with all lines intact, call button in reach, RN notified, and all needs in reach.    GOALS:   Multidisciplinary Problems       Physical Therapy Goals          Problem: Physical Therapy    Goal Priority Disciplines Outcome Interventions   Physical Therapy Goal     PT, PT/OT Progressing    Description: Goals to be met by: 25     Patient will increase functional independence with mobility by performin. Pt to be SBA with bed mobility.  2. Pt to transfer with SBA.  3. Pt to ambulate 15' w/RW, PWB RLE with brace.                         DME Justifications:  No DME recommended requiring DME justifications    History:     Past Medical History:   Diagnosis Date    Diabetes mellitus, type 2     Hyperlipidemia     Hypertension     Obesity     NAINA  (obstructive sleep apnea)        Past Surgical History:   Procedure Laterality Date    ARTHROPLASTY, KNEE, TOTAL, USING COMPUTER-ASSISTED NAVIGATION Right 11/18/2024    Procedure: ARTHROPLASTY, KNEE, TOTAL, USING COMPUTER-ASSISTED NAVIGATION;  Surgeon: Antonio Rolon MD;  Location: [facility] OR;  Service: Orthopedics;  Laterality: Right;  Blue. Same Day  Blue Silverio and 1st Venecia Madrigal Notified-NC  -----CLEARED BY PCP  RN PREOP 11/6/2024 ---TYPE&SCREEN --done---BMI--40.97--- HAS INSULIN PUMP    ARTHROSCOPIC DEBRIDEMENT OF SHOULDER  03/14/2023    Procedure: DEBRIDEMENT, SHOULDER, ARTHROSCOPIC;  Surgeon: Crissy Bradford MD;  Location: [facility] OR;  Service: Orthopedics;;    ARTHROSCOPIC DEBRIDEMENT OF SHOULDER  7/23/2024    Procedure: DEBRIDEMENT, SHOULDER, ARTHROSCOPIC;  Surgeon: Crissy Bradford MD;  Location: [facility] OR;  Service: Orthopedics;;    ARTHROSCOPIC REPAIR OF ROTATOR CUFF OF SHOULDER Right 03/14/2023    Procedure: REPAIR, ROTATOR CUFF, ARTHROSCOPIC;  Surgeon: Crissy Bradford MD;  Location: [facility] OR;  Service: Orthopedics;  Laterality: Right;  [facility] ELMER [phone]. TEXTED ELMER ON 3/9/2023 @ 12:10PM. ELMER RESPONDED ON 3/9/23 @ 12:23PM-SAG  RN PREOP 3/10/2023---CLEARED BY PCP AND CARDS    ARTHROSCOPIC REPAIR OF ROTATOR CUFF OF SHOULDER Right 7/23/2024    Procedure: REPAIR, ROTATOR CUFF, ARTHROSCOPIC;  Surgeon: Cirssy Bradford MD;  Location: [facility] OR;  Service: Orthopedics;  Laterality: Right;  [facility] ELMER [phone], NURYS [phone]  CLEARED BY PCP  RN PREOP 6/18/2024    ARTHROSCOPY,SHOULDER,WITH BICEPS TENODESIS  03/14/2023    Procedure: ARTHROSCOPY,SHOULDER,WITH BICEPS TENODESIS;  Surgeon: Crissy Bradford MD;  Location: [facility] OR;  Service: Orthopedics;;    KNEE ARTHROSCOPY W/ MENISCECTOMY Right 10/24/2023    Procedure: ARTHROSCOPY, KNEE, WITH MENISCECTOMY;  Surgeon: Crissy Bradford MD;  [facility];  Service: Orthopedics;  Laterality: Right;  RN PREOP  10/18/203---CLEARED BY PCP  ELVIA [phone]    KNEE SURGERY      acl,mcl,pcl    left knee x 2      PRK  Bilateral 2010    SUBCHONDROPLASTY Right 10/24/2023    Procedure: POSSIBLE SUBCHONDROPLASTY;  Surgeon: Crissy Bradford MD;  Location: [facility];  Service: Orthopedics;  Laterality: Right;       Time Tracking:     PT Received On: 05/20/25  PT Start Time: 0910  PT Stop Time: 0937  PT Total Time (min): 27 min     Billable Minutes: Evaluation 15 and Gait Training 12 (Co-eval and treat with MARNIE Balderas)    5/20/2025

## 2025-05-20 NOTE — PROGRESS NOTES
"Pharmacokinetic Initial Assessment: IV Vancomycin    Assessment/Plan:    Initiate intravenous vancomycin with loading dose of 1500 mg once followed by a maintenance dose of vancomycin 2000mg IV every 12 hours  Desired empiric serum trough concentration is 10 to 20 mcg/mL  Draw vancomycin trough level 60 min prior to fourth dose on 5-21-25 at approximately 0900  Pharmacy will continue to follow and monitor vancomycin.      Please contact pharmacy at extension [phone]   with any questions regarding this assessment.     Thank you for the consult,   Darlene Ringon       Patient brief summary:  Tony Gordillo is a 44 y.o. male initiated on antimicrobial therapy with IV Vancomycin for treatment of suspected bone/joint infection    Drug Allergies:   Review of patient's allergies indicates:   Allergen Reactions    Influenza virus vaccine, specific Hives    Pioglitazone      Altered mood     Victoza [liraglutide] Nausea And Vomiting    Farxiga [dapagliflozin] Rash     Erectile dysfunction and rash        Actual Body Weight:   128.4 kg    Renal Function:   Estimated Creatinine Clearance: 187.2 mL/min (based on SCr of 0.7 mg/dL).,     Dialysis Method (if applicable):  N/A    CBC (last 72 hours):  No results for input(s): "WHITE BLOOD CELL COUNT", "HEMOGLOBIN", "HEMATOCRIT", "PLATELETS", "GRAN%", "LYMPH%", "MONO%", "EOSINOPHIL%", "BASOPHIL%", "DIFFERENTIAL METHOD" in the last 72 hours.    Metabolic Panel (last 72 hours):  No results for input(s): "SODIUM", "POTASSIUM", "CHLORIDE", "CO2", "GLUCOSE", "BUN BLD", "CREATININE", "ALBUMIN", "BILIRUBIN TOTAL", "ALK PHOS", "AST", "ALT", "MAGNESIUM", "PHOSPHORUS" in the last 72 hours.    Drug levels (last 3 results):  No results for input(s): "VANCOMYCINRA", "VANCORANDOM", "VANCOMYCINPE", "VANCOPEAK", "VANCOMYCINTR", "VANCOTROUGH" in the last 72 hours.    Microbiologic Results:  Microbiology Results (last 7 days)       Procedure Component Value Units Date/Time    Gram stain [[id number]] " Collected: 05/19/25 1231    Order Status: Completed Specimen: Body Fluid from Knee, Right Updated: 05/19/25 1529     GRAM STAIN Rare WBC seen      No organisms seen    Culture, Body Fluid (Aerobic) w/ GS [[id number]] Collected: 05/19/25 1231    Order Status: Completed Specimen: Body Fluid from Knee, Right Updated: 05/19/25 1528     GRAM STAIN Rare WBC seen      No organisms seen    Culture, Anaerobe [[id number]] Collected: 05/19/25 1428    Order Status: Sent Specimen: Tissue Updated: 05/19/25 1526    Aerobic culture [[id number]] Collected: 05/19/25 1428    Order Status: Sent Specimen: Tissue Updated: 05/19/25 1526    Aerobic culture [[id number]] Collected: 05/19/25 1426    Order Status: Sent Specimen: Tissue from Knee, Right Updated: 05/19/25 1526    Culture, Anaerobe [[id number]] Collected: 05/19/25 1426    Order Status: Sent Specimen: Tissue from Knee, Right Updated: 05/19/25 1526    Culture, Anaerobe [[id number]] Collected: 05/19/25 1429    Order Status: Sent Specimen: Tissue Updated: 05/19/25 1526    Aerobic culture [[id number]] Collected: 05/19/25 1429    Order Status: Sent Specimen: Tissue from Knee, Right Updated: 05/19/25 1526    Culture, Anaerobic [[id number]] Collected: 05/19/25 1231    Order Status: Sent Specimen: Body Fluid from Knee, Right Updated: 05/19/25 1446    AFB Culture & Smear [[id number]] Collected: 05/19/25 1231    Order Status: Sent Specimen: Body Fluid from Knee, Right Updated: 05/19/25 1446    Fungus culture [[id number]] Collected: 05/19/25 1231    Order Status: Sent Specimen: Body Fluid from Knee, Right Updated: 05/19/25 1446

## 2025-05-20 NOTE — PROCEDURES
"Tony Gordillo is a 44 y.o. male patient.    Temp: 98.4 °F (36.9 °C) (05/20/25 1621)  Pulse: 89 (05/20/25 1621)  Resp: (!) 21 (05/20/25 1621)  BP: (!) 162/84 (05/20/25 1621)  SpO2: 97 % (05/20/25 1621)  Weight: 128.4 kg (283 lb) (05/19/25 1515)  Height: 6' 1" (185.4 cm) (05/19/25 1515)    PICC  Date/Time: 5/20/2025 5:04 PM  Performed by: Cesar Julian RN  Consent Done: Yes  Time out: Immediately prior to procedure a time out was called to verify the correct patient, procedure, equipment, support staff and site/side marked as required  Indications: med administration and vascular access  Anesthesia: local infiltration  Local anesthetic: lidocaine 1% without epinephrine  Anesthetic Total (mL): 2  Preparation: skin prepped with ChloraPrep  Skin prep agent dried: skin prep agent completely dried prior to procedure  Sterile barriers: all five maximum sterile barriers used - cap, mask, sterile gown, sterile gloves, and large sterile sheet  Hand hygiene: hand hygiene performed prior to central venous catheter insertion  Location details: left brachial  Catheter type: double lumen  Catheter size: 5 Fr  Catheter Length: 44cm    Ultrasound guidance: yes  Vessel Caliber: medium and patent, compressibility normal  Vascular Doppler: not done  Needle advanced into vessel with real time Ultrasound guidance.  Guidewire confirmed in vessel.  Sterile sheath used.  no esophageal manometryNumber of attempts: 1  Post-procedure: blood return through all ports, chlorhexidine patch and sterile dressing applied  Estimated blood loss (mL): 0    Complications: none          Name Cesar Julian RN  5/20/2025    "

## 2025-05-20 NOTE — PLAN OF CARE
Problem: Occupational Therapy  Goal: Occupational Therapy Goal  Description: Goals to be met by: 06/02/2025     Patient will increase functional independence with ADLs by performing:    LE Dressing with Modified Smyth.  Grooming while standing with Smyth.  Toileting from bedside commode with Modified Smyth for hygiene and clothing management.   Toilet transfer to bedside commode with Modified Smyth.  Upper extremity exercise program 2/10 reps per handout, with independence.    Outcome: Progressing

## 2025-05-20 NOTE — NURSING
[facility]  Nurses Note -- 4 Eyes        Date:  05/20/2025        Skin assessed on:  Q shift        [x] No Pressure Injuries Present                 []Prevention Measures Documented     [] Yes LDA Previously documented      [] Yes New Pressure Injury Discovered              [] LDA Added        Attending RN:  ALEXANDRA Miller     Second RN:   TJ Gtz

## 2025-05-20 NOTE — SUBJECTIVE & OBJECTIVE
Past Medical History:   Diagnosis Date    Diabetes mellitus, type 2     Hyperlipidemia     Hypertension     Obesity     NAINA (obstructive sleep apnea)        Past Surgical History:   Procedure Laterality Date    ARTHROPLASTY, KNEE, TOTAL, USING COMPUTER-ASSISTED NAVIGATION Right 11/18/2024    Procedure: ARTHROPLASTY, KNEE, TOTAL, USING COMPUTER-ASSISTED NAVIGATION;  Surgeon: Antonio Rolon MD;  Location: [facility];  Service: Orthopedics;  Laterality: Right;  Blue. Same Day  [place] and 1st Venecia Madrigal Notified-NC  -----CLEARED BY PCP  RN PREOP 11/6/2024 ---TYPE&SCREEN --done---BMI--40.97--- HAS INSULIN PUMP    ARTHROSCOPIC DEBRIDEMENT OF SHOULDER  03/14/2023    Procedure: DEBRIDEMENT, SHOULDER, ARTHROSCOPIC;  Surgeon: Crissy Bradford MD;  Location: [facility];  Service: Orthopedics;;    ARTHROSCOPIC DEBRIDEMENT OF SHOULDER  7/23/2024    Procedure: DEBRIDEMENT, SHOULDER, ARTHROSCOPIC;  Surgeon: Crissy Bradford MD;  Location: [facility];  Service: Orthopedics;;    ARTHROSCOPIC REPAIR OF ROTATOR CUFF OF SHOULDER Right 03/14/2023    Procedure: REPAIR, ROTATOR CUFF, ARTHROSCOPIC;  Surgeon: Crissy Bradford MD;  [facility];  Service: Orthopedics;  Laterality: Right;  H[facility] NEPHFLY PAYNE [phone]. TEXTED [place] ON 3/9/2023 @ 12:10PM. [facility] RESPONDED ON 3/9/23 @ 12:23PM-SAG  RN PREOP 3/10/2023---CLEARED BY PCP AND CARDS    ARTHROSCOPIC REPAIR OF ROTATOR CUFF OF SHOULDER Right 7/23/2024    Procedure: REPAIR, ROTATOR CUFF, ARTHROSCOPIC;  Surgeon: Crissy Bradford MD;  [facility];  Service: Orthopedics;  Laterality: Right;  [facility]Y [facility] NEPHEW ELMER [phone], NURYS [phone]  CLEARED BY PCP  RN PREOP 6/18/2024    ARTHROSCOPY,SHOULDER,WITH BICEPS TENODESIS  03/14/2023    Procedure: ARTHROSCOPY,SHOULDER,WITH BICEPS TENODESIS;  Surgeon: Crissy Bradford MD;  [facility];  Service: Orthopedics;;    KNEE ARTHROSCOPY W/ MENISCECTOMY Right 10/24/2023    Procedure: ARTHROSCOPY, KNEE, WITH  MENISCECTOMY;  Surgeon: Crissy Bradford MD;  Location: [facility] OR;  Service: Orthopedics;  Laterality: Right;  RN PREOP 10/18/203---CLEARED BY PCP  ELVIA [phone]    KNEE SURGERY      acl,mcl,pcl    left knee x 2      PRK  Bilateral 2010    SUBCHONDROPLASTY Right 10/24/2023    Procedure: POSSIBLE SUBCHONDROPLASTY;  Surgeon: Crissy Bradford MD;  Location: [facility] OR;  Service: Orthopedics;  Laterality: Right;       Review of patient's allergies indicates:   Allergen Reactions    Influenza virus vaccine, specific Hives    Pioglitazone      Altered mood     Victoza [liraglutide] Nausea And Vomiting    Farxiga [dapagliflozin] Rash     Erectile dysfunction and rash        Medications:  Medications Prior to Admission   Medication Sig    acetaminophen (TYLENOL) 500 MG tablet Take 2 tablets (1,000 mg total) by mouth every 8 (eight) hours as needed for Pain.    ammonium lactate 12 % Crea Apply 1 application  topically once daily.    [] amoxicillin (AMOXIL) 875 MG tablet Take 1 tablet (875 mg total) by mouth every 12 (twelve) hours. for 10 days    ARIPiprazole (ABILIFY) 2 MG Tab Take 1 tablet (2 mg total) by mouth once daily.    atorvastatin (LIPITOR) 40 MG tablet Take 1 tablet (40 mg total) by mouth once daily.    azelastine (ASTELIN) 137 mcg (0.1 %) nasal spray Use 1-2 sprays in each nostril twice daily    benazepriL (LOTENSIN) 20 MG tablet Take 1 tablet (20 mg total) by mouth once daily.    cyanocobalamin, vitamin B-12, 5,000 mcg Subl Place one under tongue once a day for 1 month, then continue to take under tongue per week    empagliflozin (JARDIANCE) 25 mg tablet Take 1 tablet (25 mg total) by mouth once daily.    EPINEPHrine (EPIPEN 2-AYALA) 0.3 mg/0.3 mL AtIn Inject 0.3 mLs (0.3 mg total) into the muscle as needed (Anaphylaxis).    fluticasone propionate (FLONASE) 50 mcg/actuation nasal spray 1 spray (50 mcg total) by Each Nostril route once daily.    insulin aspart U-100 (NOVOLOG U-100  "INSULIN ASPART) 100 unit/mL injection MAX DAILY DOSE 120 UNITS IN INSULIN PUMP.    insulin pump cart,auto,BT,G6/7 (OMNIPOD 5 G6-G7 PODS, GEN 5,) Crtg Change every 2 days    insulin pump cart,automated,BT (OMNIPOD 5 G6 PODS, GEN 5,) Crtg Inject 1 each into the skin once daily.    ketoconazole (NIZORAL) 2 % cream Apply topically once daily.    L-METHYLFOLATE 15 mg Tab TAKE 15 MG BY MOUTH ONCE DAILY.    lamoTRIgine (LAMICTAL) 100 MG tablet Take 1.5 tablets (150 mg total) by mouth once daily.    levothyroxine (SYNTHROID) 50 MCG tablet Take 1 tablet (50 mcg total) by mouth before breakfast.    loratadine (CLARITIN) 10 mg tablet Take 1 tablet (10 mg total) by mouth once daily.    mirtazapine (REMERON) 15 MG tablet Take 1 tablet (15 mg total) by mouth every evening.    modafiniL (PROVIGIL) 100 MG Tab Take 1 tablet (100 mg total) by mouth every morning.    pen needle, diabetic (BD ULTRA-FINE KEN PEN NEEDLE) 32 gauge x 5/32" Ndle 1 Device by Misc.(Non-Drug; Combo Route) route 5 (five) times daily.    pregabalin (LYRICA) 75 MG capsule Take 1 capsule (75 mg total) by mouth 2 (two) times daily.    QUEtiapine (SEROQUEL) 25 MG Tab Take 1 tablet (25 mg total) by mouth once daily in the evening    blood sugar diagnostic Strp To check BG 4 times daily, to use with insurance preferred meter    blood sugar diagnostic Strp OneTouch Ultra Test strips    blood-glucose meter kit OneTouch Ultra2 Meter kit    blood-glucose sensor (DEXCOM G7 SENSOR) Filomena Change every 10 days    lancets Misc To check BG 4 times daily, to use with insurance preferred meter    meloxicam (MOBIC) 15 MG tablet Take 1 tablet (15 mg total) by mouth once daily.    syringe, disposable, 1 mL Syrg Testosterone every 2 weeks    tirzepatide (MOUNJARO) 12.5 mg/0.5 mL PnIj Inject 12.5 mg (one pen) into the skin every 7 days.    traMADoL (ULTRAM) 50 mg tablet Take 1-2 tablets ( mg total) by mouth every 6 (six) hours as needed for Pain.     Antibiotics (From admission, " "onward)      Start     Stop Route Frequency Ordered    05/20/25 1200  cefTRIAXone injection 2 g         -- IV Every 24 hours (non-standard times) 05/20/25 1051    05/20/25 1007  vancomycin - pharmacy to dose  (vancomycin IVPB (PEDS and ADULTS))        Placed in "And" Linked Group    -- IV pharmacy to manage frequency 05/20/25 0907    05/20/25 1000  vancomycin 2,000 mg in 0.9% NaCl 500 mL IVPB (admixture device)         -- IV Every 12 hours (non-standard times) 05/20/25 0912          Antifungals (From admission, onward)      None          Antivirals (From admission, onward)      None             Immunization History   Administered Date(s) Administered    COVID-19 Vaccine 12/28/2020, 01/25/2021, 11/08/2021    COVID-19, MRNA, LN-S, PF (MODERNA FULL 0.5 ML DOSE) 12/28/2020, 01/25/2021, 11/08/2021    COVID-19, mRNA, LNP-S, PF, kayla-sucrose, 30 mcg/0.3 mL (Pfizer Ages 12+) 12/11/2023    Influenza 09/06/2019    Pneumococcal Polysaccharide - 23 Valent 09/28/2018    Tdap 07/18/2019       Family History       Problem Relation (Age of Onset)    Autism Son    Diabetes Mother, Father, Maternal Grandmother    No Known Problems Brother, Maternal Grandfather, Paternal Grandmother, Paternal Grandfather, Daughter, Maternal Aunt, Maternal Uncle, Paternal Aunt, Paternal Uncle, Other          Social History     Socioeconomic History    Marital status:    Occupational History    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     Employer: [employer]   Tobacco Use    Smoking status: Former     Types: Cigars     Passive exposure: Never    Smokeless tobacco: Never    Tobacco comments:     Has not had any cigars this year   Substance and Sexual Activity    Alcohol use: Yes     Comment: 1-2 beers every 2 weeks    Drug use: Yes     Types: Marijuana     Comment: Non-prescription cannabis     Social Drivers of Health     Financial Resource Strain: Patient Declined (5/19/2025)    Overall Financial Resource " Strain (CARDIA)     Difficulty of Paying Living Expenses: Patient declined   Food Insecurity: Patient Declined (5/19/2025)    Hunger Vital Sign     Worried About Running Out of Food in the Last Year: Patient declined     Ran Out of Food in the Last Year: Patient declined   Transportation Needs: Patient Declined (5/19/2025)    PRAPARE - Transportation     Lack of Transportation (Medical): Patient declined     Lack of Transportation (Non-Medical): Patient declined   Physical Activity: Insufficiently Active (3/10/2025)    Exercise Vital Sign     Days of Exercise per Week: 2 days     Minutes of Exercise per Session: 20 min   Stress: Patient Declined (5/19/2025)    [place] [employer] - Occupational Stress Questionnaire     Feeling of Stress : Patient declined   Recent Concern: Stress - Stress Concern Present (3/10/2025)    [place] [employer] - Occupational Stress Questionnaire     Feeling of Stress : To some extent   Housing Stability: Patient Declined (5/19/2025)    Housing Stability Vital Sign     Unable to Pay for Housing in the Last Year: Patient declined     Homeless in the Last Year: Patient declined     Review of Systems   Constitutional:  Negative for chills and fever.   Musculoskeletal:  Positive for arthralgias.   Skin:  Positive for wound.   All other systems reviewed and are negative.    Objective:     Vital Signs (Most Recent):  Temp: 98.7 °F (37.1 °C) (05/20/25 0736)  Pulse: 96 (05/20/25 0736)  Resp: 18 (05/20/25 0947)  BP: 137/83 (05/20/25 0736)  SpO2: 96 % (05/20/25 0736) Vital Signs (24h Range):  Temp:  [97.8 °F (36.6 °C)-99.4 °F (37.4 °C)] 98.7 °F (37.1 °C)  Pulse:  [] 96  Resp:  [15-20] 18  SpO2:  [94 %-100 %] 96 %  BP: (133-166)/(57-98) 137/83     Weight: 128.4 kg (283 lb)  Body mass index is 37.34 kg/m².    Estimated Creatinine Clearance: 187.2 mL/min (based on SCr of 0.7 mg/dL).     Physical Exam  Vitals and nursing note reviewed.   Constitutional:   "     Appearance: Normal appearance.   HENT:      Head: Normocephalic and atraumatic.   Eyes:      Extraocular Movements: Extraocular movements intact.      Pupils: Pupils are equal, round, and reactive to light.   Cardiovascular:      Rate and Rhythm: Normal rate.   Musculoskeletal:      Comments: Right leg in brace with Hemavac   Neurological:      General: No focal deficit present.      Mental Status: He is alert and oriented to person, place, and time.   Psychiatric:         Mood and Affect: Mood normal.         Behavior: Behavior normal.         Thought Content: Thought content normal.         Judgment: Judgment normal.          Significant Labs: CBC: No results for input(s): "WBC", "HGB", "HCT", "PLT" in the last 48 hours.  Microbiology Results (last 7 days)       Procedure Component Value Units Date/Time    Aerobic culture [[id number]] Collected: 05/19/25 1429    Order Status: Completed Specimen: Tissue from Knee, Right Updated: 05/20/25 1024     CULTURE, AEROBIC No Growth To Date    Aerobic culture [[id number]] Collected: 05/19/25 1428    Order Status: Completed Specimen: Tissue Updated: 05/20/25 1024     CULTURE, AEROBIC No Growth To Date    Aerobic culture [[id number]] Collected: 05/19/25 1426    Order Status: Completed Specimen: Tissue from Knee, Right Updated: 05/20/25 1023     CULTURE, AEROBIC No Growth To Date    Culture, Body Fluid (Aerobic) w/ GS [[id number]] Collected: 05/19/25 1231    Order Status: Completed Specimen: Body Fluid from Knee, Right Updated: 05/20/25 1022     CULTURE, FLUID No Growth To Date     GRAM STAIN Rare WBC seen      No organisms seen    Afb Culture Stain [[id number]] Collected: 05/19/25 1231    Order Status: Sent Specimen: Body Fluid from Knee, Right Updated: 05/20/25 0947    Gram stain [[id number]] Collected: 05/19/25 1231    Order Status: Completed Specimen: Body Fluid from Knee, Right Updated: 05/19/25 1529     GRAM STAIN Rare WBC seen      No organisms seen    Culture, " Anaerobe [[id number]] Collected: 05/19/25 1428    Order Status: Sent Specimen: Tissue Updated: 05/19/25 1526    Culture, Anaerobe [[id number]] Collected: 05/19/25 1426    Order Status: Sent Specimen: Tissue from Knee, Right Updated: 05/19/25 1526    Culture, Anaerobe [[id number]] Collected: 05/19/25 1429    Order Status: Sent Specimen: Tissue Updated: 05/19/25 1526    Culture, Anaerobic [[id number]] Collected: 05/19/25 1231    Order Status: Sent Specimen: Body Fluid from Knee, Right Updated: 05/19/25 1446    AFB Culture & Smear [[id number]] Collected: 05/19/25 1231    Order Status: Sent Specimen: Body Fluid from Knee, Right Updated: 05/19/25 1446    Fungus culture [[id number]] Collected: 05/19/25 1231    Order Status: Sent Specimen: Body Fluid from Knee, Right Updated: 05/19/25 1446            Significant Imaging: I have reviewed all pertinent imaging results/findings within the past 24 hours.

## 2025-05-20 NOTE — ASSESSMENT & PLAN NOTE
Mr. Parr is a pleasant 43 yo man with history of right TKA in November, 2024, now admitted with suspected infection. Synovasure was positive with negative micro. He is now s/p explantation and spacer placement. Anticipating 6 weks of culture-directed abx. If cultures remains sterile, will plan on 6 weeks of CTX and daptomycin.    Recommendations  Continue CTX and vancomycin pending culture results  If those are sterile, will plan on discharge with CTX and daptomycin, empirically

## 2025-05-20 NOTE — PLAN OF CARE
2:34pm: CM spoke to patient via telephone, [phone], regarding next steps of care.  Patient in agreement with home health; I provided the patient a choice of post acute providers and offered a list of CMS rated home health agencies.  Patient has declined to select a preferred provider and elects placement with the first accepting in network provider that is available to provide services as ordered by the physician.     Informed patient and family that placement is based on medical acceptance, insurance authorization and staff availability.

## 2025-05-21 DIAGNOSIS — Z96.651 STATUS POST RIGHT KNEE REPLACEMENT: ICD-10-CM

## 2025-05-21 DIAGNOSIS — T84.50XA INFECTION OF PROSTHETIC JOINT, INITIAL ENCOUNTER: Primary | ICD-10-CM

## 2025-05-21 LAB
ACID FAST MOD KINY STN SPEC: NORMAL
ANION GAP (OHS): 9 MMOL/L (ref 8–16)
BUN SERPL-MCNC: 9 MG/DL (ref 6–20)
CALCIUM SERPL-MCNC: 9.5 MG/DL (ref 8.7–10.5)
CHLORIDE SERPL-SCNC: 104 MMOL/L (ref 95–110)
CK SERPL-CCNC: 44 U/L (ref 20–200)
CO2 SERPL-SCNC: 24 MMOL/L (ref 23–29)
CREAT SERPL-MCNC: 0.7 MG/DL (ref 0.5–1.4)
GFR SERPLBLD CREATININE-BSD FMLA CKD-EPI: >60 ML/MIN/1.73/M2
GLUCOSE SERPL-MCNC: 155 MG/DL (ref 70–110)
POCT GLUCOSE: 107 MG/DL (ref 70–110)
POCT GLUCOSE: 136 MG/DL (ref 70–110)
POCT GLUCOSE: 182 MG/DL (ref 70–110)
POCT GLUCOSE: 89 MG/DL (ref 70–110)
POTASSIUM SERPL-SCNC: 4.1 MMOL/L (ref 3.5–5.1)
SODIUM SERPL-SCNC: 137 MMOL/L (ref 136–145)
VANCOMYCIN TROUGH SERPL-MCNC: 9.8 UG/ML (ref 10–22)

## 2025-05-21 PROCEDURE — 80202 ASSAY OF VANCOMYCIN: CPT | Performed by: ORTHOPAEDIC SURGERY

## 2025-05-21 PROCEDURE — 97530 THERAPEUTIC ACTIVITIES: CPT

## 2025-05-21 PROCEDURE — 97116 GAIT TRAINING THERAPY: CPT | Mod: CQ

## 2025-05-21 PROCEDURE — 25000003 PHARM REV CODE 250: Performed by: INTERNAL MEDICINE

## 2025-05-21 PROCEDURE — 25000003 PHARM REV CODE 250: Performed by: ORTHOPAEDIC SURGERY

## 2025-05-21 PROCEDURE — 36415 COLL VENOUS BLD VENIPUNCTURE: CPT | Performed by: ORTHOPAEDIC SURGERY

## 2025-05-21 PROCEDURE — 84520 ASSAY OF UREA NITROGEN: CPT | Performed by: ORTHOPAEDIC SURGERY

## 2025-05-21 PROCEDURE — 99233 SBSQ HOSP IP/OBS HIGH 50: CPT | Mod: ,,, | Performed by: INTERNAL MEDICINE

## 2025-05-21 PROCEDURE — 21400001 HC TELEMETRY ROOM

## 2025-05-21 PROCEDURE — 97535 SELF CARE MNGMENT TRAINING: CPT

## 2025-05-21 PROCEDURE — 63600175 PHARM REV CODE 636 W HCPCS: Performed by: INTERNAL MEDICINE

## 2025-05-21 PROCEDURE — 63600175 PHARM REV CODE 636 W HCPCS: Performed by: ORTHOPAEDIC SURGERY

## 2025-05-21 PROCEDURE — 97530 THERAPEUTIC ACTIVITIES: CPT | Mod: CQ

## 2025-05-21 PROCEDURE — 99900035 HC TECH TIME PER 15 MIN (STAT)

## 2025-05-21 PROCEDURE — 82550 ASSAY OF CK (CPK): CPT | Performed by: INTERNAL MEDICINE

## 2025-05-21 RX ORDER — OXYCODONE HYDROCHLORIDE 5 MG/1
5-10 TABLET ORAL EVERY 4 HOURS PRN
Qty: 40 TABLET | Refills: 0 | Status: SHIPPED | OUTPATIENT
Start: 2025-05-21

## 2025-05-21 RX ORDER — METHOCARBAMOL 500 MG/1
500 TABLET, FILM COATED ORAL 4 TIMES DAILY
Qty: 50 TABLET | Refills: 0 | Status: SHIPPED | OUTPATIENT
Start: 2025-05-21 | End: 2025-06-04

## 2025-05-21 RX ORDER — LAMOTRIGINE 150 MG/1
150 TABLET ORAL DAILY
Status: DISCONTINUED | OUTPATIENT
Start: 2025-05-21 | End: 2025-05-22 | Stop reason: HOSPADM

## 2025-05-21 RX ORDER — ARIPIPRAZOLE 2 MG/1
2 TABLET ORAL DAILY
Status: DISCONTINUED | OUTPATIENT
Start: 2025-05-21 | End: 2025-05-22 | Stop reason: HOSPADM

## 2025-05-21 RX ORDER — MODAFINIL 100 MG/1
100 TABLET ORAL EVERY MORNING
Status: DISCONTINUED | OUTPATIENT
Start: 2025-05-22 | End: 2025-05-22 | Stop reason: HOSPADM

## 2025-05-21 RX ORDER — DOCUSATE SODIUM 100 MG/1
100 CAPSULE, LIQUID FILLED ORAL 2 TIMES DAILY
Qty: 30 CAPSULE | Refills: 0 | Status: SHIPPED | OUTPATIENT
Start: 2025-05-21

## 2025-05-21 RX ORDER — CELECOXIB 200 MG/1
200 CAPSULE ORAL 2 TIMES DAILY
Qty: 14 CAPSULE | Refills: 0 | Status: SHIPPED | OUTPATIENT
Start: 2025-05-21

## 2025-05-21 RX ORDER — ACETAMINOPHEN 500 MG
1000 TABLET ORAL EVERY 8 HOURS PRN
Qty: 42 TABLET | Refills: 0 | Status: SHIPPED | OUTPATIENT
Start: 2025-05-21

## 2025-05-21 RX ORDER — ASPIRIN 81 MG/1
81 TABLET ORAL 2 TIMES DAILY
Qty: 60 TABLET | Refills: 0 | Status: SHIPPED | OUTPATIENT
Start: 2025-05-21 | End: 2025-06-21

## 2025-05-21 RX ORDER — QUETIAPINE FUMARATE 25 MG/1
25 TABLET, FILM COATED ORAL DAILY
Status: DISCONTINUED | OUTPATIENT
Start: 2025-05-21 | End: 2025-05-22 | Stop reason: HOSPADM

## 2025-05-21 RX ORDER — MIRTAZAPINE 15 MG/1
15 TABLET, FILM COATED ORAL NIGHTLY
Status: DISCONTINUED | OUTPATIENT
Start: 2025-05-21 | End: 2025-05-22 | Stop reason: HOSPADM

## 2025-05-21 RX ORDER — LEVOTHYROXINE SODIUM 50 UG/1
50 TABLET ORAL
Status: DISCONTINUED | OUTPATIENT
Start: 2025-05-22 | End: 2025-05-22 | Stop reason: HOSPADM

## 2025-05-21 RX ORDER — PREGABALIN 75 MG/1
75 CAPSULE ORAL 2 TIMES DAILY
Qty: 28 CAPSULE | Refills: 0 | Status: SHIPPED | OUTPATIENT
Start: 2025-05-21 | End: 2025-06-05

## 2025-05-21 RX ORDER — ATORVASTATIN CALCIUM 40 MG/1
40 TABLET, FILM COATED ORAL DAILY
Status: DISCONTINUED | OUTPATIENT
Start: 2025-05-21 | End: 2025-05-22 | Stop reason: HOSPADM

## 2025-05-21 RX ORDER — CETIRIZINE HYDROCHLORIDE 10 MG/1
10 TABLET ORAL DAILY
Status: DISCONTINUED | OUTPATIENT
Start: 2025-05-21 | End: 2025-05-22 | Stop reason: HOSPADM

## 2025-05-21 RX ADMIN — QUETIAPINE FUMARATE 25 MG: 25 TABLET ORAL at 03:05

## 2025-05-21 RX ADMIN — SENNOSIDES AND DOCUSATE SODIUM 1 TABLET: 50; 8.6 TABLET ORAL at 09:05

## 2025-05-21 RX ADMIN — METHOCARBAMOL 750 MG: 750 TABLET ORAL at 09:05

## 2025-05-21 RX ADMIN — FAMOTIDINE 20 MG: 20 TABLET, FILM COATED ORAL at 08:05

## 2025-05-21 RX ADMIN — VANCOMYCIN HYDROCHLORIDE 2000 MG: 2 INJECTION, POWDER, LYOPHILIZED, FOR SOLUTION INTRAVENOUS at 09:05

## 2025-05-21 RX ADMIN — POLYETHYLENE GLYCOL 3350 17 G: 17 POWDER, FOR SOLUTION ORAL at 09:05

## 2025-05-21 RX ADMIN — ASPIRIN 81 MG: 81 TABLET, COATED ORAL at 08:05

## 2025-05-21 RX ADMIN — CEFTRIAXONE 2 G: 2 INJECTION, POWDER, FOR SOLUTION INTRAMUSCULAR; INTRAVENOUS at 11:05

## 2025-05-21 RX ADMIN — FAMOTIDINE 20 MG: 20 TABLET, FILM COATED ORAL at 09:05

## 2025-05-21 RX ADMIN — ACETAMINOPHEN 1000 MG: 500 TABLET ORAL at 04:05

## 2025-05-21 RX ADMIN — OXYCODONE 5 MG: 5 TABLET ORAL at 08:05

## 2025-05-21 RX ADMIN — METHOCARBAMOL 750 MG: 750 TABLET ORAL at 02:05

## 2025-05-21 RX ADMIN — OXYCODONE 5 MG: 5 TABLET ORAL at 12:05

## 2025-05-21 RX ADMIN — MIRTAZAPINE 15 MG: 15 TABLET, FILM COATED ORAL at 08:05

## 2025-05-21 RX ADMIN — SENNOSIDES AND DOCUSATE SODIUM 1 TABLET: 50; 8.6 TABLET ORAL at 08:05

## 2025-05-21 RX ADMIN — ARIPIPRAZOLE 2 MG: 2 TABLET ORAL at 03:05

## 2025-05-21 RX ADMIN — PREGABALIN 75 MG: 50 CAPSULE ORAL at 08:05

## 2025-05-21 RX ADMIN — OXYCODONE 5 MG: 5 TABLET ORAL at 05:05

## 2025-05-21 RX ADMIN — DAPTOMYCIN 1025 MG: 350 INJECTION, POWDER, LYOPHILIZED, FOR SOLUTION INTRAVENOUS at 12:05

## 2025-05-21 RX ADMIN — METHOCARBAMOL 750 MG: 750 TABLET ORAL at 08:05

## 2025-05-21 RX ADMIN — OXYCODONE 5 MG: 5 TABLET ORAL at 11:05

## 2025-05-21 RX ADMIN — CETIRIZINE HYDROCHLORIDE 10 MG: 10 TABLET, FILM COATED ORAL at 03:05

## 2025-05-21 RX ADMIN — LAMOTRIGINE 150 MG: 150 TABLET ORAL at 04:05

## 2025-05-21 RX ADMIN — ATORVASTATIN CALCIUM 40 MG: 40 TABLET, FILM COATED ORAL at 03:05

## 2025-05-21 RX ADMIN — ACETAMINOPHEN 1000 MG: 500 TABLET ORAL at 11:05

## 2025-05-21 RX ADMIN — ACETAMINOPHEN 1000 MG: 500 TABLET ORAL at 05:05

## 2025-05-21 RX ADMIN — ASPIRIN 81 MG: 81 TABLET, COATED ORAL at 09:05

## 2025-05-21 NOTE — PLAN OF CARE
Problem: Wound  Goal: Optimal Pain Control and Function  Outcome: Progressing  Intervention: Prevent or Manage Pain  Flowsheets (Taken 5/21/2025 1702)  Sleep/Rest Enhancement:   noise level reduced   regular sleep/rest pattern promoted   relaxation techniques promoted   room darkened  Pain Management Interventions:   care clustered   cold applied   medication offered   pillow support provided   position adjusted   premedicated for activity   quiet environment facilitated   relaxation techniques promoted  Goal: Optimal Wound Healing  Outcome: Progressing  Intervention: Promote Wound Healing  Flowsheets (Taken 5/21/2025 1702)  Sleep/Rest Enhancement:   noise level reduced   regular sleep/rest pattern promoted   relaxation techniques promoted   room darkened

## 2025-05-21 NOTE — ASSESSMENT & PLAN NOTE
Mr. Parr is a pleasant 45 yo man with history of right TKA in November, 2024, now admitted with suspected infection. Synovasure was positive with negative micro. He is now s/p explantation and spacer placement. Anticipating 6 weks of culture-directed abx. If cultures remains sterile, will plan on 6 weeks of CTX and daptomycin.    Cx: all NGTD    Recommendations  Continue CTX  Stop vancomycin and start daptomycin  Check baseline CK  Discussed with patient and spouse via telephone

## 2025-05-21 NOTE — PLAN OF CARE
Problem: Physical Therapy  Goal: Physical Therapy Goal  Description: Goals to be met by: 25     Patient will increase functional independence with mobility by performin. Pt to be SBA with bed mobility.  2. Pt to transfer with SBA.  3. Pt to ambulate 15' w/RW, PWB RLE with brace.    Outcome: Progressing     Pt ambulated ~100ft using RW and CGA, pt able to maintain PWB precautions

## 2025-05-21 NOTE — NURSING
Received report care assumed. Patient lying in bed resting, NAD noted. Safety precautions maintained.    [facility] - [place], [place]  Nurses Note -- 4 Eyes      5/20/2025       Skin assessed on: Q Shift      [x] No Pressure Injuries Present    [x]Prevention Measures Documented    [] Yes LDA  for Pressure Injury Previously documented     [] Yes New Pressure Injury Discovered   [] LDA for New Pressure Injury Added      Attending RN:  Vira Dunn LPN     Second RN:  Angela Cordero RN

## 2025-05-21 NOTE — PROGRESS NOTES
[facility] - [facility] Surg  Infectious Disease  Progress Note    Patient Name: Tony Gordillo  MRN: [id number]  Admission Date: 5/19/2025  Length of Stay: 2 days  Attending Physician: Antonio Rolon MD  Primary Care Provider: Jovany Ford MD    Isolation Status: No active isolations  Assessment/Plan:      ID  Infection of prosthetic right knee joint  Mr. Parr is a pleasant 45 yo man with history of right TKA in November, 2024, now admitted with suspected infection. Synovasure was positive with negative micro. He is now s/p explantation and spacer placement. Anticipating 6 weks of culture-directed abx. If cultures remains sterile, will plan on 6 weeks of CTX and daptomycin.    Cx: all NGTD    Recommendations  Continue CTX  Stop vancomycin and start daptomycin  Check baseline CK  Discussed with patient and spouse via telephone    Gregory De La Cruz MD  Infectious Disease  [facility] - [facility] Surg    Subjective:     Principal Problem:<principal problem not specified>    HPI: Mr. Parr is a pleasant 45 yo lakshmi/p right TKA on 11/18/24. Did well until he got the flu in December and never bounced back. He was seen in clinic but attempts at fluid aspiration were unsuccessful. ESR was only 28. An MRI was performed  MRI which had concerning findings for osteomyelitis and infection of his joint.  He underwent aspiration vis US and his Synovasure was positive. The patient was taken to the OR for explantation and spacer placement. ID is consulted for IV abx treatment of a periprosthetic infection. Currently, the patient is on vancomycin.     Interval History: Doing okay. No fever or chills. Tolerating abx.    Review of Systems   All other systems reviewed and are negative.    Objective:     Vital Signs (Most Recent):  Temp: 98.1 °F (36.7 °C) (05/21/25 0708)  Pulse: 78 (05/21/25 0708)  Resp: 20 (05/21/25 0708)  BP: 135/66 (05/21/25 0708)  SpO2: 96 % (05/21/25 0708) Vital Signs (24h Range):  Temp:  [98.1 °F (36.7 °C)-98.6  "°F (37 °C)] 98.1 °F (36.7 °C)  Pulse:  [] 78  Resp:  [18-21] 20  SpO2:  [94 %-99 %] 96 %  BP: (135-162)/(66-91) 135/66     Weight: 128.4 kg (283 lb)  Body mass index is 37.34 kg/m².    Estimated Creatinine Clearance: 187.2 mL/min (based on SCr of 0.7 mg/dL).     Physical Exam  Vitals and nursing note reviewed.   Constitutional:       Appearance: Normal appearance.   Neurological:      Mental Status: He is alert.   Psychiatric:         Mood and Affect: Mood normal.         Behavior: Behavior normal.         Thought Content: Thought content normal.         Judgment: Judgment normal.          Significant Labs: BMP:   Recent Labs   Lab 05/21/25  0845   *      K 4.1      CO2 24   BUN 9   CREATININE 0.7   CALCIUM 9.5     CBC: No results for input(s): "WBC", "HGB", "HCT", "PLT" in the last 48 hours.  Microbiology Results (last 7 days)       Procedure Component Value Units Date/Time    Culture, Anaerobe [[id number]] Collected: 05/19/25 1429    Order Status: Completed Specimen: Tissue Updated: 05/21/25 0744     Anaerobe Culture Culture In Progress    Culture, Anaerobe [[id number]] Collected: 05/19/25 1428    Order Status: Completed Specimen: Tissue Updated: 05/21/25 0743     Anaerobe Culture Culture In Progress    Culture, Anaerobic [[id number]] Collected: 05/19/25 1231    Order Status: Completed Specimen: Body Fluid from Knee, Right Updated: 05/21/25 0739     Anaerobe Culture Culture In Progress    Culture, Anaerobe [[id number]] Collected: 05/19/25 1426    Order Status: Completed Specimen: Tissue from Knee, Right Updated: 05/21/25 0738     Anaerobe Culture Culture In Progress    Aerobic culture [[id number]] Collected: 05/19/25 1429    Order Status: Completed Specimen: Tissue from Knee, Right Updated: 05/21/25 0606     CULTURE, AEROBIC No Growth To Date    Aerobic culture [[id number]] Collected: 05/19/25 1428    Order Status: Completed Specimen: Tissue Updated: 05/21/25 0606     CULTURE, AEROBIC No " Growth To Date    Aerobic culture [[id number]] Collected: 05/19/25 1426    Order Status: Completed Specimen: Tissue from Knee, Right Updated: 05/21/25 0605     CULTURE, AEROBIC No Growth To Date    Culture, Body Fluid (Aerobic) w/ GS [[id number]] Collected: 05/19/25 1231    Order Status: Completed Specimen: Body Fluid from Knee, Right Updated: 05/21/25 0605     CULTURE, FLUID No Growth To Date     GRAM STAIN Rare WBC seen      No organisms seen    Afb Culture Stain [[id number]] Collected: 05/19/25 1231    Order Status: Sent Specimen: Body Fluid from Knee, Right Updated: 05/20/25 0947    Gram stain [[id number]] Collected: 05/19/25 1231    Order Status: Completed Specimen: Body Fluid from Knee, Right Updated: 05/19/25 1529     GRAM STAIN Rare WBC seen      No organisms seen    AFB Culture & Smear [[id number]] Collected: 05/19/25 1231    Order Status: Resulted Specimen: Body Fluid from Knee, Right Updated: 05/19/25 1446    Fungus culture [[id number]] Collected: 05/19/25 1231    Order Status: Sent Specimen: Body Fluid from Knee, Right Updated: 05/19/25 1446            Significant Imaging: I have reviewed all pertinent imaging results/findings within the past 24 hours.

## 2025-05-21 NOTE — SUBJECTIVE & OBJECTIVE
"Interval History: Doing okay. No fever or chills. Tolerating abx.    Review of Systems   All other systems reviewed and are negative.    Objective:     Vital Signs (Most Recent):  Temp: 98.1 °F (36.7 °C) (05/21/25 0708)  Pulse: 78 (05/21/25 0708)  Resp: 20 (05/21/25 0708)  BP: 135/66 (05/21/25 0708)  SpO2: 96 % (05/21/25 0708) Vital Signs (24h Range):  Temp:  [98.1 °F (36.7 °C)-98.6 °F (37 °C)] 98.1 °F (36.7 °C)  Pulse:  [] 78  Resp:  [18-21] 20  SpO2:  [94 %-99 %] 96 %  BP: (135-162)/(66-91) 135/66     Weight: 128.4 kg (283 lb)  Body mass index is 37.34 kg/m².    Estimated Creatinine Clearance: 187.2 mL/min (based on SCr of 0.7 mg/dL).     Physical Exam  Vitals and nursing note reviewed.   Constitutional:       Appearance: Normal appearance.   Neurological:      Mental Status: He is alert.   Psychiatric:         Mood and Affect: Mood normal.         Behavior: Behavior normal.         Thought Content: Thought content normal.         Judgment: Judgment normal.          Significant Labs: BMP:   Recent Labs   Lab 05/21/25  0845   *      K 4.1      CO2 24   BUN 9   CREATININE 0.7   CALCIUM 9.5     CBC: No results for input(s): "WBC", "HGB", "HCT", "PLT" in the last 48 hours.  Microbiology Results (last 7 days)       Procedure Component Value Units Date/Time    Culture, Anaerobe [[id number]] Collected: 05/19/25 1429    Order Status: Completed Specimen: Tissue Updated: 05/21/25 0744     Anaerobe Culture Culture In Progress    Culture, Anaerobe [[id number]] Collected: 05/19/25 1428    Order Status: Completed Specimen: Tissue Updated: 05/21/25 0743     Anaerobe Culture Culture In Progress    Culture, Anaerobic [[id number]] Collected: 05/19/25 1231    Order Status: Completed Specimen: Body Fluid from Knee, Right Updated: 05/21/25 0739     Anaerobe Culture Culture In Progress    Culture, Anaerobe [[id number]] Collected: 05/19/25 1426    Order Status: Completed Specimen: Tissue from Knee, Right " Updated: 05/21/25 0738     Anaerobe Culture Culture In Progress    Aerobic culture [[id number]] Collected: 05/19/25 1429    Order Status: Completed Specimen: Tissue from Knee, Right Updated: 05/21/25 0606     CULTURE, AEROBIC No Growth To Date    Aerobic culture [[id number]] Collected: 05/19/25 1428    Order Status: Completed Specimen: Tissue Updated: 05/21/25 0606     CULTURE, AEROBIC No Growth To Date    Aerobic culture [[id number]] Collected: 05/19/25 1426    Order Status: Completed Specimen: Tissue from Knee, Right Updated: 05/21/25 0605     CULTURE, AEROBIC No Growth To Date    Culture, Body Fluid (Aerobic) w/ GS [[id number]] Collected: 05/19/25 1231    Order Status: Completed Specimen: Body Fluid from Knee, Right Updated: 05/21/25 0605     CULTURE, FLUID No Growth To Date     GRAM STAIN Rare WBC seen      No organisms seen    Afb Culture Stain [[id number]] Collected: 05/19/25 1231    Order Status: Sent Specimen: Body Fluid from Knee, Right Updated: 05/20/25 0947    Gram stain [[id number]] Collected: 05/19/25 1231    Order Status: Completed Specimen: Body Fluid from Knee, Right Updated: 05/19/25 1529     GRAM STAIN Rare WBC seen      No organisms seen    AFB Culture & Smear [[id number]] Collected: 05/19/25 1231    Order Status: Resulted Specimen: Body Fluid from Knee, Right Updated: 05/19/25 1446    Fungus culture [[id number]] Collected: 05/19/25 1231    Order Status: Sent Specimen: Body Fluid from Knee, Right Updated: 05/19/25 1446            Significant Imaging: I have reviewed all pertinent imaging results/findings within the past 24 hours.

## 2025-05-21 NOTE — PLAN OF CARE
No acute distress noted, patient free from falls or injury this shift.  Bed in low position, wheels locked, call light in reach for assistance, plan of care continued.      Problem: Wound  Goal: Optimal Pain Control and Function  Outcome: Progressing  Goal: Skin Health and Integrity  Outcome: Progressing  Goal: Optimal Wound Healing  Outcome: Progressing     Problem: Skin Injury Risk Increased  Goal: Skin Health and Integrity  Outcome: Progressing     Problem: Infection  Goal: Absence of Infection Signs and Symptoms  Outcome: Progressing

## 2025-05-21 NOTE — PT/OT/SLP PROGRESS
Occupational Therapy   Treatment    Name: Tony Gordillo  MRN: [id number]  Admitting Diagnosis:  <principal problem not specified>  2 Days Post-Op    Recommendations:     Discharge Recommendations: Low Intensity Therapy  Discharge Equipment Recommendations:  none  Barriers to discharge:  Other (Comment)    Assessment:     Tony Gordillo is a 44 y.o. male with a medical diagnosis of <principal problem not specified>.  He presents with with the following performance deficits affecting function are impaired endurance, weakness, impaired functional mobility, impaired self care skills, impaired balance, pain, edema, decreased lower extremity function.     Rehab Prognosis:  Good; patient would benefit from acute skilled OT services to address these deficits and reach maximum level of function.       Plan:     Patient to be seen 4 x/week to address the above listed problems via self-care/home management, therapeutic activities, therapeutic exercises  Plan of Care Expires: 06/02/25  Plan of Care Reviewed with: patient    Subjective     Chief Complaint: pain of R knee  Patient/Family Comments/goals: Return home independent  Pain/Comfort:  Pain Rating 1: 5/10  Location - Side 1: Right  Location - Orientation 1: lower  Location 1: knee  Pain Addressed 1: Pre-medicate for activity  Pain Rating Post-Intervention 1: 5/10    Objective:     Communicated with: Nurse prior to session.  Patient found HOB elevated with cryotherapy, knee immobilizer, wound vac upon OT entry to room.    General Precautions: Standard, fall    Orthopedic Precautions:RLE partial weight bearing  Braces: Hinged knee brace  Respiratory Status: Room air     Occupational Performance:     Bed Mobility:    Patient completed Supine to Sit with minimum assistance to manage R knee.    Functional Mobility/Transfers:  Patient completed Sit <> Stand Transfer with contact guard assistance  with  rolling walker   Patient completed Bed <> Chair Transfer using Stand  Pivot technique with contact guard assistance with rolling walker  Patient completed Toilet Transfer Step Transfer technique with contact guard assistance and minimum assistance with  rolling walker and grab bars  Functional Mobility: Pt performed functional mob in room with CGA for balance and assistance to manage IV pole.     Activities of Daily Living:  Grooming: independence with setup provided by OT.  Upper Body Dressing: minimum assistance to don gown   Toileting: moderate assistance for gown management.       [facility] 6 Click ADL: 17    Treatment & Education:  Pt participated in functional mob in room using RW as OT managed IV pole. OT provided verbal cues to adhere to R PWB (flat foot) status with pt verbalizing understanding. Verbal cues provided for proper hand and foot placement during transitional movement and assisted with management of R knee 2/2 hinge brace. Improvement noted with ax tolerance when performing functional mob with RW.     Patient left up in chair with shamir feet elevated, cryotherapy to R knee, all lines intact, call button in reach, and nurse notified    GOALS:   Multidisciplinary Problems       Occupational Therapy Goals          Problem: Occupational Therapy    Goal Priority Disciplines Outcome Interventions   Occupational Therapy Goal     OT, PT/OT Progressing    Description: Goals to be met by: 06/02/2025     Patient will increase functional independence with ADLs by performing:    LE Dressing with Modified [place].  Grooming while standing with [place].  Toileting from bedside commode with Modified [place] for hygiene and clothing management.   Toilet transfer to bedside commode with Modified [place].  Upper extremity exercise program 2/10 reps per handout, with independence.                         DME Justifications:  No DME recommended requiring DME justifications    Time Tracking:     OT Date of Treatment: 05/21/25  OT Start Time: 0945  OT Stop Time: 1012  OT  Total Time (min): 27 min    Billable Minutes:Self Care/Home Management 15  Therapeutic Activity 12    OT/CHADD: OT          5/21/2025

## 2025-05-21 NOTE — PROGRESS NOTES
Vancomycin consult follow-up:    Patient reviewed, BMP ordered to assess renal function, continue current therapy; Next levels due: 0900

## 2025-05-21 NOTE — PT/OT/SLP PROGRESS
Physical Therapy Treatment    Patient Name:  Tony Gordillo   MRN:  [id number]    Recommendations:     Discharge Recommendations: Low Intensity Therapy  Discharge Equipment Recommendations: none  Barriers to discharge: None    Assessment:     Tony Gordillo is a 44 y.o. male admitted with a medical diagnosis of <principal problem not specified>.  He presents with the following impairments/functional limitations: weakness, impaired functional mobility, gait instability, decreased lower extremity function, pain, impaired skin, decreased ROM, orthopedic precautions.    Pt ambulated ~100ft using RW and CGA, pt able to maintain PWB precautions    Rehab Prognosis: Good; patient would benefit from acute skilled PT services to address these deficits and reach maximum level of function.    Recent Surgery: Procedure(s) (LRB):  IRRIGATION AND DEBRIDEMENT, KNEE, WITH ANTIBIOTIC BEADS OR ANTIBIOTIC SPACER INSERTION (Right) 2 Days Post-Op    Plan:     During this hospitalization, patient to be seen 5 x/week to address the identified rehab impairments via gait training, therapeutic activities and progress toward the following goals:    Plan of Care Expires:  05/27/25    Subjective     Chief Complaint: some pain  Patient/Family Comments/goals: Pt agreed to therapy  Pain/Comfort:  Pain Rating 1: 5/10  Location - Side 1: Right  Location 1: knee  Pain Addressed 1: Reposition, Distraction      Objective:     Communicated with nursing prior to session.  Patient found up in chair with cryotherapy, hinge brace, wound vac upon PT entry to room.     General Precautions: Standard,    Orthopedic Precautions: RLE partial weight bearing (50%, no flexion >90 degrees)  Braces: Hinged knee brace  Respiratory Status: Room air     Functional Mobility:  Transfers: Gait belt donned     Sit to Stand:  contact guard assistance with rolling walker  Toilet Transfer: contact guard assistance with  rolling walker  using  Step Transfer  Gait: Pt ambulated  ~100ft using RW and CGA, maintaining PWB to RLE. Pt with decreased lee, step length, increased time in double stance, cues to maintain PWB RLE and safety awareness  Balance: Pt with Fair standing balance      AM-PAC 6 CLICK MOBILITY  Turning over in bed (including adjusting bedclothes, sheets and blankets)?: 3  Sitting down on and standing up from a chair with arms (e.g., wheelchair, bedside commode, etc.): 3  Moving from lying on back to sitting on the side of the bed?: 3  Moving to and from a bed to a chair (including a wheelchair)?: 3  Need to walk in hospital room?: 3  Climbing 3-5 steps with a railing?: 3  Basic Mobility Total Score: 18       Treatment & Education:  Educated pt on transfers and gait training, pt with good understanding    Patient left up in chair with all lines intact, call button in reach, nursing notified, and cryotherapy reapplied..    GOALS:   Multidisciplinary Problems       Physical Therapy Goals          Problem: Physical Therapy    Goal Priority Disciplines Outcome Interventions   Physical Therapy Goal     PT, PT/OT Progressing    Description: Goals to be met by: 25     Patient will increase functional independence with mobility by performin. Pt to be SBA with bed mobility.  2. Pt to transfer with SBA.  3. Pt to ambulate 15' w/RW, PWB RLE with brace.                         DME Justifications:      Time Tracking:     PT Received On: 25  PT Start Time: 1314     PT Stop Time: 1338  PT Total Time (min): 24 min     Billable Minutes: Gait Training 15 and Therapeutic Activity 9    Treatment Type: Treatment  PT/PTA: PTA     Number of PTA visits since last PT visit: 2025

## 2025-05-21 NOTE — NURSING
Called and spoke with pharmacist Skyler Mansfield regarding trough. He confirmed that Vancomycin 2gm dose can be administered at this time. Next trough is scheduled for 5/21 at 0900.

## 2025-05-21 NOTE — NURSING
[facility] [place]  Nurses Note -- 4 Eyes        Date:  05/21/2025        Skin assessed on:  Q shift        [x] No Pressure Injuries Present                 []Prevention Measures Documented     [] Yes LDA Previously documented      [] Yes New Pressure Injury Discovered              [] LDA Added        Attending RN:  ALEXANDRA Miller     Second RN:   TJ Constantino

## 2025-05-21 NOTE — PROGRESS NOTES
"ORTHOPAEDIC POSTOP PROGRESS NOTE       Subjective   Patient states that they are doing okay from a pain standpoint. Cultures NGTD. PICC line placed. ID following    Objective   VITAL SIGNS: BP (!) 145/80 (BP Location: Right arm, Patient Position: Sitting)   Pulse 86   Temp 98 °F (36.7 °C)   Resp 18   Ht 6' 1" (1.854 m)   Wt 128.4 kg (283 lb)   SpO2 99%   BMI 37.34 kg/m²    INTAKE AND OUTPUT:   Intake/Output Summary (Last 24 hours) at 5/21/2025 1259  Last data filed at 5/21/2025 0626  Gross per 24 hour   Intake 1337.8 ml   Output 1900 ml   Net -562.2 ml        PHYSICAL EXAMINATION:  Right Lower Extremity:    T scope in place, provena in place, polar care in place    Dorsalis pedis pulses palpable     Dorsi flexion 5/5 strength    Plantar flexion 5/5 strength     Extensor hallucis extension: 5/5 strength    Sensory intact to light touch L1-S1    Dressing clean/dry/intact    LABS: Reviewed      Assessment/Plan:  WEIGHT BEARING- partial weight bearing (flat foot) in T-scope brace. Unlocked from 0-60 degrees.   BRACING-  T Scope  Wound Care: Provena for 7-10 days.   PHYSICAL THERAPY-  Range of motion restricted in T Scope  Active flexion-extension only, no forced motion at any time during recovery.  ANTIBIOTICS-The patient will stay on IV antibiotics as directed by ID. Has picc line and HH for expected 6 weeks of IV abx. Will follow up on operative cultures. NGTD on Synovasure testing cultures.   DISPO: Home with Home Health tomorrow  ASA 81mg BID [id number] for VTE prophylaxis  Restart Home Meds    Problem List:   Patient Active Problem List    Diagnosis Date Noted    Infection of prosthetic right knee joint 05/20/2025    Primary osteoarthritis of both knees 07/15/2024    Bilateral chronic knee pain 07/15/2024    Impaired mobility and ADLs 06/07/2024    Imbalance 06/07/2024    Cognitive communication deficit 05/11/2024    Concussion with no loss of consciousness 05/07/2024    Concussion with loss of consciousness " 05/07/2024    Other specified persistent mood disorders 05/07/2024    Allergy desensitization therapy 05/06/2024    Traumatic encephalopathy 03/25/2024    Acute migraine 01/31/2024    MCI (mild cognitive impairment) 01/31/2024    Medication overuse headache 01/31/2024    Chronic migraine with aura, intractable, with status migrainosus 01/31/2024    Agitation 01/31/2024    Refractive error 01/24/2024    Tear of lateral meniscus of right knee, current 10/24/2023    Uncontrolled type 2 diabetes mellitus 10/17/2023    S/P right rotator cuff repair 03/17/2023    Biceps tendonosis of right shoulder 03/17/2023    Decreased range of motion of right shoulder 03/17/2023    Impaired strength of shoulder muscles 03/17/2023    Traumatic rotator cuff tear, right, initial encounter 03/14/2023    Other amnesia 11/02/2022    Mild neurocognitive disorder due to traumatic brain injury 10/19/2022    Outbursts of anger 10/19/2022    Left hand pain 09/24/2022    Decreased strength, endurance, and mobility 03/08/2022    Dyspnea 02/18/2022    Rib pain on left side 08/08/2021    Right foot pain 10/10/2019    Decreased strength of lower extremity 10/10/2019    Decreased range of motion of both ankles 10/10/2019    Gait abnormality 10/10/2019    Low testosterone in male; pituitary axis normal. MRI negative. 11/2019 started on testosterone 09/04/2019    NAINA (obstructive sleep apnea) 8/2019 sleep study AHI 11     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, with long-term current use of insulin     Essential hypertension     Hypothyroidism 07/29/2015    Hyperlipidemia LDL  goal <70 06/03/2015    Insulin long-term use 06/03/2015    Tinea pedis 06/03/2015    Morbid obesity 06/03/2015    Allergic conjunctivitis 01/29/2015    Descemet's membrane fold 01/21/2014    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

## 2025-05-22 ENCOUNTER — TELEPHONE (OUTPATIENT)
Dept: INFECTIOUS DISEASES | Facility: CLINIC | Age: 45
End: 2025-05-22
Payer: COMMERCIAL

## 2025-05-22 VITALS
HEART RATE: 92 BPM | BODY MASS INDEX: 37.51 KG/M2 | OXYGEN SATURATION: 98 % | HEIGHT: 73 IN | SYSTOLIC BLOOD PRESSURE: 144 MMHG | TEMPERATURE: 98 F | RESPIRATION RATE: 18 BRPM | WEIGHT: 283 LBS | DIASTOLIC BLOOD PRESSURE: 80 MMHG

## 2025-05-22 PROBLEM — T84.50XA PROSTHETIC JOINT INFECTION: Status: ACTIVE | Noted: 2025-05-22

## 2025-05-22 LAB
POCT GLUCOSE: 132 MG/DL (ref 70–110)
POCT GLUCOSE: 209 MG/DL (ref 70–110)
POCT GLUCOSE: 95 MG/DL (ref 70–110)

## 2025-05-22 PROCEDURE — 94761 N-INVAS EAR/PLS OXIMETRY MLT: CPT

## 2025-05-22 PROCEDURE — 25000003 PHARM REV CODE 250: Performed by: ORTHOPAEDIC SURGERY

## 2025-05-22 PROCEDURE — 97110 THERAPEUTIC EXERCISES: CPT

## 2025-05-22 PROCEDURE — 97530 THERAPEUTIC ACTIVITIES: CPT

## 2025-05-22 PROCEDURE — 99233 SBSQ HOSP IP/OBS HIGH 50: CPT | Mod: ,,, | Performed by: INTERNAL MEDICINE

## 2025-05-22 PROCEDURE — 25000003 PHARM REV CODE 250: Performed by: INTERNAL MEDICINE

## 2025-05-22 PROCEDURE — 63600175 PHARM REV CODE 636 W HCPCS: Performed by: INTERNAL MEDICINE

## 2025-05-22 PROCEDURE — 99900035 HC TECH TIME PER 15 MIN (STAT)

## 2025-05-22 RX ADMIN — ACETAMINOPHEN 1000 MG: 500 TABLET ORAL at 11:05

## 2025-05-22 RX ADMIN — MODAFINIL 100 MG: 100 TABLET ORAL at 06:05

## 2025-05-22 RX ADMIN — ACETAMINOPHEN 1000 MG: 500 TABLET ORAL at 12:05

## 2025-05-22 RX ADMIN — CETIRIZINE HYDROCHLORIDE 10 MG: 10 TABLET, FILM COATED ORAL at 09:05

## 2025-05-22 RX ADMIN — DAPTOMYCIN 1025 MG: 350 INJECTION, POWDER, LYOPHILIZED, FOR SOLUTION INTRAVENOUS at 11:05

## 2025-05-22 RX ADMIN — ATORVASTATIN CALCIUM 40 MG: 40 TABLET, FILM COATED ORAL at 09:05

## 2025-05-22 RX ADMIN — FAMOTIDINE 20 MG: 20 TABLET, FILM COATED ORAL at 09:05

## 2025-05-22 RX ADMIN — LEVOTHYROXINE SODIUM 50 MCG: 0.05 TABLET ORAL at 06:05

## 2025-05-22 RX ADMIN — POLYETHYLENE GLYCOL 3350 17 G: 17 POWDER, FOR SOLUTION ORAL at 09:05

## 2025-05-22 RX ADMIN — OXYCODONE 10 MG: 5 TABLET ORAL at 12:05

## 2025-05-22 RX ADMIN — CEFTRIAXONE 2 G: 2 INJECTION, POWDER, FOR SOLUTION INTRAMUSCULAR; INTRAVENOUS at 11:05

## 2025-05-22 RX ADMIN — OXYCODONE 5 MG: 5 TABLET ORAL at 06:05

## 2025-05-22 RX ADMIN — METHOCARBAMOL 750 MG: 750 TABLET ORAL at 09:05

## 2025-05-22 RX ADMIN — ACETAMINOPHEN 1000 MG: 500 TABLET ORAL at 06:05

## 2025-05-22 RX ADMIN — METHOCARBAMOL 750 MG: 750 TABLET ORAL at 02:05

## 2025-05-22 RX ADMIN — QUETIAPINE FUMARATE 25 MG: 25 TABLET ORAL at 09:05

## 2025-05-22 RX ADMIN — ARIPIPRAZOLE 2 MG: 2 TABLET ORAL at 09:05

## 2025-05-22 RX ADMIN — LAMOTRIGINE 150 MG: 150 TABLET ORAL at 09:05

## 2025-05-22 RX ADMIN — ASPIRIN 81 MG: 81 TABLET, COATED ORAL at 09:05

## 2025-05-22 RX ADMIN — SENNOSIDES AND DOCUSATE SODIUM 1 TABLET: 50; 8.6 TABLET ORAL at 09:05

## 2025-05-22 NOTE — PT/OT/SLP PROGRESS
Occupational Therapy   Treatment    Name: Tony Gordillo  MRN: [id number]  Admitting Diagnosis:  Infection of prosthetic right knee joint  3 Days Post-Op    Recommendations:     Discharge Recommendations: Low Intensity Therapy  Discharge Equipment Recommendations:  none  Barriers to discharge:  None    Assessment:     Tony Gordillo is a 44 y.o. male with a medical diagnosis of Infection of prosthetic right knee joint.  He presents with pain of R knee. Performance deficits affecting function are impaired endurance, weakness, impaired functional mobility, impaired self care skills, impaired balance, pain, edema, decreased lower extremity function.     Rehab Prognosis:  Good; patient would benefit from acute skilled OT services to address these deficits and reach maximum level of function.       Plan:     Patient to be seen 4 x/week to address the above listed problems via self-care/home management, therapeutic activities, therapeutic exercises  Plan of Care Expires: 06/02/25  Plan of Care Reviewed with: patient    Subjective     Chief Complaint: Pain of R knee   Patient/Family Comments/goals: Reduced pain and improve mobility to return to PLOF  Pain/Comfort:  Pain Rating 1: 5/10  Location - Side 1: Right  Location - Orientation 1: lower  Location 1: knee  Pain Addressed 1: Pre-medicate for activity  Pain Rating Post-Intervention 1: 5/10    Objective:     Communicated with: Nurse prior to session.  Patient found HOB elevated with cryotherapy, wound vac, peripheral IV, SHAN hose upon OT entry to room.    General Precautions: Standard, fall    Orthopedic Precautions:RLE partial weight bearing  Braces: Hinged knee brace  Respiratory Status: Room air     Occupational Performance:     Bed Mobility:    Patient completed Supine to Sit with minimum assistance  Patient completed Sit to Supine with contact guard assistance     Functional Mobility/Transfers:  Patient completed Bed <> Chair Transfer using Step Transfer  technique with contact guard assistance with rolling walker    Activities of Daily Living:  NT      [facility] 6 Click ADL: 18    Treatment & Education:  AM: Pt trained in safe transfers from bed ><chair using RW with CGA and cues for sequencing transitional movements with wound vac and IV pole.   PM: while seated in chair pt performed UE theraband ex [id number] in various planes to improve UE strength and endurance needed for functional t/fs. Occasional rest breaks provided.     Patient left HOB elevated with all lines intact and call button in reach    GOALS:   Multidisciplinary Problems       Occupational Therapy Goals          Problem: Occupational Therapy    Goal Priority Disciplines Outcome Interventions   Occupational Therapy Goal     OT, PT/OT Progressing    Description: Goals to be met by: 06/02/2025     Patient will increase functional independence with ADLs by performing:    LE Dressing with Modified [place].  Grooming while standing with [place].  Toileting from bedside commode with Modified [place] for hygiene and clothing management.   Toilet transfer to bedside commode with Modified [place].  Upper extremity exercise program 2/10 reps per handout, with independence.                         DME Justifications:  No DME recommended requiring DME justifications    Time Tracking:     OT Date of Treatment: 05/22/25  OT Start Time: 1156  OT Stop Time: 1208  OT Total Time (min): 12 min    Billable Minutes:Therapeutic Activity 12    2nd visit Start time: 1525                Stop time: 1537   Total time: 12 mins  Billable minutes: Therapeutic Exercise 12    OT/CHADD: OT          5/22/2025

## 2025-05-22 NOTE — DISCHARGE INSTRUCTIONS
Our goal at [facility] is to always give you outstanding care and exceptional service. You may receive a survey from [employer] by mail, text or e-mail in the next 24-48 hours asking about the care you received with us. The survey should only take 5-10 minutes to complete and is very important to us.     Your feedback provides us with a way to recognize our staff who work tirelessly to provide the best care! Also, your responses help us learn how to improve when your experience was below our aspiration of excellence. We are always looking for ways to improve your stay. We WILL use your feedback to continue making improvements to help us provide the highest quality care. We keep your personal information and feedback confidential. We appreciate your time completing this survey and can't wait to hear from you!!!    We look forward to your continued care with us! Thanks so much for choosing [facility] for your healthcare needs!

## 2025-05-22 NOTE — TELEPHONE ENCOUNTER
Patient notified of the Infectious Disease hospital f/u appt on the [facility]

## 2025-05-22 NOTE — PLAN OF CARE
Discharge Orders: Home [place]         Expected Discharge Date: POD3    Diagnoses:  Post-op right knee explant and antibiotic spacer with antibiotic bead placement     Patient is homebound due to:   Pt requires home health services due to taxing effort to leave the home as a result of immobility from Post-op right knee explant and antibiotic spacer with antibiotic bead placement     Weight Bearing Status: partial weight bearing (flat foot) in T-scope brace. Unlocked from 0-60 degrees. BRACING- T Scope       Physical Therapy:     Range of motion restricted in T Scope     Active flexion-extension only, no forced motion at any time during recovery.    Aide to provide assistance with personal care and  ADLs 2 times a week for 2 wee    Wound Care: Provena wound vac on for 7 days will follow up with clinician for removal    Cold therapy/Ice: Encouraged at least 20 minutes 2-3 times daily or more if desired.  Incision must be kept waterproof while icing.  Wear TEDS Bilateral Thigh High Stockings for 3 weeks. Dre hose x 3 weeks. Ok to remove dre hose 1-2 hours/day max if desired.       Contact:  Please contact [phone] with concerns.         BLOOD THINNER:    If sent home on Lovenox: 28 days post-op for TKR/OLGA  If sent home home on ASA: 81mg   BID x 4 weeks   If on Plavix, ok to stop hospital prescribed blood thinner and restart Plavix on POD5 after surgery    Once finished with prescribed blood thinner, patients can return to pre-surgical ASA dosage if they took ASA before surgery.     ANTIBIOTICS-The patient will stay on IV antibiotics as directed by ID. Will need picc line and HH for expected 6 weeks of IV abx. NGTD on operative cultures.       Outpatient Therapy: [facility] is preference (to begin 2 weeks post op)  [address]  [place] [address]      DME:  - T scope. Unlocked from 0-60 degrees.

## 2025-05-22 NOTE — TELEPHONE ENCOUNTER
----- Message from  Eusebia sent at 5/22/2025  3:51 PM CDT -----  Regarding: Hospital Follow Up  Good afternoon, please schedule patient for hospital follow up.  He is discharging today, thank you!

## 2025-05-22 NOTE — PLAN OF CARE
Case Management Final Discharge Note    [facility] [phone]     New DME ordered / company name: None    Relevant SDOH / Transition of Care Barriers:  None    Person available to provide assistance at home when needed and their contact information: Patient is independent; Marisol Gordillo (Spouse) [phone]         Scheduled followup appointment: Follow up appointments with PCP and Orthopedics; in-basket message sent to Infectious Disease Clinic to schedule hospital follow up    Referrals placed: None    Transportation: Private vehicle/family taking patient home        Patient and family educated on discharge services and updated on DC plan.  Patient to discharge with home health from [facility]; he is independent and lives with his spouse who is able to assist him at home as needed.  Patient needing 6wks IV abx and will receive infusion services from [facility], [phone]; infusion teachings completed with patient at bedside.  Bedside RN notified, patient clear to discharge from Case Management Perspective.    05/22/25 1553   Final Note   Assessment Type Final Discharge Note   Anticipated Discharge Disposition [facility] Resources/Appts/Education Provided Provided patient/caregiver with written discharge plan information;Appointments scheduled and added to AVS;Post-Acute resouces added to AVS   Post-Acute Status   Post-Acute Authorization Home Health;IV Infusion   Home Health Status Set-up Complete/Auth obtained   IV Infusion Status Set-up Complete/Auth obtained   Discharge Delays None known at this time

## 2025-05-22 NOTE — PROGRESS NOTES
"ORTHOPAEDIC POSTOP PROGRESS NOTE       Subjective   Patient states that they are doing well from a pain standpoint. Cultures still NGTD. PICC line placed. ID following. Provena changed yesterday.     Objective   VITAL SIGNS: BP (!) 141/79   Pulse 90   Temp 97.9 °F (36.6 °C) (Oral)   Resp 18   Ht 6' 1" (1.854 m)   Wt 128.4 kg (283 lb)   SpO2 98%   BMI 37.34 kg/m²    INTAKE AND OUTPUT:   Intake/Output Summary (Last 24 hours) at 5/22/2025 0755  Last data filed at 5/22/2025 0102  Gross per 24 hour   Intake 840 ml   Output 2000 ml   Net -1160 ml        PHYSICAL EXAMINATION:  Right Lower Extremity:    T scope in place, provena in place, polar care in place    Dorsalis pedis pulses palpable     Dorsi flexion 5/5 strength    Plantar flexion 5/5 strength     Extensor hallucis extension: 5/5 strength    Sensory intact to light touch L1-S1    Dressing clean/dry/intact    LABS: Reviewed      Assessment/Plan:  WEIGHT BEARING- partial weight bearing (flat foot) in T-scope brace. Unlocked from 0-60 degrees.   BRACING-  T Scope  Wound Care: Provena for 7-10 days. Changed 5/21/2025.  PHYSICAL THERAPY-  Range of motion restricted in T Scope  Active flexion-extension only, no forced motion at any time during recovery.  ANTIBIOTICS-The patient will stay on IV antibiotics as directed by ID. Has picc line and HH for expected 6 weeks of IV abx. Will follow up on operative cultures, NGTD. NGTD on Synovasure testing cultures.   DISPO: Home with Home Health today  ASA 81mg BID [id number] for VTE prophylaxis  Restart Home Meds    Problem List:   Patient Active Problem List    Diagnosis Date Noted    Infection of prosthetic right knee joint 05/20/2025    Primary osteoarthritis of both knees 07/15/2024    Bilateral chronic knee pain 07/15/2024    Impaired mobility and ADLs 06/07/2024    Imbalance 06/07/2024    Cognitive communication deficit 05/11/2024    Concussion with no loss of consciousness 05/07/2024    Concussion with loss of " consciousness 05/07/2024    Other specified persistent mood disorders 05/07/2024    Allergy desensitization therapy 05/06/2024    Traumatic encephalopathy 03/25/2024    Acute migraine 01/31/2024    MCI (mild cognitive impairment) 01/31/2024    Medication overuse headache 01/31/2024    Chronic migraine with aura, intractable, with status migrainosus 01/31/2024    Agitation 01/31/2024    Refractive error 01/24/2024    Tear of lateral meniscus of right knee, current 10/24/2023    Uncontrolled type 2 diabetes mellitus 10/17/2023    S/P right rotator cuff repair 03/17/2023    Biceps tendonosis of right shoulder 03/17/2023    Decreased range of motion of right shoulder 03/17/2023    Impaired strength of shoulder muscles 03/17/2023    Traumatic rotator cuff tear, right, initial encounter 03/14/2023    Other amnesia 11/02/2022    Mild neurocognitive disorder due to traumatic brain injury 10/19/2022    Outbursts of anger 10/19/2022    Left hand pain 09/24/2022    Decreased strength, endurance, and mobility 03/08/2022    Dyspnea 02/18/2022    Rib pain on left side 08/08/2021    Right foot pain 10/10/2019    Decreased strength of lower extremity 10/10/2019    Decreased range of motion of both ankles 10/10/2019    Gait abnormality 10/10/2019    Low testosterone in male; pituitary axis normal. MRI negative. 11/2019 started on testosterone 09/04/2019    NAINA (obstructive sleep apnea) 8/2019 sleep study AHI 11     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, with long-term current use of insulin     Essential hypertension     Hypothyroidism 07/29/2015     Hyperlipidemia LDL goal <70 06/03/2015    Insulin long-term use 06/03/2015    Tinea pedis 06/03/2015    Morbid obesity 06/03/2015    Allergic conjunctivitis 01/29/2015    Descemet's membrane fold 01/21/2014    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

## 2025-05-22 NOTE — DISCHARGE SUMMARY
[facility] Surg  Orthopedics  Discharge Summary      Patient Name: Tony Gordillo  MRN: [id number]  Admission Date: 5/19/2025  Hospital Length of Stay: 3 days  Discharge Date and Time: 5/22/2025  Attending Physician: Antonio Rolon MD   Discharging Provider: Lidia Loera PA-C  Primary Care Provider: Jovany Ford MD    HPI: Right knee periprosthetic joint infection     Procedure(s) (LRB):  IRRIGATION AND DEBRIDEMENT, KNEE, WITH ANTIBIOTIC BEADS OR ANTIBIOTIC SPACER INSERTION (Right)      Hospital Course: Hospital Course    The patient is a 43 y/o male  who has been followed in clinic for right knee periprosthetic joint infection. It was determined he would benefit from surgery. The procedure, its risks, benefits, and potential complications were discussed in detail with the patient prior to surgery. Understanding of all topics was conveyed by the patient, and consent was given for surgery. The patient was admitted to [facility] on 5/19/2025 for right knee explant and antibiotic spacer with antibiotic bead placement.    The procedure was tolerated well and he was sent to the post-operative recovery room in stable condition, where he also did well. Post-operative x-rays in the recovery room showed well-aligned components without evidence of complication or fracture. He was subsequently sent to his hospital room for postoperative management.  ?  Once on the floor his postoperative course  was unremarkable and he did well. his diet was advanced which was tolerated. his pain was well controlled on multimodal pain medication; this was eventually transitioned to oral medication alone prior to discharge. He worked with Physical Therapy starting on POD#1 who recommended he be discharged home. Their dressing remained clean dry and intact throughout the hospital stay. Provena in place and changed 5/21/25. He remained afebrile with stable vital signs throughout the stay. He/she was stable for discharge on  POD#3. ID consulted. NGTD on operative cultures. PICC line placed.    Consults (From admission, onward)          Status Ordering Provider     [facility]  Once        Provider:  (Not yet assigned)    Completed ANOOP CASTELLON     Inpatient consult to Respiratory Care  Once        Provider:  (Not yet assigned)    Acknowledged ANOOP CASTELLON     Inpatient consult to Respiratory Care  Once        Provider:  (Not yet assigned)    Acknowledged ANOOP CASTELLON     Inpatient consult to Infectious Diseases  Once        Provider:  Gregory De La Cruz MD    Completed ANOOP CASTELLON            Significant Diagnostic Studies:  Microbiology: Wound Culture: NGTD    Pending Diagnostic Studies:       Procedure Component Value Units Date/Time    Specimen to Pathology Orthopedics [[id number]] Collected: 05/19/25 1230    Order Status: Sent Lab Status: No result     Specimen: Tissue from Knee, Right; Tissue from Knee, Right; Tissue from Knee, Right           Final Active Diagnoses:    Diagnosis Date Noted POA    Infection of prosthetic right knee joint [T84.53XA] 05/20/2025 Not Applicable      Problems Resolved During this Admission:      Discharged Condition: good    Disposition: Home-Health Care [facility]    Follow Up:    Patient Instructions:   Justification of Rolling Walker:  The mobility limitation can not be sufficiently resolved by the use of a cane.  Patient's functional mobility deficit can be sufficiently resolved with the use of a rolling walker.  Patient has mobility limitation significantly impairs their ability to participate in 1 or more activities of daily living.  The use of the rolling walker we will significantly improve the patient's ability to participate in MRADLS and the patient will use it on a regular basis in the home.      Activity as tolerated     Keep surgical extremity elevated when not ambulating     Lifting restrictions   Order Comments: No strenuous exercise or lifting of >  10 lbs     Weight bearing as tolerated     No driving, operating heavy equipment or signing legal documents while taking pain medication     Call MD for:  temperature >100.4     Call MD for:  persistent nausea and vomiting     Call MD for:  severe uncontrolled pain     Call MD for:  difficulty breathing, headache or visual disturbances     Call MD for:  redness, tenderness, or signs of infection (pain, swelling, redness, odor or green/yellow discharge around incision site)     Call MD for:  hives     Call MD for:  persistent dizziness or light-headedness     Call MD for:  extreme fatigue     Medications:  Reconciled Home Medications:      Medication List        START taking these medications      aspirin 81 MG EC tablet  Commonly known as: ECOTRIN  Take 1 tablet (81 mg total) by mouth 2 (two) times a day.     celecoxib 200 MG capsule  Commonly known as: CeleBREX  Take 1 capsule (200 mg total) by mouth 2 (two) times daily.     docusate sodium 100 MG capsule  Commonly known as: COLACE  Take 1 capsule (100 mg total) by mouth 2 (two) times daily.     methocarbamoL 500 MG Tab  Commonly known as: Robaxin  Take 1 tablet (500 mg total) by mouth 4 (four) times daily. for 13 days     oxyCODONE 5 MG immediate release tablet  Commonly known as: ROXICODONE  Take 1-2 tablets (5-10 mg total) by mouth every 4 (four) hours as needed (pain).            CONTINUE taking these medications      acetaminophen 500 MG tablet  Commonly known as: TYLENOL  Take 2 tablets (1,000 mg total) by mouth every 8 (eight) hours as needed for Pain.     ammonium lactate 12 % Crea  Apply 1 application  topically once daily.     ARIPiprazole 2 MG Tab  Commonly known as: ABILIFY  Take 1 tablet (2 mg total) by mouth once daily.     atorvastatin 40 MG tablet  Commonly known as: LIPITOR  Take 1 tablet (40 mg total) by mouth once daily.     AUVI-Q 0.3 mg/0.3 mL Atin  Generic drug: EPINEPHrine  Inject 0.3 mLs (0.3 mg total) into the muscle as needed  (Anaphylaxis).     azelastine 137 mcg (0.1 %) nasal spray  Commonly known as: ASTELIN  Use 1-2 sprays in each nostril twice daily     benazepriL 20 MG tablet  Commonly known as: LOTENSIN  Take 1 tablet (20 mg total) by mouth once daily.     * blood sugar diagnostic Strp  OneTouch Ultra Test strips     * blood sugar diagnostic Strp  To check BG 4 times daily, to use with insurance preferred meter     blood-glucose meter kit  OneTouch Ultra2 Meter kit     cyanocobalamin (vitamin B-12) 5,000 mcg Subl  Place one under tongue once a day for 1 month, then continue to take under tongue per week     DEXCOM G7 SENSOR Filomena  Generic drug: blood-glucose sensor  Change every 10 days     empagliflozin 25 mg tablet  Commonly known as: JARDIANCE  Take 1 tablet (25 mg total) by mouth once daily.     fluticasone propionate 50 mcg/actuation nasal spray  Commonly known as: FLONASE  1 spray (50 mcg total) by Each Nostril route once daily.     insulin aspart U-100 100 unit/mL injection  Commonly known as: NovoLOG U-100 Insulin aspart  MAX DAILY DOSE 120 UNITS IN INSULIN PUMP.     ketoconazole 2 % cream  Commonly known as: NIZORAL  Apply topically once daily.     L-METHYLFOLATE 15 mg Tab  Generic drug: levomefolate calcium  TAKE 15 MG BY MOUTH ONCE DAILY.     lamoTRIgine 100 MG tablet  Commonly known as: LAMICTAL  Take 1.5 tablets (150 mg total) by mouth once daily.     lancets Misc  To check BG 4 times daily, to use with insurance preferred meter     levothyroxine 50 MCG tablet  Commonly known as: SYNTHROID  Take 1 tablet (50 mcg total) by mouth before breakfast.     loratadine 10 mg tablet  Commonly known as: CLARITIN  Take 1 tablet (10 mg total) by mouth once daily.     mirtazapine 15 MG tablet  Commonly known as: REMERON  Take 1 tablet (15 mg total) by mouth every evening.     modafiniL 100 MG Tab  Commonly known as: PROVIGIL  Take 1 tablet (100 mg total) by mouth every morning.     MOUNJARO 12.5 mg/0.5 mL Pnij  Generic drug:  "tirzepatide  Inject 12.5 mg (one pen) into the skin every 7 days.     OMNIPOD 5 G6 PODS (GEN 5) Crtg  Generic drug: insulin pump cart,automated,BT  Inject 1 each into the skin once daily.     OMNIPOD 5 G6-G7 PODS (GEN 5) Crtg  Generic drug: insulin pump cart,auto,BT,G6/7  Change every 2 days     pen needle, diabetic 32 gauge x 5/32" Ndle  Commonly known as: BD ULTRA-FINE KEN PEN NEEDLE  1 Device by Misc.(Non-Drug; Combo Route) route 5 (five) times daily.     pregabalin 75 MG capsule  Commonly known as: LYRICA  Take 1 capsule (75 mg total) by mouth 2 (two) times daily. for 14 days     QUEtiapine 25 MG Tab  Commonly known as: SEROQUEL  Take 1 tablet (25 mg total) by mouth once daily in the evening     syringe (disposable) 1 mL Syrg  Testosterone every 2 weeks           * This list has 2 medication(s) that are the same as other medications prescribed for you. Read the directions carefully, and ask your doctor or other care provider to review them with you.                STOP taking these medications      amoxicillin 875 MG tablet  Commonly known as: AMOXIL     meloxicam 15 MG tablet  Commonly known as: MOBIC     traMADoL 50 mg tablet  Commonly known as: EUSEBIO Loera PA-C  Orthopedics  [facility]  "

## 2025-05-22 NOTE — NURSING
Patient awake alert and oriented this afternoon. Patient up in chair with no complaints.  Patient was able to ambulate to bathroom with walker with minimal assist.

## 2025-05-22 NOTE — TREATMENT PLAN
Outpatient Antibiotic Therapy Plan:    Please send referral to [facility].    1) Infection: Culture-negative PJI    2) Discharge Antibiotics:    Intravenous antibiotics:  Ceftriaxone 2 g IV q 24 hours   Daptomycin 1000 mg IV q 24 hours       3) Therapy Duration:  6 weeks    Estimated end date of IV antibiotics: 7/1/25    4) Outpatient Weekly Labs:    Order the following labs to be drawn on Mondays:   CBC  CMP   CRP  CPK     5) Fax Lab Results to Infectious Diseases Provider: [facility] Fax Number: [phone]    6) Outpatient Infectious Diseases Follow-up    Follow-up appointment will be arranged by the ID clinic and will be found in the patient's appointments tab.    Prior to discharge, please ensure the patient's follow-up has been scheduled.    If there is still no follow-up scheduled prior to discharge, please send an Nonpareil message to [facility] Clinical Pool or Call Infectious Diseases Dept.

## 2025-05-23 LAB
BACTERIA FLD AEROBE CULT: NO GROWTH
BACTERIA SPEC AEROBE CULT: NO GROWTH
BACTERIA SPEC ANAEROBE CULT: NO GROWTH
GRAM STN SPEC: NORMAL
GRAM STN SPEC: NORMAL

## 2025-05-25 LAB
BACTERIA SPEC AEROBE CULT: ABNORMAL
BACTERIA SPEC AEROBE CULT: ABNORMAL

## 2025-05-27 ENCOUNTER — LAB REQUISITION (OUTPATIENT)
Dept: LAB | Facility: HOSPITAL | Age: 45
End: 2025-05-27
Payer: COMMERCIAL

## 2025-05-27 DIAGNOSIS — M65.161 OTHER INFECTIVE (TENO)SYNOVITIS, RIGHT KNEE: ICD-10-CM

## 2025-05-27 DIAGNOSIS — T84.53XA INFECTION AND INFLAMMATORY REACTION DUE TO INTERNAL RIGHT KNEE PROSTHESIS, INITIAL ENCOUNTER: ICD-10-CM

## 2025-05-27 DIAGNOSIS — T84.50XA INFECTION AND INFLAMMATORY REACTION DUE TO UNSPECIFIED INTERNAL JOINT PROSTHESIS, INITIAL ENCOUNTER: ICD-10-CM

## 2025-05-27 DIAGNOSIS — M86.00 ACUTE HEMATOGENOUS OSTEOMYELITIS, UNSPECIFIED SITE: ICD-10-CM

## 2025-05-27 LAB
ABSOLUTE EOSINOPHIL (OHS): 0.22 K/UL
ABSOLUTE MONOCYTE (OHS): 0.48 K/UL (ref 0.3–1)
ABSOLUTE NEUTROPHIL COUNT (OHS): 3.78 K/UL (ref 1.8–7.7)
ALBUMIN SERPL BCP-MCNC: 3.3 G/DL (ref 3.5–5.2)
ALP SERPL-CCNC: 90 UNIT/L (ref 40–150)
ALT SERPL W/O P-5'-P-CCNC: 37 UNIT/L (ref 10–44)
ANION GAP (OHS): 11 MMOL/L (ref 8–16)
AST SERPL-CCNC: 34 UNIT/L (ref 11–45)
BASOPHILS # BLD AUTO: 0.04 K/UL
BASOPHILS NFR BLD AUTO: 0.6 %
BILIRUB SERPL-MCNC: 0.3 MG/DL (ref 0.1–1)
BUN SERPL-MCNC: 16 MG/DL (ref 6–20)
CALCIUM SERPL-MCNC: 9.9 MG/DL (ref 8.7–10.5)
CHLORIDE SERPL-SCNC: 105 MMOL/L (ref 95–110)
CK SERPL-CCNC: 104 U/L (ref 20–200)
CO2 SERPL-SCNC: 25 MMOL/L (ref 23–29)
CREAT SERPL-MCNC: 0.8 MG/DL (ref 0.5–1.4)
CRP SERPL-MCNC: 31.8 MG/L
ERYTHROCYTE [DISTWIDTH] IN BLOOD BY AUTOMATED COUNT: 14.6 % (ref 11.5–14.5)
GFR SERPLBLD CREATININE-BSD FMLA CKD-EPI: >60 ML/MIN/1.73/M2
GLUCOSE SERPL-MCNC: 171 MG/DL (ref 70–110)
HCT VFR BLD AUTO: 39.5 % (ref 40–54)
HGB BLD-MCNC: 12.4 GM/DL (ref 14–18)
IMM GRANULOCYTES # BLD AUTO: 0.02 K/UL (ref 0–0.04)
IMM GRANULOCYTES NFR BLD AUTO: 0.3 % (ref 0–0.5)
LYMPHOCYTES # BLD AUTO: 1.66 K/UL (ref 1–4.8)
MCH RBC QN AUTO: 26.9 PG (ref 27–31)
MCHC RBC AUTO-ENTMCNC: 31.4 G/DL (ref 32–36)
MCV RBC AUTO: 86 FL (ref 82–98)
NUCLEATED RBC (/100WBC) (OHS): 0 /100 WBC
PLATELET # BLD AUTO: 321 K/UL (ref 150–450)
PMV BLD AUTO: 10.5 FL (ref 9.2–12.9)
POTASSIUM SERPL-SCNC: 3.8 MMOL/L (ref 3.5–5.1)
PROT SERPL-MCNC: 7.9 GM/DL (ref 6–8.4)
RBC # BLD AUTO: 4.61 M/UL (ref 4.6–6.2)
RELATIVE EOSINOPHIL (OHS): 3.5 %
RELATIVE LYMPHOCYTE (OHS): 26.8 % (ref 18–48)
RELATIVE MONOCYTE (OHS): 7.7 % (ref 4–15)
RELATIVE NEUTROPHIL (OHS): 61.1 % (ref 38–73)
SODIUM SERPL-SCNC: 141 MMOL/L (ref 136–145)
WBC # BLD AUTO: 6.2 K/UL (ref 3.9–12.7)

## 2025-05-27 PROCEDURE — 82550 ASSAY OF CK (CPK): CPT | Performed by: INTERNAL MEDICINE

## 2025-05-27 PROCEDURE — 86140 C-REACTIVE PROTEIN: CPT | Performed by: INTERNAL MEDICINE

## 2025-05-27 PROCEDURE — 80053 COMPREHEN METABOLIC PANEL: CPT | Performed by: INTERNAL MEDICINE

## 2025-05-27 PROCEDURE — 85025 COMPLETE CBC W/AUTO DIFF WBC: CPT | Performed by: INTERNAL MEDICINE

## 2025-05-30 ENCOUNTER — OFFICE VISIT (OUTPATIENT)
Dept: ORTHOPEDICS | Facility: CLINIC | Age: 45
End: 2025-05-30
Payer: COMMERCIAL

## 2025-05-30 ENCOUNTER — APPOINTMENT (OUTPATIENT)
Dept: RADIOLOGY | Facility: HOSPITAL | Age: 45
End: 2025-05-30
Attending: ORTHOPAEDIC SURGERY
Payer: COMMERCIAL

## 2025-05-30 VITALS — HEIGHT: 73 IN | WEIGHT: 283.06 LBS | BODY MASS INDEX: 37.51 KG/M2

## 2025-05-30 DIAGNOSIS — T84.50XA INFECTION OF PROSTHETIC JOINT, INITIAL ENCOUNTER: Primary | ICD-10-CM

## 2025-05-30 DIAGNOSIS — T84.50XA INFECTION OF PROSTHETIC JOINT, INITIAL ENCOUNTER: ICD-10-CM

## 2025-05-30 DIAGNOSIS — Z96.651 STATUS POST RIGHT KNEE REPLACEMENT: ICD-10-CM

## 2025-05-30 PROCEDURE — 73562 X-RAY EXAM OF KNEE 3: CPT | Mod: 26,RT,, | Performed by: STUDENT IN AN ORGANIZED HEALTH CARE EDUCATION/TRAINING PROGRAM

## 2025-05-30 PROCEDURE — 73562 X-RAY EXAM OF KNEE 3: CPT | Mod: TC,FY,PN,RT

## 2025-05-30 PROCEDURE — 99999 PR PBB SHADOW E&M-EST. PATIENT-LVL IV: CPT | Mod: PBBFAC,,, | Performed by: ORTHOPAEDIC SURGERY

## 2025-05-30 NOTE — PROGRESS NOTES
Ortho Knee Follow Up Note    PCP: Jovany Ford MD   Referring Provider: No referring provider defined for this encounter.        Assessment:  44 y.o. male status post right knee explant and articulating spacer placement for PJI on 5/19/25.  Patient is culture had concern for staph epidermidis in broth only otherwise culture negative infection.  Has infectious disease follow-up scheduled for 7/2/25.  Has PICC line in place he has been receiving antibiotics.  CRP is down trending.  Prevena wound VAC removed.  Incision appears well approximated without issue.  Has been in a T scope brace limited to flexion to 60°.  We will place order for a wheelchair today.  Handicap sticker placed today    We will continue with current treatment plan.  Tentative replant date for July 28th pending resolution of his inflammatory markers.  If elevated after 2 week antibiotic holiday would need reaspiration.    Plan:  Follow up 2 weeks  Future Radiographs Indicated at next visit: No    Pain Management: Continue current pain management  Anticoagulation: Continue ASA for total of 4 weeks duration post operatively  Wound Care: Ok to shower. No scrubbing incision. No soaking in pools or tubs for 6 weeks post operative.     Patient Reassurance:   Post-operative course discussed with patient. Patient reassured and supported. All questions answered.    ACTIVE PROBLEM LIST  Problem List[1]        HPI:  Tony Gordillo presents today for a post-op visit.     BMI: There is no height or weight on file to calculate BMI.    Post operative recovery was complicated by: None    Patient rates his condition as improving. Pleased with surgical outcome to date.     Functional Assessment:  Home PT  Functional Difficulties:  Interferes with sleep and Walking  Pain Medication:  Non-Narcotic  Anticoagulation: Aspirin     EXAM:    right POST-OPERATIVE KNEE    There were no vitals taken for this visit.    Skin:  Appropriate post op appearance, No evidence  of erythema, warmth, discharge, or drainage, and Incision clean/dry/intact  Range of Motion: Flexion: 0, Extension 60  Neurovascular Status: Sensation intact in Sural, Saphenous, SPN, DPN and Tibial nerve distribution. 5 out of 5 strength in hip flexion, knee flexion/extension, ankle plantarflexion/dorsiflexion. 2+ dorsalis pedis    IMAGING:  X-ray Knee:   Implants are well fixed and aligned. There is no evidence of loosening.  Some interval resorption of his antibiotic beads         [1]   Patient Active Problem List  Diagnosis    Hyperlipidemia LDL goal <70    Insulin long-term use    Tinea pedis    Morbid obesity    Hypothyroidism    Type 2 diabetes mellitus 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 MRI negative. 11/2019 started on testosterone    Right foot pain    Decreased strength of lower extremity    Decreased range of motion of both ankles    Gait abnormality    Rib pain on left side    Dyspnea    Decreased strength, endurance, and mobility    Left hand pain    Mild neurocognitive disorder due to traumatic brain injury    Outbursts of anger    Other amnesia    Traumatic rotator cuff tear, right, initial encounter    S/P right rotator cuff repair    Biceps tendonosis of right shoulder    Decreased range of motion of right shoulder    Impaired strength of shoulder muscles    Allergic conjunctivitis    Cornea edema    Descemet's membrane fold    Herpes simplex keratitis    Uncontrolled type 2 diabetes mellitus    Tear of  lateral meniscus of right knee, current    Refractive error    Acute migraine    MCI (mild cognitive impairment)    Medication overuse headache    Chronic migraine with aura, intractable, with status migrainosus    Agitation    Traumatic encephalopathy    Allergy desensitization therapy    Concussion with no loss of consciousness    Concussion with loss of consciousness    Other specified persistent mood disorders    Cognitive communication deficit    Impaired mobility and ADLs    Imbalance    Primary osteoarthritis of both knees    Bilateral chronic knee pain    Infection of prosthetic right knee joint    Prosthetic joint infection

## 2025-06-02 ENCOUNTER — OFFICE VISIT (OUTPATIENT)
Dept: FAMILY MEDICINE | Facility: CLINIC | Age: 45
End: 2025-06-02
Payer: COMMERCIAL

## 2025-06-02 ENCOUNTER — LAB REQUISITION (OUTPATIENT)
Dept: LAB | Facility: HOSPITAL | Age: 45
End: 2025-06-02
Payer: COMMERCIAL

## 2025-06-02 VITALS
SYSTOLIC BLOOD PRESSURE: 114 MMHG | HEART RATE: 89 BPM | TEMPERATURE: 98 F | DIASTOLIC BLOOD PRESSURE: 68 MMHG | WEIGHT: 295.19 LBS | HEIGHT: 73 IN | OXYGEN SATURATION: 97 % | BODY MASS INDEX: 39.12 KG/M2

## 2025-06-02 DIAGNOSIS — F07.81 TRAUMATIC ENCEPHALOPATHY: ICD-10-CM

## 2025-06-02 DIAGNOSIS — F06.70 MILD NEUROCOGNITIVE DISORDER DUE TO TRAUMATIC BRAIN INJURY: ICD-10-CM

## 2025-06-02 DIAGNOSIS — S46.011A TRAUMATIC ROTATOR CUFF TEAR, RIGHT, INITIAL ENCOUNTER: ICD-10-CM

## 2025-06-02 DIAGNOSIS — Z79.4 INSULIN LONG-TERM USE: ICD-10-CM

## 2025-06-02 DIAGNOSIS — E53.8 B12 DEFICIENCY: ICD-10-CM

## 2025-06-02 DIAGNOSIS — M17.12 UNILATERAL PRIMARY OSTEOARTHRITIS, LEFT KNEE: ICD-10-CM

## 2025-06-02 DIAGNOSIS — E03.4 HYPOTHYROIDISM DUE TO ACQUIRED ATROPHY OF THYROID: ICD-10-CM

## 2025-06-02 DIAGNOSIS — G43.E11 CHRONIC MIGRAINE WITH AURA, INTRACTABLE, WITH STATUS MIGRAINOSUS: ICD-10-CM

## 2025-06-02 DIAGNOSIS — Z12.11 SCREENING FOR COLORECTAL CANCER: ICD-10-CM

## 2025-06-02 DIAGNOSIS — S06.0X0S CONCUSSION WITHOUT LOSS OF CONSCIOUSNESS, SEQUELA: ICD-10-CM

## 2025-06-02 DIAGNOSIS — E66.01 MORBID OBESITY: ICD-10-CM

## 2025-06-02 DIAGNOSIS — T84.53XD INFECTION AND INFLAMMATORY REACTION DUE TO INTERNAL RIGHT KNEE PROSTHESIS, SUBSEQUENT ENCOUNTER: ICD-10-CM

## 2025-06-02 DIAGNOSIS — E11.3292 TYPE 2 DIABETES MELLITUS WITH LEFT EYE AFFECTED BY MILD NONPROLIFERATIVE RETINOPATHY WITHOUT MACULAR EDEMA, WITH LONG-TERM CURRENT USE OF INSULIN: ICD-10-CM

## 2025-06-02 DIAGNOSIS — Z12.12 SCREENING FOR COLORECTAL CANCER: ICD-10-CM

## 2025-06-02 DIAGNOSIS — G31.84 MCI (MILD COGNITIVE IMPAIRMENT): ICD-10-CM

## 2025-06-02 DIAGNOSIS — S06.9XAS MILD NEUROCOGNITIVE DISORDER DUE TO TRAUMATIC BRAIN INJURY: ICD-10-CM

## 2025-06-02 DIAGNOSIS — Z79.4 TYPE 2 DIABETES MELLITUS WITH LEFT EYE AFFECTED BY MILD NONPROLIFERATIVE RETINOPATHY WITHOUT MACULAR EDEMA, WITH LONG-TERM CURRENT USE OF INSULIN: ICD-10-CM

## 2025-06-02 DIAGNOSIS — T84.50XD INFECTION OF PROSTHETIC JOINT, SUBSEQUENT ENCOUNTER: Primary | ICD-10-CM

## 2025-06-02 LAB
ABSOLUTE EOSINOPHIL (OHS): 0.35 K/UL
ABSOLUTE MONOCYTE (OHS): 0.88 K/UL (ref 0.3–1)
ABSOLUTE NEUTROPHIL COUNT (OHS): 5.23 K/UL (ref 1.8–7.7)
ALBUMIN SERPL BCP-MCNC: 3.6 G/DL (ref 3.5–5.2)
ALP SERPL-CCNC: 94 UNIT/L (ref 40–150)
ALT SERPL W/O P-5'-P-CCNC: 50 UNIT/L (ref 10–44)
ANION GAP (OHS): 11 MMOL/L (ref 8–16)
AST SERPL-CCNC: 36 UNIT/L (ref 11–45)
BASOPHILS # BLD AUTO: 0.09 K/UL
BASOPHILS NFR BLD AUTO: 1 %
BILIRUB SERPL-MCNC: 0.3 MG/DL (ref 0.1–1)
BUN SERPL-MCNC: 17 MG/DL (ref 6–20)
CALCIUM SERPL-MCNC: 9.5 MG/DL (ref 8.7–10.5)
CHLORIDE SERPL-SCNC: 105 MMOL/L (ref 95–110)
CK SERPL-CCNC: 124 U/L (ref 20–200)
CO2 SERPL-SCNC: 23 MMOL/L (ref 23–29)
CREAT SERPL-MCNC: 0.7 MG/DL (ref 0.5–1.4)
CRP SERPL-MCNC: 8.6 MG/L
ERYTHROCYTE [DISTWIDTH] IN BLOOD BY AUTOMATED COUNT: 15.2 % (ref 11.5–14.5)
GFR SERPLBLD CREATININE-BSD FMLA CKD-EPI: >60 ML/MIN/1.73/M2
GLUCOSE SERPL-MCNC: 157 MG/DL (ref 70–110)
HCT VFR BLD AUTO: 33.1 % (ref 40–54)
HGB BLD-MCNC: 10.5 GM/DL (ref 14–18)
IMM GRANULOCYTES # BLD AUTO: 0.06 K/UL (ref 0–0.04)
IMM GRANULOCYTES NFR BLD AUTO: 0.6 % (ref 0–0.5)
LYMPHOCYTES # BLD AUTO: 2.73 K/UL (ref 1–4.8)
MCH RBC QN AUTO: 27.3 PG (ref 27–31)
MCHC RBC AUTO-ENTMCNC: 31.7 G/DL (ref 32–36)
MCV RBC AUTO: 86 FL (ref 82–98)
NUCLEATED RBC (/100WBC) (OHS): 0 /100 WBC
PLATELET # BLD AUTO: 289 K/UL (ref 150–450)
PMV BLD AUTO: 11.1 FL (ref 9.2–12.9)
POTASSIUM SERPL-SCNC: 4.5 MMOL/L (ref 3.5–5.1)
PROT SERPL-MCNC: 7.9 GM/DL (ref 6–8.4)
RBC # BLD AUTO: 3.85 M/UL (ref 4.6–6.2)
RELATIVE EOSINOPHIL (OHS): 3.7 %
RELATIVE LYMPHOCYTE (OHS): 29.2 % (ref 18–48)
RELATIVE MONOCYTE (OHS): 9.4 % (ref 4–15)
RELATIVE NEUTROPHIL (OHS): 56.1 % (ref 38–73)
SODIUM SERPL-SCNC: 139 MMOL/L (ref 136–145)
WBC # BLD AUTO: 9.34 K/UL (ref 3.9–12.7)

## 2025-06-02 PROCEDURE — 85025 COMPLETE CBC W/AUTO DIFF WBC: CPT | Performed by: INTERNAL MEDICINE

## 2025-06-02 PROCEDURE — 86140 C-REACTIVE PROTEIN: CPT | Performed by: INTERNAL MEDICINE

## 2025-06-02 PROCEDURE — 99999 PR PBB SHADOW E&M-EST. PATIENT-LVL V: CPT | Mod: PBBFAC,,, | Performed by: INTERNAL MEDICINE

## 2025-06-02 PROCEDURE — 80053 COMPREHEN METABOLIC PANEL: CPT | Performed by: INTERNAL MEDICINE

## 2025-06-02 PROCEDURE — 82550 ASSAY OF CK (CPK): CPT | Performed by: INTERNAL MEDICINE

## 2025-06-09 ENCOUNTER — OFFICE VISIT (OUTPATIENT)
Dept: ORTHOPEDICS | Facility: CLINIC | Age: 45
End: 2025-06-09
Payer: COMMERCIAL

## 2025-06-09 VITALS — BODY MASS INDEX: 39.12 KG/M2 | HEIGHT: 73 IN | WEIGHT: 295.19 LBS

## 2025-06-09 DIAGNOSIS — T84.50XA INFECTION OF PROSTHETIC JOINT, INITIAL ENCOUNTER: Primary | ICD-10-CM

## 2025-06-09 DIAGNOSIS — Z96.651 STATUS POST RIGHT KNEE REPLACEMENT: ICD-10-CM

## 2025-06-09 PROCEDURE — 99999 PR PBB SHADOW E&M-EST. PATIENT-LVL IV: CPT | Mod: PBBFAC,,,

## 2025-06-09 PROCEDURE — 99024 POSTOP FOLLOW-UP VISIT: CPT | Mod: S$GLB,,,

## 2025-06-09 NOTE — PROGRESS NOTES
Ortho Knee Follow Up Note    PCP: Jovany Ford MD   Referring Provider: No referring provider defined for this encounter.      Addendum:  Tony Gordillo has a mobility limitation that significantly impairs her ability to participate in one or more mobility related activities of daily living (MRADL's) such as toileting, feeding, dressing, grooming, and bathing in customary locations in the home. The mobility limitation cannot be sufficiently resolved by the use of a cane or walker. The use of a manual wheelchair will significantly improve the patient's ability to participate in MRADLS and the patient will use it on regular basis in the home. Tony has expressed his willingness to use a manual wheelchair in the home. Patient's upper body strength is sufficient for propulsion. He also has a caregiver who is available, willing, and able to provide assistance with the wheelchair when needed.    Assessment:  44 y.o. male status post right knee explant and articulating spacer placement for PJI on 5/19/25.  Patient culture had concern for staph epidermidis in broth only otherwise culture negative infection.  Has infectious disease follow-up scheduled for 7/2/25.  Has PICC line in place he has been receiving antibiotics. In wheelchair today. Has been in a T scope brace limited to flexion to 60°.  Here for staple removal. Incision looks good. Steri strips placed.    We will continue with current treatment plan.  Tentative replant date for July 28th pending resolution of his inflammatory markers.  If elevated after 2 week antibiotic holiday would need reaspiration. He will see Dr. Rolon after ID visit or sooner if needed.    Plan:  Follow up with Dr. Rolon in 3 weeks or sooner if needed  Future Radiographs Indicated at next visit: No    Pain Management: Continue current pain management  Anticoagulation: Continue ASA for total of 4 weeks duration post operatively  Wound Care: Ok to shower. No scrubbing incision. No  soaking in pools or tubs for 6 weeks post operative.     Patient Reassurance:   Post-operative course discussed with patient. Patient reassured and supported. All questions answered.    ACTIVE PROBLEM LIST  Problem List[1]        HPI:  Tony Gordillo presents today for a post-op visit.     BMI: There is no height or weight on file to calculate BMI.    Post operative recovery was complicated by: None    Patient rates his condition as improving. Pleased with surgical outcome to date.     Functional Assessment:  Home PT  Functional Difficulties:  Interferes with sleep and Walking  Pain Medication:  Non-Narcotic  Anticoagulation: Aspirin     EXAM:    right POST-OPERATIVE KNEE    There were no vitals taken for this visit.    Skin:  Appropriate post op appearance, No evidence of erythema, warmth, discharge, or drainage, and Incision clean/dry/intact. Staples removed today.  Range of Motion: Flexion: 0, Extension 60  Neurovascular Status: Sensation intact in Sural, Saphenous, SPN, DPN and Tibial nerve distribution. 5 out of 5 strength in hip flexion, knee flexion/extension, ankle plantarflexion/dorsiflexion. 2+ dorsalis pedis    IMAGING:  X-ray Knee:   Implants are well fixed and aligned. There is no evidence of loosening.  Some interval resorption of his antibiotic beads           [1]   Patient Active Problem List  Diagnosis    Hyperlipidemia LDL goal <70    Insulin long-term use    Tinea pedis    Morbid obesity    Hypothyroidism    Type 2 diabetes mellitus with left eye affected by mild nonproliferative retinopathy without macular edema, with long-term current use of insulin    Essential hypertension    Migraine with aura and without status migrainosus, not intractable propranolol SE angry; topamax not helpful; imitrex ineffective; daith ear piercing helps    Non-seasonal allergic rhinitis; avoid antihistamines per opthalmology    Acute bilateral low back pain without sciatica    NAINA (obstructive sleep apnea) 8/2019  sleep study AHI 11    Low testosterone in male; pituitary axis normal. MRI negative. 11/2019 started on testosterone    Right foot pain    Decreased strength of lower extremity    Decreased range of motion of both ankles    Gait abnormality    Rib pain on left side    Dyspnea    Decreased strength, endurance, and mobility    Left hand pain    Mild neurocognitive disorder due to traumatic brain injury    Outbursts of anger    Other amnesia    Traumatic rotator cuff tear, right, initial encounter    S/P right rotator cuff repair    Biceps tendonosis of right shoulder    Decreased range of motion of right shoulder    Impaired strength of shoulder muscles    Allergic conjunctivitis    Cornea edema    Descemet's membrane fold    Herpes simplex keratitis    Uncontrolled type 2 diabetes mellitus    Tear of lateral meniscus of right knee, current    Refractive error    Acute migraine    MCI (mild cognitive impairment)    Medication overuse headache    Chronic migraine with aura, intractable, with status migrainosus    Agitation    Traumatic encephalopathy    Allergy desensitization therapy    Concussion with no loss of consciousness    Concussion with loss of consciousness    Other specified persistent mood disorders    Cognitive communication deficit    Impaired mobility and ADLs    Imbalance    Primary osteoarthritis of both knees    Bilateral chronic knee pain    Infection of prosthetic right knee joint    Prosthetic joint infection

## 2025-06-10 ENCOUNTER — PATIENT MESSAGE (OUTPATIENT)
Dept: ENDOCRINOLOGY | Facility: CLINIC | Age: 45
End: 2025-06-10
Payer: COMMERCIAL

## 2025-06-10 ENCOUNTER — LAB REQUISITION (OUTPATIENT)
Dept: LAB | Facility: HOSPITAL | Age: 45
End: 2025-06-10
Payer: COMMERCIAL

## 2025-06-10 DIAGNOSIS — E11.3292 TYPE 2 DIABETES MELLITUS WITH MILD NONPROLIFERATIVE DIABETIC RETINOPATHY WITHOUT MACULAR EDEMA, LEFT EYE: ICD-10-CM

## 2025-06-10 DIAGNOSIS — T84.53XD INFECTION AND INFLAMMATORY REACTION DUE TO INTERNAL RIGHT KNEE PROSTHESIS, SUBSEQUENT ENCOUNTER: ICD-10-CM

## 2025-06-10 LAB
ABSOLUTE EOSINOPHIL (OHS): 0.28 K/UL
ABSOLUTE MONOCYTE (OHS): 0.54 K/UL (ref 0.3–1)
ABSOLUTE NEUTROPHIL COUNT (OHS): 3.61 K/UL (ref 1.8–7.7)
ALBUMIN SERPL BCP-MCNC: 3.7 G/DL (ref 3.5–5.2)
ALP SERPL-CCNC: 98 UNIT/L (ref 40–150)
ALT SERPL W/O P-5'-P-CCNC: 45 UNIT/L (ref 10–44)
ANION GAP (OHS): 12 MMOL/L (ref 8–16)
AST SERPL-CCNC: 27 UNIT/L (ref 11–45)
BASOPHILS # BLD AUTO: 0.07 K/UL
BASOPHILS NFR BLD AUTO: 1.1 %
BILIRUB SERPL-MCNC: 0.6 MG/DL (ref 0.1–1)
BUN SERPL-MCNC: 14 MG/DL (ref 6–20)
CALCIUM SERPL-MCNC: 9.4 MG/DL (ref 8.7–10.5)
CHLORIDE SERPL-SCNC: 102 MMOL/L (ref 95–110)
CK SERPL-CCNC: 73 U/L (ref 20–200)
CO2 SERPL-SCNC: 25 MMOL/L (ref 23–29)
CREAT SERPL-MCNC: 0.7 MG/DL (ref 0.5–1.4)
CRP SERPL-MCNC: 7.4 MG/L
ERYTHROCYTE [DISTWIDTH] IN BLOOD BY AUTOMATED COUNT: 15.9 % (ref 11.5–14.5)
GFR SERPLBLD CREATININE-BSD FMLA CKD-EPI: >60 ML/MIN/1.73/M2
GLUCOSE SERPL-MCNC: 157 MG/DL (ref 70–110)
HCT VFR BLD AUTO: 43.3 % (ref 40–54)
HGB BLD-MCNC: 12.8 GM/DL (ref 14–18)
IMM GRANULOCYTES # BLD AUTO: 0.02 K/UL (ref 0–0.04)
IMM GRANULOCYTES NFR BLD AUTO: 0.3 % (ref 0–0.5)
LYMPHOCYTES # BLD AUTO: 1.91 K/UL (ref 1–4.8)
MCH RBC QN AUTO: 26.1 PG (ref 27–31)
MCHC RBC AUTO-ENTMCNC: 29.6 G/DL (ref 32–36)
MCV RBC AUTO: 88 FL (ref 82–98)
NUCLEATED RBC (/100WBC) (OHS): 0 /100 WBC
PLATELET # BLD AUTO: 243 K/UL (ref 150–450)
PMV BLD AUTO: 11.9 FL (ref 9.2–12.9)
POTASSIUM SERPL-SCNC: 3.8 MMOL/L (ref 3.5–5.1)
PROT SERPL-MCNC: 8 GM/DL (ref 6–8.4)
RBC # BLD AUTO: 4.91 M/UL (ref 4.6–6.2)
RELATIVE EOSINOPHIL (OHS): 4.4 %
RELATIVE LYMPHOCYTE (OHS): 29.7 % (ref 18–48)
RELATIVE MONOCYTE (OHS): 8.4 % (ref 4–15)
RELATIVE NEUTROPHIL (OHS): 56.1 % (ref 38–73)
SODIUM SERPL-SCNC: 139 MMOL/L (ref 136–145)
WBC # BLD AUTO: 6.43 K/UL (ref 3.9–12.7)

## 2025-06-10 PROCEDURE — 82550 ASSAY OF CK (CPK): CPT | Performed by: INTERNAL MEDICINE

## 2025-06-10 PROCEDURE — 85025 COMPLETE CBC W/AUTO DIFF WBC: CPT | Performed by: INTERNAL MEDICINE

## 2025-06-10 PROCEDURE — 84460 ALANINE AMINO (ALT) (SGPT): CPT | Performed by: INTERNAL MEDICINE

## 2025-06-10 PROCEDURE — 86140 C-REACTIVE PROTEIN: CPT | Performed by: INTERNAL MEDICINE

## 2025-06-13 RX ORDER — TIRZEPATIDE 10 MG/.5ML
INJECTION, SOLUTION SUBCUTANEOUS
Qty: 4 PEN | Refills: 0 | Status: SHIPPED | OUTPATIENT
Start: 2025-06-13

## 2025-06-13 RX ORDER — TIRZEPATIDE 7.5 MG/.5ML
INJECTION, SOLUTION SUBCUTANEOUS
Qty: 4 PEN | Refills: 0 | Status: SHIPPED | OUTPATIENT
Start: 2025-06-13

## 2025-06-16 ENCOUNTER — LAB REQUISITION (OUTPATIENT)
Dept: LAB | Facility: HOSPITAL | Age: 45
End: 2025-06-16
Payer: COMMERCIAL

## 2025-06-16 DIAGNOSIS — T84.53XD INFECTION AND INFLAMMATORY REACTION DUE TO INTERNAL RIGHT KNEE PROSTHESIS, SUBSEQUENT ENCOUNTER: ICD-10-CM

## 2025-06-16 LAB
ABSOLUTE EOSINOPHIL (OHS): 0.28 K/UL
ABSOLUTE MONOCYTE (OHS): 0.48 K/UL (ref 0.3–1)
ABSOLUTE NEUTROPHIL COUNT (OHS): 3.06 K/UL (ref 1.8–7.7)
ALBUMIN SERPL BCP-MCNC: 3.8 G/DL (ref 3.5–5.2)
ALP SERPL-CCNC: 97 UNIT/L (ref 40–150)
ALT SERPL W/O P-5'-P-CCNC: 27 UNIT/L (ref 10–44)
ANION GAP (OHS): 11 MMOL/L (ref 8–16)
AST SERPL-CCNC: 20 UNIT/L (ref 11–45)
BASOPHILS # BLD AUTO: 0.05 K/UL
BASOPHILS NFR BLD AUTO: 0.9 %
BILIRUB SERPL-MCNC: 0.6 MG/DL (ref 0.1–1)
BUN SERPL-MCNC: 15 MG/DL (ref 6–20)
CALCIUM SERPL-MCNC: 9.7 MG/DL (ref 8.7–10.5)
CHLORIDE SERPL-SCNC: 103 MMOL/L (ref 95–110)
CK SERPL-CCNC: 79 U/L (ref 20–200)
CO2 SERPL-SCNC: 24 MMOL/L (ref 23–29)
CREAT SERPL-MCNC: 0.8 MG/DL (ref 0.5–1.4)
CRP SERPL-MCNC: 5.1 MG/L
ERYTHROCYTE [DISTWIDTH] IN BLOOD BY AUTOMATED COUNT: 16.1 % (ref 11.5–14.5)
FUNGUS SPEC CULT: NORMAL
GFR SERPLBLD CREATININE-BSD FMLA CKD-EPI: >60 ML/MIN/1.73/M2
GLUCOSE SERPL-MCNC: 170 MG/DL (ref 70–110)
HCT VFR BLD AUTO: 41.2 % (ref 40–54)
HGB BLD-MCNC: 12.8 GM/DL (ref 14–18)
IMM GRANULOCYTES # BLD AUTO: 0.02 K/UL (ref 0–0.04)
IMM GRANULOCYTES NFR BLD AUTO: 0.3 % (ref 0–0.5)
LYMPHOCYTES # BLD AUTO: 1.92 K/UL (ref 1–4.8)
MCH RBC QN AUTO: 27.1 PG (ref 27–31)
MCHC RBC AUTO-ENTMCNC: 31.1 G/DL (ref 32–36)
MCV RBC AUTO: 87 FL (ref 82–98)
NUCLEATED RBC (/100WBC) (OHS): 0 /100 WBC
PLATELET # BLD AUTO: 199 K/UL (ref 150–450)
PMV BLD AUTO: 12.1 FL (ref 9.2–12.9)
POTASSIUM SERPL-SCNC: 4.1 MMOL/L (ref 3.5–5.1)
PROT SERPL-MCNC: 8 GM/DL (ref 6–8.4)
RBC # BLD AUTO: 4.72 M/UL (ref 4.6–6.2)
RELATIVE EOSINOPHIL (OHS): 4.8 %
RELATIVE LYMPHOCYTE (OHS): 33 % (ref 18–48)
RELATIVE MONOCYTE (OHS): 8.3 % (ref 4–15)
RELATIVE NEUTROPHIL (OHS): 52.7 % (ref 38–73)
SODIUM SERPL-SCNC: 138 MMOL/L (ref 136–145)
WBC # BLD AUTO: 5.81 K/UL (ref 3.9–12.7)

## 2025-06-16 PROCEDURE — 82040 ASSAY OF SERUM ALBUMIN: CPT | Performed by: INTERNAL MEDICINE

## 2025-06-16 PROCEDURE — 85025 COMPLETE CBC W/AUTO DIFF WBC: CPT | Performed by: INTERNAL MEDICINE

## 2025-06-16 PROCEDURE — 82550 ASSAY OF CK (CPK): CPT | Performed by: INTERNAL MEDICINE

## 2025-06-16 PROCEDURE — 86140 C-REACTIVE PROTEIN: CPT | Performed by: INTERNAL MEDICINE

## 2025-06-23 ENCOUNTER — LAB REQUISITION (OUTPATIENT)
Dept: LAB | Facility: HOSPITAL | Age: 45
End: 2025-06-23
Payer: COMMERCIAL

## 2025-06-23 DIAGNOSIS — T84.53XD INFECTION AND INFLAMMATORY REACTION DUE TO INTERNAL RIGHT KNEE PROSTHESIS, SUBSEQUENT ENCOUNTER: ICD-10-CM

## 2025-06-23 LAB
ABSOLUTE EOSINOPHIL (OHS): 0.22 K/UL
ABSOLUTE MONOCYTE (OHS): 0.56 K/UL (ref 0.3–1)
ABSOLUTE NEUTROPHIL COUNT (OHS): 3.83 K/UL (ref 1.8–7.7)
ALBUMIN SERPL BCP-MCNC: 3.9 G/DL (ref 3.5–5.2)
ALP SERPL-CCNC: 91 UNIT/L (ref 40–150)
ALT SERPL W/O P-5'-P-CCNC: 21 UNIT/L (ref 10–44)
ANION GAP (OHS): 11 MMOL/L (ref 8–16)
AST SERPL-CCNC: 15 UNIT/L (ref 11–45)
BASOPHILS # BLD AUTO: 0.03 K/UL
BASOPHILS NFR BLD AUTO: 0.5 %
BILIRUB SERPL-MCNC: 0.5 MG/DL (ref 0.1–1)
BUN SERPL-MCNC: 18 MG/DL (ref 6–20)
CALCIUM SERPL-MCNC: 9.4 MG/DL (ref 8.7–10.5)
CHLORIDE SERPL-SCNC: 104 MMOL/L (ref 95–110)
CK SERPL-CCNC: 57 U/L (ref 20–200)
CO2 SERPL-SCNC: 24 MMOL/L (ref 23–29)
CREAT SERPL-MCNC: 0.8 MG/DL (ref 0.5–1.4)
CRP SERPL-MCNC: 7.8 MG/L
ERYTHROCYTE [DISTWIDTH] IN BLOOD BY AUTOMATED COUNT: 15.9 % (ref 11.5–14.5)
GFR SERPLBLD CREATININE-BSD FMLA CKD-EPI: >60 ML/MIN/1.73/M2
GLUCOSE SERPL-MCNC: 136 MG/DL (ref 70–110)
HCT VFR BLD AUTO: 42.4 % (ref 40–54)
HGB BLD-MCNC: 13.3 GM/DL (ref 14–18)
IMM GRANULOCYTES # BLD AUTO: 0.02 K/UL (ref 0–0.04)
IMM GRANULOCYTES NFR BLD AUTO: 0.3 % (ref 0–0.5)
LYMPHOCYTES # BLD AUTO: 1.54 K/UL (ref 1–4.8)
MCH RBC QN AUTO: 27.2 PG (ref 27–31)
MCHC RBC AUTO-ENTMCNC: 31.4 G/DL (ref 32–36)
MCV RBC AUTO: 87 FL (ref 82–98)
NUCLEATED RBC (/100WBC) (OHS): 0 /100 WBC
PLATELET # BLD AUTO: 167 K/UL (ref 150–450)
PMV BLD AUTO: 11 FL (ref 9.2–12.9)
POTASSIUM SERPL-SCNC: 3.9 MMOL/L (ref 3.5–5.1)
PROT SERPL-MCNC: 7.9 GM/DL (ref 6–8.4)
RBC # BLD AUTO: 4.89 M/UL (ref 4.6–6.2)
RELATIVE EOSINOPHIL (OHS): 3.5 %
RELATIVE LYMPHOCYTE (OHS): 24.8 % (ref 18–48)
RELATIVE MONOCYTE (OHS): 9 % (ref 4–15)
RELATIVE NEUTROPHIL (OHS): 61.9 % (ref 38–73)
SODIUM SERPL-SCNC: 139 MMOL/L (ref 136–145)
WBC # BLD AUTO: 6.2 K/UL (ref 3.9–12.7)

## 2025-06-23 PROCEDURE — 82550 ASSAY OF CK (CPK): CPT | Performed by: INTERNAL MEDICINE

## 2025-06-23 PROCEDURE — 82247 BILIRUBIN TOTAL: CPT | Performed by: INTERNAL MEDICINE

## 2025-06-23 PROCEDURE — 86140 C-REACTIVE PROTEIN: CPT | Performed by: INTERNAL MEDICINE

## 2025-06-23 PROCEDURE — 85025 COMPLETE CBC W/AUTO DIFF WBC: CPT | Performed by: INTERNAL MEDICINE

## 2025-06-24 LAB — MYCOBACTERIUM SPEC QL CULT: NORMAL

## 2025-06-25 NOTE — PHYSICIAN QUERY
Please clarify if the osteomyelitis diagnosis has been:    Clinically undetermined, patient has periprosthetic joint infection that likely involved implant and bone

## 2025-06-25 NOTE — PHYSICIAN QUERY
Please clarify if the periprosthetic septic arthritis has been:     Other clarification or diagnosis, please specify: This is not septic arthritis, patient has a joint replacement in. This is chronic periprosthetic joint infection.

## 2025-06-30 ENCOUNTER — LAB REQUISITION (OUTPATIENT)
Dept: LAB | Facility: HOSPITAL | Age: 45
End: 2025-06-30
Payer: COMMERCIAL

## 2025-06-30 DIAGNOSIS — T84.53XD INFECTION AND INFLAMMATORY REACTION DUE TO INTERNAL RIGHT KNEE PROSTHESIS, SUBSEQUENT ENCOUNTER: ICD-10-CM

## 2025-06-30 LAB
ABSOLUTE EOSINOPHIL (OHS): 0.41 K/UL
ABSOLUTE MONOCYTE (OHS): 0.48 K/UL (ref 0.3–1)
ABSOLUTE NEUTROPHIL COUNT (OHS): 3 K/UL (ref 1.8–7.7)
ALBUMIN SERPL BCP-MCNC: 3.4 G/DL (ref 3.5–5.2)
ALP SERPL-CCNC: 85 UNIT/L (ref 40–150)
ALT SERPL W/O P-5'-P-CCNC: 16 UNIT/L (ref 10–44)
ANION GAP (OHS): 10 MMOL/L (ref 8–16)
AST SERPL-CCNC: 15 UNIT/L (ref 11–45)
BASOPHILS # BLD AUTO: 0.02 K/UL
BASOPHILS NFR BLD AUTO: 0.3 %
BILIRUB SERPL-MCNC: 0.4 MG/DL (ref 0.1–1)
BUN SERPL-MCNC: 14 MG/DL (ref 6–20)
CALCIUM SERPL-MCNC: 8.8 MG/DL (ref 8.7–10.5)
CHLORIDE SERPL-SCNC: 106 MMOL/L (ref 95–110)
CK SERPL-CCNC: 58 U/L (ref 20–200)
CO2 SERPL-SCNC: 24 MMOL/L (ref 23–29)
CREAT SERPL-MCNC: 0.8 MG/DL (ref 0.5–1.4)
CRP SERPL-MCNC: 14.4 MG/L
ERYTHROCYTE [DISTWIDTH] IN BLOOD BY AUTOMATED COUNT: 16.2 % (ref 11.5–14.5)
GFR SERPLBLD CREATININE-BSD FMLA CKD-EPI: >60 ML/MIN/1.73/M2
GLUCOSE SERPL-MCNC: 114 MG/DL (ref 70–110)
HCT VFR BLD AUTO: 39.8 % (ref 40–54)
HGB BLD-MCNC: 12.3 GM/DL (ref 14–18)
IMM GRANULOCYTES # BLD AUTO: 0.02 K/UL (ref 0–0.04)
IMM GRANULOCYTES NFR BLD AUTO: 0.3 % (ref 0–0.5)
LYMPHOCYTES # BLD AUTO: 1.96 K/UL (ref 1–4.8)
MCH RBC QN AUTO: 27.2 PG (ref 27–31)
MCHC RBC AUTO-ENTMCNC: 30.9 G/DL (ref 32–36)
MCV RBC AUTO: 88 FL (ref 82–98)
NUCLEATED RBC (/100WBC) (OHS): 0 /100 WBC
PLATELET # BLD AUTO: 183 K/UL (ref 150–450)
PMV BLD AUTO: 11.3 FL (ref 9.2–12.9)
POTASSIUM SERPL-SCNC: 3.9 MMOL/L (ref 3.5–5.1)
PROT SERPL-MCNC: 6.9 GM/DL (ref 6–8.4)
RBC # BLD AUTO: 4.53 M/UL (ref 4.6–6.2)
RELATIVE EOSINOPHIL (OHS): 7 %
RELATIVE LYMPHOCYTE (OHS): 33.3 % (ref 18–48)
RELATIVE MONOCYTE (OHS): 8.1 % (ref 4–15)
RELATIVE NEUTROPHIL (OHS): 51 % (ref 38–73)
SODIUM SERPL-SCNC: 140 MMOL/L (ref 136–145)
WBC # BLD AUTO: 5.89 K/UL (ref 3.9–12.7)

## 2025-06-30 PROCEDURE — 80053 COMPREHEN METABOLIC PANEL: CPT | Performed by: ORTHOPAEDIC SURGERY

## 2025-06-30 PROCEDURE — 86140 C-REACTIVE PROTEIN: CPT | Performed by: ORTHOPAEDIC SURGERY

## 2025-06-30 PROCEDURE — 85025 COMPLETE CBC W/AUTO DIFF WBC: CPT | Performed by: ORTHOPAEDIC SURGERY

## 2025-06-30 PROCEDURE — 82550 ASSAY OF CK (CPK): CPT | Performed by: ORTHOPAEDIC SURGERY

## 2025-07-02 ENCOUNTER — INFUSION (OUTPATIENT)
Dept: INFUSION THERAPY | Facility: HOSPITAL | Age: 45
End: 2025-07-02
Attending: INTERNAL MEDICINE
Payer: COMMERCIAL

## 2025-07-02 ENCOUNTER — OFFICE VISIT (OUTPATIENT)
Dept: INFECTIOUS DISEASES | Facility: CLINIC | Age: 45
End: 2025-07-02
Payer: COMMERCIAL

## 2025-07-02 VITALS — SYSTOLIC BLOOD PRESSURE: 136 MMHG | OXYGEN SATURATION: 98 % | HEART RATE: 81 BPM | DIASTOLIC BLOOD PRESSURE: 86 MMHG

## 2025-07-02 DIAGNOSIS — T84.50XD INFECTION OF PROSTHETIC JOINT, SUBSEQUENT ENCOUNTER: Primary | ICD-10-CM

## 2025-07-02 PROCEDURE — 99999 PR PBB SHADOW E&M-EST. PATIENT-LVL IV: CPT | Mod: PBBFAC,,,

## 2025-07-02 PROCEDURE — 96523 IRRIG DRUG DELIVERY DEVICE: CPT

## 2025-07-02 NOTE — PLAN OF CARE
Pt arrived to [facility] from Dr. Schmitt office to have L brachial PICC line removed. Pt has recent hx R knee arthroplasty with s/p infection of prosthetic joint. AA&Ox4. Informed pt on PICC line removal tx, taking deep breath and bearing down as PICC removed to help prevent air embolism. Verbalized understanding  No redness, swelling, or drainage to L brachial PICC site. Cleansed left brachial PICC site per aseptic technique while pt lying in supine position in reclined chair, and removed PICC line. PICC cath tip intact and measured 44cm. Sterile pressure dressing applied with tegaderm. Instructed pt to go to ER if bleeding or drainage from PICC site or if experiences shortness of breath. Verbalized understanding.    Attending Only

## 2025-07-02 NOTE — PROGRESS NOTES
Infectious Diseases Progress Note  Subjective:     Chief Complaint: follow up for OPAT    HPI: Tony Gordillo is a 44 y.o. male  with diabetes and R knee arthroplasty 11/2024 who was admitted 5/19/25 for I&D and implant removal after MRI of R knee demonstrated periprosthetic septic arthritis and osteomyelitis. Surgical cultures with staph epidermidis in 2 broth cultures. He completed 6 weeks of ceftriaxone and daptomycin. Tolerated well aside from skin irritation at LUE PICC line site. States that surgical site healed well. He sees Dr. Alvarez in orthopedics clinic tomorrow with eventual plan of re-implantation at the end of July.       Immunization History   Administered Date(s) Administered    COVID-19 Vaccine 12/28/2020, 01/25/2021, 11/08/2021    COVID-19, MRNA, LN-S, PF (MODERNA FULL 0.5 ML DOSE) 12/28/2020, 01/25/2021, 11/08/2021    COVID-19, mRNA, LNP-S, PF, kayla-sucrose, 30 mcg/0.3 mL (Pfizer Ages 12+) 12/11/2023    Influenza 09/06/2019    Pneumococcal Polysaccharide - 23 Valent 09/28/2018    Tdap 07/18/2019        Review of Systems   All other systems reviewed and are negative.       Past Medical History:   Diagnosis Date    Diabetes mellitus, type 2     Hyperlipidemia     Hypertension     Obesity     NAINA (obstructive sleep apnea)      Past Surgical History:   Procedure Laterality Date    ARTHROPLASTY, KNEE, TOTAL, USING COMPUTER-ASSISTED NAVIGATION Right 11/18/2024    Procedure: ARTHROPLASTY, KNEE, TOTAL, USING COMPUTER-ASSISTED NAVIGATION;  Surgeon: Antonio Rolon MD;  Location: [facility] OR;  Service: Orthopedics;  Laterality: Right;  Blue. Same Day  Blue Silverio and 1st Assist Kaitlin Notified-[place]  -----CLEARED BY PCP  RN PREOP 11/6/2024 ---TYPE&SCREEN --done---BMI--40.97--- HAS INSULIN PUMP    ARTHROSCOPIC DEBRIDEMENT OF SHOULDER  03/14/2023    Procedure: DEBRIDEMENT, SHOULDER, ARTHROSCOPIC;  Surgeon: Crissy Bradford MD;  Location: [facility] OR;  Service: Orthopedics;;    ARTHROSCOPIC DEBRIDEMENT OF  SHOULDER  7/23/2024    Procedure: DEBRIDEMENT, SHOULDER, ARTHROSCOPIC;  Surgeon: Crissy Bradford MD;  Location: [facility] OR;  Service: Orthopedics;;    ARTHROSCOPIC REPAIR OF ROTATOR CUFF OF SHOULDER Right 03/14/2023    Procedure: REPAIR, ROTATOR CUFF, ARTHROSCOPIC;  Surgeon: Crissy Bradford MD;  Location: [facility] OR;  Service: Orthopedics;  Laterality: Right;  [facility] [employer] EL[facility] [phone]. TEXTED ELMER ON 3/9/2023 @ 12:10PM. ELMER RESPONDED ON 3/9/23 @ 12:23PM-SAG  RN PREOP 3/10/2023---CLEARED BY PCP AND CARDS    ARTHROSCOPIC REPAIR OF ROTATOR CUFF OF SHOULDER Right 7/23/2024    Procedure: REPAIR, ROTATOR CUFF, ARTHROSCOPIC;  Surgeon: Crissy Bradford MD;  Location: [facility] OR;  Service: Orthopedics;  Laterality: Right;  [facility] [employer] [facility]MER [phone], NURYS [phone]  CLEARED BY [facility]  RN PREOP 6/18/2024    ARTHROSCOPY,SHOULDER,WITH BICEPS TENODESIS  03/14/2023    Procedure: ARTHROSCOPY,SHOULDER,WITH BICEPS TENODESIS;  Surgeon: Crissy Bradford MD;  Location: [facility] OR;  Service: Orthopedics;;    IRRIGATION AND DEBRIDEMENT, KNEE, WITH ANTIBIOTIC BEADS OR ANTIBIOTIC SPACER INSERTION Right 5/19/2025    Procedure: IRRIGATION AND DEBRIDEMENT, KNEE, WITH ANTIBIOTIC BEADS OR ANTIBIOTIC SPACER INSERTION;  Surgeon: Antonio Rolon MD;  Location: [facility] OR;  Service: Orthopedics;  Laterality: Right;  [facility]. Antony Vinson- [place]    RN PRE OP 5/13/2025---NEED CONSENT    KNEE ARTHROSCOPY W/ MENISCECTOMY Right 10/24/2023    Procedure: ARTHROSCOPY, KNEE, WITH MENISCECTOMY;  Surgeon: Crissy Bradford MD;  Location: [facility] OR;  Service: Orthopedics;  Laterality: Right;  RN PREOP 10/18/203---CLEARED BY PCP  ELVIA [phone]    KNEE SURGERY      acl,mcl,pcl    left knee x 2      PRK  Bilateral 2010    SUBCHONDROPLASTY Right 10/24/2023    Procedure: POSSIBLE SUBCHONDROPLASTY;  Surgeon: Crissy Bradford MD;  Location: [facility];  Service: Orthopedics;  Laterality: Right;     Family History    Problem Relation Name Age of Onset    Diabetes Mother      Diabetes Father      No Known Problems Brother      Diabetes Maternal Grandmother      No Known Problems Maternal Grandfather      No Known Problems Paternal Grandmother      No Known Problems Paternal Grandfather      No Known Problems Daughter adopted     Autism Son adopted     No Known Problems Maternal Aunt      No Known Problems Maternal Uncle      No Known Problems Paternal Aunt      No Known Problems Paternal Uncle      No Known Problems Other       Social History[1]    Objective:     Physical Exam  Vitals reviewed.   Constitutional:       Appearance: Normal appearance. He is not ill-appearing.      Comments: In wheelchair   HENT:      Head: Normocephalic.      Nose: Nose normal.   Eyes:      General: No scleral icterus.  Pulmonary:      Effort: Pulmonary effort is normal. No respiratory distress.   Musculoskeletal:      Comments: RLE in brace   Skin:     Findings: Rash (LUE PICC line, irritation at dressing site) present.   Neurological:      Mental Status: He is alert.   Psychiatric:         Mood and Affect: Mood normal.         Behavior: Behavior normal.         Data:    All data, including recent labs, radiology, and pathology, has been independently reviewed.    Labs:    Recent Labs   Lab Result Units 06/16/25  1130 06/23/25  1020 06/30/25  1000   WBC K/uL 5.81 6.20 5.89   HGB gm/dL 12.8* 13.3* 12.3*   HCT % 41.2 42.4 39.8*   Sodium mmol/L 138 139 140   Potassium mmol/L 4.1 3.9 3.9   Chloride mmol/L 103 104 106   BUN mg/dL 15 18 14   Creatinine mg/dL 0.8 0.8 0.8   AST unit/L 20 15 15   ALT unit/L 27 21 16   ALP unit/L 97 91 85   Bilirubin Total mg/dL 0.6 0.5 0.4   CRP mg/L 5.1 7.8 14.4*        Radiology:    No results found in the last 24 hours.     Assessment:  Tony Gordillo is a 44 y.o. male  with diabetes and R knee arthroplasty 11/2024 complicated by septic arthritis now s/p implant removal and debridement on 5/19. 2 broth cultures  with methicillin resistant staph epidermidis, given that it is growing in two separate samples raises the possibility this is the causative organism. He completed 6 weeks of ceftriaxone and daptomycin. Only PO options are clindamycin and linezolid. However currently no indication for PO course given implant removal. Tentative plan for re-implantation end of July if he does well off antibiotics.     Recommendations  - stop antibiotics  - remove PICC line    Follow up as needed pending discussion with Dr. Alvarez in orthopedics     I spent a total of 30 minutes on the day of the visit.  This includes face to face time and non-face to face time preparing to see the patient (eg, review of tests), obtaining and/or reviewing separately obtained history, documenting clinical information in the electronic or other health record, independently interpreting results and communicating results to the patient/family/caregiver, or care coordinator.      Emily Schmitt MD  Infectious Disease         [1]   Social History  Tobacco Use    Smoking status: Former     Types: Cigars     Passive exposure: Never    Smokeless tobacco: Never    Tobacco comments:     Has not had any cigars this year   Substance Use Topics    Alcohol use: Not Currently     Comment: 1-2 beers every 2 weeks    Drug use: Yes     Types: Marijuana     Comment: Non-prescription cannabis

## 2025-07-03 ENCOUNTER — ANESTHESIA EVENT (OUTPATIENT)
Dept: SURGERY | Facility: HOSPITAL | Age: 45
DRG: 468 | End: 2025-07-03
Payer: COMMERCIAL

## 2025-07-03 ENCOUNTER — OFFICE VISIT (OUTPATIENT)
Dept: ORTHOPEDICS | Facility: CLINIC | Age: 45
End: 2025-07-03
Payer: COMMERCIAL

## 2025-07-03 ENCOUNTER — TELEPHONE (OUTPATIENT)
Dept: PREADMISSION TESTING | Facility: HOSPITAL | Age: 45
End: 2025-07-03
Payer: COMMERCIAL

## 2025-07-03 VITALS — BODY MASS INDEX: 39.12 KG/M2 | HEIGHT: 73 IN | WEIGHT: 295.19 LBS

## 2025-07-03 DIAGNOSIS — T84.50XA INFECTION OF PROSTHETIC JOINT, INITIAL ENCOUNTER: Primary | ICD-10-CM

## 2025-07-03 PROCEDURE — 99024 POSTOP FOLLOW-UP VISIT: CPT | Mod: S$GLB,,, | Performed by: ORTHOPAEDIC SURGERY

## 2025-07-03 PROCEDURE — 99999 PR PBB SHADOW E&M-EST. PATIENT-LVL V: CPT | Mod: PBBFAC,,, | Performed by: ORTHOPAEDIC SURGERY

## 2025-07-03 NOTE — PROGRESS NOTES
Ortho Knee Follow Up Note    PCP: Jovany Ford MD   Referring Provider: No referring provider defined for this encounter.      Addendum:  Tony Gordillo has a mobility limitation that significantly impairs her ability to participate in one or more mobility related activities of daily living (MRADL's) such as toileting, feeding, dressing, grooming, and bathing in customary locations in the home. The mobility limitation cannot be sufficiently resolved by the use of a cane or walker. The use of a manual wheelchair will significantly improve the patient's ability to participate in MRADLS and the patient will use it on regular basis in the home. Tony has expressed his willingness to use a manual wheelchair in the home. Patient's upper body strength is sufficient for propulsion. He also has a caregiver who is available, willing, and able to provide assistance with the wheelchair when needed.    Assessment:  44 y.o. male status post right knee explant and articulating spacer placement for PJI on 5/19/25.  Patient culture had concern for staph epidermidis in broth only otherwise culture negative infection.  Has infectious disease follow-up scheduled for 7/2/25.  At that time he was done with completion of antibiotics he had PICC line removed.  Three days prior to the PICC line being removed he had a bump in his CRP but otherwise had normalized prior to that date.  I discussed this with ID we are going to repeat inflammatory markers.  Otherwise he feels like he has been doing well.  In wheelchair today. Has been in a T scope brace limited to flexion to 90°.      We will continue with current treatment plan.  Tentative replant date for July 28th pending resolution of his inflammatory markers.  Case request submitted today.  If elevated after 2 week antibiotic holiday would need reaspiration.    Plan:  Follow up in 2 weeks  Future Radiographs Indicated at next visit: No    Pain Management: Continue current pain  "management  Anticoagulation: None  Wound Care: None    Patient Reassurance:   Post-operative course discussed with patient. Patient reassured and supported. All questions answered.    ACTIVE PROBLEM LIST  Problem List[1]        HPI:  Tony Gordillo presents today for a post-op visit.     BMI: Body mass index is 38.95 kg/m².    Post operative recovery was complicated by: None    Patient rates his condition as improving. Pleased with surgical outcome to date.     Functional Assessment:  Home PT  Functional Difficulties:  Interferes with sleep and Walking  Pain Medication:  Non-Narcotic  Anticoagulation: Aspirin     EXAM:    right POST-OPERATIVE KNEE    Ht 6' 1" (1.854 m)   Wt 133.9 kg (295 lb 3.1 oz)   BMI 38.95 kg/m²     Skin:  Appropriate post op appearance, No evidence of erythema, warmth, discharge, or drainage, and Incision clean/dry/intact. Staples removed today.  Range of Motion: Flexion: 0, Extension 90  Neurovascular Status: Sensation intact in Sural, Saphenous, SPN, DPN and Tibial nerve distribution. 5 out of 5 strength in hip flexion, knee flexion/extension, ankle plantarflexion/dorsiflexion. 2+ dorsalis pedis    IMAGING:  X-ray Knee:   Implants are well fixed and aligned. There is no evidence of loosening.  Some interval resorption of his antibiotic beads             [1]   Patient Active Problem List  Diagnosis    Hyperlipidemia LDL goal <70    Insulin long-term use    Tinea pedis    Morbid obesity    Hypothyroidism    Type 2 diabetes mellitus with left eye affected by mild nonproliferative retinopathy without macular edema, with long-term current use of insulin    Essential hypertension    Migraine with aura and without status migrainosus, not intractable propranolol SE angry; topamax not helpful; imitrex ineffective; daith ear piercing helps    Non-seasonal allergic rhinitis; avoid antihistamines per opthalmology    Acute bilateral low back pain without sciatica    NAINA (obstructive sleep apnea) " 8/2019 sleep study AHI 11    Low testosterone in male; pituitary axis normal. MRI negative. 11/2019 started on testosterone    Right foot pain    Decreased strength of lower extremity    Decreased range of motion of both ankles    Gait abnormality    Rib pain on left side    Dyspnea    Decreased strength, endurance, and mobility    Left hand pain    Mild neurocognitive disorder due to traumatic brain injury    Outbursts of anger    Other amnesia    Traumatic rotator cuff tear, right, initial encounter    S/P right rotator cuff repair    Biceps tendonosis of right shoulder    Decreased range of motion of right shoulder    Impaired strength of shoulder muscles    Allergic conjunctivitis    Cornea edema    Descemet's membrane fold    Herpes simplex keratitis    Uncontrolled type 2 diabetes mellitus    Tear of lateral meniscus of right knee, current    Refractive error    Acute migraine    MCI (mild cognitive impairment)    Medication overuse headache    Chronic migraine with aura, intractable, with status migrainosus    Agitation    Traumatic encephalopathy    Allergy desensitization therapy    Concussion with no loss of consciousness    Concussion with loss of consciousness    Other specified persistent mood disorders    Cognitive communication deficit    Impaired mobility and ADLs    Imbalance    Primary osteoarthritis of both knees    Bilateral chronic knee pain    Infection of prosthetic right knee joint    Prosthetic joint infection

## 2025-07-12 NOTE — PROGRESS NOTES
This note was created by combination of typed  and M-Modal dictation.  Transcription errors may be present.   This note was also generated with the assistance of ambient listening technology. Verbal consent was obtained by the patient and accompanying visitor(s) for the recording of patient appointment to facilitate this note. I attest to having reviewed and edited the generated note for accuracy, though some syntax or spelling errors may persist. Please contact the author of this note for any clarification.    Assessment and Plan:   Assessment and Plan   Assessment & Plan  Preop examination  Primary osteoarthritis of both knees  Status post right knee replacement complicated by infected hardware status post removal and plan is redo/revision.  Scheduled for 7/28 pending ESR and CRP.  Plan is if those are negative can proceed but if they are abnormal may postpone the surgery and aspirate the knee to check for continued infection.  Was previously seen by Cardiology back in May, cleared to proceed.  He has been holding his tirzepatide  Aside from ESR and CRP which are pending may proceed to surgery without further testing        Type 2 diabetes mellitus with left eye affected by mild nonproliferative retinopathy without macular edema, with long-term current use of insulin  Followed by DM NP  Stop mounjaro 1 week prior to surgery  Jardiance hold day of surgery  Is on insulin pump and the plan is to continue throughout the surgery as he did previously.  He was monitored by anesthesia previously       Essential hypertension  Stable on benazepril no changes       Hyperlipidemia LDL goal <70  Stable on statin. Last lipid 1/2025 was good plan to repeat labs 01/2026       Hypothyroidism due to acquired atrophy of thyroid  Stable on LT 50  Last check 2/2025 was good plan to repeat labs 02/2026       MCI (mild cognitive impairment)  Followed by neuro.  Previously seen by neuropsych. Levomethylfolate; lamictal,  modafinil. Applying for disability/SSI.  In the meantime while he is awaiting adjudication is taking classes for [employer] science       Moderate episode of recurrent major depressive disorder  KYLE (generalized anxiety disorder)  Followed by psychiatry. On seroquel, remeron, lamictal, abilify       Anemia, unspecified type  He is concerned about his mild anemia on serial labs and has self initiated iron supplement.  I suspect and anemia of chronic inflammation from his recent infection.  Will check a ferritin, if normal/elevated he can stop the iron   If it is low he can continue the iron but we will need workup for causes of possible bleeding  Orders:    Ferritin; Future    Multiple joint pain  His home health nurse asked him to rule out rheumatoid arthritis.  He certainly does have alternate explanations for his various joint pains but will check rheumatoid factor and CCP antibody.  If abnormal we will need him to see Rheumatology  Orders:    Rheumatoid Factor; Future    Cyclic Citrullinated Peptide Antibody, IgG; Future      There are no discontinued medications.    meds sent this encounter:         Follow Up:   Future Appointments   Date Time Provider Department Center   7/14/2025 10:00 AM LAB, [facility] DRAW STATION [facility] LAB [facility]   7/14/2025  1:40 PM Jovany Ford MD [facility] MED [facility]   7/15/2025 11:20 AM Antonio Rolon MD [facility]   7/18/2025  1:00 PM Kate Mansfield, MAR[facility]   7/21/2025  9:00 AM PRE-ADMIT 1, [facility]   7/21/2025 10:00 AM Leandra Asencio NP [facility]   8/5/2025  3:30 PM Asher Pepe, ETHAN [facility] SLEEP [facility]   8/19/2025 10:00 AM Antonio Rolon MD [facility]   10/8/2025  2:00 PM Jovany Ford MD [facility] FAM MED [place]          Subjective:   Subjective   Chief Complaint   Patient presents with    Pre-op Exam       HPI  Tony is a 44 y.o. male.     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     Employer: [employer]   Tobacco Use    Smoking status: Former     Types: Cigars     Passive exposure: Never    Smokeless tobacco: Never    Tobacco comments:     Has not had any cigars this year   Substance and Sexual Activity    Alcohol use: Not Currently     Comment: 1-2 beers every 2 weeks    Drug use: Yes     Types: Marijuana     Comment: Non-prescription cannabis    Sexual activity: Yes     Social Drivers of Health     Financial Resource Strain: Patient Declined (5/19/2025)    Overall Financial Resource Strain (CARDIA)     Difficulty of Paying Living Expenses: Patient declined   Food Insecurity: Patient Declined (5/19/2025)    Hunger Vital Sign     Worried About Running Out of Food in the Last Year: Patient declined     Ran Out of Food in the Last Year: Patient declined   Transportation Needs: Patient Declined (5/19/2025)    PRAPARE - Transportation     Lack of Transportation (Medical): Patient declined     Lack of Transportation (Non-Medical): Patient declined   Physical Activity: Insufficiently Active (3/10/2025)    Exercise Vital Sign     Days of Exercise per Week: 2 days     Minutes of Exercise per Session: 20 min   Stress: Patient Declined (5/19/2025)    [employer] [employer] - Occupational Stress Questionnaire     Feeling of Stress : Patient declined   Recent Concern: Stress - Stress Concern Present (3/10/2025)    [employer] - Occupational Stress Questionnaire     Feeling of Stress : To some extent   Housing Stability: Patient Declined (5/19/2025)    Housing Stability Vital Sign     Unable to Pay for Housing in the Last Year: Patient declined     Homeless in the Last Year: Patient declined       Last appointment with this clinic was 6/2/2025. Last visit with me 6/2/2025   To summarize last visit  and events leading up to today:  HTN  5/13/25 TTE LVEF 60 - 65%  lipid/statin. Incidental coronary artery calcifications on cardiac PET. 4/13/2022 Cardiac PET stress test was negative for any significant perfusion abnormalities.  Hypothyroid on LT  DM followed by DM NP  2/2023 changed to mounjaro  4/2023 mounjaro with SE at igher dose. Revert back to ozempic  But then reverted back to mounjaro due to not control on ozempic  7/10 DM NP visit mounaro, jardiance, toujeo, fiasp  Hx of colitis, saw [facility] - due to quick resolution, did not require colonoscopy.   Fibroscan was ordered but not covered.  Cognitive impairment with history of concussion; migraines; seen by neuro and neuropsych  11/2022 MRI brain neg  11/2022 EEG normal.  Saw neuropsych for testing. Results c/w hx of mod-severe TBI  Saw neuro 1/2023.  MCI. Hold on donepezil. Consider depakote in the future. 1/2023 started on lamictal  Saw neuro in f/u 4/2023. Start levo methylfolate; incr lamictal  7/3/23 messaged neuro - start modafenil  4/6/24 MRI brain No acute intracranial process.  Stable appearance of the brain.   occipital neuritis which typically does not respond to most migraine medications but does respond to anti-inflammatories.  Consider trigger point injections continue with Psychiatry  Long covid symptoms, improved to return to work 5/2022  Saw ENT for headaches, sinus congestion, hx of NAINA, eval for Inspire, rec's were need for weight loss. ordered PSG  05/20/2024 saw allergist.  Allergic rhinitis, desensitization, odactra  3/14/23 R rotator repair  7/23/24 Arthroscopic  subscapularis and supraspinatus rotator cuff repair  R knee replacement complicated by prosthesis infection s/p explant and IV antibiotics  11/18/24 R TKR  5/16/25 saw ortho for prosthesis infection with MRI showing osteomyelitis.  Cultures negative. Plan for explant and dynamic spacer placement.  5/19/25 right knee explant and articulating spacer placement for PJI  history of  testosterone/DHEA, anastrozole, and another estrogen blocker, followed by [facility] but out-of-pocket expense is high with current unemployment.    MDD, KYLE, followed by psych. Seroquel, aripiprazole, lamictal, mirtazapine, modafinil       Last visit me 06/02/2025  Status post knee replacement explant temporary spacer placement good IV antibiotics.  Diabetes followed by DM NP.  Off of Mounjaro has not restarted  Hypothyroid check future labs  MCi followed by Neurology applying for social security/disability.  Takes B12, 2 tablets daily over-the-counter  Fit kit    He reached out to DM NP.  Plan Mounjaro 7.5 mg for 1 month and then increase to 10    Saw ID in follow-up 07/02/2025.  Stable stop antibiotics remove PICC line check inflammatory markers, tentative surgery 7/28 if markers OK    Today's visit:  Planned surgery: R knee arthroscopic revision  Surgeon: Dr. Rolon  Date of surgery: 7/28/25    Surgical risk score: intermediate    Preop risk factors:  Diabetes mellitus    ASA classification: class 2    Revised cardiac risk index predictors  none      The latest value available is:  Lab Results   Component Value Date    HGBA1C 6.2 (H) 05/13/2025     Patient Medical Risk Stratification  Intermediate risk    Cardiac Risk Factors  Low risk      Pulmonary Risk Factors  Low risk    Liver RIsk  MELD score  MELD 3.0: 6 at 11/6/2024  9:35 AM  MELD-Na: 6 at 11/6/2024  9:35 AM  Calculated from:  Serum Creatinine: 0.8 mg/dL (Using min of 1 mg/dL) at 11/6/2024  9:35 AM  Serum Sodium: 140 mmol/L (Using max of 137 mmol/L) at 11/6/2024  9:35 AM  Total Bilirubin: 0.5 mg/dL (Using min of 1 mg/dL) at 11/6/2024  9:35 AM  Serum Albumin: 3.8 g/dL (Using max of 3.5 g/dL) at 11/6/2024  9:35 AM  INR(ratio): 0.9 (Using min of 1) at 11/6/2024  9:35 AM  Age at listing (hypothetical): 44 years  Sex: Male at 11/6/2024  9:35 AM       Renal Risk  Low risk    Cognitive Dysfunction Risk  Moderate risk    History of Present Illness     SOCIAL HISTORY:  - Occupation: [employer]     HPI:  Tony recently had hardware removed from his knee and completed a course of antibiotics via a PICC line. He has been evaluated by an orthopedic surgeon, Dr. Rolon, who is planning to redo the knee surgery. Tony is scheduled to see Dr. Rolon tomorrow to review inflammation markers and determine if they can proceed with the surgery. If the markers are not satisfactory, Dr. Rolon plans to perform an aspiration of the knee to check for fluid.    Tony reports that his knee is still slightly swollen, and the kneecap has shifted slightly. He has constant back pain and generalized pain throughout his body, indicating a history of physical strain or injury.    Tony's recent labs showed mild abnormalities in his RBCs, including abnormal sizes and carriers. He has been taking iron supplements and B12 to address this issue. Tony is attempting to optimize his condition before the upcoming surgery, including treating his toenails and ensuring proper skin care to prevent complications.    A home health nurse suggested the possibility of rheumatoid arthritis due to the patient's ongoing inflammation and pain. Tony inquired about this possibility, noting his widespread pain.    Tony denies chest pain and shortness of breath.    MEDICATIONS:  - Abilify  - Seroquel  - Lamictal  - Modafinil 100 mg, daily  - B12 supplement, daily  - Iron supplement  - Levothyroxine, for thyroid  - Statin, for cholesterol  - Benazepril, for blood pressure  - Jardiance  - Insulin, via insulin pump  - Remeron  - Discontinued Oxycodone  - Discontinued Lyrica  - Discontinued Mounjaro, to restart after surgery  - Discontinued methylfolate supplement for about a few weeks    ROS:  Respiratory: no shortness of breath  Cardiovascular: no chest pain, no lower extremity edema  Musculoskeletal: reports joint pain, reports joint swelling, reports body aches  Neurological:  reports confusion or disorientation  Psychiatric: reports thought or thinking problems or concerns         Patient Care Team:  Jovany Ford MD as PCP - General (Internal Medicine)  Janneth Johnson RN (Inactive) as Registered Nurse (Diabetes)  Rocky Hewitt MD as Consulting Physician (Ophthalmology)  Preethi Monaco RD (Inactive) as Dietitian (Nutrition)  Christofer Rowley MD as Consulting Physician (Orthopedic Surgery)  Singh Rizo MD as Consulting Physician (Sports Medicine)  Shilpa Gordon NP as Nurse Practitioner (Urology)  Nicole Wynn MD (Family Medicine)  Beatris Nixon RD as Dietitian (Diabetes)  Reynaldo Delacruz OD as Consulting Physician (Ophthalmology)    Patient Active Problem List    Diagnosis Date Noted    Prosthetic joint infection 05/22/2025    Infection of prosthetic right knee joint 05/20/2025    Primary osteoarthritis of both knees 07/15/2024    Bilateral chronic knee pain 07/15/2024    Impaired mobility and ADLs 06/07/2024    Imbalance 06/07/2024    Cognitive communication deficit 05/11/2024    Concussion with no loss of consciousness 05/07/2024    Concussion with loss of consciousness 05/07/2024    Other specified persistent mood disorders 05/07/2024    Allergy desensitization therapy 05/06/2024    Traumatic encephalopathy 03/25/2024    Acute migraine 01/31/2024    MCI (mild cognitive impairment) 01/31/2024    Medication overuse headache 01/31/2024    Chronic migraine with aura, intractable, with status migrainosus 01/31/2024    Agitation 01/31/2024    Refractive error 01/24/2024    Tear of lateral meniscus of right knee, current 10/24/2023    Uncontrolled type 2 diabetes mellitus 10/17/2023    S/P right rotator cuff repair 03/17/2023    Biceps tendonosis of right shoulder 03/17/2023    Decreased range of motion of right shoulder 03/17/2023    Impaired strength of shoulder muscles 03/17/2023    Traumatic rotator cuff tear, right, initial encounter  03/14/2023    Other amnesia 11/02/2022    Mild neurocognitive disorder due to traumatic brain injury 10/19/2022    Outbursts of anger 10/19/2022    Left hand pain 09/24/2022    Decreased strength, endurance, and mobility 03/08/2022    Dyspnea 02/18/2022      started after covid 19. cxr unremarkable.  Clinically improve with symbicort.  PFT with restrictive physiology with preserved dlco c/w habitus.  Stress echo with ?ischemic changes on echo but ST changes on ekg.        Rib pain on left side 08/08/2021    Right foot pain 10/10/2019    Decreased strength of lower extremity 10/10/2019    Decreased range of motion of both ankles 10/10/2019    Gait abnormality 10/10/2019    Low testosterone in male; pituitary axis normal. MRI negative. 11/2019 started on testosterone 09/04/2019 11/06/2019 MRI pituitary with and without contrast from [facility]  Impression:  1.  No pituitary mass seen.  2.  Absence of the septum pellucidum.      NAINA (obstructive sleep apnea) 8/2019 sleep study [facility] 11      -ahi of 11.  Stopped apap for over month due to gasping sensation.  ?excessive leakage a/w nasal congestion while having covid 19.  Nasal pantency is adequate.  Recommend resumption of apap.  However, APAP is being recall by [employer]  -we discussed at length about [employer]iron[employer] recall.  Patient already register his apap  -will switch to nasal pillow to alleviate pressure on ridge of nose.        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, with long-term current use of insulin     Essential hypertension     Hypothyroidism 07/29/2015    Hyperlipidemia  LDL goal <70 06/03/2015    Insulin long-term use 06/03/2015    Tinea pedis 06/03/2015    Morbid obesity 06/03/2015    Allergic conjunctivitis 01/29/2015    Descemet's membrane fold 01/21/2014    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  Medication List with Changes/Refills   Current Medications    ACETAMINOPHEN (TYLENOL) 500 MG TABLET    Take 2 tablets (1,000 mg total) by mouth every 8 (eight) hours as needed for Pain.    AMMONIUM LACTATE 12 % CREA    Apply 1 application  topically once daily.    ARIPIPRAZOLE (ABILIFY) 2 MG TAB    Take 1 tablet (2 mg total) by mouth once daily.    ASPIRIN (ECOTRIN) 81 MG EC TABLET    Take 1 tablet (81 mg total) by mouth 2 (two) times a day.    ATORVASTATIN (LIPITOR) 40 MG TABLET    Take 1 tablet (40 mg total) by mouth once daily.    AZELASTINE (ASTELIN) 137 MCG (0.1 %) NASAL SPRAY    Use 1-2 sprays in each nostril twice daily    BENAZEPRIL (LOTENSIN) 20 MG TABLET    Take 1 tablet (20 mg total) by mouth once daily.    BLOOD SUGAR DIAGNOSTIC STRP    To check BG 4 times daily, to use with insurance preferred meter    BLOOD SUGAR DIAGNOSTIC STRP    OneTouch Ultra Test strips    BLOOD-GLUCOSE METER KIT    OneTouch Ultra2 Meter kit    BLOOD-GLUCOSE SENSOR (DEXCOM G7 SENSOR) CAT    Change every 10 days    CELECOXIB (CELEBREX) 200 MG CAPSULE    Take 1 capsule (200 mg total) by mouth 2 (two) times daily.    CYANOCOBALAMIN, VITAMIN B-12, 5,000 MCG SUBL    Place one under tongue once a day for 1 month, then continue to take under tongue per week    DOCUSATE SODIUM (COLACE) 100 MG CAPSULE    Take 1 capsule (100 mg total) by mouth 2 (two) times daily.    EMPAGLIFLOZIN (JARDIANCE) 25 MG TABLET    Take 1 tablet (25 mg total) by mouth once daily.    EPINEPHRINE (EPIPEN 2-AYALA) 0.3 MG/0.3 ML ATIN    Inject 0.3 mLs  "(0.3 mg total) into the muscle as needed (Anaphylaxis).    FLUTICASONE PROPIONATE (FLONASE) 50 MCG/ACTUATION NASAL SPRAY    1 spray (50 mcg total) by Each Nostril route once daily.    INSULIN ASPART U-100 (NOVOLOG U-100 INSULIN ASPART) 100 UNIT/ML INJECTION    MAX DAILY DOSE 120 UNITS IN INSULIN PUMP.    INSULIN PUMP CART,AUTO,BT,G6/7 (OMNIPOD 5 G6-G7 PODS, GEN 5,) CRTG    Change every 2 days    INSULIN PUMP CART,AUTOMATED,BT (OMNIPOD 5 G6 PODS, GEN 5,) CRTG    Inject 1 each into the skin once daily.    KETOCONAZOLE (NIZORAL) 2 % CREAM    Apply topically once daily.    L-METHYLFOLATE 15 MG TAB    TAKE 15 MG BY MOUTH ONCE DAILY.    LAMOTRIGINE (LAMICTAL) 100 MG TABLET    Take 1.5 tablets (150 mg total) by mouth once daily.    LANCETS [facility]    To check BG 4 times daily, to use with insurance preferred meter    LEVOTHYROXINE (SYNTHROID) 50 MCG TABLET    Take 1 tablet (50 mcg total) by mouth before breakfast.    LORATADINE (CLARITIN) 10 MG TABLET    Take 1 tablet (10 mg total) by mouth once daily.    MIRTAZAPINE (REMERON) 15 MG TABLET    Take 1 tablet (15 mg total) by mouth every evening.    OXYCODONE (ROXICODONE) 5 MG IMMEDIATE RELEASE TABLET    Take 1-2 tablets (5-10 mg total) by mouth every 4 (four) hours as needed (pain).    PEN NEEDLE, DIABETIC (BD ULTRA-FINE KEN PEN NEEDLE) 32 GAUGE X 5/32" NDLE    1 Device by Misc.(Non-Drug; Combo Route) route 5 (five) times daily.    PREGABALIN (LYRICA) 75 MG CAPSULE    Take 1 capsule (75 mg total) by mouth 2 (two) times daily. for 14 days    QUETIAPINE (SEROQUEL) 25 MG TAB    Take 1 tablet (25 mg total) by mouth once daily in the evening    SYRINGE, DISPOSABLE, 1 ML SYRG    Testosterone every 2 weeks    TIRZEPATIDE (MOUNJARO) 10 MG/0.5 ML PNIJ    inject Mounjaro 7.5 mg once weekly for 4 weeks. Then increase to Mounjaro 10 mg once weekly.    TIRZEPATIDE (MOUNJARO) 7.5 MG/0.5 ML PNIJ    inject Mounjaro 7.5 mg once weekly for 4 weeks. Then increase to Mounjaro 10 mg once weekly. "         Objective:   Objective   Physical Exam   Vitals:    07/14/25 1340   BP: 130/72   Pulse: 98   Temp: 98.7 °F (37.1 °C)   TempSrc: Oral   SpO2: 97%    There is no height or weight on file to calculate BMI.            Physical Exam  Constitutional:       General: He is not in acute distress.     Appearance: He is well-developed.   Eyes:      Extraocular Movements: Extraocular movements intact.   Cardiovascular:      Rate and Rhythm: Normal rate and regular rhythm.      Heart sounds: Normal heart sounds. No murmur heard.  Pulmonary:      Effort: Pulmonary effort is normal.      Breath sounds: Normal breath sounds.   Musculoskeletal:      Right lower leg: No edema.      Left lower leg: No edema.      Comments: In wheelchair did not challenge him to stand.    The right knee well-healed surgical incision.  Fullness but non ballotable   Skin:     General: Skin is warm and dry.   Neurological:      Mental Status: He is alert and oriented to person, place, and time.      Coordination: Coordination normal.   Psychiatric:         Behavior: Behavior normal.         Thought Content: Thought content normal.

## 2025-07-12 NOTE — ASSESSMENT & PLAN NOTE
Followed by DM NP  Stop mounjaro 1 week prior to surgery  Jardiance ericka day of surgery  Is on insulin pump and the plan is to continue throughout the surgery as he did previously.  He was monitored by anesthesia previously

## 2025-07-12 NOTE — ASSESSMENT & PLAN NOTE
Followed by neuro.  Previously seen by neuropsych. Levomethylfolate; lamictal, modafinil. Applying for disability/SSI.  In the meantime while he is awaiting adjudication is taking classes for computer science

## 2025-07-12 NOTE — ASSESSMENT & PLAN NOTE
Status post right knee replacement complicated by infected hardware status post removal and plan is redo/revision.  Scheduled for 7/28 pending ESR and CRP.  Plan is if those are negative can proceed but if they are abnormal may postpone the surgery and aspirate the knee to check for continued infection.  Was previously seen by Cardiology back in May, cleared to proceed.  He has been holding his tirzepatide  Aside from ESR and CRP which are pending may proceed to surgery without further testing

## 2025-07-14 ENCOUNTER — LAB VISIT (OUTPATIENT)
Dept: LAB | Facility: HOSPITAL | Age: 45
End: 2025-07-14
Attending: ORTHOPAEDIC SURGERY
Payer: COMMERCIAL

## 2025-07-14 ENCOUNTER — OFFICE VISIT (OUTPATIENT)
Dept: FAMILY MEDICINE | Facility: CLINIC | Age: 45
End: 2025-07-14
Payer: COMMERCIAL

## 2025-07-14 VITALS
HEART RATE: 98 BPM | OXYGEN SATURATION: 97 % | TEMPERATURE: 99 F | SYSTOLIC BLOOD PRESSURE: 130 MMHG | DIASTOLIC BLOOD PRESSURE: 72 MMHG

## 2025-07-14 DIAGNOSIS — M17.0 PRIMARY OSTEOARTHRITIS OF BOTH KNEES: ICD-10-CM

## 2025-07-14 DIAGNOSIS — M25.50 MULTIPLE JOINT PAIN: ICD-10-CM

## 2025-07-14 DIAGNOSIS — I10 ESSENTIAL HYPERTENSION: ICD-10-CM

## 2025-07-14 DIAGNOSIS — T84.50XA INFECTION OF PROSTHETIC JOINT, INITIAL ENCOUNTER: ICD-10-CM

## 2025-07-14 DIAGNOSIS — E53.8 B12 DEFICIENCY: ICD-10-CM

## 2025-07-14 DIAGNOSIS — F33.1 MODERATE EPISODE OF RECURRENT MAJOR DEPRESSIVE DISORDER: ICD-10-CM

## 2025-07-14 DIAGNOSIS — E78.5 HYPERLIPIDEMIA LDL GOAL <70: ICD-10-CM

## 2025-07-14 DIAGNOSIS — F41.1 GAD (GENERALIZED ANXIETY DISORDER): ICD-10-CM

## 2025-07-14 DIAGNOSIS — D64.9 ANEMIA, UNSPECIFIED TYPE: ICD-10-CM

## 2025-07-14 DIAGNOSIS — Z79.4 TYPE 2 DIABETES MELLITUS WITH LEFT EYE AFFECTED BY MILD NONPROLIFERATIVE RETINOPATHY WITHOUT MACULAR EDEMA, WITH LONG-TERM CURRENT USE OF INSULIN: ICD-10-CM

## 2025-07-14 DIAGNOSIS — Z01.818 PREOP EXAMINATION: Primary | ICD-10-CM

## 2025-07-14 DIAGNOSIS — E11.3292 TYPE 2 DIABETES MELLITUS WITH LEFT EYE AFFECTED BY MILD NONPROLIFERATIVE RETINOPATHY WITHOUT MACULAR EDEMA, WITH LONG-TERM CURRENT USE OF INSULIN: ICD-10-CM

## 2025-07-14 DIAGNOSIS — G31.84 MCI (MILD COGNITIVE IMPAIRMENT): ICD-10-CM

## 2025-07-14 DIAGNOSIS — E03.4 HYPOTHYROIDISM DUE TO ACQUIRED ATROPHY OF THYROID: ICD-10-CM

## 2025-07-14 LAB
CRP SERPL-MCNC: 6.7 MG/L
CYCLIC CITRULLINATED PEPTIDE (CCP) (OHS): <0.5 U/ML
ERYTHROCYTE [SEDIMENTATION RATE] IN BLOOD BY PHOTOMETRIC METHOD: 24 MM/HR
FERRITIN SERPL-MCNC: 69 NG/ML (ref 20–300)
RHEUMATOID FACT SERPL-ACNC: <13 IU/ML
VIT B12 SERPL-MCNC: >2000 PG/ML (ref 210–950)

## 2025-07-14 PROCEDURE — 82728 ASSAY OF FERRITIN: CPT

## 2025-07-14 PROCEDURE — 82607 VITAMIN B-12: CPT

## 2025-07-14 PROCEDURE — 86140 C-REACTIVE PROTEIN: CPT

## 2025-07-14 PROCEDURE — 86431 RHEUMATOID FACTOR QUANT: CPT

## 2025-07-14 PROCEDURE — 99999 PR PBB SHADOW E&M-EST. PATIENT-LVL V: CPT | Mod: PBBFAC,,, | Performed by: INTERNAL MEDICINE

## 2025-07-14 PROCEDURE — 86200 CCP ANTIBODY: CPT

## 2025-07-14 PROCEDURE — 85652 RBC SED RATE AUTOMATED: CPT

## 2025-07-14 PROCEDURE — 36415 COLL VENOUS BLD VENIPUNCTURE: CPT | Mod: PO

## 2025-07-14 PROCEDURE — 99214 OFFICE O/P EST MOD 30 MIN: CPT | Mod: S$GLB,,, | Performed by: INTERNAL MEDICINE

## 2025-07-14 RX ORDER — MODAFINIL 100 MG/1
100 TABLET ORAL DAILY
COMMUNITY

## 2025-07-15 ENCOUNTER — OFFICE VISIT (OUTPATIENT)
Dept: ORTHOPEDICS | Facility: CLINIC | Age: 45
End: 2025-07-15
Payer: COMMERCIAL

## 2025-07-15 ENCOUNTER — PATIENT MESSAGE (OUTPATIENT)
Dept: ORTHOPEDICS | Facility: CLINIC | Age: 45
End: 2025-07-15

## 2025-07-15 ENCOUNTER — RESULTS FOLLOW-UP (OUTPATIENT)
Dept: FAMILY MEDICINE | Facility: CLINIC | Age: 45
End: 2025-07-15
Payer: COMMERCIAL

## 2025-07-15 VITALS — HEIGHT: 73 IN | BODY MASS INDEX: 39.12 KG/M2 | WEIGHT: 295.19 LBS

## 2025-07-15 DIAGNOSIS — T84.50XA INFECTION OF PROSTHETIC JOINT, INITIAL ENCOUNTER: Primary | ICD-10-CM

## 2025-07-15 DIAGNOSIS — T84.50XA INFECTION AND INFLAMMATORY REACTION DUE TO INTERNAL JOINT PROSTHESIS: ICD-10-CM

## 2025-07-15 LAB
APPEARANCE FLD: NORMAL
COLOR FLD: NORMAL
CRYSTAL, BODY FLUID (OHS): NORMAL
LYMPHOCYTES NFR FLD MANUAL: 60 %
MONOS+MACROS NFR FLD MANUAL: 5 %
NEUTROPHILS NFR FLD MANUAL: 35 %
WBC # FLD: 518 /CU MM

## 2025-07-15 PROCEDURE — 89051 BODY FLUID CELL COUNT: CPT | Performed by: ORTHOPAEDIC SURGERY

## 2025-07-15 PROCEDURE — 89060 EXAM SYNOVIAL FLUID CRYSTALS: CPT | Performed by: ORTHOPAEDIC SURGERY

## 2025-07-15 PROCEDURE — 87070 CULTURE OTHR SPECIMN AEROBIC: CPT | Performed by: ORTHOPAEDIC SURGERY

## 2025-07-15 PROCEDURE — 99999 PR PBB SHADOW E&M-EST. PATIENT-LVL IV: CPT | Mod: PBBFAC,,, | Performed by: ORTHOPAEDIC SURGERY

## 2025-07-15 NOTE — PROCEDURES
Large Joint Aspiration/Injection: R knee    Date/Time: 7/15/2025 11:20 AM    Performed by: Antonio Rolon MD  Authorized by: Antonio Rolon MD    Consent Done?:  Yes (Verbal)  Indications:  Joint swelling  Prep: patient was prepped and draped in usual sterile fashion      Local anesthesia used?: Yes    Anesthesia:  Local infiltration  Local anesthetic:  Topical anesthetic and lidocaine 1% without epinephrine    Details:  Needle Size:  22 G and 18 G  Approach:  Superior  Location:  Knee  Site:  R knee  Aspirate amount (mL):  15  Aspirate:  Blood-tinged  Lab: fluid sent for laboratory analysis    Patient tolerance:  Patient tolerated the procedure well with no immediate complications

## 2025-07-15 NOTE — PROGRESS NOTES
B12 was good on supplement  Rheumatoid factor, CCP antibody negative  Ferritin normal.  Okay to stop iron supplement    Okay to stay on B12

## 2025-07-15 NOTE — PROGRESS NOTES
Dr. Lomeli spoke to physician. Dr. Lomeli would like to see patient in clinic on Monday, 3/25. This has been scheduled. Message sent to patient regarding date and time.      EST PATIENT ORTHOPAEDIC: Knee    PRIMARY CARE PHYSICIAN: Jovany Ford MD   REFERRING PROVIDER: No referring provider defined for this encounter.     ASSESSMENT & PLAN:    Impression:  Right Knee PJI  Left knee severe osteoarthritis  Left knee history of ACL reconstruction    Follow Up Plan: 3 weeks after surgery  HPI:     44 y.o. male status post right total Knee Primary completed on 11/18/24 and status post right knee explant and articulating spacer placement for PJI on 5/19/25.  Patient culture had concern for staph epidermidis in broth only otherwise culture negative infection.  Has infectious disease follow-up scheduled for 7/2/25.  At that time he was done with completion of antibiotics he had PICC line removed.  Three days prior to the PICC line being removed he had a bump in his CRP but otherwise had normalized prior to that date.  I discussed this with ID we are going to repeat inflammatory markers.  Otherwise he feels like he has been doing well.  In wheelchair today. Has been in a T scope brace limited to flexion to 90°.      We will continue with current treatment plan.  Tentative replant date for July 28th.  His repeat inflammatory markers suggested a normal CRP which was previously elevated and a slightly elevated ESR.  This is all being off antibiotics for the last 2 weeks.  As a result I recommended reaspiration.  About 15 cc of joint fluid was obtained we will send for cell counts.       We had a lengthy discussion regarding the risk and benefit of surgery, the alternatives, limitations and personnel involved. These included but were not limited to infection, persistent pain, instability, nerve injury, blood clots, dislocation, loosening, leg length inequality and medical complications. We also discussed the pre-operative course, surgery itself and rehabilitation.    Patricia-operative blood management and transfusion issues were discussed, and options clearly outlined. The patient has consented to  the use of the banked allogenic blood if medically necessary.    Final treatment will depend on his aspiration results.  If not concern for infectious etiology we will proceed with 2nd stage.  If concern for ongoing infection need to have a further discussion with ID regarding treatment and whether or not I and D versus repeat explant and changing of antibiotics would be necessary.  Can consider intraop frozen sections if inconclusive.    To review:  Prior to explant, he had elevated inflammatory markers at a CRP of 48 and an ESR of 28.  I also had x-rays obtained which demonstrated some lateral patellar tilt and tracking to his knee compared to films obtained from months prior. I obtained an MRI which had concerning findings for osteomyelitis and infection of his joint.  I sent him for ultrasound-guided aspiration which was sent for Synovasure.  This came back with alpha defense of positive, synovial CRP positive, significantly elevated white blood cell count, NGTD on cultures.     The patient has been ordered:  None    CONSULTS:   Anesthesia for optimization    ACTIVE PROBLEM LIST  Patient Active Problem List   Diagnosis    Hyperlipidemia LDL goal <70    Insulin long-term use    Tinea pedis    Morbid obesity    Hypothyroidism    Type 2 diabetes mellitus 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 MRI negative. 11/2019 started on testosterone    Right foot pain    Decreased strength of lower extremity    Decreased range of  motion of both ankles    Gait abnormality    Rib pain on left side    Dyspnea    Decreased strength, endurance, and mobility    Left hand pain    Mild neurocognitive disorder due to traumatic brain injury    Outbursts of anger    Other amnesia    Traumatic rotator cuff tear, right, initial encounter    S/P right rotator cuff repair    Biceps tendonosis of right shoulder    Decreased range of motion of right shoulder    Impaired strength of shoulder muscles    Allergic conjunctivitis    Cornea edema    Descemet's membrane fold    Herpes simplex keratitis    Uncontrolled type 2 diabetes mellitus    Tear of lateral meniscus of right knee, current    Refractive error    Acute migraine    MCI (mild cognitive impairment)    Medication overuse headache    Chronic migraine with aura, intractable, with status migrainosus    Agitation    Traumatic encephalopathy    Allergy desensitization therapy    Concussion with no loss of consciousness    Concussion with loss of consciousness    Other specified persistent mood disorders    Cognitive communication deficit    Impaired mobility and ADLs    Imbalance    Primary osteoarthritis of both knees    Bilateral chronic knee pain    Infection of prosthetic right knee joint    Prosthetic joint infection           SUBJECTIVE    CHIEF COMPLAINT: Knee Pain    HPI:     44 y.o. male status post right total Knee Primary completed on 11/18/24 and status post right knee explant and articulating spacer placement for PJI on 5/19/25.  Patient culture had concern for staph epidermidis in broth only otherwise culture negative infection.  Has infectious disease follow-up scheduled for 7/2/25.  At that time he was done with completion of antibiotics he had PICC line removed.  Three days prior to the PICC line being removed he had a bump in his CRP but otherwise had normalized prior to that date.  I discussed this with ID we are going to repeat inflammatory markers.  Otherwise he feels like he has been  doing well.  In wheelchair today. Has been in a T scope brace limited to flexion to 90°.      We will continue with current treatment plan.  Tentative replant date for July 28th.  His repeat inflammatory markers suggested a normal CRP which was previously elevated and a slightly elevated ESR.  This is all being off antibiotics for the last 2 weeks.  As a result I recommended reaspiration.  About 15 cc of joint fluid was obtained we will send for cell counts.       To review:  Prior to explant, he had elevated inflammatory markers at a CRP of 48 and an ESR of 28.  I also had x-rays obtained which demonstrated some lateral patellar tilt and tracking to his knee compared to films obtained from months prior. I obtained an MRI which had concerning findings for osteomyelitis and infection of his joint.  I sent him for ultrasound-guided aspiration which was sent for Synovasure.  This came back with alpha defense of positive, synovial CRP positive, significantly elevated white blood cell count, NGTD on cultures.     PROGRESSIVE SYMPTOMS:  Pain impacting work  Pain worsened by weight bearing  Pain effecting living situation    FUNCTIONAL STATUS:   Climb a flight of stairs or walk up a hill     PREVIOUS TREATMENTS:  Medical: OTC NSAIDS, RX NSAIDS, Steroid Injections, Biologic Injections, Narcotics, and Tylenol  Physical Therapy: Activities Modified  and Formal Physical Therapy for 6 weeks  Previous Orthopaedic Surgery: Left Knee ACL Reconstruction x3, Right Shoulder RCR with Dr. Bradford    REVIEW OF SYSTEMS:  PAIN ASSESSMENT:  See HPI.  MUSCULOSKELETAL: See HPI.  OTHER 10 point review of systems is negative except as stated in HPI above    PAST MEDICAL HISTORY   has a past medical history of Diabetes mellitus, type 2, Hyperlipidemia, Hypertension, Obesity, and NAINA (obstructive sleep apnea).     PAST SURGICAL HISTORY   has a past surgical history that includes left knee x 2; Knee surgery; Arthroscopic repair of rotator cuff of  shoulder (Right, 03/14/2023); arthroscopy,shoulder,with biceps tenodesis (03/14/2023); Arthroscopic debridement of shoulder (03/14/2023); Knee arthroscopy w/ meniscectomy (Right, 10/24/2023); Subchondroplasty (Right, 10/24/2023); PRK  (Bilateral, 2010); Arthroscopic repair of rotator cuff of shoulder (Right, 7/23/2024); Arthroscopic debridement of shoulder (7/23/2024); arthroplasty, knee, total, using computer-assisted navigation (Right, 11/18/2024); and irrigation and debridement, knee, with antibiotic beads or antibiotic spacer insertion (Right, 5/19/2025).     FAMILY HISTORY  family history includes Autism in his son; Diabetes in his father, maternal grandmother, and mother; No Known Problems in his brother, daughter, maternal aunt, maternal grandfather, maternal uncle, paternal aunt, paternal grandfather, paternal grandmother, paternal uncle, and another family member.     SOCIAL HISTORY   reports that he has quit smoking. His smoking use included cigars. He has never been exposed to tobacco smoke. He has never used smokeless tobacco. He reports that he does not currently use alcohol. He reports current drug use. Drug: Marijuana.     ALLERGIES   Review of patient's allergies indicates:   Allergen Reactions    Influenza virus vaccine, specific Hives    Pioglitazone      Altered mood     Victoza [liraglutide] Nausea And Vomiting    Farxiga [dapagliflozin] Rash     Erectile dysfunction and rash         MEDICATIONS  Current Outpatient Medications on File Prior to Visit   Medication Sig Dispense Refill    acetaminophen (TYLENOL) 500 MG tablet Take 2 tablets (1,000 mg total) by mouth every 8 (eight) hours as needed for Pain. 42 tablet 0    ammonium lactate 12 % Crea Apply 1 application  topically once daily. 140 g 5    ARIPiprazole (ABILIFY) 2 MG Tab Take 1 tablet (2 mg total) by mouth once daily. 30 tablet 11    atorvastatin (LIPITOR) 40 MG tablet Take 1 tablet (40 mg total) by mouth once daily. 90 tablet 3     azelastine (ASTELIN) 137 mcg (0.1 %) nasal spray Use 1-2 sprays in each nostril twice daily 90 mL 3    benazepriL (LOTENSIN) 20 MG tablet Take 1 tablet (20 mg total) by mouth once daily. 90 tablet 3    blood sugar diagnostic Strp To check BG 4 times daily, to use with insurance preferred meter 400 strip 3    blood sugar diagnostic Strp OneTouch Ultra Test strips      blood-glucose meter kit OneTouch Ultra2 Meter kit      blood-glucose sensor (DEXCOM G7 SENSOR) Filomena Change every 10 days 12 each 3    celecoxib (CELEBREX) 200 MG capsule Take 1 capsule (200 mg total) by mouth 2 (two) times daily. 14 capsule 0    cyanocobalamin, vitamin B-12, 5,000 mcg Subl Place one under tongue once a day for 1 month, then continue to take under tongue per week 30 tablet 3    docusate sodium (COLACE) 100 MG capsule Take 1 capsule (100 mg total) by mouth 2 (two) times daily. 30 capsule 0    empagliflozin (JARDIANCE) 25 mg tablet Take 1 tablet (25 mg total) by mouth once daily. 90 tablet 2    EPINEPHrine (EPIPEN 2-AYALA) 0.3 mg/0.3 mL AtIn Inject 0.3 mLs (0.3 mg total) into the muscle as needed (Anaphylaxis). 2 each 0    fluticasone propionate (FLONASE) 50 mcg/actuation nasal spray 1 spray (50 mcg total) by Each Nostril route once daily. 48 g 5    insulin aspart U-100 (NOVOLOG U-100 INSULIN ASPART) 100 unit/mL injection MAX DAILY DOSE 120 UNITS IN INSULIN PUMP. 110 mL 2    insulin pump cart,auto,BT,G6/7 (OMNIPOD 5 G6-G7 PODS, GEN 5,) Crtg Change every 2 days 45 each 2    insulin pump cart,automated,BT (OMNIPOD 5 G6 PODS, GEN 5,) Crtg Inject 1 each into the skin once daily. 30 each 11    ketoconazole (NIZORAL) 2 % cream Apply topically once daily. 60 g 0    L-METHYLFOLATE 15 mg Tab TAKE 15 MG BY MOUTH ONCE DAILY. 30 tablet 11    lamoTRIgine (LAMICTAL) 100 MG tablet Take 1.5 tablets (150 mg total) by mouth once daily. 135 tablet 3    lancets Misc To check BG 4 times daily, to use with insurance preferred meter 400 each 3    levothyroxine  "(SYNTHROID) 50 MCG tablet Take 1 tablet (50 mcg total) by mouth before breakfast. 90 tablet 1    loratadine (CLARITIN) 10 mg tablet Take 1 tablet (10 mg total) by mouth once daily. 90 tablet 3    modafiniL (PROVIGIL) 100 MG Tab Take 100 mg by mouth once daily.      oxyCODONE (ROXICODONE) 5 MG immediate release tablet Take 1-2 tablets (5-10 mg total) by mouth every 4 (four) hours as needed (pain). 40 tablet 0    pen needle, diabetic (BD ULTRA-FINE KEN PEN NEEDLE) 32 gauge x 5/32" Ndle 1 Device by Misc.(Non-Drug; Combo Route) route 5 (five) times daily. 550 each 4    QUEtiapine (SEROQUEL) 25 MG Tab Take 1 tablet (25 mg total) by mouth once daily in the evening 30 tablet 11    syringe, disposable, 1 mL Syrg Testosterone every 2 weeks 25 Syringe 1    tirzepatide (MOUNJARO) 10 mg/0.5 mL PnIj inject Mounjaro 7.5 mg once weekly for 4 weeks. Then increase to Mounjaro 10 mg once weekly. 4 Pen 0    tirzepatide (MOUNJARO) 7.5 mg/0.5 mL PnIj inject Mounjaro 7.5 mg once weekly for 4 weeks. Then increase to Mounjaro 10 mg once weekly. 4 Pen 0    aspirin (ECOTRIN) 81 MG EC tablet Take 1 tablet (81 mg total) by mouth 2 (two) times a day. 60 tablet 0    mirtazapine (REMERON) 15 MG tablet Take 1 tablet (15 mg total) by mouth every evening. 30 tablet 11    pregabalin (LYRICA) 75 MG capsule Take 1 capsule (75 mg total) by mouth 2 (two) times daily. for 14 days (Patient not taking: Reported on 7/14/2025) 28 capsule 0     No current facility-administered medications on file prior to visit.          PHYSICAL EXAM   height is 6' 1" (1.854 m) and weight is 133.9 kg (295 lb 3.1 oz).   Body mass index is 38.95 kg/m².      All other systems deferred.  GENERAL:  No acute distress  HABITUS: Obese  GAIT: Antalgic  SKIN: Normal   CV: RRR  Non labored breathing     KNEE EXAM:    right:   Effusion: Moderate joint effusion  TTP: Yes over Medial and lateral Joint Line   Yes crepitus with passive knee ROM  Passive ROM: Extension 0, Flexion 90  No pain " with manipulation of patella  Stable to varus/valgus stress. No increased laxity to anterior/posterior drawer testing  No pain with IR/ER rotation of the hip  5/5 strength in knee flexion and extension, ankle plantarflexion and dorsiflexion  Neurovascular Status: Sensation intact to light touch in Sural, Saphenous, SPN, DPN, Tibial nerve distribution  2+ pulse DP/PT, normal capillary refill, foot has normal warmth    DATA:  Diagnostic tests reviewed for today's visit:     4v of the left knee reveal Moderate degenerative changes of the Lateral and Patellofemoral compartment. There is evidence of advanced osteoarthritis changes with Osteophyte formation and Joint space narrowing. The limb is in netural alignment. The patella is tracking midline.    Repeat 3v of right knee radiographs demonstrate well-seated well-positioned components with a lateral subluxation of his patella compared to previous films    MRI report and images reviewed with findings concerning for periprosthetic septic arthritis and osteomyelitis

## 2025-07-18 ENCOUNTER — OFFICE VISIT (OUTPATIENT)
Dept: ENDOCRINOLOGY | Facility: CLINIC | Age: 45
End: 2025-07-18
Payer: COMMERCIAL

## 2025-07-18 DIAGNOSIS — R41.840 ATTENTION AND CONCENTRATION DEFICIT: ICD-10-CM

## 2025-07-18 DIAGNOSIS — E11.9 TYPE 2 DIABETES MELLITUS WITHOUT COMPLICATION, WITH LONG-TERM CURRENT USE OF INSULIN: Primary | ICD-10-CM

## 2025-07-18 DIAGNOSIS — G47.33 OSA (OBSTRUCTIVE SLEEP APNEA): ICD-10-CM

## 2025-07-18 DIAGNOSIS — E78.5 HYPERLIPIDEMIA, UNSPECIFIED HYPERLIPIDEMIA TYPE: ICD-10-CM

## 2025-07-18 DIAGNOSIS — Z79.4 TYPE 2 DIABETES MELLITUS WITHOUT COMPLICATION, WITH LONG-TERM CURRENT USE OF INSULIN: Primary | ICD-10-CM

## 2025-07-18 RX ORDER — INSULIN PMP CART,AUT,G6/7,CNTR
EACH SUBCUTANEOUS
Qty: 90 EACH | Refills: 2 | Status: SHIPPED | OUTPATIENT
Start: 2025-07-18

## 2025-07-18 RX ORDER — TIRZEPATIDE 7.5 MG/.5ML
INJECTION, SOLUTION SUBCUTANEOUS
Qty: 4 PEN | Refills: 2 | Status: SHIPPED | OUTPATIENT
Start: 2025-07-18

## 2025-07-18 RX ORDER — MODAFINIL 100 MG/1
100 TABLET ORAL EVERY MORNING
Qty: 90 TABLET | Refills: 1 | Status: SHIPPED | OUTPATIENT
Start: 2025-07-18 | End: 2026-01-14

## 2025-07-18 RX ORDER — INSULIN ASPART 100 [IU]/ML
INJECTION, SOLUTION INTRAVENOUS; SUBCUTANEOUS
Qty: 110 ML | Refills: 2 | Status: SHIPPED | OUTPATIENT
Start: 2025-07-18

## 2025-07-18 RX ORDER — TIRZEPATIDE 5 MG/.5ML
INJECTION, SOLUTION SUBCUTANEOUS
Qty: 4 PEN | Refills: 0 | Status: SHIPPED | OUTPATIENT
Start: 2025-07-18

## 2025-07-18 NOTE — PROGRESS NOTES
CC: This 44 y.o. White male  is here for evaluation of  T2DM along with comorbidities indicated in the Visit Diagnosis section of this encounter.    HPI: Tony Gordillo was diagnosed with T2DM in 2008. He was without health insurance for 2017 and mid 2018.       Prior visit 1/2025 arrives with daughter.   A1c remains low now at 6.1%.   Denies lows after meals. Trying to bolus 15-20 minutes ac to ideally control BGs.   He has increased Mounjaro to 12.5 mg. Denies n/v, constipation, or diarrhea. No change in appetite or glucose control.   Has  left TKA scheduled in the spring. S/p right TKA.   Pt lost both his jobs.  Plan Glucoses very well controlled.   Continue current pump settings, Jardiance and Mounjaro.   Rx for Dexcom g7 sent with compatible pods.   Return to clinic in 6 months with A1c prior, virtual.       Interval hx  Last A1c was 6.2% in May.   Reports BGs have been high. He has been sedentary. He's had complications with right knee prosthesis which needed to removed. He has been sedentary and dependent on others to prepare meals. C/o back pain using walker and notes that BGs spike when he uses heating pad for pain relief.      C/o nausea and sulfur burps when he resumed Mounjaro at 7.5 mg. He has been taking antibiotics. He would like to wait after his sx on 7/28 to resume since he has to hold prior to surgery anyway. He hasn't taken Mounjaro x 3 months.     He is changing pod every 1-1.5 days.                             LAST DIABETES EDUCATION: 2019  Pt started Omnipod 5 on 8/2/23 with ROMIE Nixon RD.  F/u in 3/2024    HOSPITALIZED FOR DIABETES -  No.    DIABETES MEDICATIONS:    Jardiance 25 mg once daily   Mounjaro 12.5 mg weekly on Sundays      Novolog in Omnipod 5   Basal rate   12 am - 1.2 u/hr  5:30 pm - 1.75 u/hr     ICR 2    ISF 20  Target glucose 110, correct above 110, active insulin time 4 hours.         DM COMPLICATIONS: none    SIGNIFICANT DIABETES MED HISTORY:  "  glyburide--hypoglycemia  Metformin - diarrhea and vomiting (has not tried metformin XR); Metformin topical - ineffective  Pioglitazone - altered mood, reportedly became angry   Victoza - nausea and vomiting at 1.2 mg; felt "run down" at 0.6 mg.   Trulicity less effective than Ozempic, switched to Ozempic 4/2022, switched from Ozempic to Mounjaro 2/2023  Mounjaro - GI s/e when he increased from 5 to 10 mg.     SELF MONITORING BLOOD GLUCOSE: Dexcom G7    CGM interpretation:  Glucoses are higher with hyperglycemia noted after meals even when patient boluses.  Some glucose spikes likely due to missed meal boluses.  No hypoglycemia.      HYPOGLYCEMIC EPISODES: none     CURRENT DIET: drinks water mostly. Sometimes coffee at work.  Eats 2-3 meals/day.     CURRENT EXERCISE:     SOCIAL: previously worked as  and EMS       There were no vitals taken for this visit.         ROS:   CONSTITUTIONAL: Appetite good, +  fatigue      PHYSICAL EXAM:  GENERAL: Well developed, well nourished. No acute distress.   PSYCH: AAOx3, appropriate mood and affect, conversant, well-groomed. Judgement and insight good.   NEURO: Cranial nerves grossly intact. Speech clear, no tremor.   CHEST: Respirations even and unlabored.          Hemoglobin A1C   Date Value Ref Range Status   01/06/2025 6.1 (H) 4.0 - 5.6 % Final     Comment:     ADA Screening Guidelines:  5.7-6.4%  Consistent with prediabetes  >or=6.5%  Consistent with diabetes    High levels of fetal hemoglobin interfere with the HbA1C  assay. Heterozygous hemoglobin variants (HbS, HgC, etc)do  not significantly interfere with this assay.   However, presence of multiple variants may affect accuracy.     11/06/2024 6.3 (H) 4.0 - 5.6 % Final     Comment:     ADA Screening Guidelines:  5.7-6.4%  Consistent with prediabetes  >or=6.5%  Consistent with diabetes    High levels of fetal hemoglobin interfere with the HbA1C  assay. Heterozygous hemoglobin variants (HbS, HgC, etc)do  not " significantly interfere with this assay.   However, presence of multiple variants may affect accuracy.     05/08/2024 5.7 (H) 4.0 - 5.6 % Final     Comment:     ADA Screening Guidelines:  5.7-6.4%  Consistent with prediabetes  >or=6.5%  Consistent with diabetes    High levels of fetal hemoglobin interfere with the HbA1C  assay. Heterozygous hemoglobin variants (HbS, HgC, etc)do  not significantly interfere with this assay.   However, presence of multiple variants may affect accuracy.       Hemoglobin A1c   Date Value Ref Range Status   05/13/2025 6.2 (H) 4.0 - 5.6 % Final     Comment:     ADA Screening Guidelines:  5.7-6.4%  Consistent with prediabetes  >=6.5%  Consistent with diabetes    High levels of fetal hemoglobin interfere with the HbA1C  assay. Heterozygous hemoglobin variants (HbS, HgC, etc)do  not significantly interfere with this assay.   However, presence of multiple variants may affect accuracy.     Lab Results   Component Value Date    TSH 2.557 02/25/2025           Component Value Date/Time     06/30/2025 1000     02/25/2025 1056    K 3.9 06/30/2025 1000    K 4.2 02/25/2025 1056     06/30/2025 1000     02/25/2025 1056    CO2 24 06/30/2025 1000    CO2 24 02/25/2025 1056    BUN 14 06/30/2025 1000    CREATININE 0.8 06/30/2025 1000     (H) 06/30/2025 1000     (H) 02/25/2025 1056    CALCIUM 8.8 06/30/2025 1000    CALCIUM 9.8 02/25/2025 1056    ALKPHOS 85 06/30/2025 1000    ALKPHOS 93 02/25/2025 1056    AST 15 06/30/2025 1000    AST 22 02/25/2025 1056    ALT 16 06/30/2025 1000    ALT 19 02/25/2025 1056    BILITOT 0.4 06/30/2025 1000    BILITOT 0.4 02/25/2025 1056    EGFRNORACEVR >60 06/30/2025 1000    EGFRNORACEVR >60.0 02/25/2025 1056    ESTGFRAFRICA >60 03/25/2022 0617       Lab Results   Component Value Date    CHOL 118 (L) 01/06/2025    CHOL 142 07/06/2023    CHOL 162 02/17/2022     Lab Results   Component Value Date    HDL 42 01/06/2025    HDL 57 07/06/2023    HDL  52 02/17/2022     Lab Results   Component Value Date    LDLCALC 52.2 (L) 01/06/2025    LDLCALC 72.8 07/06/2023    LDLCALC 81.8 02/17/2022     Lab Results   Component Value Date    TRIG 119 01/06/2025    TRIG 61 07/06/2023    TRIG 141 02/17/2022       Lab Results   Component Value Date    CHOLHDL 35.6 01/06/2025    CHOLHDL 40.1 07/06/2023    CHOLHDL 32.1 02/17/2022         Lab Results   Component Value Date    MICALBCREAT Unable to calculate 01/06/2025      Latest Reference Range & Units Most Recent   Urine Creatinine 23.0 - 375.0 mg/dL 74.0  1/6/25 13:15   Urine Microalbumin ug/mL <5.0  1/6/25 13:15   MICROALB/CREAT RATIO 0.0 - 30.0 ug/mg Unable to calculate  1/6/25 13:15           ASSESSMENT and PLAN:    A1C GOAL: < 7 %       1. Type 2 diabetes mellitus without complication, with long-term current use of insulin  Decline in glucose control secondary to infection, lack of medication with Mounjaro, pain, and limited physical activity.    Change carb ratio from 2 to 1.6  Change ISF from 20 to 16.   Improve bolus adherence.   Resume Mounjaro at 5 mg once weekly after surgery. After a month, increase dose to 7.5 mg once weekly.   If glucoses drop too low after meals once Mounjaro is working, then decrease carb ratio back to 2.     Return to clinic in 3 months with labs prior. Virtual       empagliflozin (JARDIANCE) 25 mg tablet    insulin aspart U-100 (NOVOLOG U-100 INSULIN ASPART) 100 unit/mL injection    Hemoglobin A1C      2. Hyperlipidemia, unspecified hyperlipidemia type  Continue atorvastatin.                Orders Placed This Encounter   Procedures    Hemoglobin A1C     Standing Status:   Future     Expected Date:   7/18/2025     Expiration Date:   10/16/2026     Send normal result to authorizing provider's In Basket if patient is active on MyChart::   Yes        Follow up in about 3 months (around 10/18/2025) for Virtual Visit.                   The patient location is: [place]  The chief complaint leading to  consultation is: type 2 diabetes    Visit type: audiovisual    Face to Face time with patient: 28minutes of total time spent on the encounter, which includes face to face time and non-face to face time preparing to see the patient (eg, review of tests), Obtaining and/or reviewing separately obtained history, Documenting clinical information in the electronic or other health record, Independently interpreting results (not separately reported) and communicating results to the patient/family/caregiver, or Care coordination (not separately reported).         Each patient to whom he or she provides medical services by telemedicine is:  (1) informed of the relationship between the physician and patient and the respective role of any other health care provider with respect to management of the patient; and (2) notified that he or she may decline to receive medical services by telemedicine and may withdraw from such care at any time.    Notes:

## 2025-07-18 NOTE — PATIENT INSTRUCTIONS
Change carb ratio from 2 to 1.6  Change ISF from 20 to 16.   Improve bolus adherence.   Resume Mounjaro at 5 mg once weekly after surgery. After a month, increase dose to 7.5 mg once weekly.   If glucoses drop too low after meals once Mounjaro is taking into effect, then decrease carb ratio back to 2.     Return to clinic in 3 months with labs prior. Virtual

## 2025-07-19 LAB — BACTERIA SPEC AEROBE CULT: NO GROWTH

## 2025-07-21 ENCOUNTER — HOSPITAL ENCOUNTER (OUTPATIENT)
Dept: PREADMISSION TESTING | Facility: HOSPITAL | Age: 45
Discharge: HOME | End: 2025-07-21
Attending: ORTHOPAEDIC SURGERY
Payer: COMMERCIAL

## 2025-07-21 VITALS
SYSTOLIC BLOOD PRESSURE: 117 MMHG | OXYGEN SATURATION: 98 % | DIASTOLIC BLOOD PRESSURE: 72 MMHG | BODY MASS INDEX: 39.1 KG/M2 | TEMPERATURE: 97 F | HEART RATE: 79 BPM | RESPIRATION RATE: 16 BRPM | WEIGHT: 295 LBS | HEIGHT: 73 IN

## 2025-07-21 DIAGNOSIS — Z01.818 PRE-OP TESTING: Primary | ICD-10-CM

## 2025-07-21 LAB
ABSOLUTE EOSINOPHIL (OHS): 0.67 K/UL
ABSOLUTE MONOCYTE (OHS): 0.71 K/UL (ref 0.3–1)
ABSOLUTE NEUTROPHIL COUNT (OHS): 3.5 K/UL (ref 1.8–7.7)
ALBUMIN SERPL BCP-MCNC: 3.7 G/DL (ref 3.5–5.2)
ALP SERPL-CCNC: 78 UNIT/L (ref 40–150)
ALT SERPL W/O P-5'-P-CCNC: 21 UNIT/L (ref 10–44)
ANION GAP (OHS): 9 MMOL/L (ref 8–16)
APTT PPP: 24.6 SECONDS (ref 21–32)
AST SERPL-CCNC: 17 UNIT/L (ref 11–45)
BASOPHILS # BLD AUTO: 0.06 K/UL
BASOPHILS NFR BLD AUTO: 0.8 %
BILIRUB SERPL-MCNC: 0.6 MG/DL (ref 0.1–1)
BUN SERPL-MCNC: 18 MG/DL (ref 6–20)
CALCIUM SERPL-MCNC: 9.3 MG/DL (ref 8.7–10.5)
CHLORIDE SERPL-SCNC: 106 MMOL/L (ref 95–110)
CO2 SERPL-SCNC: 23 MMOL/L (ref 23–29)
CREAT SERPL-MCNC: 0.8 MG/DL (ref 0.5–1.4)
EAG (OHS): 140 MG/DL (ref 68–131)
ERYTHROCYTE [DISTWIDTH] IN BLOOD BY AUTOMATED COUNT: 16.2 % (ref 11.5–14.5)
GFR SERPLBLD CREATININE-BSD FMLA CKD-EPI: >60 ML/MIN/1.73/M2
GLUCOSE SERPL-MCNC: 152 MG/DL (ref 70–110)
HBA1C MFR BLD: 6.5 % (ref 4–5.6)
HCT VFR BLD AUTO: 41.7 % (ref 40–54)
HGB BLD-MCNC: 13.4 GM/DL (ref 14–18)
IMM GRANULOCYTES # BLD AUTO: 0.02 K/UL (ref 0–0.04)
IMM GRANULOCYTES NFR BLD AUTO: 0.3 % (ref 0–0.5)
INDIRECT COOMBS: NORMAL
INR PPP: 0.9 (ref 0.8–1.2)
LYMPHOCYTES # BLD AUTO: 2.35 K/UL (ref 1–4.8)
MCH RBC QN AUTO: 27.8 PG (ref 27–31)
MCHC RBC AUTO-ENTMCNC: 32.1 G/DL (ref 32–36)
MCV RBC AUTO: 87 FL (ref 82–98)
NUCLEATED RBC (/100WBC) (OHS): 0 /100 WBC
PLATELET # BLD AUTO: 204 K/UL (ref 150–450)
PMV BLD AUTO: 11.5 FL (ref 9.2–12.9)
POTASSIUM SERPL-SCNC: 4.1 MMOL/L (ref 3.5–5.1)
PROT SERPL-MCNC: 7.4 GM/DL (ref 6–8.4)
PROTHROMBIN TIME: 10.4 SECONDS (ref 9–12.5)
RBC # BLD AUTO: 4.82 M/UL (ref 4.6–6.2)
RELATIVE EOSINOPHIL (OHS): 9.2 %
RELATIVE LYMPHOCYTE (OHS): 32.1 % (ref 18–48)
RELATIVE MONOCYTE (OHS): 9.7 % (ref 4–15)
RELATIVE NEUTROPHIL (OHS): 47.9 % (ref 38–73)
RH BLD: NORMAL
SODIUM SERPL-SCNC: 138 MMOL/L (ref 136–145)
WBC # BLD AUTO: 7.31 K/UL (ref 3.9–12.7)

## 2025-07-21 PROCEDURE — 83036 HEMOGLOBIN GLYCOSYLATED A1C: CPT | Performed by: ORTHOPAEDIC SURGERY

## 2025-07-21 PROCEDURE — 82040 ASSAY OF SERUM ALBUMIN: CPT | Performed by: ORTHOPAEDIC SURGERY

## 2025-07-21 PROCEDURE — 85610 PROTHROMBIN TIME: CPT | Performed by: ORTHOPAEDIC SURGERY

## 2025-07-21 PROCEDURE — 85730 THROMBOPLASTIN TIME PARTIAL: CPT | Performed by: ORTHOPAEDIC SURGERY

## 2025-07-21 PROCEDURE — 86850 RBC ANTIBODY SCREEN: CPT | Performed by: ORTHOPAEDIC SURGERY

## 2025-07-21 PROCEDURE — 85025 COMPLETE CBC W/AUTO DIFF WBC: CPT | Performed by: ORTHOPAEDIC SURGERY

## 2025-07-21 NOTE — ANESTHESIA PREPROCEDURE EVALUATION
07/28/2025  Tony Gordillo is a 44 y.o., male.  To undergo Procedure(s) (LRB):    IRRIGATION AND DEBRIDEMENT, KNEE, WITH ANTIBIOTIC BEADS OR ANTIBIOTIC SPACER INSERTION (Right)       Past Medical History:  Past Medical History:   Diagnosis Date    Diabetes mellitus, type 2     Hyperlipidemia     Hypertension     Obesity     NAINA (obstructive sleep apnea)        Past Surgical History:  Past Surgical History:   Procedure Laterality Date    ARTHROPLASTY, KNEE, TOTAL, USING COMPUTER-ASSISTED NAVIGATION Right 11/18/2024    Procedure: ARTHROPLASTY, KNEE, TOTAL, USING COMPUTER-ASSISTED NAVIGATION;  Surgeon: Antonio Rolon MD;  Location: [facility] OR;  Service: Orthopedics;  Laterality: Right;  [place]  [place] and 1st Assist Kaitlin Notified-[place]  -----CLEARED BY PCP  RN PREOP 11/6/2024 ---TYPE&SCREEN --done---BMI--40.97--- HAS INSULIN PUMP    ARTHROSCOPIC DEBRIDEMENT OF SHOULDER  03/14/2023    Procedure: DEBRIDEMENT, SHOULDER, ARTHROSCOPIC;  Surgeon: Crissy Bradford MD;  Location: [facility] OR;  Service: Orthopedics;;    ARTHROSCOPIC DEBRIDEMENT OF SHOULDER  7/23/2024    Procedure: DEBRIDEMENT, SHOULDER, ARTHROSCOPIC;  Surgeon: Crissy Bradford MD;  Location: [facility] OR;  Service: Orthopedics;;    ARTHROSCOPIC REPAIR OF ROTATOR CUFF OF SHOULDER Right 03/14/2023    Procedure: REPAIR, ROTATOR CUFF, ARTHROSCOPIC;  Surgeon: Crissy Bradford MD;  Location: [facility] OR;  Service: Orthopedics;  Laterality: Right;  KARY PAYNE [phone]. TEXTED ELMER ON 3/9/2023 @ 12:10PM. [place]MER RESPONDED ON 3/9/23 @ 12:23PM-SAG  RN PREOP 3/10/2023---CLEARED BY PCP AND CARDS    ARTHROSCOPIC REPAIR OF ROTATOR CUFF OF SHOULDER Right 7/23/2024    Procedure: REPAIR, ROTATOR CUFF, ARTHROSCOPIC;  Surgeon: Crissy Bradford MD;  Location: [facility] OR;  Service: Orthopedics;  Laterality: Right;  [place] PAYNE  [phone], NURYS [phone]  CLEARED BY PCP  RN PREOP 6/18/2024    ARTHROSCOPY,SHOULDER,WITH BICEPS TENODESIS  03/14/2023    Procedure: ARTHROSCOPY,SHOULDER,WITH BICEPS TENODESIS;  Surgeon: Crissy Bradford MD;  Location: [facility] OR;  Service: Orthopedics;;    IRRIGATION AND DEBRIDEMENT, KNEE, WITH ANTIBIOTIC BEADS OR ANTIBIOTIC SPACER INSERTION Right 5/19/2025    Procedure: IRRIGATION AND DEBRIDEMENT, KNEE, WITH ANTIBIOTIC BEADS OR ANTIBIOTIC SPACER INSERTION;  Surgeon: Antonio Rolon MD;  Location: [facility] OR;  Service: Orthopedics;  Laterality: Right;  [facility]. Antony Hardy Notified- [place]    RN PRE OP 5/13/2025---NEED CONSENT    KNEE ARTHROSCOPY W/ MENISCECTOMY Right 10/24/2023    Procedure: ARTHROSCOPY, KNEE, WITH MENISCECTOMY;  Surgeon: Crissy Bradford MD;  Location: [facility] OR;  Service: Orthopedics;  Laterality: Right;  RN PREOP 10/18/203---CLEARED BY PCP  ELVIA [phone]    KNEE SURGERY      acl,mcl,pcl    left knee x 2      PRK  Bilateral 2010    SUBCHONDROPLASTY Right 10/24/2023    Procedure: POSSIBLE SUBCHONDROPLASTY;  Surgeon: Crissy Bradford MD;  Location: [facility] OR;  Service: Orthopedics;  Laterality: Right;       Social History:  Social History[1]    Medications:  Medications Ordered Prior to Encounter[2]    Allergies:  Review of patient's allergies indicates:   Allergen Reactions    Influenza virus vaccine, specific Hives    Pioglitazone      Altered mood     Victoza [liraglutide] Nausea And Vomiting    Farxiga [dapagliflozin] Rash     Erectile dysfunction and rash        Active Problems:  Problem List[3]    Diagnostic Studies:   Latest Reference Range & Units 05/13/25 14:47   WBC 3.90 - 12.70 K/uL 9.09   RBC 4.60 - 6.20 M/uL 5.01   Hemoglobin 14.0 - 18.0 gm/dL 13.5 (L)   Hematocrit 40.0 - 54.0 % 43.1   MCV 82 - 98 fL 86   MCH 27.0 - 31.0 pg 26.9 (L)   MCHC 32.0 - 36.0 g/dL 31.3 (L)   RDW 11.5 - 14.5 % 14.9 (H)   Platelet Count 150 - 450 K/uL 322      Latest Reference Range  & Units 05/13/25 14:47   Sodium 136 - 145 mmol/L 140   Potassium 3.5 - 5.1 mmol/L 4.5   Chloride 95 - 110 mmol/L 105   CO2 23 - 29 mmol/L 23   Anion Gap 8 - 16 mmol/L 12   BUN 6 - 20 mg/dL 13   Creatinine 0.5 - 1.4 mg/dL 0.7   eGFR >60 mL/min/1.73/m2 >60   Glucose 70 - 110 mg/dL 93   Calcium 8.7 - 10.5 mg/dL 9.7     EKG (5/13/25):  Normal sinus rhythm with sinus arrhythmia     TTE (5/13/25):    Left Ventricle: The left ventricle is normal in size. There is mild concentric hypertrophy. There is normal systolic function with a visually estimated ejection fraction of 60 - 65%.    Right Ventricle: The right ventricle is normal in size Systolic function is normal.    Pulmonary Artery: The estimated pulmonary artery systolic pressure is 17 mmHg.    IVC/SVC: Normal venous pressure at 3 mmHg.    24 Hour Vitals:  Temp:  [36.7 °C (98 °F)] 36.7 °C (98 °F)  Pulse:  [92] 92  Resp:  [18] 18  SpO2:  [96 %] 96 %  BP: (135)/(79) 135/79   See Nursing Charting For Additional Vitals      Pre-op Assessment    I have reviewed the Patient Summary Reports.     I have reviewed the Nursing Notes. I have reviewed the NPO Status.   I have reviewed the Medications.     Review of Systems  Anesthesia Hx:  No problems with previous Anesthesia             Denies Family Hx of Anesthesia complications.     Social:  No Alcohol Use, Recreational Drugs marijuana       Hematology/Oncology:  Hematology Normal   Oncology Normal                                   EENT/Dental:  EENT/Dental Normal           Cardiovascular:  Exercise tolerance: good   Hypertension           hyperlipidemia   ECG has been reviewed.                            Pulmonary:      Shortness of breath  Sleep Apnea, CPAP SOB at random times since COVID, stable                Renal/:  Renal/ Normal                 Hepatic/GI:  Hepatic/GI Normal        Taking GLP-1 Agonists (have not taken in several months, will resume after surgery) Instructed to Hold for 7 Days No Reported GI  Symptoms          Musculoskeletal:  Arthritis   S/p R TKA 11/26/24  Infection of prosthetic joint            Neurological:      Headaches     H/o TBI                            Endocrine:  Diabetes Hypothyroidism        Obesity / BMI > 30  Dermatological:  Skin Normal    Psych:  Psychiatric History                  Physical Exam  General: Well nourished, Cooperative and Alert    Airway:  Mallampati: II   Mouth Opening: Normal  TM Distance: Normal  Tongue: Normal  Neck ROM: Normal ROM    Dental:  Intact    Chest/Lungs:  Normal Respiratory Rate    Heart:  Rate: Normal        Anesthesia Plan  Type of Anesthesia, risks & benefits discussed:    Anesthesia Type: Gen ETT, Spinal, CSE  Intra-op Monitoring Plan: Standard ASA Monitors  Post Op Pain Control Plan: peripheral nerve block  Induction:  IV  Informed Consent: Informed consent signed with the Patient and all parties understand the risks and agree with anesthesia plan.  All questions answered. Patient consented to blood products? Yes  ASA Score: 3  Day of Surgery Review of History & Physical: H&P Update referred to the surgeon/provider.    Ready For Surgery From Anesthesia Perspective.     .             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     Employer: [employer]   Tobacco Use    Smoking status: Former     Types: Cigars     Passive exposure: Never    Smokeless tobacco: Never    Tobacco comments:     Has not had any cigars this year   Vaping Use    Vaping status: Every Day    Substances: CBD    Devices: Disposable   Substance and Sexual Activity    Alcohol use: Not Currently     Comment: 1-2 beers every 2 weeks    Drug use: Yes     Types: Marijuana     Comment: Non-prescription cannabis    Sexual activity: Yes     Social Drivers of Health     Financial Resource Strain: Patient Declined (5/19/2025)    Overall Financial Resource  Strain (CARDIA)     Difficulty of Paying Living Expenses: Patient declined   Food Insecurity: Patient Declined (5/19/2025)    Hunger Vital Sign     Worried About Running Out of Food in the Last Year: Patient declined     Ran Out of Food in the Last Year: Patient declined   Transportation Needs: Patient Declined (5/19/2025)    PRAPARE - Transportation     Lack of Transportation (Medical): Patient declined     Lack of Transportation (Non-Medical): Patient declined   Physical Activity: Insufficiently Active (3/10/2025)    Exercise Vital Sign     Days of Exercise per Week: 2 days     Minutes of Exercise per Session: 20 min   Stress: Patient Declined (5/19/2025)    [place] [employer] - Occupational Stress Questionnaire     Feeling of Stress : Patient declined   Recent Concern: Stress - Stress Concern Present (3/10/2025)    [place] [employer] - Occupational Stress Questionnaire     Feeling of Stress : To some extent   Housing Stability: Patient Declined (5/19/2025)    Housing Stability Vital Sign     Unable to Pay for Housing in the Last Year: Patient declined     Homeless in the Last Year: Patient declined   [2]   No current facility-administered medications on file prior to encounter.     Current Outpatient Medications on File Prior to Encounter   Medication Sig Dispense Refill    acetaminophen (TYLENOL) 500 MG tablet Take 2 tablets (1,000 mg total) by mouth every 8 (eight) hours as needed for Pain. 42 tablet 0    ammonium lactate 12 % Crea Apply 1 application  topically once daily. 140 g 5    ARIPiprazole (ABILIFY) 2 MG Tab Take 1 tablet (2 mg total) by mouth once daily. 30 tablet 11    atorvastatin (LIPITOR) 40 MG tablet Take 1 tablet (40 mg total) by mouth once daily. 90 tablet 3    azelastine (ASTELIN) 137 mcg (0.1 %) nasal spray Use 1-2 sprays in each nostril twice daily 90 mL 3    benazepriL (LOTENSIN) 20 MG tablet Take 1 tablet (20 mg total) by mouth once daily. 90  "tablet 3    cyanocobalamin, vitamin B-12, 5,000 mcg Subl Place one under tongue once a day for 1 month, then continue to take under tongue per week 30 tablet 3    fluticasone propionate (FLONASE) 50 mcg/actuation nasal spray 1 spray (50 mcg total) by Each Nostril route once daily. 48 g 5    L-METHYLFOLATE 15 mg Tab TAKE 15 MG BY MOUTH ONCE DAILY. 30 tablet 11    lamoTRIgine (LAMICTAL) 100 MG tablet Take 1.5 tablets (150 mg total) by mouth once daily. 135 tablet 3    levothyroxine (SYNTHROID) 50 MCG tablet Take 1 tablet (50 mcg total) by mouth before breakfast. 90 tablet 1    loratadine (CLARITIN) 10 mg tablet Take 1 tablet (10 mg total) by mouth once daily. 90 tablet 3    mirtazapine (REMERON) 15 MG tablet Take 1 tablet (15 mg total) by mouth every evening. 30 tablet 11    QUEtiapine (SEROQUEL) 25 MG Tab Take 1 tablet (25 mg total) by mouth once daily in the evening 30 tablet 11    blood sugar diagnostic Strp To check BG 4 times daily, to use with insurance preferred meter 400 strip 3    blood sugar diagnostic Strp OneTouch Ultra Test strips      blood-glucose meter kit OneTouch Ultra2 Meter kit      blood-glucose sensor (DEXCOM G7 SENSOR) Filomena Change every 10 days 12 each 3    EPINEPHrine (EPIPEN 2-AYALA) 0.3 mg/0.3 mL AtIn Inject 0.3 mLs (0.3 mg total) into the muscle as needed (Anaphylaxis). 2 each 0    lancets Misc To check BG 4 times daily, to use with insurance preferred meter 400 each 3    pen needle, diabetic (BD ULTRA-FINE KEN PEN NEEDLE) 32 gauge x 5/32" Ndle 1 Device by Misc.(Non-Drug; Combo Route) route 5 (five) times daily. 550 each 4    syringe, disposable, 1 mL Syrg Testosterone every 2 weeks 25 Syringe 1   [3]   Patient Active Problem List  Diagnosis    Hyperlipidemia LDL goal <70    Insulin long-term use    Tinea pedis    Morbid obesity    Hypothyroidism    Type 2 diabetes mellitus with left eye affected by mild nonproliferative retinopathy without macular edema, with long-term current use of insulin "    Essential hypertension    Migraine with aura and without status migrainosus, not intractable propranolol SE angry; topamax not helpful; imitrex ineffective; daith ear piercing helps    Non-seasonal allergic rhinitis; avoid antihistamines per opthalmology    Acute bilateral low back pain without sciatica    NAINA (obstructive sleep apnea) 8/2019 sleep study [facility] 11    Low testosterone in male; pituitary axis normal. MRI negative. 11/2019 started on testosterone    Right foot pain    Decreased strength of lower extremity    Decreased range of motion of both ankles    Gait abnormality    Rib pain on left side    Dyspnea    Decreased strength, endurance, and mobility    Left hand pain    Mild neurocognitive disorder due to traumatic brain injury    Outbursts of anger    Other amnesia    Traumatic rotator cuff tear, right, initial encounter    S/P right rotator cuff repair    Biceps tendonosis of right shoulder    Decreased range of motion of right shoulder    Impaired strength of shoulder muscles    Allergic conjunctivitis    Cornea edema    Descemet's membrane fold    Herpes simplex keratitis    Uncontrolled type 2 diabetes mellitus    Tear of lateral meniscus of right knee, current    Refractive error    Acute migraine    MCI (mild cognitive impairment)    Medication overuse headache    Chronic migraine with aura, intractable, with status migrainosus    Agitation    Traumatic encephalopathy    Allergy desensitization therapy    Concussion with no loss of consciousness    Concussion with loss of consciousness    Other specified persistent mood disorders    Cognitive communication deficit    Impaired mobility and ADLs    Imbalance    Primary osteoarthritis of both knees    Bilateral chronic knee pain    Infection of prosthetic right knee joint    Prosthetic joint infection

## 2025-07-21 NOTE — DISCHARGE INSTRUCTIONS
YOUR PROCEDURE WILL BE AT [facility] at [address] [place] [address]                 Enter through the Main Entrance facing Marjorie Salazar.      Your procedure  is scheduled for __7/28/2025________.    Call [phone] between 2pm and 5pm on __7/25/2025_____to find out your arrival time for the day of surgery.    You may have up to two visitors.  No children under 18 years old.     You will be going to the Same Day Surgery Unit on the 2nd floor of the hospital.    Report to the Same Day Surgery Registration Desk in the hallway.(Just beside the Same Day Surgery Unit)                    DO NOT PARK IN THE GARAGE.  THERE IS NO OPEN ENTRANCE TO THE HOSPITAL BUILDING AND NO ELEVATOR.      Important instructions:  Do not eat anything after midnight.  You may have plain water, non carbonated.  You may also have Gatorade or Powerade(avoid red/blue) after midnight.    Stop all fluids 2 hours before your surgery.    It is okay to brush your teeth.  Do not have gum, candy or mints.    SEE MEDICATION SHEET.   TAKE MEDICATIONS AS DIRECTED.    Do not take any diabetic medication on the morning of surgery unless instructed to do so by your doctor or pre op nurse.    All GLP-1 weekly diabetic/weight loss medications must not be taken for one week before your surgery, or your surgery could be canceled.      STOP taking for 7 days before surgery:    Aspirin           Voltaren (Diclofenac)  Ibuprofen  (Advil, Motrin)                Indomethocin  Mobic (meloxicam, celebrex)      Etodolac   Aleve (naproxen)          Toradol (ketoralac)  Fish oil, Krill oil and Vitamin E  Headache Powders (BC Powder, Goody's Powder, Stanback)                           You may take Tylenol if needed which is not a blood thinner.    Please shower the night before and the morning of your surgery.      Follow any Prep Instructions given by your surgeon.               Use Chlorhexidine soap as instructed by your pre  op nurse.   Please place clean linens on your bed the night before surgery. Please wear fresh clean clothing after each shower.    Contact lenses and removable denture work may not be worn during your procedure.    You may wear deodorant only. If you are having breast surgery, do not wear deodorant on the operative side.    Do not wear powder, body lotion, perfume/cologne or make-up.    Do not wear any jewelry or have any metal on your body.    You will be asked to remove any dentures or partials for the procedure.    If you are going home on the same day of surgery, you must arrange for a family member or a friend to drive you home.  Public transportation is prohibited.  You will not be able to drive home if you were given anesthesia or sedation.    Patients who want to have their Post-op prescriptions filled from our in-house [facility], bring a Credit/Debit Card or cash with you. A co-pay may be required.  The pharmacy closes at 5:30 pm.    Wear loose fitting clothes allowing for bandages.    Please leave money and valuables home.      You may bring your cell phone.    Call the doctor if fever or illness should occur before your surgery.    Call [phone] to contact us here if needed.                            CLOTHES ON DAY OF SURGERY    SHOULDER surgery:  you must have a very oversized shirt.  Very, Very large.  You will probably have a large sling on with your arm strapped to your chest.  You will not be able to put the arm of the operated shoulder into a sleeve.  You can put the arm of the un-operated shoulder into the sleeve, but the shirt will need to be draped over the operated shoulder.       ARM or HAND surgery:  make sure that your sleeves are large and loose enough to pass over large dressings or cast.      BREAST or UNDERARM surgery:  wear a loose, button down shirt so that you can dress without raising your arms over your head.    ABDOMINAL surgery:  wear loose, comfortable clothing.  Nothing  tight around the abdomen.  NO JEANS    PENIS or SCROTAL surgery:  loose comfortable clothing.  Large sweat pants, pajama pants or a robe.  ABSOLUTELY NO JEANS      LEG or FOOT surgery:  wear large loose pants that are able to pass over any large dressings or casts.  You could also wear loose shorts or a skirt.

## 2025-07-23 ENCOUNTER — PATIENT MESSAGE (OUTPATIENT)
Dept: NEUROLOGY | Facility: CLINIC | Age: 45
End: 2025-07-23
Payer: COMMERCIAL

## 2025-07-25 ENCOUNTER — TELEPHONE (OUTPATIENT)
Dept: SURGERY | Facility: HOSPITAL | Age: 45
End: 2025-07-25
Payer: COMMERCIAL

## 2025-07-28 ENCOUNTER — ANESTHESIA (OUTPATIENT)
Dept: SURGERY | Facility: HOSPITAL | Age: 45
DRG: 468 | End: 2025-07-28
Payer: COMMERCIAL

## 2025-07-28 ENCOUNTER — HOSPITAL ENCOUNTER (INPATIENT)
Facility: HOSPITAL | Age: 45
LOS: 1 days | Discharge: HOME HEALTH | DRG: 468 | End: 2025-07-28
Attending: ORTHOPAEDIC SURGERY | Admitting: ORTHOPAEDIC SURGERY
Payer: COMMERCIAL

## 2025-07-28 VITALS
RESPIRATION RATE: 18 BRPM | SYSTOLIC BLOOD PRESSURE: 131 MMHG | OXYGEN SATURATION: 96 % | TEMPERATURE: 99 F | HEART RATE: 93 BPM | DIASTOLIC BLOOD PRESSURE: 71 MMHG | WEIGHT: 295 LBS | BODY MASS INDEX: 38.92 KG/M2

## 2025-07-28 DIAGNOSIS — T84.53XD INFECTION ASSOCIATED WITH INTERNAL RIGHT KNEE PROSTHESIS, SUBSEQUENT ENCOUNTER: Primary | ICD-10-CM

## 2025-07-28 DIAGNOSIS — T84.50XA INFECTION OF PROSTHETIC JOINT, INITIAL ENCOUNTER: ICD-10-CM

## 2025-07-28 DIAGNOSIS — T84.50XA PROSTHETIC JOINT INFECTION: ICD-10-CM

## 2025-07-28 LAB
POCT GLUCOSE: 133 MG/DL (ref 70–110)
POCT GLUCOSE: 134 MG/DL (ref 70–110)

## 2025-07-28 PROCEDURE — 97162 PT EVAL MOD COMPLEX 30 MIN: CPT

## 2025-07-28 PROCEDURE — 97535 SELF CARE MNGMENT TRAINING: CPT

## 2025-07-28 PROCEDURE — 63600175 PHARM REV CODE 636 W HCPCS: Performed by: ANESTHESIOLOGY

## 2025-07-28 PROCEDURE — 97530 THERAPEUTIC ACTIVITIES: CPT

## 2025-07-28 PROCEDURE — 87116 MYCOBACTERIA CULTURE: CPT | Performed by: ORTHOPAEDIC SURGERY

## 2025-07-28 PROCEDURE — 97165 OT EVAL LOW COMPLEX 30 MIN: CPT

## 2025-07-28 PROCEDURE — 71000016 HC POSTOP RECOV ADDL HR: Performed by: ORTHOPAEDIC SURGERY

## 2025-07-28 PROCEDURE — 88305 TISSUE EXAM BY PATHOLOGIST: CPT | Mod: 26,,, | Performed by: PATHOLOGY

## 2025-07-28 PROCEDURE — 25000003 PHARM REV CODE 250: Performed by: ORTHOPAEDIC SURGERY

## 2025-07-28 PROCEDURE — 36000711: Performed by: ORTHOPAEDIC SURGERY

## 2025-07-28 PROCEDURE — 87206 SMEAR FLUORESCENT/ACID STAI: CPT | Performed by: ORTHOPAEDIC SURGERY

## 2025-07-28 PROCEDURE — 71000039 HC RECOVERY, EACH ADD'L HOUR: Performed by: ORTHOPAEDIC SURGERY

## 2025-07-28 PROCEDURE — 63600175 PHARM REV CODE 636 W HCPCS: Performed by: STUDENT IN AN ORGANIZED HEALTH CARE EDUCATION/TRAINING PROGRAM

## 2025-07-28 PROCEDURE — 63600175 PHARM REV CODE 636 W HCPCS

## 2025-07-28 PROCEDURE — C1776 JOINT DEVICE (IMPLANTABLE): HCPCS | Performed by: ORTHOPAEDIC SURGERY

## 2025-07-28 PROCEDURE — 25000003 PHARM REV CODE 250

## 2025-07-28 PROCEDURE — 64447 NJX AA&/STRD FEMORAL NRV IMG: CPT | Performed by: STUDENT IN AN ORGANIZED HEALTH CARE EDUCATION/TRAINING PROGRAM

## 2025-07-28 PROCEDURE — 87075 CULTR BACTERIA EXCEPT BLOOD: CPT | Performed by: ORTHOPAEDIC SURGERY

## 2025-07-28 PROCEDURE — 27487 REVISE/REPLACE KNEE JOINT: CPT | Mod: 58,RT,, | Performed by: ORTHOPAEDIC SURGERY

## 2025-07-28 PROCEDURE — 71000033 HC RECOVERY, INTIAL HOUR: Performed by: ORTHOPAEDIC SURGERY

## 2025-07-28 PROCEDURE — 88305 TISSUE EXAM BY PATHOLOGIST: CPT | Mod: TC | Performed by: ORTHOPAEDIC SURGERY

## 2025-07-28 PROCEDURE — 71000015 HC POSTOP RECOV 1ST HR: Performed by: ORTHOPAEDIC SURGERY

## 2025-07-28 PROCEDURE — 27201423 OPTIME MED/SURG SUP & DEVICES STERILE SUPPLY: Performed by: ORTHOPAEDIC SURGERY

## 2025-07-28 PROCEDURE — 36000710: Performed by: ORTHOPAEDIC SURGERY

## 2025-07-28 PROCEDURE — 0SPC0EZ REMOVAL OF ARTICULATING SPACER FROM RIGHT KNEE JOINT, OPEN APPROACH: ICD-10-PCS | Performed by: ORTHOPAEDIC SURGERY

## 2025-07-28 PROCEDURE — 37000009 HC ANESTHESIA EA ADD 15 MINS: Performed by: ORTHOPAEDIC SURGERY

## 2025-07-28 PROCEDURE — 63600175 PHARM REV CODE 636 W HCPCS: Performed by: ORTHOPAEDIC SURGERY

## 2025-07-28 PROCEDURE — C1713 ANCHOR/SCREW BN/BN,TIS/BN: HCPCS | Performed by: ORTHOPAEDIC SURGERY

## 2025-07-28 PROCEDURE — 0SRC0J9 REPLACEMENT OF RIGHT KNEE JOINT WITH SYNTHETIC SUBSTITUTE, CEMENTED, OPEN APPROACH: ICD-10-PCS | Performed by: ORTHOPAEDIC SURGERY

## 2025-07-28 PROCEDURE — 87070 CULTURE OTHR SPECIMN AEROBIC: CPT | Performed by: ORTHOPAEDIC SURGERY

## 2025-07-28 PROCEDURE — 37000008 HC ANESTHESIA 1ST 15 MINUTES: Performed by: ORTHOPAEDIC SURGERY

## 2025-07-28 PROCEDURE — 12000002 HC ACUTE/MED SURGE SEMI-PRIVATE ROOM

## 2025-07-28 DEVICE — ASYMMETRIC PATELLA
Type: IMPLANTABLE DEVICE | Site: KNEE | Status: FUNCTIONAL
Brand: TRIATHLON

## 2025-07-28 DEVICE — POSTERIOR STABILIZED FEMORAL
Type: IMPLANTABLE DEVICE | Site: KNEE | Status: FUNCTIONAL
Brand: TRIATHLON

## 2025-07-28 DEVICE — TIBIAL BEARING INSERT - PS
Type: IMPLANTABLE DEVICE | Site: KNEE | Status: FUNCTIONAL
Brand: TRIATHLON

## 2025-07-28 DEVICE — UNIVERSAL TIBIAL BASEPLATE
Type: IMPLANTABLE DEVICE | Site: KNEE | Status: FUNCTIONAL
Brand: TRIATHLON

## 2025-07-28 DEVICE — SIMPLEX® HV WITH GENTAMICIN IS A FAST-SETTING ACRYLIC RESIN WITH ADDITION OF GENTAMICIN SULFATE FOR USE IN BONE SURGERY. MIXING THE TWO SEPARATE STERILE COMPONENTS PRODUCES A DUCTILE BONE CEMENT WHICH, AFTER HARDENING, FIXES THE IMPLANT AND TRANSFERS STRESSES PRODUCED DURING MOVEMENT EVENLY TO THE BONE. SIMPLEX® HV WITH GENTAMICINN CEMENT POWDER ALSO CONTAINS INSOLUBLE ZIRCONIUM DIOXIDE AS AN X-RAY CONTRAST MEDIUM. SIMPLEX® HV WITH GENTAMICIN DOES NOT EMIT A SIGNAL AND DOES NOT POSE A SAFETY RISK IN A MAGNETIC RESONANCE ENVIRONMENT.
Type: IMPLANTABLE DEVICE | Site: KNEE | Status: FUNCTIONAL
Brand: SIMPLEX HV WITH GENTAMICIN

## 2025-07-28 RX ORDER — ASPIRIN 81 MG/1
81 TABLET ORAL 2 TIMES DAILY
Status: DISCONTINUED | OUTPATIENT
Start: 2025-07-28 | End: 2025-07-28 | Stop reason: HOSPADM

## 2025-07-28 RX ORDER — MEPERIDINE HYDROCHLORIDE 25 MG/ML
12.5 INJECTION INTRAMUSCULAR; INTRAVENOUS; SUBCUTANEOUS EVERY 10 MIN PRN
Status: DISCONTINUED | OUTPATIENT
Start: 2025-07-28 | End: 2025-07-28 | Stop reason: HOSPADM

## 2025-07-28 RX ORDER — MIDAZOLAM HYDROCHLORIDE 1 MG/ML
INJECTION INTRAMUSCULAR; INTRAVENOUS
Status: DISCONTINUED | OUTPATIENT
Start: 2025-07-28 | End: 2025-07-28

## 2025-07-28 RX ORDER — HALOPERIDOL LACTATE 5 MG/ML
0.5 INJECTION, SOLUTION INTRAMUSCULAR
Status: DISCONTINUED | OUTPATIENT
Start: 2025-07-28 | End: 2025-07-28 | Stop reason: HOSPADM

## 2025-07-28 RX ORDER — LIDOCAINE HYDROCHLORIDE 10 MG/ML
1 INJECTION, SOLUTION EPIDURAL; INFILTRATION; INTRACAUDAL; PERINEURAL ONCE
Status: DISCONTINUED | OUTPATIENT
Start: 2025-07-28 | End: 2025-07-28 | Stop reason: HOSPADM

## 2025-07-28 RX ORDER — OXYCODONE HYDROCHLORIDE 5 MG/1
10 TABLET ORAL
Status: DISCONTINUED | OUTPATIENT
Start: 2025-07-28 | End: 2025-07-28 | Stop reason: HOSPADM

## 2025-07-28 RX ORDER — LIDOCAINE HYDROCHLORIDE 10 MG/ML
1 INJECTION, SOLUTION EPIDURAL; INFILTRATION; INTRACAUDAL; PERINEURAL
Status: DISCONTINUED | OUTPATIENT
Start: 2025-07-28 | End: 2025-07-28 | Stop reason: HOSPADM

## 2025-07-28 RX ORDER — SODIUM CHLORIDE 9 MG/ML
INJECTION, SOLUTION INTRAVENOUS CONTINUOUS
Status: DISCONTINUED | OUTPATIENT
Start: 2025-07-28 | End: 2025-07-28 | Stop reason: HOSPADM

## 2025-07-28 RX ORDER — TRANEXAMIC ACID 10 MG/ML
1000 INJECTION, SOLUTION INTRAVENOUS
Status: COMPLETED | OUTPATIENT
Start: 2025-07-28 | End: 2025-07-28

## 2025-07-28 RX ORDER — ROPIVACAINE HYDROCHLORIDE 5 MG/ML
INJECTION, SOLUTION EPIDURAL; INFILTRATION; PERINEURAL
Status: COMPLETED | OUTPATIENT
Start: 2025-07-28 | End: 2025-07-28

## 2025-07-28 RX ORDER — FENTANYL CITRATE 50 UG/ML
INJECTION, SOLUTION INTRAMUSCULAR; INTRAVENOUS
Status: DISCONTINUED | OUTPATIENT
Start: 2025-07-28 | End: 2025-07-28

## 2025-07-28 RX ORDER — NALOXONE HCL 0.4 MG/ML
0.02 VIAL (ML) INJECTION
Status: DISCONTINUED | OUTPATIENT
Start: 2025-07-28 | End: 2025-07-28 | Stop reason: HOSPADM

## 2025-07-28 RX ORDER — ROPIVACAINE/EPI/CLONIDINE/KET 2.46-0.005
SYRINGE (ML) INJECTION
Status: DISCONTINUED | OUTPATIENT
Start: 2025-07-28 | End: 2025-07-28 | Stop reason: HOSPADM

## 2025-07-28 RX ORDER — CEFAZOLIN 2 G/1
2 INJECTION, POWDER, FOR SOLUTION INTRAMUSCULAR; INTRAVENOUS
Status: COMPLETED | OUTPATIENT
Start: 2025-07-28 | End: 2025-07-28

## 2025-07-28 RX ORDER — PROPOFOL 10 MG/ML
VIAL (ML) INTRAVENOUS
Status: DISCONTINUED | OUTPATIENT
Start: 2025-07-28 | End: 2025-07-28

## 2025-07-28 RX ORDER — KETAMINE HYDROCHLORIDE 50 MG/ML
INJECTION, SOLUTION INTRAMUSCULAR; INTRAVENOUS
Status: DISCONTINUED | OUTPATIENT
Start: 2025-07-28 | End: 2025-07-28

## 2025-07-28 RX ORDER — PROCHLORPERAZINE EDISYLATE 5 MG/ML
5 INJECTION INTRAMUSCULAR; INTRAVENOUS EVERY 6 HOURS PRN
Status: DISCONTINUED | OUTPATIENT
Start: 2025-07-28 | End: 2025-07-28 | Stop reason: HOSPADM

## 2025-07-28 RX ORDER — DOCUSATE SODIUM 100 MG/1
100 CAPSULE, LIQUID FILLED ORAL 2 TIMES DAILY
Qty: 30 CAPSULE | Refills: 0 | Status: SHIPPED | OUTPATIENT
Start: 2025-07-28

## 2025-07-28 RX ORDER — DEXAMETHASONE SODIUM PHOSPHATE 4 MG/ML
INJECTION, SOLUTION INTRA-ARTICULAR; INTRALESIONAL; INTRAMUSCULAR; INTRAVENOUS; SOFT TISSUE
Status: DISCONTINUED | OUTPATIENT
Start: 2025-07-28 | End: 2025-07-28

## 2025-07-28 RX ORDER — CELECOXIB 100 MG/1
400 CAPSULE ORAL ONCE
Status: COMPLETED | OUTPATIENT
Start: 2025-07-28 | End: 2025-07-28

## 2025-07-28 RX ORDER — DIPHENHYDRAMINE HYDROCHLORIDE 50 MG/ML
25 INJECTION, SOLUTION INTRAMUSCULAR; INTRAVENOUS EVERY 6 HOURS PRN
Status: DISCONTINUED | OUTPATIENT
Start: 2025-07-28 | End: 2025-07-28 | Stop reason: HOSPADM

## 2025-07-28 RX ORDER — ROCURONIUM BROMIDE 10 MG/ML
INJECTION, SOLUTION INTRAVENOUS
Status: DISCONTINUED | OUTPATIENT
Start: 2025-07-28 | End: 2025-07-28

## 2025-07-28 RX ORDER — METHOCARBAMOL 500 MG/1
500 TABLET, FILM COATED ORAL 4 TIMES DAILY
Qty: 40 TABLET | Refills: 0 | Status: SHIPPED | OUTPATIENT
Start: 2025-07-28 | End: 2025-08-07

## 2025-07-28 RX ORDER — SODIUM CHLORIDE, SODIUM LACTATE, POTASSIUM CHLORIDE, CALCIUM CHLORIDE 600; 310; 30; 20 MG/100ML; MG/100ML; MG/100ML; MG/100ML
INJECTION, SOLUTION INTRAVENOUS CONTINUOUS
Status: DISCONTINUED | OUTPATIENT
Start: 2025-07-28 | End: 2025-07-28 | Stop reason: HOSPADM

## 2025-07-28 RX ORDER — HYDROMORPHONE HYDROCHLORIDE 2 MG/ML
0.2 INJECTION, SOLUTION INTRAMUSCULAR; INTRAVENOUS; SUBCUTANEOUS EVERY 5 MIN PRN
Status: DISCONTINUED | OUTPATIENT
Start: 2025-07-28 | End: 2025-07-28 | Stop reason: HOSPADM

## 2025-07-28 RX ORDER — ACETAMINOPHEN 500 MG
1000 TABLET ORAL EVERY 8 HOURS PRN
Qty: 42 TABLET | Refills: 0 | Status: SHIPPED | OUTPATIENT
Start: 2025-07-28

## 2025-07-28 RX ORDER — OXYCODONE HYDROCHLORIDE 5 MG/1
5 TABLET ORAL
Status: DISCONTINUED | OUTPATIENT
Start: 2025-07-28 | End: 2025-07-28 | Stop reason: HOSPADM

## 2025-07-28 RX ORDER — ONDANSETRON HYDROCHLORIDE 2 MG/ML
INJECTION, SOLUTION INTRAVENOUS
Status: DISCONTINUED | OUTPATIENT
Start: 2025-07-28 | End: 2025-07-28

## 2025-07-28 RX ORDER — SODIUM CHLORIDE 0.9 % (FLUSH) 0.9 %
10 SYRINGE (ML) INJECTION
Status: DISCONTINUED | OUTPATIENT
Start: 2025-07-28 | End: 2025-07-28 | Stop reason: HOSPADM

## 2025-07-28 RX ORDER — ONDANSETRON HYDROCHLORIDE 2 MG/ML
4 INJECTION, SOLUTION INTRAVENOUS EVERY 8 HOURS PRN
Status: DISCONTINUED | OUTPATIENT
Start: 2025-07-28 | End: 2025-07-28 | Stop reason: HOSPADM

## 2025-07-28 RX ORDER — PREGABALIN 75 MG/1
75 CAPSULE ORAL 2 TIMES DAILY
Qty: 28 CAPSULE | Refills: 0 | Status: SHIPPED | OUTPATIENT
Start: 2025-07-28 | End: 2025-08-11

## 2025-07-28 RX ORDER — POLYETHYLENE GLYCOL 3350 17 G/17G
17 POWDER, FOR SOLUTION ORAL DAILY
Status: DISCONTINUED | OUTPATIENT
Start: 2025-07-28 | End: 2025-07-28 | Stop reason: HOSPADM

## 2025-07-28 RX ORDER — SUCCINYLCHOLINE CHLORIDE 20 MG/ML
INJECTION INTRAMUSCULAR; INTRAVENOUS
Status: DISCONTINUED | OUTPATIENT
Start: 2025-07-28 | End: 2025-07-28

## 2025-07-28 RX ORDER — ACETAMINOPHEN 500 MG
1000 TABLET ORAL EVERY 6 HOURS
Status: DISCONTINUED | OUTPATIENT
Start: 2025-07-28 | End: 2025-07-28 | Stop reason: HOSPADM

## 2025-07-28 RX ORDER — CEFAZOLIN 2 G/1
2 INJECTION, POWDER, FOR SOLUTION INTRAMUSCULAR; INTRAVENOUS
Status: DISCONTINUED | OUTPATIENT
Start: 2025-07-28 | End: 2025-07-28 | Stop reason: HOSPADM

## 2025-07-28 RX ORDER — OXYCODONE HYDROCHLORIDE 5 MG/1
5-10 TABLET ORAL EVERY 4 HOURS PRN
Qty: 40 TABLET | Refills: 0 | Status: SHIPPED | OUTPATIENT
Start: 2025-07-28

## 2025-07-28 RX ORDER — DOXYCYCLINE 100 MG/1
100 CAPSULE ORAL EVERY 12 HOURS
Qty: 90 CAPSULE | Refills: 0 | Status: SHIPPED | OUTPATIENT
Start: 2025-07-28 | End: 2025-09-11

## 2025-07-28 RX ORDER — FAMOTIDINE 20 MG/1
20 TABLET, FILM COATED ORAL 2 TIMES DAILY
Status: DISCONTINUED | OUTPATIENT
Start: 2025-07-28 | End: 2025-07-28 | Stop reason: HOSPADM

## 2025-07-28 RX ORDER — ACETAMINOPHEN 500 MG
1000 TABLET ORAL
Status: DISCONTINUED | OUTPATIENT
Start: 2025-07-28 | End: 2025-07-28 | Stop reason: HOSPADM

## 2025-07-28 RX ORDER — HYDROMORPHONE HYDROCHLORIDE 2 MG/ML
INJECTION, SOLUTION INTRAMUSCULAR; INTRAVENOUS; SUBCUTANEOUS
Status: DISCONTINUED | OUTPATIENT
Start: 2025-07-28 | End: 2025-07-28

## 2025-07-28 RX ORDER — MUPIROCIN 20 MG/G
1 OINTMENT TOPICAL
Status: COMPLETED | OUTPATIENT
Start: 2025-07-28 | End: 2025-07-28

## 2025-07-28 RX ORDER — METHOCARBAMOL 750 MG/1
750 TABLET, FILM COATED ORAL 3 TIMES DAILY
Status: DISCONTINUED | OUTPATIENT
Start: 2025-07-28 | End: 2025-07-28 | Stop reason: HOSPADM

## 2025-07-28 RX ORDER — SODIUM CHLORIDE 9 MG/ML
INJECTION, SOLUTION INTRAVENOUS
Status: DISCONTINUED | OUTPATIENT
Start: 2025-07-28 | End: 2025-07-28 | Stop reason: HOSPADM

## 2025-07-28 RX ORDER — AMOXICILLIN 250 MG
1 CAPSULE ORAL 2 TIMES DAILY
Status: DISCONTINUED | OUTPATIENT
Start: 2025-07-28 | End: 2025-07-28 | Stop reason: HOSPADM

## 2025-07-28 RX ORDER — LIDOCAINE HYDROCHLORIDE 20 MG/ML
INJECTION INTRAVENOUS
Status: DISCONTINUED | OUTPATIENT
Start: 2025-07-28 | End: 2025-07-28

## 2025-07-28 RX ORDER — ASPIRIN 81 MG/1
81 TABLET ORAL 2 TIMES DAILY
Qty: 60 TABLET | Refills: 0 | Status: SHIPPED | OUTPATIENT
Start: 2025-07-28 | End: 2025-08-27

## 2025-07-28 RX ORDER — CELECOXIB 200 MG/1
200 CAPSULE ORAL 2 TIMES DAILY
Qty: 14 CAPSULE | Refills: 0 | Status: SHIPPED | OUTPATIENT
Start: 2025-07-28

## 2025-07-28 RX ORDER — ACETAMINOPHEN 500 MG
1000 TABLET ORAL
Status: COMPLETED | OUTPATIENT
Start: 2025-07-28 | End: 2025-07-28

## 2025-07-28 RX ADMIN — SUCCINYLCHOLINE CHLORIDE 140 MG: 20 INJECTION, SOLUTION INTRAMUSCULAR; INTRAVENOUS at 10:07

## 2025-07-28 RX ADMIN — CELECOXIB 400 MG: 100 CAPSULE ORAL at 07:07

## 2025-07-28 RX ADMIN — PROPOFOL 20 MG: 10 INJECTION, EMULSION INTRAVENOUS at 12:07

## 2025-07-28 RX ADMIN — ASPIRIN 81 MG: 81 TABLET, COATED ORAL at 02:07

## 2025-07-28 RX ADMIN — SODIUM CHLORIDE, SODIUM LACTATE, POTASSIUM CHLORIDE, AND CALCIUM CHLORIDE: .6; .31; .03; .02 INJECTION, SOLUTION INTRAVENOUS at 09:07

## 2025-07-28 RX ADMIN — SENNOSIDES AND DOCUSATE SODIUM 1 TABLET: 50; 8.6 TABLET ORAL at 02:07

## 2025-07-28 RX ADMIN — ONDANSETRON 4 MG: 2 INJECTION, SOLUTION INTRAMUSCULAR; INTRAVENOUS at 12:07

## 2025-07-28 RX ADMIN — ACETAMINOPHEN 1000 MG: 500 TABLET ORAL at 07:07

## 2025-07-28 RX ADMIN — ROCURONIUM BROMIDE 10 MG: 10 INJECTION INTRAVENOUS at 11:07

## 2025-07-28 RX ADMIN — FENTANYL CITRATE 100 MCG: 50 INJECTION, SOLUTION INTRAMUSCULAR; INTRAVENOUS at 09:07

## 2025-07-28 RX ADMIN — KETAMINE HYDROCHLORIDE 20 MG: 50 INJECTION, SOLUTION, CONCENTRATE INTRAMUSCULAR; INTRAVENOUS at 10:07

## 2025-07-28 RX ADMIN — HYDROMORPHONE HYDROCHLORIDE 0.4 MG: 2 INJECTION, SOLUTION INTRAMUSCULAR; INTRAVENOUS; SUBCUTANEOUS at 12:07

## 2025-07-28 RX ADMIN — GLYCOPYRROLATE 0.2 MG: 0.2 INJECTION, SOLUTION INTRAMUSCULAR; INTRAVITREAL at 10:07

## 2025-07-28 RX ADMIN — HYDROMORPHONE HYDROCHLORIDE 0.2 MG: 2 INJECTION, SOLUTION INTRAMUSCULAR; INTRAVENOUS; SUBCUTANEOUS at 12:07

## 2025-07-28 RX ADMIN — POLYETHYLENE GLYCOL 3350 17 G: 17 POWDER, FOR SOLUTION ORAL at 02:07

## 2025-07-28 RX ADMIN — ACETAMINOPHEN 1000 MG: 500 TABLET ORAL at 02:07

## 2025-07-28 RX ADMIN — TRANEXAMIC ACID 1000 MG: 10 INJECTION, SOLUTION INTRAVENOUS at 10:07

## 2025-07-28 RX ADMIN — PREGABALIN 75 MG: 25 CAPSULE ORAL at 07:07

## 2025-07-28 RX ADMIN — MUPIROCIN 1 G: 20 OINTMENT TOPICAL at 07:07

## 2025-07-28 RX ADMIN — FENTANYL CITRATE 100 MCG: 50 INJECTION, SOLUTION INTRAMUSCULAR; INTRAVENOUS at 10:07

## 2025-07-28 RX ADMIN — SUGAMMADEX 300 MG: 100 INJECTION, SOLUTION INTRAVENOUS at 12:07

## 2025-07-28 RX ADMIN — PROPOFOL 200 MG: 10 INJECTION, EMULSION INTRAVENOUS at 10:07

## 2025-07-28 RX ADMIN — FAMOTIDINE 20 MG: 20 TABLET, FILM COATED ORAL at 02:07

## 2025-07-28 RX ADMIN — CEFAZOLIN 3 G: 2 INJECTION, POWDER, FOR SOLUTION INTRAMUSCULAR; INTRAVENOUS at 10:07

## 2025-07-28 RX ADMIN — KETAMINE HYDROCHLORIDE 30 MG: 50 INJECTION, SOLUTION, CONCENTRATE INTRAMUSCULAR; INTRAVENOUS at 10:07

## 2025-07-28 RX ADMIN — ROCURONIUM BROMIDE 45 MG: 10 INJECTION INTRAVENOUS at 10:07

## 2025-07-28 RX ADMIN — ROPIVACAINE HYDROCHLORIDE 30 ML: 5 INJECTION, SOLUTION EPIDURAL; INFILTRATION; PERINEURAL at 09:07

## 2025-07-28 RX ADMIN — HYDROMORPHONE HYDROCHLORIDE 0.6 MG: 2 INJECTION, SOLUTION INTRAMUSCULAR; INTRAVENOUS; SUBCUTANEOUS at 12:07

## 2025-07-28 RX ADMIN — HALOPERIDOL LACTATE 0.5 MG: 5 INJECTION, SOLUTION INTRAMUSCULAR at 04:07

## 2025-07-28 RX ADMIN — METHOCARBAMOL 750 MG: 750 TABLET ORAL at 02:07

## 2025-07-28 RX ADMIN — DEXAMETHASONE SODIUM PHOSPHATE 4 MG: 4 INJECTION, SOLUTION INTRAMUSCULAR; INTRAVENOUS at 10:07

## 2025-07-28 RX ADMIN — SODIUM CHLORIDE, POTASSIUM CHLORIDE, SODIUM LACTATE AND CALCIUM CHLORIDE: 600; 310; 30; 20 INJECTION, SOLUTION INTRAVENOUS at 08:07

## 2025-07-28 RX ADMIN — TRANEXAMIC ACID 1000 MG: 10 INJECTION, SOLUTION INTRAVENOUS at 12:07

## 2025-07-28 RX ADMIN — LIDOCAINE HYDROCHLORIDE 60 MG: 20 INJECTION, SOLUTION INTRAVENOUS at 10:07

## 2025-07-28 RX ADMIN — ROCURONIUM BROMIDE 5 MG: 10 INJECTION INTRAVENOUS at 10:07

## 2025-07-28 RX ADMIN — ROCURONIUM BROMIDE 20 MG: 10 INJECTION INTRAVENOUS at 10:07

## 2025-07-28 RX ADMIN — PROPOFOL 50 MG: 10 INJECTION, EMULSION INTRAVENOUS at 12:07

## 2025-07-28 RX ADMIN — ONDANSETRON 4 MG: 2 INJECTION INTRAMUSCULAR; INTRAVENOUS at 02:07

## 2025-07-28 RX ADMIN — ROCURONIUM BROMIDE 20 MG: 10 INJECTION INTRAVENOUS at 12:07

## 2025-07-28 RX ADMIN — SODIUM CHLORIDE, SODIUM LACTATE, POTASSIUM CHLORIDE, AND CALCIUM CHLORIDE: .6; .31; .03; .02 INJECTION, SOLUTION INTRAVENOUS at 12:07

## 2025-07-28 RX ADMIN — MIDAZOLAM HYDROCHLORIDE 2 MG: 1 INJECTION INTRAMUSCULAR; INTRAVENOUS at 09:07

## 2025-07-28 NOTE — ANESTHESIA PROCEDURE NOTES
Intubation    Date/Time: 7/28/2025 10:06 AM    Performed by: Gonzales Day CRNA  Authorized by: Maxim Gonsalez II, MD    Intubation:     Induction:  Intravenous    Intubated:  Postinduction    Mask Ventilation:  Easy mask    Attempts:  1    Attempted By:  CRNA    Method of Intubation:  Video laryngoscopy    Blade:  Chua 4    Laryngeal View Grade: Grade I - full view of cords      Difficult Airway Encountered?: No      Complications:  None    Airway Device:  Oral endotracheal tube    Airway Device Size:  7.5    Style/Cuff Inflation:  Cuffed (inflated to minimal occlusive pressure)    Tube secured:  22    Secured at:  The lips    Placement Verified By:  Capnometry    Complicating Factors:  Obesity and beard    Findings Post-Intubation:  BS equal bilateral and atraumatic/condition of teeth unchanged

## 2025-07-28 NOTE — H&P
EST PATIENT ORTHOPAEDIC: Knee    PRIMARY CARE PHYSICIAN: Jovany Ford MD   REFERRING PROVIDER: Antonio Rolon MD  [address] [place] [address]  KATARINA Harrington [id number]     ASSESSMENT & PLAN:    Impression:  Right Knee PJI  Left knee severe osteoarthritis  Left knee history of ACL reconstruction    Follow Up Plan: 3 weeks after surgery  HPI:     44 y.o. male status post right total Knee Primary completed on 11/18/24 and status post right knee explant and articulating spacer placement for PJI on 5/19/25.  Patient culture had concern for staph epidermidis in broth only otherwise culture negative infection.  Has infectious disease follow-up scheduled for 7/2/25.  At that time he was done with completion of antibiotics he had PICC line removed.  Three days prior to the PICC line being removed he had a bump in his CRP but otherwise had normalized prior to that date.  I discussed this with ID we are going to repeat inflammatory markers.  Otherwise he feels like he has been doing well.  In wheelchair today. Has been in a T scope brace limited to flexion to 90°.      We will continue with current treatment plan.  Tentative replant date for July 28th.  His repeat inflammatory markers suggested a normal CRP which was previously elevated and a slightly elevated ESR.  This is all being off antibiotics for the last 2 weeks.  As a result I recommended reaspiration.  About 15 cc of joint fluid was obtained we will send for cell counts.       We had a lengthy discussion regarding the risk and benefit of surgery, the alternatives, limitations and personnel involved. These included but were not limited to infection, persistent pain, instability, nerve injury, blood clots, dislocation, loosening, leg length inequality and medical complications. We also discussed the pre-operative course, surgery itself and rehabilitation.    Patricia-operative blood management and transfusion issues were discussed, and options clearly outlined. The patient has  consented to the use of the banked allogenic blood if medically necessary.    Final treatment will depend on his aspiration results.  If not concern for infectious etiology we will proceed with 2nd stage.  If concern for ongoing infection need to have a further discussion with ID regarding treatment and whether or not I and D versus repeat explant and changing of antibiotics would be necessary.  Can consider intraop frozen sections if inconclusive.    To review:  Prior to explant, he had elevated inflammatory markers at a CRP of 48 and an ESR of 28.  I also had x-rays obtained which demonstrated some lateral patellar tilt and tracking to his knee compared to films obtained from months prior. I obtained an MRI which had concerning findings for osteomyelitis and infection of his joint.  I sent him for ultrasound-guided aspiration which was sent for Synovasure.  This came back with alpha defense of positive, synovial CRP positive, significantly elevated white blood cell count, NGTD on cultures.     The patient has been ordered:  None    CONSULTS:   Anesthesia for optimization    ACTIVE PROBLEM LIST  Patient Active Problem List   Diagnosis    Hyperlipidemia LDL goal <70    Insulin long-term use    Tinea pedis    Morbid obesity    Hypothyroidism    Type 2 diabetes mellitus 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 MRI negative. 11/2019 started on testosterone    Right foot pain    Decreased strength of lower extremity    Decreased  range of motion of both ankles    Gait abnormality    Rib pain on left side    Dyspnea    Decreased strength, endurance, and mobility    Left hand pain    Mild neurocognitive disorder due to traumatic brain injury    Outbursts of anger    Other amnesia    Traumatic rotator cuff tear, right, initial encounter    S/P right rotator cuff repair    Biceps tendonosis of right shoulder    Decreased range of motion of right shoulder    Impaired strength of shoulder muscles    Allergic conjunctivitis    Cornea edema    Descemet's membrane fold    Herpes simplex keratitis    Uncontrolled type 2 diabetes mellitus    Tear of lateral meniscus of right knee, current    Refractive error    Acute migraine    MCI (mild cognitive impairment)    Medication overuse headache    Chronic migraine with aura, intractable, with status migrainosus    Agitation    Traumatic encephalopathy    Allergy desensitization therapy    Concussion with no loss of consciousness    Concussion with loss of consciousness    Other specified persistent mood disorders    Cognitive communication deficit    Impaired mobility and ADLs    Imbalance    Primary osteoarthritis of both knees    Bilateral chronic knee pain    Infection of prosthetic right knee joint    Prosthetic joint infection           SUBJECTIVE    CHIEF COMPLAINT: Knee Pain    HPI:     44 y.o. male status post right total Knee Primary completed on 11/18/24 and status post right knee explant and articulating spacer placement for PJI on 5/19/25.  Patient culture had concern for staph epidermidis in broth only otherwise culture negative infection.  Has infectious disease follow-up scheduled for 7/2/25.  At that time he was done with completion of antibiotics he had PICC line removed.  Three days prior to the PICC line being removed he had a bump in his CRP but otherwise had normalized prior to that date.  I discussed this with ID we are going to repeat inflammatory markers.  Otherwise he feels like he  has been doing well.  In wheelchair today. Has been in a T scope brace limited to flexion to 90°.      We will continue with current treatment plan.  Tentative replant date for July 28th.  His repeat inflammatory markers suggested a normal CRP which was previously elevated and a slightly elevated ESR.  This is all being off antibiotics for the last 2 weeks.  As a result I recommended reaspiration.  About 15 cc of joint fluid was obtained we will send for cell counts.       To review:  Prior to explant, he had elevated inflammatory markers at a CRP of 48 and an ESR of 28.  I also had x-rays obtained which demonstrated some lateral patellar tilt and tracking to his knee compared to films obtained from months prior. I obtained an MRI which had concerning findings for osteomyelitis and infection of his joint.  I sent him for ultrasound-guided aspiration which was sent for Synovasure.  This came back with alpha defense of positive, synovial CRP positive, significantly elevated white blood cell count, NGTD on cultures.     PROGRESSIVE SYMPTOMS:  Pain impacting work  Pain worsened by weight bearing  Pain effecting living situation    FUNCTIONAL STATUS:   Climb a flight of stairs or walk up a hill     PREVIOUS TREATMENTS:  Medical: OTC NSAIDS, RX NSAIDS, Steroid Injections, Biologic Injections, Narcotics, and Tylenol  Physical Therapy: Activities Modified  and Formal Physical Therapy for 6 weeks  Previous Orthopaedic Surgery: Left Knee ACL Reconstruction x3, Right Shoulder RCR with Dr. Bradford    REVIEW OF SYSTEMS:  PAIN ASSESSMENT:  See HPI.  MUSCULOSKELETAL: See HPI.  OTHER 10 point review of systems is negative except as stated in HPI above    PAST MEDICAL HISTORY   has a past medical history of Diabetes mellitus, type 2, Hyperlipidemia, Hypertension, Obesity, and NAINA (obstructive sleep apnea).     PAST SURGICAL HISTORY   has a past surgical history that includes left knee x 2; Knee surgery; Arthroscopic repair of rotator  cuff of shoulder (Right, 03/14/2023); arthroscopy,shoulder,with biceps tenodesis (03/14/2023); Arthroscopic debridement of shoulder (03/14/2023); Knee arthroscopy w/ meniscectomy (Right, 10/24/2023); Subchondroplasty (Right, 10/24/2023); PRK  (Bilateral, 2010); Arthroscopic repair of rotator cuff of shoulder (Right, 7/23/2024); Arthroscopic debridement of shoulder (7/23/2024); arthroplasty, knee, total, using computer-assisted navigation (Right, 11/18/2024); and irrigation and debridement, knee, with antibiotic beads or antibiotic spacer insertion (Right, 5/19/2025).     FAMILY HISTORY  family history includes Autism in his son; Diabetes in his father, maternal grandmother, and mother; No Known Problems in his brother, daughter, maternal aunt, maternal grandfather, maternal uncle, paternal aunt, paternal grandfather, paternal grandmother, paternal uncle, and another family member.     SOCIAL HISTORY   reports that he has quit smoking. His smoking use included cigars. He has never been exposed to tobacco smoke. He has never used smokeless tobacco. He reports that he does not currently use alcohol. He reports current drug use. Drug: Marijuana.     ALLERGIES   Review of patient's allergies indicates:   Allergen Reactions    Influenza virus vaccine, specific Hives    Pioglitazone      Altered mood     Victoza [liraglutide] Nausea And Vomiting    Farxiga [dapagliflozin] Rash     Erectile dysfunction and rash         MEDICATIONS  No current facility-administered medications on file prior to encounter.     Current Outpatient Medications on File Prior to Encounter   Medication Sig Dispense Refill    acetaminophen (TYLENOL) 500 MG tablet Take 2 tablets (1,000 mg total) by mouth every 8 (eight) hours as needed for Pain. 42 tablet 0    ammonium lactate 12 % Crea Apply 1 application  topically once daily. 140 g 5    ARIPiprazole (ABILIFY) 2 MG Tab Take 1 tablet (2 mg total) by mouth once daily. 30 tablet 11    atorvastatin  "(LIPITOR) 40 MG tablet Take 1 tablet (40 mg total) by mouth once daily. 90 tablet 3    azelastine (ASTELIN) 137 mcg (0.1 %) nasal spray Use 1-2 sprays in each nostril twice daily 90 mL 3    benazepriL (LOTENSIN) 20 MG tablet Take 1 tablet (20 mg total) by mouth once daily. 90 tablet 3    blood sugar diagnostic Strp To check BG 4 times daily, to use with insurance preferred meter 400 strip 3    blood sugar diagnostic Strp OneTouch Ultra Test strips      blood-glucose meter kit OneTouch Ultra2 Meter kit      blood-glucose sensor (DEXCOM G7 SENSOR) Filomena Change every 10 days 12 each 3    cyanocobalamin, vitamin B-12, 5,000 mcg Subl Place one under tongue once a day for 1 month, then continue to take under tongue per week 30 tablet 3    EPINEPHrine (EPIPEN 2-AYALA) 0.3 mg/0.3 mL AtIn Inject 0.3 mLs (0.3 mg total) into the muscle as needed (Anaphylaxis). 2 each 0    fluticasone propionate (FLONASE) 50 mcg/actuation nasal spray 1 spray (50 mcg total) by Each Nostril route once daily. 48 g 5    L-METHYLFOLATE 15 mg Tab TAKE 15 MG BY MOUTH ONCE DAILY. 30 tablet 11    lamoTRIgine (LAMICTAL) 100 MG tablet Take 1.5 tablets (150 mg total) by mouth once daily. 135 tablet 3    lancets Misc To check BG 4 times daily, to use with insurance preferred meter 400 each 3    levothyroxine (SYNTHROID) 50 MCG tablet Take 1 tablet (50 mcg total) by mouth before breakfast. 90 tablet 1    loratadine (CLARITIN) 10 mg tablet Take 1 tablet (10 mg total) by mouth once daily. 90 tablet 3    mirtazapine (REMERON) 15 MG tablet Take 1 tablet (15 mg total) by mouth every evening. 30 tablet 11    pen needle, diabetic (BD ULTRA-FINE KEN PEN NEEDLE) 32 gauge x 5/32" Ndle 1 Device by Misc.(Non-Drug; Combo Route) route 5 (five) times daily. 550 each 4    QUEtiapine (SEROQUEL) 25 MG Tab Take 1 tablet (25 mg total) by mouth once daily in the evening 30 tablet 11    syringe, disposable, 1 mL Syrg Testosterone every 2 weeks 25 Syringe 1          PHYSICAL EXAM   " weight is 133.8 kg (295 lb).   Body mass index is 38.92 kg/m².      All other systems deferred.  GENERAL:  No acute distress  HABITUS: Obese  GAIT: Antalgic  SKIN: Normal   CV: RRR  Non labored breathing     KNEE EXAM:    right:   Effusion: Moderate joint effusion  TTP: Yes over Medial and lateral Joint Line   Yes crepitus with passive knee ROM  Passive ROM: Extension 0, Flexion 90  No pain with manipulation of patella  Stable to varus/valgus stress. No increased laxity to anterior/posterior drawer testing  No pain with IR/ER rotation of the hip  5/5 strength in knee flexion and extension, ankle plantarflexion and dorsiflexion  Neurovascular Status: Sensation intact to light touch in Sural, Saphenous, SPN, DPN, Tibial nerve distribution  2+ pulse DP/PT, normal capillary refill, foot has normal warmth    DATA:  Diagnostic tests reviewed for today's visit:     4v of the left knee reveal Moderate degenerative changes of the Lateral and Patellofemoral compartment. There is evidence of advanced osteoarthritis changes with Osteophyte formation and Joint space narrowing. The limb is in netural alignment. The patella is tracking midline.    Repeat 3v of right knee radiographs demonstrate well-seated well-positioned components with a lateral subluxation of his patella compared to previous films    MRI report and images reviewed with findings concerning for periprosthetic septic arthritis and osteomyelitis

## 2025-07-28 NOTE — OP NOTE
OPERATIVE NOTE     PATIENT NAME: Tony Gordillo   MRN: [id number]      Surgery Date:7/28/25  Surgeon(s) and Assistant(s): Antonio Rolon MD. Fany Han CFA     Procedure(s): right knee removal of antibiotic spacer and replant with revision total knee arthroplasty      Anesthesia:  General     Attestation:   I was present for all of the critical portions of the operation and performed all critical portions.  The medial assistant performed the superficial closure under supervision, and assisted during the operation. I was immediately available for the duration of the entire case.      Preoperative Diagnosis:  Right knee periprosthetic joint infection     Postoperative Diagnosis: Same     Findings:  See Full Operative Report    Complications: None     EBL: 75cc     Implants:   Implant Name Type Inv. Item Serial No.  Lot No. LRB No. Used Action   PIN BONE [id number] - [id number]  PIN BONE [id number]  [employer].  Right 2 Implanted and Explanted   PIN BONE 4 X 140MM STERILE - [id number]  PIN BONE 4 X 140MM STERILE  [employer]  Right 2 Implanted and Explanted   CEMENT BONE W/GENT SIMPLEX HV - [id number]  CEMENT BONE W/GENT SIMPLEX HV  [employer]. [id number] Right 2 Implanted   BASEPLATE TIBIAL TRIATHLON SZ6 - [id number]  BASEPLATE TIBIAL TRIATHLON SZ6  [employer]. [id number] Right 1 Implanted   COMP FEM POST STAB BRIDGER SZ 5 RT - [id number]  COMP FEM POST STAB BRIDGER SZ 5 RT  [employer]. [id number] Right 1 Implanted   PATELLA TRI 38X11 X3 POLYETHYL - [id number]  PATELLA TRI 38X11 X3 POLYETHYL  [employer]. [id number] Right 1 Implanted   TIBIAL POST STAB SZ 6 9X3 POLY - [id number]  TIBIAL POST STAB SZ 6 9X3 POLY  [employer]. 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     Relevant Medications    oxyCODONE (ROXICODONE) 5 MG immediate release tablet    pregabalin (LYRICA) 75 MG capsule    Other Relevant Orders    Vital signs    Perform Warner Agitation Sedation Scale (RASS)    Insert peripheral IV    FOOT COMPRESSION PUMP    Place foot compression device    Document RASS    Chlorhexidine (CHG) 2% Wipes    Ortho Pathway Patient    Notify Physician - Potential Need of Opioid Reversal    Notify Anesthesiologist if patient on home insulin pump    Vital signs - notify MD    Diet NPO    Monitor EtCO2    Oxygen Continuous    Activity as tolerated    Keep surgical extremity elevated when not ambulating (Completed)    Lifting restrictions    Weight bearing as tolerated    No driving, operating heavy equipment or signing legal documents while taking pain medication    Call MD for:  temperature >100.4    Call MD for:  persistent nausea and vomiting    Call MD for:  severe uncontrolled pain    Call MD for:  difficulty breathing, headache or visual disturbances    Call MD for:  redness, tenderness, or signs of infection (pain, swelling, redness, odor or green/yellow discharge around incision site)    Call MD for:  hives    Call MD for:  persistent dizziness or light-headedness    Call MD for:  extreme fatigue    Admit to Inpatient    Prosthetic joint infection    Relevant Orders    Culture, Anaerobic    Aerobic culture    AFB Culture & Smear    Specimen to Pathology        OPERATIVE INDICATIONS:     44 y.o. male status post right total Knee Primary completed on 11/18/24. Six months out from his right total knee, he is on a cane for ambulation still but attributes this to both his right knee ongoing issues as well as his left knee pain.  He has been working hard with outpatient therapy.  He has regained full extension and has good motion to about 115° today.  But he has had intermittent episodes where his knees become swollen on him and he has had some difficulty with his range of motion and it is  regression on his right. I thought it is necessary to obtain repeat radiographs, get ESR and CRP to ensure no underlying infectious process.  He had elevated inflammatory markers at a CRP of 48 and an ESR of 28.  I also had x-rays obtained which demonstrated some lateral patellar tilt and tracking to his knee compared to films obtained from months prior.  I sent him for ultrasound-guided aspiration which was sent for Synovasure.  This came back with alpha defense of positive, synovial CRP positive, significantly elevated white blood cell count, NGTD on cultures.  So he underwent explant and dynamic spacer placement on 5/19/25. He did well post operatively, completed 6 weeks of antibiotics via picc line. After a 2 weeks antibiotic holiday, we repeated ESR/CRP which was mildly elevated, discussed case with ID and repeated aspiration with no concerns for ongoing infection based on cell counts, and cultures. So we had plan for definitive revision knee replacement today.      Knee revision, benefits, and potential complications were outlined in detail.  These included but are not limited to fracture, infection, vascular injury, nerve injury, instability, patellar dislocation, and need for banked bone grafting. Understanding of all topics was conveyed to me by the patient pre-operatively, and patient consent was given to proceed with the planned revision knee arthroplasty to stabilize the joint.    CLINICAL EVIDENCE OF INFECTION: None    OPERATIVE PROCEDURE:   The patient was identified and brought into the Operating Room by the anesthesia and nursing teams. The lower extremity and surgical site were marked prior to moving into the operating room. Intravenous antibiotic prophylaxis dosing was confirmed and allergies checked. General anesthesia was successfully performed, and appropriate IV access acquired. The patient was carefully re-positioned supine on the operating room table. The leg kothari was placed. All pressure  points were checked and carefully padded.  Sequential compression stockings were applied to the non-operative lower extremity.  A high tourniquet was applied to the upper thigh. Tranexamic acid was used during the procedure and at the time of closure for blood conservation.    A surgical time-out was performed immediately preceding the incision with all personnel in the operating room; the patient identity was again confirmed, the right knee-surgical site and extremity were identified and confirmed, X-rays were reviewed, and availability of the appropriate surgical equipment was established.    The knee was exsanguinated and exposed through the previous skin incision. This was extended proximally and distally for exposure. Dissection was carried down through skin and subcutaneous tissue with care taken to preserve well vascularized skin flaps medially and laterally. A median parapatellar arthrotomy was made from the open capsule rent proximally and distally to enter the knee space. Fluid samples were sent for culture. The patella was dislocated laterally. A provisional freehand patellar cut was made to help with mobilization. The synovium was thickened and hypertrophic. A near complete synovectomy was performed for exposure, and the medial and lateral gutters were cleared of scar and synovial reflections. Tissue samples were also sent for culture.       Attention was initially turned to the femoral component.  A Ranawat bent Hoeman retractor was placed laterally at Gerdy's tubercle, and Z-retractors were placed to expose and protect the collateral ligaments. Osteotomes were used to carefully break up the implant-cement/bone interfaces on all flat surfaces circumferentially. Using manual dis-impaction techniques that employed the implant was carefully removed from the distal femur. The femoral component was removed without much iatrogenic bone loss. The epicondyles remained intact and attached, but anterior bone was  deficient but did not perforate through the anterior cortex     We then moved on to removal of the tibial tray. Retractors were placed medially and laterally to protect the collaterals, and a Mejia retractor was placed posterior-central in the intra-condylar notch. The tibia was then subluxed anteriorly for wide exposure.  Exposure along the joint line was broadened to further protect the extensor mechanism and tibial tubercle.  The tibial implant was freed and removed cement/fibrous tissue filling the interfaces and keeping it in place. Multiple osteotomes were used to free the implant. This was done carefully so the soft tibial plateau did not fracture. There was some mild central bone loss but lateral and medial aspects were well maintained.      Tibial instrumentation and reconstruction followed. The medullary cavity of the tibia was opened up with entry . Using the reamer as an intra-medullary alignment guide, the proximal tibial cutting block was set-up perpendicular to the tibial axis with neutral to slight posterior slope. The proximal tibial osteotomy was made at a level to achieve a flat surface for the new implant with minimal resection of supportive host bone. Once the cut was completed the reamer was removed. A trial tibial template was sized on the plateau, and proper varus-valgus alignment confirmed. There was good trial template coverage obtained over host bone, and the trial template was rotated appropriately with its mid-point at the junction of the middle one-third and lateral two-thirds of the tibial tubercle and anterior tibial crest.      Tibial trial template coverage was adequate at the cortical rim, and there was no significant central metaphyseal cancellous bone void, so I did not elect to use a metaphyseal cone. The tibial trial template was fixed in position with headed pins. The keel punch was impacted down to assure final implant fit. Universal tibial base-plate trial and  extension stem trial.     We then turned our attention to the femoral component reconstruction.  Z-retractors were replaced carefully. There was no significant bone loss. His index surgery was done robotically so I knew the gaps and cuts would be balanced. So a CR trail femur was placed and there was no parts that would require augmentation. He had good existing bone as well. So I did not feel increased constraint would be needed with stems.  The central box cut was made, the guide was removed, and the central box was cleared using a sagittal saw and rongeurs for the final PS stabilized implant. The trial was advanced until contact was made with the distal femur. AP offset was checked, as well as rotational mobility of the guide. The flexion gap was initially assessed. The gaps were symmetric with the trial externally rotated. With the the knee into full extension, there was no significant hyperextension and I had good varus-valgus constraint.  The joint line position was assessed from the medial epicondyles.  The patella was prepped, paying attention to place it medial to help with the tracking. The knee was then bent to 90 degrees and the flexion gap again checked. This construct also allowed for full flexion to 120 degrees without significant medial-lateral laxity or AP translation. Tracking of the patella resulted in some lateral subluxation which was present on pre-op films. This was corrected with a lateral release and towel clips recreating the tension on the extensor mechanism in flexion. At that point the entire knee trial was excellent; range of motion was at least 0-125 degrees.  No instability.       All trials were then removed. The knee space was further irrigated with pulsatile sterile saline. The posterior recesses of the knee and areas of known bleeding were treated. The cut bone surfaces were further irrigated, suctioned, and dried.  Polymethylmethacrylate (PMMA) bone cement, and the final implants  were cemented into place using the standard cementing technique to retrograde fill the canal. The sequence for cementing was tibia, followed by the femur. A cement gun was used to uniformly fill the canals. The implants were fully seated by impaction. Excess cement was removed with curettes and freers. The knee cement was compressed using a PS constrained poly  and the knee was held in extension until the cement cured.     The tourniquet was released and no excessive bleeding was encountered in the knee space. All visible synovial bleeding was further treated. The wound was again irrigated. The final total stabilized PS polyethylene was impacted into place. Bleeding was minimal at that point, and drains were not necessary. The entire construct was checked for stability through the range of motion. There were no rotational issues with the stabilized polyethylene insert in place. There was no mechanical clicking or catching.     The medial capsule was then re-approximated to the repaired tendon with a barbed Stratfix suture was used to close the arthrotomy into anatomic position. This was in done in an imbridicated way to attempt to avoid lateral subluxation of the patella. The deep and superficial subcutaneous tissue was irrigated and carefully closed in multiple layers with deep interrupted 2-0 Vicryl suture more superficially.  The skin was closed with monocryl and dermabond. The patient was then transferred from the operating room table to a hospital bed and taken to recovery in stable condition.     POSTOPERATIVE PLANS:      WEIGHT BEARING- weight bearing as tolerated     BRACING-  None.     PHYSICAL THERAPY-     Range of motion Active ROM only. No PASSIVE for 2 week.      ANTIBIOTICS-The patient will stay on antibiotics of doxycycline for 6 weeks at discharge.      DISPO: Home with Home Health

## 2025-07-28 NOTE — DISCHARGE SUMMARY
[facility] [place]  Short Stay    Procedure(s) (LRB):  REVISION, ARTHROPLASTY, TOTAL KNEE (Right)      OUTCOME: Patient tolerated treatment/procedure well without complication and is now ready for discharge.    DISPOSITION: [facility] [place]    FINAL DIAGNOSIS:  Prosthetic joint infection    FOLLOWUP: In clinic    DISCHARGE INSTRUCTIONS:    Discharge Procedure Orders   Activity as tolerated     Keep surgical extremity elevated when not ambulating     Lifting restrictions   Order Comments: No strenuous exercise or lifting of > 10 lbs     Weight bearing as tolerated     No driving, operating heavy equipment or signing legal documents while taking pain medication     Call MD for:  temperature >100.4     Call MD for:  persistent nausea and vomiting     Call MD for:  severe uncontrolled pain     Call MD for:  difficulty breathing, headache or visual disturbances     Call MD for:  redness, tenderness, or signs of infection (pain, swelling, redness, odor or green/yellow discharge around incision site)     Call MD for:  hives     Call MD for:  persistent dizziness or light-headedness     Call MD for:  extreme fatigue        TIME SPENT ON DISCHARGE: 20 minutes

## 2025-07-28 NOTE — PT/OT/SLP EVAL
"Occupational Therapy   Evaluation    Name: Tony Gordillo  MRN: [id number]  Admitting Diagnosis: Prosthetic joint infection  Recent Surgery: Procedure(s) (LRB):  REVISION, ARTHROPLASTY, TOTAL KNEE (Right) * Day of Surgery *    Recommendations:     Discharge Recommendations: Low Intensity Therapy  Discharge Equipment Recommendations:  to be determined by next level of care  Barriers to discharge:   (nausea, lightheadedness)    Assessment:     Tony Gordillo is a 44 y.o. male with a medical diagnosis of Prosthetic joint infection.  He presents with The primary encounter diagnosis was Infection associated with internal right knee prosthesis, subsequent encounter. Diagnoses of Prosthetic joint infection and Infection of prosthetic joint, initial encounter were also pertinent to this visit. . Performance deficits affecting function: weakness, impaired self care skills, impaired functional mobility, impaired balance, decreased lower extremity function, impaired cardiopulmonary response to activity.    OT initial eval completed. Pt performing functional mobility with CGA and RW; lower body ADLs with Max A. Pt performance limited by nausea. Recommend LIT once medically stable.  Rehab Prognosis: Good; patient would benefit from acute skilled OT services to address these deficits and reach maximum level of function.       Plan:     Patient to be seen 3 x/week to address the above listed problems via self-care/home management, therapeutic activities, therapeutic exercises  Plan of Care Expires: 08/04/25  Plan of Care Reviewed with: patient    Subjective     Chief Complaint: nausea  Patient/Family Comments/goals: pt agreeable for eval; reporting he is "trying to wake up"    Occupational Profile:  Living Environment: Lives with spouse, SSH, 1STE  Previous level of function: Mod I with RW and W/C for functional mobility; assist PRN from family 2/2 pain  Roles and Routines: drives  Equipment Used at Home: walker, rolling, " wheelchair  Assistance upon Discharge: family    Pain/Comfort:  Pain Rating 1: 3/10 (R knee)  Pain Addressed 1: Pre-medicate for activity, Reposition, Cessation of Activity, Nurse notified  Pain Rating Post-Intervention 1: 3/10    Patients cultural, spiritual, Gnosticism conflicts given the current situation: no    Objective:     Communicated with: PACU RN, reporting pt is ready to be seen by therapy, prior to session.  Patient found HOB elevated with blood pressure cuff, telemetry, SCD, pulse ox (continuous), peripheral IV upon OT entry to room.    General Precautions: Standard, fall  Orthopedic Precautions: RLE weight bearing as tolerated (Per Dr. Rolon, R knee AROM only, no PROM for 2 wks, no forced FLX > 90*)  Braces: N/A  Respiratory Status: Room air    Occupational Performance:    1st visit  Bed Mobility:    Patient completed Scooting/Bridging with stand by assistance  Patient completed Supine to Sit with stand by assistance    Functional Mobility/Transfers:  Patient completed Sit <> Stand Transfer with Min Ax2  with  rolling walker  attempted; pt terminated prior to full, upright stand 2/2 nausea and lightheadedness. BP monitored and grossly stable; increasing <10 SBP with upright posture  Functional Mobility: Pt tolerated sitting eob with SBA; no LOB.    Activities of Daily Living:  Upper Body Dressing: stand by assistance with setup to don gown as robe  Lower Body Dressing: maximal assistance to don socks    Cognitive/Visual Perceptual:  Cognitive/Psychosocial Skills:     -       Oriented to: Person, Place, Time, and Situation   -       Follows Commands/attention:Follows two-step commands  -       Communication: clear/fluent  -       Memory: No Deficits noted  -       Safety awareness/insight to disability: impaired   -       Mood/Affect/Coping skills/emotional control: lethargic  Visual/Perceptual:      -Intact  acuity      Physical Exam:  Balance:    -       Good sit; stand unable to be tested  Upper  Extremity Range of Motion:     -       Right Upper Extremity: WFL  -       Left Upper Extremity: WFL  Upper Extremity Strength:    -       Right Upper Extremity: WFL  -       Left Upper Extremity: WFL   Strength:    -       Right Upper Extremity: WFL  -       Left Upper Extremity: WFL  Gross motor coordination:   WFL    AMPAC 6 Click ADL:  AMPAC Total Score: 17    Treatment & Education:  Patient educated on role of OT/ POC development. Co-eval with PT to maximize function and safety. Occupational profile developed via interview. Patient guided to transition eob for assessment. Initiated ADL / functional mobility training as above with instruction on precautions prior to mobility, safe t/f techniques, polar ice setup and use. Educated patient on importance of out of bed mobility, movement/repositioning throughout the day, and call button use. Answered questions within scope, and all needs met.     Patient left HOB elevated with all lines intact, call button in reach, RN notified, and RN present    2nd visit:   PACU RN reporting pt is feeling better and more awake; is ready for OT eval.   Sup>sit eob SBA.  Vitals monitored; stable with no adverse s/s.  Donned gown SBA with setup.   Re-ed on safe t/f techniques and precautions.   Sit>stand CGAx2 with RW.  Mobility with RW and CGA to bathroom.  Toilet t/f CGA with RW. Edu on positioning of RW and grab bars. Pt voided in standing with SBA.  CGA stand>sit for seated rest break on toilet. Pt reporting return of nausea. Chair brought, and RN notified. Pt ambulated to chair with CGA, RW. Rolled back to  bedside where pt t/fed CGA to eob with RW. Sit>sup with SBA via leg lift technique.  Pt left HOB elevated with all lines intact, call button in reach, RN notified and present    GOALS:   Multidisciplinary Problems       Occupational Therapy Goals          Problem: Occupational Therapy    Goal Priority Disciplines Outcome Interventions   Occupational Therapy Goal     OT,  PT/OT Progressing    Description: Goals to be met by: 8/4/2025     Patient will increase functional independence with ADLs by performing:    LE Dressing with Modified [place].  Grooming while standing at sink with Modified [place].  Toileting from toilet with Modified [place] for hygiene and clothing management.   Step transfer with Modified [place]  Toilet transfer to toilet with Modified [place].                         History:     Past Medical History:   Diagnosis Date    Diabetes mellitus, type 2     Hyperlipidemia     Hypertension     Obesity     NAINA (obstructive sleep apnea)          Past Surgical History:   Procedure Laterality Date    ARTHROPLASTY, KNEE, TOTAL, USING COMPUTER-ASSISTED NAVIGATION Right 11/18/2024    Procedure: ARTHROPLASTY, KNEE, TOTAL, USING COMPUTER-ASSISTED NAVIGATION;  Surgeon: Antonio Rolon MD;  Location: [facility] [place] OR;  Service: Orthopedics;  Laterality: Right;  [place]. Same Day  Blue Silverio and 1st Venecia Kaitlin Notified-[place]  -----CLEARED BY PCP  RN PREOP 11/6/2024 ---TYPE&SCREEN --done---BMI--40.97--- HAS INSULIN PUMP    ARTHROSCOPIC DEBRIDEMENT OF SHOULDER  03/14/2023    Procedure: DEBRIDEMENT, SHOULDER, ARTHROSCOPIC;  Surgeon: Crissy Bradford MD;  Location: [facility] [place] OR;  Service: Orthopedics;;    ARTHROSCOPIC DEBRIDEMENT OF SHOULDER  7/23/2024    Procedure: DEBRIDEMENT, SHOULDER, ARTHROSCOPIC;  Surgeon: Crissy Bradford MD;  Location: [facility] [place] OR;  Service: Orthopedics;;    ARTHROSCOPIC REPAIR OF ROTATOR CUFF OF SHOULDER Right 03/14/2023    Procedure: REPAIR, ROTATOR CUFF, ARTHROSCOPIC;  Surgeon: Crissy Bradford MD;  Location: [facility] [place] OR;  Service: Orthopedics;  Laterality: Right;  [facility] [employer] [facility] [phone]. TEXTED ELMER ON 3/9/2023 @ 12:10PM. ELMER RESPONDED ON 3/9/23 @ 12:23PM-SAG  RN PREOP 3/10/2023---CLEARED BY PCP AND CARDS    ARTHROSCOPIC REPAIR OF ROTATOR CUFF OF SHOULDER Right 7/23/2024    Procedure: REPAIR, ROTATOR CUFF, ARTHROSCOPIC;   Surgeon: Crissy Bradford MD;  Location: [facility] OR;  Service: Orthopedics;  Laterality: Right;  [facility] [employer] ELMER [phone], NURYS [phone]  CLEARED BY PCP  RN PREOP 6/18/2024    ARTHROSCOPY,SHOULDER,WITH BICEPS TENODESIS  03/14/2023    Procedure: ARTHROSCOPY,SHOULDER,WITH BICEPS TENODESIS;  Surgeon: Crissy Bradford MD;  Location: [facility] OR;  Service: Orthopedics;;    IRRIGATION AND DEBRIDEMENT, KNEE, WITH ANTIBIOTIC BEADS OR ANTIBIOTIC SPACER INSERTION Right 5/19/2025    Procedure: IRRIGATION AND DEBRIDEMENT, KNEE, WITH ANTIBIOTIC BEADS OR ANTIBIOTIC SPACER INSERTION;  Surgeon: Antonio Rolon MD;  Location: [facility] OR;  Service: Orthopedics;  Laterality: Right;  [facility]. Antony Hardy Notified- [place]    RN PRE OP 5/13/2025---NEED CONSENT    KNEE ARTHROSCOPY W/ MENISCECTOMY Right 10/24/2023    Procedure: ARTHROSCOPY, KNEE, WITH MENISCECTOMY;  Surgeon: Crissy Bradford MD;  Location: [facility] OR;  Service: Orthopedics;  Laterality: Right;  RN PREOP 10/18/203---CLEARED BY PCP  ELVIA [phone]    KNEE SURGERY      acl,mcl,pcl    left knee x 2      PRK  Bilateral 2010    SUBCHONDROPLASTY Right 10/24/2023    Procedure: POSSIBLE SUBCHONDROPLASTY;  Surgeon: Crissy Bradford MD;  Location: [facility] OR;  Service: Orthopedics;  Laterality: Right;       Time Tracking:     OT Date of Treatment: 07/28/25  1st visit  OT Start Time: 1440  OT Stop Time: 1501  OT Total Time (min): 21 min (1 eval- 11 min, 1 TA- 8 min) total time; PT coeval  2nd visit  [id number] (15 min- 1 SC, 8 min- 1 TA) total time; PT cotx    Billable Minutes:Evaluation 11  Self Care/Home Management 15  Therapeutic Activity 16    7/28/2025

## 2025-07-28 NOTE — PLAN OF CARE
Discharge Orders: Home [place]         Expected Discharge Date: POD1    Diagnoses:  Post-op revision knee replacement    Patient is homebound due to:   Pt requires home health services due to taxing effort to leave the home as a result of immobility from Post-op knee replacement    Weight Bearing Status:   full weight bearing: Progress to cane as able.  Set up for outpatient PT as soon as able after 2 weeks, once patient is MOD I with cane.    Physical Therapy:   3 times a week for 2 weeks (Call for further orders beyond 2 weeks)  - Ambulate with a rolling walker  - Progress to cane  - Instruct on ROM and strengthening of knee  - NO PASSIVE (forced) FLEXION FOR 2 WEEKS    Aide to provide assistance with personal care and  ADLs 2 times a week for 2 weeks    Wound Care:   Surgical dressing change: Starting on  post op day 7-10, dressing can be moved. Incision is glued. Protective dressing should be placed with island dressing or apply gauze and cover with tegaderm.  Pt may shower if surgical dressing is waterproof, once removed on POD7 can shower but protect surgical dressing from getting wet by using press and seal saranwrap or garbage bag. Removal and replacement of dressing after shower only needed if incision is suspected to have gotten wet during shower. No soaking in the tub or hot tub use for 6 weeks.    Cold therapy/Ice: Encouraged at least 20 minutes 2-3 times daily or more if desired.  Incision must be kept waterproof while icing.  Wear TEDS Bilateral Thigh High Stockings for 3 weeks. Dre hose x 3 weeks. Ok to remove dre hose 1-2 hours/day max if desired.       Contact:  Please contact [phone] with concerns.         BLOOD THINNER:    If sent home on Lovenox: 28 days post-op for TKR/OLGA  If sent home home on ASA: 81mg   BID x 4 weeks   If on Plavix, ok to stop hospital prescribed blood thinner and restart Plavix on POD5 after surgery    Once finished with prescribed blood thinner, patients can return to  pre-surgical ASA dosage if they took ASA before surgery.       Outpatient Therapy: [facility] is preference (to begin 2 weeks post op)  [address] [facility]  [place] [address]      DME:  - Per PT/OT recommendation

## 2025-07-28 NOTE — PT/OT/SLP EVAL
Physical Therapy Evaluation    Patient Name:  Tony Gordillo   MRN:  [id number]    Recommendations:     Discharge Recommendations: Low Intensity Therapy   Discharge Equipment Recommendations: none   Barriers to discharge: None    Assessment:     Tony Gordillo is a 44 y.o. male admitted with a medical diagnosis of Prosthetic joint infection.  He presents with the following impairments/functional limitations: weakness, impaired endurance, impaired self care skills, impaired functional mobility, impaired balance.    1st attempt Eval: Pt able to sit EOB with supervision assist by hooking L+ LE OOB in order to get R+ LE OOB without assist. Did require verbal cues for technique. Initially upon sitting EOB, pt BP was taken and WNL. However, after approx 3-4 min seated EOB in preparation for standing and walking, he began to feel ill. Stating that it is hard for him to wake up. He needed constant verbal cues to open eyes and hold head straight forward. He then became nauseated. Sat another few min, attempted standing after being administered meds for nausea. He stood senior care with min A and needed to sit back down. He stated that he was feeling to ill to participate. Sit to supine with min A. Nursing and pt notified that we would return 30-40 min to re-attempts.     2nd attempt 3:44pm -4:11 pm : Pt able to sit EOB without verbal cues using technique used in earlier session. Able to sit EOB with increased s/s and stated he was prepared to walk to the bathroom. Pt stood with CGA to RW. Ambulated 150 ft to restroom, RW, verbal cues for sequencing and weight bearing in UE to assist R+ LE WB. Pt ambulates with a step to gait pattern, and resulting in an overall decreased lee. Pt made it to restroom and began to get nauseated.The nausea persisted throughout toileting. As a result, pt had to be placed in rolling chair and PT pushed pt back to bed , pt able to assist minimally by suing L +LE. However, he was dry heaving and  vomiting. Chair placed 90 deg angle to bed, pt able to stand CGA , RW, and transfer using stand pivot with SBA. Overall, his mobility is adequate for being supervision to mod I . His primary limiting factor remains nausea from surgery    Rehab Prognosis: Good; patient would benefit from acute skilled PT services to address these deficits and reach maximum level of function.    Recent Surgery: Procedure(s) (LRB):  REVISION, ARTHROPLASTY, TOTAL KNEE (Right) * Day of Surgery *    Plan:     During this hospitalization, patient to be seen  (NA) to address the identified rehab impairments via therapeutic activities and progress toward the following goals:    Plan of Care Expires:  07/28/25    Subjective     Chief Complaint: Pt reports continued nausea   Patient/Family Comments/goals: return home   Pain/Comfort:  Pain Rating 1: 2/10  Location - Side 1: Right  Location 1: knee  Pain Addressed 1: Pre-medicate for activity    Patients cultural, spiritual, Baptism conflicts given the current situation: no    Living Environment:  Lives at home with family, 1 JHON   Prior to admission, patients level of function was modified independent with B+ axillary crutches.  Equipment used at home: wheelchair, walker, rolling.  DME owned (not currently used): rolling walker and wheelchair.  Upon discharge, patient will have assistance from wife and son.    Objective:     Communicated with nursing prior to session.  Patient found HOB elevated with telemetry, blood pressure cuff, peripheral IV, pulse ox (continuous)  upon PT entry to room.    General Precautions: Standard, fall  Orthopedic Precautions:RLE weight bearing as tolerated (no agressive passive knee flexion > 90 deg)   Braces: N/A  Respiratory Status: Room air    Exams:  Cognitive Exam:  Patient is oriented to Person, Place, Time, and Situation  Gross Motor Coordination:  WFL  Postural Exam:  Patient presented with the following abnormalities:    -       Rounded shoulders  -        Forward head  Sensation:    -       Intact  RLE ROM: WFL except knee flexion  RLE Strength: Grossly 3+/5  LLE ROM: WFL  LLE Strength: WFL    Functional Mobility:  Bed Mobility:     Rolling Left:  supervision  Scooting: supervision  Bridging: supervision  Supine to Sit: supervision  Sit to Supine: supervision  Transfers:     Sit to Stand:  stand by assistance and contact guard assistance with rolling walker  Gait: 150 ft, RW, CGA to SBA   Balance: Static Sit and Stand Good       AM-PAC 6 CLICK MOBILITY  Total Score:20       Treatment & Education:  Eval  TA 1:1 x 8 min - first session  TA 1:1 x 23 min - second Tx session    Patient left HOB elevated with all lines intact, call button in reach, and nursing notified.    GOALS:   Multidisciplinary Problems       Physical Therapy Goals       Not on file              Multidisciplinary Problems (Resolved)          Problem: Physical Therapy    Goal Priority Disciplines Outcome Interventions   Physical Therapy Goal   (Resolved)     PT, PT/OT Met    Description: PT Eval and Discharge                        DME Justifications:  No DME recommended requiring DME justifications    History:     Past Medical History:   Diagnosis Date    Diabetes mellitus, type 2     Hyperlipidemia     Hypertension     Obesity     NAINA (obstructive sleep apnea)        Past Surgical History:   Procedure Laterality Date    ARTHROPLASTY, KNEE, TOTAL, USING COMPUTER-ASSISTED NAVIGATION Right 11/18/2024    Procedure: ARTHROPLASTY, KNEE, TOTAL, USING COMPUTER-ASSISTED NAVIGATION;  Surgeon: Antonio Rolon MD;  Location: [facility] OR;  Service: Orthopedics;  Laterality: Right;  [place]. Same Day  Blue Silverio and 1st [place] Kaitlin Notified-[place]  -----CLEARED BY PCP  RN PREOP 11/6/2024 ---TYPE&SCREEN --done---BMI--40.97--- HAS INSULIN PUMP    ARTHROSCOPIC DEBRIDEMENT OF SHOULDER  03/14/2023    Procedure: DEBRIDEMENT, SHOULDER, ARTHROSCOPIC;  Surgeon: Crissy Bradford MD;  Location: [facility] OR;  Service:  Orthopedics;;    ARTHROSCOPIC DEBRIDEMENT OF SHOULDER  7/23/2024    Procedure: DEBRIDEMENT, SHOULDER, ARTHROSCOPIC;  Surgeon: Crissy Bradford MD;  Location: [facility] OR;  Service: Orthopedics;;    ARTHROSCOPIC REPAIR OF ROTATOR CUFF OF SHOULDER Right 03/14/2023    Procedure: REPAIR, ROTATOR CUFF, ARTHROSCOPIC;  Surgeon: Crissy Bradford MD;  Location: [facility] OR;  Service: Orthopedics;  Laterality: Right;  [facility] EL[facility] [phone]. TEXTED ELMER ON 3/9/2023 @ 12:10PM. ELMER RESPONDED ON 3/9/23 @ 12:23PM-SAG  RN PREOP 3/10/2023---CLEARED BY PCP AND CARDS    ARTHROSCOPIC REPAIR OF ROTATOR CUFF OF SHOULDER Right 7/23/2024    Procedure: REPAIR, ROTATOR CUFF, ARTHROSCOPIC;  Surgeon: Crissy Bradford MD;  Location: [facility] OR;  Service: Orthopedics;  Laterality: Right;  [facility] [facility] [phone], NURYS [phone]  CLEARED BY PCP  RN PREOP 6/18/2024    ARTHROSCOPY,SHOULDER,WITH BICEPS TENODESIS  03/14/2023    Procedure: ARTHROSCOPY,SHOULDER,WITH BICEPS TENODESIS;  Surgeon: Crissy Bradford MD;  Location: [facility] OR;  Service: Orthopedics;;    IRRIGATION AND DEBRIDEMENT, KNEE, WITH ANTIBIOTIC BEADS OR ANTIBIOTIC SPACER INSERTION Right 5/19/2025    Procedure: IRRIGATION AND DEBRIDEMENT, KNEE, WITH ANTIBIOTIC BEADS OR ANTIBIOTIC SPACER INSERTION;  Surgeon: Antonio Rolon MD;  Location: [facility] OR;  Service: Orthopedics;  Laterality: Right;  [facility]. Antony Vinson- [place]    RN PRE OP 5/13/2025---NEED CONSENT    KNEE ARTHROSCOPY W/ MENISCECTOMY Right 10/24/2023    Procedure: ARTHROSCOPY, KNEE, WITH MENISCECTOMY;  Surgeon: Crissy Bradford MD;  Location: [facility] OR;  Service: Orthopedics;  Laterality: Right;  RN PREOP 10/18/203---CLEARED BY PCP  ELVIA [phone]    KNEE SURGERY      acl,mcl,pcl    left knee x 2      PRK  Bilateral 2010    SUBCHONDROPLASTY Right 10/24/2023    Procedure: POSSIBLE SUBCHONDROPLASTY;  Surgeon: Crissy Bradford MD;  Location: [facility] OR;  Service:  Orthopedics;  Laterality: Right;       Time Tracking:     PT Received On:    PT Start Time: 1439     PT Stop Time: 1500  PT Total Time (min): 21 min     Billable Minutes: Evaluation 1 and Therapeutic Activity 3      07/28/2025

## 2025-07-28 NOTE — ANESTHESIA PROCEDURE NOTES
Peripheral Block    Patient location during procedure: pre-op   Block not for primary anesthetic.  Reason for block: at surgeon's request and post-op pain management   Post-op Pain Location: osteoarthritis of right knee   Start time: 7/28/2025 9:46 AM  Timeout: 7/28/2025 9:46 AM   End time: 7/28/2025 9:48 AM    Staffing  Authorizing Provider: Ovi Noland MD  Performing Provider: Ovi Noland MD    Staffing  Performed by: Ovi Noland MD  Authorized by: Ovi Noland MD    Preanesthetic Checklist  Completed: patient identified, IV checked, site marked, risks and benefits discussed, surgical consent, monitors and equipment checked, pre-op evaluation and timeout performed  Peripheral Block  Patient position: supine  Prep: ChloraPrep  Patient monitoring: heart rate, cardiac monitor, continuous pulse ox, continuous capnometry and frequent blood pressure checks  Block type: adductor canal  Laterality: right  Injection technique: single shot  Needle  Needle type: Echogenic   Needle gauge: 20 G  Needle length: 4 in  Needle localization: anatomical landmarks and ultrasound guidance   -ultrasound image captured on disc.  Assessment  Injection assessment: negative aspiration, negative parasthesia and local visualized surrounding nerve  Paresthesia pain: none  Heart rate change: no  Slow fractionated injection: yes  Pain Tolerance: comfortable throughout block  Medications:    Medications: ropivacaine (NAROPIN) injection 0.5% - Perineural   30 mL - 7/28/2025 9:48:00 AM    Additional Notes  VSS.  [facility] RN monitoring vitals throughout procedure.  Patient tolerated procedure well.

## 2025-07-28 NOTE — OR NURSING
Patient connected to cardiac monitor.  Dr. Bowen at bedside for time out and to begin procedure.    0953-Tolerated well, no complications VSS

## 2025-07-28 NOTE — PLAN OF CARE
Problem: Occupational Therapy  Goal: Occupational Therapy Goal  Description: Goals to be met by: 8/4/2025     Patient will increase functional independence with ADLs by performing:    LE Dressing with Modified [place].  Grooming while standing at sink with Modified [place].  Toileting from toilet with Modified [place] for hygiene and clothing management.   Step transfer with Modified [place]  Toilet transfer to toilet with Modified [place].    Outcome: Progressing     OT initial eval completed. Pt performing functional mobility with CGA and RW; lower body ADLs with Max A. Pt performance limited by nausea. Recommend LIT once medically stable.

## 2025-07-28 NOTE — ANESTHESIA POSTPROCEDURE EVALUATION
Anesthesia Post Evaluation    Patient: Tony Gordillo    Procedure(s) Performed: Procedure(s) (LRB):  REVISION, ARTHROPLASTY, TOTAL KNEE (Right)    Final Anesthesia Type: general      Patient location during evaluation: PACU  Patient participation: Yes- Able to Participate  Level of consciousness: awake and alert  Post-procedure vital signs: reviewed and stable  Pain management: adequate  Airway patency: patent    PONV status at discharge: No PONV  Anesthetic complications: no      Respiratory status: spontaneous ventilation and room air  Hydration status: euvolemic  Follow-up not needed.              Vitals Value Taken Time   /76 07/28/25 13:02   Temp 36.6 °C (97.9 °F) 07/28/25 12:43   Pulse 96 07/28/25 13:13   Resp 22 07/28/25 13:13   SpO2 92 % 07/28/25 13:13   Vitals shown include unfiled device data.      No case tracking events are documented in the log.      Pain/Bernabe Score: Pain Rating Prior to Med Admin: 0 (7/28/2025  7:39 AM)  Bernabe Score: 6 (7/28/2025 12:37 PM)

## 2025-07-28 NOTE — DISCHARGE INSTRUCTIONS
Post-Operative Discharge Instructions: Dr. Rolon    Physical Therapy  You will have home health/physical therapy working with you 2-3x a week for the first two weeks. After this, it will be necessary to begin formal outpatient physical therapy for an additional 2 weeks for hip replacements and about 4-6 weeks for knee replacements.   Knee replacements can get stiff and as such the post operative therapy is critical after a knee replacement. Range of motion exercises are not limited to therapy sessions and should be done every 1-2 hours on your own.   Hip replacements your therapy for the first month is mostly walking and gradually returning to activities as tolerated. However, you will have to wean off assistive devices and maintain hip precautions for 6 weeks post operatively.     Pain Management  Some degree of pain is normal and expected. You will improve gradually as your muscles become stronger and healing continues. This speed of recovery is different for every patient and you should not compare your recovery to another friend or relative.   You will be discharged with pain medications. You should wean off of these as tolerated by 4-6 weeks. but it is expected you will need them in the immediate post operative period and for therapy.   Pain medications can have common side effects of constipation which you should take a laxative, nausea which you should take medication with food, itching which can be alleviated with Benadryl, and confusion which you should stop taking pain medications and call our office if this occurs.   Be aware of your ingestion of Tylenol if your pain medication has this in it, you should not exceed 3000mg of Acetaminophen as this can damage your liver.   If you require a pain medication refill, you will need to contact our office at least 3 days in advance to prescription running out. Prescriptions cannot be called into a pharmacy. You will need to  a prescription in one of our  office locations depending on our clinic availability.     Swelling  You will have swelling of the post operative leg. Swelling may get worse with activity. It will likely get worse in the 2-3 days after discharge from the hospital. Typically swelling is correlated to pain. It can be helpful to elevate your extremity above the level of your heart and consistent use of ice. Elevating your extremity should not be done as to violate your hip precautions after a hip replacement, and no pillows should be placed under the knee after knee replacement surgery.   You will have compression stockings that should remain on for 3 weeks following surgery. They should only be removed for hygiene purposes. These will help with the swelling.       Anticoagulation  You will be placed on a blood thinner to prevent blood clots. You will need to take this as directed.        Wound Care  Your dressing should be changed after 7 days and every few days thereafter. You may shower over this dressing but it should be covered or kept dry (jp wrap or garbage bag). We will remove a portion of your bottom glue/gauze dressing at your follow up appointment. You can continue to shower but NO scrubbing the incision, should pat dry. NO soaking the wound in a bathtub, hot tub, pool, ocean etc. for at least 6 weeks after surgery. Your incision will likely be glued on the skin and no sutures should need to be removed post operatively.  You can begin putting on Vitamin E based lotions on the wound around 6-8 weeks post operatively when there is no remaining scabbing of the incision.   If there is any drainage post operatively you should contact our office immediately    Antibiotics  You will need to be placed on prophylactic antibiotics for 6 weeks.    Other Considerations  No additional anti-inflammatories should be used for 3 weeks following surgery  No driving for about 2-4 weeks following surgery on the left leg and about 4-6 weeks after surgery on  the right leg. You will need to be off pain medications completely. You will need to be able to perform evasive driving maneuvers without hesitation.    You can fly about 2 weeks post-operatively. However, we may recommend alterative blood thinners if this will take place.   Return to work is patient specific. If a desk job, it may be 2-4 weeks for motivated individuals. Patients in strenuous or physical jobs may be out for 12 weeks.     Follow-up appointments  Your first post operative visit will be about 3 weeks after surgery. You will have x-rays at this appointment  Your second post operative visit will be about 3 months after surgery. This will be for a clinical check only unless a reason arises for an x-ray  Your third post operative visit will be about 1 year after surgery. X-rays are taken at this time.      Do not drive, drink alcohol, or sign legal documents for 24 hours, or if taking narcotic pain medication.   Do not drive until cleared by Dr. Rolon     Fall Prevention  Millions of people fall every year and injure themselves. You may have had anesthesia or sedation which may increase your risk of falling. You may have health issues that put you at an increased risk of falling.     Here are ways to reduce your risk of falling.    Make your home safe by keeping walkways clear of objects you may trip over.  Use non-slip pads under rugs. Do not use area rugs or small throw rugs.  Use non-slip mats in bathtubs and showers.  Install handrails and lights on staircases.  Do not walk in poorly lit areas.  Do not stand on chairs or wobbly ladders.  Use caution when reaching overhead or looking upward. This position can cause a loss of balance.  Be sure your shoes fit properly, have non-slip bottoms and are in good condition.   Wear shoes both inside and out. Avoid going barefoot or wearing slippers.  Be cautious when going up and down stairs, curbs, and when walking on uneven sidewalks.  If your balance is  poor, consider using a cane or walker.  If your fall was related to alcohol use, stop or limit alcohol intake.   If your fall was related to use of sleeping medicines, talk to your doctor about this. You may need to reduce your dosage at bedtime if you awaken during the night to go to the bathroom.    To reduce the need for nighttime bathroom trips:  Avoid drinking fluids for several hours before going to bed  Empty your bladder before going to bed  Men can keep a urinal at the bedside  Stay as active as you can. Balance, flexibility, strength, and endurance all come from exercise. They all play a role in preventing falls. Ask your healthcare provider which types of activity are right for you.  Get your vision checked on a regular basis.  If you have pets, know where they are before you stand up or walk so you don't trip over them.  Use night lights.

## 2025-07-28 NOTE — TRANSFER OF CARE
Anesthesia Transfer of Care Note    Patient: Tony Gordillo    Procedure(s) Performed: Procedure(s) (LRB):  REVISION, ARTHROPLASTY, TOTAL KNEE (Right)    Patient location: PACU    Anesthesia Type: general    Transport from OR: Transported from OR on 6-10 L/min O2 by face mask with adequate spontaneous ventilation    Post pain: adequate analgesia    Post assessment: no apparent anesthetic complications and tolerated procedure well    Post vital signs: stable    Level of consciousness: responds to stimulation and sedated    Nausea/Vomiting: no nausea/vomiting    Complications: none    Transfer of care protocol was followed      Last vitals: Visit Vitals  /60 (BP Location: Right arm, Patient Position: Lying)   Pulse 95   Temp 36.6 °C (97.9 °F) (Temporal)   Resp 15   Wt 133.8 kg (295 lb)   SpO2 100%   BMI 38.92 kg/m²

## 2025-07-29 ENCOUNTER — EXTERNAL HOME HEALTH (OUTPATIENT)
Dept: HOME HEALTH SERVICES | Facility: HOSPITAL | Age: 45
End: 2025-07-29
Payer: COMMERCIAL

## 2025-07-29 ENCOUNTER — TELEPHONE (OUTPATIENT)
Dept: ORTHOPEDICS | Facility: CLINIC | Age: 45
End: 2025-07-29
Payer: COMMERCIAL

## 2025-07-29 DIAGNOSIS — Z96.651 STATUS POST REVISION OF TOTAL REPLACEMENT OF RIGHT KNEE: Primary | ICD-10-CM

## 2025-07-29 LAB — ACID FAST MOD KINY STN SPEC: NORMAL

## 2025-07-29 NOTE — TELEPHONE ENCOUNTER
Called the patient today regarding his surgery with Dr. Antonio Rolon. The patient had a Revision of right TKA on 7/28/25.     Pain Scale: 1 / 10  Any issues with Fever: No.  Completing at home exercises: Yes:   Any concerns regarding their dressing/bandage: No.  Patient confirmed first HH-PT appointment: HH has not reached out yet, will contact them.    Any other concerns: No.  [facility]    The patient was informed that if they have post op EMERGENCY's  to call the orthopedic Post-op Hot Line at [phone] .The patient was reminded that if they have any chest pain or shortness of breath to call 911 or go to the ER.  The patient verbalized understanding and does not have any other questions     Fany Mathur CST  Clinical- OR Assistant to Dr. Ryan Charles [facility]  [phone]

## 2025-07-31 ENCOUNTER — PATIENT OUTREACH (OUTPATIENT)
Dept: ADMINISTRATIVE | Facility: CLINIC | Age: 45
End: 2025-07-31
Payer: COMMERCIAL

## 2025-07-31 DIAGNOSIS — T84.50XA INFECTION OF PROSTHETIC JOINT, INITIAL ENCOUNTER: ICD-10-CM

## 2025-07-31 DIAGNOSIS — Z96.651 STATUS POST REVISION OF TOTAL REPLACEMENT OF RIGHT KNEE: Primary | ICD-10-CM

## 2025-07-31 DIAGNOSIS — Z96.659 HISTORY OF REVISION OF TOTAL KNEE ARTHROPLASTY: Primary | ICD-10-CM

## 2025-07-31 LAB
ESTROGEN SERPL-MCNC: NORMAL PG/ML
INSULIN SERPL-ACNC: NORMAL U[IU]/ML
LAB AP CLINICAL INFORMATION: NORMAL
LAB AP GROSS DESCRIPTION: NORMAL
LAB AP PERFORMING LOCATION(S): NORMAL
LAB AP REPORT FOOTNOTES: NORMAL

## 2025-07-31 NOTE — PROGRESS NOTES
C3 nurse spoke with Tony Gordillo for a TCC post hospital discharge follow up call. C3 nurse placed Ambulatory referral/consult to [facility].

## 2025-08-01 LAB
BACTERIA SPEC AEROBE CULT: NO GROWTH
BACTERIA SPEC ANAEROBE CULT: NO GROWTH

## 2025-08-04 LAB — MYCOBACTERIUM SPEC QL CULT: NORMAL

## 2025-08-05 ENCOUNTER — OFFICE VISIT (OUTPATIENT)
Dept: SLEEP MEDICINE | Facility: CLINIC | Age: 45
End: 2025-08-05
Payer: COMMERCIAL

## 2025-08-05 ENCOUNTER — PATIENT MESSAGE (OUTPATIENT)
Dept: SLEEP MEDICINE | Facility: CLINIC | Age: 45
End: 2025-08-05

## 2025-08-05 VITALS
BODY MASS INDEX: 39.1 KG/M2 | WEIGHT: 295 LBS | HEIGHT: 73 IN | HEART RATE: 112 BPM | SYSTOLIC BLOOD PRESSURE: 126 MMHG | DIASTOLIC BLOOD PRESSURE: 88 MMHG

## 2025-08-05 DIAGNOSIS — R35.1 NOCTURIA: ICD-10-CM

## 2025-08-05 DIAGNOSIS — G47.33 OSA (OBSTRUCTIVE SLEEP APNEA): Primary | ICD-10-CM

## 2025-08-05 DIAGNOSIS — G47.19 EXCESSIVE DAYTIME SLEEPINESS: ICD-10-CM

## 2025-08-05 DIAGNOSIS — F51.09 OTHER INSOMNIA NOT DUE TO A SUBSTANCE OR KNOWN PHYSIOLOGICAL CONDITION: ICD-10-CM

## 2025-08-05 PROCEDURE — 99204 OFFICE O/P NEW MOD 45 MIN: CPT | Mod: S$GLB,,, | Performed by: PHYSICIAN ASSISTANT

## 2025-08-05 PROCEDURE — 99999 PR PBB SHADOW E&M-EST. PATIENT-LVL V: CPT | Mod: PBBFAC,,, | Performed by: PHYSICIAN ASSISTANT

## 2025-08-05 NOTE — PROGRESS NOTES
Referred by Priscilla Wadsworth NP     NEW PATIENT VISIT    Tony Gordillo  is a pleasant 45 y.o. male  with PMH significant for HTN, HLD, DM II, hypothyroidism, low T, OA, LBP, allergic rhinitis, migraines, hx traumatic encephalopathy, mood disorder, BMI 38+, NAINA who presents for NAINA management following referral from Neurology       Reports dx NAINA in 2019 (AHI 11) following c/o snoring, poor disrupted and un-refreshing sleep, difficulties with sleep onset and maintenance, and excessive daytime sleepiness and fatigue. States he has been using his CPAP nightly since dx with significant improvement in sx. States he utilizing NP mask interface nightly, which is comfortable, but also has a FFM he utilized when congestion. This system works well for him. Reports pressure settings comfortable, tolerating well. States current CPAP machine is from 2019, and recently broke. Machine is blowing out inconsistent pressures. Since issues with CPAP has been dealing with increase in sx. Presents today to establish care with sleep medicine and to get orders for new CPAP machine.     PAP history   Problems CPAP broken   Mask NP   Pressure 4-20cwp   DME unsure   Machine age 2019   Download [place] hours [id number]       SLEEP SCHEDULE   Environment    Bed Time 10-11PM   Sleep Latency 1+hrs   Arousals 1-2   Nocturia 1   Back to sleep 20mins-can't   Wake time 9-10AM   Naps 0-2   Work          8/5/2025    10:55 AM   Sleep Clinic ROS    Difficulty breathing through the nose?  Sometimes   Sore throat or dry mouth in the morning? Yes   Irregular or very fast heart beat?  No   Shortness of breath?  No   Acid reflux? Yes   Body aches and pains?  Yes   Morning headaches? Yes   Dizziness? No   Mood changes?  No   Do you exercise?  Yes   Do you feel like moving your legs a lot?  No       Past Medical History:   Diagnosis Date    Diabetes mellitus, type 2     Hyperlipidemia     Hypertension     Obesity     NAINA (obstructive sleep apnea)      Problem  "List[1]  Current Medications[2]       Vitals:    08/05/25 1459   BP: 126/88   BP Location: Left arm   Patient Position: Sitting   Pulse: (!) 112   Weight: 133.8 kg (294 lb 15.6 oz)   Height: 6' 1" (1.854 m)     Physical Exam:    GEN:   Well-appearing  Psych:  Appropriate affect, demonstrates insight  SKIN:  No rash on the face or bridge of the nose      LABS:   Lab Results   Component Value Date    HGB 13.4 (L) 07/21/2025    CO2 23 07/21/2025         RECORDS REVIEWED:    8/22/19 HST: AHI 11, RDI 28, rizwan 89%    1/13/23 PSG: AHI 19.5 (REM AHI 51, supine AHI 29), rizwan 83%    ASSESSMENT        8/5/2025    10:48 AM   EPWORTH SLEEPINESS SCALE   Sitting and reading 1   Watching TV 1   Sitting, inactive in a public place (e.g. a theatre or a meeting) 0   As a passenger in a car for an hour without a break 1   Lying down to rest in the afternoon when circumstances permit 2   Sitting and talking to someone 0   Sitting quietly after a lunch without alcohol 0   In a car, while stopped for a few minutes in traffic 0   Total score 5        Patient-reported       PROBLEM DESCRIPTION/ Sx on Presentation  STATUS   Hx NAINA   + snoring, + witnessed apneas  Dx NAINA 2019 AHI 11 on HST  Had PSG in 2023 AHI 29 with REM AHI 51  Using CPAP nightly since dx with good control of sx  New   Daytime Sx   + sleepiness when inactive   ESS 5/24 on intake   (Improved on CPAP)  New   Insomnia   Trouble falling asleep: 1+ hrs  Arousals:         1-2  Hard to get back to sleep?: 20mins-can't    Prior pertinent medications:  Current pertinent medications:   Lyrica 75mg BID, oxycodone 5-10mg q4hrs PRN, robaxin 500mg QID, modafinil 100mg (prescribed/monitored by Neurology), lamictal 150mg, seroquel 25mg  New   Nocturia   x 1 per sleep period  New   Other issues:       PLAN      -using and benefiting from CPAP therapy until CPAP machine broke  -CPAP has reached the end of its usable life, patient will need a new one  -CPAP and supplies ordered (faxed to " [facility])  -consider titration study if increase sx persist once on new PAP  -discussed NAINA and PAP with patient in detail, including possible complications of untreated NAINA like heart attack/stroke  -advised on strict driving precautions; advised never to drive drowsy     Advised on plan of care. Answered all patient questions. Patient verbalized understanding and voiced agreement with plan of care.     RTC  will need follow up 31-90 days after receiving new CPAP  machine for compliance       The patient was given open opportunity to ask questions and/or express concerns about treatment plan. All questions/concerns were discussed.     Two patient identifiers used prior to evaluation.                  [1]   Patient Active Problem List  Diagnosis    Hyperlipidemia LDL goal <70    Insulin long-term use    Tinea pedis    Morbid obesity    Hypothyroidism    Type 2 diabetes mellitus 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 MRI negative. 11/2019 started on testosterone    Right foot pain    Decreased strength of lower extremity    Decreased range of motion of both ankles    Gait abnormality    Rib pain on left side    Dyspnea    Decreased strength, endurance, and mobility    Left hand pain    Mild neurocognitive disorder due to traumatic brain injury    Outbursts of anger    Other amnesia    Traumatic rotator cuff tear, right, initial encounter    S/P right rotator cuff repair    Biceps tendonosis of right shoulder    Decreased  range of motion of right shoulder    Impaired strength of shoulder muscles    Allergic conjunctivitis    Cornea edema    Descemet's membrane fold    Herpes simplex keratitis    Uncontrolled type 2 diabetes mellitus    Tear of lateral meniscus of right knee, current    Refractive error    Acute migraine    MCI (mild cognitive impairment)    Medication overuse headache    Chronic migraine with aura, intractable, with status migrainosus    Agitation    Traumatic encephalopathy    Allergy desensitization therapy    Concussion with no loss of consciousness    Concussion with loss of consciousness    Other specified persistent mood disorders    Cognitive communication deficit    Impaired mobility and ADLs    Imbalance    Primary osteoarthritis of both knees    Bilateral chronic knee pain    Infection of prosthetic right knee joint    Prosthetic joint infection   [2]   Current Outpatient Medications:     acetaminophen (TYLENOL) 500 MG tablet, Take 2 tablets (1,000 mg total) by mouth every 8 (eight) hours as needed for Pain., Disp: 42 tablet, Rfl: 0    ammonium lactate 12 % Crea, Apply 1 application  topically once daily., Disp: 140 g, Rfl: 5    ARIPiprazole (ABILIFY) 2 MG Tab, Take 1 tablet (2 mg total) by mouth once daily., Disp: 30 tablet, Rfl: 11    aspirin (ECOTRIN) 81 MG EC tablet, Take 1 tablet (81 mg total) by mouth 2 (two) times a day., Disp: 60 tablet, Rfl: 0    atorvastatin (LIPITOR) 40 MG tablet, Take 1 tablet (40 mg total) by mouth once daily., Disp: 90 tablet, Rfl: 3    azelastine (ASTELIN) 137 mcg (0.1 %) nasal spray, Use 1-2 sprays in each nostril twice daily, Disp: 90 mL, Rfl: 3    benazepriL (LOTENSIN) 20 MG tablet, Take 1 tablet (20 mg total) by mouth once daily., Disp: 90 tablet, Rfl: 3    blood sugar diagnostic Strp, To check BG 4 times daily, to use with insurance preferred meter, Disp: 400 strip, Rfl: 3    blood sugar diagnostic Strp, OneTouch Ultra Test strips, Disp: , Rfl:     blood-glucose meter  kit, OneTouch Ultra2 Meter kit, Disp: , Rfl:     blood-glucose sensor (DEXCOM G7 SENSOR) Filomena, Change every 10 days, Disp: 12 each, Rfl: 3    celecoxib (CELEBREX) 200 MG capsule, Take 1 capsule (200 mg total) by mouth 2 (two) times daily., Disp: 14 capsule, Rfl: 0    cyanocobalamin, vitamin B-12, 5,000 mcg Subl, Place one under tongue once a day for 1 month, then continue to take under tongue per week, Disp: 30 tablet, Rfl: 3    docusate sodium (COLACE) 100 MG capsule, Take 1 capsule (100 mg total) by mouth 2 (two) times daily., Disp: 30 capsule, Rfl: 0    doxycycline (VIBRAMYCIN) 100 MG Cap, Take 1 capsule (100 mg total) by mouth every 12 (twelve) hours., Disp: 90 capsule, Rfl: 0    empagliflozin (JARDIANCE) 25 mg tablet, Take 1 tablet (25 mg total) by mouth once daily., Disp: 90 tablet, Rfl: 2    EPINEPHrine (EPIPEN 2-AYALA) 0.3 mg/0.3 mL AtIn, Inject 0.3 mLs (0.3 mg total) into the muscle as needed (Anaphylaxis)., Disp: 2 each, Rfl: 0    fluticasone propionate (FLONASE) 50 mcg/actuation nasal spray, 1 spray (50 mcg total) by Each Nostril route once daily., Disp: 48 g, Rfl: 5    insulin aspart U-100 (NOVOLOG U-100 INSULIN ASPART) 100 unit/mL injection, MAX DAILY DOSE 120 UNITS IN INSULIN PUMP., Disp: 110 mL, Rfl: 2    insulin pump cart,auto,BT,G6/7 (OMNIPOD 5 G6-G7 PODS, GEN 5,) Crtg, Change pod once daily, Disp: 90 each, Rfl: 2    L-METHYLFOLATE 15 mg Tab, TAKE 15 MG BY MOUTH ONCE DAILY., Disp: 30 tablet, Rfl: 11    lamoTRIgine (LAMICTAL) 100 MG tablet, Take 1.5 tablets (150 mg total) by mouth once daily., Disp: 135 tablet, Rfl: 3    lancets Misc, To check BG 4 times daily, to use with insurance preferred meter, Disp: 400 each, Rfl: 3    levothyroxine (SYNTHROID) 50 MCG tablet, Take 1 tablet (50 mcg total) by mouth before breakfast., Disp: 90 tablet, Rfl: 1    loratadine (CLARITIN) 10 mg tablet, Take 1 tablet (10 mg total) by mouth once daily., Disp: 90 tablet, Rfl: 3    methocarbamoL (ROBAXIN) 500 MG Tab, Take 1  "tablet (500 mg total) by mouth 4 (four) times daily. for 10 days, Disp: 40 tablet, Rfl: 0    modafiniL (PROVIGIL) 100 MG Tab, Take 1 tablet (100 mg total) by mouth every morning., Disp: 90 tablet, Rfl: 1    oxyCODONE (ROXICODONE) 5 MG immediate release tablet, Take 1-2 tablets (5-10 mg total) by mouth every 4 (four) hours as needed (pain)., Disp: 40 tablet, Rfl: 0    pen needle, diabetic (BD ULTRA-FINE KEN PEN NEEDLE) 32 gauge x 5/32" Ndle, 1 Device by Misc.(Non-Drug; Combo Route) route 5 (five) times daily., Disp: 550 each, Rfl: 4    pregabalin (LYRICA) 75 MG capsule, Take 1 capsule (75 mg total) by mouth 2 (two) times daily. for 14 days, Disp: 28 capsule, Rfl: 0    QUEtiapine (SEROQUEL) 25 MG Tab, Take 1 tablet (25 mg total) by mouth once daily in the evening, Disp: 30 tablet, Rfl: 11    syringe, disposable, 1 mL Syrg, Testosterone every 2 weeks, Disp: 25 Syringe, Rfl: 1    tirzepatide (MOUNJARO) 5 mg/0.5 mL PnIj, Start Mounjaro 5 mg once weekly for 4 weeks. Then increase to Mounjaro 7.5 mg once weekly., Disp: 4 Pen, Rfl: 0    tirzepatide (MOUNJARO) 7.5 mg/0.5 mL PnIj, Start Mounjaro 5 mg once weekly for 4 weeks. Then increase to Mounjaro 7.5 mg once weekly., Disp: 4 Pen, Rfl: 2    mirtazapine (REMERON) 15 MG tablet, Take 1 tablet (15 mg total) by mouth every evening., Disp: 30 tablet, Rfl: 11    "

## 2025-08-06 ENCOUNTER — TELEPHONE (OUTPATIENT)
Dept: HOME HEALTH SERVICES | Facility: CLINIC | Age: 45
End: 2025-08-06
Payer: COMMERCIAL

## 2025-08-06 NOTE — PHYSICIAN QUERY
Please clarify the diagnosis of Prosthetic joint infection:  Other (please specify): Right knee periprosthetic joint infection

## 2025-08-11 ENCOUNTER — PATIENT MESSAGE (OUTPATIENT)
Dept: HOME HEALTH SERVICES | Facility: CLINIC | Age: 45
End: 2025-08-11
Payer: COMMERCIAL

## 2025-08-12 ENCOUNTER — OFFICE VISIT (OUTPATIENT)
Dept: HOME HEALTH SERVICES | Facility: CLINIC | Age: 45
End: 2025-08-12
Payer: COMMERCIAL

## 2025-08-12 ENCOUNTER — PATIENT MESSAGE (OUTPATIENT)
Dept: ORTHOPEDICS | Facility: CLINIC | Age: 45
End: 2025-08-12
Payer: COMMERCIAL

## 2025-08-12 VITALS
OXYGEN SATURATION: 98 % | SYSTOLIC BLOOD PRESSURE: 128 MMHG | DIASTOLIC BLOOD PRESSURE: 70 MMHG | RESPIRATION RATE: 16 BRPM | HEART RATE: 68 BPM | TEMPERATURE: 97 F

## 2025-08-12 DIAGNOSIS — I10 ESSENTIAL HYPERTENSION: Primary | ICD-10-CM

## 2025-08-12 DIAGNOSIS — Z96.659 HISTORY OF REVISION OF TOTAL KNEE ARTHROPLASTY: ICD-10-CM

## 2025-08-12 DIAGNOSIS — T84.50XA INFECTION OF PROSTHETIC JOINT, INITIAL ENCOUNTER: ICD-10-CM

## 2025-08-12 PROCEDURE — 99349 HOME/RES VST EST MOD MDM 40: CPT | Mod: S$GLB,,, | Performed by: NURSE PRACTITIONER

## 2025-08-17 ENCOUNTER — HOSPITAL ENCOUNTER (EMERGENCY)
Facility: HOSPITAL | Age: 45
Discharge: HOME | End: 2025-08-17
Attending: EMERGENCY MEDICINE
Payer: COMMERCIAL

## 2025-08-17 VITALS
HEIGHT: 73 IN | DIASTOLIC BLOOD PRESSURE: 81 MMHG | HEART RATE: 88 BPM | OXYGEN SATURATION: 97 % | TEMPERATURE: 98 F | RESPIRATION RATE: 18 BRPM | BODY MASS INDEX: 41.75 KG/M2 | WEIGHT: 315 LBS | SYSTOLIC BLOOD PRESSURE: 170 MMHG

## 2025-08-17 DIAGNOSIS — Z77.098 EXPOSURE TO CHEMICAL INHALATION: Primary | ICD-10-CM

## 2025-08-17 DIAGNOSIS — R07.9 CHEST PAIN: ICD-10-CM

## 2025-08-17 DIAGNOSIS — R06.00 DYSPNEA: ICD-10-CM

## 2025-08-17 PROCEDURE — 94640 AIRWAY INHALATION TREATMENT: CPT | Mod: XB

## 2025-08-17 PROCEDURE — 94761 N-INVAS EAR/PLS OXIMETRY MLT: CPT

## 2025-08-17 PROCEDURE — 25000242 PHARM REV CODE 250 ALT 637 W/ HCPCS: Performed by: EMERGENCY MEDICINE

## 2025-08-17 PROCEDURE — 25000003 PHARM REV CODE 250: Performed by: EMERGENCY MEDICINE

## 2025-08-17 PROCEDURE — 96374 THER/PROPH/DIAG INJ IV PUSH: CPT

## 2025-08-17 PROCEDURE — 99284 EMERGENCY DEPT VISIT MOD MDM: CPT | Mod: 25

## 2025-08-17 PROCEDURE — 93005 ELECTROCARDIOGRAM TRACING: CPT

## 2025-08-17 RX ORDER — IPRATROPIUM BROMIDE AND ALBUTEROL SULFATE 2.5; .5 MG/3ML; MG/3ML
3 SOLUTION RESPIRATORY (INHALATION)
Status: COMPLETED | OUTPATIENT
Start: 2025-08-17 | End: 2025-08-17

## 2025-08-17 RX ORDER — INDOMETHACIN 25 MG/1
50 CAPSULE ORAL
Status: COMPLETED | OUTPATIENT
Start: 2025-08-17 | End: 2025-08-17

## 2025-08-17 RX ORDER — IBUPROFEN 400 MG/1
800 TABLET, FILM COATED ORAL
Status: COMPLETED | OUTPATIENT
Start: 2025-08-17 | End: 2025-08-17

## 2025-08-17 RX ADMIN — IBUPROFEN 800 MG: 400 TABLET ORAL at 02:08

## 2025-08-17 RX ADMIN — SODIUM BICARBONATE 50 MEQ: 84 INJECTION, SOLUTION INTRAVENOUS at 02:08

## 2025-08-17 RX ADMIN — IPRATROPIUM BROMIDE AND ALBUTEROL SULFATE 3 ML: 2.5; .5 SOLUTION RESPIRATORY (INHALATION) at 02:08

## 2025-08-18 LAB
OHS QRS DURATION: 94 MS
OHS QRS DURATION: 96 MS
OHS QTC CALCULATION: 415 MS
OHS QTC CALCULATION: 450 MS

## 2025-08-19 ENCOUNTER — OFFICE VISIT (OUTPATIENT)
Dept: ORTHOPEDICS | Facility: CLINIC | Age: 45
End: 2025-08-19
Payer: COMMERCIAL

## 2025-08-19 ENCOUNTER — PATIENT MESSAGE (OUTPATIENT)
Dept: NEUROLOGY | Facility: CLINIC | Age: 45
End: 2025-08-19
Payer: COMMERCIAL

## 2025-08-19 ENCOUNTER — APPOINTMENT (OUTPATIENT)
Dept: RADIOLOGY | Facility: HOSPITAL | Age: 45
End: 2025-08-19
Attending: ORTHOPAEDIC SURGERY
Payer: COMMERCIAL

## 2025-08-19 VITALS — BODY MASS INDEX: 41.75 KG/M2 | WEIGHT: 315 LBS | HEIGHT: 73 IN

## 2025-08-19 DIAGNOSIS — Z96.651 STATUS POST REVISION OF TOTAL REPLACEMENT OF RIGHT KNEE: ICD-10-CM

## 2025-08-19 DIAGNOSIS — Z96.651 STATUS POST REVISION OF TOTAL REPLACEMENT OF RIGHT KNEE: Primary | ICD-10-CM

## 2025-08-19 PROCEDURE — 73562 X-RAY EXAM OF KNEE 3: CPT | Mod: TC,PN,RT

## 2025-08-19 PROCEDURE — 73562 X-RAY EXAM OF KNEE 3: CPT | Mod: 26,RT,, | Performed by: RADIOLOGY

## 2025-08-19 PROCEDURE — 99999 PR PBB SHADOW E&M-EST. PATIENT-LVL IV: CPT | Mod: PBBFAC,,, | Performed by: ORTHOPAEDIC SURGERY

## 2025-08-19 PROCEDURE — 99024 POSTOP FOLLOW-UP VISIT: CPT | Mod: S$GLB,,, | Performed by: ORTHOPAEDIC SURGERY

## 2025-08-25 ENCOUNTER — OFFICE VISIT (OUTPATIENT)
Dept: PSYCHIATRY | Facility: CLINIC | Age: 45
End: 2025-08-25
Payer: COMMERCIAL

## 2025-08-25 DIAGNOSIS — F41.1 GENERALIZED ANXIETY DISORDER: ICD-10-CM

## 2025-08-25 DIAGNOSIS — F33.1 MAJOR DEPRESSIVE DISORDER, RECURRENT, MODERATE: Primary | ICD-10-CM

## 2025-08-25 DIAGNOSIS — R45.4 OUTBURSTS OF ANGER: ICD-10-CM

## 2025-08-25 DIAGNOSIS — G43.E11 CHRONIC MIGRAINE WITH AURA, INTRACTABLE, WITH STATUS MIGRAINOSUS: ICD-10-CM

## 2025-08-25 PROCEDURE — 99214 OFFICE O/P EST MOD 30 MIN: CPT | Mod: S$GLB,,,

## 2025-08-25 PROCEDURE — G2211 COMPLEX E/M VISIT ADD ON: HCPCS | Mod: S$GLB,,,

## 2025-08-25 PROCEDURE — 99999 PR PBB SHADOW E&M-EST. PATIENT-LVL I: CPT | Mod: PBBFAC,,,

## 2025-08-25 RX ORDER — SERTRALINE HYDROCHLORIDE 50 MG/1
50 TABLET, FILM COATED ORAL DAILY
Qty: 30 TABLET | Refills: 11 | Status: SHIPPED | OUTPATIENT
Start: 2025-08-25 | End: 2026-08-25

## 2025-08-25 RX ORDER — BUPROPION HYDROCHLORIDE 150 MG/1
150 TABLET ORAL DAILY
Qty: 30 TABLET | Refills: 11 | Status: SHIPPED | OUTPATIENT
Start: 2025-08-25 | End: 2026-08-25

## 2025-08-27 ENCOUNTER — PATIENT MESSAGE (OUTPATIENT)
Dept: ADMINISTRATIVE | Facility: HOSPITAL | Age: 45
End: 2025-08-27
Payer: COMMERCIAL

## 2025-08-31 ENCOUNTER — DOCUMENT SCAN (OUTPATIENT)
Dept: HOME HEALTH SERVICES | Facility: HOSPITAL | Age: 45
End: 2025-08-31
Payer: COMMERCIAL

## 2025-09-04 ENCOUNTER — TELEPHONE (OUTPATIENT)
Facility: CLINIC | Age: 45
End: 2025-09-04
Payer: COMMERCIAL

## 2025-09-04 DIAGNOSIS — M17.12 PRIMARY OSTEOARTHRITIS OF LEFT KNEE: Primary | ICD-10-CM

## (undated) DEVICE — GLOVE SURG BIOGEL LATEX SZ 7.5

## (undated) DEVICE — SPONGE GAUZE 4X4 12 PLY STRL

## (undated) DEVICE — SOL IRR NACL .9% 1000CC

## (undated) DEVICE — DRAPE INCISE IOBAN 2 23X17IN

## (undated) DEVICE — Device

## (undated) DEVICE — PROBE ARTHO ENERGY 90 DEG

## (undated) DEVICE — TUBE SET INFLOW/OUTFLOW

## (undated) DEVICE — SWAB CULTURETTE II DUAL

## (undated) DEVICE — PIN BONE 4X110MM
Type: IMPLANTABLE DEVICE | Site: KNEE | Status: NON-FUNCTIONAL
Removed: 2024-11-18

## (undated) DEVICE — GOWN NONREINF SET-IN SLV XL

## (undated) DEVICE — GLOVE SENSICARE PI GRN 8

## (undated) DEVICE — DRAPE U SPLIT SHEET 54X76IN

## (undated) DEVICE — BLADE SAW OSC 19.5 X 1.2 X 90

## (undated) DEVICE — APPLICATOR CHLORAPREP ORN 26ML

## (undated) DEVICE — GLOVE BIOGEL PI MICRO SZ 6.5

## (undated) DEVICE — TOURNIQUET SB QC DP 34X4IN

## (undated) DEVICE — BLANKET UPPER BODY 78.7X29.9IN

## (undated) DEVICE — SUT ETHILON 3-0 PS2 18 BLK

## (undated) DEVICE — PIN BONE 4 X 140MM STERILE
Type: IMPLANTABLE DEVICE | Site: KNEE | Status: NON-FUNCTIONAL
Removed: 2025-07-28

## (undated) DEVICE — DRAPE STERI U-SHAPED 47X51IN

## (undated) DEVICE — GLOVE SIGNATURE ESSNTL LTX 8

## (undated) DEVICE — PEROXIDE HYDROGEN 3% 16OZ

## (undated) DEVICE — GLOVE SIGNATURE ESSNTL LTX 7.5

## (undated) DEVICE — SOL NACL IRR 3000ML

## (undated) DEVICE — ARMCRADLE BLUE POLY FOAM

## (undated) DEVICE — KIT TRACTION SHLDR ST DISP LF

## (undated) DEVICE — SPONGE COTTON TRAY 4X4IN

## (undated) DEVICE — STOCKINET 4INX48

## (undated) DEVICE — COVER OVERHEAD SURG LT BLUE

## (undated) DEVICE — CANISTER INFOV.A.C WOUND 500ML

## (undated) DEVICE — SUT 3/0 36IN COATED VICRYL

## (undated) DEVICE — SYR ONLY LUER LOCK 20CC

## (undated) DEVICE — SUT MCRYL PLUS 4-0 PS2 27IN

## (undated) DEVICE — GAUZE CNFRM STRL 3INX4.1YD

## (undated) DEVICE — CANISTER SUCTION RIGID 2000CC

## (undated) DEVICE — DRAPE PLASTIC U 60X72

## (undated) DEVICE — BANDAGE ACE ELASTIC 6"

## (undated) DEVICE — SUT STRATAFIX PDS PLS 45CM

## (undated) DEVICE — PAD COLD THERAPY KNEE WRAP ON

## (undated) DEVICE — PULSAVAC ZIMMER

## (undated) DEVICE — SUT VICRYL PLUS 2-0 CT1 18

## (undated) DEVICE — CLIPPER BLADE MOD 4406 (CAREF)

## (undated) DEVICE — SYR 30CC LUER LOCK

## (undated) DEVICE — TOWEL OR DISP STRL BLUE 4/PK

## (undated) DEVICE — BLANKET WARMING UPPER BODY

## (undated) DEVICE — PASSER SUTURE FIRSTPASS ST

## (undated) DEVICE — SEE MEDLINE ITEM 157166

## (undated) DEVICE — COLLECTOR SPECIMEN ANAEROBIC

## (undated) DEVICE — UNDERGLOVES BIOGEL PI SIZE 8

## (undated) DEVICE — CONTAINER SPECIMEN OR STER 4OZ

## (undated) DEVICE — COOLER ICEMAN COLD THERAPY

## (undated) DEVICE — TIP YANKAUERS BULB NO VENT

## (undated) DEVICE — NDL SPINAL 18GX3.5 SPINOCAN

## (undated) DEVICE — SEE MEDLINE ITEM 157165

## (undated) DEVICE — UNDERGLOVES BIOGEL PI SZ 7 LF

## (undated) DEVICE — CANISTER SUCTION 2 LTR

## (undated) DEVICE — PAD CAST SPECIALIST STRL 6

## (undated) DEVICE — SUT VICRYL+ 1 CT1 18IN

## (undated) DEVICE — PAD SHOULDER POLAR CARE XL

## (undated) DEVICE — PACK ARTHROSCOPY W/ISO BAC

## (undated) DEVICE — CANNULA THREADED 8.5 X 72MM

## (undated) DEVICE — DRAPE T EXTRM SURG 121X128X90

## (undated) DEVICE — SEE MEDLINE ITEM 157110

## (undated) DEVICE — BLADE SURG CARBON STEEL SZ11

## (undated) DEVICE — DRAPE THREE-QUARTER 53X77IN

## (undated) DEVICE — HOOD FLYTE PEELWY STERISHIELD

## (undated) DEVICE — DRESSING TRANS 4X4 TEGADERM

## (undated) DEVICE — ELECTRODE REM PLYHSV RETURN 9

## (undated) DEVICE — SUT STRATAFIX PDS 2-0 CT-1 9IN

## (undated) DEVICE — UNDERGLOVE BIOGEL PI SZ 6.5 LF

## (undated) DEVICE — BLANKET LOWER BODY 55.9X40.2IN

## (undated) DEVICE — GLOVE SENSICARE PI GRN 6.5

## (undated) DEVICE — NDL ANES SPINAL 18X3.5ST 18G

## (undated) DEVICE — MAT SUCTION PUDDLEVAC ORANGE

## (undated) DEVICE — BLADE GREAT WHITE 4.2 6/BX

## (undated) DEVICE — DRESSING GAUZE XEROFORM 5X9

## (undated) DEVICE — SPONGE LAP 18X18 PREWASHED

## (undated) DEVICE — SYS SURGIPHOR STRL IRRG

## (undated) DEVICE — CANNULA ARTHRO 7MMX7CM

## (undated) DEVICE — BLADE MEDIUM LONG 9MM X 31MM

## (undated) DEVICE — TAPE MEDIPORE 4IN X 2YDS

## (undated) DEVICE — DRAPE SURG W/TWL 17 5/8X23

## (undated) DEVICE — DRAPE THREE-QTR REINF 53X77IN

## (undated) DEVICE — BOWL STERILE LARGE 32OZ

## (undated) DEVICE — NDL SAFETY 22G X 1.5 ECLIPSE

## (undated) DEVICE — SUT 0 36IN PDS II VIO MONO

## (undated) DEVICE — GLOVE SURGICAL LATEX SZ 7

## (undated) DEVICE — DRAPE TOP 53X102IN

## (undated) DEVICE — BLADE MAKO STANDARD

## (undated) DEVICE — MAT QUICK 40X30 FLOOR FLUID LF

## (undated) DEVICE — SUT PDS 2-0 CT1 27IN CLEAR

## (undated) DEVICE — TRAP FLUID SMOKE EVACUATOR

## (undated) DEVICE — BLADE TONGUE DEPRESSOR STRL

## (undated) DEVICE — PIN BONE 4 X 140MM STERILE
Type: IMPLANTABLE DEVICE | Site: KNEE | Status: NON-FUNCTIONAL
Removed: 2024-11-18

## (undated) DEVICE — SYR 50CC LL

## (undated) DEVICE — PAD ABDOMINAL STERILE 8X10IN

## (undated) DEVICE — PAD PREP CUFFED NS 24X48IN

## (undated) DEVICE — DRESSING OPTIFOAM 3X3IN

## (undated) DEVICE — PAD ABD 8X10 STERILE

## (undated) DEVICE — SLING ARM ULTRA III PAD MED

## (undated) DEVICE — KIT TRIATHLON CR TIB PREP SZ6

## (undated) DEVICE — SUT ULTRA BLADE #2

## (undated) DEVICE — BRACE KNEE T SCOPE PREMIER

## (undated) DEVICE — KIT TRIATHLON CR FEM PREP SZ5

## (undated) DEVICE — SUT MCRYL PLUS 3-0 PS2 27IN

## (undated) DEVICE — SLING ULTRASLING II LG +13IN

## (undated) DEVICE — SEE MEDLINE ITEM 157216

## (undated) DEVICE — BANDAGE MATRIX HK LOOP 6IN 5YD

## (undated) DEVICE — DRAPE STERI INSTRUMENT 1018

## (undated) DEVICE — BLADE MAKO NARROW

## (undated) DEVICE — TOWEL OR XRAY BLUE 17X26IN

## (undated) DEVICE — BANDAGE ELASTIC ACE 2IN 10/CA

## (undated) DEVICE — SYS CLSR DERMABOND PRINEO 22CM

## (undated) DEVICE — BNDG COFLEX FOAM LF2 ST 6X5YD

## (undated) DEVICE — GEMINI ARTHREX SR8

## (undated) DEVICE — GLOVE SENSICARE PI MICRO 6.5

## (undated) DEVICE — NDL HYPO STD REG BVL 18GX1.5IN

## (undated) DEVICE — STAPLER SKIN ROTATING HEAD

## (undated) DEVICE — PAD SHOULDER CARE POLAR

## (undated) DEVICE — BANDAGE ESMARK ELASTIC ST 6X9

## (undated) DEVICE — KIT DRAPE RIO ONE PIECE W/POCK

## (undated) DEVICE — KIT VIZADISC KNEE TRACKING

## (undated) DEVICE — BANDAGE MATRIX HK LOOP 4IN 5YD

## (undated) DEVICE — GAUZE SPONGE 4X4 12PLY

## (undated) DEVICE — SHEET DRAPE FAN-FOLDED 3/4

## (undated) DEVICE — CANNULA ARTHRO 8.25MM X 7CM

## (undated) DEVICE — PIN BONE 4X110MM
Type: IMPLANTABLE DEVICE | Site: KNEE | Status: NON-FUNCTIONAL
Removed: 2025-07-28

## (undated) DEVICE — BLADE LANZA PREBENT 4.2MMX13CM

## (undated) DEVICE — PAD CAST SPECIALIST STRL 4

## (undated) DEVICE — SYR 3CC LUER LOC

## (undated) DEVICE — NDL SPINAL 20GX3.5 HUB

## (undated) DEVICE — BLADE SAW OSCILLATING

## (undated) DEVICE — BNDG COFLEX FOAM LF2 ST 4X5YD

## (undated) DEVICE — BLADE VORTEX SHAVER 4.5MMX13CM

## (undated) DEVICE — SYS IRRISEPT 450ML0.05% CHG

## (undated) DEVICE — KIT PREVENA PLUS

## (undated) DEVICE — GLOVE SENSICARE PI MICRO 7.5